# Patient Record
Sex: FEMALE | Race: WHITE | NOT HISPANIC OR LATINO | Employment: OTHER | URBAN - METROPOLITAN AREA
[De-identification: names, ages, dates, MRNs, and addresses within clinical notes are randomized per-mention and may not be internally consistent; named-entity substitution may affect disease eponyms.]

---

## 2017-03-22 DIAGNOSIS — F31.32 BIPOLAR 1 DISORDER, DEPRESSED, MODERATE (H): Primary | ICD-10-CM

## 2017-03-22 LAB — LITHIUM SERPL-SCNC: 0.3 MMOL/L (ref 0.6–1.2)

## 2017-03-22 PROCEDURE — 36415 COLL VENOUS BLD VENIPUNCTURE: CPT | Performed by: FAMILY MEDICINE

## 2017-03-22 PROCEDURE — 80178 ASSAY OF LITHIUM: CPT | Performed by: PSYCHIATRY & NEUROLOGY

## 2017-03-28 ENCOUNTER — TRANSFERRED RECORDS (OUTPATIENT)
Dept: HEALTH INFORMATION MANAGEMENT | Facility: CLINIC | Age: 82
End: 2017-03-28

## 2017-04-01 DIAGNOSIS — R25.1 TREMOR: ICD-10-CM

## 2017-04-01 NOTE — TELEPHONE ENCOUNTER
Medication Detail      Disp Refills Start End EDNA   propranolol (INDERAL) 20 MG tablet 60 tablet 11 10/6/2016  No   Sig: Take 1 tablet (20 mg) by mouth 2 times daily   Class: E-Prescribe   Route: Oral   Order: [id number]       Last Office Visit with [facility], [facility] or [facility] prescribing provider:  8/4/2016   Future Office Visit:        BP Readings from Last 3 Encounters:   08/04/16 118/78   05/14/15 126/66   11/21/14 122/80     Patient is requesting a 90 day supply

## 2017-04-05 RX ORDER — PROPRANOLOL HYDROCHLORIDE 20 MG/1
TABLET ORAL
Qty: 180 TABLET | Refills: 1 | Status: SHIPPED | OUTPATIENT
Start: 2017-04-05 | End: 2017-11-22

## 2017-05-15 ENCOUNTER — TELEPHONE (OUTPATIENT)
Dept: FAMILY MEDICINE | Facility: CLINIC | Age: 82
End: 2017-05-15

## 2017-05-15 DIAGNOSIS — F31.4 SEVERE DEPRESSED BIPOLAR I DISORDER WITHOUT PSYCHOTIC FEATURES (H): Primary | ICD-10-CM

## 2017-05-15 NOTE — TELEPHONE ENCOUNTER
Reason for Call: Request for an order or referral:    Order or referral being requested: Pt is calling req a referral for a psychiatrist. The one she is seeing now is retiring.     Date needed: as soon as possible    Has the patient been seen by the PCP for this problem? YES    Additional comments: NA    Phone number Patient can be reached at:  Home number on file [phone] (home)    Best Time:  anytime    Can we leave a detailed message on this number?  YES    Call taken on 5/15/2017 at 9:47 AM by Evita Carrillo

## 2017-08-29 ENCOUNTER — TELEPHONE (OUTPATIENT)
Dept: FAMILY MEDICINE | Facility: CLINIC | Age: 82
End: 2017-08-29

## 2017-08-29 DIAGNOSIS — F31.4 SEVERE DEPRESSED BIPOLAR I DISORDER WITHOUT PSYCHOTIC FEATURES (H): Primary | ICD-10-CM

## 2017-10-25 DIAGNOSIS — Z79.01 LONG-TERM (CURRENT) USE OF ANTICOAGULANTS: ICD-10-CM

## 2017-10-25 DIAGNOSIS — F31.32 MODERATE DEPRESSED BIPOLAR I DISORDER (H): Primary | ICD-10-CM

## 2017-10-25 LAB
ALBUMIN SERPL-MCNC: 3.2 G/DL (ref 3.4–5)
ALP SERPL-CCNC: 117 U/L (ref 40–150)
ALT SERPL W P-5'-P-CCNC: 17 U/L (ref 0–50)
ANION GAP SERPL CALCULATED.3IONS-SCNC: 13 MMOL/L (ref 3–14)
AST SERPL W P-5'-P-CCNC: 13 U/L (ref 0–45)
BILIRUB SERPL-MCNC: 0.3 MG/DL (ref 0.2–1.3)
BUN SERPL-MCNC: 28 MG/DL (ref 7–30)
CALCIUM SERPL-MCNC: 9.9 MG/DL (ref 8.5–10.1)
CHLORIDE SERPL-SCNC: 113 MMOL/L (ref 94–109)
CO2 SERPL-SCNC: 17 MMOL/L (ref 20–32)
CREAT SERPL-MCNC: 1.78 MG/DL (ref 0.52–1.04)
GFR SERPL CREATININE-BSD FRML MDRD: 27 ML/MIN/1.7M2
GLUCOSE SERPL-MCNC: 104 MG/DL (ref 70–99)
LITHIUM SERPL-SCNC: 1.29 MMOL/L (ref 0.6–1.2)
POTASSIUM SERPL-SCNC: 4.8 MMOL/L (ref 3.4–5.3)
PROT SERPL-MCNC: 7.2 G/DL (ref 6.8–8.8)
SODIUM SERPL-SCNC: 143 MMOL/L (ref 133–144)
T4 FREE SERPL-MCNC: 0.92 NG/DL (ref 0.76–1.46)
TSH SERPL DL<=0.005 MIU/L-ACNC: 3.04 MU/L (ref 0.4–4)

## 2017-10-25 PROCEDURE — 84479 ASSAY OF THYROID (T3 OR T4): CPT | Mod: 90

## 2017-10-25 PROCEDURE — 80178 ASSAY OF LITHIUM: CPT

## 2017-10-25 PROCEDURE — 84443 ASSAY THYROID STIM HORMONE: CPT

## 2017-10-25 PROCEDURE — 36415 COLL VENOUS BLD VENIPUNCTURE: CPT

## 2017-10-25 PROCEDURE — 99000 SPECIMEN HANDLING OFFICE-LAB: CPT

## 2017-10-25 PROCEDURE — 84439 ASSAY OF FREE THYROXINE: CPT

## 2017-10-25 PROCEDURE — 80053 COMPREHEN METABOLIC PANEL: CPT

## 2017-10-26 LAB — T3RU NFR SERPL: 32 % (ref 28–41)

## 2017-11-22 DIAGNOSIS — R25.1 TREMOR: ICD-10-CM

## 2017-11-22 NOTE — TELEPHONE ENCOUNTER
Medication Detail      Disp Refills Start End EDNA   propranolol (INDERAL) 20 MG tablet 180 tablet 1 4/5/2017  No   Sig: TAKE 1 TABLET BY MOUTH TWICE DAILY     LOV   8-4-16

## 2017-11-24 NOTE — TELEPHONE ENCOUNTER
BP Readings from Last 1 Encounters:   08/04/16 118/78     Last o v 8/16    Defer to pcp  Perlita Pepe RN

## 2017-11-27 RX ORDER — PROPRANOLOL HYDROCHLORIDE 20 MG/1
20 TABLET ORAL 2 TIMES DAILY
Qty: 60 TABLET | Refills: 0 | Status: SHIPPED | OUTPATIENT
Start: 2017-11-27 | End: 2017-12-26

## 2017-11-27 NOTE — TELEPHONE ENCOUNTER
Notified pt that medication was approved and sent to pharmacy and that an additional appointment must be scheduled prior to additional refills given. Pt stated she will call back to schedule.  Evita Golsdmith

## 2017-11-29 DIAGNOSIS — Z79.899 ENCOUNTER FOR LONG-TERM CURRENT USE OF MEDICATION: Primary | ICD-10-CM

## 2017-11-29 LAB — LITHIUM SERPL-SCNC: 0.42 MMOL/L (ref 0.6–1.2)

## 2017-11-29 PROCEDURE — 80178 ASSAY OF LITHIUM: CPT

## 2017-11-29 PROCEDURE — 36415 COLL VENOUS BLD VENIPUNCTURE: CPT | Performed by: FAMILY MEDICINE

## 2017-12-06 ENCOUNTER — TRANSFERRED RECORDS (OUTPATIENT)
Dept: HEALTH INFORMATION MANAGEMENT | Facility: CLINIC | Age: 82
End: 2017-12-06

## 2017-12-26 DIAGNOSIS — R25.1 TREMOR: ICD-10-CM

## 2017-12-26 NOTE — TELEPHONE ENCOUNTER
Medication Detail      Disp Refills Start End EDNA   propranolol (INDERAL) 20 MG tablet 60 tablet 0 11/27/2017  No   Sig: Take 1 tablet (20 mg) by mouth 2 times daily   Class: E-Prescribe   Route: Oral     LOV 8/4/2016

## 2017-12-28 RX ORDER — PROPRANOLOL HYDROCHLORIDE 20 MG/1
20 TABLET ORAL 2 TIMES DAILY
Qty: 15 TABLET | Refills: 0 | Status: SHIPPED | OUTPATIENT
Start: 2017-12-28 | End: 2018-01-09

## 2017-12-28 NOTE — TELEPHONE ENCOUNTER
Routing refill request to provider for review/approval because:  Associated diagnosis of tremor not an indicated use for this medication on [facility] Refill protocol  -patient also overdue for annual office visit  -Nicole refill given and patient did not follow up.  See 11/22/17 refill encounter.    Dr. Quintanilla-Please sign if agree.  15 day supply pended.    Team coordinators-Please contact patient to schedule annual office visit.    Thank you!  AMITA LangstonN, RN        Requested Prescriptions   Pending Prescriptions Disp Refills     propranolol (INDERAL) 20 MG tablet 60 tablet 0     Sig: Take 1 tablet (20 mg) by mouth 2 times daily    Beta-Blockers Protocol Failed    12/26/2017  3:57 PM       Failed - Blood pressure under 140/90    BP Readings from Last 3 Encounters:   08/04/16 118/78   05/14/15 126/66   11/21/14 122/80                Failed - Recent or future visit with authorizing provider's specialty    Patient had office visit in the last year or has a visit in the next 30 days with authorizing provider.  See chart review.              Passed - Patient is age 6 or older

## 2017-12-28 NOTE — TELEPHONE ENCOUNTER
"Notified pt that medication was approved and sent to pharmacy and that an additional appointment must be scheduled prior to additional refills given. Pt stated she will \"try\" to schedule an appt in [place]  [place]       "

## 2018-01-09 ENCOUNTER — OFFICE VISIT (OUTPATIENT)
Dept: FAMILY MEDICINE | Facility: CLINIC | Age: 83
End: 2018-01-09
Payer: MEDICARE

## 2018-01-09 VITALS
WEIGHT: 168 LBS | HEART RATE: 74 BPM | BODY MASS INDEX: 31.72 KG/M2 | TEMPERATURE: 98.6 F | RESPIRATION RATE: 12 BRPM | HEIGHT: 61 IN | DIASTOLIC BLOOD PRESSURE: 72 MMHG | SYSTOLIC BLOOD PRESSURE: 126 MMHG | OXYGEN SATURATION: 94 %

## 2018-01-09 DIAGNOSIS — R25.1 TREMOR: ICD-10-CM

## 2018-01-09 DIAGNOSIS — R53.83 FATIGUE, UNSPECIFIED TYPE: ICD-10-CM

## 2018-01-09 DIAGNOSIS — Z91.81 AT RISK FOR FALLING: ICD-10-CM

## 2018-01-09 DIAGNOSIS — N18.4 CKD (CHRONIC KIDNEY DISEASE) STAGE 4, GFR 15-29 ML/MIN (H): ICD-10-CM

## 2018-01-09 DIAGNOSIS — E53.8 VITAMIN B 12 DEFICIENCY: ICD-10-CM

## 2018-01-09 DIAGNOSIS — R60.0 PERIPHERAL EDEMA: ICD-10-CM

## 2018-01-09 DIAGNOSIS — E55.9 VITAMIN D DEFICIENCY: ICD-10-CM

## 2018-01-09 DIAGNOSIS — F31.4 SEVERE DEPRESSED BIPOLAR I DISORDER WITHOUT PSYCHOTIC FEATURES (H): ICD-10-CM

## 2018-01-09 DIAGNOSIS — Z00.00 MEDICARE ANNUAL WELLNESS VISIT, SUBSEQUENT: Primary | ICD-10-CM

## 2018-01-09 LAB
ERYTHROCYTE [DISTWIDTH] IN BLOOD BY AUTOMATED COUNT: 14.3 % (ref 10–15)
HCT VFR BLD AUTO: 40.6 % (ref 35–47)
HGB BLD-MCNC: 12.7 G/DL (ref 11.7–15.7)
MCH RBC QN AUTO: 31.6 PG (ref 26.5–33)
MCHC RBC AUTO-ENTMCNC: 31.3 G/DL (ref 31.5–36.5)
MCV RBC AUTO: 101 FL (ref 78–100)
PLATELET # BLD AUTO: 282 10E9/L (ref 150–450)
RBC # BLD AUTO: 4.02 10E12/L (ref 3.8–5.2)
VIT B12 SERPL-MCNC: 340 PG/ML (ref 193–986)
WBC # BLD AUTO: 8.2 10E9/L (ref 4–11)

## 2018-01-09 PROCEDURE — 85027 COMPLETE CBC AUTOMATED: CPT | Performed by: FAMILY MEDICINE

## 2018-01-09 PROCEDURE — 82607 VITAMIN B-12: CPT | Performed by: FAMILY MEDICINE

## 2018-01-09 PROCEDURE — G0439 PPPS, SUBSEQ VISIT: HCPCS | Performed by: FAMILY MEDICINE

## 2018-01-09 PROCEDURE — 36415 COLL VENOUS BLD VENIPUNCTURE: CPT | Performed by: FAMILY MEDICINE

## 2018-01-09 PROCEDURE — 82306 VITAMIN D 25 HYDROXY: CPT | Performed by: FAMILY MEDICINE

## 2018-01-09 RX ORDER — PROPRANOLOL HYDROCHLORIDE 20 MG/1
20 TABLET ORAL 2 TIMES DAILY
Qty: 180 TABLET | Refills: 3 | Status: SHIPPED | OUTPATIENT
Start: 2018-01-09 | End: 2019-01-22

## 2018-01-09 NOTE — MR AVS SNAPSHOT
After Visit Summary   1/9/2018    Lennie Mar    MRN: [id number]           Patient Information     Date Of Birth          11/28/1927        Visit Information        Provider Department      1/9/2018 2:00 PM Ty Quintanilla MD [facility]        Today's Diagnoses     Vitamin D deficiency    -  1    At risk for falling        Medicare annual wellness visit, subsequent        Tremor        Vitamin B 12 deficiency        Peripheral edema        Fatigue, unspecified type        CKD (chronic kidney disease) stage 4, GFR 15-29 ml/min (H)          Care Instructions      Preventive Health Recommendations    Female Ages 65 +    Yearly exam:     See your health care provider every year in order to  o Review health changes.   o Discuss preventive care.    o Review your medicines if your doctor has prescribed any.      You no longer need a yearly Pap test unless you've had an abnormal Pap test in the past 10 years. If you have vaginal symptoms, such as bleeding or discharge, be sure to talk with your provider about a Pap test.      Every 1 to 2 years, have a mammogram.  If you are over 69, talk with your health care provider about whether or not you want to continue having screening mammograms.      Every 10 years, have a colonoscopy. Or, have a yearly FIT test (stool test). These exams will check for colon cancer.       Have a cholesterol test every 5 years, or more often if your doctor advises it.       Have a diabetes test (fasting glucose) every three years. If you are at risk for diabetes, you should have this test more often.       At age 65, have a bone density scan (DEXA) to check for osteoporosis (brittle bone disease).    Shots:    Get a flu shot each year.    Get a tetanus shot every 10 years.    Talk to your doctor about your pneumonia vaccines. There are now two you should receive - Pneumovax (PPSV 23) and Prevnar (PCV 13).    Talk to your doctor about the shingles  "vaccine.    Talk to your doctor about the hepatitis B vaccine.    Nutrition:     Eat at least 5 servings of fruits and vegetables each day.      Eat whole-grain bread, whole-wheat pasta and brown rice instead of white grains and rice.      Talk to your provider about Calcium and Vitamin D.     Lifestyle    Exercise at least 150 minutes a week (30 minutes a day, 5 days a week). This will help you control your weight and prevent disease.      Limit alcohol to one drink per day.      No smoking.       Wear sunscreen to prevent skin cancer.       See your dentist twice a year for an exam and cleaning.      See your eye doctor every 1 to 2 years to screen for conditions such as glaucoma, macular degeneration and cataracts.          Follow-ups after your visit        Who to contact     If you have questions or need follow up information about today's clinic visit or your schedule please contact [facility] directly at [phone].  Normal or non-critical lab and imaging results will be communicated to you by MyChart, letter or phone within 4 business days after the clinic has received the results. If you do not hear from us within 7 days, please contact the clinic through [employer] or phone. If you have a critical or abnormal lab result, we will notify you by phone as soon as possible.  Submit refill requests through [employer] or call your pharmacy and they will forward the refill request to us. Please allow 3 business days for your refill to be completed.          Additional Information About Your Visit        [employer] Information     [employer] lets you send messages to your doctor, view your test results, renew your prescriptions, schedule appointments and more. To sign up, go to [email or web address] . Click on \"Log in\" on the left side of the screen, which will take you to the Welcome page. Then click on \"Sign up Now\" on the right side of the page.     You will be asked to enter the access code listed " "below, as well as some personal information. Please follow the directions to create your username and password.     Your access code is: [email or web address]  Expires: 2018  2:42 PM     Your access code will  in 90 days. If you need help or a new code, please call your [facility] clinic or [phone].        Care EveryWhere ID     This is your Care EveryWhere ID. This could be used by other organizations to access your [facility] medical records  [id number]        Your Vitals Were     Pulse Temperature Respirations Height Pulse Oximetry BMI (Body Mass Index)    74 98.6  F (37  C) (Oral) 12 5' 1\" (1.549 m) 94% 31.74 kg/m2       Blood Pressure from Last 3 Encounters:   18 126/72   16 118/78   05/14/15 126/66    Weight from Last 3 Encounters:   18 168 lb (76.2 kg)   16 178 lb 12.8 oz (81.1 kg)   05/14/15 187 lb (84.8 kg)              We Performed the Following     CBC with platelets     Vitamin B12     Vitamin D Deficiency          Where to get your medicines      These medications were sent to [id number] - [facility] [address] AT [facility]  [address], [facility] [address]     Phone:  [phone]     propranolol 20 MG tablet          Primary Care Provider Office Phone # Fax #    Ty Quintanilla [phone] [address]  [facility] [address]        Equal Access to Services     [facility]: Hadii aad cody hadasho Sojean marie, waaxda luqadaha, qaybta kaalmada mellisa pagan . So [facility] [phone].    ATENCIÓN: Si habla español, tiene a foster disposición servicios gratuitos de asistencia lingüística. Llame al [phone].    We comply with applicable federal civil rights laws and [place] laws. We do not discriminate on the basis of race, color, national origin, age, disability, sex, sexual orientation, or gender identity.            Thank you!     Thank you for choosing " [facility]  for your care. Our goal is always to provide you with excellent care. Hearing back from our patients is one way we can continue to improve our services. Please take a few minutes to complete the written survey that you may receive in the mail after your visit with us. Thank you!             Your Updated Medication List - Protect others around you: Learn how to safely use, store and throw away your medicines at www.disposemymeds.org.          This list is accurate as of: 1/9/18  2:42 PM.  Always use your most recent med list.                   Brand Name Dispense Instructions for use Diagnosis    acetaminophen 325 MG tablet    TYLENOL    100 tablet    Take 2 tablets by mouth every 4 hours as needed for pain.    S/P colon resection       LATUDA PO      Take by mouth daily        * lithium 150 MG capsule     30 capsule    Take 1 capsule by mouth daily (with breakfast).    Bipolar disorder, unspecified       * lithium 300 MG capsule     30 capsule    Take 1 capsule by mouth daily (with dinner).    Bipolar disorder, unspecified       order for DME     2 Package    Equipment being ordered: compression stocking knee high 20-30mmhg    Edema       propranolol 20 MG tablet    INDERAL    180 tablet    Take 1 tablet (20 mg) by mouth 2 times daily    Tremor       sertraline 50 MG tablet    ZOLOFT     Take 25 mg by mouth daily        * Notice:  This list has 2 medication(s) that are the same as other medications prescribed for you. Read the directions carefully, and ask your doctor or other care provider to review them with you.

## 2018-01-09 NOTE — PROGRESS NOTES
SUBJECTIVE:   Lennie Mar is a 89 year old female who presents for Preventive Visit.  She is followed regularly by psychiatry. They do labs including kidney function testing and thyroid testing. She had been seen by nephrology but hasn't recently due to them saying she needn't follow up there any longer as things were stable.   Are you in the first 12 months of your Medicare Part B coverage?  No    Healthy Habits:    Do you get at least three servings of calcium containing foods daily (dairy, green leafy vegetables, etc.)? yes    Amount of exercise or daily activities, outside of work: 0 day(s) per week    Problems taking medications regularly No    Medication side effects: No    Have you had an eye exam in the past two years? yes    Do you see a dentist twice per year? yes    Do you have sleep apnea, excessive snoring or daytime drowsiness?no      Ability to successfully perform activities of daily living: No, needs assistance with: transportation, housework and laundry    Home safety:  none identified -lives in a independent community     Hearing impairment: No    Fall risk:  Fallen 2 or more times in the past year?: No  Any fall with injury in the past year?: No        COGNITIVE SCREEN  1) Repeat 3 items (Banana, Sunrise, Chair)    2) Clock draw: NORMAL  3) 3 item recall: Recalls 3 objects  Results: 3 items recalled: COGNITIVE IMPAIRMENT LESS LIKELY    Mini-CogTM Copyright KILO Roldan. Licensed by the author for use in [facility]; reprinted with permission (shae@[facility]). All rights reserved.       she lives in an apartment. Spends time with family going to movies frequently and they have thes at her apratment. She report having more severe depression recently but is coming out of that on her new medication. She is unsure of what the med changes were exactly.     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 No                        Today's PHQ-2 Score:   PHQ-2 ( 1999 Pfizer) 1/9/2018 5/14/2015   Q1: Little interest or pleasure in doing things 0 0   Q2: Feeling down, depressed or hopeless 0 0   PHQ-2 Score 0 0         Do you feel safe in your environment - Yes    Do you have a Health Care Directive?: No: Advance care planning was reviewed with patient; patient declined at this time.      Current providers sharing in care for this patient include: Patient Care Team:  Ty Quintanilla MD as PCP - Elton Hussein MD as MD (Psychiatry)    The following health maintenance items are reviewed in Epic and correct as of today:  Health Maintenance   Topic Date Due     MEDICARE ANNUAL WELLNESS VISIT  01/09/2019     FALL RISK ASSESSMENT  01/09/2019     TETANUS IMMUNIZATION (SYSTEM ASSIGNED)  05/11/2019     ADVANCE DIRECTIVE PLANNING Q5 YRS  01/09/2023     INFLUENZA VACCINE (SYSTEM ASSIGNED)  Completed     PNEUMOCOCCAL  Completed     Labs reviewed in EPIC        ROS: she is chronically incontinent of urine. She reports occ edema about her ankles without associated shortness of breath. This resolves spontaneously. Unclear of trigger.   Constitutional, HEENT, cardiovascular, pulmonary, gi and gu systems are negative, except as otherwise noted.      OBJECTIVE:   /72 (BP Location: Left arm, Patient Position: Sitting, Cuff Size: Adult Regular)  Pulse 74  Temp 98.6  F (37  C) (Oral)  Resp 12  Ht 5' 1\" (1.549 m)  Wt 168 lb (76.2 kg)  SpO2 94%  BMI 31.74 kg/m2 Estimated body mass index is " "31.74 kg/(m^2) as calculated from the following:    Height as of this encounter: 5' 1\" (1.549 m).    Weight as of this encounter: 168 lb (76.2 kg).  EXAM:   GENERAL: healthy, alert and no distress  EYES: Eyes grossly normal to inspection, PERRL and conjunctivae and sclerae normal  HENT: ear canals and TM's normal, nose and mouth without ulcers or lesions  NECK: no adenopathy, no asymmetry, masses, or scars and thyroid normal to palpation  RESP: lungs clear to auscultation - no rales, rhonchi or wheezes  BREAST: normal without masses, tenderness or nipple discharge and no palpable axillary masses or adenopathy  CV: regular rate and rhythm, normal S1 S2, no S3 or S4, no murmur, click or rub, no peripheral edema and peripheral pulses strong  ABDOMEN: soft, nontender, no hepatosplenomegaly, no masses and bowel sounds normal  MS: no gross musculoskeletal defects noted, no edema today  SKIN: no suspicious lesions or rashes  PSYCH: mentation appears normal, affect normal/bright    ASSESSMENT / PLAN:       ICD-10-CM    1. Medicare annual wellness visit, subsequent Z00.00    2. CKD (chronic kidney disease) stage 4, GFR 15-29 ml/min (H) N18.4    3. Severe depressed bipolar I disorder without psychotic features (H) F31.4    4. Tremor R25.1 propranolol (INDERAL) 20 MG tablet   5. Vitamin D deficiency E55.9 Vitamin D Deficiency   6. Vitamin B 12 deficiency E53.8 Vitamin B12   7. Peripheral edema R60.9    8. Fatigue, unspecified type R53.83 Vitamin B12     CBC with platelets   9. At risk for falling Z91.81    we refilled her propranolol. She has been stable this past year medically. Will repeat the b12 and cbc as this has not been done for some time.     End of Life Planning:  Patient currently has an advanced directive: she has a POLST but no advance directive. Provided her with the short form. She wants her son, Mg, to be the primary decision maker for her.     COUNSELING:  Reviewed preventive health counseling, as reflected " "in patient instructions       Regular exercise    BP Screening:   Last 3 BP Readings:    BP Readings from Last 3 Encounters:   01/09/18 126/72   08/04/16 118/78   05/14/15 126/66       The following was recommended to the patient:  Re-screen BP within a year and recommended lifestyle modifications    Estimated body mass index is 31.74 kg/(m^2) as calculated from the following:    Height as of this encounter: 5' 1\" (1.549 m).    Weight as of this encounter: 168 lb (76.2 kg).  Weight management plan: Discussed healthy diet and exercise guidelines and patient will follow up in 12 months in clinic to re-evaluate.     reports that she quit smoking about 24 years ago. Her smoking use included Cigarettes. She has a 45.00 pack-year smoking history. She has never used smokeless tobacco.        Appropriate preventive services were discussed with this patient, including applicable screening as appropriate for cardiovascular disease, diabetes, osteopenia/osteoporosis, and glaucoma.  As appropriate for age/gender, discussed screening for colorectal cancer, prostate cancer, breast cancer, and cervical cancer. Checklist reviewing preventive services available has been given to the patient.    Reviewed patients plan of care and provided an AVS. The Intermediate Care Plan ( asthma action plan, low back pain action plan, and migraine action plan) for Lennie meets the Care Plan requirement. This Care Plan has been established and reviewed with the Patient.    Counseling Resources:  ATP IV Guidelines  Pooled Cohorts Equation Calculator  Breast Cancer Risk Calculator  FRAX Risk Assessment  ICSI Preventive Guidelines  Dietary Guidelines for Americans, 2010  USDA's MyPlate  ASA Prophylaxis  Lung CA Screening    Ty Quintanilla MD  [facility]  "

## 2018-01-09 NOTE — NURSING NOTE
"Chief Complaint   Patient presents with     Physical       Initial /72 (BP Location: Left arm, Patient Position: Sitting, Cuff Size: Adult Regular)  Pulse 74  Temp 98.6  F (37  C) (Oral)  Resp 12  Ht 5' 1\" (1.549 m)  Wt 168 lb (76.2 kg)  SpO2 94%  BMI 31.74 kg/m2 Estimated body mass index is 31.74 kg/(m^2) as calculated from the following:    Height as of this encounter: 5' 1\" (1.549 m).    Weight as of this encounter: 168 lb (76.2 kg).  Medication Reconciliation: complete       Diego Davila MA      "

## 2018-01-09 NOTE — LETTER
January 16, 2018      Lennie Pauline Zaid  1955 [address]   [facility] [address]        Dear ,    We are writing to inform you of your test results.    Your vitamin D level is low. We should replace it with vitamin d at 50,000 IU weekly for 2-3 months then recheck. A prescription has been sent to your pharmacy.      The b 12 is borderline low as well. Are you taking b 12 1000mg daily. If not, you should start. This is available over the counter.     We could recheck these 2 in 3 months.    I will have my nurse try to call you with the results to make sure you get the message.    Resulted Orders   Vitamin B12   Result Value Ref Range    Vitamin B12 340 193 - 986 pg/mL   Vitamin D Deficiency   Result Value Ref Range    Vitamin D Deficiency screening 12 (L) 20 - 75 ug/L      Comment:      Season, race, dietary intake, and treatment affect the concentration of   25-hydroxy-Vitamin D. Values may decrease during winter months and increase   during summer months. Values 20-29 ug/L may indicate Vitamin D insufficiency   and values <20 ug/L may indicate Vitamin D deficiency.  Vitamin D determination is routinely performed by an immunoassay specific for   25 hydroxyvitamin D3.  If an individual is on vitamin D2 (ergocalciferol)   supplementation, please specify 25 OH vitamin D2 and D3 level determination by   LCMSMS test VITD23.     CBC with platelets   Result Value Ref Range    WBC 8.2 4.0 - 11.0 10e9/L    RBC Count 4.02 3.8 - 5.2 10e12/L    Hemoglobin 12.7 11.7 - 15.7 g/dL    Hematocrit 40.6 35.0 - 47.0 %     (H) 78 - 100 fl    MCH 31.6 26.5 - 33.0 pg    MCHC 31.3 (L) 31.5 - 36.5 g/dL    RDW 14.3 10.0 - 15.0 %    Platelet Count 282 150 - 450 10e9/L       If you have any questions or concerns, please call the clinic at the number listed above.       Sincerely,        Ty Quintanilla MD/nr

## 2018-01-10 LAB — DEPRECATED CALCIDIOL+CALCIFEROL SERPL-MC: 12 UG/L (ref 20–75)

## 2018-01-15 ENCOUNTER — TELEPHONE (OUTPATIENT)
Dept: FAMILY MEDICINE | Facility: CLINIC | Age: 83
End: 2018-01-15

## 2018-01-15 DIAGNOSIS — E53.8 VITAMIN B12 DEFICIENCY (NON ANEMIC): ICD-10-CM

## 2018-01-15 DIAGNOSIS — E55.9 VITAMIN D DEFICIENCY: Primary | ICD-10-CM

## 2018-01-15 RX ORDER — ERGOCALCIFEROL 1.25 MG/1
50000 CAPSULE, LIQUID FILLED ORAL
Qty: 12 CAPSULE | Refills: 0 | Status: SHIPPED | OUTPATIENT
Start: 2018-01-15 | End: 2018-11-08

## 2018-01-15 RX ORDER — ERGOCALCIFEROL 1.25 MG/1
50000 CAPSULE, LIQUID FILLED ORAL
Qty: 12 CAPSULE | Refills: 0 | Status: SHIPPED | OUTPATIENT
Start: 2018-01-15 | End: 2018-01-15

## 2018-01-15 NOTE — LETTER
January 15, 2018      Lennie Mar  1955 [address]   [place] [address]        Dear Lennie,     Your vitamin D level is low. We should replace it with vitamin d at 50,000 IU weekly for 2-3 months then recheck. A prescription has been sent to your pharmacy.       The b 12 is borderline low as well. Are you taking b 12 1000mg daily. If not, you should start.      We could recheck these 2 in 3 months.     I will have my nurse try to call you with the results to make sure you get the message.     Ty Quintanilla                Sincerely,        Ty Quintanilla MD/Sienna Kessler RN

## 2018-01-15 NOTE — TELEPHONE ENCOUNTER
Triage nurse called patient and reviewed the lab result note below. Also mailed letter to patient.  Future orders placed for labs .  Patient states she understands the plan.   Sienna Kessler RN

## 2018-01-15 NOTE — TELEPHONE ENCOUNTER
Your vitamin D level is low. We should replace it with vitamin d at 50,000 IU weekly for 2-3 months then recheck. A prescription has been sent to your pharmacy.      The b 12 is borderline low as well. Are you taking b 12 1000mg daily. If not, you should start.     We could recheck these 2 in 3 months.    I will have my nurse try to call you with the results to make sure you get the message.    Ty Quintanilla

## 2018-02-27 ENCOUNTER — MEDICAL CORRESPONDENCE (OUTPATIENT)
Dept: HEALTH INFORMATION MANAGEMENT | Facility: CLINIC | Age: 83
End: 2018-02-27

## 2018-03-02 DIAGNOSIS — E53.8 VITAMIN B12 DEFICIENCY (NON ANEMIC): ICD-10-CM

## 2018-03-02 DIAGNOSIS — E53.8 VITAMIN B 12 DEFICIENCY: ICD-10-CM

## 2018-03-02 DIAGNOSIS — Z79.899 ENCOUNTER FOR LONG-TERM (CURRENT) USE OF MEDICATIONS: Primary | ICD-10-CM

## 2018-03-02 DIAGNOSIS — E55.9 VITAMIN D DEFICIENCY: ICD-10-CM

## 2018-03-02 LAB
ALBUMIN SERPL-MCNC: 3.3 G/DL (ref 3.4–5)
ALP SERPL-CCNC: 102 U/L (ref 40–150)
ALT SERPL W P-5'-P-CCNC: 15 U/L (ref 0–50)
ANION GAP SERPL CALCULATED.3IONS-SCNC: 8 MMOL/L (ref 3–14)
AST SERPL W P-5'-P-CCNC: 15 U/L (ref 0–45)
BILIRUB SERPL-MCNC: 0.3 MG/DL (ref 0.2–1.3)
BUN SERPL-MCNC: 28 MG/DL (ref 7–30)
CALCIUM SERPL-MCNC: 9.6 MG/DL (ref 8.5–10.1)
CHLORIDE SERPL-SCNC: 110 MMOL/L (ref 94–109)
CO2 SERPL-SCNC: 23 MMOL/L (ref 20–32)
CREAT SERPL-MCNC: 1.83 MG/DL (ref 0.52–1.04)
GFR SERPL CREATININE-BSD FRML MDRD: 26 ML/MIN/1.7M2
GLUCOSE SERPL-MCNC: 94 MG/DL (ref 70–99)
LITHIUM SERPL-SCNC: 0.6 MMOL/L (ref 0.6–1.2)
POTASSIUM SERPL-SCNC: 4.6 MMOL/L (ref 3.4–5.3)
PROT SERPL-MCNC: 6.8 G/DL (ref 6.8–8.8)
SODIUM SERPL-SCNC: 141 MMOL/L (ref 133–144)
TSH SERPL DL<=0.005 MIU/L-ACNC: 2.25 MU/L (ref 0.4–4)
VIT B12 SERPL-MCNC: 664 PG/ML (ref 193–986)

## 2018-03-02 PROCEDURE — 99000 SPECIMEN HANDLING OFFICE-LAB: CPT | Performed by: FAMILY MEDICINE

## 2018-03-02 PROCEDURE — 80053 COMPREHEN METABOLIC PANEL: CPT

## 2018-03-02 PROCEDURE — 83921 ORGANIC ACID SINGLE QUANT: CPT | Mod: 90 | Performed by: FAMILY MEDICINE

## 2018-03-02 PROCEDURE — 84443 ASSAY THYROID STIM HORMONE: CPT | Performed by: FAMILY MEDICINE

## 2018-03-02 PROCEDURE — 82306 VITAMIN D 25 HYDROXY: CPT | Performed by: FAMILY MEDICINE

## 2018-03-02 PROCEDURE — 82607 VITAMIN B-12: CPT | Performed by: FAMILY MEDICINE

## 2018-03-02 PROCEDURE — 80178 ASSAY OF LITHIUM: CPT

## 2018-03-02 PROCEDURE — 36415 COLL VENOUS BLD VENIPUNCTURE: CPT | Performed by: FAMILY MEDICINE

## 2018-03-04 LAB — METHYLMALONATE SERPL-SCNC: 0.22 UMOL/L (ref 0–0.4)

## 2018-03-05 LAB — DEPRECATED CALCIDIOL+CALCIFEROL SERPL-MC: 21 UG/L (ref 20–75)

## 2018-04-24 DIAGNOSIS — E55.9 VITAMIN D DEFICIENCY: ICD-10-CM

## 2018-04-24 RX ORDER — ERGOCALCIFEROL 1.25 MG/1
50000 CAPSULE, LIQUID FILLED ORAL
Qty: 12 CAPSULE | Refills: 0 | OUTPATIENT
Start: 2018-04-24

## 2018-04-24 NOTE — TELEPHONE ENCOUNTER
vitamin D (ERGOCALCIFEROL) 57743 UNIT capsule      Last Written Prescription Date:  1-15-18  Last Fill Quantity: 12 cap,   # refills: 0  Last Office Visit: 1-9-18  Future Office visit:    Next 5 appointments (look out 90 days)     May 01, 2018  2:00 PM CDT   Office Visit with Ty Quintanilla MD   [facility]    [address] [place] [address] [phone]                   Routing refill request to provider for review/approval because:  Drug not on the [facility], [facility] or [facility] refill protocol or controlled substance

## 2018-05-01 ENCOUNTER — OFFICE VISIT (OUTPATIENT)
Dept: FAMILY MEDICINE | Facility: CLINIC | Age: 83
End: 2018-05-01
Payer: MEDICARE

## 2018-05-01 VITALS
WEIGHT: 165 LBS | OXYGEN SATURATION: 97 % | SYSTOLIC BLOOD PRESSURE: 125 MMHG | RESPIRATION RATE: 18 BRPM | BODY MASS INDEX: 31.18 KG/M2 | HEART RATE: 67 BPM | DIASTOLIC BLOOD PRESSURE: 71 MMHG | TEMPERATURE: 97.7 F

## 2018-05-01 DIAGNOSIS — N63.20 LEFT BREAST LUMP: Primary | ICD-10-CM

## 2018-05-01 DIAGNOSIS — D48.62 NEOPLASM OF UNCERTAIN BEHAVIOR OF LEFT BREAST: ICD-10-CM

## 2018-05-01 PROCEDURE — 99214 OFFICE O/P EST MOD 30 MIN: CPT | Performed by: FAMILY MEDICINE

## 2018-05-01 NOTE — MR AVS SNAPSHOT
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 If you do, we will ask you to remove it before your exam.  Wear comfortable, two-piece clothing.  If you have any allergies, tell your care team.  Bring any previous mammograms from other facilities or have them mailed to the breast center.  Three-dimensional (3D) mammograms are available at [facility] locations in [facility], [facility], [facility], and [place]. [facility] locations include [place] and [facility] in [place]. Benefits of 3D mammograms include: - Improved rate of cancer detection - Decreases your chance of having to go back for more tests, which means fewer: - \"False-positive\" results (This means that there is an abnormal area but it isn't cancer.) - Invasive testing procedures, such as a biopsy or surgery - Can provide clearer images of the breast if you have dense breast tissue. 3D mammography is an optional exam that anyone can have with a 2D mammogram. It doesn't replace or take the place of a 2D mammogram. 2D mammograms remain an effective screening test for all women.  Not all insurance companies cover the cost of a 3D mammogram. Check with your insurance.            " "May 04, 2018  1:30 PM CDT   US BREAST LEFT LIMITED 1-3 QUAD with UCBCUS2   [facility] ([facility])    [address]  2nd Floor  [facility] [address] [phone]           Please bring a list of your medicines (including vitamins, minerals and over-the-counter drugs). Also, tell your doctor about any allergies you may have. Wear comfortable clothes and leave your valuables at home.  You do not need to do anything special to prepare for your exam.  Please call the Imaging Department at your exam site with any questions.              Future tests that were ordered for you today     Open Future Orders        Priority Expected Expires Ordered    US Breast Left Limited 1-3 Quadrants Routine  5/1/2019 5/1/2018    MA Diagnostic Bilateral w/Asher Routine  5/1/2019 5/1/2018            Who to contact     If you have questions or need follow up information about today's clinic visit or your schedule please contact [facility] directly at [phone].  Normal or non-critical lab and imaging results will be communicated to you by MyChart, letter or phone within 4 business days after the clinic has received the results. If you do not hear from us within 7 days, please contact the clinic through Socialitehart or phone. If you have a critical or abnormal lab result, we will notify you by phone as soon as possible.  Submit refill requests through SocialThreader or call your pharmacy and they will forward the refill request to us. Please allow 3 business days for your refill to be completed.          Additional Information About Your Visit        SocialThreader Information     SocialThreader lets you send messages to your doctor, view your test results, renew your prescriptions, schedule appointments and more. To sign up, go to [email or web address] . Click on \"Log in\" on the left side of the screen, which will take you to the Welcome page. Then click on \"Sign up Now\" on the right " side of the page.     You will be asked to enter the access code listed below, as well as some personal information. Please follow the directions to create your username and password.     Your access code is: [email or web address]  Expires: 2018  2:37 PM     Your access code will  in 90 days. If you need help or a new code, please call your [facility] clinic or [phone].        Care EveryWhere ID     This is your Care EveryWhere ID. This could be used by other organizations to access your [facility] medical records  [id number]        Your Vitals Were     Pulse Temperature Respirations Pulse Oximetry BMI (Body Mass Index)       67 97.7  F (36.5  C) (Oral) 18 97% 31.18 kg/m2        Blood Pressure from Last 3 Encounters:   18 125/71   18 126/72   16 118/78    Weight from Last 3 Encounters:   18 165 lb (74.8 kg)   18 168 lb (76.2 kg)   16 178 lb 12.8 oz (81.1 kg)              Today, you had the following     No orders found for display       Primary Care Provider Office Phone # Fax #    Ty Quintanilla [phone] [address] [address]        Equal Access to Services     [facility]: Hadii dottie ross hadasho Soomaali, waaxda luqadaha, qaybta kaalmada adeegyada, mellisa lim . So [facility] [phone].    ATENCIÓN: Si habla español, tiene a foster disposición servicios gratuitos de asistencia lingüística. Llame al [phone].    We comply with applicable federal civil rights laws and [place] laws. We do not discriminate on the basis of race, color, national origin, age, disability, sex, sexual orientation, or gender identity.            Thank you!     Thank you for choosing [facility]  for your care. Our goal is always to provide you with excellent care. Hearing back from our patients is one way we can continue to improve our services. Please take a few minutes to complete the written survey that you may  receive in the mail after your visit with us. Thank you!             Your Updated Medication List - Protect others around you: Learn how to safely use, store and throw away your medicines at www.disposemymeds.org.          This list is accurate as of 5/1/18  2:37 PM.  Always use your most recent med list.                   Brand Name Dispense Instructions for use Diagnosis    acetaminophen 325 MG tablet    TYLENOL    100 tablet    Take 2 tablets by mouth every 4 hours as needed for pain.    S/P colon resection       LATUDA PO      Take by mouth daily        * lithium 150 MG capsule     30 capsule    Take 1 capsule by mouth daily (with breakfast).    Bipolar disorder, unspecified       * lithium 300 MG capsule     30 capsule    Take 1 capsule by mouth daily (with dinner).    Bipolar disorder, unspecified       order for DME     2 Package    Equipment being ordered: compression stocking knee high 20-30mmhg    Edema       propranolol 20 MG tablet    INDERAL    180 tablet    Take 1 tablet (20 mg) by mouth 2 times daily    Tremor       sertraline 50 MG tablet    ZOLOFT     Take 25 mg by mouth daily        vitamin D 77218 UNIT capsule    ERGOCALCIFEROL    12 capsule    Take 1 capsule (50,000 Units) by mouth every 7 days    Vitamin D deficiency       * Notice:  This list has 2 medication(s) that are the same as other medications prescribed for you. Read the directions carefully, and ask your doctor or other care provider to review them with you.

## 2018-05-01 NOTE — PROGRESS NOTES
SUBJECTIVE:   Lennie Mar is a 89 year old female who presents to clinic today for the following health issues:      Breast Lump      Duration: unknow    Description (location/character/radiation): left breast     Intensity:  moderate    Accompanying signs and symptoms: none    History (similar episodes/previous evaluation): None    Precipitating or alleviating factors: None    Therapies tried and outcome: None     Not sure how long it was there. Noted when she rolled over in bed.     Hard. Tender.     No fever. Otherwise has been feeling well. No sweats nor weight change.     No cough nor lung symptoms    OBJECTIVE: /71  Pulse 67  Temp 97.7  F (36.5  C) (Oral)  Resp 18  Wt 165 lb (74.8 kg)  SpO2 97%  BMI 31.18 kg/m2 no apparent distress   left breast with 2cm firm mass just at edge of nipple about 4 oclock position.     No lad.       ICD-10-CM    1. Left breast lump N63.20 MA Diagnostic Digital Left   2. 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 Concerning for cancer. Discussed with her.

## 2018-05-04 ENCOUNTER — RADIANT APPOINTMENT (OUTPATIENT)
Dept: MAMMOGRAPHY | Facility: CLINIC | Age: 83
End: 2018-05-04
Payer: MEDICARE

## 2018-05-04 DIAGNOSIS — N63.20 LEFT BREAST LUMP: ICD-10-CM

## 2018-05-04 DIAGNOSIS — D48.62 NEOPLASM OF UNCERTAIN BEHAVIOR OF LEFT BREAST: ICD-10-CM

## 2018-05-09 ENCOUNTER — RADIANT APPOINTMENT (OUTPATIENT)
Dept: MAMMOGRAPHY | Facility: CLINIC | Age: 83
End: 2018-05-09
Attending: FAMILY MEDICINE
Payer: MEDICARE

## 2018-05-09 DIAGNOSIS — R92.8 ABNORMAL FINDING ON BREAST IMAGING: ICD-10-CM

## 2018-05-09 PROCEDURE — 88305 TISSUE EXAM BY PATHOLOGIST: CPT | Performed by: FAMILY MEDICINE

## 2018-05-09 PROCEDURE — 00000159 ZZHCL STATISTIC H-SEND OUTS PREP: Performed by: FAMILY MEDICINE

## 2018-05-09 PROCEDURE — 88342 IMHCHEM/IMCYTCHM 1ST ANTB: CPT | Performed by: FAMILY MEDICINE

## 2018-05-09 PROCEDURE — 88360 TUMOR IMMUNOHISTOCHEM/MANUAL: CPT | Performed by: FAMILY MEDICINE

## 2018-05-09 PROCEDURE — 88377 M/PHMTRC ALYS ISHQUANT/SEMIQ: CPT | Performed by: PATHOLOGY

## 2018-05-09 PROCEDURE — 00000158 ZZHCL STATISTIC H-FISH PROCESS B/S: Performed by: FAMILY MEDICINE

## 2018-05-09 RX ORDER — LIDOCAINE HYDROCHLORIDE AND EPINEPHRINE 10; 10 MG/ML; UG/ML
10 INJECTION, SOLUTION INFILTRATION; PERINEURAL ONCE
Status: COMPLETED | OUTPATIENT
Start: 2018-05-09 | End: 2018-05-09

## 2018-05-09 RX ORDER — LIDOCAINE HYDROCHLORIDE 10 MG/ML
10 INJECTION, SOLUTION EPIDURAL; INFILTRATION; INTRACAUDAL; PERINEURAL ONCE
Status: COMPLETED | OUTPATIENT
Start: 2018-05-09 | End: 2018-05-09

## 2018-05-09 RX ADMIN — LIDOCAINE HYDROCHLORIDE AND EPINEPHRINE 10 ML: 10; 10 INJECTION, SOLUTION INFILTRATION; PERINEURAL at 15:08

## 2018-05-09 RX ADMIN — LIDOCAINE HYDROCHLORIDE 10 ML: 10 INJECTION, SOLUTION EPIDURAL; INFILTRATION; INTRACAUDAL; PERINEURAL at 15:08

## 2018-05-11 LAB — COPATH REPORT: NORMAL

## 2018-05-14 ENCOUNTER — TELEPHONE (OUTPATIENT)
Dept: MAMMOGRAPHY | Facility: CLINIC | Age: 83
End: 2018-05-14

## 2018-05-14 LAB — COPATH REPORT: NORMAL

## 2018-05-14 NOTE — TELEPHONE ENCOUNTER
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  We discussed the Radiologist's recommendation of surgical and oncological consultation.  She is going to start by meeting with Dr. Perlita Malik on Monday, May 21st at 1:15pm at [facility] to discuss treatment options.  Lennie verbalized understanding and all questions and concerns were answered at this time.  Per Lennie's request writer reached out to her son Naren Mar regarding her biopsy results, left him a message, still awaiting a return phone call.

## 2018-05-19 NOTE — PROGRESS NOTES
Oncology Consultation:  Date on this visit: 5/21/2018    Lennie Mar  is referred by Dr.Donald Maged Quintanilla for an oncology consultation. She requires evaluation for new diagnosis of two separate left breast cancers.    Primary Physician: Ty Quintanilla     History Of Present Illness:  Ms. Mar is a 89 year old female who presents with two invasive cancers of the left breast. She self palpated a lump and underwent mammaogram on 5/4/18 that showed a  2 cm mass in the superficial lateral left breast, near the nipple, with fine pleomorphic calcifications extending posteriorly measuring up to 6.9 cm and a second 2.3 cm mass in the upper inner left breast at the 11:00 position. Ultrasonography showed an irregular mass at 11:00, 7 cm from the nipple, measuring 2.4 cm and a mass at 3:00, 2 cm from the nipple, measuring 3.5 cm.  Biopsies of both masses were performed on 5/9/18.  Pathology of the 3:00  mass near the nipple was a grade 3 invasive carcinoma.  Estrogen receptor staining was strongly positive and ND moderately positive.  HER2 was amplified with a HER2/CEN17 ratio of 6.4/2.3 for a total ratio of 2.8.  There was associated intermediate grade DCIS and skin involvement. The 11:00 mass was a grade 3 invasive carcinoma ER strongly positive, ND strongly positive, and HER2 nonamplified. No lymphadenopathy was seen on ultrasound.    The patient was feeling well but 'overwhelmed' when seen in clinic today. She was seen with her daughter-in-law, Marta. She notes no obvious growth of her breast masses since diagnosis.  In fact, she states she can only palpate one of the two masses.  She denies breast pain or discomfort. She is generally in good health.  [place] but cares for herself, doing her own cooking and cleaning. She mentions she doesn't eat very much.  She is currently on lithium, abilify, and sertraline for bipolar disorder.  She has had no recent changes in  these medications.  She has 8 children, one of whom  from melanoma.  She has two paternal aunts with breast cancer but no other known cancer in the family. She did not take estrogen after menopause and was only on birth control briefly in her 20s for menorrhagia.  The remainder of a complete 12 point ROS was negative other than the symptoms noted above in the HPI.      Past Medical/Surgical History:  Past Medical History:   Diagnosis Date     Asymptomatic varicose veins      Bipolar disorder, unspecified      Chronic back pain     Right sided pain     CKD (chronic kidney disease) stage 3, GFR 30-59 ml/min 2014     Memory loss      Muscle weakness (generalized) 2008     Past Surgical History:   Procedure Laterality Date     APPENDECTOMY OPEN       CATARACT IOL, RT/LT       COLONOSCOPY WITH CO2 INSUFFLATION  2012    Procedure:COLONOSCOPY WITH CO2 INSUFFLATION; Surgeon:GAYLE REYNA; Location:[facility] OR     CYSTOCELE REPAIR       CYSTOSCOPY, INSERT STENT URETHRA, COMBINED       CYSTOSCOPY, RETROGRADES, INSERT STENT URETER(S), COMBINED  2012    Procedure:COMBINED CYSTOSCOPY, RETROGRADES, INSERT STENT URETER(S); Surgeon:ANT ESPINOZA; Location:[facility] OR     DECOMPRESSION LUMBAR ONE LEVEL  2013    Procedure: DECOMPRESSION LUMBAR ONE LEVEL;  Posterior Decompression Right Lumbar 5- Sacral 1;  Surgeon: You Zavala MD;  Location: [facility] OR     HC TOOTH EXTRACTION W/FORCEP       HYSTERECTOMY, CATA       IRRIGATION AND DEBRIDEMENT ORAL, COMBINED      For treatment of gingivitis     LAPAROSCOPIC ASSISTED COLECTOMY  2012    Procedure:LAPAROSCOPIC ASSISTED COLECTOMY; Cysto, Bilateral Stent placement (both stents removed at end of case)- Yanet ACOSTA. Laparoscopic Extended Right Venu Colectomy with CO2 Colonoscopy and polyp removal-Judah; Surgeon:GAYLE REYNA; Location:[facility] OR     LIGATN/STRIP LONG OR SHORT SAPHEN  5     STRIP VEIN BILATERAL  1964      SURGICAL HISTORY OF -       ureter surgery for blockage.     Allergies:  Allergies as of 2018 - Alexey as Reviewed 2018   Allergen Reaction Noted     Cafergot Other (See Comments) and Nausea and Vomiting 1972     Ciprofloxacin  2012     Penicillins Rash 1949     Sulfa drugs Rash 1950     Lamictal [lamotrigine] Other (See Comments) 02/15/2014     Naproxen       Tetracycline       Current Medications:  Current Outpatient Prescriptions   Medication Sig Dispense Refill     acetaminophen (TYLENOL) 325 MG tablet Take 2 tablets by mouth every 4 hours as needed for pain. (Patient not taking: Reported on 2018) 100 tablet 0     lithium 150 MG capsule Take 1 capsule by mouth daily (with breakfast). 30 capsule 1     lithium 300 MG capsule Take 1 capsule by mouth daily (with dinner). 30 capsule 1     Lurasidone HCl (LATUDA PO) Take by mouth daily        ORDER FOR DME Equipment being ordered: compression stocking knee high 20-30mmhg 2 Package 0     propranolol (INDERAL) 20 MG tablet Take 1 tablet (20 mg) by mouth 2 times daily 180 tablet 3     sertraline (ZOLOFT) 50 MG tablet Take 25 mg by mouth daily        vitamin D (ERGOCALCIFEROL) 51833 UNIT capsule Take 1 capsule (50,000 Units) by mouth every 7 days (Patient not taking: Reported on 2018) 12 capsule 0      Family History:  Son  of melanoma.  Two paternal aunts with breast cancer at an unknown age.  She denies other family history of malignancy.    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 Yes     mother dies from a heart attack at age 47     Social History Narrative    Dairy/d 0-1 servings/d.     Caffeine 2 servings/d    Exercise 0 x week-walks regularly.    Sunscreen used - Yes    Seatbelts used - Yes    Working smoke/CO detectors in the home - Yes    Guns stored in the home - No    Self Breast Exams - Yes    Self Testicular Exam - No    Eye Exam up to date - Yes 2009    Dental Exam up to date - Yes  2010    Pap Smear up to date - No    Mammogram up to date - No    PSA up to date - No    Dexa Scan up to date - No    Flex Sig / Colonoscopy up to date - No    Immunizations up to date - Yes  5/2009 Td    Abuse: Current or Past(Physical, Sexual or Emotional)- No    Do you feel safe in your environment - Yes    Updated 3/2010                                 Lives alone. Does not drive.   Has 4 living sons, 3 of whom are physicians, and 3 daughters        Physical Exam:  /68  Pulse 70  Temp 97.1  F (36.2  C) (Oral)  Ht 1.54 m (5' 0.63\")  Wt 73.3 kg (161 lb 9.6 oz)  SpO2 92%  BMI 30.91 kg/m2  General:  Elderly appearing adult female in NAD.  Ambulates with a cane.  HEENT:  Normocephalic.  Sclera anicteric.  MMM.  No lesions of the oropharynx.  Lymph:  No palpable cervical, supraclavicular, or axillary LAD.  Breast: Increased fibroglandular tissue density at 6:00 right breast but no masses or nipple discharge. Left breast with a firm 3 x 4 cm mass involving the skin at 3:00, adjacent to the NAC. Discrete mass of 2.8 x 2.5 cm at 11:00, 7 cm from the nipple, in left breast.   Chest:  CTA bilaterally.  No wheezes or crackles.  CV:  RRR.  Nl S1 and S2.  No m/r/g.  Abd:  Soft/NT/ND.  BSs normoactive.    Ext:  No pitting edema of the bilateral lower extremities.  Pulses 2+ and symmetric.  Neuro/MSK:  Pupils equal and reactive, no facial droop, requires assistance to get up " to the exam table. Slow and wide based gait  Psych:  Mood and affect appear normal.    Laboratory/Imaging Studies:  5/4/18  Diagnostic bilateral mammograms:  2 cm mass superficial lateral left breast near the nipple with fine pleomorphic calcifications extending posteriorly measuring up to 6.9 cm.  Additional 2.3 cm mass in the upper inner left breast at the 11:00 position. Small oval mass inner right breast.    5/4/18 Bilateral breast ultrasounds:  Left breast 11:00, 7 cm from the nipple 2.4 cm mass.  Left breast 3:00, 2 cm from the nipple 3.5 cm mass with skin invasion.  Left axilla without suspicious nodes.  Right breast 3:00, 5 cm from the nipple cluster of microcysts.    5/9/18 Breast biopsies:  Left breast 3:00 mass, 2 cm from nipple is a grade 3 invasive mammary carcinoma with associated intermediate grade DCIS.  8 mitotic figures/10 hpf  ER strong in 95%  VA moderate in 15%  HER2 amplified (ratio = 6.4/2.3)      Left breast 11:00, 7 cm from the nipple is a grade 3 invasive carcinoma (mixed ductal and mucinous) with associated intermediate grade DCIS.  14 mitotic figures/hpf  ER strong in 100%  VA strong in 95%  HER2 nonamplified (ratio = 1.8/1.7)      ASSESSMENT/PLAN:  89 year old female with a stage Ib,T2N0M0, ER/VA positive, HER2 positive and a stage IIa, T2N0M0 ER/VA positive, HER2 amplified invasive mammary carcinomas of the left breast.    Today we discussed the diagnosis, staging, prognosis, and treatment options in detail with Ms. Mar and her daughter-in-law, Marta.  We reviewed the results of her imaging which demonstrates two biopsy proven breast cancers in the left breast.  One measured 2.4 cm, the other 3.5 cm.  There is no evidence of left axillary lymphadenopathy on either imaging or clinical exam.  She is asymptomatic of distant metastases.  When accounting for grade and receptor status, her 11:00 tumor is a stage IIa breast cancer; the 3:00 tumor a stage Ib.  We discussed both of these are  early stage breast cancers.    We then discussed treatment options including chemotherapy, HER2 targeted therapy, surgery, and endocrine therapy.  We reviewed the receptor status of both breast cancers.  Both breast cancers are estrogen receptor positive.  The tumor adjacent to the nipple is also HER2 positive.  We discussed that HER2 positive breast cancers are inherently more aggressive in that they are prone to increased rates of proliferation, metastasis, and recurrence.  Fortunately, HER2 targeted therapy can significantly reduce this risk of recurrence.  We discussed that HER2 targeted therapy works best in combination with chemotherapy.  We discussed the potential risk and benefits of chemotherapy.  She expressed that she is not interested in chemotherapy at this time.  Given her age and medical comorbidities, I think it is reasonable to try to avoid chemotherapy if possible.  I therefore recommended a trial of HER2 targeted therapy in combination with endocrine therapy.  I recommended treatment with Herceptin and letrozole.  We discussed Herceptin is administered intravenously once every three weeks.  It can be given via either a port-a-catheter or a peripheral vein.  Letrozole is taken as a pill daily.  We discussed potential side effects of Herceptin include hypersensitivity reaction, congestive heart failure, and diarrhea.  We discussed need to obtain an echocardiogram prior to starting Herceptin and once every three months while on the medication.  Potential side effects of letrozole include muscle and joint aches and pains, hot flashes, vaginal dryness, mood disturbances, and thinning of the bones with resultant fracture.  I recommended administering treatment in the neoadjuvant setting in order to monitor response to treatment.  I would like to give a minimum of 3-6 months of treatment before considering surgery.  If lack of response to Herceptin and letrozole, we could consider adding a second HER2  targeted agent (pertuzumab) or consider changing letrozole to low dose weekly Taxol.      After our extensive discussion, Ms. Mar, was uncertain as to whether she wanted to proceed with any treatment.  We therefore discussed no treatment as an option.  We discussed that on exam, she already has skin involvement with her tumor.  Without further treatment, it is likely the tumor would ulcerate through the skin.  Furthermore, spread to the lymph nodes, bones, liver, or lungs would be likely in the upcoming months.  If she chose to forgo treatment, I would recommend treatment with hospice.  Hospice is a service that focuses on improving quality of life at the end of life.  We would treat symptoms for the tumor to make the most of the time she has.  She expressed that she would like to discuss treatment vs no treatment further with her family.  She will contact us with the result of her decision.    Ms. Mar was seen and discussed with Dr. Field.  I have edited the above note to reflect our joint assessment and plan.  Ms. Mar and her daughter-in-law had multiple questions which were answered to their satisfaction.  A total of 50 minutes or our 60 minute face to face visit was spent in counseling.    Addendum:  Ms. Mar contacted the clinic stating she would like to proceed with treatment with Herceptin and letrozole.  A prescription for letrozole as sent to her local pharmacy.  Will schedule her for a baseline echocardiogram and first Herceptin infusion within 1-2 weeks.  Will schedule to see a provider prior to first Herceptin infusion.

## 2018-05-21 ENCOUNTER — ONCOLOGY VISIT (OUTPATIENT)
Dept: ONCOLOGY | Facility: CLINIC | Age: 83
End: 2018-05-21
Attending: INTERNAL MEDICINE
Payer: MEDICARE

## 2018-05-21 ENCOUNTER — CARE COORDINATION (OUTPATIENT)
Dept: ONCOLOGY | Facility: CLINIC | Age: 83
End: 2018-05-21

## 2018-05-21 VITALS
BODY MASS INDEX: 30.51 KG/M2 | SYSTOLIC BLOOD PRESSURE: 120 MMHG | WEIGHT: 161.6 LBS | HEART RATE: 70 BPM | OXYGEN SATURATION: 92 % | DIASTOLIC BLOOD PRESSURE: 68 MMHG | TEMPERATURE: 97.1 F | HEIGHT: 61 IN

## 2018-05-21 DIAGNOSIS — Z17.0 MALIGNANT NEOPLASM OF UPPER-INNER QUADRANT OF LEFT BREAST IN FEMALE, ESTROGEN RECEPTOR POSITIVE (H): Primary | ICD-10-CM

## 2018-05-21 DIAGNOSIS — C50.412 MALIGNANT NEOPLASM OF UPPER-OUTER QUADRANT OF LEFT BREAST IN FEMALE, ESTROGEN RECEPTOR POSITIVE (H): ICD-10-CM

## 2018-05-21 DIAGNOSIS — Z51.11 ENCOUNTER FOR ANTINEOPLASTIC CHEMOTHERAPY: ICD-10-CM

## 2018-05-21 DIAGNOSIS — Z17.0 MALIGNANT NEOPLASM OF UPPER-OUTER QUADRANT OF LEFT BREAST IN FEMALE, ESTROGEN RECEPTOR POSITIVE (H): ICD-10-CM

## 2018-05-21 DIAGNOSIS — C50.212 MALIGNANT NEOPLASM OF UPPER-INNER QUADRANT OF LEFT BREAST IN FEMALE, ESTROGEN RECEPTOR POSITIVE (H): Primary | ICD-10-CM

## 2018-05-21 PROCEDURE — 99205 OFFICE O/P NEW HI 60 MIN: CPT | Mod: ZP | Performed by: INTERNAL MEDICINE

## 2018-05-21 PROCEDURE — G0463 HOSPITAL OUTPT CLINIC VISIT: HCPCS | Mod: ZF

## 2018-05-21 RX ORDER — BUPROPION HYDROCHLORIDE 300 MG/1
TABLET ORAL
Refills: 2 | COMMUNITY
Start: 2018-03-18 | End: 2019-06-04

## 2018-05-21 RX ORDER — ARIPIPRAZOLE 5 MG/1
2 TABLET ORAL DAILY
Refills: 3 | COMMUNITY
Start: 2018-03-18 | End: 2022-05-03

## 2018-05-21 ASSESSMENT — PAIN SCALES - GENERAL: PAINLEVEL: NO PAIN (0)

## 2018-05-21 NOTE — MR AVS SNAPSHOT
After Visit Summary   5/21/2018    Lennie Mar    MRN: [id number]           Patient Information     Date Of Birth          11/28/1927        Visit Information        Provider Department      5/21/2018 1:15 PM Perlita Malik MD [facility]        Today's Diagnoses     Malignant neoplasm of upper-inner quadrant of left breast in female, estrogen receptor positive (H)    -  1    Malignant neoplasm of upper-outer quadrant of left breast in female, estrogen receptor positive (H)        Encounter for antineoplastic chemotherapy           Follow-ups after your visit        Your next 10 appointments already scheduled     Jun 05, 2018  2:00 PM CDT   Ech Limited with [id number]   [facility] ([facility])    [address]  3rd Floor  [facility] [id number] [phone]           1.  Please bring or wear a comfortable two-piece outfit. 2.  You may eat, drink and take your normal medicines. 3.  For any questions that cannot be answered, please contact the ordering physician            Jun 05, 2018  3:00 PM CDT   [facility] Draw with [facility] LAB DRAW   [facility] ([facility])    [address] [place] [address]  [address]  [facility] [address]   [phone]            Jun 05, 2018  3:30 PM CDT   (Arrive by 3:15 PM)   Return Visit with Dalila Blum PA-C   [facility] ([facility])    [address]  [address]  [facility] [address]   [phone]            Jun 06, 2018  7:00 AM CDT   Infusion 180 with  ONCOLOGY INFUSION,  12 ATC   [facility] [place] ([facility])    [address] [place] [address]  [address]  [facility] [address]   [phone]              Who to contact     If you have questions or need follow up information about today's clinic visit or your schedule please contact [facility]  "CLINIC directly at [phone].  Normal or non-critical lab and imaging results will be communicated to you by MyChart, letter or phone within 4 business days after the clinic has received the results. If you do not hear from us within 7 days, please contact the clinic through Epay Systemshart or phone. If you have a critical or abnormal lab result, we will notify you by phone as soon as possible.  Submit refill requests through Zeenoh or call your pharmacy and they will forward the refill request to us. Please allow 3 business days for your refill to be completed.          Additional Information About Your Visit        Epay SystemsharBroken Buy Information     Zeenoh lets you send messages to your doctor, view your test results, renew your prescriptions, schedule appointments and more. To sign up, go to [email or web address] . Click on \"Log in\" on the left side of the screen, which will take you to the Welcome page. Then click on \"Sign up Now\" on the right side of the page.     You will be asked to enter the access code listed below, as well as some personal information. Please follow the directions to create your username and password.     Your access code is: [email or web address]  Expires: 2018  2:37 PM     Your access code will  in 90 days. If you need help or a new code, please call your [place] clinic or [phone].        Care EveryWhere ID     This is your Care EveryWhere ID. This could be used by other organizations to access your [place] medical records  [id number]        Your Vitals Were     Pulse Temperature Height Pulse Oximetry BMI (Body Mass Index)       70 97.1  F (36.2  C) (Oral) 1.54 m (5' 0.63\") 92% 30.91 kg/m2        Blood Pressure from Last 3 Encounters:   18 120/68   18 125/71   18 126/72    Weight from Last 3 Encounters:   18 73.3 kg (161 lb 9.6 oz)   18 74.8 kg (165 lb)   18 76.2 kg (168 lb)              Today, you had the following     No orders found for display       "   Today's Medication Changes          These changes are accurate as of 5/21/18 11:59 PM.  If you have any questions, ask your nurse or doctor.               Start taking these medicines.        Dose/Directions    letrozole 2.5 MG tablet   Commonly known as:  FEMARA   Used for:  Malignant neoplasm of upper-inner quadrant of left breast in female, estrogen receptor positive (H), Malignant neoplasm of upper-outer quadrant of left breast in female, estrogen receptor positive (H)   Started by:  Perlita Malik MD        Dose:  2.5 mg   Take 1 tablet (2.5 mg) by mouth daily   Quantity:  30 tablet   Refills:  11            Where to get your medicines      These medications were sent to [id number] - [facility] [address] AT [place] [id number], [facility] [address]     Phone:  [phone]     letrozole 2.5 MG tablet                Primary Care Provider Office Phone # Fax #    Ty Quintanilla [phone] [address] [facility] [address]        Equal Access to Services     [facility] AH: Hadii dottie ku hadasho Soomaali, waaxda luqadaha, qaybta kaalmada adepazyasigrid, mellisa lim . So [facility] [phone].    ATENCIÓN: Si habla español, tiene a foster disposición servicios gratuitos de asistencia lingüística. [facility] [phone].    We comply with applicable federal civil rights laws and [place] laws. We do not discriminate on the basis of race, color, national origin, age, disability, sex, sexual orientation, or gender identity.            Thank you!     Thank you for choosing [facility]  for your care. Our goal is always to provide you with excellent care. Hearing back from our patients is one way we can continue to improve our services. Please take a few minutes to complete the written survey that you may receive in the mail after your visit with us. Thank you!              Your Updated Medication List - Protect others around you: Learn how to safely use, store and throw away your medicines at www.disposemymeds.org.          This list is accurate as of 5/21/18 11:59 PM.  Always use your most recent med list.                   Brand Name Dispense Instructions for use Diagnosis    acetaminophen 325 MG tablet    TYLENOL    100 tablet    Take 2 tablets by mouth every 4 hours as needed for pain.    S/P colon resection       ARIPiprazole 5 MG tablet    ABILIFY     TK 1 T PO QAM        buPROPion 300 MG 24 hr tablet    WELLBUTRIN XL     TK 1 T PO D        LATUDA PO      Take by mouth daily        letrozole 2.5 MG tablet    FEMARA    30 tablet    Take 1 tablet (2.5 mg) by mouth daily    Malignant neoplasm of upper-inner quadrant of left breast in female, estrogen receptor positive (H), Malignant neoplasm of upper-outer quadrant of left breast in female, estrogen receptor positive (H)       * lithium 150 MG capsule     30 capsule    Take 1 capsule by mouth daily (with breakfast).    Bipolar disorder, unspecified       * lithium 300 MG capsule     30 capsule    Take 1 capsule by mouth daily (with dinner).    Bipolar disorder, unspecified       order for DME     2 Package    Equipment being ordered: compression stocking knee high 20-30mmhg    Edema       propranolol 20 MG tablet    INDERAL    180 tablet    Take 1 tablet (20 mg) by mouth 2 times daily    Tremor       sertraline 50 MG tablet    ZOLOFT     Take 25 mg by mouth daily        vitamin D 25939 UNIT capsule    ERGOCALCIFEROL    12 capsule    Take 1 capsule (50,000 Units) by mouth every 7 days    Vitamin D deficiency       * Notice:  This list has 2 medication(s) that are the same as other medications prescribed for you. Read the directions carefully, and ask your doctor or other care provider to review them with you.

## 2018-05-21 NOTE — NURSING NOTE
"Oncology Rooming Note    May 21, 2018 1:15 PM   Lennie Mar is a 89 year old female who presents for:    Chief Complaint   Patient presents with     Oncology Clinic Visit     New - breast cancer      Initial Vitals: /68  Pulse 70  Temp 97.1  F (36.2  C) (Oral)  Ht 1.54 m (5' 0.63\")  Wt 73.3 kg (161 lb 9.6 oz)  SpO2 92%  BMI 30.91 kg/m2 Estimated body mass index is 30.91 kg/(m^2) as calculated from the following:    Height as of this encounter: 1.54 m (5' 0.63\").    Weight as of this encounter: 73.3 kg (161 lb 9.6 oz). Body surface area is 1.77 meters squared.  No Pain (0) Comment: Data Unavailable   No LMP recorded. Patient is postmenopausal.  Allergies reviewed: Yes  Medications reviewed: Yes    Medications: Medication refills not needed today.  Pharmacy name entered into [facility]:    [facility] - [place], [place] - [address] [facility] [place], [place] - [address] [facility] [place], [place] - [address] [place] [address] DRUG STORE [address] [place] [address] - [facility] [address] AT [facility] [address]    Clinical concerns:  New patient breast cancer   10 minutes for nursing intake (face to face time)     Renu Grey CMA            "

## 2018-05-21 NOTE — LETTER
5/21/2018     RE: Lennie Mar  [address]  [facility] [address]     Dear Colleague,    Thank you for referring your patient, Lennie Mar, to the [facility]. Please see a copy of my visit note below.    Oncology Consultation:  Date on this visit: 5/21/2018    Lennie Mar  is referred by Dr.Donald Maged Quintanilla for an oncology consultation. She requires evaluation for new diagnosis of two separate left breast cancers.    Primary Physician: Ty Quintanilla     History Of Present Illness:  Ms. Mar is a 89 year old female who presents with two invasive cancers of the left breast. She self palpated a lump and underwent mammaogram on 5/4/18 that showed a  2 cm mass in the superficial lateral left breast, near the nipple, with fine pleomorphic calcifications extending posteriorly measuring up to 6.9 cm and a second 2.3 cm mass in the upper inner left breast at the 11:00 position. Ultrasonography showed an irregular mass at 11:00, 7 cm from the nipple, measuring 2.4 cm and a mass at 3:00, 2 cm from the nipple, measuring 3.5 cm.  Biopsies of both masses were performed on 5/9/18.  Pathology of the 3:00  mass near the nipple was a grade 3 invasive carcinoma.  Estrogen receptor staining was strongly positive and CA moderately positive.  HER2 was amplified with a HER2/CEN17 ratio of 6.4/2.3 for a total ratio of 2.8.  There was associated intermediate grade DCIS and skin involvement. The 11:00 mass was a grade 3 invasive carcinoma ER strongly positive, CA strongly positive, and HER2 nonamplified. No lymphadenopathy was seen on ultrasound.    The patient was feeling well but 'overwhelmed' when seen in clinic today. She was seen with her daughter-in-law, Marta. She notes no obvious growth of her breast masses since diagnosis.  In fact, she states she can only palpate one of the two masses.  She denies breast pain or discomfort. She is generally in good health.  [place] and no longer drives but cares for herself, doing her own cooking and cleaning. She mentions she doesn't eat very much.  She is currently on lithium, abilify, and sertraline for bipolar disorder.  She has had no recent changes in these medications.  She has 8 children, one of whom  from melanoma.  She has two paternal aunts with breast cancer but no other known cancer in the family. She did not take estrogen after menopause and was only on birth control briefly in her 20s for menorrhagia.  The remainder of a complete 12 point ROS was negative other than the symptoms noted above in the HPI.      Past Medical/Surgical History:  Past Medical History:   Diagnosis Date     Asymptomatic varicose veins      Bipolar disorder, unspecified      Chronic back pain     Right sided pain     CKD (chronic kidney disease) stage 3, GFR 30-59 ml/min 2014     Memory loss      Muscle weakness (generalized) 2008     Past Surgical History:   Procedure Laterality Date     APPENDECTOMY OPEN       CATARACT IOL, RT/LT       COLONOSCOPY WITH CO2 INSUFFLATION  2012    Procedure:COLONOSCOPY WITH CO2 INSUFFLATION; Surgeon:GAYLE REYNA; Location:[facility]     CYSTOCELE REPAIR       CYSTOSCOPY, INSERT STENT URETHRA, COMBINED       CYSTOSCOPY, RETROGRADES, INSERT STENT URETER(S), COMBINED  2012    Procedure:COMBINED CYSTOSCOPY, RETROGRADES, INSERT STENT URETER(S); Surgeon:ANT ESPINOZA; Location:[facility]     DECOMPRESSION LUMBAR ONE LEVEL  2013    Procedure: DECOMPRESSION LUMBAR ONE LEVEL;  Posterior Decompression Right Lumbar 5- Sacral 1;  Surgeon: You Zavala MD;  Location: [facility] OR     HC TOOTH EXTRACTION W/FORCEP       HYSTERECTOMY, CATA       IRRIGATION AND DEBRIDEMENT ORAL, COMBINED      For treatment of gingivitis     LAPAROSCOPIC ASSISTED COLECTOMY  2012    Procedure:LAPAROSCOPIC ASSISTED COLECTOMY; Cysto, Bilateral Stent placement (both stents removed  at end of case)- Yanet ACOSTA. Laparoscopic Extended Right Venu Colectomy with CO2 Colonoscopy and polyp removal-Judah; Surgeon:GAYLE REYNA; Location:[facility] OR     LIGATN/STRIP LONG OR SHORT SAPHEN       STRIP VEIN BILATERAL  1964     SURGICAL HISTORY OF -       ureter surgery for blockage.     Allergies:  Allergies as of 2018 - Alexey as Reviewed 2018   Allergen Reaction Noted     Cafergot Other (See Comments) and Nausea and Vomiting 1972     Ciprofloxacin  2012     Penicillins Rash 1949     Sulfa drugs Rash 1950     Lamictal [lamotrigine] Other (See Comments) 02/15/2014     Naproxen       Tetracycline       Current Medications:  Current Outpatient Prescriptions   Medication Sig Dispense Refill     acetaminophen (TYLENOL) 325 MG tablet Take 2 tablets by mouth every 4 hours as needed for pain. (Patient not taking: Reported on 2018) 100 tablet 0     lithium 150 MG capsule Take 1 capsule by mouth daily (with breakfast). 30 capsule 1     lithium 300 MG capsule Take 1 capsule by mouth daily (with dinner). 30 capsule 1     Lurasidone HCl (LATUDA PO) Take by mouth daily        ORDER FOR DME Equipment being ordered: compression stocking knee high 20-30mmhg 2 Package 0     propranolol (INDERAL) 20 MG tablet Take 1 tablet (20 mg) by mouth 2 times daily 180 tablet 3     sertraline (ZOLOFT) 50 MG tablet Take 25 mg by mouth daily        vitamin D (ERGOCALCIFEROL) 43422 UNIT capsule Take 1 capsule (50,000 Units) by mouth every 7 days (Patient not taking: Reported on 2018) 12 capsule 0      Family History:  Son  of melanoma.  Two paternal aunts with breast cancer at an unknown age.  She denies other family history of malignancy.    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 Yes     mother dies from a heart attack at age 47     Social History Narrative    Dairy/d 0-1 servings/d.     Caffeine 2 servings/d    Exercise 0 x week-walks regularly.    Sunscreen used - Yes    Seatbelts used - Yes    Working smoke/CO detectors in the home - Yes    Guns stored in the home - No    Self Breast Exams - Yes    Self Testicular Exam - No    Eye Exam up to date - Yes 2009    Dental Exam up to date - Yes  2010    Pap Smear up to date - No    Mammogram up to date - No    PSA up to date - No    Dexa Scan up to date - No    Flex Sig / Colonoscopy up to date - No    Immunizations up to date - Yes  5/2009 Td    Abuse: Current or Past(Physical, Sexual or Emotional)- No    Do you feel safe in your environment - Yes    Updated 3/2010                                 Lives alone. Does not drive.   Has 4 living sons, 3 of whom are physicians, and 3 daughters        Physical Exam:  /68  Pulse 70  Temp 97.1  F (36.2  C) (Oral)  Ht 1.54 m (5' 0.63\")  Wt 73.3 kg (161 lb 9.6 oz)  SpO2 92%  BMI 30.91 kg/m2  General:  Elderly appearing adult female in NAD.  Ambulates with a cane.  HEENT:  Normocephalic.  Sclera anicteric.  MMM.  No lesions of the oropharynx.  Lymph:  No palpable cervical, supraclavicular, or axillary LAD.  Breast: Increased fibroglandular tissue density at 6:00 right breast but no masses or nipple discharge. Left breast with a firm 3 x 4 cm mass involving the skin at 3:00, adjacent to the NAC. Discrete mass of 2.8 x 2.5 cm at 11:00, 7 cm from the nipple, in left breast.   Chest:  CTA bilaterally.  No wheezes or crackles.  CV:  " RRR.  Nl S1 and S2.  No m/r/g.  Abd:  Soft/NT/ND.  BSs normoactive.    Ext:  No pitting edema of the bilateral lower extremities.  Pulses 2+ and symmetric.  Neuro/MSK:  Pupils equal and reactive, no facial droop, requires assistance to get up to the exam table. Slow and wide based gait  Psych:  Mood and affect appear normal.    Laboratory/Imaging Studies:  5/4/18  Diagnostic bilateral mammograms:  2 cm mass superficial lateral left breast near the nipple with fine pleomorphic calcifications extending posteriorly measuring up to 6.9 cm.  Additional 2.3 cm mass in the upper inner left breast at the 11:00 position. Small oval mass inner right breast.    5/4/18 Bilateral breast ultrasounds:  Left breast 11:00, 7 cm from the nipple 2.4 cm mass.  Left breast 3:00, 2 cm from the nipple 3.5 cm mass with skin invasion.  Left axilla without suspicious nodes.  Right breast 3:00, 5 cm from the nipple cluster of microcysts.    5/9/18 Breast biopsies:  Left breast 3:00 mass, 2 cm from nipple is a grade 3 invasive mammary carcinoma with associated intermediate grade DCIS.  8 mitotic figures/10 hpf  ER strong in 95%  ME moderate in 15%  HER2 amplified (ratio = 6.4/2.3)      Left breast 11:00, 7 cm from the nipple is a grade 3 invasive carcinoma (mixed ductal and mucinous) with associated intermediate grade DCIS.  14 mitotic figures/hpf  ER strong in 100%  ME strong in 95%  HER2 nonamplified (ratio = 1.8/1.7)      ASSESSMENT/PLAN:  89 year old female with a stage Ib,T2N0M0, ER/ME positive, HER2 positive and a stage IIa, T2N0M0 ER/ME positive, HER2 amplified invasive mammary carcinomas of the left breast.    Today we discussed the diagnosis, staging, prognosis, and treatment options in detail with Ms. Mar and her daughter-in-law, Marta.  We reviewed the results of her imaging which demonstrates two biopsy proven breast cancers in the left breast.  One measured 2.4 cm, the other 3.5 cm.  There is no evidence of left axillary  lymphadenopathy on either imaging or clinical exam.  She is asymptomatic of distant metastases.  When accounting for grade and receptor status, her 11:00 tumor is a stage IIa breast cancer; the 3:00 tumor a stage Ib.  We discussed both of these are early stage breast cancers.    We then discussed treatment options including chemotherapy, HER2 targeted therapy, surgery, and endocrine therapy.  We reviewed the receptor status of both breast cancers.  Both breast cancers are estrogen receptor positive.  The tumor adjacent to the nipple is also HER2 positive.  We discussed that HER2 positive breast cancers are inherently more aggressive in that they are prone to increased rates of proliferation, metastasis, and recurrence.  Fortunately, HER2 targeted therapy can significantly reduce this risk of recurrence.  We discussed that HER2 targeted therapy works best in combination with chemotherapy.  We discussed the potential risk and benefits of chemotherapy.  She expressed that she is not interested in chemotherapy at this time.  Given her age and medical comorbidities, I think it is reasonable to try to avoid chemotherapy if possible.  I therefore recommended a trial of HER2 targeted therapy in combination with endocrine therapy.  I recommended treatment with Herceptin and letrozole.  We discussed Herceptin is administered intravenously once every three weeks.  It can be given via either a port-a-catheter or a peripheral vein.  Letrozole is taken as a pill daily.  We discussed potential side effects of Herceptin include hypersensitivity reaction, congestive heart failure, and diarrhea.  We discussed need to obtain an echocardiogram prior to starting Herceptin and once every three months while on the medication.  Potential side effects of letrozole include muscle and joint aches and pains, hot flashes, vaginal dryness, mood disturbances, and thinning of the bones with resultant fracture.  I recommended administering  treatment in the neoadjuvant setting in order to monitor response to treatment.  I would like to give a minimum of 3-6 months of treatment before considering surgery.  If lack of response to Herceptin and letrozole, we could consider adding a second HER2 targeted agent (pertuzumab) or consider changing letrozole to low dose weekly Taxol.      After our extensive discussion, Ms. Mar, was uncertain as to whether she wanted to proceed with any treatment.  We therefore discussed no treatment as an option.  We discussed that on exam, she already has skin involvement with her tumor.  Without further treatment, it is likely the tumor would ulcerate through the skin.  Furthermore, spread to the lymph nodes, bones, liver, or lungs would be likely in the upcoming months.  If she chose to forgo treatment, I would recommend treatment with hospice.  Hospice is a service that focuses on improving quality of life at the end of life.  We would treat symptoms for the tumor to make the most of the time she has.  She expressed that she would like to discuss treatment vs no treatment further with her family.  She will contact us with the result of her decision.    Ms. Mar was seen and discussed with Dr. Field.  I have edited the above note to reflect our joint assessment and plan.  Ms. Mar and her daughter-in-law had multiple questions which were answered to their satisfaction.  A total of 50 minutes or our 60 minute face to face visit was spent in counseling.    Addendum:  Ms. Mar contacted the clinic stating she would like to proceed with treatment with Herceptin and letrozole.  A prescription for letrozole as sent to her local pharmacy.  Will schedule her for a baseline echocardiogram and first Herceptin infusion within 1-2 weeks.  Will schedule to see a provider prior to first Herceptin infusion.    Again, thank you for allowing me to participate in the care of your patient.      Sincerely,    Perlita Stringer  MD [place]

## 2018-05-21 NOTE — PROGRESS NOTES
Met patient and daughter in law in clinic room.  Discussed decision options and how to contact our office once they make a decision together.  Patient was given [facility] handouts on Herceptin and Letrozole.  Pt was given our business cards.    Leslee Colon RNCC BSN CBCN

## 2018-05-22 ENCOUNTER — CARE COORDINATION (OUTPATIENT)
Dept: ONCOLOGY | Facility: CLINIC | Age: 83
End: 2018-05-22

## 2018-05-22 DIAGNOSIS — D15.1 BENIGN NEOPLASM OF HEART: ICD-10-CM

## 2018-05-22 DIAGNOSIS — C50.912 MALIGNANT NEOPLASM OF LEFT BREAST (H): Primary | ICD-10-CM

## 2018-05-22 PROBLEM — C50.212 MALIGNANT NEOPLASM OF UPPER-INNER QUADRANT OF LEFT BREAST IN FEMALE, ESTROGEN RECEPTOR POSITIVE (H): Status: ACTIVE | Noted: 2018-05-22

## 2018-05-22 PROBLEM — Z17.0 MALIGNANT NEOPLASM OF UPPER-INNER QUADRANT OF LEFT BREAST IN FEMALE, ESTROGEN RECEPTOR POSITIVE (H): Status: ACTIVE | Noted: 2018-05-22

## 2018-05-22 PROBLEM — C50.412 MALIGNANT NEOPLASM OF UPPER-OUTER QUADRANT OF LEFT BREAST IN FEMALE, ESTROGEN RECEPTOR POSITIVE (H): Status: ACTIVE | Noted: 2018-05-22

## 2018-05-22 PROBLEM — Z17.0 MALIGNANT NEOPLASM OF UPPER-OUTER QUADRANT OF LEFT BREAST IN FEMALE, ESTROGEN RECEPTOR POSITIVE (H): Status: ACTIVE | Noted: 2018-05-22

## 2018-05-22 RX ORDER — DIPHENHYDRAMINE HYDROCHLORIDE 50 MG/ML
50 INJECTION INTRAMUSCULAR; INTRAVENOUS
Status: CANCELLED
Start: 2018-06-06

## 2018-05-22 RX ORDER — DIPHENHYDRAMINE HCL 25 MG
25 CAPSULE ORAL ONCE
Status: CANCELLED | OUTPATIENT
Start: 2018-06-06

## 2018-05-22 RX ORDER — EPINEPHRINE 1 MG/ML
0.3 INJECTION, SOLUTION INTRAMUSCULAR; SUBCUTANEOUS EVERY 5 MIN PRN
Status: CANCELLED | OUTPATIENT
Start: 2018-06-06

## 2018-05-22 RX ORDER — METHYLPREDNISOLONE SODIUM SUCCINATE 125 MG/2ML
125 INJECTION, POWDER, LYOPHILIZED, FOR SOLUTION INTRAMUSCULAR; INTRAVENOUS
Status: CANCELLED
Start: 2018-06-06

## 2018-05-22 RX ORDER — LETROZOLE 2.5 MG/1
2.5 TABLET, FILM COATED ORAL DAILY
Qty: 30 TABLET | Refills: 11 | Status: SHIPPED | OUTPATIENT
Start: 2018-05-22 | End: 2019-06-14

## 2018-05-22 RX ORDER — ALBUTEROL SULFATE 0.83 MG/ML
2.5 SOLUTION RESPIRATORY (INHALATION)
Status: CANCELLED | OUTPATIENT
Start: 2018-06-06

## 2018-05-22 RX ORDER — ACETAMINOPHEN 325 MG/1
650 TABLET ORAL ONCE
Status: CANCELLED | OUTPATIENT
Start: 2018-06-06

## 2018-05-22 RX ORDER — ALBUTEROL SULFATE 90 UG/1
1-2 AEROSOL, METERED RESPIRATORY (INHALATION)
Status: CANCELLED
Start: 2018-06-06

## 2018-05-22 RX ORDER — EPINEPHRINE 0.3 MG/.3ML
0.3 INJECTION SUBCUTANEOUS EVERY 5 MIN PRN
Status: CANCELLED | OUTPATIENT
Start: 2018-06-06

## 2018-05-22 RX ORDER — SODIUM CHLORIDE 9 MG/ML
1000 INJECTION, SOLUTION INTRAVENOUS CONTINUOUS PRN
Status: CANCELLED
Start: 2018-06-06

## 2018-05-22 RX ORDER — MEPERIDINE HYDROCHLORIDE 25 MG/ML
25 INJECTION INTRAMUSCULAR; INTRAVENOUS; SUBCUTANEOUS EVERY 30 MIN PRN
Status: CANCELLED | OUTPATIENT
Start: 2018-06-06

## 2018-05-22 NOTE — PROGRESS NOTES
Patient was called and given Dr. Malik's message.  Pt was also given information that schedulers will call her with appt.    Leslee MICHAEL BSN CBCN      Please let Lennie know that I sent a prescription for letrozole to the [facility] in [place].  She can start taking it anytime.  I have asked scheduling to schedule an echocardiogram within 1 week and Herceptin infusion within 1-2 weeks.  They can be done the same day if needed                    Pt called to say that she and her family were all in agreement to start treatment letrozole and trastuzumab. Pt was asking when she could have her Echo completed.  She needs to have rides to her appts.   Dr. Malik, updated.    Leslee MICHAEL BSN CBCN

## 2018-05-25 ENCOUNTER — RADIANT APPOINTMENT (OUTPATIENT)
Dept: CARDIOLOGY | Facility: CLINIC | Age: 83
End: 2018-05-25
Attending: INTERNAL MEDICINE
Payer: MEDICARE

## 2018-05-25 DIAGNOSIS — D15.1 BENIGN NEOPLASM OF HEART: ICD-10-CM

## 2018-05-25 DIAGNOSIS — C50.912 MALIGNANT NEOPLASM OF LEFT BREAST (H): ICD-10-CM

## 2018-06-05 ENCOUNTER — ONCOLOGY VISIT (OUTPATIENT)
Dept: ONCOLOGY | Facility: CLINIC | Age: 83
End: 2018-06-05
Attending: PHYSICIAN ASSISTANT
Payer: MEDICARE

## 2018-06-05 VITALS
DIASTOLIC BLOOD PRESSURE: 81 MMHG | HEIGHT: 60 IN | BODY MASS INDEX: 31.96 KG/M2 | HEART RATE: 70 BPM | RESPIRATION RATE: 18 BRPM | SYSTOLIC BLOOD PRESSURE: 134 MMHG | OXYGEN SATURATION: 95 % | TEMPERATURE: 98.5 F | WEIGHT: 162.8 LBS

## 2018-06-05 DIAGNOSIS — Z17.0 MALIGNANT NEOPLASM OF UPPER-OUTER QUADRANT OF LEFT BREAST IN FEMALE, ESTROGEN RECEPTOR POSITIVE (H): ICD-10-CM

## 2018-06-05 DIAGNOSIS — C50.212 MALIGNANT NEOPLASM OF UPPER-INNER QUADRANT OF LEFT BREAST IN FEMALE, ESTROGEN RECEPTOR POSITIVE (H): Primary | ICD-10-CM

## 2018-06-05 DIAGNOSIS — Z17.0 MALIGNANT NEOPLASM OF UPPER-INNER QUADRANT OF LEFT BREAST IN FEMALE, ESTROGEN RECEPTOR POSITIVE (H): Primary | ICD-10-CM

## 2018-06-05 DIAGNOSIS — C50.412 MALIGNANT NEOPLASM OF UPPER-OUTER QUADRANT OF LEFT BREAST IN FEMALE, ESTROGEN RECEPTOR POSITIVE (H): ICD-10-CM

## 2018-06-05 LAB
ALBUMIN SERPL-MCNC: 3.7 G/DL (ref 3.4–5)
ALP SERPL-CCNC: 105 U/L (ref 40–150)
ALT SERPL W P-5'-P-CCNC: 20 U/L (ref 0–50)
ANION GAP SERPL CALCULATED.3IONS-SCNC: 6 MMOL/L (ref 3–14)
AST SERPL W P-5'-P-CCNC: 15 U/L (ref 0–45)
BASOPHILS # BLD AUTO: 0.1 10E9/L (ref 0–0.2)
BASOPHILS NFR BLD AUTO: 0.5 %
BILIRUB SERPL-MCNC: 0.7 MG/DL (ref 0.2–1.3)
BUN SERPL-MCNC: 22 MG/DL (ref 7–30)
CALCIUM SERPL-MCNC: 10.1 MG/DL (ref 8.5–10.1)
CHLORIDE SERPL-SCNC: 110 MMOL/L (ref 94–109)
CO2 SERPL-SCNC: 26 MMOL/L (ref 20–32)
CREAT SERPL-MCNC: 1.53 MG/DL (ref 0.52–1.04)
DIFFERENTIAL METHOD BLD: NORMAL
EOSINOPHIL # BLD AUTO: 0.2 10E9/L (ref 0–0.7)
EOSINOPHIL NFR BLD AUTO: 1.7 %
ERYTHROCYTE [DISTWIDTH] IN BLOOD BY AUTOMATED COUNT: 14.1 % (ref 10–15)
GFR SERPL CREATININE-BSD FRML MDRD: 32 ML/MIN/1.7M2
GLUCOSE SERPL-MCNC: 92 MG/DL (ref 70–99)
HCT VFR BLD AUTO: 42.7 % (ref 35–47)
HGB BLD-MCNC: 13.7 G/DL (ref 11.7–15.7)
IMM GRANULOCYTES # BLD: 0 10E9/L (ref 0–0.4)
IMM GRANULOCYTES NFR BLD: 0.4 %
LYMPHOCYTES # BLD AUTO: 2.2 10E9/L (ref 0.8–5.3)
LYMPHOCYTES NFR BLD AUTO: 22.8 %
MCH RBC QN AUTO: 32 PG (ref 26.5–33)
MCHC RBC AUTO-ENTMCNC: 32.1 G/DL (ref 31.5–36.5)
MCV RBC AUTO: 100 FL (ref 78–100)
MONOCYTES # BLD AUTO: 0.8 10E9/L (ref 0–1.3)
MONOCYTES NFR BLD AUTO: 8.2 %
NEUTROPHILS # BLD AUTO: 6.4 10E9/L (ref 1.6–8.3)
NEUTROPHILS NFR BLD AUTO: 66.4 %
NRBC # BLD AUTO: 0 10*3/UL
NRBC BLD AUTO-RTO: 0 /100
PLATELET # BLD AUTO: 266 10E9/L (ref 150–450)
POTASSIUM SERPL-SCNC: 4.6 MMOL/L (ref 3.4–5.3)
PROT SERPL-MCNC: 7.6 G/DL (ref 6.8–8.8)
RBC # BLD AUTO: 4.28 10E12/L (ref 3.8–5.2)
SODIUM SERPL-SCNC: 141 MMOL/L (ref 133–144)
WBC # BLD AUTO: 9.6 10E9/L (ref 4–11)

## 2018-06-05 PROCEDURE — 99213 OFFICE O/P EST LOW 20 MIN: CPT | Mod: ZP | Performed by: PHYSICIAN ASSISTANT

## 2018-06-05 PROCEDURE — 36415 COLL VENOUS BLD VENIPUNCTURE: CPT

## 2018-06-05 PROCEDURE — 80053 COMPREHEN METABOLIC PANEL: CPT | Performed by: PHYSICIAN ASSISTANT

## 2018-06-05 PROCEDURE — 85025 COMPLETE CBC W/AUTO DIFF WBC: CPT | Performed by: PHYSICIAN ASSISTANT

## 2018-06-05 PROCEDURE — G0463 HOSPITAL OUTPT CLINIC VISIT: HCPCS

## 2018-06-05 RX ORDER — PROCHLORPERAZINE MALEATE 10 MG
5 TABLET ORAL EVERY 6 HOURS PRN
Qty: 30 TABLET | Refills: 3 | Status: SHIPPED | OUTPATIENT
Start: 2018-06-05 | End: 2018-11-08

## 2018-06-05 ASSESSMENT — PAIN SCALES - GENERAL: PAINLEVEL: NO PAIN (0)

## 2018-06-05 NOTE — MR AVS SNAPSHOT
After Visit Summary   6/5/2018    Lennie Mar    MRN: [id number]           Patient Information     Date Of Birth          11/28/1927        Visit Information        Provider Department      6/5/2018 3:30 PM Dalila Blum PA-C [facility]        Today's Diagnoses     Malignant neoplasm of upper-inner quadrant of left breast in female, estrogen receptor positive (H)    -  1    Malignant neoplasm of upper-outer quadrant of left breast in female, estrogen receptor positive (H)           Follow-ups after your visit        Your next 10 appointments already scheduled     Jun 06, 2018  7:00 AM CDT   Infusion 180 with UC ONCOLOGY INFUSION, UC 12 ATC   [facility] ([facility] [place])    [address]  [address]  [facility] [address] [phone]              Who to contact     If you have questions or need follow up information about today's clinic visit or your schedule please contact [facility] directly at [phone].  Normal or non-critical lab and imaging results will be communicated to you by MyChart, letter or phone within 4 business days after the clinic has received the results. If you do not hear from us within 7 days, please contact the clinic through [employer] or phone. If you have a critical or abnormal lab result, we will notify you by phone as soon as possible.  Submit refill requests through [employer] or call your pharmacy and they will forward the refill request to us. Please allow 3 business days for your refill to be completed.          Additional Information About Your Visit        Care EveryWhere ID     This is your Care EveryWhere ID. This could be used by other organizations to access your [facility] medical records  [id number]        Your Vitals Were     Pulse Temperature Respirations Height Pulse Oximetry BMI (Body Mass Index)    70 98.5  F (36.9  C) (Tympanic) 18 1.524 m (5') 95%  31.79 kg/m2       Blood Pressure from Last 3 Encounters:   06/05/18 134/81   05/21/18 120/68   05/01/18 125/71    Weight from Last 3 Encounters:   06/05/18 73.8 kg (162 lb 12.8 oz)   05/21/18 73.3 kg (161 lb 9.6 oz)   05/01/18 74.8 kg (165 lb)              We Performed the Following     CBC with platelets differential     Comprehensive metabolic panel          Today's Medication Changes          These changes are accurate as of 6/5/18  4:36 PM.  If you have any questions, ask your nurse or doctor.               Start taking these medicines.        Dose/Directions    prochlorperazine 10 MG tablet   Commonly known as:  COMPAZINE   Used for:  Malignant neoplasm of upper-inner quadrant of left breast in female, estrogen receptor positive (H), Malignant neoplasm of upper-outer quadrant of left breast in female, estrogen receptor positive (H)   Started by:  Dalila Blum PA-C        Dose:  5 mg   Take 0.5 tablets (5 mg) by mouth every 6 hours as needed (Nausea/Vomiting)   Quantity:  30 tablet   Refills:  3            Where to get your medicines      These medications were sent to [place], [place] - [address] [facility]  [address] [facility] [address] [address]    Hours:  TRANSPLANT PHONE NUMBER [phone] Phone:  [phone]     prochlorperazine 10 MG tablet                Primary Care Provider Office Phone # Fax #    Ty Maged Quintanilla [phone]       [address]  [facility] [address]        Equal Access to Services     ANKIT SALINAS AH: Hadii dottie tavarezo Sojean marie, waaxda luqadaha, qaybta kaalmada adeegyasigrid, waxay idikarie palacios. So [facility] [phone].    ATENCIÓN: Si habla español, tiene a foster disposición servicios gratuitos de asistencia lingüística. Llame al [phone].    We comply with applicable federal civil rights laws and [place] laws. We do not discriminate on the basis of race, color, national origin, age,  disability, sex, sexual orientation, or gender identity.            Thank you!     Thank you for choosing [facility]  for your care. Our goal is always to provide you with excellent care. Hearing back from our patients is one way we can continue to improve our services. Please take a few minutes to complete the written survey that you may receive in the mail after your visit with us. Thank you!             Your Updated Medication List - Protect others around you: Learn how to safely use, store and throw away your medicines at www.disposemymeds.org.          This list is accurate as of 6/5/18  4:36 PM.  Always use your most recent med list.                   Brand Name Dispense Instructions for use Diagnosis    acetaminophen 325 MG tablet    TYLENOL    100 tablet    Take 2 tablets by mouth every 4 hours as needed for pain.    S/P colon resection       ARIPiprazole 5 MG tablet    ABILIFY     TK 1 T PO QAM        buPROPion 300 MG 24 hr tablet    WELLBUTRIN XL     TK 1 T PO D        LATUDA PO      Take by mouth daily        letrozole 2.5 MG tablet    FEMARA    30 tablet    Take 1 tablet (2.5 mg) by mouth daily    Malignant neoplasm of upper-inner quadrant of left breast in female, estrogen receptor positive (H), Malignant neoplasm of upper-outer quadrant of left breast in female, estrogen receptor positive (H)       * lithium 150 MG capsule     30 capsule    Take 1 capsule by mouth daily (with breakfast).    Bipolar disorder, unspecified       * lithium 300 MG capsule     30 capsule    Take 1 capsule by mouth daily (with dinner).    Bipolar disorder, unspecified       order for DME     2 Package    Equipment being ordered: compression stocking knee high 20-30mmhg    Edema       prochlorperazine 10 MG tablet    COMPAZINE    30 tablet    Take 0.5 tablets (5 mg) by mouth every 6 hours as needed (Nausea/Vomiting)    Malignant neoplasm of upper-inner quadrant of left breast in female, estrogen receptor  positive (H), Malignant neoplasm of upper-outer quadrant of left breast in female, estrogen receptor positive (H)       propranolol 20 MG tablet    INDERAL    180 tablet    Take 1 tablet (20 mg) by mouth 2 times daily    Tremor       sertraline 50 MG tablet    ZOLOFT     Take 25 mg by mouth daily        vitamin D 59188 UNIT capsule    ERGOCALCIFEROL    12 capsule    Take 1 capsule (50,000 Units) by mouth every 7 days    Vitamin D deficiency       * Notice:  This list has 2 medication(s) that are the same as other medications prescribed for you. Read the directions carefully, and ask your doctor or other care provider to review them with you.

## 2018-06-05 NOTE — PROGRESS NOTES
Hematology-Oncology Visit  Jun 5, 2018    Reason for Visit: follow-up left breast cancer, 2 primaries     HPI: Lennie Mar is a 89 year old female with past medical history of bipolar disorder, CKD, essential tremor with recent diagnosis of left breast cancer, two separate primaries. She presented with a palpable mass and had a mammogram and US leading to biopsies on 5/9/18. Pathology showed grade 3 invasive carcinoma, ER/RI positive, HER2 amplified of mass at 3:00 position with associated DCIS and skin involvement. The mass at 11:00 position was also grade 3 invasive carcinoma, ER/RI positive, but HER2 nonamplified. No lymphadenopathy was noted.     She met with Dr. Malik on 5/21/18 and elected to start neoadjuvant treatment with Herceptin every 3 weeks along with letrozole. She presents in follow-up prior to starting this tomorrow. Echocardiogram was done 5/25 showed EF of 55-60%.     Interval History: Lennie is here alone today. She is feeling well. She started letrozole about a week ago. She denies any side effects from treatment. Denies any hot flashes, fatigue, joint stiffness. She has no breast pain. She generally has been in good health and lives independently. She does not drive herself. She denies any recent fevers/chills or infectious concerns. No issues with anorexia, n/v, constipation, or diarrhea. She has chronic urinary incontinence which is unchanged. She has a good understanding of the plan going forward. ROS otherwise negative.     Current Outpatient Prescriptions   Medication     acetaminophen (TYLENOL) 325 MG tablet     ARIPiprazole (ABILIFY) 5 MG tablet     buPROPion (WELLBUTRIN XL) 300 MG 24 hr tablet     letrozole (FEMARA) 2.5 MG tablet     lithium 150 MG capsule     lithium 300 MG capsule     Lurasidone HCl (LATUDA PO)     ORDER FOR DME     propranolol (INDERAL) 20 MG tablet     sertraline (ZOLOFT) 50 MG tablet     vitamin D (ERGOCALCIFEROL) 91683 UNIT capsule     No current  facility-administered medications for this visit.        PHYSICAL EXAM:  /81 (BP Location: Right arm)  Pulse 70  Temp 98.5  F (36.9  C) (Tympanic)  Resp 18  Ht 1.524 m (5')  Wt 73.8 kg (162 lb 12.8 oz)  SpO2 95%  BMI 31.79 kg/m2  General: Alert, oriented, pleasant, NAD  HEENT: Normocephalic, atraumatic, PERRLA, EOMI. Moist mucus membranes, no lesions or thrush  Neck: No cervical or supraclavicular LAD.  Axillary: No LAD  Breast: R breast without focal mass. L breast softer, deeper mass 11 o'clock position several centimeters from NAC measuring 3 x 2.5 cm. More superficial mass at 3 o'clock position with central erythema and induration on skin measuring 3 x 2.5 cm abutting lateral nipple   Lungs: CTA bilaterally, normal work of breathing  Cardiac: RRR, S1, S2, no murmurs  Abdomen: Soft, nontender, nondistended. Normoactive bowel sounds. No hepatosplenomegaly, masses  Neuro: CNII-XII grossly intact  Extremities: No pedal edema    Labs:    6/5/2018 15:52   Sodium 141   Potassium 4.6   Chloride 110 (H)   Carbon Dioxide 26   Urea Nitrogen 22   Creatinine 1.53 (H)   GFR Estimate 32 (L)   GFR Estimate If Black 39 (L)   Calcium 10.1   Anion Gap 6   Albumin 3.7   Protein Total 7.6   Bilirubin Total 0.7   Alkaline Phosphatase 105   ALT 20   AST 15   Glucose 92   WBC 9.6   Hemoglobin 13.7   Hematocrit 42.7   Platelet Count 266   RBC Count 4.28      MCH 32.0   MCHC 32.1   RDW 14.1   Diff Method Automated Method   % Neutrophils 66.4   % Lymphocytes 22.8   % Monocytes 8.2   % Eosinophils 1.7   % Basophils 0.5   % Immature Granulocytes 0.4   Nucleated RBCs 0   Absolute Neutrophil 6.4   Absolute Lymphocytes 2.2   Absolute Monocytes 0.8   Absolute Eosinophils 0.2   Absolute Basophils 0.1   Abs Immature Granulocytes 0.0   Absolute Nucleated RBC 0.0       Assessment & Plan:     1. Left breast cancer, two separate primaries, both ER/CT positive, 1 HER2 amplified: Recently diagnosed. Plan for neoadjuvant letrozole  and every 3 week Herceptin for around 3-6 months and then consider surgery at that time pending response. If there is no response to letrozole and Herceptin, would potentially add Perjeta or weekly Taxol. Her baseline lab work and echocardiogram are sufficient to start treatment with Herceptin tomorrow. She started on letrozole last week and is tolerating well. Follow-up in 3 weeks prior to next Herceptin. Then can follow-up every other infusion. Will plan for serial breast exams to assess response. Repeat imaging may be needed if clinical response is not clear.     Dalila Blum PA-C    [facility]  [address] [place] [id number]  [phone]

## 2018-06-05 NOTE — LETTER
6/5/2018      RE: Lennie Mar  [address]  [place] [address]       Hematology-Oncology Visit  Jun 5, 2018    Reason for Visit: follow-up left breast cancer, 2 primaries     HPI: Lennie Mar is a 89 year old female with past medical history of bipolar disorder, CKD, essential tremor with recent diagnosis of left breast cancer, two separate primaries. She presented with a palpable mass and had a mammogram and US leading to biopsies on 5/9/18. Pathology showed grade 3 invasive carcinoma, ER/AR positive, HER2 amplified of mass at 3:00 position with associated DCIS and skin involvement. The mass at 11:00 position was also grade 3 invasive carcinoma, ER/AR positive, but HER2 nonamplified. No lymphadenopathy was noted.     She met with Dr. Malik on 5/21/18 and elected to start neoadjuvant treatment with Herceptin every 3 weeks along with letrozole. She presents in follow-up prior to starting this tomorrow. Echocardiogram was done 5/25 showed EF of 55-60%.     Interval History: Lennie is here alone today. She is feeling well. She started letrozole about a week ago. She denies any side effects from treatment. Denies any hot flashes, fatigue, joint stiffness. She has no breast pain. She generally has been in good health and lives independently. She does not drive herself. She denies any recent fevers/chills or infectious concerns. No issues with anorexia, n/v, constipation, or diarrhea. She has chronic urinary incontinence which is unchanged. She has a good understanding of the plan going forward. ROS otherwise negative.     Current Outpatient Prescriptions   Medication     acetaminophen (TYLENOL) 325 MG tablet     ARIPiprazole (ABILIFY) 5 MG tablet     buPROPion (WELLBUTRIN XL) 300 MG 24 hr tablet     letrozole (FEMARA) 2.5 MG tablet     lithium 150 MG capsule     lithium 300 MG capsule     Lurasidone HCl (LATUDA PO)     ORDER FOR DME     propranolol (INDERAL) 20 MG tablet     sertraline  (ZOLOFT) 50 MG tablet     vitamin D (ERGOCALCIFEROL) 02891 UNIT capsule     No current facility-administered medications for this visit.        PHYSICAL EXAM:  /81 (BP Location: Right arm)  Pulse 70  Temp 98.5  F (36.9  C) (Tympanic)  Resp 18  Ht 1.524 m (5')  Wt 73.8 kg (162 lb 12.8 oz)  SpO2 95%  BMI 31.79 kg/m2  General: Alert, oriented, pleasant, NAD  HEENT: Normocephalic, atraumatic, PERRLA, EOMI. Moist mucus membranes, no lesions or thrush  Neck: No cervical or supraclavicular LAD.  Axillary: No LAD  Breast: R breast without focal mass. L breast softer, deeper mass 11 o'clock position several centimeters from NAC measuring 3 x 2.5 cm. More superficial mass at 3 o'clock position with central erythema and induration on skin measuring 3 x 2.5 cm abutting lateral nipple   Lungs: CTA bilaterally, normal work of breathing  Cardiac: RRR, S1, S2, no murmurs  Abdomen: Soft, nontender, nondistended. Normoactive bowel sounds. No hepatosplenomegaly, masses  Neuro: CNII-XII grossly intact  Extremities: No pedal edema    Labs:    6/5/2018 15:52   Sodium 141   Potassium 4.6   Chloride 110 (H)   Carbon Dioxide 26   Urea Nitrogen 22   Creatinine 1.53 (H)   GFR Estimate 32 (L)   GFR Estimate If Black 39 (L)   Calcium 10.1   Anion Gap 6   Albumin 3.7   Protein Total 7.6   Bilirubin Total 0.7   Alkaline Phosphatase 105   ALT 20   AST 15   Glucose 92   WBC 9.6   Hemoglobin 13.7   Hematocrit 42.7   Platelet Count 266   RBC Count 4.28      MCH 32.0   MCHC 32.1   RDW 14.1   Diff Method Automated Method   % Neutrophils 66.4   % Lymphocytes 22.8   % Monocytes 8.2   % Eosinophils 1.7   % Basophils 0.5   % Immature Granulocytes 0.4   Nucleated RBCs 0   Absolute Neutrophil 6.4   Absolute Lymphocytes 2.2   Absolute Monocytes 0.8   Absolute Eosinophils 0.2   Absolute Basophils 0.1   Abs Immature Granulocytes 0.0   Absolute Nucleated RBC 0.0       Assessment & Plan:     1. Left breast cancer, two separate primaries, both  ER/IA positive, 1 HER2 amplified: Recently diagnosed. Plan for neoadjuvant letrozole and every 3 week Herceptin for around 3-6 months and then consider surgery at that time pending response. If there is no response to letrozole and Herceptin, would potentially add Perjeta or weekly Taxol. Her baseline lab work and echocardiogram are sufficient to start treatment with Herceptin tomorrow. She started on letrozole last week and is tolerating well. Follow-up in 3 weeks prior to next Herceptin. Then can follow-up every other infusion. Will plan for serial breast exams to assess response. Repeat imaging may be needed if clinical response is not clear.     Dalila Blum PA-C    [facility]  [address] [place] [address] [phone]

## 2018-06-05 NOTE — NURSING NOTE
Chief Complaint   Patient presents with     Blood Draw     VPT blood draw and vitals     Blood draw from left arm

## 2018-06-05 NOTE — NURSING NOTE
Oncology Rooming Note    June 5, 2018 3:48 PM   Lennie Mar is a 89 year old female who presents for:    Chief Complaint   Patient presents with     Blood Draw     VPT blood draw and vitals     Oncology Clinic Visit     Return for Breast Ca , Labs      Initial Vitals: /81 (BP Location: Right arm)  Pulse 70  Temp 98.5  F (36.9  C) (Tympanic)  Resp 18  Ht 1.524 m (5')  Wt 73.8 kg (162 lb 12.8 oz)  SpO2 95%  BMI 31.79 kg/m2 Estimated body mass index is 31.79 kg/(m^2) as calculated from the following:    Height as of this encounter: 1.524 m (5').    Weight as of this encounter: 73.8 kg (162 lb 12.8 oz). Body surface area is 1.77 meters squared.  No Pain (0) Comment: Data Unavailable   No LMP recorded. Patient is postmenopausal.  Allergies reviewed: Yes  Medications reviewed: Yes    Medications: MEDICATION REFILLS NEEDED TODAY. Provider was notified.  Pharmacy name entered into [facility]:    [facility] - [place], [place] - [address] [facility] [place], [place] - [address] [facility] [place], [place] - [address] [facility] [id number] [place] [address] - [address] [facility] [address]    Clinical concerns: Results  Kulwant was notified.    6  minutes for nursing intake (face to face time)     Bridget Walden MA

## 2018-06-06 ENCOUNTER — INFUSION THERAPY VISIT (OUTPATIENT)
Dept: ONCOLOGY | Facility: CLINIC | Age: 83
End: 2018-06-06
Attending: INTERNAL MEDICINE
Payer: MEDICARE

## 2018-06-06 VITALS
RESPIRATION RATE: 16 BRPM | HEART RATE: 68 BPM | DIASTOLIC BLOOD PRESSURE: 59 MMHG | TEMPERATURE: 97.7 F | OXYGEN SATURATION: 95 % | SYSTOLIC BLOOD PRESSURE: 118 MMHG

## 2018-06-06 DIAGNOSIS — Z17.0 MALIGNANT NEOPLASM OF UPPER-OUTER QUADRANT OF LEFT BREAST IN FEMALE, ESTROGEN RECEPTOR POSITIVE (H): ICD-10-CM

## 2018-06-06 DIAGNOSIS — C50.412 MALIGNANT NEOPLASM OF UPPER-OUTER QUADRANT OF LEFT BREAST IN FEMALE, ESTROGEN RECEPTOR POSITIVE (H): ICD-10-CM

## 2018-06-06 DIAGNOSIS — Z17.0 MALIGNANT NEOPLASM OF UPPER-INNER QUADRANT OF LEFT BREAST IN FEMALE, ESTROGEN RECEPTOR POSITIVE (H): Primary | ICD-10-CM

## 2018-06-06 DIAGNOSIS — C50.212 MALIGNANT NEOPLASM OF UPPER-INNER QUADRANT OF LEFT BREAST IN FEMALE, ESTROGEN RECEPTOR POSITIVE (H): Primary | ICD-10-CM

## 2018-06-06 PROCEDURE — A9270 NON-COVERED ITEM OR SERVICE: HCPCS | Mod: ZF | Performed by: INTERNAL MEDICINE

## 2018-06-06 PROCEDURE — 96413 CHEMO IV INFUSION 1 HR: CPT

## 2018-06-06 PROCEDURE — 25000128 H RX IP 250 OP 636: Mod: ZF | Performed by: INTERNAL MEDICINE

## 2018-06-06 PROCEDURE — 25000132 ZZH RX MED GY IP 250 OP 250 PS 637: Mod: ZF | Performed by: INTERNAL MEDICINE

## 2018-06-06 RX ORDER — ACETAMINOPHEN 325 MG/1
650 TABLET ORAL ONCE
Status: COMPLETED | OUTPATIENT
Start: 2018-06-06 | End: 2018-06-06

## 2018-06-06 RX ORDER — DIPHENHYDRAMINE HCL 25 MG
25 CAPSULE ORAL ONCE
Status: COMPLETED | OUTPATIENT
Start: 2018-06-06 | End: 2018-06-06

## 2018-06-06 RX ADMIN — TRASTUZUMAB 600 MG: 150 INJECTION, POWDER, LYOPHILIZED, FOR SOLUTION INTRAVENOUS at 08:14

## 2018-06-06 RX ADMIN — SODIUM CHLORIDE 250 ML: 9 INJECTION, SOLUTION INTRAVENOUS at 07:41

## 2018-06-06 RX ADMIN — ACETAMINOPHEN 650 MG: 325 TABLET ORAL at 07:41

## 2018-06-06 RX ADMIN — DIPHENHYDRAMINE HYDROCHLORIDE 25 MG: 25 CAPSULE ORAL at 07:41

## 2018-06-06 ASSESSMENT — PAIN SCALES - GENERAL: PAINLEVEL: NO PAIN (0)

## 2018-06-06 NOTE — PROGRESS NOTES
Infusion Nursing Note:  Lennie Mar presents today for Day 1 Cycle 1 Herceptin.    Patient seen by provider today: No   present during visit today: Not Applicable.    Note: Patient new to infusion, received first chemotherapy today.  Pt oriented to infusion room, location of bathrooms, nutrition stations, and call light. New patient teaching done previously.  All medications reviewed prior to administration and all patient's questions answered.    Patient instructed to call triage with chills and/or temperature greater than or equal to 100.5, uncontrolled nausea/vomiting, diarrhea, constipation, dizziness, shortness of breath, chest pain, bleeding, unexplained bruising, or any new/concerning symptoms, questions/concerns.     Patient states she feels well overall and presents no new complaints since seeing EVELINE Stewart yesterday (6/5/18). Denies pain.    Intravenous Access:  Peripheral IV placed.    Treatment Conditions:  ECHO/MUGA completed 5/25  EF 55-60%.      Post Infusion Assessment:  Patient tolerated infusion without incident.  Blood return noted pre and post infusion.  Site patent and intact, free from redness, edema or discomfort.  No evidence of extravasations.  Access discontinued per protocol.    Discharge Plan:   Prescription refills given for Compazine.  Discharge instructions reviewed with: Patient.  Patient and/or family verbalized understanding of discharge instructions and all questions answered.  Copy of AVS reviewed with patient and/or family.  Patient will return 6/27/18 for next appointment.  Patient discharged in stable condition accompanied by: self.  Departure Mode: Ambulatory.  Face to Face time: 0 minutes.    Juan Diego Jimenez RN

## 2018-06-06 NOTE — MR AVS SNAPSHOT
After Visit Summary   6/6/2018    Lennie Mar    MRN: [id number]           Patient Information     Date Of Birth          11/28/1927        Visit Information        Provider Department      6/6/2018 7:00 AM  12 ATC;  ONCOLOGY INFUSION [facility]        Today's Diagnoses     Malignant neoplasm of upper-inner quadrant of left breast in female, estrogen receptor positive (H)    -  1    Malignant neoplasm of upper-outer quadrant of left breast in female, estrogen receptor positive (H)          Care Instructions    Contact Numbers    [facility] [place]: [phone]  [facility] Triage and after hours / weekends / holidays:  [phone]      Please call the triage or after hours line if you experience a temperature greater than or equal to 100.5, shaking chills, have uncontrolled nausea, vomiting and/or diarrhea, dizziness, shortness of breath, chest pain, bleeding, unexplained bruising, or if you have any other new/concerning symptoms, questions or concerns.      If you are having any concerning symptoms or wish to speak to a provider before your next infusion visit, please call your care coordinator or triage to notify them so we can adequately serve you.     If you need a refill on a narcotic prescription or other medication, please call before your infusion appointment.                   June 2018 Sunday Monday Tuesday Wednesday Thursday Friday Saturday                            1     2       3     4     5     [facility] LAB DRAW    3:00 PM   (15 min.)   [facility]   [facility] Lab Draw     [facility] RETURN    3:15 PM   (50 min.)   Dalila Blum PA-C   [facility] 6     [facility] ONC INFUSION 180    7:00 AM   (180 min.)    ONCOLOGY INFUSION   [facility] 7     8     9       10     11     12     13     14     15     16       17     18     19     20     21     22     23       24     25     26     27     [facility] DRAW     6:30 AM   (15 min.)   [facility] DRAW   [facility] Lab Draw     [facility] RETURN    6:45 AM   (50 min.)   Dalila Blum PA-C   [facility]     [facility] ONC INFUSION 60    9:30 AM   (60 min.)   UC ONCOLOGY INFUSION   [facility] 28 29 30 July 2018 Sunday Monday Tuesday Wednesday Thursday Friday Saturday   1     2     3     4     5     6     7       8     9     10     11     12     13     14       15     16     17     [facility] ONC INFUSION 60    9:30 AM   (60 min.)   [facility] ONCOLOGY INFUSION   [facility] 18     19     20     21       22     23     24     25     26     27     28       29     30     31                                      Lab Results:  No results found for this or any previous visit (from the past 12 hour(s)).            Follow-ups after your visit        Your next 10 appointments already scheduled     Jun 27, 2018  6:30 AM CDT   [facility] Draw with  [facility] LAB DRAW   [facility] Lab Draw ([facility])    [address]  [address]  [facility] [place] [facility] [address]   [phone]            Jun 27, 2018  7:00 AM CDT   (Arrive by 6:45 AM)   Return Visit with Dalila Blum PA-C   [facility] ([facility])    [address]  [address]  [facility] [place] [facility] [place] [facility] [address]   [phone]            Jun 27, 2018  9:30 AM CDT   Infusion 60 with UC ONCOLOGY INFUSION, UC 22 ATC   [facility] ([facility])    [address]  [address]  [facility] [place] [facility] [address]   [phone]            Jul 17, 2018  9:30 AM CDT   Infusion 60 with UC ONCOLOGY INFUSION, UC 22 ATC   [facility] ([facility])    [address]  [address]  [facility] [place] [facility] [address]   [phone]            Aug 08, 2018  8:00 AM CDT   [facility] Lab Draw with [facility] LAB DRAW   [facility]  Lab Draw ([facility])    [address]  [address]  [facility] [facility] [id number] [phone]            Aug 08, 2018  8:40 AM CDT   (Arrive by 8:25 AM)   Return Visit with Dalila Blum PA-C   [facility] ([facility])    [address]  [address]  [facility] [id number] [phone]            Aug 08, 2018  9:30 AM CDT   Infusion 60 with UC ONCOLOGY INFUSION, UC 21 ATC   [facility] ([facility])    [address]  [address]  [facility] [facility] [id number] [phone]              Who to contact     If you have questions or need follow up information about today's clinic visit or your schedule please contact [facility] directly at [phone].  Normal or non-critical lab and imaging results will be communicated to you by MyChart, letter or phone within 4 business days after the clinic has received the results. If you do not hear from us within 7 days, please contact the clinic through MyChart or phone. If you have a critical or abnormal lab result, we will notify you by phone as soon as possible.  Submit refill requests through [facility] or call your pharmacy and they will forward the refill request to us. Please allow 3 business days for your refill to be completed.          Additional Information About Your Visit        Care EveryWhere ID     This is your Care EveryWhere ID. This could be used by other organizations to access your [facility] medical records  [id number]        Your Vitals Were     Pulse Temperature Respirations Pulse Oximetry          68 97.7  F (36.5  C) (Oral) 16 95%         Blood Pressure from Last 3 Encounters:   06/06/18 118/59   06/05/18 134/81   05/21/18 120/68    Weight from Last 3 Encounters:   06/05/18 73.8 kg (162 lb 12.8 oz)   05/21/18 73.3 kg (161 lb 9.6 oz)   05/01/18 74.8 kg (165 lb)              Today, you had the following      No orders found for display       Primary Care Provider Office Phone # Fax #    Ty Quintanilla [phone] [address]  [facility] [address]        Equal Access to Services     ANKIT SALINAS : Hadalex dottie ross daysio Soanastasiiaali, waaxda luqadaha, qaybta kaalmada adeshar, mellisa posada laMarylyly palacios. So [facility] [phone].    ATENCIÓN: Si habla español, tiene a foster disposición servicios gratuitos de asistencia lingüística. Llame al [phone].    We comply with applicable federal civil rights laws and [place] laws. We do not discriminate on the basis of race, color, national origin, age, disability, sex, sexual orientation, or gender identity.            Thank you!     Thank you for choosing [facility]  for your care. Our goal is always to provide you with excellent care. Hearing back from our patients is one way we can continue to improve our services. Please take a few minutes to complete the written survey that you may receive in the mail after your visit with us. Thank you!             Your Updated Medication List - Protect others around you: Learn how to safely use, store and throw away your medicines at www.disposemymeds.org.          This list is accurate as of 6/6/18  9:18 AM.  Always use your most recent med list.                   Brand Name Dispense Instructions for use Diagnosis    acetaminophen 325 MG tablet    TYLENOL    100 tablet    Take 2 tablets by mouth every 4 hours as needed for pain.    S/P colon resection       ARIPiprazole 5 MG tablet    ABILIFY     TK 1 T PO QAM        buPROPion 300 MG 24 hr tablet    WELLBUTRIN XL     TK 1 T PO D        LATUDA PO      Take by mouth daily        letrozole 2.5 MG tablet    FEMARA    30 tablet    Take 1 tablet (2.5 mg) by mouth daily    Malignant neoplasm of upper-inner quadrant of left breast in female, estrogen receptor positive (H), Malignant neoplasm of upper-outer quadrant of left breast in female,  estrogen receptor positive (H)       * lithium 150 MG capsule     30 capsule    Take 1 capsule by mouth daily (with breakfast).    Bipolar disorder, unspecified       * lithium 300 MG capsule     30 capsule    Take 1 capsule by mouth daily (with dinner).    Bipolar disorder, unspecified       order for DME     2 Package    Equipment being ordered: compression stocking knee high 20-30mmhg    Edema       prochlorperazine 10 MG tablet    COMPAZINE    30 tablet    Take 0.5 tablets (5 mg) by mouth every 6 hours as needed (Nausea/Vomiting)    Malignant neoplasm of upper-inner quadrant of left breast in female, estrogen receptor positive (H), Malignant neoplasm of upper-outer quadrant of left breast in female, estrogen receptor positive (H)       propranolol 20 MG tablet    INDERAL    180 tablet    Take 1 tablet (20 mg) by mouth 2 times daily    Tremor       sertraline 50 MG tablet    ZOLOFT     Take 25 mg by mouth daily        vitamin D 35407 UNIT capsule    ERGOCALCIFEROL    12 capsule    Take 1 capsule (50,000 Units) by mouth every 7 days    Vitamin D deficiency       * Notice:  This list has 2 medication(s) that are the same as other medications prescribed for you. Read the directions carefully, and ask your doctor or other care provider to review them with you.

## 2018-06-06 NOTE — PATIENT INSTRUCTIONS
Contact Numbers    [facility] Main [place]: [phone]  [facility] Triage and after hours / weekends / holidays:  [phone]      Please call the triage or after hours line if you experience a temperature greater than or equal to 100.5, shaking chills, have uncontrolled nausea, vomiting and/or diarrhea, dizziness, shortness of breath, chest pain, bleeding, unexplained bruising, or if you have any other new/concerning symptoms, questions or concerns.      If you are having any concerning symptoms or wish to speak to a provider before your next infusion visit, please call your care coordinator or triage to notify them so we can adequately serve you.     If you need a refill on a narcotic prescription or other medication, please call before your infusion appointment.                   June 2018 Sunday Monday Tuesday Wednesday Thursday Friday Saturday                            1     2       3     4     5     [facility]    3:00 PM   (15 min.)    [facility]     [facility] RETURN    3:15 PM   (50 min.)   Dalila Blum PA-C   [facility] 6     [facility] ONC INFUSION 180    7:00 AM   (180 min.)    ONCOLOGY INFUSION   [facility] 7     8     9       10     11     12     13     14     15     16       17     18     19     20     21     22     23       24     25     26     27     [facility] DRAW    6:30 AM   (15 min.)    [facility] LAB DRAW   [facility] Lab Draw     [facility] RETURN    6:45 AM   (50 min.)   Dalila Blum PA-C   [facility]     [facility] ONC INFUSION 60    9:30 AM   (60 min.)   UC ONCOLOGY INFUSION   [facility] 28 29 30 July 2018 Sunday Monday Tuesday Wednesday Thursday Friday Saturday   1     2     3     4     5     6     7       8     9     10     11     12     13     14       15     16     17     [facility] ONC INFUSION 60    9:30 AM   (60 min.)    ONCOLOGY INFUSION     [facility] 18     19     20     21       22     23     24     25     26     27     28       29     30     31                                      Lab Results:  No results found for this or any previous visit (from the past 12 hour(s)).

## 2018-06-12 ENCOUNTER — CARE COORDINATION (OUTPATIENT)
Dept: ONCOLOGY | Facility: CLINIC | Age: 83
End: 2018-06-12

## 2018-06-12 NOTE — PROGRESS NOTES
Call placed to patient for follow up with Herceptin infusion.  Pt stated she is doing and feeling fine.  Pt is also taking letrozole without any complaints.          Leslee Colon RNCC BSN CBCN

## 2018-06-26 ENCOUNTER — TELEPHONE (OUTPATIENT)
Dept: ONCOLOGY | Facility: CLINIC | Age: 83
End: 2018-06-26

## 2018-06-27 ENCOUNTER — INFUSION THERAPY VISIT (OUTPATIENT)
Dept: ONCOLOGY | Facility: CLINIC | Age: 83
End: 2018-06-27
Attending: INTERNAL MEDICINE
Payer: MEDICARE

## 2018-06-27 VITALS
SYSTOLIC BLOOD PRESSURE: 143 MMHG | HEIGHT: 60 IN | OXYGEN SATURATION: 96 % | BODY MASS INDEX: 32.76 KG/M2 | TEMPERATURE: 97.9 F | HEART RATE: 66 BPM | WEIGHT: 166.9 LBS | DIASTOLIC BLOOD PRESSURE: 79 MMHG | RESPIRATION RATE: 16 BRPM

## 2018-06-27 DIAGNOSIS — Z17.0 MALIGNANT NEOPLASM OF UPPER-INNER QUADRANT OF LEFT BREAST IN FEMALE, ESTROGEN RECEPTOR POSITIVE (H): Primary | ICD-10-CM

## 2018-06-27 DIAGNOSIS — C50.412 MALIGNANT NEOPLASM OF UPPER-OUTER QUADRANT OF LEFT BREAST IN FEMALE, ESTROGEN RECEPTOR POSITIVE (H): ICD-10-CM

## 2018-06-27 DIAGNOSIS — C50.212 MALIGNANT NEOPLASM OF UPPER-INNER QUADRANT OF LEFT BREAST IN FEMALE, ESTROGEN RECEPTOR POSITIVE (H): Primary | ICD-10-CM

## 2018-06-27 DIAGNOSIS — Z17.0 MALIGNANT NEOPLASM OF UPPER-OUTER QUADRANT OF LEFT BREAST IN FEMALE, ESTROGEN RECEPTOR POSITIVE (H): ICD-10-CM

## 2018-06-27 PROCEDURE — A9270 NON-COVERED ITEM OR SERVICE: HCPCS | Mod: ZF | Performed by: PHYSICIAN ASSISTANT

## 2018-06-27 PROCEDURE — 25000132 ZZH RX MED GY IP 250 OP 250 PS 637: Mod: ZF | Performed by: PHYSICIAN ASSISTANT

## 2018-06-27 PROCEDURE — 96367 TX/PROPH/DG ADDL SEQ IV INF: CPT

## 2018-06-27 PROCEDURE — 25000128 H RX IP 250 OP 636: Mod: ZF | Performed by: PHYSICIAN ASSISTANT

## 2018-06-27 PROCEDURE — 99213 OFFICE O/P EST LOW 20 MIN: CPT | Mod: ZP | Performed by: PHYSICIAN ASSISTANT

## 2018-06-27 PROCEDURE — 40000556 ZZH STATISTIC PERIPHERAL IV START W US GUIDANCE: Mod: ZF

## 2018-06-27 PROCEDURE — G0463 HOSPITAL OUTPT CLINIC VISIT: HCPCS | Mod: ZF

## 2018-06-27 PROCEDURE — 96413 CHEMO IV INFUSION 1 HR: CPT

## 2018-06-27 PROCEDURE — 25000125 ZZHC RX 250: Mod: ZF | Performed by: PHYSICIAN ASSISTANT

## 2018-06-27 RX ORDER — DIPHENHYDRAMINE HCL 25 MG
50 CAPSULE ORAL
Status: CANCELLED
Start: 2018-06-27

## 2018-06-27 RX ORDER — ALBUTEROL SULFATE 90 UG/1
1-2 AEROSOL, METERED RESPIRATORY (INHALATION)
Status: CANCELLED
Start: 2018-06-27

## 2018-06-27 RX ORDER — ALBUTEROL SULFATE 0.83 MG/ML
2.5 SOLUTION RESPIRATORY (INHALATION)
Status: CANCELLED | OUTPATIENT
Start: 2018-06-27

## 2018-06-27 RX ORDER — SODIUM CHLORIDE 9 MG/ML
1000 INJECTION, SOLUTION INTRAVENOUS CONTINUOUS PRN
Status: CANCELLED
Start: 2018-06-27

## 2018-06-27 RX ORDER — EPINEPHRINE 0.3 MG/.3ML
0.3 INJECTION SUBCUTANEOUS EVERY 5 MIN PRN
Status: CANCELLED | OUTPATIENT
Start: 2018-06-27

## 2018-06-27 RX ORDER — EPINEPHRINE 1 MG/ML
0.3 INJECTION, SOLUTION INTRAMUSCULAR; SUBCUTANEOUS EVERY 5 MIN PRN
Status: CANCELLED | OUTPATIENT
Start: 2018-06-27

## 2018-06-27 RX ORDER — MEPERIDINE HYDROCHLORIDE 25 MG/ML
25 INJECTION INTRAMUSCULAR; INTRAVENOUS; SUBCUTANEOUS EVERY 30 MIN PRN
Status: CANCELLED | OUTPATIENT
Start: 2018-06-27

## 2018-06-27 RX ORDER — METHYLPREDNISOLONE SODIUM SUCCINATE 125 MG/2ML
125 INJECTION, POWDER, LYOPHILIZED, FOR SOLUTION INTRAMUSCULAR; INTRAVENOUS
Status: CANCELLED
Start: 2018-06-27

## 2018-06-27 RX ORDER — ACETAMINOPHEN 325 MG/1
650 TABLET ORAL
Status: DISCONTINUED | OUTPATIENT
Start: 2018-06-27 | End: 2018-06-27 | Stop reason: HOSPADM

## 2018-06-27 RX ORDER — ACETAMINOPHEN 325 MG/1
650 TABLET ORAL
Status: CANCELLED
Start: 2018-06-27

## 2018-06-27 RX ORDER — DIPHENHYDRAMINE HYDROCHLORIDE 50 MG/ML
50 INJECTION INTRAMUSCULAR; INTRAVENOUS
Status: CANCELLED
Start: 2018-06-27

## 2018-06-27 RX ORDER — DIPHENHYDRAMINE HCL 25 MG
50 CAPSULE ORAL
Status: DISCONTINUED | OUTPATIENT
Start: 2018-06-27 | End: 2018-06-27 | Stop reason: HOSPADM

## 2018-06-27 RX ADMIN — DIPHENHYDRAMINE HYDROCHLORIDE 50 MG: 25 CAPSULE ORAL at 09:26

## 2018-06-27 RX ADMIN — ACETAMINOPHEN 650 MG: 325 TABLET ORAL at 09:26

## 2018-06-27 RX ADMIN — TRASTUZUMAB 450 MG: 150 INJECTION, POWDER, LYOPHILIZED, FOR SOLUTION INTRAVENOUS at 09:58

## 2018-06-27 RX ADMIN — FAMOTIDINE 20 MG: 20 INJECTION, SOLUTION INTRAVENOUS at 09:26

## 2018-06-27 RX ADMIN — SODIUM CHLORIDE 250 ML: 9 INJECTION, SOLUTION INTRAVENOUS at 09:26

## 2018-06-27 ASSESSMENT — PAIN SCALES - GENERAL: PAINLEVEL: NO PAIN (0)

## 2018-06-27 NOTE — LETTER
"6/27/2018      RE: Lennie Mar  [address]  [facility] [address]       Hematology-Oncology Visit  Jun 27, 2018    Reason for Visit: follow-up left breast cancer, 2 primaries     HPI: Lennie Mar is a 89 year old female with past medical history of bipolar disorder, CKD, essential tremor with recent diagnosis of left breast cancer, two separate primaries. She presented with a palpable mass and had a mammogram and US leading to biopsies on 5/9/18. Pathology showed grade 3 invasive carcinoma, ER/NC positive, HER2 amplified of mass at 3:00 position with associated DCIS and skin involvement. The mass at 11:00 position was also grade 3 invasive carcinoma, ER/NC positive, but HER2 nonamplified. No lymphadenopathy was noted.     She met with Dr. Malik on 5/21/18 and elected to start neoadjuvant treatment with Herceptin every 3 weeks along with letrozole. Echocardiogram was done 5/25 showed EF of 55-60%.     Here today for Cycle 2 of her Herceptin.     Chemotherapy 6/6/2018   Day, Cycle Day 1, Cycle 1   TRASTuzumab (HERCEPTIN)  mg       Interval History: Lennie is here alone today. Feeling fine today. Denies any side effects from either the Letrozole or the Herceptin. She does have edema in her LE bilaterally (history of for several years) for which she has relief with elevation prn.   Denies any hot flashes, fatigue, joint stiffness. She has no breast pain. She does reports that the breast mass at 3:00 o'clock \"feels softer\" than before start of treatment. She does not feel there has been a noticeable change in size of either masses. She continues to live independently. She does not drive herself. he has chronic urinary incontinence which is stable.     Patient denies any of the following except if noted above: fevers, chills, difficulty with energy, vision or hearing changes, headaches, chest pain, dyspnea, abdominal pain, vomiting, or neuropathy.. ROS otherwise negative on a " 12-system review.            Current Outpatient Prescriptions   Medication     ARIPiprazole (ABILIFY) 5 MG tablet     buPROPion (WELLBUTRIN XL) 300 MG 24 hr tablet     letrozole (FEMARA) 2.5 MG tablet     lithium 150 MG capsule     lithium 300 MG capsule     ORDER FOR DME     propranolol (INDERAL) 20 MG tablet     sertraline (ZOLOFT) 50 MG tablet     vitamin D (ERGOCALCIFEROL) 03203 UNIT capsule     acetaminophen (TYLENOL) 325 MG tablet     Lurasidone HCl (LATUDA PO)     prochlorperazine (COMPAZINE) 10 MG tablet     No current facility-administered medications for this visit.        PHYSICAL EXAM:  /79  Pulse 66  Temp 97.9  F (36.6  C) (Oral)  Resp 16  Ht 1.524 m (5')  Wt 75.7 kg (166 lb 14.4 oz)  SpO2 96%  BMI 32.6 kg/m2  General: Alert, oriented, pleasant, NAD  HEENT: Normocephalic, atraumatic, PERRLA, EOMI. Moist mucus membranes, no lesions or thrush  Neck: No cervical or supraclavicular LAD.  Axillary: No LAD  Breast: R breast without focal mass. L breast softer, deeper mass 11 o'clock position several centimeters from NAC measuring 3 x 2.5 cm . More superficial mass at 3 o'clock position with no skin changes measuring 2x2 cm (smaller in size and not as firm as last exam) abutting lateral nipple   Lungs: CTA bilaterally, normal work of breathing  Cardiac: RRR, S1, S2, no murmurs  Abdomen: Soft, nontender, nondistended. Normoactive bowel sounds. No hepatosplenomegaly, masses  Neuro: CNII-XII grossly intact  Extremities: No pedal edema    Labs:   None today    Assessment & Plan:     1. Left breast cancer, two separate primaries, both ER/DC positive, 1 HER2 amplified: Recently diagnosed. Plan for neoadjuvant letrozole and every 3 week Herceptin for around 3-6 months and then consider surgery at that time pending response. If there is no response to letrozole and Herceptin, would potentially add Perjeta or weekly Taxol. Her baseline lab work and echocardiogram were sufficient to start treatment with  Herceptin on 6/6/2018. She started on letrozole the week prior to her first Herceptin.  She is tolerating well. Will continue with cycle 2 of Herceptin today.     Follow-up in 6 weeks prior to cycle 4 of Herceptin with labs. She will follow-up every other infusion. Will plan for serial breast exams to assess response. Repeat imaging may be needed if clinical response is not clear.     Patient seen in coordination with Dalila Blum PA-C. Please see attestation.     Jayashree ISIDRO    The patient was seen in conjunction with Jayashree Devi NP who served as a scribe for today's visit. I have reviewed and edited the above note, and agree with the above findings and plan.    Dalila Blum PA-C  [facility]  [address] [place] [address] [id number] [phone]

## 2018-06-27 NOTE — PROGRESS NOTES
Infusion Nursing Note:  Lennie Mar presents today for Cycle 2 Day 1 Herceptin.    Patient seen by provider today: Yes: Dalila Blum NP   present during visit today: Not Applicable.    Note: Patient requested same pre-meds as Cycle 1.     Intravenous Access:  Peripheral IV placed by Vascular Access    Treatment Conditions:  ECHO/MUGA completed 5/25/18  EF 55-60%.    Post Infusion Assessment:  Patient tolerated infusion without incident.  Blood return noted pre and post infusion.  Site patent and intact, free from redness, edema or discomfort.  No evidence of extravasations.  Access discontinued per protocol.    Discharge Plan:   Patient declined prescription refills.  Discharge instructions reviewed with: Patient.  Patient and/or family verbalized understanding of discharge instructions and all questions answered.  Copy of AVS reviewed with patient and/or family.  Patient will return 7/17/18 for next appointment.  Patient discharged in stable condition accompanied by: self.  Departure Mode: Ambulatory.    Lore Workman RN

## 2018-06-27 NOTE — NURSING NOTE
Oncology Rooming Note    June 27, 2018 7:01 AM   Lennie Mar is a 89 year old female who presents for:    Chief Complaint   Patient presents with     Oncology Clinic Visit     F/U Breast Ca     Initial Vitals: /79  Pulse 66  Temp 97.9  F (36.6  C) (Oral)  Resp 16  Ht 1.524 m (5')  Wt 75.7 kg (166 lb 14.4 oz)  SpO2 96%  BMI 32.6 kg/m2 Estimated body mass index is 32.6 kg/(m^2) as calculated from the following:    Height as of this encounter: 1.524 m (5').    Weight as of this encounter: 75.7 kg (166 lb 14.4 oz). Body surface area is 1.79 meters squared.  No Pain (0) Comment: Data Unavailable   No LMP recorded. Patient is postmenopausal.  Allergies reviewed: Yes  Medications reviewed: Yes    Medications: MEDICATION REFILLS NEEDED TODAY. Provider was notified.  Pharmacy name entered into Can[facility]:    [facility] - [place], [place] - [address] [facility] [place], [place] - [address] [facility] [place], [place] - [address] [facility] [id number] [place] [address] - [address] AT [facility] [address]    Clinical concerns: none , Dalila was not notified.    10 minutes for nursing intake (face to face time)     NICHO OWEN LPN

## 2018-06-27 NOTE — PATIENT INSTRUCTIONS
Contact Numbers    [facility] Main Line: [phone]  [facility] Triage and after hours / weekends / holidays:  [phone]      Please call the triage or after hours line if you experience a temperature greater than or equal to 100.5, shaking chills, have uncontrolled nausea, vomiting and/or diarrhea, dizziness, shortness of breath, chest pain, bleeding, unexplained bruising, or if you have any other new/concerning symptoms, questions or concerns.      If you are having any concerning symptoms or wish to speak to a provider before your next infusion visit, please call your care coordinator or triage to notify them so we can adequately serve you.     If you need a refill on a narcotic prescription or other medication, please call before your infusion appointment.                   June 2018 Sunday Monday Tuesday Wednesday Thursday Friday Saturday                            1     2       3     4     5     [facility] D[facility]    3:00 PM   (15 min.)    [facility] LAB DRAW   [facility] Lab Draw     [facility] RETURN    3:15 PM   (50 min.)   Dalila Blum PA-C [facility] 6     [facility] ONC INFUSION 180    7:00 AM   (180 min.)    ONCOLOGY INFUSION   [facility] 7     8     9       10     11     12     13     14     15     16       17     18     19     20     21     22     23       24     25     26     27     [facility] RETURN    6:45 AM   (50 min.)   Dalila Blum PA-C   [facility]     [facility] ONC INFUSION 60    9:30 AM   (60 min.)    ONCOLOGY INFUSION   [facility] 28     29 30 July 2018 Sunday Monday Tuesday Wednesday Thursday Friday Saturday   1     2     3     4     5     6     7       8     9     10     11     12     13     14       15     16     17     [facility] DRAW    8:45 AM   (15 min.)   [facility]   [facility] Lab Draw     [facility] ONC INFUSION 60    9:30 AM   (60 min.)    ONCOLOGY INFUSION     [facility] 18     19     20     21       22     23     24     25     26     27     28       29     30     31                                   No results found for this or any previous visit (from the past 24 hour(s)).

## 2018-06-27 NOTE — MR AVS SNAPSHOT
After Visit Summary   6/27/2018    Lennie Mar    MRN: [id number]           Patient Information     Date Of Birth          11/28/1927        Visit Information        Provider Department      6/27/2018 9:30 AM MARICRUZ 22 ATC;  ONCOLOGY INFUSION [facility]        Today's Diagnoses     Malignant neoplasm of upper-inner quadrant of left breast in female, estrogen receptor positive (H)    -  1    Malignant neoplasm of upper-outer quadrant of left breast in female, estrogen receptor positive (H)          Care Instructions    Contact Numbers    [facility] Main Line: [phone]  [facility] Triage and after hours / weekends / holidays:  [phone]      Please call the triage or after hours line if you experience a temperature greater than or equal to 100.5, shaking chills, have uncontrolled nausea, vomiting and/or diarrhea, dizziness, shortness of breath, chest pain, bleeding, unexplained bruising, or if you have any other new/concerning symptoms, questions or concerns.      If you are having any concerning symptoms or wish to speak to a provider before your next infusion visit, please call your care coordinator or triage to notify them so we can adequately serve you.     If you need a refill on a narcotic prescription or other medication, please call before your infusion appointment.                   June 2018 Sunday Monday Tuesday Wednesday Thursday Friday Saturday                            1     2       3     4     5     [facility]00 [facility]   (15 min.)   [facility]   [facility] Lab Draw     [facility] RETURN    3:15 PM   (50 min.)   Dalila Blum PA-C   [facility] 6     [facility] ONC INFUSION 180    7:00 AM   (180 min.)    ONCOLOGY INFUSION   [facility] 7     8     9       10     11     12     13     14     15     16       17     18     19     20     21     22     23       24     25     26     27     [facility] RETURN    6:45 AM    (50 min.)   Dalila Blum PA-C   [facility]     [facility] ONC INFUSION 60    9:30 AM   (60 min.)   [facility] ONCOLOGY INFUSION   [facility] 28 29 30 July 2018 Sunday Monday Tuesday Wednesday Thursday Friday Saturday   1     2     3     4     5     6     7       8     9     10     11     12     13     14       15     16     17     [facility] DRAW    8:45 AM   (15 min.)   [facility]   [facility] Lab Draw     [facility] ONC INFUSION 60    9:30 AM   (60 min.)   UC ONCOLOGY INFUSION   [facility] 18     19     20     21       22     23     24     25     26     27     28       29     30     31                                   No results found for this or any previous visit (from the past 24 hour(s)).              Follow-ups after your visit        Your next 10 appointments already scheduled     Jul 17, 2018  8:45 AM CDT   [facility] with [facility]   [facility]    [address]  [address]  [place] [address]   [phone]            Jul 17, 2018  9:30 AM CDT   Infusion 60 with UC ONCOLOGY INFUSION, UC 22 ATC   [facility] ([facility])    [address]  [address]  [place] [facility] [address]   [phone]            Aug 08, 2018  8:00 AM CDT   [facility] Draw with [facility]   [facility]    [address]  [address]  [place] [facility] [address]   [phone]            Aug 08, 2018  8:40 AM CDT   (Arrive by 8:25 AM)   Return Visit with Dalila Blum PA-C   [facility] ([facility])    [address]  [address]  [place] [address]   [phone]            Aug 08, 2018  9:30 AM CDT   Infusion 60 with UC ONCOLOGY INFUSION, UC 21 ATC   [facility] (  [facility])    [address]  [address]  [facility] [facility] [address]   [phone]            Aug 28, 2018 10:00 AM CDT   Infusion 60 with UC ONCOLOGY INFUSION, UC 29 ATC   [facility] ([facility])    [address]  [address]  [place] [facility] [place] [address]   [phone]            Sep 18, 2018  8:45 AM CDT   (Arrive by 8:30 AM)   Return Visit with Perlita Malik MD   [facility] ([facility])    [address] [place] [address]  [address]  [facility] [address]   [phone]              Future tests that were ordered for you today     Open Future Orders        Priority Expected Expires Ordered    *CBC with platelets differential Routine 7/17/2018 6/27/2019 6/27/2018    Comprehensive metabolic panel Routine 7/17/2018 6/27/2019 6/27/2018            Who to contact     If you have questions or need follow up information about today's clinic visit or your schedule please contact [facility] directly at [phone].  Normal or non-critical lab and imaging results will be communicated to you by MyChart, letter or phone within 4 business days after the clinic has received the results. If you do not hear from us within 7 days, please contact the clinic through MyChart or phone. If you have a critical or abnormal lab result, we will notify you by phone as soon as possible.  Submit refill requests through [facility] or call your pharmacy and they will forward the refill request to us. Please allow 3 business days for your refill to be completed.          Additional Information About Your Visit        Care EveryWhere ID     This is your Care EveryWhere ID. This could be used by other organizations to access your [facility] medical records  [id number]         Blood Pressure from Last 3 Encounters:   06/27/18 143/79   06/06/18 118/59   06/05/18 134/81    Weight from Last 3 Encounters:    06/27/18 75.7 kg (166 lb 14.4 oz)   06/05/18 73.8 kg (162 lb 12.8 oz)   05/21/18 73.3 kg (161 lb 9.6 oz)              Today, you had the following     No orders found for display       Primary Care Provider Office Phone # Fax #    Ty Quintanilla [phone]  [facility] [address]        Equal Access to Services     ANKIT SALINAS : Hadii aad ku hadasho Soomaali, waaxda luqadaha, qaybta kaalmada adeegyada, waxay idiin hayaan adeeg khreshari la'lyly . So [facility] [phone].    ATENCIÓN: Si habla español, tiene a foster disposición servicios gratuitos de asistencia lingüística. [facility] [phone].    We comply with applicable federal civil rights laws and [place] laws. We do not discriminate on the basis of race, color, national origin, age, disability, sex, sexual orientation, or gender identity.            Thank you!     Thank you for choosing [facility]  for your care. Our goal is always to provide you with excellent care. Hearing back from our patients is one way we can continue to improve our services. Please take a few minutes to complete the written survey that you may receive in the mail after your visit with us. Thank you!             Your Updated Medication List - Protect others around you: Learn how to safely use, store and throw away your medicines at www.disposemymeds.org.          This list is accurate as of 6/27/18 10:25 AM.  Always use your most recent med list.                   Brand Name Dispense Instructions for use Diagnosis    acetaminophen 325 MG tablet    TYLENOL    100 tablet    Take 2 tablets by mouth every 4 hours as needed for pain.    S/P colon resection       ARIPiprazole 5 MG tablet    ABILIFY     TK 1 T PO QAM        buPROPion 300 MG 24 hr tablet    WELLBUTRIN XL     TK 1 T PO D        LATUDA PO      Take by mouth daily        letrozole 2.5 MG tablet    FEMARA    30 tablet    Take 1 tablet (2.5 mg) by mouth daily    Malignant neoplasm  of upper-inner quadrant of left breast in female, estrogen receptor positive (H), Malignant neoplasm of upper-outer quadrant of left breast in female, estrogen receptor positive (H)       * lithium 150 MG capsule     30 capsule    Take 1 capsule by mouth daily (with breakfast).    Bipolar disorder, unspecified       * lithium 300 MG capsule     30 capsule    Take 1 capsule by mouth daily (with dinner).    Bipolar disorder, unspecified       order for DME     2 Package    Equipment being ordered: compression stocking knee high 20-30mmhg    Edema       prochlorperazine 10 MG tablet    COMPAZINE    30 tablet    Take 0.5 tablets (5 mg) by mouth every 6 hours as needed (Nausea/Vomiting)    Malignant neoplasm of upper-inner quadrant of left breast in female, estrogen receptor positive (H), Malignant neoplasm of upper-outer quadrant of left breast in female, estrogen receptor positive (H)       propranolol 20 MG tablet    INDERAL    180 tablet    Take 1 tablet (20 mg) by mouth 2 times daily    Tremor       sertraline 50 MG tablet    ZOLOFT     Take 25 mg by mouth daily        vitamin D 86348 UNIT capsule    ERGOCALCIFEROL    12 capsule    Take 1 capsule (50,000 Units) by mouth every 7 days    Vitamin D deficiency       * Notice:  This list has 2 medication(s) that are the same as other medications prescribed for you. Read the directions carefully, and ask your doctor or other care provider to review them with you.

## 2018-06-27 NOTE — MR AVS SNAPSHOT
After Visit Summary   6/27/2018    Lennie Mar    MRN: [id number]           Patient Information     Date Of Birth          11/28/1927        Visit Information        Provider Department      6/27/2018 7:00 AM Dalila Blum PA-C [facility]        Today's Diagnoses     Malignant neoplasm of upper-inner quadrant of left breast in female, estrogen receptor positive (H)    -  1    Malignant neoplasm of upper-outer quadrant of left breast in female, estrogen receptor positive (H)           Follow-ups after your visit        Your next 10 appointments already scheduled     Jul 17, 2018  8:45 AM CDT   [facility] Lab Draw with [facility] DRAW   [facility]    [address]  [address]  [facility] [address]   [phone]            Jul 17, 2018  9:30 AM CDT   Infusion 60 with UC ONCOLOGY INFUSION, UC 22 ATC   [facility] ([facility])    [address]  [address]  [place] [facility] [address]   [phone]            Aug 08, 2018  8:00 AM CDT   [facility] Lab Draw with [facility] LAB DRAW   [facility] Lab Draw ([facility])    [address]  [address]  [place] [facility] [address]   [phone]            Aug 08, 2018  8:40 AM CDT   (Arrive by 8:25 AM)   Return Visit with Dalila Blum PA-C   [facility] ([facility])    [address]  [address]  [place] [facility] [address]   [phone]            Aug 08, 2018  9:30 AM CDT   Infusion 60 with UC ONCOLOGY INFUSION, UC 21 ATC   [facility] ([facility])    [address]  [address]  [facility] [address]   [phone]            Aug 28, 2018 10:00 AM CDT   Infusion 60 with UC ONCOLOGY INFUSION, UC 29 ATC   [facility] ([facility])     [address]  [address]  [facility] [id number] [phone]            Sep 18, 2018  8:45 AM CDT   (Arrive by 8:30 AM)   Return Visit with Perlita Malik MD   [facility] ([facility])    [address]  [address]  [place] [facility] [address] [phone]              Future tests that were ordered for you today     Open Future Orders        Priority Expected Expires Ordered    *CBC with platelets differential Routine 7/17/2018 6/27/2019 6/27/2018    Comprehensive metabolic panel Routine 7/17/2018 6/27/2019 6/27/2018            Who to contact     If you have questions or need follow up information about today's clinic visit or your schedule please contact [facility] directly at [phone].  Normal or non-critical lab and imaging results will be communicated to you by MyChart, letter or phone within 4 business days after the clinic has received the results. If you do not hear from us within 7 days, please contact the clinic through MyChart or phone. If you have a critical or abnormal lab result, we will notify you by phone as soon as possible.  Submit refill requests through [employer] or call your pharmacy and they will forward the refill request to us. Please allow 3 business days for your refill to be completed.          Additional Information About Your Visit        Care EveryWhere ID     This is your Care EveryWhere ID. This could be used by other organizations to access your [facility] medical records  [id number]        Your Vitals Were     Pulse Temperature Respirations Height Pulse Oximetry BMI (Body Mass Index)    66 97.9  F (36.6  C) (Oral) 16 1.524 m (5') 96% 32.6 kg/m2       Blood Pressure from Last 3 Encounters:   06/27/18 143/79   06/06/18 118/59   06/05/18 134/81    Weight from Last 3 Encounters:   06/27/18 75.7 kg (166 lb 14.4 oz)   06/05/18 73.8 kg (162 lb 12.8 oz)   05/21/18 73.3 kg (161 lb 9.6 oz)                Primary Care Provider Office Phone # Fax #    Ty Quintanilla [phone]  [facility] [address]        Equal Access to Services     ANKIT SALINAS : Marielle dottie ross inga Teague, wayogeshda luqlyly, qaybta kadanna pagan, mellisa posada laMarylyly palacios. So [facility] [phone].    ATENCIÓN: Si habla español, tiene a foster disposición servicios gratuitos de asistencia lingüística. Llame al [phone].    We comply with applicable federal civil rights laws and [place] laws. We do not discriminate on the basis of race, color, national origin, age, disability, sex, sexual orientation, or gender identity.            Thank you!     Thank you for choosing [facility]  for your care. Our goal is always to provide you with excellent care. Hearing back from our patients is one way we can continue to improve our services. Please take a few minutes to complete the written survey that you may receive in the mail after your visit with us. Thank you!             Your Updated Medication List - Protect others around you: Learn how to safely use, store and throw away your medicines at www.disposemymeds.org.          This list is accurate as of 6/27/18 11:36 AM.  Always use your most recent med list.                   Brand Name Dispense Instructions for use Diagnosis    acetaminophen 325 MG tablet    TYLENOL    100 tablet    Take 2 tablets by mouth every 4 hours as needed for pain.    S/P colon resection       ARIPiprazole 5 MG tablet    ABILIFY     TK 1 T PO QAM        buPROPion 300 MG 24 hr tablet    WELLBUTRIN XL     TK 1 T PO D        LATUDA PO      Take by mouth daily        letrozole 2.5 MG tablet    FEMARA    30 tablet    Take 1 tablet (2.5 mg) by mouth daily    Malignant neoplasm of upper-inner quadrant of left breast in female, estrogen receptor positive (H), Malignant neoplasm of upper-outer quadrant of left breast in female, estrogen receptor positive (H)        * lithium 150 MG capsule     30 capsule    Take 1 capsule by mouth daily (with breakfast).    Bipolar disorder, unspecified       * lithium 300 MG capsule     30 capsule    Take 1 capsule by mouth daily (with dinner).    Bipolar disorder, unspecified       order for DME     2 Package    Equipment being ordered: compression stocking knee high 20-30mmhg    Edema       prochlorperazine 10 MG tablet    COMPAZINE    30 tablet    Take 0.5 tablets (5 mg) by mouth every 6 hours as needed (Nausea/Vomiting)    Malignant neoplasm of upper-inner quadrant of left breast in female, estrogen receptor positive (H), Malignant neoplasm of upper-outer quadrant of left breast in female, estrogen receptor positive (H)       propranolol 20 MG tablet    INDERAL    180 tablet    Take 1 tablet (20 mg) by mouth 2 times daily    Tremor       sertraline 50 MG tablet    ZOLOFT     Take 25 mg by mouth daily        vitamin D 69398 UNIT capsule    ERGOCALCIFEROL    12 capsule    Take 1 capsule (50,000 Units) by mouth every 7 days    Vitamin D deficiency       * Notice:  This list has 2 medication(s) that are the same as other medications prescribed for you. Read the directions carefully, and ask your doctor or other care provider to review them with you.

## 2018-06-27 NOTE — PROGRESS NOTES
"Hematology-Oncology Visit  Jun 27, 2018    Reason for Visit: follow-up left breast cancer, 2 primaries     HPI: Lennie Mar is a 89 year old female with past medical history of bipolar disorder, CKD, essential tremor with recent diagnosis of left breast cancer, two separate primaries. She presented with a palpable mass and had a mammogram and US leading to biopsies on 5/9/18. Pathology showed grade 3 invasive carcinoma, ER/KY positive, HER2 amplified of mass at 3:00 position with associated DCIS and skin involvement. The mass at 11:00 position was also grade 3 invasive carcinoma, ER/KY positive, but HER2 nonamplified. No lymphadenopathy was noted.     She met with Dr. Malik on 5/21/18 and elected to start neoadjuvant treatment with Herceptin every 3 weeks along with letrozole. Echocardiogram was done 5/25 showed EF of 55-60%.     Here today for Cycle 2 of her Herceptin.     Chemotherapy 6/6/2018   Day, Cycle Day 1, Cycle 1   TRASTuzumab (HERCEPTIN)  mg       Interval History: Lennie is here alone today. Feeling fine today. Denies any side effects from either the Letrozole or the Herceptin. She does have edema in her LE bilaterally (history of for several years) for which she has relief with elevation prn.   Denies any hot flashes, fatigue, joint stiffness. She has no breast pain. She does reports that the breast mass at 3:00 o'clock \"feels softer\" than before start of treatment. She does not feel there has been a noticeable change in size of either masses. She continues to live independently. She does not drive herself. he has chronic urinary incontinence which is stable.     Patient denies any of the following except if noted above: fevers, chills, difficulty with energy, vision or hearing changes, headaches, chest pain, dyspnea, abdominal pain, vomiting, or neuropathy.. ROS otherwise negative on a 12-system review.            Current Outpatient Prescriptions   Medication     ARIPiprazole " (ABILIFY) 5 MG tablet     buPROPion (WELLBUTRIN XL) 300 MG 24 hr tablet     letrozole (FEMARA) 2.5 MG tablet     lithium 150 MG capsule     lithium 300 MG capsule     ORDER FOR DME     propranolol (INDERAL) 20 MG tablet     sertraline (ZOLOFT) 50 MG tablet     vitamin D (ERGOCALCIFEROL) 57071 UNIT capsule     acetaminophen (TYLENOL) 325 MG tablet     Lurasidone HCl (LATUDA PO)     prochlorperazine (COMPAZINE) 10 MG tablet     No current facility-administered medications for this visit.        PHYSICAL EXAM:  /79  Pulse 66  Temp 97.9  F (36.6  C) (Oral)  Resp 16  Ht 1.524 m (5')  Wt 75.7 kg (166 lb 14.4 oz)  SpO2 96%  BMI 32.6 kg/m2  General: Alert, oriented, pleasant, NAD  HEENT: Normocephalic, atraumatic, PERRLA, EOMI. Moist mucus membranes, no lesions or thrush  Neck: No cervical or supraclavicular LAD.  Axillary: No LAD  Breast: R breast without focal mass. L breast softer, deeper mass 11 o'clock position several centimeters from NAC measuring 3 x 2.5 cm . More superficial mass at 3 o'clock position with no skin changes measuring 2x2 cm (smaller in size and not as firm as last exam) abutting lateral nipple   Lungs: CTA bilaterally, normal work of breathing  Cardiac: RRR, S1, S2, no murmurs  Abdomen: Soft, nontender, nondistended. Normoactive bowel sounds. No hepatosplenomegaly, masses  Neuro: CNII-XII grossly intact  Extremities: No pedal edema    Labs:   None today    Assessment & Plan:     1. Left breast cancer, two separate primaries, both ER/SC positive, 1 HER2 amplified: Recently diagnosed. Plan for neoadjuvant letrozole and every 3 week Herceptin for around 3-6 months and then consider surgery at that time pending response. If there is no response to letrozole and Herceptin, would potentially add Perjeta or weekly Taxol. Her baseline lab work and echocardiogram were sufficient to start treatment with Herceptin on 6/6/2018. She started on letrozole the week prior to her first Herceptin.  She is  tolerating well. Will continue with cycle 2 of Herceptin today.     Follow-up in 6 weeks prior to cycle 4 of Herceptin with labs. She will follow-up every other infusion. Will plan for serial breast exams to assess response. Repeat imaging may be needed if clinical response is not clear.     Patient seen in coordination with Dalila Blum PA-C. Please see attestation.     Jayashree ISIDRO    The patient was seen in conjunction with Jayashree Devi NP who served as a scribe for today's visit. I have reviewed and edited the above note, and agree with the above findings and plan.    Dalila Blum PA-C  [facility]  [address] [place] [id number] [phone]

## 2018-07-12 RX ORDER — EPINEPHRINE 1 MG/ML
0.3 INJECTION, SOLUTION, CONCENTRATE INTRAVENOUS EVERY 5 MIN PRN
Status: CANCELLED | OUTPATIENT
Start: 2018-07-18

## 2018-07-12 RX ORDER — DIPHENHYDRAMINE HCL 25 MG
25 CAPSULE ORAL
Status: CANCELLED
Start: 2018-07-18

## 2018-07-12 RX ORDER — DIPHENHYDRAMINE HYDROCHLORIDE 50 MG/ML
50 INJECTION INTRAMUSCULAR; INTRAVENOUS
Status: CANCELLED
Start: 2018-07-18

## 2018-07-12 RX ORDER — ALBUTEROL SULFATE 90 UG/1
1-2 AEROSOL, METERED RESPIRATORY (INHALATION)
Status: CANCELLED
Start: 2018-07-18

## 2018-07-12 RX ORDER — METHYLPREDNISOLONE SODIUM SUCCINATE 125 MG/2ML
125 INJECTION, POWDER, LYOPHILIZED, FOR SOLUTION INTRAMUSCULAR; INTRAVENOUS
Status: CANCELLED
Start: 2018-07-18

## 2018-07-12 RX ORDER — EPINEPHRINE 0.3 MG/.3ML
0.3 INJECTION SUBCUTANEOUS EVERY 5 MIN PRN
Status: CANCELLED | OUTPATIENT
Start: 2018-07-18

## 2018-07-12 RX ORDER — ALBUTEROL SULFATE 0.83 MG/ML
2.5 SOLUTION RESPIRATORY (INHALATION)
Status: CANCELLED | OUTPATIENT
Start: 2018-07-18

## 2018-07-12 RX ORDER — SODIUM CHLORIDE 9 MG/ML
1000 INJECTION, SOLUTION INTRAVENOUS CONTINUOUS PRN
Status: CANCELLED
Start: 2018-07-18

## 2018-07-12 RX ORDER — MEPERIDINE HYDROCHLORIDE 25 MG/ML
25 INJECTION INTRAMUSCULAR; INTRAVENOUS; SUBCUTANEOUS EVERY 30 MIN PRN
Status: CANCELLED | OUTPATIENT
Start: 2018-07-18

## 2018-07-12 RX ORDER — ACETAMINOPHEN 325 MG/1
650 TABLET ORAL
Status: CANCELLED
Start: 2018-07-18

## 2018-07-16 ENCOUNTER — CARE COORDINATION (OUTPATIENT)
Dept: ONCOLOGY | Facility: CLINIC | Age: 83
End: 2018-07-16

## 2018-07-16 NOTE — PROGRESS NOTES
"Call placed to patient for follow up.  Pt reports she is feeling fine, \"would not even know I had anything wrong.\"  Pt gets up early and goes for a walk everyday.   Pt has a clinic infusion appt tomorrow.    Leslee Colon RNCC BSN CBCN    "

## 2018-07-17 ENCOUNTER — APPOINTMENT (OUTPATIENT)
Dept: LAB | Facility: CLINIC | Age: 83
End: 2018-07-17
Attending: INTERNAL MEDICINE
Payer: MEDICARE

## 2018-07-17 ENCOUNTER — INFUSION THERAPY VISIT (OUTPATIENT)
Dept: ONCOLOGY | Facility: CLINIC | Age: 83
End: 2018-07-17
Attending: INTERNAL MEDICINE
Payer: MEDICARE

## 2018-07-17 VITALS
BODY MASS INDEX: 32.52 KG/M2 | HEART RATE: 71 BPM | OXYGEN SATURATION: 96 % | WEIGHT: 166.5 LBS | SYSTOLIC BLOOD PRESSURE: 145 MMHG | TEMPERATURE: 98.5 F | DIASTOLIC BLOOD PRESSURE: 82 MMHG

## 2018-07-17 DIAGNOSIS — Z17.0 MALIGNANT NEOPLASM OF UPPER-INNER QUADRANT OF LEFT BREAST IN FEMALE, ESTROGEN RECEPTOR POSITIVE (H): Primary | ICD-10-CM

## 2018-07-17 DIAGNOSIS — C50.412 MALIGNANT NEOPLASM OF UPPER-OUTER QUADRANT OF LEFT BREAST IN FEMALE, ESTROGEN RECEPTOR POSITIVE (H): ICD-10-CM

## 2018-07-17 DIAGNOSIS — Z17.0 MALIGNANT NEOPLASM OF UPPER-OUTER QUADRANT OF LEFT BREAST IN FEMALE, ESTROGEN RECEPTOR POSITIVE (H): ICD-10-CM

## 2018-07-17 DIAGNOSIS — C50.212 MALIGNANT NEOPLASM OF UPPER-INNER QUADRANT OF LEFT BREAST IN FEMALE, ESTROGEN RECEPTOR POSITIVE (H): Primary | ICD-10-CM

## 2018-07-17 LAB
ALBUMIN SERPL-MCNC: 3.5 G/DL (ref 3.4–5)
ALP SERPL-CCNC: 112 U/L (ref 40–150)
ALT SERPL W P-5'-P-CCNC: 26 U/L (ref 0–50)
ANION GAP SERPL CALCULATED.3IONS-SCNC: 7 MMOL/L (ref 3–14)
AST SERPL W P-5'-P-CCNC: 27 U/L (ref 0–45)
BASOPHILS # BLD AUTO: 0.1 10E9/L (ref 0–0.2)
BASOPHILS NFR BLD AUTO: 0.9 %
BILIRUB SERPL-MCNC: 0.5 MG/DL (ref 0.2–1.3)
BUN SERPL-MCNC: 23 MG/DL (ref 7–30)
CALCIUM SERPL-MCNC: 9.7 MG/DL (ref 8.5–10.1)
CHLORIDE SERPL-SCNC: 112 MMOL/L (ref 94–109)
CO2 SERPL-SCNC: 23 MMOL/L (ref 20–32)
CREAT SERPL-MCNC: 1.26 MG/DL (ref 0.52–1.04)
DIFFERENTIAL METHOD BLD: ABNORMAL
EOSINOPHIL # BLD AUTO: 0.2 10E9/L (ref 0–0.7)
EOSINOPHIL NFR BLD AUTO: 2.6 %
ERYTHROCYTE [DISTWIDTH] IN BLOOD BY AUTOMATED COUNT: 14.2 % (ref 10–15)
GFR SERPL CREATININE-BSD FRML MDRD: 40 ML/MIN/1.7M2
GLUCOSE SERPL-MCNC: 102 MG/DL (ref 70–99)
HCT VFR BLD AUTO: 41 % (ref 35–47)
HGB BLD-MCNC: 12.8 G/DL (ref 11.7–15.7)
IMM GRANULOCYTES # BLD: 0.1 10E9/L (ref 0–0.4)
IMM GRANULOCYTES NFR BLD: 0.7 %
LYMPHOCYTES # BLD AUTO: 2.5 10E9/L (ref 0.8–5.3)
LYMPHOCYTES NFR BLD AUTO: 26.4 %
MCH RBC QN AUTO: 31.2 PG (ref 26.5–33)
MCHC RBC AUTO-ENTMCNC: 31.2 G/DL (ref 31.5–36.5)
MCV RBC AUTO: 100 FL (ref 78–100)
MONOCYTES # BLD AUTO: 0.9 10E9/L (ref 0–1.3)
MONOCYTES NFR BLD AUTO: 9.1 %
NEUTROPHILS # BLD AUTO: 5.7 10E9/L (ref 1.6–8.3)
NEUTROPHILS NFR BLD AUTO: 60.3 %
NRBC # BLD AUTO: 0 10*3/UL
NRBC BLD AUTO-RTO: 0 /100
PLATELET # BLD AUTO: 287 10E9/L (ref 150–450)
POTASSIUM SERPL-SCNC: 5.3 MMOL/L (ref 3.4–5.3)
PROT SERPL-MCNC: 7.7 G/DL (ref 6.8–8.8)
RBC # BLD AUTO: 4.1 10E12/L (ref 3.8–5.2)
SODIUM SERPL-SCNC: 142 MMOL/L (ref 133–144)
WBC # BLD AUTO: 9.4 10E9/L (ref 4–11)

## 2018-07-17 PROCEDURE — 80053 COMPREHEN METABOLIC PANEL: CPT | Performed by: NURSE PRACTITIONER

## 2018-07-17 PROCEDURE — 96413 CHEMO IV INFUSION 1 HR: CPT

## 2018-07-17 PROCEDURE — 40000556 ZZH STATISTIC PERIPHERAL IV START W US GUIDANCE

## 2018-07-17 PROCEDURE — 85025 COMPLETE CBC W/AUTO DIFF WBC: CPT | Performed by: NURSE PRACTITIONER

## 2018-07-17 PROCEDURE — 25000128 H RX IP 250 OP 636: Mod: ZF | Performed by: INTERNAL MEDICINE

## 2018-07-17 RX ADMIN — TRASTUZUMAB 450 MG: 150 INJECTION, POWDER, LYOPHILIZED, FOR SOLUTION INTRAVENOUS at 10:11

## 2018-07-17 RX ADMIN — SODIUM CHLORIDE 250 ML: 9 INJECTION, SOLUTION INTRAVENOUS at 10:11

## 2018-07-17 NOTE — MR AVS SNAPSHOT
After Visit Summary   7/17/2018    Lennie Mar    MRN: [id number]           Patient Information     Date Of Birth          11/28/1927        Visit Information        Provider Department      7/17/2018 9:30 AM UC 22 ATC; [facility]        Today's Diagnoses     Malignant neoplasm of upper-inner quadrant of left breast in female, estrogen receptor positive (H)    -  1    Malignant neoplasm of upper-outer quadrant of left breast in female, estrogen receptor positive (H)           Follow-ups after your visit        Your next 10 appointments already scheduled     Aug 08, 2018  8:00 AM CDT   [facility] Draw with [facility]   [facility] Draw ([facility])    [address]  [address]  [facility] [address]   [phone]            Aug 08, 2018  8:40 AM CDT   (Arrive by 8:25 AM)   Return Visit with Dalila Blum PA-C   [facility] ([facility])    [address]  [address]  [facility] [address]   [phone]            Aug 08, 2018  9:30 AM CDT   Infusion 60 with UC ONCOLOGY INFUSION, UC 21 ATC   [facility] ([facility])    [address]  [address]  [facility] [address]   [phone]            Aug 28, 2018 10:00 AM CDT   Infusion 60 with UC ONCOLOGY INFUSION, UC 29 ATC   [facility] ([facility])    [address]  [address]  [facility] [address]   [phone]            Sep 18, 2018  8:45 AM CDT   (Arrive by 8:30 AM)   Return Visit with Perlita Malik MD   [facility] ([facility])    [address] [place] [address]  [address]  [facility] [address] [phone]              Who to contact     If you have questions or need follow up information about today's clinic visit or your  schedule please contact [facility] directly at [phone].  Normal or non-critical lab and imaging results will be communicated to you by MyChart, letter or phone within 4 business days after the clinic has received the results. If you do not hear from us within 7 days, please contact the clinic through MyChart or phone. If you have a critical or abnormal lab result, we will notify you by phone as soon as possible.  Submit refill requests through [facility] or call your pharmacy and they will forward the refill request to us. Please allow 3 business days for your refill to be completed.          Additional Information About Your Visit        Care EveryWhere ID     This is your Care EveryWhere ID. This could be used by other organizations to access your [facility] medical records  [id number]        Your Vitals Were     Pulse Temperature Pulse Oximetry BMI (Body Mass Index)          71 98.5  F (36.9  C) (Oral) 96% 32.52 kg/m2         Blood Pressure from Last 3 Encounters:   07/17/18 145/82   06/27/18 143/79   06/06/18 118/59    Weight from Last 3 Encounters:   07/17/18 75.5 kg (166 lb 8 oz)   06/27/18 75.7 kg (166 lb 14.4 oz)   06/05/18 73.8 kg (162 lb 12.8 oz)              We Performed the Following     *CBC with platelets differential     Comprehensive metabolic panel        Primary Care Provider Office Phone # Fax #    Ty Quintanilla [phone]       [address]  [facility] [address]        Equal Access to Services     ANKIT SALINAS : Hadii aad ku hadasho Soomaali, waaxda luqadaha, qaybta kaalmada adeegyada, mellisa lim . So [facility] [phone].    ATENCIÓN: Si habla español, tiene a foster disposición servicios gratuitos de asistencia lingüística. Llame al [phone].    We comply with applicable federal civil rights laws and [place] laws. We do not discriminate on the basis of race, color, national origin, age, disability, sex, sexual orientation, or  gender identity.            Thank you!     Thank you for choosing [facility]  for your care. Our goal is always to provide you with excellent care. Hearing back from our patients is one way we can continue to improve our services. Please take a few minutes to complete the written survey that you may receive in the mail after your visit with us. Thank you!             Your Updated Medication List - Protect others around you: Learn how to safely use, store and throw away your medicines at www.disposemymeds.org.          This list is accurate as of 7/17/18 11:22 AM.  Always use your most recent med list.                   Brand Name Dispense Instructions for use Diagnosis    acetaminophen 325 MG tablet    TYLENOL    100 tablet    Take 2 tablets by mouth every 4 hours as needed for pain.    S/P colon resection       ARIPiprazole 5 MG tablet    ABILIFY     TK 1 T PO QAM        buPROPion 300 MG 24 hr tablet    WELLBUTRIN XL     TK 1 T PO D        LATUDA PO      Take by mouth daily        letrozole 2.5 MG tablet    FEMARA    30 tablet    Take 1 tablet (2.5 mg) by mouth daily    Malignant neoplasm of upper-inner quadrant of left breast in female, estrogen receptor positive (H), Malignant neoplasm of upper-outer quadrant of left breast in female, estrogen receptor positive (H)       * lithium 150 MG capsule     30 capsule    Take 1 capsule by mouth daily (with breakfast).    Bipolar disorder, unspecified       * lithium 300 MG capsule     30 capsule    Take 1 capsule by mouth daily (with dinner).    Bipolar disorder, unspecified       order for DME     2 Package    Equipment being ordered: compression stocking knee high 20-30mmhg    Edema       prochlorperazine 10 MG tablet    COMPAZINE    30 tablet    Take 0.5 tablets (5 mg) by mouth every 6 hours as needed (Nausea/Vomiting)    Malignant neoplasm of upper-inner quadrant of left breast in female, estrogen receptor positive (H), Malignant neoplasm of  upper-outer quadrant of left breast in female, estrogen receptor positive (H)       propranolol 20 MG tablet    INDERAL    180 tablet    Take 1 tablet (20 mg) by mouth 2 times daily    Tremor       sertraline 50 MG tablet    ZOLOFT     Take 25 mg by mouth daily        vitamin D 99749 UNIT capsule    ERGOCALCIFEROL    12 capsule    Take 1 capsule (50,000 Units) by mouth every 7 days    Vitamin D deficiency       * Notice:  This list has 2 medication(s) that are the same as other medications prescribed for you. Read the directions carefully, and ask your doctor or other care provider to review them with you.

## 2018-07-17 NOTE — PROGRESS NOTES
Infusion Nursing Note:  Lennie Mar presents today for Cycle 3 Herceptin.    Patient seen by provider today: No    Note: Pt reports feeling well and states she has not noticed any side effects from treatment so far.    Intravenous Access:  Peripheral IV placed.    Treatment Conditions:  Lab Results   Component Value Date    HGB 12.8 07/17/2018     Lab Results   Component Value Date    WBC 9.4 07/17/2018      Lab Results   Component Value Date    ANEU 5.7 07/17/2018     Lab Results   Component Value Date     07/17/2018      Lab Results   Component Value Date     07/17/2018                   Lab Results   Component Value Date    POTASSIUM 5.3 07/17/2018           Lab Results   Component Value Date    MAG 2.0 04/22/2012            Lab Results   Component Value Date    CR 1.26 07/17/2018                   Lab Results   Component Value Date    CANELO 9.7 07/17/2018                Lab Results   Component Value Date    BILITOTAL 0.5 07/17/2018           Lab Results   Component Value Date    ALBUMIN 3.5 07/17/2018                    Lab Results   Component Value Date    ALT 26 07/17/2018           Lab Results   Component Value Date    AST 27 07/17/2018       Results reviewed, labs MET treatment parameters, ok to proceed with treatment.  ECHO/MUGA completed 5/25  EF 55-60%.    Post Infusion Assessment:  Patient tolerated infusion without incident.  Blood return noted pre and post infusion.  Site patent and intact, free from redness, edema or discomfort.  No evidence of extravasations. Access discontinued per protocol.    Discharge Plan:   Patient declined prescription refills.  Copy of AVS reviewed with patient and/or family.  Patient will return 8/8 for next appointment.  Patient discharged in stable condition accompanied by: self.  Departure Mode: Ambulatory.    Erma Bello RN

## 2018-07-17 NOTE — NURSING NOTE
Chief Complaint   Patient presents with     Blood Draw     IV placed by vascular access, vitals completed by MA, pt checked in for appt.      Fernanda Mitchell MA

## 2018-08-07 DIAGNOSIS — C50.212 MALIGNANT NEOPLASM OF UPPER-INNER QUADRANT OF LEFT BREAST IN FEMALE, ESTROGEN RECEPTOR POSITIVE (H): Primary | ICD-10-CM

## 2018-08-07 DIAGNOSIS — Z17.0 MALIGNANT NEOPLASM OF UPPER-INNER QUADRANT OF LEFT BREAST IN FEMALE, ESTROGEN RECEPTOR POSITIVE (H): Primary | ICD-10-CM

## 2018-08-08 ENCOUNTER — INFUSION THERAPY VISIT (OUTPATIENT)
Dept: ONCOLOGY | Facility: CLINIC | Age: 83
End: 2018-08-08
Attending: INTERNAL MEDICINE
Payer: MEDICARE

## 2018-08-08 ENCOUNTER — APPOINTMENT (OUTPATIENT)
Dept: LAB | Facility: CLINIC | Age: 83
End: 2018-08-08
Attending: INTERNAL MEDICINE
Payer: MEDICARE

## 2018-08-08 VITALS
WEIGHT: 168 LBS | HEIGHT: 60 IN | BODY MASS INDEX: 32.98 KG/M2 | HEART RATE: 65 BPM | OXYGEN SATURATION: 96 % | TEMPERATURE: 97.7 F | RESPIRATION RATE: 16 BRPM | SYSTOLIC BLOOD PRESSURE: 153 MMHG | DIASTOLIC BLOOD PRESSURE: 84 MMHG

## 2018-08-08 DIAGNOSIS — Z17.0 MALIGNANT NEOPLASM OF UPPER-INNER QUADRANT OF LEFT BREAST IN FEMALE, ESTROGEN RECEPTOR POSITIVE (H): Primary | ICD-10-CM

## 2018-08-08 DIAGNOSIS — Z91.89 AT RISK FOR CARDIOMYOPATHY: ICD-10-CM

## 2018-08-08 DIAGNOSIS — C50.212 MALIGNANT NEOPLASM OF UPPER-INNER QUADRANT OF LEFT BREAST IN FEMALE, ESTROGEN RECEPTOR POSITIVE (H): Primary | ICD-10-CM

## 2018-08-08 DIAGNOSIS — C50.412 MALIGNANT NEOPLASM OF UPPER-OUTER QUADRANT OF LEFT BREAST IN FEMALE, ESTROGEN RECEPTOR POSITIVE (H): ICD-10-CM

## 2018-08-08 DIAGNOSIS — Z17.0 MALIGNANT NEOPLASM OF UPPER-OUTER QUADRANT OF LEFT BREAST IN FEMALE, ESTROGEN RECEPTOR POSITIVE (H): ICD-10-CM

## 2018-08-08 DIAGNOSIS — Z51.11 ENCOUNTER FOR ANTINEOPLASTIC CHEMOTHERAPY: ICD-10-CM

## 2018-08-08 LAB
ALBUMIN SERPL-MCNC: 3.6 G/DL (ref 3.4–5)
ALP SERPL-CCNC: 124 U/L (ref 40–150)
ALT SERPL W P-5'-P-CCNC: 24 U/L (ref 0–50)
ANION GAP SERPL CALCULATED.3IONS-SCNC: 7 MMOL/L (ref 3–14)
AST SERPL W P-5'-P-CCNC: 22 U/L (ref 0–45)
BASOPHILS # BLD AUTO: 0.1 10E9/L (ref 0–0.2)
BASOPHILS NFR BLD AUTO: 0.8 %
BILIRUB SERPL-MCNC: 0.4 MG/DL (ref 0.2–1.3)
BUN SERPL-MCNC: 34 MG/DL (ref 7–30)
CALCIUM SERPL-MCNC: 9.7 MG/DL (ref 8.5–10.1)
CHLORIDE SERPL-SCNC: 110 MMOL/L (ref 94–109)
CO2 SERPL-SCNC: 23 MMOL/L (ref 20–32)
CREAT SERPL-MCNC: 1.41 MG/DL (ref 0.52–1.04)
DIFFERENTIAL METHOD BLD: NORMAL
EOSINOPHIL # BLD AUTO: 0.3 10E9/L (ref 0–0.7)
EOSINOPHIL NFR BLD AUTO: 2.8 %
ERYTHROCYTE [DISTWIDTH] IN BLOOD BY AUTOMATED COUNT: 13.8 % (ref 10–15)
GFR SERPL CREATININE-BSD FRML MDRD: 35 ML/MIN/1.7M2
GLUCOSE SERPL-MCNC: 92 MG/DL (ref 70–99)
HCT VFR BLD AUTO: 41.8 % (ref 35–47)
HGB BLD-MCNC: 13.2 G/DL (ref 11.7–15.7)
IMM GRANULOCYTES # BLD: 0 10E9/L (ref 0–0.4)
IMM GRANULOCYTES NFR BLD: 0.4 %
LYMPHOCYTES # BLD AUTO: 2.2 10E9/L (ref 0.8–5.3)
LYMPHOCYTES NFR BLD AUTO: 24.8 %
MCH RBC QN AUTO: 31.6 PG (ref 26.5–33)
MCHC RBC AUTO-ENTMCNC: 31.6 G/DL (ref 31.5–36.5)
MCV RBC AUTO: 100 FL (ref 78–100)
MONOCYTES # BLD AUTO: 0.8 10E9/L (ref 0–1.3)
MONOCYTES NFR BLD AUTO: 9.3 %
NEUTROPHILS # BLD AUTO: 5.5 10E9/L (ref 1.6–8.3)
NEUTROPHILS NFR BLD AUTO: 61.9 %
NRBC # BLD AUTO: 0 10*3/UL
NRBC BLD AUTO-RTO: 0 /100
PLATELET # BLD AUTO: 284 10E9/L (ref 150–450)
POTASSIUM SERPL-SCNC: 4.4 MMOL/L (ref 3.4–5.3)
PROT SERPL-MCNC: 7.7 G/DL (ref 6.8–8.8)
RBC # BLD AUTO: 4.18 10E12/L (ref 3.8–5.2)
SODIUM SERPL-SCNC: 140 MMOL/L (ref 133–144)
WBC # BLD AUTO: 8.9 10E9/L (ref 4–11)

## 2018-08-08 PROCEDURE — 85025 COMPLETE CBC W/AUTO DIFF WBC: CPT | Performed by: PHYSICIAN ASSISTANT

## 2018-08-08 PROCEDURE — G0463 HOSPITAL OUTPT CLINIC VISIT: HCPCS | Mod: ZF

## 2018-08-08 PROCEDURE — 80053 COMPREHEN METABOLIC PANEL: CPT | Performed by: PHYSICIAN ASSISTANT

## 2018-08-08 PROCEDURE — 25000128 H RX IP 250 OP 636: Mod: ZF | Performed by: PHYSICIAN ASSISTANT

## 2018-08-08 PROCEDURE — 40000556 ZZH STATISTIC PERIPHERAL IV START W US GUIDANCE

## 2018-08-08 PROCEDURE — 99214 OFFICE O/P EST MOD 30 MIN: CPT | Mod: ZP | Performed by: PHYSICIAN ASSISTANT

## 2018-08-08 PROCEDURE — 96413 CHEMO IV INFUSION 1 HR: CPT

## 2018-08-08 RX ORDER — ACETAMINOPHEN 325 MG/1
650 TABLET ORAL
Status: CANCELLED
Start: 2018-08-08

## 2018-08-08 RX ORDER — EPINEPHRINE 1 MG/ML
0.3 INJECTION, SOLUTION INTRAMUSCULAR; SUBCUTANEOUS EVERY 5 MIN PRN
Status: CANCELLED | OUTPATIENT
Start: 2018-08-08

## 2018-08-08 RX ORDER — METHYLPREDNISOLONE SODIUM SUCCINATE 125 MG/2ML
125 INJECTION, POWDER, LYOPHILIZED, FOR SOLUTION INTRAMUSCULAR; INTRAVENOUS
Status: CANCELLED
Start: 2018-08-08

## 2018-08-08 RX ORDER — SODIUM CHLORIDE 9 MG/ML
1000 INJECTION, SOLUTION INTRAVENOUS CONTINUOUS PRN
Status: CANCELLED
Start: 2018-08-08

## 2018-08-08 RX ORDER — ALBUTEROL SULFATE 0.83 MG/ML
2.5 SOLUTION RESPIRATORY (INHALATION)
Status: CANCELLED | OUTPATIENT
Start: 2018-08-08

## 2018-08-08 RX ORDER — DIPHENHYDRAMINE HCL 25 MG
25 CAPSULE ORAL
Status: CANCELLED
Start: 2018-08-08

## 2018-08-08 RX ORDER — PRAMIPEXOLE DIHYDROCHLORIDE 1 MG/1
1 TABLET ORAL EVERY MORNING
COMMUNITY
Start: 2018-07-30 | End: 2019-06-04

## 2018-08-08 RX ORDER — EPINEPHRINE 0.3 MG/.3ML
0.3 INJECTION SUBCUTANEOUS EVERY 5 MIN PRN
Status: CANCELLED | OUTPATIENT
Start: 2018-08-08

## 2018-08-08 RX ORDER — DIPHENHYDRAMINE HYDROCHLORIDE 50 MG/ML
50 INJECTION INTRAMUSCULAR; INTRAVENOUS
Status: CANCELLED
Start: 2018-08-08

## 2018-08-08 RX ORDER — ALBUTEROL SULFATE 90 UG/1
1-2 AEROSOL, METERED RESPIRATORY (INHALATION)
Status: CANCELLED
Start: 2018-08-08

## 2018-08-08 RX ORDER — MEPERIDINE HYDROCHLORIDE 25 MG/ML
25 INJECTION INTRAMUSCULAR; INTRAVENOUS; SUBCUTANEOUS EVERY 30 MIN PRN
Status: CANCELLED | OUTPATIENT
Start: 2018-08-08

## 2018-08-08 RX ADMIN — TRASTUZUMAB 450 MG: 150 INJECTION, POWDER, LYOPHILIZED, FOR SOLUTION INTRAVENOUS at 10:25

## 2018-08-08 RX ADMIN — SODIUM CHLORIDE 250 ML: 9 INJECTION, SOLUTION INTRAVENOUS at 10:21

## 2018-08-08 ASSESSMENT — PAIN SCALES - GENERAL: PAINLEVEL: NO PAIN (0)

## 2018-08-08 NOTE — LETTER
8/8/2018      RE: Lennie Mar  [address]  [facility] [address]       Hematology-Oncology Visit  Aug 8, 2018    Reason for Visit: follow-up left breast cancer, 2 primaries     HPI: Lennie Mar is a 89 year old female with past medical history of bipolar disorder, CKD, essential tremor with recent diagnosis of left breast cancer, two separate primaries. She presented with a palpable mass and had a mammogram and US leading to biopsies on 5/9/18. Pathology showed grade 3 invasive carcinoma, ER/RI positive, HER2 amplified of mass at 3:00 position with associated DCIS and skin involvement. The mass at 11:00 position was also grade 3 invasive carcinoma, ER/RI positive, but HER2 nonamplified. No lymphadenopathy was noted.     She met with Dr. Malik on 5/21/18 and elected to start neoadjuvant treatment with Herceptin every 3 weeks along with letrozole. Echocardiogram was done 5/25 showed EF of 55-60%.     Here today for Cycle 4 of her Herceptin.     Interval History: Lennie is here alone today. She continues to feel well at this time. She has no concerns. She denies any side effect from letrozole or Herceptin. No fatigue, joint stiffness, hot flashes, nausea, difficulty with bowels. No SOB, change in edema, or CP. Feels like one breast mass is smaller, the other is harder for her to feel. She has no breast pain. ROS otherwise negative.       Current Outpatient Prescriptions   Medication     acetaminophen (TYLENOL) 325 MG tablet     ARIPiprazole (ABILIFY) 5 MG tablet     buPROPion (WELLBUTRIN XL) 300 MG 24 hr tablet     letrozole (FEMARA) 2.5 MG tablet     lithium 150 MG capsule     lithium 300 MG capsule     pramipexole (MIRAPEX) 1 MG tablet     propranolol (INDERAL) 20 MG tablet     sertraline (ZOLOFT) 50 MG tablet     Lurasidone HCl (LATUDA PO)     ORDER FOR DME     prochlorperazine (COMPAZINE) 10 MG tablet     vitamin D (ERGOCALCIFEROL) 08769 UNIT capsule     No current  facility-administered medications for this visit.        PHYSICAL EXAM:  /84  Pulse 65  Temp 97.7  F (36.5  C) (Oral)  Resp 16  Ht 1.524 m (5')  Wt 76.2 kg (168 lb)  SpO2 96%  Breastfeeding? No  BMI 32.81 kg/m2  General: Alert, oriented, pleasant, NAD  HEENT: Normocephalic, atraumatic, PERRLA, EOMI. Moist mucus membranes, no lesions or thrush  Neck: No cervical or supraclavicular LAD.  Axillary: No LAD  Breast: R breast without focal mass. L breast softer, deeper mass 11 o'clock position several centimeters from NAC measuring 2.5 x 1.5 cm . More superficial mass at 3 o'clock position with no skin changes measuring 2x2 cm (smaller in size and not as firm as last exam) abutting lateral nipple   Lungs: CTA bilaterally, normal work of breathing  Cardiac: RRR, S1, S2, no murmurs  Abdomen: Soft, nontender, nondistended. Normoactive bowel sounds. No hepatosplenomegaly, masses  Neuro: CNII-XII grossly intact  Extremities: No pedal edema    Labs:    8/8/2018 08:12   Sodium 140   Potassium 4.4   Chloride 110 (H)   Carbon Dioxide 23   Urea Nitrogen 34 (H)   Creatinine 1.41 (H)   GFR Estimate 35 (L)   GFR Estimate If Black 42 (L)   Calcium 9.7   Anion Gap 7   Albumin 3.6   Protein Total 7.7   Bilirubin Total 0.4   Alkaline Phosphatase 124   ALT 24   AST 22   Glucose 92   WBC 8.9   Hemoglobin 13.2   Hematocrit 41.8   Platelet Count 284   RBC Count 4.18      MCH 31.6   MCHC 31.6   RDW 13.8   Diff Method Automated Method   % Neutrophils 61.9   % Lymphocytes 24.8   % Monocytes 9.3   % Eosinophils 2.8   % Basophils 0.8   % Immature Granulocytes 0.4   Nucleated RBCs 0   Absolute Neutrophil 5.5   Absolute Lymphocytes 2.2   Absolute Monocytes 0.8   Absolute Eosinophils 0.3   Absolute Basophils 0.1   Abs Immature Granulocytes 0.0   Absolute Nucleated RBC 0.0         Assessment & Plan:     1. Left breast cancer, two separate primaries, both ER/FL positive, 1 HER2 amplified: Recently diagnosed. Plan for neoadjuvant  letrozole and every 3 week Herceptin for around 3-6 months and then consider surgery at that time pending response. If there is no response to letrozole and Herceptin, would potentially add Perjeta or weekly Taxol. She is tolerating treatment extremely well and has had some clinical response. Will proceed with cycle 4 of Herceptin today and daily letrozole. Follow-up echo in 3 weeks prior to Herceptin and then see Dr. Malik in mid-September.     Dalila Blum PA-C  [facility] [address] [id number] [phone]

## 2018-08-08 NOTE — PROGRESS NOTES
Infusion Nursing Note:  Lennie Mar presents today for Cycle 4 Day 1 Herceptin.    Patient seen by provider today: Yes: Dalila Blum PA-C.   present during visit today: Not Applicable.    Note: Lennie reports to infusion after her visit with Dalila Blum PA-C. She is ready for treatment and offers no concerns today.     Intravenous Access:  Peripheral IV placed in lab.    Treatment Conditions:  Lab Results   Component Value Date    HGB 13.2 08/08/2018     Lab Results   Component Value Date    WBC 8.9 08/08/2018      Lab Results   Component Value Date    ANEU 5.5 08/08/2018     Lab Results   Component Value Date     08/08/2018      Lab Results   Component Value Date     08/08/2018                   Lab Results   Component Value Date    POTASSIUM 4.4 08/08/2018           Lab Results   Component Value Date    MAG 2.0 04/22/2012            Lab Results   Component Value Date    CR 1.41 08/08/2018                   Lab Results   Component Value Date    CANELO 9.7 08/08/2018                Lab Results   Component Value Date    BILITOTAL 0.4 08/08/2018           Lab Results   Component Value Date    ALBUMIN 3.6 08/08/2018                    Lab Results   Component Value Date    ALT 24 08/08/2018           Lab Results   Component Value Date    AST 22 08/08/2018       Post Infusion Assessment:  Patient tolerated infusion without incident.  Blood return noted pre and post infusion.  Site patent and intact, free from redness, edema or discomfort.  No evidence of extravasations.  Access discontinued per protocol.    Discharge Plan:   Patient declined prescription refills.  Discharge instructions reviewed with: Patient.  Patient and/or family verbalized understanding of discharge instructions and all questions answered.  Copy of AVS reviewed with patient and/or family.  Patient will return 8/28/2018 for next appointment.  Patient discharged in stable condition accompanied by:  self.  Departure Mode: Ambulatory.    John Schilling RN

## 2018-08-08 NOTE — MR AVS SNAPSHOT
After Visit Summary   8/8/2018    Lennie Mar    MRN: [id number]           Patient Information     Date Of Birth          11/28/1927        Visit Information        Provider Department      8/8/2018 9:30 AM UC 21 ATC;  ONCOLOGY INFUSION [facility]        Today's Diagnoses     Malignant neoplasm of upper-inner quadrant of left breast in female, estrogen receptor positive (H)    -  1    Malignant neoplasm of upper-outer quadrant of left breast in female, estrogen receptor positive (H)          Care Instructions    [facility] Main Line: [phone]    Call triage nurse with chills and/or temperature greater than or equal to 100.4, uncontrolled nausea/vomiting, diarrhea, constipation, dizziness, shortness of breath, chest pain, bleeding, unexplained bruising, or any new/concerning symptoms, questions/concerns.     If you are having any concerning symptoms or wish to speak to a provider before your next infusion visit, please call your care coordinator or triage to notify them so we can adequately serve you.     For triage nurse, after hours, weekends, and holidays: [phone]          August 2018 Sunday Monday Tuesday Wednesday Thursday Friday Saturday                  1     2     3     4       5     6     7     8     [facility] LAB DRAW    8:00 AM   (15 min.)   [facility] LAB DRAW   [facility] Lab Draw     P RETURN    8:25 AM   (50 min.)   Dalila Blum PA-C   [facility] 60    9:30 AM   (60 min.)    ONCOLOGY INFUSION   [facility] 9     10     11       12     13     14     15     16     17     18       19     20     21     22     23     24     25       26     27     28     ECH LIMITED    9:00 AM   (60 min.)   [facility]CR[facility]   10:00 AM   (60 min.)    ONCOLOGY INFUSION   [facility] 29     30     31                      September 2018 Sunday Monday Tuesday Wednesday Thursday Friday Saturday                                 1       2     3     4     5     6     7     8       9     10     11     12     13     14     15       16     17     18     [facility] RETURN    8:30 AM   (30 min.)   Perlita Malik MD   [facility]     UMP ONC INFUSION 60    9:30 AM   (60 min.)   UC ONCOLOGY INFUSION   [facility] 19     20     21     22       23     24     25     26     27     28     29       30                                                Lab Results:  Recent Results (from the past 12 hour(s))   CBC with platelets differential    Collection Time: 08/08/18  8:12 AM   Result Value Ref Range    WBC 8.9 4.0 - 11.0 10e9/L    RBC Count 4.18 3.8 - 5.2 10e12/L    Hemoglobin 13.2 11.7 - 15.7 g/dL    Hematocrit 41.8 35.0 - 47.0 %     78 - 100 fl    MCH 31.6 26.5 - 33.0 pg    MCHC 31.6 31.5 - 36.5 g/dL    RDW 13.8 10.0 - 15.0 %    Platelet Count 284 150 - 450 10e9/L    Diff Method Automated Method     % Neutrophils 61.9 %    % Lymphocytes 24.8 %    % Monocytes 9.3 %    % Eosinophils 2.8 %    % Basophils 0.8 %    % Immature Granulocytes 0.4 %    Nucleated RBCs 0 0 /100    Absolute Neutrophil 5.5 1.6 - 8.3 10e9/L    Absolute Lymphocytes 2.2 0.8 - 5.3 10e9/L    Absolute Monocytes 0.8 0.0 - 1.3 10e9/L    Absolute Eosinophils 0.3 0.0 - 0.7 10e9/L    Absolute Basophils 0.1 0.0 - 0.2 10e9/L    Abs Immature Granulocytes 0.0 0 - 0.4 10e9/L    Absolute Nucleated RBC 0.0    Comprehensive metabolic panel    Collection Time: 08/08/18  8:12 AM   Result Value Ref Range    Sodium 140 133 - 144 mmol/L    Potassium 4.4 3.4 - 5.3 mmol/L    Chloride 110 (H) 94 - 109 mmol/L    Carbon Dioxide 23 20 - 32 mmol/L    Anion Gap 7 3 - 14 mmol/L    Glucose 92 70 - 99 mg/dL    Urea Nitrogen 34 (H) 7 - 30 mg/dL    Creatinine 1.41 (H) 0.52 - 1.04 mg/dL    GFR Estimate 35 (L) >60 mL/min/1.7m2    GFR Estimate If Black 42 (L)  >60 mL/min/1.7m2    Calcium 9.7 8.5 - 10.1 mg/dL    Bilirubin Total 0.4 0.2 - 1.3 mg/dL    Albumin 3.6 3.4 - 5.0 g/dL    Protein Total 7.7 6.8 - 8.8 g/dL    Alkaline Phosphatase 124 40 - 150 U/L    ALT 24 0 - 50 U/L    AST 22 0 - 45 U/L                   Follow-ups after your visit        Your next 10 appointments already scheduled     Aug 28, 2018  9:00 AM CDT   Ech Limited with [id number]   [facility])    [address]  3rd Floor  [facility] [id number] [phone]           1.  Please bring or wear a comfortable two-piece outfit. 2.  You may eat, drink and take your normal medicines. 3.  For any questions that cannot be answered, please contact the ordering physician 4.  Please do not wear perfumes or scented lotions on the day of your exam.            Aug 28, 2018 10:00 AM CDT   Infusion 60 with UC ONCOLOGY INFUSION, UC 29 ATC   [facility] ([facility])    [address] [place] [address]  [address]  [facility] [id number] [phone]            Sep 18, 2018  8:45 AM CDT   (Arrive by 8:30 AM)   Return Visit with Perlita Malik MD   [facility] ([facility])    [address] [place] [address]  [address]  [facility] [id number] [phone]            Sep 18, 2018  9:30 AM CDT   Infusion 60 with UC ONCOLOGY INFUSION, UC 10 ATC   [facility] ([facility])    [address] [place] [address]  [address]  [facility] [id number] [phone]              Future tests that were ordered for you today     Open Future Orders        Priority Expected Expires Ordered    Echocardiogram Limited Routine 8/29/2018 8/8/2019 8/8/2018            Who to contact     If you have questions or need follow up information about today's clinic visit or your schedule please contact [facility] directly at [phone].  Normal or non-critical lab and  imaging results will be communicated to you by MyChart, letter or phone within 4 business days after the clinic has received the results. If you do not hear from us within 7 days, please contact the clinic through MyChart or phone. If you have a critical or abnormal lab result, we will notify you by phone as soon as possible.  Submit refill requests through [employer] or call your pharmacy and they will forward the refill request to us. Please allow 3 business days for your refill to be completed.          Additional Information About Your Visit        Care EveryWhere ID     This is your Care EveryWhere ID. This could be used by other organizations to access your [place] medical records  [id number]         Blood Pressure from Last 3 Encounters:   08/08/18 153/84   07/17/18 145/82   06/27/18 143/79    Weight from Last 3 Encounters:   08/08/18 76.2 kg (168 lb)   07/17/18 75.5 kg (166 lb 8 oz)   06/27/18 75.7 kg (166 lb 14.4 oz)              Today, you had the following     No orders found for display       Primary Care Provider Office Phone # Fax #    Ty Maged Quintanilla [phone]       [address]  [facility] [address]        Equal Access to Services     ANKIT SALINAS : Hadii dottie ku hadasho Soomaali, waaxda luqadaha, qaybta kaalmada adeegyada, mellisa palacios. So [facility] [phone].    ATENCIÓN: Si habla español, tiene a foster disposición servicios gratuitos de asistencia lingüística. Llame al [phone].    We comply with applicable federal civil rights laws and [place] laws. We do not discriminate on the basis of race, color, national origin, age, disability, sex, sexual orientation, or gender identity.            Thank you!     Thank you for choosing [facility]  for your care. Our goal is always to provide you with excellent care. Hearing back from our patients is one way we can continue to improve our services. Please take a few minutes to complete the  written survey that you may receive in the mail after your visit with us. Thank you!             Your Updated Medication List - Protect others around you: Learn how to safely use, store and throw away your medicines at www.disposemymeds.org.          This list is accurate as of 8/8/18 10:50 AM.  Always use your most recent med list.                   Brand Name Dispense Instructions for use Diagnosis    acetaminophen 325 MG tablet    TYLENOL    100 tablet    Take 2 tablets by mouth every 4 hours as needed for pain.    S/P colon resection       ARIPiprazole 5 MG tablet    ABILIFY     TK 1 T PO QAM        buPROPion 300 MG 24 hr tablet    WELLBUTRIN XL     TK 1 T PO D        LATUDA PO      Take by mouth daily        letrozole 2.5 MG tablet    FEMARA    30 tablet    Take 1 tablet (2.5 mg) by mouth daily    Malignant neoplasm of upper-inner quadrant of left breast in female, estrogen receptor positive (H), Malignant neoplasm of upper-outer quadrant of left breast in female, estrogen receptor positive (H)       * lithium 150 MG capsule     30 capsule    Take 1 capsule by mouth daily (with breakfast).    Bipolar disorder, unspecified       * lithium 300 MG capsule     30 capsule    Take 1 capsule by mouth daily (with dinner).    Bipolar disorder, unspecified       order for DME     2 Package    Equipment being ordered: compression stocking knee high 20-30mmhg    Edema       pramipexole 1 MG tablet    MIRAPEX          prochlorperazine 10 MG tablet    COMPAZINE    30 tablet    Take 0.5 tablets (5 mg) by mouth every 6 hours as needed (Nausea/Vomiting)    Malignant neoplasm of upper-inner quadrant of left breast in female, estrogen receptor positive (H), Malignant neoplasm of upper-outer quadrant of left breast in female, estrogen receptor positive (H)       propranolol 20 MG tablet    INDERAL    180 tablet    Take 1 tablet (20 mg) by mouth 2 times daily    Tremor       sertraline 50 MG tablet    ZOLOFT     Take 25 mg by mouth  daily        vitamin D 42294 UNIT capsule    ERGOCALCIFEROL    12 capsule    Take 1 capsule (50,000 Units) by mouth every 7 days    Vitamin D deficiency       * Notice:  This list has 2 medication(s) that are the same as other medications prescribed for you. Read the directions carefully, and ask your doctor or other care provider to review them with you.

## 2018-08-08 NOTE — PATIENT INSTRUCTIONS
[facility] Main Line: [phone]    Call triage nurse with chills and/or temperature greater than or equal to 100.4, uncontrolled nausea/vomiting, diarrhea, constipation, dizziness, shortness of breath, chest pain, bleeding, unexplained bruising, or any new/concerning symptoms, questions/concerns.     If you are having any concerning symptoms or wish to speak to a provider before your next infusion visit, please call your care coordinator or triage to notify them so we can adequately serve you.     For triage nurse, after hours, weekends, and holidays: [phone]          August 2018 Sunday Monday Tuesday Wednesday Thursday Friday Saturday                  1     2     3     4       5     6     7     8     [facility] LAB DRAW    8:00 AM   (15 min.)    [facility] LAB DRAW   [facility] Lab Draw     UMP RETURN    8:25 AM   (50 min.)   Dalila Blum PA-C   [facility] ONC INFUSION 60    9:30 AM   (60 min.)    ONCOLOGY INFUSION   [facility] 9     10     11       12     13     14     15     16     17     18       19     20     21     22     23     24     25       26     27     28     ECH LIMITED    9:00 AM   (60 min.)   UCECHCR4   [facility] ONC INFUSION 60   10:00 AM   (60 min.)    ONCOLOGY INFUSION   [facility] 29 30 31 September 2018 Sunday Monday Tuesday Wednesday Thursday Friday Saturday                                 1       2     3     4     5     6     7     8       9     10     11     12     13     14     15       16     17     18     [facility] RETURN    8:30 AM   (30 min.)   [facility]    9:30 AM   (60 min.)    ONCOLOGY INFUSION   [facility] 19     20     21     22       23     24     25     26     27     28     29       30                                                Lab  Results:  Recent Results (from the past 12 hour(s))   CBC with platelets differential    Collection Time: 08/08/18  8:12 AM   Result Value Ref Range    WBC 8.9 4.0 - 11.0 10e9/L    RBC Count 4.18 3.8 - 5.2 10e12/L    Hemoglobin 13.2 11.7 - 15.7 g/dL    Hematocrit 41.8 35.0 - 47.0 %     78 - 100 fl    MCH 31.6 26.5 - 33.0 pg    MCHC 31.6 31.5 - 36.5 g/dL    RDW 13.8 10.0 - 15.0 %    Platelet Count 284 150 - 450 10e9/L    Diff Method Automated Method     % Neutrophils 61.9 %    % Lymphocytes 24.8 %    % Monocytes 9.3 %    % Eosinophils 2.8 %    % Basophils 0.8 %    % Immature Granulocytes 0.4 %    Nucleated RBCs 0 0 /100    Absolute Neutrophil 5.5 1.6 - 8.3 10e9/L    Absolute Lymphocytes 2.2 0.8 - 5.3 10e9/L    Absolute Monocytes 0.8 0.0 - 1.3 10e9/L    Absolute Eosinophils 0.3 0.0 - 0.7 10e9/L    Absolute Basophils 0.1 0.0 - 0.2 10e9/L    Abs Immature Granulocytes 0.0 0 - 0.4 10e9/L    Absolute Nucleated RBC 0.0    Comprehensive metabolic panel    Collection Time: 08/08/18  8:12 AM   Result Value Ref Range    Sodium 140 133 - 144 mmol/L    Potassium 4.4 3.4 - 5.3 mmol/L    Chloride 110 (H) 94 - 109 mmol/L    Carbon Dioxide 23 20 - 32 mmol/L    Anion Gap 7 3 - 14 mmol/L    Glucose 92 70 - 99 mg/dL    Urea Nitrogen 34 (H) 7 - 30 mg/dL    Creatinine 1.41 (H) 0.52 - 1.04 mg/dL    GFR Estimate 35 (L) >60 mL/min/1.7m2    GFR Estimate If Black 42 (L) >60 mL/min/1.7m2    Calcium 9.7 8.5 - 10.1 mg/dL    Bilirubin Total 0.4 0.2 - 1.3 mg/dL    Albumin 3.6 3.4 - 5.0 g/dL    Protein Total 7.7 6.8 - 8.8 g/dL    Alkaline Phosphatase 124 40 - 150 U/L    ALT 24 0 - 50 U/L    AST 22 0 - 45 U/L

## 2018-08-08 NOTE — NURSING NOTE
Chief Complaint   Patient presents with     Blood Draw     PIV placed by RN, labs drawn, vitals completed, pt checked in for appt.      Wen Daniel, CMA

## 2018-08-08 NOTE — NURSING NOTE
Oncology Rooming Note    August 8, 2018 8:36 AM   Lennie Mar is a 89 year old female who presents for:    Chief Complaint   Patient presents with     Blood Draw     PIV placed by RN, labs drawn, vitals completed, pt checked in for appt.      Oncology Clinic Visit     Return: Breast CA     Initial Vitals: /84  Pulse 65  Temp 97.7  F (36.5  C) (Oral)  Resp 16  Ht 1.524 m (5')  Wt 76.2 kg (168 lb)  SpO2 96%  Breastfeeding? No  BMI 32.81 kg/m2 Estimated body mass index is 32.81 kg/(m^2) as calculated from the following:    Height as of this encounter: 1.524 m (5').    Weight as of this encounter: 76.2 kg (168 lb). Body surface area is 1.8 meters squared.  No Pain (0) Comment: Data Unavailable   No LMP recorded. Patient is postmenopausal.  Allergies reviewed: Yes  Medications reviewed: Yes    Medications: Medication refills not needed today.  Pharmacy name entered into Appvance:    [facility] - [place], [place] - [address] [facility] [place], [place] - [address] [facility] [place], [place] - [address] [facility] [address] [place] [address] - [address] [facility] [address]    Clinical concerns: no new concerns today Dalila Blum was notified.    10 minutes for nursing intake (face to face time)     Sanjuanita Anders CMA

## 2018-08-08 NOTE — MR AVS SNAPSHOT
After Visit Summary   8/8/2018    Lennie Mar    MRN: [id number]           Patient Information     Date Of Birth          11/28/1927        Visit Information        Provider Department      8/8/2018 8:40 AM Dalila Blum PA-C [facility]        Today's Diagnoses     Malignant neoplasm of upper-inner quadrant of left breast in female, estrogen receptor positive (H)    -  1    At risk for cardiomyopathy        Encounter for antineoplastic chemotherapy            Follow-ups after your visit        Your next 10 appointments already scheduled     Aug 28, 2018 10:00 AM CDT   Infusion 60 with UC ONCOLOGY INFUSION, UC 29 ATC   [facility] ([facility])    [address] [place] [address]  [address]  [facility] [address] [phone]            Sep 18, 2018  8:45 AM CDT   (Arrive by 8:30 AM)   Return Visit with Perlita Malik MD   [facility] ([facility])    [address] [place] [address]  [address]  [facility] [address] [phone]              Future tests that were ordered for you today     Open Future Orders        Priority Expected Expires Ordered    Echocardiogram Limited Routine 8/29/2018 8/8/2019 8/8/2018            Who to contact     If you have questions or need follow up information about today's clinic visit or your schedule please contact [facility] directly at [phone].  Normal or non-critical lab and imaging results will be communicated to you by MyChart, letter or phone within 4 business days after the clinic has received the results. If you do not hear from us within 7 days, please contact the clinic through MyChart or phone. If you have a critical or abnormal lab result, we will notify you by phone as soon as possible.  Submit refill requests through [employer] or call your pharmacy and they will forward the refill request to us. Please allow 3  business days for your refill to be completed.          Additional Information About Your Visit        Care EveryWhere ID     This is your Care EveryWhere ID. This could be used by other organizations to access your [place] medical records  [id number]        Your Vitals Were     Pulse Temperature Respirations Height Pulse Oximetry Breastfeeding?    65 97.7  F (36.5  C) (Oral) 16 1.524 m (5') 96% No    BMI (Body Mass Index)                   32.81 kg/m2            Blood Pressure from Last 3 Encounters:   08/08/18 153/84   07/17/18 145/82   06/27/18 143/79    Weight from Last 3 Encounters:   08/08/18 76.2 kg (168 lb)   07/17/18 75.5 kg (166 lb 8 oz)   06/27/18 75.7 kg (166 lb 14.4 oz)              We Performed the Following     CBC with platelets differential     Comprehensive metabolic panel        Primary Care Provider Office Phone # Fax #    Ty Quintanilla [phone] [address] [facility] [address]        Equal Access to Services     ANKIT SALINAS : Hadii aad ku hadasho Soomaali, waaxda luqadaha, qaybta kaalmada adeegyasigrid, mellisa lim . So [facility] [phone].    ATENCIÓN: Si habla español, tiene a foster disposición servicios gratuitos de asistencia lingüística. Ll[facility] [phone].    We comply with applicable federal civil rights laws and [place] laws. We do not discriminate on the basis of race, color, national origin, age, disability, sex, sexual orientation, or gender identity.            Thank you!     Thank you for choosing [facility]  for your care. Our goal is always to provide you with excellent care. Hearing back from our patients is one way we can continue to improve our services. Please take a few minutes to complete the written survey that you may receive in the mail after your visit with us. Thank you!             Your Updated Medication List - Protect others around you: Learn how to safely use, store and throw away your  medicines at www.disposemymeds.org.          This list is accurate as of 8/8/18  9:33 AM.  Always use your most recent med list.                   Brand Name Dispense Instructions for use Diagnosis    acetaminophen 325 MG tablet    TYLENOL    100 tablet    Take 2 tablets by mouth every 4 hours as needed for pain.    S/P colon resection       ARIPiprazole 5 MG tablet    ABILIFY     TK 1 T PO QAM        buPROPion 300 MG 24 hr tablet    WELLBUTRIN XL     TK 1 T PO D        LATUDA PO      Take by mouth daily        letrozole 2.5 MG tablet    FEMARA    30 tablet    Take 1 tablet (2.5 mg) by mouth daily    Malignant neoplasm of upper-inner quadrant of left breast in female, estrogen receptor positive (H), Malignant neoplasm of upper-outer quadrant of left breast in female, estrogen receptor positive (H)       * lithium 150 MG capsule     30 capsule    Take 1 capsule by mouth daily (with breakfast).    Bipolar disorder, unspecified       * lithium 300 MG capsule     30 capsule    Take 1 capsule by mouth daily (with dinner).    Bipolar disorder, unspecified       order for DME     2 Package    Equipment being ordered: compression stocking knee high 20-30mmhg    Edema       pramipexole 1 MG tablet    MIRAPEX          prochlorperazine 10 MG tablet    COMPAZINE    30 tablet    Take 0.5 tablets (5 mg) by mouth every 6 hours as needed (Nausea/Vomiting)    Malignant neoplasm of upper-inner quadrant of left breast in female, estrogen receptor positive (H), Malignant neoplasm of upper-outer quadrant of left breast in female, estrogen receptor positive (H)       propranolol 20 MG tablet    INDERAL    180 tablet    Take 1 tablet (20 mg) by mouth 2 times daily    Tremor       sertraline 50 MG tablet    ZOLOFT     Take 25 mg by mouth daily        vitamin D 83735 UNIT capsule    ERGOCALCIFEROL    12 capsule    Take 1 capsule (50,000 Units) by mouth every 7 days    Vitamin D deficiency       * Notice:  This list has 2 medication(s) that  are the same as other medications prescribed for you. Read the directions carefully, and ask your doctor or other care provider to review them with you.

## 2018-08-08 NOTE — PROGRESS NOTES
Hematology-Oncology Visit  Aug 8, 2018    Reason for Visit: follow-up left breast cancer, 2 primaries     HPI: Lennie Mar is a 89 year old female with past medical history of bipolar disorder, CKD, essential tremor with recent diagnosis of left breast cancer, two separate primaries. She presented with a palpable mass and had a mammogram and US leading to biopsies on 5/9/18. Pathology showed grade 3 invasive carcinoma, ER/FL positive, HER2 amplified of mass at 3:00 position with associated DCIS and skin involvement. The mass at 11:00 position was also grade 3 invasive carcinoma, ER/FL positive, but HER2 nonamplified. No lymphadenopathy was noted.     She met with Dr. Malik on 5/21/18 and elected to start neoadjuvant treatment with Herceptin every 3 weeks along with letrozole. Echocardiogram was done 5/25 showed EF of 55-60%.     Here today for Cycle 4 of her Herceptin.     Interval History: Lennie is here alone today. She continues to feel well at this time. She has no concerns. She denies any side effect from letrozole or Herceptin. No fatigue, joint stiffness, hot flashes, nausea, difficulty with bowels. No SOB, change in edema, or CP. Feels like one breast mass is smaller, the other is harder for her to feel. She has no breast pain. ROS otherwise negative.       Current Outpatient Prescriptions   Medication     acetaminophen (TYLENOL) 325 MG tablet     ARIPiprazole (ABILIFY) 5 MG tablet     buPROPion (WELLBUTRIN XL) 300 MG 24 hr tablet     letrozole (FEMARA) 2.5 MG tablet     lithium 150 MG capsule     lithium 300 MG capsule     pramipexole (MIRAPEX) 1 MG tablet     propranolol (INDERAL) 20 MG tablet     sertraline (ZOLOFT) 50 MG tablet     Lurasidone HCl (LATUDA PO)     ORDER FOR DME     prochlorperazine (COMPAZINE) 10 MG tablet     vitamin D (ERGOCALCIFEROL) 09251 UNIT capsule     No current facility-administered medications for this visit.        PHYSICAL EXAM:  /84  Pulse 65  Temp 97.7  F  (36.5  C) (Oral)  Resp 16  Ht 1.524 m (5')  Wt 76.2 kg (168 lb)  SpO2 96%  Breastfeeding? No  BMI 32.81 kg/m2  General: Alert, oriented, pleasant, NAD  HEENT: Normocephalic, atraumatic, PERRLA, EOMI. Moist mucus membranes, no lesions or thrush  Neck: No cervical or supraclavicular LAD.  Axillary: No LAD  Breast: R breast without focal mass. L breast softer, deeper mass 11 o'clock position several centimeters from NAC measuring 2.5 x 1.5 cm . More superficial mass at 3 o'clock position with no skin changes measuring 2x2 cm (smaller in size and not as firm as last exam) abutting lateral nipple   Lungs: CTA bilaterally, normal work of breathing  Cardiac: RRR, S1, S2, no murmurs  Abdomen: Soft, nontender, nondistended. Normoactive bowel sounds. No hepatosplenomegaly, masses  Neuro: CNII-XII grossly intact  Extremities: No pedal edema    Labs:    8/8/2018 08:12   Sodium 140   Potassium 4.4   Chloride 110 (H)   Carbon Dioxide 23   Urea Nitrogen 34 (H)   Creatinine 1.41 (H)   GFR Estimate 35 (L)   GFR Estimate If Black 42 (L)   Calcium 9.7   Anion Gap 7   Albumin 3.6   Protein Total 7.7   Bilirubin Total 0.4   Alkaline Phosphatase 124   ALT 24   AST 22   Glucose 92   WBC 8.9   Hemoglobin 13.2   Hematocrit 41.8   Platelet Count 284   RBC Count 4.18      MCH 31.6   MCHC 31.6   RDW 13.8   Diff Method Automated Method   % Neutrophils 61.9   % Lymphocytes 24.8   % Monocytes 9.3   % Eosinophils 2.8   % Basophils 0.8   % Immature Granulocytes 0.4   Nucleated RBCs 0   Absolute Neutrophil 5.5   Absolute Lymphocytes 2.2   Absolute Monocytes 0.8   Absolute Eosinophils 0.3   Absolute Basophils 0.1   Abs Immature Granulocytes 0.0   Absolute Nucleated RBC 0.0         Assessment & Plan:     1. Left breast cancer, two separate primaries, both ER/AK positive, 1 HER2 amplified: Recently diagnosed. Plan for neoadjuvant letrozole and every 3 week Herceptin for around 3-6 months and then consider surgery at that time pending  response. If there is no response to letrozole and Herceptin, would potentially add Perjeta or weekly Taxol. She is tolerating treatment extremely well and has had some clinical response. Will proceed with cycle 4 of Herceptin today and daily letrozole. Follow-up echo in 3 weeks prior to Herceptin and then see Dr. Malik in mid-September.     Dalila Blum PA-C  [facility] [address] [address] [phone]

## 2018-08-20 ENCOUNTER — OFFICE VISIT (OUTPATIENT)
Dept: PODIATRY | Facility: CLINIC | Age: 83
End: 2018-08-20
Payer: MEDICARE

## 2018-08-20 VITALS
BODY MASS INDEX: 31.72 KG/M2 | HEIGHT: 61 IN | SYSTOLIC BLOOD PRESSURE: 140 MMHG | DIASTOLIC BLOOD PRESSURE: 62 MMHG | WEIGHT: 168 LBS

## 2018-08-20 DIAGNOSIS — M20.41 HAMMER TOE OF RIGHT FOOT: Primary | ICD-10-CM

## 2018-08-20 PROCEDURE — 99203 OFFICE O/P NEW LOW 30 MIN: CPT | Performed by: PODIATRIST

## 2018-08-20 ASSESSMENT — PAIN SCALES - GENERAL: PAINLEVEL: NO PAIN (0)

## 2018-08-20 NOTE — PROGRESS NOTES
PATIENT HISTORY:  Lennie Mar is a 89 year old female who presents to clinic for R 2nd hammertoe.  1 week ago she irritated the toe in some boots while hiking.  0-4/10 pain.  No pain today.  Improving with rest.  No other injury.      Review of Systems:  Patient denies fever, chills, rash, wound, stiffness, numbness, weakness, heart burn, blood in stool, chest pain with activity, calf pain when walking, shortness of breath with activity, chronic cough, easy bleeding/bruising, fatigue, depression, anxiety.  Patient admits to limping, thirst, ankle swelling.     PAST MEDICAL HISTORY:   Past Medical History:   Diagnosis Date     Asymptomatic varicose veins      Bipolar disorder, unspecified      Chronic back pain 2005    Right sided pain     CKD (chronic kidney disease) stage 3, GFR 30-59 ml/min 2/18/2014     Memory loss      Muscle weakness (generalized) 6/23/2008     PAST SURGICAL HISTORY:   Past Surgical History:   Procedure Laterality Date     APPENDECTOMY OPEN  1947     CATARACT IOL, RT/LT       COLONOSCOPY WITH CO2 INSUFFLATION  2/29/2012    Procedure:COLONOSCOPY WITH CO2 INSUFFLATION; Surgeon:GAYLE REYNA; Location:[facility] OR     CYSTOCELE REPAIR  1972     CYSTOSCOPY, INSERT STENT URETHRA, COMBINED  1999     CYSTOSCOPY, RETROGRADES, INSERT STENT URETER(S), COMBINED  2/29/2012    Procedure:COMBINED CYSTOSCOPY, RETROGRADES, INSERT STENT URETER(S); Surgeon:ANT ESPINOZA; Location:[facility] OR     DECOMPRESSION LUMBAR ONE LEVEL  8/9/2013    Procedure: DECOMPRESSION LUMBAR ONE LEVEL;  Posterior Decompression Right Lumbar 5- Sacral 1;  Surgeon: You Zavala MD;  Location: [facility] OR     HC TOOTH EXTRACTION W/FORCEP       HYSTERECTOMY, CATA  1963     IRRIGATION AND DEBRIDEMENT ORAL, COMBINED  1982    For treatment of gingivitis     LAPAROSCOPIC ASSISTED COLECTOMY  2/29/2012    Procedure:LAPAROSCOPIC ASSISTED COLECTOMY; Cysto, Bilateral Stent placement (both stents removed at end of case)- Yanet ACOSTA.  Laparoscopic Extended Right Venu Colectomy with CO2 Colonoscopy and polyp removal-Judah; Surgeon:GAYLE REYNA; Location:[facility] OR     LIGATN/STRIP LONG OR SHORT SAPHEN  1955     STRIP VEIN BILATERAL  1964     SURGICAL HISTORY OF -   1999    ureter surgery for blockage.        MEDICATIONS:   Current Outpatient Prescriptions:      acetaminophen (TYLENOL) 325 MG tablet, Take 2 tablets by mouth every 4 hours as needed for pain., Disp: 100 tablet, Rfl: 0     ARIPiprazole (ABILIFY) 5 MG tablet, TK 1 T PO QAM, Disp: , Rfl: 3     buPROPion (WELLBUTRIN XL) 300 MG 24 hr tablet, TK 1 T PO D, Disp: , Rfl: 2     letrozole (FEMARA) 2.5 MG tablet, Take 1 tablet (2.5 mg) by mouth daily, Disp: 30 tablet, Rfl: 11     lithium 150 MG capsule, Take 1 capsule by mouth daily (with breakfast)., Disp: 30 capsule, Rfl: 1     lithium 300 MG capsule, Take 1 capsule by mouth daily (with dinner)., Disp: 30 capsule, Rfl: 1     Lurasidone HCl (LATUDA PO), Take by mouth daily , Disp: , Rfl:      ORDER FOR DME, Equipment being ordered: compression stocking knee high 20-30mmhg, Disp: 2 Package, Rfl: 0     pramipexole (MIRAPEX) 1 MG tablet, , Disp: , Rfl:      prochlorperazine (COMPAZINE) 10 MG tablet, Take 0.5 tablets (5 mg) by mouth every 6 hours as needed (Nausea/Vomiting), Disp: 30 tablet, Rfl: 3     propranolol (INDERAL) 20 MG tablet, Take 1 tablet (20 mg) by mouth 2 times daily, Disp: 180 tablet, Rfl: 3     sertraline (ZOLOFT) 50 MG tablet, Take 25 mg by mouth daily , Disp: , Rfl:      vitamin D (ERGOCALCIFEROL) 11453 UNIT capsule, Take 1 capsule (50,000 Units) by mouth every 7 days, Disp: 12 capsule, Rfl: 0     ALLERGIES:    Allergies   Allergen Reactions     Cafergot Other (See Comments) and Nausea and Vomiting     Severe HA, N/V & diarrhea     Ciprofloxacin      Nausea and vomiting per son      Penicillins Rash     Sulfa Drugs Rash     Lamictal [Lamotrigine] Other (See Comments)     Patient experienced night moss     Naproxen      Pt  unable to remember reaction.     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 Yes     mother dies from a heart attack at age 47     Social History Narrative    Dairy/d 0-1 servings/d.     Caffeine 2 servings/d    Exercise 0 x week-walks regularly.    Sunscreen used - Yes    Seatbelts used - Yes    Working smoke/CO detectors in the home - Yes    Guns stored in the home - No    Self Breast Exams - Yes    Self Testicular Exam - No    Eye Exam up to date - Yes     Dental Exam up to date - Yes      Pap Smear up to date - No    Mammogram up to date - No    PSA up to date - No    Dexa Scan up to date - No    Flex Sig / Colonoscopy up to date - No    Immunizations up to date - Yes  2009 Td    Abuse: Current or Past(Physical, Sexual or Emotional)- No    Do you feel safe in your environment - Yes    Updated 3/2010                                    FAMILY HISTORY:   Family History   Problem Relation Age of Onset     C.A.D. Mother       at age 47 Heart failure, Rhumatic Fever     Cerebrovascular Disease Father       at age 79     Diabetes Father      type 2     Circulatory Maternal Grandmother      vericose veins     C.A.D. Paternal Grandfather      GASTROINTESTINAL DISEASE Paternal Grandfather      ulcer     Cancer Son       age 51--Melanoma     Cancer Brother       age 50's--Melanoma  "    Arthritis Sister      Circulatory Sister      vericose veins     Circulatory Sister      vericose veins     Eye Disorder Sister      Cateracts     Respiratory Sister      asthma     Respiratory Brother      COPD        EXAM:Vitals: /62  Ht 5' 1\" (1.549 m)  Wt 168 lb (76.2 kg)  BMI 31.74 kg/m2  BMI= Body mass index is 31.74 kg/(m^2).    General appearance: Patient is alert and fully cooperative with history & exam.  No sign of distress is noted during the visit.     Psychiatric: Affect is pleasant & appropriate.  Patient appears motivated to improve health.     Respiratory: Breathing is regular & unlabored while sitting.     HEENT: Hearing is intact to spoken word.  Speech is clear.  No gross evidence of visual impairment that would impact ambulation.     Dermatologic: Skin is intact to R foot without significant lesions, rash or abrasion.  No paronychia or evidence of soft tissue infection is noted.     Vascular: DP & PT pulses are intact & regular on the R.  No significant edema or varicosities noted.  CFT and skin temperature are normal.     Neurologic: Lower extremity sensation is intact to light touch.  No evidence of weakness or contracture in the lower extremities.  No evidence of neuropathy.     Musculoskeletal: R 2nd hammertoe noted.  Partially reducible.  R bunion noted.  Patient is ambulatory without assistive device or brace.  No gross ankle deformity noted.  No foot or ankle joint effusion is noted.     ASSESSMENT: R 2nd hammertoe     PLAN:  Reviewed patient's chart in epic.  Discussed condition and treatment options including pros and cons.    Hammertoe treatment options were discussed.  This included both surgical and non-surgical treatment alternatives.  Non-surgical options include wide fitting shoes, deep toe box, crest pad or foam pads, foot orthotics and debridement of any callous as necessary.  Surgical treatments were explained.  Pt wants to avoid surgery.    Deeper shoes advised.  " Budin splint given for use prn.  F/u prn.     Mahendra Gold DPM, FACFAS    Weight management plan: Patient was referred to their PCP to discuss a diet and exercise plan.     Patient to follow up with Primary Care provider regarding elevated blood pressure.

## 2018-08-20 NOTE — PATIENT INSTRUCTIONS
Thank you for choosing [facility]!    Follow up as needed     DR. MEADOWS'S CLINIC LOCATIONS     MONDAY  [place] TUESDAY & FRIDAY AM  [place]   [address]   [address]   [place] [address] [place] [address]   [phone]  [phone]  [phone]       WEDNESDAY  [place] SCHEDULE SURGERY: [phone]   [address] [facility] APPOINTMENTS: [phone]   [place] [address] BILLING QUESTIONS: [phone]   [phone]       Hammertoe treatment options were discussed.  This included both surgical and non-surgical treatment alternatives.  Non-surgical options include wide fitting shoes, deep toe box, crest pad or foam pads, foot orthotics and debridement of any callous as necessary.  Surgical treatments were explained including soft tissue release, arthroplasty, joint fusion, metatarsal phalangeal joint release with extensor tendon release.      Potential complications from surgery were discussed to include recurrent deformity, floppy toe, incomplete correction, need for repeat surgery, infection, lateral drift and blood clot.  Surgery may involve external pin fixation.  It was explained that hammertoe surgery does not make the toes normal but rather the goal is to make the toe less crooked and hopefully less painful from shoe pressure.     Patient is aware that surgery is elective, can be avoided if desired.  The recovery process was discussed including impact to work, walking, shoes and daily activities.  I would anticipate up to 12 months for maximum recovery after surgery. Likely surgical procedures based on exam and xray findings include .        Patient to follow up with Primary Care provider regarding elevated blood pressure.      Body Mass Index (BMI)  Many things can cause foot and ankle problems. Foot structure, activity level, foot mechanics and injuries are common causes of pain.  One very important  issue that often goes unmentioned, is body weight. Extra weight can cause increased stress on muscles, ligaments, bones and tendons.  Sometimes just a few extra pounds is all it takes to put one over her/his threshold. Without reducing that stress, it can be difficult to alleviate pain. Some people are uncomfortable addressing this issue, but we feel it is important for you to think about it. As Foot &  Ankle specialists, our job is addressing the lower extremity problem and possible causes. Regarding extra body weight, we encourage patients to discuss diet and weight management plans with their primary care doctors. It is this team approach that gives you the best opportunity for pain relief and getting you back on your feet.

## 2018-08-20 NOTE — MR AVS SNAPSHOT
After Visit Summary   8/20/2018    Lennie Mar    MRN: [id number]           Patient Information     Date Of Birth          11/28/1927        Visit Information        Provider Department      8/20/2018 1:30 PM Mahendra Meadows DPM [facility]        Care Instructions    Thank you for choosing [facility]!    Follow up as needed     DR. MEADOWS'S CLINIC LOCATIONS     MONDAY  [place] TUESDAY & FRIDAY AM  [place]   [address] [facility]   [address]   [place] [address] JEAN MARIE Lemus [address]   [phone]  [phone]  [phone]       WEDNESDAY  [place] SCHEDULE SURGERY: [phone]   [address] [place] [address] APPOINTMENTS: [phone]   [place] [address] BILLING QUESTIONS: [phone]   [phone]       Hammertoe treatment options were discussed.  This included both surgical and non-surgical treatment alternatives.  Non-surgical options include wide fitting shoes, deep toe box, crest pad or foam pads, foot orthotics and debridement of any callous as necessary.  Surgical treatments were explained including soft tissue release, arthroplasty, joint fusion, metatarsal phalangeal joint release with extensor tendon release.      Potential complications from surgery were discussed to include recurrent deformity, floppy toe, incomplete correction, need for repeat surgery, infection, lateral drift and blood clot.  Surgery may involve external pin fixation.  It was explained that hammertoe surgery does not make the toes normal but rather the goal is to make the toe less crooked and hopefully less painful from shoe pressure.     Patient is aware that surgery is elective, can be avoided if desired.  The recovery process was discussed including impact to work, walking, shoes and daily activities.  I would anticipate up to 12 months for maximum recovery after surgery. Likely surgical  procedures based on exam and xray findings include .        Patient to follow up with Primary Care provider regarding elevated blood pressure.      Body Mass Index (BMI)  Many things can cause foot and ankle problems. Foot structure, activity level, foot mechanics and injuries are common causes of pain.  One very important issue that often goes unmentioned, is body weight. Extra weight can cause increased stress on muscles, ligaments, bones and tendons.  Sometimes just a few extra pounds is all it takes to put one over her/his threshold. Without reducing that stress, it can be difficult to alleviate pain. Some people are uncomfortable addressing this issue, but we feel it is important for you to think about it. As Foot &  Ankle specialists, our job is addressing the lower extremity problem and possible causes. Regarding extra body weight, we encourage patients to discuss diet and weight management plans with their primary care doctors. It is this team approach that gives you the best opportunity for pain relief and getting you back on your feet.              Follow-ups after your visit        Your next 10 appointments already scheduled     Aug 28, 2018  9:00 AM CDT   [employer] with [id number]   [facility])    [address] [place] [address]  3rd Floor  [facility] [address] [phone]           1.  Please bring or wear a comfortable two-piece outfit. 2.  You may eat, drink and take your normal medicines. 3.  For any questions that cannot be answered, please contact the ordering physician 4.  Please do not wear perfumes or scented lotions on the day of your exam.            Aug 28, 2018 10:00 AM CDT   Infusion 60 with MARICRUZ ONCOLOGY INFUSION, UC 29 ATC   [facility] ([facility])    [address] [place] [address]  [address]  [facility] [id number] [phone]            Sep 18, 2018  8:45 AM CDT   (Arrive by 8:30 AM)   Return Visit with Perlita  "Siomara Malik MD   [facility] ([facility])    [address]  [address]  [facility] [address] [phone]            Sep 18, 2018  9:30 AM CDT   Infusion 60 with UC ONCOLOGY INFUSION, UC 10 ATC   [facility] ([facility])    [address]  [address]  [facility] [address] [phone]              Who to contact     If you have questions or need follow up information about today's clinic visit or your schedule please contact [facility] directly at [phone].  Normal or non-critical lab and imaging results will be communicated to you by MyChart, letter or phone within 4 business days after the clinic has received the results. If you do not hear from us within 7 days, please contact the clinic through MyChart or phone. If you have a critical or abnormal lab result, we will notify you by phone as soon as possible.  Submit refill requests through Virtual 3-D Display for Smartphones or call your pharmacy and they will forward the refill request to us. Please allow 3 business days for your refill to be completed.          Additional Information About Your Visit        Care EveryWhere ID     This is your Care EveryWhere ID. This could be used by other organizations to access your [place] medical records  [id number]        Your Vitals Were     Height BMI (Body Mass Index)                5' 1\" (1.549 m) 31.74 kg/m2           Blood Pressure from Last 3 Encounters:   08/20/18 140/62   08/08/18 153/84   07/17/18 145/82    Weight from Last 3 Encounters:   08/20/18 168 lb (76.2 kg)   08/08/18 168 lb (76.2 kg)   07/17/18 166 lb 8 oz (75.5 kg)              Today, you had the following     No orders found for display       Primary Care Provider Office Phone # Fax #    Ty Quintanilla [phone]       [address]  [facility] [address]        Equal Access to Services     ANKIT SALINAS AH: Hadii aad ku " inga Te[place], wayogeshda luqadaha, qaybta kadanna pagan, mellisa popramana suzanne. So [facility] [phone].    ATENCIÓN: Si habla kristin, tiene a foster disposición servicios gratuitos de asistencia lingüística. Elio al [phone].    We comply with applicable federal civil rights laws and [place] laws. We do not discriminate on the basis of race, color, national origin, age, disability, sex, sexual orientation, or gender identity.            Thank you!     Thank you for choosing [facility]  for your care. Our goal is always to provide you with excellent care. Hearing back from our patients is one way we can continue to improve our services. Please take a few minutes to complete the written survey that you may receive in the mail after your visit with us. Thank you!             Your Updated Medication List - Protect others around you: Learn how to safely use, store and throw away your medicines at www.disposemymeds.org.          This list is accurate as of 8/20/18  1:45 PM.  Always use your most recent med list.                   Brand Name Dispense Instructions for use Diagnosis    acetaminophen 325 MG tablet    TYLENOL    100 tablet    Take 2 tablets by mouth every 4 hours as needed for pain.    S/P colon resection       ARIPiprazole 5 MG tablet    ABILIFY     TK 1 T PO QAM        buPROPion 300 MG 24 hr tablet    WELLBUTRIN XL     TK 1 T PO D        LATUDA PO      Take by mouth daily        letrozole 2.5 MG tablet    FEMARA    30 tablet    Take 1 tablet (2.5 mg) by mouth daily    Malignant neoplasm of upper-inner quadrant of left breast in female, estrogen receptor positive (H), Malignant neoplasm of upper-outer quadrant of left breast in female, estrogen receptor positive (H)       * lithium 150 MG capsule     30 capsule    Take 1 capsule by mouth daily (with breakfast).    Bipolar disorder, unspecified       * lithium 300 MG capsule     30 capsule    Take 1 capsule by  mouth daily (with dinner).    Bipolar disorder, unspecified       order for DME     2 Package    Equipment being ordered: compression stocking knee high 20-30mmhg    Edema       pramipexole 1 MG tablet    MIRAPEX          prochlorperazine 10 MG tablet    COMPAZINE    30 tablet    Take 0.5 tablets (5 mg) by mouth every 6 hours as needed (Nausea/Vomiting)    Malignant neoplasm of upper-inner quadrant of left breast in female, estrogen receptor positive (H), Malignant neoplasm of upper-outer quadrant of left breast in female, estrogen receptor positive (H)       propranolol 20 MG tablet    INDERAL    180 tablet    Take 1 tablet (20 mg) by mouth 2 times daily    Tremor       sertraline 50 MG tablet    ZOLOFT     Take 25 mg by mouth daily        vitamin D 12185 UNIT capsule    ERGOCALCIFEROL    12 capsule    Take 1 capsule (50,000 Units) by mouth every 7 days    Vitamin D deficiency       * Notice:  This list has 2 medication(s) that are the same as other medications prescribed for you. Read the directions carefully, and ask your doctor or other care provider to review them with you.

## 2018-08-20 NOTE — LETTER
8/20/2018         RE: Lennie Mar  [address]  [facility] [address]        Dear Colleague,    Thank you for referring your patient, Lennie Mar, to the [facility]. Please see a copy of my visit note below.    PATIENT HISTORY:  Lennie Mar is a 89 year old female who presents to clinic for R 2nd hammertoe.  1 week ago she irritated the toe in some boots while hiking.  0-4/10 pain.  No pain today.  Improving with rest.  No other injury.      Review of Systems:  Patient denies fever, chills, rash, wound, stiffness, numbness, weakness, heart burn, blood in stool, chest pain with activity, calf pain when walking, shortness of breath with activity, chronic cough, easy bleeding/bruising, fatigue, depression, anxiety.  Patient admits to limping, thirst, ankle swelling.     PAST MEDICAL HISTORY:   Past Medical History:   Diagnosis Date     Asymptomatic varicose veins      Bipolar disorder, unspecified      Chronic back pain 2005    Right sided pain     CKD (chronic kidney disease) stage 3, GFR 30-59 ml/min 2/18/2014     Memory loss      Muscle weakness (generalized) 6/23/2008     PAST SURGICAL HISTORY:   Past Surgical History:   Procedure Laterality Date     APPENDECTOMY OPEN  1947     CATARACT IOL, RT/LT       COLONOSCOPY WITH CO2 INSUFFLATION  2/29/2012    Procedure:COLONOSCOPY WITH CO2 INSUFFLATION; Surgeon:GAYLE REYNA; Location:[facility] OR     CYSTOCELE REPAIR  1972     CYSTOSCOPY, INSERT STENT URETHRA, COMBINED  1999     CYSTOSCOPY, RETROGRADES, INSERT STENT URETER(S), COMBINED  2/29/2012    Procedure:COMBINED CYSTOSCOPY, RETROGRADES, INSERT STENT URETER(S); Surgeon:ANT ESPINOZA; Location:[facility]     DECOMPRESSION LUMBAR ONE LEVEL  8/9/2013    Procedure: DECOMPRESSION LUMBAR ONE LEVEL;  Posterior Decompression Right Lumbar 5- Sacral 1;  Surgeon: You Zavala MD;  Location: [facility] OR     HC TOOTH EXTRACTION W/FORCEP       HYSTERECTOMY, CATA  1963      IRRIGATION AND DEBRIDEMENT ORAL, COMBINED  1982    For treatment of gingivitis     LAPAROSCOPIC ASSISTED COLECTOMY  2/29/2012    Procedure:LAPAROSCOPIC ASSISTED COLECTOMY; Cysto, Bilateral Stent placement (both stents removed at end of case)- Yanet ACOSTA. Laparoscopic Extended Right Venu Colectomy with CO2 Colonoscopy and polyp removal-Judah; Surgeon:GAYLE REYNA; Location:[facility] OR     LIGATN/STRIP LONG OR SHORT SAPHEN  1955     STRIP VEIN BILATERAL  1964     SURGICAL HISTORY OF -   1999    ureter surgery for blockage.        MEDICATIONS:   Current Outpatient Prescriptions:      acetaminophen (TYLENOL) 325 MG tablet, Take 2 tablets by mouth every 4 hours as needed for pain., Disp: 100 tablet, Rfl: 0     ARIPiprazole (ABILIFY) 5 MG tablet, TK 1 T PO QAM, Disp: , Rfl: 3     buPROPion (WELLBUTRIN XL) 300 MG 24 hr tablet, TK 1 T PO D, Disp: , Rfl: 2     letrozole (FEMARA) 2.5 MG tablet, Take 1 tablet (2.5 mg) by mouth daily, Disp: 30 tablet, Rfl: 11     lithium 150 MG capsule, Take 1 capsule by mouth daily (with breakfast)., Disp: 30 capsule, Rfl: 1     lithium 300 MG capsule, Take 1 capsule by mouth daily (with dinner)., Disp: 30 capsule, Rfl: 1     Lurasidone HCl (LATUDA PO), Take by mouth daily , Disp: , Rfl:      ORDER FOR DME, Equipment being ordered: compression stocking knee high 20-30mmhg, Disp: 2 Package, Rfl: 0     pramipexole (MIRAPEX) 1 MG tablet, , Disp: , Rfl:      prochlorperazine (COMPAZINE) 10 MG tablet, Take 0.5 tablets (5 mg) by mouth every 6 hours as needed (Nausea/Vomiting), Disp: 30 tablet, Rfl: 3     propranolol (INDERAL) 20 MG tablet, Take 1 tablet (20 mg) by mouth 2 times daily, Disp: 180 tablet, Rfl: 3     sertraline (ZOLOFT) 50 MG tablet, Take 25 mg by mouth daily , Disp: , Rfl:      vitamin D (ERGOCALCIFEROL) 79251 UNIT capsule, Take 1 capsule (50,000 Units) by mouth every 7 days, Disp: 12 capsule, Rfl: 0     ALLERGIES:    Allergies   Allergen Reactions     Cafergot Other (See  Comments) and Nausea and Vomiting     Severe HA, N/V & diarrhea     Ciprofloxacin      Nausea and vomiting per son      Penicillins Rash     Sulfa Drugs Rash     Lamictal [Lamotrigine] Other (See Comments)     Patient experienced night moss     Naproxen      Pt unable to remember reaction.     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 Yes     mother dies from a heart attack at age 47     Social History Narrative    Dairy/d 0-1 servings/d.     Caffeine 2 servings/d    Exercise 0 x week-walks regularly.    Sunscreen used - Yes    Seatbelts used - Yes    Working smoke/CO detectors in the home - Yes    Guns stored in the home - No    Self Breast Exams - Yes    Self Testicular Exam - No    Eye Exam up to date - Yes     Dental Exam up to date - Yes      Pap Smear up to date - No    Mammogram up to date - No    PSA up to date - No    Dexa Scan up to date - No    Flex Sig / Colonoscopy up to date - No    Immunizations up to date - Yes  2009 Td    Abuse: Current or Past(Physical, Sexual or Emotional)- No    Do you feel safe in your environment - Yes    Updated 3/2010                                    FAMILY HISTORY:   Family History   Problem Relation Age of Onset     C.A.D. Mother       at age 47 Heart failure, Rhumatic Fever     Cerebrovascular Disease Father       at age 79      "Diabetes Father      type 2     Circulatory Maternal Grandmother      vericose veins     C.A.D. Paternal Grandfather      GASTROINTESTINAL DISEASE Paternal Grandfather      ulcer     Cancer Son       age 51--Melanoma     Cancer Brother       age 50's--Melanoma     Arthritis Sister      Circulatory Sister      vericose veins     Circulatory Sister      vericose veins     Eye Disorder Sister      Cateracts     Respiratory Sister      asthma     Respiratory Brother      COPD        EXAM:Vitals: /62  Ht 5' 1\" (1.549 m)  Wt 168 lb (76.2 kg)  BMI 31.74 kg/m2  BMI= Body mass index is 31.74 kg/(m^2).    General appearance: Patient is alert and fully cooperative with history & exam.  No sign of distress is noted during the visit.     Psychiatric: Affect is pleasant & appropriate.  Patient appears motivated to improve health.     Respiratory: Breathing is regular & unlabored while sitting.     HEENT: Hearing is intact to spoken word.  Speech is clear.  No gross evidence of visual impairment that would impact ambulation.     Dermatologic: Skin is intact to R foot without significant lesions, rash or abrasion.  No paronychia or evidence of soft tissue infection is noted.     Vascular: DP & PT pulses are intact & regular on the R.  No significant edema or varicosities noted.  CFT and skin temperature are normal.     Neurologic: Lower extremity sensation is intact to light touch.  No evidence of weakness or contracture in the lower extremities.  No evidence of neuropathy.     Musculoskeletal: R 2nd hammertoe noted.  Partially reducible.  R bunion noted.  Patient is ambulatory without assistive device or brace.  No gross ankle deformity noted.  No foot or ankle joint effusion is noted.     ASSESSMENT: R 2nd hammertoe     PLAN:  Reviewed patient's chart in epic.  Discussed condition and treatment options including pros and cons.    Hammjonnye treatment options were discussed.  This included both surgical and " non-surgical treatment alternatives.  Non-surgical options include wide fitting shoes, deep toe box, crest pad or foam pads, foot orthotics and debridement of any callous as necessary.  Surgical treatments were explained.  Pt wants to avoid surgery.    Deeper shoes advised.  Budin splint given for use prn.  F/u prn.     Mahendra Gold DPM, FACFAS    Weight management plan: Patient was referred to their PCP to discuss a diet and exercise plan.     Patient to follow up with Primary Care provider regarding elevated blood pressure.        Again, thank you for allowing me to participate in the care of your patient.        Sincerely,        Mahendra Gold DPM

## 2018-08-23 RX ORDER — ALBUTEROL SULFATE 90 UG/1
1-2 AEROSOL, METERED RESPIRATORY (INHALATION)
Status: CANCELLED
Start: 2018-08-28

## 2018-08-23 RX ORDER — DIPHENHYDRAMINE HYDROCHLORIDE 50 MG/ML
50 INJECTION INTRAMUSCULAR; INTRAVENOUS
Status: CANCELLED
Start: 2018-08-28

## 2018-08-23 RX ORDER — EPINEPHRINE 1 MG/ML
0.3 INJECTION, SOLUTION INTRAMUSCULAR; SUBCUTANEOUS EVERY 5 MIN PRN
Status: CANCELLED | OUTPATIENT
Start: 2018-08-28

## 2018-08-23 RX ORDER — ALBUTEROL SULFATE 0.83 MG/ML
2.5 SOLUTION RESPIRATORY (INHALATION)
Status: CANCELLED | OUTPATIENT
Start: 2018-08-28

## 2018-08-23 RX ORDER — SODIUM CHLORIDE 9 MG/ML
1000 INJECTION, SOLUTION INTRAVENOUS CONTINUOUS PRN
Status: CANCELLED
Start: 2018-08-28

## 2018-08-23 RX ORDER — MEPERIDINE HYDROCHLORIDE 25 MG/ML
25 INJECTION INTRAMUSCULAR; INTRAVENOUS; SUBCUTANEOUS EVERY 30 MIN PRN
Status: CANCELLED | OUTPATIENT
Start: 2018-08-28

## 2018-08-23 RX ORDER — EPINEPHRINE 0.3 MG/.3ML
0.3 INJECTION SUBCUTANEOUS EVERY 5 MIN PRN
Status: CANCELLED | OUTPATIENT
Start: 2018-08-28

## 2018-08-23 RX ORDER — ACETAMINOPHEN 325 MG/1
650 TABLET ORAL
Status: CANCELLED
Start: 2018-08-28

## 2018-08-23 RX ORDER — METHYLPREDNISOLONE SODIUM SUCCINATE 125 MG/2ML
125 INJECTION, POWDER, LYOPHILIZED, FOR SOLUTION INTRAMUSCULAR; INTRAVENOUS
Status: CANCELLED
Start: 2018-08-28

## 2018-08-23 RX ORDER — DIPHENHYDRAMINE HCL 25 MG
25 CAPSULE ORAL
Status: CANCELLED
Start: 2018-08-28

## 2018-08-28 ENCOUNTER — INFUSION THERAPY VISIT (OUTPATIENT)
Dept: ONCOLOGY | Facility: CLINIC | Age: 83
End: 2018-08-28
Attending: INTERNAL MEDICINE
Payer: MEDICARE

## 2018-08-28 ENCOUNTER — RADIANT APPOINTMENT (OUTPATIENT)
Dept: CARDIOLOGY | Facility: CLINIC | Age: 83
End: 2018-08-28
Attending: PHYSICIAN ASSISTANT
Payer: MEDICARE

## 2018-08-28 VITALS
DIASTOLIC BLOOD PRESSURE: 71 MMHG | HEIGHT: 61 IN | BODY MASS INDEX: 31.87 KG/M2 | WEIGHT: 168.8 LBS | RESPIRATION RATE: 18 BRPM | TEMPERATURE: 98.3 F | SYSTOLIC BLOOD PRESSURE: 149 MMHG | HEART RATE: 65 BPM | OXYGEN SATURATION: 99 %

## 2018-08-28 DIAGNOSIS — Z51.11 ENCOUNTER FOR ANTINEOPLASTIC CHEMOTHERAPY: ICD-10-CM

## 2018-08-28 DIAGNOSIS — Z17.0 MALIGNANT NEOPLASM OF UPPER-INNER QUADRANT OF LEFT BREAST IN FEMALE, ESTROGEN RECEPTOR POSITIVE (H): Primary | ICD-10-CM

## 2018-08-28 DIAGNOSIS — C50.412 MALIGNANT NEOPLASM OF UPPER-OUTER QUADRANT OF LEFT BREAST IN FEMALE, ESTROGEN RECEPTOR POSITIVE (H): ICD-10-CM

## 2018-08-28 DIAGNOSIS — C50.212 MALIGNANT NEOPLASM OF UPPER-INNER QUADRANT OF LEFT BREAST IN FEMALE, ESTROGEN RECEPTOR POSITIVE (H): Primary | ICD-10-CM

## 2018-08-28 DIAGNOSIS — Z17.0 MALIGNANT NEOPLASM OF UPPER-OUTER QUADRANT OF LEFT BREAST IN FEMALE, ESTROGEN RECEPTOR POSITIVE (H): ICD-10-CM

## 2018-08-28 DIAGNOSIS — Z91.89 AT RISK FOR CARDIOMYOPATHY: ICD-10-CM

## 2018-08-28 PROCEDURE — 25000128 H RX IP 250 OP 636: Mod: ZF | Performed by: INTERNAL MEDICINE

## 2018-08-28 PROCEDURE — 40000141 ZZH STATISTIC PERIPHERAL IV START W/O US GUIDANCE: Mod: ZF

## 2018-08-28 PROCEDURE — 96413 CHEMO IV INFUSION 1 HR: CPT

## 2018-08-28 RX ADMIN — SODIUM CHLORIDE 250 ML: 9 INJECTION, SOLUTION INTRAVENOUS at 11:43

## 2018-08-28 RX ADMIN — Medication 4 ML: at 10:30

## 2018-08-28 RX ADMIN — TRASTUZUMAB 450 MG: 150 INJECTION, POWDER, LYOPHILIZED, FOR SOLUTION INTRAVENOUS at 11:43

## 2018-08-28 ASSESSMENT — PAIN SCALES - GENERAL: PAINLEVEL: NO PAIN (0)

## 2018-08-28 NOTE — PROGRESS NOTES
Infusion Nursing Note:  Lennie Mar presents today for Cycle 5 Herceptin.    Patient seen by provider today: No    Note: Patient had an echo prior to infusion today. Preliminary ejection fraction: 55-60 %.  Denies any new medical concerns.    Intravenous Access:  Peripheral IV placed by Vascular Access.    Treatment Conditions:  ECHO/MUGA completed today:  EF 55-60%.  Not Applicable; no labs ordered for today.    Post Infusion Assessment:  Patient tolerated infusion without incident.  Blood return noted pre and post infusion.  Site patent and intact, free from redness, edema or discomfort.  No evidence of extravasations. Access discontinued per protocol.    Discharge Plan:   Patient declined prescription refills.  Copy of AVS reviewed with patient and/or family.  Patient will return 9/18 for next appointment.  Patient discharged in stable condition accompanied by: self.  Departure Mode: Ambulatory.    Erma Bello RN

## 2018-08-28 NOTE — MR AVS SNAPSHOT
After Visit Summary   8/28/2018    Lennie Mar    MRN: [id number]           Patient Information     Date Of Birth          11/28/1927        Visit Information        Provider Department      8/28/2018 10:00 AM UC 29 ATC; UC ONCOLOGY INFUSION [facility]        Today's Diagnoses     Malignant neoplasm of upper-inner quadrant of left breast in female, estrogen receptor positive (H)    -  1    Malignant neoplasm of upper-outer quadrant of left breast in female, estrogen receptor positive (H)           Follow-ups after your visit        Your next 10 appointments already scheduled     Sep 18, 2018  8:45 AM CDT   (Arrive by 8:30 AM)   Return Visit with Perlita Malik MD   [facility] ([facility])    [address] [place] [address]  [address]  [facility] [address] [phone]            Sep 18, 2018  9:30 AM CDT   Infusion 60 with UC ONCOLOGY INFUSION, UC 10 ATC   [facility] ([facility])    [address] [place] [address]  [address]  [facility] [address] [phone]              Who to contact     If you have questions or need follow up information about today's clinic visit or your schedule please contact [facility] directly at [phone].  Normal or non-critical lab and imaging results will be communicated to you by MyChart, letter or phone within 4 business days after the clinic has received the results. If you do not hear from us within 7 days, please contact the clinic through MyChart or phone. If you have a critical or abnormal lab result, we will notify you by phone as soon as possible.  Submit refill requests through [employer] or call your pharmacy and they will forward the refill request to us. Please allow 3 business days for your refill to be completed.          Additional Information About Your Visit        Care EveryWhere ID     This is your Care  "EveryWhere ID. This could be used by other organizations to access your [place] medical records  [id number]        Your Vitals Were     Pulse Temperature Respirations Height Pulse Oximetry BMI (Body Mass Index)    65 98.3  F (36.8  C) (Oral) 18 1.549 m (5' 0.98\") 99% 31.91 kg/m2       Blood Pressure from Last 3 Encounters:   08/28/18 149/71   08/20/18 140/62   08/08/18 153/84    Weight from Last 3 Encounters:   08/28/18 76.6 kg (168 lb 12.8 oz)   08/20/18 76.2 kg (168 lb)   08/08/18 76.2 kg (168 lb)              Today, you had the following     No orders found for display       Primary Care Provider Office Phone # Fax #    Ty Quintanilla [phone] [address]  [facility] [address]        Equal Access to Services     ANKIT SALINAS : Hadii dottie tavarezo Sojean marie, waaxda luqadaha, qaybta kaalmada adepazyasigrid, mellisa lim . So [facility] [phone].    ATENCIÓN: Si andreala español, tiene a foster disposición servicios gratuitos de asistencia lingüística. Llame al [phone].    We comply with applicable federal civil rights laws and [place] laws. We do not discriminate on the basis of race, color, national origin, age, disability, sex, sexual orientation, or gender identity.            Thank you!     Thank you for choosing [facility]  for your care. Our goal is always to provide you with excellent care. Hearing back from our patients is one way we can continue to improve our services. Please take a few minutes to complete the written survey that you may receive in the mail after your visit with us. Thank you!             Your Updated Medication List - Protect others around you: Learn how to safely use, store and throw away your medicines at www.disposemymeds.org.          This list is accurate as of 8/28/18 12:38 PM.  Always use your most recent med list.                   Brand Name Dispense Instructions for use Diagnosis    acetaminophen 325 MG " tablet    TYLENOL    100 tablet    Take 2 tablets by mouth every 4 hours as needed for pain.    S/P colon resection       ARIPiprazole 5 MG tablet    ABILIFY     TK 1 T PO QAM        buPROPion 300 MG 24 hr tablet    WELLBUTRIN XL     TK 1 T PO D        LATUDA PO      Take by mouth daily        letrozole 2.5 MG tablet    FEMARA    30 tablet    Take 1 tablet (2.5 mg) by mouth daily    Malignant neoplasm of upper-inner quadrant of left breast in female, estrogen receptor positive (H), Malignant neoplasm of upper-outer quadrant of left breast in female, estrogen receptor positive (H)       * lithium 150 MG capsule     30 capsule    Take 1 capsule by mouth daily (with breakfast).    Bipolar disorder, unspecified       * lithium 300 MG capsule     30 capsule    Take 1 capsule by mouth daily (with dinner).    Bipolar disorder, unspecified       order for DME     2 Package    Equipment being ordered: compression stocking knee high 20-30mmhg    Edema       pramipexole 1 MG tablet    MIRAPEX          prochlorperazine 10 MG tablet    COMPAZINE    30 tablet    Take 0.5 tablets (5 mg) by mouth every 6 hours as needed (Nausea/Vomiting)    Malignant neoplasm of upper-inner quadrant of left breast in female, estrogen receptor positive (H), Malignant neoplasm of upper-outer quadrant of left breast in female, estrogen receptor positive (H)       propranolol 20 MG tablet    INDERAL    180 tablet    Take 1 tablet (20 mg) by mouth 2 times daily    Tremor       sertraline 50 MG tablet    ZOLOFT     Take 25 mg by mouth daily        vitamin D 82084 UNIT capsule    ERGOCALCIFEROL    12 capsule    Take 1 capsule (50,000 Units) by mouth every 7 days    Vitamin D deficiency       * Notice:  This list has 2 medication(s) that are the same as other medications prescribed for you. Read the directions carefully, and ask your doctor or other care provider to review them with you.

## 2018-08-29 ENCOUNTER — CARE COORDINATION (OUTPATIENT)
Dept: ONCOLOGY | Facility: CLINIC | Age: 83
End: 2018-08-29

## 2018-08-29 NOTE — PROGRESS NOTES
Call placed to patient and left a voicemail message with our clinic contact information.    Leslee Colon RNCC BSN CBCN          Will you please call Lennie when you have time and let her know her echo was stable?     Thanks,   Dalila

## 2018-09-17 RX ORDER — ALBUTEROL SULFATE 90 UG/1
1-2 AEROSOL, METERED RESPIRATORY (INHALATION)
Status: CANCELLED
Start: 2018-09-18

## 2018-09-17 RX ORDER — DIPHENHYDRAMINE HYDROCHLORIDE 50 MG/ML
50 INJECTION INTRAMUSCULAR; INTRAVENOUS
Status: CANCELLED
Start: 2018-09-18

## 2018-09-17 RX ORDER — DIPHENHYDRAMINE HCL 25 MG
25 CAPSULE ORAL
Status: CANCELLED
Start: 2018-09-18

## 2018-09-17 RX ORDER — SODIUM CHLORIDE 9 MG/ML
1000 INJECTION, SOLUTION INTRAVENOUS CONTINUOUS PRN
Status: CANCELLED
Start: 2018-09-18

## 2018-09-17 RX ORDER — EPINEPHRINE 1 MG/ML
0.3 INJECTION, SOLUTION INTRAMUSCULAR; SUBCUTANEOUS EVERY 5 MIN PRN
Status: CANCELLED | OUTPATIENT
Start: 2018-09-18

## 2018-09-17 RX ORDER — MEPERIDINE HYDROCHLORIDE 25 MG/ML
25 INJECTION INTRAMUSCULAR; INTRAVENOUS; SUBCUTANEOUS EVERY 30 MIN PRN
Status: CANCELLED | OUTPATIENT
Start: 2018-09-18

## 2018-09-17 RX ORDER — ALBUTEROL SULFATE 0.83 MG/ML
2.5 SOLUTION RESPIRATORY (INHALATION)
Status: CANCELLED | OUTPATIENT
Start: 2018-09-18

## 2018-09-17 RX ORDER — EPINEPHRINE 0.3 MG/.3ML
0.3 INJECTION SUBCUTANEOUS EVERY 5 MIN PRN
Status: CANCELLED | OUTPATIENT
Start: 2018-09-18

## 2018-09-17 RX ORDER — ACETAMINOPHEN 325 MG/1
650 TABLET ORAL
Status: CANCELLED
Start: 2018-09-18

## 2018-09-17 RX ORDER — METHYLPREDNISOLONE SODIUM SUCCINATE 125 MG/2ML
125 INJECTION, POWDER, LYOPHILIZED, FOR SOLUTION INTRAMUSCULAR; INTRAVENOUS
Status: CANCELLED
Start: 2018-09-18

## 2018-09-18 ENCOUNTER — INFUSION THERAPY VISIT (OUTPATIENT)
Dept: ONCOLOGY | Facility: CLINIC | Age: 83
End: 2018-09-18
Attending: INTERNAL MEDICINE
Payer: MEDICARE

## 2018-09-18 VITALS
DIASTOLIC BLOOD PRESSURE: 70 MMHG | OXYGEN SATURATION: 100 % | WEIGHT: 165.31 LBS | SYSTOLIC BLOOD PRESSURE: 136 MMHG | RESPIRATION RATE: 16 BRPM | HEIGHT: 61 IN | BODY MASS INDEX: 31.21 KG/M2 | HEART RATE: 68 BPM | TEMPERATURE: 98.4 F

## 2018-09-18 DIAGNOSIS — C50.212 MALIGNANT NEOPLASM OF UPPER-INNER QUADRANT OF LEFT BREAST IN FEMALE, ESTROGEN RECEPTOR POSITIVE (H): Primary | ICD-10-CM

## 2018-09-18 DIAGNOSIS — Z17.0 MALIGNANT NEOPLASM OF UPPER-INNER QUADRANT OF LEFT BREAST IN FEMALE, ESTROGEN RECEPTOR POSITIVE (H): Primary | ICD-10-CM

## 2018-09-18 DIAGNOSIS — Z17.0 MALIGNANT NEOPLASM OF UPPER-OUTER QUADRANT OF LEFT BREAST IN FEMALE, ESTROGEN RECEPTOR POSITIVE (H): ICD-10-CM

## 2018-09-18 DIAGNOSIS — Z17.0 MALIGNANT NEOPLASM OF AREOLA OF LEFT BREAST IN FEMALE, ESTROGEN RECEPTOR POSITIVE (H): ICD-10-CM

## 2018-09-18 DIAGNOSIS — C50.012 MALIGNANT NEOPLASM OF AREOLA OF LEFT BREAST IN FEMALE, ESTROGEN RECEPTOR POSITIVE (H): ICD-10-CM

## 2018-09-18 DIAGNOSIS — Z51.11 ENCOUNTER FOR ANTINEOPLASTIC CHEMOTHERAPY: ICD-10-CM

## 2018-09-18 DIAGNOSIS — C50.412 MALIGNANT NEOPLASM OF UPPER-OUTER QUADRANT OF LEFT BREAST IN FEMALE, ESTROGEN RECEPTOR POSITIVE (H): ICD-10-CM

## 2018-09-18 PROCEDURE — 40000556 ZZH STATISTIC PERIPHERAL IV START W US GUIDANCE: Mod: ZF

## 2018-09-18 PROCEDURE — G0463 HOSPITAL OUTPT CLINIC VISIT: HCPCS | Mod: ZF

## 2018-09-18 PROCEDURE — 99215 OFFICE O/P EST HI 40 MIN: CPT | Mod: ZP | Performed by: INTERNAL MEDICINE

## 2018-09-18 PROCEDURE — 25000128 H RX IP 250 OP 636: Mod: ZF | Performed by: INTERNAL MEDICINE

## 2018-09-18 PROCEDURE — 96413 CHEMO IV INFUSION 1 HR: CPT

## 2018-09-18 RX ORDER — ALBUTEROL SULFATE 0.83 MG/ML
2.5 SOLUTION RESPIRATORY (INHALATION)
Status: CANCELLED | OUTPATIENT
Start: 2018-10-09

## 2018-09-18 RX ORDER — EPINEPHRINE 1 MG/ML
0.3 INJECTION, SOLUTION INTRAMUSCULAR; SUBCUTANEOUS EVERY 5 MIN PRN
Status: CANCELLED | OUTPATIENT
Start: 2018-10-09

## 2018-09-18 RX ORDER — EPINEPHRINE 0.3 MG/.3ML
0.3 INJECTION SUBCUTANEOUS EVERY 5 MIN PRN
Status: CANCELLED | OUTPATIENT
Start: 2018-10-09

## 2018-09-18 RX ORDER — METHYLPREDNISOLONE SODIUM SUCCINATE 125 MG/2ML
125 INJECTION, POWDER, LYOPHILIZED, FOR SOLUTION INTRAMUSCULAR; INTRAVENOUS
Status: CANCELLED
Start: 2018-10-09

## 2018-09-18 RX ORDER — SODIUM CHLORIDE 9 MG/ML
1000 INJECTION, SOLUTION INTRAVENOUS CONTINUOUS PRN
Status: CANCELLED
Start: 2018-10-09

## 2018-09-18 RX ORDER — ACETAMINOPHEN 325 MG/1
650 TABLET ORAL
Status: CANCELLED
Start: 2018-10-09

## 2018-09-18 RX ORDER — DIPHENHYDRAMINE HYDROCHLORIDE 50 MG/ML
50 INJECTION INTRAMUSCULAR; INTRAVENOUS
Status: CANCELLED
Start: 2018-10-09

## 2018-09-18 RX ORDER — MEPERIDINE HYDROCHLORIDE 25 MG/ML
25 INJECTION INTRAMUSCULAR; INTRAVENOUS; SUBCUTANEOUS EVERY 30 MIN PRN
Status: CANCELLED | OUTPATIENT
Start: 2018-10-09

## 2018-09-18 RX ORDER — DIPHENHYDRAMINE HCL 25 MG
25 CAPSULE ORAL
Status: CANCELLED
Start: 2018-10-09

## 2018-09-18 RX ORDER — ALBUTEROL SULFATE 90 UG/1
1-2 AEROSOL, METERED RESPIRATORY (INHALATION)
Status: CANCELLED
Start: 2018-10-09

## 2018-09-18 RX ADMIN — TRASTUZUMAB 450 MG: 150 INJECTION, POWDER, LYOPHILIZED, FOR SOLUTION INTRAVENOUS at 10:27

## 2018-09-18 ASSESSMENT — PAIN SCALES - GENERAL: PAINLEVEL: NO PAIN (0)

## 2018-09-18 NOTE — PATIENT INSTRUCTIONS
Contact Numbers    [facility] Main Line: [phone]  [facility] Triage and After Hours Nurse Line:  [phone]    Please call the [facility] nurse triage or the after hours nurse line if you experience a temperature greater than or equal to 100.5, shaking chills, have uncontrolled nausea, vomiting and/or diarrhea, dizziness, lightheadedness, shortness of breath, chest pain, bleeding, unexplained bruising, or if you have any other new/concerning symptoms, questions or concerns.     If you are having any concerning symptoms or wish to speak to a provider before your next infusion visit, please call your care coordinator or triage to notify them so we can adequately serve you.     If you need a refill on a narcotic prescription or other medication, please call triage before your infusion appointment.             September 2018 Sunday Monday Tuesday Wednesday Thursday Friday Saturday                                 1       2     3     4     5     6     7     8       9     10     11     12     13     14     15       16     17     18     [facility] RETURN    8:30 AM   (30 min.)   Perlita Malik MD   [facility] ONC INFUSION 60    9:30 AM   (60 min.)    ONCOLOGY INFUSION   [facility] 19     20     21     22       23     24     25     26     27     28     29       30                                              October 2018 Sunday Monday Tuesday Wednesday Thursday Friday Saturday        1     2     3     4     5     6       7     8     9     MA DIAGNOSTIC DIGITAL LEFT    1:00 PM   (20 min.)   UCBCMA2   [facility]     US BREAST LT LMT 1-3 QUAD    1:30 PM   (30 min.)   UCBCUS1   [facility]     [facility]    2:00 PM   (15 min.)   [facility] LAB DRAW   [facility] Lab Draw     [facility] ONC INFUSION 120    2:30 PM   (120 min.)    ONCOLOGY INFUSION   [facility] 10     11     12     13       14     15     16      17     18     19     20       21     22     23     24     25     26     27       28     29     30     [facility] LAB DRAW   11:30 AM   (15 min.)   [facility] LAB DRAW   [facility] Lab Draw     [facility] RETURN   11:55 AM   (50 min.)   Dalila Blum PA-C   [facility] ONC INFUSION 120    1:30 PM   (120 min.)    ONCOLOGY INFUSION   [facility] 31

## 2018-09-18 NOTE — MR AVS SNAPSHOT
After Visit Summary   9/18/2018    Lennie Mar    MRN: [id number]           Patient Information     Date Of Birth          11/28/1927        Visit Information        Provider Department      9/18/2018 8:45 AM Perlita Malik MD [facility]        Today's Diagnoses     Malignant neoplasm of upper-inner quadrant of left breast in female, estrogen receptor positive (H)    -  1    Malignant neoplasm of areola of left breast in female, estrogen receptor positive (H)        Malignant neoplasm of upper-outer quadrant of left breast in female, estrogen receptor positive (H)        Encounter for antineoplastic chemotherapy           Follow-ups after your visit        Your next 10 appointments already scheduled     Sep 28, 2018 10:15 AM CDT   (Arrive by 10:00 AM)   New Patient Visit with Nahun Betancourt MD   [facility] ([facility])    [address] [place] [address]  [address]  [facility] [address]   [phone]            Oct 09, 2018  1:00 PM CDT   MA DIAGNOSTIC DIGITAL LEFT with UCBCMA2   [facility] ([facility])    [address] [place] [address], [address] Floor  [facility] [address]   [phone]           How do I prepare for my exam? (Food and drink instructions) No Food and Drink Restrictions.  How do I prepare for my exam? (Other instructions) Do not use any powder, lotion or deodorant under your arms or on your breast. If you do, we will ask you to remove it before your exam.  What should I wear: Wear comfortable, two-piece clothing.  How long does the exam take: Most scans will take 15 minutes.  What should I bring: Bring any previous mammograms from other facilities or have them mailed to the breast center.  Do I need a :  No  is needed.  What do I need to tell my doctor: If you have any allergies, tell your care team.  What should I do after the exam: No restrictions, You may resume  "normal activities.  What is this test: This test is an x-ray of the breast to look for breast disease. The breast is pressed between two plates to flatten and spread the tissue. An X-ray is taken of the breast from different angles.  Who should I call with questions: If you have any questions, please call the Imaging Department where you will have your exam. Directions, parking instructions, and other information is available on our website, [email or web address].  Other information about my exam Three-dimensional (3D) mammograms are available at [facility] locations in [facility], [place], and [place]. [facility] locations include [place] and the [facility] [place] in [place].  Benefits of 3D mammograms include: * Improved rate of cancer detection * Decreases your chance of having to go back for more tests, which means fewer: * \"False-positive\" results (This means that there is an abnormal area but it isn't cancer.) * Invasive testing procedures, such as a biopsy or surgery * Can provide clearer images of the breast if you have dense breast tissue.  *3D mammography is an optional exam that anyone can have with a 2D mammogram. It doesn't replace or take the place of a 2D mammogram. 2D mammograms remain an effective screening test for all women.  Not all insurance companies cover the cost of a 3D mammogram. Check with your insurance.            Oct 09, 2018  1:30 PM CDT   US BREAST LEFT LIMITED 1-3 QUAD with UCBCUS1   [facility] ([facility])    [address]  [facility] [address] [phone]           How do I prepare for my exam? (Food and drink instructions) No Food and Drink Restrictions.  How do I prepare for my exam? (Other instructions) You do not need to do anything special to prepare for your exam.  What should I wear: Wear comfortable clothes.  How long does the exam take: Most " ultrasounds take 30 to 60 minutes.  What should I bring: Bring a list of your medicines, including vitamins, minerals and over-the-counter drugs. It is safest to leave personal items at home.  Do I need a :  No  is needed.  What do I need to tell my doctor: Tell your doctor about any allergies you may have.  What should I do after the exam: No restrictions, You may resume normal activities.  What is this test: An ultrasound uses sound waves to make pictures of the body. Sound waves do not cause pain. The only discomfort may be the pressure of the wand against your skin or full bladder.  Who should I call with questions: If you have any questions, please call the Imaging Department where you will have your exam. Directions, parking instructions, and other information is available on our website, [email or web address].            Oct 09, 2018  2:00 PM CDT   [facility] Lab Draw with UC MASONIC LAB DRAW   [facility] ([facility])    [address]  [address]  [facility] [address]   [phone]            Oct 09, 2018  2:30 PM CDT   Infusion 120 with UC ONCOLOGY INFUSION, UC 28 ATC   [facility] ([facility])    [address]  [address]  [facility] [address]   [phone]            Oct 30, 2018 11:30 AM CDT   [facility] Lab Draw with UC MASONIC LAB DRAW   [facility] ([facility])    [address]  [address]  [facility] [address]   [phone]            Oct 30, 2018 12:10 PM CDT   (Arrive by 11:55 AM)   Return Visit with Dalila Blum PA-C   [facility] ([facility])    [address] [place] [address]  [address]  [facility] [address] [phone]            Oct 30, 2018  1:30 PM CDT   Infusion 120 with UC ONCOLOGY INFUSION, UC 22 ATC   [facility] ([facility])    [address]  [address] [place] [id number] [phone]              Who to contact     If you have questions or need follow up information about today's clinic visit or your schedule please contact [facility] directly at [phone].  Normal or non-critical lab and imaging results will be communicated to you by MyChart, letter or phone within 4 business days after the clinic has received the results. If you do not hear from us within 7 days, please contact the clinic through MyChart or phone. If you have a critical or abnormal lab result, we will notify you by phone as soon as possible.  Submit refill requests through [employer] or call your pharmacy and they will forward the refill request to us. Please allow 3 business days for your refill to be completed.          Additional Information About Your Visit        MyChart Information     [employer] lets you send messages to your doctor, view your test results, renew your prescriptions, schedule appointments and more. To sign up, go to [email or web address] . Click on \"Log in\" on the left side of the screen, which will take you to the Welcome page. Then click on \"Sign up Now\" on the right side of the page.     You will be asked to enter the access code listed below, as well as some personal information. Please follow the directions to create your username and password.     Your access code is: [email or web address]  Expires: 2018 10:18 AM     Your access code will  in 90 days. If you need help or a new code, please call your [place] clinic or [phone].        Care EveryWhere ID     This is your Care EveryWhere ID. This could be used by other organizations to access your [place] medical records  [id number]        Your Vitals Were     Pulse Temperature Respirations Height Pulse Oximetry Breastfeeding?    68 98.4  F (36.9  C) (Oral) 16 1.549 m (5' 1\") 100% No    BMI (Body Mass Index)                   31.24 kg/m2            " Blood Pressure from Last 3 Encounters:   09/18/18 136/70   08/28/18 149/71   08/20/18 140/62    Weight from Last 3 Encounters:   09/18/18 75 kg (165 lb 5 oz)   08/28/18 76.6 kg (168 lb 12.8 oz)   08/20/18 76.2 kg (168 lb)               Primary Care Provider Office Phone # Fax #    Ty Maged Quintanilla [phone]       [address] [facility] [address]        Equal Access to Services     [facility] : Hadii aad ku hadasho Soomaali, waaxda luqadaha, qaybta kaalmada adeegyada, waxmonroe lim . So [facility] [phone].    ATENCIÓN: Si habla español, tiene a foster disposición servicios gratuitos de asistencia lingüística. LlFostoria City [facility] [phone].    We comply with applicable federal civil rights laws and [place] laws. We do not discriminate on the basis of race, color, national origin, age, disability, sex, sexual orientation, or gender identity.            Thank you!     Thank you for choosing [facility]  for your care. Our goal is always to provide you with excellent care. Hearing back from our patients is one way we can continue to improve our services. Please take a few minutes to complete the written survey that you may receive in the mail after your visit with us. Thank you!             Your Updated Medication List - Protect others around you: Learn how to safely use, store and throw away your medicines at www.disposemymeds.org.          This list is accurate as of 9/18/18 11:59 PM.  Always use your most recent med list.                   Brand Name Dispense Instructions for use Diagnosis    acetaminophen 325 MG tablet    TYLENOL    100 tablet    Take 2 tablets by mouth every 4 hours as needed for pain.    S/P colon resection       ARIPiprazole 5 MG tablet    ABILIFY     TK 1 T PO QAM        buPROPion 300 MG 24 hr tablet    WELLBUTRIN XL     TK 1 T PO D        LATUDA PO      Take by mouth daily        letrozole 2.5 MG tablet    FEMARA    30 tablet    Take 1  tablet (2.5 mg) by mouth daily    Malignant neoplasm of upper-inner quadrant of left breast in female, estrogen receptor positive (H), Malignant neoplasm of upper-outer quadrant of left breast in female, estrogen receptor positive (H)       * lithium 150 MG capsule     30 capsule    Take 1 capsule by mouth daily (with breakfast).    Bipolar disorder, unspecified       * lithium 300 MG capsule     30 capsule    Take 1 capsule by mouth daily (with dinner).    Bipolar disorder, unspecified       order for DME     2 Package    Equipment being ordered: compression stocking knee high 20-30mmhg    Edema       pramipexole 1 MG tablet    MIRAPEX          prochlorperazine 10 MG tablet    COMPAZINE    30 tablet    Take 0.5 tablets (5 mg) by mouth every 6 hours as needed (Nausea/Vomiting)    Malignant neoplasm of upper-inner quadrant of left breast in female, estrogen receptor positive (H), Malignant neoplasm of upper-outer quadrant of left breast in female, estrogen receptor positive (H)       propranolol 20 MG tablet    INDERAL    180 tablet    Take 1 tablet (20 mg) by mouth 2 times daily    Tremor       sertraline 50 MG tablet    ZOLOFT     Take 25 mg by mouth daily        vitamin D 90963 UNIT capsule    ERGOCALCIFEROL    12 capsule    Take 1 capsule (50,000 Units) by mouth every 7 days    Vitamin D deficiency       * Notice:  This list has 2 medication(s) that are the same as other medications prescribed for you. Read the directions carefully, and ask your doctor or other care provider to review them with you.

## 2018-09-18 NOTE — LETTER
9/18/2018     RE: Lennie Mar  [address]  [facility] [address]     Dear Colleague,    Thank you for referring your patient, Lennie Mar, to the [facility]. Please see a copy of my visit note below.    Oncology Follow Up:  Date on this visit: 9/18/2018    Diagnosis:  1.  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  Stage IIa, T2N0M0, ER/NE positive, HER-2 non-amplified invasive ductal carcinoma of the left breast at 11:00    Primary Physician: Ty Quintanilla     History Of Present Illness:  Ms. Mar is a 89 year old female who presents with two invasive cancers of the left breast. She self palpated a lump and underwent mammaogram on 5/4/18 that showed a  2 cm mass in the superficial lateral left breast, near the nipple, with fine pleomorphic calcifications extending posteriorly measuring up to 6.9 cm and a second 2.3 cm mass in the upper inner left breast at the 11:00 position. Ultrasonography showed an irregular mass at 11:00, 7 cm from the nipple, measuring 2.4 cm and a mass at 3:00, 2 cm from the nipple, measuring 3.5 cm.  Biopsies of both masses were performed on 5/9/18.  Pathology of the 3:00  mass near the nipple was a grade 3 invasive carcinoma.  Estrogen receptor staining was strongly positive and NE moderately positive.  HER2 was amplified with a HER2/CEN17 ratio of 6.4/2.3 for a total ratio of 2.8.  There was associated intermediate grade DCIS and skin involvement. The 11:00 mass was a grade 3 invasive carcinoma ER strongly positive, NE strongly positive, and HER2 nonamplified. No lymphadenopathy was seen on ultrasound.    She began treatment with Herceptin and letrozole on 6/1/18.    Interval History:  Ms. Mar comes in to clinic today for routine breast cancer followup as well as to receive her next Herceptin infusion.  She is  currently on treatment with Herceptin and letrozole.  She is tolerating it remarkably well and in fact denies any side effects from either treatment.  She specifically denies increased joint stiffness, hot flashes, vaginal dryness, pain or mood disturbances.  She has not had any diarrhea.  She denies cough, shortness of breath or chest pain.  She has not noted any swelling of her lower extremities.  She has no new bone or joint aches or pains.  She has chronic low back pain which is unchanged from prior.  She has no headaches, visual changes or focal neurologic complaints.  She mentions that she no longer can feel the upper inner left breast mass.  She still feels the mass adjacent to the left nipple; however, denies any residual skin redness.  The remainder of a complete 12-point review of systems is completed and was negative with the exception of that mentioned above.      Past Medical/Surgical History:  Past Medical History:   Diagnosis Date     Asymptomatic varicose veins      Bipolar disorder, unspecified      Chronic back pain 2005    Right sided pain     CKD (chronic kidney disease) stage 3, GFR 30-59 ml/min 2/18/2014     Memory loss      Muscle weakness (generalized) 6/23/2008     Past Surgical History:   Procedure Laterality Date     APPENDECTOMY OPEN  1947     CATARACT IOL, RT/LT       COLONOSCOPY WITH CO2 INSUFFLATION  2/29/2012    Procedure:COLONOSCOPY WITH CO2 INSUFFLATION; Surgeon:GAYLE REYNA; Location:[facility] OR     CYSTOCELE REPAIR  1972     CYSTOSCOPY, INSERT STENT URETHRA, COMBINED  1999     CYSTOSCOPY, RETROGRADES, INSERT STENT URETER(S), COMBINED  2/29/2012    Procedure:COMBINED CYSTOSCOPY, RETROGRADES, INSERT STENT URETER(S); Surgeon:ANT ESPINOZA; Location:[facility]     DECOMPRESSION LUMBAR ONE LEVEL  8/9/2013    Procedure: DECOMPRESSION LUMBAR ONE LEVEL;  Posterior Decompression Right Lumbar 5- Sacral 1;  Surgeon: You Zavala MD;  Location: [facility] OR     HC TOOTH EXTRACTION  W/FORCEP       HYSTERECTOMY, CATA  1963     IRRIGATION AND DEBRIDEMENT ORAL, COMBINED  1982    For treatment of gingivitis     LAPAROSCOPIC ASSISTED COLECTOMY  2/29/2012    Procedure:LAPAROSCOPIC ASSISTED COLECTOMY; Cysto, Bilateral Stent placement (both stents removed at end of case)- Yanet ACOSTA. Laparoscopic Extended Right Venu Colectomy with CO2 Colonoscopy and polyp removal-Judah; Surgeon:GAYLE REYNA; Location:[facility]     LIGATN/STRIP LONG OR SHORT SAPHEN  1955     STRIP VEIN BILATERAL  1964     SURGICAL HISTORY OF -   1999    ureter surgery for blockage.     Allergies:  Allergies as of 09/18/2018 - Alexey as Reviewed 08/28/2018   Allergen Reaction Noted     Cafergot Other (See Comments) and Nausea and Vomiting 01/01/1972     Ciprofloxacin  06/19/2012     Penicillins Rash 01/01/1949     Sulfa drugs Rash 01/01/1950     Lamictal [lamotrigine] Other (See Comments) 02/15/2014     Naproxen       Tetracycline       Current Medications:  Current Outpatient Prescriptions   Medication Sig Dispense Refill     acetaminophen (TYLENOL) 325 MG tablet Take 2 tablets by mouth every 4 hours as needed for pain. 100 tablet 0     ARIPiprazole (ABILIFY) 5 MG tablet TK 1 T PO QAM  3     buPROPion (WELLBUTRIN XL) 300 MG 24 hr tablet TK 1 T PO D  2     letrozole (FEMARA) 2.5 MG tablet Take 1 tablet (2.5 mg) by mouth daily 30 tablet 11     lithium 150 MG capsule Take 1 capsule by mouth daily (with breakfast). 30 capsule 1     lithium 300 MG capsule Take 1 capsule by mouth daily (with dinner). 30 capsule 1     Lurasidone HCl (LATUDA PO) Take by mouth daily        ORDER FOR DME Equipment being ordered: compression stocking knee high 20-30mmhg 2 Package 0     pramipexole (MIRAPEX) 1 MG tablet        prochlorperazine (COMPAZINE) 10 MG tablet Take 0.5 tablets (5 mg) by mouth every 6 hours as needed (Nausea/Vomiting) (Patient not taking: Reported on 8/28/2018) 30 tablet 3     propranolol (INDERAL) 20 MG tablet Take 1 tablet (20 mg) by  "mouth 2 times daily 180 tablet 3     sertraline (ZOLOFT) 50 MG tablet Take 25 mg by mouth daily        vitamin D (ERGOCALCIFEROL) 41664 UNIT capsule Take 1 capsule (50,000 Units) by mouth every 7 days 12 capsule 0     Physical Exam:  /70  Pulse 68  Temp 98.4  F (36.9  C) (Oral)  Resp 16  Ht 1.549 m (5' 1\")  Wt 75 kg (165 lb 5 oz)  SpO2 100%  Breastfeeding? No  BMI 31.24 kg/m2  General:  Elderly appearing adult female in NAD.  Ambulates with a cane.  HEENT:  Normocephalic.  Sclera anicteric.  MMM.  No lesions of the oropharynx.  Lymph:  No palpable cervical, supraclavicular, or axillary LAD.  Breast: Left breast with a firm 2 x 2 cm mass at 3:00, adjacent to the NAC.  There is still tethering of the skin to the mass at this location, but no erythema or pear d'orange.  Mass at 11:00 is no longer palpable, but there remains a subcentimeter tender area of increased density 6 cm from the nipple at 11:00.   Chest:  CTA bilaterally.  No wheezes or crackles.  CV:  RRR.  Nl S1 and S2.  No m/r/g.  Abd:  Soft/NT/ND.  BSs normoactive.    Ext:  No pitting edema of the bilateral lower extremities.  Pulses 2+ and symmetric.  Neuro/MSK:  Pupils equal and reactive, no facial droop, requires assistance to get up to the exam table. Slow and wide based gait  Psych:  Mood and affect appear normal.  Skin:  No visible concerning skin rashes or lesions.    Laboratory/Imaging Studies:  8/28/18 Echocardiogram:  LVEF of 60-65%.  Mild to moderate ELIU.  Trace mitral and tricuspid insufficiency.      ASSESSMENT/PLAN:  89 year old female with a stage Ib,T2N0M0, ER/TX positive, HER2 positive and a stage IIa, T2N0M0 ER/TX positive, HER2 non-amplified invasive mammary carcinomas of the left breast.    1.  Left breast cancers:  On Herceptin and letrozole for >3 months.  She has had a remarkable decrease in size of the ER/TX positive breast cancer at 11:00 left breast.  The ER positive, HER2 positive cancer at 3:00 is perhaps half the " initial size on exam.  We discussed obtaining imaging with mammogram/ultrasound to obtain objective measurements of her breast tumors.  We discussed we could potentially enhance response of the HER2 positive tumor to treatment with the addition of pertuzumab.  We discussed potential for increased diarrhea with addition of pertuzumab. She, her son, and daughter-in-law agreed to add pertuzumab.  We will plan to obtain insurance approval at this time and give first pertuzumab infusion along with her Herceptin infusion in 3 weeks.    Finally we discussed surgery.  We discussed that without surgery, the tumors would eventually progress through the current treatment and other treatments would need to be considered.  She again expressed that she does not want chemotherapy treatments now or in the future.  She has skin involvement of her malignancy, therefore if surgery was pursued, a mastectomy would typically be recommended.  We discussed that given advanced age, the risks of surgery must be weighed with the potential benefits.  I would like to obtain Dr. Betancourt's opinion as to whether patient is a surgical candidate.  We will repeat imaging and then review her case with our surgeons at breast conference.  I have also placed a referral for her to meet with Dr. Betancourt in clinic.  She was in agreement with this plan.    2.  At risk for cardiomyopathy:  Echocardiogram 8/28/18 with a retained LVEF.   Plan to continue to monitor once every 3 months while on HER2 targeted therapy.  Next echocardiogram will be due late November/early December.    3.  Follow Up:  Visit with Dr. Betancourt within 1-2 months.  Labs, left breast mammogram and ultrasound, and Herceptin and pertuzumab infusion in 3 weeks.  Visit with Dalila Blum and Herceptin and pertuzumab infusion in 6 weeks.  Echocardiogram, Herceptin and pertuzumab infusion in 9 weeks.  Visit with me and herceptin and pertuzumab infusion in 12 weeks.    It was a pleasure to meet  with Ms. Mar and her daughter-in-law in clinic today.  Her son also called in and took part in the conversation as well.  A total of 30 minutes of our 40 minute visit was spent discussing risks and benefits of her treatment options.    Again, thank you for allowing me to participate in the care of your patient.      Sincerely,    Perlita Malik MD

## 2018-09-18 NOTE — NURSING NOTE
"Oncology Rooming Note    September 18, 2018 8:40 AM   Lennie Mar is a 89 year old female who presents for:    Chief Complaint   Patient presents with     Oncology Clinic Visit     Return: Breast CA     Initial Vitals: /70  Pulse 68  Temp 98.4  F (36.9  C) (Oral)  Resp 16  Ht 1.549 m (5' 1\")  Wt 75 kg (165 lb 5 oz)  SpO2 100%  Breastfeeding? No  BMI 31.24 kg/m2 Estimated body mass index is 31.24 kg/(m^2) as calculated from the following:    Height as of this encounter: 1.549 m (5' 1\").    Weight as of this encounter: 75 kg (165 lb 5 oz). Body surface area is 1.8 meters squared.  No Pain (0) Comment: Data Unavailable   No LMP recorded. Patient is postmenopausal.  Allergies reviewed: Yes  Medications reviewed: Yes    Medications: Medication refills not needed today.  Pharmacy name entered into [employer]:    [facility] - [place], [place] - [address] [facility] [place], [place] - [address] [facility] [place], [place] - [address] [facility] [id number] [place] [address] - [address] [facility] [address]    Clinical concerns: no new concerns today Dr. Malik was notified.    10 minutes for nursing intake (face to face time)     Sanjuanita Andres CMA            "

## 2018-09-18 NOTE — PROGRESS NOTES
Infusion Nursing Note:  Lennie Mar presents today for Cycle 6 Herceptin.    Patient seen by provider today: Yes: Dr. Malik    Note: Reviewed with patient that she will be starting Perjeta with next infusion.    Intravenous Access:  Peripheral IV placed by Vascular Access.    Treatment Conditions:  ECHO/MUGA completed 8/28/18  EF 60-65%.    Post Infusion Assessment:  Patient tolerated infusion without incident.  Blood return noted pre and post infusion.  Site patent and intact, free from redness, edema or discomfort.  No evidence of extravasations. Access discontinued per protocol.    Discharge Plan:   Patient declined prescription refills.  Copy of AVS reviewed with patient and/or family.  Patient will return 10/9 for next appointment.  Patient discharged in stable condition accompanied by: self.  Departure Mode: Ambulatory.    Erma Bello RN

## 2018-09-18 NOTE — MR AVS SNAPSHOT
After Visit Summary   9/18/2018    Lennie Mar    MRN: [id number]           Patient Information     Date Of Birth          11/28/1927        Visit Information        Provider Department      9/18/2018 9:30 AM MARICRUZ 10 ATC;  ONCOLOGY INFUSION [facility]        Today's Diagnoses     Malignant neoplasm of upper-inner quadrant of left breast in female, estrogen receptor positive (H)    -  1    Malignant neoplasm of upper-outer quadrant of left breast in female, estrogen receptor positive (H)          Care Instructions    Contact Numbers    [facility] [place] [phone]  [facility] Triage and After Hours Nurse Line:  [phone]    Please call the [facility] nurse triage or the after hours nurse line if you experience a temperature greater than or equal to 100.5, shaking chills, have uncontrolled nausea, vomiting and/or diarrhea, dizziness, lightheadedness, shortness of breath, chest pain, bleeding, unexplained bruising, or if you have any other new/concerning symptoms, questions or concerns.     If you are having any concerning symptoms or wish to speak to a provider before your next infusion visit, please call your care coordinator or triage to notify them so we can adequately serve you.     If you need a refill on a narcotic prescription or other medication, please call triage before your infusion appointment.             September 2018 Sunday Monday Tuesday Wednesday Thursday Friday Saturday                                 1       2     3     4     5     6     7     8       9     10     11     12     13     14     15       16     17     18     UMP RETURN    8:30 AM   (30 min.)   Perlita Malik MD   [facility] ONC INFUSION 60    9:30 AM   (60 min.)    ONCOLOGY INFUSION   [facility] 19     20     21     22       23     24     25     26     27     28     29       30                                              October 2018    Sunday Monday Tuesday Wednesday Thursday Friday Saturday        1     2     3     4     5     6       7     8     9     MA DIAGNOSTIC DIGITAL LEFT    1:00 PM   (20 min.)   UCBCMA2   [facility]     US BREAST LT LMT 1-3 QUAD    1:30 PM   (30 min.)   UCBCUS1   [facility]     UM [facility] LAB DRAW    2:00 PM   (15 min.)    [facility] LAB DRAW   [facility] Draw     UM ONC INFUSION 120    2:30 PM   (120 min.)   UC ONCOLOGY INFUSION   [facility] 10     11     12     13       14     15     16     17     18     19     20       21     22     23     24     25     26     27       28     29     30     [facility] LAB DRAW   11:30 AM   (15 min.)    [facility] LAB DRAW   [facility] Draw     UMP RETURN   11:55 AM   (50 min.)   Dalila Blum PA-C   [facility]     UM ONC INFUSION 120    1:30 PM   (120 min.)    ONCOLOGY INFUSION   [facility] 31                                   Follow-ups after your visit        Your next 10 appointments already scheduled     Oct 09, 2018  1:00 PM CDT   MA DIAGNOSTIC DIGITAL LEFT with UCBA2   [facility] ([facility] [place])    [address] [facility] [address] [phone]           Do not use any powder, lotion or deodorant under your arms or on your breast. If you do, we will ask you to remove it before your exam.  Wear comfortable, two-piece clothing.  If you have any allergies, tell your care team.  Bring any previous mammograms from other facilities or have them mailed to the breast center.  Three-dimensional (3D) mammograms are available at [place] locations in [facility], [place], [place], [facility], [place], [place], and [place]. [facility] locations include [place] [facility] in [place]. Benefits of 3D mammograms include: - Improved rate of cancer detection -  "Decreases your chance of having to go back for more tests, which means fewer: - \"False-positive\" results (This means that there is an abnormal area but it isn't cancer.) - Invasive testing procedures, such as a biopsy or surgery - Can provide clearer images of the breast if you have dense breast tissue. 3D mammography is an optional exam that anyone can have with a 2D mammogram. It doesn't replace or take the place of a 2D mammogram. 2D mammograms remain an effective screening test for all women.  Not all insurance companies cover the cost of a 3D mammogram. Check with your insurance.            Oct 09, 2018  1:30 PM CDT   US BREAST LEFT LIMITED 1-3 QUAD with UCBCUS1   [facility] ([facility])    [address]  [facility] [address] [phone]           How do I prepare for my exam? (Food and drink instructions) No Food and Drink Restrictions.  How do I prepare for my exam? (Other instructions) You do not need to do anything special to prepare for your exam.  What should I wear: Wear comfortable clothes.  How long does the exam take: Most ultrasounds take 30 to 60 minutes.  What should I bring: Bring a list of your medicines, including vitamins, minerals and over-the-counter drugs. It is safest to leave personal items at home.  Do I need a :  No  is needed.  What do I need to tell my doctor: Tell your doctor about any allergies you may have.  What should I do after the exam: No restrictions, You may resume normal activities.  What is this test: An ultrasound uses sound waves to make pictures of the body. Sound waves do not cause pain. The only discomfort may be the pressure of the wand against your skin or full bladder.  Who should I call with questions: If you have any questions, please call the Imaging Department where you will have your exam. Directions, parking instructions, and other information is available on our website, " [email or web address].            Oct 09, 2018  2:00 PM CDT   [facility] Lab Draw with  [facility] LAB DRAW   [facility] ([facility])    [address]  [address]  [facility] [address]   [phone]            Oct 09, 2018  2:30 PM CDT   Infusion 120 with UC ONCOLOGY INFUSION, UC 28 ATC   [facility] ([facility])    [address]  [address]  [facility] [address]   [phone]            Oct 30, 2018 11:30 AM CDT   [facility] Lab Draw with  MASONIC LAB DRAW   [facility] Lab Draw ([facility])    [address]  [address]  [place] [facility] [address]   [phone]            Oct 30, 2018 12:10 PM CDT   (Arrive by 11:55 AM)   Return Visit with Dalila Blum PA-C   [facility] ([facility])    [address]  [address]  [facility] [address]   [phone]            Oct 30, 2018  1:30 PM CDT   Infusion 120 with UC ONCOLOGY INFUSION, UC 22 ATC   [facility] ([facility])    [address]  [address]  [place] [facility] [address]   [phone]            Nov 20, 2018 10:30 AM CST   Ech Limited with UCECHCR1   [facility] Echo ([facility])    [address]  3rd Floor  [facility] [facility] [address]   [phone]           1.  Please bring or wear a comfortable two-piece outfit. 2.  You may eat, drink and take your normal medicines. 3.  For any questions that cannot be answered, please contact the ordering physician 4.  Please do not wear perfumes or scented lotions on the day of your exam.              Future tests that were ordered for you today     Open Future Orders        Priority Expected Expires Ordered    Echocardiogram Limited Routine  9/18/2019 9/18/2018    MA Diagnostic Digital Left Routine  9/18/2019 9/18/2018    US Breast Unilateral Left  "Routine  2019            Who to contact     If you have questions or need follow up information about today's clinic visit or your schedule please contact [facility] directly at [phone].  Normal or non-critical lab and imaging results will be communicated to you by MyChart, letter or phone within 4 business days after the clinic has received the results. If you do not hear from us within 7 days, please contact the clinic through Urbantech[employer] or phone. If you have a critical or abnormal lab result, we will notify you by phone as soon as possible.  Submit refill requests through Tr[employer]im or call your pharmacy and they will forward the refill request to us. Please allow 3 business days for your refill to be completed.          Additional Information About Your Visit        UrbantechharEnlighted Information     Tr[employer]im lets you send messages to your doctor, view your test results, renew your prescriptions, schedule appointments and more. To sign up, go to [email or web address] . Click on \"Log in\" on the left side of the screen, which will take you to the Welcome page. Then click on \"Sign up Now\" on the right side of the page.     You will be asked to enter the access code listed below, as well as some personal information. Please follow the directions to create your username and password.     Your access code is: [email or web address]  Expires: 2018 10:18 AM     Your access code will  in 90 days. If you need help or a new code, please call your [place] clinic or [phone].        Care EveryWhere ID     This is your Care EveryWhere ID. This could be used by other organizations to access your [place] medical records  [id number]         Blood Pressure from Last 3 Encounters:   18 136/70   18 149/71   18 140/62    Weight from Last 3 Encounters:   18 75 kg (165 lb 5 oz)   18 76.6 kg (168 lb 12.8 oz)   18 76.2 kg (168 lb)              Today, you had the " following     No orders found for display       Primary Care Provider Office Phone # Fax #    Ty Quintanilla [phone] [address]  [facility] [address]        Equal Access to Services     ANKIT SALINAS : Hadalex dottie ku daysio Soanastasiiaali, waaxda luqadaha, qaybta kaalmada adeegyada, mellisa posada laMarylyly palacios. So [facility] [phone].    ATENCIÓN: Si habla español, tiene a foster disposición servicios gratuitos de asistencia lingüística. Llame al [phone].    We comply with applicable federal civil rights laws and [place] laws. We do not discriminate on the basis of race, color, national origin, age, disability, sex, sexual orientation, or gender identity.            Thank you!     Thank you for choosing [facility]  for your care. Our goal is always to provide you with excellent care. Hearing back from our patients is one way we can continue to improve our services. Please take a few minutes to complete the written survey that you may receive in the mail after your visit with us. Thank you!             Your Updated Medication List - Protect others around you: Learn how to safely use, store and throw away your medicines at www.disposemymeds.org.          This list is accurate as of 9/18/18 10:59 AM.  Always use your most recent med list.                   Brand Name Dispense Instructions for use Diagnosis    acetaminophen 325 MG tablet    TYLENOL    100 tablet    Take 2 tablets by mouth every 4 hours as needed for pain.    S/P colon resection       ARIPiprazole 5 MG tablet    ABILIFY     TK 1 T PO QAM        buPROPion 300 MG 24 hr tablet    WELLBUTRIN XL     TK 1 T PO D        LATUDA PO      Take by mouth daily        letrozole 2.5 MG tablet    FEMARA    30 tablet    Take 1 tablet (2.5 mg) by mouth daily    Malignant neoplasm of upper-inner quadrant of left breast in female, estrogen receptor positive (H), Malignant neoplasm of upper-outer quadrant of left breast  in female, estrogen receptor positive (H)       * lithium 150 MG capsule     30 capsule    Take 1 capsule by mouth daily (with breakfast).    Bipolar disorder, unspecified       * lithium 300 MG capsule     30 capsule    Take 1 capsule by mouth daily (with dinner).    Bipolar disorder, unspecified       order for DME     2 Package    Equipment being ordered: compression stocking knee high 20-30mmhg    Edema       pramipexole 1 MG tablet    MIRAPEX          prochlorperazine 10 MG tablet    COMPAZINE    30 tablet    Take 0.5 tablets (5 mg) by mouth every 6 hours as needed (Nausea/Vomiting)    Malignant neoplasm of upper-inner quadrant of left breast in female, estrogen receptor positive (H), Malignant neoplasm of upper-outer quadrant of left breast in female, estrogen receptor positive (H)       propranolol 20 MG tablet    INDERAL    180 tablet    Take 1 tablet (20 mg) by mouth 2 times daily    Tremor       sertraline 50 MG tablet    ZOLOFT     Take 25 mg by mouth daily        vitamin D 31407 UNIT capsule    ERGOCALCIFEROL    12 capsule    Take 1 capsule (50,000 Units) by mouth every 7 days    Vitamin D deficiency       * Notice:  This list has 2 medication(s) that are the same as other medications prescribed for you. Read the directions carefully, and ask your doctor or other care provider to review them with you.

## 2018-09-28 ENCOUNTER — ONCOLOGY VISIT (OUTPATIENT)
Dept: ONCOLOGY | Facility: CLINIC | Age: 83
End: 2018-09-28
Attending: SURGERY
Payer: MEDICARE

## 2018-09-28 VITALS
DIASTOLIC BLOOD PRESSURE: 77 MMHG | TEMPERATURE: 97 F | RESPIRATION RATE: 16 BRPM | WEIGHT: 164 LBS | HEART RATE: 68 BPM | SYSTOLIC BLOOD PRESSURE: 132 MMHG | BODY MASS INDEX: 30.96 KG/M2 | HEIGHT: 61 IN | OXYGEN SATURATION: 97 %

## 2018-09-28 DIAGNOSIS — C50.212 MALIGNANT NEOPLASM OF UPPER-INNER QUADRANT OF LEFT BREAST IN FEMALE, ESTROGEN RECEPTOR POSITIVE (H): Primary | ICD-10-CM

## 2018-09-28 DIAGNOSIS — Z17.0 MALIGNANT NEOPLASM OF UPPER-INNER QUADRANT OF LEFT BREAST IN FEMALE, ESTROGEN RECEPTOR POSITIVE (H): Primary | ICD-10-CM

## 2018-09-28 PROCEDURE — G0463 HOSPITAL OUTPT CLINIC VISIT: HCPCS | Mod: ZF

## 2018-09-28 ASSESSMENT — PAIN SCALES - GENERAL: PAINLEVEL: NO PAIN (0)

## 2018-09-28 NOTE — NURSING NOTE
"Oncology Rooming Note    September 28, 2018 10:09 AM   Lennie Mar is a 89 year old female who presents for:    Chief Complaint   Patient presents with     Oncology Clinic Visit     NEw pt Breast Ca      Initial Vitals: /77 (BP Location: Right arm, Patient Position: Sitting, Cuff Size: Adult Regular)  Pulse 68  Temp 97  F (36.1  C) (Oral)  Resp 16  Ht 1.549 m (5' 0.98\")  Wt 74.4 kg (164 lb)  SpO2 97%  BMI 31 kg/m2 Estimated body mass index is 31 kg/(m^2) as calculated from the following:    Height as of this encounter: 1.549 m (5' 0.98\").    Weight as of this encounter: 74.4 kg (164 lb). Body surface area is 1.79 meters squared.  No Pain (0) Comment: Data Unavailable   No LMP recorded. Patient is postmenopausal.  Allergies reviewed: Yes  Medications reviewed: Yes    Medications: Medication refills not needed today.  Pharmacy name entered into readeo:    [facility] - [place], [place] - [address] [facility] - [place], [place] - [address] [facility] [place], [place] - [address]  [facility] [id number] [place] [address] - [address] [facility] [address]    Clinical concerns: none      8 minutes for nursing intake (face to face time)     Lela YASMINE Boyle              "

## 2018-09-28 NOTE — MR AVS SNAPSHOT
After Visit Summary   9/28/2018    Lennie Mar    MRN: [id number]           Patient Information     Date Of Birth          11/28/1927        Visit Information        Provider Department      9/28/2018 10:15 AM Nahun Betancourt MD [facility]        Today's Diagnoses     Malignant neoplasm of upper-inner quadrant of left breast in female, estrogen receptor positive (H)    -  1       Follow-ups after your visit        Your next 10 appointments already scheduled     Oct 09, 2018  1:00 PM CDT   MA DIAGNOSTIC DIGITAL LEFT with UCBCMA2   [facility] ([email or web address])    [address] [place] [address], 2nd Floor  [facility] [address] [phone]           How do I prepare for my exam? (Food and drink instructions) No Food and Drink Restrictions.  How do I prepare for my exam? (Other instructions) Do not use any powder, lotion or deodorant under your arms or on your breast. If you do, we will ask you to remove it before your exam.  What should I wear: Wear comfortable, two-piece clothing.  How long does the exam take: Most scans will take 15 minutes.  What should I bring: Bring any previous mammograms from other facilities or have them mailed to the breast center.  Do I need a :  No  is needed.  What do I need to tell my doctor: If you have any allergies, tell your care team.  What should I do after the exam: No restrictions, You may resume normal activities.  What is this test: This test is an x-ray of the breast to look for breast disease. The breast is pressed between two plates to flatten and spread the tissue. An X-ray is taken of the breast from different angles.  Who should I call with questions: If you have any questions, please call the Imaging Department where you will have your exam. Directions, parking instructions, and other information is available on our website, [email or web address].  Other information about my exam  "Three-dimensional (3D) mammograms are available at [facility] locations in [place], [place], [place], [place], [facility], [place], and [place]. [facility] locations include [place] and the [facility] [place] in [place].  Benefits of 3D mammograms include: * Improved rate of cancer detection * Decreases your chance of having to go back for more tests, which means fewer: * \"False-positive\" results (This means that there is an abnormal area but it isn't cancer.) * Invasive testing procedures, such as a biopsy or surgery * Can provide clearer images of the breast if you have dense breast tissue.  *3D mammography is an optional exam that anyone can have with a 2D mammogram. It doesn't replace or take the place of a 2D mammogram. 2D mammograms remain an effective screening test for all women.  Not all insurance companies cover the cost of a 3D mammogram. Check with your insurance.            Oct 09, 2018  1:30 PM CDT   US BREAST LEFT LIMITED 1-3 QUAD with UCBCUS1   [facility] ([facility])    [address] [place] [address], 2nd Floor  [facility] [address] [phone]           How do I prepare for my exam? (Food and drink instructions) No Food and Drink Restrictions.  How do I prepare for my exam? (Other instructions) You do not need to do anything special to prepare for your exam.  What should I wear: Wear comfortable clothes.  How long does the exam take: Most ultrasounds take 30 to 60 minutes.  What should I bring: Bring a list of your medicines, including vitamins, minerals and over-the-counter drugs. It is safest to leave personal items at home.  Do I need a :  No  is needed.  What do I need to tell my doctor: Tell your doctor about any allergies you may have.  What should I do after the exam: No restrictions, You may resume normal activities.  What is this test: An ultrasound uses sound waves to make pictures of the body. Sound waves do not " cause pain. The only discomfort may be the pressure of the wand against your skin or full bladder.  Who should I call with questions: If you have any questions, please call the Imaging Department where you will have your exam. Directions, parking instructions, and other information is available on our website, [email or web address].            Oct 09, 2018  2:00 PM CDT   Masonic Lab Draw with  MASONIC LAB DRAW   [facility])    [address]  [address]  [facility] [address]   [phone]            Oct 09, 2018  2:30 PM CDT   Infusion 120 with UC ONCOLOGY INFUSION, UC 28 ATC   [facility] ([facility])    [address]  [address]  [facility] [address]   [phone]            Oct 30, 2018 11:30 AM CDT   Masonic Lab Draw with  MASONIC LAB DRAW   [facility] Lab Draw ([facility])    [address]  [address]  [facility] [address]   [phone]            Oct 30, 2018 12:10 PM CDT   (Arrive by 11:55 AM)   Return Visit with Dalila Blum PA-C   [facility] ([facility])    [address]  [address]  [facility] [address]   [phone]            Oct 30, 2018  1:30 PM CDT   Infusion 120 with UC ONCOLOGY INFUSION, UC 22 ATC   [facility] ([facility])    [address]  [address]  [facility] [address]   [phone]            Nov 20, 2018 10:30 AM CST   Ech Limited with UCECHCR1   [facility] ([facility])    [address]  3rd Floor  [facility] [address] [phone]           1.  Please bring or wear a comfortable two-piece outfit. 2.  You may eat, drink and take your normal medicines. 3.  For any questions that cannot be answered, please contact the ordering physician 4.  Please do not wear perfumes or  "scented lotions on the day of your exam.              Who to contact     If you have questions or need follow up information about today's clinic visit or your schedule please contact [facility] directly at [phone].  Normal or non-critical lab and imaging results will be communicated to you by MyChart, letter or phone within 4 business days after the clinic has received the results. If you do not hear from us within 7 days, please contact the clinic through [facility] or phone. If you have a critical or abnormal lab result, we will notify you by phone as soon as possible.  Submit refill requests through [facility] or call your pharmacy and they will forward the refill request to us. Please allow 3 business days for your refill to be completed.          Additional Information About Your Visit        [facility] Information     [facility] lets you send messages to your doctor, view your test results, renew your prescriptions, schedule appointments and more. To sign up, go to [email or web address] . Click on \"Log in\" on the left side of the screen, which will take you to the Welcome page. Then click on \"Sign up Now\" on the right side of the page.     You will be asked to enter the access code listed below, as well as some personal information. Please follow the directions to create your username and password.     Your access code is: [email or web address]  Expires: 2018 10:18 AM     Your access code will  in 90 days. If you need help or a new code, please call your [place] clinic or [phone].        Care EveryWhere ID     This is your Care EveryWhere ID. This could be used by other organizations to access your [place] medical records  [id number]        Your Vitals Were     Pulse Temperature Respirations Height Pulse Oximetry BMI (Body Mass Index)    68 97  F (36.1  C) (Oral) 16 1.549 m (5' 0.98\") 97% 31 kg/m2       Blood Pressure from Last 3 Encounters:   18 132/77   18 136/70   18 " 149/71    Weight from Last 3 Encounters:   09/28/18 74.4 kg (164 lb)   09/18/18 75 kg (165 lb 5 oz)   08/28/18 76.6 kg (168 lb 12.8 oz)              Today, you had the following     No orders found for display       Primary Care Provider Office Phone # Fax #    Ty Quintanilla [phone]  [facility] [address]        Equal Access to Services     ANKIT SALINAS : Hadii aad ku hadasho Soomaali, waaxda luqadaha, qaybta kaalmada adeegyada, waxay idiin hayaan adeeg tammyreshari la'lyly . So [facility] [phone].    ATENCIÓN: Si habla español, tiene a foster disposición servicios gratuitos de asistencia lingüística. [facility] [phone].    We comply with applicable federal civil rights laws and [place] laws. We do not discriminate on the basis of race, color, national origin, age, disability, sex, sexual orientation, or gender identity.            Thank you!     Thank you for choosing [facility]  for your care. Our goal is always to provide you with excellent care. Hearing back from our patients is one way we can continue to improve our services. Please take a few minutes to complete the written survey that you may receive in the mail after your visit with us. Thank you!             Your Updated Medication List - Protect others around you: Learn how to safely use, store and throw away your medicines at www.disposemymeds.org.          This list is accurate as of 9/28/18 11:59 PM.  Always use your most recent med list.                   Brand Name Dispense Instructions for use Diagnosis    acetaminophen 325 MG tablet    TYLENOL    100 tablet    Take 2 tablets by mouth every 4 hours as needed for pain.    S/P colon resection       ARIPiprazole 5 MG tablet    ABILIFY     TK 1 T PO QAM        buPROPion 300 MG 24 hr tablet    WELLBUTRIN XL     TK 1 T PO D        LATUDA PO      Take by mouth daily        letrozole 2.5 MG tablet    FEMARA    30 tablet    Take 1 tablet (2.5 mg) by mouth daily     Malignant neoplasm of upper-inner quadrant of left breast in female, estrogen receptor positive (H), Malignant neoplasm of upper-outer quadrant of left breast in female, estrogen receptor positive (H)       * lithium 150 MG capsule     30 capsule    Take 1 capsule by mouth daily (with breakfast).    Bipolar disorder, unspecified (H)       * lithium 300 MG capsule     30 capsule    Take 1 capsule by mouth daily (with dinner).    Bipolar disorder, unspecified (H)       order for DME     2 Package    Equipment being ordered: compression stocking knee high 20-30mmhg    Edema       pramipexole 1 MG tablet    MIRAPEX          prochlorperazine 10 MG tablet    COMPAZINE    30 tablet    Take 0.5 tablets (5 mg) by mouth every 6 hours as needed (Nausea/Vomiting)    Malignant neoplasm of upper-inner quadrant of left breast in female, estrogen receptor positive (H), Malignant neoplasm of upper-outer quadrant of left breast in female, estrogen receptor positive (H)       propranolol 20 MG tablet    INDERAL    180 tablet    Take 1 tablet (20 mg) by mouth 2 times daily    Tremor       sertraline 50 MG tablet    ZOLOFT     Take 25 mg by mouth daily        vitamin D 12098 UNIT capsule    ERGOCALCIFEROL    12 capsule    Take 1 capsule (50,000 Units) by mouth every 7 days    Vitamin D deficiency       * Notice:  This list has 2 medication(s) that are the same as other medications prescribed for you. Read the directions carefully, and ask your doctor or other care provider to review them with you.

## 2018-09-28 NOTE — PROGRESS NOTES
Lennie Mar is a 89-year-old woman I was asked to see at the request of Dr. Perlita Malik for evaluation of a left breast cancer.  The patient palpated a mass in her left breast.  She underwent a mammogram and ultrasound, which I have reviewed.  This was in May.  It demonstrated 2 masses in her breast, 1 mass was 3.5 cm, the other was 2.4 cm.  There is also a large area of pleomorphic calcifications.  She underwent a needle biopsy of the 2 masses which demonstrated a grade 3 invasive ductal cancer, estrogen receptor positive, HER-2 positive.  She saw Dr. Malik and started Herceptin and letrozole.  Since starting that treatment, she can no longer palpate one of the breast masses.  She has done well with her preoperative treatment.      PAST MEDICAL HISTORY:  Significant for no major medical problems.  She does have bipolar disease.  She has had colon resection for a polyp and a hysterectomy.      PHYSICAL EXAMINATION:  She is a well-appearing woman in no apparent distress.  Head and neck examination reveals no cervical, supraclavicular or infraclavicular lymphadenopathy.  Bilateral breast examination reveals a palpable mass and thickening of the skin just lateral to the left nipple.  I can appreciate no other palpable masses.  She has no lymphadenopathy.  Right breast examination reveals no dominant masses, skin changes, nipple changes or axillary lymphadenopathy.      IMPRESSION:  I think this is 1 process going on in her left breast, she probably has a T3 N0 M0 breast cancer.      PLAN:  I think she is a candidate for surgical treatment.  I would recommend a mastectomy with a sentinel lymph node biopsy.  I do not think she is a good candidate for reconstruction.  She is reluctant to undergo the surgery.  I spoke to her son who is a physician.  He would like her to undergo surgery.  We are not going to schedule her for surgery at the present time, she is going to talk to the rest of her children and make a  decision about whether or not to proceed with surgery or to continue her current therapy.      TT: 40 minutes, CT: 30 minutes.      cc:   Perlita Malik MD   [address] [place] [address]      TT 40 minutes.  CT:  30 minutes.

## 2018-10-09 ENCOUNTER — RADIANT APPOINTMENT (OUTPATIENT)
Dept: MAMMOGRAPHY | Facility: CLINIC | Age: 83
End: 2018-10-09
Attending: INTERNAL MEDICINE
Payer: MEDICARE

## 2018-10-09 ENCOUNTER — APPOINTMENT (OUTPATIENT)
Dept: LAB | Facility: CLINIC | Age: 83
End: 2018-10-09
Attending: INTERNAL MEDICINE
Payer: MEDICARE

## 2018-10-09 ENCOUNTER — INFUSION THERAPY VISIT (OUTPATIENT)
Dept: ONCOLOGY | Facility: CLINIC | Age: 83
End: 2018-10-09
Attending: INTERNAL MEDICINE
Payer: MEDICARE

## 2018-10-09 VITALS
OXYGEN SATURATION: 96 % | WEIGHT: 161.8 LBS | BODY MASS INDEX: 30.59 KG/M2 | SYSTOLIC BLOOD PRESSURE: 145 MMHG | DIASTOLIC BLOOD PRESSURE: 71 MMHG | TEMPERATURE: 99.4 F | RESPIRATION RATE: 18 BRPM | HEART RATE: 76 BPM

## 2018-10-09 DIAGNOSIS — C50.412 MALIGNANT NEOPLASM OF UPPER-OUTER QUADRANT OF LEFT BREAST IN FEMALE, ESTROGEN RECEPTOR POSITIVE (H): ICD-10-CM

## 2018-10-09 DIAGNOSIS — Z17.0 MALIGNANT NEOPLASM OF UPPER-INNER QUADRANT OF LEFT BREAST IN FEMALE, ESTROGEN RECEPTOR POSITIVE (H): ICD-10-CM

## 2018-10-09 DIAGNOSIS — C50.012 MALIGNANT NEOPLASM OF AREOLA OF LEFT BREAST IN FEMALE, ESTROGEN RECEPTOR POSITIVE (H): ICD-10-CM

## 2018-10-09 DIAGNOSIS — Z17.0 MALIGNANT NEOPLASM OF AREOLA OF LEFT BREAST IN FEMALE, ESTROGEN RECEPTOR POSITIVE (H): ICD-10-CM

## 2018-10-09 DIAGNOSIS — Z17.0 MALIGNANT NEOPLASM OF UPPER-OUTER QUADRANT OF LEFT BREAST IN FEMALE, ESTROGEN RECEPTOR POSITIVE (H): ICD-10-CM

## 2018-10-09 DIAGNOSIS — C50.212 MALIGNANT NEOPLASM OF UPPER-INNER QUADRANT OF LEFT BREAST IN FEMALE, ESTROGEN RECEPTOR POSITIVE (H): ICD-10-CM

## 2018-10-09 DIAGNOSIS — Z17.0 MALIGNANT NEOPLASM OF UPPER-INNER QUADRANT OF LEFT BREAST IN FEMALE, ESTROGEN RECEPTOR POSITIVE (H): Primary | ICD-10-CM

## 2018-10-09 DIAGNOSIS — C50.212 MALIGNANT NEOPLASM OF UPPER-INNER QUADRANT OF LEFT BREAST IN FEMALE, ESTROGEN RECEPTOR POSITIVE (H): Primary | ICD-10-CM

## 2018-10-09 LAB
ALBUMIN SERPL-MCNC: 3.5 G/DL (ref 3.4–5)
ALP SERPL-CCNC: 111 U/L (ref 40–150)
ALT SERPL W P-5'-P-CCNC: 17 U/L (ref 0–50)
ANION GAP SERPL CALCULATED.3IONS-SCNC: 6 MMOL/L (ref 3–14)
AST SERPL W P-5'-P-CCNC: 14 U/L (ref 0–45)
BASOPHILS # BLD AUTO: 0.1 10E9/L (ref 0–0.2)
BASOPHILS NFR BLD AUTO: 0.8 %
BILIRUB SERPL-MCNC: 0.4 MG/DL (ref 0.2–1.3)
BUN SERPL-MCNC: 25 MG/DL (ref 7–30)
CALCIUM SERPL-MCNC: 9.4 MG/DL (ref 8.5–10.1)
CHLORIDE SERPL-SCNC: 108 MMOL/L (ref 94–109)
CO2 SERPL-SCNC: 24 MMOL/L (ref 20–32)
CREAT SERPL-MCNC: 1.49 MG/DL (ref 0.52–1.04)
DIFFERENTIAL METHOD BLD: NORMAL
EOSINOPHIL # BLD AUTO: 0.2 10E9/L (ref 0–0.7)
EOSINOPHIL NFR BLD AUTO: 1.8 %
ERYTHROCYTE [DISTWIDTH] IN BLOOD BY AUTOMATED COUNT: 13.3 % (ref 10–15)
GFR SERPL CREATININE-BSD FRML MDRD: 33 ML/MIN/1.7M2
GLUCOSE SERPL-MCNC: 87 MG/DL (ref 70–99)
HCT VFR BLD AUTO: 41.2 % (ref 35–47)
HGB BLD-MCNC: 13.1 G/DL (ref 11.7–15.7)
IMM GRANULOCYTES # BLD: 0.1 10E9/L (ref 0–0.4)
IMM GRANULOCYTES NFR BLD: 0.5 %
LYMPHOCYTES # BLD AUTO: 2.8 10E9/L (ref 0.8–5.3)
LYMPHOCYTES NFR BLD AUTO: 28 %
MCH RBC QN AUTO: 31.7 PG (ref 26.5–33)
MCHC RBC AUTO-ENTMCNC: 31.8 G/DL (ref 31.5–36.5)
MCV RBC AUTO: 100 FL (ref 78–100)
MONOCYTES # BLD AUTO: 0.8 10E9/L (ref 0–1.3)
MONOCYTES NFR BLD AUTO: 8.1 %
NEUTROPHILS # BLD AUTO: 6 10E9/L (ref 1.6–8.3)
NEUTROPHILS NFR BLD AUTO: 60.8 %
NRBC # BLD AUTO: 0 10*3/UL
NRBC BLD AUTO-RTO: 0 /100
PLATELET # BLD AUTO: 289 10E9/L (ref 150–450)
POTASSIUM SERPL-SCNC: 4.6 MMOL/L (ref 3.4–5.3)
PROT SERPL-MCNC: 7.2 G/DL (ref 6.8–8.8)
RBC # BLD AUTO: 4.13 10E12/L (ref 3.8–5.2)
SODIUM SERPL-SCNC: 138 MMOL/L (ref 133–144)
WBC # BLD AUTO: 9.9 10E9/L (ref 4–11)

## 2018-10-09 PROCEDURE — 85025 COMPLETE CBC W/AUTO DIFF WBC: CPT | Performed by: INTERNAL MEDICINE

## 2018-10-09 PROCEDURE — 25000128 H RX IP 250 OP 636: Mod: ZF | Performed by: INTERNAL MEDICINE

## 2018-10-09 PROCEDURE — 96413 CHEMO IV INFUSION 1 HR: CPT

## 2018-10-09 PROCEDURE — 80053 COMPREHEN METABOLIC PANEL: CPT | Performed by: INTERNAL MEDICINE

## 2018-10-09 RX ADMIN — SODIUM CHLORIDE 250 ML: 9 INJECTION, SOLUTION INTRAVENOUS at 15:33

## 2018-10-09 RX ADMIN — TRASTUZUMAB 450 MG: 150 INJECTION, POWDER, LYOPHILIZED, FOR SOLUTION INTRAVENOUS at 15:34

## 2018-10-09 ASSESSMENT — PAIN SCALES - GENERAL: PAINLEVEL: NO PAIN (0)

## 2018-10-09 NOTE — PATIENT INSTRUCTIONS
Contact Numbers    [facility] Main Line: [phone]  [facility] Triage and after hours / weekends / holidays:  [phone]      Please call the triage or after hours line if you experience a temperature greater than or equal to 100.5, shaking chills, have uncontrolled nausea, vomiting and/or diarrhea, dizziness, shortness of breath, chest pain, bleeding, unexplained bruising, or if you have any other new/concerning symptoms, questions or concerns.      If you are having any concerning symptoms or wish to speak to a provider before your next infusion visit, please call your care coordinator or triage to notify them so we can adequately serve you.     If you need a refill on a narcotic prescription or other medication, please call before your infusion appointment.             October 2018 Sunday Monday Tuesday Wednesday Thursday Friday Saturday        1     2     3     4     5     6       7     8     9     MA DIAGNOSTIC DIGITAL LEFT    1:00 PM   (20 min.)   [email or web address]   [facility]     US BREAST LT LMT 1-3 QUAD    1:30 PM   (30 min.)   UCBCUS1   [facility]     [facility] MASONIC LAB DRAW    2:00 PM   (15 min.)    MASONIC LAB DRAW   [facility] Lab Draw     P ONC INFUSION 120    2:30 PM   (120 min.)    ONCOLOGY INFUSION   [facility] 10     11     12     13       14     15     16     17     18     19     20       21     22     23     24     25     26     27       28     29     30     [facility] MASONIC LAB DRAW   11:30 AM   (15 min.)    MAS[facility]IC LAB DRAW   [facility]     [facility] RETURN   11:55 AM   (50 min.)   Dalila Blum PA-C   [facility]     [facility] ONC INFUSION 120    1:30 PM   (120 min.)    ONCOLOGY INFUSION   [facility] 31 November 2018 Sunday Monday Tuesday Wednesday Thursday Friday Saturday                       1     2     3       4     5     6     7     8     9     10        11     12     13     14     15     16     17       18     19     20     ECH LIMITED   10:30 AM   (60 min.)   [email or web address]   [facility] Echo     [facility] MASONIC LAB DRAW   11:30 AM   (15 min.)    MASONIC LAB DRAW   [facility] Lab Draw     [facility] ONC INFUSION 120   12:00 PM   (120 min.)    ONCOLOGY INFUSION   [facility] 21     22     23     24       25     26     27     28  Happy Birthday!     29     30                      Recent Results (from the past 24 hour(s))   Comprehensive metabolic panel    Collection Time: 10/09/18  2:37 PM   Result Value Ref Range    Sodium 138 133 - 144 mmol/L    Potassium 4.6 3.4 - 5.3 mmol/L    Chloride 108 94 - 109 mmol/L    Carbon Dioxide 24 20 - 32 mmol/L    Anion Gap 6 3 - 14 mmol/L    Glucose 87 70 - 99 mg/dL    Urea Nitrogen 25 7 - 30 mg/dL    Creatinine 1.49 (H) 0.52 - 1.04 mg/dL    GFR Estimate 33 (L) >60 mL/min/1.7m2    GFR Estimate If Black 40 (L) >60 mL/min/1.7m2    Calcium 9.4 8.5 - 10.1 mg/dL    Bilirubin Total 0.4 0.2 - 1.3 mg/dL    Albumin 3.5 3.4 - 5.0 g/dL    Protein Total 7.2 6.8 - 8.8 g/dL    Alkaline Phosphatase 111 40 - 150 U/L    ALT 17 0 - 50 U/L    AST 14 0 - 45 U/L   *CBC with platelets differential    Collection Time: 10/09/18  2:37 PM   Result Value Ref Range    WBC 9.9 4.0 - 11.0 10e9/L    RBC Count 4.13 3.8 - 5.2 10e12/L    Hemoglobin 13.1 11.7 - 15.7 g/dL    Hematocrit 41.2 35.0 - 47.0 %     78 - 100 fl    MCH 31.7 26.5 - 33.0 pg    MCHC 31.8 31.5 - 36.5 g/dL    RDW 13.3 10.0 - 15.0 %    Platelet Count 289 150 - 450 10e9/L    Diff Method Automated Method     % Neutrophils 60.8 %    % Lymphocytes 28.0 %    % Monocytes 8.1 %    % Eosinophils 1.8 %    % Basophils 0.8 %    % Immature Granulocytes 0.5 %    Nucleated RBCs 0 0 /100    Absolute Neutrophil 6.0 1.6 - 8.3 10e9/L    Absolute Lymphocytes 2.8 0.8 - 5.3 10e9/L    Absolute Monocytes 0.8 0.0 - 1.3 10e9/L    Absolute Eosinophils 0.2 0.0 - 0.7 10e9/L    Absolute Basophils 0.1 0.0 -  0.2 10e9/L    Abs Immature Granulocytes 0.1 0 - 0.4 10e9/L    Absolute Nucleated RBC 0.0

## 2018-10-09 NOTE — PROGRESS NOTES
Infusion Nursing Note:  Lennie Mar presents today for Cycle 7 Day 1 Herceptin (perjeta held due to insurance issues).    Patient seen by provider today: No    Treatment Conditions:  Lab Results   Component Value Date    HGB 13.1 10/09/2018     Lab Results   Component Value Date    WBC 9.9 10/09/2018      Lab Results   Component Value Date    ANEU 6.0 10/09/2018     Lab Results   Component Value Date     10/09/2018      Lab Results   Component Value Date     10/09/2018                   Lab Results   Component Value Date    POTASSIUM 4.6 10/09/2018           Lab Results   Component Value Date    MAG 2.0 04/22/2012            Lab Results   Component Value Date    CR 1.49 10/09/2018                   Lab Results   Component Value Date    CANELO 9.4 10/09/2018                Lab Results   Component Value Date    BILITOTAL 0.4 10/09/2018           Lab Results   Component Value Date    ALBUMIN 3.5 10/09/2018                    Lab Results   Component Value Date    ALT 17 10/09/2018           Lab Results   Component Value Date    AST 14 10/09/2018         Pain: No    Note:   10/9/18 1430: TORB Dr. Malik / Addie Raphael RN:  Hold perjeta today, due to perjeta not covered by insurance.   OK to give Herceptin.    Intravenous Access:  Peripheral IV placed.    Post Infusion Assessment:  Patient tolerated infusion without incident.  Blood return noted pre and post infusion.  Access discontinued per protocol.    Discharge Plan:   Patient declined prescription refills.  Discharge instructions reviewed with: Patient.  Patient and/or family verbalized understanding of discharge instructions and all questions answered.  Copy of AVS reviewed with patient and/or family.  Patient will return 10/30/18 for next appointment.  Patient discharged in stable condition accompanied by: self.  Departure Mode: Ambulatory.  Face to Face time: 0.    Addie Raphael, CASSIE

## 2018-10-09 NOTE — NURSING NOTE
Chief Complaint   Patient presents with     Blood Draw     Labs drawn via PIV placed by RN. Line flushed with saline. VS taken.      Ramu Moore RN

## 2018-10-09 NOTE — MR AVS SNAPSHOT
After Visit Summary   10/9/2018    Lennie Mar    MRN: [id number]           Patient Information     Date Of Birth          11/28/1927        Visit Information        Provider Department      10/9/2018 2:30 PM  28 ATC;  ONCOLOGY INFUSION [facility]        Today's Diagnoses     Malignant neoplasm of upper-inner quadrant of left breast in female, estrogen receptor positive (H)    -  1    Malignant neoplasm of upper-outer quadrant of left breast in female, estrogen receptor positive (H)          Care Instructions    Contact Numbers    [facility] Main Line: [phone]  [facility] Triage and after hours / weekends / holidays:  [phone]      Please call the triage or after hours line if you experience a temperature greater than or equal to 100.5, shaking chills, have uncontrolled nausea, vomiting and/or diarrhea, dizziness, shortness of breath, chest pain, bleeding, unexplained bruising, or if you have any other new/concerning symptoms, questions or concerns.      If you are having any concerning symptoms or wish to speak to a provider before your next infusion visit, please call your care coordinator or triage to notify them so we can adequately serve you.     If you need a refill on a narcotic prescription or other medication, please call before your infusion appointment.             October 2018 Sunday Monday Tuesday Wednesday Thursday Friday Saturday        1     2     3     4     5     6       7     8     9     MA DIAGNOSTIC DIGITAL LEFT    1:00 PM   (20 min.)   UCBCMA2   [facility]     US BREAST LT LMT 1-3 QUAD    1:30 PM   (30 min.)   UCBCUS1   [facility]     [facility] LAB DRAW    2:00 PM   (15 min.)   [facility] LAB DRAW   [facility] Lab Draw     [facility] ONC INFUSION 120    2:30 PM   (120 min.)    ONCOLOGY INFUSION   [facility] 10     11     12     13       14     15     16     17     18     19     20        21     22     23     24     25     26     27       28     29     30     [facility] LAB DRAW   11:30 AM   (15 min.)    [facility] LAB [facility]RAW   [facility]     [facility] RETURN   11:55 AM   (50 min.)   Dalila Blum PA-C   [facility]     [facility] ONC INFUSION 120    1:30 PM   (120 min.)    ONCOLOGY INFUSION   [facility] 31 November 2018 Sunday Monday Tuesday Wednesday Thursday Friday Saturday                       1     2     3       4     5     6     7     8     9     10       11     12     13     14     15     16     17       18     19     20     ECH LIMITED   10:30 AM   (60 min.)   [email or web address]   [facility]     [facility] LAB DRAW   11:30 AM   (15 min.)    [facility] LAB DRAW   [facility]     [facility] ONC INFUSION 120   12:00 PM   (120 min.)    ONCOLOGY INFUSION   [facility] 21     22     23     24       25     26     27     28  Happy Birthday!     29     30                      Recent Results (from the past 24 hour(s))   Comprehensive metabolic panel    Collection Time: 10/09/18  2:37 PM   Result Value Ref Range    Sodium 138 133 - 144 mmol/L    Potassium 4.6 3.4 - 5.3 mmol/L    Chloride 108 94 - 109 mmol/L    Carbon Dioxide 24 20 - 32 mmol/L    Anion Gap 6 3 - 14 mmol/L    Glucose 87 70 - 99 mg/dL    Urea Nitrogen 25 7 - 30 mg/dL    Creatinine 1.49 (H) 0.52 - 1.04 mg/dL    GFR Estimate 33 (L) >60 mL/min/1.7m2    GFR Estimate If Black 40 (L) >60 mL/min/1.7m2    Calcium 9.4 8.5 - 10.1 mg/dL    Bilirubin Total 0.4 0.2 - 1.3 mg/dL    Albumin 3.5 3.4 - 5.0 g/dL    Protein Total 7.2 6.8 - 8.8 g/dL    Alkaline Phosphatase 111 40 - 150 U/L    ALT 17 0 - 50 U/L    AST 14 0 - 45 U/L   *CBC with platelets differential    Collection Time: 10/09/18  2:37 PM   Result Value Ref Range    WBC 9.9 4.0 - 11.0 10e9/L    RBC Count 4.13 3.8 - 5.2 10e12/L    Hemoglobin 13.1 11.7 - 15.7 g/dL    Hematocrit 41.2 35.0 - 47.0  %     78 - 100 fl    MCH 31.7 26.5 - 33.0 pg    MCHC 31.8 31.5 - 36.5 g/dL    RDW 13.3 10.0 - 15.0 %    Platelet Count 289 150 - 450 10e9/L    Diff Method Automated Method     % Neutrophils 60.8 %    % Lymphocytes 28.0 %    % Monocytes 8.1 %    % Eosinophils 1.8 %    % Basophils 0.8 %    % Immature Granulocytes 0.5 %    Nucleated RBCs 0 0 /100    Absolute Neutrophil 6.0 1.6 - 8.3 10e9/L    Absolute Lymphocytes 2.8 0.8 - 5.3 10e9/L    Absolute Monocytes 0.8 0.0 - 1.3 10e9/L    Absolute Eosinophils 0.2 0.0 - 0.7 10e9/L    Absolute Basophils 0.1 0.0 - 0.2 10e9/L    Abs Immature Granulocytes 0.1 0 - 0.4 10e9/L    Absolute Nucleated RBC 0.0                Follow-ups after your visit        Your next 10 appointments already scheduled     Oct 30, 2018 11:30 AM CDT   [facility] Lab Draw with [facility] LAB DRAW   [facility] Draw ([facility])    [address] [place] [address]  [address]  [facility] [address]   [phone]            Oct 30, 2018 12:10 PM CDT   (Arrive by 11:55 AM)   Return Visit with Dalila Blum PA-C   [facility] ([facility])    [address] [place] [address]  [address]  [facility] [address]   [phone]            Oct 30, 2018  1:30 PM CDT   Infusion 120 with  ONCOLOGY INFUSION, UC 22 ATC   [facility] ([facility])    [address] [place] [address]  [address]  [facility] [address]   [phone]            Nov 20, 2018 10:30 AM CST   Ech Limited with UCECHCR1   [facility])    [address] [place] [address]  3rd Floor  [facility] [address] [phone]           1.  Please bring or wear a comfortable two-piece outfit. 2.  You may eat, drink and take your normal medicines. 3.  For any questions that cannot be answered, please contact the ordering physician 4.  Please do not wear perfumes or scented lotions on the day of your exam.            Nov  "20, 2018 11:30 AM CST   M[facility]ic Lab Draw with UC MASONIC LAB DRAW   [facility] Lab Draw ([facility])    [address]  [address]  [facility] [address]   [phone]            Nov 20, 2018 12:00 PM CST   Infusion 120 with UC ONCOLOGY INFUSION, UC 15 ATC   [facility] ([facility])    [address]  [address]  [facility] [address]   [phone]            Dec 11, 2018 10:45 AM CST   [facility] Lab Draw with UC MASONIC LAB DRAW   [facility] Lab Draw ([facility])    [address]  [address]  [facility] [address]   [phone]            Dec 11, 2018 11:15 AM CST   (Arrive by 11:00 AM)   Return Visit with Perlita Malik MD   [facility] ([facility])    [address] [place] [address]  [address]  [facility] [address] [phone]              Who to contact     If you have questions or need follow up information about today's clinic visit or your schedule please contact [facility] [place] directly at [phone].  Normal or non-critical lab and imaging results will be communicated to you by Ooploohart, letter or phone within 4 business days after the clinic has received the results. If you do not hear from us within 7 days, please contact the clinic through Ooploohart or phone. If you have a critical or abnormal lab result, we will notify you by phone as soon as possible.  Submit refill requests through APX or call your pharmacy and they will forward the refill request to us. Please allow 3 business days for your refill to be completed.          Additional Information About Your Visit        APX Information     APX lets you send messages to your doctor, view your test results, renew your prescriptions, schedule appointments and more. To sign up, go to [email or web address] . Click on \"Log in\" on the left side " "of the screen, which will take you to the Welcome page. Then click on \"Sign up Now\" on the right side of the page.     You will be asked to enter the access code listed below, as well as some personal information. Please follow the directions to create your username and password.     Your access code is: [email or web address]  Expires: 2018 10:18 AM     Your access code will  in 90 days. If you need help or a new code, please call your [facility] or [phone].        Care EveryWhere ID     This is your Care EveryWhere ID. This could be used by other organizations to access your [place] medical records  [id number]        Your Vitals Were     Pulse Temperature Respirations Pulse Oximetry BMI (Body Mass Index)       76 99.4  F (37.4  C) (Oral) 18 96% 30.59 kg/m2        Blood Pressure from Last 3 Encounters:   10/09/18 145/71   18 132/77   18 136/70    Weight from Last 3 Encounters:   10/09/18 73.4 kg (161 lb 12.8 oz)   18 74.4 kg (164 lb)   18 75 kg (165 lb 5 oz)              We Performed the Following     *CBC with platelets differential     Comprehensive metabolic panel        Primary Care Provider Office Phone # Fax #    Ty Quintanilla [phone] [address]  [facility] [address]        Equal Access to Services     [facility] AH: Hadii aad ku hadasho Soanastasiiaali, waaxda luqadaha, qaybta kaalmada deepakyada, mellisa palacios. So [facility] [phone].    ATENCIÓN: Si habla español, tiene a foster disposición servicios gratuitos de asistencia lingüística. Llame al [phone].    We comply with applicable federal civil rights laws and [place] laws. We do not discriminate on the basis of race, color, national origin, age, disability, sex, sexual orientation, or gender identity.            Thank you!     Thank you for choosing [facility]  for your care. Our goal is always to provide you with excellent care. Hearing back " from our patients is one way we can continue to improve our services. Please take a few minutes to complete the written survey that you may receive in the mail after your visit with us. Thank you!             Your Updated Medication List - Protect others around you: Learn how to safely use, store and throw away your medicines at www.disposemymeds.org.          This list is accurate as of 10/9/18  3:46 PM.  Always use your most recent med list.                   Brand Name Dispense Instructions for use Diagnosis    acetaminophen 325 MG tablet    TYLENOL    100 tablet    Take 2 tablets by mouth every 4 hours as needed for pain.    S/P colon resection       ARIPiprazole 5 MG tablet    ABILIFY     TK 1 T PO QAM        buPROPion 300 MG 24 hr tablet    WELLBUTRIN XL     TK 1 T PO D        LATUDA PO      Take by mouth daily        letrozole 2.5 MG tablet    FEMARA    30 tablet    Take 1 tablet (2.5 mg) by mouth daily    Malignant neoplasm of upper-inner quadrant of left breast in female, estrogen receptor positive (H), Malignant neoplasm of upper-outer quadrant of left breast in female, estrogen receptor positive (H)       * lithium 150 MG capsule     30 capsule    Take 1 capsule by mouth daily (with breakfast).    Bipolar disorder, unspecified (H)       * lithium 300 MG capsule     30 capsule    Take 1 capsule by mouth daily (with dinner).    Bipolar disorder, unspecified (H)       order for DME     2 Package    Equipment being ordered: compression stocking knee high 20-30mmhg    Edema       pramipexole 1 MG tablet    MIRAPEX          prochlorperazine 10 MG tablet    COMPAZINE    30 tablet    Take 0.5 tablets (5 mg) by mouth every 6 hours as needed (Nausea/Vomiting)    Malignant neoplasm of upper-inner quadrant of left breast in female, estrogen receptor positive (H), Malignant neoplasm of upper-outer quadrant of left breast in female, estrogen receptor positive (H)       propranolol 20 MG tablet    INDERAL    180 tablet     Take 1 tablet (20 mg) by mouth 2 times daily    Tremor       sertraline 50 MG tablet    ZOLOFT     Take 25 mg by mouth daily        vitamin D 73369 UNIT capsule    ERGOCALCIFEROL    12 capsule    Take 1 capsule (50,000 Units) by mouth every 7 days    Vitamin D deficiency       * Notice:  This list has 2 medication(s) that are the same as other medications prescribed for you. Read the directions carefully, and ask your doctor or other care provider to review them with you.

## 2018-10-11 DIAGNOSIS — C50.912 MALIGNANT NEOPLASM OF LEFT BREAST (H): Primary | ICD-10-CM

## 2018-10-11 RX ORDER — CLINDAMYCIN PHOSPHATE 900 MG/50ML
900 INJECTION, SOLUTION INTRAVENOUS SEE ADMIN INSTRUCTIONS
Status: CANCELLED | OUTPATIENT
Start: 2018-10-11

## 2018-10-11 RX ORDER — CLINDAMYCIN PHOSPHATE 900 MG/50ML
900 INJECTION, SOLUTION INTRAVENOUS
Status: CANCELLED | OUTPATIENT
Start: 2018-10-11

## 2018-10-12 ENCOUNTER — MEDICAL CORRESPONDENCE (OUTPATIENT)
Dept: HEALTH INFORMATION MANAGEMENT | Facility: CLINIC | Age: 83
End: 2018-10-12

## 2018-10-29 PROBLEM — C50.212 MALIGNANT NEOPLASM OF UPPER-INNER QUADRANT OF LEFT BREAST IN FEMALE, ESTROGEN RECEPTOR POSITIVE (H): Status: ACTIVE | Noted: 2018-05-09

## 2018-10-29 PROBLEM — Z17.0 MALIGNANT NEOPLASM OF UPPER-INNER QUADRANT OF LEFT BREAST IN FEMALE, ESTROGEN RECEPTOR POSITIVE (H): Status: ACTIVE | Noted: 2018-05-09

## 2018-10-29 RX ORDER — ACETAMINOPHEN 325 MG/1
650 TABLET ORAL
Status: CANCELLED
Start: 2018-10-30

## 2018-10-29 RX ORDER — METHYLPREDNISOLONE SODIUM SUCCINATE 125 MG/2ML
125 INJECTION, POWDER, LYOPHILIZED, FOR SOLUTION INTRAMUSCULAR; INTRAVENOUS
Status: CANCELLED
Start: 2018-10-30

## 2018-10-29 RX ORDER — ALBUTEROL SULFATE 0.83 MG/ML
2.5 SOLUTION RESPIRATORY (INHALATION)
Status: CANCELLED | OUTPATIENT
Start: 2018-10-30

## 2018-10-29 RX ORDER — DIPHENHYDRAMINE HYDROCHLORIDE 50 MG/ML
50 INJECTION INTRAMUSCULAR; INTRAVENOUS
Status: CANCELLED
Start: 2018-10-30

## 2018-10-29 RX ORDER — SODIUM CHLORIDE 9 MG/ML
1000 INJECTION, SOLUTION INTRAVENOUS CONTINUOUS PRN
Status: CANCELLED
Start: 2018-10-30

## 2018-10-29 RX ORDER — EPINEPHRINE 0.3 MG/.3ML
0.3 INJECTION SUBCUTANEOUS EVERY 5 MIN PRN
Status: CANCELLED | OUTPATIENT
Start: 2018-10-30

## 2018-10-29 RX ORDER — ALBUTEROL SULFATE 90 UG/1
1-2 AEROSOL, METERED RESPIRATORY (INHALATION)
Status: CANCELLED
Start: 2018-10-30

## 2018-10-29 RX ORDER — DIPHENHYDRAMINE HCL 25 MG
25 CAPSULE ORAL
Status: CANCELLED
Start: 2018-10-30

## 2018-10-29 RX ORDER — MEPERIDINE HYDROCHLORIDE 25 MG/ML
25 INJECTION INTRAMUSCULAR; INTRAVENOUS; SUBCUTANEOUS EVERY 30 MIN PRN
Status: CANCELLED | OUTPATIENT
Start: 2018-10-30

## 2018-10-29 RX ORDER — EPINEPHRINE 1 MG/ML
0.3 INJECTION, SOLUTION INTRAMUSCULAR; SUBCUTANEOUS EVERY 5 MIN PRN
Status: CANCELLED | OUTPATIENT
Start: 2018-10-30

## 2018-10-30 ENCOUNTER — ONCOLOGY VISIT (OUTPATIENT)
Dept: ONCOLOGY | Facility: CLINIC | Age: 83
End: 2018-10-30
Attending: INTERNAL MEDICINE
Payer: MEDICARE

## 2018-10-30 ENCOUNTER — APPOINTMENT (OUTPATIENT)
Dept: LAB | Facility: CLINIC | Age: 83
End: 2018-10-30
Attending: INTERNAL MEDICINE
Payer: MEDICARE

## 2018-10-30 VITALS
HEIGHT: 60 IN | SYSTOLIC BLOOD PRESSURE: 132 MMHG | BODY MASS INDEX: 32.94 KG/M2 | RESPIRATION RATE: 24 BRPM | OXYGEN SATURATION: 96 % | WEIGHT: 167.8 LBS | HEART RATE: 63 BPM | DIASTOLIC BLOOD PRESSURE: 76 MMHG | TEMPERATURE: 98.8 F

## 2018-10-30 DIAGNOSIS — Z17.0 MALIGNANT NEOPLASM OF UPPER-INNER QUADRANT OF LEFT BREAST IN FEMALE, ESTROGEN RECEPTOR POSITIVE (H): Primary | ICD-10-CM

## 2018-10-30 DIAGNOSIS — Z17.0 MALIGNANT NEOPLASM OF UPPER-OUTER QUADRANT OF LEFT BREAST IN FEMALE, ESTROGEN RECEPTOR POSITIVE (H): ICD-10-CM

## 2018-10-30 DIAGNOSIS — C50.212 MALIGNANT NEOPLASM OF UPPER-INNER QUADRANT OF LEFT BREAST IN FEMALE, ESTROGEN RECEPTOR POSITIVE (H): Primary | ICD-10-CM

## 2018-10-30 DIAGNOSIS — C50.412 MALIGNANT NEOPLASM OF UPPER-OUTER QUADRANT OF LEFT BREAST IN FEMALE, ESTROGEN RECEPTOR POSITIVE (H): ICD-10-CM

## 2018-10-30 DIAGNOSIS — Z17.0 MALIGNANT NEOPLASM OF UPPER-INNER QUADRANT OF LEFT BREAST IN FEMALE, ESTROGEN RECEPTOR POSITIVE (H): ICD-10-CM

## 2018-10-30 DIAGNOSIS — C50.212 MALIGNANT NEOPLASM OF UPPER-INNER QUADRANT OF LEFT BREAST IN FEMALE, ESTROGEN RECEPTOR POSITIVE (H): ICD-10-CM

## 2018-10-30 LAB
ALBUMIN SERPL-MCNC: 3.4 G/DL (ref 3.4–5)
ALP SERPL-CCNC: 116 U/L (ref 40–150)
ALT SERPL W P-5'-P-CCNC: 30 U/L (ref 0–50)
ANION GAP SERPL CALCULATED.3IONS-SCNC: 7 MMOL/L (ref 3–14)
AST SERPL W P-5'-P-CCNC: 21 U/L (ref 0–45)
BASOPHILS # BLD AUTO: 0.1 10E9/L (ref 0–0.2)
BASOPHILS NFR BLD AUTO: 0.7 %
BILIRUB SERPL-MCNC: 0.4 MG/DL (ref 0.2–1.3)
BUN SERPL-MCNC: 30 MG/DL (ref 7–30)
CALCIUM SERPL-MCNC: 9.5 MG/DL (ref 8.5–10.1)
CHLORIDE SERPL-SCNC: 110 MMOL/L (ref 94–109)
CO2 SERPL-SCNC: 24 MMOL/L (ref 20–32)
CREAT SERPL-MCNC: 1.44 MG/DL (ref 0.52–1.04)
DIFFERENTIAL METHOD BLD: NORMAL
EOSINOPHIL # BLD AUTO: 0.2 10E9/L (ref 0–0.7)
EOSINOPHIL NFR BLD AUTO: 2.1 %
ERYTHROCYTE [DISTWIDTH] IN BLOOD BY AUTOMATED COUNT: 14.1 % (ref 10–15)
GFR SERPL CREATININE-BSD FRML MDRD: 34 ML/MIN/1.7M2
GLUCOSE SERPL-MCNC: 82 MG/DL (ref 70–99)
HCT VFR BLD AUTO: 39.1 % (ref 35–47)
HGB BLD-MCNC: 12.6 G/DL (ref 11.7–15.7)
IMM GRANULOCYTES # BLD: 0.1 10E9/L (ref 0–0.4)
IMM GRANULOCYTES NFR BLD: 0.7 %
LYMPHOCYTES # BLD AUTO: 2.4 10E9/L (ref 0.8–5.3)
LYMPHOCYTES NFR BLD AUTO: 22.3 %
MCH RBC QN AUTO: 31.9 PG (ref 26.5–33)
MCHC RBC AUTO-ENTMCNC: 32.2 G/DL (ref 31.5–36.5)
MCV RBC AUTO: 99 FL (ref 78–100)
MONOCYTES # BLD AUTO: 1.1 10E9/L (ref 0–1.3)
MONOCYTES NFR BLD AUTO: 10.1 %
NEUTROPHILS # BLD AUTO: 7 10E9/L (ref 1.6–8.3)
NEUTROPHILS NFR BLD AUTO: 64.1 %
NRBC # BLD AUTO: 0 10*3/UL
NRBC BLD AUTO-RTO: 0 /100
PLATELET # BLD AUTO: 308 10E9/L (ref 150–450)
POTASSIUM SERPL-SCNC: 4.4 MMOL/L (ref 3.4–5.3)
PROT SERPL-MCNC: 7.4 G/DL (ref 6.8–8.8)
RBC # BLD AUTO: 3.95 10E12/L (ref 3.8–5.2)
SODIUM SERPL-SCNC: 141 MMOL/L (ref 133–144)
WBC # BLD AUTO: 10.9 10E9/L (ref 4–11)

## 2018-10-30 PROCEDURE — 96413 CHEMO IV INFUSION 1 HR: CPT

## 2018-10-30 PROCEDURE — G0463 HOSPITAL OUTPT CLINIC VISIT: HCPCS | Mod: ZF

## 2018-10-30 PROCEDURE — 25000128 H RX IP 250 OP 636: Mod: ZF | Performed by: INTERNAL MEDICINE

## 2018-10-30 PROCEDURE — 80053 COMPREHEN METABOLIC PANEL: CPT | Performed by: PHYSICIAN ASSISTANT

## 2018-10-30 PROCEDURE — 40000141 ZZH STATISTIC PERIPHERAL IV START W/O US GUIDANCE: Mod: ZF

## 2018-10-30 PROCEDURE — 85025 COMPLETE CBC W/AUTO DIFF WBC: CPT | Performed by: PHYSICIAN ASSISTANT

## 2018-10-30 PROCEDURE — 40000556 ZZH STATISTIC PERIPHERAL IV START W US GUIDANCE: Mod: ZF

## 2018-10-30 PROCEDURE — 99214 OFFICE O/P EST MOD 30 MIN: CPT | Mod: ZP | Performed by: PHYSICIAN ASSISTANT

## 2018-10-30 RX ADMIN — TRASTUZUMAB 450 MG: 150 INJECTION, POWDER, LYOPHILIZED, FOR SOLUTION INTRAVENOUS at 13:44

## 2018-10-30 ASSESSMENT — PAIN SCALES - GENERAL: PAINLEVEL: NO PAIN (0)

## 2018-10-30 NOTE — MR AVS SNAPSHOT
After Visit Summary   10/30/2018    Lennie Mar    MRN: [id number]           Patient Information     Date Of Birth          11/28/1927        Visit Information        Provider Department      10/30/2018 12:10 PM Dalila Blum PA-C [facility]        Today's Diagnoses     Malignant neoplasm of upper-inner quadrant of left breast in female, estrogen receptor positive (H)           Follow-ups after your visit        Your next 10 appointments already scheduled     Oct 30, 2018  1:30 PM CDT   Infusion 120 with UC ONCOLOGY INFUSION, UC 22 ATC   [facility] ([facility])    [address]  [address]  [facility] [address]   [phone]            Nov 19, 2018  7:00 AM CST   NM SENTINEL NODE INJECTION BILATERAL with [email or web address]   [facility] [place], Nuclear Medicine ([facility])    [address] [id number] [phone]           How do I prepare for my exam? (Food and drink instructions) For Adults:  No Food and Drink Restrictions.  For children: Young children may need medicine to help them relax (called sedation). We will tell you in advance if your child needs this medicine. If so, he or she cannot eat or drink before this test. You will need to arrive about 45 minutes early.  *If your child will not be sedated, he or she can eat and drink as normal.  How do I prepare for my exam? (Other instructions) You may take your normal medicines, unless your doctor tells you not to.  What should I wear: Please wear comfortable clothes.  How long does the exam take: Please allow 90 minutes for this exam.  What should I bring: Please bring a list of your medicines (including vitamins, minerals and over-the-counter drugs). Leave your valuables at home.  Do I need a :  No  is needed.  What do I need to tell my doctor: If you are feeding or may be pregnant,  tell us before the exam.  What should I do after the exam: No restrictions, You may resume normal activities. The radioactive fluid will leave your body when you urinate (use the toilet).  *If your child was sedated, we will bring him or her to the recovery room. It may take up to 90 minutes before your child is ready to go home. We will give you clear guidelines about how to care for your child at home. Be sure to ask any questions you may have.  What is this test:  For these tests, we will inject a small amount of radioactive fluid into your arm or hand (about the amount of radiation you would get in an X-ray). If you are having a GFR, we will test your blood to measure the radioactivity. This tells us how well your kidneys are filtering (cleaning) your blood. If you are having a renogram (kidney scan), we take pictures of the kidneys to see how quickly they can remove the radioactive fluid from your body. This tells us how well your kidneys are working. The fluid helps the kidneys show up on our video screen.  Other information about my exam (For children): We may place a catheter (tube) in the bladder. This tube will drain urine from the body. A parent or other adult may stay with the child in the exam room.  Who should I call with questions: Please call your Imaging Department at your exam site with any questions. Directions, parking instructions, and other information is available on our website, [email or web address].            Nov 19, 2018   Procedure with Nahun Betancourt MD   [facility] [place], Same Day Surgery (--)    [address] [address] [phone]            Nov 20, 2018 10:30 AM [facility]   Ech Limited with [address]CH[address] Echo ([facility])    [address]  3rd Floor  [facility] [address] [phone]           1.  Please bring or wear a comfortable two-piece outfit. 2.  You may eat, drink and take your normal medicines. 3.  For any questions that  cannot be answered, please contact the ordering physician 4.  Please do not wear perfumes or scented lotions on the day of your exam.            Nov 20, 2018 11:30 AM CST   Masonic Lab Draw with UC MASONIC LAB DRAW   [facility])    [address] [place] [address]  [address]  [facility] [address]   [phone]            Nov 20, 2018 12:00 PM CST   Infusion 120 with UC ONCOLOGY INFUSION, UC 15 ATC   [facility] ([facility])    [address] [place] [address]  [address]  [facility] [address] [phone]            Dec 06, 2018 10:30 AM CST   (Arrive by 10:15 AM)   Post-Op with Nahun Betancourt MD   [facility] ([facility])    [address] [place] [address]  [address]  [facility] [address] [phone]            Dec 11, 2018 10:45 AM CST   Masonic Lab Draw with UC MASONIC LAB DRAW   [facility] ([facility])    [address] [place] [address]  [address]  [facility] [address] [phone]              Who to contact     If you have questions or need follow up information about today's clinic visit or your schedule please contact [facility] directly at [phone].  Normal or non-critical lab and imaging results will be communicated to you by MyChart, letter or phone within 4 business days after the clinic has received the results. If you do not hear from us within 7 days, please contact the clinic through OUYAhart or phone. If you have a critical or abnormal lab result, we will notify you by phone as soon as possible.  Submit refill requests through [employer] or call your pharmacy and they will forward the refill request to us. Please allow 3 business days for your refill to be completed.          Additional Information About Your Visit        [employer] Information     [employer] lets you send messages to your doctor, view your test results, renew your  "prescriptions, schedule appointments and more. To sign up, go to [email or web address] . Click on \"Log in\" on the left side of the screen, which will take you to the Welcome page. Then click on \"Sign up Now\" on the right side of the page.     You will be asked to enter the access code listed below, as well as some personal information. Please follow the directions to create your username and password.     Your access code is: [email or web address]  Expires: 2018 10:18 AM     Your access code will  in 90 days. If you need help or a new code, please call your [place] clinic or [phone].        Care EveryWhere ID     This is your Care EveryWhere ID. This could be used by other organizations to access your [place] medical records  [id number]        Your Vitals Were     Pulse Temperature Respirations Height Pulse Oximetry BMI (Body Mass Index)    63 98.8  F (37.1  C) (Oral) 24 1.524 m (5') 96% 32.77 kg/m2       Blood Pressure from Last 3 Encounters:   10/30/18 132/76   10/09/18 145/71   18 132/77    Weight from Last 3 Encounters:   10/30/18 76.1 kg (167 lb 12.8 oz)   10/09/18 73.4 kg (161 lb 12.8 oz)   18 74.4 kg (164 lb)              We Performed the Following     CBC with platelets differential     Comprehensive metabolic panel        Primary Care Provider Office Phone # Fax #    Ty Maged Quintanilla [phone]       [address]  [facility] [address]        Equal Access to Services     [facility] AH: Hadii dottie Teague, maximiliano curry, mellisa mata. So [facility] [phone].    ATENCIÓN: Si habla español, tiene a foster disposición servicios gratuitos de asistencia lingüística. Elio al [phone].    We comply with applicable federal civil rights laws and [place] laws. We do not discriminate on the basis of race, color, national origin, age, disability, sex, sexual orientation, or gender identity.            Thank " you!     Thank you for choosing [facility]  for your care. Our goal is always to provide you with excellent care. Hearing back from our patients is one way we can continue to improve our services. Please take a few minutes to complete the written survey that you may receive in the mail after your visit with us. Thank you!             Your Updated Medication List - Protect others around you: Learn how to safely use, store and throw away your medicines at www.disposemymeds.org.          This list is accurate as of 10/30/18  1:04 PM.  Always use your most recent med list.                   Brand Name Dispense Instructions for use Diagnosis    acetaminophen 325 MG tablet    TYLENOL    100 tablet    Take 2 tablets by mouth every 4 hours as needed for pain.    S/P colon resection       ARIPiprazole 5 MG tablet    ABILIFY     TK 1 T PO QAM        buPROPion 300 MG 24 hr tablet    WELLBUTRIN XL     TK 1 T PO D        LATUDA PO      Take by mouth daily        letrozole 2.5 MG tablet    FEMARA    30 tablet    Take 1 tablet (2.5 mg) by mouth daily    Malignant neoplasm of upper-inner quadrant of left breast in female, estrogen receptor positive (H), Malignant neoplasm of upper-outer quadrant of left breast in female, estrogen receptor positive (H)       * lithium 150 MG capsule     30 capsule    Take 1 capsule by mouth daily (with breakfast).    Bipolar disorder, unspecified (H)       * lithium 300 MG capsule     30 capsule    Take 1 capsule by mouth daily (with dinner).    Bipolar disorder, unspecified (H)       order for DME     2 Package    Equipment being ordered: compression stocking knee high 20-30mmhg    Edema       pramipexole 1 MG tablet    MIRAPEX          prochlorperazine 10 MG tablet    COMPAZINE    30 tablet    Take 0.5 tablets (5 mg) by mouth every 6 hours as needed (Nausea/Vomiting)    Malignant neoplasm of upper-inner quadrant of left breast in female, estrogen receptor positive (H),  Malignant neoplasm of upper-outer quadrant of left breast in female, estrogen receptor positive (H)       propranolol 20 MG tablet    INDERAL    180 tablet    Take 1 tablet (20 mg) by mouth 2 times daily    Tremor       sertraline 50 MG tablet    ZOLOFT     Take 25 mg by mouth daily        vitamin D 52246 UNIT capsule    ERGOCALCIFEROL    12 capsule    Take 1 capsule (50,000 Units) by mouth every 7 days    Vitamin D deficiency       * Notice:  This list has 2 medication(s) that are the same as other medications prescribed for you. Read the directions carefully, and ask your doctor or other care provider to review them with you.

## 2018-10-30 NOTE — LETTER
10/30/2018      RE: Lennie Mar  [address]  [facility] [id number]       Hematology-Oncology Visit  Oct 30, 2018    Reason for Visit: follow-up left breast cancer, 2 primaries     HPI: Lennie Mar is a 89 year old female with past medical history of bipolar disorder, CKD, essential tremor with recent diagnosis of left breast cancer, two separate primaries. She presented with a palpable mass and had a mammogram and US leading to biopsies on 5/9/18. Pathology showed grade 3 invasive carcinoma, ER/CA positive, HER2 amplified of mass at 3:00 position with associated DCIS and skin involvement. The mass at 11:00 position was also grade 3 invasive carcinoma, ER/CA positive, but HER2 nonamplified. No lymphadenopathy was noted.     She met with Dr. Malik on 5/21/18 and elected to start neoadjuvant treatment with Herceptin every 3 weeks along with letrozole. Echocardiogram was done 5/25 showed EF of 55-60%.    Here today for Cycle 8 of her Herceptin. Plan for surgery with mastectomy on 11/19.     Interval History: Lennie is here alone today. She continues to feel well. She has been walking half a mile around her assisted living complex daily. With the weather getting colder, she is planning to walk inside. She has a good appetite and is eating well and cooking meals for herself. She denies any side effects from treatment: no fatigue, SOB, change in edema, CP, palpitations, joint aches, hot flashes. No nausea or bowel changes. She does not have any questions about surgery. Her grand daughter is coming to stay with her from [place] for the recovery time. ROS otherwise negative.      Current Outpatient Prescriptions   Medication     acetaminophen (TYLENOL) 325 MG tablet     ARIPiprazole (ABILIFY) 5 MG tablet     buPROPion (WELLBUTRIN XL) 300 MG 24 hr tablet     letrozole (FEMARA) 2.5 MG tablet     lithium 150 MG capsule     lithium 300 MG capsule     Lurasidone HCl (LATUDA PO)     pramipexole  (MIRAPEX) 1 MG tablet     propranolol (INDERAL) 20 MG tablet     sertraline (ZOLOFT) 50 MG tablet     vitamin D (ERGOCALCIFEROL) 71772 UNIT capsule     ORDER FOR DME     prochlorperazine (COMPAZINE) 10 MG tablet     No current facility-administered medications for this visit.      Facility-Administered Medications Ordered in Other Visits   Medication     TRASTuzumab (HERCEPTIN) 440 mg in sodium chloride 0.9 % 270.97 mL       PHYSICAL EXAM:  /76 (BP Location: Right arm, Patient Position: Sitting)  Pulse 63  Temp 98.8  F (37.1  C) (Oral)  Resp 24  Ht 1.524 m (5')  Wt 76.1 kg (167 lb 12.8 oz)  SpO2 96%  BMI 32.77 kg/m2  General: Alert, oriented, pleasant, NAD  HEENT: Normocephalic, atraumatic, PERRLA, EOMI. Moist mucus membranes, no lesions or thrush  Neck: No cervical or supraclavicular LAD.  Axillary: No LAD  Breast: L breast softer, deeper mass 11 o'clock position several centimeters from NAC is no longer palpable but patient can feel tenderness in the area. More superficial mass at 3 o'clock position with no skin changes measuring 1x1 cm (smaller in size and not as firm as last exam) abutting lateral nipple. There is a tethering effect still seen   Lungs: CTA bilaterally, normal work of breathing  Cardiac: RRR, S1, S2, no murmurs  Abdomen: Soft, nontender, nondistended. Normoactive bowel sounds. No hepatosplenomegaly, masses  Neuro: CNII-XII grossly intact  Extremities: No pedal edema    Labs:    10/30/2018 12:06   Sodium 141   Potassium 4.4   Chloride 110 (H)   Carbon Dioxide 24   Urea Nitrogen 30   Creatinine 1.44 (H)   GFR Estimate 34 (L)   GFR Estimate If Black 41 (L)   Calcium 9.5   Anion Gap 7   Albumin 3.4   Protein Total 7.4   Bilirubin Total 0.4   Alkaline Phosphatase 116   ALT 30   AST 21   Glucose 82   WBC 10.9   Hemoglobin 12.6   Hematocrit 39.1   Platelet Count 308   RBC Count 3.95   MCV 99   MCH 31.9   MCHC 32.2   RDW 14.1   Diff Method Automated Method   % Neutrophils 64.1   %  Lymphocytes 22.3   % Monocytes 10.1   % Eosinophils 2.1   % Basophils 0.7   % Immature Granulocytes 0.7   Nucleated RBCs 0   Absolute Neutrophil 7.0   Absolute Lymphocytes 2.4   Absolute Monocytes 1.1   Absolute Eosinophils 0.2   Absolute Basophils 0.1   Abs Immature Granulocytes 0.1   Absolute Nucleated RBC 0.0     Imaging: Mammo and US on 10/9   Findings:     Mammogram:  In the lateral left breast, anterior depth, there has been significant  decrease in size of the 3:00 mass located superficially.  Biopsy clip  remains in this region.  Fine pleomorphic calcifications extending  from anterior depth in a segmental distribution measuring up to 7 cm  cm are not significantly changed. Additional round mass with  spiculated margins upper inner quadrant 11:00 position, middle depth  appears decreased by both size and volume on mammography.  Additional  right breast oval small mass described on 5/4/2018 exam is no longer  visualized.  Questionable prominent left axillary lymph node.     Ultrasound:  Targeted ultrasound evaluation was performed by the technologist and  radiologist.     In the left breast 11:00 position, 7 cm from the nipple, there is an  irregular hypoechoic mass containing calcifications and  hypervascularity, measuring 2.2 x 1.3 x 2.1 cm, previously,  2.4 x 1.9  x 2.1 cm, correlating with mammography.     In the left breast 3:00 position, 2 cm from the nipple, there is an  irregular mass containing coarse calcifications with evidence of skin  invasion.  This area is overall decreased in extent, which correlates  with mammography and measures approximately 1.1 x 1.3 x 0.8 cm,  previously at least 3.5 x 1.9 x 2.0 cm.      Left axillary survey demonstrates no morphologically suspicious lymph  nodes.  Questionable lymph node on mammography correlates with  adjacent benign appearing lymph nodes.      Assessment & Plan:   1. Left breast cancer, two separate primaries, both ER/CT positive, 1 HER2 amplified: She  is currently on letrozole and Herceptin and has had a great response with reduction in size of both primary masses based on US and mammogram earlier this month. There was plan to add in Perjeta however insurance coverage has been an issue, however, on imaging she has had a greater response to HER2 amplified lesion. Will give infusion of Herceptin today and proceed with surgery on 11/19 and skip infusion on 11/20. Then echo, pathology review, and resume Herceptin (and potentially Perjeta) after seeing Dr. Malik. She continues to tolerate treatment extremely well. She should call with any interval concerns prior to surgery.     Dalila Blum PA-C  [facility]  [address] [place] [id number] [phone]

## 2018-10-30 NOTE — PROGRESS NOTES
Hematology-Oncology Visit  Oct 30, 2018    Reason for Visit: follow-up left breast cancer, 2 primaries     HPI: Lennie Mar is a 89 year old female with past medical history of bipolar disorder, CKD, essential tremor with recent diagnosis of left breast cancer, two separate primaries. She presented with a palpable mass and had a mammogram and US leading to biopsies on 5/9/18. Pathology showed grade 3 invasive carcinoma, ER/MT positive, HER2 amplified of mass at 3:00 position with associated DCIS and skin involvement. The mass at 11:00 position was also grade 3 invasive carcinoma, ER/MT positive, but HER2 nonamplified. No lymphadenopathy was noted.     She met with Dr. Malik on 5/21/18 and elected to start neoadjuvant treatment with Herceptin every 3 weeks along with letrozole. Echocardiogram was done 5/25 showed EF of 55-60%.    Here today for Cycle 8 of her Herceptin. Plan for surgery with mastectomy on 11/19.     Interval History: Lennie is here alone today. She continues to feel well. She has been walking half a mile around her assisted living complex daily. With the weather getting colder, she is planning to walk inside. She has a good appetite and is eating well and cooking meals for herself. She denies any side effects from treatment: no fatigue, SOB, change in edema, CP, palpitations, joint aches, hot flashes. No nausea or bowel changes. She does not have any questions about surgery. Her grand daughter is coming to stay with her from [place] for the recovery time. ROS otherwise negative.      Current Outpatient Prescriptions   Medication     acetaminophen (TYLENOL) 325 MG tablet     ARIPiprazole (ABILIFY) 5 MG tablet     buPROPion (WELLBUTRIN XL) 300 MG 24 hr tablet     letrozole (FEMARA) 2.5 MG tablet     lithium 150 MG capsule     lithium 300 MG capsule     Lurasidone HCl (LATUDA PO)     pramipexole (MIRAPEX) 1 MG tablet     propranolol (INDERAL) 20 MG tablet     sertraline (ZOLOFT) 50 MG  tablet     vitamin D (ERGOCALCIFEROL) 59480 UNIT capsule     ORDER FOR DME     prochlorperazine (COMPAZINE) 10 MG tablet     No current facility-administered medications for this visit.      Facility-Administered Medications Ordered in Other Visits   Medication     TRASTuzumab (HERCEPTIN) 440 mg in sodium chloride 0.9 % 270.97 mL       PHYSICAL EXAM:  /76 (BP Location: Right arm, Patient Position: Sitting)  Pulse 63  Temp 98.8  F (37.1  C) (Oral)  Resp 24  Ht 1.524 m (5')  Wt 76.1 kg (167 lb 12.8 oz)  SpO2 96%  BMI 32.77 kg/m2  General: Alert, oriented, pleasant, NAD  HEENT: Normocephalic, atraumatic, PERRLA, EOMI. Moist mucus membranes, no lesions or thrush  Neck: No cervical or supraclavicular LAD.  Axillary: No LAD  Breast: L breast softer, deeper mass 11 o'clock position several centimeters from NAC is no longer palpable but patient can feel tenderness in the area. More superficial mass at 3 o'clock position with no skin changes measuring 1x1 cm (smaller in size and not as firm as last exam) abutting lateral nipple. There is a tethering effect still seen   Lungs: CTA bilaterally, normal work of breathing  Cardiac: RRR, S1, S2, no murmurs  Abdomen: Soft, nontender, nondistended. Normoactive bowel sounds. No hepatosplenomegaly, masses  Neuro: CNII-XII grossly intact  Extremities: No pedal edema    Labs:    10/30/2018 12:06   Sodium 141   Potassium 4.4   Chloride 110 (H)   Carbon Dioxide 24   Urea Nitrogen 30   Creatinine 1.44 (H)   GFR Estimate 34 (L)   GFR Estimate If Black 41 (L)   Calcium 9.5   Anion Gap 7   Albumin 3.4   Protein Total 7.4   Bilirubin Total 0.4   Alkaline Phosphatase 116   ALT 30   AST 21   Glucose 82   WBC 10.9   Hemoglobin 12.6   Hematocrit 39.1   Platelet Count 308   RBC Count 3.95   MCV 99   MCH 31.9   MCHC 32.2   RDW 14.1   Diff Method Automated Method   % Neutrophils 64.1   % Lymphocytes 22.3   % Monocytes 10.1   % Eosinophils 2.1   % Basophils 0.7   % Immature Granulocytes  0.7   Nucleated RBCs 0   Absolute Neutrophil 7.0   Absolute Lymphocytes 2.4   Absolute Monocytes 1.1   Absolute Eosinophils 0.2   Absolute Basophils 0.1   Abs Immature Granulocytes 0.1   Absolute Nucleated RBC 0.0     Imaging: Mammo and US on 10/9   Findings:     Mammogram:  In the lateral left breast, anterior depth, there has been significant  decrease in size of the 3:00 mass located superficially.  Biopsy clip  remains in this region.  Fine pleomorphic calcifications extending  from anterior depth in a segmental distribution measuring up to 7 cm  cm are not significantly changed. Additional round mass with  spiculated margins upper inner quadrant 11:00 position, middle depth  appears decreased by both size and volume on mammography.  Additional  right breast oval small mass described on 5/4/2018 exam is no longer  visualized.  Questionable prominent left axillary lymph node.     Ultrasound:  Targeted ultrasound evaluation was performed by the technologist and  radiologist.     In the left breast 11:00 position, 7 cm from the nipple, there is an  irregular hypoechoic mass containing calcifications and  hypervascularity, measuring 2.2 x 1.3 x 2.1 cm, previously,  2.4 x 1.9  x 2.1 cm, correlating with mammography.     In the left breast 3:00 position, 2 cm from the nipple, there is an  irregular mass containing coarse calcifications with evidence of skin  invasion.  This area is overall decreased in extent, which correlates  with mammography and measures approximately 1.1 x 1.3 x 0.8 cm,  previously at least 3.5 x 1.9 x 2.0 cm.      Left axillary survey demonstrates no morphologically suspicious lymph  nodes.  Questionable lymph node on mammography correlates with  adjacent benign appearing lymph nodes.      Assessment & Plan:   1. Left breast cancer, two separate primaries, both ER/[place] positive, 1 HER2 amplified: She is currently on letrozole and Herceptin and has had a great response with reduction in size of both  primary masses based on US and mammogram earlier this month. There was plan to add in Perjeta however insurance coverage has been an issue, however, on imaging she has had a greater response to HER2 amplified lesion. Will give infusion of Herceptin today and proceed with surgery on 11/19 and skip infusion on 11/20. Then echo, pathology review, and resume Herceptin (and potentially Perjeta) after seeing Dr. Malik. She continues to tolerate treatment extremely well. She should call with any interval concerns prior to surgery.     Dalila Blum PA-C  [facility]  [address] [place] [id number] [phone]

## 2018-10-30 NOTE — PATIENT INSTRUCTIONS
Contact Numbers  [facility]: [phone]    After Hours:  [phone]  Triage: [phone]    Please call the [facility] Triage line if you experience a temperature greater than or equal to 100.5, shaking chills, have uncontrolled nausea, vomiting and/or diarrhea, dizziness, shortness of breath, chest pain, bleeding, unexplained bruising, or if you have any other new/concerning symptoms, questions or concerns.     If it is after hours, weekends, or holidays, please call the main hospital  at  [phone] and ask to speak to the Oncology doctor on call.     If you are having any concerning symptoms or wish to speak to a provider before your next infusion visit, please call your care coordinator or triage to notify them so we can adequately serve you.     If you need a refill on a narcotic prescription or other medication, please call triage before your infusion appointment.           October 2018 Sunday Monday Tuesday Wednesday Thursday Friday Saturday        1     2     3     4     5     6       7     8     9     MA DIAGNOSTIC DIGITAL LEFT    1:00 PM   (20 min.)   [id number]   [facility]     US BREAST LT LMT 1-3 QUAD    1:30 PM   (30 min.)   [id number]   [facility]     [facility] DRAW    2:00 PM   (15 min.)    [facility] DRAW   [facility] Lab Draw     [facility] ONC INFUSION 120    2:30 PM   (120 min.)    ONCOLOGY INFUSION   [facility] 10     11     12     13       14     15     16     17     18     19     20       21     22     23     24     25     26     27       28     29     30     [facility] DRAW   11:30 AM   (15 min.)    [facility] DRAW   [facility] Lab Draw     [facility] RETURN   11:55 AM   (50 min.)   Dalila Blum PA-C   [facility] ONC INFUSION 120    1:30 PM   (120 min.)    ONCOLOGY INFUSION   [facility] 31 November 2018    Sunday Monday Tuesday Wednesday Thursday Friday Saturday                       1     2     3       4     5     6     7     8     9     10       11     12     13     14     15     16     17       18     19     NM SENTINAL NODE INJ    7:00 AM   (30 min.)   [id number]   [facility], Nuclear Medicine     MASTECTOMY PARTIAL WITH SENTINEL NODE    7:30 AM   Nahun Betancourt MD   [facility] OR 20     21     22     23     24       25     26     27     28  Happy Birthday!     29     30                       Lab Results:  Recent Results (from the past 12 hour(s))   Comprehensive metabolic panel    Collection Time: 10/30/18 12:06 PM   Result Value Ref Range    Sodium 141 133 - 144 mmol/L    Potassium 4.4 3.4 - 5.3 mmol/L    Chloride 110 (H) 94 - 109 mmol/L    Carbon Dioxide 24 20 - 32 mmol/L    Anion Gap 7 3 - 14 mmol/L    Glucose 82 70 - 99 mg/dL    Urea Nitrogen 30 7 - 30 mg/dL    Creatinine 1.44 (H) 0.52 - 1.04 mg/dL    GFR Estimate 34 (L) >60 mL/min/1.7m2    GFR Estimate If Black 41 (L) >60 mL/min/1.7m2    Calcium 9.5 8.5 - 10.1 mg/dL    Bilirubin Total 0.4 0.2 - 1.3 mg/dL    Albumin 3.4 3.4 - 5.0 g/dL    Protein Total 7.4 6.8 - 8.8 g/dL    Alkaline Phosphatase 116 40 - 150 U/L    ALT 30 0 - 50 U/L    AST 21 0 - 45 U/L   CBC with platelets differential    Collection Time: 10/30/18 12:06 PM   Result Value Ref Range    WBC 10.9 4.0 - 11.0 10e9/L    RBC Count 3.95 3.8 - 5.2 10e12/L    Hemoglobin 12.6 11.7 - 15.7 g/dL    Hematocrit 39.1 35.0 - 47.0 %    MCV 99 78 - 100 fl    MCH 31.9 26.5 - 33.0 pg    MCHC 32.2 31.5 - 36.5 g/dL    RDW 14.1 10.0 - 15.0 %    Platelet Count 308 150 - 450 10e9/L    Diff Method Automated Method     % Neutrophils 64.1 %    % Lymphocytes 22.3 %    % Monocytes 10.1 %    % Eosinophils 2.1 %    % Basophils 0.7 %    % Immature Granulocytes 0.7 %    Nucleated RBCs 0 0 /100    Absolute Neutrophil 7.0 1.6 - 8.3 10e9/L    Absolute Lymphocytes 2.4 0.8 - 5.3 10e9/L    Absolute Monocytes 1.1 0.0 - 1.3 10e9/L     Absolute Eosinophils 0.2 0.0 - 0.7 10e9/L    Absolute Basophils 0.1 0.0 - 0.2 10e9/L    Abs Immature Granulocytes 0.1 0 - 0.4 10e9/L    Absolute Nucleated RBC 0.0

## 2018-10-30 NOTE — NURSING NOTE
Chief Complaint   Patient presents with     Blood Draw     PIV placed, labs collected by RN.

## 2018-10-30 NOTE — NURSING NOTE
Oncology Rooming Note    October 30, 2018 12:29 PM   Lennie Mar is a 89 year old female who presents for:    Chief Complaint   Patient presents with     Blood Draw     PIV placed, labs collected by RN.      Oncology Clinic Visit     Breast Cancer     Initial Vitals: /76 (BP Location: Right arm, Patient Position: Sitting)  Pulse 63  Temp 98.8  F (37.1  C) (Oral)  Resp 24  Ht 1.524 m (5')  Wt 76.1 kg (167 lb 12.8 oz)  SpO2 96%  BMI 32.77 kg/m2 Estimated body mass index is 32.77 kg/(m^2) as calculated from the following:    Height as of this encounter: 1.524 m (5').    Weight as of this encounter: 76.1 kg (167 lb 12.8 oz). Body surface area is 1.79 meters squared.  No Pain (0) Comment: Data Unavailable   No LMP recorded. Patient is postmenopausal.  Allergies reviewed: Yes  Medications reviewed: Yes    Medications: Medication refills not needed today.  Pharmacy name entered into [facility]:    [facility] - [place], [place] - [address] [facility] - [place], [place] - [address] [facility] [place], [place] - [address]  [id number] - [place], [place] - [facility] [facility] [address]    Clinical concerns: No New Concerns    5 minutes for nursing intake (face to face time)     KAI Alford

## 2018-10-30 NOTE — MR AVS SNAPSHOT
After Visit Summary   10/30/2018    Lennie Mar    MRN: [id number]           Patient Information     Date Of Birth          11/28/1927        Visit Information        Provider Department      10/30/2018 1:30 PM UC 22 ATC;  ONCOLOGY INFUSION [facility]        Today's Diagnoses     Malignant neoplasm of upper-inner quadrant of left breast in female, estrogen receptor positive (H)    -  1    Malignant neoplasm of upper-outer quadrant of left breast in female, estrogen receptor positive (H)          Care Instructions    Contact Numbers  [facility]: [phone]    After Hours:  [phone]  Triage: [phone]    Please call the [facility] Triage line if you experience a temperature greater than or equal to 100.5, shaking chills, have uncontrolled nausea, vomiting and/or diarrhea, dizziness, shortness of breath, chest pain, bleeding, unexplained bruising, or if you have any other new/concerning symptoms, questions or concerns.     If it is after hours, weekends, or holidays, please call the main hospital  at  [phone] and ask to speak to the Oncology doctor on call.     If you are having any concerning symptoms or wish to speak to a provider before your next infusion visit, please call your care coordinator or triage to notify them so we can adequately serve you.     If you need a refill on a narcotic prescription or other medication, please call triage before your infusion appointment.           October 2018 Sunday Monday Tuesday Wednesday Thursday Friday Saturday        1     2     3     4     5     6       7     8     9     MA DIAGNOSTIC DIGITAL LEFT    1:00 PM   (20 min.)   [id number]   [facility]     US BREAST LT LMT 1-3 QUAD    1:30 PM   (30 min.)   [email or web address]   [facility]     [facility] LAB DRAW    2:00 PM   (15 min.)    [facility]     [facility] ONC INFUSION 120    2:30 PM   (120  min.)   UC ONCOLOGY INFUSION   [facility] 10     11     12     13       14     15     16     17     18     19     20       21     22     23     24     25     26     27       28     29     30     [facility]   11:30 AM   (15 min.)    MASONIC LAB DRAW   [facility] Lab Draw     [facility] RETURN   11:55 AM   (50 min.)   Dalila Blum PA-C   [facility] ONC INFUSION 120    1:30 PM   (120 min.)    ONCOLOGY INFUSION   [facility] 31 November 2018 Sunday Monday Tuesday Wednesday Thursday Friday Saturday                       1     2     3       4     5     6     7     8     9     10       11     12     13     14     15     16     17       18     19     NM SENTINAL NODE INJ    7:00 AM   (30 min.)   [email or web address]   [facility], [place], Nuclear Medicine     MASTECTOMY PARTIAL WITH SENTINEL NODE    7:30 AM   Nahun Betancourt MD   UU OR 20     21     22     23     24       25     26     27     28  Happy Birthday!     29     30                       Lab Results:  Recent Results (from the past 12 hour(s))   Comprehensive metabolic panel    Collection Time: 10/30/18 12:06 PM   Result Value Ref Range    Sodium 141 133 - 144 mmol/L    Potassium 4.4 3.4 - 5.3 mmol/L    Chloride 110 (H) 94 - 109 mmol/L    Carbon Dioxide 24 20 - 32 mmol/L    Anion Gap 7 3 - 14 mmol/L    Glucose 82 70 - 99 mg/dL    Urea Nitrogen 30 7 - 30 mg/dL    Creatinine 1.44 (H) 0.52 - 1.04 mg/dL    GFR Estimate 34 (L) >60 mL/min/1.7m2    GFR Estimate If Black 41 (L) >60 mL/min/1.7m2    Calcium 9.5 8.5 - 10.1 mg/dL    Bilirubin Total 0.4 0.2 - 1.3 mg/dL    Albumin 3.4 3.4 - 5.0 g/dL    Protein Total 7.4 6.8 - 8.8 g/dL    Alkaline Phosphatase 116 40 - 150 U/L    ALT 30 0 - 50 U/L    AST 21 0 - 45 U/L   CBC with platelets differential    Collection Time: 10/30/18 12:06 PM   Result Value Ref Range    WBC 10.9 4.0 - 11.0 10e9/L    RBC Count 3.95 3.8 - 5.2 10e12/L     Hemoglobin 12.6 11.7 - 15.7 g/dL    Hematocrit 39.1 35.0 - 47.0 %    MCV 99 78 - 100 fl    MCH 31.9 26.5 - 33.0 pg    MCHC 32.2 31.5 - 36.5 g/dL    RDW 14.1 10.0 - 15.0 %    Platelet Count 308 150 - 450 10e9/L    Diff Method Automated Method     % Neutrophils 64.1 %    % Lymphocytes 22.3 %    % Monocytes 10.1 %    % Eosinophils 2.1 %    % Basophils 0.7 %    % Immature Granulocytes 0.7 %    Nucleated RBCs 0 0 /100    Absolute Neutrophil 7.0 1.6 - 8.3 10e9/L    Absolute Lymphocytes 2.4 0.8 - 5.3 10e9/L    Absolute Monocytes 1.1 0.0 - 1.3 10e9/L    Absolute Eosinophils 0.2 0.0 - 0.7 10e9/L    Absolute Basophils 0.1 0.0 - 0.2 10e9/L    Abs Immature Granulocytes 0.1 0 - 0.4 10e9/L    Absolute Nucleated RBC 0.0                Follow-ups after your visit        Your next 10 appointments already scheduled     Nov 19, 2018  7:00 AM CST   NM SENTINEL NODE INJECTION BILATERAL with [email or web address]   [facility], [place], Nuclear Medicine ([facility], [place])    [address] [address] [phone]           How do I prepare for my exam? (Food and drink instructions) For Adults:  No Food and Drink Restrictions.  For children: Young children may need medicine to help them relax (called sedation). We will tell you in advance if your child needs this medicine. If so, he or she cannot eat or drink before this test. You will need to arrive about 45 minutes early.  *If your child will not be sedated, he or she can eat and drink as normal.  How do I prepare for my exam? (Other instructions) You may take your normal medicines, unless your doctor tells you not to.  What should I wear: Please wear comfortable clothes.  How long does the exam take: Please allow 90 minutes for this exam.  What should I bring: Please bring a list of your medicines (including vitamins, minerals and over-the-counter drugs). Leave your valuables at home.  Do I need a :  No  is needed.  What do I  need to tell my doctor: If you are feeding or may be pregnant, tell us before the exam.  What should I do after the exam: No restrictions, You may resume normal activities. The radioactive fluid will leave your body when you urinate (use the toilet).  *If your child was sedated, we will bring him or her to the recovery room. It may take up to 90 minutes before your child is ready to go home. We will give you clear guidelines about how to care for your child at home. Be sure to ask any questions you may have.  What is this test:  For these tests, we will inject a small amount of radioactive fluid into your arm or hand (about the amount of radiation you would get in an X-ray). If you are having a GFR, we will test your blood to measure the radioactivity. This tells us how well your kidneys are filtering (cleaning) your blood. If you are having a renogram (kidney scan), we take pictures of the kidneys to see how quickly they can remove the radioactive fluid from your body. This tells us how well your kidneys are working. The fluid helps the kidneys show up on our video screen.  Other information about my exam (For children): We may place a catheter (tube) in the bladder. This tube will drain urine from the body. A parent or other adult may stay with the child in the exam room.  Who should I call with questions: Please call your Imaging Department at your exam site with any questions. Directions, parking instructions, and other information is available on our website, [email or web address].            Nov 19, 2018   Procedure with Nahun Betancourt MD   [facility], [place], Same Day Surgery (--)    [address] [address]   [phone]            Dec 06, 2018 10:30 AM CST   (Arrive by 10:15 AM)   Post-Op with Nahun Betancourt MD   [facility] ([facility])    [address]  [address]  [place] [address] [phone]            Dec 11, 2018 10:45 AM CST   [facility]   [facility] Lab Draw ([facility])    [address]  [address]  [place] [address] [phone]            Dec 11, 2018 11:15 AM CST   (Arrive by 11:00 AM)   Return Visit with Perlita Malik MD   [facility] ([facility])    [address]  [address]  [place] [id number] [phone]            Dec 11, 2018 11:30 AM CST   (Arrive by 11:15 AM)   [employer] with [id number]   [facility] ([facility])    [address]  3rd [address]  [place] [id number] [phone]           1.  Please bring or wear a comfortable two-piece outfit. 2.  You may eat, drink and take your normal medicines. 3.  For any questions that cannot be answered, please contact the ordering physician 4.  Please do not wear perfumes or scented lotions on the day of your exam.            Dec 11, 2018  1:00 PM CST   Infusion 120 with  ONCOLOGY INFUSION, UC 18 ATC   [facility] ([facility])    [address]  [address]  [place] [address] [phone]              Who to contact     If you have questions or need follow up information about today's clinic visit or your schedule please contact [facility] directly at [phone].  Normal or non-critical lab and imaging results will be communicated to you by MyChart, letter or phone within 4 business days after the clinic has received the results. If you do not hear from us within 7 days, please contact the clinic through MyChart or phone. If you have a critical or abnormal lab result, we will notify you by phone as soon as possible.  Submit refill requests through [employer] or call your pharmacy and they will forward the refill request to us. Please allow 3 business days for your refill to be completed.          Additional Information About Your  "Visit        [employer] Information     [employer] lets you send messages to your doctor, view your test results, renew your prescriptions, schedule appointments and more. To sign up, go to [email or web address] . Click on \"Log in\" on the left side of the screen, which will take you to the Welcome page. Then click on \"Sign up Now\" on the right side of the page.     You will be asked to enter the access code listed below, as well as some personal information. Please follow the directions to create your username and password.     Your access code is: [email or web address]  Expires: 2018 10:18 AM     Your access code will  in 90 days. If you need help or a new code, please call your [facility] clinic or [phone].        Care EveryWhere ID     This is your Care EveryWhere ID. This could be used by other organizations to access your [facility] medical records  [id number]         Blood Pressure from Last 3 Encounters:   10/30/18 132/76   10/09/18 145/71   18 132/77    Weight from Last 3 Encounters:   10/30/18 76.1 kg (167 lb 12.8 oz)   10/09/18 73.4 kg (161 lb 12.8 oz)   18 74.4 kg (164 lb)              Today, you had the following     No orders found for display       Primary Care Provider Office Phone # Fax #    Ty Maged Quintanilla [phone]       [address]  [facility] [address]        Equal Access to Services     [facility] Center[facility]CTOR[facility] AH: Hadii aad ku hadasho Soomaali, waaxda luqadaha, qaybta kaalmada adeegyada, mellisa palacios. So [facility] [phone].    ATENCIÓN: Si habla español, tiene a foster disposición servicios gratuitos de asistencia lingüística. Llame al [phone].    We comply with applicable federal civil rights laws and [place] laws. We do not discriminate on the basis of race, color, national origin, age, disability, sex, sexual orientation, or gender identity.            Thank you!     Thank you for choosing [facility]  for your care. Our " goal is always to provide you with excellent care. Hearing back from our patients is one way we can continue to improve our services. Please take a few minutes to complete the written survey that you may receive in the mail after your visit with us. Thank you!             Your Updated Medication List - Protect others around you: Learn how to safely use, store and throw away your medicines at www.disposemymeds.org.          This list is accurate as of 10/30/18  1:53 PM.  Always use your most recent med list.                   Brand Name Dispense Instructions for use Diagnosis    acetaminophen 325 MG tablet    TYLENOL    100 tablet    Take 2 tablets by mouth every 4 hours as needed for pain.    S/P colon resection       ARIPiprazole 5 MG tablet    ABILIFY     TK 1 T PO QAM        buPROPion 300 MG 24 hr tablet    WELLBUTRIN XL     TK 1 T PO D        LATUDA PO      Take by mouth daily        letrozole 2.5 MG tablet    FEMARA    30 tablet    Take 1 tablet (2.5 mg) by mouth daily    Malignant neoplasm of upper-inner quadrant of left breast in female, estrogen receptor positive (H), Malignant neoplasm of upper-outer quadrant of left breast in female, estrogen receptor positive (H)       * lithium 150 MG capsule     30 capsule    Take 1 capsule by mouth daily (with breakfast).    Bipolar disorder, unspecified (H)       * lithium 300 MG capsule     30 capsule    Take 1 capsule by mouth daily (with dinner).    Bipolar disorder, unspecified (H)       order for DME     2 Package    Equipment being ordered: compression stocking knee high 20-30mmhg    Edema       pramipexole 1 MG tablet    MIRAPEX          prochlorperazine 10 MG tablet    COMPAZINE    30 tablet    Take 0.5 tablets (5 mg) by mouth every 6 hours as needed (Nausea/Vomiting)    Malignant neoplasm of upper-inner quadrant of left breast in female, estrogen receptor positive (H), Malignant neoplasm of upper-outer quadrant of left breast in female, estrogen receptor  positive (H)       propranolol 20 MG tablet    INDERAL    180 tablet    Take 1 tablet (20 mg) by mouth 2 times daily    Tremor       sertraline 50 MG tablet    ZOLOFT     Take 25 mg by mouth daily        vitamin D 89837 UNIT capsule    ERGOCALCIFEROL    12 capsule    Take 1 capsule (50,000 Units) by mouth every 7 days    Vitamin D deficiency       * Notice:  This list has 2 medication(s) that are the same as other medications prescribed for you. Read the directions carefully, and ask your doctor or other care provider to review them with you.

## 2018-10-30 NOTE — PROGRESS NOTES
Infusion Nursing Note:  Lennie Mar presents today for C8D1 Herceptin.    Patient seen by provider today: Yes: Dalila Blum   present during visit today: Not Applicable.    Note: Patient feels well. No complaints made. Otherwise well. Patient will proceed with surgery on 11/19/18. See provider's notes (10/30/18) .    Intravenous Access:  Peripheral IV placed.    Treatment Conditions:  Lab Results   Component Value Date    HGB 12.6 10/30/2018     Lab Results   Component Value Date    WBC 10.9 10/30/2018      Lab Results   Component Value Date    ANEU 7.0 10/30/2018     Lab Results   Component Value Date     10/30/2018      Lab Results   Component Value Date     10/30/2018                   Lab Results   Component Value Date    POTASSIUM 4.4 10/30/2018           Lab Results   Component Value Date    MAG 2.0 04/22/2012            Lab Results   Component Value Date    CR 1.44 10/30/2018                   Lab Results   Component Value Date    CANELO 9.5 10/30/2018                Lab Results   Component Value Date    BILITOTAL 0.4 10/30/2018           Lab Results   Component Value Date    ALBUMIN 3.4 10/30/2018                    Lab Results   Component Value Date    ALT 30 10/30/2018           Lab Results   Component Value Date    AST 21 10/30/2018       Results reviewed, labs MET treatment parameters, ok to proceed with treatment.  ECHO/MUGA completed 8/28/18  EF 60-65%.      Post Infusion Assessment:  Patient tolerated infusion without incident.  Blood return noted pre and post infusion.  Site patent and intact, free from redness, edema or discomfort.  No evidence of extravasations.  Access discontinued per protocol.    Discharge Plan:   Patient declined prescription refills.  Discharge instructions reviewed with: Patient.  Patient and/or family verbalized understanding of discharge instructions and all questions answered.  Copy of AVS reviewed with patient and/or family.  Patient will  return 12/11/18 for next provider appointment.  Patient discharged in stable condition accompanied by: self.  Departure Mode: Ambulatory.    MARISA FOSS RN

## 2018-11-08 ENCOUNTER — ANESTHESIA EVENT (OUTPATIENT)
Dept: SURGERY | Facility: CLINIC | Age: 83
End: 2018-11-08
Payer: MEDICARE

## 2018-11-08 ENCOUNTER — OFFICE VISIT (OUTPATIENT)
Dept: SURGERY | Facility: CLINIC | Age: 83
End: 2018-11-08
Payer: MEDICARE

## 2018-11-08 VITALS
HEIGHT: 61 IN | OXYGEN SATURATION: 96 % | DIASTOLIC BLOOD PRESSURE: 83 MMHG | TEMPERATURE: 98.7 F | BODY MASS INDEX: 31.45 KG/M2 | HEART RATE: 95 BPM | RESPIRATION RATE: 18 BRPM | SYSTOLIC BLOOD PRESSURE: 145 MMHG | WEIGHT: 166.6 LBS

## 2018-11-08 DIAGNOSIS — C50.912 MALIGNANT NEOPLASM OF LEFT FEMALE BREAST, UNSPECIFIED ESTROGEN RECEPTOR STATUS, UNSPECIFIED SITE OF BREAST (H): ICD-10-CM

## 2018-11-08 DIAGNOSIS — Z01.818 PREOP EXAMINATION: Primary | ICD-10-CM

## 2018-11-08 ASSESSMENT — LIFESTYLE VARIABLES: TOBACCO_USE: 1

## 2018-11-08 NOTE — ANESTHESIA PREPROCEDURE EVALUATION
Anesthesia Evaluation     . Pt has had prior anesthetic. Type: MAC and General    No history of anesthetic complications          ROS/MED HX    ENT/Pulmonary:     (+)tobacco use, Past use 1.5 packs/day  , . .    Neurologic:     (+)other neuro chronic non-traumatic subdural hygroma, memory loss, tremors, poor balance    Cardiovascular:     (+) ----. : . . . :. . Previous cardiac testing Echodate:8/2018results:Interpretation Summary  Left ventricular function, chamber size, wall motion, and wall thickness are  normal.The EF is 60-65%. No regional wall motion abnormalities are seen.  Right ventricular function, chamber size, wall motion, and thickness are  normal.  Mild to moderate right atrial enlargement is present. Trace mitral  insufficiency is present. Trace tricuspid insufficiency is present.  The inferior vena cava was normal in size with preserved respiratory  variability. No pericardial effusion is present.     There has been no change.Stress Testdate:2014 results:Interpretation Summary  Normal resting LV function with EF of approximately 60-65%; normal response   to exercise with increase to approximately 70-75%.  No stress induced regional wall motion abnormalities.  No ECG evidence of ischemia.  No subjective evidence of ischemia.  No significant valvular disease noted on routine screening color flow Doppler   and pulsed Doppler examination.  Normal functional capacity. date: results: date: results:          METS/Exercise Tolerance:  3 - Able to walk 1-2 blocks without stopping   Hematologic:     (+) History of Transfusion no previous transfusion reaction -      Musculoskeletal:  - neg musculoskeletal ROS       GI/Hepatic:  - neg GI/hepatic ROS       Renal/Genitourinary:     (+) chronic renal disease, type: CRI, Other Renal/ Genitourinary, urinary incontinence      Endo:     (+) Other Endocrine Disorder hyperparathyroidism.      Psychiatric:     (+) psychiatric history bipolar, depression and anxiety  (alcohol dependence in remission x 41 years)      Infectious Disease:  - neg infectious disease ROS       Malignancy:   (+) Malignancy History of Breast  Breast CA Active status post Chemo.         Other:    (+) No chance of pregnancy no H/O Chronic Pain,                   Physical Exam  Normal systems: cardiovascular and pulmonary    Airway   Mallampati: III  TM distance: <3 FB  Neck ROM: limited    Dental   (+) upper dentures and lower dentures    Cardiovascular   Rhythm and rate: regular and normal      Pulmonary    breath sounds clear to auscultation    Other findings: Generalized tremors           PAC Discussion and Assessment    ASA Classification: 3  Case is suitable for: [place]  Anesthetic techniques and relevant risks discussed: GA  Invasive monitoring and risk discussed:   Types:   Possibility and Risk of blood transfusion discussed:   NPO instructions given:   Additional anesthetic preparation and risks discussed:   Needs early admission to pre-op area:   Other:     PAC Resident/NP Anesthesia Assessment:  Lennie Mar is a 89 year old female scheduled for a Left Mastectomy, Left [place] Lymph Node Biopsy on 11/19/2018 by Dr. Betancourt in treatment of breast cancer.  PAC referral for risk assessment and optimization for anesthesia with comorbid conditions of: history of smoking, memory loss, tremors, hyperparathyroidism, chronic subdural hygroma, CKD, alcohol dependence in remission, anxiety, bipolar disorder and depression.     Pre-operative considerations:  1.  Cardiac:  Functional status- METS 3-4.  She reports that she walks a half mile daily at slow pace for exercise.  She denies any cardiopulmonary complications with that exercise.  She has a stress test that was negative for ischemia in 2014 and an echocardiogram in August 2018 that showed an EF of 60-65% and no wall motion abnormalities.  She has no documented cardiac history.  Intermediate risk surgery with 0.4% risk of major adverse cardiac  event.   2.  Pulm:  Airway feasible. Neck ROM and mouth opening are limited.  KASANDRA risk: low.  She quit smoking in 1993.    3.  GI:  Risk of PONV score = 3.  If > 2, anti-emetic intervention recommended.  4. Neuro:  Has a known chronic non-traumatic subdural hygroma which has required no surgical intervention.  Last imaging was in 2016 (see Care Everywhere).  She has notable memory impairment.  She is on propranolol and mirapex for generalized tremors.  High risk for post-op delirium based on age and known history of memory loss - monitor closely.  She reports having poor balance, but chooses not to use a walker or a cane (she has one of each) as she feels they aren't needed.  She does have a known history of falls though.  Consider fall risk precautions.  5. Psych: On multiple psych medications, but she denies any recent changes.  Follows with [facility].  6. Musculoskeletal:  Neck ROM is limited.   7. Renal:  +CKD - recent creatinine was stable at 1.44.  Will recheck DOS.      VTE risk: 3%    Patient is optimized and is acceptable candidate for the proposed procedure.  No further diagnostic evaluation is needed.     Patient discussed with Dr. Ferguson.     **For further details of assessment, testing, and physical exam please see H and P completed on same date.          Elva Neville DNP, RN, APRN      Reviewed and Signed by PAC Mid-Level Provider/Resident  Mid-Level Provider/Resident: Elva Neville DNP, RN, APRN  Date: 11/8/2018  Time: 1018    Attending Anesthesiologist Anesthesia Assessment:  I have examined the patient and reviewed the medical record.  I have discussed the patient with the DWAINE and concur with her assessment  The patient is scheduled for mastectomy for breast cancer  The patient has a history of hygroma which was evaluated by neurosurgery and felt to not require surgery.  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  She continues to have  memory loss, tremors, and intermittent balance issues.  She has no known cardiac disease, a negative stress test 2014, and normal echocardiogram with EF 65%.  She walks daily a total of 1//2 mile.  She denies pulmonary disease.  She has stable CKD stage 3 (GFR 33)    PE:  Pleasant but somewhat confused female, easily reoriented.  MPC 2-3, limited extension of neck , dentures.  Lungs clear .  CV  RRR without murmur.    No further testing necessary per protocol.  Final plan per attending anesthesiologist the day of surgery.  Patient is high risk for post operative delirium. Recommend no benzodiazepines      Reviewed and Signed by PAC Anesthesiologist  Anesthesiologist: Souleymane Ferguson MD  Date: 11/8/2018  Time:   Pass/Fail:   Disposition:     PAC Pharmacist Assessment:        Pharmacist:   Date:   Time:      Anesthesia Plan      History & Physical Review  History and physical reviewed and following examination; no interval change.    ASA Status:  3 .        Plan for General with Intravenous induction. Maintenance will be Inhalation.    PONV prophylaxis:  Ondansetron (or other 5HT-3)       Postoperative Care  Postoperative pain management:  IV analgesics.      Consents  Anesthetic plan, risks, benefits and alternatives discussed with:  Patient..                          .

## 2018-11-08 NOTE — PATIENT INSTRUCTIONS
Preparing for Your Surgery      Name:  Lennie Mar   MRN:  [id number]   :  [id number]   Today's Date:  2018     Arriving for surgery:  Surgery date:  18  Arrival time:  5:30AM  Please come to:       [facility] [address]  [address] [place]  [address]    -   parking is available in front of the hospital from 5:15 am to 8:00 pm    -  Stop at the Information Desk in the lobby    -   Inform the information person that you are here for surgery. An escort to 3c will be provided. If you would not like an escort, please proceed to 3C on the 3rd floor. [phone]     What can I eat or drink?  -  You may have solid food or milk products until 8 hours prior to your surgery. 18, 11:30PM  -  You may have water, apple juice or 7up/Sprite until 2 hours prior to your surgery. 18, 5:30AM    Which medicines can I take?  -  Do NOT take these medications in the morning, the day of surgery:    Vitamin B12    -  Please take these medications the day of surgery:    Take all other morning medications except Vitamin B12.    How do I prepare myself?  -  Take two showers: one the night before surgery; and one the morning of surgery.         Use Scrubcare or Hibiclens to wash from neck down.  You may use your own     shampoo and conditioner. No other hair products.   -  Do NOT use lotion, powder, deodorant, or antiperspirant the day of your surgery.  -  Do NOT wear any makeup, fingernail polish or jewelry.  - Do not bring your own medications to the hospital, except for inhalers and eye   drops.  -  Bring your ID and insurance card.    Questions or Concerns:  -If you have questions or concerns regarding the day of surgery, please call [phone].     -For questions after surgery please call your surgeons office.           AFTER YOUR SURGERY  Breathing exercises   Breathing exercises help you recover faster. Take deep breaths and let the air out slowly. This  will:     Help you wake up after surgery.    Help prevent complications like pneumonia.  Preventing complications will help you go home sooner.   Nausea and vomiting   You may feel sick to your stomach after surgery; if so, let your nurse know.    Pain control:  After surgery, you may have pain. Our goal is to help you manage your pain. Pain medicine will help you feel comfortable enough to do activities that will help you heal.  These activities may include breathing exercises, walking and physical therapy.   To help your health care team treat your pain we will ask: 1) If you have pain  2) where it is located 3) describe your pain in your words  Methods of pain control include medications given by mouth, vein or by nerve block for some surgeries.  Sequential Compression Device (SCD) or Pneumo Boots:  You may need to wear SCD S on your legs or feet. These are wraps connected to a machine that pumps in air and releases it. The repeated pumping helps prevent blood clots from forming.

## 2018-11-08 NOTE — H&P
Pre-Operative H & P     CC:  Preoperative exam to assess for increased cardiopulmonary risk while undergoing surgery and anesthesia.    Date of Encounter: 11/8/2018  Primary Care Physician:  Ty Quintanilla  Associated diagnosis:  Left breast cancer    HPI  Lennie Mar is a 89 year old female who presents for pre-operative H & P in preparation for a Left Mastectomy, Left [place] Lymph Node Biopsy with Dr. Betancourt on 11/19/2018 at [facility].       Lennie Mar is a 89 year old female with history of smoking, memory loss, tremors, hyperparathyroidism, chronic subdural hygroma, CKD, alcohol dependence in remission, anxiety, bipolar disorder and depression that has left breast cancer.  She was diagnosed with the breast cancer in May 2018 after presenting with a palpable mass.  She started neoadjuvant treatment with Herceptin and letrozole for initial treatment.  She completed cycle 8 of her Herceptin on 10/30/2018 and feels she has tolerated that well.  Her next Herceptin will be skipped on 11/20/2018 due to the surgery on 11/19/2018, but the plan currently is to resume that afterwards.  She has consulted with Dr. Betancourt for surgical opinion and the above listed surgery has been recommended for further treatment.      History is obtained from the patient and her medical record.     Past Medical History  Past Medical History:   Diagnosis Date     Alcohol dependence in remission (H)      Anxiety      Asymptomatic varicose veins      Bipolar disorder, unspecified (H)      CKD (chronic kidney disease) stage 3, GFR 30-59 ml/min (H) 2/18/2014     History of smoking      Hyperparathyroidism (H)      Memory loss      Muscle weakness (generalized) 6/23/2008     Severe depression (H)      Subdural hygroma     chronic.  no surgeries     Tremor of unknown origin        Past Surgical History  Past Surgical History:   Procedure Laterality Date     APPENDECTOMY OPEN  1947      CATARACT IOL, RT/LT       COLONOSCOPY WITH CO2 INSUFFLATION  2/29/2012    Procedure:COLONOSCOPY WITH CO2 INSUFFLATION; Surgeon:GAYLE REYNA; Location:[facility] OR     CYSTOCELE REPAIR  1972     CYSTOSCOPY, INSERT STENT URETHRA, COMBINED  1999     CYSTOSCOPY, RETROGRADES, INSERT STENT URETER(S), COMBINED  2/29/2012    Procedure:COMBINED CYSTOSCOPY, RETROGRADES, INSERT STENT URETER(S); Surgeon:ANT ESPINOZA; Location:[facility] OR     DECOMPRESSION LUMBAR ONE LEVEL  8/9/2013    Procedure: DECOMPRESSION LUMBAR ONE LEVEL;  Posterior Decompression Right Lumbar 5- Sacral 1;  Surgeon: You Zavala MD;  Location: [facility] OR     HC TOOTH EXTRACTION W/FORCEP       HYSTERECTOMY, CATA  1963     IRRIGATION AND DEBRIDEMENT ORAL, COMBINED  1982    For treatment of gingivitis     LAPAROSCOPIC ASSISTED COLECTOMY  2/29/2012    Procedure:LAPAROSCOPIC ASSISTED COLECTOMY; Cysto, Bilateral Stent placement (both stents removed at end of case)- Yanet ACOSTA. Laparoscopic Extended Right Venu Colectomy with CO2 Colonoscopy and polyp removal-Judah; Surgeon:GAYLE REYNA; Location:[facility] OR     LIGATN/STRIP LONG OR SHORT SAPHEN  1955     STRIP VEIN BILATERAL  1964     SURGICAL HISTORY OF -   1999    ureter surgery for blockage.       Hx of Blood transfusions/reactions: yes, no reactions    Hx of abnormal bleeding or anti-platelet use: none    Menstrual history: No LMP recorded. Patient is postmenopausal.:     Steroid use in the last year: none    Personal or FH with difficulty with Anesthesia:  none    Prior to Admission Medications  Current Outpatient Prescriptions   Medication Sig Dispense Refill     ARIPiprazole (ABILIFY) 5 MG tablet 5 mg tablet in the morning  3     buPROPion (WELLBUTRIN XL) 300 MG 24 hr tablet 300 mg tablet in the mornnig  2     Cyanocobalamin (VITAMIN B 12 PO) Take by mouth every morning       letrozole (FEMARA) 2.5 MG tablet Take 1 tablet (2.5 mg) by mouth daily (Patient taking differently: Take 2.5 mg by mouth  every evening ) 30 tablet 11     lithium 150 MG capsule Take 1 capsule by mouth daily (with breakfast). 30 capsule 1     lithium 300 MG capsule Take 1 capsule by mouth daily (with dinner). 30 capsule 1     pramipexole (MIRAPEX) 1 MG tablet Take 1 mg by mouth every morning        propranolol (INDERAL) 20 MG tablet Take 1 tablet (20 mg) by mouth 2 times daily 180 tablet 3     sertraline (ZOLOFT) 50 MG tablet Take 25 mg by mouth every morning        ORDER FOR DME Equipment being ordered: compression stocking knee high 20-30mmhg (Patient not taking: Reported on 9/18/2018) 2 Package 0       Allergies  Allergies   Allergen Reactions     Cafergot Other (See Comments) and Nausea and Vomiting     Severe HA, N/V & diarrhea     Ciprofloxacin      Nausea and vomiting per son      Penicillins Rash     Sulfa Drugs Rash     Lamictal [Lamotrigine] Other (See Comments)     Patient experienced night moss     Naproxen      Pt unable to remember reaction.     Tetracycline      Pt unable to remember allergy       Social History  Social History     Social History     Marital status:      Spouse name: N/A     Number of children: N/A     Years of education: College-2y     Occupational History     [employer] Retired     Retired ini 1992     Social History Main Topics     Smoking status: Former Smoker     Packs/day: 1.50     Years: 30.00     Types: Cigarettes     Quit date: 7/26/1993     Smokeless tobacco: Never Used     Alcohol use No     Drug use: No     Sexual activity: Not Currently     Other Topics Concern     Parent/Sibling W/ Cabg, Mi Or Angioplasty Before 65f 55m? Yes     mother dies from a heart attack at age 47     Social History Narrative    Dairy/d 0-1 servings/d.     Caffeine 2 servings/d    Exercise 0 x week-walks regularly.    Sunscreen used - Yes    Seatbelts used - Yes    Working smoke/CO detectors in the home - Yes    Guns stored in the home - No    Self Breast Exams - Yes    Self Testicular Exam - No    Eye Exam up  to date - Yes     Dental Exam up to date - Yes      Pap Smear up to date - No    Mammogram up to date - No    PSA up to date - No    Dexa Scan up to date - No    Flex Sig / Colonoscopy up to date - No    Immunizations up to date - Yes  2009 Td    Abuse: Current or Past(Physical, Sexual or Emotional)- No    Do you feel safe in your environment - Yes    Updated 3/2010                                   Family History  Family History   Problem Relation Age of Onset     C.A.D. Mother       at age 47 Heart failure, Rhumatic Fever     Cerebrovascular Disease Father       at age 79     Diabetes Father      type 2     Circulatory Maternal Grandmother      vericose veins     C.A.D. Paternal Grandfather      GASTROINTESTINAL DISEASE Paternal Grandfather      ulcer     Cancer Son       age 51--Melanoma     Cancer Brother       age 50's--Melanoma     Arthritis Sister      Circulatory Sister      vericose veins     Circulatory Sister      vericose veins     Eye Disorder Sister      Cateracts     Respiratory Sister      asthma     Respiratory Brother      COPD           ROS/MED HX  The complete review of systems is negative other than noted in the HPI or here.   ENT/Pulmonary:     (+)tobacco use, Past use 1.5 packs/day  , . .    Neurologic:     (+)other neuro chronic non-traumatic subdural hygroma, memory loss, tremors, poor balance    Cardiovascular:     negative  METS/Exercise Tolerance:  3 - Able to walk 1-2 blocks without stopping   Hematologic:     (+) History of Transfusion no previous transfusion reaction -      Musculoskeletal:  - neg musculoskeletal ROS       GI/Hepatic:  - neg GI/hepatic ROS       Renal/Genitourinary:     (+) chronic renal disease, type: CRI, Other Renal/ Genitourinary, urinary incontinence      Endo:     (+) Other Endocrine Disorder hyperparathyroidism.      Psychiatric:     (+) psychiatric history bipolar, depression and anxiety (alcohol dependence in remission x 41 years)     "  Infectious Disease:  - neg infectious disease ROS       Malignancy:   (+) Malignancy History of Breast  Breast CA Active status post Chemo.         Other:    (+) No chance of pregnancy no H/O Chronic Pain,               Temp: 98.7  F (37.1  C) Temp src: Oral BP: 145/83 Pulse: 95   Resp: 18 SpO2: 96 %         166 lbs 9.6 oz  5' 1\"   Body mass index is 31.48 kg/(m^2).       Physical Exam  Constitutional: Awake, alert, cooperative, no apparent distress, and appears stated age.  Eyes: Pupils equal, round and reactive to light, extra ocular muscles intact, sclera clear, conjunctiva normal.  HENT: Normocephalic, oral pharynx with moist mucus membranes.  Upper and lower dentures.    Respiratory: Clear to auscultation bilaterally, no crackles or wheezing.  Cardiovascular: Regular rate and rhythm, normal S1 and S2, and no murmur noted.  Carotids +2, no bruits. No edema. Palpable pulses to radial  DP and PT arteries.   GI: Normal bowel sounds, soft, non-distended, slight right upper quadrant pinpoint tenderness, no CVA tenderness , no masses palpated, no hepatosplenomegaly.    Lymph/Hematologic: No cervical lymphadenopathy and no supraclavicular lymphadenopathy.  Genitourinary:  deferred  Skin: Warm and dry.  No rashes at anticipated surgical site.   Musculoskeletal: Limited ROM of neck. There is no redness, warmth, or swelling of the exposed joints. Gross motor strength is normal.    Neurologic: Awake, alert, oriented to name, place and time. Cranial nerves II-XII are grossly intact. Gait is slightly impaired.   Neuropsychiatric: Calm, cooperative. Normal affect.     Labs: (personally reviewed)   Component      Latest Ref Rng & Units 10/30/2018   WBC      4.0 - 11.0 10e9/L 10.9   RBC Count      3.8 - 5.2 10e12/L 3.95   Hemoglobin      11.7 - 15.7 g/dL 12.6   Hematocrit      35.0 - 47.0 % 39.1   MCV      78 - 100 fl 99   MCH      26.5 - 33.0 pg 31.9   MCHC      31.5 - 36.5 g/dL 32.2   RDW      10.0 - 15.0 % 14.1   Platelet " Count      150 - 450 10e9/L 308   Diff Method       Automated Method   % Neutrophils      % 64.1   % Lymphocytes      % 22.3   % Monocytes      % 10.1   % Eosinophils      % 2.1   % Basophils      % 0.7   % Immature Granulocytes      % 0.7   Nucleated RBCs      0 /100 0   Absolute Neutrophil      1.6 - 8.3 10e9/L 7.0   Absolute Lymphocytes      0.8 - 5.3 10e9/L 2.4   Absolute Monocytes      0.0 - 1.3 10e9/L 1.1   Absolute Eosinophils      0.0 - 0.7 10e9/L 0.2   Absolute Basophils      0.0 - 0.2 10e9/L 0.1   Abs Immature Granulocytes      0 - 0.4 10e9/L 0.1   Absolute Nucleated RBC       0.0   Sodium      133 - 144 mmol/L 141   Potassium      3.4 - 5.3 mmol/L 4.4   Chloride      94 - 109 mmol/L 110 (H)   Carbon Dioxide      20 - 32 mmol/L 24   Anion Gap      3 - 14 mmol/L 7   Glucose      70 - 99 mg/dL 82   Urea Nitrogen      7 - 30 mg/dL 30   Creatinine      0.52 - 1.04 mg/dL 1.44 (H)   GFR Estimate      >60 mL/min/1.7m2 34 (L)   GFR Estimate If Black      >60 mL/min/1.7m2 41 (L)   Calcium      8.5 - 10.1 mg/dL 9.5   Bilirubin Total      0.2 - 1.3 mg/dL 0.4   Albumin      3.4 - 5.0 g/dL 3.4   Protein Total      6.8 - 8.8 g/dL 7.4   Alkaline Phosphatase      40 - 150 U/L 116   ALT      0 - 50 U/L 30   AST      0 - 45 U/L 21           Echocardiogram  8/2018  Interpretation Summary  Left ventricular function, chamber size, wall motion, and wall thickness are  normal.The EF is 60-65%. No regional wall motion abnormalities are seen.  Right ventricular function, chamber size, wall motion, and thickness are  normal.  Mild to moderate right atrial enlargement is present. Trace mitral  insufficiency is present. Trace tricuspid insufficiency is present.  The inferior vena cava was normal in size with preserved respiratory  variability. No pericardial effusion is present.     There has been no change.    Stress test   2014  Interpretation Summary  Normal resting LV function with EF of approximately 60-65%; normal response   to  exercise with increase to approximately 70-75%.  No stress induced regional wall motion abnormalities.  No ECG evidence of ischemia.  No subjective evidence of ischemia.  No significant valvular disease noted on routine screening color flow Doppler   and pulsed Doppler examination.  Normal functional capacity.    Outside records reviewed from: Care Everywhere        ASSESSMENT and PLAN  Lennie Mar is a 89 year old female scheduled for a Left Mastectomy, Left [place] Lymph Node Biopsy on 11/19/2018 by Dr. Betancourt in treatment of breast cancer.  PAC referral for risk assessment and optimization for anesthesia with comorbid conditions of: history of smoking, memory loss, tremors, hyperparathyroidism, chronic subdural hygroma, CKD, alcohol dependence in remission, anxiety, bipolar disorder and depression.     Pre-operative considerations:  1.  Cardiac:  Functional status- METS 3-4.  She reports that she walks a half mile daily at slow pace for exercise.  She denies any cardiopulmonary complications with that exercise.  She has a stress test that was negative for ischemia in 2014 and an echocardiogram in August 2018 that showed an EF of 60-65% and no wall motion abnormalities.  She has no documented cardiac history.  Intermediate risk surgery with 0.4% risk of major adverse cardiac event.   2.  Pulm:  Airway feasible. Neck ROM and mouth opening are limited.  KASANDRA risk: low.  She quit smoking in 1993.    3.  GI:  Risk of PONV score = 3.  If > 2, anti-emetic intervention recommended.  4. Neuro:  Has a known chronic non-traumatic subdural hygroma which has required no surgical intervention.  Last imaging was in 2016 (see Care Everywhere).  She has notable memory impairment.  She is on propranolol and mirapex for generalized tremors.  High risk for post-op delirium based on age and known history of memory loss - monitor closely.  She reports having poor balance, but chooses not to use a walker or a cane (she has one of  each) as she feels they aren't needed.  She does have a known history of falls though.  Consider fall risk precautions.  5. Psych: On multiple psych medications, but she denies any recent changes.  Follows with [facility].  6. Musculoskeletal:  Neck ROM is limited.   7. Renal:  +CKD - recent creatinine was stable at 1.44.  Will recheck DOS.      VTE risk: 3%    Patient is optimized and is acceptable candidate for the proposed procedure.  No further diagnostic evaluation is needed.     Patient discussed with Dr. Ferguson.               Elva Neville DNP, RN, APRN  Preoperative Assessment Center  [facility]  Phone: [phone]  Fax: [phone]

## 2018-11-08 NOTE — MR AVS SNAPSHOT
After Visit Summary   2018    Lennie Mar    MRN: [id number]           Patient Information     Date Of Birth          1927        Visit Information        Provider Department      2018 8:00 AM Elva Neville APRN CNP [facility]        Care Instructions    Preparing for Your Surgery      Name:  Lennie Mar   MRN:  [id number]   :  1927   Today's Date:  2018     Arriving for surgery:  Surgery date:  18  Arrival time:  5:30AM  Please come to:       [facility] [address]  [address] [place]  [address]    -   parking is available in front of the hospital from 5:15 am to 8:00 pm    -  Stop at the Information Desk in the lobby    -   Inform the information person that you are here for surgery. An escort to 3c will be provided. If you would not like an escort, please proceed to 3C on the 3rd floor. [phone]     What can I eat or drink?  -  You may have solid food or milk products until 8 hours prior to your surgery. 18, 11:30PM  -  You may have water, apple juice or 7up/Sprite until 2 hours prior to your surgery. 18, 5:30AM    Which medicines can I take?  -  Do NOT take these medications in the morning, the day of surgery:    Vitamin B12    -  Please take these medications the day of surgery:    Take all other morning medications except Vitamin B12.    How do I prepare myself?  -  Take two showers: one the night before surgery; and one the morning of surgery.         Use Scrubcare or Hibiclens to wash from neck down.  You may use your own     shampoo and conditioner. No other hair products.   -  Do NOT use lotion, powder, deodorant, or antiperspirant the day of your surgery.  -  Do NOT wear any makeup, fingernail polish or jewelry.  - Do not bring your own medications to the hospital, except for inhalers and eye   drops.  -  Bring your ID and insurance  card.    Questions or Concerns:  -If you have questions or concerns regarding the day of surgery, please call [phone].     -For questions after surgery please call your surgeons office.           AFTER YOUR SURGERY  Breathing exercises   Breathing exercises help you recover faster. Take deep breaths and let the air out slowly. This will:     Help you wake up after surgery.    Help prevent complications like pneumonia.  Preventing complications will help you go home sooner.   Nausea and vomiting   You may feel sick to your stomach after surgery; if so, let your nurse know.    Pain control:  After surgery, you may have pain. Our goal is to help you manage your pain. Pain medicine will help you feel comfortable enough to do activities that will help you heal.  These activities may include breathing exercises, walking and physical therapy.   To help your health care team treat your pain we will ask: 1) If you have pain  2) where it is located 3) describe your pain in your words  Methods of pain control include medications given by mouth, vein or by nerve block for some surgeries.  Sequential Compression Device (SCD) or Pneumo Boots:  You may need to wear SCD S on your legs or feet. These are wraps connected to a machine that pumps in air and releases it. The repeated pumping helps prevent blood clots from forming.           Follow-ups after your visit        Your next 10 appointments already scheduled     Nov 19, 2018  7:00 AM CST   NM SENTINEL NODE INJECTION BILATERAL with [email or web address]   [facility], [place], Nuclear Medicine ([facility] [place])    [address] [facility] [address] [phone]           How do I prepare for my exam? (Food and drink instructions) For Adults:  No Food and Drink Restrictions.  For children: Young children may need medicine to help them relax (called sedation). We will tell you in advance if your child needs this medicine. If so, he or she  cannot eat or drink before this test. You will need to arrive about 45 minutes early.  *If your child will not be sedated, he or she can eat and drink as normal.  How do I prepare for my exam? (Other instructions) You may take your normal medicines, unless your doctor tells you not to.  What should I wear: Please wear comfortable clothes.  How long does the exam take: Please allow 90 minutes for this exam.  What should I bring: Please bring a list of your medicines (including vitamins, minerals and over-the-counter drugs). Leave your valuables at home.  Do I need a :  No  is needed.  What do I need to tell my doctor: If you are feeding or may be pregnant, tell us before the exam.  What should I do after the exam: No restrictions, You may resume normal activities. The radioactive fluid will leave your body when you urinate (use the toilet).  *If your child was sedated, we will bring him or her to the recovery room. It may take up to 90 minutes before your child is ready to go home. We will give you clear guidelines about how to care for your child at home. Be sure to ask any questions you may have.  What is this test:  For these tests, we will inject a small amount of radioactive fluid into your arm or hand (about the amount of radiation you would get in an X-ray). If you are having a GFR, we will test your blood to measure the radioactivity. This tells us how well your kidneys are filtering (cleaning) your blood. If you are having a renogram (kidney scan), we take pictures of the kidneys to see how quickly they can remove the radioactive fluid from your body. This tells us how well your kidneys are working. The fluid helps the kidneys show up on our video screen.  Other information about my exam (For children): We may place a catheter (tube) in the bladder. This tube will drain urine from the body. A parent or other adult may stay with the child in the exam room.  Who should I call with questions: Please  call your Imaging Department at your exam site with any questions. Directions, parking instructions, and other information is available on our website, [email or web address].            Nov 19, 2018   Procedure with Nahun Betancourt MD   [facility], [place], Same Day Surgery (--)    [address]  [place] [address]   [phone]            Dec 06, 2018 10:30 AM CST   (Arrive by 10:15 AM)   Post-Op with Nahun Betancourt MD   [facility] ([facility])    [address] [place] [address]  [address]  [facility] [address] [phone]            Dec 11, 2018 10:45 AM CST   [facility] Lab Draw with  [facility] LAB DRAW   [facility] Lab Draw ([facility])    [address] [place] [address]  [address]  [facility] [address] [phone]            Dec 11, 2018 11:15 AM CST   (Arrive by 11:00 AM)   Return Visit with Perlita Malik MD   [facility] ([facility])    [address] [place] [address]  [address]  [facility] [id number] [phone]            Dec 11, 2018 11:30 AM CST   (Arrive by 11:15 AM)   [employer] with [id number]   [facility])    [address] [place] [address]  3rd Floor  [facility] [address] [phone]           1.  Please bring or wear a comfortable two-piece outfit. 2.  You may eat, drink and take your normal medicines. 3.  For any questions that cannot be answered, please contact the ordering physician 4.  Please do not wear perfumes or scented lotions on the day of your exam.            Dec 11, 2018  1:00 PM CST   Infusion 120 with UC ONCOLOGY INFUSION, UC 18 ATC   [facility])    [address] [place] [address]  [address]  [facility] [id number] [phone]              Who to contact     Please call your clinic at [phone] to:    Ask questions about your health    Make or cancel appointments    Discuss your  "medicines    Learn about your test results    Speak to your doctor            Additional Information About Your Visit        MyChart Information     [employer] is an electronic gateway that provides easy, online access to your medical records. With [employer], you can request a clinic appointment, read your test results, renew a prescription or communicate with your care team.     To sign up for [employer] visit the website at [email or web address]   You will be asked to enter the access code listed below, as well as some personal information. Please follow the directions to create your username and password.     Your access code is: [email or web address]  Expires: 2018  9:18 AM     Your access code will  in 90 days. If you need help or a new code, please contact your [facility] or call [phone] for assistance.        Care EveryWhere ID     This is your Care EveryWhere ID. This could be used by other organizations to access your [place] medical records  [id number]        Your Vitals Were     Pulse Temperature Respirations Height Pulse Oximetry BMI (Body Mass Index)    95 98.7  F (37.1  C) (Oral) 18 1.549 m (5' 1\") 96% 31.48 kg/m2       Blood Pressure from Last 3 Encounters:   18 145/83   10/30/18 132/76   10/09/18 145/71    Weight from Last 3 Encounters:   18 75.6 kg (166 lb 9.6 oz)   10/30/18 76.1 kg (167 lb 12.8 oz)   10/09/18 73.4 kg (161 lb 12.8 oz)              Today, you had the following     No orders found for display         Today's Medication Changes          These changes are accurate as of 18  8:44 AM.  If you have any questions, ask your nurse or doctor.               These medicines have changed or have updated prescriptions.        Dose/Directions    letrozole 2.5 MG tablet   Commonly known as:  FEMARA   This may have changed:  when to take this   Used for:  Malignant neoplasm of upper-inner quadrant of left breast in female, estrogen receptor " positive (H), Malignant neoplasm of upper-outer quadrant of left breast in female, estrogen receptor positive (H)        Dose:  2.5 mg   Take 1 tablet (2.5 mg) by mouth daily   Quantity:  30 tablet   Refills:  11                Primary Care Provider Office Phone # Fax #    Ty Quintanilla [phone] [address]  [facility] [address]        Equal Access to Services     ANKIT SALINAS : Hadii aad ku hadasho Soomaali, waaxda luqadaha, qaybta kaalmada adeegyada, waxay idiin hayleifn adeeg khjasen ladeneenn ah. So [facility] [phone].    ATENCIÓN: Si moise foster, tiene a foster disposición servicios gratuitos de asistencia lingüística. Llame al [phone].    We comply with applicable federal civil rights laws and [place] laws. We do not discriminate on the basis of race, color, national origin, age, disability, sex, sexual orientation, or gender identity.            Thank you!     Thank you for choosing [facility]  for your care. Our goal is always to provide you with excellent care. Hearing back from our patients is one way we can continue to improve our services. Please take a few minutes to complete the written survey that you may receive in the mail after your visit with us. Thank you!             Your Updated Medication List - Protect others around you: Learn how to safely use, store and throw away your medicines at www.disposemymeds.org.          This list is accurate as of 11/8/18  8:44 AM.  Always use your most recent med list.                   Brand Name Dispense Instructions for use Diagnosis    ARIPiprazole 5 MG tablet    ABILIFY     5 mg tablet in the morning        buPROPion 300 MG 24 hr tablet    WELLBUTRIN XL     300 mg tablet in the mornnig        letrozole 2.5 MG tablet    FEMARA    30 tablet    Take 1 tablet (2.5 mg) by mouth daily    Malignant neoplasm of upper-inner quadrant of left breast in female, estrogen receptor positive (H), Malignant neoplasm of  upper-outer quadrant of left breast in female, estrogen receptor positive (H)       * lithium 150 MG capsule     30 capsule    Take 1 capsule by mouth daily (with breakfast).    Bipolar disorder, unspecified (H)       * lithium 300 MG capsule     30 capsule    Take 1 capsule by mouth daily (with dinner).    Bipolar disorder, unspecified (H)       order for DME     2 Package    Equipment being ordered: compression stocking knee high 20-30mmhg    Edema       pramipexole 1 MG tablet    MIRAPEX     Take 1 mg by mouth every morning        propranolol 20 MG tablet    INDERAL    180 tablet    Take 1 tablet (20 mg) by mouth 2 times daily    Tremor       sertraline 50 MG tablet    ZOLOFT     Take 25 mg by mouth every morning        VITAMIN B 12 PO      Take by mouth every morning        * Notice:  This list has 2 medication(s) that are the same as other medications prescribed for you. Read the directions carefully, and ask your doctor or other care provider to review them with you.

## 2018-11-19 ENCOUNTER — HOSPITAL ENCOUNTER (OUTPATIENT)
Facility: CLINIC | Age: 83
Discharge: HOME | End: 2018-11-20
Attending: SURGERY | Admitting: SURGERY
Payer: MEDICARE

## 2018-11-19 ENCOUNTER — SURGERY (OUTPATIENT)
Age: 83
End: 2018-11-19

## 2018-11-19 ENCOUNTER — ANESTHESIA (OUTPATIENT)
Dept: SURGERY | Facility: CLINIC | Age: 83
End: 2018-11-19
Payer: MEDICARE

## 2018-11-19 ENCOUNTER — HOSPITAL ENCOUNTER (OUTPATIENT)
Dept: NUCLEAR MEDICINE | Facility: CLINIC | Age: 83
Setting detail: NUCLEAR MEDICINE
End: 2018-11-19
Attending: SURGERY
Payer: MEDICARE

## 2018-11-19 DIAGNOSIS — G89.18 POSTOPERATIVE PAIN: Primary | ICD-10-CM

## 2018-11-19 DIAGNOSIS — C50.912 MALIGNANT NEOPLASM OF LEFT BREAST (H): ICD-10-CM

## 2018-11-19 PROBLEM — C50.919 BREAST CANCER (H): Status: ACTIVE | Noted: 2018-11-19

## 2018-11-19 LAB
CREAT SERPL-MCNC: 1.57 MG/DL (ref 0.52–1.04)
GFR SERPL CREATININE-BSD FRML MDRD: 31 ML/MIN/1.7M2
GLUCOSE BLDC GLUCOMTR-MCNC: 78 MG/DL (ref 70–99)

## 2018-11-19 PROCEDURE — 25000566 ZZH SEVOFLURANE, EA 15 MIN: Performed by: SURGERY

## 2018-11-19 PROCEDURE — 88342 IMHCHEM/IMCYTCHM 1ST ANTB: CPT | Performed by: SURGERY

## 2018-11-19 PROCEDURE — 00000159 ZZHCL STATISTIC H-SEND OUTS PREP: Performed by: SURGERY

## 2018-11-19 PROCEDURE — 36415 COLL VENOUS BLD VENIPUNCTURE: CPT | Performed by: NURSE PRACTITIONER

## 2018-11-19 PROCEDURE — 00000158 ZZHCL STATISTIC H-FISH PROCESS B/S: Performed by: SURGERY

## 2018-11-19 PROCEDURE — 37000009 ZZH ANESTHESIA TECHNICAL FEE, EACH ADDTL 15 MIN: Performed by: SURGERY

## 2018-11-19 PROCEDURE — 88360 TUMOR IMMUNOHISTOCHEM/MANUAL: CPT | Performed by: SURGERY

## 2018-11-19 PROCEDURE — 88341 IMHCHEM/IMCYTCHM EA ADD ANTB: CPT | Performed by: SURGERY

## 2018-11-19 PROCEDURE — 25000128 H RX IP 250 OP 636: Performed by: ANESTHESIOLOGY

## 2018-11-19 PROCEDURE — 25000128 H RX IP 250 OP 636: Performed by: NEUROLOGICAL SURGERY

## 2018-11-19 PROCEDURE — 71000016 ZZH RECOVERY PHASE 1 LEVEL 3 FIRST HR: Performed by: SURGERY

## 2018-11-19 PROCEDURE — 25000125 ZZHC RX 250: Performed by: NURSE ANESTHETIST, CERTIFIED REGISTERED

## 2018-11-19 PROCEDURE — 27210794 ZZH OR GENERAL SUPPLY STERILE: Performed by: SURGERY

## 2018-11-19 PROCEDURE — 82565 ASSAY OF CREATININE: CPT | Performed by: NURSE PRACTITIONER

## 2018-11-19 PROCEDURE — 25000128 H RX IP 250 OP 636: Performed by: NURSE ANESTHETIST, CERTIFIED REGISTERED

## 2018-11-19 PROCEDURE — 37000008 ZZH ANESTHESIA TECHNICAL FEE, 1ST 30 MIN: Performed by: SURGERY

## 2018-11-19 PROCEDURE — 71000017 ZZH RECOVERY PHASE 1 LEVEL 3 EA ADDTL HR: Performed by: SURGERY

## 2018-11-19 PROCEDURE — A9270 NON-COVERED ITEM OR SERVICE: HCPCS | Mod: GY | Performed by: NEUROLOGICAL SURGERY

## 2018-11-19 PROCEDURE — 38792 RA TRACER ID OF SENTINL NODE: CPT

## 2018-11-19 PROCEDURE — 25000125 ZZHC RX 250: Performed by: SURGERY

## 2018-11-19 PROCEDURE — 36000057 ZZH SURGERY LEVEL 3 1ST 30 MIN - UMMC: Performed by: SURGERY

## 2018-11-19 PROCEDURE — 40000170 ZZH STATISTIC PRE-PROCEDURE ASSESSMENT II: Performed by: SURGERY

## 2018-11-19 PROCEDURE — 82962 GLUCOSE BLOOD TEST: CPT

## 2018-11-19 PROCEDURE — 88307 TISSUE EXAM BY PATHOLOGIST: CPT | Performed by: SURGERY

## 2018-11-19 PROCEDURE — 25000132 ZZH RX MED GY IP 250 OP 250 PS 637: Mod: GY | Performed by: NEUROLOGICAL SURGERY

## 2018-11-19 PROCEDURE — 88377 M/PHMTRC ALYS ISHQUANT/SEMIQ: CPT | Performed by: PATHOLOGY

## 2018-11-19 PROCEDURE — 36000059 ZZH SURGERY LEVEL 3 EA 15 ADDTL MIN UMMC: Performed by: SURGERY

## 2018-11-19 RX ORDER — LETROZOLE 2.5 MG/1
2.5 TABLET, FILM COATED ORAL EVERY EVENING
Status: DISCONTINUED | OUTPATIENT
Start: 2018-11-19 | End: 2018-11-20 | Stop reason: HOSPADM

## 2018-11-19 RX ORDER — ONDANSETRON 2 MG/ML
4 INJECTION INTRAMUSCULAR; INTRAVENOUS EVERY 30 MIN PRN
Status: DISCONTINUED | OUTPATIENT
Start: 2018-11-19 | End: 2018-11-19 | Stop reason: HOSPADM

## 2018-11-19 RX ORDER — PRAMIPEXOLE DIHYDROCHLORIDE 1 MG/1
1 TABLET ORAL EVERY MORNING
Status: DISCONTINUED | OUTPATIENT
Start: 2018-11-20 | End: 2018-11-20 | Stop reason: HOSPADM

## 2018-11-19 RX ORDER — SERTRALINE HYDROCHLORIDE 25 MG/1
25 TABLET, FILM COATED ORAL EVERY MORNING
Status: DISCONTINUED | OUTPATIENT
Start: 2018-11-20 | End: 2018-11-20 | Stop reason: HOSPADM

## 2018-11-19 RX ORDER — ONDANSETRON 4 MG/1
4 TABLET, ORALLY DISINTEGRATING ORAL EVERY 6 HOURS PRN
Status: DISCONTINUED | OUTPATIENT
Start: 2018-11-19 | End: 2018-11-20 | Stop reason: HOSPADM

## 2018-11-19 RX ORDER — EPHEDRINE SULFATE 50 MG/ML
INJECTION, SOLUTION INTRAMUSCULAR; INTRAVENOUS; SUBCUTANEOUS PRN
Status: DISCONTINUED | OUTPATIENT
Start: 2018-11-19 | End: 2018-11-19

## 2018-11-19 RX ORDER — LIDOCAINE HYDROCHLORIDE 20 MG/ML
INJECTION, SOLUTION INFILTRATION; PERINEURAL PRN
Status: DISCONTINUED | OUTPATIENT
Start: 2018-11-19 | End: 2018-11-19

## 2018-11-19 RX ORDER — BUPROPION HYDROCHLORIDE 300 MG/1
300 TABLET ORAL DAILY
Status: DISCONTINUED | OUTPATIENT
Start: 2018-11-20 | End: 2018-11-20 | Stop reason: HOSPADM

## 2018-11-19 RX ORDER — LIDOCAINE 40 MG/G
CREAM TOPICAL
Status: DISCONTINUED | OUTPATIENT
Start: 2018-11-19 | End: 2018-11-20 | Stop reason: HOSPADM

## 2018-11-19 RX ORDER — FENTANYL CITRATE 50 UG/ML
INJECTION, SOLUTION INTRAMUSCULAR; INTRAVENOUS PRN
Status: DISCONTINUED | OUTPATIENT
Start: 2018-11-19 | End: 2018-11-19

## 2018-11-19 RX ORDER — ONDANSETRON 4 MG/1
4 TABLET, ORALLY DISINTEGRATING ORAL EVERY 30 MIN PRN
Status: DISCONTINUED | OUTPATIENT
Start: 2018-11-19 | End: 2018-11-19 | Stop reason: HOSPADM

## 2018-11-19 RX ORDER — NALOXONE HYDROCHLORIDE 0.4 MG/ML
.1-.4 INJECTION, SOLUTION INTRAMUSCULAR; INTRAVENOUS; SUBCUTANEOUS
Status: DISCONTINUED | OUTPATIENT
Start: 2018-11-19 | End: 2018-11-20 | Stop reason: HOSPADM

## 2018-11-19 RX ORDER — HYDROMORPHONE HYDROCHLORIDE 1 MG/ML
.3-.5 INJECTION, SOLUTION INTRAMUSCULAR; INTRAVENOUS; SUBCUTANEOUS EVERY 10 MIN PRN
Status: DISCONTINUED | OUTPATIENT
Start: 2018-11-19 | End: 2018-11-19 | Stop reason: HOSPADM

## 2018-11-19 RX ORDER — CLINDAMYCIN PHOSPHATE 900 MG/50ML
900 INJECTION, SOLUTION INTRAVENOUS
Status: COMPLETED | OUTPATIENT
Start: 2018-11-19 | End: 2018-11-19

## 2018-11-19 RX ORDER — ONDANSETRON 2 MG/ML
4 INJECTION INTRAMUSCULAR; INTRAVENOUS EVERY 6 HOURS PRN
Status: DISCONTINUED | OUTPATIENT
Start: 2018-11-19 | End: 2018-11-20 | Stop reason: HOSPADM

## 2018-11-19 RX ORDER — SODIUM CHLORIDE, SODIUM LACTATE, POTASSIUM CHLORIDE, CALCIUM CHLORIDE 600; 310; 30; 20 MG/100ML; MG/100ML; MG/100ML; MG/100ML
INJECTION, SOLUTION INTRAVENOUS CONTINUOUS
Status: DISCONTINUED | OUTPATIENT
Start: 2018-11-19 | End: 2018-11-20 | Stop reason: HOSPADM

## 2018-11-19 RX ORDER — SODIUM CHLORIDE, SODIUM LACTATE, POTASSIUM CHLORIDE, CALCIUM CHLORIDE 600; 310; 30; 20 MG/100ML; MG/100ML; MG/100ML; MG/100ML
INJECTION, SOLUTION INTRAVENOUS CONTINUOUS
Status: DISCONTINUED | OUTPATIENT
Start: 2018-11-19 | End: 2018-11-19 | Stop reason: HOSPADM

## 2018-11-19 RX ORDER — ONDANSETRON 2 MG/ML
INJECTION INTRAMUSCULAR; INTRAVENOUS PRN
Status: DISCONTINUED | OUTPATIENT
Start: 2018-11-19 | End: 2018-11-19

## 2018-11-19 RX ORDER — HYDROMORPHONE HCL/0.9% NACL/PF 0.2MG/0.2
0.2 SYRINGE (ML) INTRAVENOUS
Status: DISCONTINUED | OUTPATIENT
Start: 2018-11-19 | End: 2018-11-20 | Stop reason: HOSPADM

## 2018-11-19 RX ORDER — CLINDAMYCIN PHOSPHATE 900 MG/50ML
900 INJECTION, SOLUTION INTRAVENOUS SEE ADMIN INSTRUCTIONS
Status: DISCONTINUED | OUTPATIENT
Start: 2018-11-19 | End: 2018-11-19 | Stop reason: HOSPADM

## 2018-11-19 RX ORDER — ARIPIPRAZOLE 5 MG/1
5 TABLET ORAL DAILY
Status: DISCONTINUED | OUTPATIENT
Start: 2018-11-20 | End: 2018-11-20 | Stop reason: HOSPADM

## 2018-11-19 RX ORDER — PROPRANOLOL HYDROCHLORIDE 20 MG/1
20 TABLET ORAL 2 TIMES DAILY
Status: DISCONTINUED | OUTPATIENT
Start: 2018-11-19 | End: 2018-11-20 | Stop reason: HOSPADM

## 2018-11-19 RX ORDER — LITHIUM CARBONATE 300 MG/1
300 CAPSULE ORAL
Status: DISCONTINUED | OUTPATIENT
Start: 2018-11-19 | End: 2018-11-20 | Stop reason: HOSPADM

## 2018-11-19 RX ORDER — ACETAMINOPHEN 325 MG/1
975 TABLET ORAL EVERY 8 HOURS
Status: DISCONTINUED | OUTPATIENT
Start: 2018-11-19 | End: 2018-11-20 | Stop reason: HOSPADM

## 2018-11-19 RX ORDER — FENTANYL CITRATE 50 UG/ML
25-50 INJECTION, SOLUTION INTRAMUSCULAR; INTRAVENOUS EVERY 5 MIN PRN
Status: DISCONTINUED | OUTPATIENT
Start: 2018-11-19 | End: 2018-11-19 | Stop reason: HOSPADM

## 2018-11-19 RX ORDER — ISOSULFAN BLUE 50 MG/5ML
INJECTION, SOLUTION SUBCUTANEOUS PRN
Status: DISCONTINUED | OUTPATIENT
Start: 2018-11-19 | End: 2018-11-19 | Stop reason: HOSPADM

## 2018-11-19 RX ORDER — LITHIUM CARBONATE 150 MG/1
150 CAPSULE ORAL
Status: DISCONTINUED | OUTPATIENT
Start: 2018-11-20 | End: 2018-11-20 | Stop reason: HOSPADM

## 2018-11-19 RX ORDER — HYDROMORPHONE HYDROCHLORIDE 1 MG/ML
.3-.5 INJECTION, SOLUTION INTRAMUSCULAR; INTRAVENOUS; SUBCUTANEOUS
Status: DISCONTINUED | OUTPATIENT
Start: 2018-11-19 | End: 2018-11-19 | Stop reason: HOSPADM

## 2018-11-19 RX ORDER — PROPOFOL 10 MG/ML
INJECTION, EMULSION INTRAVENOUS PRN
Status: DISCONTINUED | OUTPATIENT
Start: 2018-11-19 | End: 2018-11-19

## 2018-11-19 RX ORDER — SODIUM CHLORIDE, SODIUM LACTATE, POTASSIUM CHLORIDE, CALCIUM CHLORIDE 600; 310; 30; 20 MG/100ML; MG/100ML; MG/100ML; MG/100ML
INJECTION, SOLUTION INTRAVENOUS CONTINUOUS PRN
Status: DISCONTINUED | OUTPATIENT
Start: 2018-11-19 | End: 2018-11-19

## 2018-11-19 RX ADMIN — PROPRANOLOL HYDROCHLORIDE 20 MG: 20 TABLET ORAL at 19:23

## 2018-11-19 RX ADMIN — Medication 5 MG: at 08:46

## 2018-11-19 RX ADMIN — ACETAMINOPHEN 975 MG: 325 TABLET, FILM COATED ORAL at 14:36

## 2018-11-19 RX ADMIN — Medication 5 MG: at 09:02

## 2018-11-19 RX ADMIN — PHENYLEPHRINE HYDROCHLORIDE 100 MCG: 10 INJECTION, SOLUTION INTRAMUSCULAR; INTRAVENOUS; SUBCUTANEOUS at 09:10

## 2018-11-19 RX ADMIN — ROCURONIUM BROMIDE 50 MG: 10 INJECTION INTRAVENOUS at 07:42

## 2018-11-19 RX ADMIN — PHENYLEPHRINE HYDROCHLORIDE 100 MCG: 10 INJECTION, SOLUTION INTRAMUSCULAR; INTRAVENOUS; SUBCUTANEOUS at 07:55

## 2018-11-19 RX ADMIN — Medication 0.5 MG: at 11:12

## 2018-11-19 RX ADMIN — ISOSULFAN BLUE 5 ML: 10 INJECTION, SOLUTION SUBCUTANEOUS at 07:50

## 2018-11-19 RX ADMIN — ONDANSETRON HYDROCHLORIDE 4 MG: 2 INJECTION, SOLUTION INTRAMUSCULAR; INTRAVENOUS at 10:19

## 2018-11-19 RX ADMIN — LIDOCAINE HYDROCHLORIDE 60 MG: 20 INJECTION, SOLUTION INFILTRATION; PERINEURAL at 07:42

## 2018-11-19 RX ADMIN — Medication 5 MG: at 09:10

## 2018-11-19 RX ADMIN — CLINDAMYCIN PHOSPHATE 900 MG: 18 INJECTION, SOLUTION INTRAVENOUS at 07:52

## 2018-11-19 RX ADMIN — PHENYLEPHRINE HYDROCHLORIDE 100 MCG: 10 INJECTION, SOLUTION INTRAMUSCULAR; INTRAVENOUS; SUBCUTANEOUS at 08:02

## 2018-11-19 RX ADMIN — FENTANYL CITRATE 50 MCG: 50 INJECTION, SOLUTION INTRAMUSCULAR; INTRAVENOUS at 07:42

## 2018-11-19 RX ADMIN — Medication 0.5 MG: at 11:00

## 2018-11-19 RX ADMIN — Medication 0.5 MG: at 10:46

## 2018-11-19 RX ADMIN — PROPOFOL 60 MG: 10 INJECTION, EMULSION INTRAVENOUS at 07:42

## 2018-11-19 RX ADMIN — SODIUM CHLORIDE, POTASSIUM CHLORIDE, SODIUM LACTATE AND CALCIUM CHLORIDE: 600; 310; 30; 20 INJECTION, SOLUTION INTRAVENOUS at 07:22

## 2018-11-19 RX ADMIN — Medication 0.3 MG: at 10:19

## 2018-11-19 RX ADMIN — SUGAMMADEX 200 MG: 100 INJECTION, SOLUTION INTRAVENOUS at 09:42

## 2018-11-19 RX ADMIN — FENTANYL CITRATE 50 MCG: 50 INJECTION, SOLUTION INTRAMUSCULAR; INTRAVENOUS at 08:12

## 2018-11-19 RX ADMIN — SODIUM CHLORIDE, POTASSIUM CHLORIDE, SODIUM LACTATE AND CALCIUM CHLORIDE: 600; 310; 30; 20 INJECTION, SOLUTION INTRAVENOUS at 14:39

## 2018-11-19 RX ADMIN — ONDANSETRON 4 MG: 2 INJECTION INTRAMUSCULAR; INTRAVENOUS at 09:14

## 2018-11-19 RX ADMIN — LETROZOLE 2.5 MG: 2.5 TABLET ORAL at 19:23

## 2018-11-19 RX ADMIN — Medication 0.2 MG: at 10:31

## 2018-11-19 RX ADMIN — Medication 5 MG: at 08:05

## 2018-11-19 RX ADMIN — LITHIUM CARBONATE 300 MG: 300 CAPSULE, GELATIN COATED ORAL at 19:23

## 2018-11-19 NOTE — ANESTHESIA CARE TRANSFER NOTE
Patient: Lennie Mar    Procedure(s):  Left Mastectomy, Left [place] Lymph Node Biopsy    Diagnosis: Malignant Neoplasm Of Left Breast   Diagnosis Additional Information: No value filed.    Anesthesia Type:   No value filed.     Note:  Airway :Face Mask  Patient transferred to:PACU  Handoff Report: Identifed the Patient, Identified the Reponsible Provider, Reviewed the pertinent medical history, Discussed the surgical course, Reviewed Intra-OP anesthesia mangement and issues during anesthesia, Set expectations for post-procedure period and Allowed opportunity for questions and acknowledgement of understanding      Vitals: (Last set prior to Anesthesia Care Transfer)    CRNA VITALS  11/19/2018 0930 - 11/19/2018 1000      11/19/2018             Resp Rate (observed): (!)  1                Electronically Signed By: DOTTIE Mohamud CRNA  November 19, 2018  10:00 AM

## 2018-11-19 NOTE — IP AVS SNAPSHOT
[address] Observation [address] [address]    Phone:  [phone]    Fax:  [phone]                                       After Visit Summary   11/19/2018    Lennie Mar    MRN: [id number]           After Visit Summary Signature Page     I have received my discharge instructions, and my questions have been answered. I have discussed any challenges I see with this plan with the nurse or doctor.    ..........................................................................................................................................  Patient/Patient Representative Signature      ..........................................................................................................................................  Patient Representative Print Name and Relationship to Patient    ..................................................               ................................................  Date                                   Time    ..........................................................................................................................................  Reviewed by Signature/Title    ...................................................              ..............................................  Date                                               Time          22EPIC Rev 08/18

## 2018-11-19 NOTE — OP NOTE
Procedure Date: 11/19/2018      PREOPERATIVE DIAGNOSIS:  Left breast cancer.      POSTOPERATIVE DIAGNOSIS:  Left breast cancer.      PROCEDURE:  Left mastectomy, lymphatic mapping and left axillary sentinel lymph node biopsy x2.      ATTENDING SURGEON:  Nahun Betancourt MD      RESIDENT SURGEON:  Nasir Quiros MD      ANESTHESIA:  General with endotracheal intubation.      INDICATIONS:  The patient is a 89-year-old woman who presented with a T3N0M0 left breast cancer.  She now presents for surgical treatment.      PROCEDURE IN DETAIL:  After informed consent, the patient was brought to the operating room, given a general anesthetic and endotracheally intubated.  I injected technetium sulfur colloid and isosulfan blue beneath her left areola.  She was prepped and draped in the usual fashion.  I made a wide elliptical incision to include the nipple areolar complex.  The Bovie cautery was used to incise subcutaneous tissues.  Skin flaps were raised sharply with the Bovie cautery.  A superior skin flap was raised to the clavicle.  A medial skin flap was raised to the lateral border of the sternum and an inferior skin flap was raised to inferior mammary fold.  Next, the breast and pectoralis fascia removed the pectoralis major muscle from superior to inferior and from medial to lateral.  As the specimen was dissected off the lateral border of the pectoralis major muscle it was oriented, divided and sent to pathology.  Strict hemostasis was obtained.  We identified a blue-stained radioactive lymph node.  [place] lymph node #1 was removed and had ex vivo counts of 408 counts per second.  We identified a second blue radioactive lymph node.  [place] lymph node #2 had ex vivo counts of 59 counts per second.  After removal of the second sentinel lymph node, there were no additional blue radioactive or palpable lymph nodes.  Strict hemostasis was obtained with the Bovie cautery.  A 15 round Sen-Tariq drain was placed in the  anterior axillary line and secured to the skin with a 3-0 nylon suture.  The dermis was closed with interrupted 3-0 Vicryl suture.  The skin was closed with a running 4-0 PDS subcuticular stitch.  Dermabond was placed and the patient was taken to recovery room in stable condition.         ALEXANDER SONG MD             D: 2018   T: 2018   MT: KELLY      Name:     MANI ANDERSON   MRN:      [id number]        Account:        [id number]   :      1927           Procedure Date: 2018      Document: [id number]

## 2018-11-19 NOTE — IP AVS SNAPSHOT
MRN:[id number]                      After Visit Summary   11/19/2018    Lennie Mar    MRN: [id number]           Thank you!     Thank you for choosing [facility] for your care. Our goal is always to provide you with excellent care. Hearing back from our patients is one way we can continue to improve our services. Please take a few minutes to complete the written survey that you may receive in the mail after you visit with us. Thank you!        Patient Information     Date Of Birth          11/28/1927        Designated Caregiver       Most Recent Value    Caregiver    Will someone help with your care after discharge? yes    Name of designated caregiver ave Garner- daughter in law    Phone number of caregiver [phone]      About your hospital stay     You were admitted on:  November 19, 2018 You last received care in the:  Unit 6D Observation [facility]    You were discharged on:  November 20, 2018       Who to Call     For medical emergencies, please call 911.  For non-urgent questions about your medical care, please call your primary care provider or clinic, [phone]  For questions related to your surgery, please call your surgery clinic        Attending Provider     Provider Specialty    Nahun Betancourt MD General Surgery       Primary Care Provider Office Phone # Fax #    Ty Quintanilla [phone]      After Care Instructions     Diet Instructions       Resume pre-procedure diet            Discharge Instructions       Follow up appointment as instructed by Surgeon and or RN (12/6 with Dr. Betancourt)            Discharge Instructions       From Dr Betancourt:    1. Patient to follow up with appointment in 2 weeks with Dr. Betancourt.   2. Do not drive while taking narcotic pain medication   3. May take plain Tylenol as needed for pain.   4. Caution with putting ice on the incision as it will be numb you will not realize it if you leave the ice for too long and can  "damage your skin.  5. If you develop any fever/chills, worsening pain, redness, swelling, or drainage from your wound please call the clinic (Monday through Friday 8:00am-5:00pm [phone] Chiquita MATTHEWS) or on-call surgical oncology resident (nights and weekends [phone] and ask \"I would like to page the Surgical Oncology Resident on call.\")   6. No lifting over 5-10 lbs and no strenuous physical activity for 6 weeks.   7. Monitor the drain output. Record the drain output per 24 hours.   Drain care:  Please keep drain site clean and dry.   Okay to shower with drains.  Please call the clinic (see phone numbers above) if:  Your drain falls out.  The stitch that is holding the drain in place at the skin is coming loose or missing.  The drainage fluid is very thick and/or has a bad odor.  The squeeze bulb does not stay collapsed.  The drainage suddenly stops or dramatically decreases when the drain has been having steady amounts of drainage.  Please keep a log of the drainage amounts every time you empty the drain.    Please \"strip\" the drain 3 times per day:  Wash your hands with soap and water and dry.  Gently squeeze the tubing if you see a clot to loosen it up.   and pinch the drain where it comes out of the skin with one hand, to steady it.  With the other hand,  and pinch the drain and slide your fingers down the tubing, towards the drainage bulb.  Then release your  on the end where the tube comes out of your body, followed by releasing your  at the end of the drainage bulb.    Repeat several times.  It may be easier to 'strip' the drain with an alcohol swab or with hand cleanser on the hand that is moving along the tube.  Wash your hands again.            No Alcohol       For 24 hours post procedure and while taking narcotics            No driving or operating machinery        Until able to react safely and quickly and not taking narcotics            Shower       No shower for 48 hours post " procedure. May shower Postoperative Day (POD)  2                  Your next 10 appointments already scheduled     Dec 06, 2018 10:30 AM CST   (Arrive by 10:15 AM)   Post-Op with Nahun Betancourt MD   [facility] ([facility])    [address] [place] [address]  [address]  [facility] [address]   [phone]            Dec 11, 2018 10:45 AM CST   [facility] Lab Draw with  [facility] LAB DRAW   [facility] Lab Draw ([facility])    [address] [place] [address]  [address]  [facility] [address]   [phone]            Dec 11, 2018 11:15 AM CST   (Arrive by 11:00 AM)   Return Visit with Perlita Malik MD   [facility] ([facility])    [address] [place] [address]  [address]  [facility] [address]   [phone]            Dec 11, 2018 11:30 AM CST   (Arrive by 11:15 AM)   [employer] with [email or web address]   [facility] ([facility])    [address] [place] [address]  3rd Floor  [facility] [address]   [phone]           1.  Please bring or wear a comfortable two-piece outfit. 2.  You may eat, drink and take your normal medicines. 3.  For any questions that cannot be answered, please contact the ordering physician 4.  Please do not wear perfumes or scented lotions on the day of your exam.            Dec 11, 2018  1:00 PM CST   Infusion 120 with  ONCOLOGY INFUSION, UC 18 ATC   [facility] ([facility])    [address] [place] [address]  [address]  [facility] [address]   [phone]              Pending Results     Date and Time Order Name Status Description    11/19/2018 0848 Surgical pathology exam In process             Statement of Approval     Ordered          11/20/18 0702  I have reviewed and agree with all the recommendations and orders detailed in this document.  EFFECTIVE NOW     Approved and electronically signed by:  Mini Lua PA-C            "  Admission Information     Date & Time Provider Department Dept. Phone    2018 Nahun Betancourt MD Unit 6D Observation [facility] [place] [phone]      Your Vitals Were     Blood Pressure Pulse Temperature Respirations Height Weight    135/58 (BP Location: Right arm) 67 98.1  F (36.7  C) (Oral) 16 1.549 m (5' 1\") 75.5 kg (166 lb 7.2 oz)    Pulse Oximetry BMI (Body Mass Index)                96% 31.45 kg/m2          [employer] Information     [facility] lets you send messages to your doctor, view your test results, renew your prescriptions, schedule appointments and more. To sign up, go to [email or web address] . Click on \"Log in\" on the left side of the screen, which will take you to the Welcome page. Then click on \"Sign up Now\" on the right side of the page.     You will be asked to enter the access code listed below, as well as some personal information. Please follow the directions to create your username and password.     Your access code is: [email or web address]  Expires: 2018  9:18 AM     Your access code will  in 90 days. If you need help or a new code, please call your [facility] clinic or [phone].        Care EveryWhere ID     This is your Care EveryWhere ID. This could be used by other organizations to access your [facility] medical records  [id number]        Equal Access to Services     [facility] BRAD AH: Hadii dottie tavarezo Sojean marie, waaxda luqadaha, qaybta kaalmada eshada, mellisa palacios. So [facility] [phone].    ATENCIÓN: Si habla español, tiene a foster disposición servicios gratuitos de asistencia lingüística. Llame al [phone].    We comply with applicable federal civil rights laws and [place] laws. We do not discriminate on the basis of race, color, national origin, age, disability, sex, sexual orientation, or gender identity.               Review of your medicines      START taking        Dose / Directions    acetaminophen 325 MG tablet   Commonly known as:  " TYLENOL   Used for:  Postoperative pain        Dose:  975 mg   Take 3 tablets (975 mg) by mouth every 8 hours as needed for mild pain   Quantity:  50 tablet   Refills:  0       oxyCODONE IR 5 MG tablet   Commonly known as:  ROXICODONE   Used for:  Postoperative pain        Dose:  2.5-5 mg   Take 0.5-1 tablets (2.5-5 mg) by mouth every 6 hours as needed   Quantity:  10 tablet   Refills:  0         CONTINUE these medicines which may have CHANGED, or have new prescriptions. If we are uncertain of the size of tablets/capsules you have at home, strength may be listed as something that might have changed.        Dose / Directions    letrozole 2.5 MG tablet   Commonly known as:  FEMARA   This may have changed:  when to take this   Used for:  Malignant neoplasm of upper-inner quadrant of left breast in female, estrogen receptor positive (H), Malignant neoplasm of upper-outer quadrant of left breast in female, estrogen receptor positive (H)        Dose:  2.5 mg   Take 1 tablet (2.5 mg) by mouth daily   Quantity:  30 tablet   Refills:  11         CONTINUE these medicines which have NOT CHANGED        Dose / Directions    ARIPiprazole 5 MG tablet   Commonly known as:  ABILIFY        5 mg tablet in the morning   Refills:  3       buPROPion 300 MG 24 hr tablet   Commonly known as:  WELLBUTRIN XL        300 mg tablet in the mornnig   Refills:  2       * lithium 150 MG capsule   Used for:  Bipolar disorder, unspecified (H)        Dose:  150 mg   Take 1 capsule by mouth daily (with breakfast).   Quantity:  30 capsule   Refills:  1       * lithium 300 MG capsule   Used for:  Bipolar disorder, unspecified (H)        Dose:  300 mg   Take 1 capsule by mouth daily (with dinner).   Quantity:  30 capsule   Refills:  1       order for DME   Used for:  Edema        Equipment being ordered: compression stocking knee high 20-30mmhg   Quantity:  2 Package   Refills:  0       pramipexole 1 MG tablet   Commonly known as:  MIRAPEX        Dose:  1  mg   Take 1 mg by mouth every morning   Refills:  0       propranolol 20 MG tablet   Commonly known as:  INDERAL   Used for:  Tremor        Dose:  20 mg   Take 1 tablet (20 mg) by mouth 2 times daily   Quantity:  180 tablet   Refills:  3       sertraline 50 MG tablet   Commonly known as:  ZOLOFT        Dose:  25 mg   Take 25 mg by mouth every morning   Refills:  0       VITAMIN B 12 PO        Take by mouth every morning   Refills:  0       * Notice:  This list has 2 medication(s) that are the same as other medications prescribed for you. Read the directions carefully, and ask your doctor or other care provider to review them with you.         Where to get your medicines      These medications were sent to [facility] [place], [place] - [address]  [address] [address]     Phone:  [phone]     acetaminophen 325 MG tablet         Some of these will need a paper prescription and others can be bought over the counter. Ask your nurse if you have questions.     Bring a paper prescription for each of these medications     oxyCODONE IR 5 MG tablet                Protect others around you: Learn how to safely use, store and throw away your medicines at www.disposemymeds.org.        Information about OPIOIDS     PRESCRIPTION OPIOIDS: WHAT YOU NEED TO KNOW   We gave you an opioid (narcotic) pain medicine. It is important to manage your pain, but opioids are not always the best choice. You should first try all the other options your care team gave you. Take this medicine for as short a time (and as few doses) as possible.    Some activities can increase your pain, such as bandage changes or therapy sessions. It may help to take your pain medicine 30 to 60 minutes before these activities. Reduce your stress by getting enough sleep, working on hobbies you enjoy and practicing relaxation or meditation. Talk to your care team about ways to manage your pain beyond prescription  opioids.    These medicines have risks:    DO NOT drive when on new or higher doses of pain medicine. These medicines can affect your alertness and reaction times, and you could be arrested for driving under the influence (DUI). If you need to use opioids long-term, talk to your care team about driving.    DO NOT operate heavy machinery    DO NOT do any other dangerous activities while taking these medicines.    DO NOT drink any alcohol while taking these medicines.     If the opioid prescribed includes acetaminophen, DO NOT take with any other medicines that contain acetaminophen. Read all labels carefully. Look for the word  acetaminophen  or  Tylenol.  Ask your pharmacist if you have questions or are unsure.    You can get addicted to pain medicines, especially if you have a history of addiction (chemical, alcohol or substance dependence). Talk to your care team about ways to reduce this risk.    All opioids tend to cause constipation. Drink plenty of water and eat foods that have a lot of fiber, such as fruits, vegetables, prune juice, apple juice and high-fiber cereal. Take a laxative (Miralax, milk of magnesia, Colace, Senna) if you don t move your bowels at least every other day. Other side effects include upset stomach, sleepiness, dizziness, throwing up, tolerance (needing more of the medicine to have the same effect), physical dependence and slowed breathing.    Store your pills in a secure place, locked if possible. We will not replace any lost or stolen medicine. If you don t finish your medicine, please throw away (dispose) as directed by your pharmacist. The [place] Pollution Control Agency has more information about safe disposal: https://www.pca.[facility].[place].us/living-green/managing-unwanted-medications             Medication List: This is a list of all your medications and when to take them. Check marks below indicate your daily home schedule. Keep this list as a reference.      Medications            Morning Afternoon Evening Bedtime As Needed    acetaminophen 325 MG tablet   Commonly known as:  TYLENOL   Take 3 tablets (975 mg) by mouth every 8 hours as needed for mild pain   Last time this was given:  975 mg on 11/19/2018  2:36 PM                                ARIPiprazole 5 MG tablet   Commonly known as:  ABILIFY   5 mg tablet in the morning                                buPROPion 300 MG 24 hr tablet   Commonly known as:  WELLBUTRIN XL   300 mg tablet in the mornnig                                letrozole 2.5 MG tablet   Commonly known as:  FEMARA   Take 1 tablet (2.5 mg) by mouth daily   Last time this was given:  2.5 mg on 11/19/2018  7:23 PM                                * lithium 150 MG capsule   Take 1 capsule by mouth daily (with breakfast).   Last time this was given:  300 mg on 11/19/2018  7:23 PM                                * lithium 300 MG capsule   Take 1 capsule by mouth daily (with dinner).   Last time this was given:  300 mg on 11/19/2018  7:23 PM                                order for DME   Equipment being ordered: compression stocking knee high 20-30mmhg                                oxyCODONE IR 5 MG tablet   Commonly known as:  ROXICODONE   Take 0.5-1 tablets (2.5-5 mg) by mouth every 6 hours as needed                                pramipexole 1 MG tablet   Commonly known as:  MIRAPEX   Take 1 mg by mouth every morning                                propranolol 20 MG tablet   Commonly known as:  INDERAL   Take 1 tablet (20 mg) by mouth 2 times daily   Last time this was given:  20 mg on 11/19/2018  7:23 PM                                sertraline 50 MG tablet   Commonly known as:  ZOLOFT   Take 25 mg by mouth every morning                                VITAMIN B 12 PO   Take by mouth every morning                                * Notice:  This list has 2 medication(s) that are the same as other medications prescribed for you. Read the directions carefully, and ask your doctor or  other care provider to review them with you.

## 2018-11-19 NOTE — BRIEF OP NOTE
[facility], [place]    Brief Operative Note    Pre-operative diagnosis: Malignant Neoplasm Of Left Breast   Post-operative diagnosis * No post-op diagnosis entered *  Procedure: Procedure(s):  Left Mastectomy, Left [place] Lymph Node Biopsy  Surgeon: Surgeon(s) and Role:     * Nahun Betancourt MD - Primary     * Nasir Quiros MD - Resident - Assisting  Anesthesia: General   Estimated blood loss: 20cc  Drains: Sen-Tariq  Specimens:   ID Type Source Tests Collected by Time Destination   A : Left Breast Stitch @ 12 clock  Tissue Breast, Left SURGICAL PATHOLOGY EXAM Nahun Betancourt MD 11/19/2018  8:47 AM    B : Left Axillary Sentinal Lymphnode # 1 Tissue Breast, Left SURGICAL PATHOLOGY EXAM Nahun Betancourt MD 11/19/2018  8:57 AM    C : Left Axillary Sentinal Lymphnode # 2 Tissue Breast, Left SURGICAL PATHOLOGY EXAM Nahun Betancourt MD 11/19/2018  8:57 AM      Findings:   None unexpected.  Complications: None.  Implants: None.

## 2018-11-19 NOTE — ANESTHESIA POSTPROCEDURE EVALUATION
Anesthesia POST Procedure Evaluation    Patient: Lennie Mar   MRN:     [id number] Gender:   female   Age:    89 year old :      1927        Preoperative Diagnosis: Malignant Neoplasm Of Left Breast    Procedure(s):  Left Mastectomy, Left [place] Lymph Node Biopsy   Postop Comments: No value filed.       Anesthesia Type:  Not documented    Reportable Event: NO     PAIN: Uncomplicated   Sign Out status: Comfortable, Well controlled pain     PONV: No PONV   Sign Out status:  No Nausea or Vomiting     Neuro/Psych: Uneventful perioperative course   Sign Out Status: Preoperative baseline; Age appropriate mentation     Airway/Resp.: Uneventful perioperative course   Sign Out Status: Non labored breathing, age appropriate RR; Resp. Status within EXPECTED Parameters     CV: Uneventful perioperative course   Sign Out status: Appropriate BP and perfusion indices; Appropriate HR/Rhythm     Disposition:   Sign Out in:  PACU  Disposition:  Phase II; Home  Recovery Course: Uneventful  Follow-Up: Not required           Last Anesthesia Record Vitals:  CRNA VITALS  2018 0930 - 2018 1030      2018             Resp Rate (observed): (!)  1          Last PACU/Preop Vitals:  Vitals:    18 1030 18 1045 18 1100   BP: 138/68 144/65 148/71   Pulse:      Resp: 16 14 14   Temp:      SpO2: 97% 98% 98%         Electronically Signed By: Westley Rowell MD, 2018, 11:18 AM

## 2018-11-19 NOTE — OR NURSING
Dr. Rowell (anesthesia) at bedside in PACU, states OK to give patient additional 1 mg IV dilaudid (states he will enter order). Dr. Rowell also states patient may transfer out of PACU, he will enter anesthesia sign out.

## 2018-11-20 VITALS
BODY MASS INDEX: 31.43 KG/M2 | RESPIRATION RATE: 16 BRPM | WEIGHT: 166.45 LBS | HEART RATE: 67 BPM | OXYGEN SATURATION: 96 % | DIASTOLIC BLOOD PRESSURE: 58 MMHG | SYSTOLIC BLOOD PRESSURE: 135 MMHG | TEMPERATURE: 98.1 F | HEIGHT: 61 IN

## 2018-11-20 PROCEDURE — 99024 POSTOP FOLLOW-UP VISIT: CPT | Performed by: PHYSICIAN ASSISTANT

## 2018-11-20 RX ORDER — OXYCODONE HYDROCHLORIDE 5 MG/1
2.5-5 TABLET ORAL EVERY 6 HOURS PRN
Qty: 10 TABLET | Refills: 0 | Status: SHIPPED | OUTPATIENT
Start: 2018-11-20 | End: 2019-02-18

## 2018-11-20 RX ORDER — ACETAMINOPHEN 325 MG/1
975 TABLET ORAL EVERY 8 HOURS PRN
Qty: 50 TABLET | Refills: 0 | Status: SHIPPED | OUTPATIENT
Start: 2018-11-20 | End: 2024-08-08

## 2018-11-20 NOTE — PROGRESS NOTES
"POST OP CHECK  11/19/2018    Lennie Mar is a 89 year old female with h/o malignant neoplasm of left breast now POD #0 s/p left mastectomy, left sentinel lymph node biopsy.    Pt reports pain is controlled. Tolerating sips and chips. Voiding. No new issues.    /52 (BP Location: Right arm)  Pulse 67  Temp 98.2  F (36.8  C) (Oral)  Resp 16  Ht 1.549 m (5' 1\")  Wt 75.5 kg (166 lb 7.2 oz)  SpO2 91%  BMI 31.45 kg/m2  General: Alert, interactive, & in NAD  Resp: Non-labored on 2L NC  Cardiac: Regular rate; extremities warm   Abdomen: Soft, non-tender, non-distended  Incision: dressings C/D/I. Compression wrap in place, JORGE draining serosanguinous fluid  Extremities: No LE edema or obvious joint abnormalities    EBL 20      A/P: No acute post-op issues. Continue plan of care per primary team. Please call with questions.     - Ok for regular diet    Yolanda Bowman MD  General Surgery PGY-1  [phone]      "

## 2018-11-20 NOTE — PROGRESS NOTES
"Pt is s/p Left Mastectomy with Lymph Node Biopsies. Pt has JORGE drain from left chest-see I and O flowsheets for output. Pt's chest wrapped with ace wrap, UTV incisions but no visible drainage present. Pt denies pain at this time. Left arm tender to the touch, pulses palpable. Pt voided spontaneously overnight, tolerating oral hydration, and walked with assist. Pt moved from 19-2 to 16-1. Capnography is good, sats 95% on RA.   /56 (BP Location: Right arm)  Pulse 67  Temp 98.5  F (36.9  C) (Oral)  Resp 16  Ht 1.549 m (5' 1\")  Wt 75.5 kg (166 lb 7.2 oz)  SpO2 92%  BMI 31.45 kg/m2    "

## 2018-11-20 NOTE — PROGRESS NOTES
Pt refused capnography the rest of the night. Pt values were all good on capno, 95% O2 on RA. Will continue to monitor on continuous pulse ox.

## 2018-11-20 NOTE — PROGRESS NOTES
Vitals stable.  Pain control adequate with scheduled tylenol.  Tolerating regular diet.  Good bowel sounds.  Dressing to left chest cdi.  Ace wrap intact.  L radial pulse 2+.  cmsin intact.  JORGE patent.  See flowsheet.  Pt family member demonstrated proper care of JORGE drain.  Incontinent of urine at baseline.  Two incontinent episodes this shift.  Pt wearing incontience brief.  Out of bed with assist of one.  Capnography on.  IPI 10.  Instructed on IS.

## 2018-11-20 NOTE — PROGRESS NOTES
Up walking in room. Ate well for breakfast. Urinating adequate amounts. No complaint of discomfort. Belongings packed by family. Adequate for discharge. Will be discharged to home with family.

## 2018-11-20 NOTE — PROGRESS NOTES
"Surgery Progress Note    No events overnight; pain well-controlled; tolerating regular diet without nausea (ate two bowls of tomato soup).    /61 (BP Location: Right arm)  Pulse 67  Temp 98.4  F (36.9  C) (Oral)  Resp 16  Ht 1.549 m (5' 1\")  Wt 75.5 kg (166 lb 7.2 oz)  SpO2 94%  BMI 31.45 kg/m2  NAD, pleasant  On room air  Incision c/d/i closed with dermabond.  Flaps healthy.  No underlying masses or swelling.  JORGE with serosanguinous output    A/p 89F s/p L mastectomy, slnb POD1    -ok for discharge home today  -drain teaching to daughter complete  -follow-up with Dr. Betancourt on 12/6 already scheduled.    Will discuss with staff.    Nasir Quiros MD  [email or web address]  [id number]  "

## 2018-11-20 NOTE — PLAN OF CARE
Problem: Patient Care Overview  Goal: Plan of Care/Patient Progress Review  Patient discharge via wheelchair with all belongings. She understands and agrees with all discharge instructions. PIV removed. Patient discharged.

## 2018-11-20 NOTE — DISCHARGE SUMMARY
[facility] Discharge Summary    Lennie Mar MRN# [id number]   Age: 89 year old YOB: 1927     Date of Admission:  11/19/2018  Date of Discharge::  11/20/2018  Admitting Physician:  Nahun Betancourt MD  Discharge Physician:  Nahun Betancourt MD     PCP:  Ty Quintanilla    Disposition: Patient discharged to home in stable condition.    Admission Diagnosis:  Left breast cancer  Chronic kidney disease  Hyperparathyroidism  Subdural hygroma  Depression  Anxiety  History of smoking  Memory loss    Discharge Diagnosis:  Left breast cancer  Chronic kidney disease  Hyperparathyroidism  Subdural hygroma  Depression  Anxiety  History of smoking  Memory loss    Discharge medications  Current Discharge Medication List      START taking these medications    Details   acetaminophen (TYLENOL) 325 MG tablet Take 3 tablets (975 mg) by mouth every 8 hours as needed for mild pain  Qty: 50 tablet, Refills: 0    Associated Diagnoses: Postoperative pain      oxyCODONE IR (ROXICODONE) 5 MG tablet Take 0.5-1 tablets (2.5-5 mg) by mouth every 6 hours as needed  Qty: 10 tablet, Refills: 0    Associated Diagnoses: Postoperative pain         CONTINUE these medications which have NOT CHANGED    Details   ARIPiprazole (ABILIFY) 5 MG tablet 5 mg tablet in the morning  Refills: 3      buPROPion (WELLBUTRIN XL) 300 MG 24 hr tablet 300 mg tablet in the mornnig  Refills: 2      Cyanocobalamin (VITAMIN B 12 PO) Take by mouth every morning      letrozole (FEMARA) 2.5 MG tablet Take 1 tablet (2.5 mg) by mouth daily  Qty: 30 tablet, Refills: 11    Associated Diagnoses: Malignant neoplasm of upper-inner quadrant of left breast in female, estrogen receptor positive (H); Malignant neoplasm of upper-outer quadrant of left breast in female, estrogen receptor positive (H)      !! lithium 150 MG capsule Take 1 capsule by mouth daily (with breakfast).  Qty: 30 capsule, Refills: 1    Associated Diagnoses: Bipolar  "disorder, unspecified (H)      !! lithium 300 MG capsule Take 1 capsule by mouth daily (with dinner).  Qty: 30 capsule, Refills: 1    Associated Diagnoses: Bipolar disorder, unspecified (H)      pramipexole (MIRAPEX) 1 MG tablet Take 1 mg by mouth every morning       propranolol (INDERAL) 20 MG tablet Take 1 tablet (20 mg) by mouth 2 times daily  Qty: 180 tablet, Refills: 3    Associated Diagnoses: Tremor      sertraline (ZOLOFT) 50 MG tablet Take 25 mg by mouth every morning       ORDER FOR DME Equipment being ordered: compression stocking knee high 20-30mmhg  Qty: 2 Package, Refills: 0    Associated Diagnoses: Edema       !! - Potential duplicate medications found. Please discuss with provider.        Follow up, Special Instructions:  After Care     Future Labs/Procedures    Diet Instructions     Comments:    Resume pre-procedure diet    Discharge Instructions     Comments:    Follow up appointment as instructed by Surgeon and or RN (12/6 with Dr. Betancourt)    Discharge Instructions     Comments:    From Dr Betancourt:    1. Patient to follow up with appointment in 2 weeks with Dr. Betancourt.   2. Do not drive while taking narcotic pain medication   3. May take plain Tylenol as needed for pain.   4. Caution with putting ice on the incision as it will be numb you will not realize it if you leave the ice for too long and can damage your skin.  5. If you develop any fever/chills, worsening pain, redness, swelling, or drainage from your wound please call the clinic (Monday through Friday 8:00am-5:00pm [phone] Chiquita MATTHEWS) or on-call surgical oncology resident (nights and weekends [phone] and ask \"I would like to page the Surgical Oncology Resident on call.\")   6. No lifting over 5-10 lbs and no strenuous physical activity for 6 weeks.   7. Monitor the drain output. Record the drain output per 24 hours.   Drain care:  Please keep drain site clean and dry.   Okay to shower with drains.  Please call the clinic (see phone " "numbers above) if:  Your drain falls out.  The stitch that is holding the drain in place at the skin is coming loose or missing.  The drainage fluid is very thick and/or has a bad odor.  The squeeze bulb does not stay collapsed.  The drainage suddenly stops or dramatically decreases when the drain has been having steady amounts of drainage.  Please keep a log of the drainage amounts every time you empty the drain.    Please \"strip\" the drain 3 times per day:  Wash your hands with soap and water and dry.  Gently squeeze the tubing if you see a clot to loosen it up.   and pinch the drain where it comes out of the skin with one hand, to steady it.  With the other hand,  and pinch the drain and slide your fingers down the tubing, towards the drainage bulb.  Then release your  on the end where the tube comes out of your body, followed by releasing your  at the end of the drainage bulb.    Repeat several times.  It may be easier to 'strip' the drain with an alcohol swab or with hand cleanser on the hand that is moving along the tube.  Wash your hands again.    No Alcohol     Comments:    For 24 hours post procedure and while taking narcotics    No driving or operating machinery      Comments:    Until able to react safely and quickly and not taking narcotics    Shower     Comments:    No shower for 48 hours post procedure. May shower Postoperative Day (POD)  2        Procedures:  11/19/18 Dr. Betancourt  Left mastectomy, lymphatic mapping and left axillary sentinel lymph node biopsy x2.     Consultations:  None    Brief HPI:  Lennie Mar is a 89-year-old woman who presented with a [id number] left breast cancer.  She now presents for surgical treatment.     Hospital Course:  The patient was admitted and underwent the above procedure. The patient tolerated the procedure well. The patient recovered well with no post-operative complications. Prior to discharge pain was controlled with oral pain medication and the " patient was able to ambulate and void without difficulty. The patient received appropriate education post operatively including drain teaching. On POD #1 the patient was discharged to home. She was discharged with the surgical JORGE drain. She will follow up with Dr. Betancourt in 2 weeks.     Surgical pathology  Pending     Mini Lua PA-C

## 2018-11-21 ENCOUNTER — TELEPHONE (OUTPATIENT)
Dept: ONCOLOGY | Facility: CLINIC | Age: 83
End: 2018-11-21

## 2018-11-21 ENCOUNTER — TELEPHONE (OUTPATIENT)
Dept: SURGERY | Facility: CLINIC | Age: 83
End: 2018-11-21

## 2018-11-21 NOTE — TELEPHONE ENCOUNTER
Patient's daughter called back saying she had follow up questions regarding a conversation she had with someone from Dr. Betancourt's team. Unsure of who called, believed this may be Mini. Paged Mini that family is requesting another call.     Mini returned the page and confirmed she did call the patient and family and will call them back.

## 2018-11-21 NOTE — TELEPHONE ENCOUNTER
POST-OP CALL  Nov 21, 2018    Lennie Mar is a 89 year old female s/p left mastectomy and sentinel lymph node biopsy.     Patient called with swelling above the ace wrap with some warmth. No erythema or bruising. She has mild erythema around SLNBx site. No fever. Pain controlled. Discussed monitoring the swelling. They will call if this worsens or she developed fever, increased pain or erythema. Ok to remove acewrap. Ok to shower. Ok to use ice for less than 5 minutes at a time.     Fevers/chills: Patient denies fever/chills.  Eating/drinking: Patient is able to eat and drink without any complaints.   Bowel habits: Patient reports having a normal bowel movement.  Urine output: Voiding without difficulty.   Drains (JORGE): site covered with gauze, denies drainage from around site. Drainage:  light pink,   Incisions: Patient denies any signs and symptoms of infection. No erythema, swelling or drainag  Pain: Patient reports pain is controlled.  Narcotics: The patient denies taking any pain medication.   Patient will call with any questions or concerns.    Mini Lua PA-C

## 2018-11-21 NOTE — TELEPHONE ENCOUNTER
Lennie's daughter called with a few concerns:     1. Compression dressing to L breast around torso - how long should this be left on?     2. Swollen and warm area several inches in length superior to L breast mastectomy site near L axilla.     3. Home care needed next week for post op cares    Lennie had L breast mastectomy and sentinel node biopsy with Dr. Betancourt on 11/19/18. JORGE drain is patent and draining well, drainage has changed from red to pink. Area of warmth and swelling described above. Currently afebrile. No bleeding or purulent drainage from L mastectomy site.     Paged on call resident Franko Chi.     Paged returned from OR nurse, Franko is scrubbing into surgery. Advised to outreach to Dr. Betancourt's team in clinic or page Dr. Betancourt in OR 8 at *[phone]

## 2018-11-26 ENCOUNTER — TELEPHONE (OUTPATIENT)
Dept: ONCOLOGY | Facility: CLINIC | Age: 83
End: 2018-11-26

## 2018-11-26 LAB — COPATH REPORT: NORMAL

## 2018-11-26 NOTE — TELEPHONE ENCOUNTER
Spoke with pt's daughter, Ca who said pt is doing well after left mastectomy/sentinel lymph node biopsy on 11/19. She reports april drain output @ < 30 cc's in 24 hours for several days. The plan is for pt to have post-op appt with EVELINE Hankins on 11/28 to remove april drain. Pt's daughter believes she can manage without home health assistance if her drain is out.

## 2018-11-26 NOTE — PROGRESS NOTES
"FOLLOW-UP  Nov 28, 2018    Lennie Mar is a 89 year old female who returns for a post operative visit for JORGE drain removal.     HPI:    She underwent a left mastectomy and left axillary sentinel lymph node biopsy on 11/19/18 with Dr. Betancourt for left breast cancer    Since the procedure, she has done well. She is taking tylenol for some axillary discomfort. She denies any swelling, erythema, bruising or fever. Her JORGE drain output has been less than 30 ml per day. They are stripping the drain.     /71  Pulse 68  Temp 97.6  F (36.4  C) (Tympanic)  Resp 16  Ht 1.549 m (5' 1\")  Wt 75.5 kg (166 lb 8 oz)  SpO2 99%  Breastfeeding? No  BMI 31.46 kg/m2   Physical Exam  Left chest incision is clean, dry and intact. There is no swelling, erythema, or bruising. The drain output is serosanguinous. The drain was stripped. There is about 15 ml in the drain and she reports that has been in there for several days.     ASSESSMENT:    Lennie Mar is a 89 year old female s/p left mastectomy and sentinel lymph node biopsy.     PLAN:  1. JORGE drain was removed.   2. Follow up with Dr. Betancourt as scheduled.     Mini Lua PA-C  "

## 2018-11-27 LAB — COPATH REPORT: NORMAL

## 2018-11-28 ENCOUNTER — OFFICE VISIT (OUTPATIENT)
Dept: SURGERY | Facility: CLINIC | Age: 83
End: 2018-11-28
Attending: PHYSICIAN ASSISTANT
Payer: MEDICARE

## 2018-11-28 VITALS
TEMPERATURE: 97.6 F | HEIGHT: 61 IN | SYSTOLIC BLOOD PRESSURE: 143 MMHG | DIASTOLIC BLOOD PRESSURE: 71 MMHG | WEIGHT: 166.5 LBS | BODY MASS INDEX: 31.43 KG/M2 | RESPIRATION RATE: 16 BRPM | OXYGEN SATURATION: 99 % | HEART RATE: 68 BPM

## 2018-11-28 DIAGNOSIS — C50.912 MALIGNANT NEOPLASM OF LEFT FEMALE BREAST, UNSPECIFIED ESTROGEN RECEPTOR STATUS, UNSPECIFIED SITE OF BREAST (H): Primary | ICD-10-CM

## 2018-11-28 PROCEDURE — 40000114 ZZH STATISTIC NO CHARGE CLINIC VISIT

## 2018-11-28 PROCEDURE — 99024 POSTOP FOLLOW-UP VISIT: CPT | Mod: ZP | Performed by: PHYSICIAN ASSISTANT

## 2018-11-28 PROCEDURE — G0463 HOSPITAL OUTPT CLINIC VISIT: HCPCS | Mod: ZF

## 2018-11-28 ASSESSMENT — PAIN SCALES - GENERAL: PAINLEVEL: MILD PAIN (2)

## 2018-11-28 NOTE — LETTER
"11/28/2018       RE: Lennie Mar  [address]  [facility] [address]     Dear Colleague,    Thank you for referring your patient, Lnenie Mar, to the [facility]. Please see a copy of my visit note below.    FOLLOW-UP  Nov 28, 2018    Lennie Mar is a 89 year old female who returns for a post operative visit for JORGE drain removal.     HPI:    She underwent a left mastectomy and left axillary sentinel lymph node biopsy on 11/19/18 with Dr. Betancourt for left breast cancer    Since the procedure, she has done well. She is taking tylenol for some axillary discomfort. She denies any swelling, erythema, bruising or fever. Her JORGE drain output has been less than 30 ml per day. They are stripping the drain.     /71  Pulse 68  Temp 97.6  F (36.4  C) (Tympanic)  Resp 16  Ht 1.549 m (5' 1\")  Wt 75.5 kg (166 lb 8 oz)  SpO2 99%  Breastfeeding? No  BMI 31.46 kg/m2   Physical Exam  Left chest incision is clean, dry and intact. There is no swelling, erythema, or bruising. The drain output is serosanguinous. The drain was stripped. There is about 15 ml in the drain and she reports that has been in there for several days.     ASSESSMENT:    Lennie Mar is a 89 year old female s/p left mastectomy and sentinel lymph node biopsy.     PLAN:  1. JORGE drain was removed.   2. Follow up with Dr. Betancourt as scheduled.     Miin Lua PA-C    "

## 2018-11-28 NOTE — NURSING NOTE
"Oncology Rooming Note    November 28, 2018 10:00 AM   Lennie Mar is a 89 year old female who presents for:    Chief Complaint   Patient presents with     Oncology Clinic Visit     Return: Post Op     Initial Vitals: /71  Pulse 68  Temp 97.6  F (36.4  C) (Tympanic)  Resp 16  Ht 1.549 m (5' 1\")  Wt 75.5 kg (166 lb 8 oz)  SpO2 99%  Breastfeeding? No  BMI 31.46 kg/m2 Estimated body mass index is 31.46 kg/(m^2) as calculated from the following:    Height as of this encounter: 1.549 m (5' 1\").    Weight as of this encounter: 75.5 kg (166 lb 8 oz). Body surface area is 1.8 meters squared.  Mild Pain (2) Comment: left side drain   No LMP recorded. Patient is postmenopausal.  Allergies reviewed: Yes  Medications reviewed: Yes    Medications: Medication refills not needed today.  Pharmacy name entered into [employer]:    [facility] - [place], [place] - [address] [facility] [place], [place] - [address] [facility] [place], [place] - [address] [address] [place] [address] - [facility] [address] AT [facility] [address]    Clinical concerns: no new concerns today Mini Lua was notified.    10 minutes for nursing intake (face to face time)     Sanjuanita Anders CMA              "

## 2018-11-28 NOTE — MR AVS SNAPSHOT
After Visit Summary   11/28/2018    Lennie Mar    MRN: [id number]           Patient Information     Date Of Birth          11/28/1927        Visit Information        Provider Department      11/28/2018 10:00 AM Mini Lua PA-C [facility]         Follow-ups after your visit        Your next 10 appointments already scheduled     Dec 06, 2018 10:30 AM CST   (Arrive by 10:15 AM)   Post-Op with Nahun Betancourt MD   [facility] ([facility])    [address] [place] [address]  [address]  [facility] [address]   [phone]            Dec 11, 2018 10:45 AM CST   [facility] Draw with  [facility] DRAW   [facility] Draw ([facility])    [address] [place] [address]  [address]  [facility] [address]   [phone]            Dec 11, 2018 11:15 AM CST   (Arrive by 11:00 AM)   Return Visit with Perlita Malik MD   [facility] ([facility])    [address] [place] [address]  [address]  [facility] [address]   [phone]            Dec 11, 2018 11:30 AM CST   (Arrive by 11:15 AM)   [employer] with [id number]   [facility])    [address] [place] [address]  3rd Floor  [facility] [address] [phone]           1.  Please bring or wear a comfortable two-piece outfit. 2.  You may eat, drink and take your normal medicines. 3.  For any questions that cannot be answered, please contact the ordering physician 4.  Please do not wear perfumes or scented lotions on the day of your exam.            Dec 11, 2018  1:00 PM CST   Infusion 120 with  ONCOLOGY INFUSION, UC 18 ATC   [facility])    [address] [place] [address]  [address] [place] [id number] [phone]              Who to contact     If you have questions or need follow up information about today's clinic visit or your  "schedule please contact [facility] directly at [phone].  Normal or non-critical lab and imaging results will be communicated to you by MyChart, letter or phone within 4 business days after the clinic has received the results. If you do not hear from us within 7 days, please contact the clinic through MyChart or phone. If you have a critical or abnormal lab result, we will notify you by phone as soon as possible.  Submit refill requests through "[employer]" or call your pharmacy and they will forward the refill request to us. Please allow 3 business days for your refill to be completed.          Additional Information About Your Visit        Care EveryWhere ID     This is your Care EveryWhere ID. This could be used by other organizations to access your [facility] medical records  [id number]        Your Vitals Were     Pulse Temperature Respirations Height Pulse Oximetry Breastfeeding?    68 97.6  F (36.4  C) (Tympanic) 16 1.549 m (5' 1\") 99% No    BMI (Body Mass Index)                   31.46 kg/m2            Blood Pressure from Last 3 Encounters:   11/28/18 143/71   11/20/18 135/58   11/08/18 145/83    Weight from Last 3 Encounters:   11/28/18 75.5 kg (166 lb 8 oz)   11/19/18 75.5 kg (166 lb 7.2 oz)   11/08/18 75.6 kg (166 lb 9.6 oz)              Today, you had the following     No orders found for display         Today's Medication Changes          These changes are accurate as of 11/28/18 10:30 AM.  If you have any questions, ask your nurse or doctor.               These medicines have changed or have updated prescriptions.        Dose/Directions    letrozole 2.5 MG tablet   Commonly known as:  FEMARA   This may have changed:  when to take this   Used for:  Malignant neoplasm of upper-inner quadrant of left breast in female, estrogen receptor positive (H), Malignant neoplasm of upper-outer quadrant of left breast in female, estrogen receptor positive (H)        Dose:  2.5 mg   Take 1 tablet " (2.5 mg) by mouth daily   Quantity:  30 tablet   Refills:  11                Primary Care Provider Office Phone # Fax #    Ty Quintanilla [phone] [address] [facility] [address]        Equal Access to Services     INDIOVALENCIA BRAD AH: Hadii dottie ross hadjennifero Soanastasiiaali, waaxda luqadaha, qaybta kaalmada adeegyada, mellisa reginaldoin hayaan anapaz posada carina palacios. So [facility] [phone].    ATENCIÓN: Si habla español, tiene a foster disposición servicios gratuitos de asistencia lingüística. Llame al [phone].    We comply with applicable federal civil rights laws and [place] laws. We do not discriminate on the basis of race, color, national origin, age, disability, sex, sexual orientation, or gender identity.            Thank you!     Thank you for choosing [facility]  for your care. Our goal is always to provide you with excellent care. Hearing back from our patients is one way we can continue to improve our services. Please take a few minutes to complete the written survey that you may receive in the mail after your visit with us. Thank you!             Your Updated Medication List - Protect others around you: Learn how to safely use, store and throw away your medicines at www.disposemymeds.org.          This list is accurate as of 11/28/18 10:30 AM.  Always use your most recent med list.                   Brand Name Dispense Instructions for use Diagnosis    acetaminophen 325 MG tablet    TYLENOL    50 tablet    Take 3 tablets (975 mg) by mouth every 8 hours as needed for mild pain    Postoperative pain       ARIPiprazole 5 MG tablet    ABILIFY     5 mg tablet in the morning        buPROPion 300 MG 24 hr tablet    WELLBUTRIN XL     300 mg tablet in the mornnig        letrozole 2.5 MG tablet    FEMARA    30 tablet    Take 1 tablet (2.5 mg) by mouth daily    Malignant neoplasm of upper-inner quadrant of left breast in female, estrogen receptor positive (H), Malignant neoplasm of  upper-outer quadrant of left breast in female, estrogen receptor positive (H)       * lithium 150 MG capsule     30 capsule    Take 1 capsule by mouth daily (with breakfast).    Bipolar disorder, unspecified (H)       * lithium 300 MG capsule     30 capsule    Take 1 capsule by mouth daily (with dinner).    Bipolar disorder, unspecified (H)       order for DME     2 Package    Equipment being ordered: compression stocking knee high 20-30mmhg    Edema       oxyCODONE 5 MG tablet    ROXICODONE    10 tablet    Take 0.5-1 tablets (2.5-5 mg) by mouth every 6 hours as needed    Postoperative pain       pramipexole 1 MG tablet    MIRAPEX     Take 1 mg by mouth every morning        propranolol 20 MG tablet    INDERAL    180 tablet    Take 1 tablet (20 mg) by mouth 2 times daily    Tremor       sertraline 50 MG tablet    ZOLOFT     Take 25 mg by mouth every morning        VITAMIN B 12 PO      Take by mouth every morning        * Notice:  This list has 2 medication(s) that are the same as other medications prescribed for you. Read the directions carefully, and ask your doctor or other care provider to review them with you.

## 2018-12-05 RX ORDER — MEPERIDINE HYDROCHLORIDE 25 MG/ML
25 INJECTION INTRAMUSCULAR; INTRAVENOUS; SUBCUTANEOUS EVERY 30 MIN PRN
Status: CANCELLED | OUTPATIENT
Start: 2018-12-11

## 2018-12-05 RX ORDER — DIPHENHYDRAMINE HYDROCHLORIDE 50 MG/ML
50 INJECTION INTRAMUSCULAR; INTRAVENOUS
Status: CANCELLED
Start: 2018-12-11

## 2018-12-05 RX ORDER — DIPHENHYDRAMINE HCL 25 MG
25 CAPSULE ORAL
Status: CANCELLED
Start: 2018-12-11

## 2018-12-05 RX ORDER — SODIUM CHLORIDE 9 MG/ML
1000 INJECTION, SOLUTION INTRAVENOUS CONTINUOUS PRN
Status: CANCELLED
Start: 2018-12-11

## 2018-12-05 RX ORDER — EPINEPHRINE 1 MG/ML
0.3 INJECTION, SOLUTION INTRAMUSCULAR; SUBCUTANEOUS EVERY 5 MIN PRN
Status: CANCELLED | OUTPATIENT
Start: 2018-12-11

## 2018-12-05 RX ORDER — ALBUTEROL SULFATE 90 UG/1
1-2 AEROSOL, METERED RESPIRATORY (INHALATION)
Status: CANCELLED
Start: 2018-12-11

## 2018-12-05 RX ORDER — ACETAMINOPHEN 325 MG/1
650 TABLET ORAL
Status: CANCELLED
Start: 2018-12-11

## 2018-12-05 RX ORDER — ALBUTEROL SULFATE 0.83 MG/ML
2.5 SOLUTION RESPIRATORY (INHALATION)
Status: CANCELLED | OUTPATIENT
Start: 2018-12-11

## 2018-12-05 RX ORDER — EPINEPHRINE 0.3 MG/.3ML
0.3 INJECTION SUBCUTANEOUS EVERY 5 MIN PRN
Status: CANCELLED | OUTPATIENT
Start: 2018-12-11

## 2018-12-05 RX ORDER — METHYLPREDNISOLONE SODIUM SUCCINATE 125 MG/2ML
125 INJECTION, POWDER, LYOPHILIZED, FOR SOLUTION INTRAMUSCULAR; INTRAVENOUS
Status: CANCELLED
Start: 2018-12-11

## 2018-12-06 ENCOUNTER — OFFICE VISIT (OUTPATIENT)
Dept: ONCOLOGY | Facility: CLINIC | Age: 83
End: 2018-12-06
Attending: SURGERY
Payer: MEDICARE

## 2018-12-06 VITALS
SYSTOLIC BLOOD PRESSURE: 133 MMHG | OXYGEN SATURATION: 96 % | WEIGHT: 158.06 LBS | BODY MASS INDEX: 29.84 KG/M2 | TEMPERATURE: 97.2 F | RESPIRATION RATE: 16 BRPM | HEART RATE: 86 BPM | DIASTOLIC BLOOD PRESSURE: 74 MMHG | HEIGHT: 61 IN

## 2018-12-06 DIAGNOSIS — Z17.0 MALIGNANT NEOPLASM OF UPPER-INNER QUADRANT OF LEFT BREAST IN FEMALE, ESTROGEN RECEPTOR POSITIVE (H): Primary | ICD-10-CM

## 2018-12-06 DIAGNOSIS — C50.212 MALIGNANT NEOPLASM OF UPPER-INNER QUADRANT OF LEFT BREAST IN FEMALE, ESTROGEN RECEPTOR POSITIVE (H): Primary | ICD-10-CM

## 2018-12-06 PROCEDURE — G0463 HOSPITAL OUTPT CLINIC VISIT: HCPCS | Mod: ZF

## 2018-12-06 RX ORDER — NEOMYCIN SULFATE, POLYMYXIN B SULFATE, AND DEXAMETHASONE 3.5; 10000; 1 MG/G; [USP'U]/G; MG/G
OINTMENT OPHTHALMIC
Refills: 1 | COMMUNITY
Start: 2018-02-10 | End: 2019-06-04

## 2018-12-06 ASSESSMENT — PAIN SCALES - GENERAL: PAINLEVEL: NO PAIN (0)

## 2018-12-06 NOTE — MR AVS SNAPSHOT
After Visit Summary   12/6/2018    Lennie Mar    MRN: [id number]           Patient Information     Date Of Birth          11/28/1927        Visit Information        Provider Department      12/6/2018 10:30 AM Nahun Betancourt MD [facility]        Today's Diagnoses     Malignant neoplasm of upper-inner quadrant of left breast in female, estrogen receptor positive (H)    -  1       Follow-ups after your visit        Your next 10 appointments already scheduled     Dec 11, 2018 10:45 AM CST   [facility] Draw with  [facility] DRAW   [facility] ([facility])    [address] [place] [address]  [address]  [facility] [id number] [phone]            Dec 11, 2018 11:15 AM CST   (Arrive by 11:00 AM)   Return Visit with Perlita Malik MD   [facility] ([facility])    [address] [place] [address]  [address]  [facility] [id number] [phone]            Dec 11, 2018 11:30 AM CST   [facility] [employer] with [id number]   [facility] ([facility])    [address] [place] [address]  3rd Floor  [facility] [id number] [phone]           1.  Please bring or wear a comfortable two-piece outfit. 2.  You may eat, drink and take your normal medicines. 3.  For any questions that cannot be answered, please contact the ordering physician            Dec 11, 2018  1:00 PM CST   Infusion 120 with  ONCOLOGY INFUSION, UC 18 ATC   [facility] ([facility])    [address] [place] [address]  [address]  [facility] [id number] [phone]              Who to contact     If you have questions or need follow up information about today's clinic visit or your schedule please contact [facility] directly at [phone].  Normal or non-critical lab and imaging results will be communicated to you by MyChart, letter or phone within 4 business days after  "the clinic has received the results. If you do not hear from us within 7 days, please contact the clinic through [email or web address] or phone. If you have a critical or abnormal lab result, we will notify you by phone as soon as possible.  Submit refill requests through [email or web address] or call your pharmacy and they will forward the refill request to us. Please allow 3 business days for your refill to be completed.          Additional Information About Your Visit        Care EveryWhere ID     This is your Care EveryWhere ID. This could be used by other organizations to access your [place] medical records  [id number]        Your Vitals Were     Pulse Temperature Respirations Height Pulse Oximetry Breastfeeding?    86 97.2  F (36.2  C) (Oral) 16 1.549 m (5' 1\") 96% No    BMI (Body Mass Index)                   29.87 kg/m2            Blood Pressure from Last 3 Encounters:   12/06/18 133/74   11/28/18 143/71   11/20/18 135/58    Weight from Last 3 Encounters:   12/06/18 71.7 kg (158 lb 1 oz)   11/28/18 75.5 kg (166 lb 8 oz)   11/19/18 75.5 kg (166 lb 7.2 oz)              Today, you had the following     No orders found for display         Today's Medication Changes          These changes are accurate as of 12/6/18 11:59 PM.  If you have any questions, ask your nurse or doctor.               These medicines have changed or have updated prescriptions.        Dose/Directions    letrozole 2.5 MG tablet   Commonly known as:  FEMARA   This may have changed:  when to take this   Used for:  Malignant neoplasm of upper-inner quadrant of left breast in female, estrogen receptor positive (H), Malignant neoplasm of upper-outer quadrant of left breast in female, estrogen receptor positive (H)        Dose:  2.5 mg   Take 1 tablet (2.5 mg) by mouth daily   Quantity:  30 tablet   Refills:  11                Primary Care Provider Office Phone # Fax #    Ty Quintanilla [phone] [address]  [facility] [address]        Equal " Access to Services     [facility]: Hadii aad ku hadselvin Teague, wayogeshda luqlyly, qaerickata kaguanakomellisa rey. So [facility] [phone].    ATENCIÓN: Si habla kristin, tiene a foster disposición servicios gratuitos de asistencia lingüística. Llame al [phone].    We comply with applicable federal civil rights laws and [place] laws. We do not discriminate on the basis of race, color, national origin, age, disability, sex, sexual orientation, or gender identity.            Thank you!     Thank you for choosing [facility]  for your care. Our goal is always to provide you with excellent care. Hearing back from our patients is one way we can continue to improve our services. Please take a few minutes to complete the written survey that you may receive in the mail after your visit with us. Thank you!             Your Updated Medication List - Protect others around you: Learn how to safely use, store and throw away your medicines at www.disposemymeds.org.          This list is accurate as of 12/6/18 11:59 PM.  Always use your most recent med list.                   Brand Name Dispense Instructions for use Diagnosis    acetaminophen 325 MG tablet    TYLENOL    50 tablet    Take 3 tablets (975 mg) by mouth every 8 hours as needed for mild pain    Postoperative pain       ARIPiprazole 5 MG tablet    ABILIFY     5 mg tablet in the morning        buPROPion 300 MG 24 hr tablet    WELLBUTRIN XL     300 mg tablet in the mornnig        letrozole 2.5 MG tablet    FEMARA    30 tablet    Take 1 tablet (2.5 mg) by mouth daily    Malignant neoplasm of upper-inner quadrant of left breast in female, estrogen receptor positive (H), Malignant neoplasm of upper-outer quadrant of left breast in female, estrogen receptor positive (H)       * lithium 150 MG capsule    ESKALITH    30 capsule    Take 1 capsule by mouth daily (with breakfast).    Bipolar disorder, unspecified (H)       *  lithium 300 MG capsule     30 capsule    Take 1 capsule by mouth daily (with dinner).    Bipolar disorder, unspecified (H)       neomycin-polymyxin-dexamethasone 3.5-44906-6.1 ophthalmic ointment    MAXITROL          order for DME     2 Package    Equipment being ordered: compression stocking knee high 20-30mmhg    Edema       oxyCODONE 5 MG tablet    ROXICODONE    10 tablet    Take 0.5-1 tablets (2.5-5 mg) by mouth every 6 hours as needed    Postoperative pain       pramipexole 1 MG tablet    MIRAPEX     Take 1 mg by mouth every morning        propranolol 20 MG tablet    INDERAL    180 tablet    Take 1 tablet (20 mg) by mouth 2 times daily    Tremor       sertraline 50 MG tablet    ZOLOFT     Take 25 mg by mouth every morning        VITAMIN B 12 PO      Take by mouth every morning        * Notice:  This list has 2 medication(s) that are the same as other medications prescribed for you. Read the directions carefully, and ask your doctor or other care provider to review them with you.

## 2018-12-06 NOTE — LETTER
12/6/2018       RE: Lennie Mar  [address]  [facility] [place] [address]     Dear Colleague,    Thank you for referring your patient, Lennie Mar, to the [facility] at [facility]. Please see a copy of my visit note below.    Lennie Mar is here for a postoperative visit after undergoing a left simple mastectomy and a sentinel lymph node biopsy.  She did well from that procedure and really did not have any postoperative problems.  She saw Mini Lua PA-C, who removed her Sen-Tariq drain.  The patient really denies any pain or swelling.  Her pathology report revealed 2 foci of residual disease, 1 focus was measured at 27 mm in greatest dimension;  the other focus was 25 mm in greatest dimension.  The margins were negative.  She had 2 positive sentinel lymph nodes.      PHYSICAL EXAMINATION:  Her incision is healing well with no evidence of infection.  She does have some swelling medially and superiorly.        PLAN:  I told her that I expect that this would improve with time.  She is scheduled to see Dr. Malik next week to discuss additional endocrine therapy or HER-2 directed therapy.  I will let Dr. Malik discuss with her the potential benefits of radiation therapy in someone who is 89 years old.     Nahun Betancourt MD      cc:    Perlita Malik MD   [address] [place] [address]

## 2018-12-06 NOTE — PROGRESS NOTES
Lennie Mar is here for a postoperative visit after undergoing a left simple mastectomy and a sentinel lymph node biopsy.  She did well from that procedure and really did not have any postoperative problems.  She saw Mini Lua PA-C, who removed her Sen-Tariq drain.  The patient really denies any pain or swelling.  Her pathology report revealed 2 foci of residual disease, 1 focus was measured at 27 mm in greatest dimension;  the other focus was 25 mm in greatest dimension.  The margins were negative.  She had 2 positive sentinel lymph nodes.      PHYSICAL EXAMINATION:  Her incision is healing well with no evidence of infection.  She does have some swelling medially and superiorly.        PLAN:  I told her that I expect that this would improve with time.  She is scheduled to see Dr. Malik next week to discuss additional endocrine therapy or HER-2 directed therapy.  I will let Dr. Malik discuss with her the potential benefits of radiation therapy in someone who is 89 years old.      cc:    Perlita Malik MD   [address] [place] [address]

## 2018-12-06 NOTE — NURSING NOTE
"Oncology Rooming Note    December 6, 2018 10:35 AM   Lennei Mar is a 89 year old female who presents for:    Chief Complaint   Patient presents with     Oncology Clinic Visit     Post OP     Initial Vitals: /74  Pulse 86  Temp 97.2  F (36.2  C) (Oral)  Resp 16  Ht 1.549 m (5' 1\")  Wt 71.7 kg (158 lb 1 oz)  SpO2 96%  Breastfeeding? No  BMI 29.87 kg/m2 Estimated body mass index is 29.87 kg/(m^2) as calculated from the following:    Height as of this encounter: 1.549 m (5' 1\").    Weight as of this encounter: 71.7 kg (158 lb 1 oz). Body surface area is 1.76 meters squared.  No Pain (0) Comment: Data Unavailable   No LMP recorded. Patient is postmenopausal.  Allergies reviewed: Yes  Medications reviewed: Yes    Medications: Medication refills not needed today.  Pharmacy name entered into Pint Please:    [facility] - [place], [place] - [address] [facility] [place], [place] - [address] [facility] [place], [place] - [address] [facility] [id number] [place] [address] - [address] [facility] [address]    Clinical concerns: no new concerns today Dr. Betancourt    10 minutes for nursing intake (face to face time)     Sanjuanita Anders CMA              "

## 2018-12-10 NOTE — PROGRESS NOTES
Oncology Follow Up:  Date on this visit: 12/11/2018    Diagnosis:  1.  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  Stage IIa, T2N0M0, ER/AK positive, HER-2 non-amplified invasive ductal carcinoma of the left breast at 11:00    Primary Physician: Ty Quintanilla     History Of Present Illness:  Ms. Mar is a 89 year old female who presents with two invasive cancers of the left breast. She self palpated a lump and underwent mammaogram on 5/4/18 that showed a  2 cm mass in the superficial lateral left breast, near the nipple, with fine pleomorphic calcifications extending posteriorly measuring up to 6.9 cm and a second 2.3 cm mass in the upper inner left breast at the 1:00 position. Ultrasonography showed an irregular mass at 1:00, 7 cm from the nipple, measuring 2.4 cm and a mass at 3:00, 2 cm from the nipple, measuring 3.5 cm.  Biopsies of both masses were performed on 5/9/18.  Pathology of the 3:00  mass near the nipple was a grade 3 invasive carcinoma.  Estrogen receptor staining was strongly positive and AK moderately positive.  HER2 was amplified with a HER2/CEN17 ratio of 6.4/2.3 for a total ratio of 2.8.  There was associated intermediate grade DCIS and skin involvement. The 1:00 mass was a grade 3 invasive carcinoma ER strongly positive, AK strongly positive, and HER2 nonamplified. No lymphadenopathy was seen on ultrasound.    She began treatment with Herceptin and letrozole on 6/1/18.  Left breast mastectomy and SLN biopsy on 11/19/18 showed two residual tumors.  The larger tumor at 3:00 was grade 2 and measured 2.7 cm, ER positive, AK negative, HER2-amplified.    The smaller tumor at 1:00 was grade 3 and measured 2.5 cm, ER/AK positive, HER2 non-amplified.  2/3 left axillary lymph nodes demonstrated metastases measuring 9 mm and 1.2 mm.  HER-2 FISH of the larger axillary lymph node  metastasis was amplified.    Interval History:  Ms. Mar presents today for post-mastectomy follow up for breast cancer.    She reports tolerating the operation well without any post-op complications. She continues to have some left chest wall tenderness and swelling.    She has been living independently in an apartment in a senior living community. She has someone clean her apartment once in a while, but otherwise completes her ADLs independently. She walks about 1/2 a mile per day in her building and takes 2-3 hr naps per day. She does have nocturia so wakes up multiple times during the night. Denies falls.    She has 2 sisters (99 and 98 yo) and 2 brothers, 8 children, 23 grand children, and 40 great grand children. Her nephew who lives in [place] and a daughter in law in [place] drive her to her doctor's appointments.    3 of her children are physicians (Cardiologist, trauma, psychiatrist).    The remainder of a complete 12 point review of systems was reviewed with the patient and was negative with the exception of that mentioned above.    Past Medical/Surgical History:  Past Medical History:   Diagnosis Date     Alcohol dependence in remission (H)      Anxiety      Asymptomatic varicose veins      Bipolar disorder, unspecified (H)      CKD (chronic kidney disease) stage 3, GFR 30-59 ml/min (H) 2/18/2014     History of smoking      Hyperparathyroidism (H)      Memory loss      Muscle weakness (generalized) 6/23/2008     Severe depression (H)      Subdural hygroma     chronic.  no surgeries     Tremor of unknown origin      Past Surgical History:   Procedure Laterality Date     APPENDECTOMY OPEN  1947     CATARACT IOL, RT/LT       COLONOSCOPY WITH CO2 INSUFFLATION  2/29/2012    Procedure:COLONOSCOPY WITH CO2 INSUFFLATION; Surgeon:GAYLE REYNA; Location:[facility] OR     CYSTOCELE REPAIR  1972     CYSTOSCOPY, INSERT STENT URETHRA, COMBINED  1999     CYSTOSCOPY, RETROGRADES, INSERT STENT URETER(S), COMBINED   2/29/2012    Procedure:COMBINED CYSTOSCOPY, RETROGRADES, INSERT STENT URETER(S); Surgeon:ANT ESPINOZA; Location:[facility] OR     DECOMPRESSION LUMBAR ONE LEVEL  8/9/2013    Procedure: DECOMPRESSION LUMBAR ONE LEVEL;  Posterior Decompression Right Lumbar 5- Sacral 1;  Surgeon: You Zavala MD;  Location: [facility] OR     HC TOOTH EXTRACTION W/FORCEP       HYSTERECTOMY, CATA  1963     IRRIGATION AND DEBRIDEMENT ORAL, COMBINED  1982    For treatment of gingivitis     LAPAROSCOPIC ASSISTED COLECTOMY  2/29/2012    Procedure:LAPAROSCOPIC ASSISTED COLECTOMY; Cysto, Bilateral Stent placement (both stents removed at end of case)- Yanet ACOSTA. Laparoscopic Extended Right Venu Colectomy with CO2 Colonoscopy and polyp removal-Judah; Surgeon:GAYLE REYNA; Location:[facility] OR     LIGATN/STRIP LONG OR SHORT SAPHEN  1955     MASTECTOMY PARTIAL WITH SENTINEL NODE Left 11/19/2018    Procedure: Left Mastectomy, Left [place] Lymph Node Biopsy;  Surgeon: Nahun Betancourt MD;  Location: [facility] OR     STRIP VEIN BILATERAL  1964     SURGICAL HISTORY OF -   1999    ureter surgery for blockage.     Allergies:  Allergies as of 12/11/2018 - Reviewed 12/11/2018   Allergen Reaction Noted     Cafergot Other (See Comments) and Nausea and Vomiting 01/01/1972     Ciprofloxacin  06/19/2012     Penicillins Rash 01/01/1949     Sulfa drugs Rash 01/01/1950     Lamictal [lamotrigine] Other (See Comments) 02/15/2014     Naproxen       Tetracycline       Current Medications:  Current Outpatient Medications   Medication Sig Dispense Refill     acetaminophen (TYLENOL) 325 MG tablet Take 3 tablets (975 mg) by mouth every 8 hours as needed for mild pain 50 tablet 0     ARIPiprazole (ABILIFY) 5 MG tablet 5 mg tablet in the morning  3     buPROPion (WELLBUTRIN XL) 300 MG 24 hr tablet 300 mg tablet in the mornnig  2     Cyanocobalamin (VITAMIN B 12 PO) Take by mouth every morning       letrozole (FEMARA) 2.5 MG tablet Take 1 tablet (2.5 mg) by mouth daily  (Patient taking differently: Take 2.5 mg by mouth every evening ) 30 tablet 11     lithium 150 MG capsule Take 1 capsule by mouth daily (with breakfast). 30 capsule 1     lithium 300 MG capsule Take 1 capsule by mouth daily (with dinner). 30 capsule 1     neomycin-polymyxin-dexamethasone (MAXITROL) 3.5-00349-5.1 ophthalmic ointment   1     ORDER FOR DME Equipment being ordered: compression stocking knee high 20-30mmhg 2 Package 0     oxyCODONE IR (ROXICODONE) 5 MG tablet Take 0.5-1 tablets (2.5-5 mg) by mouth every 6 hours as needed 10 tablet 0     pramipexole (MIRAPEX) 1 MG tablet Take 1 mg by mouth every morning        propranolol (INDERAL) 20 MG tablet Take 1 tablet (20 mg) by mouth 2 times daily 180 tablet 3     sertraline (ZOLOFT) 50 MG tablet Take 25 mg by mouth every morning        Family history:  Updated. Patient reported finding out that her 98 yo sister had unilateral mastectomy 15 yrs ago, and her 61 yo niece recently underwent b/l mastectomies    Physical Exam:  /64 (BP Location: Right arm, Patient Position: Sitting, Cuff Size: Adult Regular)   Pulse 77   Temp 97.6  F (36.4  C) (Oral)   Resp 18   Wt 72.3 kg (159 lb 4.8 oz)   SpO2 94%   BMI 30.10 kg/m    General:  Elderly appearing adult female in NAD.  Ambulating today without a cane.  HEENT:  Normocephalic.  Sclera anicteric.  MMM.  No lesions of the oropharynx.  Lymph:  No palpable cervical, supraclavicular, or axillary LAD.  Breast: Left breast mastectomy c/d/i, minimal ecchymoses in inframammary fold region with mild tenderness, prominent left axillary fold without palpable mass,   Chest:  CTA bilaterally.  No wheezes or crackles.  CV:  RRR.  Nl S1 and S2.  No m/r/g.  Abd:  Soft/NT/ND.  BSs normoactive.    Ext:  No pitting edema of the bilateral lower extremities.   Neuro/MSK:  Pupils equal and reactive, no facial droop, requires assistance to get up to the exam table. Slow and wide based gait but stable without assistance  Psych:  Mood  and affect appear normal.  Skin:  No visible concerning skin rashes or lesions.    Laboratory/Imaging Studies:  11/19/18 Left breast mastectomy and sentinel lymph node procedure:  Two foci of invasive carcinoma residual:    1.  2.7 cm, grade 2 at 3:00 invasive mammary carcinoma of NOS  - directly invades the dermis  - lymphovascular invasion present  - ER positive, NC negative  - HER2 amplified    2.  2.5 cm, grade 3 at 1:00, mixed invasive ductal and mucinous carcinoma  - ER positive, NC positive  - HER-2 non-amplified    -Overall tumor cellularity of both tumors is 15%  -Surgical margins were negative    3.  High-grade DCIS, LCIS, ALH    4.  Metastatic carcinoma in 2/3 axillary lymph nodes, measuring 9 mm and 1.2 mm.  Positive extranodal extension.  Larger of the two lymph node metastases is HER2 amplified.    ASSESSMENT/PLAN:  89 year old female with multifocal, T2N1M0, invasive mammary carcinomas of the left breast.      1.  Left breast cancers:  We reviewed the pathology of her recent left breast mastectomy and SLN bx which demonstrated residual tumors in the left breast measuring 2.7 cm and 2.5 cm, with overall tumor cellularity of 15%.  2/3 axillary nodes showed metastases.  She remains at high risk of recurrence.  According to the Symmans 2017 paper with RCB III disease, her risk of recurrence in the next 2 years is approximately 40-45% and is approximately 80% in 4 years, with the caveat that she did not receive a taxane with her neoadjuvant therapy.  Given positive axillary nodes and dermal involvement of tumor, I recommend proceeding with adjuvant radiation therapy.  In addition, I recommend 1 year of adjuvant treatment with ado-trastuzumab emtansine (T-DM1).      We reviewed that T-DM1 is an antibody drug conjugate, in this case, a chemotherapy drug with trastuzumab.  It is given as an IV infusion once every 3 weeks.  We reviewed potential side effects including low blood counts, increased risk of  infection, elevated LFTs, neuropathy, fatigue, nausea, vomiting, diarrhea, and potential for hypersensitivity reaction.  She is agreeable to treatment with T-DM1.  Will therefore proceed with obtaining insurance approval.  Of note, in the [facility] clinical trial, T-DM1 was delivered concurrently with radiation.  We will plan to do the same.  Will, however, also discuss this treatment plan with my radiation colleagues at breast conference this Friday.    If she were intolerant of T-DM1, could consider adjuvant trastuzumab, pertuzumab, letrozole.    Although she expressed her desire for risk reduction, she was not ready to commit to therapy. Discussed the above via phone call with her son Dmitry (psychiatrist) and provided references. Dmitry was supportive of his mother starting T-DM1, but Ms. Mar wanted to give it some thought primarily because she did not feel ready to commit to 1 year of therapy. Reviewed also that she likely would be eligible for transportation assistance via the [facility], she will also discuss this with her family.    2.  At risk for cardiomyopathy:  Echocardiogram to be performed today following our visit.   Plan to continue to monitor once every 3 months while on HER2 targeted therapy.  Next echocardiogram will be due in 03/2019.    3.  Follow Up:  Rad/onc within 1 week. RTC with labs in 2-3 weeks with infusion appointment.    35 of this 40 minute visit spent in counseling and coordinating care.    Reviewed the above recommendations with Ms. Mar and her son, Dmitry (via telephone). All questions were answered to their satisfaction.    Patient discussed with Dr. Malik.    Nena Cade MD ([place]), PhD   Hem/onc fellow    Patient was seen and discussed with Dr. Cade.  I have edited the above note to reflect our joint assessment and plan.  A total of 35 minutes of our 40 minute face to face visit was spent in counseling.    Perlita Malik MD    Addendum: Discussed her case at breast tumor board  12/14/18. Dr. Andrews will see patient and discuss hypofractionated xrt and is agreeable to concurrent kadcyla.     Radiology also commented on a 5mm right breast abnormality seen on her 10/9/18 mammogram, which is suspicious for cancer. Will need to discuss this with patient further and consider repeat imaging to assess for stability.    Genetic counselor recommended discussing referral with patient.

## 2018-12-11 ENCOUNTER — APPOINTMENT (OUTPATIENT)
Dept: LAB | Facility: CLINIC | Age: 83
End: 2018-12-11
Attending: INTERNAL MEDICINE
Payer: MEDICARE

## 2018-12-11 ENCOUNTER — ONCOLOGY VISIT (OUTPATIENT)
Dept: ONCOLOGY | Facility: CLINIC | Age: 83
End: 2018-12-11
Attending: INTERNAL MEDICINE
Payer: MEDICARE

## 2018-12-11 ENCOUNTER — PRE VISIT (OUTPATIENT)
Dept: RADIATION ONCOLOGY | Facility: CLINIC | Age: 83
End: 2018-12-11

## 2018-12-11 VITALS
HEART RATE: 77 BPM | TEMPERATURE: 97.6 F | OXYGEN SATURATION: 94 % | BODY MASS INDEX: 30.1 KG/M2 | DIASTOLIC BLOOD PRESSURE: 64 MMHG | RESPIRATION RATE: 18 BRPM | WEIGHT: 159.3 LBS | SYSTOLIC BLOOD PRESSURE: 127 MMHG

## 2018-12-11 DIAGNOSIS — C50.212 MALIGNANT NEOPLASM OF UPPER-INNER QUADRANT OF LEFT BREAST IN FEMALE, ESTROGEN RECEPTOR POSITIVE (H): Primary | ICD-10-CM

## 2018-12-11 DIAGNOSIS — Z17.0 MALIGNANT NEOPLASM OF UPPER-OUTER QUADRANT OF LEFT BREAST IN FEMALE, ESTROGEN RECEPTOR POSITIVE (H): ICD-10-CM

## 2018-12-11 DIAGNOSIS — C50.412 MALIGNANT NEOPLASM OF UPPER-OUTER QUADRANT OF LEFT BREAST IN FEMALE, ESTROGEN RECEPTOR POSITIVE (H): ICD-10-CM

## 2018-12-11 DIAGNOSIS — Z17.0 MALIGNANT NEOPLASM OF UPPER-INNER QUADRANT OF LEFT BREAST IN FEMALE, ESTROGEN RECEPTOR POSITIVE (H): Primary | ICD-10-CM

## 2018-12-11 LAB
ALBUMIN SERPL-MCNC: 3.2 G/DL (ref 3.4–5)
ALP SERPL-CCNC: 124 U/L (ref 40–150)
ALT SERPL W P-5'-P-CCNC: 17 U/L (ref 0–50)
ANION GAP SERPL CALCULATED.3IONS-SCNC: 7 MMOL/L (ref 3–14)
AST SERPL W P-5'-P-CCNC: 15 U/L (ref 0–45)
BASOPHILS # BLD AUTO: 0.1 10E9/L (ref 0–0.2)
BASOPHILS NFR BLD AUTO: 0.9 %
BILIRUB SERPL-MCNC: 0.3 MG/DL (ref 0.2–1.3)
BUN SERPL-MCNC: 32 MG/DL (ref 7–30)
CALCIUM SERPL-MCNC: 9.5 MG/DL (ref 8.5–10.1)
CHLORIDE SERPL-SCNC: 111 MMOL/L (ref 94–109)
CO2 SERPL-SCNC: 23 MMOL/L (ref 20–32)
CREAT SERPL-MCNC: 1.72 MG/DL (ref 0.52–1.04)
DIFFERENTIAL METHOD BLD: ABNORMAL
EOSINOPHIL # BLD AUTO: 0.2 10E9/L (ref 0–0.7)
EOSINOPHIL NFR BLD AUTO: 1.6 %
ERYTHROCYTE [DISTWIDTH] IN BLOOD BY AUTOMATED COUNT: 14.1 % (ref 10–15)
GFR SERPL CREATININE-BSD FRML MDRD: 28 ML/MIN/1.7M2
GLUCOSE SERPL-MCNC: 100 MG/DL (ref 70–99)
HCT VFR BLD AUTO: 43.1 % (ref 35–47)
HGB BLD-MCNC: 12.6 G/DL (ref 11.7–15.7)
IMM GRANULOCYTES # BLD: 0.1 10E9/L (ref 0–0.4)
IMM GRANULOCYTES NFR BLD: 0.6 %
LYMPHOCYTES # BLD AUTO: 2.7 10E9/L (ref 0.8–5.3)
LYMPHOCYTES NFR BLD AUTO: 19.4 %
MCH RBC QN AUTO: 31.1 PG (ref 26.5–33)
MCHC RBC AUTO-ENTMCNC: 29.2 G/DL (ref 31.5–36.5)
MCV RBC AUTO: 106 FL (ref 78–100)
MONOCYTES # BLD AUTO: 1.3 10E9/L (ref 0–1.3)
MONOCYTES NFR BLD AUTO: 9 %
NEUTROPHILS # BLD AUTO: 9.5 10E9/L (ref 1.6–8.3)
NEUTROPHILS NFR BLD AUTO: 68.5 %
NRBC # BLD AUTO: 0 10*3/UL
NRBC BLD AUTO-RTO: 0 /100
PLATELET # BLD AUTO: 262 10E9/L (ref 150–450)
POTASSIUM SERPL-SCNC: 5 MMOL/L (ref 3.4–5.3)
PROT SERPL-MCNC: 7.3 G/DL (ref 6.8–8.8)
RBC # BLD AUTO: 4.05 10E12/L (ref 3.8–5.2)
SODIUM SERPL-SCNC: 140 MMOL/L (ref 133–144)
WBC # BLD AUTO: 13.9 10E9/L (ref 4–11)

## 2018-12-11 PROCEDURE — 85025 COMPLETE CBC W/AUTO DIFF WBC: CPT | Performed by: INTERNAL MEDICINE

## 2018-12-11 PROCEDURE — G0463 HOSPITAL OUTPT CLINIC VISIT: HCPCS | Mod: ZF

## 2018-12-11 PROCEDURE — 80053 COMPREHEN METABOLIC PANEL: CPT | Performed by: INTERNAL MEDICINE

## 2018-12-11 PROCEDURE — 99215 OFFICE O/P EST HI 40 MIN: CPT | Mod: GC | Performed by: INTERNAL MEDICINE

## 2018-12-11 PROCEDURE — 36592 COLLECT BLOOD FROM PICC: CPT

## 2018-12-11 ASSESSMENT — PAIN SCALES - GENERAL: PAINLEVEL: NO PAIN (0)

## 2018-12-11 NOTE — TELEPHONE ENCOUNTER
Date of appointment:12/26/18    Diagnosis/reason for appointment:Cons-Breast Cancer-Left-  Referring provider/facility:Ref Dr. Malik  Who called:Clinic    Recent Studies  Imaging:  Pathology:  Labs:  Previous chemo: Yes  radiation (if known):No    Records in Epic/CE    Additional information:

## 2018-12-11 NOTE — NURSING NOTE
"Oncology Rooming Note    December 11, 2018 11:56 AM   Lennie Mar is a 89 year old female who presents for:    Chief Complaint   Patient presents with     Blood Draw     Labs drawn via PIV placed by vascular RN, line flushed and saline locked, VS taken     Oncology Clinic Visit     P RETURN- BREAST CA     Initial Vitals: /64 (BP Location: Right arm, Patient Position: Sitting, Cuff Size: Adult Regular)   Pulse 77   Temp 97.6  F (36.4  C) (Oral)   Resp 18   Wt 72.3 kg (159 lb 4.8 oz)   SpO2 94%   BMI 30.10 kg/m   Estimated body mass index is 30.1 kg/m  as calculated from the following:    Height as of 12/6/18: 1.549 m (5' 1\").    Weight as of this encounter: 72.3 kg (159 lb 4.8 oz). Body surface area is 1.76 meters squared.  No Pain (0) Comment: Data Unavailable   No LMP recorded. Patient is postmenopausal.  Allergies reviewed: Yes  Medications reviewed: Yes    Medications: Medication refills not needed today.  Pharmacy name entered into [employer]:    [facility] - [place], [place] - [address] [facility] - [place], [place] - [address] [facility] [place], [place] - [address] [id number] - [place], [place] - [address] AT [facility] [address]    Clinical concerns: No new concerns. Helena was notified.    10 minutes for nursing intake (face to face time)     Adrian Haines LPN            "

## 2018-12-11 NOTE — LETTER
12/11/2018       RE: Lennie Mar  [address]  [facility] [address]     Dear Colleague,    Thank you for referring your patient, Lennie Mar, to the [facility]. Please see a copy of my visit note below.    Oncology Follow Up:  Date on this visit: 12/11/2018    Diagnosis:  1.  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  Stage IIa, T2N0M0, ER/FL positive, HER-2 non-amplified invasive ductal carcinoma of the left breast at 11:00    Primary Physician: Ty Quintanilla     History Of Present Illness:  Ms. Mar is a 89 year old female who presents with two invasive cancers of the left breast. She self palpated a lump and underwent mammaogram on 5/4/18 that showed a  2 cm mass in the superficial lateral left breast, near the nipple, with fine pleomorphic calcifications extending posteriorly measuring up to 6.9 cm and a second 2.3 cm mass in the upper inner left breast at the 1:00 position. Ultrasonography showed an irregular mass at 1:00, 7 cm from the nipple, measuring 2.4 cm and a mass at 3:00, 2 cm from the nipple, measuring 3.5 cm.  Biopsies of both masses were performed on 5/9/18.  Pathology of the 3:00  mass near the nipple was a grade 3 invasive carcinoma.  Estrogen receptor staining was strongly positive and FL moderately positive.  HER2 was amplified with a HER2/CEN17 ratio of 6.4/2.3 for a total ratio of 2.8.  There was associated intermediate grade DCIS and skin involvement. The 1:00 mass was a grade 3 invasive carcinoma ER strongly positive, FL strongly positive, and HER2 nonamplified. No lymphadenopathy was seen on ultrasound.    She began treatment with Herceptin and letrozole on 6/1/18.  Left breast mastectomy and SLN biopsy on 11/19/18 showed two residual tumors.  The larger tumor at 3:00 was grade 2 and measured 2.7 cm, ER positive, FL  negative, HER2-amplified.    The smaller tumor at 1:00 was grade 3 and measured 2.5 cm, ER/ID positive, HER2 non-amplified.  2/3 left axillary lymph nodes demonstrated metastases measuring 9 mm and 1.2 mm.  HER-2 FISH of the larger axillary lymph node metastasis was amplified.    Interval History:  Ms. Mar presents today for post-mastectomy follow up for breast cancer.    She reports tolerating the operation well without any post-op complications. She continues to have some left chest wall tenderness and swelling.    She has been living independently in an apartment in a senior living community. She has someone clean her apartment once in a while, but otherwise completes her ADLs independently. She walks about 1/2 a mile per day in her building and takes 2-3 hr naps per day. She does have nocturia so wakes up multiple times during the night. Denies falls.    She has 2 sisters (99 and 96 yo) and 2 brothers, 8 children, 23 grand children, and 40 great grand children. Her nephew who lives in [place] and a daughter in law in [place] drive her to her doctor's appointments.    3 of her children are physicians (Cardiologist, trauma, psychiatrist).    The remainder of a complete 12 point review of systems was reviewed with the patient and was negative with the exception of that mentioned above.    Past Medical/Surgical History:  Past Medical History:   Diagnosis Date     Alcohol dependence in remission (H)      Anxiety      Asymptomatic varicose veins      Bipolar disorder, unspecified (H)      CKD (chronic kidney disease) stage 3, GFR 30-59 ml/min (H) 2/18/2014     History of smoking      Hyperparathyroidism (H)      Memory loss      Muscle weakness (generalized) 6/23/2008     Severe depression (H)      Subdural hygroma     chronic.  no surgeries     Tremor of unknown origin      Past Surgical History:   Procedure Laterality Date     APPENDECTOMY OPEN  1947     CATARACT IOL, RT/LT       COLONOSCOPY WITH CO2  INSUFFLATION  2/29/2012    Procedure:COLONOSCOPY WITH CO2 INSUFFLATION; Surgeon:GAYLE REYNA; Location:[facility] OR     CYSTOCELE REPAIR  1972     CYSTOSCOPY, INSERT STENT URETHRA, COMBINED  1999     CYSTOSCOPY, RETROGRADES, INSERT STENT URETER(S), COMBINED  2/29/2012    Procedure:COMBINED CYSTOSCOPY, RETROGRADES, INSERT STENT URETER(S); Surgeon:ANT ESPINOZA; Location:[facility] OR     DECOMPRESSION LUMBAR ONE LEVEL  8/9/2013    Procedure: DECOMPRESSION LUMBAR ONE LEVEL;  Posterior Decompression Right Lumbar 5- Sacral 1;  Surgeon: You Zavala MD;  Location: [facility] OR     HC TOOTH EXTRACTION W/FORCEP       HYSTERECTOMY, CATA  1963     IRRIGATION AND DEBRIDEMENT ORAL, COMBINED  1982    For treatment of gingivitis     LAPAROSCOPIC ASSISTED COLECTOMY  2/29/2012    Procedure:LAPAROSCOPIC ASSISTED COLECTOMY; Cysto, Bilateral Stent placement (both stents removed at end of case)- Yanet ACOSTA. Laparoscopic Extended Right Venu Colectomy with CO2 Colonoscopy and polyp removal-Judah; Surgeon:GAYLE REYNA; Location:[facility] OR     LIGATN/STRIP LONG OR SHORT SAPHEN  1955     MASTECTOMY PARTIAL WITH SENTINEL NODE Left 11/19/2018    Procedure: Left Mastectomy, Left Howe Lymph Node Biopsy;  Surgeon: Nahun Betancourt MD;  Location: [facility] OR     STRIP VEIN BILATERAL  1964     SURGICAL HISTORY OF -   1999    ureter surgery for blockage.     Allergies:  Allergies as of 12/11/2018 - Reviewed 12/11/2018   Allergen Reaction Noted     Cafergot Other (See Comments) and Nausea and Vomiting 01/01/1972     Ciprofloxacin  06/19/2012     Penicillins Rash 01/01/1949     Sulfa drugs Rash 01/01/1950     Lamictal [lamotrigine] Other (See Comments) 02/15/2014     Naproxen       Tetracycline       Current Medications:  Current Outpatient Medications   Medication Sig Dispense Refill     acetaminophen (TYLENOL) 325 MG tablet Take 3 tablets (975 mg) by mouth every 8 hours as needed for mild pain 50 tablet 0     ARIPiprazole (ABILIFY) 5 MG tablet  5 mg tablet in the morning  3     buPROPion (WELLBUTRIN XL) 300 MG 24 hr tablet 300 mg tablet in the mornnig  2     Cyanocobalamin (VITAMIN B 12 PO) Take by mouth every morning       letrozole (FEMARA) 2.5 MG tablet Take 1 tablet (2.5 mg) by mouth daily (Patient taking differently: Take 2.5 mg by mouth every evening ) 30 tablet 11     lithium 150 MG capsule Take 1 capsule by mouth daily (with breakfast). 30 capsule 1     lithium 300 MG capsule Take 1 capsule by mouth daily (with dinner). 30 capsule 1     neomycin-polymyxin-dexamethasone (MAXITROL) 3.5-69138-9.1 ophthalmic ointment   1     ORDER FOR DME Equipment being ordered: compression stocking knee high 20-30mmhg 2 Package 0     oxyCODONE IR (ROXICODONE) 5 MG tablet Take 0.5-1 tablets (2.5-5 mg) by mouth every 6 hours as needed 10 tablet 0     pramipexole (MIRAPEX) 1 MG tablet Take 1 mg by mouth every morning        propranolol (INDERAL) 20 MG tablet Take 1 tablet (20 mg) by mouth 2 times daily 180 tablet 3     sertraline (ZOLOFT) 50 MG tablet Take 25 mg by mouth every morning        Family history:  Updated. Patient reported finding out that her 98 yo sister had unilateral mastectomy 15 yrs ago, and her 61 yo niece recently underwent b/l mastectomies    Physical Exam:  /64 (BP Location: Right arm, Patient Position: Sitting, Cuff Size: Adult Regular)   Pulse 77   Temp 97.6  F (36.4  C) (Oral)   Resp 18   Wt 72.3 kg (159 lb 4.8 oz)   SpO2 94%   BMI 30.10 kg/m     General:  Elderly appearing adult female in NAD.  Ambulating today without a cane.  HEENT:  Normocephalic.  Sclera anicteric.  MMM.  No lesions of the oropharynx.  Lymph:  No palpable cervical, supraclavicular, or axillary LAD.  Breast: Left breast mastectomy c/d/i, minimal ecchymoses in inframammary fold region with mild tenderness, prominent left axillary fold without palpable mass,   Chest:  CTA bilaterally.  No wheezes or crackles.  CV:  RRR.  Nl S1 and S2.  No m/r/g.  Abd:  Soft/NT/ND.   BSs normoactive.    Ext:  No pitting edema of the bilateral lower extremities.   Neuro/MSK:  Pupils equal and reactive, no facial droop, requires assistance to get up to the exam table. Slow and wide based gait but stable without assistance  Psych:  Mood and affect appear normal.  Skin:  No visible concerning skin rashes or lesions.    Laboratory/Imaging Studies:  11/19/18 Left breast mastectomy and sentinel lymph node procedure:  Two foci of invasive carcinoma residual:    1.  2.7 cm, grade 2 at 3:00 invasive mammary carcinoma of NOS  - directly invades the dermis  - lymphovascular invasion present  - ER positive, AL negative  - HER2 amplified    2.  2.5 cm, grade 3 at 1:00, mixed invasive ductal and mucinous carcinoma  - ER positive, AL positive  - HER-2 non-amplified    -Overall tumor cellularity of both tumors is 15%  -Surgical margins were negative    3.  High-grade DCIS, LCIS, ALH    4.  Metastatic carcinoma in 2/3 axillary lymph nodes, measuring 9 mm and 1.2 mm.  Positive extranodal extension.  Larger of the two lymph node metastases is HER2 amplified.    ASSESSMENT/PLAN:  89 year old female with multifocal, T2N1M0, invasive mammary carcinomas of the left breast.      1.  Left breast cancers:  We reviewed the pathology of her recent left breast mastectomy and SLN bx which demonstrated residual tumors in the left breast measuring 2.7 cm and 2.5 cm, with overall tumor cellularity of 15%.  2/3 axillary nodes showed metastases.  She remains at high risk of recurrence.  According to the [facility] 2017 paper with RCB III disease, her risk of recurrence in the next 2 years is approximately 40-45% and is approximately 80% in 4 years, with the caveat that she did not receive a taxane with her neoadjuvant therapy.  Given positive axillary nodes and dermal involvement of tumor, I recommend proceeding with adjuvant radiation therapy.  In addition, I recommend 1 year of adjuvant treatment with ado-trastuzumab emtansine  (T-DM1).      We reviewed that T-DM1 is an antibody drug conjugate, in this case, a chemotherapy drug with trastuzumab.  It is given as an IV infusion once every 3 weeks.  We reviewed potential side effects including low blood counts, increased risk of infection, elevated LFTs, neuropathy, fatigue, nausea, vomiting, diarrhea, and potential for hypersensitivity reaction.  She is agreeable to treatment with T-DM1.  Will therefore proceed with obtaining insurance approval.  Of note, in the [facility] clinical trial, T-DM1 was delivered concurrently with radiation.  We will plan to do the same.  Will, however, also discuss this treatment plan with my radiation colleagues at breast conference this Friday.    If she were intolerant of T-DM1, could consider adjuvant trastuzumab, pertuzumab, letrozole.    Although she expressed her desire for risk reduction, she was not ready to commit to therapy. Discussed the above via phone call with her son Dmitry (psychiatrist) and provided references. Dmitry was supportive of his mother starting T-DM1, but Ms. Mar wanted to give it some thought primarily because she did not feel ready to commit to 1 year of therapy. Reviewed also that she likely would be eligible for transportation assistance via the [facility], she will also discuss this with her family.    2.  At risk for cardiomyopathy:  Echocardiogram to be performed today following our visit.   Plan to continue to monitor once every 3 months while on HER2 targeted therapy.  Next echocardiogram will be due in 03/2019.    3.  Follow Up:  Rad/onc within 1 week. RTC with labs in 2-3 weeks with infusion appointment.    35 of this 40 minute visit spent in counseling and coordinating care.    Reviewed the above recommendations with Ms. Mar and her son, Dmitry (via telephone). All questions were answered to their satisfaction.    Patient discussed with Dr. Malik.    Nena AliceaVaidenCarla Cade MD, PhD   Hem/onc fellow    Patient was seen and  discussed with Dr. Cade.  I have edited the above note to reflect our joint assessment and plan.  A total of 35 minutes of our 40 minute face to face visit was spent in counseling.    Perlita Malik MD    Addendum: Discussed her case at breast tumor board 12/14/18. Dr. Andrews will see patient and discuss hypofractionated xrt and is agreeable to concurrent kadcyla.     Radiology also commented on a 5mm right breast abnormality seen on her 10/9/18 mammogram, which is suspicious for cancer. Will need to discuss this with patient further and consider repeat imaging to assess for stability.    Genetic counselor recommended discussing referral with patient.        Again, thank you for allowing me to participate in the care of your patient.      Sincerely,    Perlita Malik MD

## 2018-12-11 NOTE — NURSING NOTE
Chief Complaint   Patient presents with     Blood Draw     Labs drawn via PIV placed by vascular RN, line flushed and saline locked, VS taken     Melita Leavitt RN

## 2018-12-14 ENCOUNTER — TELEPHONE (OUTPATIENT)
Dept: ONCOLOGY | Facility: CLINIC | Age: 83
End: 2018-12-14

## 2018-12-14 NOTE — TELEPHONE ENCOUNTER
Social Work Note: Telephone Call  Oncology Clinic    Data/Intervention:  Patient Name:  Lennie Mar  /Age:  1927 (89 year old)    Call From:  Pt to SW  Reason for Call:  transportation    Assessment:  Received call from pt re: options for transportation to appts, specifically for upcoming appt on .  SW reviewed transportation options available to pt, including both [facility] and [employer].  Provided national contact phone number for [employer] [facility] [employer] program (as clinic navigator currently out of office).  Pt accepting of information and states she plans to call as her family (who usually provides transport) is unavailable.  Provided pt with SW's contact phone number for any further questions about resources or supports, including transportation.      Plan:  Addressed patient's distress through today. No other concerns at this time.  SW available as necessary.         Sarah Guillen (Martens), SUZYSW, MSW   - [facility]  Phone: [phone]  Pager: [phone]  2018

## 2018-12-20 RX ORDER — EPINEPHRINE 0.3 MG/.3ML
0.3 INJECTION SUBCUTANEOUS EVERY 5 MIN PRN
Status: CANCELLED | OUTPATIENT
Start: 2019-01-03

## 2018-12-20 RX ORDER — DIPHENHYDRAMINE HYDROCHLORIDE 50 MG/ML
50 INJECTION INTRAMUSCULAR; INTRAVENOUS
Status: CANCELLED
Start: 2019-01-03

## 2018-12-20 RX ORDER — EPINEPHRINE 1 MG/ML
0.3 INJECTION, SOLUTION INTRAMUSCULAR; SUBCUTANEOUS EVERY 5 MIN PRN
Status: CANCELLED | OUTPATIENT
Start: 2019-01-03

## 2018-12-20 RX ORDER — ALBUTEROL SULFATE 90 UG/1
1-2 AEROSOL, METERED RESPIRATORY (INHALATION)
Status: CANCELLED
Start: 2019-01-03

## 2018-12-20 RX ORDER — SODIUM CHLORIDE 9 MG/ML
1000 INJECTION, SOLUTION INTRAVENOUS CONTINUOUS PRN
Status: CANCELLED
Start: 2019-01-03

## 2018-12-20 RX ORDER — LORAZEPAM 2 MG/ML
0.5 INJECTION INTRAMUSCULAR EVERY 4 HOURS PRN
Status: CANCELLED
Start: 2019-01-03

## 2018-12-20 RX ORDER — METHYLPREDNISOLONE SODIUM SUCCINATE 125 MG/2ML
125 INJECTION, POWDER, LYOPHILIZED, FOR SOLUTION INTRAMUSCULAR; INTRAVENOUS
Status: CANCELLED
Start: 2019-01-03

## 2018-12-20 RX ORDER — ALBUTEROL SULFATE 0.83 MG/ML
2.5 SOLUTION RESPIRATORY (INHALATION)
Status: CANCELLED | OUTPATIENT
Start: 2019-01-03

## 2018-12-20 RX ORDER — MEPERIDINE HYDROCHLORIDE 25 MG/ML
25 INJECTION INTRAMUSCULAR; INTRAVENOUS; SUBCUTANEOUS EVERY 30 MIN PRN
Status: CANCELLED | OUTPATIENT
Start: 2019-01-03

## 2018-12-26 NOTE — PROGRESS NOTES
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    Ms. Mar's oncologic history begins with a self palpated breast mass in mid 2018.  As such, she underwent a mammogram and targeted ultrasound on 2018 which showed two lesions, one in the UOQ (3:00) and one in the UIQ (11:00).  The first (lesion A), showed a 2 cm hyperdense mass with associated calcifications extending from the mass posteriorly in segmental and ductal distribution on mammography and correlated with 3.5 x 1.9 x 2.0 cm irregular mass with skin invasion at the 3 o'clock position 2 cm from the nipple on ultrasonography.  The second (lesion B) was a round 2.3 cm hyperdense mass in the upper inner quadrant correlating to a 2.4 x 1.9 x 2.1 cm irregular mass containing calcifications in hypervascularity at the 11 o'clock position, 7 cm from the nipple.  Directed left axillary ultrasound showed no suspicious lymphadenopathy.  The right breast demonstrated a cluster of microcysts at  "the 3 o'clock position, 5 cm from the nipple with corresponding mammographic changes which was assessed to be benign in appearance.      She then returned for a left breast biopsy of the 2 aforementioned lesions on 5/9/2018. Pathology from the biopsy of the specimen A (specimen #: [id number]) demonstrated solid-type, ER+/AL+/Her2+ grade 3, invasive mammary carcinoma with associated grade 2 DCIS and calcifications. Pathology from the biopsy of the specimen B (specimen #: [id number]) ER+/AL+/Her2- grade 3, invasive mammary carcinoma with associated grade 2 DCIS.  HER-2 testing was done independently on the samples by [facility] with HER2/CEN17 ratio of 2.8 (amplified) and sample A and 1.8 (nonamplified) and sample B.     She then began treatment with traztuzumab (Herceptin) and letrozole on 6/1/2018. She declined chemotherapy due to advanced age.      After this she proceeded to left breast mastectomy and sentinel lymph node biopsy on 11/19/2018.  Surgical pathology from this procedure (specimen #: [id number]) again showed partial response in both foci of disease.  Lesion A (3:00 focus) showed a 2.7 x 2.5 x 1.2 cm focus of ER+ (95%)/AL positive (15%)/HER-2+ (HER2/CEN17=2.8) grade 2 invasive ductal carcinoma, (+) LVSI, (+) dermal invasion, (-) dermal lymphatic invasion, Ki-67 5%.  Lesion B (1:00 focus) showed a 2.5 x 1.6 x 1.2 cm focus of ER+(98%)/AL+(70%)/HER2-(HER2/CEN17= 1.1), grade 3 invasive ductal carcinoma. The 1:00 focus correlated to the 11:00 lesion seen on imaging as determined by the presence of a \"padlock\" shaped biopsy clip.  There was associated DCIS and LCIS.  The margins were negative with closest invasive margin of 2.5 mm anteriorly and closest DCIS margins of > 10 mm in every direction.  The overall tumor cellularity was 15%.     In regards to her axillary evaluation, 2/3 sentinel lymph nodes were positive for metastatic ductal carcinoma, (+) BRENT.  One lymph node harbored a focus of 9 mm of metastatic " carcinoma with BRENT (1.5 mm). HER-2 testing was done on this lymph node (specimen #: [id number]) which was positive by [facility] in 2/3 blocks tested (maximum HER2/CEN17 ratio 3.1).  The second lymph node showed a 1.2 mm focus of metastatic carcinoma without BRENT and probable treatment related change.    Given her residual disease and pricila positivity she was referred for consideration of adjuvant radiotherapy.  On interview today she reports she is well healed from surgery.  She has no discharge from her surgical site.  She denies any post-surgical fatigue or pain.  Her full review of systems was negative with exception of reported bipolar disease for which she is under medical management.     All pertinent labs, imaging, and pathology findings have been reviewed with details above.     REVIEW OF SYSTEMS: A 10 point review of systems was obtained. Pertinent findings are noted in the HPI and nursing note from today, but are otherwise unremarkable.     CHEMOTHERAPY HISTORY: Yes letrozole and Herceptin -   Trastuzumab infusion:    Cycle 1: 6/6/2018    Cycle 2: 6/27/2018    Cycle 3: 7/17/2018    Cycle 4: 8/8/2018    Cycle 5: 8/28/2018    Cycle 6: 9/18/2018    Cycle 7: 10/9/2018    Cycle 8: 10/30/2018    RADIATION THERAPY HISTORY: None    IMPLANTABLE CARDIAC DEVICE: None    PREGNANCY RISK: Postmenopausal    PAST MEDICAL /SURGICAL HISTORY:  Past Medical History:   Diagnosis Date     Alcohol dependence in remission (H)      Anxiety      Asymptomatic varicose veins      Bipolar disorder, unspecified (H)      CKD (chronic kidney disease) stage 3, GFR 30-59 ml/min (H) 2/18/2014     History of smoking      Hyperparathyroidism (H)      Memory loss      Muscle weakness (generalized) 6/23/2008     Severe depression (H)      Subdural hygroma     chronic.  no surgeries     Tremor of unknown origin      Past Surgical History:   Procedure Laterality Date     APPENDECTOMY OPEN  1947     CATARACT IOL, RT/LT       COLONOSCOPY WITH CO2  INSUFFLATION  2/29/2012    Procedure:COLONOSCOPY WITH CO2 INSUFFLATION; Surgeon:GAYLE REYNA; Location:[facility] OR     CYSTOCELE REPAIR  1972     CYSTOSCOPY, INSERT STENT URETHRA, COMBINED  1999     CYSTOSCOPY, RETROGRADES, INSERT STENT URETER(S), COMBINED  2/29/2012    Procedure:COMBINED CYSTOSCOPY, RETROGRADES, INSERT STENT URETER(S); Surgeon:ANT ESPINOZA; Location:[facility]     DECOMPRESSION LUMBAR ONE LEVEL  8/9/2013    Procedure: DECOMPRESSION LUMBAR ONE LEVEL;  Posterior Decompression Right Lumbar 5- Sacral 1;  Surgeon: You Zavala MD;  Location: [facility] OR     HC TOOTH EXTRACTION W/FORCEP       HYSTERECTOMY, CATA  1963     IRRIGATION AND DEBRIDEMENT ORAL, COMBINED  1982    For treatment of gingivitis     LAPAROSCOPIC ASSISTED COLECTOMY  2/29/2012    Procedure:LAPAROSCOPIC ASSISTED COLECTOMY; Cysto, Bilateral Stent placement (both stents removed at end of case)- Yanet ACOSTA. Laparoscopic Extended Right Venu Colectomy with CO2 Colonoscopy and polyp removal-Judah; Surgeon:GAYLE REYNA; Location:[facility] OR     LIGATN/STRIP LONG OR SHORT SAPHEN  1955     MASTECTOMY PARTIAL WITH SENTINEL NODE Left 11/19/2018    Procedure: Left Mastectomy, Left [place] Lymph Node Biopsy;  Surgeon: Nahun Betancourt MD;  Location: [facility] OR     STRIP VEIN BILATERAL  1964     SURGICAL HISTORY OF -   1999    ureter surgery for blockage.       ALLERGIES:    Cafergot    Penicillin class antibiotics    Sulfa drugs    Naproxen    Tetracycline    Ciprofloxacin    Lamotrigine (Lamictal)    MEDICATIONS:    Acetaminophen    Aripiprazole line bupropion    Letrozole    Lithium    Neomycin-polymyxin- dexamethasone ophthalmic ointment    Pramipexole    Propranolol    Sertraline    FAMILY CANCER HISTORY:  Sister with breast cancer at age of 84 (now alive at 99)  Niece with breast cancer diagnosed at age 60    SOCIAL HISTORY:    Marital status:     Education: College (2 years)    Tobacco: Former smoker of 30 years (45 pack  years)    Alcohol use: None    PHYSICAL EXAM:  Vital Signs: /82   Pulse 51   Wt 73.4 kg (161 lb 14.4 oz)   BMI 30.59 kg/m    Gen: NAD  Eyes: EOMI, sclera anicteric  HENT      Head: NC/AT     Ears: No external auricular lesions     Nose/sinus: No rhinorrhea or epistaxis     Oral Cavity/Oropharynx: MMM  Pulm: Breathing comfortably on room air  CV: No visible cyanosis  Skin: Normal color and turgor of the visualized skin  Neurologic/MSK/psych: A&Ox3, Gross motor movements against gravity noted in the upper and lower extremities bilaterally.  She has some post-surgical restriction to raising her left arm above her head, however is able to with discomfort.    Chest Wall: Left chest wall with surgical incision in place and well healed.  She has a minor amount of redundant tissue post mastectomy in the medial portion of the left chest wall.  Right breast without any nodularity or changes to the NAC.    ECOG PERFORMANCE STATUS: 2    RADIOLOGY AND LABORATORIES: Relevant studies reviewed as above outlined in HPI.     IMPRESSION: In summary, Ms. Mar is a very pleasant 89 year old female multicentric cT2(m) N0 M0 G3 (stage III), grade 3, invasive ductal carcinoma of the upper outer and upper inner quadrants of the left breast status post neoadjuvant letrozole and trastuzumab followed by mastectomy with sentinel lymph node biopsy (11/19/2018) showing pPR (ypT2 N1(sn) M0 G3).       She is an appropriate candidate for PMRT for the purpose of decreasing the probability of local regional recurrence.  While she is of advanced age, given our projected estimates of her life span based on the longevity within her family as well as good performance status for her age, we expect that this would offer her a significant benefit in locoregional control and possibly survival as well.    RECOMMENDATION:  We recommended a course of postmastectomy radiotherapy with coverage of the chest wall and the axillary and supraclavicular  lymphatics. Specifically, we would recommend ~ 4000 cGy in 15 daily fractions (265 cGy/fx) to this field with a boost to the chest wall scar of 1000 cGy in 4 daily fractions.  We have chosen to recommend a hypofractionated course for this patient in the post-mastectomy setting given her transportation limitations, advanced age, and inability to attend a long course of treatment.      In regards to the side effects, we discussed both the expected acute and chronic toxicities of treatment. In regards to acute toxicities, we discussed the possibility of the development of fatigue, skin redness, blistering, itching, swelling of the chest wall, or lower blood counts. In regards to chronic toxicities, we discussed the possibility of hyperpigmentation or fibrosis of the skin within the treatment field, lymphedema of the arm, pain, rib fracture, damage to the shoulder joint, scarring or inflammation of the lung, or damage or inflammation to the pericardium or myocardium.  Additionally we discussed the small possibility of the development of secondary cancer.    Ms. Mar had many questions during our conversation today, which were answered to the best of our ability. By the end of our consultation today, Ms. Mar decided she was not ready to sign an informed consent and decided to return home for consideration.  She understands the merits of adjuvant radiotherapy and is amenable to radiotherapy as long as her ride situation can be coordinated.  We made plans to discuss her care with her son, Dmitry, in [place], [place], by her request.  His cell phone number is [phone] and his home phone number is [phone].    Additionally we gave her instructions for exercises to improve her left arm / shoulder mobility prior to treatment.     The patient was seen and discussed with staff, Dr. Andrews. Thank you for involving us in the care of this patient.  Please feel free to contact us with questions or concerns at any  time.      Josesito Doherty MD  Radiation Oncology Resident, PGY-4  [facility]  Phone: [phone]    I saw and examined the patient with the resident.  I have reviewed and edited the resident's note and agree with the plan of care.    I spoke to Dmitry, Mrs. Mar's son over the phone.  Dmitry is supportive of a course of adjuvant radiation therapy.  He will reach out to Mrs. Mar and will facilitate transportation.  Potentially, Mrs. Mar's nephew can provide some rides.  I will reach out to Mrs. Mar this Friday to touch base again.    Lupe Andrews MD    CC  Patient Care Team:  Ty Quintanilla MD as PCP - General  [facility] as MD (Psychiatry)  Leslee Colon, RN as Nurse Coordinator (Breast Oncology)  SHAYAN JULIO    Addendum:  Talked to Mrs. Mar on the phone on 1/7/19.  She has decided to move forward with radiation therapy.  She prefers simulation next week instead of this week. We will have it set up.    Lupe Andrews MD

## 2018-12-31 ENCOUNTER — OFFICE VISIT (OUTPATIENT)
Dept: RADIATION ONCOLOGY | Facility: CLINIC | Age: 83
End: 2018-12-31
Attending: RADIOLOGY
Payer: MEDICARE

## 2018-12-31 ENCOUNTER — HOSPITAL ENCOUNTER (OUTPATIENT)
Dept: CARDIOLOGY | Facility: CLINIC | Age: 83
Discharge: HOME | End: 2018-12-31
Attending: INTERNAL MEDICINE | Admitting: INTERNAL MEDICINE
Payer: MEDICARE

## 2018-12-31 VITALS
HEART RATE: 51 BPM | WEIGHT: 161.9 LBS | SYSTOLIC BLOOD PRESSURE: 138 MMHG | DIASTOLIC BLOOD PRESSURE: 82 MMHG | BODY MASS INDEX: 30.59 KG/M2

## 2018-12-31 DIAGNOSIS — Z51.11 ENCOUNTER FOR ANTINEOPLASTIC CHEMOTHERAPY: ICD-10-CM

## 2018-12-31 DIAGNOSIS — Z17.0 MALIGNANT NEOPLASM OF UPPER-OUTER QUADRANT OF LEFT BREAST IN FEMALE, ESTROGEN RECEPTOR POSITIVE (H): ICD-10-CM

## 2018-12-31 DIAGNOSIS — C50.212 MALIGNANT NEOPLASM OF UPPER-INNER QUADRANT OF LEFT BREAST IN FEMALE, ESTROGEN RECEPTOR POSITIVE (H): Primary | ICD-10-CM

## 2018-12-31 DIAGNOSIS — Z17.0 MALIGNANT NEOPLASM OF UPPER-INNER QUADRANT OF LEFT BREAST IN FEMALE, ESTROGEN RECEPTOR POSITIVE (H): Primary | ICD-10-CM

## 2018-12-31 DIAGNOSIS — C50.412 MALIGNANT NEOPLASM OF UPPER-OUTER QUADRANT OF LEFT BREAST IN FEMALE, ESTROGEN RECEPTOR POSITIVE (H): ICD-10-CM

## 2018-12-31 PROCEDURE — G0463 HOSPITAL OUTPT CLINIC VISIT: HCPCS | Performed by: RADIOLOGY

## 2018-12-31 PROCEDURE — 93306 TTE W/DOPPLER COMPLETE: CPT

## 2018-12-31 PROCEDURE — 93306 TTE W/DOPPLER COMPLETE: CPT | Mod: 26 | Performed by: INTERNAL MEDICINE

## 2018-12-31 ASSESSMENT — ENCOUNTER SYMPTOMS
CHILLS: 0
DYSURIA: 0
SHORTNESS OF BREATH: 0
NERVOUS/ANXIOUS: 1
HEARTBURN: 0
DEPRESSION: 1
FEVER: 0
COUGH: 0
DIZZINESS: 0
BLURRED VISION: 0
MYALGIAS: 0

## 2018-12-31 NOTE — PROGRESS NOTES
HPI    INITIAL PATIENT ASSESSMENT    Diagnosis: breast cancer    Prior radiation therapy: None    Prior chemotherapy: None    Prior hormonal therapy:No    Pain Eval:  Denies    Psychosocial  Living arrangements: by herself in an apartment, cousin is nearby   Fall Risk: independent   referral needs: Not needed    Advanced Directive: No  Implantable Cardiac Device? No    Onset of menarche:   LMP: No LMP recorded. Patient is postmenopausal.  Onset of menopause:   Abnormal vaginal bleeding/discharge: No  Are you pregnant? No  Reproductive note: 8 children    Nurse face-to-face time: Level 3:  10 min face to face time  Review of Systems   Constitutional: Negative for chills and fever.   HENT: Negative for hearing loss.    Eyes: Negative for blurred vision.   Respiratory: Negative for cough and shortness of breath.    Cardiovascular: Negative for chest pain.   Gastrointestinal: Negative for heartburn.   Genitourinary: Negative for dysuria and urgency.   Musculoskeletal: Negative for myalgias.   Neurological: Negative for dizziness.   Psychiatric/Behavioral: Positive for depression. The patient is nervous/anxious (is bipolar).

## 2018-12-31 NOTE — LETTER
2018       RE: Lennie Mar   [address]  [facility] [address]     Dear Colleague,    Thank you for referring your patient, Lennie Mar, to the RADIATION ONCOLOGY CLINIC. Please see a copy of my visit note below.    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    Ms. Mar's oncologic history begins with a self palpated breast mass in mid 2018.  As such, she underwent a mammogram and targeted ultrasound on 2018 which showed two lesions, one in the UOQ (3:00) and one in the UIQ (11:00).  The first (lesion A), showed a 2 cm hyperdense mass with associated calcifications extending from the mass posteriorly in segmental and ductal distribution on mammography and correlated with 3.5 x 1.9 x 2.0 cm irregular mass with skin invasion at the 3 o'clock position 2 cm from the nipple on ultrasonography.  The second (lesion B) was a round 2.3 cm hyperdense mass in the upper inner quadrant correlating to a 2.4 x 1.9  "x 2.1 cm irregular mass containing calcifications in hypervascularity at the 11 o'clock position, 7 cm from the nipple.  Directed left axillary ultrasound showed no suspicious lymphadenopathy.  The right breast demonstrated a cluster of microcysts at the 3 o'clock position, 5 cm from the nipple with corresponding mammographic changes which was assessed to be benign in appearance.      She then returned for a left breast biopsy of the 2 aforementioned lesions on 5/9/2018. Pathology from the biopsy of the specimen A (specimen #: [id number]) demonstrated solid-type, ER+/KS+/Her2+ grade 3, invasive mammary carcinoma with associated grade 2 DCIS and calcifications. Pathology from the biopsy of the specimen B (specimen #: [id number]) ER+/KS+/Her2- grade 3, invasive mammary carcinoma with associated grade 2 DCIS.  HER-2 testing was done independently on the samples by COURTNEY with HER2/CEN17 ratio of 2.8 (amplified) and sample A and 1.8 (nonamplified) and sample B.     She then began treatment with traztuzumab (Herceptin) and letrozole on 6/1/2018. She declined chemotherapy due to advanced age.      After this she proceeded to left breast mastectomy and sentinel lymph node biopsy on 11/19/2018.  Surgical pathology from this procedure (specimen #: [id number]) again showed partial response in both foci of disease.  Lesion A (3:00 focus) showed a 2.7 x 2.5 x 1.2 cm focus of ER+ (95%)/KS positive (15%)/HER-2+ (HER2/CEN17=2.8) grade 2 invasive ductal carcinoma, (+) LVSI, (+) dermal invasion, (-) dermal lymphatic invasion, Ki-67 5%.  Lesion B (1:00 focus) showed a 2.5 x 1.6 x 1.2 cm focus of ER+(98%)/KS+(70%)/HER2-(HER2/CEN17= 1.1), grade 3 invasive ductal carcinoma. The 1:00 focus correlated to the 11:00 lesion seen on imaging as determined by the presence of a \"padlock\" shaped biopsy clip.  There was associated DCIS and LCIS.  The margins were negative with closest invasive margin of 2.5 mm anteriorly and closest DCIS margins of > " 10 mm in every direction.  The overall tumor cellularity was 15%.     In regards to her axillary evaluation, 2/3 sentinel lymph nodes were positive for metastatic ductal carcinoma, (+) BRENT.  One lymph node harbored a focus of 9 mm of metastatic carcinoma with BRENT (1.5 mm). HER-2 testing was done on this lymph node (specimen #: [id number]) which was positive by COURTNEY in 2/3 blocks tested (maximum HER2/CEN17 ratio 3.1).  The second lymph node showed a 1.2 mm focus of metastatic carcinoma without BRENT and probable treatment related change.    Given her residual disease and pricila positivity she was referred for consideration of adjuvant radiotherapy.  On interview today she reports she is well healed from surgery.  She has no discharge from her surgical site.  She denies any post-surgical fatigue or pain.  Her full review of systems was negative with exception of reported bipolar disease for which she is under medical management.     All pertinent labs, imaging, and pathology findings have been reviewed with details above.     REVIEW OF SYSTEMS: A 10 point review of systems was obtained. Pertinent findings are noted in the HPI and nursing note from today, but are otherwise unremarkable.     CHEMOTHERAPY HISTORY: Yes letrozole and Herceptin -   Trastuzumab infusion:    Cycle 1: 6/6/2018    Cycle 2: 6/27/2018    Cycle 3: 7/17/2018    Cycle 4: 8/8/2018    Cycle 5: 8/28/2018    Cycle 6: 9/18/2018    Cycle 7: 10/9/2018    Cycle 8: 10/30/2018    RADIATION THERAPY HISTORY: None    IMPLANTABLE CARDIAC DEVICE: None    PREGNANCY RISK: Postmenopausal    PAST MEDICAL /SURGICAL HISTORY:  Past Medical History:   Diagnosis Date     Alcohol dependence in remission (H)      Anxiety      Asymptomatic varicose veins      Bipolar disorder, unspecified (H)      CKD (chronic kidney disease) stage 3, GFR 30-59 ml/min (H) 2/18/2014     History of smoking      Hyperparathyroidism (H)      Memory loss      Muscle weakness (generalized) 6/23/2008      Severe depression (H)      Subdural hygroma     chronic.  no surgeries     Tremor of unknown origin      Past Surgical History:   Procedure Laterality Date     APPENDECTOMY OPEN  1947     CATARACT IOL, RT/LT       COLONOSCOPY WITH CO2 INSUFFLATION  2/29/2012    Procedure:COLONOSCOPY WITH CO2 INSUFFLATION; Surgeon:GAYLE REYNA; Location:[facility] OR     CYSTOCELE REPAIR  1972     CYSTOSCOPY, INSERT STENT URETHRA, COMBINED  1999     CYSTOSCOPY, RETROGRADES, INSERT STENT URETER(S), COMBINED  2/29/2012    Procedure:COMBINED CYSTOSCOPY, RETROGRADES, INSERT STENT URETER(S); Surgeon:ANT ESPINOZA; Location:[facility] OR     DECOMPRESSION LUMBAR ONE LEVEL  8/9/2013    Procedure: DECOMPRESSION LUMBAR ONE LEVEL;  Posterior Decompression Right Lumbar 5- Sacral 1;  Surgeon: You Zavala MD;  Location: [facility] OR     HC TOOTH EXTRACTION W/FORCEP       HYSTERECTOMY, CATA  1963     IRRIGATION AND DEBRIDEMENT ORAL, COMBINED  1982    For treatment of gingivitis     LAPAROSCOPIC ASSISTED COLECTOMY  2/29/2012    Procedure:LAPAROSCOPIC ASSISTED COLECTOMY; Cysto, Bilateral Stent placement (both stents removed at end of case)- Yanet ACOSTA. Laparoscopic Extended Right Venu Colectomy with CO2 Colonoscopy and polyp removal-Judah; Surgeon:GAYLE REYNA; Location:[facility] OR     LIGATN/STRIP LONG OR SHORT SAPHEN  1955     MASTECTOMY PARTIAL WITH SENTINEL NODE Left 11/19/2018    Procedure: Left Mastectomy, Left Oxbow Lymph Node Biopsy;  Surgeon: Nahun Betancourt MD;  Location: [facility] OR     STRIP VEIN BILATERAL  1964     SURGICAL HISTORY OF -   1999    ureter surgery for blockage.       ALLERGIES:    Cafergot    Penicillin class antibiotics    Sulfa drugs    Naproxen    Tetracycline    Ciprofloxacin    Lamotrigine (Lamictal)    MEDICATIONS:    Acetaminophen    Aripiprazole line bupropion    Letrozole    Lithium    Neomycin-polymyxin- dexamethasone ophthalmic ointment    Pramipexole    Propranolol    Sertraline    FAMILY CANCER  HISTORY:  Sister with breast cancer at age of 84 (now alive at 99)  Niece with breast cancer diagnosed at age 60    SOCIAL HISTORY:    Marital status:     Education: College (2 years)    Tobacco: Former smoker of 30 years (45 pack years)    Alcohol use: None    PHYSICAL EXAM:  Vital Signs: /82   Pulse 51   Wt 73.4 kg (161 lb 14.4 oz)   BMI 30.59 kg/m     Gen: NAD  Eyes: EOMI, sclera anicteric  HENT      Head: NC/AT     Ears: No external auricular lesions     Nose/sinus: No rhinorrhea or epistaxis     Oral Cavity/Oropharynx: MMM  Pulm: Breathing comfortably on room air  CV: No visible cyanosis  Skin: Normal color and turgor of the visualized skin  Neurologic/MSK/psych: A&Ox3, Gross motor movements against gravity noted in the upper and lower extremities bilaterally.  She has some post-surgical restriction to raising her left arm above her head, however is able to with discomfort.    Chest Wall: Left chest wall with surgical incision in place and well healed.  She has a minor amount of redundant tissue post mastectomy in the medial portion of the left chest wall.  Right breast without any nodularity or changes to the NAC.    ECOG PERFORMANCE STATUS: 2    RADIOLOGY AND LABORATORIES: Relevant studies reviewed as above outlined in HPI.     IMPRESSION: In summary, Ms. Mar is a very pleasant 89 year old female multicentric cT2(m) N0 M0 G3 (stage III), grade 3, invasive ductal carcinoma of the upper outer and upper inner quadrants of the left breast status post neoadjuvant letrozole and trastuzumab followed by mastectomy with sentinel lymph node biopsy (11/19/2018) showing pPR (ypT2 N1(sn) M0 G3).       She is an appropriate candidate for PMRT for the purpose of decreasing the probability of local regional recurrence.  While she is of advanced age, given our projected estimates of her life span based on the longevity within her family as well as good performance status for her age, we expect that this  would offer her a significant benefit in locoregional control and possibly survival as well.    RECOMMENDATION:  We recommended a course of postmastectomy radiotherapy with coverage of the chest wall and the axillary and supraclavicular lymphatics. Specifically, we would recommend ~ 4000 cGy in 15 daily fractions (265 cGy/fx) to this field with a boost to the chest wall scar of 1000 cGy in 4 daily fractions.  We have chosen to recommend a hypofractionated course for this patient in the post-mastectomy setting given her transportation limitations, advanced age, and inability to attend a long course of treatment.      In regards to the side effects, we discussed both the expected acute and chronic toxicities of treatment. In regards to acute toxicities, we discussed the possibility of the development of fatigue, skin redness, blistering, itching, swelling of the chest wall, or lower blood counts. In regards to chronic toxicities, we discussed the possibility of hyperpigmentation or fibrosis of the skin within the treatment field, lymphedema of the arm, pain, rib fracture, damage to the shoulder joint, scarring or inflammation of the lung, or damage or inflammation to the pericardium or myocardium.  Additionally we discussed the small possibility of the development of secondary cancer.    Ms. Mar had many questions during our conversation today, which were answered to the best of our ability. By the end of our consultation today, Ms. Mar decided she was not ready to sign an informed consent and decided to return home for consideration.  She understands the merits of adjuvant radiotherapy and is amenable to radiotherapy as long as her ride situation can be coordinated.  We made plans to discuss her care with her son, Dmitry, in [place], [place], by her request.  His cell phone number is [phone] and his home phone number is [phone].    Additionally we gave her instructions for exercises to improve her left arm /  shoulder mobility prior to treatment.     The patient was seen and discussed with staff, Dr. Andrews. Thank you for involving us in the care of this patient.  Please feel free to contact us with questions or concerns at any time.      Josesito Doherty MD  Radiation Oncology Resident, PGY-4  [facility]  Phone: [phone]    I saw and examined the patient with the resident.  I have reviewed and edited the resident's note and agree with the plan of care.    I spoke to Dmitry, Mrs. Mar's son over the phone.  Dmitry is supportive of a course of adjuvant radiation therapy.  He will reach out to Mrs. Mar and will facilitate transportation.  Potentially, Mrs. Mar's nephew can provide some rides.  I will reach out to Mrs. Mar this Friday to touch base again.      Lupe Andrews MD    CC  Patient Care Team:  Ty Quintanilla MD as PCP - General  Elton Mejia MD as MD (Psychiatry)  Leslee Colon, RN as Nurse Coordinator (Breast Oncology)  SHAYAN JULIO          [facility] [place]    INITIAL PATIENT ASSESSMENT    Diagnosis: breast cancer    Prior radiation therapy: None    Prior chemotherapy: None    Prior hormonal therapy:No    Pain Eval:  Denies    Psychosocial  Living arrangements: by herself in an apartment, cousin is nearby   Fall Risk: independent   referral needs: Not needed    Advanced Directive: No  Implantable Cardiac Device? No    Onset of menarche:   LMP: No LMP recorded. Patient is postmenopausal.  Onset of menopause:   Abnormal vaginal bleeding/discharge: No  Are you pregnant? No  Reproductive note: 8 children    Nurse face-to-face time: Level 3:  10 min face to face time  Review of Systems   Constitutional: Negative for chills and fever.   HENT: Negative for hearing loss.    Eyes: Negative for blurred vision.   Respiratory: Negative for cough and shortness of breath.    Cardiovascular: Negative for chest pain.   Gastrointestinal: Negative for heartburn.    Genitourinary: Negative for dysuria and urgency.   Musculoskeletal: Negative for myalgias.   Neurological: Negative for dizziness.   Psychiatric/Behavioral: Positive for depression. The patient is nervous/anxious (is bipolar).

## 2019-01-02 RX ORDER — LORAZEPAM 0.5 MG/1
0.5 TABLET ORAL EVERY 4 HOURS PRN
Qty: 30 TABLET | Refills: 2 | Status: SHIPPED | OUTPATIENT
Start: 2019-01-02 | End: 2019-06-04

## 2019-01-02 RX ORDER — PROCHLORPERAZINE MALEATE 10 MG
5 TABLET ORAL EVERY 6 HOURS PRN
Qty: 30 TABLET | Refills: 2 | Status: SHIPPED | OUTPATIENT
Start: 2019-01-02 | End: 2019-06-04

## 2019-01-02 RX ORDER — ONDANSETRON 8 MG/1
8 TABLET, FILM COATED ORAL EVERY 8 HOURS PRN
Qty: 30 TABLET | Refills: 3 | Status: SHIPPED | OUTPATIENT
Start: 2019-01-02 | End: 2019-06-04

## 2019-01-03 ENCOUNTER — INFUSION THERAPY VISIT (OUTPATIENT)
Dept: ONCOLOGY | Facility: CLINIC | Age: 84
End: 2019-01-03
Attending: INTERNAL MEDICINE
Payer: MEDICARE

## 2019-01-03 ENCOUNTER — APPOINTMENT (OUTPATIENT)
Dept: LAB | Facility: CLINIC | Age: 84
End: 2019-01-03
Attending: INTERNAL MEDICINE
Payer: MEDICARE

## 2019-01-03 VITALS — SYSTOLIC BLOOD PRESSURE: 135 MMHG | DIASTOLIC BLOOD PRESSURE: 41 MMHG

## 2019-01-03 VITALS
WEIGHT: 161.4 LBS | RESPIRATION RATE: 16 BRPM | TEMPERATURE: 98.6 F | OXYGEN SATURATION: 98 % | HEART RATE: 70 BPM | DIASTOLIC BLOOD PRESSURE: 76 MMHG | BODY MASS INDEX: 30.5 KG/M2 | SYSTOLIC BLOOD PRESSURE: 153 MMHG

## 2019-01-03 DIAGNOSIS — Z17.0 MALIGNANT NEOPLASM OF UPPER-INNER QUADRANT OF LEFT BREAST IN FEMALE, ESTROGEN RECEPTOR POSITIVE (H): Primary | ICD-10-CM

## 2019-01-03 DIAGNOSIS — C50.212 MALIGNANT NEOPLASM OF UPPER-INNER QUADRANT OF LEFT BREAST IN FEMALE, ESTROGEN RECEPTOR POSITIVE (H): Primary | ICD-10-CM

## 2019-01-03 DIAGNOSIS — Z17.0 MALIGNANT NEOPLASM OF UPPER-OUTER QUADRANT OF LEFT BREAST IN FEMALE, ESTROGEN RECEPTOR POSITIVE (H): ICD-10-CM

## 2019-01-03 DIAGNOSIS — C50.412 MALIGNANT NEOPLASM OF UPPER-OUTER QUADRANT OF LEFT BREAST IN FEMALE, ESTROGEN RECEPTOR POSITIVE (H): ICD-10-CM

## 2019-01-03 LAB
ALBUMIN SERPL-MCNC: 3.1 G/DL (ref 3.4–5)
ALP SERPL-CCNC: 134 U/L (ref 40–150)
ALT SERPL W P-5'-P-CCNC: 23 U/L (ref 0–50)
ANION GAP SERPL CALCULATED.3IONS-SCNC: 7 MMOL/L (ref 3–14)
AST SERPL W P-5'-P-CCNC: 22 U/L (ref 0–45)
BASOPHILS # BLD AUTO: 0.1 10E9/L (ref 0–0.2)
BASOPHILS NFR BLD AUTO: 0.9 %
BILIRUB SERPL-MCNC: 0.5 MG/DL (ref 0.2–1.3)
BUN SERPL-MCNC: 25 MG/DL (ref 7–30)
CALCIUM SERPL-MCNC: 9.4 MG/DL (ref 8.5–10.1)
CHLORIDE SERPL-SCNC: 109 MMOL/L (ref 94–109)
CO2 SERPL-SCNC: 23 MMOL/L (ref 20–32)
CREAT SERPL-MCNC: 1.46 MG/DL (ref 0.52–1.04)
DIFFERENTIAL METHOD BLD: ABNORMAL
EOSINOPHIL # BLD AUTO: 0.3 10E9/L (ref 0–0.7)
EOSINOPHIL NFR BLD AUTO: 3.1 %
ERYTHROCYTE [DISTWIDTH] IN BLOOD BY AUTOMATED COUNT: 14.4 % (ref 10–15)
GFR SERPL CREATININE-BSD FRML MDRD: 31 ML/MIN/{1.73_M2}
GLUCOSE SERPL-MCNC: 90 MG/DL (ref 70–99)
HCT VFR BLD AUTO: 37.5 % (ref 35–47)
HGB BLD-MCNC: 11.6 G/DL (ref 11.7–15.7)
IMM GRANULOCYTES # BLD: 0.1 10E9/L (ref 0–0.4)
IMM GRANULOCYTES NFR BLD: 0.6 %
LYMPHOCYTES # BLD AUTO: 2.6 10E9/L (ref 0.8–5.3)
LYMPHOCYTES NFR BLD AUTO: 25 %
MCH RBC QN AUTO: 31.3 PG (ref 26.5–33)
MCHC RBC AUTO-ENTMCNC: 30.9 G/DL (ref 31.5–36.5)
MCV RBC AUTO: 101 FL (ref 78–100)
MONOCYTES # BLD AUTO: 1.1 10E9/L (ref 0–1.3)
MONOCYTES NFR BLD AUTO: 10.4 %
NEUTROPHILS # BLD AUTO: 6.1 10E9/L (ref 1.6–8.3)
NEUTROPHILS NFR BLD AUTO: 60 %
NRBC # BLD AUTO: 0 10*3/UL
NRBC BLD AUTO-RTO: 0 /100
PLATELET # BLD AUTO: 282 10E9/L (ref 150–450)
POTASSIUM SERPL-SCNC: 4.8 MMOL/L (ref 3.4–5.3)
PROT SERPL-MCNC: 7.3 G/DL (ref 6.8–8.8)
RBC # BLD AUTO: 3.71 10E12/L (ref 3.8–5.2)
SODIUM SERPL-SCNC: 139 MMOL/L (ref 133–144)
WBC # BLD AUTO: 10.2 10E9/L (ref 4–11)

## 2019-01-03 PROCEDURE — 96415 CHEMO IV INFUSION ADDL HR: CPT

## 2019-01-03 PROCEDURE — 99214 OFFICE O/P EST MOD 30 MIN: CPT | Mod: ZP | Performed by: PHYSICIAN ASSISTANT

## 2019-01-03 PROCEDURE — 25000128 H RX IP 250 OP 636: Mod: ZF | Performed by: INTERNAL MEDICINE

## 2019-01-03 PROCEDURE — G0463 HOSPITAL OUTPT CLINIC VISIT: HCPCS | Mod: ZF

## 2019-01-03 PROCEDURE — 96413 CHEMO IV INFUSION 1 HR: CPT

## 2019-01-03 PROCEDURE — 85025 COMPLETE CBC W/AUTO DIFF WBC: CPT | Performed by: INTERNAL MEDICINE

## 2019-01-03 PROCEDURE — 80053 COMPREHEN METABOLIC PANEL: CPT | Performed by: INTERNAL MEDICINE

## 2019-01-03 RX ADMIN — ADO-TRASTUZUMAB EMTANSINE 260 MG: 20 INJECTION, POWDER, LYOPHILIZED, FOR SOLUTION INTRAVENOUS at 15:46

## 2019-01-03 RX ADMIN — SODIUM CHLORIDE 250 ML: 9 INJECTION, SOLUTION INTRAVENOUS at 15:42

## 2019-01-03 ASSESSMENT — PAIN SCALES - GENERAL: PAINLEVEL: NO PAIN (0)

## 2019-01-03 NOTE — NURSING NOTE
Chief Complaint   Patient presents with     Blood Draw     Labs drawn via PIV by RN in lab. VS taken. Pt checked in for next appt     Labs drawn from PIV placed by RN. Line flushed with saline. Vitals taken. Pt checked in for appointment(s).    Magali COWAN RN PHN BSN  BMT/Oncology Lab

## 2019-01-03 NOTE — PATIENT INSTRUCTIONS
Contact Numbers    [facility] Main Line: [phone]  [facility] Triage and after hours / weekends / holidays:  [phone]      Please call the triage or after hours line if you experience a temperature greater than or equal to 100.5, shaking chills, have uncontrolled nausea, vomiting and/or diarrhea, dizziness, shortness of breath, chest pain, bleeding, unexplained bruising, or if you have any other new/concerning symptoms, questions or concerns.      If you are having any concerning symptoms or wish to speak to a provider before your next infusion visit, please call your care coordinator or triage to notify them so we can adequately serve you.     If you need a refill on a narcotic prescription or other medication, please call before your infusion appointment.

## 2019-01-03 NOTE — LETTER
1/3/2019      RE: Lennie Mar  [address]  [facility] [address]       Hematology-Oncology Visit  Gary 3, 2019    Reason for Visit: follow-up left breast cancer, 2 primaries     HPI: Lennie Mar is a 89 year old female with past medical history of bipolar disorder, CKD, essential tremor with recent diagnosis of left breast cancer, two separate primaries. She presented with a palpable mass and had a mammogram and US leading to biopsies on 5/9/18. Pathology showed grade 3 invasive carcinoma, ER/IL positive, HER2 amplified of mass at 3:00 position with associated DCIS and skin involvement. The mass at 11:00 position was also grade 3 invasive carcinoma, ER/IL positive, but HER2 nonamplified. No lymphadenopathy was noted.     She met with Dr. Malik on 5/21/18 and elected to start neoadjuvant treatment with Herceptin every 3 weeks along with letrozole. Echocardiogram was done 5/25 showed EF of 55-60%. She tolerated neoadjuvant treatment remarkably well. She had a left breast mastectomy and SLNB on 11/19/18 showing two residual tumors, the larger grade 2 tumor ER, IL, HER2 amplified measuring 2.7 cm and smaller grade 3 tumor ER positive, IL negative, HER2 negative measured 2.5 cm. 2/3 left axillary nodes demonstrated metastases measuring 9 mm and 1.2 mm. HER2 FISH of larger axillary lymph node metastasis was amplified. Dr. Malik recommended adjuvant therapy with TDM1 for 1 year and adjuvant radiation.     Interval History: Lennie is here alone today to start treatment with TDM1. She is feeling well. She has had no pain since surgery and has been recovering well. She has no questions or concerns today. No fevers/chills, cough, SOB, CP, or edema. No anorexia, n/v, or changes in bowels or bladder. No baseline neuropathy. ROS otherwise negative.      Current Outpatient Medications   Medication     acetaminophen (TYLENOL) 325 MG tablet     ARIPiprazole (ABILIFY) 5 MG tablet     buPROPion  (WELLBUTRIN XL) 300 MG 24 hr tablet     Cyanocobalamin (VITAMIN B 12 PO)     letrozole (FEMARA) 2.5 MG tablet     lithium 150 MG capsule     lithium 300 MG capsule     LORazepam (ATIVAN) 0.5 MG tablet     neomycin-polymyxin-dexamethasone (MAXITROL) 3.5-07993-0.1 ophthalmic ointment     ondansetron (ZOFRAN) 8 MG tablet     ORDER FOR DME     oxyCODONE IR (ROXICODONE) 5 MG tablet     pramipexole (MIRAPEX) 1 MG tablet     prochlorperazine (COMPAZINE) 10 MG tablet     propranolol (INDERAL) 20 MG tablet     sertraline (ZOLOFT) 50 MG tablet     No current facility-administered medications for this visit.        PHYSICAL EXAM:  /76 (BP Location: Right arm, Patient Position: Sitting, Cuff Size: Adult Regular)   Pulse 70   Temp 98.6  F (37  C) (Tympanic)   Resp 16   Wt 73.2 kg (161 lb 6.4 oz)   SpO2 98%   BMI 30.50 kg/m     General: Alert, oriented, pleasant, NAD  HEENT: Normocephalic, atraumatic, PERRLA, EOMI. Moist mucus membranes, no lesions or thrush  Neck: No cervical or supraclavicular LAD.  Axillary: No LAD  Breast: S/p L mastectomy. Incision is healing well with dermabond present in areas medially. No axillary adenopathy   Lungs: CTA bilaterally, normal work of breathing  Cardiac: RRR, S1, S2, no murmurs  Abdomen: Soft, nontender, nondistended. Normoactive bowel sounds. No hepatosplenomegaly, masses  Neuro: CNII-XII grossly intact  Extremities: No pedal edema    Labs:    1/3/2019 14:35   Sodium 139   Potassium 4.8   Chloride 109   Carbon Dioxide 23   Urea Nitrogen 25   Creatinine 1.46 (H)   GFR Estimate 31 (L)   GFR Estimate If Black 36 (L)   Calcium 9.4   Anion Gap 7   Albumin 3.1 (L)   Protein Total 7.3   Bilirubin Total 0.5   Alkaline Phosphatase 134   ALT 23   AST 22   Glucose 90   WBC 10.2   Hemoglobin 11.6 (L)   Hematocrit 37.5   Platelet Count 282   RBC Count 3.71 (L)    (H)   MCH 31.3   MCHC 30.9 (L)   RDW 14.4   Diff Method Automated Method   % Neutrophils 60.0   % Lymphocytes 25.0   %  Monocytes 10.4   % Eosinophils 3.1   % Basophils 0.9   % Immature Granulocytes 0.6   Nucleated RBCs 0   Absolute Neutrophil 6.1   Absolute Lymphocytes 2.6   Absolute Monocytes 1.1   Absolute Eosinophils 0.3   Absolute Basophils 0.1   Abs Immature Granulocytes 0.1   Absolute Nucleated RBC 0.0         Assessment & Plan:   1. Left breast cancer, two separate primaries, both ER/CT positive, 1 HER2 amplified: S/p neoadjuvant treatment with letrozole and Herceptin, L mastectomy and SLNB with RCD III disease, indicating higher risk of recurrence. Adjuvant radiation therapy and adjuvant treatment of Kadycla for 1 year were recommended. Her echocardiogram from Monday shows normal EF to start Kadcyla. She is feeling well and baseline labs are adequate to start today. Will proceed with cycle 1 and follow-up prior to cycle 2. She has met with Dr. Andrews and is still considering radiation therapy due to rides. It seems like this issue has been sorted out, Dr. Andrews is following up end of this week.     Dalila Blum PA-C  [facility]  [address] [place] [id number] [phone]

## 2019-01-03 NOTE — NURSING NOTE
"Oncology Rooming Note    January 3, 2019 2:25 PM   Lennie Mar is a 89 year old female who presents for:    Chief Complaint   Patient presents with     Blood Draw     Labs drawn via PIV by RN in lab. VS taken. Pt checked in for next appt     Oncology Clinic Visit     Breast Ca , Labs , Tx      Initial Vitals: /76 (BP Location: Right arm, Patient Position: Sitting, Cuff Size: Adult Regular)   Pulse 70   Temp 98.6  F (37  C) (Tympanic)   Resp 16   Wt 73.2 kg (161 lb 6.4 oz)   SpO2 98%   BMI 30.50 kg/m   Estimated body mass index is 30.5 kg/m  as calculated from the following:    Height as of 12/6/18: 1.549 m (5' 1\").    Weight as of this encounter: 73.2 kg (161 lb 6.4 oz). Body surface area is 1.77 meters squared.  No Pain (0) Comment: Data Unavailable   No LMP recorded. Patient is postmenopausal.  Allergies reviewed: Yes  Medications reviewed: Yes    Medications: MEDICATION REFILLS NEEDED TODAY. Provider was notified.  Pharmacy name entered into [employer]:    [facility] - [place], [place] - [address] [facility] [place], [place] - [address] [facility] [place], [place] - [address]  [id number] - [place], [place] - [address] [facility] [address]    Clinical concerns: Refills Estefanía Blum  was notified.   mg 5  minutes for nursing intake (face to face time)     Bridget Walden MA              "

## 2019-01-03 NOTE — PROGRESS NOTES
Infusion Nursing Note:  Lennie Mar presents today for Cycle 1 Day 1 Kadcyla.    Patient seen by provider today: Yes: EVELINE Stewart    Note: Written handout provided for Kadcyla. Medication reviewed with patient, including side effects, potential infusion reaction symptoms. Verbalized understanding. Patient educated to call triage for temp > 100.4, uncontrolled symptoms. Verbalized understanding. No other concerns at this time.     Intravenous Access:  Peripheral IV placed in lab.    Treatment Conditions:  Lab Results   Component Value Date    HGB 11.6 01/03/2019     Lab Results   Component Value Date    WBC 10.2 01/03/2019      Lab Results   Component Value Date    ANEU 6.1 01/03/2019     Lab Results   Component Value Date     01/03/2019      Lab Results   Component Value Date     01/03/2019                   Lab Results   Component Value Date    POTASSIUM 4.8 01/03/2019           Lab Results   Component Value Date    MAG 2.0 04/22/2012            Lab Results   Component Value Date    CR 1.46 01/03/2019                   Lab Results   Component Value Date    CANELO 9.4 01/03/2019                Lab Results   Component Value Date    BILITOTAL 0.5 01/03/2019           Lab Results   Component Value Date    ALBUMIN 3.1 01/03/2019                    Lab Results   Component Value Date    ALT 23 01/03/2019           Lab Results   Component Value Date    AST 22 01/03/2019     Results reviewed, labs MET treatment parameters, ok to proceed with treatment.  ECHO/MUGA completed 12/31/18  EF 55-60%.      Post Infusion Assessment:  Patient tolerated infusion without incident.  Patient observed for 90 minutes post Kadcyla per protocol.  Blood return noted pre and post infusion.  Access discontinued per protocol.    Discharge Plan:   Patient declined prescription refills. Patient states she has compazine at home. Reviewed anti-nausea medications with pharmacist. Declined Ativan and zofran.  Discharge  instructions reviewed with: Patient.  Patient  verbalized understanding of discharge instructions and all questions answered.  Copy of AVS reviewed with patient. Inbox message sent to EVELINE Stewart to place schedule requests. Patient educated that next infusion appointment will be around 1/24/19.  Patient discharged in stable condition accompanied by: Cousin to pick patient up.  Face to Face time: 2.    LUCIE OJEDA RN

## 2019-01-03 NOTE — PROGRESS NOTES
Hematology-Oncology Visit  Gary 3, 2019    Reason for Visit: follow-up left breast cancer, 2 primaries     HPI: Lennie Mar is a 89 year old female with past medical history of bipolar disorder, CKD, essential tremor with recent diagnosis of left breast cancer, two separate primaries. She presented with a palpable mass and had a mammogram and US leading to biopsies on 5/9/18. Pathology showed grade 3 invasive carcinoma, ER/VT positive, HER2 amplified of mass at 3:00 position with associated DCIS and skin involvement. The mass at 11:00 position was also grade 3 invasive carcinoma, ER/VT positive, but HER2 nonamplified. No lymphadenopathy was noted.     She met with Dr. Malik on 5/21/18 and elected to start neoadjuvant treatment with Herceptin every 3 weeks along with letrozole. Echocardiogram was done 5/25 showed EF of 55-60%. She tolerated neoadjuvant treatment remarkably well. She had a left breast mastectomy and SLNB on 11/19/18 showing two residual tumors, the larger grade 2 tumor ER, VT, HER2 amplified measuring 2.7 cm and smaller grade 3 tumor ER positive, VT negative, HER2 negative measured 2.5 cm. 2/3 left axillary nodes demonstrated metastases measuring 9 mm and 1.2 mm. HER2 FISH of larger axillary lymph node metastasis was amplified. Dr. Malik recommended adjuvant therapy with TDM1 for 1 year and adjuvant radiation.     Interval History: Lennie is here alone today to start treatment with TDM1. She is feeling well. She has had no pain since surgery and has been recovering well. She has no questions or concerns today. No fevers/chills, cough, SOB, CP, or edema. No anorexia, n/v, or changes in bowels or bladder. No baseline neuropathy. ROS otherwise negative.      Current Outpatient Medications   Medication     acetaminophen (TYLENOL) 325 MG tablet     ARIPiprazole (ABILIFY) 5 MG tablet     buPROPion (WELLBUTRIN XL) 300 MG 24 hr tablet     Cyanocobalamin (VITAMIN B 12 PO)     letrozole (FEMARA)  2.5 MG tablet     lithium 150 MG capsule     lithium 300 MG capsule     LORazepam (ATIVAN) 0.5 MG tablet     neomycin-polymyxin-dexamethasone (MAXITROL) 3.5-78132-2.1 ophthalmic ointment     ondansetron (ZOFRAN) 8 MG tablet     ORDER FOR DME     oxyCODONE IR (ROXICODONE) 5 MG tablet     pramipexole (MIRAPEX) 1 MG tablet     prochlorperazine (COMPAZINE) 10 MG tablet     propranolol (INDERAL) 20 MG tablet     sertraline (ZOLOFT) 50 MG tablet     No current facility-administered medications for this visit.        PHYSICAL EXAM:  /76 (BP Location: Right arm, Patient Position: Sitting, Cuff Size: Adult Regular)   Pulse 70   Temp 98.6  F (37  C) (Tympanic)   Resp 16   Wt 73.2 kg (161 lb 6.4 oz)   SpO2 98%   BMI 30.50 kg/m    General: Alert, oriented, pleasant, NAD  HEENT: Normocephalic, atraumatic, PERRLA, EOMI. Moist mucus membranes, no lesions or thrush  Neck: No cervical or supraclavicular LAD.  Axillary: No LAD  Breast: S/p L mastectomy. Incision is healing well with dermabond present in areas medially. No axillary adenopathy   Lungs: CTA bilaterally, normal work of breathing  Cardiac: RRR, S1, S2, no murmurs  Abdomen: Soft, nontender, nondistended. Normoactive bowel sounds. No hepatosplenomegaly, masses  Neuro: CNII-XII grossly intact  Extremities: No pedal edema    Labs:    1/3/2019 14:35   Sodium 139   Potassium 4.8   Chloride 109   Carbon Dioxide 23   Urea Nitrogen 25   Creatinine 1.46 (H)   GFR Estimate 31 (L)   GFR Estimate If Black 36 (L)   Calcium 9.4   Anion Gap 7   Albumin 3.1 (L)   Protein Total 7.3   Bilirubin Total 0.5   Alkaline Phosphatase 134   ALT 23   AST 22   Glucose 90   WBC 10.2   Hemoglobin 11.6 (L)   Hematocrit 37.5   Platelet Count 282   RBC Count 3.71 (L)    (H)   MCH 31.3   MCHC 30.9 (L)   RDW 14.4   Diff Method Automated Method   % Neutrophils 60.0   % Lymphocytes 25.0   % Monocytes 10.4   % Eosinophils 3.1   % Basophils 0.9   % Immature Granulocytes 0.6   Nucleated RBCs 0    Absolute Neutrophil 6.1   Absolute Lymphocytes 2.6   Absolute Monocytes 1.1   Absolute Eosinophils 0.3   Absolute Basophils 0.1   Abs Immature Granulocytes 0.1   Absolute Nucleated RBC 0.0         Assessment & Plan:   1. Left breast cancer, two separate primaries, both ER/AR positive, 1 HER2 amplified: S/p neoadjuvant treatment with letrozole and Herceptin, L mastectomy and SLNB with RCD III disease, indicating higher risk of recurrence. Adjuvant radiation therapy and adjuvant treatment of Kadycla for 1 year were recommended. Her echocardiogram from Monday shows normal EF to start Kadcyla. She is feeling well and baseline labs are adequate to start today. Will proceed with cycle 1 and follow-up prior to cycle 2. She has met with Dr. Andrews and is still considering radiation therapy due to rides. It seems like this issue has been sorted out, Dr. Andrews is following up end of this week.     Dalila Blum PA-C  [facility]  [address] [place] [address] [phone]

## 2019-01-16 ENCOUNTER — ALLIED HEALTH/NURSE VISIT (OUTPATIENT)
Dept: RADIATION ONCOLOGY | Facility: CLINIC | Age: 84
End: 2019-01-16
Attending: RADIOLOGY
Payer: MEDICARE

## 2019-01-16 DIAGNOSIS — C50.212 MALIGNANT NEOPLASM OF UPPER-INNER QUADRANT OF LEFT BREAST IN FEMALE, ESTROGEN RECEPTOR POSITIVE (H): Primary | ICD-10-CM

## 2019-01-16 DIAGNOSIS — Z17.0 MALIGNANT NEOPLASM OF UPPER-INNER QUADRANT OF LEFT BREAST IN FEMALE, ESTROGEN RECEPTOR POSITIVE (H): Primary | ICD-10-CM

## 2019-01-16 PROCEDURE — 77290 THER RAD SIMULAJ FIELD CPLX: CPT | Performed by: RADIOLOGY

## 2019-01-16 PROCEDURE — 77334 RADIATION TREATMENT AID(S): CPT | Performed by: RADIOLOGY

## 2019-01-16 NOTE — PROGRESS NOTES
Radiation Therapy Patient Education    Person involved with teaching: Patient    Patient educational needs for self management of treatment-related side effects assessment completed.  [facility] Patient Ed tab contains Patient Learning Assessment    Education Materials Given  Skin Care During Radiation Treatment    Educational Topics Discussed  Side effects expected, Skin care and When to call MD/RN    Response To Teaching  Verbalizes understanding, may need repetition of information.      Referrals sent: None    Chemotherapy?  No  Lennie voiced hesitation to start radiation therapy, said she was going to think about it.  Dr. Andrews aware and will call her son to discuss with him per Lennie's request.

## 2019-01-19 DIAGNOSIS — R25.1 TREMOR: ICD-10-CM

## 2019-01-19 NOTE — TELEPHONE ENCOUNTER
"Requested Prescriptions   Pending Prescriptions Disp Refills     propranolol (INDERAL) 20 MG tablet  Last Written Prescription Date:  1/9/2018  Last Fill Quantity: 180 tabs,  # refills: 3   Last office visit: 5/1/2018 with prescribing provider:  Dwight   Future Office Visit:     180 tablet 3     Sig: Take 1 tablet (20 mg) by mouth 2 times daily    Beta-Blockers Protocol Passed - 1/19/2019  2:19 PM       Passed - Blood pressure under 140/90 in past 12 months    BP Readings from Last 3 Encounters:   01/03/19 135/41   01/03/19 153/76   12/31/18 138/82                Passed - Patient is age 6 or older       Passed - Recent (12 mo) or future (30 days) visit within the authorizing provider's specialty    Patient had office visit in the last 12 months or has a visit in the next 30 days with authorizing provider or within the authorizing provider's specialty.  See \"Patient Info\" tab in inbasket, or \"Choose Columns\" in Meds & Orders section of the refill encounter.             Passed - Medication is active on med list          "

## 2019-01-22 RX ORDER — PROPRANOLOL HYDROCHLORIDE 20 MG/1
20 TABLET ORAL 2 TIMES DAILY
Qty: 180 TABLET | Refills: 1 | Status: SHIPPED | OUTPATIENT
Start: 2019-01-22 | End: 2019-06-04

## 2019-01-22 NOTE — TELEPHONE ENCOUNTER
Pending Prescriptions:                       Disp   Refills    propranolol (INDERAL) 20 MG tablet        180 ta*1            Sig: Take 1 tablet (20 mg) by mouth 2 times daily     - note placed on refill to schedule office visit in May 2019    Routing refill request to provider for review/approval because:    BP Readings from Last 6 Encounters:   01/03/19 135/41   01/03/19 153/76   12/31/18 138/82   12/11/18 127/64   12/06/18 133/74   11/28/18 143/71     BP elevated at oncology visit - patient does not have HTN on problem list and medication is associated with tremor not HTN     Lennie Horvath, Registered Nurse   [facility]

## 2019-01-24 ENCOUNTER — ONCOLOGY VISIT (OUTPATIENT)
Dept: ONCOLOGY | Facility: CLINIC | Age: 84
End: 2019-01-24
Attending: PHYSICIAN ASSISTANT
Payer: MEDICARE

## 2019-01-24 ENCOUNTER — APPOINTMENT (OUTPATIENT)
Dept: LAB | Facility: CLINIC | Age: 84
End: 2019-01-24
Attending: INTERNAL MEDICINE
Payer: MEDICARE

## 2019-01-24 ENCOUNTER — INFUSION THERAPY VISIT (OUTPATIENT)
Dept: ONCOLOGY | Facility: CLINIC | Age: 84
End: 2019-01-24
Attending: INTERNAL MEDICINE
Payer: MEDICARE

## 2019-01-24 VITALS
OXYGEN SATURATION: 95 % | TEMPERATURE: 98.7 F | WEIGHT: 155.4 LBS | RESPIRATION RATE: 16 BRPM | HEIGHT: 61 IN | HEART RATE: 90 BPM | DIASTOLIC BLOOD PRESSURE: 67 MMHG | BODY MASS INDEX: 29.34 KG/M2 | SYSTOLIC BLOOD PRESSURE: 129 MMHG

## 2019-01-24 DIAGNOSIS — Z17.0 MALIGNANT NEOPLASM OF UPPER-INNER QUADRANT OF LEFT BREAST IN FEMALE, ESTROGEN RECEPTOR POSITIVE (H): Primary | ICD-10-CM

## 2019-01-24 DIAGNOSIS — C50.212 MALIGNANT NEOPLASM OF UPPER-INNER QUADRANT OF LEFT BREAST IN FEMALE, ESTROGEN RECEPTOR POSITIVE (H): Primary | ICD-10-CM

## 2019-01-24 DIAGNOSIS — C50.212 MALIGNANT NEOPLASM OF UPPER-INNER QUADRANT OF LEFT BREAST IN FEMALE, ESTROGEN RECEPTOR POSITIVE (H): ICD-10-CM

## 2019-01-24 DIAGNOSIS — Z17.0 MALIGNANT NEOPLASM OF UPPER-INNER QUADRANT OF LEFT BREAST IN FEMALE, ESTROGEN RECEPTOR POSITIVE (H): ICD-10-CM

## 2019-01-24 LAB
ALBUMIN SERPL-MCNC: 2.9 G/DL (ref 3.4–5)
ALP SERPL-CCNC: 111 U/L (ref 40–150)
ALT SERPL W P-5'-P-CCNC: 20 U/L (ref 0–50)
ANION GAP SERPL CALCULATED.3IONS-SCNC: 7 MMOL/L (ref 3–14)
AST SERPL W P-5'-P-CCNC: 25 U/L (ref 0–45)
BASOPHILS # BLD AUTO: 0.1 10E9/L (ref 0–0.2)
BASOPHILS NFR BLD AUTO: 1.2 %
BILIRUB SERPL-MCNC: 0.3 MG/DL (ref 0.2–1.3)
BUN SERPL-MCNC: 24 MG/DL (ref 7–30)
CALCIUM SERPL-MCNC: 9.5 MG/DL (ref 8.5–10.1)
CHLORIDE SERPL-SCNC: 110 MMOL/L (ref 94–109)
CO2 SERPL-SCNC: 23 MMOL/L (ref 20–32)
CREAT SERPL-MCNC: 1.6 MG/DL (ref 0.52–1.04)
DIFFERENTIAL METHOD BLD: ABNORMAL
EOSINOPHIL # BLD AUTO: 0.2 10E9/L (ref 0–0.7)
EOSINOPHIL NFR BLD AUTO: 2.3 %
ERYTHROCYTE [DISTWIDTH] IN BLOOD BY AUTOMATED COUNT: 14 % (ref 10–15)
GFR SERPL CREATININE-BSD FRML MDRD: 28 ML/MIN/{1.73_M2}
GLUCOSE SERPL-MCNC: 97 MG/DL (ref 70–99)
HCT VFR BLD AUTO: 38.4 % (ref 35–47)
HGB BLD-MCNC: 11.9 G/DL (ref 11.7–15.7)
IMM GRANULOCYTES # BLD: 0.2 10E9/L (ref 0–0.4)
IMM GRANULOCYTES NFR BLD: 1.4 %
LYMPHOCYTES # BLD AUTO: 2.6 10E9/L (ref 0.8–5.3)
LYMPHOCYTES NFR BLD AUTO: 24.7 %
MCH RBC QN AUTO: 30.1 PG (ref 26.5–33)
MCHC RBC AUTO-ENTMCNC: 31 G/DL (ref 31.5–36.5)
MCV RBC AUTO: 97 FL (ref 78–100)
MONOCYTES # BLD AUTO: 0.9 10E9/L (ref 0–1.3)
MONOCYTES NFR BLD AUTO: 9 %
NEUTROPHILS # BLD AUTO: 6.4 10E9/L (ref 1.6–8.3)
NEUTROPHILS NFR BLD AUTO: 61.4 %
NRBC # BLD AUTO: 0 10*3/UL
NRBC BLD AUTO-RTO: 0 /100
PLATELET # BLD AUTO: 302 10E9/L (ref 150–450)
POTASSIUM SERPL-SCNC: 4.3 MMOL/L (ref 3.4–5.3)
PROT SERPL-MCNC: 7.4 G/DL (ref 6.8–8.8)
RBC # BLD AUTO: 3.96 10E12/L (ref 3.8–5.2)
SODIUM SERPL-SCNC: 140 MMOL/L (ref 133–144)
WBC # BLD AUTO: 10.5 10E9/L (ref 4–11)

## 2019-01-24 PROCEDURE — 96413 CHEMO IV INFUSION 1 HR: CPT

## 2019-01-24 PROCEDURE — G0463 HOSPITAL OUTPT CLINIC VISIT: HCPCS | Mod: ZF

## 2019-01-24 PROCEDURE — 80053 COMPREHEN METABOLIC PANEL: CPT | Performed by: PHYSICIAN ASSISTANT

## 2019-01-24 PROCEDURE — 85025 COMPLETE CBC W/AUTO DIFF WBC: CPT | Performed by: PHYSICIAN ASSISTANT

## 2019-01-24 PROCEDURE — 99214 OFFICE O/P EST MOD 30 MIN: CPT | Mod: ZP | Performed by: PHYSICIAN ASSISTANT

## 2019-01-24 PROCEDURE — 25000128 H RX IP 250 OP 636: Mod: ZF | Performed by: PHYSICIAN ASSISTANT

## 2019-01-24 RX ORDER — SODIUM CHLORIDE 9 MG/ML
1000 INJECTION, SOLUTION INTRAVENOUS CONTINUOUS PRN
Status: CANCELLED
Start: 2019-01-24

## 2019-01-24 RX ORDER — ALBUTEROL SULFATE 0.83 MG/ML
2.5 SOLUTION RESPIRATORY (INHALATION)
Status: CANCELLED | OUTPATIENT
Start: 2019-01-24

## 2019-01-24 RX ORDER — EPINEPHRINE 0.3 MG/.3ML
0.3 INJECTION SUBCUTANEOUS EVERY 5 MIN PRN
Status: CANCELLED | OUTPATIENT
Start: 2019-01-24

## 2019-01-24 RX ORDER — LORAZEPAM 2 MG/ML
0.5 INJECTION INTRAMUSCULAR EVERY 4 HOURS PRN
Status: CANCELLED
Start: 2019-01-24

## 2019-01-24 RX ORDER — METHYLPREDNISOLONE SODIUM SUCCINATE 125 MG/2ML
125 INJECTION, POWDER, LYOPHILIZED, FOR SOLUTION INTRAMUSCULAR; INTRAVENOUS
Status: CANCELLED
Start: 2019-01-24

## 2019-01-24 RX ORDER — MEPERIDINE HYDROCHLORIDE 25 MG/ML
25 INJECTION INTRAMUSCULAR; INTRAVENOUS; SUBCUTANEOUS EVERY 30 MIN PRN
Status: CANCELLED | OUTPATIENT
Start: 2019-01-24

## 2019-01-24 RX ORDER — ALBUTEROL SULFATE 90 UG/1
1-2 AEROSOL, METERED RESPIRATORY (INHALATION)
Status: CANCELLED
Start: 2019-01-24

## 2019-01-24 RX ORDER — DIPHENHYDRAMINE HYDROCHLORIDE 50 MG/ML
50 INJECTION INTRAMUSCULAR; INTRAVENOUS
Status: CANCELLED
Start: 2019-01-24

## 2019-01-24 RX ORDER — EPINEPHRINE 1 MG/ML
0.3 INJECTION, SOLUTION INTRAMUSCULAR; SUBCUTANEOUS EVERY 5 MIN PRN
Status: CANCELLED | OUTPATIENT
Start: 2019-01-24

## 2019-01-24 RX ADMIN — ADO-TRASTUZUMAB EMTANSINE 260 MG: 20 INJECTION, POWDER, LYOPHILIZED, FOR SOLUTION INTRAVENOUS at 12:34

## 2019-01-24 RX ADMIN — SODIUM CHLORIDE 250 ML: 9 INJECTION, SOLUTION INTRAVENOUS at 12:34

## 2019-01-24 ASSESSMENT — MIFFLIN-ST. JEOR: SCORE: 1057.01

## 2019-01-24 ASSESSMENT — PAIN SCALES - GENERAL: PAINLEVEL: NO PAIN (0)

## 2019-01-24 NOTE — PROGRESS NOTES
Infusion Nursing Note:  Lennie Mar presents today for Day 1 Cycle 2 Kadcyla.    Patient seen by provider today: Yes: EVELINE Stewart   present during visit today: Not Applicable.    Note: N/A.    Intravenous Access:  Peripheral IV placed.    Treatment Conditions:  Lab Results   Component Value Date    HGB 11.9 01/24/2019     Lab Results   Component Value Date    WBC 10.5 01/24/2019      Lab Results   Component Value Date    ANEU 6.4 01/24/2019     Lab Results   Component Value Date     01/24/2019      Lab Results   Component Value Date     01/24/2019                   Lab Results   Component Value Date    POTASSIUM 4.3 01/24/2019           Lab Results   Component Value Date    MAG 2.0 04/22/2012            Lab Results   Component Value Date    CR 1.60 01/24/2019                   Lab Results   Component Value Date    CANELO 9.5 01/24/2019                Lab Results   Component Value Date    BILITOTAL 0.3 01/24/2019           Lab Results   Component Value Date    ALBUMIN 2.9 01/24/2019                    Lab Results   Component Value Date    ALT 20 01/24/2019           Lab Results   Component Value Date    AST 25 01/24/2019       Results reviewed, labs MET treatment parameters, ok to proceed with treatment.  ECHO/MUGA completed 12/31/18  EF 55-60%.    Post Infusion Assessment:  Patient tolerated infusion without incident.  Patient observed for 30 minutes post Kadcyla infusion per protocol.   Blood return noted pre and post infusion.  Site patent and intact, free from redness, edema or discomfort.  No evidence of extravasations.  Access discontinued per protocol.    Discharge Plan:   Patient declined prescription refills.  Copy of AVS reviewed with patient and/or family.  Patient will return 02/12 for next appointment.  Patient discharged in stable condition accompanied by: friend.  Departure Mode: Ambulatory.    Lennie Briggs RN

## 2019-01-24 NOTE — NURSING NOTE
Chief Complaint   Patient presents with     Blood Draw     labs drawn with piv start by rn.  vs taken     Labs drawn with PIV start by rn.  Pt tolerated well.  VS taken and pt checked in for next appt.  Tamika Nguyen RN

## 2019-01-24 NOTE — NURSING NOTE
"Oncology Rooming Note    January 24, 2019 10:45 AM   Lennie Mar is a 89 year old female who presents for:    Chief Complaint   Patient presents with     Blood Draw     labs drawn with piv start by rn.  vs taken     Oncology Clinic Visit     Return visit related to Breast Cancer     Initial Vitals: /67 (BP Location: Right arm, Patient Position: Sitting, Cuff Size: Adult Regular)   Pulse 90   Temp 98.7  F (37.1  C) (Oral)   Resp 16   Ht 1.549 m (5' 0.98\")   Wt 70.5 kg (155 lb 6.4 oz)   SpO2 95%   BMI 29.38 kg/m   Estimated body mass index is 29.38 kg/m  as calculated from the following:    Height as of this encounter: 1.549 m (5' 0.98\").    Weight as of this encounter: 70.5 kg (155 lb 6.4 oz). Body surface area is 1.74 meters squared.  No Pain (0) Comment: Data Unavailable   No LMP recorded. Patient is postmenopausal.  Allergies reviewed: Yes  Medications reviewed: Yes    Medications: Medication refills not needed today.  Pharmacy name entered into [employer]:    [facility] - [place], [place] - [address] [facility] - [place], [place] - [address] [facility] [place], [place] - [address]  [id number] - [place], [place] - [address] [facility] [address]    Clinical concerns: No new concerns. Provider was notified.    10 minutes for nursing intake (face to face time)     Pauline Koroma LPN            "

## 2019-01-24 NOTE — LETTER
1/24/2019      RE: Lennie Mar  [address]  [facility] [id number]       Hematology-Oncology Visit  Jan 24, 2019    Reason for Visit: follow-up left breast cancer, 2 primaries     HPI: Lennie Mar is a 89 year old female with past medical history of bipolar disorder, CKD, essential tremor with recent diagnosis of left breast cancer, two separate primaries. She presented with a palpable mass and had a mammogram and US leading to biopsies on 5/9/18. Pathology showed grade 3 invasive carcinoma, ER/ME positive, HER2 amplified of mass at 3:00 position with associated DCIS and skin involvement. The mass at 11:00 position was also grade 3 invasive carcinoma, ER/ME positive, but HER2 nonamplified. No lymphadenopathy was noted.     She met with Dr. Malik on 5/21/18 and elected to start neoadjuvant treatment with Herceptin every 3 weeks along with letrozole. Echocardiogram was done 5/25 showed EF of 55-60%. She tolerated neoadjuvant treatment remarkably well. She had a left breast mastectomy and SLNB on 11/19/18 showing two residual tumors, the larger grade 2 tumor ER, ME, HER2 amplified measuring 2.7 cm and smaller grade 3 tumor ER positive, ME negative, HER2 negative measured 2.5 cm. 2/3 left axillary nodes demonstrated metastases measuring 9 mm and 1.2 mm. HER2 FISH of larger axillary lymph node metastasis was amplified. Dr. Malik recommended adjuvant therapy with TDM1 for 1 year and adjuvant radiation.     Interval History: Lennie is here alone today to for cycle 2 of TDM1. Patient is feeling well overall today. Tolerated her first treatment well. Denies any nausea or new onset arthralgias. States she has factors in her life that are causing her some emotional stress, however when asked she states repeatedly that she does not want to discuss it. She had no further questions or concerns today.      Current Outpatient Medications   Medication     acetaminophen (TYLENOL) 325 MG tablet      "ARIPiprazole (ABILIFY) 5 MG tablet     buPROPion (WELLBUTRIN XL) 300 MG 24 hr tablet     Cyanocobalamin (VITAMIN B 12 PO)     letrozole (FEMARA) 2.5 MG tablet     lithium 150 MG capsule     lithium 300 MG capsule     LORazepam (ATIVAN) 0.5 MG tablet     neomycin-polymyxin-dexamethasone (MAXITROL) 3.5-84439-6.1 ophthalmic ointment     ondansetron (ZOFRAN) 8 MG tablet     ORDER FOR DME     oxyCODONE IR (ROXICODONE) 5 MG tablet     pramipexole (MIRAPEX) 1 MG tablet     prochlorperazine (COMPAZINE) 10 MG tablet     propranolol (INDERAL) 20 MG tablet     sertraline (ZOLOFT) 50 MG tablet     No current facility-administered medications for this visit.      Facility-Administered Medications Ordered in Other Visits   Medication     ADO-trastuzumab (KADCYLA) 260 mg in sodium chloride 0.9 % 288 mL CHEMOTHERAPY     ROS: Patient denies any fevers, chills, or night sweats. No headaches, dizziness, or confusion. Denies chest pain, SOB, or abdominal pain. No diarrhea, constipation or vomiting. Denies any pain, rashes, or peripheral swelling.     PHYSICAL EXAM:  /67 (BP Location: Right arm, Patient Position: Sitting, Cuff Size: Adult Regular)   Pulse 90   Temp 98.7  F (37.1  C) (Oral)   Resp 16   Ht 1.549 m (5' 0.98\")   Wt 70.5 kg (155 lb 6.4 oz)   SpO2 95%   BMI 29.38 kg/m     General: Alert, oriented, pleasant, NAD  HEENT: Normocephalic, atraumatic, PERRLA, EOMI. Moist mucus membranes, no lesions or thrush  Neck: No cervical or supraclavicular LAD.  Axillary: No LAD  Lungs: CTA bilaterally, normal work of breathing  Cardiac: RRR, S1, S2, no murmurs  Abdomen: Soft, nontender, nondistended. Normoactive bowel sounds. No hepatosplenomegaly, masses  Neuro: CNII-XII grossly intact  Extremities: No pedal edema    Labs:    1/24/2019 13:07   1/24/2019 10:31   Sodium 140   Potassium 4.3   Chloride 110 (H)   Carbon Dioxide 23   Urea Nitrogen 24   Creatinine 1.60 (H)   GFR Estimate 28 (L)   GFR Estimate If Black 32 (L)   Calcium " 9.5   Anion Gap 7   Albumin 2.9 (L)   Protein Total 7.4   Bilirubin Total 0.3   Alkaline Phosphatase 111   ALT 20   AST 25   Glucose 97   WBC 10.5   Hemoglobin 11.9   Hematocrit 38.4   Platelet Count 302   RBC Count 3.96   MCV 97   MCH 30.1   MCHC 31.0 (L)   RDW 14.0   Diff Method Automated Method   % Neutrophils 61.4   % Lymphocytes 24.7   % Monocytes 9.0   % Eosinophils 2.3   % Basophils 1.2   % Immature Granulocytes 1.4   Nucleated RBCs 0   Absolute Neutrophil 6.4   Absolute Lymphocytes 2.6   Absolute Monocytes 0.9   Absolute Eosinophils 0.2   Absolute Basophils 0.1   Abs Immature Granulocytes 0.2   Absolute Nucleated RBC 0.0       Assessment & Plan:   1. Left breast cancer, two separate primaries, both ER/CT positive, 1 HER2 amplified: S/p neoadjuvant treatment with letrozole and Herceptin, L mastectomy and SLNB with RCD III disease, indicating higher risk of recurrence. Adjuvant radiation therapy and adjuvant treatment of Kadycla for 1 year.  Tolerated cycle 1 well without any adverse effects. Will proceed with cycle 2 today. Plan for radiation to start tomorrow. Follow-up in 3 weeks prior to cycle 3.     Luis Eduardo Steinberg PA-Tatiana    The patient was seen in conjunction with CLEM Cantor who served as a scribe for today's visit. I have reviewed and edited the above note, and agree with the above findings and plan.    Dalila Blum PA-C    [facility]  [address] [place] [address]  [phone]

## 2019-01-24 NOTE — PROGRESS NOTES
Hematology-Oncology Visit  Jan 24, 2019    Reason for Visit: follow-up left breast cancer, 2 primaries     HPI: Lennie Mar is a 89 year old female with past medical history of bipolar disorder, CKD, essential tremor with recent diagnosis of left breast cancer, two separate primaries. She presented with a palpable mass and had a mammogram and US leading to biopsies on 5/9/18. Pathology showed grade 3 invasive carcinoma, ER/HI positive, HER2 amplified of mass at 3:00 position with associated DCIS and skin involvement. The mass at 11:00 position was also grade 3 invasive carcinoma, ER/HI positive, but HER2 nonamplified. No lymphadenopathy was noted.     She met with Dr. Malik on 5/21/18 and elected to start neoadjuvant treatment with Herceptin every 3 weeks along with letrozole. Echocardiogram was done 5/25 showed EF of 55-60%. She tolerated neoadjuvant treatment remarkably well. She had a left breast mastectomy and SLNB on 11/19/18 showing two residual tumors, the larger grade 2 tumor ER, HI, HER2 amplified measuring 2.7 cm and smaller grade 3 tumor ER positive, HI negative, HER2 negative measured 2.5 cm. 2/3 left axillary nodes demonstrated metastases measuring 9 mm and 1.2 mm. HER2 FISH of larger axillary lymph node metastasis was amplified. Dr. Malik recommended adjuvant therapy with TDM1 for 1 year and adjuvant radiation.     Interval History: Lennie is here alone today to for cycle 2 of TDM1. Patient is feeling well overall today. Tolerated her first treatment well. Denies any nausea or new onset arthralgias. States she has factors in her life that are causing her some emotional stress, however when asked she states repeatedly that she does not want to discuss it. She had no further questions or concerns today.      Current Outpatient Medications   Medication     acetaminophen (TYLENOL) 325 MG tablet     ARIPiprazole (ABILIFY) 5 MG tablet     buPROPion (WELLBUTRIN XL) 300 MG 24 hr tablet      "Cyanocobalamin (VITAMIN B 12 PO)     letrozole (FEMARA) 2.5 MG tablet     lithium 150 MG capsule     lithium 300 MG capsule     LORazepam (ATIVAN) 0.5 MG tablet     neomycin-polymyxin-dexamethasone (MAXITROL) 3.5-47994-6.1 ophthalmic ointment     ondansetron (ZOFRAN) 8 MG tablet     ORDER FOR DME     oxyCODONE IR (ROXICODONE) 5 MG tablet     pramipexole (MIRAPEX) 1 MG tablet     prochlorperazine (COMPAZINE) 10 MG tablet     propranolol (INDERAL) 20 MG tablet     sertraline (ZOLOFT) 50 MG tablet     No current facility-administered medications for this visit.      Facility-Administered Medications Ordered in Other Visits   Medication     ADO-trastuzumab (KADCYLA) 260 mg in sodium chloride 0.9 % 288 mL CHEMOTHERAPY     ROS: Patient denies any fevers, chills, or night sweats. No headaches, dizziness, or confusion. Denies chest pain, SOB, or abdominal pain. No diarrhea, constipation or vomiting. Denies any pain, rashes, or peripheral swelling.     PHYSICAL EXAM:  /67 (BP Location: Right arm, Patient Position: Sitting, Cuff Size: Adult Regular)   Pulse 90   Temp 98.7  F (37.1  C) (Oral)   Resp 16   Ht 1.549 m (5' 0.98\")   Wt 70.5 kg (155 lb 6.4 oz)   SpO2 95%   BMI 29.38 kg/m    General: Alert, oriented, pleasant, NAD  HEENT: Normocephalic, atraumatic, PERRLA, EOMI. Moist mucus membranes, no lesions or thrush  Neck: No cervical or supraclavicular LAD.  Axillary: No LAD  Lungs: CTA bilaterally, normal work of breathing  Cardiac: RRR, S1, S2, no murmurs  Abdomen: Soft, nontender, nondistended. Normoactive bowel sounds. No hepatosplenomegaly, masses  Neuro: CNII-XII grossly intact  Extremities: No pedal edema    Labs:    1/24/2019 13:07   1/24/2019 10:31   Sodium 140   Potassium 4.3   Chloride 110 (H)   Carbon Dioxide 23   Urea Nitrogen 24   Creatinine 1.60 (H)   GFR Estimate 28 (L)   GFR Estimate If Black 32 (L)   Calcium 9.5   Anion Gap 7   Albumin 2.9 (L)   Protein Total 7.4   Bilirubin Total 0.3   Alkaline " Phosphatase 111   ALT 20   AST 25   Glucose 97   WBC 10.5   Hemoglobin 11.9   Hematocrit 38.4   Platelet Count 302   RBC Count 3.96   MCV 97   MCH 30.1   MCHC 31.0 (L)   RDW 14.0   Diff Method Automated Method   % Neutrophils 61.4   % Lymphocytes 24.7   % Monocytes 9.0   % Eosinophils 2.3   % Basophils 1.2   % Immature Granulocytes 1.4   Nucleated RBCs 0   Absolute Neutrophil 6.4   Absolute Lymphocytes 2.6   Absolute Monocytes 0.9   Absolute Eosinophils 0.2   Absolute Basophils 0.1   Abs Immature Granulocytes 0.2   Absolute Nucleated RBC 0.0       Assessment & Plan:   1. Left breast cancer, two separate primaries, both ER/OH positive, 1 HER2 amplified: S/p neoadjuvant treatment with letrozole and Herceptin, L mastectomy and SLNB with RCD III disease, indicating higher risk of recurrence. Adjuvant radiation therapy and adjuvant treatment of Kadycla for 1 year.  Tolerated cycle 1 well without any adverse effects. Will proceed with cycle 2 today. Plan for radiation to start tomorrow. Follow-up in 3 weeks prior to cycle 3.     Luis Eduardo Steinberg PA-Tatiana    The patient was seen in conjunction with CLEM Cantor who served as a scribe for today's visit. I have reviewed and edited the above note, and agree with the above findings and plan.    Dalila Blum PA-C    [facility] [address] [address]  [phone]

## 2019-01-24 NOTE — PATIENT INSTRUCTIONS
Contact Numbers  [facility] Main Line: [phone]  [facility] NurseTriage and After Hours:  [phone]    Call triage with chills and/or temperature greater than or equal to 100.5, uncontrolled nausea/vomiting, diarrhea, constipation, dizziness, shortness of breath, chest pain, bleeding, unexplained bruising, or any new/concerning symptoms, questions/concerns.     If after hours, weekends, or holidays, call the nurse triage number. Calls may be forwarded to the hospital , please ask for the adult oncology doctor on call.     If you are having any concerning symptoms or wish to speak to a provider before your next infusion visit, please call your care coordinator or triage to notify them so we can adequately serve you.     If you need a refill on a narcotic prescription, please call triage or your care coordinator before your infusion appointment.           January 2019 Sunday Monday Tuesday Wednesday Thursday Friday Saturday             1     2     3    [facility] LAB DRAW   2:00 PM   (15 min.)    [facility] DRAW   [facility] Lab Draw    P RETURN   2:15 PM   (50 min.)   Dalila Blum PA-C   [facility] ONC INFUSION 120   3:30 PM   (120 min.)   [facility] ONCOL[facility]Y [facility]FUSION   [facility] 4     5       6     7     8     9     10     11     12       13     14     15     16    [facility] TCT/SIM SUITE  10:00 AM   (60 min.)   Lupe Andrews MD   Radiation Oncology Clinic 17     18     19       20     21     22     23     24    P [facility] DRAW   8:45 AM   (15 min.)    [facility] DRAW   [facility] Lab Draw    P RETURN   9:05 AM   (50 min.)   Dal[facility] Blum [facility]C   [facility]  10:00 AM   (120 min.)   [facility]   [facility] 25    P EXTERNAL RADIATION TREATMT   2:30 PM   (30 min.)   [facility] RAD ONC VARIAN   Radiation Oncology Clinic 26       27     28    [facility] EXTERNAL RADIATION  TREATMT   2:00 PM   (15 min.)   [facility] RAD ONC VARIAN   Radiation Oncology Clinic 29    [facility] EXTERNAL RADIATION TREATMT   2:00 PM   (15 min.)   [facility] RAD ONC VARIAN   [facility] Oncology [facility] 30    [facility] EXTERNAL RADIATION TREATMT   2:00 PM   (15 min.)   [facility] RAD ONC VARIAN   [facility] 31    [facility] EXTERNAL RADIATION TREATMT   2:00 PM   (15 min.)   [facility] RAD ONC VARIAN   Radiation Oncology Clinic    [facility] ON TREATMENT VISIT   2:15 PM   (15 min.)   Lupe Andrews MD   Radiation Oncology Clinic                       February 2019 Sunday Monday Tuesday Wednesday Thursday Friday Saturday                            1    [facility] EXTERNAL RADIATION TREATMT   2:00 PM   (15 min.)   [facility] RAD ONC VARIAN   Rad[facility] Oncology Clinic 2       3     4    [facility] EXTERNAL RADIATION TREATMT   2:00 PM   (15 min.)   [facility] RAD ONC VARIAN   Radiation Oncology Clinic    [facility] ON TREATMENT VISIT   2:15 PM   (15 min.)   Lupe Andrews MD   Radiation Oncology Clinic 5    [facility] EXTERNAL RADIATION TREATMT   2:00 PM   (15 min.)   [facility] RAD ONC VARIAN   [facility] Oncology Clinic 6    [facility] EXTERNAL RADIATION TREATMT   2:00 PM   (15 min.)   [facility] RAD ONC VARIAN   [facility]cology [facility] 7    [facility] EXTERNAL RADIATION TREATMT   2:00 PM   (15 min.)   [facility] RAD ONC VARIAN   [facility] Oncology Clinic 8    [facility] EXTERNAL RADIATION TREATMT   2:00 PM   (15 min.)   [facility] RAD ONC VARIAN   Radiation Oncology Clinic 9       10     11    [facility] EXTERNAL RADIATION TREATMT   2:00 PM   (15 min.)   [facility] RAD ONC VARIAN   Radiation Oncology Clinic    [facility] ON TREATMENT VISIT   2:15 PM   (15 min.)   Lupe Andrews MD   Radiation Oncology Clinic 12    [facility] DRAW   7:45 AM   (15 min.)    [facility] DRAW   [facility] Lab Draw    [facility] RETURN   8:00 AM   (30 min.)   Perlita Malik MD   [facility]   9:00 AM   (120 min.)   [facility]   [facility]   2:00 PM    (15 min.)   [facility] RAD ONC VARIAN   [facility] Oncology Clinic 13    [facility] EXTERNAL RADIATION TREATMT   2:00 PM   (15 min.)   [facility] RAD ONC VARIAN   Radiation Oncology Clinic 14    [facility] EXTERNAL RADIATION TREATMT   2:00 PM   (15 min.)   [facility] RAD ONC VARIAN   Radiation Oncology Clinic 15    [facility] EXTERNAL RADIATION TREATMT   2:00 PM   (15 min.)   [facility] RAD ONC VARIAN   Radiation Oncology Clinic 16       17     18    [facility] EXTERNAL RADIATION TREATMT   2:00 PM   (15 min.)   [facility] RAD ONC VARIAN   Radiation Oncology Clinic    [facility] ON TREATMENT VISIT   2:15 PM   (15 min.)   Lupe Andrews MD   Radiation Oncology Clinic 19    [facility] EXTERNAL RADIATION TREATMT   2:00 PM   (15 min.)   [facility] RAD ONC [facility]   Radiation Oncology Clinic 20    [facility] EXTERNAL RADIATION TREATMT   2:00 PM   (15 min.)   [facility] RAD ONC VARIAN   Radiation Oncology Clinic 21    [facility] EXTERNAL RADIATION TREATMT   2:00 PM   (15 min.)   [facility] RAD ONC [facility]   Radiation Oncology Clinic 22     23       24     25     26     27     28                               Lab Results:  Recent Results (from the past 12 hour(s))   CBC with platelets differential    Collection Time: 01/24/19 10:31 AM   Result Value Ref Range    WBC 10.5 4.0 - 11.0 10e9/L    RBC Count 3.96 3.8 - 5.2 10e12/L    Hemoglobin 11.9 11.7 - 15.7 g/dL    Hematocrit 38.4 35.0 - 47.0 %    MCV 97 78 - 100 fl    MCH 30.1 26.5 - 33.0 pg    MCHC 31.0 (L) 31.5 - 36.5 g/dL    RDW 14.0 10.0 - 15.0 %    Platelet Count 302 150 - 450 10e9/L    Diff Method Automated Method     % Neutrophils 61.4 %    % Lymphocytes 24.7 %    % Monocytes 9.0 %    % Eosinophils 2.3 %    % Basophils 1.2 %    % Immature Granulocytes 1.4 %    Nucleated RBCs 0 0 /100    Absolute Neutrophil 6.4 1.6 - 8.3 10e9/L    Absolute Lymphocytes 2.6 0.8 - 5.3 10e9/L    Absolute Monocytes 0.9 0.0 - 1.3 10e9/L    Absolute Eosinophils 0.2 0.0 - 0.7 10e9/L    Absolute Basophils 0.1 0.0 - 0.2 10e9/L    Abs Immature Granulocytes 0.2 0 - 0.4 10e9/L    Absolute Nucleated RBC 0.0     Comprehensive metabolic panel    Collection Time: 01/24/19 10:31 AM   Result Value Ref Range    Sodium 140 133 - 144 mmol/L    Potassium 4.3 3.4 - 5.3 mmol/L    Chloride 110 (H) 94 - 109 mmol/L    Carbon Dioxide 23 20 - 32 mmol/L    Anion Gap 7 3 - 14 mmol/L    Glucose 97 70 - 99 mg/dL    Urea Nitrogen 24 7 - 30 mg/dL    Creatinine 1.60 (H) 0.52 - 1.04 mg/dL    GFR Estimate 28 (L) >60 mL/min/[1.73_m2]    GFR Estimate If Black 32 (L) >60 mL/min/[1.73_m2]    Calcium 9.5 8.5 - 10.1 mg/dL    Bilirubin Total 0.3 0.2 - 1.3 mg/dL    Albumin 2.9 (L) 3.4 - 5.0 g/dL    Protein Total 7.4 6.8 - 8.8 g/dL    Alkaline Phosphatase 111 40 - 150 U/L    ALT 20 0 - 50 U/L    AST 25 0 - 45 U/L

## 2019-01-25 ENCOUNTER — APPOINTMENT (OUTPATIENT)
Dept: RADIATION ONCOLOGY | Facility: CLINIC | Age: 84
End: 2019-01-25
Attending: RADIOLOGY
Payer: MEDICARE

## 2019-01-25 PROCEDURE — 77280 THER RAD SIMULAJ FIELD SMPL: CPT | Performed by: RADIOLOGY

## 2019-01-25 PROCEDURE — 77332 RADIATION TREATMENT AID(S): CPT | Performed by: RADIOLOGY

## 2019-01-28 ENCOUNTER — APPOINTMENT (OUTPATIENT)
Dept: RADIATION ONCOLOGY | Facility: CLINIC | Age: 84
End: 2019-01-28
Attending: RADIOLOGY
Payer: MEDICARE

## 2019-01-28 PROCEDURE — 77412 RADIATION TX DELIVERY LVL 3: CPT | Performed by: RADIOLOGY

## 2019-01-28 PROCEDURE — 77331 SPECIAL RADIATION DOSIMETRY: CPT | Performed by: RADIOLOGY

## 2019-01-29 ENCOUNTER — APPOINTMENT (OUTPATIENT)
Dept: RADIATION ONCOLOGY | Facility: CLINIC | Age: 84
End: 2019-01-29
Attending: RADIOLOGY
Payer: MEDICARE

## 2019-01-29 PROCEDURE — 77412 RADIATION TX DELIVERY LVL 3: CPT | Performed by: RADIOLOGY

## 2019-01-29 PROCEDURE — 77417 THER RADIOLOGY PORT IMAGE(S): CPT | Performed by: RADIOLOGY

## 2019-01-30 ENCOUNTER — APPOINTMENT (OUTPATIENT)
Dept: RADIATION ONCOLOGY | Facility: CLINIC | Age: 84
End: 2019-01-30
Attending: RADIOLOGY
Payer: MEDICARE

## 2019-01-30 PROCEDURE — 77412 RADIATION TX DELIVERY LVL 3: CPT | Performed by: RADIOLOGY

## 2019-01-31 ENCOUNTER — APPOINTMENT (OUTPATIENT)
Dept: RADIATION ONCOLOGY | Facility: CLINIC | Age: 84
End: 2019-01-31
Attending: RADIOLOGY
Payer: MEDICARE

## 2019-01-31 VITALS
BODY MASS INDEX: 29.74 KG/M2 | HEART RATE: 66 BPM | DIASTOLIC BLOOD PRESSURE: 55 MMHG | WEIGHT: 157.3 LBS | SYSTOLIC BLOOD PRESSURE: 142 MMHG | OXYGEN SATURATION: 100 %

## 2019-01-31 DIAGNOSIS — Z17.0 MALIGNANT NEOPLASM OF UPPER-INNER QUADRANT OF LEFT BREAST IN FEMALE, ESTROGEN RECEPTOR POSITIVE (H): Primary | ICD-10-CM

## 2019-01-31 DIAGNOSIS — C50.212 MALIGNANT NEOPLASM OF UPPER-INNER QUADRANT OF LEFT BREAST IN FEMALE, ESTROGEN RECEPTOR POSITIVE (H): Primary | ICD-10-CM

## 2019-01-31 PROCEDURE — 77412 RADIATION TX DELIVERY LVL 3: CPT | Performed by: RADIOLOGY

## 2019-01-31 NOTE — PROGRESS NOTES
[facility] PHYSICIANS  SPECIALIZING IN BREAKTHROUGHS  Radiation Oncology    On Treatment Visit Note    Lennie Mar      Date: 2019   MRN: [id number]   : 1927  Diagnosis: Breast cancer    Reason for Visit:  On Radiation Treatment Visit     Treatment Summary to Date   Site: L CW+ Axilla+ SC Current Dose: 1060/6040 cGy Fractions:       Chemotherapy  Chemo concurrent with radx?: No    Subjective:   Ms. Mar presents today for her weekly on treatment visit. She is tolerating her treatments so far with no issues. She denies any skin changes.     Nursing ROS:   Nutrition Alteration  Diet Type: Patient's Preference  Nutrition Note: appetite good    Skin  Skin Reaction: 0 - No changes  Skin Note: reviewed skin care     Gastrointestinal  GI Note: WNL     Psychosocial  Pyschosocial Note: sleeping well    Pain Assessment  0-10 Pain Scale: 0    Objective:   /55   Pulse 66   Wt 71.4 kg (157 lb 4.8 oz)   SpO2 100%   BMI 29.74 kg/m    Gen: Appears well, in no acute distress  Skin: No erythema    Labs:  CBC RESULTS:   Recent Labs   Lab Test 19  1031   WBC 10.5   RBC 3.96   HGB 11.9   HCT 38.4   MCV 97   MCH 30.1   MCHC 31.0*   RDW 14.0        ELECTROLYTES:  Recent Labs   Lab Test 19  1031      POTASSIUM 4.3   CHLORIDE 110*   CANELO 9.5   CO2 23   BUN 24   CR 1.60*   GLC 97     Assessment:  She is tolerating her radiation therapy well with no toxicity.  All questions and concerns addressed.    Toxicities: None    Plan:   1. Continue current therapy.  2. Use of skin moisturizers regularly          Mosaiq chart and setup information reviewed  Ports checked    Educational Topic Discussed  Education Instructions: reviewed      Jose Souza MD  PGY-2 Resident, Radiation Oncology  [facility]    I saw and examined the patient with the resident.  I have reviewed and edited the resident's note and agree with the plan of care.      Lupe  MD Darryl

## 2019-01-31 NOTE — LETTER
2019       RE: Lennie Mar   [address]  [place] [address]     Dear Colleague,    Thank you for referring your patient, Lennie Mar, to the RADIATION ONCOLOGY CLINIC. Please see a copy of my visit note below.    [facility] PHYSICIANS  SPECIALIZING IN BREAKTHROUGHS  Radiation Oncology    On Treatment Visit Note    Lennie Mar      Date: 2019   MRN: [id number]   : 1927  Diagnosis: Breast cancer    Reason for Visit:  On Radiation Treatment Visit     Treatment Summary to Date   Site: L CW+ Axilla+ SC Current Dose: 1060/6040 cGy Fractions:       Chemotherapy  Chemo concurrent with radx?: No    Subjective:   Ms. Mar presents today for her weekly on treatment visit. She is tolerating her treatments so far with no issues. She denies any skin changes.     Nursing ROS:   Nutrition Alteration  Diet Type: Patient's Preference  Nutrition Note: appetite good    Skin  Skin Reaction: 0 - No changes  Skin Note: reviewed skin care     Gastrointestinal  GI Note: WNL     Psychosocial  Pyschosocial Note: sleeping well    Pain Assessment  0-10 Pain Scale: 0    Objective:   /55   Pulse 66   Wt 71.4 kg (157 lb 4.8 oz)   SpO2 100%   BMI 29.74 kg/m     Gen: Appears well, in no acute distress  Skin: No erythema    Labs:  CBC RESULTS:   Recent Labs   Lab Test 19  1031   WBC 10.5   RBC 3.96   HGB 11.9   HCT 38.4   MCV 97   MCH 30.1   MCHC 31.0*   RDW 14.0        ELECTROLYTES:  Recent Labs   Lab Test 19  1031      POTASSIUM 4.3   CHLORIDE 110*   CANELO 9.5   CO2 23   BUN 24   CR 1.60*   GLC 97     Assessment:  She is tolerating her radiation therapy well with no toxicity.  All questions and concerns addressed.    Toxicities: None    Plan:   1. Continue current therapy.  2. Use of skin moisturizers regularly          Mosaiq chart and setup information reviewed  Ports checked    Educational Topic Discussed  Education Instructions:  reviewed      Jose Souza MD  PGY-2 Resident, Radiation Oncology  [facility]    I saw and examined the patient with the resident.  I have reviewed and edited the resident's note and agree with the plan of care.      Lupe Andrews MD

## 2019-02-01 ENCOUNTER — APPOINTMENT (OUTPATIENT)
Dept: RADIATION ONCOLOGY | Facility: CLINIC | Age: 84
End: 2019-02-01
Attending: RADIOLOGY
Payer: MEDICARE

## 2019-02-01 PROCEDURE — 77336 RADIATION PHYSICS CONSULT: CPT | Performed by: RADIOLOGY

## 2019-02-01 PROCEDURE — 77412 RADIATION TX DELIVERY LVL 3: CPT | Performed by: RADIOLOGY

## 2019-02-04 ENCOUNTER — APPOINTMENT (OUTPATIENT)
Dept: RADIATION ONCOLOGY | Facility: CLINIC | Age: 84
End: 2019-02-04
Attending: RADIOLOGY
Payer: MEDICARE

## 2019-02-04 PROCEDURE — 77412 RADIATION TX DELIVERY LVL 3: CPT | Performed by: RADIOLOGY

## 2019-02-05 ENCOUNTER — APPOINTMENT (OUTPATIENT)
Dept: RADIATION ONCOLOGY | Facility: CLINIC | Age: 84
End: 2019-02-05
Attending: RADIOLOGY
Payer: MEDICARE

## 2019-02-05 PROCEDURE — 77412 RADIATION TX DELIVERY LVL 3: CPT | Performed by: RADIOLOGY

## 2019-02-05 PROCEDURE — 77417 THER RADIOLOGY PORT IMAGE(S): CPT | Performed by: RADIOLOGY

## 2019-02-06 ENCOUNTER — APPOINTMENT (OUTPATIENT)
Dept: RADIATION ONCOLOGY | Facility: CLINIC | Age: 84
End: 2019-02-06
Attending: RADIOLOGY
Payer: MEDICARE

## 2019-02-06 VITALS
WEIGHT: 158 LBS | BODY MASS INDEX: 29.87 KG/M2 | HEART RATE: 68 BPM | SYSTOLIC BLOOD PRESSURE: 128 MMHG | DIASTOLIC BLOOD PRESSURE: 76 MMHG

## 2019-02-06 DIAGNOSIS — C50.412 MALIGNANT NEOPLASM OF UPPER-OUTER QUADRANT OF LEFT BREAST IN FEMALE, ESTROGEN RECEPTOR POSITIVE (H): Primary | ICD-10-CM

## 2019-02-06 DIAGNOSIS — Z17.0 MALIGNANT NEOPLASM OF UPPER-OUTER QUADRANT OF LEFT BREAST IN FEMALE, ESTROGEN RECEPTOR POSITIVE (H): Primary | ICD-10-CM

## 2019-02-06 PROCEDURE — 77412 RADIATION TX DELIVERY LVL 3: CPT | Performed by: RADIOLOGY

## 2019-02-06 NOTE — PROGRESS NOTES
[facility] PHYSICIANS  SPECIALIZING IN BREAKTHROUGHS  Radiation Oncology    On Treatment Visit Note      Lennie Mar      Date: 2019   MRN: [id number]   : 1927  Diagnosis: Breast cancer      Reason for Visit:  On Radiation Treatment Visit     Treatment Summary to Date   L breast + SCV Fossa Current Dose: 2120/6040 cGy Fractions:       Chemotherapy  Chemo concurrent with radx?: No    ED Visit/Hosiptal Admission: None    Treatment Breaks: None      Subjective:   Mrs. Mar continue to tolerate radiation therapy well with no complaints.  She has been using moisturizer.  She has no questions.      Nursing ROS:   Nutrition Alteration  Diet Type: Patient's Preference  Nutrition Note: appetite good  Skin  Skin Reaction: 0 - No changes  Skin Note: reviewed skin care        Gastrointestinal  GI Note: WNL     Psychosocial  Pyschosocial Note: sleeping well  Pain Assessment  0-10 Pain Scale: 0    Objective:   /76   Pulse 68   Wt 71.7 kg (158 lb)   BMI 29.87 kg/m    Gen: Appears well, in no acute distress  Skin: Mild diffuse erythema over treatment field    Labs:  CBC RESULTS:   Recent Labs   Lab Test 19  1031   WBC 10.5   RBC 3.96   HGB 11.9   HCT 38.4   MCV 97   MCH 30.1   MCHC 31.0*   RDW 14.0        ELECTROLYTES:  Recent Labs   Lab Test 19  1031      POTASSIUM 4.3   CHLORIDE 110*   CANELO 9.5   CO2 23   BUN 24   CR 1.60*   GLC 97       Assessment:    Tolerating radiation therapy well.  All questions and concerns addressed.    Toxicities:  Dermatitis: Grade 1: Faint erythema or dry desquamation    Plan:   1. Continue current therapy.        Mosaiq chart and setup information reviewed  Ports checked    Medication Review  Med list reviewed with patient?: Yes    Educational Topic Discussed  Education Instructions: reviewed        Lupe Andrews MD/PhD  [phone] clinic  Pager [phone]    Please do not send letter to referring physician.

## 2019-02-06 NOTE — LETTER
Date:February 8, 2019      Provider requested that no letter be sent. Do not send.       [facility] Information

## 2019-02-07 ENCOUNTER — APPOINTMENT (OUTPATIENT)
Dept: RADIATION ONCOLOGY | Facility: CLINIC | Age: 84
End: 2019-02-07
Attending: RADIOLOGY
Payer: MEDICARE

## 2019-02-07 PROCEDURE — 77412 RADIATION TX DELIVERY LVL 3: CPT | Performed by: RADIOLOGY

## 2019-02-08 ENCOUNTER — APPOINTMENT (OUTPATIENT)
Dept: RADIATION ONCOLOGY | Facility: CLINIC | Age: 84
End: 2019-02-08
Attending: RADIOLOGY
Payer: MEDICARE

## 2019-02-08 PROCEDURE — 77412 RADIATION TX DELIVERY LVL 3: CPT | Performed by: RADIOLOGY

## 2019-02-08 PROCEDURE — 77336 RADIATION PHYSICS CONSULT: CPT | Performed by: RADIOLOGY

## 2019-02-11 ENCOUNTER — APPOINTMENT (OUTPATIENT)
Dept: RADIATION ONCOLOGY | Facility: CLINIC | Age: 84
End: 2019-02-11
Attending: RADIOLOGY
Payer: MEDICARE

## 2019-02-11 ENCOUNTER — CARE COORDINATION (OUTPATIENT)
Dept: ONCOLOGY | Facility: CLINIC | Age: 84
End: 2019-02-11

## 2019-02-11 VITALS
DIASTOLIC BLOOD PRESSURE: 64 MMHG | BODY MASS INDEX: 29.6 KG/M2 | HEART RATE: 78 BPM | WEIGHT: 156.6 LBS | SYSTOLIC BLOOD PRESSURE: 138 MMHG

## 2019-02-11 DIAGNOSIS — Z17.0 MALIGNANT NEOPLASM OF UPPER-OUTER QUADRANT OF LEFT BREAST IN FEMALE, ESTROGEN RECEPTOR POSITIVE (H): Primary | ICD-10-CM

## 2019-02-11 DIAGNOSIS — E07.9 DISORDER OF THYROID: ICD-10-CM

## 2019-02-11 DIAGNOSIS — Z79.01 LONG TERM (CURRENT) USE OF ANTICOAGULANTS: Primary | ICD-10-CM

## 2019-02-11 DIAGNOSIS — C50.412 MALIGNANT NEOPLASM OF UPPER-OUTER QUADRANT OF LEFT BREAST IN FEMALE, ESTROGEN RECEPTOR POSITIVE (H): Primary | ICD-10-CM

## 2019-02-11 LAB
ALBUMIN SERPL-MCNC: 3.2 G/DL (ref 3.4–5)
ALP SERPL-CCNC: 113 U/L (ref 40–150)
ALT SERPL W P-5'-P-CCNC: 22 U/L (ref 0–50)
ANION GAP SERPL CALCULATED.3IONS-SCNC: 8 MMOL/L (ref 3–14)
AST SERPL W P-5'-P-CCNC: 26 U/L (ref 0–45)
BILIRUB SERPL-MCNC: 0.4 MG/DL (ref 0.2–1.3)
BUN SERPL-MCNC: 29 MG/DL (ref 7–30)
CALCIUM SERPL-MCNC: 9.9 MG/DL (ref 8.5–10.1)
CHLORIDE SERPL-SCNC: 110 MMOL/L (ref 94–109)
CO2 SERPL-SCNC: 22 MMOL/L (ref 20–32)
CREAT SERPL-MCNC: 1.67 MG/DL (ref 0.52–1.04)
GFR SERPL CREATININE-BSD FRML MDRD: 26 ML/MIN/{1.73_M2}
GLUCOSE SERPL-MCNC: 96 MG/DL (ref 70–99)
LITHIUM SERPL-SCNC: 0.76 MMOL/L (ref 0.6–1.2)
POTASSIUM SERPL-SCNC: 4.2 MMOL/L (ref 3.4–5.3)
PROT SERPL-MCNC: 7.4 G/DL (ref 6.8–8.8)
SODIUM SERPL-SCNC: 140 MMOL/L (ref 133–144)
T4 FREE SERPL-MCNC: 0.95 NG/DL (ref 0.76–1.46)
TSH SERPL DL<=0.005 MIU/L-ACNC: 2.49 MU/L (ref 0.4–4)

## 2019-02-11 PROCEDURE — 84439 ASSAY OF FREE THYROXINE: CPT | Performed by: PSYCHIATRY & NEUROLOGY

## 2019-02-11 PROCEDURE — 80053 COMPREHEN METABOLIC PANEL: CPT | Performed by: PSYCHIATRY & NEUROLOGY

## 2019-02-11 PROCEDURE — 77412 RADIATION TX DELIVERY LVL 3: CPT | Performed by: RADIOLOGY

## 2019-02-11 PROCEDURE — 84443 ASSAY THYROID STIM HORMONE: CPT | Performed by: PSYCHIATRY & NEUROLOGY

## 2019-02-11 PROCEDURE — 80178 ASSAY OF LITHIUM: CPT | Performed by: PSYCHIATRY & NEUROLOGY

## 2019-02-11 PROCEDURE — 36415 COLL VENOUS BLD VENIPUNCTURE: CPT | Performed by: PSYCHIATRY & NEUROLOGY

## 2019-02-11 NOTE — LETTER
2019       RE: Lennie Mar   [address]  [place] [address]     Dear Colleague,    Thank you for referring your patient, Lennie Mar, to the RADIATION ONCOLOGY CLINIC. Please see a copy of my visit note below.    [facility] PHYSICIANS  SPECIALIZING IN BREAKTHROUGHS  Radiation Oncology    On Treatment Visit Note    Lennie Mar      Date: 2019   MRN: [id number]   : 1927  Diagnosis: Breast cancer    Reason for Visit:  On Radiation Treatment Visit     Treatment Summary to Date   L Chest Wall & SCV  Current Dose: 2915/6040 cGy Fractions:       Chemotherapy  Chemo concurrent with radx?: No    Subjective:  Mrs. Mar continue to tolerate radiation therapy well with no complaints. She has been using moisturizer. She has no questions    Nursing ROS:   Nutrition Alteration  Diet Type: Patient's Preference  Nutrition Note: appetite good    Skin  Skin Reaction: 0 - No changes  Skin Note: reviewed skin care     Gastrointestinal  GI Note: WNL    Psychosocial  Pyschosocial Note: sleeping well    Objective:   /64   Pulse 78   Wt 71 kg (156 lb 9.6 oz)   BMI 29.60 kg/m     Gen: Appears well, in no acute distress  Skin: Mild erythema over treatment field    Labs:  CBC RESULTS:   Recent Labs   Lab Test 19  1031   WBC 10.5   RBC 3.96   HGB 11.9   HCT 38.4   MCV 97   MCH 30.1   MCHC 31.0*   RDW 14.0        ELECTROLYTES:  Recent Labs   Lab Test 19  1031      POTASSIUM 4.3   CHLORIDE 110*   CANELO 9.5   CO2 23   BUN 24   CR 1.60*   GLC 97       Assessment:    Tolerating radiation therapy well.  All questions and concerns addressed.    Toxicities:  Dermatitis: Grade 1: Faint erythema or dry desquamation    Plan:   1. Continue current therapy.    2. Continue skin care       Mosaiq chart and setup information reviewed  Ports checked    Medication Review  Med list reviewed with patient?: Yes    Educational Topic Discussed  Education  Instructions: reviewed        Jose Souza MD  PGY-2 Resident, Radiation Oncology  [facility]    I saw and examined the patient with the resident.  I have reviewed and edited the resident's note and agree with the plan of care.      Lupe Andrews MD    Again, thank you for allowing me to participate in the care of your patient.      Sincerely,    Lupe Andrews MD

## 2019-02-11 NOTE — PROGRESS NOTES
Call placed to patient about ride status tomorrow.  Pt stated she will have a ride here but just being dropped offf.  Pt has radiation oncology appt after clinic visit and infusion appts tomorrow. Pt will need to take the shuttle to radiation oncology appt.          Leslee Colon RNCC BSN CBCN

## 2019-02-11 NOTE — LETTER
Date:February 13, 2019      Provider requested that no letter be sent. Do not send.       [facility] Information

## 2019-02-11 NOTE — PROGRESS NOTES
[facility] PHYSICIANS  SPECIALIZING IN BREAKTHROUGHS  Radiation Oncology    On Treatment Visit Note    Lennie Mar      Date: 2019   MRN: [id number]   : 1927  Diagnosis: Breast cancer    Reason for Visit:  On Radiation Treatment Visit     Treatment Summary to Date   L Chest Wall & SCV  Current Dose: 2915/6040 cGy Fractions:       Chemotherapy  Chemo concurrent with radx?: No    Subjective:  Mrs. Mar continue to tolerate radiation therapy well with no complaints. She has been using moisturizer. She has no questions    Nursing ROS:   Nutrition Alteration  Diet Type: Patient's Preference  Nutrition Note: appetite good    Skin  Skin Reaction: 0 - No changes  Skin Note: reviewed skin care     Gastrointestinal  GI Note: WNL    Psychosocial  Pyschosocial Note: sleeping well    Objective:   /64   Pulse 78   Wt 71 kg (156 lb 9.6 oz)   BMI 29.60 kg/m    Gen: Appears well, in no acute distress  Skin: Mild erythema over treatment field    Labs:  CBC RESULTS:   Recent Labs   Lab Test 19  1031   WBC 10.5   RBC 3.96   HGB 11.9   HCT 38.4   MCV 97   MCH 30.1   MCHC 31.0*   RDW 14.0        ELECTROLYTES:  Recent Labs   Lab Test 19  1031      POTASSIUM 4.3   CHLORIDE 110*   CANELO 9.5   CO2 23   BUN 24   CR 1.60*   GLC 97       Assessment:    Tolerating radiation therapy well.  All questions and concerns addressed.    Toxicities:  Dermatitis: Grade 1: Faint erythema or dry desquamation    Plan:   1. Continue current therapy.    2. Continue skin care       Mosaiq chart and setup information reviewed  Ports checked    Medication Review  Med list reviewed with patient?: Yes    Educational Topic Discussed  Education Instructions: reviewed        Jose Souza MD  PGY-2 Resident, Radiation Oncology  [facility]    I saw and examined the patient with the resident.  I have reviewed and edited the resident's note and agree with the plan of  care.      Lupe Andrews MD

## 2019-02-13 ENCOUNTER — APPOINTMENT (OUTPATIENT)
Dept: RADIATION ONCOLOGY | Facility: CLINIC | Age: 84
End: 2019-02-13
Attending: RADIOLOGY
Payer: MEDICARE

## 2019-02-13 PROCEDURE — 77417 THER RADIOLOGY PORT IMAGE(S): CPT | Performed by: RADIOLOGY

## 2019-02-13 PROCEDURE — 77412 RADIATION TX DELIVERY LVL 3: CPT | Performed by: RADIOLOGY

## 2019-02-14 ENCOUNTER — APPOINTMENT (OUTPATIENT)
Dept: RADIATION ONCOLOGY | Facility: CLINIC | Age: 84
End: 2019-02-14
Attending: RADIOLOGY
Payer: MEDICARE

## 2019-02-14 PROCEDURE — 77412 RADIATION TX DELIVERY LVL 3: CPT | Performed by: RADIOLOGY

## 2019-02-15 ENCOUNTER — APPOINTMENT (OUTPATIENT)
Dept: RADIATION ONCOLOGY | Facility: CLINIC | Age: 84
End: 2019-02-15
Attending: RADIOLOGY
Payer: MEDICARE

## 2019-02-15 PROCEDURE — 77300 RADIATION THERAPY DOSE PLAN: CPT | Performed by: RADIOLOGY

## 2019-02-15 PROCEDURE — 77412 RADIATION TX DELIVERY LVL 3: CPT | Performed by: RADIOLOGY

## 2019-02-15 PROCEDURE — 77290 THER RAD SIMULAJ FIELD CPLX: CPT | Performed by: RADIOLOGY

## 2019-02-15 PROCEDURE — 77334 RADIATION TREATMENT AID(S): CPT | Performed by: RADIOLOGY

## 2019-02-18 ENCOUNTER — OFFICE VISIT (OUTPATIENT)
Dept: RADIATION ONCOLOGY | Facility: CLINIC | Age: 84
End: 2019-02-18
Attending: RADIOLOGY
Payer: MEDICARE

## 2019-02-18 VITALS
SYSTOLIC BLOOD PRESSURE: 129 MMHG | DIASTOLIC BLOOD PRESSURE: 60 MMHG | BODY MASS INDEX: 29.49 KG/M2 | OXYGEN SATURATION: 96 % | WEIGHT: 156 LBS | HEART RATE: 66 BPM

## 2019-02-18 DIAGNOSIS — Z17.0 MALIGNANT NEOPLASM OF UPPER-OUTER QUADRANT OF LEFT BREAST IN FEMALE, ESTROGEN RECEPTOR POSITIVE (H): Primary | ICD-10-CM

## 2019-02-18 DIAGNOSIS — C50.412 MALIGNANT NEOPLASM OF UPPER-OUTER QUADRANT OF LEFT BREAST IN FEMALE, ESTROGEN RECEPTOR POSITIVE (H): Primary | ICD-10-CM

## 2019-02-18 PROCEDURE — 77336 RADIATION PHYSICS CONSULT: CPT | Performed by: RADIOLOGY

## 2019-02-18 PROCEDURE — 77412 RADIATION TX DELIVERY LVL 3: CPT | Performed by: RADIOLOGY

## 2019-02-18 NOTE — PROGRESS NOTES
[facility] PHYSICIANS  SPECIALIZING IN BREAKTHROUGHS  Radiation Oncology    On Treatment Visit Note    Lennie Mar      Date: 2019   MRN: [id number]   : 1927  Diagnosis: Breast cancer    Reason for Visit:  On Radiation Treatment Visit     Treatment Summary to Date   L Chest wall and SCV fossa   Current Dose: 3975/4975 cGy Fractions: 15/19      Chemotherapy  Chemo concurrent with radx?: No    Subjective: Ms. Mar continues to tolerate radiation therapy well with no complaints. She has been using moisturizer. She has no questions.    Nursing ROS:   Nutrition Alteration  Diet Type: Patient's Preference  Nutrition Note: appetite good    Skin  Skin Reaction: 0 - No changes  Skin Note: reviewed skin care     Gastrointestinal  GI Note: WNL    Genitourinary   Note: Up alot at night r/t drinking lots of fluids    Psychosocial  Pyschosocial Note: Not sleeping as well r/t up to the BR. Discussed not drinking fluids after 7 pm.     Objective:   /60   Pulse 66   Wt 70.8 kg (156 lb)   SpO2 96%   BMI 29.49 kg/m    Gen: Appears well, in no acute distress  Skin: Mild erythema over treatment field. Multiple black moles around the mastectomy scar.     Labs:  CBC RESULTS:   Recent Labs   Lab Test 19  1031   WBC 10.5   RBC 3.96   HGB 11.9   HCT 38.4   MCV 97   MCH 30.1   MCHC 31.0*   RDW 14.0        ELECTROLYTES:  Recent Labs   Lab Test 19  0915      POTASSIUM 4.2   CHLORIDE 110*   CANELO 9.9   CO2 22   BUN 29   CR 1.67*   GLC 96       Assessment:  She is tolerating her radiation therapy well with no issues or concerns.  All questions and concerns addressed.    Toxicities:  Dermatitis: Grade 1: Faint erythema or dry desquamation    Plan:   1. Continue current therapy.    2. Continue skin care       Mosaiq chart and setup information reviewed  Ports checked    Medication Review  Med list reviewed with patient?: Yes    Educational Topic Discussed  Education  Instructions: reviewed        Jose Souza MD  PGY-2 Resident, Radiation Oncology  [facility]    I saw and examined the patient with the resident.  I have reviewed and edited the resident's note and agree with the plan of care.      Lupe Andrews MD

## 2019-02-18 NOTE — LETTER
2019       RE: Lennie Mar   [address]  [place] [address]     Dear Colleague,    Thank you for referring your patient, Lennie Mar, to the RADIATION ONCOLOGY CLINIC. Please see a copy of my visit note below.    [facility] PHYSICIANS  SPECIALIZING IN BREAKTHROUGHS  Radiation Oncology    On Treatment Visit Note    Lennie Mar      Date: 2019   MRN: [id number]   : 1927  Diagnosis: Breast cancer    Reason for Visit:  On Radiation Treatment Visit     Treatment Summary to Date   L Chest wall and SCV fossa   Current Dose: 3975/4975 cGy Fractions: 15/19      Chemotherapy  Chemo concurrent with radx?: No    Subjective: Ms. Mar continues to tolerate radiation therapy well with no complaints. She has been using moisturizer. She has no questions.    Nursing ROS:   Nutrition Alteration  Diet Type: Patient's Preference  Nutrition Note: appetite good    Skin  Skin Reaction: 0 - No changes  Skin Note: reviewed skin care     Gastrointestinal  GI Note: WNL    Genitourinary   Note: Up alot at night r/t drinking lots of fluids    Psychosocial  Pyschosocial Note: Not sleeping as well r/t up to the BR. Discussed not drinking fluids after 7 pm.     Objective:   /60   Pulse 66   Wt 70.8 kg (156 lb)   SpO2 96%   BMI 29.49 kg/m     Gen: Appears well, in no acute distress  Skin: Mild erythema over treatment field. Multiple black moles around the mastectomy scar.     Labs:  CBC RESULTS:   Recent Labs   Lab Test 19  1031   WBC 10.5   RBC 3.96   HGB 11.9   HCT 38.4   MCV 97   MCH 30.1   MCHC 31.0*   RDW 14.0        ELECTROLYTES:  Recent Labs   Lab Test 19  0915      POTASSIUM 4.2   CHLORIDE 110*   CANELO 9.9   CO2 22   BUN 29   CR 1.67*   GLC 96       Assessment:  She is tolerating her radiation therapy well with no issues or concerns.  All questions and concerns addressed.    Toxicities:  Dermatitis: Grade 1: Faint erythema or dry  desquamation    Plan:   1. Continue current therapy.    2. Continue skin care       Mosaiq chart and setup information reviewed  Ports checked    Medication Review  Med list reviewed with patient?: Yes    Educational Topic Discussed  Education Instructions: reviewed    Jose Souza MD  PGY-2 Resident, Radiation Oncology  [facility]    I saw and examined the patient with the resident.  I have reviewed and edited the resident's note and agree with the plan of care.      Lupe Andrews MD

## 2019-02-19 ENCOUNTER — APPOINTMENT (OUTPATIENT)
Dept: RADIATION ONCOLOGY | Facility: CLINIC | Age: 84
End: 2019-02-19
Attending: RADIOLOGY
Payer: MEDICARE

## 2019-02-19 PROCEDURE — 77412 RADIATION TX DELIVERY LVL 3: CPT | Performed by: RADIOLOGY

## 2019-02-20 ENCOUNTER — APPOINTMENT (OUTPATIENT)
Dept: RADIATION ONCOLOGY | Facility: CLINIC | Age: 84
End: 2019-02-20
Attending: RADIOLOGY
Payer: MEDICARE

## 2019-02-20 PROCEDURE — 77412 RADIATION TX DELIVERY LVL 3: CPT | Performed by: RADIOLOGY

## 2019-02-21 ENCOUNTER — APPOINTMENT (OUTPATIENT)
Dept: RADIATION ONCOLOGY | Facility: CLINIC | Age: 84
End: 2019-02-21
Attending: RADIOLOGY
Payer: MEDICARE

## 2019-02-21 PROCEDURE — 77412 RADIATION TX DELIVERY LVL 3: CPT | Performed by: RADIOLOGY

## 2019-02-22 ENCOUNTER — OFFICE VISIT (OUTPATIENT)
Dept: RADIATION ONCOLOGY | Facility: CLINIC | Age: 84
End: 2019-02-22
Attending: RADIOLOGY
Payer: MEDICARE

## 2019-02-22 VITALS — BODY MASS INDEX: 29.3 KG/M2 | WEIGHT: 155 LBS

## 2019-02-22 DIAGNOSIS — Z17.0 MALIGNANT NEOPLASM OF UPPER-OUTER QUADRANT OF LEFT BREAST IN FEMALE, ESTROGEN RECEPTOR POSITIVE (H): Primary | ICD-10-CM

## 2019-02-22 DIAGNOSIS — C50.412 MALIGNANT NEOPLASM OF UPPER-OUTER QUADRANT OF LEFT BREAST IN FEMALE, ESTROGEN RECEPTOR POSITIVE (H): Primary | ICD-10-CM

## 2019-02-22 PROCEDURE — 77412 RADIATION TX DELIVERY LVL 3: CPT | Performed by: RADIOLOGY

## 2019-02-22 PROCEDURE — 77336 RADIATION PHYSICS CONSULT: CPT | Performed by: RADIOLOGY

## 2019-02-22 NOTE — LETTER
2019       RE: Lennie Mar   [address]  [facility] [address]     Dear Colleague,    Thank you for referring your patient, Lennie Mar, to the RADIATION ONCOLOGY CLINIC. Please see a copy of my visit note below.    [facility] PHYSICIANS  SPECIALIZING IN BREAKTHROUGHS  Radiation Oncology    On Treatment Visit Note    Lennie Mar      Date: 2019   MRN: [id number]   : 1927  Diagnosis: Breast cancer    Reason for Visit:  On Radiation Treatment Visit     Treatment Summary to Date   L Chest Wall & SCV  Current Dose: 4975/4975 cGy Fractions:      Chemotherapy  Chemo concurrent with radx?: No    Subjective:  Ms. Mar completed her treatment today. She has tolerated her radiation therapy well with no complaints. She has been using moisturizer. She has no questions.     Nursing ROS:   Nutrition Alteration  Diet Type: Patient's Preference  Nutrition Note: appetite good    Skin  Skin Reaction: 0 - No changes  Skin Note: reviewed skin care     Gastrointestinal  GI Note: WNL    Genitourinary   Note: Up a lot at night r/t drinking lots of fluids    Psychosocial  Pyschosocial Note: Not sleeping as well r/t up to the BR. Discussed not drinking fluids after 7 pm.     Objective:   Wt 70.3 kg (155 lb)   BMI 29.30 kg/m     Gen: Appears well, in no acute distress  Skin: Mild swelling and erythema of treatment field    Labs:  CBC RESULTS:   Recent Labs   Lab Test 19  1031   WBC 10.5   RBC 3.96   HGB 11.9   HCT 38.4   MCV 97   MCH 30.1   MCHC 31.0*   RDW 14.0        ELECTROLYTES:  Recent Labs   Lab Test 19  0915      POTASSIUM 4.2   CHLORIDE 110*   CANELO 9.9   CO2 22   BUN 29   CR 1.67*   GLC 96       Assessment:  She tolerated her radiation therapy well. She has mild swelling and redness over the treatment area.  All questions and concerns addressed.    Toxicities: Dermatitis: Grade 1: Faint erythema or dry desquamation    Plan:   1. RTC  in one month to see Ms. Lay Porter NP  2. Continue skin care     Mosaiq chart and setup information reviewed  Ports checked    Medication Review  Med list reviewed with patient?: Yes    Educational Topic Discussed  Additional Instructions: Pt to F/U with Lay Porter NP in 1 month  Education Instructions: reviewed    Jose Souza MD  PGY-2 Resident, Radiation Oncology  [facility]    I saw and examined the patient with the resident.  I have reviewed and edited the resident's note and agree with the plan of care.      Lupe Andrews MD

## 2019-02-22 NOTE — PROGRESS NOTES
[facility] PHYSICIANS  SPECIALIZING IN BREAKTHROUGHS  Radiation Oncology    On Treatment Visit Note    Lennie Mar      Date: 2019   MRN: [id number]   : 1927  Diagnosis: Breast cancer    Reason for Visit:  On Radiation Treatment Visit     Treatment Summary to Date   L Chest Wall & SCV  Current Dose: 4975/4975 cGy Fractions:      Chemotherapy  Chemo concurrent with radx?: No    Subjective:  Ms. Mar completed her treatment today. She has tolerated her radiation therapy well with no complaints. She has been using moisturizer. She has no questions.     Nursing ROS:   Nutrition Alteration  Diet Type: Patient's Preference  Nutrition Note: appetite good    Skin  Skin Reaction: 0 - No changes  Skin Note: reviewed skin care     Gastrointestinal  GI Note: WNL    Genitourinary   Note: Up a lot at night r/t drinking lots of fluids    Psychosocial  Pyschosocial Note: Not sleeping as well r/t up to the BR. Discussed not drinking fluids after 7 pm.     Objective:   Wt 70.3 kg (155 lb)   BMI 29.30 kg/m    Gen: Appears well, in no acute distress  Skin: Mild swelling and erythema of treatment field    Labs:  CBC RESULTS:   Recent Labs   Lab Test 19  1031   WBC 10.5   RBC 3.96   HGB 11.9   HCT 38.4   MCV 97   MCH 30.1   MCHC 31.0*   RDW 14.0        ELECTROLYTES:  Recent Labs   Lab Test 19  0915      POTASSIUM 4.2   CHLORIDE 110*   CANELO 9.9   CO2 22   BUN 29   CR 1.67*   GLC 96       Assessment:  She tolerated her radiation therapy well. She has mild swelling and redness over the treatment area.  All questions and concerns addressed.    Toxicities: Dermatitis: Grade 1: Faint erythema or dry desquamation    Plan:   1. RTC in one month to see Ms. Lay Porter NP  2. Continue skin care       Mosaiq chart and setup information reviewed  Ports checked    Medication Review  Med list reviewed with patient?: Yes    Educational Topic Discussed  Additional Instructions: Pt  to F/U with Lay Porter NP in 1 month  Education Instructions: reviewed      Jose Souza MD  PGY-2 Resident, Radiation Oncology  [facility]    I saw and examined the patient with the resident.  I have reviewed and edited the resident's note and agree with the plan of care.      Lupe Andrews MD

## 2019-02-26 NOTE — PROCEDURES
Radiotherapy Treatment Summary          Date of Report: 2019     PATIENT: MANI ANDERSON  MEDICAL RECORD NO: [id number]  : 1927     DIAGNOSIS: C50.211 Malignant neoplasm of upper-inner quadrant of right female breast  INTENT OF RADIOTHERAPY: Cure  PATHOLOGY:     Invasive ductal carcinoma                              STAGE: cT2(m)N0;  ypT2(m) N1(sn) M0  Lesion A (UOQ @ 3:00): ER+ (95%)/WY positive (15%)/HER-2+, 2.7 x 2.5 x 1.2 cm focus, G2                                          (+) LVSI, (+) dermal invasion, (-) dermal lymphatic invasion  Lesion B (UIQ @ 11:00): ER+(98%)/WY+(70%)/HER2-, 2.5 x 1.6 x 1.2 cm , G3  Lymph Node: 9 mm, (+) REINALDO & 1.2 mm, (-) REINALDO, Her2+     CONCURRENT SYSTEMIC THERAPY:  None                   Details of the treatments summarized below are found in records kept in the Department of Radiation Oncology at [facility].     Treatment Summary:  Radiation Oncology - Course: 1 Protocol:   Treatment Site Current Dose Modality From To Elapsed Days Fx.  1 L CW + Axilla  3,975 cGy 06 X  1/28/2019  2019  21 15  1 L CW Boost  1,000 cGy e09  2019   3  4  2 L SCV  3,975 cGy 10x/18x  1/28/2019  2019  21 15          Dose per Fraction:  265 cGy (CW, SCV), 250 cGy (Boost)  Total Dose:   4,975 cGy           COMMENTS:  Ms. Anderson is a 89 year old female who initially palpated a lump in her left breast in mid .   Her work up demonstrated two lesions in the left breast; a 3.5 x 1.9 x 2.0 cm irregular mass with skin invasion   at the 3 o'clock position 2 cm from the nipple and a 2.4 x 1.9 x 2.1 cm irregular mass at the 11 o'clock position,   7 cm from the nipple. Ultrasound guided biopsy showed invasive mammary carcinoma, ER+/WY+/Her2+ at the   3:00 lesion, but ER+/WY+/Her2- at the 11:00 lesion. Both are grade 3. She declined chemotherapy and received   Herceptin and letrozole between 18- 10/30/18. She then underwent left breast mastectomy and sentinel    lymph node biopsy on 11/19/2018. The pathology showed treatment related changes in both lesions with   pathologic findings as listed above.  Margins were negative with closest invasive margin of 2.5 mm anteriorly   and closest DCIS margins of > 10 mm. Two of three sentinel nodes were positive for metastatic carcinoma.      She was hesitant towards adjuvant radiation therapy but ultimately agreed to proceed.  Radiotherapy was   delivered in two phases: 3,975 cGy in 15 fractions to the chest wall and the axillary and supraclavicular   lymphatics; this was followed by a boost 1000 cGy in 4 fractions to the mastectomy scar.  She tolerated her   treatment course very well. She developed mild swelling and erythema of her skin by the end of treatment. She   used skin moisturizers regularly during treatment.                    Acute Toxicity Profile by CTC v4.0:   Dermatitis: Grade 1: Faint erythema or dry desquamation     PAIN MANAGEMENT:    None                           FOLLOW UP PLAN:   1- Follow up in one month with Lay Porter NP                                            2- Follow up with Medical oncology, as scheduled                        Resident Physician: Jose Souza M.D.   Staff Physician: Lupe Andrews M.D.  Physicist: Irais Latif, PhD     CC: Ty Quintanilla MD                                    Radiation Oncology:  [facility] [address] [place] [address]

## 2019-03-01 ENCOUNTER — APPOINTMENT (OUTPATIENT)
Dept: LAB | Facility: CLINIC | Age: 84
End: 2019-03-01
Attending: INTERNAL MEDICINE
Payer: MEDICARE

## 2019-03-01 ENCOUNTER — INFUSION THERAPY VISIT (OUTPATIENT)
Dept: ONCOLOGY | Facility: CLINIC | Age: 84
End: 2019-03-01
Attending: INTERNAL MEDICINE
Payer: MEDICARE

## 2019-03-01 VITALS
OXYGEN SATURATION: 98 % | RESPIRATION RATE: 18 BRPM | SYSTOLIC BLOOD PRESSURE: 125 MMHG | TEMPERATURE: 98.5 F | DIASTOLIC BLOOD PRESSURE: 78 MMHG | HEART RATE: 95 BPM | BODY MASS INDEX: 28.81 KG/M2 | WEIGHT: 152.4 LBS

## 2019-03-01 DIAGNOSIS — Z17.0 MALIGNANT NEOPLASM OF UPPER-INNER QUADRANT OF LEFT BREAST IN FEMALE, ESTROGEN RECEPTOR POSITIVE (H): Primary | ICD-10-CM

## 2019-03-01 DIAGNOSIS — C50.212 MALIGNANT NEOPLASM OF UPPER-INNER QUADRANT OF LEFT BREAST IN FEMALE, ESTROGEN RECEPTOR POSITIVE (H): Primary | ICD-10-CM

## 2019-03-01 LAB
ALBUMIN SERPL-MCNC: 3.3 G/DL (ref 3.4–5)
ALP SERPL-CCNC: 122 U/L (ref 40–150)
ALT SERPL W P-5'-P-CCNC: 19 U/L (ref 0–50)
ANION GAP SERPL CALCULATED.3IONS-SCNC: 6 MMOL/L (ref 3–14)
AST SERPL W P-5'-P-CCNC: 19 U/L (ref 0–45)
BASOPHILS # BLD AUTO: 0.1 10E9/L (ref 0–0.2)
BASOPHILS NFR BLD AUTO: 0.7 %
BILIRUB SERPL-MCNC: 0.5 MG/DL (ref 0.2–1.3)
BUN SERPL-MCNC: 26 MG/DL (ref 7–30)
CALCIUM SERPL-MCNC: 10 MG/DL (ref 8.5–10.1)
CHLORIDE SERPL-SCNC: 110 MMOL/L (ref 94–109)
CO2 SERPL-SCNC: 24 MMOL/L (ref 20–32)
CREAT SERPL-MCNC: 1.9 MG/DL (ref 0.52–1.04)
DIFFERENTIAL METHOD BLD: ABNORMAL
EOSINOPHIL # BLD AUTO: 0.2 10E9/L (ref 0–0.7)
EOSINOPHIL NFR BLD AUTO: 2.3 %
ERYTHROCYTE [DISTWIDTH] IN BLOOD BY AUTOMATED COUNT: 16 % (ref 10–15)
GFR SERPL CREATININE-BSD FRML MDRD: 23 ML/MIN/{1.73_M2}
GLUCOSE SERPL-MCNC: 103 MG/DL (ref 70–99)
HCT VFR BLD AUTO: 39.6 % (ref 35–47)
HGB BLD-MCNC: 12.5 G/DL (ref 11.7–15.7)
IMM GRANULOCYTES # BLD: 0 10E9/L (ref 0–0.4)
IMM GRANULOCYTES NFR BLD: 0.4 %
LYMPHOCYTES # BLD AUTO: 1 10E9/L (ref 0.8–5.3)
LYMPHOCYTES NFR BLD AUTO: 12 %
MCH RBC QN AUTO: 31.3 PG (ref 26.5–33)
MCHC RBC AUTO-ENTMCNC: 31.6 G/DL (ref 31.5–36.5)
MCV RBC AUTO: 99 FL (ref 78–100)
MONOCYTES # BLD AUTO: 1 10E9/L (ref 0–1.3)
MONOCYTES NFR BLD AUTO: 11.4 %
NEUTROPHILS # BLD AUTO: 6.1 10E9/L (ref 1.6–8.3)
NEUTROPHILS NFR BLD AUTO: 73.2 %
NRBC # BLD AUTO: 0 10*3/UL
NRBC BLD AUTO-RTO: 0 /100
PLATELET # BLD AUTO: 210 10E9/L (ref 150–450)
POTASSIUM SERPL-SCNC: 4.5 MMOL/L (ref 3.4–5.3)
PROT SERPL-MCNC: 7.5 G/DL (ref 6.8–8.8)
RBC # BLD AUTO: 4 10E12/L (ref 3.8–5.2)
SODIUM SERPL-SCNC: 140 MMOL/L (ref 133–144)
WBC # BLD AUTO: 8.3 10E9/L (ref 4–11)

## 2019-03-01 PROCEDURE — 25000128 H RX IP 250 OP 636: Mod: ZF | Performed by: INTERNAL MEDICINE

## 2019-03-01 PROCEDURE — 80053 COMPREHEN METABOLIC PANEL: CPT | Performed by: INTERNAL MEDICINE

## 2019-03-01 PROCEDURE — 25800030 ZZH RX IP 258 OP 636: Mod: ZF | Performed by: INTERNAL MEDICINE

## 2019-03-01 PROCEDURE — 96413 CHEMO IV INFUSION 1 HR: CPT

## 2019-03-01 PROCEDURE — 85025 COMPLETE CBC W/AUTO DIFF WBC: CPT | Performed by: INTERNAL MEDICINE

## 2019-03-01 RX ADMIN — SODIUM CHLORIDE 250 ML: 9 INJECTION, SOLUTION INTRAVENOUS at 14:23

## 2019-03-01 RX ADMIN — ADO-TRASTUZUMAB EMTANSINE 260 MG: 20 INJECTION, POWDER, LYOPHILIZED, FOR SOLUTION INTRAVENOUS at 14:23

## 2019-03-01 ASSESSMENT — PAIN SCALES - GENERAL: PAINLEVEL: NO PAIN (0)

## 2019-03-01 NOTE — PROGRESS NOTES
Infusion Nursing Note:  Lennie Mar presents today for Cycle 3 Day 1 Kadcyla.    Patient seen by provider today: No   present during visit today: Not Applicable.    Note: Patient presents today very distressed and angry with treatment plan. Per pt, she was under the notion that she would be done with treatment after radiation. Pa, RNCC did notify writer prior to patient's infusion appt about patient's possible misunderstanding.  Told patient we would follow-up with her RNCC/provider regarding communication issue. IB sent to [facility] RN[facility]/Dr. Malik. Upon assessment, before chemo started, did notice trembling of bilateral hands. Patient stated this was new to her. Patient did appear/verbalized her distress. Provider notified. No complaints of pain.    Intravenous Access:  Peripheral IV placed.    Treatment Conditions:  Lab Results   Component Value Date    HGB 12.5 03/01/2019     Lab Results   Component Value Date    WBC 8.3 03/01/2019      Lab Results   Component Value Date    ANEU 6.1 03/01/2019     Lab Results   Component Value Date     03/01/2019      Results reviewed, labs MET treatment parameters, ok to proceed with treatment.  ECHO/MUGA completed 9/18/18  EF 55-60%.      Post Infusion Assessment:  Patient tolerated infusion without incident.  Blood return noted pre and post infusion.  No observation required per protocol.  Site patent and intact, free from redness, edema or discomfort.  No evidence of extravasations.  Access discontinued per protocol.    Discharge Plan:   Patient declined prescription refills.  Discharge instructions reviewed with: Patient.  Patient and/or family verbalized understanding of discharge instructions and all questions answered.  Copy of AVS reviewed with patient and/or family.  Patient will return 3/22 for next appointment.  Patient discharged in stable condition accompanied by: self.  called on patient's behalf.  Departure Mode: Ambulatory.    Isabell  CASSIE Deshpande

## 2019-03-01 NOTE — NURSING NOTE
Chief Complaint   Patient presents with     Blood Draw     Labs drawn via PIV placed by Minal Gamez RN in lab. Line flushed with slaine. VS taken.       Ramu Moore RN

## 2019-03-01 NOTE — PATIENT INSTRUCTIONS
Contact Numbers    [facility] [place]: [phone]  [facility] Triage and After Hours Nurse Line:  [phone]    Please call the [facility] nurse triage or the after hours nurse line if you experience a temperature greater than or equal to 100.5, shaking chills, have uncontrolled nausea, vomiting and/or diarrhea, dizziness, lightheadedness, shortness of breath, chest pain, bleeding, unexplained bruising, or if you have any other new/concerning symptoms, questions or concerns.     If you are having any concerning symptoms or wish to speak to a provider before your next infusion visit, please call your care coordinator or triage to notify them so we can adequately serve you.     If you need a refill on a narcotic prescription or other medication, please call triage before your infusion appointment.       March 2019 Sunday Monday Tuesday Wednesday Thursday Friday Saturday                            1    [facility] DRAW  12:00 PM   (15 min.)    [facility] DRAW   [facility] Lab Draw    P ONC INFUSION 120  12:30 PM   (120 min.)    ONCOLOGY INFUSION   [facility] 2       3     4     5     6     7     8     9       10     11     12     13     14     15     16       17     18     19     20     21     22    [facility] DRAW   9:00 AM   (15 min.)    [facility] DRAW   [facility] Lab Draw    [facility] RETURN   9:15 AM   (50 min.)   Sheba Wilks PA   [facility]    [facility] ONC INFUSION 120  10:30 AM   (120 min.)    ONCOLOGY INFUSION   [facility]    [facility] RETURN  11:30 AM   (60 min.)   Lay Lyon APRN CNP   Radiation Oncology Clinic 23       24     25     26     27     28     29     30       31                                               April 2019 Sunday Monday Tuesday Wednesday Thursday Friday Saturday        1     2     3     4     5     6       7     8     9     10     11     12     13       14     15     16     17     18     19      20       21     22     23     24     25     26     27       28     29     30                                         Lab Results:  Recent Results (from the past 12 hour(s))   CBC with platelets differential    Collection Time: 03/01/19 12:33 PM   Result Value Ref Range    WBC 8.3 4.0 - 11.0 10e9/L    RBC Count 4.00 3.8 - 5.2 10e12/L    Hemoglobin 12.5 11.7 - 15.7 g/dL    Hematocrit 39.6 35.0 - 47.0 %    MCV 99 78 - 100 fl    MCH 31.3 26.5 - 33.0 pg    MCHC 31.6 31.5 - 36.5 g/dL    RDW 16.0 (H) 10.0 - 15.0 %    Platelet Count 210 150 - 450 10e9/L    Diff Method Automated Method     % Neutrophils 73.2 %    % Lymphocytes 12.0 %    % Monocytes 11.4 %    % Eosinophils 2.3 %    % Basophils 0.7 %    % Immature Granulocytes 0.4 %    Nucleated RBCs 0 0 /100    Absolute Neutrophil 6.1 1.6 - 8.3 10e9/L    Absolute Lymphocytes 1.0 0.8 - 5.3 10e9/L    Absolute Monocytes 1.0 0.0 - 1.3 10e9/L    Absolute Eosinophils 0.2 0.0 - 0.7 10e9/L    Absolute Basophils 0.1 0.0 - 0.2 10e9/L    Abs Immature Granulocytes 0.0 0 - 0.4 10e9/L    Absolute Nucleated RBC 0.0    Comprehensive metabolic panel    Collection Time: 03/01/19 12:33 PM   Result Value Ref Range    Sodium 140 133 - 144 mmol/L    Potassium 4.5 3.4 - 5.3 mmol/L    Chloride 110 (H) 94 - 109 mmol/L    Carbon Dioxide 24 20 - 32 mmol/L    Anion Gap 6 3 - 14 mmol/L    Glucose 103 (H) 70 - 99 mg/dL    Urea Nitrogen 26 7 - 30 mg/dL    Creatinine 1.90 (H) 0.52 - 1.04 mg/dL    GFR Estimate 23 (L) >60 mL/min/[1.73_m2]    GFR Estimate If Black 26 (L) >60 mL/min/[1.73_m2]    Calcium 10.0 8.5 - 10.1 mg/dL    Bilirubin Total 0.5 0.2 - 1.3 mg/dL    Albumin 3.3 (L) 3.4 - 5.0 g/dL    Protein Total 7.5 6.8 - 8.8 g/dL    Alkaline Phosphatase 122 40 - 150 U/L    ALT 19 0 - 50 U/L    AST 19 0 - 45 U/L

## 2019-03-08 ENCOUNTER — CARE COORDINATION (OUTPATIENT)
Dept: ONCOLOGY | Facility: CLINIC | Age: 84
End: 2019-03-08

## 2019-03-22 ENCOUNTER — APPOINTMENT (OUTPATIENT)
Dept: LAB | Facility: CLINIC | Age: 84
End: 2019-03-22
Attending: INTERNAL MEDICINE
Payer: MEDICARE

## 2019-03-22 ENCOUNTER — ONCOLOGY VISIT (OUTPATIENT)
Dept: ONCOLOGY | Facility: CLINIC | Age: 84
End: 2019-03-22
Attending: PHYSICIAN ASSISTANT
Payer: MEDICARE

## 2019-03-22 ENCOUNTER — OFFICE VISIT (OUTPATIENT)
Dept: RADIATION ONCOLOGY | Facility: CLINIC | Age: 84
End: 2019-03-22
Attending: RADIOLOGY
Payer: MEDICARE

## 2019-03-22 ENCOUNTER — INFUSION THERAPY VISIT (OUTPATIENT)
Dept: ONCOLOGY | Facility: CLINIC | Age: 84
End: 2019-03-22
Attending: INTERNAL MEDICINE
Payer: MEDICARE

## 2019-03-22 VITALS
RESPIRATION RATE: 16 BRPM | HEART RATE: 68 BPM | WEIGHT: 148 LBS | DIASTOLIC BLOOD PRESSURE: 82 MMHG | OXYGEN SATURATION: 96 % | SYSTOLIC BLOOD PRESSURE: 132 MMHG | BODY MASS INDEX: 27.98 KG/M2

## 2019-03-22 VITALS
BODY MASS INDEX: 27.96 KG/M2 | TEMPERATURE: 97.9 F | OXYGEN SATURATION: 94 % | RESPIRATION RATE: 16 BRPM | HEART RATE: 77 BPM | SYSTOLIC BLOOD PRESSURE: 123 MMHG | WEIGHT: 147.9 LBS | DIASTOLIC BLOOD PRESSURE: 74 MMHG

## 2019-03-22 DIAGNOSIS — N18.30 CKD (CHRONIC KIDNEY DISEASE) STAGE 3, GFR 30-59 ML/MIN (H): ICD-10-CM

## 2019-03-22 DIAGNOSIS — C50.412 MALIGNANT NEOPLASM OF UPPER-OUTER QUADRANT OF LEFT BREAST IN FEMALE, ESTROGEN RECEPTOR POSITIVE (H): Primary | ICD-10-CM

## 2019-03-22 DIAGNOSIS — Z17.0 MALIGNANT NEOPLASM OF UPPER-OUTER QUADRANT OF LEFT BREAST IN FEMALE, ESTROGEN RECEPTOR POSITIVE (H): Primary | ICD-10-CM

## 2019-03-22 DIAGNOSIS — Z17.0 MALIGNANT NEOPLASM OF UPPER-INNER QUADRANT OF LEFT BREAST IN FEMALE, ESTROGEN RECEPTOR POSITIVE (H): Primary | ICD-10-CM

## 2019-03-22 DIAGNOSIS — C50.212 MALIGNANT NEOPLASM OF UPPER-INNER QUADRANT OF LEFT BREAST IN FEMALE, ESTROGEN RECEPTOR POSITIVE (H): Primary | ICD-10-CM

## 2019-03-22 DIAGNOSIS — D48.7 NEOPLASM OF UNCERTAIN BEHAVIOR OF OTHER SPECIFIED SITES: ICD-10-CM

## 2019-03-22 LAB
ALBUMIN SERPL-MCNC: 3.3 G/DL (ref 3.4–5)
ALP SERPL-CCNC: 128 U/L (ref 40–150)
ALT SERPL W P-5'-P-CCNC: 24 U/L (ref 0–50)
ANION GAP SERPL CALCULATED.3IONS-SCNC: 7 MMOL/L (ref 3–14)
AST SERPL W P-5'-P-CCNC: 27 U/L (ref 0–45)
BASOPHILS # BLD AUTO: 0.1 10E9/L (ref 0–0.2)
BASOPHILS NFR BLD AUTO: 0.7 %
BILIRUB SERPL-MCNC: 0.5 MG/DL (ref 0.2–1.3)
BUN SERPL-MCNC: 24 MG/DL (ref 7–30)
CALCIUM SERPL-MCNC: 10.3 MG/DL (ref 8.5–10.1)
CHLORIDE SERPL-SCNC: 108 MMOL/L (ref 94–109)
CO2 SERPL-SCNC: 21 MMOL/L (ref 20–32)
CREAT SERPL-MCNC: 1.93 MG/DL (ref 0.52–1.04)
DIFFERENTIAL METHOD BLD: ABNORMAL
EOSINOPHIL # BLD AUTO: 0.2 10E9/L (ref 0–0.7)
EOSINOPHIL NFR BLD AUTO: 2.3 %
ERYTHROCYTE [DISTWIDTH] IN BLOOD BY AUTOMATED COUNT: 15.9 % (ref 10–15)
GFR SERPL CREATININE-BSD FRML MDRD: 22 ML/MIN/{1.73_M2}
GLUCOSE SERPL-MCNC: 90 MG/DL (ref 70–99)
HCT VFR BLD AUTO: 42.9 % (ref 35–47)
HGB BLD-MCNC: 13.8 G/DL (ref 11.7–15.7)
IMM GRANULOCYTES # BLD: 0.1 10E9/L (ref 0–0.4)
IMM GRANULOCYTES NFR BLD: 0.9 %
LYMPHOCYTES # BLD AUTO: 1.2 10E9/L (ref 0.8–5.3)
LYMPHOCYTES NFR BLD AUTO: 13.8 %
MCH RBC QN AUTO: 30.6 PG (ref 26.5–33)
MCHC RBC AUTO-ENTMCNC: 32.2 G/DL (ref 31.5–36.5)
MCV RBC AUTO: 95 FL (ref 78–100)
MONOCYTES # BLD AUTO: 0.8 10E9/L (ref 0–1.3)
MONOCYTES NFR BLD AUTO: 9.1 %
NEUTROPHILS # BLD AUTO: 6.6 10E9/L (ref 1.6–8.3)
NEUTROPHILS NFR BLD AUTO: 73.2 %
NRBC # BLD AUTO: 0 10*3/UL
NRBC BLD AUTO-RTO: 0 /100
PLATELET # BLD AUTO: 168 10E9/L (ref 150–450)
POTASSIUM SERPL-SCNC: 4.7 MMOL/L (ref 3.4–5.3)
PROT SERPL-MCNC: 7.5 G/DL (ref 6.8–8.8)
RBC # BLD AUTO: 4.51 10E12/L (ref 3.8–5.2)
SODIUM SERPL-SCNC: 137 MMOL/L (ref 133–144)
WBC # BLD AUTO: 9 10E9/L (ref 4–11)

## 2019-03-22 PROCEDURE — 25800030 ZZH RX IP 258 OP 636: Mod: ZF | Performed by: PHYSICIAN ASSISTANT

## 2019-03-22 PROCEDURE — G0463 HOSPITAL OUTPT CLINIC VISIT: HCPCS | Performed by: NURSE PRACTITIONER

## 2019-03-22 PROCEDURE — 85025 COMPLETE CBC W/AUTO DIFF WBC: CPT | Performed by: PHYSICIAN ASSISTANT

## 2019-03-22 PROCEDURE — 40000556 ZZH STATISTIC PERIPHERAL IV START W US GUIDANCE: Mod: ZF

## 2019-03-22 PROCEDURE — 99214 OFFICE O/P EST MOD 30 MIN: CPT | Mod: ZP | Performed by: PHYSICIAN ASSISTANT

## 2019-03-22 PROCEDURE — G0463 HOSPITAL OUTPT CLINIC VISIT: HCPCS | Mod: ZF

## 2019-03-22 PROCEDURE — 36415 COLL VENOUS BLD VENIPUNCTURE: CPT

## 2019-03-22 PROCEDURE — 80053 COMPREHEN METABOLIC PANEL: CPT | Performed by: PHYSICIAN ASSISTANT

## 2019-03-22 PROCEDURE — 25000128 H RX IP 250 OP 636: Mod: ZF | Performed by: PHYSICIAN ASSISTANT

## 2019-03-22 PROCEDURE — 96413 CHEMO IV INFUSION 1 HR: CPT

## 2019-03-22 RX ORDER — ALBUTEROL SULFATE 90 UG/1
1-2 AEROSOL, METERED RESPIRATORY (INHALATION)
Status: CANCELLED
Start: 2019-03-22

## 2019-03-22 RX ORDER — MEPERIDINE HYDROCHLORIDE 25 MG/ML
25 INJECTION INTRAMUSCULAR; INTRAVENOUS; SUBCUTANEOUS EVERY 30 MIN PRN
Status: CANCELLED | OUTPATIENT
Start: 2019-03-22

## 2019-03-22 RX ORDER — DIPHENHYDRAMINE HYDROCHLORIDE 50 MG/ML
50 INJECTION INTRAMUSCULAR; INTRAVENOUS
Status: CANCELLED
Start: 2019-03-22

## 2019-03-22 RX ORDER — METHYLPREDNISOLONE SODIUM SUCCINATE 125 MG/2ML
125 INJECTION, POWDER, LYOPHILIZED, FOR SOLUTION INTRAMUSCULAR; INTRAVENOUS
Status: CANCELLED
Start: 2019-03-22

## 2019-03-22 RX ORDER — LORAZEPAM 2 MG/ML
0.5 INJECTION INTRAMUSCULAR EVERY 4 HOURS PRN
Status: CANCELLED
Start: 2019-03-22

## 2019-03-22 RX ORDER — EPINEPHRINE 1 MG/ML
0.3 INJECTION, SOLUTION INTRAMUSCULAR; SUBCUTANEOUS EVERY 5 MIN PRN
Status: CANCELLED | OUTPATIENT
Start: 2019-03-22

## 2019-03-22 RX ORDER — SODIUM CHLORIDE 9 MG/ML
1000 INJECTION, SOLUTION INTRAVENOUS CONTINUOUS PRN
Status: CANCELLED
Start: 2019-03-22

## 2019-03-22 RX ORDER — EPINEPHRINE 0.3 MG/.3ML
0.3 INJECTION SUBCUTANEOUS EVERY 5 MIN PRN
Status: CANCELLED | OUTPATIENT
Start: 2019-03-22

## 2019-03-22 RX ORDER — ALBUTEROL SULFATE 0.83 MG/ML
2.5 SOLUTION RESPIRATORY (INHALATION)
Status: CANCELLED | OUTPATIENT
Start: 2019-03-22

## 2019-03-22 RX ADMIN — ADO-TRASTUZUMAB EMTANSINE 260 MG: 20 INJECTION, POWDER, LYOPHILIZED, FOR SOLUTION INTRAVENOUS at 11:25

## 2019-03-22 RX ADMIN — SODIUM CHLORIDE 250 ML: 9 INJECTION, SOLUTION INTRAVENOUS at 11:26

## 2019-03-22 ASSESSMENT — PAIN SCALES - GENERAL: PAINLEVEL: NO PAIN (0)

## 2019-03-22 NOTE — LETTER
3/22/2019       RE: Lennie Mar   [address]  [facility] [address]     Dear Colleague,    Thank you for referring your patient, Lennie Mar, to the RADIATION ONCOLOGY CLINIC. Please see a copy of my visit note below.    Radiation Oncology Follow-up Visit  2019    Lennie Mar  MRN: [id number]   : 1927     DISEASE TREATED:   Invasive ductal carcinoma, ypT2 (m) N1(sn) M0, ER+/WY+/HER2+    RADIATION THERAPY DELIVERED:   4,975 cGy to left chest wall and axilla completed 2019    INTERVAL SINCE COMPLETION OF RADIATION THERAPY:   1 month     HPI:  Ms. Mar is a 89 year old female who initially palpated a lump in her left breast in mid 2018.   Her work up demonstrated two lesions in the left breast; a 3.5 x 1.9 x 2.0 cm irregular mass with skin invasion at the 3 o'clock position 2 cm from the nipple and a 2.4 x 1.9 x 2.1 cm irregular mass at the 11 o'clock position, 7 cm from the nipple. Ultrasound guided biopsy showed invasive mammary carcinoma, ER+/WY+/Her2+ at the 3:00 lesion, but ER+/WY+/Her2- at the 11:00 lesion. Both are grade 3. She declined chemotherapy and received  Herceptin and letrozole between 18- 10/30/18. She then underwent left breast mastectomy and sentinel lymph node biopsy on 2018. The pathology showed treatment related changes in both lesions with pathologic findings as listed above.  Margins were negative with closest invasive margin of 2.5 mm anteriorly and closest DCIS margins of > 10 mm. Two of three sentinel nodes were positive for metastatic carcinoma.         SUBJECTIVE:   Lennie Mar is a 89 year old female who is here today for routine 1 month follow up after completing radiation therapy.   She states that her skin is healing well.  She continues to moisturize.  She denies any pain.  She feels she still has fatigue from radiation.    ROS:  Complete review of systems is negative except for symptoms discussed in  subjective above.    Current Outpatient Medications   Medication     acetaminophen (TYLENOL) 325 MG tablet     ARIPiprazole (ABILIFY) 5 MG tablet     buPROPion (WELLBUTRIN XL) 300 MG 24 hr tablet     Cyanocobalamin (VITAMIN B 12 PO)     letrozole (FEMARA) 2.5 MG tablet     lithium 150 MG capsule     lithium 300 MG capsule     LORazepam (ATIVAN) 0.5 MG tablet     neomycin-polymyxin-dexamethasone (MAXITROL) 3.5-83972-9.1 ophthalmic ointment     ondansetron (ZOFRAN) 8 MG tablet     ORDER FOR DME     pramipexole (MIRAPEX) 1 MG tablet     prochlorperazine (COMPAZINE) 10 MG tablet     propranolol (INDERAL) 20 MG tablet     sertraline (ZOLOFT) 50 MG tablet     No current facility-administered medications for this visit.           Allergies   Allergen Reactions     Cafergot Other (See Comments) and Nausea and Vomiting     Severe HA, N/V & diarrhea     Ciprofloxacin      Nausea and vomiting per son      Penicillins Rash     Sulfa Drugs Rash     Lamictal [Lamotrigine] Other (See Comments)     Patient experienced night moss     Naproxen      Pt unable to remember reaction.     Tetracycline      Pt unable to remember allergy       Past Medical History:   Diagnosis Date     Alcohol dependence in remission (H)      Anxiety      Asymptomatic varicose veins      Bipolar disorder, unspecified (H)      CKD (chronic kidney disease) stage 3, GFR 30-59 ml/min (H) 2/18/2014     History of smoking      Hyperparathyroidism (H)      Memory loss      Muscle weakness (generalized) 6/23/2008     Severe depression (H)      Subdural hygroma     chronic.  no surgeries     Tremor of unknown origin          PHYSICAL EXAM:  /82   Pulse 68   Resp 16   Wt 67.1 kg (148 lb)   SpO2 96%   BMI 27.98 kg/m     Gen: Alert, in NAD  Pulm: No wheezing, stridor or respiratory distress  CV: Well-perfused, no cyanosis, no pedal edema  Skin: Mild dry desquamation around left mastectomy scar and axilla remains.  No erythema.  No signs of infection and is  healing well.  Psychiatric: Appropriate mood and affect      LABS AND IMAGING:  Reviewed.    IMPRESSION:   Ms. Mar is a 89 year old female with a left sided invasive ductal carcinoma s/p mastectomy and now 1 month out from radiation.  She is doing great and healing from acute side effects.    PLAN:   Patient is recovering nicely from acute side effects of radiation therapy.  She has some fatigue that remains.  Discussed that the fatigue should improve very soon and recommended increased activity and exercise to help.  Continue to moisturize.  She has an appointment in medical oncology today for Kadcyla infusion.  She will continue to follow with medical oncology and will f/u here as needed.      Lay Lyon, NP  Radiation Oncology  [facility]

## 2019-03-22 NOTE — NURSING NOTE
Chief Complaint   Patient presents with     Labs Only     venipuncture, vitals checked     Debora Cobb RN on 3/22/2019 at 9:22 AM

## 2019-03-22 NOTE — PROGRESS NOTES
Infusion Nursing Note:  Lennie Mar presents today for Cycle 4 Day 1 Kadcyla    Patient seen by provider today: Yes: Sheba JIANG PA-C   present during visit today: Not Applicable.    Note:     Intravenous Access:  Peripheral IV placed.    Treatment Conditions:  Lab Results   Component Value Date    HGB 13.8 03/22/2019     Lab Results   Component Value Date    WBC 9.0 03/22/2019      Lab Results   Component Value Date    ANEU 6.6 03/22/2019     Lab Results   Component Value Date     03/22/2019      Lab Results   Component Value Date     03/22/2019                   Lab Results   Component Value Date    POTASSIUM 4.7 03/22/2019                                  Lab Results   Component Value Date    CR 1.93- provider aware. baseline 03/22/2019                   Lab Results   Component Value Date    CANELO 10.3 03/22/2019                Lab Results   Component Value Date    BILITOTAL 0.5 03/22/2019           Lab Results   Component Value Date    ALBUMIN 3.3 03/22/2019                    Lab Results   Component Value Date    ALT 24 03/22/2019           Lab Results   Component Value Date    AST 27 03/22/2019       Results reviewed, labs MET treatment parameters, ok to proceed with treatment.  ECHO/MUGA completed 12/18  EF 55-60%.      Post Infusion Assessment:  Patient tolerated infusion without incident.  Patient observed for 30 minutes post per protocol.  Blood return noted pre and post infusion.  No evidence of extravasations.  Access discontinued per protocol.       Discharge Plan:   Copy of AVS reviewed with patient and/or family.  Patient will return 4/15 for next appointment for labs/provider/ECHO/infusion.  Patient discharged in stable condition accompanied by: self.  Departure Mode: Ambulatory.    Idania Perez RN

## 2019-03-22 NOTE — PROGRESS NOTES
Hematology-Oncology Visit  Mar 22, 2019    Reason for Visit: follow-up left breast cancer, 2 primaries     HPI: Lennie Mar is a 89 year old female with past medical history of bipolar disorder, CKD, essential tremor with recent diagnosis of left breast cancer, two separate primaries. She presented with a palpable mass and had a mammogram and US leading to biopsies on 5/9/18. Pathology showed grade 3 invasive carcinoma, ER/CO positive, HER2 amplified of mass at 3:00 position with associated DCIS and skin involvement. The mass at 11:00 position was also grade 3 invasive carcinoma, ER/CO positive, but HER2 nonamplified. No lymphadenopathy was noted.     She met with Dr. Malik on 5/21/18 and elected to start neoadjuvant treatment with Herceptin every 3 weeks along with letrozole. Echocardiogram was done 5/25 showed EF of 55-60%. She tolerated neoadjuvant treatment remarkably well. She had a left breast mastectomy and SLNB on 11/19/18 showing two residual tumors, the larger grade 2 tumor ER, CO, HER2 amplified measuring 2.7 cm and smaller grade 3 tumor ER positive, CO negative, HER2 negative measured 2.5 cm. 2/3 left axillary nodes demonstrated metastases measuring 9 mm and 1.2 mm. HER2 FISH of larger axillary lymph node metastasis was amplified. Dr. Malik recommended adjuvant therapy with TDM1 for 1 year and adjuvant radiation.     Interval History: Lennie is here alone today to for cycle 4  of TDM1. She states she has some fatigue, but not worse in the past few weeks. Denies SOB, dizziness, chest pain. No new leg swelling. Her weight is down. Appetite is about the same. She eats typically 2 meals/day, brunch and dinner. She does snack between meals with tortilla chips. She does not think she is eating less, but eating less sweets and carbohydrate. Denies heartburn, nausea, diarrhea, constipation. She drinks 32 oz of juice and water daily.     She is taking letrozole. She does not think she is having any  side effects. She denies any arthralgias. She has some chronic low back on right side. She admits to feeling some depression lately. She is bipolar. She saw her psychiatrist recently. No medication changes. She denies any suicidal ideation. She had no further questions or concerns today.      Current Outpatient Medications   Medication     acetaminophen (TYLENOL) 325 MG tablet     ARIPiprazole (ABILIFY) 5 MG tablet     buPROPion (WELLBUTRIN XL) 300 MG 24 hr tablet     Cyanocobalamin (VITAMIN B 12 PO)     letrozole (FEMARA) 2.5 MG tablet     lithium 150 MG capsule     lithium 300 MG capsule     LORazepam (ATIVAN) 0.5 MG tablet     neomycin-polymyxin-dexamethasone (MAXITROL) 3.5-12181-6.1 ophthalmic ointment     ondansetron (ZOFRAN) 8 MG tablet     ORDER FOR DME     pramipexole (MIRAPEX) 1 MG tablet     prochlorperazine (COMPAZINE) 10 MG tablet     propranolol (INDERAL) 20 MG tablet     sertraline (ZOLOFT) 50 MG tablet     No current facility-administered medications for this visit.        PHYSICAL EXAM:  /74   Pulse 77   Temp 97.9  F (36.6  C)   Resp 16   Wt 67.1 kg (147 lb 14.4 oz)   SpO2 94%   BMI 27.96 kg/m    General: Alert, oriented, pleasant, NAD  HEENT: Normocephalic, atraumatic, PERRL, EOMI. Moist mucus membranes, no lesions or thrush  Neck: No cervical or supraclavicular LAD.  Axillary: No LAD  Breast: Left chest wall with healing mastectomy incision. Some desquamation in axillary area. Skin is moisturized. No suspicious nodules or masses.   Lungs: CTA bilaterally, normal work of breathing  Cardiac: RRR, S1, S2, no murmurs  Abdomen: Soft, nontender, nondistended. Normoactive bowel sounds. No hepatosplenomegaly, masses  Neuro: CNII-XII grossly intact  Extremities: No pedal edema    Labs:    Results for MANI ANDERSON (MRN [id number]) as of 3/22/2019 09:57   3/22/2019 09:20   Sodium 137   Potassium 4.7   Chloride 108   Carbon Dioxide 21   Urea Nitrogen 24   Creatinine 1.93 (H)   GFR Estimate  22 (L)   GFR Estimate If Black 26 (L)   Calcium 10.3 (H)   Anion Gap 7   Albumin 3.3 (L)   Protein Total 7.5   Bilirubin Total 0.5   Alkaline Phosphatase 128   ALT 24   AST 27   Glucose 90   WBC 9.0   Hemoglobin 13.8   Hematocrit 42.9   Platelet Count 168   RBC Count 4.51   MCV 95   MCH 30.6   MCHC 32.2   RDW 15.9 (H)   Diff Method Automated Method   % Neutrophils 73.2   % Lymphocytes 13.8   % Monocytes 9.1   % Eosinophils 2.3   % Basophils 0.7   % Immature Granulocytes 0.9   Nucleated RBCs 0   Absolute Neutrophil 6.6   Absolute Lymphocytes 1.2   Absolute Monocytes 0.8   Absolute Eosinophils 0.2   Absolute Basophils 0.1   Abs Immature Granulocytes 0.1   Absolute Nucleated RBC 0.0       Assessment & Plan:   89 year old female with multifocal, T2N1M0, invasive mammary carcinomas of the left breast.  She is s/p treatment with 5 months neoadjuvant herceptin and letrozole and left breast mastectomy.     1.  Left breast cancers:  She is currently undergoing treatment with radiation and Kadcyla.  She presents to clinic today for evaluation prior to cycle #4 of adjuvant Kadcyla.  She is tolerating treatment well.  Overall plan is to complete 1 year of T-DM1.  Upon completion of T-DM1 will resume treatment with letrozole. Discussed plan for 1 year of Kadcyla based on [facility] clinical trial to reduce risk of recurrence. Lennie understands reasons for continuing treatment.     2.  At risk for cardiomyopathy:  Echocardiogram 12/31/18 showed a retained LVEF.  Plan to continue to monitor once every 3 months while on HER2 targeted therapy.  Next echocardiogram will be due prior to next visit.     3.  Follow Up: Labs, visit with Dr. Simons, and Kadcyla infusion in 3 weeks.  Labs and Kadcyla infusion in 6 weeks.  Labs, visit with Dalila Blum, and Kadcyla infusion in 9 weeks.     Addendum: She has history of CKD but with worsening Cr will refer to nephrology. Per pharmacy, Kadcyla should not be affecting Cr. UA at next  visit prior to nephrology appointment.    [facility] [address] [id number] [phone]

## 2019-03-22 NOTE — LETTER
3/22/2019      RE: Lennie Mar  [address]  [facility] [address]       Hematology-Oncology Visit  Mar 22, 2019    Reason for Visit: follow-up left breast cancer, 2 primaries     HPI: Lennie Mar is a 89 year old female with past medical history of bipolar disorder, CKD, essential tremor with recent diagnosis of left breast cancer, two separate primaries. She presented with a palpable mass and had a mammogram and US leading to biopsies on 5/9/18. Pathology showed grade 3 invasive carcinoma, ER/SC positive, HER2 amplified of mass at 3:00 position with associated DCIS and skin involvement. The mass at 11:00 position was also grade 3 invasive carcinoma, ER/SC positive, but HER2 nonamplified. No lymphadenopathy was noted.     She met with Dr. Malik on 5/21/18 and elected to start neoadjuvant treatment with Herceptin every 3 weeks along with letrozole. Echocardiogram was done 5/25 showed EF of 55-60%. She tolerated neoadjuvant treatment remarkably well. She had a left breast mastectomy and SLNB on 11/19/18 showing two residual tumors, the larger grade 2 tumor ER, SC, HER2 amplified measuring 2.7 cm and smaller grade 3 tumor ER positive, SC negative, HER2 negative measured 2.5 cm. 2/3 left axillary nodes demonstrated metastases measuring 9 mm and 1.2 mm. HER2 FISH of larger axillary lymph node metastasis was amplified. Dr. Malik recommended adjuvant therapy with TDM1 for 1 year and adjuvant radiation.     Interval History: Lennie is here alone today to for cycle 4  of TDM1. She states she has some fatigue, but not worse in the past few weeks. Denies SOB, dizziness, chest pain. No new leg swelling. Her weight is down. Appetite is about the same. She eats typically 2 meals/day, brunch and dinner. She does snack between meals with tortilla chips. She does not think she is eating less, but eating less sweets and carbohydrate. Denies heartburn, nausea, diarrhea, constipation. She drinks 32  oz of juice and water daily.     She is taking letrozole. She does not think she is having any side effects. She denies any arthralgias. She has some chronic low back on right side. She admits to feeling some depression lately. She is bipolar. She saw her psychiatrist recently. No medication changes. She denies any suicidal ideation. She had no further questions or concerns today.      Current Outpatient Medications   Medication     acetaminophen (TYLENOL) 325 MG tablet     ARIPiprazole (ABILIFY) 5 MG tablet     buPROPion (WELLBUTRIN XL) 300 MG 24 hr tablet     Cyanocobalamin (VITAMIN B 12 PO)     letrozole (FEMARA) 2.5 MG tablet     lithium 150 MG capsule     lithium 300 MG capsule     LORazepam (ATIVAN) 0.5 MG tablet     neomycin-polymyxin-dexamethasone (MAXITROL) 3.5-05631-7.1 ophthalmic ointment     ondansetron (ZOFRAN) 8 MG tablet     ORDER FOR DME     pramipexole (MIRAPEX) 1 MG tablet     prochlorperazine (COMPAZINE) 10 MG tablet     propranolol (INDERAL) 20 MG tablet     sertraline (ZOLOFT) 50 MG tablet     No current facility-administered medications for this visit.        PHYSICAL EXAM:  /74   Pulse 77   Temp 97.9  F (36.6  C)   Resp 16   Wt 67.1 kg (147 lb 14.4 oz)   SpO2 94%   BMI 27.96 kg/m     General: Alert, oriented, pleasant, NAD  HEENT: Normocephalic, atraumatic, PERRL, EOMI. Moist mucus membranes, no lesions or thrush  Neck: No cervical or supraclavicular LAD.  Axillary: No LAD  Breast: Left chest wall with healing mastectomy incision. Some desquamation in axillary area. Skin is moisturized. No suspicious nodules or masses.   Lungs: CTA bilaterally, normal work of breathing  Cardiac: RRR, S1, S2, no murmurs  Abdomen: Soft, nontender, nondistended. Normoactive bowel sounds. No hepatosplenomegaly, masses  Neuro: CNII-XII grossly intact  Extremities: No pedal edema    Labs:    Results for MANI ANDERSON (MRN [id number]) as of 3/22/2019 09:57   3/22/2019 09:20   Sodium 137   Potassium  4.7   Chloride 108   Carbon Dioxide 21   Urea Nitrogen 24   Creatinine 1.93 (H)   GFR Estimate 22 (L)   GFR Estimate If Black 26 (L)   Calcium 10.3 (H)   Anion Gap 7   Albumin 3.3 (L)   Protein Total 7.5   Bilirubin Total 0.5   Alkaline Phosphatase 128   ALT 24   AST 27   Glucose 90   WBC 9.0   Hemoglobin 13.8   Hematocrit 42.9   Platelet Count 168   RBC Count 4.51   MCV 95   MCH 30.6   MCHC 32.2   RDW 15.9 (H)   Diff Method Automated Method   % Neutrophils 73.2   % Lymphocytes 13.8   % Monocytes 9.1   % Eosinophils 2.3   % Basophils 0.7   % Immature Granulocytes 0.9   Nucleated RBCs 0   Absolute Neutrophil 6.6   Absolute Lymphocytes 1.2   Absolute Monocytes 0.8   Absolute Eosinophils 0.2   Absolute Basophils 0.1   Abs Immature Granulocytes 0.1   Absolute Nucleated RBC 0.0       Assessment & Plan:   89 year old female with multifocal, T2N1M0, invasive mammary carcinomas of the left breast.  She is s/p treatment with 5 months neoadjuvant herceptin and letrozole and left breast mastectomy.     1.  Left breast cancers:  She is currently undergoing treatment with radiation and Kadcyla.  She presents to clinic today for evaluation prior to cycle #4 of adjuvant Kadcyla.  She is tolerating treatment well.  Overall plan is to complete 1 year of T-DM1.  Upon completion of T-DM1 will resume treatment with letrozole.  Discussed plan for 1 year of Kadcyla based on [facility] clinical trial to reduce risk of recurrence. Lennie understands reasons for continuing treatment.     2.  At risk for cardiomyopathy:  Echocardiogram 12/31/18 showed a retained LVEF.  Plan to continue to monitor once every 3 months while on HER2 targeted therapy.  Next echocardiogram will be due prior to next visit.     3.  Follow Up: Labs, visit with Dr. Simons, and Kadcyla infusion in 3 weeks.  Labs and Kadcyla infusion in 6 weeks.  Labs, visit with Dalila Blum, and Kadcyla infusion in 9 weeks.     Addendum: She has history of CKD but with  worsening Cr will refer to nephrology. Per pharmacy, Kadcyla should not be affecting Cr. UA at next visit prior to nephrology appointment.    [facility] [address] [id number] [phone]

## 2019-03-22 NOTE — PROGRESS NOTES
Radiation Oncology Follow-up Visit  2019    Lennie Mar  MRN: [id number]   : 1927     DISEASE TREATED:   Invasive ductal carcinoma, ypT2 (m) N1(sn) M0, ER+/ID+/HER2+    RADIATION THERAPY DELIVERED:   4,975 cGy to left chest wall and axilla completed 2019    INTERVAL SINCE COMPLETION OF RADIATION THERAPY:   1 month     HPI:  Ms. Mar is a 89 year old female who initially palpated a lump in her left breast in mid 2018.   Her work up demonstrated two lesions in the left breast; a 3.5 x 1.9 x 2.0 cm irregular mass with skin invasion at the 3 o'clock position 2 cm from the nipple and a 2.4 x 1.9 x 2.1 cm irregular mass at the 11 o'clock position, 7 cm from the nipple. Ultrasound guided biopsy showed invasive mammary carcinoma, ER+/ID+/Her2+ at the 3:00 lesion, but ER+/ID+/Her2- at the 11:00 lesion. Both are grade 3. She declined chemotherapy and received  Herceptin and letrozole between 18- 10/30/18. She then underwent left breast mastectomy and sentinel lymph node biopsy on 2018. The pathology showed treatment related changes in both lesions with pathologic findings as listed above.  Margins were negative with closest invasive margin of 2.5 mm anteriorly and closest DCIS margins of > 10 mm. Two of three sentinel nodes were positive for metastatic carcinoma.         SUBJECTIVE:   Lennie Mar is a 89 year old female who is here today for routine 1 month follow up after completing radiation therapy.   She states that her skin is healing well.  She continues to moisturize.  She denies any pain.  She feels she still has fatigue from radiation.    ROS:  Complete review of systems is negative except for symptoms discussed in subjective above.    Current Outpatient Medications   Medication     acetaminophen (TYLENOL) 325 MG tablet     ARIPiprazole (ABILIFY) 5 MG tablet     buPROPion (WELLBUTRIN XL) 300 MG 24 hr tablet     Cyanocobalamin (VITAMIN B 12 PO)     letrozole (FEMARA)  2.5 MG tablet     lithium 150 MG capsule     lithium 300 MG capsule     LORazepam (ATIVAN) 0.5 MG tablet     neomycin-polymyxin-dexamethasone (MAXITROL) 3.5-72097-3.1 ophthalmic ointment     ondansetron (ZOFRAN) 8 MG tablet     ORDER FOR DME     pramipexole (MIRAPEX) 1 MG tablet     prochlorperazine (COMPAZINE) 10 MG tablet     propranolol (INDERAL) 20 MG tablet     sertraline (ZOLOFT) 50 MG tablet     No current facility-administered medications for this visit.           Allergies   Allergen Reactions     Cafergot Other (See Comments) and Nausea and Vomiting     Severe HA, N/V & diarrhea     Ciprofloxacin      Nausea and vomiting per son      Penicillins Rash     Sulfa Drugs Rash     Lamictal [Lamotrigine] Other (See Comments)     Patient experienced night moss     Naproxen      Pt unable to remember reaction.     Tetracycline      Pt unable to remember allergy       Past Medical History:   Diagnosis Date     Alcohol dependence in remission (H)      Anxiety      Asymptomatic varicose veins      Bipolar disorder, unspecified (H)      CKD (chronic kidney disease) stage 3, GFR 30-59 ml/min (H) 2/18/2014     History of smoking      Hyperparathyroidism (H)      Memory loss      Muscle weakness (generalized) 6/23/2008     Severe depression (H)      Subdural hygroma     chronic.  no surgeries     Tremor of unknown origin          PHYSICAL EXAM:  /82   Pulse 68   Resp 16   Wt 67.1 kg (148 lb)   SpO2 96%   BMI 27.98 kg/m    Gen: Alert, in NAD  Pulm: No wheezing, stridor or respiratory distress  CV: Well-perfused, no cyanosis, no pedal edema  Skin: Mild dry desquamation around left mastectomy scar and axilla remains.  No erythema.  No signs of infection and is healing well.  Psychiatric: Appropriate mood and affect      LABS AND IMAGING:  Reviewed.    IMPRESSION:   Ms. Mar is a 89 year old female with a left sided invasive ductal carcinoma s/p mastectomy and now 1 month out from radiation.  She is doing great  and healing from acute side effects.    PLAN:   Patient is recovering nicely from acute side effects of radiation therapy.  She has some fatigue that remains.  Discussed that the fatigue should improve very soon and recommended increased activity and exercise to help.  Continue to moisturize.  She has an appointment in medical oncology today for Kadcyla infusion.  She will continue to follow with medical oncology and will f/u here as needed.      Lay Lyon NP  Radiation Oncology  [facility]

## 2019-03-22 NOTE — PATIENT INSTRUCTIONS
Contact Numbers    [facility] Main Line: [phone]  [facility] Triage and after hours / weekends / holidays:  [phone]      Please call the triage or after hours line if you experience a temperature greater than or equal to 100.5, shaking chills, have uncontrolled nausea, vomiting and/or diarrhea, dizziness, shortness of breath, chest pain, bleeding, unexplained bruising, or if you have any other new/concerning symptoms, questions or concerns.      If you are having any concerning symptoms or wish to speak to a provider before your next infusion visit, please call your care coordinator or triage to notify them so we can adequately serve you.     If you need a refill on a narcotic prescription or other medication, please call before your infusion appointment.       March 2019 Sunday Monday Tuesday Wednesday Thursday Friday Saturday                            1    [facility] DRAW  12:00 PM   (15 min.)    [facility]   [facility]    [facility] ONC INFUSION 120  12:30 PM   (120 min.)    ONCOLOGY INFUSION   [facility] 2       3     4     5     6     7     8     9       10     11     12     13     14     15     16       17     18     19     20     21     22    [facility] RETURN   8:00 AM   (30 min.)   Lay Lyon APRN CNP   Radiation Oncology Clinic    [facility] DRAW   9:00 AM   (15 min.)    [facility] DRAW   [facility] Draw    [facility] RETURN   9:15 AM   (50 min.)   Sheba Wilks PA   [facility] ONC INFUSION 120  10:30 AM   (120 min.)    ONCOLOGY INFUSION   [facility] 23       24     25     26     27     28     29     30       31 April 2019 Sunday Monday Tuesday Wednesday Thursday Friday Saturday        1     2     3     4     5     6       7     8     9     10     11     12     13       14     15    [facility] DRAW   7:45 AM   (15 min.)    [facility]  DRAW   [facility] Lab Draw    ECHO COMPLETE   8:30 AM   (60 min.)   [id number]   [facility]    P RETURN   9:30 AM   (30 min.)   Perlita Malik MD   [facility]    [facility] ONC INFUSION 120  11:30 AM   (120 min.)    ONCOLOGY INFUSION   [facility] 16     17     18     19     20       21     22     23     24     25     26     27       28     29     30                                        Recent Results (from the past 24 hour(s))   CBC with platelets differential    Collection Time: 03/22/19  9:20 AM   Result Value Ref Range    WBC 9.0 4.0 - 11.0 10e9/L    RBC Count 4.51 3.8 - 5.2 10e12/L    Hemoglobin 13.8 11.7 - 15.7 g/dL    Hematocrit 42.9 35.0 - 47.0 %    MCV 95 78 - 100 fl    MCH 30.6 26.5 - 33.0 pg    MCHC 32.2 31.5 - 36.5 g/dL    RDW 15.9 (H) 10.0 - 15.0 %    Platelet Count 168 150 - 450 10e9/L    Diff Method Automated Method     % Neutrophils 73.2 %    % Lymphocytes 13.8 %    % Monocytes 9.1 %    % Eosinophils 2.3 %    % Basophils 0.7 %    % Immature Granulocytes 0.9 %    Nucleated RBCs 0 0 /100    Absolute Neutrophil 6.6 1.6 - 8.3 10e9/L    Absolute Lymphocytes 1.2 0.8 - 5.3 10e9/L    Absolute Monocytes 0.8 0.0 - 1.3 10e9/L    Absolute Eosinophils 0.2 0.0 - 0.7 10e9/L    Absolute Basophils 0.1 0.0 - 0.2 10e9/L    Abs Immature Granulocytes 0.1 0 - 0.4 10e9/L    Absolute Nucleated RBC 0.0    Comprehensive metabolic panel    Collection Time: 03/22/19  9:20 AM   Result Value Ref Range    Sodium 137 133 - 144 mmol/L    Potassium 4.7 3.4 - 5.3 mmol/L    Chloride 108 94 - 109 mmol/L    Carbon Dioxide 21 20 - 32 mmol/L    Anion Gap 7 3 - 14 mmol/L    Glucose 90 70 - 99 mg/dL    Urea Nitrogen 24 7 - 30 mg/dL    Creatinine 1.93 (H) 0.52 - 1.04 mg/dL    GFR Estimate 22 (L) >60 mL/min/[1.73_m2]    GFR Estimate If Black 26 (L) >60 mL/min/[1.73_m2]    Calcium 10.3 (H) 8.5 - 10.1 mg/dL    Bilirubin Total 0.5 0.2 - 1.3 mg/dL    Albumin 3.3 (L) 3.4 - 5.0  g/dL    Protein Total 7.5 6.8 - 8.8 g/dL    Alkaline Phosphatase 128 40 - 150 U/L    ALT 24 0 - 50 U/L    AST 27 0 - 45 U/L

## 2019-03-22 NOTE — NURSING NOTE
FOLLOW-UP VISIT    Patient Name: Lennie Mar      : 1927     Age: 89 year old        ______________________________________________________________________________     Chief Complaint   Patient presents with     Cancer     Breast Cancer: Left breast 4975 cGy completed 19     /82   Pulse 68   Resp 16   Wt 67.1 kg (148 lb)   SpO2 96%   BMI 27.98 kg/m     Breast Cancer: Left breast 4975 cGy completed 19      Pain  Denies    Labs  Other Labs:     Imaging  None          Other Appointments:     MD Name:  Appointment Date:    MD Name: Appointment Date:   MD Name: Appointment Date:   Other Appointment Notes:     Residual Radiation side effect:      Additional Instructions:     Nurse face-to-face time: Level 2:  5 min face to face time

## 2019-03-25 ENCOUNTER — CARE COORDINATION (OUTPATIENT)
Dept: ONCOLOGY | Facility: CLINIC | Age: 84
End: 2019-03-25

## 2019-03-25 NOTE — PROGRESS NOTES
Call placed to patient who did not want to move forward with UA first   and nephrology referral.  Pt stated she wants to think about it first.  She would like to discuss with Dr. Malik at her next clinic visit.  Pt was asked to contact our office with any urine volume changes.  Pt stated she is drinking 32 oz of fluid everyday and is feeling fine.  Sheba costa.    Leslee Colon RNCC BSN ELEANORN            Sheba Wilks, Leslee Antonio, RN             I placed nephrology referral and order for UA for next visit. I was unable to reach Lennie by phone to discuss the nephrology referral. Can you try to reach her at some point to let her know? I will be on PTA starting Monday, 3/25 for 2 weeks and will not be able to follow up. Thanks

## 2019-04-12 RX ORDER — DIPHENHYDRAMINE HYDROCHLORIDE 50 MG/ML
50 INJECTION INTRAMUSCULAR; INTRAVENOUS
Status: CANCELLED
Start: 2019-05-06

## 2019-04-12 RX ORDER — EPINEPHRINE 0.3 MG/.3ML
0.3 INJECTION SUBCUTANEOUS EVERY 5 MIN PRN
Status: CANCELLED | OUTPATIENT
Start: 2019-05-06

## 2019-04-12 RX ORDER — ALBUTEROL SULFATE 0.83 MG/ML
2.5 SOLUTION RESPIRATORY (INHALATION)
Status: CANCELLED | OUTPATIENT
Start: 2019-04-15

## 2019-04-12 RX ORDER — LORAZEPAM 2 MG/ML
0.5 INJECTION INTRAMUSCULAR EVERY 4 HOURS PRN
Status: CANCELLED
Start: 2019-04-15

## 2019-04-12 RX ORDER — MEPERIDINE HYDROCHLORIDE 25 MG/ML
25 INJECTION INTRAMUSCULAR; INTRAVENOUS; SUBCUTANEOUS EVERY 30 MIN PRN
Status: CANCELLED | OUTPATIENT
Start: 2019-04-15

## 2019-04-12 RX ORDER — EPINEPHRINE 1 MG/ML
0.3 INJECTION, SOLUTION INTRAMUSCULAR; SUBCUTANEOUS EVERY 5 MIN PRN
Status: CANCELLED | OUTPATIENT
Start: 2019-04-15

## 2019-04-12 RX ORDER — MEPERIDINE HYDROCHLORIDE 25 MG/ML
25 INJECTION INTRAMUSCULAR; INTRAVENOUS; SUBCUTANEOUS EVERY 30 MIN PRN
Status: CANCELLED | OUTPATIENT
Start: 2019-05-06

## 2019-04-12 RX ORDER — ALBUTEROL SULFATE 90 UG/1
1-2 AEROSOL, METERED RESPIRATORY (INHALATION)
Status: CANCELLED
Start: 2019-04-15

## 2019-04-12 RX ORDER — ALBUTEROL SULFATE 0.83 MG/ML
2.5 SOLUTION RESPIRATORY (INHALATION)
Status: CANCELLED | OUTPATIENT
Start: 2019-05-06

## 2019-04-12 RX ORDER — METHYLPREDNISOLONE SODIUM SUCCINATE 125 MG/2ML
125 INJECTION, POWDER, LYOPHILIZED, FOR SOLUTION INTRAMUSCULAR; INTRAVENOUS
Status: CANCELLED
Start: 2019-04-15

## 2019-04-12 RX ORDER — METHYLPREDNISOLONE SODIUM SUCCINATE 125 MG/2ML
125 INJECTION, POWDER, LYOPHILIZED, FOR SOLUTION INTRAMUSCULAR; INTRAVENOUS
Status: CANCELLED
Start: 2019-05-06

## 2019-04-12 RX ORDER — EPINEPHRINE 1 MG/ML
0.3 INJECTION, SOLUTION INTRAMUSCULAR; SUBCUTANEOUS EVERY 5 MIN PRN
Status: CANCELLED | OUTPATIENT
Start: 2019-05-06

## 2019-04-12 RX ORDER — DIPHENHYDRAMINE HYDROCHLORIDE 50 MG/ML
50 INJECTION INTRAMUSCULAR; INTRAVENOUS
Status: CANCELLED
Start: 2019-04-15

## 2019-04-12 RX ORDER — ALBUTEROL SULFATE 90 UG/1
1-2 AEROSOL, METERED RESPIRATORY (INHALATION)
Status: CANCELLED
Start: 2019-05-06

## 2019-04-12 RX ORDER — SODIUM CHLORIDE 9 MG/ML
1000 INJECTION, SOLUTION INTRAVENOUS CONTINUOUS PRN
Status: CANCELLED
Start: 2019-05-06

## 2019-04-12 RX ORDER — EPINEPHRINE 0.3 MG/.3ML
0.3 INJECTION SUBCUTANEOUS EVERY 5 MIN PRN
Status: CANCELLED | OUTPATIENT
Start: 2019-04-15

## 2019-04-12 RX ORDER — SODIUM CHLORIDE 9 MG/ML
1000 INJECTION, SOLUTION INTRAVENOUS CONTINUOUS PRN
Status: CANCELLED
Start: 2019-04-15

## 2019-04-12 NOTE — PROGRESS NOTES
ONCOLOGY FOLLOW UP:    Date on this visit: 4/15/2019    Diagnosis:  1.  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  Stage IIa, T2N0M0, ER/KS positive, HER-2 non-amplified invasive ductal carcinoma of the left breast at 11:00    Primary Physician: Ty Quintanilla     History Of Present Illness:  Ms. Mar is a 89 year old female who presents with two invasive cancers of the left breast. She self palpated a lump and underwent mammaogram on 5/4/18 that showed a  2 cm mass in the superficial lateral left breast, near the nipple, with fine pleomorphic calcifications extending posteriorly measuring up to 6.9 cm and a second 2.3 cm mass in the upper inner left breast at the 1:00 position. Ultrasonography showed an irregular mass at 1:00, 7 cm from the nipple, measuring 2.4 cm and a mass at 3:00, 2 cm from the nipple, measuring 3.5 cm.  Biopsies of both masses were performed on 5/9/18.  Pathology of the 3:00  mass near the nipple was a grade 3 invasive carcinoma.  Estrogen receptor staining was strongly positive and KS moderately positive.  HER2 was amplified with a HER2/CEN17 ratio of 6.4/2.3 for a total ratio of 2.8.  There was associated intermediate grade DCIS and skin involvement. The 1:00 mass was a grade 3 invasive carcinoma ER strongly positive, KS strongly positive, and HER2 nonamplified. No lymphadenopathy was seen on ultrasound.    She began treatment with Herceptin and letrozole on 6/1/18.  Left breast mastectomy and SLN biopsy on 11/19/18 showed two residual tumors.  The larger tumor at 3:00 was grade 2 and measured 2.7 cm, ER positive, KS negative, HER2-amplified.    The smaller tumor at 1:00 was grade 3 and measured 2.5 cm, ER/KS positive, HER2 non-amplified.  Overall tumor cellularity was 15%.  2/3 left axillary lymph nodes demonstrated metastases measuring 9 mm and 1.2 mm.  HER-2 FISH of  the larger axillary lymph node metastasis was amplified.    Her case was discussed at breast conference and recommendation was to administer adjuvant T-DM1 given HER2 positivity in the lymph node metastasis.  She began treatment with adjuvant T-DM1 on 1/3/19.  She completed radiation to the left chest and regional lymph nodes (left axillary and supraclavicular) on 19.    Interval History:  Ms. Mar comes into clinic today for routine breast cancer followup.  She continues on treatment with Kadcyla and presents for evaluation prior to cycle #5 of 17 total planned treatments.  She has generally been feeling well and denies side effects from the medication.  She specifically denies fevers, chills or infectious complaints.  She has had no neuropathy.  She has no cough, shortness of breath or chest pains.  She denies nausea, vomiting, diarrhea or constipation.  She denies low urine output, dysuria, fatigue, or metal taste in the mouth.  She states generally she has been in good health.  She mentions today she has 8 children.  One of her sons  of cancer and her daughter is currently battling lung cancer.  Her daughter resides in [place], [place] and unfortunately she and her daughter have been estranged for a number of years.  She is able to keep up-to-date on her daughter's health via her sons who live locally.  She would like to visit her at some time, but is waiting for her daughter to invite her to do so.  She does mention that she has a new psychologist, Dr. Horn.  He has adjusted her lithium dosing a few times since she has started seeing him.  The remainder of a complete 12-point review of systems was reviewed with the patient and was negative with the exception of that mentioned above.      Past Medical/Surgical History:  Past Medical History:   Diagnosis Date     Alcohol dependence in remission (H)      Anxiety      Asymptomatic varicose veins      Bipolar disorder, unspecified (H)      CKD  (chronic kidney disease) stage 3, GFR 30-59 ml/min (H) 2/18/2014     History of smoking      Hyperparathyroidism (H)      Memory loss      Muscle weakness (generalized) 6/23/2008     Severe depression (H)      Subdural hygroma     chronic.  no surgeries     Tremor of unknown origin      Past Surgical History:   Procedure Laterality Date     APPENDECTOMY OPEN  1947     CATARACT IOL, RT/LT       COLONOSCOPY WITH CO2 INSUFFLATION  2/29/2012    Procedure:COLONOSCOPY WITH CO2 INSUFFLATION; Surgeon:GAYLE REYNA; Location:[facility]     CYSTOCELE REPAIR  1972     CYSTOSCOPY, INSERT STENT URETHRA, COMBINED  1999     CYSTOSCOPY, RETROGRADES, INSERT STENT URETER(S), COMBINED  2/29/2012    Procedure:COMBINED CYSTOSCOPY, RETROGRADES, INSERT STENT URETER(S); Surgeon:ANT ESPINOZA; Location:[facility]     DECOMPRESSION LUMBAR ONE LEVEL  8/9/2013    Procedure: DECOMPRESSION LUMBAR ONE LEVEL;  Posterior Decompression Right Lumbar 5- Sacral 1;  Surgeon: You Zavala MD;  Location: [facility] OR     HC TOOTH EXTRACTION W/FORCEP       HYSTERECTOMY, CATA  1963     IRRIGATION AND DEBRIDEMENT ORAL, COMBINED  1982    For treatment of gingivitis     LAPAROSCOPIC ASSISTED COLECTOMY  2/29/2012    Procedure:LAPAROSCOPIC ASSISTED COLECTOMY; Cysto, Bilateral Stent placement (both stents removed at end of case)- Yanet ACOSTA. Laparoscopic Extended Right Venu Colectomy with CO2 Colonoscopy and polyp removal-Judah; Surgeon:GAYLE REYNA; Location:[facility] OR     LIGATN/STRIP LONG OR SHORT SAPHEN  1955     MASTECTOMY PARTIAL WITH SENTINEL NODE Left 11/19/2018    Procedure: Left Mastectomy, Left Brillion Lymph Node Biopsy;  Surgeon: Nahun Betancourt MD;  Location: [facility] OR     STRIP VEIN BILATERAL  1964     SURGICAL HISTORY OF -   1999    ureter surgery for blockage.     Allergies:  Allergies as of 04/15/2019 - Reviewed 03/22/2019   Allergen Reaction Noted     Cafergot Other (See Comments) and Nausea and Vomiting 01/01/1972     Ciprofloxacin   06/19/2012     Penicillins Rash 01/01/1949     Sulfa drugs Rash 01/01/1950     Lamictal [lamotrigine] Other (See Comments) 02/15/2014     Naproxen       Tetracycline       Current Medications:  Current Outpatient Medications   Medication Sig Dispense Refill     acetaminophen (TYLENOL) 325 MG tablet Take 3 tablets (975 mg) by mouth every 8 hours as needed for mild pain 50 tablet 0     ARIPiprazole (ABILIFY) 5 MG tablet 5 mg tablet in the morning  3     buPROPion (WELLBUTRIN XL) 300 MG 24 hr tablet 300 mg tablet in the mornnig  2     Cyanocobalamin (VITAMIN B 12 PO) Take by mouth every morning       letrozole (FEMARA) 2.5 MG tablet Take 1 tablet (2.5 mg) by mouth daily (Patient taking differently: Take 2.5 mg by mouth every evening ) 30 tablet 11     lithium 150 MG capsule Take 1 capsule by mouth daily (with breakfast). 30 capsule 1     lithium 300 MG capsule Take 1 capsule by mouth daily (with dinner). 30 capsule 1     LORazepam (ATIVAN) 0.5 MG tablet Take 1 tablet (0.5 mg) by mouth every 4 hours as needed (Anxiety, Nausea/Vomiting or Sleep) 30 tablet 2     neomycin-polymyxin-dexamethasone (MAXITROL) 3.5-16385-3.1 ophthalmic ointment   1     ondansetron (ZOFRAN) 8 MG tablet Take 1 tablet (8 mg) by mouth every 8 hours as needed for nausea 30 tablet 3     ORDER FOR DME Equipment being ordered: compression stocking knee high 20-30mmhg 2 Package 0     pramipexole (MIRAPEX) 1 MG tablet Take 1 mg by mouth every morning        prochlorperazine (COMPAZINE) 10 MG tablet Take 0.5 tablets (5 mg) by mouth every 6 hours as needed (Nausea/Vomiting) 30 tablet 2     propranolol (INDERAL) 20 MG tablet Take 1 tablet (20 mg) by mouth 2 times daily 180 tablet 1     sertraline (ZOLOFT) 50 MG tablet Take 25 mg by mouth every morning        Physical Exam:  /74 (BP Location: Right arm, Patient Position: Sitting, Cuff Size: Adult Regular)   Pulse 75   Temp 97.6  F (36.4  C) (Oral)   Resp 16   Wt 66.2 kg (146 lb)   SpO2 93%   BMI  27.60 kg/m    General:  Elderly appearing, well-nourished adult female in NAD. A&Ox3.  HEENT:  Normocephalic.  Sclera anicteric.  MMM.  No lesions of the oropharynx.  There is a palpable 3 x 4 cm mass right neck, thyroid nodule vs lymphadenopathy.  Lymph:  No palpable supraclavicular or axillary LAD.  See HEENT exam for palpable neck mass.  Chest:  CTA bilaterally.  No wheezes or crackles.  CV:  RRR.  Nl S1 and S2.  No m/r/g.  Breast:  Left breast mastectomy.  No palpable masses of the left chest wall.  Right breast is of decreased fibroglandular density and is without palpable masses.  Abd:  Soft/NT/ND.  BSs normoactive.  No hepatosplenomegaly.  Ext:  No pitting edema of the bilateral lower extremities.  Pulses 2+ and symmetric.  Musculo:  Strength 5/5 throughout.  Neuro:  Cranial nerves grossly intact.  Psych:  Mood and affect appear normal.      Laboratory/Imaging Studies:  4/15/19 Labs:  Creatinine is elevated at 1.8 with a GFR of 24, however, stable over the last month  Sodium and potassium are wnl.  Corrected calcium is at the upper limits of normal  LFTs are wnl.    Blood counts are wnl.    TSH and free T4 wnl in 02/2019    4/15/19 echocardiogram:  LVEF = 61%    ASSESSMENT/PLAN:  89 year old female with multifocal, T2N1M0, invasive mammary carcinomas of the left breast.  She is s/p treatment with 5 months neoadjuvant herceptin and letrozole, left breast mastectomy, SLN biopsy, and adjuvant radiation.  Continues on adjuvant Kadcyla.    1.  Left breast cancers:  She continues on treatment with Kadcyla.  She presents to clinic today for evaluation prior to cycle #5 of adjuvant Kadcyla.  She is tolerating treatment well.  We reviewed today that overall plan is to complete 1 year, or 17 total doses, of T-DM1.  Upon completion of T-DM1 will resume treatment with letrozole.     2.  Acute on chronic kidney failure:  Increasing creatinine and decreasing GFR especially prominent over the past year and a half.  UA  today.  Previously declined Nephrology referral.  We discussed the nature of kidney failure, possible causes, and that intervention now might prevent further kidney failure and ultimately dialysis.  She is agreeable to Nephrology referral at this time.    3.  UTI:  UA today consistent with UTI.  She has a number of allergies including penicillins, sulfa, ciprofloxacin, and tetracycline.  Cefpodoxime 100 mg PO daily (renally dosed) x 5 days prescribed.    4.  At risk for cardiomyopathy:  Echocardiogram today shows a LVEF of 61%  Plan to continue to monitor once every 3 months while on HER2 targeted therapy.  Next echocardiogram will be due in 07/2019.    5.  Palpable right neck mass:  Suspect this is a thyroid nodule.  Lymphadenopathy is also possible.  Review of her imaging does not show prior neck imaging.  Patient is agreeable to further evaluation with a right neck ultrasound.    6.  Bipolar disorder:  Has now established care with Dr. Horn at [facility].  Recommended re-discussing lithium dose with Dr. Horn in the setting of worsening renal function.    7.  Follow Up:  Visit with Nephrology within 4-6 weeks.  Labs, neck ultrasound, and Kacyla infusion in 3 weeks.  Labs, visit with Dalila Blum, and Kadcyla infusion in 6 weeks.  Labs and Kadcyla infusion in 9 weeks.  Labs, echocardiogram, visit with me, and Kadcyla infusion in 12 weeks.    It was a pleasure to meet with Ms. Mar in clinic today.  A total of 20 minutes of our 30 minute face to face visit was spent in counseling.

## 2019-04-15 ENCOUNTER — CARE COORDINATION (OUTPATIENT)
Dept: ONCOLOGY | Facility: CLINIC | Age: 84
End: 2019-04-15

## 2019-04-15 ENCOUNTER — INFUSION THERAPY VISIT (OUTPATIENT)
Dept: ONCOLOGY | Facility: CLINIC | Age: 84
End: 2019-04-15
Attending: INTERNAL MEDICINE
Payer: MEDICARE

## 2019-04-15 ENCOUNTER — ANCILLARY PROCEDURE (OUTPATIENT)
Dept: CARDIOLOGY | Facility: CLINIC | Age: 84
End: 2019-04-15
Attending: PHYSICIAN ASSISTANT
Payer: MEDICARE

## 2019-04-15 ENCOUNTER — APPOINTMENT (OUTPATIENT)
Dept: LAB | Facility: CLINIC | Age: 84
End: 2019-04-15
Attending: INTERNAL MEDICINE
Payer: MEDICARE

## 2019-04-15 VITALS
DIASTOLIC BLOOD PRESSURE: 74 MMHG | HEART RATE: 75 BPM | BODY MASS INDEX: 27.6 KG/M2 | TEMPERATURE: 97.6 F | SYSTOLIC BLOOD PRESSURE: 122 MMHG | RESPIRATION RATE: 16 BRPM | OXYGEN SATURATION: 93 % | WEIGHT: 146 LBS

## 2019-04-15 DIAGNOSIS — Z17.0 MALIGNANT NEOPLASM OF UPPER-INNER QUADRANT OF LEFT BREAST IN FEMALE, ESTROGEN RECEPTOR POSITIVE (H): Primary | ICD-10-CM

## 2019-04-15 DIAGNOSIS — E04.1 THYROID NODULE: ICD-10-CM

## 2019-04-15 DIAGNOSIS — N18.4 CHRONIC RENAL FAILURE, STAGE 4 (SEVERE) (H): ICD-10-CM

## 2019-04-15 DIAGNOSIS — C50.212 MALIGNANT NEOPLASM OF UPPER-INNER QUADRANT OF LEFT BREAST IN FEMALE, ESTROGEN RECEPTOR POSITIVE (H): ICD-10-CM

## 2019-04-15 DIAGNOSIS — C50.212 MALIGNANT NEOPLASM OF UPPER-INNER QUADRANT OF LEFT BREAST IN FEMALE, ESTROGEN RECEPTOR POSITIVE (H): Primary | ICD-10-CM

## 2019-04-15 DIAGNOSIS — Z51.11 ENCOUNTER FOR ANTINEOPLASTIC CHEMOTHERAPY: ICD-10-CM

## 2019-04-15 DIAGNOSIS — D48.7 NEOPLASM OF UNCERTAIN BEHAVIOR OF OTHER SPECIFIED SITES: ICD-10-CM

## 2019-04-15 DIAGNOSIS — C50.411 MALIGNANT NEOPLASM OF UPPER-OUTER QUADRANT OF RIGHT FEMALE BREAST, UNSPECIFIED ESTROGEN RECEPTOR STATUS (H): ICD-10-CM

## 2019-04-15 DIAGNOSIS — N18.30 CKD (CHRONIC KIDNEY DISEASE) STAGE 3, GFR 30-59 ML/MIN (H): ICD-10-CM

## 2019-04-15 DIAGNOSIS — N30.00 ACUTE CYSTITIS WITHOUT HEMATURIA: ICD-10-CM

## 2019-04-15 DIAGNOSIS — Z17.0 MALIGNANT NEOPLASM OF UPPER-INNER QUADRANT OF LEFT BREAST IN FEMALE, ESTROGEN RECEPTOR POSITIVE (H): ICD-10-CM

## 2019-04-15 DIAGNOSIS — C50.411 MALIGNANT NEOPLASM OF UPPER-OUTER QUADRANT OF RIGHT FEMALE BREAST, UNSPECIFIED ESTROGEN RECEPTOR STATUS (H): Primary | ICD-10-CM

## 2019-04-15 LAB
ALBUMIN SERPL-MCNC: 3.2 G/DL (ref 3.4–5)
ALBUMIN UR-MCNC: NEGATIVE MG/DL
ALBUMIN UR-MCNC: NEGATIVE MG/DL
ALP SERPL-CCNC: 141 U/L (ref 40–150)
ALT SERPL W P-5'-P-CCNC: 24 U/L (ref 0–50)
ANION GAP SERPL CALCULATED.3IONS-SCNC: 6 MMOL/L (ref 3–14)
APPEARANCE UR: ABNORMAL
APPEARANCE UR: ABNORMAL
AST SERPL W P-5'-P-CCNC: 32 U/L (ref 0–45)
BACTERIA #/AREA URNS HPF: ABNORMAL /HPF
BACTERIA #/AREA URNS HPF: ABNORMAL /HPF
BASOPHILS # BLD AUTO: 0.1 10E9/L (ref 0–0.2)
BASOPHILS NFR BLD AUTO: 1.2 %
BILIRUB SERPL-MCNC: 0.4 MG/DL (ref 0.2–1.3)
BILIRUB UR QL STRIP: NEGATIVE
BILIRUB UR QL STRIP: NEGATIVE
BUN SERPL-MCNC: 33 MG/DL (ref 7–30)
CALCIUM SERPL-MCNC: 10.2 MG/DL (ref 8.5–10.1)
CHLORIDE SERPL-SCNC: 110 MMOL/L (ref 94–109)
CO2 SERPL-SCNC: 22 MMOL/L (ref 20–32)
COLOR UR AUTO: YELLOW
COLOR UR AUTO: YELLOW
CREAT SERPL-MCNC: 1.8 MG/DL (ref 0.52–1.04)
DIFFERENTIAL METHOD BLD: ABNORMAL
EOSINOPHIL # BLD AUTO: 0.2 10E9/L (ref 0–0.7)
EOSINOPHIL NFR BLD AUTO: 2 %
ERYTHROCYTE [DISTWIDTH] IN BLOOD BY AUTOMATED COUNT: 15.4 % (ref 10–15)
GFR SERPL CREATININE-BSD FRML MDRD: 24 ML/MIN/{1.73_M2}
GLUCOSE SERPL-MCNC: 91 MG/DL (ref 70–99)
GLUCOSE UR STRIP-MCNC: NEGATIVE MG/DL
GLUCOSE UR STRIP-MCNC: NEGATIVE MG/DL
HCT VFR BLD AUTO: 44.4 % (ref 35–47)
HGB BLD-MCNC: 14 G/DL (ref 11.7–15.7)
HGB UR QL STRIP: ABNORMAL
HGB UR QL STRIP: ABNORMAL
IMM GRANULOCYTES # BLD: 0 10E9/L (ref 0–0.4)
IMM GRANULOCYTES NFR BLD: 0.5 %
KETONES UR STRIP-MCNC: NEGATIVE MG/DL
KETONES UR STRIP-MCNC: NEGATIVE MG/DL
LEUKOCYTE ESTERASE UR QL STRIP: ABNORMAL
LEUKOCYTE ESTERASE UR QL STRIP: ABNORMAL
LYMPHOCYTES # BLD AUTO: 1.2 10E9/L (ref 0.8–5.3)
LYMPHOCYTES NFR BLD AUTO: 15.9 %
MCH RBC QN AUTO: 30.6 PG (ref 26.5–33)
MCHC RBC AUTO-ENTMCNC: 31.5 G/DL (ref 31.5–36.5)
MCV RBC AUTO: 97 FL (ref 78–100)
MONOCYTES # BLD AUTO: 0.7 10E9/L (ref 0–1.3)
MONOCYTES NFR BLD AUTO: 9.1 %
MUCOUS THREADS #/AREA URNS LPF: PRESENT /LPF
MUCOUS THREADS #/AREA URNS LPF: PRESENT /LPF
NEUTROPHILS # BLD AUTO: 5.4 10E9/L (ref 1.6–8.3)
NEUTROPHILS NFR BLD AUTO: 71.3 %
NITRATE UR QL: NEGATIVE
NITRATE UR QL: NEGATIVE
NRBC # BLD AUTO: 0 10*3/UL
NRBC BLD AUTO-RTO: 0 /100
PH UR STRIP: 6 PH (ref 5–7)
PH UR STRIP: 6 PH (ref 5–7)
PLATELET # BLD AUTO: 218 10E9/L (ref 150–450)
POTASSIUM SERPL-SCNC: 4.7 MMOL/L (ref 3.4–5.3)
PROT SERPL-MCNC: 7.7 G/DL (ref 6.8–8.8)
RBC # BLD AUTO: 4.58 10E12/L (ref 3.8–5.2)
RBC #/AREA URNS AUTO: 1 /HPF (ref 0–2)
RBC #/AREA URNS AUTO: 5 /HPF (ref 0–2)
SODIUM SERPL-SCNC: 138 MMOL/L (ref 133–144)
SOURCE: ABNORMAL
SOURCE: ABNORMAL
SP GR UR STRIP: 1.01 (ref 1–1.03)
SP GR UR STRIP: 1.01 (ref 1–1.03)
SQUAMOUS #/AREA URNS AUTO: <1 /HPF (ref 0–1)
TRANS CELLS #/AREA URNS HPF: 1 /HPF
UROBILINOGEN UR STRIP-MCNC: 0 MG/DL (ref 0–2)
UROBILINOGEN UR STRIP-MCNC: 0 MG/DL (ref 0–2)
WBC # BLD AUTO: 7.6 10E9/L (ref 4–11)
WBC #/AREA URNS AUTO: >182 /HPF (ref 0–5)
WBC #/AREA URNS AUTO: >182 /HPF (ref 0–5)
WBC CLUMPS #/AREA URNS HPF: PRESENT /HPF
WBC CLUMPS #/AREA URNS HPF: PRESENT /HPF
YEAST #/AREA URNS HPF: ABNORMAL /HPF

## 2019-04-15 PROCEDURE — 87186 SC STD MICRODIL/AGAR DIL: CPT | Performed by: PHYSICIAN ASSISTANT

## 2019-04-15 PROCEDURE — G0463 HOSPITAL OUTPT CLINIC VISIT: HCPCS | Mod: ZF

## 2019-04-15 PROCEDURE — 87088 URINE BACTERIA CULTURE: CPT | Performed by: PHYSICIAN ASSISTANT

## 2019-04-15 PROCEDURE — 96413 CHEMO IV INFUSION 1 HR: CPT

## 2019-04-15 PROCEDURE — 99214 OFFICE O/P EST MOD 30 MIN: CPT | Mod: ZP | Performed by: INTERNAL MEDICINE

## 2019-04-15 PROCEDURE — 25800030 ZZH RX IP 258 OP 636: Mod: ZF | Performed by: INTERNAL MEDICINE

## 2019-04-15 PROCEDURE — 87086 URINE CULTURE/COLONY COUNT: CPT | Performed by: PHYSICIAN ASSISTANT

## 2019-04-15 PROCEDURE — 81001 URINALYSIS AUTO W/SCOPE: CPT | Performed by: INTERNAL MEDICINE

## 2019-04-15 PROCEDURE — 85025 COMPLETE CBC W/AUTO DIFF WBC: CPT | Performed by: INTERNAL MEDICINE

## 2019-04-15 PROCEDURE — 80053 COMPREHEN METABOLIC PANEL: CPT | Performed by: INTERNAL MEDICINE

## 2019-04-15 PROCEDURE — 25000128 H RX IP 250 OP 636: Mod: ZF | Performed by: INTERNAL MEDICINE

## 2019-04-15 RX ORDER — CEFPODOXIME PROXETIL 100 MG/1
100 TABLET, FILM COATED ORAL 2 TIMES DAILY
Qty: 10 TABLET | Refills: 0 | Status: SHIPPED | OUTPATIENT
Start: 2019-04-15 | End: 2019-06-04

## 2019-04-15 RX ADMIN — ADO-TRASTUZUMAB EMTANSINE 260 MG: 20 INJECTION, POWDER, LYOPHILIZED, FOR SOLUTION INTRAVENOUS at 11:52

## 2019-04-15 RX ADMIN — SODIUM CHLORIDE 250 ML: 9 INJECTION, SOLUTION INTRAVENOUS at 11:52

## 2019-04-15 ASSESSMENT — PAIN SCALES - GENERAL: PAINLEVEL: NO PAIN (0)

## 2019-04-15 NOTE — NURSING NOTE
Chief Complaint   Patient presents with     Blood Draw     Labs drawn via PIV by RN in lab. VS taken.      Labs drawn via peripheral IV. Vital signs taken. Checked into next appointment.   Gabbi Solo RN

## 2019-04-15 NOTE — NURSING NOTE
"Oncology Rooming Note    April 15, 2019 9:41 AM   Lennie Mar is a 89 year old female who presents for:    Chief Complaint   Patient presents with     Blood Draw     Labs drawn via PIV by RN in lab. VS taken.      Oncology Clinic Visit     [facility] RETURN- BREAST CA     Initial Vitals: /74 (BP Location: Right arm, Patient Position: Sitting, Cuff Size: Adult Regular)   Pulse 75   Temp 97.6  F (36.4  C) (Oral)   Resp 16   Wt 66.2 kg (146 lb)   SpO2 93%   BMI 27.60 kg/m   Estimated body mass index is 27.6 kg/m  as calculated from the following:    Height as of 1/24/19: 1.549 m (5' 0.98\").    Weight as of this encounter: 66.2 kg (146 lb). Body surface area is 1.69 meters squared.  No Pain (0) Comment: Data Unavailable   No LMP recorded. Patient is postmenopausal.  Allergies reviewed: Yes  Medications reviewed: Yes    Medications: Medication refills not needed today.  Pharmacy name entered into [email or web address]:    [facility] - [place], [place] - [address] [facility] [place], [place] - [address] [facility] [place], [place] - [address]  [facility] [id number] [place] [address] - [facility] AT [facility] [address]    Clinical concerns: No new concerns. [place] was notified.      Adrian Haines LPN            "

## 2019-04-15 NOTE — PROGRESS NOTES
Per Dr. Malik called patient with positive UTI results.  Pt will start on Vantin BID x 5 days.  Pt stated she has no urinary symptoms.     Leslee Colon RNCC BSN CBCN

## 2019-04-15 NOTE — PROGRESS NOTES
Met with patient in clinic visit.  Pt offered no complaints and did not need any resources today.  Pt was alone today for her visit.      Leslee Colon RNCC BSN CBCN

## 2019-04-15 NOTE — PROGRESS NOTES
Call placed to clinic pharmacy to have medication sent by .  Pharmacist recommends due to patient's CRT clearance of 17 to start Vantin (cefpodoxine) once a day x 5 days.   This was reviewed with Dr. Malik and ok to proceed.    Leslee Colon RNCC BSN CBCN

## 2019-04-15 NOTE — PROGRESS NOTES
Infusion Nursing Note:  Lennie Carpenter Zaid presents today for Cycle 5 Day 1 Kadcyla.    Patient seen by provider today: Yes: Dr. Malik   present during visit today: Not Applicable.    Note:     Intravenous Access:  Peripheral IV placed.    Treatment Conditions:  Lab Results   Component Value Date    HGB 14.0 04/15/2019     Lab Results   Component Value Date    WBC 7.6 04/15/2019      Lab Results   Component Value Date    ANEU 5.4 04/15/2019     Lab Results   Component Value Date     04/15/2019      Lab Results   Component Value Date     04/15/2019                   Lab Results   Component Value Date    POTASSIUM 4.7 04/15/2019           Lab Results   Component Value Date    MAG 2.0 04/22/2012            Lab Results   Component Value Date    CR 1.80 04/15/2019                   Lab Results   Component Value Date    CANELO 10.2 04/15/2019                Lab Results   Component Value Date    BILITOTAL 0.4 04/15/2019           Lab Results   Component Value Date    ALBUMIN 3.2 04/15/2019                    Lab Results   Component Value Date    ALT 24 04/15/2019           Lab Results   Component Value Date    AST 32 04/15/2019       Results reviewed, labs MET treatment parameters, ok to proceed with treatment.  ECHO done today. 60-65%    Post Infusion Assessment:  Patient tolerated infusion without incident.  Blood return noted pre and post infusion.  No evidence of extravasations.  Access discontinued per protocol.       Discharge Plan:   Discharge instructions reviewed with: Patient.  Copy of AVS reviewed with patient and/or family.  Patient will return 5/6/19 for next appointment for provider/labs/infusion.  Patient discharged in stable condition accompanied by: self and friend.  Departure Mode: Ambulatory.    Idania Perez RN

## 2019-04-15 NOTE — LETTER
4/15/2019       RE: Lennie Mar  [address]  [facility] [address]     Dear Colleague,    Thank you for referring your patient, Lennie Mar, to the [facility]. Please see a copy of my visit note below.    ONCOLOGY FOLLOW UP:    Date on this visit: 4/15/2019    Diagnosis:  1.  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  Stage IIa, T2N0M0, ER/VA positive, HER-2 non-amplified invasive ductal carcinoma of the left breast at 11:00    Primary Physician: Ty Quintanilla     History Of Present Illness:  Ms. Mar is a 89 year old female who presents with two invasive cancers of the left breast. She self palpated a lump and underwent mammaogram on 5/4/18 that showed a  2 cm mass in the superficial lateral left breast, near the nipple, with fine pleomorphic calcifications extending posteriorly measuring up to 6.9 cm and a second 2.3 cm mass in the upper inner left breast at the 1:00 position. Ultrasonography showed an irregular mass at 1:00, 7 cm from the nipple, measuring 2.4 cm and a mass at 3:00, 2 cm from the nipple, measuring 3.5 cm.  Biopsies of both masses were performed on 5/9/18.  Pathology of the 3:00  mass near the nipple was a grade 3 invasive carcinoma.  Estrogen receptor staining was strongly positive and VA moderately positive.  HER2 was amplified with a HER2/CEN17 ratio of 6.4/2.3 for a total ratio of 2.8.  There was associated intermediate grade DCIS and skin involvement. The 1:00 mass was a grade 3 invasive carcinoma ER strongly positive, VA strongly positive, and HER2 nonamplified. No lymphadenopathy was seen on ultrasound.    She began treatment with Herceptin and letrozole on 6/1/18.  Left breast mastectomy and SLN biopsy on 11/19/18 showed two residual tumors.  The larger tumor at 3:00 was grade 2 and measured 2.7 cm, ER positive, VA  negative, HER2-amplified.    The smaller tumor at 1:00 was grade 3 and measured 2.5 cm, ER/FL positive, HER2 non-amplified.  Overall tumor cellularity was 15%.  2/3 left axillary lymph nodes demonstrated metastases measuring 9 mm and 1.2 mm.  HER-2 FISH of the larger axillary lymph node metastasis was amplified.    Her case was discussed at breast conference and recommendation was to administer adjuvant T-DM1 given HER2 positivity in the lymph node metastasis.  She began treatment with adjuvant T-DM1 on 1/3/19.  She completed radiation to the left chest and regional lymph nodes (left axillary and supraclavicular) on 19.    Interval History:  Ms. Mar comes into clinic today for routine breast cancer followup.  She continues on treatment with Kadcyla and presents for evaluation prior to cycle #5 of 17 total planned treatments.  She has generally been feeling well and denies side effects from the medication.  She specifically denies fevers, chills or infectious complaints.  She has had no neuropathy.  She has no cough, shortness of breath or chest pains.  She denies nausea, vomiting, diarrhea or constipation.  She denies low urine output, dysuria, fatigue, or metal taste in the mouth.  She states generally she has been in good health.  She mentions today she has 8 children.  One of her sons  of cancer and her daughter is currently battling lung cancer.  Her daughter resides in [place], [place] and unfortunately she and her daughter have been estranged for a number of years.  She is able to keep up-to-date on her daughter's health via her sons who live locally.  She would like to visit her at some time, but is waiting for her daughter to invite her to do so.  She does mention that she has a new psychologist, Dr. Horn.  He has adjusted her lithium dosing a few times since she has started seeing him.  The remainder of a complete 12-point review of systems was reviewed with the patient and was negative  with the exception of that mentioned above.      Past Medical/Surgical History:  Past Medical History:   Diagnosis Date     Alcohol dependence in remission (H)      Anxiety      Asymptomatic varicose veins      Bipolar disorder, unspecified (H)      CKD (chronic kidney disease) stage 3, GFR 30-59 ml/min (H) 2/18/2014     History of smoking      Hyperparathyroidism (H)      Memory loss      Muscle weakness (generalized) 6/23/2008     Severe depression (H)      Subdural hygroma     chronic.  no surgeries     Tremor of unknown origin      Past Surgical History:   Procedure Laterality Date     APPENDECTOMY OPEN  1947     CATARACT IOL, RT/LT       COLONOSCOPY WITH CO2 INSUFFLATION  2/29/2012    Procedure:COLONOSCOPY WITH CO2 INSUFFLATION; Surgeon:GAYLE REYNA; Location:[facility] OR     CYSTOCELE REPAIR  1972     CYSTOSCOPY, INSERT STENT URETHRA, COMBINED  1999     CYSTOSCOPY, RETROGRADES, INSERT STENT URETER(S), COMBINED  2/29/2012    Procedure:COMBINED CYSTOSCOPY, RETROGRADES, INSERT STENT URETER(S); Surgeon:ANT ESPINOZA; Location:[facility]     DECOMPRESSION LUMBAR ONE LEVEL  8/9/2013    Procedure: DECOMPRESSION LUMBAR ONE LEVEL;  Posterior Decompression Right Lumbar 5- Sacral 1;  Surgeon: You Zavala MD;  Location: [facility] OR     HC TOOTH EXTRACTION W/FORCEP       HYSTERECTOMY, CATA  1963     IRRIGATION AND DEBRIDEMENT ORAL, COMBINED  1982    For treatment of gingivitis     LAPAROSCOPIC ASSISTED COLECTOMY  2/29/2012    Procedure:LAPAROSCOPIC ASSISTED COLECTOMY; Cysto, Bilateral Stent placement (both stents removed at end of case)- Yanet ACOSTA. Laparoscopic Extended Right Venu Colectomy with CO2 Colonoscopy and polyp removal-Judah; Surgeon:GAYLE REYNA; Location:[facility] OR     LIGATN/STRIP LONG OR SHORT SAPHEN  1955     MASTECTOMY PARTIAL WITH SENTINEL NODE Left 11/19/2018    Procedure: Left Mastectomy, Left Lytle Lymph Node Biopsy;  Surgeon: Nahun Betancourt MD;  Location: [facility] OR     STRIP VEIN BILATERAL   1964     SURGICAL HISTORY OF -   1999    ureter surgery for blockage.     Allergies:  Allergies as of 04/15/2019 - Reviewed 03/22/2019   Allergen Reaction Noted     Cafergot Other (See Comments) and Nausea and Vomiting 01/01/1972     Ciprofloxacin  06/19/2012     Penicillins Rash 01/01/1949     Sulfa drugs Rash 01/01/1950     Lamictal [lamotrigine] Other (See Comments) 02/15/2014     Naproxen       Tetracycline       Current Medications:  Current Outpatient Medications   Medication Sig Dispense Refill     acetaminophen (TYLENOL) 325 MG tablet Take 3 tablets (975 mg) by mouth every 8 hours as needed for mild pain 50 tablet 0     ARIPiprazole (ABILIFY) 5 MG tablet 5 mg tablet in the morning  3     buPROPion (WELLBUTRIN XL) 300 MG 24 hr tablet 300 mg tablet in the mornnig  2     Cyanocobalamin (VITAMIN B 12 PO) Take by mouth every morning       letrozole (FEMARA) 2.5 MG tablet Take 1 tablet (2.5 mg) by mouth daily (Patient taking differently: Take 2.5 mg by mouth every evening ) 30 tablet 11     lithium 150 MG capsule Take 1 capsule by mouth daily (with breakfast). 30 capsule 1     lithium 300 MG capsule Take 1 capsule by mouth daily (with dinner). 30 capsule 1     LORazepam (ATIVAN) 0.5 MG tablet Take 1 tablet (0.5 mg) by mouth every 4 hours as needed (Anxiety, Nausea/Vomiting or Sleep) 30 tablet 2     neomycin-polymyxin-dexamethasone (MAXITROL) 3.5-69813-7.1 ophthalmic ointment   1     ondansetron (ZOFRAN) 8 MG tablet Take 1 tablet (8 mg) by mouth every 8 hours as needed for nausea 30 tablet 3     ORDER FOR DME Equipment being ordered: compression stocking knee high 20-30mmhg 2 Package 0     pramipexole (MIRAPEX) 1 MG tablet Take 1 mg by mouth every morning        prochlorperazine (COMPAZINE) 10 MG tablet Take 0.5 tablets (5 mg) by mouth every 6 hours as needed (Nausea/Vomiting) 30 tablet 2     propranolol (INDERAL) 20 MG tablet Take 1 tablet (20 mg) by mouth 2 times daily 180 tablet 1     sertraline (ZOLOFT) 50 MG  tablet Take 25 mg by mouth every morning        Physical Exam:  /74 (BP Location: Right arm, Patient Position: Sitting, Cuff Size: Adult Regular)   Pulse 75   Temp 97.6  F (36.4  C) (Oral)   Resp 16   Wt 66.2 kg (146 lb)   SpO2 93%   BMI 27.60 kg/m     General:  Elderly appearing, well-nourished adult female in NAD. A&Ox3.  HEENT:  Normocephalic.  Sclera anicteric.  MMM.  No lesions of the oropharynx.  There is a palpable 3 x 4 cm mass right neck, thyroid nodule vs lymphadenopathy.  Lymph:  No palpable supraclavicular or axillary LAD.  See HEENT exam for palpable neck mass.  Chest:  CTA bilaterally.  No wheezes or crackles.  CV:  RRR.  Nl S1 and S2.  No m/r/g.  Breast:  Left breast mastectomy.  No palpable masses of the left chest wall.  Right breast is of decreased fibroglandular density and is without palpable masses.  Abd:  Soft/NT/ND.  BSs normoactive.  No hepatosplenomegaly.  Ext:  No pitting edema of the bilateral lower extremities.  Pulses 2+ and symmetric.  Musculo:  Strength 5/5 throughout.  Neuro:  Cranial nerves grossly intact.  Psych:  Mood and affect appear normal.      Laboratory/Imaging Studies:  4/15/19 Labs:  Creatinine is elevated at 1.8 with a GFR of 24, however, stable over the last month  Sodium and potassium are wnl.  Corrected calcium is at the upper limits of normal  LFTs are wnl.    Blood counts are wnl.    TSH and free T4 wnl in 02/2019    4/15/19 echocardiogram:  LVEF = 61%    ASSESSMENT/PLAN:  89 year old female with multifocal, T2N1M0, invasive mammary carcinomas of the left breast.  She is s/p treatment with 5 months neoadjuvant herceptin and letrozole, left breast mastectomy, SLN biopsy, and adjuvant radiation.  Continues on adjuvant Kadcyla.    1.  Left breast cancers:  She continues on treatment with Kadcyla.  She presents to clinic today for evaluation prior to cycle #5 of adjuvant Kadcyla.  She is tolerating treatment well.  We reviewed today that overall plan is to  complete 1 year, or 17 total doses, of T-DM1.  Upon completion of T-DM1 will resume treatment with letrozole.     2.  Acute on chronic kidney failure:  Increasing creatinine and decreasing GFR especially prominent over the past year and a half.  UA today.  Previously declined Nephrology referral.  We discussed the nature of kidney failure, possible causes, and that intervention now might prevent further kidney failure and ultimately dialysis.  She is agreeable to Nephrology referral at this time.    3.  UTI:  UA today consistent with UTI.  She has a number of allergies including penicillins, sulfa, ciprofloxacin, and tetracycline.  Cefpodoxime 100 mg PO daily (renally dosed) x 5 days prescribed.    4.  At risk for cardiomyopathy:  Echocardiogram today shows a LVEF of 61%  Plan to continue to monitor once every 3 months while on HER2 targeted therapy.  Next echocardiogram will be due in 07/2019.    5.  Palpable right neck mass:  Suspect this is a thyroid nodule.  Lymphadenopathy is also possible.  Review of her imaging does not show prior neck imaging.  Patient is agreeable to further evaluation with a right neck ultrasound.    6.  Bipolar disorder:  Has now established care with Dr. Horn [facility] in [place].  Recommended re-discussing lithium dose with Dr. Horn in the setting of worsening renal function.    7.  Follow Up:  Visit with Nephrology within 4-6 weeks.  Labs, neck ultrasound, and Kacyla infusion in 3 weeks.  Labs, visit with Dalila Blum, and Kadcyla infusion in 6 weeks.  Labs and Kadcyla infusion in 9 weeks.  Labs, echocardiogram, visit with me, and Kadcyla infusion in 12 weeks.    It was a pleasure to meet with Ms. Mar in clinic today.  A total of 20 minutes of our 30 minute face to face visit was spent in counseling.    Again, thank you for allowing me to participate in the care of your patient.      Sincerely,    Perlita Malik MD

## 2019-04-15 NOTE — PROGRESS NOTES
"SPIRITUAL HEALTH SERVICES  SPIRITUAL ASSESSMENT Progress Note  [facility]     REASON FOR ENCOUNTER: Introduction      Reviewed documentation. Short visit with Lennie to introduce  availability.     Lennie was grateful to know about the availability of  support and immediately shared how excitedly she was anticipating the \"holy day services\" this week, naming her intention to attend mass on Thursday, Friday and Saturday. We shared our mutual love for the [facility] service.    Lennie attends [employer] [facility] [employer] [facility] [employer] [facility] [employer] in [place].    Lennie shared that evetything is going \"really good\" with her treatment and did not elaborate further.    She did not identify any immediate spiritual / emotional support needs but understands that she can request  follow-up visits at future appointments through her nurse.    Robert Mccarty MDiv  Chaplain Resident  Pager [phone]  Cell [phone]    "

## 2019-04-17 LAB
BACTERIA SPEC CULT: ABNORMAL
Lab: ABNORMAL
SPECIMEN SOURCE: ABNORMAL

## 2019-04-20 ENCOUNTER — OFFICE VISIT (OUTPATIENT)
Dept: URGENT CARE | Facility: URGENT CARE | Age: 84
End: 2019-04-20
Payer: MEDICARE

## 2019-04-20 VITALS
TEMPERATURE: 97.8 F | DIASTOLIC BLOOD PRESSURE: 61 MMHG | HEART RATE: 73 BPM | SYSTOLIC BLOOD PRESSURE: 115 MMHG | OXYGEN SATURATION: 96 % | WEIGHT: 146 LBS | BODY MASS INDEX: 27.6 KG/M2

## 2019-04-20 DIAGNOSIS — F31.4 SEVERE DEPRESSED BIPOLAR I DISORDER WITHOUT PSYCHOTIC FEATURES (H): ICD-10-CM

## 2019-04-20 DIAGNOSIS — R26.89 BALANCE PROBLEMS: Primary | ICD-10-CM

## 2019-04-20 LAB
ALBUMIN UR-MCNC: NEGATIVE MG/DL
APPEARANCE UR: CLEAR
BILIRUB UR QL STRIP: NEGATIVE
COLOR UR AUTO: YELLOW
GLUCOSE UR STRIP-MCNC: NEGATIVE MG/DL
HGB UR QL STRIP: NEGATIVE
KETONES UR STRIP-MCNC: NEGATIVE MG/DL
LEUKOCYTE ESTERASE UR QL STRIP: ABNORMAL
NITRATE UR QL: NEGATIVE
PH UR STRIP: 6.5 PH (ref 5–7)
RBC #/AREA URNS AUTO: NORMAL /HPF
SOURCE: ABNORMAL
SP GR UR STRIP: 1.01 (ref 1–1.03)
UROBILINOGEN UR STRIP-ACNC: 0.2 EU/DL (ref 0.2–1)
WBC #/AREA URNS AUTO: NORMAL /HPF

## 2019-04-20 PROCEDURE — 82565 ASSAY OF CREATININE: CPT | Performed by: PHYSICIAN ASSISTANT

## 2019-04-20 PROCEDURE — 80178 ASSAY OF LITHIUM: CPT | Performed by: PHYSICIAN ASSISTANT

## 2019-04-20 PROCEDURE — 36415 COLL VENOUS BLD VENIPUNCTURE: CPT | Performed by: PHYSICIAN ASSISTANT

## 2019-04-20 PROCEDURE — 81001 URINALYSIS AUTO W/SCOPE: CPT | Performed by: PHYSICIAN ASSISTANT

## 2019-04-20 PROCEDURE — 99213 OFFICE O/P EST LOW 20 MIN: CPT | Performed by: PHYSICIAN ASSISTANT

## 2019-04-20 ASSESSMENT — ENCOUNTER SYMPTOMS
CONSTITUTIONAL NEGATIVE: 1
TREMORS: 1
GASTROINTESTINAL NEGATIVE: 1

## 2019-04-20 NOTE — PROGRESS NOTES
SUBJECTIVE:   Lennie Mar is a 89 year old female presenting with a chief complaint of   Chief Complaint   Patient presents with     Urgent Care     Pt in clinic c/o being off balance.     Balance/ Vestibular       She is an established patient of [facility]. History of bipolar disorder for years and breast cancer diagnosed in 2018 receiving radiation to left breast. Completed one month ago. Recent abnormal UA and started on Vantin 2x daily for 5 days on 4/15/19. Recent fall 2 days ago and EMS was called and felt she was okay. No trip to ED.  Son Dmitry, a physician, has noticed problems with her turning (she mentions worse to the left) and increased tremor. She takes lithium for bipolar disorder with some reduction in dose one year ago according to son Dmitry. History of CKD stable GFR in the 's. She does have follow up with Psychiatry in 2 days. Dmitry would like his mother to get a Lithium level and a creatinine today. Last lithium in mid February was 0.76 which Dmitry feels is high. She walked around the clinic today without any falls but with some mild eccentric lateral movements. Talking well to provider.         Review of Systems   Constitutional: Negative.    Gastrointestinal: Negative.    Neurological: Positive for tremors.       Past Medical History:   Diagnosis Date     Alcohol dependence in remission (H)      Anxiety      Asymptomatic varicose veins      Bipolar disorder, unspecified (H)      CKD (chronic kidney disease) stage 3, GFR 30-59 ml/min (H) 2014     History of smoking      Hyperparathyroidism (H)      Memory loss      Muscle weakness (generalized) 2008     Severe depression (H)      Subdural hygroma     chronic.  no surgeries     Tremor of unknown origin      Family History   Problem Relation Age of Onset     C.A.D. Mother          at age 47 Heart failure, Rhumatic Fever     Cerebrovascular Disease Father          at age 79     Diabetes Father         type 2     Breast Cancer  Sister 84        99 year old sister     Circulatory Maternal Grandmother         vericose veins     C.A.D. Paternal Grandfather      Gastrointestinal Disease Paternal Grandfather         ulcer     Cancer Son          age 51--Melanoma     Cancer Brother          age 50's--Melanoma     Arthritis Sister      Circulatory Sister         vericose veins     Circulatory Sister         vericose veins     Eye Disorder Sister         Cateracts     Respiratory Sister         asthma     Respiratory Brother         COPD     Breast Cancer Niece 60        daughter of 98 yo sister     Current Outpatient Medications   Medication Sig Dispense Refill     acetaminophen (TYLENOL) 325 MG tablet Take 3 tablets (975 mg) by mouth every 8 hours as needed for mild pain 50 tablet 0     ARIPiprazole (ABILIFY) 5 MG tablet 5 mg tablet in the morning  3     buPROPion (WELLBUTRIN XL) 300 MG 24 hr tablet 300 mg tablet in the mornnig  2     cefpodoxime (VANTIN) 100 MG tablet Take 1 tablet (100 mg) by mouth 2 times daily for 5 days 10 tablet 0     Cyanocobalamin (VITAMIN B 12 PO) Take by mouth every morning       letrozole (FEMARA) 2.5 MG tablet Take 1 tablet (2.5 mg) by mouth daily (Patient taking differently: Take 2.5 mg by mouth every evening ) 30 tablet 11     lithium 150 MG capsule Take 1 capsule by mouth daily (with breakfast). 30 capsule 1     lithium 300 MG capsule Take 1 capsule by mouth daily (with dinner). 30 capsule 1     LORazepam (ATIVAN) 0.5 MG tablet Take 1 tablet (0.5 mg) by mouth every 4 hours as needed (Anxiety, Nausea/Vomiting or Sleep) 30 tablet 2     neomycin-polymyxin-dexamethasone (MAXITROL) 3.5-46502-3.1 ophthalmic ointment   1     ondansetron (ZOFRAN) 8 MG tablet Take 1 tablet (8 mg) by mouth every 8 hours as needed for nausea 30 tablet 3     ORDER FOR DME Equipment being ordered: compression stocking knee high 20-30mmhg 2 Package 0     pramipexole (MIRAPEX) 1 MG tablet Take 1 mg by mouth every morning         prochlorperazine (COMPAZINE) 10 MG tablet Take 0.5 tablets (5 mg) by mouth every 6 hours as needed (Nausea/Vomiting) 30 tablet 2     propranolol (INDERAL) 20 MG tablet Take 1 tablet (20 mg) by mouth 2 times daily 180 tablet 1     sertraline (ZOLOFT) 50 MG tablet Take 25 mg by mouth every morning        Social History     Tobacco Use     Smoking status: Former Smoker     Packs/day: 1.50     Years: 30.00     Pack years: 45.00     Types: Cigarettes     Last attempt to quit: 1993     Years since quittin.7     Smokeless tobacco: Never Used   Substance Use Topics     Alcohol use: No       OBJECTIVE  /61   Pulse 73   Temp 97.8  F (36.6  C) (Oral)   Wt 66.2 kg (146 lb)   SpO2 96%   BMI 27.60 kg/m      Physical Exam   Constitutional: She appears well-developed and well-nourished.   Eyes: Pupils are equal, round, and reactive to light. EOM are normal.   Neck: Normal range of motion. Neck supple.   Cardiovascular: Normal rate, regular rhythm and normal heart sounds.   Pulmonary/Chest: Effort normal and breath sounds normal.   Abdominal: Soft. Bowel sounds are normal.   Neurological: She is alert.   Psychiatric: She has a normal mood and affect.     intention tremor hands left >> right     Labs: UA normal    Pending lithium and creatinine.     ASSESSMENT:       1. Balance problems    - *UA reflex to Microscopic and Culture ([facility] (except [place] and [place])  - Lithium level  - Creatinine  - Urine Microscopic    2. Severe depressed bipolar I disorder without psychotic features (H)    - Lithium level  - Creatinine    PLAN: PCP aware of labs.     Follow up in 2 days with Psychiatrist. Pending nephrology appointment in 2 weeks.

## 2019-04-22 LAB
CREAT SERPL-MCNC: 1.67 MG/DL (ref 0.52–1.04)
GFR SERPL CREATININE-BSD FRML MDRD: 26 ML/MIN/{1.73_M2}
LITHIUM SERPL-SCNC: 0.51 MMOL/L (ref 0.6–1.2)

## 2019-05-03 DIAGNOSIS — N18.30 CKD (CHRONIC KIDNEY DISEASE) STAGE 3, GFR 30-59 ML/MIN (H): Primary | ICD-10-CM

## 2019-05-03 DIAGNOSIS — D63.1 ANEMIA IN CHRONIC KIDNEY DISEASE (CODE): ICD-10-CM

## 2019-05-06 ENCOUNTER — OFFICE VISIT (OUTPATIENT)
Dept: NEPHROLOGY | Facility: CLINIC | Age: 84
End: 2019-05-06
Attending: INTERNAL MEDICINE
Payer: MEDICARE

## 2019-05-06 ENCOUNTER — ANCILLARY PROCEDURE (OUTPATIENT)
Dept: ULTRASOUND IMAGING | Facility: CLINIC | Age: 84
End: 2019-05-06
Attending: INTERNAL MEDICINE
Payer: MEDICARE

## 2019-05-06 ENCOUNTER — TELEPHONE (OUTPATIENT)
Dept: ONCOLOGY | Facility: CLINIC | Age: 84
End: 2019-05-06

## 2019-05-06 ENCOUNTER — INFUSION THERAPY VISIT (OUTPATIENT)
Dept: ONCOLOGY | Facility: CLINIC | Age: 84
End: 2019-05-06
Attending: INTERNAL MEDICINE
Payer: MEDICARE

## 2019-05-06 VITALS
DIASTOLIC BLOOD PRESSURE: 76 MMHG | SYSTOLIC BLOOD PRESSURE: 130 MMHG | HEART RATE: 94 BPM | BODY MASS INDEX: 28.41 KG/M2 | WEIGHT: 150.3 LBS | OXYGEN SATURATION: 90 %

## 2019-05-06 DIAGNOSIS — E04.1 THYROID NODULE: ICD-10-CM

## 2019-05-06 DIAGNOSIS — R22.1 NECK MASS: Primary | ICD-10-CM

## 2019-05-06 DIAGNOSIS — D63.1 ANEMIA IN CHRONIC KIDNEY DISEASE (CODE): ICD-10-CM

## 2019-05-06 DIAGNOSIS — Z17.0 MALIGNANT NEOPLASM OF UPPER-INNER QUADRANT OF LEFT BREAST IN FEMALE, ESTROGEN RECEPTOR POSITIVE (H): Primary | ICD-10-CM

## 2019-05-06 DIAGNOSIS — E04.1 THYROID NODULE: Primary | ICD-10-CM

## 2019-05-06 DIAGNOSIS — N18.30 CKD (CHRONIC KIDNEY DISEASE) STAGE 3, GFR 30-59 ML/MIN (H): ICD-10-CM

## 2019-05-06 DIAGNOSIS — C50.212 MALIGNANT NEOPLASM OF UPPER-INNER QUADRANT OF LEFT BREAST IN FEMALE, ESTROGEN RECEPTOR POSITIVE (H): Primary | ICD-10-CM

## 2019-05-06 DIAGNOSIS — E83.52 HYPERCALCEMIA: Primary | ICD-10-CM

## 2019-05-06 DIAGNOSIS — N18.4 CHRONIC RENAL FAILURE, STAGE 4 (SEVERE) (H): ICD-10-CM

## 2019-05-06 LAB
ALBUMIN SERPL-MCNC: 3 G/DL (ref 3.4–5)
ALBUMIN SERPL-MCNC: 3 G/DL (ref 3.4–5)
ALBUMIN UR-MCNC: NEGATIVE MG/DL
ALP SERPL-CCNC: 141 U/L (ref 40–150)
ALT SERPL W P-5'-P-CCNC: 27 U/L (ref 0–50)
ANION GAP SERPL CALCULATED.3IONS-SCNC: 6 MMOL/L (ref 3–14)
ANION GAP SERPL CALCULATED.3IONS-SCNC: 7 MMOL/L (ref 3–14)
APPEARANCE UR: ABNORMAL
AST SERPL W P-5'-P-CCNC: 34 U/L (ref 0–45)
BACTERIA #/AREA URNS HPF: ABNORMAL /HPF
BASOPHILS # BLD AUTO: 0.1 10E9/L (ref 0–0.2)
BASOPHILS NFR BLD AUTO: 1.1 %
BILIRUB SERPL-MCNC: 0.4 MG/DL (ref 0.2–1.3)
BILIRUB UR QL STRIP: NEGATIVE
BUN SERPL-MCNC: 26 MG/DL (ref 7–30)
BUN SERPL-MCNC: 26 MG/DL (ref 7–30)
CALCIUM SERPL-MCNC: 10.3 MG/DL (ref 8.5–10.1)
CALCIUM SERPL-MCNC: 10.5 MG/DL (ref 8.5–10.1)
CHLORIDE SERPL-SCNC: 108 MMOL/L (ref 94–109)
CHLORIDE SERPL-SCNC: 109 MMOL/L (ref 94–109)
CO2 SERPL-SCNC: 23 MMOL/L (ref 20–32)
CO2 SERPL-SCNC: 23 MMOL/L (ref 20–32)
COLOR UR AUTO: YELLOW
CREAT SERPL-MCNC: 2.08 MG/DL (ref 0.52–1.04)
CREAT SERPL-MCNC: 2.1 MG/DL (ref 0.52–1.04)
CREAT UR-MCNC: 71 MG/DL
DEPRECATED CALCIDIOL+CALCIFEROL SERPL-MC: 14 UG/L (ref 20–75)
DIFFERENTIAL METHOD BLD: ABNORMAL
EOSINOPHIL # BLD AUTO: 0.2 10E9/L (ref 0–0.7)
EOSINOPHIL NFR BLD AUTO: 3.4 %
ERYTHROCYTE [DISTWIDTH] IN BLOOD BY AUTOMATED COUNT: 15.1 % (ref 10–15)
FERRITIN SERPL-MCNC: 180 NG/ML (ref 8–252)
GFR SERPL CREATININE-BSD FRML MDRD: 20 ML/MIN/{1.73_M2}
GFR SERPL CREATININE-BSD FRML MDRD: 20 ML/MIN/{1.73_M2}
GLUCOSE SERPL-MCNC: 97 MG/DL (ref 70–99)
GLUCOSE SERPL-MCNC: 98 MG/DL (ref 70–99)
GLUCOSE UR STRIP-MCNC: NEGATIVE MG/DL
HCT VFR BLD AUTO: 37.7 % (ref 35–47)
HGB BLD-MCNC: 11.6 G/DL (ref 11.7–15.7)
HGB UR QL STRIP: NEGATIVE
IMM GRANULOCYTES # BLD: 0 10E9/L (ref 0–0.4)
IMM GRANULOCYTES NFR BLD: 0.6 %
IRON SATN MFR SERPL: 18 % (ref 15–46)
IRON SERPL-MCNC: 54 UG/DL (ref 35–180)
KETONES UR STRIP-MCNC: NEGATIVE MG/DL
LEUKOCYTE ESTERASE UR QL STRIP: ABNORMAL
LYMPHOCYTES # BLD AUTO: 1.1 10E9/L (ref 0.8–5.3)
LYMPHOCYTES NFR BLD AUTO: 16.4 %
MCH RBC QN AUTO: 30.4 PG (ref 26.5–33)
MCHC RBC AUTO-ENTMCNC: 30.8 G/DL (ref 31.5–36.5)
MCV RBC AUTO: 99 FL (ref 78–100)
MICROALBUMIN UR-MCNC: 22 MG/L
MICROALBUMIN/CREAT UR: 30.79 MG/G CR (ref 0–25)
MONOCYTES # BLD AUTO: 1 10E9/L (ref 0–1.3)
MONOCYTES NFR BLD AUTO: 15.6 %
NEUTROPHILS # BLD AUTO: 4.1 10E9/L (ref 1.6–8.3)
NEUTROPHILS NFR BLD AUTO: 62.9 %
NITRATE UR QL: NEGATIVE
NRBC # BLD AUTO: 0 10*3/UL
NRBC BLD AUTO-RTO: 0 /100
PH UR STRIP: 7 PH (ref 5–7)
PHOSPHATE SERPL-MCNC: 3.4 MG/DL (ref 2.5–4.5)
PLATELET # BLD AUTO: 182 10E9/L (ref 150–450)
POTASSIUM SERPL-SCNC: 4.1 MMOL/L (ref 3.4–5.3)
POTASSIUM SERPL-SCNC: 4.1 MMOL/L (ref 3.4–5.3)
PROT SERPL-MCNC: 7.3 G/DL (ref 6.8–8.8)
PROT UR-MCNC: 0.19 G/L
PROT/CREAT 24H UR: 0.27 G/G CR (ref 0–0.2)
PTH-INTACT SERPL-MCNC: 112 PG/ML (ref 18–80)
RBC # BLD AUTO: 3.82 10E12/L (ref 3.8–5.2)
RBC #/AREA URNS AUTO: 2 /HPF (ref 0–2)
SODIUM SERPL-SCNC: 138 MMOL/L (ref 133–144)
SODIUM SERPL-SCNC: 139 MMOL/L (ref 133–144)
SOURCE: ABNORMAL
SP GR UR STRIP: 1.01 (ref 1–1.03)
SQUAMOUS #/AREA URNS AUTO: 1 /HPF (ref 0–1)
TIBC SERPL-MCNC: 298 UG/DL (ref 240–430)
UROBILINOGEN UR STRIP-MCNC: 0 MG/DL (ref 0–2)
WBC # BLD AUTO: 6.5 10E9/L (ref 4–11)
WBC #/AREA URNS AUTO: >182 /HPF (ref 0–5)
WBC CLUMPS #/AREA URNS HPF: PRESENT /HPF

## 2019-05-06 PROCEDURE — 25000128 H RX IP 250 OP 636: Mod: ZF | Performed by: INTERNAL MEDICINE

## 2019-05-06 PROCEDURE — 83970 ASSAY OF PARATHORMONE: CPT | Performed by: INTERNAL MEDICINE

## 2019-05-06 PROCEDURE — 82728 ASSAY OF FERRITIN: CPT | Performed by: INTERNAL MEDICINE

## 2019-05-06 PROCEDURE — 81001 URINALYSIS AUTO W/SCOPE: CPT | Performed by: INTERNAL MEDICINE

## 2019-05-06 PROCEDURE — 84156 ASSAY OF PROTEIN URINE: CPT | Performed by: INTERNAL MEDICINE

## 2019-05-06 PROCEDURE — 85025 COMPLETE CBC W/AUTO DIFF WBC: CPT | Performed by: INTERNAL MEDICINE

## 2019-05-06 PROCEDURE — 36415 COLL VENOUS BLD VENIPUNCTURE: CPT | Performed by: INTERNAL MEDICINE

## 2019-05-06 PROCEDURE — 83550 IRON BINDING TEST: CPT | Performed by: INTERNAL MEDICINE

## 2019-05-06 PROCEDURE — 25800030 ZZH RX IP 258 OP 636: Mod: ZF | Performed by: INTERNAL MEDICINE

## 2019-05-06 PROCEDURE — 80053 COMPREHEN METABOLIC PANEL: CPT | Performed by: INTERNAL MEDICINE

## 2019-05-06 PROCEDURE — G0463 HOSPITAL OUTPT CLINIC VISIT: HCPCS | Mod: ZF

## 2019-05-06 PROCEDURE — 82306 VITAMIN D 25 HYDROXY: CPT | Performed by: INTERNAL MEDICINE

## 2019-05-06 PROCEDURE — 80069 RENAL FUNCTION PANEL: CPT | Performed by: INTERNAL MEDICINE

## 2019-05-06 PROCEDURE — 82043 UR ALBUMIN QUANTITATIVE: CPT | Performed by: INTERNAL MEDICINE

## 2019-05-06 PROCEDURE — 83540 ASSAY OF IRON: CPT | Performed by: INTERNAL MEDICINE

## 2019-05-06 PROCEDURE — 82610 CYSTATIN C: CPT | Performed by: INTERNAL MEDICINE

## 2019-05-06 PROCEDURE — 96413 CHEMO IV INFUSION 1 HR: CPT

## 2019-05-06 RX ADMIN — SODIUM CHLORIDE 250 ML: 9 INJECTION, SOLUTION INTRAVENOUS at 11:30

## 2019-05-06 RX ADMIN — ADO-TRASTUZUMAB EMTANSINE 260 MG: 20 INJECTION, POWDER, LYOPHILIZED, FOR SOLUTION INTRAVENOUS at 11:30

## 2019-05-06 ASSESSMENT — PAIN SCALES - GENERAL: PAINLEVEL: NO PAIN (0)

## 2019-05-06 NOTE — PROGRESS NOTES
Infusion Nursing Note:  Lennie Mar presents today for C6 Kadcyla.    Patient seen by provider today: Yes: Dr Simons    Treatment Conditions:  Lab Results   Component Value Date    HGB 11.6 05/06/2019     Lab Results   Component Value Date    WBC 6.5 05/06/2019      Lab Results   Component Value Date    ANEU 4.1 05/06/2019     Lab Results   Component Value Date     05/06/2019      Lab Results   Component Value Date     05/06/2019     05/06/2019                   Lab Results   Component Value Date    POTASSIUM 4.1 05/06/2019    POTASSIUM 4.1 05/06/2019           Lab Results   Component Value Date    MAG 2.0 04/22/2012            Lab Results   Component Value Date    CR 2.08 05/06/2019    CR 2.10 05/06/2019                   Lab Results   Component Value Date    CANELO 10.5 05/06/2019    CANELO 10.3 05/06/2019                Lab Results   Component Value Date    BILITOTAL 0.4 05/06/2019           Lab Results   Component Value Date    ALBUMIN 3.0 05/06/2019    ALBUMIN 3.0 05/06/2019                    Lab Results   Component Value Date    ALT 27 05/06/2019           Lab Results   Component Value Date    AST 34 05/06/2019         Intravenous Access:  Peripheral IV placed.  Access dc'd at time of discharge.      Note:   Pt saw her renal MD prior to infusion and he is addressing her abnormal lab results per note.  PT will have U/S of neck and kidneys, also will decrease lithium dose.  IB sent to Dr Malik with drop in HGB.  Results reviewed, copy given to patient.  Proceed with treatment.    Copy of AVS given to patient. + Blood return from PIV pre and post infusion.  Tolerated infusion without incident. No Prescriptions filled today.   D/C in care of self.  Pt will return 5/29 for C7 Kadcyla/provider visit.       Stephy Dyer RN

## 2019-05-06 NOTE — TELEPHONE ENCOUNTER
I spoke to patient, per Dr Malik US thyroid today shows a large nodule in the right thyroid area. Recommended a biopsy (FNA) to further investigate this nodule. Pt is agreeable to this plan; she asks that I call her son, Dmitry, to update him as well.    I spoke to Dmitry, we reviewed US thyroid & recommendations for FNA. Dmitry is agreeable to this plan. Schedulers tasked to arrange appt & contact pt/Dmitry with appt details.           From: Perlita Malik MD  Sent: 5/6/2019  12:57 PM  To: Brian Tinsley RN  Subject: call pt with results                             Pa,     Please let Lennie know that her thyroid ultrasound shows a 7.7 cm nodule (I.e large nodule) right thyroid.  I recommend that she have this biopsied.  I will place the order.  If she is agreeable, please send scheduling a message.    Perlita Ray

## 2019-05-06 NOTE — PROGRESS NOTES
May 6, 2019    I was asked to see this patient by Dr. Malik     CC: CKD stage 4    HPI: Lennie Mar is a 89 year old female who presents for evaluation of  CKD stage 4  Ms Mar had an elevated creatinine going back to 2012, however may have had CKD long before since her creatinine overestimates her eGFR. She reports of being on Lithium since 1968 and reportedly was on higher dose of Li. She had an episode of lithium toxicity in 2012 when she presented with neurological symptoms and had level of 1.3. She also has developed tremors over years. I will obtain records from her psychiatrist. She has had recurrent renal calculi in the right kidney and had colon cancer/rt hemicolectomy during which b/l ureteral stents were placed. She had a tortuous right ureter at the time. In November 2018, she was diagnosed with Ca breast and had a left mastectomy. She is now on letrozole. She carries a h/o hyperparathyroidism and has had hypercalcemia more recently. Elevated PTH since 2012.Other ROS includes urinary incontinence due to overactive bladder and orthostatic hypotension.     - History of Hematuria: no  - Swelling: trace  - Hx of UTIs: yes  - Hx of stones: yes, does not remember having stones, reviewed from urology notes   - Rashes/Joint pain: no  - Family hx of kidney disease: no  - NSAID use: no    # CKD stage 4: most likely 2/2 long term lithium use. UA with minimal proteinuria. Her CKD seem to be progressively worsening and her creatinine is now at 2.0 with eGFR of 20. Her cystatin C eGFR is 25 . Has a h/o ureteral procedures and stent placement. She also has mild hypercalcemia which could be contributing.  --Will arrange for an US of her kidneys   --Agree with decreasing the lithium dose by her psychiatrist   --Will consider treating her hypercalcemia with Cinacalcet if her creatine continues to get worse and she remains hypercalcemic since this may be the only reversible factor.     #Volume/BP: Acceptable.  BP goal of < 160/90. Trace edema at the end of the day. TTE done 4/19 with EF of 61%    # Hypercalcemia: likely has PTH related hypercalcemia 2/2 lithium use. She also has a mass in her neck which will need to be evaluated. PTH of 112 today.   --TSH and T4 wnl.   --Check US of her neck to initiate the w/u     --Will recheck BMP in 1 month along with iCa     F/u in clinic in 3 months      Jodi Simons MD          Allergies   Allergen Reactions     Cafergot Other (See Comments) and Nausea and Vomiting     Severe HA, N/V & diarrhea     Ciprofloxacin      Nausea and vomiting per son      Penicillins Rash     Sulfa Drugs Rash     Lamictal [Lamotrigine] Other (See Comments)     Patient experienced night moss     Naproxen      Pt unable to remember reaction.     Tetracycline      Pt unable to remember allergy         Current Outpatient Medications on File Prior to Visit:  acetaminophen (TYLENOL) 325 MG tablet Take 3 tablets (975 mg) by mouth every 8 hours as needed for mild pain   ARIPiprazole (ABILIFY) 5 MG tablet 5 mg tablet in the morning   buPROPion (WELLBUTRIN XL) 300 MG 24 hr tablet 300 mg tablet in the [facility]   Cyanocobalamin (VITAMIN B 12 PO) Take by mouth every morning   letrozole (FEMARA) 2.5 MG tablet Take 1 tablet (2.5 mg) by mouth daily (Patient taking differently: Take 2.5 mg by mouth every evening )   lithium 150 MG capsule Take 1 capsule by mouth daily (with breakfast).   lithium 300 MG capsule Take 1 capsule by mouth daily (with dinner).   LORazepam (ATIVAN) 0.5 MG tablet Take 1 tablet (0.5 mg) by mouth every 4 hours as needed (Anxiety, Nausea/Vomiting or Sleep)   neomycin-polymyxin-dexamethasone (MAXITROL) 3.5-69774-0.1 ophthalmic ointment    ondansetron (ZOFRAN) 8 MG tablet Take 1 tablet (8 mg) by mouth every 8 hours as needed for nausea   ORDER FOR DME Equipment being ordered: compression stocking knee high 20-30mmhg   pramipexole (MIRAPEX) 1 MG tablet Take 1 mg by mouth every morning     prochlorperazine (COMPAZINE) 10 MG tablet Take 0.5 tablets (5 mg) by mouth every 6 hours as needed (Nausea/Vomiting)   propranolol (INDERAL) 20 MG tablet Take 1 tablet (20 mg) by mouth 2 times daily   sertraline (ZOLOFT) 50 MG tablet Take 25 mg by mouth every morning    [] cefpodoxime (VANTIN) 100 MG tablet Take 1 tablet (100 mg) by mouth 2 times daily for 5 days     No current facility-administered medications on file prior to visit.     Past Medical History:   Diagnosis Date     Alcohol dependence in remission (H)      Anxiety      Asymptomatic varicose veins      Bipolar disorder, unspecified (H)      CKD (chronic kidney disease) stage 3, GFR 30-59 ml/min (H) 2014     History of smoking      Hyperparathyroidism (H)      Memory loss      Muscle weakness (generalized) 2008     Severe depression (H)      Subdural hygroma     chronic.  no surgeries     Tremor of unknown origin        Past Surgical History:   Procedure Laterality Date     APPENDECTOMY OPEN       CATARACT IOL, RT/LT       COLONOSCOPY WITH CO2 INSUFFLATION  2012    Procedure:COLONOSCOPY WITH CO2 INSUFFLATION; Surgeon:GAYLE REYNA; Location:[facility] OR     CYSTOCELE REPAIR       CYSTOSCOPY, INSERT STENT URETHRA, COMBINED       CYSTOSCOPY, RETROGRADES, INSERT STENT URETER(S), COMBINED  2012    Procedure:COMBINED CYSTOSCOPY, RETROGRADES, INSERT STENT URETER(S); Surgeon:ANT ESPINOZA; Location:[facility] OR     DECOMPRESSION LUMBAR ONE LEVEL  2013    Procedure: DECOMPRESSION LUMBAR ONE LEVEL;  Posterior Decompression Right Lumbar 5- Sacral 1;  Surgeon: You Zavala MD;  Location: [facility] OR     HC TOOTH EXTRACTION W/FORCEP       HYSTERECTOMY, CATA       IRRIGATION AND DEBRIDEMENT ORAL, COMBINED      For treatment of gingivitis     LAPAROSCOPIC ASSISTED COLECTOMY  2012    Procedure:LAPAROSCOPIC ASSISTED COLECTOMY; Cysto, Bilateral Stent placement (both stents removed at end of case)- Yanet ACOSTA.  Laparoscopic Extended Right Venu Colectomy with CO2 Colonoscopy and polyp removal-Judah; Surgeon:GAYLE REYNA; Location:[facility] OR     LIGATN/STRIP LONG OR SHORT SAPHEN  195     MASTECTOMY PARTIAL WITH SENTINEL NODE Left 2018    Procedure: Left Mastectomy, Left [place] Lymph Node Biopsy;  Surgeon: Nahun Betancourt MD;  Location: [facility] OR     STRIP VEIN BILATERAL       SURGICAL HISTORY OF -       ureter surgery for blockage.       Social History     Tobacco Use     Smoking status: Former Smoker     Packs/day: 1.50     Years: 30.00     Pack years: 45.00     Types: Cigarettes     Last attempt to quit: 1993     Years since quittin.7     Smokeless tobacco: Never Used   Substance Use Topics     Alcohol use: No     Drug use: No       Family History   Problem Relation Age of Onset     C.A.D. Mother          at age 47 Heart failure, Rhumatic Fever     Cerebrovascular Disease Father          at age 79     Diabetes Father         type 2     Breast Cancer Sister 84        99 year old sister     Circulatory Maternal Grandmother         vericose veins     C.A.D. Paternal Grandfather      Gastrointestinal Disease Paternal Grandfather         ulcer     Cancer Son          age 51--Melanoma     Cancer Brother          age 50's--Melanoma     Arthritis Sister      Circulatory Sister         vericose veins     Circulatory Sister         vericose veins     Eye Disorder Sister         Cateracts     Respiratory Sister         asthma     Respiratory Brother         COPD     Breast Cancer Niece 60        daughter of 98 yo sister       ROS: A 12 system review of systems was negative other than noted here or above.     Exam:  /76   Pulse 94   Wt 68.2 kg (150 lb 4.8 oz)   SpO2 90%   BMI 28.41 kg/m      GENERAL APPEARANCE: alert and no distress  EYES: PERRL, no scleral icterus  HENT: mouth without ulcers or lesions  NECK: supple, no adenopathy  RESP: lungs clear to auscultation   CV: regular  rhythm, normal rate, no rub  Extremities: no clubbing, cyanosis. Trace edema +  SKIN: no rash  NEURO: mentation intact and speech normal  PSYCH: affect normal/bright    Results:    Infusion Therapy Visit on 05/06/2019   Component Date Value Ref Range Status     WBC 05/06/2019 6.5  4.0 - 11.0 10e9/L Final     RBC Count 05/06/2019 3.82  3.8 - 5.2 10e12/L Final     Hemoglobin 05/06/2019 11.6* 11.7 - 15.7 g/dL Final     Hematocrit 05/06/2019 37.7  35.0 - 47.0 % Final     MCV 05/06/2019 99  78 - 100 fl Final     MCH 05/06/2019 30.4  26.5 - 33.0 pg Final     MCHC 05/06/2019 30.8* 31.5 - 36.5 g/dL Final     RDW 05/06/2019 15.1* 10.0 - 15.0 % Final     Platelet Count 05/06/2019 182  150 - 450 10e9/L Final     Diff Method 05/06/2019 Automated Method   Final     % Neutrophils 05/06/2019 62.9  % Final     % Lymphocytes 05/06/2019 16.4  % Final     % Monocytes 05/06/2019 15.6  % Final     % Eosinophils 05/06/2019 3.4  % Final     % Basophils 05/06/2019 1.1  % Final     % Immature Granulocytes 05/06/2019 0.6  % Final     Nucleated RBCs 05/06/2019 0  0 /100 Final     Absolute Neutrophil 05/06/2019 4.1  1.6 - 8.3 10e9/L Final     Absolute Lymphocytes 05/06/2019 1.1  0.8 - 5.3 10e9/L Final     Absolute Monocytes 05/06/2019 1.0  0.0 - 1.3 10e9/L Final     Absolute Eosinophils 05/06/2019 0.2  0.0 - 0.7 10e9/L Final     Absolute Basophils 05/06/2019 0.1  0.0 - 0.2 10e9/L Final     Abs Immature Granulocytes 05/06/2019 0.0  0 - 0.4 10e9/L Final     Absolute Nucleated RBC 05/06/2019 0.0   Final     Sodium 05/06/2019 139  133 - 144 mmol/L Final     Potassium 05/06/2019 4.1  3.4 - 5.3 mmol/L Final     Chloride 05/06/2019 109  94 - 109 mmol/L Final     Carbon Dioxide 05/06/2019 23  20 - 32 mmol/L Final     Anion Gap 05/06/2019 7  3 - 14 mmol/L Final     Glucose 05/06/2019 97  70 - 99 mg/dL Final     Urea Nitrogen 05/06/2019 26  7 - 30 mg/dL Final     Creatinine 05/06/2019 2.08* 0.52 - 1.04 mg/dL Final     GFR Estimate 05/06/2019 20* >60  mL/min/[1.73_m2] Final    Comment: Non  GFR Calc  Starting 12/18/2018, serum creatinine based estimated GFR (eGFR) will be   calculated using the Chronic Kidney Disease Epidemiology Collaboration   (CKD-EPI) equation.       GFR Estimate If Black 05/06/2019 23* >60 mL/min/[1.73_m2] Final    Comment:  GFR Calc  Starting 12/18/2018, serum creatinine based estimated GFR (eGFR) will be   calculated using the Chronic Kidney Disease Epidemiology Collaboration   (CKD-EPI) equation.       Calcium 05/06/2019 10.5* 8.5 - 10.1 mg/dL Final     Bilirubin Total 05/06/2019 0.4  0.2 - 1.3 mg/dL Final     Albumin 05/06/2019 3.0* 3.4 - 5.0 g/dL Final     Protein Total 05/06/2019 7.3  6.8 - 8.8 g/dL Final     Alkaline Phosphatase 05/06/2019 141  40 - 150 U/L Final     ALT 05/06/2019 27  0 - 50 U/L Final     AST 05/06/2019 34  0 - 45 U/L Final   Orders Only on 05/06/2019   Component Date Value Ref Range Status     Color Urine 05/06/2019 Yellow   Final     Appearance Urine 05/06/2019 Slightly Cloudy   Final     Glucose Urine 05/06/2019 Negative  NEG^Negative mg/dL Final     Bilirubin Urine 05/06/2019 Negative  NEG^Negative Final     Ketones Urine 05/06/2019 Negative  NEG^Negative mg/dL Final     Specific Gravity Urine 05/06/2019 1.009  1.003 - 1.035 Final     Blood Urine 05/06/2019 Negative  NEG^Negative Final     pH Urine 05/06/2019 7.0  5.0 - 7.0 pH Final     Protein Albumin Urine 05/06/2019 Negative  NEG^Negative mg/dL Final     Urobilinogen mg/dL 05/06/2019 0.0  0.0 - 2.0 mg/dL Final     Nitrite Urine 05/06/2019 Negative  NEG^Negative Final     Leukocyte Esterase Urine 05/06/2019 Large* NEG^Negative Final     Source 05/06/2019 Midstream Urine   Final     WBC Urine 05/06/2019 >182* 0 - 5 /HPF Final     RBC Urine 05/06/2019 2  0 - 2 /HPF Final     WBC Clumps 05/06/2019 Present* NEG^Negative /HPF Final     Bacteria Urine 05/06/2019 Many* NEG^Negative /HPF Final     Squamous Epithelial /HPF Urine  05/06/2019 1  0 - 1 /HPF Final     Parathyroid Hormone Intact 05/06/2019 112* 18 - 80 pg/mL Final     Iron 05/06/2019 54  35 - 180 ug/dL Final     Iron Binding Cap 05/06/2019 298  240 - 430 ug/dL Final     Iron Saturation Index 05/06/2019 18  15 - 46 % Final     Ferritin 05/06/2019 180  8 - 252 ng/mL Final     Creatinine Urine 05/06/2019 71  mg/dL Final     Albumin Urine mg/L 05/06/2019 22  mg/L Final     Albumin Urine mg/g Cr 05/06/2019 30.79* 0 - 25 mg/g Cr Final     Protein Random Urine 05/06/2019 0.19  g/L Final     Protein Total Urine g/gr Creatinine 05/06/2019 0.27* 0 - 0.2 g/g Cr Final     Sodium 05/06/2019 138  133 - 144 mmol/L Final     Potassium 05/06/2019 4.1  3.4 - 5.3 mmol/L Final     Chloride 05/06/2019 108  94 - 109 mmol/L Final     Carbon Dioxide 05/06/2019 23  20 - 32 mmol/L Final     Anion Gap 05/06/2019 6  3 - 14 mmol/L Final     Glucose 05/06/2019 98  70 - 99 mg/dL Final     Urea Nitrogen 05/06/2019 26  7 - 30 mg/dL Final     Creatinine 05/06/2019 2.10* 0.52 - 1.04 mg/dL Final     GFR Estimate 05/06/2019 20* >60 mL/min/[1.73_m2] Final    Comment: Non  GFR Calc  Starting 12/18/2018, serum creatinine based estimated GFR (eGFR) will be   calculated using the Chronic Kidney Disease Epidemiology Collaboration   (CKD-EPI) equation.       GFR Estimate If Black 05/06/2019 23* >60 mL/min/[1.73_m2] Final    Comment:  GFR Calc  Starting 12/18/2018, serum creatinine based estimated GFR (eGFR) will be   calculated using the Chronic Kidney Disease Epidemiology Collaboration   (CKD-EPI) equation.       Calcium 05/06/2019 10.3* 8.5 - 10.1 mg/dL Final     Phosphorus 05/06/2019 3.4  2.5 - 4.5 mg/dL Final     Albumin 05/06/2019 3.0* 3.4 - 5.0 g/dL Final

## 2019-05-06 NOTE — LETTER
5/6/2019      RE: Lennie Mar  [address]  [facility] [address]       May 6, 2019    I was asked to see this patient by Dr. Malik     CC: CKD stage 4    HPI: Lennie Mar is a 89 year old female who presents for evaluation of  CKD stage 4  Ms Mar had an elevated creatinine going back to 2012, however may have had CKD long before since her creatinine overestimates her eGFR. She reports of being on Lithium since 1968 and reportedly was on higher dose of Li. She had an episode of lithium toxicity in 2012 when she presented with neurological symptoms and had level of 1.3. She also has developed tremors over years. I will obtain records from her psychiatrist. She has had recurrent renal calculi in the right kidney and had colon cancer/rt hemicolectomy during which b/l ureteral stents were placed. She had a tortuous right ureter at the time. In November 2018, she was diagnosed with Ca breast and had a left mastectomy. She is now on letrozole. She carries a h/o hyperparathyroidism and has had hypercalcemia more recently. Elevated PTH since 2012.Other ROS includes urinary incontinence due to overactive bladder and orthostatic hypotension.     - History of Hematuria: no  - Swelling: trace  - Hx of UTIs: yes  - Hx of stones: yes, does not remember having stones, reviewed from urology notes   - Rashes/Joint pain: no  - Family hx of kidney disease: no  - NSAID use: no    # CKD stage 4: most likely 2/2 long term lithium use. UA with minimal proteinuria. Her CKD seem to be progressively worsening and her creatinine is now at 2.0 with eGFR of 20. Her cystatin C eGFR is 25 . Has a h/o ureteral procedures and stent placement. She also has mild hypercalcemia which could be contributing.  --Will arrange for an US of her kidneys   --Agree with decreasing the lithium dose by her psychiatrist   --Will consider treating her hypercalcemia with Cinacalcet if her creatine continues to get worse and she  remains hypercalcemic since this may be the only reversible factor.     #Volume/BP: Acceptable. BP goal of < 160/90. Trace edema at the end of the day. TTE done 4/19 with EF of 61%    # Hypercalcemia: likely has PTH related hypercalcemia 2/2 lithium use. She also has a mass in her neck which will need to be evaluated. PTH of 112 today.   --TSH and T4 wnl.   --Check US of her neck to initiate the w/u     --Will recheck BMP in 1 month along with iCa     F/u in clinic in 3 months      Jodi Simons MD          Allergies   Allergen Reactions     Cafergot Other (See Comments) and Nausea and Vomiting     Severe HA, N/V & diarrhea     Ciprofloxacin      Nausea and vomiting per son      Penicillins Rash     Sulfa Drugs Rash     Lamictal [Lamotrigine] Other (See Comments)     Patient experienced night moss     Naproxen      Pt unable to remember reaction.     Tetracycline      Pt unable to remember allergy         Current Outpatient Medications on File Prior to Visit:  acetaminophen (TYLENOL) 325 MG tablet Take 3 tablets (975 mg) by mouth every 8 hours as needed for mild pain   ARIPiprazole (ABILIFY) 5 MG tablet 5 mg tablet in the morning   buPROPion (WELLBUTRIN XL) 300 MG 24 hr tablet 300 mg tablet in the mornnig   Cyanocobalamin (VITAMIN B 12 PO) Take by mouth every morning   letrozole (FEMARA) 2.5 MG tablet Take 1 tablet (2.5 mg) by mouth daily (Patient taking differently: Take 2.5 mg by mouth every evening )   lithium 150 MG capsule Take 1 capsule by mouth daily (with breakfast).   lithium 300 MG capsule Take 1 capsule by mouth daily (with dinner).   LORazepam (ATIVAN) 0.5 MG tablet Take 1 tablet (0.5 mg) by mouth every 4 hours as needed (Anxiety, Nausea/Vomiting or Sleep)   neomycin-polymyxin-dexamethasone (MAXITROL) 3.5-32144-5.1 ophthalmic ointment    ondansetron (ZOFRAN) 8 MG tablet Take 1 tablet (8 mg) by mouth every 8 hours as needed for nausea   ORDER FOR DME Equipment being ordered: compression stocking knee  high 20-30mmhg   pramipexole (MIRAPEX) 1 MG tablet Take 1 mg by mouth every morning    prochlorperazine (COMPAZINE) 10 MG tablet Take 0.5 tablets (5 mg) by mouth every 6 hours as needed (Nausea/Vomiting)   propranolol (INDERAL) 20 MG tablet Take 1 tablet (20 mg) by mouth 2 times daily   sertraline (ZOLOFT) 50 MG tablet Take 25 mg by mouth every morning    [] cefpodoxime (VANTIN) 100 MG tablet Take 1 tablet (100 mg) by mouth 2 times daily for 5 days     No current facility-administered medications on file prior to visit.     Past Medical History:   Diagnosis Date     Alcohol dependence in remission (H)      Anxiety      Asymptomatic varicose veins      Bipolar disorder, unspecified (H)      CKD (chronic kidney disease) stage 3, GFR 30-59 ml/min (H) 2014     History of smoking      Hyperparathyroidism (H)      Memory loss      Muscle weakness (generalized) 2008     Severe depression (H)      Subdural hygroma     chronic.  no surgeries     Tremor of unknown origin        Past Surgical History:   Procedure Laterality Date     APPENDECTOMY OPEN       CATARACT IOL, RT/LT       COLONOSCOPY WITH CO2 INSUFFLATION  2012    Procedure:COLONOSCOPY WITH CO2 INSUFFLATION; Surgeon:GAYLE REYNA; Location:[facility] OR     CYSTOCELE REPAIR       CYSTOSCOPY, INSERT STENT URETHRA, COMBINED       CYSTOSCOPY, RETROGRADES, INSERT STENT URETER(S), COMBINED  2012    Procedure:COMBINED CYSTOSCOPY, RETROGRADES, INSERT STENT URETER(S); Surgeon:ANT ESPINOZA; Location:[facility] OR     DECOMPRESSION LUMBAR ONE LEVEL  2013    Procedure: DECOMPRESSION LUMBAR ONE LEVEL;  Posterior Decompression Right Lumbar 5- Sacral 1;  Surgeon: You Zavala MD;  Location: [facility] OR     HC TOOTH EXTRACTION W/FORCEP       HYSTERECTOMY, CATA       IRRIGATION AND DEBRIDEMENT ORAL, COMBINED      For treatment of gingivitis     LAPAROSCOPIC ASSISTED COLECTOMY  2012    Procedure:LAPAROSCOPIC ASSISTED COLECTOMY;  Cysto, Bilateral Stent placement (both stents removed at end of case)- Yanet ACOSTA. Laparoscopic Extended Right Venu Colectomy with CO2 Colonoscopy and polyp removal-Judah; Surgeon:GAYLE REYNA; Location:[facility] OR     LIGATN/STRIP LONG OR SHORT SAPHEN  195     MASTECTOMY PARTIAL WITH SENTINEL NODE Left 2018    Procedure: Left Mastectomy, Left Pisek Lymph Node Biopsy;  Surgeon: Nahun Betancourt MD;  Location: [facility] OR     STRIP VEIN BILATERAL       SURGICAL HISTORY OF -       ureter surgery for blockage.       Social History     Tobacco Use     Smoking status: Former Smoker     Packs/day: 1.50     Years: 30.00     Pack years: 45.00     Types: Cigarettes     Last attempt to quit: 1993     Years since quittin.7     Smokeless tobacco: Never Used   Substance Use Topics     Alcohol use: No     Drug use: No       Family History   Problem Relation Age of Onset     C.A.D. Mother          at age 47 Heart failure, Rhumatic Fever     Cerebrovascular Disease Father          at age 79     Diabetes Father         type 2     Breast Cancer Sister 84        99 year old sister     Circulatory Maternal Grandmother         vericose veins     C.A.D. Paternal Grandfather      Gastrointestinal Disease Paternal Grandfather         ulcer     Cancer Son          age 51--Melanoma     Cancer Brother          age 50's--Melanoma     Arthritis Sister      Circulatory Sister         vericose veins     Circulatory Sister         vericose veins     Eye Disorder Sister         Cateracts     Respiratory Sister         asthma     Respiratory Brother         COPD     Breast Cancer Niece 60        daughter of 98 yo sister       ROS: A 12 system review of systems was negative other than noted here or above.     Exam:  /76   Pulse 94   Wt 68.2 kg (150 lb 4.8 oz)   SpO2 90%   BMI 28.41 kg/m       GENERAL APPEARANCE: alert and no distress  EYES: PERRL, no scleral icterus  HENT: mouth without ulcers or  lesions  NECK: supple, no adenopathy  RESP: lungs clear to auscultation   CV: regular rhythm, normal rate, no rub  Extremities: no clubbing, cyanosis. Trace edema +  SKIN: no rash  NEURO: mentation intact and speech normal  PSYCH: affect normal/bright    Results:    Infusion Therapy Visit on 05/06/2019   Component Date Value Ref Range Status     WBC 05/06/2019 6.5  4.0 - 11.0 10e9/L Final     RBC Count 05/06/2019 3.82  3.8 - 5.2 10e12/L Final     Hemoglobin 05/06/2019 11.6* 11.7 - 15.7 g/dL Final     Hematocrit 05/06/2019 37.7  35.0 - 47.0 % Final     MCV 05/06/2019 99  78 - 100 fl Final     MCH 05/06/2019 30.4  26.5 - 33.0 pg Final     MCHC 05/06/2019 30.8* 31.5 - 36.5 g/dL Final     RDW 05/06/2019 15.1* 10.0 - 15.0 % Final     Platelet Count 05/06/2019 182  150 - 450 10e9/L Final     Diff Method 05/06/2019 Automated Method   Final     % Neutrophils 05/06/2019 62.9  % Final     % Lymphocytes 05/06/2019 16.4  % Final     % Monocytes 05/06/2019 15.6  % Final     % Eosinophils 05/06/2019 3.4  % Final     % Basophils 05/06/2019 1.1  % Final     % Immature Granulocytes 05/06/2019 0.6  % Final     Nucleated RBCs 05/06/2019 0  0 /100 Final     Absolute Neutrophil 05/06/2019 4.1  1.6 - 8.3 10e9/L Final     Absolute Lymphocytes 05/06/2019 1.1  0.8 - 5.3 10e9/L Final     Absolute Monocytes 05/06/2019 1.0  0.0 - 1.3 10e9/L Final     Absolute Eosinophils 05/06/2019 0.2  0.0 - 0.7 10e9/L Final     Absolute Basophils 05/06/2019 0.1  0.0 - 0.2 10e9/L Final     Abs Immature Granulocytes 05/06/2019 0.0  0 - 0.4 10e9/L Final     Absolute Nucleated RBC 05/06/2019 0.0   Final     Sodium 05/06/2019 139  133 - 144 mmol/L Final     Potassium 05/06/2019 4.1  3.4 - 5.3 mmol/L Final     Chloride 05/06/2019 109  94 - 109 mmol/L Final     Carbon Dioxide 05/06/2019 23  20 - 32 mmol/L Final     Anion Gap 05/06/2019 7  3 - 14 mmol/L Final     Glucose 05/06/2019 97  70 - 99 mg/dL Final     Urea Nitrogen 05/06/2019 26  7 - 30 mg/dL Final      Creatinine 05/06/2019 2.08* 0.52 - 1.04 mg/dL Final     GFR Estimate 05/06/2019 20* >60 mL/min/[1.73_m2] Final    Comment: Non  GFR Calc  Starting 12/18/2018, serum creatinine based estimated GFR (eGFR) will be   calculated using the Chronic Kidney Disease Epidemiology Collaboration   (CKD-EPI) equation.       GFR Estimate If Black 05/06/2019 23* >60 mL/min/[1.73_m2] Final    Comment:  GFR Calc  Starting 12/18/2018, serum creatinine based estimated GFR (eGFR) will be   calculated using the Chronic Kidney Disease Epidemiology Collaboration   (CKD-EPI) equation.       Calcium 05/06/2019 10.5* 8.5 - 10.1 mg/dL Final     Bilirubin Total 05/06/2019 0.4  0.2 - 1.3 mg/dL Final     Albumin 05/06/2019 3.0* 3.4 - 5.0 g/dL Final     Protein Total 05/06/2019 7.3  6.8 - 8.8 g/dL Final     Alkaline Phosphatase 05/06/2019 141  40 - 150 U/L Final     ALT 05/06/2019 27  0 - 50 U/L Final     AST 05/06/2019 34  0 - 45 U/L Final   Orders Only on 05/06/2019   Component Date Value Ref Range Status     Color Urine 05/06/2019 Yellow   Final     Appearance Urine 05/06/2019 Slightly Cloudy   Final     Glucose Urine 05/06/2019 Negative  NEG^Negative mg/dL Final     Bilirubin Urine 05/06/2019 Negative  NEG^Negative Final     Ketones Urine 05/06/2019 Negative  NEG^Negative mg/dL Final     Specific Gravity Urine 05/06/2019 1.009  1.003 - 1.035 Final     Blood Urine 05/06/2019 Negative  NEG^Negative Final     pH Urine 05/06/2019 7.0  5.0 - 7.0 pH Final     Protein Albumin Urine 05/06/2019 Negative  NEG^Negative mg/dL Final     Urobilinogen mg/dL 05/06/2019 0.0  0.0 - 2.0 mg/dL Final     Nitrite Urine 05/06/2019 Negative  NEG^Negative Final     Leukocyte Esterase Urine 05/06/2019 Large* NEG^Negative Final     Source 05/06/2019 Midstream Urine   Final     WBC Urine 05/06/2019 >182* 0 - 5 /HPF Final     RBC Urine 05/06/2019 2  0 - 2 /HPF Final     WBC Clumps 05/06/2019 Present* NEG^Negative /HPF Final     Bacteria  Urine 05/06/2019 Many* NEG^Negative /HPF Final     Squamous Epithelial /HPF Urine 05/06/2019 1  0 - 1 /HPF Final     Parathyroid Hormone Intact 05/06/2019 112* 18 - 80 pg/mL Final     Iron 05/06/2019 54  35 - 180 ug/dL Final     Iron Binding Cap 05/06/2019 298  240 - 430 ug/dL Final     Iron Saturation Index 05/06/2019 18  15 - 46 % Final     Ferritin 05/06/2019 180  8 - 252 ng/mL Final     Creatinine Urine 05/06/2019 71  mg/dL Final     Albumin Urine mg/L 05/06/2019 22  mg/L Final     Albumin Urine mg/g Cr 05/06/2019 30.79* 0 - 25 mg/g Cr Final     Protein Random Urine 05/06/2019 0.19  g/L Final     Protein Total Urine g/gr Creatinine 05/06/2019 0.27* 0 - 0.2 g/g Cr Final     Sodium 05/06/2019 138  133 - 144 mmol/L Final     Potassium 05/06/2019 4.1  3.4 - 5.3 mmol/L Final     Chloride 05/06/2019 108  94 - 109 mmol/L Final     Carbon Dioxide 05/06/2019 23  20 - 32 mmol/L Final     Anion Gap 05/06/2019 6  3 - 14 mmol/L Final     Glucose 05/06/2019 98  70 - 99 mg/dL Final     Urea Nitrogen 05/06/2019 26  7 - 30 mg/dL Final     Creatinine 05/06/2019 2.10* 0.52 - 1.04 mg/dL Final     GFR Estimate 05/06/2019 20* >60 mL/min/[1.73_m2] Final    Comment: Non  GFR Calc  Starting 12/18/2018, serum creatinine based estimated GFR (eGFR) will be   calculated using the Chronic Kidney Disease Epidemiology Collaboration   (CKD-EPI) equation.       GFR Estimate If Black 05/06/2019 23* >60 mL/min/[1.73_m2] Final    Comment:  GFR Calc  Starting 12/18/2018, serum creatinine based estimated GFR (eGFR) will be   calculated using the Chronic Kidney Disease Epidemiology Collaboration   (CKD-EPI) equation.       Calcium 05/06/2019 10.3* 8.5 - 10.1 mg/dL Final     Phosphorus 05/06/2019 3.4  2.5 - 4.5 mg/dL Final     Albumin 05/06/2019 3.0* 3.4 - 5.0 g/dL Final           Jodi Simons MD

## 2019-05-06 NOTE — LETTER
5/6/2019       RE: Lennie Mar  [address]  [facility] [address]     Dear Colleague,    Thank you for referring your patient, Lennie Mar, to the [facility] NEPHROLOGY at [facility]. Please see a copy of my visit note below.    May 6, 2019    I was asked to see this patient by Dr. Malik     CC: CKD stage 4    HPI: Lennie Mar is a 89 year old female who presents for evaluation of  CKD stage 4  Ms Mar had an elevated creatinine going back to 2012, however may have had CKD long before since her creatinine overestimates her eGFR. She reports of being on Lithium since 1968 and reportedly was on higher dose of Li. She had an episode of lithium toxicity in 2012 when she presented with neurological symptoms and had level of 1.3. She also has developed tremors over years. I will obtain records from her psychiatrist. She has had recurrent renal calculi in the right kidney and had colon cancer/rt hemicolectomy during which b/l ureteral stents were placed. She had a tortuous right ureter at the time. In November 2018, she was diagnosed with Ca breast and had a left mastectomy. She is now on letrozole. She carries a h/o hyperparathyroidism and has had hypercalcemia more recently. Elevated PTH since 2012.Other ROS includes urinary incontinence due to overactive bladder and orthostatic hypotension.     - History of Hematuria: no  - Swelling: trace  - Hx of UTIs: yes  - Hx of stones: yes, does not remember having stones, reviewed from urology notes   - Rashes/Joint pain: no  - Family hx of kidney disease: no  - NSAID use: no    # CKD stage 4: most likely 2/2 long term lithium use. UA with minimal proteinuria. Her CKD seem to be progressively worsening and her creatinine is now at 2.0 with eGFR of 20. Her cystatin C eGFR is 25 . Has a h/o ureteral procedures and stent placement. She also has mild hypercalcemia which could be contributing.  --Will  arrange for an US of her kidneys   --Agree with decreasing the lithium dose by her psychiatrist   --Will consider treating her hypercalcemia with Cinacalcet if her creatine continues to get worse and she remains hypercalcemic since this may be the only reversible factor.     #Volume/BP: Acceptable. BP goal of < 160/90. Trace edema at the end of the day. TTE done 4/19 with EF of 61%    # Hypercalcemia: likely has PTH related hypercalcemia 2/2 lithium use. She also has a mass in her neck which will need to be evaluated. PTH of 112 today.   --TSH and T4 wnl.   --Check US of her neck to initiate the w/u     --Will recheck BMP in 1 month along with iCa     F/u in clinic in 3 months      Jodi Simons MD          Allergies   Allergen Reactions     Cafergot Other (See Comments) and Nausea and Vomiting     Severe HA, N/V & diarrhea     Ciprofloxacin      Nausea and vomiting per son      Penicillins Rash     Sulfa Drugs Rash     Lamictal [Lamotrigine] Other (See Comments)     Patient experienced night moss     Naproxen      Pt unable to remember reaction.     Tetracycline      Pt unable to remember allergy         Current Outpatient Medications on File Prior to Visit:  acetaminophen (TYLENOL) 325 MG tablet Take 3 tablets (975 mg) by mouth every 8 hours as needed for mild pain   ARIPiprazole (ABILIFY) 5 MG tablet 5 mg tablet in the morning   buPROPion (WELLBUTRIN XL) 300 MG 24 hr tablet 300 mg tablet in the mornnig   Cyanocobalamin (VITAMIN B 12 PO) Take by mouth every morning   letrozole (FEMARA) 2.5 MG tablet Take 1 tablet (2.5 mg) by mouth daily (Patient taking differently: Take 2.5 mg by mouth every evening )   lithium 150 MG capsule Take 1 capsule by mouth daily (with breakfast).   lithium 300 MG capsule Take 1 capsule by mouth daily (with dinner).   LORazepam (ATIVAN) 0.5 MG tablet Take 1 tablet (0.5 mg) by mouth every 4 hours as needed (Anxiety, Nausea/Vomiting or Sleep)   neomycin-polymyxin-dexamethasone (MAXITROL)  3.5-41782-4.1 ophthalmic ointment    ondansetron (ZOFRAN) 8 MG tablet Take 1 tablet (8 mg) by mouth every 8 hours as needed for nausea   ORDER FOR DME Equipment being ordered: compression stocking knee high 20-30mmhg   pramipexole (MIRAPEX) 1 MG tablet Take 1 mg by mouth every morning    prochlorperazine (COMPAZINE) 10 MG tablet Take 0.5 tablets (5 mg) by mouth every 6 hours as needed (Nausea/Vomiting)   propranolol (INDERAL) 20 MG tablet Take 1 tablet (20 mg) by mouth 2 times daily   sertraline (ZOLOFT) 50 MG tablet Take 25 mg by mouth every morning    [] cefpodoxime (VANTIN) 100 MG tablet Take 1 tablet (100 mg) by mouth 2 times daily for 5 days     No current facility-administered medications on file prior to visit.     Past Medical History:   Diagnosis Date     Alcohol dependence in remission (H)      Anxiety      Asymptomatic varicose veins      Bipolar disorder, unspecified (H)      CKD (chronic kidney disease) stage 3, GFR 30-59 ml/min (H) 2014     History of smoking      Hyperparathyroidism (H)      Memory loss      Muscle weakness (generalized) 2008     Severe depression (H)      Subdural hygroma     chronic.  no surgeries     Tremor of unknown origin        Past Surgical History:   Procedure Laterality Date     APPENDECTOMY OPEN       CATARACT IOL, RT/LT       COLONOSCOPY WITH CO2 INSUFFLATION  2012    Procedure:COLONOSCOPY WITH CO2 INSUFFLATION; Surgeon:GAYLE REYNA; Location:[facility] OR     CYSTOCELE REPAIR       CYSTOSCOPY, INSERT STENT URETHRA, COMBINED       CYSTOSCOPY, RETROGRADES, INSERT STENT URETER(S), COMBINED  2012    Procedure:COMBINED CYSTOSCOPY, RETROGRADES, INSERT STENT URETER(S); Surgeon:ATN ESPINOZA; Location:[facility] OR     DECOMPRESSION LUMBAR ONE LEVEL  2013    Procedure: DECOMPRESSION LUMBAR ONE LEVEL;  Posterior Decompression Right Lumbar 5- Sacral 1;  Surgeon: You Zavala MD;  Location: [facility] OR     HC TOOTH EXTRACTION W/FORCEP        HYSTERECTOMY, CATA       IRRIGATION AND DEBRIDEMENT ORAL, COMBINED      For treatment of gingivitis     LAPAROSCOPIC ASSISTED COLECTOMY  2012    Procedure:LAPAROSCOPIC ASSISTED COLECTOMY; Cysto, Bilateral Stent placement (both stents removed at end of case)- Yanet ACOSTA. Laparoscopic Extended Right Venu Colectomy with CO2 Colonoscopy and polyp removal-Judah; Surgeon:GAYLE REYNA; Location:[facility] OR     LIGATN/STRIP LONG OR SHORT SAPHEN       MASTECTOMY PARTIAL WITH SENTINEL NODE Left 2018    Procedure: Left Mastectomy, Left Grant Lymph Node Biopsy;  Surgeon: Nahun Betancourt MD;  Location: [facility] OR     STRIP VEIN BILATERAL       SURGICAL HISTORY OF -       ureter surgery for blockage.       Social History     Tobacco Use     Smoking status: Former Smoker     Packs/day: 1.50     Years: 30.00     Pack years: 45.00     Types: Cigarettes     Last attempt to quit: 1993     Years since quittin.7     Smokeless tobacco: Never Used   Substance Use Topics     Alcohol use: No     Drug use: No       Family History   Problem Relation Age of Onset     C.A.D. Mother          at age 47 Heart failure, Rhumatic Fever     Cerebrovascular Disease Father          at age 79     Diabetes Father         type 2     Breast Cancer Sister 84        99 year old sister     Circulatory Maternal Grandmother         vericose veins     C.A.D. Paternal Grandfather      Gastrointestinal Disease Paternal Grandfather         ulcer     Cancer Son          age 51--Melanoma     Cancer Brother          age 50's--Melanoma     Arthritis Sister      Circulatory Sister         vericose veins     Circulatory Sister         vericose veins     Eye Disorder Sister         Cateracts     Respiratory Sister         asthma     Respiratory Brother         COPD     Breast Cancer Niece 60        daughter of 98 yo sister       ROS: A 12 system review of systems was negative other than noted here or above.      Exam:  /76   Pulse 94   Wt 68.2 kg (150 lb 4.8 oz)   SpO2 90%   BMI 28.41 kg/m       GENERAL APPEARANCE: alert and no distress  EYES: PERRL, no scleral icterus  HENT: mouth without ulcers or lesions  NECK: supple, no adenopathy  RESP: lungs clear to auscultation   CV: regular rhythm, normal rate, no rub  Extremities: no clubbing, cyanosis. Trace edema +  SKIN: no rash  NEURO: mentation intact and speech normal  PSYCH: affect normal/bright    Results:    Infusion Therapy Visit on 05/06/2019   Component Date Value Ref Range Status     WBC 05/06/2019 6.5  4.0 - 11.0 10e9/L Final     RBC Count 05/06/2019 3.82  3.8 - 5.2 10e12/L Final     Hemoglobin 05/06/2019 11.6* 11.7 - 15.7 g/dL Final     Hematocrit 05/06/2019 37.7  35.0 - 47.0 % Final     MCV 05/06/2019 99  78 - 100 fl Final     MCH 05/06/2019 30.4  26.5 - 33.0 pg Final     MCHC 05/06/2019 30.8* 31.5 - 36.5 g/dL Final     RDW 05/06/2019 15.1* 10.0 - 15.0 % Final     Platelet Count 05/06/2019 182  150 - 450 10e9/L Final     Diff Method 05/06/2019 Automated Method   Final     % Neutrophils 05/06/2019 62.9  % Final     % Lymphocytes 05/06/2019 16.4  % Final     % Monocytes 05/06/2019 15.6  % Final     % Eosinophils 05/06/2019 3.4  % Final     % Basophils 05/06/2019 1.1  % Final     % Immature Granulocytes 05/06/2019 0.6  % Final     Nucleated RBCs 05/06/2019 0  0 /100 Final     Absolute Neutrophil 05/06/2019 4.1  1.6 - 8.3 10e9/L Final     Absolute Lymphocytes 05/06/2019 1.1  0.8 - 5.3 10e9/L Final     Absolute Monocytes 05/06/2019 1.0  0.0 - 1.3 10e9/L Final     Absolute Eosinophils 05/06/2019 0.2  0.0 - 0.7 10e9/L Final     Absolute Basophils 05/06/2019 0.1  0.0 - 0.2 10e9/L Final     Abs Immature Granulocytes 05/06/2019 0.0  0 - 0.4 10e9/L Final     Absolute Nucleated RBC 05/06/2019 0.0   Final     Sodium 05/06/2019 139  133 - 144 mmol/L Final     Potassium 05/06/2019 4.1  3.4 - 5.3 mmol/L Final     Chloride 05/06/2019 109  94 - 109 mmol/L Final      Carbon Dioxide 05/06/2019 23  20 - 32 mmol/L Final     Anion Gap 05/06/2019 7  3 - 14 mmol/L Final     Glucose 05/06/2019 97  70 - 99 mg/dL Final     Urea Nitrogen 05/06/2019 26  7 - 30 mg/dL Final     Creatinine 05/06/2019 2.08* 0.52 - 1.04 mg/dL Final     GFR Estimate 05/06/2019 20* >60 mL/min/[1.73_m2] Final    Comment: Non  GFR Calc  Starting 12/18/2018, serum creatinine based estimated GFR (eGFR) will be   calculated using the Chronic Kidney Disease Epidemiology Collaboration   (CKD-EPI) equation.       GFR Estimate If Black 05/06/2019 23* >60 mL/min/[1.73_m2] Final    Comment:  GFR Calc  Starting 12/18/2018, serum creatinine based estimated GFR (eGFR) will be   calculated using the Chronic Kidney Disease Epidemiology Collaboration   (CKD-EPI) equation.       Calcium 05/06/2019 10.5* 8.5 - 10.1 mg/dL Final     Bilirubin Total 05/06/2019 0.4  0.2 - 1.3 mg/dL Final     Albumin 05/06/2019 3.0* 3.4 - 5.0 g/dL Final     Protein Total 05/06/2019 7.3  6.8 - 8.8 g/dL Final     Alkaline Phosphatase 05/06/2019 141  40 - 150 U/L Final     ALT 05/06/2019 27  0 - 50 U/L Final     AST 05/06/2019 34  0 - 45 U/L Final   Orders Only on 05/06/2019   Component Date Value Ref Range Status     Color Urine 05/06/2019 Yellow   Final     Appearance Urine 05/06/2019 Slightly Cloudy   Final     Glucose Urine 05/06/2019 Negative  NEG^Negative mg/dL Final     Bilirubin Urine 05/06/2019 Negative  NEG^Negative Final     Ketones Urine 05/06/2019 Negative  NEG^Negative mg/dL Final     Specific Gravity Urine 05/06/2019 1.009  1.003 - 1.035 Final     Blood Urine 05/06/2019 Negative  NEG^Negative Final     pH Urine 05/06/2019 7.0  5.0 - 7.0 pH Final     Protein Albumin Urine 05/06/2019 Negative  NEG^Negative mg/dL Final     Urobilinogen mg/dL 05/06/2019 0.0  0.0 - 2.0 mg/dL Final     Nitrite Urine 05/06/2019 Negative  NEG^Negative Final     Leukocyte Esterase Urine 05/06/2019 Large* NEG^Negative Final     Source  05/06/2019 Midstream Urine   Final     WBC Urine 05/06/2019 >182* 0 - 5 /HPF Final     RBC Urine 05/06/2019 2  0 - 2 /HPF Final     WBC Clumps 05/06/2019 Present* NEG^Negative /HPF Final     Bacteria Urine 05/06/2019 Many* NEG^Negative /HPF Final     Squamous Epithelial /HPF Urine 05/06/2019 1  0 - 1 /HPF Final     Parathyroid Hormone Intact 05/06/2019 112* 18 - 80 pg/mL Final     Iron 05/06/2019 54  35 - 180 ug/dL Final     Iron Binding Cap 05/06/2019 298  240 - 430 ug/dL Final     Iron Saturation Index 05/06/2019 18  15 - 46 % Final     Ferritin 05/06/2019 180  8 - 252 ng/mL Final     Creatinine Urine 05/06/2019 71  mg/dL Final     Albumin Urine mg/L 05/06/2019 22  mg/L Final     Albumin Urine mg/g Cr 05/06/2019 30.79* 0 - 25 mg/g Cr Final     Protein Random Urine 05/06/2019 0.19  g/L Final     Protein Total Urine g/gr Creatinine 05/06/2019 0.27* 0 - 0.2 g/g Cr Final     Sodium 05/06/2019 138  133 - 144 mmol/L Final     Potassium 05/06/2019 4.1  3.4 - 5.3 mmol/L Final     Chloride 05/06/2019 108  94 - 109 mmol/L Final     Carbon Dioxide 05/06/2019 23  20 - 32 mmol/L Final     Anion Gap 05/06/2019 6  3 - 14 mmol/L Final     Glucose 05/06/2019 98  70 - 99 mg/dL Final     Urea Nitrogen 05/06/2019 26  7 - 30 mg/dL Final     Creatinine 05/06/2019 2.10* 0.52 - 1.04 mg/dL Final     GFR Estimate 05/06/2019 20* >60 mL/min/[1.73_m2] Final    Comment: Non  GFR Calc  Starting 12/18/2018, serum creatinine based estimated GFR (eGFR) will be   calculated using the Chronic Kidney Disease Epidemiology Collaboration   (CKD-EPI) equation.       GFR Estimate If Black 05/06/2019 23* >60 mL/min/[1.73_m2] Final    Comment:  GFR Calc  Starting 12/18/2018, serum creatinine based estimated GFR (eGFR) will be   calculated using the Chronic Kidney Disease Epidemiology Collaboration   (CKD-EPI) equation.       Calcium 05/06/2019 10.3* 8.5 - 10.1 mg/dL Final     Phosphorus 05/06/2019 3.4  2.5 - 4.5 mg/dL Final      Albumin 05/06/2019 3.0* 3.4 - 5.0 g/dL Final           Again, thank you for allowing me to participate in the care of your patient.      Sincerely,    Jodi Simons MD

## 2019-05-07 LAB — LAB SCANNED RESULT: ABNORMAL

## 2019-05-09 ENCOUNTER — ANCILLARY PROCEDURE (OUTPATIENT)
Dept: ULTRASOUND IMAGING | Facility: CLINIC | Age: 84
End: 2019-05-09
Attending: INTERNAL MEDICINE
Payer: MEDICARE

## 2019-05-09 DIAGNOSIS — E04.1 THYROID NODULE: ICD-10-CM

## 2019-05-09 PROCEDURE — 88173 CYTOPATH EVAL FNA REPORT: CPT | Performed by: INTERNAL MEDICINE

## 2019-05-09 PROCEDURE — 88172 CYTP DX EVAL FNA 1ST EA SITE: CPT | Performed by: INTERNAL MEDICINE

## 2019-05-09 RX ORDER — LIDOCAINE HYDROCHLORIDE 10 MG/ML
5 INJECTION, SOLUTION INFILTRATION; PERINEURAL ONCE
Status: COMPLETED | OUTPATIENT
Start: 2019-05-09 | End: 2019-05-09

## 2019-05-09 RX ADMIN — LIDOCAINE HYDROCHLORIDE 2 ML: 10 INJECTION, SOLUTION INFILTRATION; PERINEURAL at 10:19

## 2019-05-10 ENCOUNTER — PATIENT OUTREACH (OUTPATIENT)
Dept: ONCOLOGY | Facility: CLINIC | Age: 84
End: 2019-05-10

## 2019-05-10 LAB — COPATH REPORT: NORMAL

## 2019-05-10 NOTE — PROGRESS NOTES
Call placed to patient but no answer.      Leslee Colon RNCC BSN CBCN        Please let Lennie know that thyroid biopsy was benign.  I sent her a [employer] message, but I am not sure she actually reads these.     Perlita

## 2019-05-13 ENCOUNTER — PATIENT OUTREACH (OUTPATIENT)
Dept: ONCOLOGY | Facility: CLINIC | Age: 84
End: 2019-05-13

## 2019-05-13 NOTE — PROGRESS NOTES
Pt returned call and received her benign thyroid biopsy results.  Pt stated she has been hiking and feeling great!  Dr. Malik, updated.    Leslee Colon RNCC BSN CBCN

## 2019-05-13 NOTE — PROGRESS NOTES
Per Dr. Malik call placed again to notify patient that her thyroid bx was negative.  Left our nurse triage telephone number for call back to receive results.    Leslee Colon RNCC BSN CBCN

## 2019-05-16 DIAGNOSIS — N18.4 CKD (CHRONIC KIDNEY DISEASE) STAGE 4, GFR 15-29 ML/MIN (H): Primary | ICD-10-CM

## 2019-05-28 DIAGNOSIS — N18.30 CKD (CHRONIC KIDNEY DISEASE) STAGE 3, GFR 30-59 ML/MIN (H): Primary | ICD-10-CM

## 2019-05-29 ENCOUNTER — ONCOLOGY VISIT (OUTPATIENT)
Dept: ONCOLOGY | Facility: CLINIC | Age: 84
End: 2019-05-29
Attending: INTERNAL MEDICINE
Payer: MEDICARE

## 2019-05-29 ENCOUNTER — ANCILLARY PROCEDURE (OUTPATIENT)
Dept: ULTRASOUND IMAGING | Facility: CLINIC | Age: 84
End: 2019-05-29
Attending: INTERNAL MEDICINE
Payer: MEDICARE

## 2019-05-29 ENCOUNTER — APPOINTMENT (OUTPATIENT)
Dept: LAB | Facility: CLINIC | Age: 84
End: 2019-05-29
Attending: INTERNAL MEDICINE
Payer: MEDICARE

## 2019-05-29 VITALS
TEMPERATURE: 97.9 F | OXYGEN SATURATION: 92 % | HEIGHT: 61 IN | BODY MASS INDEX: 28.45 KG/M2 | SYSTOLIC BLOOD PRESSURE: 144 MMHG | RESPIRATION RATE: 16 BRPM | DIASTOLIC BLOOD PRESSURE: 69 MMHG | WEIGHT: 150.7 LBS | HEART RATE: 83 BPM

## 2019-05-29 DIAGNOSIS — N18.30 CKD (CHRONIC KIDNEY DISEASE) STAGE 3, GFR 30-59 ML/MIN (H): ICD-10-CM

## 2019-05-29 DIAGNOSIS — N18.4 CKD (CHRONIC KIDNEY DISEASE) STAGE 4, GFR 15-29 ML/MIN (H): ICD-10-CM

## 2019-05-29 DIAGNOSIS — C50.212 MALIGNANT NEOPLASM OF UPPER-INNER QUADRANT OF LEFT BREAST IN FEMALE, ESTROGEN RECEPTOR POSITIVE (H): ICD-10-CM

## 2019-05-29 DIAGNOSIS — C50.212 MALIGNANT NEOPLASM OF UPPER-INNER QUADRANT OF LEFT BREAST IN FEMALE, ESTROGEN RECEPTOR POSITIVE (H): Primary | ICD-10-CM

## 2019-05-29 DIAGNOSIS — Z17.0 MALIGNANT NEOPLASM OF UPPER-INNER QUADRANT OF LEFT BREAST IN FEMALE, ESTROGEN RECEPTOR POSITIVE (H): Primary | ICD-10-CM

## 2019-05-29 DIAGNOSIS — Z17.0 MALIGNANT NEOPLASM OF UPPER-INNER QUADRANT OF LEFT BREAST IN FEMALE, ESTROGEN RECEPTOR POSITIVE (H): ICD-10-CM

## 2019-05-29 LAB
ALBUMIN SERPL-MCNC: 3.2 G/DL (ref 3.4–5)
ALP SERPL-CCNC: 187 U/L (ref 40–150)
ALT SERPL W P-5'-P-CCNC: 33 U/L (ref 0–50)
ANION GAP SERPL CALCULATED.3IONS-SCNC: 7 MMOL/L (ref 3–14)
AST SERPL W P-5'-P-CCNC: 38 U/L (ref 0–45)
BASOPHILS # BLD AUTO: 0.1 10E9/L (ref 0–0.2)
BASOPHILS NFR BLD AUTO: 0.9 %
BILIRUB SERPL-MCNC: 0.4 MG/DL (ref 0.2–1.3)
BUN SERPL-MCNC: 24 MG/DL (ref 7–30)
CA-I SERPL ISE-MCNC: 5.3 MG/DL (ref 4.4–5.2)
CALCIUM SERPL-MCNC: 10.5 MG/DL (ref 8.5–10.1)
CHLORIDE SERPL-SCNC: 110 MMOL/L (ref 94–109)
CO2 SERPL-SCNC: 25 MMOL/L (ref 20–32)
CREAT SERPL-MCNC: 1.15 MG/DL (ref 0.52–1.04)
DIFFERENTIAL METHOD BLD: ABNORMAL
EOSINOPHIL # BLD AUTO: 0.2 10E9/L (ref 0–0.7)
EOSINOPHIL NFR BLD AUTO: 3.4 %
ERYTHROCYTE [DISTWIDTH] IN BLOOD BY AUTOMATED COUNT: 15.1 % (ref 10–15)
GFR SERPL CREATININE-BSD FRML MDRD: 41 ML/MIN/{1.73_M2}
GLUCOSE SERPL-MCNC: 91 MG/DL (ref 70–99)
HCT VFR BLD AUTO: 36.7 % (ref 35–47)
HGB BLD-MCNC: 11.6 G/DL (ref 11.7–15.7)
IMM GRANULOCYTES # BLD: 0 10E9/L (ref 0–0.4)
IMM GRANULOCYTES NFR BLD: 0.6 %
LYMPHOCYTES # BLD AUTO: 1 10E9/L (ref 0.8–5.3)
LYMPHOCYTES NFR BLD AUTO: 18.9 %
MCH RBC QN AUTO: 30.5 PG (ref 26.5–33)
MCHC RBC AUTO-ENTMCNC: 31.6 G/DL (ref 31.5–36.5)
MCV RBC AUTO: 97 FL (ref 78–100)
MONOCYTES # BLD AUTO: 0.6 10E9/L (ref 0–1.3)
MONOCYTES NFR BLD AUTO: 11.2 %
NEUTROPHILS # BLD AUTO: 3.5 10E9/L (ref 1.6–8.3)
NEUTROPHILS NFR BLD AUTO: 65 %
NRBC # BLD AUTO: 0 10*3/UL
NRBC BLD AUTO-RTO: 0 /100
PLATELET # BLD AUTO: 157 10E9/L (ref 150–450)
POTASSIUM SERPL-SCNC: 4.5 MMOL/L (ref 3.4–5.3)
PROT SERPL-MCNC: 7.2 G/DL (ref 6.8–8.8)
RBC # BLD AUTO: 3.8 10E12/L (ref 3.8–5.2)
SODIUM SERPL-SCNC: 142 MMOL/L (ref 133–144)
WBC # BLD AUTO: 5.4 10E9/L (ref 4–11)

## 2019-05-29 PROCEDURE — 25800030 ZZH RX IP 258 OP 636: Mod: ZF | Performed by: PHYSICIAN ASSISTANT

## 2019-05-29 PROCEDURE — 82330 ASSAY OF CALCIUM: CPT | Performed by: INTERNAL MEDICINE

## 2019-05-29 PROCEDURE — 80053 COMPREHEN METABOLIC PANEL: CPT | Performed by: PHYSICIAN ASSISTANT

## 2019-05-29 PROCEDURE — 25000128 H RX IP 250 OP 636: Mod: ZF | Performed by: PHYSICIAN ASSISTANT

## 2019-05-29 PROCEDURE — 40000556 ZZH STATISTIC PERIPHERAL IV START W US GUIDANCE: Mod: ZF

## 2019-05-29 PROCEDURE — 85025 COMPLETE CBC W/AUTO DIFF WBC: CPT | Performed by: PHYSICIAN ASSISTANT

## 2019-05-29 PROCEDURE — G0463 HOSPITAL OUTPT CLINIC VISIT: HCPCS | Mod: ZF

## 2019-05-29 PROCEDURE — 99214 OFFICE O/P EST MOD 30 MIN: CPT | Mod: ZP | Performed by: PHYSICIAN ASSISTANT

## 2019-05-29 PROCEDURE — 96413 CHEMO IV INFUSION 1 HR: CPT

## 2019-05-29 RX ORDER — ALBUTEROL SULFATE 90 UG/1
1-2 AEROSOL, METERED RESPIRATORY (INHALATION)
Status: CANCELLED
Start: 2019-05-29

## 2019-05-29 RX ORDER — SODIUM CHLORIDE 9 MG/ML
1000 INJECTION, SOLUTION INTRAVENOUS CONTINUOUS PRN
Status: CANCELLED
Start: 2019-05-29

## 2019-05-29 RX ORDER — DIPHENHYDRAMINE HYDROCHLORIDE 50 MG/ML
50 INJECTION INTRAMUSCULAR; INTRAVENOUS
Status: CANCELLED
Start: 2019-05-29

## 2019-05-29 RX ORDER — METHYLPREDNISOLONE SODIUM SUCCINATE 125 MG/2ML
125 INJECTION, POWDER, LYOPHILIZED, FOR SOLUTION INTRAMUSCULAR; INTRAVENOUS
Status: CANCELLED
Start: 2019-05-29

## 2019-05-29 RX ORDER — MEPERIDINE HYDROCHLORIDE 25 MG/ML
25 INJECTION INTRAMUSCULAR; INTRAVENOUS; SUBCUTANEOUS EVERY 30 MIN PRN
Status: CANCELLED | OUTPATIENT
Start: 2019-05-29

## 2019-05-29 RX ORDER — ALBUTEROL SULFATE 0.83 MG/ML
2.5 SOLUTION RESPIRATORY (INHALATION)
Status: CANCELLED | OUTPATIENT
Start: 2019-05-29

## 2019-05-29 RX ORDER — EPINEPHRINE 0.3 MG/.3ML
0.3 INJECTION SUBCUTANEOUS EVERY 5 MIN PRN
Status: CANCELLED | OUTPATIENT
Start: 2019-05-29

## 2019-05-29 RX ORDER — EPINEPHRINE 1 MG/ML
0.3 INJECTION, SOLUTION INTRAMUSCULAR; SUBCUTANEOUS EVERY 5 MIN PRN
Status: CANCELLED | OUTPATIENT
Start: 2019-05-29

## 2019-05-29 RX ADMIN — SODIUM CHLORIDE 250 ML: 9 INJECTION, SOLUTION INTRAVENOUS at 13:38

## 2019-05-29 RX ADMIN — ADO-TRASTUZUMAB EMTANSINE 260 MG: 20 INJECTION, POWDER, LYOPHILIZED, FOR SOLUTION INTRAVENOUS at 13:38

## 2019-05-29 ASSESSMENT — PAIN SCALES - GENERAL: PAINLEVEL: NO PAIN (0)

## 2019-05-29 ASSESSMENT — MIFFLIN-ST. JEOR: SCORE: 1035.69

## 2019-05-29 NOTE — NURSING NOTE
"Oncology Rooming Note    May 29, 2019 12:44 PM   Lennie Mar is a 89 year old female who presents for:    Chief Complaint   Patient presents with     Blood Draw     labs drawn with IV start by rn.  vital signs taken.     Oncology Clinic Visit     Return visit related to Breast Cancer     Initial Vitals: /69 (BP Location: Right arm, Patient Position: Sitting, Cuff Size: Adult Regular)   Pulse 83   Temp 97.9  F (36.6  C) (Oral)   Resp 16   Ht 1.549 m (5' 0.98\")   Wt 68.4 kg (150 lb 11.2 oz)   SpO2 92%   BMI 28.49 kg/m   Estimated body mass index is 28.49 kg/m  as calculated from the following:    Height as of this encounter: 1.549 m (5' 0.98\").    Weight as of this encounter: 68.4 kg (150 lb 11.2 oz). Body surface area is 1.72 meters squared.  No Pain (0) Comment: Data Unavailable   No LMP recorded. Patient is postmenopausal.  Allergies reviewed: Yes  Medications reviewed: Yes    Medications: Medication refills not needed today.  Pharmacy name entered into [employer]:    [facility] - [place], [place] - [address] [facility] [place], [place] - [address] [facility] [place], [place] - [address]  [facility] [id number] [place] [address] - [address] AT [facility] [address]    Clinical concerns: No new concerns. Provider was notified.      Pauline Koroma LPN            "

## 2019-05-29 NOTE — PATIENT INSTRUCTIONS
Contact Numbers  [facility] Nurse Triage: [phone]  After Hours Nurse Line:  [phone]     Please call the [facility] Triage line if you experience a temperature greater than or equal to 100.5, shaking chills, have uncontrolled nausea, vomiting and/or diarrhea, dizziness, shortness of breath, chest pain, bleeding, unexplained bruising, or if you have any other new/concerning symptoms, questions or concerns.      If it is after hours, weekends, or holidays, please call either the after hours nurse line listed above.      If you are having any concerning symptoms or wish to speak to a provider before your next infusion visit, please call your care coordinator or triage to notify them so we can adequately serve you.      If you need a refill on a narcotic prescription or other medication, please call triage before your infusion appointment.       May 2019      [place] Monday Tuesday Wednesday Thursday Friday Saturday                  1     2     3     4       5     6    LAB WITH  CLINIC   7:00 AM   (15 min.)    LAB   [facility]    P NEW   7:30 AM   (60 min.)   Jodi Simons MD   [facility] Nephrology    US HEAD NECK SOFT TISSUE   9:45 AM   (45 min.)   [facility]   [facility] ONC INFUSION 60  11:00 AM   (60 min.)    ONCOLOGY INFUSION   [facility] 7     8     9    US BIOPSY THYROID FNA  10:00 AM   (60 min.)   [facility]   [facility] US 10     11       12     13     14     15     16     17     18       19     20     21     22     23     24     25       26     27     28     29    US RENAL COMPLETE  10:45 AM   (45 min.)   [facility]   [facility]  11:30 AM   (15 min.)   [facility]   [facility] Lab Draw    [facility] RETURN  11:55 AM   (50 min.)   Dalila Blum PA-C   [facility] ONC INFUSION 60   1:00 PM   (60 min.)    ONCOLOGY INFUSION   [facility]  30    [facility] RETURN  12:00 PM   (30 min.)   Jodi Simons MD   [facility] Nephrology 31 June 2019 Sunday Monday Tuesday Wednesday Thursday Friday Saturday                                 1       2     3     4     5     6     7     8       9     10     11     12     13     14     15       16     17    [facility] DRAW   9:00 AM   (15 min.)    [facility] DRAW   [facility] Lab Draw    [facility] ONC INFUSION 60   9:30 AM   (60 min.)    ONCOLOGY INFUSION   [facility] 18     19     20     21     22       23     24     25     26     27     28     29       30                                                   Lab Results:  Recent Results (from the past 12 hour(s))   CBC with platelets differential    Collection Time: 05/29/19 12:36 PM   Result Value Ref Range    WBC 5.4 4.0 - 11.0 10e9/L    RBC Count 3.80 3.8 - 5.2 10e12/L    Hemoglobin 11.6 (L) 11.7 - 15.7 g/dL    Hematocrit 36.7 35.0 - 47.0 %    MCV 97 78 - 100 fl    MCH 30.5 26.5 - 33.0 pg    MCHC 31.6 31.5 - 36.5 g/dL    RDW 15.1 (H) 10.0 - 15.0 %    Platelet Count 157 150 - 450 10e9/L    Diff Method Automated Method     % Neutrophils 65.0 %    % Lymphocytes 18.9 %    % Monocytes 11.2 %    % Eosinophils 3.4 %    % Basophils 0.9 %    % Immature Granulocytes 0.6 %    Nucleated RBCs 0 0 /100    Absolute Neutrophil 3.5 1.6 - 8.3 10e9/L    Absolute Lymphocytes 1.0 0.8 - 5.3 10e9/L    Absolute Monocytes 0.6 0.0 - 1.3 10e9/L    Absolute Eosinophils 0.2 0.0 - 0.7 10e9/L    Absolute Basophils 0.1 0.0 - 0.2 10e9/L    Abs Immature Granulocytes 0.0 0 - 0.4 10e9/L    Absolute Nucleated RBC 0.0    Comprehensive metabolic panel    Collection Time: 05/29/19 12:36 PM   Result Value Ref Range    Sodium 142 133 - 144 mmol/L    Potassium 4.5 3.4 - 5.3 mmol/L    Chloride 110 (H) 94 - 109 mmol/L    Carbon Dioxide 25 20 - 32 mmol/L    Anion Gap 7 3 - 14 mmol/L    Glucose 91 70 - 99 mg/dL    Urea Nitrogen 24 7 - 30 mg/dL    Creatinine  1.15 (H) 0.52 - 1.04 mg/dL    GFR Estimate 41 (L) >60 mL/min/[1.73_m2]    GFR Estimate If Black 48 (L) >60 mL/min/[1.73_m2]    Calcium 10.5 (H) 8.5 - 10.1 mg/dL    Bilirubin Total 0.4 0.2 - 1.3 mg/dL    Albumin 3.2 (L) 3.4 - 5.0 g/dL    Protein Total 7.2 6.8 - 8.8 g/dL    Alkaline Phosphatase 187 (H) 40 - 150 U/L    ALT 33 0 - 50 U/L    AST 38 0 - 45 U/L   Calcium ionized    Collection Time: 05/29/19 12:36 PM   Result Value Ref Range    Calcium Ionized 5.3 (H) 4.4 - 5.2 mg/dL

## 2019-05-29 NOTE — PROGRESS NOTES
Infusion Nursing Note:  Lennie Mar presents for C7D1 Kadcyla  Met with EVELINE Stewart before infusion.    Note: Patient presents to infusion unaccompanied today. She reports feeling ok with no new complaints.     Treatment Conditions:  Lab Results   Component Value Date    HGB 11.6 05/29/2019     Lab Results   Component Value Date    WBC 5.4 05/29/2019      Lab Results   Component Value Date    ANEU 3.5 05/29/2019     Lab Results   Component Value Date     05/29/2019      Lab Results   Component Value Date     05/29/2019                   Lab Results   Component Value Date    POTASSIUM 4.5 05/29/2019           Lab Results   Component Value Date    MAG 2.0 04/22/2012            Lab Results   Component Value Date    CR 1.15 05/29/2019                   Lab Results   Component Value Date    CANELO 10.5 05/29/2019                Lab Results   Component Value Date    BILITOTAL 0.4 05/29/2019           Lab Results   Component Value Date    ALBUMIN 3.2 05/29/2019                    Lab Results   Component Value Date    ALT 33 05/29/2019           Lab Results   Component Value Date    AST 38 05/29/2019       Results reviewed, labs MET treatment parameters, ok to proceed with treatment.    Intravenous Access:  Peripheral IV placed.    Post Infusion Assessment:  Patient tolerated infusion without incident.  Blood return noted pre and post infusion.  Site patent and intact, free from redness, edema or discomfort.  No evidence of extravasations.  Access discontinued per protocol.    Discharge Plan:   Patient declined prescription refills.  Patient and/or family verbalized understanding of discharge instructions and all questions answered.  Copy of AVS reviewed with patient and/or family.  Patient will return 6/17 for next appointment.  Patient discharged in stable condition accompanied by: self.    Oxana Zhong RN

## 2019-05-29 NOTE — NURSING NOTE
Chief Complaint   Patient presents with     Blood Draw     labs drawn with IV start by rn.  vital signs taken.     Labs drawn with IV start by RN in lab.  Vital signs taken.  Pt checked in to next appointment.  Bryanna Root RN

## 2019-05-29 NOTE — LETTER
2019      RE: Lennie Mar   [address]  [facility] [address]       Hematology-Oncology Visit  May 29, 2019    Reason for Visit: follow-up left breast cancer, 2 primaries     HPI: Lennie Mar is a 89 year old female with past medical history of bipolar disorder, CKD, essential tremor with recent diagnosis of left breast cancer, two separate primaries. She presented with a palpable mass and had a mammogram and US leading to biopsies on 18. Pathology showed grade 3 invasive carcinoma, ER/MT positive, HER2 amplified of mass at 3:00 position with associated DCIS and skin involvement. The mass at 11:00 position was also grade 3 invasive carcinoma, ER/MT positive, but HER2 nonamplified. No lymphadenopathy was noted.     She met with Dr. Malik on 18 and elected to start neoadjuvant treatment with Herceptin every 3 weeks along with letrozole. Echocardiogram was done  showed EF of 55-60%. She tolerated neoadjuvant treatment remarkably well. She had a left breast mastectomy and SLNB on 18 showing two residual tumors, the larger grade 2 tumor ER, MT, HER2 amplified measuring 2.7 cm and smaller grade 3 tumor ER positive, MT negative, HER2 negative measured 2.5 cm. 2/3 left axillary nodes demonstrated metastases measuring 9 mm and 1.2 mm. HER2 FISH of larger axillary lymph node metastasis was amplified. Dr. Malik recommended adjuvant therapy with TDM1 for 1 year and adjuvant radiation.     Interval History: Lennie is here alone today to for cycle 7 of TDM1. She is feeling well today. Since her lithium dose has been decreased, she has more energy and feels better overall. She is happy her thyroid biopsy returned benign. No change in FLORES, CP, worsening edema. She is eating and drinking well. No n/v/d/c.  She is drinking a lot of fluids. She is traveling to [place] on  for her sister's . No fevers/chills or infectious concerns. ROS otherwise negative.   "     Current Outpatient Medications   Medication     lithium 150 MG capsule     acetaminophen (TYLENOL) 325 MG tablet     ARIPiprazole (ABILIFY) 5 MG tablet     buPROPion (WELLBUTRIN XL) 300 MG 24 hr tablet     Cyanocobalamin (VITAMIN B 12 PO)     letrozole (FEMARA) 2.5 MG tablet     lithium 300 MG capsule     LORazepam (ATIVAN) 0.5 MG tablet     neomycin-polymyxin-dexamethasone (MAXITROL) 3.5-67338-7.1 ophthalmic ointment     ondansetron (ZOFRAN) 8 MG tablet     ORDER FOR DME     pramipexole (MIRAPEX) 1 MG tablet     prochlorperazine (COMPAZINE) 10 MG tablet     propranolol (INDERAL) 20 MG tablet     sertraline (ZOLOFT) 50 MG tablet     No current facility-administered medications for this visit.      Facility-Administered Medications Ordered in Other Visits   Medication     0.9% sodium chloride BOLUS     ADO-trastuzumab (KADCYLA) 260 mg in sodium chloride 0.9 % 288 mL CHEMOTHERAPY       PHYSICAL EXAM:  /69 (BP Location: Right arm, Patient Position: Sitting, Cuff Size: Adult Regular)   Pulse 83   Temp 97.9  F (36.6  C) (Oral)   Resp 16   Ht 1.549 m (5' 0.98\")   Wt 68.4 kg (150 lb 11.2 oz)   SpO2 92%   BMI 28.49 kg/m     General: Alert, oriented, pleasant, NAD  HEENT: Normocephalic, atraumatic, PERRL, EOMI. Moist mucus membranes, no lesions or thrush  Neck: No cervical or supraclavicular LAD. R neck mass central to lateral measuring about 3 cm.   Axillary: No LAD  Breast: Deferred today.   Lungs: CTA bilaterally, normal work of breathing  Cardiac: RRR, S1, S2, no murmurs  Abdomen: Soft, nontender, nondistended. Normoactive bowel sounds. No hepatosplenomegaly, masses  Neuro: CNII-XII grossly intact  Extremities: No pedal edema    Labs:    5/29/2019 12:36   Sodium 142   Potassium 4.5   Chloride 110 (H)   Carbon Dioxide 25   Urea Nitrogen 24   Creatinine 1.15 (H)   GFR Estimate 41 (L)   GFR Estimate If Black 48 (L)   Calcium 10.5 (H)   Anion Gap 7   Albumin 3.2 (L)   Protein Total 7.2   Bilirubin Total 0.4 "   Alkaline Phosphatase 187 (H)   ALT 33   AST 38   Calcium Ionized 5.3 (H)   Glucose 91   WBC 5.4   Hemoglobin 11.6 (L)   Hematocrit 36.7   Platelet Count 157   RBC Count 3.80   MCV 97   MCH 30.5   MCHC 31.6   RDW 15.1 (H)   Diff Method Automated Method   % Neutrophils 65.0   % Lymphocytes 18.9   % Monocytes 11.2   % Eosinophils 3.4   % Basophils 0.9   % Immature Granulocytes 0.6   Nucleated RBCs 0   Absolute Neutrophil 3.5   Absolute Lymphocytes 1.0   Absolute Monocytes 0.6   Absolute Eosinophils 0.2   Absolute Basophils 0.1   Abs Immature Granulocytes 0.0   Absolute Nucleated RBC 0.0         Assessment & Plan:   89 year old female with multifocal, T2N1M0, invasive mammary carcinomas of the left breast.  She is s/p treatment with 5 months neoadjuvant herceptin and letrozole and left breast mastectomy.     1.  Left breast cancers:  She is currently undergoing treatment with radiation and Kadcyla.  She presents to clinic today for evaluation prior to cycle #7 of adjuvant Kadcyla.  She is tolerating treatment well.  Overall plan is to complete 1 year of T-DM1.  Upon completion of T-DM1 will resume treatment with letrozole. Discussed plan for 1 year of Kadcyla based on [facility] clinical trial to reduce risk of recurrence. She is tolerating well and lab work is adequate to proceed.      2.  At risk for cardiomyopathy:  Echocardiogram    4/15/19 was unchanged with EF at 61%. Plan to continue to monitor once every 3 months while on HER2 targeted therapy.  Next echocardiogram will be due in July.      3. CKD: She saw nephrology who suspected lithium toxicity. Her lithium dose was decreased. Renal US shows medical renal disease and mild R hydro. Cr has improved to 1.15 today. Has follow-up with nephrology tomorrow.     4. R neck mass: Biopsy was done showing benign findings.     5. Hypercalcemia: Likely related to PTH elevation from lithium. Stable--seeing nephro tomorrow.     [facility] [address] [place] [address]  [phone]

## 2019-05-30 ENCOUNTER — OFFICE VISIT (OUTPATIENT)
Dept: NEPHROLOGY | Facility: CLINIC | Age: 84
End: 2019-05-30
Attending: INTERNAL MEDICINE
Payer: MEDICARE

## 2019-05-30 VITALS
DIASTOLIC BLOOD PRESSURE: 83 MMHG | SYSTOLIC BLOOD PRESSURE: 146 MMHG | HEIGHT: 61 IN | HEART RATE: 85 BPM | OXYGEN SATURATION: 93 % | WEIGHT: 148.8 LBS | BODY MASS INDEX: 28.09 KG/M2

## 2019-05-30 DIAGNOSIS — N18.30 CKD (CHRONIC KIDNEY DISEASE) STAGE 3, GFR 30-59 ML/MIN (H): ICD-10-CM

## 2019-05-30 DIAGNOSIS — E55.9 VITAMIN D DEFICIENCY: Primary | ICD-10-CM

## 2019-05-30 DIAGNOSIS — E21.3 HYPERPARATHYROIDISM (H): ICD-10-CM

## 2019-05-30 PROCEDURE — G0463 HOSPITAL OUTPT CLINIC VISIT: HCPCS | Mod: ZF

## 2019-05-30 ASSESSMENT — PAIN SCALES - GENERAL: PAINLEVEL: NO PAIN (0)

## 2019-05-30 ASSESSMENT — MIFFLIN-ST. JEOR: SCORE: 1027.01

## 2019-05-30 NOTE — LETTER
"5/30/2019      RE: Lennie Mar  [address]  [facility] [address]       May 6, 2019    I was asked to see this patient by Dr. Malik     CC: CKD stage 4    HPI: Lennie Mar is a 89 year old female who presents for evaluation of  CKD stage 4  Ms Mar had an elevated creatinine going back to 2012, however may have had CKD long before since her creatinine overestimates her eGFR. She reports of being on Lithium since 1968 and reportedly was on higher dose of Li. She had an episode of lithium toxicity in 2012 when she presented with neurological symptoms and had level of 1.3. She also has developed tremors over years. I will obtain records from her psychiatrist. She has had recurrent renal calculi in the right kidney and had colon cancer/rt hemicolectomy during which b/l ureteral stents were placed. She had a tortuous right ureter at the time. In November 2018, she was diagnosed with Ca breast and had a left mastectomy. She is now on letrozole. She carries a h/o hyperparathyroidism and has had hypercalcemia more recently. Elevated PTH since 2012.Other ROS includes urinary incontinence due to overactive bladder and orthostatic hypotension.     - History of Hematuria: no  - Swelling: trace  - Hx of UTIs: yes  - Hx of stones: yes, does not remember having stones, reviewed from urology notes   - Rashes/Joint pain: no  - Family hx of kidney disease: no  - NSAID use: no    Today (5/30/2019): The patient is doing well and her kidney function is improved as she reduces her Lithium dose. She says that she is doing well on this reduced dosage: \"feeling like her old self\". She is not taking Vitamin D at this time, but is drinking a couple glasses of milk per day. She is not taking calcium supplements and continues to take Vitamin C. Her chemo cycles are also going well at this time, as she is finishing up cycle 7/17. Her blood pressures run slightly lower than 140/90 at home but usually around " those levels. Her tremors are resolved. No lower extremity edema at this time. She denies dizziness or lightheadedness except when she gets rapidly or turns fast. No shortness of breath.       # CKD stage 4: most likely 2/2 long term lithium use. UA with minimal proteinuria. Her CKD seem to be progressively worsening and her creatinine is now at 2.0 with eGFR of 20. Her cystatin C eGFR is 25 . Has a h/o ureteral procedures and stent placement. She also has mild hypercalcemia which could be contributing. US kidney showed mild atrophy and medical renal disease w/ b/l simple cysts. Mild right hydronephrosis.  --Creatinine improved today likely with decreasing Lithium dose.   --Agree with decreasing the lithium dose as she tolerates further   --Will consider treating her hypercalcemia with Cinacalcet if her creatine continues to get worse and she remains hypercalcemic     #Volume/BP: Acceptable. BP goal of < 160/90. Trace edema at the end of the day. TTE done 4/19 with EF of 61%    # Hypercalcemia: likely has PTH related hypercalcemia 2/2 lithium use. She also has a mass in her neck which will need to be evaluated. PTH of 112 today.   --TSH and T4 wnl. US neck showed a thyroid nodule which was biopsied and found to be benign   --Continues to have mild hypercalcemia  --will supplement 1000 international unit(s) vit D for now  --will continue to monitor Ca for now.     F/u in clinic in 6 months.     Jodi Simons MD          Allergies   Allergen Reactions     Cafergot Other (See Comments) and Nausea and Vomiting     Severe HA, N/V & diarrhea     Ciprofloxacin      Nausea and vomiting per son      Penicillins Rash and Unknown     Sulfa Drugs Rash and Unknown     Ergot Alkaloids Unknown     Lamictal [Lamotrigine] Other (See Comments)     Patient experienced night moss     Naproxen      Pt unable to remember reaction.     Naproxen Unknown     Tetracycline Unknown     Pt unable to remember allergy         Current Outpatient  Medications on File Prior to Visit:  acetaminophen (TYLENOL) 325 MG tablet Take 3 tablets (975 mg) by mouth every 8 hours as needed for mild pain   ARIPiprazole (ABILIFY) 5 MG tablet 5 mg tablet in the morning   buPROPion (WELLBUTRIN XL) 300 MG 24 hr tablet 300 mg tablet in the mornnig   [] cefpodoxime (VANTIN) 100 MG tablet Take 1 tablet (100 mg) by mouth 2 times daily for 5 days   Cyanocobalamin (VITAMIN B 12 PO) Take by mouth every morning   letrozole (FEMARA) 2.5 MG tablet Take 1 tablet (2.5 mg) by mouth daily (Patient taking differently: Take 2.5 mg by mouth every evening )   lithium 150 MG capsule Take 1 capsule by mouth daily (with breakfast).   lithium 300 MG capsule Take 1 capsule by mouth daily (with dinner). (Patient not taking: Reported on 2019)   LORazepam (ATIVAN) 0.5 MG tablet Take 1 tablet (0.5 mg) by mouth every 4 hours as needed (Anxiety, Nausea/Vomiting or Sleep)   neomycin-polymyxin-dexamethasone (MAXITROL) 3.5-55526-2.1 ophthalmic ointment    ondansetron (ZOFRAN) 8 MG tablet Take 1 tablet (8 mg) by mouth every 8 hours as needed for nausea   ORDER FOR DME Equipment being ordered: compression stocking knee high 20-30mmhg   pramipexole (MIRAPEX) 1 MG tablet Take 1 mg by mouth every morning    prochlorperazine (COMPAZINE) 10 MG tablet Take 0.5 tablets (5 mg) by mouth every 6 hours as needed (Nausea/Vomiting)   propranolol (INDERAL) 20 MG tablet Take 1 tablet (20 mg) by mouth 2 times daily   sertraline (ZOLOFT) 50 MG tablet Take 25 mg by mouth every morning      Current Facility-Administered Medications on File Prior to Visit:  [COMPLETED] 0.9% sodium chloride BOLUS   [COMPLETED] ADO-trastuzumab (KADCYLA) 260 mg in sodium chloride 0.9 % 288 mL CHEMOTHERAPY       Past Medical History:   Diagnosis Date     Alcohol dependence in remission (H)      Anxiety      Asymptomatic varicose veins      Bipolar disorder, unspecified (H)      CKD (chronic kidney disease) stage 3, GFR 30-59 ml/min (H)  2014     History of smoking      Hyperparathyroidism (H)      Memory loss      Muscle weakness (generalized) 2008     Severe depression (H)      Subdural hygroma     chronic.  no surgeries     Tremor of unknown origin        Past Surgical History:   Procedure Laterality Date     APPENDECTOMY OPEN       CATARACT IOL, RT/LT       COLONOSCOPY WITH CO2 INSUFFLATION  2012    Procedure:COLONOSCOPY WITH CO2 INSUFFLATION; Surgeon:GAYLE REYNA; Location:[facility] OR     CYSTOCELE REPAIR       CYSTOSCOPY, INSERT STENT URETHRA, COMBINED       CYSTOSCOPY, RETROGRADES, INSERT STENT URETER(S), COMBINED  2012    Procedure:COMBINED CYSTOSCOPY, RETROGRADES, INSERT STENT URETER(S); Surgeon:ANT ESPINOZA; Location:[facility] OR     DECOMPRESSION LUMBAR ONE LEVEL  2013    Procedure: DECOMPRESSION LUMBAR ONE LEVEL;  Posterior Decompression Right Lumbar 5- Sacral 1;  Surgeon: You Zavala MD;  Location: [facility] OR     HC TOOTH EXTRACTION W/FORCEP       HYSTERECTOMY, CATA       IRRIGATION AND DEBRIDEMENT ORAL, COMBINED      For treatment of gingivitis     LAPAROSCOPIC ASSISTED COLECTOMY  2012    Procedure:LAPAROSCOPIC ASSISTED COLECTOMY; Cysto, Bilateral Stent placement (both stents removed at end of case)- Yanet ACOSTA. Laparoscopic Extended Right Venu Colectomy with CO2 Colonoscopy and polyp removal-Judah; Surgeon:GAYLE REYNA; Location:[facility] OR     LIGATN/STRIP LONG OR SHORT SAPHEN       MASTECTOMY PARTIAL WITH SENTINEL NODE Left 2018    Procedure: Left Mastectomy, Left [place] Lymph Node Biopsy;  Surgeon: Nahun Betancourt MD;  Location: [facility] OR     STRIP VEIN BILATERAL       SURGICAL HISTORY OF -       ureter surgery for blockage.       Social History     Tobacco Use     Smoking status: Former Smoker     Packs/day: 1.50     Years: 30.00     Pack years: 45.00     Types: Cigarettes     Last attempt to quit: 1993     Years since quittin.8     Smokeless  tobacco: Never Used   Substance Use Topics     Alcohol use: No     Drug use: No       Family History   Problem Relation Age of Onset     C.A.D. Mother          at age 47 Heart failure, Rhumatic Fever     Cerebrovascular Disease Father          at age 79     Diabetes Father         type 2     Breast Cancer Sister 84        99 year old sister     Circulatory Maternal Grandmother         vericose veins     C.A.D. Paternal Grandfather      Gastrointestinal Disease Paternal Grandfather         ulcer     Cancer Son          age 51--Melanoma     Cancer Brother          age 50's--Melanoma     Arthritis Sister      Circulatory Sister         vericose veins     Circulatory Sister         vericose veins     Eye Disorder Sister         Cateracts     Respiratory Sister         asthma     Respiratory Brother         COPD     Breast Cancer Niece 60        daughter of 98 yo sister       ROS: A 12 system review of systems was negative other than noted here or above.     Exam:  There were no vitals taken for this visit.    GENERAL APPEARANCE: alert and no distress  EYES: PERRL, no scleral icterus  HENT: mouth without ulcers or lesions  NECK: supple, no adenopathy  RESP: lungs clear to auscultation   CV: regular rhythm, normal rate, no rub  Extremities: no clubbing, cyanosis. No edema   SKIN: no rash  NEURO: mentation intact and speech normal  PSYCH: affect normal/bright    Results:    Infusion Therapy Visit on 2019   Component Date Value Ref Range Status     WBC 2019 6.5  4.0 - 11.0 10e9/L Final     RBC Count 2019 3.82  3.8 - 5.2 10e12/L Final     Hemoglobin 2019 11.6* 11.7 - 15.7 g/dL Final     Hematocrit 2019 37.7  35.0 - 47.0 % Final     MCV 2019 99  78 - 100 fl Final     MCH 2019 30.4  26.5 - 33.0 pg Final     MCHC 2019 30.8* 31.5 - 36.5 g/dL Final     RDW 2019 15.1* 10.0 - 15.0 % Final     Platelet Count 2019 182  150 - 450 10e9/L Final     Diff Method  05/06/2019 Automated Method   Final     % Neutrophils 05/06/2019 62.9  % Final     % Lymphocytes 05/06/2019 16.4  % Final     % Monocytes 05/06/2019 15.6  % Final     % Eosinophils 05/06/2019 3.4  % Final     % Basophils 05/06/2019 1.1  % Final     % Immature Granulocytes 05/06/2019 0.6  % Final     Nucleated RBCs 05/06/2019 0  0 /100 Final     Absolute Neutrophil 05/06/2019 4.1  1.6 - 8.3 10e9/L Final     Absolute Lymphocytes 05/06/2019 1.1  0.8 - 5.3 10e9/L Final     Absolute Monocytes 05/06/2019 1.0  0.0 - 1.3 10e9/L Final     Absolute Eosinophils 05/06/2019 0.2  0.0 - 0.7 10e9/L Final     Absolute Basophils 05/06/2019 0.1  0.0 - 0.2 10e9/L Final     Abs Immature Granulocytes 05/06/2019 0.0  0 - 0.4 10e9/L Final     Absolute Nucleated RBC 05/06/2019 0.0   Final     Sodium 05/06/2019 139  133 - 144 mmol/L Final     Potassium 05/06/2019 4.1  3.4 - 5.3 mmol/L Final     Chloride 05/06/2019 109  94 - 109 mmol/L Final     Carbon Dioxide 05/06/2019 23  20 - 32 mmol/L Final     Anion Gap 05/06/2019 7  3 - 14 mmol/L Final     Glucose 05/06/2019 97  70 - 99 mg/dL Final     Urea Nitrogen 05/06/2019 26  7 - 30 mg/dL Final     Creatinine 05/06/2019 2.08* 0.52 - 1.04 mg/dL Final     GFR Estimate 05/06/2019 20* >60 mL/min/[1.73_m2] Final    Comment: Non  GFR Calc  Starting 12/18/2018, serum creatinine based estimated GFR (eGFR) will be   calculated using the Chronic Kidney Disease Epidemiology Collaboration   (CKD-EPI) equation.       GFR Estimate If Black 05/06/2019 23* >60 mL/min/[1.73_m2] Final    Comment:  GFR Calc  Starting 12/18/2018, serum creatinine based estimated GFR (eGFR) will be   calculated using the Chronic Kidney Disease Epidemiology Collaboration   (CKD-EPI) equation.       Calcium 05/06/2019 10.5* 8.5 - 10.1 mg/dL Final     Bilirubin Total 05/06/2019 0.4  0.2 - 1.3 mg/dL Final     Albumin 05/06/2019 3.0* 3.4 - 5.0 g/dL Final     Protein Total 05/06/2019 7.3  6.8 - 8.8 g/dL Final      Alkaline Phosphatase 05/06/2019 141  40 - 150 U/L Final     ALT 05/06/2019 27  0 - 50 U/L Final     AST 05/06/2019 34  0 - 45 U/L Final   Orders Only on 05/06/2019   Component Date Value Ref Range Status     Color Urine 05/06/2019 Yellow   Final     Appearance Urine 05/06/2019 Slightly Cloudy   Final     Glucose Urine 05/06/2019 Negative  NEG^Negative mg/dL Final     Bilirubin Urine 05/06/2019 Negative  NEG^Negative Final     Ketones Urine 05/06/2019 Negative  NEG^Negative mg/dL Final     Specific Gravity Urine 05/06/2019 1.009  1.003 - 1.035 Final     Blood Urine 05/06/2019 Negative  NEG^Negative Final     pH Urine 05/06/2019 7.0  5.0 - 7.0 pH Final     Protein Albumin Urine 05/06/2019 Negative  NEG^Negative mg/dL Final     Urobilinogen mg/dL 05/06/2019 0.0  0.0 - 2.0 mg/dL Final     Nitrite Urine 05/06/2019 Negative  NEG^Negative Final     Leukocyte Esterase Urine 05/06/2019 Large* NEG^Negative Final     Source 05/06/2019 Midstream Urine   Final     WBC Urine 05/06/2019 >182* 0 - 5 /HPF Final     RBC Urine 05/06/2019 2  0 - 2 /HPF Final     WBC Clumps 05/06/2019 Present* NEG^Negative /HPF Final     Bacteria Urine 05/06/2019 Many* NEG^Negative /HPF Final     Squamous Epithelial /HPF Urine 05/06/2019 1  0 - 1 /HPF Final     Parathyroid Hormone Intact 05/06/2019 112* 18 - 80 pg/mL Final     Iron 05/06/2019 54  35 - 180 ug/dL Final     Iron Binding Cap 05/06/2019 298  240 - 430 ug/dL Final     Iron Saturation Index 05/06/2019 18  15 - 46 % Final     Ferritin 05/06/2019 180  8 - 252 ng/mL Final     Creatinine Urine 05/06/2019 71  mg/dL Final     Albumin Urine mg/L 05/06/2019 22  mg/L Final     Albumin Urine mg/g Cr 05/06/2019 30.79* 0 - 25 mg/g Cr Final     Protein Random Urine 05/06/2019 0.19  g/L Final     Protein Total Urine g/gr Creatinine 05/06/2019 0.27* 0 - 0.2 g/g Cr Final     Sodium 05/06/2019 138  133 - 144 mmol/L Final     Potassium 05/06/2019 4.1  3.4 - 5.3 mmol/L Final     Chloride 05/06/2019 108  94 -  109 mmol/L Final     Carbon Dioxide 05/06/2019 23  20 - 32 mmol/L Final     Anion Gap 05/06/2019 6  3 - 14 mmol/L Final     Glucose 05/06/2019 98  70 - 99 mg/dL Final     Urea Nitrogen 05/06/2019 26  7 - 30 mg/dL Final     Creatinine 05/06/2019 2.10* 0.52 - 1.04 mg/dL Final     GFR Estimate 05/06/2019 20* >60 mL/min/[1.73_m2] Final    Comment: Non  GFR Calc  Starting 12/18/2018, serum creatinine based estimated GFR (eGFR) will be   calculated using the Chronic Kidney Disease Epidemiology Collaboration   (CKD-EPI) equation.       GFR Estimate If Black 05/06/2019 23* >60 mL/min/[1.73_m2] Final    Comment:  GFR Calc  Starting 12/18/2018, serum creatinine based estimated GFR (eGFR) will be   calculated using the Chronic Kidney Disease Epidemiology Collaboration   (CKD-EPI) equation.       Calcium 05/06/2019 10.3* 8.5 - 10.1 mg/dL Final     Phosphorus 05/06/2019 3.4  2.5 - 4.5 mg/dL Final     Albumin 05/06/2019 3.0* 3.4 - 5.0 g/dL Final           Jodi Simons MD

## 2019-05-30 NOTE — PROGRESS NOTES
"May 6, 2019    I was asked to see this patient by Dr. Malik     CC: CKD stage 4    HPI: Lennie Mar is a 89 year old female who presents for evaluation of  CKD stage 4  Ms Mar had an elevated creatinine going back to 2012, however may have had CKD long before since her creatinine overestimates her eGFR. She reports of being on Lithium since 1968 and reportedly was on higher dose of Li. She had an episode of lithium toxicity in 2012 when she presented with neurological symptoms and had level of 1.3. She also has developed tremors over years. I will obtain records from her psychiatrist. She has had recurrent renal calculi in the right kidney and had colon cancer/rt hemicolectomy during which b/l ureteral stents were placed. She had a tortuous right ureter at the time. In November 2018, she was diagnosed with Ca breast and had a left mastectomy. She is now on letrozole. She carries a h/o hyperparathyroidism and has had hypercalcemia more recently. Elevated PTH since 2012.Other ROS includes urinary incontinence due to overactive bladder and orthostatic hypotension.     - History of Hematuria: no  - Swelling: trace  - Hx of UTIs: yes  - Hx of stones: yes, does not remember having stones, reviewed from urology notes   - Rashes/Joint pain: no  - Family hx of kidney disease: no  - NSAID use: no    Today (5/30/2019): The patient is doing well and her kidney function is improved as she reduces her Lithium dose. She says that she is doing well on this reduced dosage: \"feeling like her old self\". She is not taking Vitamin D at this time, but is drinking a couple glasses of milk per day. She is not taking calcium supplements and continues to take Vitamin C. Her chemo cycles are also going well at this time, as she is finishing up cycle 7/17. Her blood pressures run slightly lower than 140/90 at home but usually around those levels. Her tremors are resolved. No lower extremity edema at this time. She denies " dizziness or lightheadedness except when she gets rapidly or turns fast. No shortness of breath.       # CKD stage 4: most likely 2/2 long term lithium use. UA with minimal proteinuria. Her CKD seem to be progressively worsening and her creatinine is now at 2.0 with eGFR of 20. Her cystatin C eGFR is 25 . Has a h/o ureteral procedures and stent placement. She also has mild hypercalcemia which could be contributing. US kidney showed mild atrophy and medical renal disease w/ b/l simple cysts. Mild right hydronephrosis.  --Creatinine improved today likely with decreasing Lithium dose.   --Agree with decreasing the lithium dose as she tolerates further   --Will consider treating her hypercalcemia with Cinacalcet if her creatine continues to get worse and she remains hypercalcemic     #Volume/BP: Acceptable. BP goal of < 160/90. Trace edema at the end of the day. TTE done 4/19 with EF of 61%    # Hypercalcemia: likely has PTH related hypercalcemia 2/2 lithium use. She also has a mass in her neck which will need to be evaluated. PTH of 112 today.   --TSH and T4 wnl. US neck showed a thyroid nodule which was biopsied and found to be benign   --Continues to have mild hypercalcemia  --will supplement 1000 international unit(s) vit D for now  --will continue to monitor Ca for now.     F/u in clinic in 6 months.     Jodi Simons MD          Allergies   Allergen Reactions     Cafergot Other (See Comments) and Nausea and Vomiting     Severe HA, N/V & diarrhea     Ciprofloxacin      Nausea and vomiting per son      Penicillins Rash and Unknown     Sulfa Drugs Rash and Unknown     Ergot Alkaloids Unknown     Lamictal [Lamotrigine] Other (See Comments)     Patient experienced night moss     Naproxen      Pt unable to remember reaction.     Naproxen Unknown     Tetracycline Unknown     Pt unable to remember allergy         Current Outpatient Medications on File Prior to Visit:  acetaminophen (TYLENOL) 325 MG tablet Take 3 tablets  (975 mg) by mouth every 8 hours as needed for mild pain   ARIPiprazole (ABILIFY) 5 MG tablet 5 mg tablet in the morning   buPROPion (WELLBUTRIN XL) 300 MG 24 hr tablet 300 mg tablet in the mornnig   [] cefpodoxime (VANTIN) 100 MG tablet Take 1 tablet (100 mg) by mouth 2 times daily for 5 days   Cyanocobalamin (VITAMIN B 12 PO) Take by mouth every morning   letrozole (FEMARA) 2.5 MG tablet Take 1 tablet (2.5 mg) by mouth daily (Patient taking differently: Take 2.5 mg by mouth every evening )   lithium 150 MG capsule Take 1 capsule by mouth daily (with breakfast).   lithium 300 MG capsule Take 1 capsule by mouth daily (with dinner). (Patient not taking: Reported on 2019)   LORazepam (ATIVAN) 0.5 MG tablet Take 1 tablet (0.5 mg) by mouth every 4 hours as needed (Anxiety, Nausea/Vomiting or Sleep)   neomycin-polymyxin-dexamethasone (MAXITROL) 3.5-87469-2.1 ophthalmic ointment    ondansetron (ZOFRAN) 8 MG tablet Take 1 tablet (8 mg) by mouth every 8 hours as needed for nausea   ORDER FOR DME Equipment being ordered: compression stocking knee high 20-30mmhg   pramipexole (MIRAPEX) 1 MG tablet Take 1 mg by mouth every morning    prochlorperazine (COMPAZINE) 10 MG tablet Take 0.5 tablets (5 mg) by mouth every 6 hours as needed (Nausea/Vomiting)   propranolol (INDERAL) 20 MG tablet Take 1 tablet (20 mg) by mouth 2 times daily   sertraline (ZOLOFT) 50 MG tablet Take 25 mg by mouth every morning      Current Facility-Administered Medications on File Prior to Visit:  [COMPLETED] 0.9% sodium chloride BOLUS   [COMPLETED] ADO-trastuzumab (KADCYLA) 260 mg in sodium chloride 0.9 % 288 mL CHEMOTHERAPY       Past Medical History:   Diagnosis Date     Alcohol dependence in remission (H)      Anxiety      Asymptomatic varicose veins      Bipolar disorder, unspecified (H)      CKD (chronic kidney disease) stage 3, GFR 30-59 ml/min (H) 2014     History of smoking      Hyperparathyroidism (H)      Memory loss      Muscle  weakness (generalized) 2008     Severe depression (H)      Subdural hygroma     chronic.  no surgeries     Tremor of unknown origin        Past Surgical History:   Procedure Laterality Date     APPENDECTOMY OPEN       CATARACT IOL, RT/LT       COLONOSCOPY WITH CO2 INSUFFLATION  2012    Procedure:COLONOSCOPY WITH CO2 INSUFFLATION; Surgeon:GAYLE REYNA; Location:[facility] OR     CYSTOCELE REPAIR       CYSTOSCOPY, INSERT STENT URETHRA, COMBINED       CYSTOSCOPY, RETROGRADES, INSERT STENT URETER(S), COMBINED  2012    Procedure:COMBINED CYSTOSCOPY, RETROGRADES, INSERT STENT URETER(S); Surgeon:ANT ESPINOZA; Location:[facility]     DECOMPRESSION LUMBAR ONE LEVEL  2013    Procedure: DECOMPRESSION LUMBAR ONE LEVEL;  Posterior Decompression Right Lumbar 5- Sacral 1;  Surgeon: You Zavala MD;  Location: [facility] OR     HC TOOTH EXTRACTION W/FORCEP       HYSTERECTOMY, CATA       IRRIGATION AND DEBRIDEMENT ORAL, COMBINED      For treatment of gingivitis     LAPAROSCOPIC ASSISTED COLECTOMY  2012    Procedure:LAPAROSCOPIC ASSISTED COLECTOMY; Cysto, Bilateral Stent placement (both stents removed at end of case)- Yanet ACOSTA. Laparoscopic Extended Right Venu Colectomy with CO2 Colonoscopy and polyp removal-Judah; Surgeon:GAYLE REYNA; Location:[facility] OR     LIGATN/STRIP LONG OR SHORT SAPHEN       MASTECTOMY PARTIAL WITH SENTINEL NODE Left 2018    Procedure: Left Mastectomy, Left Coleman Lymph Node Biopsy;  Surgeon: Nahun Betancourt MD;  Location: [facility] OR     STRIP VEIN BILATERAL  1964     SURGICAL HISTORY OF -       ureter surgery for blockage.       Social History     Tobacco Use     Smoking status: Former Smoker     Packs/day: 1.50     Years: 30.00     Pack years: 45.00     Types: Cigarettes     Last attempt to quit: 1993     Years since quittin.8     Smokeless tobacco: Never Used   Substance Use Topics     Alcohol use: No     Drug use: No       Family  History   Problem Relation Age of Onset     C.A.D. Mother          at age 47 Heart failure, Rhumatic Fever     Cerebrovascular Disease Father          at age 79     Diabetes Father         type 2     Breast Cancer Sister 84        99 year old sister     Circulatory Maternal Grandmother         vericose veins     C.A.D. Paternal Grandfather      Gastrointestinal Disease Paternal Grandfather         ulcer     Cancer Son          age 51--Melanoma     Cancer Brother          age 50's--Melanoma     Arthritis Sister      Circulatory Sister         vericose veins     Circulatory Sister         vericose veins     Eye Disorder Sister         Cateracts     Respiratory Sister         asthma     Respiratory Brother         COPD     Breast Cancer Niece 60        daughter of 98 yo sister       ROS: A 12 system review of systems was negative other than noted here or above.     Exam:  There were no vitals taken for this visit.    GENERAL APPEARANCE: alert and no distress  EYES: PERRL, no scleral icterus  HENT: mouth without ulcers or lesions  NECK: supple, no adenopathy  RESP: lungs clear to auscultation   CV: regular rhythm, normal rate, no rub  Extremities: no clubbing, cyanosis. No edema   SKIN: no rash  NEURO: mentation intact and speech normal  PSYCH: affect normal/bright    Results:    Infusion Therapy Visit on 2019   Component Date Value Ref Range Status     WBC 2019 6.5  4.0 - 11.0 10e9/L Final     RBC Count 2019 3.82  3.8 - 5.2 10e12/L Final     Hemoglobin 2019 11.6* 11.7 - 15.7 g/dL Final     Hematocrit 2019 37.7  35.0 - 47.0 % Final     MCV 2019 99  78 - 100 fl Final     MCH 2019 30.4  26.5 - 33.0 pg Final     MCHC 2019 30.8* 31.5 - 36.5 g/dL Final     RDW 2019 15.1* 10.0 - 15.0 % Final     Platelet Count 2019 182  150 - 450 10e9/L Final     Diff Method 2019 Automated Method   Final     % Neutrophils 2019 62.9  % Final     %  Lymphocytes 05/06/2019 16.4  % Final     % Monocytes 05/06/2019 15.6  % Final     % Eosinophils 05/06/2019 3.4  % Final     % Basophils 05/06/2019 1.1  % Final     % Immature Granulocytes 05/06/2019 0.6  % Final     Nucleated RBCs 05/06/2019 0  0 /100 Final     Absolute Neutrophil 05/06/2019 4.1  1.6 - 8.3 10e9/L Final     Absolute Lymphocytes 05/06/2019 1.1  0.8 - 5.3 10e9/L Final     Absolute Monocytes 05/06/2019 1.0  0.0 - 1.3 10e9/L Final     Absolute Eosinophils 05/06/2019 0.2  0.0 - 0.7 10e9/L Final     Absolute Basophils 05/06/2019 0.1  0.0 - 0.2 10e9/L Final     Abs Immature Granulocytes 05/06/2019 0.0  0 - 0.4 10e9/L Final     Absolute Nucleated RBC 05/06/2019 0.0   Final     Sodium 05/06/2019 139  133 - 144 mmol/L Final     Potassium 05/06/2019 4.1  3.4 - 5.3 mmol/L Final     Chloride 05/06/2019 109  94 - 109 mmol/L Final     Carbon Dioxide 05/06/2019 23  20 - 32 mmol/L Final     Anion Gap 05/06/2019 7  3 - 14 mmol/L Final     Glucose 05/06/2019 97  70 - 99 mg/dL Final     Urea Nitrogen 05/06/2019 26  7 - 30 mg/dL Final     Creatinine 05/06/2019 2.08* 0.52 - 1.04 mg/dL Final     GFR Estimate 05/06/2019 20* >60 mL/min/[1.73_m2] Final    Comment: Non  GFR Calc  Starting 12/18/2018, serum creatinine based estimated GFR (eGFR) will be   calculated using the Chronic Kidney Disease Epidemiology Collaboration   (CKD-EPI) equation.       GFR Estimate If Black 05/06/2019 23* >60 mL/min/[1.73_m2] Final    Comment:  GFR Calc  Starting 12/18/2018, serum creatinine based estimated GFR (eGFR) will be   calculated using the Chronic Kidney Disease Epidemiology Collaboration   (CKD-EPI) equation.       Calcium 05/06/2019 10.5* 8.5 - 10.1 mg/dL Final     Bilirubin Total 05/06/2019 0.4  0.2 - 1.3 mg/dL Final     Albumin 05/06/2019 3.0* 3.4 - 5.0 g/dL Final     Protein Total 05/06/2019 7.3  6.8 - 8.8 g/dL Final     Alkaline Phosphatase 05/06/2019 141  40 - 150 U/L Final     ALT 05/06/2019 27  0  - 50 U/L Final     AST 05/06/2019 34  0 - 45 U/L Final   Orders Only on 05/06/2019   Component Date Value Ref Range Status     Color Urine 05/06/2019 Yellow   Final     Appearance Urine 05/06/2019 Slightly Cloudy   Final     Glucose Urine 05/06/2019 Negative  NEG^Negative mg/dL Final     Bilirubin Urine 05/06/2019 Negative  NEG^Negative Final     Ketones Urine 05/06/2019 Negative  NEG^Negative mg/dL Final     Specific Gravity Urine 05/06/2019 1.009  1.003 - 1.035 Final     Blood Urine 05/06/2019 Negative  NEG^Negative Final     pH Urine 05/06/2019 7.0  5.0 - 7.0 pH Final     Protein Albumin Urine 05/06/2019 Negative  NEG^Negative mg/dL Final     Urobilinogen mg/dL 05/06/2019 0.0  0.0 - 2.0 mg/dL Final     Nitrite Urine 05/06/2019 Negative  NEG^Negative Final     Leukocyte Esterase Urine 05/06/2019 Large* NEG^Negative Final     Source 05/06/2019 Midstream Urine   Final     WBC Urine 05/06/2019 >182* 0 - 5 /HPF Final     RBC Urine 05/06/2019 2  0 - 2 /HPF Final     WBC Clumps 05/06/2019 Present* NEG^Negative /HPF Final     Bacteria Urine 05/06/2019 Many* NEG^Negative /HPF Final     Squamous Epithelial /HPF Urine 05/06/2019 1  0 - 1 /HPF Final     Parathyroid Hormone Intact 05/06/2019 112* 18 - 80 pg/mL Final     Iron 05/06/2019 54  35 - 180 ug/dL Final     Iron Binding Cap 05/06/2019 298  240 - 430 ug/dL Final     Iron Saturation Index 05/06/2019 18  15 - 46 % Final     Ferritin 05/06/2019 180  8 - 252 ng/mL Final     Creatinine Urine 05/06/2019 71  mg/dL Final     Albumin Urine mg/L 05/06/2019 22  mg/L Final     Albumin Urine mg/g Cr 05/06/2019 30.79* 0 - 25 mg/g Cr Final     Protein Random Urine 05/06/2019 0.19  g/L Final     Protein Total Urine g/gr Creatinine 05/06/2019 0.27* 0 - 0.2 g/g Cr Final     Sodium 05/06/2019 138  133 - 144 mmol/L Final     Potassium 05/06/2019 4.1  3.4 - 5.3 mmol/L Final     Chloride 05/06/2019 108  94 - 109 mmol/L Final     Carbon Dioxide 05/06/2019 23  20 - 32 mmol/L Final     Anion  Gap 05/06/2019 6  3 - 14 mmol/L Final     Glucose 05/06/2019 98  70 - 99 mg/dL Final     Urea Nitrogen 05/06/2019 26  7 - 30 mg/dL Final     Creatinine 05/06/2019 2.10* 0.52 - 1.04 mg/dL Final     GFR Estimate 05/06/2019 20* >60 mL/min/[1.73_m2] Final    Comment: Non  GFR Calc  Starting 12/18/2018, serum creatinine based estimated GFR (eGFR) will be   calculated using the Chronic Kidney Disease Epidemiology Collaboration   (CKD-EPI) equation.       GFR Estimate If Black 05/06/2019 23* >60 mL/min/[1.73_m2] Final    Comment:  GFR Calc  Starting 12/18/2018, serum creatinine based estimated GFR (eGFR) will be   calculated using the Chronic Kidney Disease Epidemiology Collaboration   (CKD-EPI) equation.       Calcium 05/06/2019 10.3* 8.5 - 10.1 mg/dL Final     Phosphorus 05/06/2019 3.4  2.5 - 4.5 mg/dL Final     Albumin 05/06/2019 3.0* 3.4 - 5.0 g/dL Final

## 2019-05-30 NOTE — LETTER
"5/30/2019       RE: Lennie Mar  [address]  [facility] [address]     Dear Colleague,    Thank you for referring your patient, Lennie Mar, to the [facility] NEPHROLOGY at [facility]. Please see a copy of my visit note below.    May 6, 2019    I was asked to see this patient by Dr. Malik     CC: CKD stage 4    HPI: Lennie Mar is a 89 year old female who presents for evaluation of  CKD stage 4  Ms Mar had an elevated creatinine going back to 2012, however may have had CKD long before since her creatinine overestimates her eGFR. She reports of being on Lithium since 1968 and reportedly was on higher dose of Li. She had an episode of lithium toxicity in 2012 when she presented with neurological symptoms and had level of 1.3. She also has developed tremors over years. I will obtain records from her psychiatrist. She has had recurrent renal calculi in the right kidney and had colon cancer/rt hemicolectomy during which b/l ureteral stents were placed. She had a tortuous right ureter at the time. In November 2018, she was diagnosed with Ca breast and had a left mastectomy. She is now on letrozole. She carries a h/o hyperparathyroidism and has had hypercalcemia more recently. Elevated PTH since 2012.Other ROS includes urinary incontinence due to overactive bladder and orthostatic hypotension.     - History of Hematuria: no  - Swelling: trace  - Hx of UTIs: yes  - Hx of stones: yes, does not remember having stones, reviewed from urology notes   - Rashes/Joint pain: no  - Family hx of kidney disease: no  - NSAID use: no    Today (5/30/2019): The patient is doing well and her kidney function is improved as she reduces her Lithium dose. She says that she is doing well on this reduced dosage: \"feeling like her old self\". She is not taking Vitamin D at this time, but is drinking a couple glasses of milk per day. She is not taking calcium supplements " and continues to take Vitamin C. Her chemo cycles are also going well at this time, as she is finishing up cycle 7/17. Her blood pressures run slightly lower than 140/90 at home but usually around those levels. Her tremors are resolved. No lower extremity edema at this time. She denies dizziness or lightheadedness except when she gets rapidly or turns fast. No shortness of breath.       # CKD stage 4: most likely 2/2 long term lithium use. UA with minimal proteinuria. Her CKD seem to be progressively worsening and her creatinine is now at 2.0 with eGFR of 20. Her cystatin C eGFR is 25 . Has a h/o ureteral procedures and stent placement. She also has mild hypercalcemia which could be contributing. US kidney showed mild atrophy and medical renal disease w/ b/l simple cysts. Mild right hydronephrosis.  --Creatinine improved today likely with decreasing Lithium dose.   --Agree with decreasing the lithium dose as she tolerates further   --Will consider treating her hypercalcemia with Cinacalcet if her creatine continues to get worse and she remains hypercalcemic     #Volume/BP: Acceptable. BP goal of < 160/90. Trace edema at the end of the day. TTE done 4/19 with EF of 61%    # Hypercalcemia: likely has PTH related hypercalcemia 2/2 lithium use. She also has a mass in her neck which will need to be evaluated. PTH of 112 today.   --TSH and T4 wnl. US neck showed a thyroid nodule which was biopsied and found to be benign   --Continues to have mild hypercalcemia  --will supplement 1000 international unit(s) vit D for now  --will continue to monitor Ca for now.     F/u in clinic in 6 months.     Jodi Simons MD          Allergies   Allergen Reactions     Cafergot Other (See Comments) and Nausea and Vomiting     Severe HA, N/V & diarrhea     Ciprofloxacin      Nausea and vomiting per son      Penicillins Rash and Unknown     Sulfa Drugs Rash and Unknown     Ergot Alkaloids Unknown     Lamictal [Lamotrigine] Other (See  Comments)     Patient experienced night moss     Naproxen      Pt unable to remember reaction.     Naproxen Unknown     Tetracycline Unknown     Pt unable to remember allergy         Current Outpatient Medications on File Prior to Visit:  acetaminophen (TYLENOL) 325 MG tablet Take 3 tablets (975 mg) by mouth every 8 hours as needed for mild pain   ARIPiprazole (ABILIFY) 5 MG tablet 5 mg tablet in the morning   buPROPion (WELLBUTRIN XL) 300 MG 24 hr tablet 300 mg tablet in the mornnig   [] cefpodoxime (VANTIN) 100 MG tablet Take 1 tablet (100 mg) by mouth 2 times daily for 5 days   Cyanocobalamin (VITAMIN B 12 PO) Take by mouth every morning   letrozole (FEMARA) 2.5 MG tablet Take 1 tablet (2.5 mg) by mouth daily (Patient taking differently: Take 2.5 mg by mouth every evening )   lithium 150 MG capsule Take 1 capsule by mouth daily (with breakfast).   lithium 300 MG capsule Take 1 capsule by mouth daily (with dinner). (Patient not taking: Reported on 2019)   LORazepam (ATIVAN) 0.5 MG tablet Take 1 tablet (0.5 mg) by mouth every 4 hours as needed (Anxiety, Nausea/Vomiting or Sleep)   neomycin-polymyxin-dexamethasone (MAXITROL) 3.5-50297-3.1 ophthalmic ointment    ondansetron (ZOFRAN) 8 MG tablet Take 1 tablet (8 mg) by mouth every 8 hours as needed for nausea   ORDER FOR DME Equipment being ordered: compression stocking knee high 20-30mmhg   pramipexole (MIRAPEX) 1 MG tablet Take 1 mg by mouth every morning    prochlorperazine (COMPAZINE) 10 MG tablet Take 0.5 tablets (5 mg) by mouth every 6 hours as needed (Nausea/Vomiting)   propranolol (INDERAL) 20 MG tablet Take 1 tablet (20 mg) by mouth 2 times daily   sertraline (ZOLOFT) 50 MG tablet Take 25 mg by mouth every morning      Current Facility-Administered Medications on File Prior to Visit:  [COMPLETED] 0.9% sodium chloride BOLUS   [COMPLETED] ADO-trastuzumab (KADCYLA) 260 mg in sodium chloride 0.9 % 288 mL CHEMOTHERAPY       Past Medical History:    Diagnosis Date     Alcohol dependence in remission (H)      Anxiety      Asymptomatic varicose veins      Bipolar disorder, unspecified (H)      CKD (chronic kidney disease) stage 3, GFR 30-59 ml/min (H) 2/18/2014     History of smoking      Hyperparathyroidism (H)      Memory loss      Muscle weakness (generalized) 6/23/2008     Severe depression (H)      Subdural hygroma     chronic.  no surgeries     Tremor of unknown origin        Past Surgical History:   Procedure Laterality Date     APPENDECTOMY OPEN  1947     CATARACT IOL, RT/LT       COLONOSCOPY WITH CO2 INSUFFLATION  2/29/2012    Procedure:COLONOSCOPY WITH CO2 INSUFFLATION; Surgeon:GAYLE REYNA; Location:[facility] OR     CYSTOCELE REPAIR  1972     CYSTOSCOPY, INSERT STENT URETHRA, COMBINED  1999     CYSTOSCOPY, RETROGRADES, INSERT STENT URETER(S), COMBINED  2/29/2012    Procedure:COMBINED CYSTOSCOPY, RETROGRADES, INSERT STENT URETER(S); Surgeon:ANT ESPINOZA; Location:[facility] OR     DECOMPRESSION LUMBAR ONE LEVEL  8/9/2013    Procedure: DECOMPRESSION LUMBAR ONE LEVEL;  Posterior Decompression Right Lumbar 5- Sacral 1;  Surgeon: You Zavala MD;  Location: [facility] OR     HC TOOTH EXTRACTION W/FORCEP       HYSTERECTOMY, CATA  1963     IRRIGATION AND DEBRIDEMENT ORAL, COMBINED  1982    For treatment of gingivitis     LAPAROSCOPIC ASSISTED COLECTOMY  2/29/2012    Procedure:LAPAROSCOPIC ASSISTED COLECTOMY; Cysto, Bilateral Stent placement (both stents removed at end of case)- Yanet ACOSTA. Laparoscopic Extended Right Venu Colectomy with CO2 Colonoscopy and polyp removal-Judah; Surgeon:GAYLE REYNA; Location:[facility] OR     LIGATN/STRIP LONG OR SHORT SAPHEN  1955     MASTECTOMY PARTIAL WITH SENTINEL NODE Left 11/19/2018    Procedure: Left Mastectomy, Left [place] Lymph Node Biopsy;  Surgeon: Nahun Betancourt MD;  Location: [facility] OR     STRIP VEIN BILATERAL  1964     SURGICAL HISTORY OF -   1999    ureter surgery for blockage.       Social History      Tobacco Use     Smoking status: Former Smoker     Packs/day: 1.50     Years: 30.00     Pack years: 45.00     Types: Cigarettes     Last attempt to quit: 1993     Years since quittin.8     Smokeless tobacco: Never Used   Substance Use Topics     Alcohol use: No     Drug use: No       Family History   Problem Relation Age of Onset     C.A.D. Mother          at age 47 Heart failure, Rhumatic Fever     Cerebrovascular Disease Father          at age 79     Diabetes Father         type 2     Breast Cancer Sister 84        99 year old sister     Circulatory Maternal Grandmother         vericose veins     C.A.D. Paternal Grandfather      Gastrointestinal Disease Paternal Grandfather         ulcer     Cancer Son          age 51--Melanoma     Cancer Brother          age 50's--Melanoma     Arthritis Sister      Circulatory Sister         vericose veins     Circulatory Sister         vericose veins     Eye Disorder Sister         Cateracts     Respiratory Sister         asthma     Respiratory Brother         COPD     Breast Cancer Niece 60        daughter of 98 yo sister       ROS: A 12 system review of systems was negative other than noted here or above.     Exam:  There were no vitals taken for this visit.    GENERAL APPEARANCE: alert and no distress  EYES: PERRL, no scleral icterus  HENT: mouth without ulcers or lesions  NECK: supple, no adenopathy  RESP: lungs clear to auscultation   CV: regular rhythm, normal rate, no rub  Extremities: no clubbing, cyanosis. No edema   SKIN: no rash  NEURO: mentation intact and speech normal  PSYCH: affect normal/bright    Results:    Infusion Therapy Visit on 2019   Component Date Value Ref Range Status     WBC 2019 6.5  4.0 - 11.0 10e9/L Final     RBC Count 2019 3.82  3.8 - 5.2 10e12/L Final     Hemoglobin 2019 11.6* 11.7 - 15.7 g/dL Final     Hematocrit 2019 37.7  35.0 - 47.0 % Final     MCV 2019 99  78 - 100 fl Final      MCH 05/06/2019 30.4  26.5 - 33.0 pg Final     MCHC 05/06/2019 30.8* 31.5 - 36.5 g/dL Final     RDW 05/06/2019 15.1* 10.0 - 15.0 % Final     Platelet Count 05/06/2019 182  150 - 450 10e9/L Final     Diff Method 05/06/2019 Automated Method   Final     % Neutrophils 05/06/2019 62.9  % Final     % Lymphocytes 05/06/2019 16.4  % Final     % Monocytes 05/06/2019 15.6  % Final     % Eosinophils 05/06/2019 3.4  % Final     % Basophils 05/06/2019 1.1  % Final     % Immature Granulocytes 05/06/2019 0.6  % Final     Nucleated RBCs 05/06/2019 0  0 /100 Final     Absolute Neutrophil 05/06/2019 4.1  1.6 - 8.3 10e9/L Final     Absolute Lymphocytes 05/06/2019 1.1  0.8 - 5.3 10e9/L Final     Absolute Monocytes 05/06/2019 1.0  0.0 - 1.3 10e9/L Final     Absolute Eosinophils 05/06/2019 0.2  0.0 - 0.7 10e9/L Final     Absolute Basophils 05/06/2019 0.1  0.0 - 0.2 10e9/L Final     Abs Immature Granulocytes 05/06/2019 0.0  0 - 0.4 10e9/L Final     Absolute Nucleated RBC 05/06/2019 0.0   Final     Sodium 05/06/2019 139  133 - 144 mmol/L Final     Potassium 05/06/2019 4.1  3.4 - 5.3 mmol/L Final     Chloride 05/06/2019 109  94 - 109 mmol/L Final     Carbon Dioxide 05/06/2019 23  20 - 32 mmol/L Final     Anion Gap 05/06/2019 7  3 - 14 mmol/L Final     Glucose 05/06/2019 97  70 - 99 mg/dL Final     Urea Nitrogen 05/06/2019 26  7 - 30 mg/dL Final     Creatinine 05/06/2019 2.08* 0.52 - 1.04 mg/dL Final     GFR Estimate 05/06/2019 20* >60 mL/min/[1.73_m2] Final    Comment: Non  GFR Calc  Starting 12/18/2018, serum creatinine based estimated GFR (eGFR) will be   calculated using the Chronic Kidney Disease Epidemiology Collaboration   (CKD-EPI) equation.       GFR Estimate If Black 05/06/2019 23* >60 mL/min/[1.73_m2] Final    Comment:  GFR Calc  Starting 12/18/2018, serum creatinine based estimated GFR (eGFR) will be   calculated using the Chronic Kidney Disease Epidemiology Collaboration   (CKD-EPI) equation.        Calcium 05/06/2019 10.5* 8.5 - 10.1 mg/dL Final     Bilirubin Total 05/06/2019 0.4  0.2 - 1.3 mg/dL Final     Albumin 05/06/2019 3.0* 3.4 - 5.0 g/dL Final     Protein Total 05/06/2019 7.3  6.8 - 8.8 g/dL Final     Alkaline Phosphatase 05/06/2019 141  40 - 150 U/L Final     ALT 05/06/2019 27  0 - 50 U/L Final     AST 05/06/2019 34  0 - 45 U/L Final   Orders Only on 05/06/2019   Component Date Value Ref Range Status     Color Urine 05/06/2019 Yellow   Final     Appearance Urine 05/06/2019 Slightly Cloudy   Final     Glucose Urine 05/06/2019 Negative  NEG^Negative mg/dL Final     Bilirubin Urine 05/06/2019 Negative  NEG^Negative Final     Ketones Urine 05/06/2019 Negative  NEG^Negative mg/dL Final     Specific Gravity Urine 05/06/2019 1.009  1.003 - 1.035 Final     Blood Urine 05/06/2019 Negative  NEG^Negative Final     pH Urine 05/06/2019 7.0  5.0 - 7.0 pH Final     Protein Albumin Urine 05/06/2019 Negative  NEG^Negative mg/dL Final     Urobilinogen mg/dL 05/06/2019 0.0  0.0 - 2.0 mg/dL Final     Nitrite Urine 05/06/2019 Negative  NEG^Negative Final     Leukocyte Esterase Urine 05/06/2019 Large* NEG^Negative Final     Source 05/06/2019 Midstream Urine   Final     WBC Urine 05/06/2019 >182* 0 - 5 /HPF Final     RBC Urine 05/06/2019 2  0 - 2 /HPF Final     WBC Clumps 05/06/2019 Present* NEG^Negative /HPF Final     Bacteria Urine 05/06/2019 Many* NEG^Negative /HPF Final     Squamous Epithelial /HPF Urine 05/06/2019 1  0 - 1 /HPF Final     Parathyroid Hormone Intact 05/06/2019 112* 18 - 80 pg/mL Final     Iron 05/06/2019 54  35 - 180 ug/dL Final     Iron Binding Cap 05/06/2019 298  240 - 430 ug/dL Final     Iron Saturation Index 05/06/2019 18  15 - 46 % Final     Ferritin 05/06/2019 180  8 - 252 ng/mL Final     Creatinine Urine 05/06/2019 71  mg/dL Final     Albumin Urine mg/L 05/06/2019 22  mg/L Final     Albumin Urine mg/g Cr 05/06/2019 30.79* 0 - 25 mg/g Cr Final     Protein Random Urine 05/06/2019 0.19  g/L Final      Protein Total Urine g/gr Creatinine 05/06/2019 0.27* 0 - 0.2 g/g Cr Final     Sodium 05/06/2019 138  133 - 144 mmol/L Final     Potassium 05/06/2019 4.1  3.4 - 5.3 mmol/L Final     Chloride 05/06/2019 108  94 - 109 mmol/L Final     Carbon Dioxide 05/06/2019 23  20 - 32 mmol/L Final     Anion Gap 05/06/2019 6  3 - 14 mmol/L Final     Glucose 05/06/2019 98  70 - 99 mg/dL Final     Urea Nitrogen 05/06/2019 26  7 - 30 mg/dL Final     Creatinine 05/06/2019 2.10* 0.52 - 1.04 mg/dL Final     GFR Estimate 05/06/2019 20* >60 mL/min/[1.73_m2] Final    Comment: Non  GFR Calc  Starting 12/18/2018, serum creatinine based estimated GFR (eGFR) will be   calculated using the Chronic Kidney Disease Epidemiology Collaboration   (CKD-EPI) equation.       GFR Estimate If Black 05/06/2019 23* >60 mL/min/[1.73_m2] Final    Comment:  GFR Calc  Starting 12/18/2018, serum creatinine based estimated GFR (eGFR) will be   calculated using the Chronic Kidney Disease Epidemiology Collaboration   (CKD-EPI) equation.       Calcium 05/06/2019 10.3* 8.5 - 10.1 mg/dL Final     Phosphorus 05/06/2019 3.4  2.5 - 4.5 mg/dL Final     Albumin 05/06/2019 3.0* 3.4 - 5.0 g/dL Final           Again, thank you for allowing me to participate in the care of your patient.      Sincerely,    Jodi Simons MD

## 2019-06-03 ENCOUNTER — TELEPHONE (OUTPATIENT)
Dept: FAMILY MEDICINE | Facility: CLINIC | Age: 84
End: 2019-06-03

## 2019-06-03 NOTE — TELEPHONE ENCOUNTER
Reason for Call:  Other     Detailed comments: patient is calling with a message for Dr Dwight Quintanilla is to call Dr Naren Mar on his cell phone   # [phone] when asking patient what this is regarding she said it's regarding Lennie Zaid    Phone Number Patient can be reached at: Home number on file [phone] (home)    Best Time:     Can we leave a detailed message on this number? YES    Call taken on 6/3/2019 at 10:21 AM by Siomara Horvath

## 2019-06-03 NOTE — TELEPHONE ENCOUNTER
Routing to provider - Dwight - please review and advise as appropriate      Attempted clarification Writer called patient left non detailed message requesting return call to clinic and ask to speak with nurse

## 2019-06-04 ENCOUNTER — TELEPHONE (OUTPATIENT)
Dept: FAMILY MEDICINE | Facility: CLINIC | Age: 84
End: 2019-06-04

## 2019-06-04 DIAGNOSIS — F31.9 BIPOLAR DISORDER, UNSPECIFIED (H): ICD-10-CM

## 2019-06-04 DIAGNOSIS — F31.4 SEVERE DEPRESSED BIPOLAR I DISORDER WITHOUT PSYCHOTIC FEATURES (H): Primary | ICD-10-CM

## 2019-06-04 RX ORDER — PRAMIPEXOLE DIHYDROCHLORIDE 1 MG/1
1 TABLET ORAL AT BEDTIME
Status: ON HOLD | COMMUNITY
Start: 2019-06-04 | End: 2022-03-10

## 2019-06-04 RX ORDER — LITHIUM CARBONATE 150 MG/1
150 CAPSULE ORAL EVERY MORNING
Qty: 30 CAPSULE | Refills: 1 | COMMUNITY
Start: 2019-06-04

## 2019-06-04 NOTE — TELEPHONE ENCOUNTER
Reason for Call:  Other    Detailed comments: Patient is requesting PCP to call psychiatrist Dr. Naren Mar at [phone] regarding lithium carbonate. Please assist. Thanks!    Phone Number Patient can be reached at: Home number on file [phone] (home)    Best Time: Any    Can we leave a detailed message on this number? Not Applicable    Call taken on 6/4/2019 at 10:55 AM by Annmarie Donaldson

## 2019-06-05 DIAGNOSIS — C50.212 MALIGNANT NEOPLASM OF UPPER-INNER QUADRANT OF LEFT BREAST IN FEMALE, ESTROGEN RECEPTOR POSITIVE (H): ICD-10-CM

## 2019-06-05 DIAGNOSIS — Z17.0 MALIGNANT NEOPLASM OF UPPER-INNER QUADRANT OF LEFT BREAST IN FEMALE, ESTROGEN RECEPTOR POSITIVE (H): ICD-10-CM

## 2019-06-05 DIAGNOSIS — C50.412 MALIGNANT NEOPLASM OF UPPER-OUTER QUADRANT OF LEFT BREAST IN FEMALE, ESTROGEN RECEPTOR POSITIVE (H): ICD-10-CM

## 2019-06-05 DIAGNOSIS — Z17.0 MALIGNANT NEOPLASM OF UPPER-OUTER QUADRANT OF LEFT BREAST IN FEMALE, ESTROGEN RECEPTOR POSITIVE (H): ICD-10-CM

## 2019-06-05 RX ORDER — LETROZOLE 2.5 MG/1
TABLET, FILM COATED ORAL
Qty: 30 TABLET | Refills: 0 | OUTPATIENT
Start: 2019-06-05

## 2019-06-08 DIAGNOSIS — Z17.0 MALIGNANT NEOPLASM OF UPPER-OUTER QUADRANT OF LEFT BREAST IN FEMALE, ESTROGEN RECEPTOR POSITIVE (H): ICD-10-CM

## 2019-06-08 DIAGNOSIS — C50.212 MALIGNANT NEOPLASM OF UPPER-INNER QUADRANT OF LEFT BREAST IN FEMALE, ESTROGEN RECEPTOR POSITIVE (H): ICD-10-CM

## 2019-06-08 DIAGNOSIS — C50.412 MALIGNANT NEOPLASM OF UPPER-OUTER QUADRANT OF LEFT BREAST IN FEMALE, ESTROGEN RECEPTOR POSITIVE (H): ICD-10-CM

## 2019-06-08 DIAGNOSIS — Z17.0 MALIGNANT NEOPLASM OF UPPER-INNER QUADRANT OF LEFT BREAST IN FEMALE, ESTROGEN RECEPTOR POSITIVE (H): ICD-10-CM

## 2019-06-11 NOTE — TELEPHONE ENCOUNTER
Spoke with her son.     Could we call her and recommend she schedule an annual wellness appt with me? Even better if she could bring her medicaiton bottles with her.     Thanks    Ty Quintanilla

## 2019-06-11 NOTE — TELEPHONE ENCOUNTER
"Spoke with pt and she refused to schedule an appt.   She said she had \"surgery and is undergoing treatment\".   Pt wishes not to schedule this appt.   Routing as SHERI GANT     [facility]        "

## 2019-06-12 ENCOUNTER — TELEPHONE (OUTPATIENT)
Dept: NEPHROLOGY | Facility: CLINIC | Age: 84
End: 2019-06-12

## 2019-06-12 RX ORDER — LETROZOLE 2.5 MG/1
TABLET, FILM COATED ORAL
Qty: 90 TABLET | Refills: 0 | OUTPATIENT
Start: 2019-06-12

## 2019-06-14 DIAGNOSIS — Z17.0 MALIGNANT NEOPLASM OF UPPER-OUTER QUADRANT OF LEFT BREAST IN FEMALE, ESTROGEN RECEPTOR POSITIVE (H): ICD-10-CM

## 2019-06-14 DIAGNOSIS — Z17.0 MALIGNANT NEOPLASM OF UPPER-INNER QUADRANT OF LEFT BREAST IN FEMALE, ESTROGEN RECEPTOR POSITIVE (H): ICD-10-CM

## 2019-06-14 DIAGNOSIS — C50.212 MALIGNANT NEOPLASM OF UPPER-INNER QUADRANT OF LEFT BREAST IN FEMALE, ESTROGEN RECEPTOR POSITIVE (H): ICD-10-CM

## 2019-06-14 DIAGNOSIS — C50.412 MALIGNANT NEOPLASM OF UPPER-OUTER QUADRANT OF LEFT BREAST IN FEMALE, ESTROGEN RECEPTOR POSITIVE (H): ICD-10-CM

## 2019-06-14 RX ORDER — SODIUM CHLORIDE 9 MG/ML
1000 INJECTION, SOLUTION INTRAVENOUS CONTINUOUS PRN
Status: CANCELLED
Start: 2019-06-17

## 2019-06-14 RX ORDER — MEPERIDINE HYDROCHLORIDE 25 MG/ML
25 INJECTION INTRAMUSCULAR; INTRAVENOUS; SUBCUTANEOUS EVERY 30 MIN PRN
Status: CANCELLED | OUTPATIENT
Start: 2019-06-17

## 2019-06-14 RX ORDER — EPINEPHRINE 1 MG/ML
0.3 INJECTION, SOLUTION, CONCENTRATE INTRAVENOUS EVERY 5 MIN PRN
Status: CANCELLED | OUTPATIENT
Start: 2019-06-17

## 2019-06-14 RX ORDER — DIPHENHYDRAMINE HYDROCHLORIDE 50 MG/ML
50 INJECTION INTRAMUSCULAR; INTRAVENOUS
Status: CANCELLED
Start: 2019-06-17

## 2019-06-14 RX ORDER — LETROZOLE 2.5 MG/1
TABLET, FILM COATED ORAL
Qty: 90 TABLET | Refills: 3 | Status: SHIPPED | OUTPATIENT
Start: 2019-06-14 | End: 2019-12-23

## 2019-06-14 RX ORDER — EPINEPHRINE 0.3 MG/.3ML
0.3 INJECTION SUBCUTANEOUS EVERY 5 MIN PRN
Status: CANCELLED | OUTPATIENT
Start: 2019-06-17

## 2019-06-14 RX ORDER — ALBUTEROL SULFATE 90 UG/1
1-2 AEROSOL, METERED RESPIRATORY (INHALATION)
Status: CANCELLED
Start: 2019-06-17

## 2019-06-14 RX ORDER — ALBUTEROL SULFATE 0.83 MG/ML
2.5 SOLUTION RESPIRATORY (INHALATION)
Status: CANCELLED | OUTPATIENT
Start: 2019-06-17

## 2019-06-17 ENCOUNTER — INFUSION THERAPY VISIT (OUTPATIENT)
Dept: ONCOLOGY | Facility: CLINIC | Age: 84
End: 2019-06-17
Attending: INTERNAL MEDICINE
Payer: MEDICARE

## 2019-06-17 ENCOUNTER — APPOINTMENT (OUTPATIENT)
Dept: LAB | Facility: CLINIC | Age: 84
End: 2019-06-17
Attending: INTERNAL MEDICINE
Payer: MEDICARE

## 2019-06-17 VITALS
TEMPERATURE: 97.4 F | HEART RATE: 91 BPM | DIASTOLIC BLOOD PRESSURE: 74 MMHG | SYSTOLIC BLOOD PRESSURE: 115 MMHG | OXYGEN SATURATION: 95 % | WEIGHT: 138.3 LBS | RESPIRATION RATE: 18 BRPM | BODY MASS INDEX: 26.15 KG/M2

## 2019-06-17 DIAGNOSIS — Z17.0 MALIGNANT NEOPLASM OF UPPER-INNER QUADRANT OF LEFT BREAST IN FEMALE, ESTROGEN RECEPTOR POSITIVE (H): Primary | ICD-10-CM

## 2019-06-17 DIAGNOSIS — C50.212 MALIGNANT NEOPLASM OF UPPER-INNER QUADRANT OF LEFT BREAST IN FEMALE, ESTROGEN RECEPTOR POSITIVE (H): Primary | ICD-10-CM

## 2019-06-17 LAB
ALBUMIN SERPL-MCNC: 3.5 G/DL (ref 3.4–5)
ALP SERPL-CCNC: 170 U/L (ref 40–150)
ALT SERPL W P-5'-P-CCNC: 27 U/L (ref 0–50)
ANION GAP SERPL CALCULATED.3IONS-SCNC: 7 MMOL/L (ref 3–14)
AST SERPL W P-5'-P-CCNC: 45 U/L (ref 0–45)
AST SERPL W P-5'-P-CCNC: ABNORMAL U/L (ref 0–45)
BASOPHILS # BLD AUTO: 0.1 10E9/L (ref 0–0.2)
BASOPHILS NFR BLD AUTO: 0.5 %
BILIRUB SERPL-MCNC: 0.9 MG/DL (ref 0.2–1.3)
BUN SERPL-MCNC: 24 MG/DL (ref 7–30)
CALCIUM SERPL-MCNC: 10.7 MG/DL (ref 8.5–10.1)
CHLORIDE SERPL-SCNC: 108 MMOL/L (ref 94–109)
CO2 SERPL-SCNC: 22 MMOL/L (ref 20–32)
CREAT SERPL-MCNC: 1.32 MG/DL (ref 0.52–1.04)
DIFFERENTIAL METHOD BLD: ABNORMAL
EOSINOPHIL # BLD AUTO: 0.1 10E9/L (ref 0–0.7)
EOSINOPHIL NFR BLD AUTO: 0.9 %
ERYTHROCYTE [DISTWIDTH] IN BLOOD BY AUTOMATED COUNT: 15.1 % (ref 10–15)
GFR SERPL CREATININE-BSD FRML MDRD: 34 ML/MIN/{1.73_M2}
GLUCOSE SERPL-MCNC: 102 MG/DL (ref 70–99)
HCT VFR BLD AUTO: 43.7 % (ref 35–47)
HGB BLD-MCNC: 13.9 G/DL (ref 11.7–15.7)
IMM GRANULOCYTES # BLD: 0 10E9/L (ref 0–0.4)
IMM GRANULOCYTES NFR BLD: 0.4 %
LYMPHOCYTES # BLD AUTO: 0.9 10E9/L (ref 0.8–5.3)
LYMPHOCYTES NFR BLD AUTO: 10.1 %
MCH RBC QN AUTO: 30.5 PG (ref 26.5–33)
MCHC RBC AUTO-ENTMCNC: 31.8 G/DL (ref 31.5–36.5)
MCV RBC AUTO: 96 FL (ref 78–100)
MONOCYTES # BLD AUTO: 0.8 10E9/L (ref 0–1.3)
MONOCYTES NFR BLD AUTO: 9 %
NEUTROPHILS # BLD AUTO: 7.2 10E9/L (ref 1.6–8.3)
NEUTROPHILS NFR BLD AUTO: 79.1 %
NRBC # BLD AUTO: 0 10*3/UL
NRBC BLD AUTO-RTO: 0 /100
PLATELET # BLD AUTO: 152 10E9/L (ref 150–450)
POTASSIUM SERPL-SCNC: 4.9 MMOL/L (ref 3.4–5.3)
PROT SERPL-MCNC: 8 G/DL (ref 6.8–8.8)
RBC # BLD AUTO: 4.55 10E12/L (ref 3.8–5.2)
SODIUM SERPL-SCNC: 137 MMOL/L (ref 133–144)
WBC # BLD AUTO: 9.1 10E9/L (ref 4–11)

## 2019-06-17 PROCEDURE — 85025 COMPLETE CBC W/AUTO DIFF WBC: CPT | Performed by: INTERNAL MEDICINE

## 2019-06-17 PROCEDURE — 80053 COMPREHEN METABOLIC PANEL: CPT | Performed by: INTERNAL MEDICINE

## 2019-06-17 PROCEDURE — 25800030 ZZH RX IP 258 OP 636: Mod: ZF | Performed by: INTERNAL MEDICINE

## 2019-06-17 PROCEDURE — 96413 CHEMO IV INFUSION 1 HR: CPT

## 2019-06-17 PROCEDURE — 84450 TRANSFERASE (AST) (SGOT): CPT | Mod: 91 | Performed by: INTERNAL MEDICINE

## 2019-06-17 PROCEDURE — 25000128 H RX IP 250 OP 636: Mod: ZF | Performed by: INTERNAL MEDICINE

## 2019-06-17 RX ADMIN — SODIUM CHLORIDE 250 ML: 9 INJECTION, SOLUTION INTRAVENOUS at 12:18

## 2019-06-17 RX ADMIN — ADO-TRASTUZUMAB EMTANSINE 242 MG: 20 INJECTION, POWDER, LYOPHILIZED, FOR SOLUTION INTRAVENOUS at 12:19

## 2019-06-17 NOTE — PATIENT INSTRUCTIONS
Contact Numbers    [facility] Main Line: [phone]  [facility] Triage and after hours / weekends / holidays:  [phone]      Please call the triage or after hours line if you experience a temperature greater than or equal to 100.5, shaking chills, have uncontrolled nausea, vomiting and/or diarrhea, dizziness, shortness of breath, chest pain, bleeding, unexplained bruising, or if you have any other new/concerning symptoms, questions or concerns.      If you are having any concerning symptoms or wish to speak to a provider before your next infusion visit, please call your care coordinator or triage to notify them so we can adequately serve you.     If you need a refill on a narcotic prescription or other medication, please call before your infusion appointment.                 June 2019 Sunday Monday Tuesday Wednesday Thursday Friday Saturday                                 1       2     3     4     5     6     7     8       9     10     11     12     13     14     15       16     17    [facility] DRAW   9:00 AM   (15 min.)   [facility]   [facility] Lab Draw    [facility] ONC INFUSION 60   9:30 AM   (60 min.)    ONCOLOGY INFUSION   [facility] 18     19     20     21     22       23     24     25     26     27     28     29       30                                               July 2019 Sunday Monday Tuesday Wednesday Thursday Friday Saturday        1     2     3     4     5     6       7     8     9     10     11    [facility] DRAW  10:00 AM   (15 min.)   [facility] D[facility]   [facility] Lab Draw    ECHO LIMITED  10:30 AM   (60 min.)   [facility] Cardiac Services    [facility] RETURN  12:00 PM   (30 min.)   Perlita Malik MD   [facility]    [facility] ONC INFUSION 60   1:00 PM   (60 min.)    ONCOLOGY INFUSION   [facility] 12     13       14     15     16     17     18     19     20       21     22     23     24      25     26     27       28     29     30     31                                   Recent Results (from the past 24 hour(s))   CBC with platelets differential    Collection Time: 06/17/19  9:10 AM   Result Value Ref Range    WBC 9.1 4.0 - 11.0 10e9/L    RBC Count 4.55 3.8 - 5.2 10e12/L    Hemoglobin 13.9 11.7 - 15.7 g/dL    Hematocrit 43.7 35.0 - 47.0 %    MCV 96 78 - 100 fl    MCH 30.5 26.5 - 33.0 pg    MCHC 31.8 31.5 - 36.5 g/dL    RDW 15.1 (H) 10.0 - 15.0 %    Platelet Count 152 150 - 450 10e9/L    Diff Method Automated Method     % Neutrophils 79.1 %    % Lymphocytes 10.1 %    % Monocytes 9.0 %    % Eosinophils 0.9 %    % Basophils 0.5 %    % Immature Granulocytes 0.4 %    Nucleated RBCs 0 0 /100    Absolute Neutrophil 7.2 1.6 - 8.3 10e9/L    Absolute Lymphocytes 0.9 0.8 - 5.3 10e9/L    Absolute Monocytes 0.8 0.0 - 1.3 10e9/L    Absolute Eosinophils 0.1 0.0 - 0.7 10e9/L    Absolute Basophils 0.1 0.0 - 0.2 10e9/L    Abs Immature Granulocytes 0.0 0 - 0.4 10e9/L    Absolute Nucleated RBC 0.0    Comprehensive metabolic panel    Collection Time: 06/17/19  9:10 AM   Result Value Ref Range    Sodium 137 133 - 144 mmol/L    Potassium 4.9 3.4 - 5.3 mmol/L    Chloride 108 94 - 109 mmol/L    Carbon Dioxide 22 20 - 32 mmol/L    Anion Gap 7 3 - 14 mmol/L    Glucose 102 (H) 70 - 99 mg/dL    Urea Nitrogen 24 7 - 30 mg/dL    Creatinine 1.32 (H) 0.52 - 1.04 mg/dL    GFR Estimate 34 (L) >60 mL/min/[1.73_m2]    GFR Estimate If Black 40 (L) >60 mL/min/[1.73_m2]    Calcium 10.7 (H) 8.5 - 10.1 mg/dL    Bilirubin Total 0.9 0.2 - 1.3 mg/dL    Albumin 3.5 3.4 - 5.0 g/dL    Protein Total 8.0 6.8 - 8.8 g/dL    Alkaline Phosphatase 170 (H) 40 - 150 U/L    ALT 27 0 - 50 U/L    AST Canceled, Test credited 0 - 45 U/L   AST    Collection Time: 06/17/19 10:45 AM   Result Value Ref Range    AST 45 0 - 45 U/L

## 2019-06-17 NOTE — PROGRESS NOTES
Infusion Nursing Note:  Lennie Mar presents today for Cycle 8 Day 1 Kadcyla.    Patient seen by provider today: No   present during visit today: Not Applicable.    Note: Patient is feeling OK today, she denies any new complaints. Unfortunately her CMP hemolyzed today and lab was not able to result her AST. Dr. Malik notified.     TORB Dr. Malik/Gali Neville, RN/Lily Albarado RN- Please re-draw AST, wait for results prior to releasing chemotherapy.     Intravenous Access:  Peripheral IV placed by lab.    Treatment Conditions:  Lab Results   Component Value Date    HGB 13.9 06/17/2019     Lab Results   Component Value Date    WBC 9.1 06/17/2019      Lab Results   Component Value Date    ANEU 7.2 06/17/2019     Lab Results   Component Value Date     06/17/2019      Lab Results   Component Value Date     06/17/2019                   Lab Results   Component Value Date    POTASSIUM 4.9 06/17/2019                  Lab Results   Component Value Date    CR 1.32 06/17/2019                   Lab Results   Component Value Date    CANELO 10.7 06/17/2019                Lab Results   Component Value Date    BILITOTAL 0.9 06/17/2019           Lab Results   Component Value Date    ALBUMIN 3.5 06/17/2019                    Lab Results   Component Value Date    ALT 27 06/17/2019           Lab Results   Component Value Date    AST 45 06/17/2019       Results reviewed, labs MET treatment parameters, ok to proceed with treatment.      Post Infusion Assessment:  Patient tolerated infusion without incident.  Blood return noted pre and post infusion.  Site patent and intact, free from redness, edema or discomfort.  No evidence of extravasations.  Access discontinued per protocol.       Discharge Plan:   Patient declined prescription refills.  Discharge instructions reviewed with: Patient.  Patient and/or family verbalized understanding of discharge instructions and all questions answered.  AVS to  patient via Penemarie K MurphyT.  Patient will return 7/11/2019 for next MD visit and infusion appointment.   Patient discharged in stable condition accompanied by: self.  Departure Mode: Ambulatory.    Lily Albarado RN

## 2019-07-10 RX ORDER — MEPERIDINE HYDROCHLORIDE 25 MG/ML
25 INJECTION INTRAMUSCULAR; INTRAVENOUS; SUBCUTANEOUS EVERY 30 MIN PRN
Status: CANCELLED | OUTPATIENT
Start: 2019-07-11

## 2019-07-10 RX ORDER — SODIUM CHLORIDE 9 MG/ML
1000 INJECTION, SOLUTION INTRAVENOUS CONTINUOUS PRN
Status: CANCELLED
Start: 2019-07-11

## 2019-07-10 RX ORDER — DIPHENHYDRAMINE HYDROCHLORIDE 50 MG/ML
50 INJECTION INTRAMUSCULAR; INTRAVENOUS
Status: CANCELLED
Start: 2019-07-11

## 2019-07-10 RX ORDER — EPINEPHRINE 1 MG/ML
0.3 INJECTION, SOLUTION INTRAMUSCULAR; SUBCUTANEOUS EVERY 5 MIN PRN
Status: CANCELLED | OUTPATIENT
Start: 2019-07-11

## 2019-07-10 RX ORDER — METHYLPREDNISOLONE SODIUM SUCCINATE 125 MG/2ML
125 INJECTION, POWDER, LYOPHILIZED, FOR SOLUTION INTRAMUSCULAR; INTRAVENOUS
Status: CANCELLED
Start: 2019-07-11

## 2019-07-10 RX ORDER — EPINEPHRINE 0.3 MG/.3ML
0.3 INJECTION SUBCUTANEOUS EVERY 5 MIN PRN
Status: CANCELLED | OUTPATIENT
Start: 2019-07-11

## 2019-07-10 RX ORDER — ALBUTEROL SULFATE 90 UG/1
1-2 AEROSOL, METERED RESPIRATORY (INHALATION)
Status: CANCELLED
Start: 2019-07-11

## 2019-07-10 RX ORDER — ALBUTEROL SULFATE 0.83 MG/ML
2.5 SOLUTION RESPIRATORY (INHALATION)
Status: CANCELLED | OUTPATIENT
Start: 2019-07-11

## 2019-07-10 NOTE — PROGRESS NOTES
ONCOLOGY FOLLOW UP:  Date on this visit: 7/11/2019    Diagnosis:  1.  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  Stage IIa, T2N0M0, ER/GA positive, HER-2 non-amplified invasive ductal carcinoma of the left breast at 11:00    Primary Physician: Ty Quintanilla     History Of Present Illness:  Ms. Mar is a 89 year old female with two invasive cancers of the left breast. She self palpated a lump and underwent mammaogram on 5/4/18 that showed a  2 cm mass in the superficial lateral left breast, near the nipple, with fine pleomorphic calcifications extending posteriorly measuring up to 6.9 cm and a second 2.3 cm mass in the upper inner left breast at the 1:00 position. Ultrasonography showed an irregular mass at 1:00, 7 cm from the nipple, measuring 2.4 cm and a mass at 3:00, 2 cm from the nipple, measuring 3.5 cm.  Biopsies of both masses were performed on 5/9/18.  Pathology of the 3:00  mass near the nipple was a grade 3 invasive carcinoma.  Estrogen receptor staining was strongly positive and GA moderately positive.  HER2 was amplified with a HER2/CEN17 ratio of 6.4/2.3 for a total ratio of 2.8.  There was associated intermediate grade DCIS and skin involvement. The 1:00 mass was a grade 3 invasive carcinoma ER strongly positive, GA strongly positive, and HER2 nonamplified. No lymphadenopathy was seen on ultrasound.    She began treatment with Herceptin and letrozole on 6/1/18.  Left breast mastectomy and SLN biopsy on 11/19/18 showed two residual tumors.  The larger tumor at 3:00 was grade 2 and measured 2.7 cm, ER positive, GA negative, HER2-amplified.    The smaller tumor at 1:00 was grade 3 and measured 2.5 cm, ER/GA positive, HER2 non-amplified.  Overall tumor cellularity was 15%.  2/3 left axillary lymph nodes demonstrated metastases measuring 9 mm and 1.2 mm.  HER-2 FISH of the larger  axillary lymph node metastasis was amplified.    Her case was discussed at breast conference and recommendation was to administer adjuvant T-DM1 given HER2 positivity in the lymph node metastasis.  She began treatment with adjuvant T-DM1 on 1/3/19.  She completed radiation to the left chest and regional lymph nodes (left axillary and supraclavicular) on 2/18/19.    Interval History:   Ms. Mar comes into clinic today for routine breast cancer followup and evaluation prior to her next cycle of Kadcyla.  She overall is tolerating treatment well.  She states the last couple of months have been very difficult for her due to worsening of depression.  She states she cycles through periods of depression.  Per the patient, this episode seems longer and more intense than previous episodes, but per her daughter-in-law, this is par for the course.  It is notable that due to acute kidney failure her lithium dose was recently decreased.  Notably, she has had improvement in kidney function since.  Her daughter-in-law states that the dose reduction in lithium was also due to history of falls.  She has had no further falls since the change in dosing.  She denies concerning lumps or masses of either the right breast or the left chest wall.  She has had no cough, shortness of breath or chest pain.  No abdominal complaints.  She denies numbness and tingling of her fingers or toes.  She has had no headaches, visual changes or focal neurologic complaints.  The remainder of a complete 12-point review of systems was reviewed with the patient and was negative with the exception of that mentioned above.     Past Medical/Surgical History:  Past Medical History:   Diagnosis Date     Alcohol dependence in remission (H)      Anxiety      Asymptomatic varicose veins      Bipolar disorder, unspecified (H)      CKD (chronic kidney disease) stage 3, GFR 30-59 ml/min (H) 2/18/2014     History of smoking      Hyperparathyroidism (H)      Memory  loss      Muscle weakness (generalized) 6/23/2008     Severe depression (H)      Subdural hygroma     chronic.  no surgeries     Tremor of unknown origin      Past Surgical History:   Procedure Laterality Date     APPENDECTOMY OPEN  1947     CATARACT IOL, RT/LT       COLONOSCOPY WITH CO2 INSUFFLATION  2/29/2012    Procedure:COLONOSCOPY WITH CO2 INSUFFLATION; Surgeon:GAYLE REYNA; Location:[facility]     CYSTOCELE REPAIR  1972     CYSTOSCOPY, INSERT STENT URETHRA, COMBINED  1999     CYSTOSCOPY, RETROGRADES, INSERT STENT URETER(S), COMBINED  2/29/2012    Procedure:COMBINED CYSTOSCOPY, RETROGRADES, INSERT STENT URETER(S); Surgeon:ANT ESPINOZA; Location:[facility]     DECOMPRESSION LUMBAR ONE LEVEL  8/9/2013    Procedure: DECOMPRESSION LUMBAR ONE LEVEL;  Posterior Decompression Right Lumbar 5- Sacral 1;  Surgeon: You Zavala MD;  Location: [facility] OR     HC TOOTH EXTRACTION W/FORCEP       HYSTERECTOMY, CATA  1963     IRRIGATION AND DEBRIDEMENT ORAL, COMBINED  1982    For treatment of gingivitis     LAPAROSCOPIC ASSISTED COLECTOMY  2/29/2012    Procedure:LAPAROSCOPIC ASSISTED COLECTOMY; Cysto, Bilateral Stent placement (both stents removed at end of case)- aYnet ACOSTA. Laparoscopic Extended Right Venu Colectomy with CO2 Colonoscopy and polyp removal-Judah; Surgeon:GAYLE REYNA; Location:[facility] OR     LIGATN/STRIP LONG OR SHORT SAPHEN  1955     MASTECTOMY PARTIAL WITH SENTINEL NODE Left 11/19/2018    Procedure: Left Mastectomy, Left [place] Lymph Node Biopsy;  Surgeon: Nahun Betancourt MD;  Location: [facility] OR     STRIP VEIN BILATERAL  1964     SURGICAL HISTORY OF -   1999    ureter surgery for blockage.     Allergies:  Allergies as of 07/11/2019 - Reviewed 06/17/2019   Allergen Reaction Noted     Cafergot Other (See Comments) and Nausea and Vomiting 01/01/1972     Ciprofloxacin  06/19/2012     Penicillins Rash and Unknown 01/01/1949     Sulfa drugs Rash and Unknown 01/01/1950     Ergot alkaloids Unknown  03/08/2012     Lamictal [lamotrigine] Other (See Comments) 02/15/2014     Naproxen       Naproxen Unknown 03/08/2012     Tetracycline Unknown 03/08/2012     Current Medications:  Current Outpatient Medications   Medication Sig Dispense Refill     acetaminophen (TYLENOL) 325 MG tablet Take 3 tablets (975 mg) by mouth every 8 hours as needed for mild pain 50 tablet 0     ARIPiprazole (ABILIFY) 5 MG tablet 5 mg tablet in the morning  3     Cyanocobalamin (VITAMIN B 12 PO) Take by mouth every morning       letrozole (FEMARA) 2.5 MG tablet TAKE 1 TABLET(2.5 MG) BY MOUTH DAILY (Patient not taking: Reported on 6/17/2019) 90 tablet 3     lithium (ESKALITH) 150 MG capsule Take 1 capsule (150 mg) by mouth 2 times daily (with meals) 30 capsule 1     ORDER FOR DME Equipment being ordered: compression stocking knee high 20-30mmhg (Patient not taking: Reported on 6/17/2019) 2 Package 0     pramipexole (MIRAPEX) 1 MG tablet Take 1 tablet (1 mg) by mouth At Bedtime       vitamin D3 (CHOLECALCIFEROL) 1000 units (25 mcg) tablet Take 1 tablet (1,000 Units) by mouth daily 30 tablet 1     Physical Exam:  /67 (BP Location: Right arm, Patient Position: Sitting, Cuff Size: Adult Regular)   Pulse 92   Temp 97.7  F (36.5  C) (Oral)   Resp 16   Wt 64.8 kg (142 lb 14.4 oz)   SpO2 95%   BMI 27.02 kg/m    General:  Elderly appearing adult female in NAD. A&Ox3.  HEENT:  Normocephalic.  Sclera anicteric.  MMM.  No lesions of the oropharynx.  Persistently palpable mass right neck, unchanged from prior.  Lymph:  No palpable cervical, supraclavicular or axillary LAD.   Chest:  CTA bilaterally.  No wheezes or crackles.  CV:  RRR.  Nl S1 and S2.  No m/r/g.  Breast:  Left breast mastectomy.  No palpable masses of the left chest wall.  Right breast is without palpable masses.  Right nipple is everted.  Abd:  Soft/NT/ND.  BSs normoactive.    Ext:  No pitting edema of the bilateral lower extremities.  Pulses 2+ and symmetric.  Neuro:  Cranial  nerves grossly intact.  Shuffling gait, requires some assistance to climb onto the exam table.  Psych:  Mood and affect are flat.  Skin:  Numerous seborrheic keratoses.      Laboratory/Imaging Studies:  7/11/19 Labs:  Creatinine 1.32 (improved from 2.0 at last visit)  Calcium is elevated at 10.7, this is chronic and relatively unchanged from prior.  ALT is wnl  Alk phos stably elevated at 170  Blood counts are wnl.    7/11/19 echocardiogram:  LVEF is normal at 55-60%    ASSESSMENT/PLAN:  89 year old female with multifocal, T2N1M0, invasive mammary carcinomas of the left breast.  She is s/p treatment with 5 months neoadjuvant herceptin and letrozole, left breast mastectomy, SLN biopsy, and adjuvant radiation.  Continues on adjuvant Kadcyla.    1.  Left breast cancers:  She continues on treatment with Kadcyla.  She presents to clinic today for evaluation prior to cycle #9 of 17 total planned cycles of adjuvant Kadcyla.  Overall plan is to complete 1 year of T-DM1.  We discussed that upon completion of T-DM1 will resume treatment with letrozole.     2.  Stage 4 chronic kidney disease:  Saw Dr. Simons on 5/30/19.  Likely secondary to chronic lithium.  Plan to decrease lithium as able.  Has had significant improvement in creatinine (decrease from 2 mg/dL to 1.3 mg/dL) with adjustment of lithium dosing.  Plan for return visit with Nephrology in 11/2019.    3.  At risk for cardiomyopathy:  Echocardiogram today shows a LVEF of 55-60%  Plan to continue to monitor once every 3 months while on HER2 targeted therapy.  Next echocardiogram will be due in 10/2019.    4.  Palpable right neck mass:  7.7 cm right thyroid nodule, biopsy in 05/2019 was benign.    5.  Bipolar disorder:  Worsening of depression since last visit.  Denies suicidality.  Recommend she contact her psychiatrist, Dr. Horn at [facility] in [place], with her current symptoms.      6.  Follow Up:  Labs and Kacyla infusion in 3 weeks.  Labs, visit with Dalila  Kulwant, and Kadcyla infusion in 6 weeks.  Labs and Kadcyla infusion in 9 weeks.  Labs, echocardiogram, visit with me, and Kadcyla infusion in 12 weeks.    It was a pleasure to meet with Ms. Mar and her daughter-in-law today.  They had multiple questions regarding her treatment plan which I answered to their satisfaction.  A total of 20 minutes of our 30 minute face to face visit was spent in counseling.

## 2019-07-11 ENCOUNTER — INFUSION THERAPY VISIT (OUTPATIENT)
Dept: ONCOLOGY | Facility: CLINIC | Age: 84
End: 2019-07-11
Attending: INTERNAL MEDICINE
Payer: MEDICARE

## 2019-07-11 ENCOUNTER — ANCILLARY PROCEDURE (OUTPATIENT)
Dept: CARDIOLOGY | Facility: CLINIC | Age: 84
End: 2019-07-11
Attending: INTERNAL MEDICINE
Payer: MEDICARE

## 2019-07-11 ENCOUNTER — APPOINTMENT (OUTPATIENT)
Dept: LAB | Facility: CLINIC | Age: 84
End: 2019-07-11
Attending: INTERNAL MEDICINE
Payer: MEDICARE

## 2019-07-11 VITALS
SYSTOLIC BLOOD PRESSURE: 126 MMHG | BODY MASS INDEX: 27.02 KG/M2 | DIASTOLIC BLOOD PRESSURE: 67 MMHG | WEIGHT: 142.9 LBS | TEMPERATURE: 97.7 F | OXYGEN SATURATION: 95 % | HEART RATE: 92 BPM | RESPIRATION RATE: 16 BRPM

## 2019-07-11 DIAGNOSIS — Z51.11 ENCOUNTER FOR ANTINEOPLASTIC CHEMOTHERAPY: ICD-10-CM

## 2019-07-11 DIAGNOSIS — C50.212 MALIGNANT NEOPLASM OF UPPER-INNER QUADRANT OF LEFT BREAST IN FEMALE, ESTROGEN RECEPTOR POSITIVE (H): Primary | ICD-10-CM

## 2019-07-11 DIAGNOSIS — Z17.0 MALIGNANT NEOPLASM OF UPPER-OUTER QUADRANT OF LEFT BREAST IN FEMALE, ESTROGEN RECEPTOR POSITIVE (H): ICD-10-CM

## 2019-07-11 DIAGNOSIS — C50.412 MALIGNANT NEOPLASM OF UPPER-OUTER QUADRANT OF LEFT BREAST IN FEMALE, ESTROGEN RECEPTOR POSITIVE (H): ICD-10-CM

## 2019-07-11 DIAGNOSIS — Z17.0 MALIGNANT NEOPLASM OF UPPER-INNER QUADRANT OF LEFT BREAST IN FEMALE, ESTROGEN RECEPTOR POSITIVE (H): Primary | ICD-10-CM

## 2019-07-11 LAB
ALBUMIN SERPL-MCNC: 3.3 G/DL (ref 3.4–5)
ALP SERPL-CCNC: 241 U/L (ref 40–150)
ALT SERPL W P-5'-P-CCNC: 42 U/L (ref 0–50)
ANION GAP SERPL CALCULATED.3IONS-SCNC: 6 MMOL/L (ref 3–14)
AST SERPL W P-5'-P-CCNC: 46 U/L (ref 0–45)
BASOPHILS # BLD AUTO: 0.1 10E9/L (ref 0–0.2)
BASOPHILS NFR BLD AUTO: 0.8 %
BILIRUB SERPL-MCNC: 0.6 MG/DL (ref 0.2–1.3)
BUN SERPL-MCNC: 28 MG/DL (ref 7–30)
CALCIUM SERPL-MCNC: 10.2 MG/DL (ref 8.5–10.1)
CHLORIDE SERPL-SCNC: 113 MMOL/L (ref 94–109)
CO2 SERPL-SCNC: 24 MMOL/L (ref 20–32)
CREAT SERPL-MCNC: 1.32 MG/DL (ref 0.52–1.04)
DIFFERENTIAL METHOD BLD: ABNORMAL
EOSINOPHIL # BLD AUTO: 0.1 10E9/L (ref 0–0.7)
EOSINOPHIL NFR BLD AUTO: 1.7 %
ERYTHROCYTE [DISTWIDTH] IN BLOOD BY AUTOMATED COUNT: 16.9 % (ref 10–15)
GFR SERPL CREATININE-BSD FRML MDRD: 35 ML/MIN/{1.73_M2}
GLUCOSE SERPL-MCNC: 106 MG/DL (ref 70–99)
HCT VFR BLD AUTO: 41.7 % (ref 35–47)
HGB BLD-MCNC: 13.2 G/DL (ref 11.7–15.7)
IMM GRANULOCYTES # BLD: 0 10E9/L (ref 0–0.4)
IMM GRANULOCYTES NFR BLD: 0.3 %
LYMPHOCYTES # BLD AUTO: 0.9 10E9/L (ref 0.8–5.3)
LYMPHOCYTES NFR BLD AUTO: 14 %
MCH RBC QN AUTO: 31.1 PG (ref 26.5–33)
MCHC RBC AUTO-ENTMCNC: 31.7 G/DL (ref 31.5–36.5)
MCV RBC AUTO: 98 FL (ref 78–100)
MONOCYTES # BLD AUTO: 0.7 10E9/L (ref 0–1.3)
MONOCYTES NFR BLD AUTO: 10.9 %
NEUTROPHILS # BLD AUTO: 4.8 10E9/L (ref 1.6–8.3)
NEUTROPHILS NFR BLD AUTO: 72.3 %
NRBC # BLD AUTO: 0 10*3/UL
NRBC BLD AUTO-RTO: 0 /100
PLATELET # BLD AUTO: 143 10E9/L (ref 150–450)
POTASSIUM SERPL-SCNC: 4.1 MMOL/L (ref 3.4–5.3)
PROT SERPL-MCNC: 7.7 G/DL (ref 6.8–8.8)
RBC # BLD AUTO: 4.25 10E12/L (ref 3.8–5.2)
SODIUM SERPL-SCNC: 143 MMOL/L (ref 133–144)
WBC # BLD AUTO: 6.6 10E9/L (ref 4–11)

## 2019-07-11 PROCEDURE — 99214 OFFICE O/P EST MOD 30 MIN: CPT | Mod: ZP | Performed by: INTERNAL MEDICINE

## 2019-07-11 PROCEDURE — 25000128 H RX IP 250 OP 636: Mod: ZF | Performed by: INTERNAL MEDICINE

## 2019-07-11 PROCEDURE — 85025 COMPLETE CBC W/AUTO DIFF WBC: CPT | Performed by: INTERNAL MEDICINE

## 2019-07-11 PROCEDURE — G0463 HOSPITAL OUTPT CLINIC VISIT: HCPCS | Mod: ZF

## 2019-07-11 PROCEDURE — 40000556 ZZH STATISTIC PERIPHERAL IV START W US GUIDANCE: Mod: ZF

## 2019-07-11 PROCEDURE — 96413 CHEMO IV INFUSION 1 HR: CPT

## 2019-07-11 PROCEDURE — 25800030 ZZH RX IP 258 OP 636: Mod: ZF | Performed by: INTERNAL MEDICINE

## 2019-07-11 PROCEDURE — 80053 COMPREHEN METABOLIC PANEL: CPT | Performed by: INTERNAL MEDICINE

## 2019-07-11 RX ADMIN — ADO-TRASTUZUMAB EMTANSINE 242 MG: 20 INJECTION, POWDER, LYOPHILIZED, FOR SOLUTION INTRAVENOUS at 13:53

## 2019-07-11 RX ADMIN — SODIUM CHLORIDE 250 ML: 9 INJECTION, SOLUTION INTRAVENOUS at 13:53

## 2019-07-11 ASSESSMENT — PAIN SCALES - GENERAL: PAINLEVEL: NO PAIN (0)

## 2019-07-11 NOTE — NURSING NOTE
"Oncology Rooming Note    July 11, 2019 12:33 PM   Lennie Mar is a 89 year old female who presents for:    Chief Complaint   Patient presents with     Blood Draw     labs drawn with IV start by rn.  vital signs taken.     Oncology Clinic Visit     [facility] RETURN- BREAST CA     Initial Vitals: /67 (BP Location: Right arm, Patient Position: Sitting, Cuff Size: Adult Regular)   Pulse 92   Temp 97.7  F (36.5  C) (Oral)   Resp 16   Wt 64.8 kg (142 lb 14.4 oz)   SpO2 95%   BMI 27.02 kg/m   Estimated body mass index is 27.02 kg/m  as calculated from the following:    Height as of 5/30/19: 1.549 m (5' 0.98\").    Weight as of this encounter: 64.8 kg (142 lb 14.4 oz). Body surface area is 1.67 meters squared.  No Pain (0) Comment: Data Unavailable   No LMP recorded. Patient is postmenopausal.  Allergies reviewed: Yes  Medications reviewed: Yes    Medications: Medication refills not needed today.  Pharmacy name entered into Barcoding:    [facility] - [place], [place] - [address] [facility] - [place], [place] - [address] [facility] [place], [place] - [address] [facility] [id number] [place] [address] - [address] [facility] [address]    Clinical concerns: No new concerns. [place] was notified.      Adrian Haines, DUNIA            "

## 2019-07-11 NOTE — PATIENT INSTRUCTIONS
Contact Numbers    [facility] Main Line: [phone]  [facility] Triage and after hours / weekends / holidays:  [phone]      Please call the triage or after hours line if you experience a temperature greater than or equal to 100.5, shaking chills, have uncontrolled nausea, vomiting and/or diarrhea, dizziness, shortness of breath, chest pain, bleeding, unexplained bruising, or if you have any other new/concerning symptoms, questions or concerns.      If you are having any concerning symptoms or wish to speak to a provider before your next infusion visit, please call your care coordinator or triage to notify them so we can adequately serve you.     If you need a refill on a narcotic prescription or other medication, please call before your infusion appointment.         July 2019 Sunday Monday Tuesday Wednesday Thursday Friday Saturday        1     2     3     4     5     6       7     8     9     10     11    [facility] D[facility]  10:00 AM   (15 min.)   [facility]   [facility] Draw    ECHO LIMITED  10:30 AM   (60 min.)   [id number]   [facility]    [facility] RETURN  12:00 PM   (30 min.)   Perlita Malik MD   [facility]    [facility] ONC INFUSION 60   1:00 PM   (60 min.)    ONCOLOGY INFUSION   [facility] 12     13       14     15     16     17     18     19     20       21     22     23     24     25     26     27       28     29     30     31                                August 2019 Sunday Monday Tuesday Wednesday Thursday Friday Saturday                       1    [facility]   1:45 PM   (15 min.)    [facility]    [facility] ONC INFUSION 60   2:30 PM   (60 min.)    ONCOLOGY INFUSION   [facility] 2     3       4     5     6     7     8     9     10       11     12     13     14     15     16     17       18     19     20     21     22    [facility] DRAW  10:15 AM   (15 min.)     [facility] D[facility]   [facility] Draw    [facility] RETURN  10:45 AM   (50 min.)   Dalila Blum PA-C [facility] 23     24       25     26     27     28     29     30     31                    Recent Results (from the past 24 hour(s))   CBC with platelets differential    Collection Time: 07/11/19 10:32 AM   Result Value Ref Range    WBC 6.6 4.0 - 11.0 10e9/L    RBC Count 4.25 3.8 - 5.2 10e12/L    Hemoglobin 13.2 11.7 - 15.7 g/dL    Hematocrit 41.7 35.0 - 47.0 %    MCV 98 78 - 100 fl    MCH 31.1 26.5 - 33.0 pg    MCHC 31.7 31.5 - 36.5 g/dL    RDW 16.9 (H) 10.0 - 15.0 %    Platelet Count 143 (L) 150 - 450 10e9/L    Diff Method Automated Method     % Neutrophils 72.3 %    % Lymphocytes 14.0 %    % Monocytes 10.9 %    % Eosinophils 1.7 %    % Basophils 0.8 %    % Immature Granulocytes 0.3 %    Nucleated RBCs 0 0 /100    Absolute Neutrophil 4.8 1.6 - 8.3 10e9/L    Absolute Lymphocytes 0.9 0.8 - 5.3 10e9/L    Absolute Monocytes 0.7 0.0 - 1.3 10e9/L    Absolute Eosinophils 0.1 0.0 - 0.7 10e9/L    Absolute Basophils 0.1 0.0 - 0.2 10e9/L    Abs Immature Granulocytes 0.0 0 - 0.4 10e9/L    Absolute Nucleated RBC 0.0    Comprehensive metabolic panel    Collection Time: 07/11/19 10:32 AM   Result Value Ref Range    Sodium 143 133 - 144 mmol/L    Potassium 4.1 3.4 - 5.3 mmol/L    Chloride 113 (H) 94 - 109 mmol/L    Carbon Dioxide 24 20 - 32 mmol/L    Anion Gap 6 3 - 14 mmol/L    Glucose 106 (H) 70 - 99 mg/dL    Urea Nitrogen 28 7 - 30 mg/dL    Creatinine 1.32 (H) 0.52 - 1.04 mg/dL    GFR Estimate 35 (L) >60 mL/min/[1.73_m2]    GFR Estimate If Black 41 (L) >60 mL/min/[1.73_m2]    Calcium 10.2 (H) 8.5 - 10.1 mg/dL    Bilirubin Total 0.6 0.2 - 1.3 mg/dL    Albumin 3.3 (L) 3.4 - 5.0 g/dL    Protein Total 7.7 6.8 - 8.8 g/dL    Alkaline Phosphatase 241 (H) 40 - 150 U/L    ALT 42 0 - 50 U/L    AST 46 (H) 0 - 45 U/L

## 2019-07-11 NOTE — LETTER
7/11/2019       RE: Lennie Mar  [address]  [facility] [address]     Dear Colleague,    Thank you for referring your patient, Lennie Mar, to the [facility]. Please see a copy of my visit note below.    ONCOLOGY FOLLOW UP:  Date on this visit: 7/11/2019    Diagnosis:  1.  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  Stage IIa, T2N0M0, ER/UT positive, HER-2 non-amplified invasive ductal carcinoma of the left breast at 11:00    Primary Physician: Ty Quintanilla     History Of Present Illness:  Ms. Mar is a 89 year old female with two invasive cancers of the left breast. She self palpated a lump and underwent mammaogram on 5/4/18 that showed a  2 cm mass in the superficial lateral left breast, near the nipple, with fine pleomorphic calcifications extending posteriorly measuring up to 6.9 cm and a second 2.3 cm mass in the upper inner left breast at the 1:00 position. Ultrasonography showed an irregular mass at 1:00, 7 cm from the nipple, measuring 2.4 cm and a mass at 3:00, 2 cm from the nipple, measuring 3.5 cm.  Biopsies of both masses were performed on 5/9/18.  Pathology of the 3:00  mass near the nipple was a grade 3 invasive carcinoma.  Estrogen receptor staining was strongly positive and UT moderately positive.  HER2 was amplified with a HER2/CEN17 ratio of 6.4/2.3 for a total ratio of 2.8.  There was associated intermediate grade DCIS and skin involvement. The 1:00 mass was a grade 3 invasive carcinoma ER strongly positive, UT strongly positive, and HER2 nonamplified. No lymphadenopathy was seen on ultrasound.    She began treatment with Herceptin and letrozole on 6/1/18.  Left breast mastectomy and SLN biopsy on 11/19/18 showed two residual tumors.  The larger tumor at 3:00 was grade 2 and measured 2.7 cm, ER positive, UT negative,  HER2-amplified.    The smaller tumor at 1:00 was grade 3 and measured 2.5 cm, ER/CT positive, HER2 non-amplified.  Overall tumor cellularity was 15%.  2/3 left axillary lymph nodes demonstrated metastases measuring 9 mm and 1.2 mm.  HER-2 FISH of the larger axillary lymph node metastasis was amplified.    Her case was discussed at breast conference and recommendation was to administer adjuvant T-DM1 given HER2 positivity in the lymph node metastasis.  She began treatment with adjuvant T-DM1 on 1/3/19.  She completed radiation to the left chest and regional lymph nodes (left axillary and supraclavicular) on 2/18/19.    Interval History:   Ms. Mar comes into clinic today for routine breast cancer followup and evaluation prior to her next cycle of Kadcyla.  She overall is tolerating treatment well.  She states the last couple of months have been very difficult for her due to worsening of depression.  She states she cycles through periods of depression.  Per the patient, this episode seems longer and more intense than previous episodes, but per her daughter-in-law, this is par for the course.  It is notable that due to acute kidney failure her lithium dose was recently decreased.  Notably, she has had improvement in kidney function since.  Her daughter-in-law states that the dose reduction in lithium was also due to history of falls.  She has had no further falls since the change in dosing.  She denies concerning lumps or masses of either the right breast or the left chest wall.  She has had no cough, shortness of breath or chest pain.  No abdominal complaints.  She denies numbness and tingling of her fingers or toes.  She has had no headaches, visual changes or focal neurologic complaints.  The remainder of a complete 12-point review of systems was reviewed with the patient and was negative with the exception of that mentioned above.     Past Medical/Surgical History:  Past Medical History:   Diagnosis Date      Alcohol dependence in remission (H)      Anxiety      Asymptomatic varicose veins      Bipolar disorder, unspecified (H)      CKD (chronic kidney disease) stage 3, GFR 30-59 ml/min (H) 2/18/2014     History of smoking      Hyperparathyroidism (H)      Memory loss      Muscle weakness (generalized) 6/23/2008     Severe depression (H)      Subdural hygroma     chronic.  no surgeries     Tremor of unknown origin      Past Surgical History:   Procedure Laterality Date     APPENDECTOMY OPEN  1947     CATARACT IOL, RT/LT       COLONOSCOPY WITH CO2 INSUFFLATION  2/29/2012    Procedure:COLONOSCOPY WITH CO2 INSUFFLATION; Surgeon:GAYLE REYNA; Location:[facility] OR     CYSTOCELE REPAIR  1972     CYSTOSCOPY, INSERT STENT URETHRA, COMBINED  1999     CYSTOSCOPY, RETROGRADES, INSERT STENT URETER(S), COMBINED  2/29/2012    Procedure:COMBINED CYSTOSCOPY, RETROGRADES, INSERT STENT URETER(S); Surgeon:ANT ESPINOZA; Location:[facility] OR     DECOMPRESSION LUMBAR ONE LEVEL  8/9/2013    Procedure: DECOMPRESSION LUMBAR ONE LEVEL;  Posterior Decompression Right Lumbar 5- Sacral 1;  Surgeon: You Zavala MD;  Location: [facility] OR     HC TOOTH EXTRACTION W/FORCEP       HYSTERECTOMY, CATA  1963     IRRIGATION AND DEBRIDEMENT ORAL, COMBINED  1982    For treatment of gingivitis     LAPAROSCOPIC ASSISTED COLECTOMY  2/29/2012    Procedure:LAPAROSCOPIC ASSISTED COLECTOMY; Cysto, Bilateral Stent placement (both stents removed at end of case)- Yanet ACOSTA. Laparoscopic Extended Right Venu Colectomy with CO2 Colonoscopy and polyp removal-Judah; Surgeon:GAYLE REYNA; Location:[facility] OR     LIGATN/STRIP LONG OR SHORT SAPHEN  1955     MASTECTOMY PARTIAL WITH SENTINEL NODE Left 11/19/2018    Procedure: Left Mastectomy, Left [place] Lymph Node Biopsy;  Surgeon: Nahun Betancourt MD;  Location: [facility] OR     STRIP VEIN BILATERAL  1964     SURGICAL HISTORY OF -   1999    ureter surgery for blockage.     Allergies:  Allergies as of 07/11/2019 - Reviewed  06/17/2019   Allergen Reaction Noted     Cafergot Other (See Comments) and Nausea and Vomiting 01/01/1972     Ciprofloxacin  06/19/2012     Penicillins Rash and Unknown 01/01/1949     Sulfa drugs Rash and Unknown 01/01/1950     Ergot alkaloids Unknown 03/08/2012     Lamictal [lamotrigine] Other (See Comments) 02/15/2014     Naproxen       Naproxen Unknown 03/08/2012     Tetracycline Unknown 03/08/2012     Current Medications:  Current Outpatient Medications   Medication Sig Dispense Refill     acetaminophen (TYLENOL) 325 MG tablet Take 3 tablets (975 mg) by mouth every 8 hours as needed for mild pain 50 tablet 0     ARIPiprazole (ABILIFY) 5 MG tablet 5 mg tablet in the morning  3     Cyanocobalamin (VITAMIN B 12 PO) Take by mouth every morning       letrozole (FEMARA) 2.5 MG tablet TAKE 1 TABLET(2.5 MG) BY MOUTH DAILY (Patient not taking: Reported on 6/17/2019) 90 tablet 3     lithium (ESKALITH) 150 MG capsule Take 1 capsule (150 mg) by mouth 2 times daily (with meals) 30 capsule 1     ORDER FOR DME Equipment being ordered: compression stocking knee high 20-30mmhg (Patient not taking: Reported on 6/17/2019) 2 Package 0     pramipexole (MIRAPEX) 1 MG tablet Take 1 tablet (1 mg) by mouth At Bedtime       vitamin D3 (CHOLECALCIFEROL) 1000 units (25 mcg) tablet Take 1 tablet (1,000 Units) by mouth daily 30 tablet 1     Physical Exam:  /67 (BP Location: Right arm, Patient Position: Sitting, Cuff Size: Adult Regular)   Pulse 92   Temp 97.7  F (36.5  C) (Oral)   Resp 16   Wt 64.8 kg (142 lb 14.4 oz)   SpO2 95%   BMI 27.02 kg/m     General:  Elderly appearing adult female in NAD. A&Ox3.  HEENT:  Normocephalic.  Sclera anicteric.  MMM.  No lesions of the oropharynx.  Persistently palpable mass right neck, unchanged from prior.  Lymph:  No palpable cervical, supraclavicular or axillary LAD.   Chest:  CTA bilaterally.  No wheezes or crackles.  CV:  RRR.  Nl S1 and S2.  No m/r/g.  Breast:  Left breast mastectomy.   No palpable masses of the left chest wall.  Right breast is without palpable masses.  Right nipple is everted.  Abd:  Soft/NT/ND.  BSs normoactive.    Ext:  No pitting edema of the bilateral lower extremities.  Pulses 2+ and symmetric.  Neuro:  Cranial nerves grossly intact.  Shuffling gait, requires some assistance to climb onto the exam table.  Psych:  Mood and affect are flat.  Skin:  Numerous seborrheic keratoses.      Laboratory/Imaging Studies:  7/11/19 Labs:  Creatinine 1.32 (improved from 2.0 at last visit)  Calcium is elevated at 10.7, this is chronic and relatively unchanged from prior.  ALT is wnl  Alk phos stably elevated at 170  Blood counts are wnl.    7/11/19 echocardiogram:  LVEF is normal at 55-60%    ASSESSMENT/PLAN:  89 year old female with multifocal, T2N1M0, invasive mammary carcinomas of the left breast.  She is s/p treatment with 5 months neoadjuvant herceptin and letrozole, left breast mastectomy, SLN biopsy, and adjuvant radiation.  Continues on adjuvant Kadcyla.    1.  Left breast cancers:  She continues on treatment with Kadcyla.  She presents to clinic today for evaluation prior to cycle #9 of 17 total planned cycles of adjuvant Kadcyla.  Overall plan is to complete 1 year of T-DM1.  We discussed that upon completion of T-DM1 will resume treatment with letrozole.     2.  Stage 4 chronic kidney disease:  Saw Dr. Simons on 5/30/19.  Likely secondary to chronic lithium.  Plan to decrease lithium as able.  Has had significant improvement in creatinine (decrease from 2 mg/dL to 1.3 mg/dL) with adjustment of lithium dosing.  Plan for return visit with Nephrology in 11/2019.    3.  At risk for cardiomyopathy:  Echocardiogram today shows a LVEF of 55-60%  Plan to continue to monitor once every 3 months while on HER2 targeted therapy.  Next echocardiogram will be due in 10/2019.    4.  Palpable right neck mass:  7.7 cm right thyroid nodule, biopsy in 05/2019 was benign.    5.  Bipolar disorder:   Worsening of depression since last visit.  Denies suicidality.  Recommend she contact her psychiatrist, Dr. Horn at [facility] in [place], with her current symptoms.      6.  Follow Up:  Labs and Kacyla infusion in 3 weeks.  Labs, visit with Dalila Blum, and Kadcyla infusion in 6 weeks.  Labs and Kadcyla infusion in 9 weeks.  Labs, echocardiogram, visit with me, and Kadcyla infusion in 12 weeks.    It was a pleasure to meet with Ms. Mar and her daughter-in-law today.  They had multiple questions regarding her treatment plan which I answered to their satisfaction.  A total of 20 minutes of our 30 minute face to face visit was spent in counseling.    Again, thank you for allowing me to participate in the care of your patient.      Sincerely,    Perlita Malik MD

## 2019-07-11 NOTE — PROGRESS NOTES
Infusion Nursing Note:  Lennie Mar presents today for Cycle 9 Day 1 Kadcyla.    Patient seen by provider today: Yes: Dr. Malik    Treatment Conditions:  Lab Results   Component Value Date    HGB 13.2 07/11/2019     Lab Results   Component Value Date    WBC 6.6 07/11/2019      Lab Results   Component Value Date    ANEU 4.8 07/11/2019     Lab Results   Component Value Date     07/11/2019      Lab Results   Component Value Date     07/11/2019                   Lab Results   Component Value Date    POTASSIUM 4.1 07/11/2019           Lab Results   Component Value Date    MAG 2.0 04/22/2012            Lab Results   Component Value Date    CR 1.32 07/11/2019                   Lab Results   Component Value Date    CANELO 10.2 07/11/2019                Lab Results   Component Value Date    BILITOTAL 0.6 07/11/2019           Lab Results   Component Value Date    ALBUMIN 3.3 07/11/2019                    Lab Results   Component Value Date    ALT 42 07/11/2019           Lab Results   Component Value Date    AST 46 07/11/2019       Results reviewed, labs MET treatment parameters, ok to proceed with treatment.  ECHO/MUGA completed 7/11/19  EF 55-60%.      Note: Pt with no concerns during infusion visit today.    Intravenous Access:  Peripheral IV placed.    Post Infusion Assessment:  Patient tolerated infusion without incident.  Blood return noted pre and post infusion.  Access discontinued per protocol.    Discharge Plan:   Patient declined prescription refills.  Discharge instructions reviewed with: Patient.  Patient and/or family verbalized understanding of discharge instructions and all questions answered.  Copy of AVS reviewed with patient and/or family.  Patient will return 8/1/19 for next appointment.  Patient discharged in stable condition accompanied by: self.  Departure Mode: Ambulatory.  Face to Face time: 0.    Addie Raphael RN

## 2019-07-31 DIAGNOSIS — Z17.0 MALIGNANT NEOPLASM OF UPPER-INNER QUADRANT OF LEFT BREAST IN FEMALE, ESTROGEN RECEPTOR POSITIVE (H): ICD-10-CM

## 2019-07-31 DIAGNOSIS — C50.212 MALIGNANT NEOPLASM OF UPPER-INNER QUADRANT OF LEFT BREAST IN FEMALE, ESTROGEN RECEPTOR POSITIVE (H): ICD-10-CM

## 2019-07-31 RX ORDER — EPINEPHRINE 0.3 MG/.3ML
0.3 INJECTION SUBCUTANEOUS EVERY 5 MIN PRN
Status: CANCELLED | OUTPATIENT
Start: 2019-08-01

## 2019-07-31 RX ORDER — ALBUTEROL SULFATE 0.83 MG/ML
2.5 SOLUTION RESPIRATORY (INHALATION)
Status: CANCELLED | OUTPATIENT
Start: 2019-08-01

## 2019-07-31 RX ORDER — MEPERIDINE HYDROCHLORIDE 25 MG/ML
25 INJECTION INTRAMUSCULAR; INTRAVENOUS; SUBCUTANEOUS EVERY 30 MIN PRN
Status: CANCELLED | OUTPATIENT
Start: 2019-08-01

## 2019-07-31 RX ORDER — SODIUM CHLORIDE 9 MG/ML
1000 INJECTION, SOLUTION INTRAVENOUS CONTINUOUS PRN
Status: CANCELLED
Start: 2019-08-01

## 2019-07-31 RX ORDER — ALBUTEROL SULFATE 90 UG/1
1-2 AEROSOL, METERED RESPIRATORY (INHALATION)
Status: CANCELLED
Start: 2019-08-01

## 2019-07-31 RX ORDER — METHYLPREDNISOLONE SODIUM SUCCINATE 125 MG/2ML
125 INJECTION, POWDER, LYOPHILIZED, FOR SOLUTION INTRAMUSCULAR; INTRAVENOUS
Status: CANCELLED
Start: 2019-08-01

## 2019-07-31 RX ORDER — DIPHENHYDRAMINE HYDROCHLORIDE 50 MG/ML
50 INJECTION INTRAMUSCULAR; INTRAVENOUS
Status: CANCELLED
Start: 2019-08-01

## 2019-07-31 RX ORDER — EPINEPHRINE 1 MG/ML
0.3 INJECTION, SOLUTION INTRAMUSCULAR; SUBCUTANEOUS EVERY 5 MIN PRN
Status: CANCELLED | OUTPATIENT
Start: 2019-08-01

## 2019-08-01 ENCOUNTER — INFUSION THERAPY VISIT (OUTPATIENT)
Dept: ONCOLOGY | Facility: CLINIC | Age: 84
End: 2019-08-01
Attending: INTERNAL MEDICINE
Payer: MEDICARE

## 2019-08-01 VITALS
RESPIRATION RATE: 16 BRPM | OXYGEN SATURATION: 95 % | SYSTOLIC BLOOD PRESSURE: 134 MMHG | DIASTOLIC BLOOD PRESSURE: 68 MMHG | HEART RATE: 82 BPM | TEMPERATURE: 97.9 F | WEIGHT: 144.2 LBS | BODY MASS INDEX: 27.26 KG/M2

## 2019-08-01 DIAGNOSIS — Z17.0 MALIGNANT NEOPLASM OF UPPER-INNER QUADRANT OF LEFT BREAST IN FEMALE, ESTROGEN RECEPTOR POSITIVE (H): Primary | ICD-10-CM

## 2019-08-01 DIAGNOSIS — C50.212 MALIGNANT NEOPLASM OF UPPER-INNER QUADRANT OF LEFT BREAST IN FEMALE, ESTROGEN RECEPTOR POSITIVE (H): Primary | ICD-10-CM

## 2019-08-01 LAB
ALBUMIN SERPL-MCNC: 3.3 G/DL (ref 3.4–5)
ALP SERPL-CCNC: 236 U/L (ref 40–150)
ALT SERPL W P-5'-P-CCNC: 47 U/L (ref 0–50)
ANION GAP SERPL CALCULATED.3IONS-SCNC: 4 MMOL/L (ref 3–14)
AST SERPL W P-5'-P-CCNC: 57 U/L (ref 0–45)
BASOPHILS # BLD AUTO: 0.1 10E9/L (ref 0–0.2)
BASOPHILS NFR BLD AUTO: 1.1 %
BILIRUB SERPL-MCNC: 0.7 MG/DL (ref 0.2–1.3)
BUN SERPL-MCNC: 34 MG/DL (ref 7–30)
CALCIUM SERPL-MCNC: 10.3 MG/DL (ref 8.5–10.1)
CHLORIDE SERPL-SCNC: 111 MMOL/L (ref 94–109)
CO2 SERPL-SCNC: 24 MMOL/L (ref 20–32)
CREAT SERPL-MCNC: 1.07 MG/DL (ref 0.52–1.04)
DIFFERENTIAL METHOD BLD: ABNORMAL
EOSINOPHIL # BLD AUTO: 0.1 10E9/L (ref 0–0.7)
EOSINOPHIL NFR BLD AUTO: 1.9 %
ERYTHROCYTE [DISTWIDTH] IN BLOOD BY AUTOMATED COUNT: 16.4 % (ref 10–15)
GFR SERPL CREATININE-BSD FRML MDRD: 45 ML/MIN/{1.73_M2}
GLUCOSE SERPL-MCNC: 78 MG/DL (ref 70–99)
HCT VFR BLD AUTO: 42.6 % (ref 35–47)
HGB BLD-MCNC: 13.4 G/DL (ref 11.7–15.7)
IMM GRANULOCYTES # BLD: 0 10E9/L (ref 0–0.4)
IMM GRANULOCYTES NFR BLD: 0.5 %
LYMPHOCYTES # BLD AUTO: 1 10E9/L (ref 0.8–5.3)
LYMPHOCYTES NFR BLD AUTO: 16.2 %
MCH RBC QN AUTO: 31.3 PG (ref 26.5–33)
MCHC RBC AUTO-ENTMCNC: 31.5 G/DL (ref 31.5–36.5)
MCV RBC AUTO: 100 FL (ref 78–100)
MONOCYTES # BLD AUTO: 0.8 10E9/L (ref 0–1.3)
MONOCYTES NFR BLD AUTO: 12.5 %
NEUTROPHILS # BLD AUTO: 4.2 10E9/L (ref 1.6–8.3)
NEUTROPHILS NFR BLD AUTO: 67.8 %
NRBC # BLD AUTO: 0 10*3/UL
NRBC BLD AUTO-RTO: 0 /100
PLATELET # BLD AUTO: 122 10E9/L (ref 150–450)
POTASSIUM SERPL-SCNC: 4.6 MMOL/L (ref 3.4–5.3)
PROT SERPL-MCNC: 7.8 G/DL (ref 6.8–8.8)
RBC # BLD AUTO: 4.28 10E12/L (ref 3.8–5.2)
SODIUM SERPL-SCNC: 139 MMOL/L (ref 133–144)
WBC # BLD AUTO: 6.2 10E9/L (ref 4–11)

## 2019-08-01 PROCEDURE — 80053 COMPREHEN METABOLIC PANEL: CPT | Performed by: INTERNAL MEDICINE

## 2019-08-01 PROCEDURE — 25000128 H RX IP 250 OP 636: Mod: ZF | Performed by: INTERNAL MEDICINE

## 2019-08-01 PROCEDURE — 25800030 ZZH RX IP 258 OP 636: Mod: ZF | Performed by: INTERNAL MEDICINE

## 2019-08-01 PROCEDURE — 85025 COMPLETE CBC W/AUTO DIFF WBC: CPT | Performed by: INTERNAL MEDICINE

## 2019-08-01 PROCEDURE — 96413 CHEMO IV INFUSION 1 HR: CPT

## 2019-08-01 RX ADMIN — SODIUM CHLORIDE 250 ML: 9 INJECTION, SOLUTION INTRAVENOUS at 15:15

## 2019-08-01 RX ADMIN — ADO-TRASTUZUMAB EMTANSINE 242 MG: 20 INJECTION, POWDER, LYOPHILIZED, FOR SOLUTION INTRAVENOUS at 15:15

## 2019-08-01 ASSESSMENT — PAIN SCALES - GENERAL: PAINLEVEL: NO PAIN (0)

## 2019-08-01 NOTE — NURSING NOTE
Chief Complaint   Patient presents with     Labs Only     venipuncture, vitals checked     PIV placed for infusion    Debora Cobb RN on 8/1/2019 at 2:19 PM

## 2019-08-01 NOTE — PROGRESS NOTES
Infusion Nursing Note:  Lennie Mar presents for C10 Kadcyla    Note: pt arrives today feeling well physically, but does endorse still having issues with depression. States they've been adjusting her lithium for the past couple months, and her depression hasn't gotten any better or worse, pt does have a psychologist she follows up with. Pt denies feeling suicidal and has no thoughts of harming herself.    Pain: denies    Treatment Conditions:  Lab Results   Component Value Date    HGB 13.4 08/01/2019     Lab Results   Component Value Date    WBC 6.2 08/01/2019      Lab Results   Component Value Date    ANEU 4.2 08/01/2019     Lab Results   Component Value Date     08/01/2019      Lab Results   Component Value Date     08/01/2019                   Lab Results   Component Value Date    POTASSIUM 4.6 08/01/2019           Lab Results   Component Value Date    MAG 2.0 04/22/2012            Lab Results   Component Value Date    CR 1.07 08/01/2019                   Lab Results   Component Value Date    CANELO 10.3 08/01/2019                Lab Results   Component Value Date    BILITOTAL 0.7 08/01/2019           Lab Results   Component Value Date    ALBUMIN 3.3 08/01/2019                    Lab Results   Component Value Date    ALT 47 08/01/2019           Lab Results   Component Value Date    AST 57 08/01/2019       Results reviewed, labs MET treatment parameters, ok to proceed with treatment.  ECHO/MUGA completed 7/11/19  EF 55-60%.    Intravenous Access:  Peripheral IV placed.    Post Infusion Assessment:  Patient tolerated infusion without incident.  Blood return noted pre and post infusion.  No evidence of extravasations.  Access discontinued per protocol.    Discharge Plan:   Patient declined prescription refills.  Discharge instructions reviewed with: Patient.  Patient and/or family verbalized understanding of discharge instructions and all questions answered.  AVS to patient via [facility].  Patient  will return 8/22 for next appointment.   Patient discharged in stable condition accompanied by: lawson Comer RN

## 2019-08-09 DIAGNOSIS — E55.9 VITAMIN D DEFICIENCY: ICD-10-CM

## 2019-08-22 ENCOUNTER — ONCOLOGY VISIT (OUTPATIENT)
Dept: ONCOLOGY | Facility: CLINIC | Age: 84
End: 2019-08-22
Attending: PHYSICIAN ASSISTANT
Payer: MEDICARE

## 2019-08-22 ENCOUNTER — INFUSION THERAPY VISIT (OUTPATIENT)
Dept: ONCOLOGY | Facility: CLINIC | Age: 84
End: 2019-08-22
Attending: INTERNAL MEDICINE
Payer: MEDICARE

## 2019-08-22 VITALS
SYSTOLIC BLOOD PRESSURE: 142 MMHG | OXYGEN SATURATION: 92 % | HEART RATE: 82 BPM | WEIGHT: 148.7 LBS | HEIGHT: 61 IN | DIASTOLIC BLOOD PRESSURE: 81 MMHG | BODY MASS INDEX: 28.07 KG/M2 | RESPIRATION RATE: 18 BRPM | TEMPERATURE: 97.4 F

## 2019-08-22 DIAGNOSIS — C50.212 MALIGNANT NEOPLASM OF UPPER-INNER QUADRANT OF LEFT BREAST IN FEMALE, ESTROGEN RECEPTOR POSITIVE (H): Primary | ICD-10-CM

## 2019-08-22 DIAGNOSIS — Z17.0 MALIGNANT NEOPLASM OF UPPER-INNER QUADRANT OF LEFT BREAST IN FEMALE, ESTROGEN RECEPTOR POSITIVE (H): ICD-10-CM

## 2019-08-22 DIAGNOSIS — Z17.0 MALIGNANT NEOPLASM OF UPPER-INNER QUADRANT OF LEFT BREAST IN FEMALE, ESTROGEN RECEPTOR POSITIVE (H): Primary | ICD-10-CM

## 2019-08-22 DIAGNOSIS — C50.212 MALIGNANT NEOPLASM OF UPPER-INNER QUADRANT OF LEFT BREAST IN FEMALE, ESTROGEN RECEPTOR POSITIVE (H): ICD-10-CM

## 2019-08-22 LAB
ALBUMIN SERPL-MCNC: 3.2 G/DL (ref 3.4–5)
ALP SERPL-CCNC: 254 U/L (ref 40–150)
ALT SERPL W P-5'-P-CCNC: 57 U/L (ref 0–50)
ANION GAP SERPL CALCULATED.3IONS-SCNC: 6 MMOL/L (ref 3–14)
AST SERPL W P-5'-P-CCNC: 64 U/L (ref 0–45)
BASOPHILS # BLD AUTO: 0.1 10E9/L (ref 0–0.2)
BASOPHILS NFR BLD AUTO: 0.9 %
BILIRUB SERPL-MCNC: 0.6 MG/DL (ref 0.2–1.3)
BUN SERPL-MCNC: 19 MG/DL (ref 7–30)
CALCIUM SERPL-MCNC: 9.8 MG/DL (ref 8.5–10.1)
CHLORIDE SERPL-SCNC: 114 MMOL/L (ref 94–109)
CO2 SERPL-SCNC: 24 MMOL/L (ref 20–32)
CREAT SERPL-MCNC: 1.06 MG/DL (ref 0.52–1.04)
DIFFERENTIAL METHOD BLD: ABNORMAL
EOSINOPHIL # BLD AUTO: 0.2 10E9/L (ref 0–0.7)
EOSINOPHIL NFR BLD AUTO: 3.1 %
ERYTHROCYTE [DISTWIDTH] IN BLOOD BY AUTOMATED COUNT: 16.1 % (ref 10–15)
GFR SERPL CREATININE-BSD FRML MDRD: 46 ML/MIN/{1.73_M2}
GLUCOSE SERPL-MCNC: 90 MG/DL (ref 70–99)
HCT VFR BLD AUTO: 38.8 % (ref 35–47)
HGB BLD-MCNC: 12.3 G/DL (ref 11.7–15.7)
IMM GRANULOCYTES # BLD: 0 10E9/L (ref 0–0.4)
IMM GRANULOCYTES NFR BLD: 0.4 %
LYMPHOCYTES # BLD AUTO: 0.9 10E9/L (ref 0.8–5.3)
LYMPHOCYTES NFR BLD AUTO: 17.4 %
MCH RBC QN AUTO: 31.2 PG (ref 26.5–33)
MCHC RBC AUTO-ENTMCNC: 31.7 G/DL (ref 31.5–36.5)
MCV RBC AUTO: 99 FL (ref 78–100)
MONOCYTES # BLD AUTO: 0.6 10E9/L (ref 0–1.3)
MONOCYTES NFR BLD AUTO: 11.6 %
NEUTROPHILS # BLD AUTO: 3.6 10E9/L (ref 1.6–8.3)
NEUTROPHILS NFR BLD AUTO: 66.6 %
NRBC # BLD AUTO: 0 10*3/UL
NRBC BLD AUTO-RTO: 0 /100
PLATELET # BLD AUTO: 134 10E9/L (ref 150–450)
POTASSIUM SERPL-SCNC: 3.9 MMOL/L (ref 3.4–5.3)
PROT SERPL-MCNC: 7.2 G/DL (ref 6.8–8.8)
RBC # BLD AUTO: 3.94 10E12/L (ref 3.8–5.2)
SODIUM SERPL-SCNC: 144 MMOL/L (ref 133–144)
WBC # BLD AUTO: 5.4 10E9/L (ref 4–11)

## 2019-08-22 PROCEDURE — 80053 COMPREHEN METABOLIC PANEL: CPT | Performed by: PHYSICIAN ASSISTANT

## 2019-08-22 PROCEDURE — 85025 COMPLETE CBC W/AUTO DIFF WBC: CPT | Performed by: PHYSICIAN ASSISTANT

## 2019-08-22 PROCEDURE — 40000556 ZZH STATISTIC PERIPHERAL IV START W US GUIDANCE: Mod: ZF

## 2019-08-22 PROCEDURE — 99214 OFFICE O/P EST MOD 30 MIN: CPT | Mod: ZP | Performed by: PHYSICIAN ASSISTANT

## 2019-08-22 PROCEDURE — 96413 CHEMO IV INFUSION 1 HR: CPT

## 2019-08-22 PROCEDURE — G0463 HOSPITAL OUTPT CLINIC VISIT: HCPCS | Mod: ZF

## 2019-08-22 PROCEDURE — 25000128 H RX IP 250 OP 636: Mod: ZF | Performed by: PHYSICIAN ASSISTANT

## 2019-08-22 PROCEDURE — 25800030 ZZH RX IP 258 OP 636: Mod: ZF | Performed by: PHYSICIAN ASSISTANT

## 2019-08-22 RX ORDER — EPINEPHRINE 0.3 MG/.3ML
0.3 INJECTION SUBCUTANEOUS EVERY 5 MIN PRN
Status: CANCELLED | OUTPATIENT
Start: 2019-09-12

## 2019-08-22 RX ORDER — EPINEPHRINE 0.3 MG/.3ML
0.3 INJECTION SUBCUTANEOUS EVERY 5 MIN PRN
Status: CANCELLED | OUTPATIENT
Start: 2019-08-22

## 2019-08-22 RX ORDER — SODIUM CHLORIDE 9 MG/ML
1000 INJECTION, SOLUTION INTRAVENOUS CONTINUOUS PRN
Status: CANCELLED
Start: 2019-09-12

## 2019-08-22 RX ORDER — ALBUTEROL SULFATE 90 UG/1
1-2 AEROSOL, METERED RESPIRATORY (INHALATION)
Status: CANCELLED
Start: 2019-09-12

## 2019-08-22 RX ORDER — ALBUTEROL SULFATE 0.83 MG/ML
2.5 SOLUTION RESPIRATORY (INHALATION)
Status: CANCELLED | OUTPATIENT
Start: 2019-09-12

## 2019-08-22 RX ORDER — SODIUM CHLORIDE 9 MG/ML
1000 INJECTION, SOLUTION INTRAVENOUS CONTINUOUS PRN
Status: CANCELLED
Start: 2019-08-22

## 2019-08-22 RX ORDER — EPINEPHRINE 1 MG/ML
0.3 INJECTION, SOLUTION INTRAMUSCULAR; SUBCUTANEOUS EVERY 5 MIN PRN
Status: CANCELLED | OUTPATIENT
Start: 2019-09-12

## 2019-08-22 RX ORDER — DIPHENHYDRAMINE HYDROCHLORIDE 50 MG/ML
50 INJECTION INTRAMUSCULAR; INTRAVENOUS
Status: CANCELLED
Start: 2019-08-22

## 2019-08-22 RX ORDER — MEPERIDINE HYDROCHLORIDE 25 MG/ML
25 INJECTION INTRAMUSCULAR; INTRAVENOUS; SUBCUTANEOUS EVERY 30 MIN PRN
Status: CANCELLED | OUTPATIENT
Start: 2019-08-22

## 2019-08-22 RX ORDER — MEPERIDINE HYDROCHLORIDE 25 MG/ML
25 INJECTION INTRAMUSCULAR; INTRAVENOUS; SUBCUTANEOUS EVERY 30 MIN PRN
Status: CANCELLED | OUTPATIENT
Start: 2019-09-12

## 2019-08-22 RX ORDER — METHYLPREDNISOLONE SODIUM SUCCINATE 125 MG/2ML
125 INJECTION, POWDER, LYOPHILIZED, FOR SOLUTION INTRAMUSCULAR; INTRAVENOUS
Status: CANCELLED
Start: 2019-09-12

## 2019-08-22 RX ORDER — DIPHENHYDRAMINE HYDROCHLORIDE 50 MG/ML
50 INJECTION INTRAMUSCULAR; INTRAVENOUS
Status: CANCELLED
Start: 2019-09-12

## 2019-08-22 RX ORDER — METHYLPREDNISOLONE SODIUM SUCCINATE 125 MG/2ML
125 INJECTION, POWDER, LYOPHILIZED, FOR SOLUTION INTRAMUSCULAR; INTRAVENOUS
Status: CANCELLED
Start: 2019-08-22

## 2019-08-22 RX ORDER — EPINEPHRINE 1 MG/ML
0.3 INJECTION, SOLUTION INTRAMUSCULAR; SUBCUTANEOUS EVERY 5 MIN PRN
Status: CANCELLED | OUTPATIENT
Start: 2019-08-22

## 2019-08-22 RX ORDER — ALBUTEROL SULFATE 90 UG/1
1-2 AEROSOL, METERED RESPIRATORY (INHALATION)
Status: CANCELLED
Start: 2019-08-22

## 2019-08-22 RX ORDER — ALBUTEROL SULFATE 0.83 MG/ML
2.5 SOLUTION RESPIRATORY (INHALATION)
Status: CANCELLED | OUTPATIENT
Start: 2019-08-22

## 2019-08-22 RX ADMIN — ADO-TRASTUZUMAB EMTANSINE 260 MG: 20 INJECTION, POWDER, LYOPHILIZED, FOR SOLUTION INTRAVENOUS at 12:54

## 2019-08-22 ASSESSMENT — PAIN SCALES - GENERAL: PAINLEVEL: NO PAIN (0)

## 2019-08-22 ASSESSMENT — MIFFLIN-ST. JEOR: SCORE: 1026.56

## 2019-08-22 NOTE — PROGRESS NOTES
Infusion Nursing Note:  Lennie Mar presents today for C11 Kadcyla.    Patient seen by provider today: Yes: EVELINE Stewart    Treatment Conditions:  Lab Results   Component Value Date    HGB 12.3 08/22/2019     Lab Results   Component Value Date    WBC 5.4 08/22/2019      Lab Results   Component Value Date    ANEU 3.6 08/22/2019     Lab Results   Component Value Date     08/22/2019      Lab Results   Component Value Date     08/22/2019                   Lab Results   Component Value Date    POTASSIUM 3.9 08/22/2019           Lab Results   Component Value Date    MAG 2.0 04/22/2012            Lab Results   Component Value Date    CR 1.06 08/22/2019                   Lab Results   Component Value Date    CANELO 9.8 08/22/2019                Lab Results   Component Value Date    BILITOTAL 0.6 08/22/2019           Lab Results   Component Value Date    ALBUMIN 3.2 08/22/2019                    Lab Results   Component Value Date    ALT 57 08/22/2019           Lab Results   Component Value Date    AST 64 08/22/2019       Results reviewed, labs MET treatment parameters, ok to proceed with treatment.  ECHO/MUGA completed 7/11/19  EF 55-60%.    Intravenous Access:  Peripheral IV placed by Vascular Access.  Access dc'd at time of discharge.      Note:   Results reviewed, copy given to patient.  Proceed with treatment.    Copy of AVS given to patient. + Blood return from PIV pre and post infusion.  Tolerated infusion without incident. No Prescriptions filled today.   D/C in care of self.  Pt will return 9/12 for next appointment.       Stephy Dyer, RN, RN

## 2019-08-22 NOTE — PATIENT INSTRUCTIONS
Contact Numbers  [facility]: [phone]    After Hours:  [phone]  Triage: [phone]    Please call the [facility] Triage line if you experience a temperature greater than or equal to 100.5, shaking chills, have uncontrolled nausea, vomiting and/or diarrhea, dizziness, shortness of breath, chest pain, bleeding, unexplained bruising, or if you have any other new/concerning symptoms, questions or concerns.     If it is after hours, weekends, or holidays, please call the main hospital  at  [phone] and ask to speak to the Oncology doctor on call.     If you are having any concerning symptoms or wish to speak to a provider before your next infusion visit, please call your care coordinator or triage to notify them so we can adequately serve you.     If you need a refill on a narcotic prescription or other medication, please call triage before your infusion appointment.

## 2019-08-22 NOTE — LETTER
8/22/2019      RE: Lennie Mar  1955 [address]  [facility] [address]       Hematology-Oncology Visit  Aug 22, 2019    Reason for Visit: follow-up left breast cancer, 2 primaries     HPI: Lennie Mar is a 89 year old female with past medical history of bipolar disorder, CKD, essential tremor with recent diagnosis of left breast cancer, two separate primaries. She presented with a palpable mass and had a mammogram and US leading to biopsies on 5/9/18. Pathology showed grade 3 invasive carcinoma, ER/KY positive, HER2 amplified of mass at 3:00 position with associated DCIS and skin involvement. The mass at 11:00 position was also grade 3 invasive carcinoma, ER/KY positive, but HER2 nonamplified. No lymphadenopathy was noted.     She met with Dr. Malik on 5/21/18 and elected to start neoadjuvant treatment with Herceptin every 3 weeks along with letrozole. Echocardiogram was done 5/25 showed EF of 55-60%. She tolerated neoadjuvant treatment remarkably well. She had a left breast mastectomy and SLNB on 11/19/18 showing two residual tumors, the larger grade 2 tumor ER, KY, HER2 amplified measuring 2.7 cm and smaller grade 3 tumor ER positive, KY negative, HER2 negative measured 2.5 cm. 2/3 left axillary nodes demonstrated metastases measuring 9 mm and 1.2 mm. HER2 FISH of larger axillary lymph node metastasis was amplified. Dr. Malik recommended adjuvant therapy with TDM1 for 1 year and adjuvant radiation.     Interval History: Lennie is here alone today to for cycle 11 of TDM1. She is feeling much better. She had more depression these past few months which has finally improved, and she is very happy about this. Her lithium dose was increased again, and she is now on Mirapex as well. She continues to tolerate TDM1 well. She denies any nausea, constipation, diarrhea, neuropathy, anorexia/ No fevers/chills or infectious concerns. She has been walking a mile and a half every day, wearing her  "name tag and life alert. She has great stamina and energy with this. Denies any cough, SOB, CP, or edema. ROS otherwise negative.     Current Outpatient Medications   Medication     acetaminophen (TYLENOL) 325 MG tablet     ARIPiprazole (ABILIFY) 5 MG tablet     Ascorbic Acid (VITAMIN C PO)     lithium (ESKALITH) 150 MG capsule     pramipexole (MIRAPEX) 1 MG tablet     vitamin D3 (CHOLECALCIFEROL) 1000 units (25 mcg) tablet     Cyanocobalamin (VITAMIN B 12 PO)     letrozole (FEMARA) 2.5 MG tablet     ORDER FOR DME     No current facility-administered medications for this visit.        PHYSICAL EXAM:  BP (!) 142/81 (BP Location: Right arm, Patient Position: Sitting, Cuff Size: Adult Regular)   Pulse 82   Temp 97.4  F (36.3  C) (Oral)   Resp 18   Ht 1.549 m (5' 0.98\")   Wt 67.4 kg (148 lb 11.2 oz)   SpO2 92%   BMI 28.12 kg/m     General: Alert, oriented, pleasant, NAD  HEENT: Normocephalic, atraumatic, PERRL, EOMI. Moist mucus membranes, no lesions or thrush  Breast: Deferred today.   Lungs: CTA bilaterally, normal work of breathing  Cardiac: RRR, S1, S2, no murmurs  Abdomen: Soft, nontender, nondistended. Normoactive bowel sounds. No hepatosplenomegaly, masses  Neuro: CNII-XII grossly intact  Extremities: No pedal edema    Labs:    8/22/2019 10:52   Sodium 144   Potassium 3.9   Chloride 114 (H)   Carbon Dioxide 24   Urea Nitrogen 19   Creatinine 1.06 (H)   GFR Estimate 46 (L)   GFR Estimate If Black 53 (L)   Calcium 9.8   Anion Gap 6   Albumin 3.2 (L)   Protein Total 7.2   Bilirubin Total 0.6   Alkaline Phosphatase 254 (H)   ALT 57 (H)   AST 64 (H)   Glucose 90   WBC 5.4   Hemoglobin 12.3   Hematocrit 38.8   Platelet Count 134 (L)   RBC Count 3.94   MCV 99   MCH 31.2   MCHC 31.7   RDW 16.1 (H)   Diff Method Automated Method   % Neutrophils 66.6   % Lymphocytes 17.4   % Monocytes 11.6   % Eosinophils 3.1   % Basophils 0.9   % Immature Granulocytes 0.4   Nucleated RBCs 0   Absolute Neutrophil 3.6   Absolute " Lymphocytes 0.9   Absolute Monocytes 0.6   Absolute Eosinophils 0.2   Absolute Basophils 0.1   Abs Immature Granulocytes 0.0   Absolute Nucleated RBC 0.0       Assessment & Plan:   89 year old female with multifocal, T2N1M0, invasive mammary carcinomas of the left breast.  She is s/p treatment with 5 months neoadjuvant herceptin and letrozole and left breast mastectomy. Completed adjuvant radiation on 2/22/19.      1.  Left breast cancers:  She is currently undergoing treatment with adjuvant Kadcyla which she continues to tolerate well. Her LFTs have been trending up the past few cycles, mostly alk phos elevation. Discussed with infusion pharmacy and since her ALT/AST are only mild elevated, okay to continue on same dose but will need to monitor. Overall plan is to complete 1 year of T-DM1.  Upon completion of T-DM1 will resume treatment with letrozole. Discussed plan for 1 year of Kadcyla based on [facility] clinical trial to reduce risk of recurrence. She is tolerating well and lab work is adequate to proceed.      2.  At risk for cardiomyopathy:  Echocardiogram  7/11/19  was unchanged with normal EF. Plan to continue to monitor once every 3 months while on HER2 targeted therapy.  Next echocardiogram will be due in October, scheduled for end of September.      3. CKD: She saw nephrology who suspected lithium toxicity. Her lithium dose was initially decreased and now increased again with major depression episode. Cr has remained stable and is 1.06 today. Continue follow-up with nephrology.     4. R neck mass: Biopsy was done showing benign findings.     5. Hypercalcemia: Likely related to PTH elevation from lithium. Stable--seeing nephro tomorrow.     Dalila Blum PA-C  [facility]  [address] [place] [id number]  [phone]

## 2019-08-22 NOTE — NURSING NOTE
"Oncology Rooming Note    August 22, 2019 11:09 AM   Lennie Mar is a 89 year old female who presents for:    Chief Complaint   Patient presents with     Oncology Clinic Visit     RETURN VISIT; BREAST CA FOLLOW UP      Blood Draw     labs drawn via piv start by vascular access rn. vs taken      Initial Vitals: BP (!) 142/81 (BP Location: Right arm, Patient Position: Sitting, Cuff Size: Adult Regular)   Pulse 82   Temp 97.4  F (36.3  C) (Oral)   Resp 18   Ht 1.549 m (5' 0.98\")   Wt 67.4 kg (148 lb 11.2 oz)   SpO2 92%   BMI 28.12 kg/m   Estimated body mass index is 28.12 kg/m  as calculated from the following:    Height as of this encounter: 1.549 m (5' 0.98\").    Weight as of this encounter: 67.4 kg (148 lb 11.2 oz). Body surface area is 1.7 meters squared.  No Pain (0) Comment: Data Unavailable   No LMP recorded. Patient is postmenopausal.  Allergies reviewed: Yes  Medications reviewed: Yes    Medications: Medication refills not needed today.  Pharmacy name entered into Kensho:    [facility] - [place], [place] - [address] [facility] [place], [place] - [address] [facility] [place], [place] - [address] [id number] [place], [place] - [address] [facility] [address]    Clinical concerns: Patient has a scab on her left arm that she would like you to look at. Patient states that it has been there for a while.  Dalila Blum was notified.      Thalia Farr              "

## 2019-08-22 NOTE — PROGRESS NOTES
Hematology-Oncology Visit  Aug 22, 2019    Reason for Visit: follow-up left breast cancer, 2 primaries     HPI: Lennie Mar is a 89 year old female with past medical history of bipolar disorder, CKD, essential tremor with recent diagnosis of left breast cancer, two separate primaries. She presented with a palpable mass and had a mammogram and US leading to biopsies on 5/9/18. Pathology showed grade 3 invasive carcinoma, ER/MO positive, HER2 amplified of mass at 3:00 position with associated DCIS and skin involvement. The mass at 11:00 position was also grade 3 invasive carcinoma, ER/MO positive, but HER2 nonamplified. No lymphadenopathy was noted.     She met with Dr. Malik on 5/21/18 and elected to start neoadjuvant treatment with Herceptin every 3 weeks along with letrozole. Echocardiogram was done 5/25 showed EF of 55-60%. She tolerated neoadjuvant treatment remarkably well. She had a left breast mastectomy and SLNB on 11/19/18 showing two residual tumors, the larger grade 2 tumor ER, MO, HER2 amplified measuring 2.7 cm and smaller grade 3 tumor ER positive, MO negative, HER2 negative measured 2.5 cm. 2/3 left axillary nodes demonstrated metastases measuring 9 mm and 1.2 mm. HER2 FISH of larger axillary lymph node metastasis was amplified. Dr. Malik recommended adjuvant therapy with TDM1 for 1 year and adjuvant radiation.     Interval History: Lennie is here alone today to for cycle 11 of TDM1. She is feeling much better. She had more depression these past few months which has finally improved, and she is very happy about this. Her lithium dose was increased again, and she is now on Mirapex as well. She continues to tolerate TDM1 well. She denies any nausea, constipation, diarrhea, neuropathy, anorexia/ No fevers/chills or infectious concerns. She has been walking a mile and a half every day, wearing her name tag and life alert. She has great stamina and energy with this. Denies any cough, SOB, CP,  "or edema. ROS otherwise negative.     Current Outpatient Medications   Medication     acetaminophen (TYLENOL) 325 MG tablet     ARIPiprazole (ABILIFY) 5 MG tablet     Ascorbic Acid (VITAMIN C PO)     lithium (ESKALITH) 150 MG capsule     pramipexole (MIRAPEX) 1 MG tablet     vitamin D3 (CHOLECALCIFEROL) 1000 units (25 mcg) tablet     Cyanocobalamin (VITAMIN B 12 PO)     letrozole (FEMARA) 2.5 MG tablet     ORDER FOR DME     No current facility-administered medications for this visit.        PHYSICAL EXAM:  BP (!) 142/81 (BP Location: Right arm, Patient Position: Sitting, Cuff Size: Adult Regular)   Pulse 82   Temp 97.4  F (36.3  C) (Oral)   Resp 18   Ht 1.549 m (5' 0.98\")   Wt 67.4 kg (148 lb 11.2 oz)   SpO2 92%   BMI 28.12 kg/m    General: Alert, oriented, pleasant, NAD  HEENT: Normocephalic, atraumatic, PERRL, EOMI. Moist mucus membranes, no lesions or thrush  Breast: Deferred today.   Lungs: CTA bilaterally, normal work of breathing  Cardiac: RRR, S1, S2, no murmurs  Abdomen: Soft, nontender, nondistended. Normoactive bowel sounds. No hepatosplenomegaly, masses  Neuro: CNII-XII grossly intact  Extremities: No pedal edema    Labs:    8/22/2019 10:52   Sodium 144   Potassium 3.9   Chloride 114 (H)   Carbon Dioxide 24   Urea Nitrogen 19   Creatinine 1.06 (H)   GFR Estimate 46 (L)   GFR Estimate If Black 53 (L)   Calcium 9.8   Anion Gap 6   Albumin 3.2 (L)   Protein Total 7.2   Bilirubin Total 0.6   Alkaline Phosphatase 254 (H)   ALT 57 (H)   AST 64 (H)   Glucose 90   WBC 5.4   Hemoglobin 12.3   Hematocrit 38.8   Platelet Count 134 (L)   RBC Count 3.94   MCV 99   MCH 31.2   MCHC 31.7   RDW 16.1 (H)   Diff Method Automated Method   % Neutrophils 66.6   % Lymphocytes 17.4   % Monocytes 11.6   % Eosinophils 3.1   % Basophils 0.9   % Immature Granulocytes 0.4   Nucleated RBCs 0   Absolute Neutrophil 3.6   Absolute Lymphocytes 0.9   Absolute Monocytes 0.6   Absolute Eosinophils 0.2   Absolute Basophils 0.1   Abs " Immature Granulocytes 0.0   Absolute Nucleated RBC 0.0       Assessment & Plan:   89 year old female with multifocal, T2N1M0, invasive mammary carcinomas of the left breast.  She is s/p treatment with 5 months neoadjuvant herceptin and letrozole and left breast mastectomy. Completed adjuvant radiation on 2/22/19.      1.  Left breast cancers:  She is currently undergoing treatment with adjuvant Kadcyla which she continues to tolerate well. Her LFTs have been trending up the past few cycles, mostly alk phos elevation. Discussed with infusion pharmacy and since her ALT/AST are only mild elevated, okay to continue on same dose but will need to monitor. Overall plan is to complete 1 year of T-DM1.  Upon completion of T-DM1 will resume treatment with letrozole. Discussed plan for 1 year of Kadcyla based on [facility] clinical trial to reduce risk of recurrence. She is tolerating well and lab work is adequate to proceed.      2.  At risk for cardiomyopathy:  Echocardiogram 7/11/19  was unchanged with normal EF. Plan to continue to monitor once every 3 months while on HER2 targeted therapy.  Next echocardiogram will be due in October, scheduled for end of September.      3. CKD: She saw nephrology who suspected lithium toxicity. Her lithium dose was initially decreased and now increased again with major depression episode. Cr has remained stable and is 1.06 today. Continue follow-up with nephrology.     4. R neck mass: Biopsy was done showing benign findings.     5. Hypercalcemia: Likely related to PTH elevation from lithium. Stable--seeing nephro tomorrow.     Dalila Blum PA-C  [facility]  [address] [place] [id number]  [phone]

## 2019-08-22 NOTE — NURSING NOTE
Chief Complaint   Patient presents with     Oncology Clinic Visit     RETURN VISIT; BREAST CA FOLLOW UP      Blood Draw     labs drawn via piv start by vascular access rn. vs taken      Labs drawn via PIV start by vascular access rn in lab. Flushed with saline. Vitals taken. Pt checked in for next appointment.   Gay Mcmahan RN

## 2019-09-12 ENCOUNTER — INFUSION THERAPY VISIT (OUTPATIENT)
Dept: ONCOLOGY | Facility: CLINIC | Age: 84
End: 2019-09-12
Attending: INTERNAL MEDICINE
Payer: MEDICARE

## 2019-09-12 ENCOUNTER — APPOINTMENT (OUTPATIENT)
Dept: LAB | Facility: CLINIC | Age: 84
End: 2019-09-12
Attending: INTERNAL MEDICINE
Payer: MEDICARE

## 2019-09-12 VITALS
SYSTOLIC BLOOD PRESSURE: 120 MMHG | OXYGEN SATURATION: 95 % | WEIGHT: 144.3 LBS | TEMPERATURE: 97.6 F | RESPIRATION RATE: 16 BRPM | HEART RATE: 75 BPM | BODY MASS INDEX: 27.28 KG/M2 | DIASTOLIC BLOOD PRESSURE: 64 MMHG

## 2019-09-12 DIAGNOSIS — C50.212 MALIGNANT NEOPLASM OF UPPER-INNER QUADRANT OF LEFT BREAST IN FEMALE, ESTROGEN RECEPTOR POSITIVE (H): Primary | ICD-10-CM

## 2019-09-12 DIAGNOSIS — Z17.0 MALIGNANT NEOPLASM OF UPPER-INNER QUADRANT OF LEFT BREAST IN FEMALE, ESTROGEN RECEPTOR POSITIVE (H): Primary | ICD-10-CM

## 2019-09-12 LAB
ALBUMIN SERPL-MCNC: 3.3 G/DL (ref 3.4–5)
ALP SERPL-CCNC: 233 U/L (ref 40–150)
ALT SERPL W P-5'-P-CCNC: 35 U/L (ref 0–50)
ANION GAP SERPL CALCULATED.3IONS-SCNC: 8 MMOL/L (ref 3–14)
AST SERPL W P-5'-P-CCNC: 48 U/L (ref 0–45)
BASOPHILS # BLD AUTO: 0.1 10E9/L (ref 0–0.2)
BASOPHILS NFR BLD AUTO: 1 %
BILIRUB SERPL-MCNC: 0.8 MG/DL (ref 0.2–1.3)
BUN SERPL-MCNC: 24 MG/DL (ref 7–30)
CALCIUM SERPL-MCNC: 9.9 MG/DL (ref 8.5–10.1)
CHLORIDE SERPL-SCNC: 110 MMOL/L (ref 94–109)
CO2 SERPL-SCNC: 21 MMOL/L (ref 20–32)
CREAT SERPL-MCNC: 1.21 MG/DL (ref 0.52–1.04)
DIFFERENTIAL METHOD BLD: ABNORMAL
EOSINOPHIL # BLD AUTO: 0.2 10E9/L (ref 0–0.7)
EOSINOPHIL NFR BLD AUTO: 3.6 %
ERYTHROCYTE [DISTWIDTH] IN BLOOD BY AUTOMATED COUNT: 15.6 % (ref 10–15)
GFR SERPL CREATININE-BSD FRML MDRD: 39 ML/MIN/{1.73_M2}
GLUCOSE SERPL-MCNC: 90 MG/DL (ref 70–99)
HCT VFR BLD AUTO: 40.2 % (ref 35–47)
HGB BLD-MCNC: 12.8 G/DL (ref 11.7–15.7)
IMM GRANULOCYTES # BLD: 0 10E9/L (ref 0–0.4)
IMM GRANULOCYTES NFR BLD: 0.3 %
LYMPHOCYTES # BLD AUTO: 0.9 10E9/L (ref 0.8–5.3)
LYMPHOCYTES NFR BLD AUTO: 14.7 %
MCH RBC QN AUTO: 31.6 PG (ref 26.5–33)
MCHC RBC AUTO-ENTMCNC: 31.8 G/DL (ref 31.5–36.5)
MCV RBC AUTO: 99 FL (ref 78–100)
MONOCYTES # BLD AUTO: 0.8 10E9/L (ref 0–1.3)
MONOCYTES NFR BLD AUTO: 13.9 %
NEUTROPHILS # BLD AUTO: 3.9 10E9/L (ref 1.6–8.3)
NEUTROPHILS NFR BLD AUTO: 66.5 %
NRBC # BLD AUTO: 0 10*3/UL
NRBC BLD AUTO-RTO: 0 /100
PLATELET # BLD AUTO: 131 10E9/L (ref 150–450)
POTASSIUM SERPL-SCNC: 4.2 MMOL/L (ref 3.4–5.3)
PROT SERPL-MCNC: 7.8 G/DL (ref 6.8–8.8)
RBC # BLD AUTO: 4.05 10E12/L (ref 3.8–5.2)
SODIUM SERPL-SCNC: 139 MMOL/L (ref 133–144)
WBC # BLD AUTO: 5.9 10E9/L (ref 4–11)

## 2019-09-12 PROCEDURE — 25000128 H RX IP 250 OP 636: Mod: ZF | Performed by: PHYSICIAN ASSISTANT

## 2019-09-12 PROCEDURE — 25800030 ZZH RX IP 258 OP 636: Mod: ZF | Performed by: PHYSICIAN ASSISTANT

## 2019-09-12 PROCEDURE — 85025 COMPLETE CBC W/AUTO DIFF WBC: CPT | Performed by: INTERNAL MEDICINE

## 2019-09-12 PROCEDURE — 96413 CHEMO IV INFUSION 1 HR: CPT

## 2019-09-12 PROCEDURE — 40000141 ZZH STATISTIC PERIPHERAL IV START W/O US GUIDANCE: Mod: ZF

## 2019-09-12 PROCEDURE — 80053 COMPREHEN METABOLIC PANEL: CPT | Performed by: PHYSICIAN ASSISTANT

## 2019-09-12 RX ADMIN — ADO-TRASTUZUMAB EMTANSINE 242 MG: 20 INJECTION, POWDER, LYOPHILIZED, FOR SOLUTION INTRAVENOUS at 12:25

## 2019-09-12 ASSESSMENT — PAIN SCALES - GENERAL: PAINLEVEL: NO PAIN (0)

## 2019-09-12 NOTE — PATIENT INSTRUCTIONS
Contact Numbers    [facility] Main Line/TRIAGE: [phone]    Call with chills and/or temperature greater than or equal to 100.5, uncontrolled nausea/vomiting, diarrhea, constipation, dizziness, shortness of breath, chest pain, bleeding, unexplained bruising, or any new/concerning symptoms, questions/concerns.     If you are having any concerning symptoms or wish to speak to a provider before your next infusion visit, please call your care coordinator or triage to notify them so we can adequately serve you.       After Hours: [phone]    If after hours, weekends, or holidays, call main hospital  and ask for Oncology doctor on call.       September 2019 Sunday Monday Tuesday Wednesday Thursday Friday Saturday   1     2     3     4     5     6     7       8     9     10     11     12    [facility] LAB DRAW   9:15 AM   (15 min.)    [facility] DRAW   [facility]    [facility] ONC INFUSION 60  10:00 AM   (60 min.)    ONCOLOGY INFUSION   [facility] 13     14       15     16     17    LONG   1:20 PM   (20 min.)   Ty Quintanilla MD   [facility] 18     19     20     21       22     23     24     25     26     27     28       29     30    [facility] LAB DRAW  10:00 AM   (15 min.)    [facility] DRAW   [facility]    ECHO LIMITED  10:30 AM   (60 min.)   UCECHCR1   [facility]    [facility] RETURN  12:00 PM   (30 min.)   Perlita Malik MD   [facility]    [facility] ONC INFUSION 60   1:00 PM   (60 min.)    ONCOLOGY INFUSION   [facility]                                      October 2019 Sunday Monday Tuesday Wednesday Thursday Friday Saturday             1     2     3     4     5       6     7     8     9     10     11     12       13     14     15     16     17     18     19       20     21     22     23     24     25     26       27     28     29     30     31                                Lab Results:  Recent Results (from the past 12 hour(s))   Comprehensive metabolic panel    Collection Time: 09/12/19 10:16 AM   Result Value Ref Range    Sodium 139 133 - 144 mmol/L    Potassium 4.2 3.4 - 5.3 mmol/L    Chloride 110 (H) 94 - 109 mmol/L    Carbon Dioxide 21 20 - 32 mmol/L    Anion Gap 8 3 - 14 mmol/L    Glucose 90 70 - 99 mg/dL    Urea Nitrogen 24 7 - 30 mg/dL    Creatinine 1.21 (H) 0.52 - 1.04 mg/dL    GFR Estimate 39 (L) >60 mL/min/[1.73_m2]    GFR Estimate If Black 45 (L) >60 mL/min/[1.73_m2]    Calcium 9.9 8.5 - 10.1 mg/dL    Bilirubin Total 0.8 0.2 - 1.3 mg/dL    Albumin 3.3 (L) 3.4 - 5.0 g/dL    Protein Total 7.8 6.8 - 8.8 g/dL    Alkaline Phosphatase 233 (H) 40 - 150 U/L    ALT 35 0 - 50 U/L    AST 48 (H) 0 - 45 U/L   CBC with platelets differential    Collection Time: 09/12/19 10:50 AM   Result Value Ref Range    WBC 5.9 4.0 - 11.0 10e9/L    RBC Count 4.05 3.8 - 5.2 10e12/L    Hemoglobin 12.8 11.7 - 15.7 g/dL    Hematocrit 40.2 35.0 - 47.0 %    MCV 99 78 - 100 fl    MCH 31.6 26.5 - 33.0 pg    MCHC 31.8 31.5 - 36.5 g/dL    RDW 15.6 (H) 10.0 - 15.0 %    Platelet Count 131 (L) 150 - 450 10e9/L    Diff Method Automated Method     % Neutrophils 66.5 %    % Lymphocytes 14.7 %    % Monocytes 13.9 %    % Eosinophils 3.6 %    % Basophils 1.0 %    % Immature Granulocytes 0.3 %    Nucleated RBCs 0 0 /100    Absolute Neutrophil 3.9 1.6 - 8.3 10e9/L    Absolute Lymphocytes 0.9 0.8 - 5.3 10e9/L    Absolute Monocytes 0.8 0.0 - 1.3 10e9/L    Absolute Eosinophils 0.2 0.0 - 0.7 10e9/L    Absolute Basophils 0.1 0.0 - 0.2 10e9/L    Abs Immature Granulocytes 0.0 0 - 0.4 10e9/L    Absolute Nucleated RBC 0.0

## 2019-09-12 NOTE — NURSING NOTE
Chief Complaint   Patient presents with     Blood Draw     Labs drawn via PIV placed by vascular access RN. VS taken. Patient checked in for next appt.     Labs drawn from PIV placed by vascular access RN. Line flushed with saline. Vitals taken. Pt checked in for appointment.    Juanis Acosta RN

## 2019-09-12 NOTE — PROGRESS NOTES
Infusion Nursing Note:  Lennie Mar presents today for Cycle 12, day 1 Kadcyla.    Patient seen by provider today: No    Note: Patient presents to clinic today feeling well with no questions.  Pt did not request or require any intervention for pain today.    Intravenous Access:  Peripheral IV placed.    Treatment Conditions:  Lab Results   Component Value Date    HGB 12.8 09/12/2019     Lab Results   Component Value Date    WBC 5.9 09/12/2019      Lab Results   Component Value Date    ANEU 3.9 09/12/2019     Lab Results   Component Value Date     09/12/2019      Lab Results   Component Value Date     09/12/2019                   Lab Results   Component Value Date    POTASSIUM 4.2 09/12/2019           Lab Results   Component Value Date    MAG 2.0 04/22/2012            Lab Results   Component Value Date    CR 1.21 09/12/2019                   Lab Results   Component Value Date    CANELO 9.9 09/12/2019                Lab Results   Component Value Date    BILITOTAL 0.8 09/12/2019           Lab Results   Component Value Date    ALBUMIN 3.3 09/12/2019                    Lab Results   Component Value Date    ALT 35 09/12/2019           Lab Results   Component Value Date    AST 48 09/12/2019     Results reviewed, labs MET treatment parameters, ok to proceed with treatment.  ECHO/MUGA completed 7/11/2019 EF 55-60%  Creatinine=1.2.  TORB 9/12/19 1123 Dr. Malik/TO Gutierrez RN: Ok to proceed with Kadcyla today    Post Infusion Assessment:  Patient tolerated infusion without incident.  Blood return noted pre and post infusion.  Site patent and intact, free from redness, edema or discomfort.  No evidence of extravasations.  Access discontinued per protocol.    Discharge Plan:   Patient declined prescription refills.  Discharge instructions reviewed with: Patient.  Patient and/or family verbalized understanding of discharge instructions and all questions answered.  Copy of AVS reviewed with patient and/or  family.  Patient will return 9/30/2019 for next appointment.  Patient discharged in stable condition accompanied by: self.  Departure Mode: Ambulatory.    Sherry Gutierrez, RN, RN

## 2019-09-17 ENCOUNTER — OFFICE VISIT (OUTPATIENT)
Dept: FAMILY MEDICINE | Facility: CLINIC | Age: 84
End: 2019-09-17
Payer: MEDICARE

## 2019-09-17 VITALS
BODY MASS INDEX: 26.43 KG/M2 | RESPIRATION RATE: 12 BRPM | HEIGHT: 61 IN | HEART RATE: 60 BPM | SYSTOLIC BLOOD PRESSURE: 106 MMHG | TEMPERATURE: 98 F | WEIGHT: 140 LBS | DIASTOLIC BLOOD PRESSURE: 58 MMHG

## 2019-09-17 DIAGNOSIS — E55.9 VITAMIN D DEFICIENCY: ICD-10-CM

## 2019-09-17 DIAGNOSIS — F31.9 BIPOLAR AFFECTIVE DISORDER, REMISSION STATUS UNSPECIFIED (H): ICD-10-CM

## 2019-09-17 DIAGNOSIS — R41.3 MEMORY DIFFICULTIES: ICD-10-CM

## 2019-09-17 DIAGNOSIS — F10.21 ALCOHOL DEPENDENCE IN REMISSION (H): ICD-10-CM

## 2019-09-17 DIAGNOSIS — Z00.00 ENCOUNTER FOR MEDICARE ANNUAL WELLNESS EXAM: Primary | ICD-10-CM

## 2019-09-17 DIAGNOSIS — L82.1 SEBORRHEIC KERATOSIS: ICD-10-CM

## 2019-09-17 DIAGNOSIS — N18.4 CKD (CHRONIC KIDNEY DISEASE) STAGE 4, GFR 15-29 ML/MIN (H): ICD-10-CM

## 2019-09-17 DIAGNOSIS — Z23 NEED FOR PROPHYLACTIC VACCINATION AND INOCULATION AGAINST INFLUENZA: ICD-10-CM

## 2019-09-17 LAB
DEPRECATED CALCIDIOL+CALCIFEROL SERPL-MC: NORMAL UG/L (ref 20–75)
LITHIUM SERPL-SCNC: 0.5 MMOL/L (ref 0.6–1.2)
TSH SERPL DL<=0.005 MIU/L-ACNC: NORMAL MU/L (ref 0.4–4)
VIT B12 SERPL-MCNC: NORMAL PG/ML (ref 193–986)

## 2019-09-17 PROCEDURE — 90662 IIV NO PRSV INCREASED AG IM: CPT | Performed by: FAMILY MEDICINE

## 2019-09-17 PROCEDURE — G0008 ADMIN INFLUENZA VIRUS VAC: HCPCS | Performed by: FAMILY MEDICINE

## 2019-09-17 PROCEDURE — 36415 COLL VENOUS BLD VENIPUNCTURE: CPT | Performed by: FAMILY MEDICINE

## 2019-09-17 PROCEDURE — 80178 ASSAY OF LITHIUM: CPT | Performed by: FAMILY MEDICINE

## 2019-09-17 PROCEDURE — 99213 OFFICE O/P EST LOW 20 MIN: CPT | Mod: 25 | Performed by: FAMILY MEDICINE

## 2019-09-17 PROCEDURE — G0439 PPPS, SUBSEQ VISIT: HCPCS | Performed by: FAMILY MEDICINE

## 2019-09-17 ASSESSMENT — MIFFLIN-ST. JEOR: SCORE: 987.42

## 2019-09-17 NOTE — PATIENT INSTRUCTIONS
Patient Education   Personalized Prevention Plan  You are due for the preventive services outlined below.  Your care team is available to assist you in scheduling these services.  If you have already completed any of these items, please share that information with your care team to update in your medical record.  Health Maintenance Due   Topic Date Due     Zoster (Shingles) Vaccine (2 of 3) 05/26/2010     PHQ-2  01/01/2019     Annual Wellness Visit  01/09/2019     Diptheria Tetanus Pertussis (DTAP/TDAP/TD) Vaccine (2 - Td) 05/11/2019     Flu Vaccine (1) 09/01/2019        Patient Education

## 2019-09-17 NOTE — PROGRESS NOTES
"Subjective     Lennie Mar is a 89 year old female who presents to clinic today for the following health issues:    HPI   Lump on left side of back near armpit. Noticed it 3-5 weeks ago. Was a scab  -Not itchy, tender, or painful   -Scaly, ~1cm in length   -Denies fevers, night sweats   -Never had this     Other concerns:   -Tremor in bilateral upper extremities. Unable to write yesterday.   -Check Lithium levels:   -she also has decreased mood over the past week.     Additional history from her psychiatrist son. He has noted she is probably a rapid cycler with her BPD. He wonders about a trial of synthroid which has been used by [employer] psychiatrists for rapid cycling BPD. He has not noted cogntive decrease in her except when her BPD is worse.       SUBJECTIVE:   Lennie Mar is a 89 year old female who presents for Preventive Visit.    Are you in the first 12 months of your Medicare Part B coverage?  No    Physical Health:    In general, how would you rate your overall physical health? good    Outside of work, how many days during the week do you exercise? 6-7 days/week    Outside of work, approximately how many minutes a day do you exercise?15-30 minutes    If you drink alcohol do you typically have >3 drinks per day or >7 drinks per week? No    Do you usually eat at least 4 servings of fruit and vegetables a day, include whole grains & fiber and avoid regularly eating high fat or \"junk\" foods? No    Do you have any problems taking medications regularly?  No    Do you have any side effects from medications? tremor    Needs assistance for the following daily activities: no assistance needed    Which of the following safety concerns are present in your home?  none identified     Hearing impairment: No    In the past 6 months, have you been bothered by leaking of urine? yes    Mental Health:    In general, how would you rate your overall mental or emotional health? poor  PHQ-2 Score:      Do you feel " safe in your environment? Yes    Do you have a Health Care Directive? No: Advance care planning was reviewed with patient; patient declined at this time.    Additional concerns to address?  No    Fall risk:       Cognitive Screenin) Repeat 3 items (Leader, Season, Table)    2) Clock draw: ABNORMAL pretty close, hard time because of tremor  3) 3 item recall: Recalls 2 objects   Results: NORMAL clock, 1-2 items recalled: COGNITIVE IMPAIRMENT LESS LIKELY    MOCA testing score of 24. Some trouble with writing due to tremor.     Mini-CogTM Copyright KILO Roldan. Licensed by the author for use in [facility]; reprinted with permission (shae@[facility]). All rights reserved.      Do you have sleep apnea, excessive snoring or daytime drowsiness?: no        Reviewed and updated as needed this visit by clinical staff  Allergies  Meds         Reviewed and updated as needed this visit by Provider        Social History     Tobacco Use     Smoking status: Former Smoker     Packs/day: 1.50     Years: 30.00     Pack years: 45.00     Types: Cigarettes     Last attempt to quit: 1993     Years since quittin.1     Smokeless tobacco: Never Used   Substance Use Topics     Alcohol use: No                           Current providers sharing in care for this patient include:   Patient Care Team:  Ty Quintanilla MD as PCP - Leslee Guerrier, RN as Nurse Coordinator (Breast Oncology)  Misael Horn DO as MD (Psychiatry)  Audie Marrufo, CASSIE as Specialty Care Coordinator (Nephrology)  Ty Quintanilla MD as Assigned PCP    The following health maintenance items are reviewed in Epic and correct as of today:  Health Maintenance   Topic Date Due     ZOSTER IMMUNIZATION (2 of 3) 2010     PHQ-2  2019     MEDICARE ANNUAL WELLNESS VISIT  2019     DTAP/TDAP/TD IMMUNIZATION (2 - Td) 2019     INFLUENZA VACCINE (1) 2019     FALL RISK ASSESSMENT  2019     ADVANCE CARE PLANNING   "01/09/2023     PNEUMOCOCCAL IMMUNIZATION 65+ HIGH/HIGHEST RISK  Completed     IPV IMMUNIZATION  Aged Out     MENINGITIS IMMUNIZATION  Aged Out     Lab work is in process      ROS:  Reports worse mood as of the last week. More depressed working with her psychiatrist.   Some slow worsening of her memory and continence of urine. No cv, other neuro, gi, gu, msk symptoms     OBJECTIVE:   /58   Pulse 60   Temp 98  F (36.7  C) (Oral)   Resp 12   Ht 1.549 m (5' 1\")   Wt 63.5 kg (140 lb)   BMI 26.45 kg/m   Estimated body mass index is 26.45 kg/m  as calculated from the following:    Height as of this encounter: 1.549 m (5' 1\").    Weight as of this encounter: 63.5 kg (140 lb).  EXAM:   GENERAL: healthy, alert and no distress  HENT: ear canals and TM's normal, nose and mouth without ulcers or lesions  NECK: no adenopathy, no asymmetry, masses, or scars and thyroid normal to palpation  RESP: lungs clear to auscultation - no rales, rhonchi or wheezes  CV: regular rate and rhythm, normal S1 S2, no S3 or S4, no murmur, click or rub, no peripheral edema and peripheral pulses strong  ABDOMEN: soft, nontender, no hepatosplenomegaly, no masses and bowel sounds normal  MS: no gross musculoskeletal defects noted, no edema  SKIN: the lesion in quiestion is a 1x2cm stuck on appearing skin colored-yellow-brown papule on the post left trunk.  NEURO: Normal strength and tone and tremor bilaterally  PSYCH: mentation appears normal and affect flat    Diagnostic Test Results:  Labs reviewed in Epic    ASSESSMENT / PLAN:   1. Encounter for Medicare annual wellness exam  Discussed HCM. Immunizations discussed and updated where needed.     2. Need for prophylactic vaccination and inoculation against influenza     - INFLUENZA (HIGH DOSE) 3 VALENT VACCINE [79547]  - ADMIN INFLUENZA (For MEDICARE Patients ONLY) []    3. Vitamin D deficiency  Will recheck  - Vitamin D Deficiency    4. Bipolar affective disorder, remission status " "unspecified (H)   connected with psych, I will make sure they are aware of her recent worsening symptoms. Her son will discuss with her son regarding the thyroid recommendation.  Isee no contraindication to this trial.   - Lithium level  - TSH with free T4 reflex    5. CKD (chronic kidney disease) stage 4, GFR 15-29 ml/min (H)  Stable   - TSH with free T4 reflex    6. Memory difficulties  Consider neuro eval. If these are noted to be persistent. But in the past they have fluctuated with her mood. MOCA testing today if 24 but her effort was poor consistent with mood.   - Vitamin B12  - TSH with free T4 reflex    7. Seborrheic keratosis  Reassured benign.       End of Life Planning:  Patient currently has an advanced directive: Yes.  Practitioner is supportive of decision.    COUNSELING:  Reviewed preventive health counseling, as reflected in patient instructions       Bladder control       Fall risk prevention       Immunizations    Vaccinated for: Influenza             Osteoporosis Prevention/Bone Health    Estimated body mass index is 26.45 kg/m  as calculated from the following:    Height as of this encounter: 1.549 m (5' 1\").    Weight as of this encounter: 63.5 kg (140 lb).         reports that she quit smoking about 26 years ago. Her smoking use included cigarettes. She has a 45.00 pack-year smoking history. She has never used smokeless tobacco.      Appropriate preventive services were discussed with this patient, including applicable screening as appropriate for cardiovascular disease, diabetes, osteopenia/osteoporosis, and glaucoma.  As appropriate for age/gender, discussed screening for colorectal cancer, prostate cancer, breast cancer, and cervical cancer. Checklist reviewing preventive services available has been given to the patient.    Reviewed patients plan of care and provided an AVS. The Complex Care Plan (for patients with higher acuity and needing more deliberate coordination of services) for " Lennie meets the Care Plan requirement. This Care Plan has been established and reviewed with the Patient and son.    Counseling Resources:  ATP IV Guidelines  Pooled Cohorts Equation Calculator  Breast Cancer Risk Calculator  FRAX Risk Assessment  ICSI Preventive Guidelines  Dietary Guidelines for Americans, 2010  USDA's MyPlate  ASA Prophylaxis  Lung CA Screening    Ty Quintanilla MD  [facility]

## 2019-09-18 ENCOUNTER — TELEPHONE (OUTPATIENT)
Dept: FAMILY MEDICINE | Facility: CLINIC | Age: 84
End: 2019-09-18

## 2019-09-18 NOTE — TELEPHONE ENCOUNTER
"Reason for Call:  Other call back    Detailed comments: Naren, son \"Consent to Communicate\" on file calling about mother's lab results. Wants Dr. Quintanilla to return his call. Explained Dr. Quintanilla will be back in the office tomorrow.    Phone Number Patient can be reached at: [phone] cell    Best Time: Any Time    Can we leave a detailed message on this number? YES    Call taken on 9/18/2019 at 2:57 PM by Marta Esquivel    "

## 2019-09-20 PROBLEM — F10.21 ALCOHOL DEPENDENCE IN REMISSION (H): Status: ACTIVE | Noted: 2019-09-20

## 2019-09-28 ENCOUNTER — HEALTH MAINTENANCE LETTER (OUTPATIENT)
Age: 84
End: 2019-09-28

## 2019-09-28 RX ORDER — DIPHENHYDRAMINE HYDROCHLORIDE 50 MG/ML
50 INJECTION INTRAMUSCULAR; INTRAVENOUS
Status: CANCELLED
Start: 2019-09-30

## 2019-09-28 RX ORDER — EPINEPHRINE 0.3 MG/.3ML
0.3 INJECTION SUBCUTANEOUS EVERY 5 MIN PRN
Status: CANCELLED | OUTPATIENT
Start: 2019-10-21

## 2019-09-28 RX ORDER — MEPERIDINE HYDROCHLORIDE 25 MG/ML
25 INJECTION INTRAMUSCULAR; INTRAVENOUS; SUBCUTANEOUS EVERY 30 MIN PRN
Status: CANCELLED | OUTPATIENT
Start: 2019-09-30

## 2019-09-28 RX ORDER — METHYLPREDNISOLONE SODIUM SUCCINATE 125 MG/2ML
125 INJECTION, POWDER, LYOPHILIZED, FOR SOLUTION INTRAMUSCULAR; INTRAVENOUS
Status: CANCELLED
Start: 2019-10-21

## 2019-09-28 RX ORDER — SODIUM CHLORIDE 9 MG/ML
1000 INJECTION, SOLUTION INTRAVENOUS CONTINUOUS PRN
Status: CANCELLED
Start: 2019-09-30

## 2019-09-28 RX ORDER — EPINEPHRINE 1 MG/ML
0.3 INJECTION, SOLUTION INTRAMUSCULAR; SUBCUTANEOUS EVERY 5 MIN PRN
Status: CANCELLED | OUTPATIENT
Start: 2019-10-21

## 2019-09-28 RX ORDER — SODIUM CHLORIDE 9 MG/ML
1000 INJECTION, SOLUTION INTRAVENOUS CONTINUOUS PRN
Status: CANCELLED
Start: 2019-10-21

## 2019-09-28 RX ORDER — ALBUTEROL SULFATE 90 UG/1
1-2 AEROSOL, METERED RESPIRATORY (INHALATION)
Status: CANCELLED
Start: 2019-10-21

## 2019-09-28 RX ORDER — ALBUTEROL SULFATE 0.83 MG/ML
2.5 SOLUTION RESPIRATORY (INHALATION)
Status: CANCELLED | OUTPATIENT
Start: 2019-10-21

## 2019-09-28 RX ORDER — EPINEPHRINE 1 MG/ML
0.3 INJECTION, SOLUTION INTRAMUSCULAR; SUBCUTANEOUS EVERY 5 MIN PRN
Status: CANCELLED | OUTPATIENT
Start: 2019-09-30

## 2019-09-28 RX ORDER — EPINEPHRINE 0.3 MG/.3ML
0.3 INJECTION SUBCUTANEOUS EVERY 5 MIN PRN
Status: CANCELLED | OUTPATIENT
Start: 2019-09-30

## 2019-09-28 RX ORDER — MEPERIDINE HYDROCHLORIDE 25 MG/ML
25 INJECTION INTRAMUSCULAR; INTRAVENOUS; SUBCUTANEOUS EVERY 30 MIN PRN
Status: CANCELLED | OUTPATIENT
Start: 2019-10-21

## 2019-09-28 RX ORDER — ALBUTEROL SULFATE 0.83 MG/ML
2.5 SOLUTION RESPIRATORY (INHALATION)
Status: CANCELLED | OUTPATIENT
Start: 2019-09-30

## 2019-09-28 RX ORDER — ALBUTEROL SULFATE 90 UG/1
1-2 AEROSOL, METERED RESPIRATORY (INHALATION)
Status: CANCELLED
Start: 2019-09-30

## 2019-09-28 RX ORDER — METHYLPREDNISOLONE SODIUM SUCCINATE 125 MG/2ML
125 INJECTION, POWDER, LYOPHILIZED, FOR SOLUTION INTRAMUSCULAR; INTRAVENOUS
Status: CANCELLED
Start: 2019-09-30

## 2019-09-28 RX ORDER — DIPHENHYDRAMINE HYDROCHLORIDE 50 MG/ML
50 INJECTION INTRAMUSCULAR; INTRAVENOUS
Status: CANCELLED
Start: 2019-10-21

## 2019-09-28 NOTE — PROGRESS NOTES
ONCOLOGY FOLLOW UP:  Date on this visit: 9/30/2019    Diagnosis:  1.  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  Stage IIa, T2N0M0, ER/IN positive, HER-2 non-amplified invasive ductal carcinoma of the left breast at 11:00    Primary Physician: Ty Quintanilla     History Of Present Illness:  Ms. Mar is a 89 year old female with two cancers of the left breast. She self palpated a lump and underwent mammogram and ultrasound in 5/2018 that showed a  3.5 cm mass in the superficial lateral left breast, at 3:00, 2 cm from the nipple and a second 2.4 cm mass in the upper outer left breast at 1:00, 7 cm from the nipple.  Biopsy of the 3:00  mass near the nipple was an ER/IN positive, HER2 amplified (HER2/CEN17 ratio of 6.4/2.3) grade 3 invasive carcinoma.  The 1:00 mass was an ER/IN positive, HER2 nonamplified, grade 3 invasive carcinoma.  No lymphadenopathy was seen on ultrasound.    She began treatment with Herceptin and letrozole on 6/1/18.  Left breast mastectomy and SLN biopsy on 11/19/18 showed two residual tumors.  The larger tumor at 3:00 was grade 2 and measured 2.7 cm, ER positive, IN negative, HER2-amplified.    The smaller tumor at 1:00 was grade 3 and measured 2.5 cm, ER/IN positive, HER2 non-amplified.  Overall tumor cellularity was 15%.  2/3 left axillary lymph nodes demonstrated metastases measuring 9 mm and 1.2 mm.  HER-2 FISH of the larger axillary lymph node metastasis was amplified.    Her case was discussed at breast conference and recommendation was to administer adjuvant T-DM1 given HER2 positivity in the lymph node metastasis.  She began treatment with adjuvant T-DM1 on 1/3/19.  She completed radiation to the left chest and regional lymph nodes (left axillary and supraclavicular) on 2/18/19.    Interval History:  Ms. Mar presents to clinic for evaluation prior to cycle #13 of  adjuvant Kadcyla chemotherapy.  Ms. Mar states that overall she is tolerating Kadcyla well.  She denies any specific side effects.  She specifically denies numbness or tingling in her fingers or her toes.  She states that she has had an intermittent nosebleed from the right side.  She has a scab in this area.  When the scab comes off, it bleeds.  She has had no difficulty getting the nosebleeds to stop.  She has had no easy bruising or bleeding from her gums.  She denies current cough, shortness of breath or chest pain.  She has no current abdominal complaints including nausea, vomiting, abdominal pain, diarrhea or constipation.  Her lithium dose was decreased a number of months ago due to worsening renal function.  Her renal function improved; however, her mood became worse.  She reports ongoing depression.  She also states today that she would like to stop Kadcyla infusions.  She has not discussed this with her family.  Upon further discussion, she is agreeable to today's infusion and another in 3 weeks; however, would like me to talk to her son about the risks and benefits of continuing beyond that.  The remainder of a complete 12-point review of systems was reviewed with the patient and was negative with the exception of that mentioned above.     Past Medical/Surgical History:  Past Medical History:   Diagnosis Date     Alcohol dependence in remission (H)      Anxiety      Asymptomatic varicose veins      Bipolar disorder, unspecified (H)      CKD (chronic kidney disease) stage 3, GFR 30-59 ml/min (H) 2/18/2014     History of smoking      Hyperparathyroidism (H)      Memory loss      Muscle weakness (generalized) 6/23/2008     Severe depression (H)      Subdural hygroma     chronic.  no surgeries     Tremor of unknown origin      Past Surgical History:   Procedure Laterality Date     APPENDECTOMY OPEN  1947     CATARACT IOL, RT/LT       COLONOSCOPY WITH CO2 INSUFFLATION  2/29/2012    Procedure:COLONOSCOPY  WITH CO2 INSUFFLATION; Surgeon:GAYLE REYNA; Location:[facility] OR     CYSTOCELE REPAIR  1972     CYSTOSCOPY, INSERT STENT URETHRA, COMBINED  1999     CYSTOSCOPY, RETROGRADES, INSERT STENT URETER(S), COMBINED  2/29/2012    Procedure:COMBINED CYSTOSCOPY, RETROGRADES, INSERT STENT URETER(S); Surgeon:ANT ESPINOZA; Location:[facility] OR     DECOMPRESSION LUMBAR ONE LEVEL  8/9/2013    Procedure: DECOMPRESSION LUMBAR ONE LEVEL;  Posterior Decompression Right Lumbar 5- Sacral 1;  Surgeon: You Zavala MD;  Location: [facility] OR     HC TOOTH EXTRACTION W/FORCEP       HYSTERECTOMY, CATA  1963     IRRIGATION AND DEBRIDEMENT ORAL, COMBINED  1982    For treatment of gingivitis     LAPAROSCOPIC ASSISTED COLECTOMY  2/29/2012    Procedure:LAPAROSCOPIC ASSISTED COLECTOMY; Cysto, Bilateral Stent placement (both stents removed at end of case)- Yanet ACOSTA. Laparoscopic Extended Right Venu Colectomy with CO2 Colonoscopy and polyp removal-Judah; Surgeon:GAYLE REYNA; Location:[facility] OR     LIGATN/STRIP LONG OR SHORT SAPHEN  1955     MASTECTOMY PARTIAL WITH SENTINEL NODE Left 11/19/2018    Procedure: Left Mastectomy, Left [place] Lymph Node Biopsy;  Surgeon: Nahun Betancourt MD;  Location: [facility] OR     STRIP VEIN BILATERAL  1964     SURGICAL HISTORY OF -   1999    ureter surgery for blockage.     Allergies:  Allergies as of 09/30/2019 - Reviewed 09/17/2019   Allergen Reaction Noted     Cafergot Other (See Comments) and Nausea and Vomiting 01/01/1972     Ciprofloxacin  06/19/2012     Penicillins Rash and Unknown 01/01/1949     Sulfa drugs Rash and Unknown 01/01/1950     Ergot alkaloids Unknown 03/08/2012     Lamictal [lamotrigine] Other (See Comments) 02/15/2014     Naproxen       Naproxen Unknown 03/08/2012     Tetracycline Unknown 03/08/2012     Current Medications:  Current Outpatient Medications   Medication Sig Dispense Refill     acetaminophen (TYLENOL) 325 MG tablet Take 3 tablets (975 mg) by mouth every 8 hours as needed  for mild pain 50 tablet 0     ARIPiprazole (ABILIFY) 5 MG tablet 5 mg tablet in the morning  3     Ascorbic Acid (VITAMIN C PO) Take 1 tablet by mouth daily       Cyanocobalamin (VITAMIN B 12 PO) Take by mouth every morning       letrozole (FEMARA) 2.5 MG tablet TAKE 1 TABLET(2.5 MG) BY MOUTH DAILY (Patient not taking: Reported on 6/17/2019) 90 tablet 3     lithium (ESKALITH) 150 MG capsule Take 1 capsule (150 mg) by mouth 2 times daily (with meals) 30 capsule 1     ORDER FOR DME Equipment being ordered: compression stocking knee high 20-30mmhg (Patient not taking: Reported on 8/1/2019) 2 Package 0     pramipexole (MIRAPEX) 1 MG tablet Take 1 tablet (1 mg) by mouth At Bedtime       vitamin D3 (CHOLECALCIFEROL) 1000 units (25 mcg) tablet Take 1 tablet (1,000 Units) by mouth daily 30 tablet 1     Physical Exam:  /64 (BP Location: Right arm, Patient Position: Sitting, Cuff Size: Adult Regular)   Pulse 80   Temp 97.7  F (36.5  C) (Oral)   Resp 16   Wt 66.4 kg (146 lb 6.4 oz)   SpO2 95%   BMI 27.66 kg/m    General:  Elderly appearing adult female in NAD. A&Ox3.  HEENT:  Normocephalic.  Sclera anicteric.  MMM.  No lesions of the oropharynx.  Persistently palpable mass right neck, unchanged from prior.  Lymph:  No palpable cervical or supraclavicular.  There is a palpable mobile, approximately 1 cm lymph node palpable in the left anterior mid-low axilla.  Chest:  CTA bilaterally.  No wheezes or crackles.  CV:  RRR.  Nl S1 and S2.  No m/r/g.  Breast:  Left breast mastectomy.  There is increased fibrous density within the excess tissue at the medial aspect of left mastectomy incision.  No discretely palpable masses of the left chest wall.  Right breast is without palpable masses.  Right nipple is everted.  Abd:  Soft/NT/ND.  BSs normoactive.    Ext:  No pitting edema of the bilateral lower extremities.  Pulses 2+ and symmetric.  Neuro:  Cranial nerves grossly intact.  Shuffling gait, requires some assistance to  climb onto the exam table.  Psych:  Mood and affect are flat.  Skin:  Numerous seborrheic keratoses.      Laboratory/Imaging Studies:  9/30/19 Labs:  Electrolytes are wnl.  Creatinine is slightly elevated at 1.07 mg/dL.  LFTs are elevated as follows:  Alk Phos 233  ALT 69  AST 95    WBC and hemoglobin are wnl.  Platelets are slightly low at 117.    9/30/19 Echocardiogram:  LVEF=60-65%    ASSESSMENT/PLAN:  89 year old female with a h/o T2N0M0, ER/MA positive, HER2 non-amplified and T2N1M0, ER/MA positive, HER2 amplified invasive mammary carcinomas of the left breast.  She is s/p treatment with 5 months neoadjuvant herceptin and letrozole, left breast mastectomy, SLN biopsy, and adjuvant radiation.  Continues on adjuvant Kadcyla.    1.  Left breast cancers:  She presents to clinic today for evaluation prior to cycle #13 of 17 total planned cycles of adjuvant Kadcyla.  She is tolerating treatment well.  We reviewed overall plan is to complete 1 year of T-DM1, with last infusion on 12/23/19.  She states today that she would like to stop T-DM1.   States coming to the clinic is really inconvenient.  She has not discussed this with her family, only had the thought today.  We reviewed the risks and benefits of Kadcyla in this setting.  Discussed as of yet there has not been a study comparing 9 months vs 1 year of Kadcyla therefore I cannot predict how stopping early might affect efficacy.  She is agreeable to today's infusion and again in 3 weeks, but in the meantime, would like me to discuss with her son about stopping thereafter.  We discussed that upon completion of T-DM1, will resume treatment with letrozole.     On exam today, she has a palpable left axillary node that was not previously palpable.  We will obtain an ultrasound for further evaluation.    2.  Stage 4 chronic kidney disease:  Likely secondary to chronic lithium.  Creatinine has been stable in the 1.0 - 1.3 mg/dL range since decreasing dose of lithium.   Has return visit with Nephrology in 11/2019.    3.  At risk for cardiomyopathy:  Echocardiogram today shows a LVEF of 60-65%.  Plan to continue to monitor once every 3 months while on HER2 targeted therapy.  Next echocardiogram will be due in 1/2020.    4.  Palpable right neck mass:  7.7 cm right thyroid nodule, biopsy in 05/2019 was benign.  Recommend follow up ultrasound in 05/2020.    5.  Bipolar disorder:  Follows with Dr. Horn at [facility] in [place].  Current medications including lithium and Abilify.  She reports worsening of depression since last visit.  Has an appointment with Dr. Horn next week and will discuss with him at that time.    6.  Follow Up:  US left axilla today.  Labs and Kacyla infusion in 3 weeks.  Labs and visit with Dalila Blum in 6 weeks.      It was a pleasure to see Ms. Mar in clinic today.  A total of 20 minutes of our 25 minute face to face visit was spent in counseling.

## 2019-09-30 ENCOUNTER — APPOINTMENT (OUTPATIENT)
Dept: LAB | Facility: CLINIC | Age: 84
End: 2019-09-30
Attending: INTERNAL MEDICINE
Payer: MEDICARE

## 2019-09-30 ENCOUNTER — INFUSION THERAPY VISIT (OUTPATIENT)
Dept: ONCOLOGY | Facility: CLINIC | Age: 84
End: 2019-09-30
Attending: INTERNAL MEDICINE
Payer: MEDICARE

## 2019-09-30 ENCOUNTER — ANCILLARY PROCEDURE (OUTPATIENT)
Dept: MAMMOGRAPHY | Facility: CLINIC | Age: 84
End: 2019-09-30
Attending: INTERNAL MEDICINE
Payer: MEDICARE

## 2019-09-30 ENCOUNTER — ANCILLARY PROCEDURE (OUTPATIENT)
Dept: CARDIOLOGY | Facility: CLINIC | Age: 84
End: 2019-09-30
Attending: INTERNAL MEDICINE
Payer: MEDICARE

## 2019-09-30 VITALS
SYSTOLIC BLOOD PRESSURE: 128 MMHG | OXYGEN SATURATION: 95 % | HEART RATE: 80 BPM | WEIGHT: 146.4 LBS | RESPIRATION RATE: 16 BRPM | TEMPERATURE: 97.7 F | BODY MASS INDEX: 27.66 KG/M2 | DIASTOLIC BLOOD PRESSURE: 64 MMHG

## 2019-09-30 DIAGNOSIS — R59.0 LYMPHADENOPATHY, AXILLARY: ICD-10-CM

## 2019-09-30 DIAGNOSIS — C50.212 MALIGNANT NEOPLASM OF UPPER-INNER QUADRANT OF LEFT BREAST IN FEMALE, ESTROGEN RECEPTOR POSITIVE (H): ICD-10-CM

## 2019-09-30 DIAGNOSIS — R41.3 MEMORY DIFFICULTIES: Primary | ICD-10-CM

## 2019-09-30 DIAGNOSIS — E55.9 VITAMIN D DEFICIENCY: ICD-10-CM

## 2019-09-30 DIAGNOSIS — Z51.11 ENCOUNTER FOR ANTINEOPLASTIC CHEMOTHERAPY: ICD-10-CM

## 2019-09-30 DIAGNOSIS — C50.212 MALIGNANT NEOPLASM OF UPPER-INNER QUADRANT OF LEFT BREAST IN FEMALE, ESTROGEN RECEPTOR POSITIVE (H): Primary | ICD-10-CM

## 2019-09-30 DIAGNOSIS — Z17.0 MALIGNANT NEOPLASM OF UPPER-INNER QUADRANT OF LEFT BREAST IN FEMALE, ESTROGEN RECEPTOR POSITIVE (H): Primary | ICD-10-CM

## 2019-09-30 DIAGNOSIS — Z17.0 MALIGNANT NEOPLASM OF UPPER-INNER QUADRANT OF LEFT BREAST IN FEMALE, ESTROGEN RECEPTOR POSITIVE (H): ICD-10-CM

## 2019-09-30 LAB
ALBUMIN SERPL-MCNC: 3 G/DL (ref 3.4–5)
ALP SERPL-CCNC: 233 U/L (ref 40–150)
ALT SERPL W P-5'-P-CCNC: 69 U/L (ref 0–50)
ANION GAP SERPL CALCULATED.3IONS-SCNC: 5 MMOL/L (ref 3–14)
AST SERPL W P-5'-P-CCNC: 95 U/L (ref 0–45)
BASOPHILS # BLD AUTO: 0.1 10E9/L (ref 0–0.2)
BASOPHILS NFR BLD AUTO: 0.9 %
BILIRUB SERPL-MCNC: 0.7 MG/DL (ref 0.2–1.3)
BUN SERPL-MCNC: 17 MG/DL (ref 7–30)
CALCIUM SERPL-MCNC: 9.9 MG/DL (ref 8.5–10.1)
CHLORIDE SERPL-SCNC: 113 MMOL/L (ref 94–109)
CO2 SERPL-SCNC: 24 MMOL/L (ref 20–32)
CREAT SERPL-MCNC: 1.07 MG/DL (ref 0.52–1.04)
DEPRECATED CALCIDIOL+CALCIFEROL SERPL-MC: 21 UG/L (ref 20–75)
DIFFERENTIAL METHOD BLD: ABNORMAL
EOSINOPHIL # BLD AUTO: 0.1 10E9/L (ref 0–0.7)
EOSINOPHIL NFR BLD AUTO: 2.5 %
ERYTHROCYTE [DISTWIDTH] IN BLOOD BY AUTOMATED COUNT: 15.3 % (ref 10–15)
GFR SERPL CREATININE-BSD FRML MDRD: 45 ML/MIN/{1.73_M2}
GLUCOSE SERPL-MCNC: 92 MG/DL (ref 70–99)
HCT VFR BLD AUTO: 38.7 % (ref 35–47)
HGB BLD-MCNC: 12 G/DL (ref 11.7–15.7)
IMM GRANULOCYTES # BLD: 0 10E9/L (ref 0–0.4)
IMM GRANULOCYTES NFR BLD: 0.5 %
LYMPHOCYTES # BLD AUTO: 0.6 10E9/L (ref 0.8–5.3)
LYMPHOCYTES NFR BLD AUTO: 9.8 %
MCH RBC QN AUTO: 30.9 PG (ref 26.5–33)
MCHC RBC AUTO-ENTMCNC: 31 G/DL (ref 31.5–36.5)
MCV RBC AUTO: 100 FL (ref 78–100)
MONOCYTES # BLD AUTO: 0.7 10E9/L (ref 0–1.3)
MONOCYTES NFR BLD AUTO: 12.9 %
NEUTROPHILS # BLD AUTO: 4.1 10E9/L (ref 1.6–8.3)
NEUTROPHILS NFR BLD AUTO: 73.4 %
NRBC # BLD AUTO: 0 10*3/UL
NRBC BLD AUTO-RTO: 0 /100
PLATELET # BLD AUTO: 117 10E9/L (ref 150–450)
POTASSIUM SERPL-SCNC: 4.1 MMOL/L (ref 3.4–5.3)
PROT SERPL-MCNC: 7.6 G/DL (ref 6.8–8.8)
RBC # BLD AUTO: 3.88 10E12/L (ref 3.8–5.2)
SODIUM SERPL-SCNC: 141 MMOL/L (ref 133–144)
VIT B12 SERPL-MCNC: 594 PG/ML (ref 193–986)
WBC # BLD AUTO: 5.6 10E9/L (ref 4–11)

## 2019-09-30 PROCEDURE — 82306 VITAMIN D 25 HYDROXY: CPT | Performed by: FAMILY MEDICINE

## 2019-09-30 PROCEDURE — 80053 COMPREHEN METABOLIC PANEL: CPT | Performed by: FAMILY MEDICINE

## 2019-09-30 PROCEDURE — 96413 CHEMO IV INFUSION 1 HR: CPT

## 2019-09-30 PROCEDURE — 99214 OFFICE O/P EST MOD 30 MIN: CPT | Mod: ZP | Performed by: INTERNAL MEDICINE

## 2019-09-30 PROCEDURE — 85025 COMPLETE CBC W/AUTO DIFF WBC: CPT | Performed by: FAMILY MEDICINE

## 2019-09-30 PROCEDURE — 25000128 H RX IP 250 OP 636: Mod: ZF | Performed by: INTERNAL MEDICINE

## 2019-09-30 PROCEDURE — 82607 VITAMIN B-12: CPT | Performed by: FAMILY MEDICINE

## 2019-09-30 PROCEDURE — 25800030 ZZH RX IP 258 OP 636: Mod: ZF | Performed by: INTERNAL MEDICINE

## 2019-09-30 PROCEDURE — G0463 HOSPITAL OUTPT CLINIC VISIT: HCPCS | Mod: ZF

## 2019-09-30 RX ADMIN — ADO-TRASTUZUMAB EMTANSINE 242 MG: 20 INJECTION, POWDER, LYOPHILIZED, FOR SOLUTION INTRAVENOUS at 14:23

## 2019-09-30 ASSESSMENT — PAIN SCALES - GENERAL: PAINLEVEL: NO PAIN (0)

## 2019-09-30 NOTE — NURSING NOTE
"Oncology Rooming Note    September 30, 2019 12:30 PM   Lennie Mar is a 89 year old female who presents for:    Chief Complaint   Patient presents with     Blood Draw     labs drawn via piv start by vascular access rn. vs taken     Oncology Clinic Visit     [facility] RETURN- BREAST CA     Initial Vitals: /64 (BP Location: Right arm, Patient Position: Sitting, Cuff Size: Adult Regular)   Pulse 80   Temp 97.7  F (36.5  C) (Oral)   Resp 16   Wt 66.4 kg (146 lb 6.4 oz)   SpO2 95%   BMI 27.66 kg/m   Estimated body mass index is 27.66 kg/m  as calculated from the following:    Height as of 9/17/19: 1.549 m (5' 1\").    Weight as of this encounter: 66.4 kg (146 lb 6.4 oz). Body surface area is 1.69 meters squared.  No Pain (0) Comment: Data Unavailable   No LMP recorded. Patient is postmenopausal.  Allergies reviewed: Yes  Medications reviewed: Yes    Medications: Medication refills not needed today.  Pharmacy name entered into [employer]:    [facility] - [place], [place] - [address] [facility] [place], [place] - [address] [facility] [place], [place] - [address] [id number] - [place], [place] - [address] AT [facility] [address]    Clinical concerns: No new concerns. [place] was notified.      Adrian Haines, DUNIA            "

## 2019-09-30 NOTE — PATIENT INSTRUCTIONS
Contact Numbers  [facility]: [phone]    After Hours:  [phone]  Triage: [phone]    Please call the [facility] Triage line if you experience a temperature greater than or equal to 100.5, shaking chills, have uncontrolled nausea, vomiting and/or diarrhea, dizziness, shortness of breath, chest pain, bleeding, unexplained bruising, or if you have any other new/concerning symptoms, questions or concerns.     If it is after hours, weekends, or holidays, please call the main hospital  at  [phone] and ask to speak to the Oncology doctor on call.     If you are having any concerning symptoms or wish to speak to a provider before your next infusion visit, please call your care coordinator or triage to notify them so we can adequately serve you.     If you need a refill on a narcotic prescription or other medication, please call triage before your infusion appointment.

## 2019-09-30 NOTE — PROGRESS NOTES
Infusion Nursing Note:  Lennie Mar presents today for C13 Keytruda.    Patient seen by provider today: Yes: Dr Malik    Treatment Conditions:  Lab Results   Component Value Date    HGB 12.0 09/30/2019     Lab Results   Component Value Date    WBC 5.6 09/30/2019      Lab Results   Component Value Date    ANEU 4.1 09/30/2019     Lab Results   Component Value Date     09/30/2019      Lab Results   Component Value Date     09/30/2019                   Lab Results   Component Value Date    POTASSIUM 4.1 09/30/2019           Lab Results   Component Value Date    MAG 2.0 04/22/2012            Lab Results   Component Value Date    CR 1.07 09/30/2019                   Lab Results   Component Value Date    CANELO 9.9 09/30/2019                Lab Results   Component Value Date    BILITOTAL 0.7 09/30/2019           Lab Results   Component Value Date    ALBUMIN 3.0 09/30/2019                    Lab Results   Component Value Date    ALT 69 09/30/2019           Lab Results   Component Value Date    AST 95 09/30/2019       Results reviewed, labs MET treatment parameters, ok to proceed with treatment.  ECHO/MUGA completed 9-30  EF 60-65%.    Intravenous Access:  Peripheral IV placed in lab.  Access dc'd at time of discharge.      Note:   Results reviewed, copy given to patient.  Proceed with treatment.    Copy of AVS given to patient. + Blood return from PIV pre and post infusion.  Tolerated infusion without incident. No Prescriptions filled today.   D/C in care of self to [facility].  Pt will return 10/21 for next appointment.       Stephy Dyer, RN, RN

## 2019-09-30 NOTE — NURSING NOTE
"Oncology Rooming Note    September 30, 2019 12:24 PM   Lennie Mar is a 89 year old female who presents for:    Chief Complaint   Patient presents with     Blood Draw     labs drawn via piv start by vascular access rn. vs taken     Oncology Clinic Visit     [facility] RETURN- BREAST CA     Initial Vitals: /64 (BP Location: Right arm, Patient Position: Sitting, Cuff Size: Adult Regular)   Pulse 80   Temp 97.7  F (36.5  C) (Oral)   Resp 16   Wt 66.4 kg (146 lb 6.4 oz)   SpO2 95%   BMI 27.66 kg/m   Estimated body mass index is 27.66 kg/m  as calculated from the following:    Height as of 9/17/19: 1.549 m (5' 1\").    Weight as of this encounter: 66.4 kg (146 lb 6.4 oz). Body surface area is 1.69 meters squared.  No Pain (0) Comment: Data Unavailable   No LMP recorded. Patient is postmenopausal.  Allergies reviewed: Yes  Medications reviewed: Yes    Medications: Medication refills not needed today.  Pharmacy name entered into [employer]:    [facility] - [place], [place] - [address] [facility] - [place], [place] - [address] [facility] [place], [place] [address]  [id number] - [place], [place] - [address] AT [facility] [address]    Clinical concerns: No new concerns. [place] was notified.      Adrian Haines, DUNIA            "

## 2019-09-30 NOTE — NURSING NOTE
Chief Complaint   Patient presents with     Blood Draw     labs drawn via piv start by vascular access rn. vs taken     Labs drawn via PIV start by vascular access rn in lab. Flushed with saline. Vitals taken. Pt sent to the 3rd floor to check in for ECHO.   Gay Mcmahan RN

## 2019-09-30 NOTE — LETTER
9/30/2019       RE: Lennie Mar  [address]  [facility] [address]     Dear Colleague,    Thank you for referring your patient, Lennie Mar, to the [facility]. Please see a copy of my visit note below.    ONCOLOGY FOLLOW UP:  Date on this visit: 9/30/2019    Diagnosis:  1.  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  Stage IIa, T2N0M0, ER/ID positive, HER-2 non-amplified invasive ductal carcinoma of the left breast at 11:00    Primary Physician: Ty Quintanilla     History Of Present Illness:  Ms. Mar is a 89 year old female with two cancers of the left breast. She self palpated a lump and underwent mammogram and ultrasound in 5/2018 that showed a  3.5 cm mass in the superficial lateral left breast, at 3:00, 2 cm from the nipple and a second 2.4 cm mass in the upper outer left breast at 1:00, 7 cm from the nipple.  Biopsy of the 3:00  mass near the nipple was an ER/ID positive, HER2 amplified (HER2/CEN17 ratio of 6.4/2.3) grade 3 invasive carcinoma.  The 1:00 mass was an ER/ID positive, HER2 nonamplified, grade 3 invasive carcinoma.  No lymphadenopathy was seen on ultrasound.    She began treatment with Herceptin and letrozole on 6/1/18.  Left breast mastectomy and SLN biopsy on 11/19/18 showed two residual tumors.  The larger tumor at 3:00 was grade 2 and measured 2.7 cm, ER positive, ID negative, HER2-amplified.    The smaller tumor at 1:00 was grade 3 and measured 2.5 cm, ER/ID positive, HER2 non-amplified.  Overall tumor cellularity was 15%.  2/3 left axillary lymph nodes demonstrated metastases measuring 9 mm and 1.2 mm.  HER-2 FISH of the larger axillary lymph node metastasis was amplified.    Her case was discussed at breast conference and recommendation was to administer adjuvant T-DM1 given HER2 positivity in the lymph node metastasis.   She began treatment with adjuvant T-DM1 on 1/3/19.  She completed radiation to the left chest and regional lymph nodes (left axillary and supraclavicular) on 2/18/19.    Interval History:  Ms. Mar presents to clinic for evaluation prior to cycle #13 of adjuvant Kadcyla chemotherapy.  Ms. Mar states that overall she is tolerating Kadcyla well.  She denies any specific side effects.  She specifically denies numbness or tingling in her fingers or her toes.  She states that she has had an intermittent nosebleed from the right side.  She has a scab in this area.  When the scab comes off, it bleeds.  She has had no difficulty getting the nosebleeds to stop.  She has had no easy bruising or bleeding from her gums.  She denies current cough, shortness of breath or chest pain.  She has no current abdominal complaints including nausea, vomiting, abdominal pain, diarrhea or constipation.  Her lithium dose was decreased a number of months ago due to worsening renal function.  Her renal function improved; however, her mood became worse.  She reports ongoing depression.  She also states today that she would like to stop Kadcyla infusions.  She has not discussed this with her family.  Upon further discussion, she is agreeable to today's infusion and another in 3 weeks; however, would like me to talk to her son about the risks and benefits of continuing beyond that.  The remainder of a complete 12-point review of systems was reviewed with the patient and was negative with the exception of that mentioned above.     Past Medical/Surgical History:  Past Medical History:   Diagnosis Date     Alcohol dependence in remission (H)      Anxiety      Asymptomatic varicose veins      Bipolar disorder, unspecified (H)      CKD (chronic kidney disease) stage 3, GFR 30-59 ml/min (H) 2/18/2014     History of smoking      Hyperparathyroidism (H)      Memory loss      Muscle weakness (generalized) 6/23/2008     Severe depression (H)       Subdural hygroma     chronic.  no surgeries     Tremor of unknown origin      Past Surgical History:   Procedure Laterality Date     APPENDECTOMY OPEN  1947     CATARACT IOL, RT/LT       COLONOSCOPY WITH CO2 INSUFFLATION  2/29/2012    Procedure:COLONOSCOPY WITH CO2 INSUFFLATION; Surgeon:GAYLE REYNA; Location:[facility] OR     CYSTOCELE REPAIR  1972     CYSTOSCOPY, INSERT STENT URETHRA, COMBINED  1999     CYSTOSCOPY, RETROGRADES, INSERT STENT URETER(S), COMBINED  2/29/2012    Procedure:COMBINED CYSTOSCOPY, RETROGRADES, INSERT STENT URETER(S); Surgeon:ANT ESPINOZA; Location:[facility] OR     DECOMPRESSION LUMBAR ONE LEVEL  8/9/2013    Procedure: DECOMPRESSION LUMBAR ONE LEVEL;  Posterior Decompression Right Lumbar 5- Sacral 1;  Surgeon: You Zavala MD;  Location: [facility] OR     HC TOOTH EXTRACTION W/FORCEP       HYSTERECTOMY, CATA  1963     IRRIGATION AND DEBRIDEMENT ORAL, COMBINED  1982    For treatment of gingivitis     LAPAROSCOPIC ASSISTED COLECTOMY  2/29/2012    Procedure:LAPAROSCOPIC ASSISTED COLECTOMY; Cysto, Bilateral Stent placement (both stents removed at end of case)- Yanet ACOSTA. Laparoscopic Extended Right Venu Colectomy with CO2 Colonoscopy and polyp removal-Judah; Surgeon:GAYLE REYNA; Location:[facility] OR     LIGATN/STRIP LONG OR SHORT SAPHEN  1955     MASTECTOMY PARTIAL WITH SENTINEL NODE Left 11/19/2018    Procedure: Left Mastectomy, Left Galt Lymph Node Biopsy;  Surgeon: Nahun Betancourt MD;  Location: [facility] OR     STRIP VEIN BILATERAL  1964     SURGICAL HISTORY OF -   1999    ureter surgery for blockage.     Allergies:  Allergies as of 09/30/2019 - Reviewed 09/17/2019   Allergen Reaction Noted     Cafergot Other (See Comments) and Nausea and Vomiting 01/01/1972     Ciprofloxacin  06/19/2012     Penicillins Rash and Unknown 01/01/1949     Sulfa drugs Rash and Unknown 01/01/1950     Ergot alkaloids Unknown 03/08/2012     Lamictal [lamotrigine] Other (See Comments) 02/15/2014     Naproxen        Naproxen Unknown 03/08/2012     Tetracycline Unknown 03/08/2012     Current Medications:  Current Outpatient Medications   Medication Sig Dispense Refill     acetaminophen (TYLENOL) 325 MG tablet Take 3 tablets (975 mg) by mouth every 8 hours as needed for mild pain 50 tablet 0     ARIPiprazole (ABILIFY) 5 MG tablet 5 mg tablet in the morning  3     Ascorbic Acid (VITAMIN C PO) Take 1 tablet by mouth daily       Cyanocobalamin (VITAMIN B 12 PO) Take by mouth every morning       letrozole (FEMARA) 2.5 MG tablet TAKE 1 TABLET(2.5 MG) BY MOUTH DAILY (Patient not taking: Reported on 6/17/2019) 90 tablet 3     lithium (ESKALITH) 150 MG capsule Take 1 capsule (150 mg) by mouth 2 times daily (with meals) 30 capsule 1     ORDER FOR DME Equipment being ordered: compression stocking knee high 20-30mmhg (Patient not taking: Reported on 8/1/2019) 2 Package 0     pramipexole (MIRAPEX) 1 MG tablet Take 1 tablet (1 mg) by mouth At Bedtime       vitamin D3 (CHOLECALCIFEROL) 1000 units (25 mcg) tablet Take 1 tablet (1,000 Units) by mouth daily 30 tablet 1     Physical Exam:  /64 (BP Location: Right arm, Patient Position: Sitting, Cuff Size: Adult Regular)   Pulse 80   Temp 97.7  F (36.5  C) (Oral)   Resp 16   Wt 66.4 kg (146 lb 6.4 oz)   SpO2 95%   BMI 27.66 kg/m     General:  Elderly appearing adult female in NAD. A&Ox3.  HEENT:  Normocephalic.  Sclera anicteric.  MMM.  No lesions of the oropharynx.  Persistently palpable mass right neck, unchanged from prior.  Lymph:  No palpable cervical or supraclavicular.  There is a palpable mobile, approximately 1 cm lymph node palpable in the left anterior mid-low axilla.  Chest:  CTA bilaterally.  No wheezes or crackles.  CV:  RRR.  Nl S1 and S2.  No m/r/g.  Breast:  Left breast mastectomy.  There is increased fibrous density within the excess tissue at the medial aspect of left mastectomy incision.  No discretely palpable masses of the left chest wall.  Right breast is  without palpable masses.  Right nipple is everted.  Abd:  Soft/NT/ND.  BSs normoactive.    Ext:  No pitting edema of the bilateral lower extremities.  Pulses 2+ and symmetric.  Neuro:  Cranial nerves grossly intact.  Shuffling gait, requires some assistance to climb onto the exam table.  Psych:  Mood and affect are flat.  Skin:  Numerous seborrheic keratoses.      Laboratory/Imaging Studies:  9/30/19 Labs:  Electrolytes are wnl.  Creatinine is slightly elevated at 1.07 mg/dL.  LFTs are elevated as follows:  Alk Phos 233  ALT 69  AST 95    WBC and hemoglobin are wnl.  Platelets are slightly low at 117.    9/30/19 Echocardiogram:  LVEF=60-65%    ASSESSMENT/PLAN:  89 year old female with a h/o T2N0M0, ER/RI positive, HER2 non-amplified and T2N1M0, ER/RI positive, HER2 amplified invasive mammary carcinomas of the left breast.  She is s/p treatment with 5 months neoadjuvant herceptin and letrozole, left breast mastectomy, SLN biopsy, and adjuvant radiation.  Continues on adjuvant Kadcyla.    1.  Left breast cancers:  She presents to clinic today for evaluation prior to cycle #13 of 17 total planned cycles of adjuvant Kadcyla.  She is tolerating treatment well.  We reviewed overall plan is to complete 1 year of T-DM1, with last infusion on 12/23/19.  She states today that she would like to stop T-DM1.   States coming to the clinic is really inconvenient.  She has not discussed this with her family, only had the thought today.  We reviewed the risks and benefits of Kadcyla in this setting.  Discussed as of yet there has not been a study comparing 9 months vs 1 year of Kadcyla therefore I cannot predict how stopping early might affect efficacy.  She is agreeable to today's infusion and again in 3 weeks, but in the meantime, would like me to discuss with her son about stopping thereafter.  We discussed that upon completion of T-DM1, will resume treatment with letrozole.     On exam today, she has a palpable left axillary  node that was not previously palpable.  We will obtain an ultrasound for further evaluation.    2.  Stage 4 chronic kidney disease:  Likely secondary to chronic lithium.  Creatinine has been stable in the 1.0 - 1.3 mg/dL range since decreasing dose of lithium.  Has return visit with Nephrology in 11/2019.    3.  At risk for cardiomyopathy:  Echocardiogram today shows a LVEF of 60-65%.  Plan to continue to monitor once every 3 months while on HER2 targeted therapy.  Next echocardiogram will be due in 1/2020.    4.  Palpable right neck mass:  7.7 cm right thyroid nodule, biopsy in 05/2019 was benign.  Recommend follow up ultrasound in 05/2020.    5.  Bipolar disorder:  Follows with Dr. Horn at [facility] in [place].  Current medications including lithium and Abilify.  She reports worsening of depression since last visit.  Has an appointment with Dr. Horn next week and will discuss with him at that time.    6.  Follow Up:  US left axilla today.  Labs and Kacyla infusion in 3 weeks.  Labs and visit with Dalila Blum in 6 weeks.      It was a pleasure to see Ms. Mar in clinic today.  A total of 20 minutes of our 25 minute face to face visit was spent in counseling.    Again, thank you for allowing me to participate in the care of your patient.      Sincerely,    Perlita Malik MD

## 2019-10-02 ENCOUNTER — PATIENT OUTREACH (OUTPATIENT)
Dept: ONCOLOGY | Facility: CLINIC | Age: 84
End: 2019-10-02

## 2019-10-02 NOTE — PROGRESS NOTES
"Returned call to patient and discussed she would like to continue Kadcyla as currently scheduled and has the appointment details for her next infusion. Message sent to Dr Malik to clarify if wants an DWAINE visit. Patient felt she did not need the appointment with an DWAINE as she said \"I just saw Dr Malik. Answered all patient's questions and verbalized understanding. Cori Leonardo RN, BSN.   "

## 2019-10-21 ENCOUNTER — INFUSION THERAPY VISIT (OUTPATIENT)
Dept: ONCOLOGY | Facility: CLINIC | Age: 84
End: 2019-10-21
Attending: INTERNAL MEDICINE
Payer: MEDICARE

## 2019-10-21 ENCOUNTER — APPOINTMENT (OUTPATIENT)
Dept: LAB | Facility: CLINIC | Age: 84
End: 2019-10-21
Attending: INTERNAL MEDICINE
Payer: MEDICARE

## 2019-10-21 VITALS
WEIGHT: 147.1 LBS | SYSTOLIC BLOOD PRESSURE: 137 MMHG | DIASTOLIC BLOOD PRESSURE: 79 MMHG | OXYGEN SATURATION: 97 % | RESPIRATION RATE: 18 BRPM | HEART RATE: 80 BPM | TEMPERATURE: 97.6 F | BODY MASS INDEX: 27.79 KG/M2

## 2019-10-21 DIAGNOSIS — Z17.0 MALIGNANT NEOPLASM OF UPPER-INNER QUADRANT OF LEFT BREAST IN FEMALE, ESTROGEN RECEPTOR POSITIVE (H): Primary | ICD-10-CM

## 2019-10-21 DIAGNOSIS — C50.212 MALIGNANT NEOPLASM OF UPPER-INNER QUADRANT OF LEFT BREAST IN FEMALE, ESTROGEN RECEPTOR POSITIVE (H): Primary | ICD-10-CM

## 2019-10-21 LAB
ALBUMIN SERPL-MCNC: 3.4 G/DL (ref 3.4–5)
ALP SERPL-CCNC: 231 U/L (ref 40–150)
ALT SERPL W P-5'-P-CCNC: 64 U/L (ref 0–50)
ANION GAP SERPL CALCULATED.3IONS-SCNC: 6 MMOL/L (ref 3–14)
AST SERPL W P-5'-P-CCNC: 80 U/L (ref 0–45)
BASOPHILS # BLD AUTO: 0.1 10E9/L (ref 0–0.2)
BASOPHILS NFR BLD AUTO: 0.8 %
BILIRUB SERPL-MCNC: 1.1 MG/DL (ref 0.2–1.3)
BUN SERPL-MCNC: 21 MG/DL (ref 7–30)
CALCIUM SERPL-MCNC: 10.2 MG/DL (ref 8.5–10.1)
CHLORIDE SERPL-SCNC: 113 MMOL/L (ref 94–109)
CO2 SERPL-SCNC: 23 MMOL/L (ref 20–32)
CREAT SERPL-MCNC: 1.16 MG/DL (ref 0.52–1.04)
DIFFERENTIAL METHOD BLD: ABNORMAL
EOSINOPHIL # BLD AUTO: 0.2 10E9/L (ref 0–0.7)
EOSINOPHIL NFR BLD AUTO: 2.5 %
ERYTHROCYTE [DISTWIDTH] IN BLOOD BY AUTOMATED COUNT: 15.5 % (ref 10–15)
GFR SERPL CREATININE-BSD FRML MDRD: 41 ML/MIN/{1.73_M2}
GLUCOSE SERPL-MCNC: 86 MG/DL (ref 70–99)
HCT VFR BLD AUTO: 39.4 % (ref 35–47)
HGB BLD-MCNC: 12.5 G/DL (ref 11.7–15.7)
IMM GRANULOCYTES # BLD: 0 10E9/L (ref 0–0.4)
IMM GRANULOCYTES NFR BLD: 0.5 %
LYMPHOCYTES # BLD AUTO: 0.8 10E9/L (ref 0.8–5.3)
LYMPHOCYTES NFR BLD AUTO: 13.3 %
MCH RBC QN AUTO: 31.3 PG (ref 26.5–33)
MCHC RBC AUTO-ENTMCNC: 31.7 G/DL (ref 31.5–36.5)
MCV RBC AUTO: 99 FL (ref 78–100)
MONOCYTES # BLD AUTO: 0.9 10E9/L (ref 0–1.3)
MONOCYTES NFR BLD AUTO: 13.9 %
NEUTROPHILS # BLD AUTO: 4.2 10E9/L (ref 1.6–8.3)
NEUTROPHILS NFR BLD AUTO: 69 %
NRBC # BLD AUTO: 0 10*3/UL
NRBC BLD AUTO-RTO: 0 /100
PLATELET # BLD AUTO: 125 10E9/L (ref 150–450)
POTASSIUM SERPL-SCNC: 4.2 MMOL/L (ref 3.4–5.3)
PROT SERPL-MCNC: 7.8 G/DL (ref 6.8–8.8)
RBC # BLD AUTO: 3.99 10E12/L (ref 3.8–5.2)
SODIUM SERPL-SCNC: 142 MMOL/L (ref 133–144)
WBC # BLD AUTO: 6.1 10E9/L (ref 4–11)

## 2019-10-21 PROCEDURE — 85025 COMPLETE CBC W/AUTO DIFF WBC: CPT | Performed by: INTERNAL MEDICINE

## 2019-10-21 PROCEDURE — 25800030 ZZH RX IP 258 OP 636: Mod: ZF | Performed by: INTERNAL MEDICINE

## 2019-10-21 PROCEDURE — 25000128 H RX IP 250 OP 636: Mod: ZF | Performed by: INTERNAL MEDICINE

## 2019-10-21 PROCEDURE — 96413 CHEMO IV INFUSION 1 HR: CPT

## 2019-10-21 PROCEDURE — 80053 COMPREHEN METABOLIC PANEL: CPT | Performed by: INTERNAL MEDICINE

## 2019-10-21 RX ADMIN — ADO-TRASTUZUMAB EMTANSINE 242 MG: 20 INJECTION, POWDER, LYOPHILIZED, FOR SOLUTION INTRAVENOUS at 14:45

## 2019-10-21 RX ADMIN — SODIUM CHLORIDE 250 ML: 9 INJECTION, SOLUTION INTRAVENOUS at 14:45

## 2019-10-21 NOTE — PATIENT INSTRUCTIONS
October 2019 Sunday Monday Tuesday Wednesday Thursday Friday Saturday             1     2     3     4     5       6     7     8     9     10     11     12       13     14     15     16     17     18     19       20     21    [facility]  12:30 PM   (15 min.)   [facility]   [facility]    [facility] ONC INFUSION 60   1:00 PM   (60 min.)   [facility]   [facility] 22     23     24     25     26       27     28     29     30     31 November 2019 Sunday Monday Tuesday Wednesday Thursday Friday Saturday                            1     2       3     4     5     6     7     8     9       10     11     12    [facility]  12:30 PM   (15 min.)   [facility]   [facility] Draw    [facility] RETURN  12:45 PM   (50 min.)   Dalila Blum PA-C   [facility]    [facility] ONC INFUSION 60   2:30 PM   (60 min.)   [facility]   [facility] 13     14     15     16       17     18     19     20     21     22     23       24     25     26     27     28  Happy Birthday!     29 30                     Lab Results:  Recent Results (from the past 12 hour(s))   CBC with platelets differential    Collection Time: 10/21/19  1:29 PM   Result Value Ref Range    WBC 6.1 4.0 - 11.0 10e9/L    RBC Count 3.99 3.8 - 5.2 10e12/L    Hemoglobin 12.5 11.7 - 15.7 g/dL    Hematocrit 39.4 35.0 - 47.0 %    MCV 99 78 - 100 fl    MCH 31.3 26.5 - 33.0 pg    MCHC 31.7 31.5 - 36.5 g/dL    RDW 15.5 (H) 10.0 - 15.0 %    Platelet Count 125 (L) 150 - 450 10e9/L    Diff Method Automated Method     % Neutrophils 69.0 %    % Lymphocytes 13.3 %    % Monocytes 13.9 %    % Eosinophils 2.5 %    % Basophils 0.8 %    % Immature Granulocytes 0.5 %    Nucleated RBCs 0 0 /100    Absolute Neutrophil 4.2 1.6 - 8.3 10e9/L    Absolute Lymphocytes 0.8 0.8 - 5.3 10e9/L    Absolute Monocytes 0.9 0.0 - 1.3 10e9/L    Absolute  Eosinophils 0.2 0.0 - 0.7 10e9/L    Absolute Basophils 0.1 0.0 - 0.2 10e9/L    Abs Immature Granulocytes 0.0 0 - 0.4 10e9/L    Absolute Nucleated RBC 0.0    Comprehensive metabolic panel    Collection Time: 10/21/19  1:29 PM   Result Value Ref Range    Sodium 142 133 - 144 mmol/L    Potassium 4.2 3.4 - 5.3 mmol/L    Chloride 113 (H) 94 - 109 mmol/L    Carbon Dioxide 23 20 - 32 mmol/L    Anion Gap 6 3 - 14 mmol/L    Glucose 86 70 - 99 mg/dL    Urea Nitrogen 21 7 - 30 mg/dL    Creatinine 1.16 (H) 0.52 - 1.04 mg/dL    GFR Estimate 41 (L) >60 mL/min/[1.73_m2]    GFR Estimate If Black 47 (L) >60 mL/min/[1.73_m2]    Calcium 10.2 (H) 8.5 - 10.1 mg/dL    Bilirubin Total 1.1 0.2 - 1.3 mg/dL    Albumin 3.4 3.4 - 5.0 g/dL    Protein Total 7.8 6.8 - 8.8 g/dL    Alkaline Phosphatase 231 (H) 40 - 150 U/L    ALT 64 (H) 0 - 50 U/L    AST 80 (H) 0 - 45 U/L

## 2019-10-21 NOTE — PROGRESS NOTES
Infusion Nursing Note:  Lennie Mar presents today for C14 D1 Kadcyla.    Patient seen by provider today: No   present during visit today: Not Applicable.    Note: N/A.    Intravenous Access:  Peripheral IV placed.    Treatment Conditions:  Lab Results   Component Value Date    HGB 12.5 10/21/2019     Lab Results   Component Value Date    WBC 6.1 10/21/2019      Lab Results   Component Value Date    ANEU 4.2 10/21/2019     Lab Results   Component Value Date     10/21/2019      Lab Results   Component Value Date     10/21/2019                   Lab Results   Component Value Date    POTASSIUM 4.2 10/21/2019           Lab Results   Component Value Date    MAG 2.0 04/22/2012            Lab Results   Component Value Date    CR 1.16 10/21/2019                   Lab Results   Component Value Date    CANELO 10.2 10/21/2019                Lab Results   Component Value Date    BILITOTAL 1.1 10/21/2019           Lab Results   Component Value Date    ALBUMIN 3.4 10/21/2019                    Lab Results   Component Value Date    ALT 64 10/21/2019           Lab Results   Component Value Date    AST 80 10/21/2019       Results reviewed, labs MET treatment parameters, ok to proceed with treatment.      Post Infusion Assessment:  Patient tolerated infusion without incident.  Blood return noted pre and post infusion.  Site patent and intact, free from redness, edema or discomfort.  No evidence of extravasations.  Access discontinued per protocol.       Discharge Plan:   Patient declined prescription refills.  Discharge instructions reviewed with: Patient.  Patient and/or family verbalized understanding of discharge instructions and all questions answered.  Copy of AVS reviewed with patient and/or family.  Patient will return 11/12/2019 for next appointment.  Patient discharged in stable condition accompanied by: self.  Departure Mode: Ambulatory.    Ermelinda Anderson RN

## 2019-10-21 NOTE — NURSING NOTE
Chief Complaint   Patient presents with     Blood Draw     labs drawn via piv start by rn with ultrasound     Labs drawn via PIV start by rn in lab. Flushed with saline. Pt checked in for next appointment.   Gay Mcmahan RN

## 2019-10-29 DIAGNOSIS — F31.32 MODERATE DEPRESSED BIPOLAR I DISORDER (H): Primary | ICD-10-CM

## 2019-10-29 LAB
T4 FREE SERPL-MCNC: 0.79 NG/DL (ref 0.76–1.46)
TSH SERPL DL<=0.005 MIU/L-ACNC: 3.43 MU/L (ref 0.4–4)

## 2019-10-29 PROCEDURE — 36415 COLL VENOUS BLD VENIPUNCTURE: CPT

## 2019-10-29 PROCEDURE — 84439 ASSAY OF FREE THYROXINE: CPT

## 2019-10-29 PROCEDURE — 84443 ASSAY THYROID STIM HORMONE: CPT

## 2019-11-12 ENCOUNTER — ONCOLOGY VISIT (OUTPATIENT)
Dept: ONCOLOGY | Facility: CLINIC | Age: 84
End: 2019-11-12
Attending: INTERNAL MEDICINE
Payer: MEDICARE

## 2019-11-12 ENCOUNTER — INFUSION THERAPY VISIT (OUTPATIENT)
Dept: ONCOLOGY | Facility: CLINIC | Age: 84
End: 2019-11-12
Attending: PHYSICIAN ASSISTANT
Payer: MEDICARE

## 2019-11-12 ENCOUNTER — APPOINTMENT (OUTPATIENT)
Dept: LAB | Facility: CLINIC | Age: 84
End: 2019-11-12
Attending: INTERNAL MEDICINE
Payer: MEDICARE

## 2019-11-12 VITALS
TEMPERATURE: 97 F | BODY MASS INDEX: 26.66 KG/M2 | RESPIRATION RATE: 16 BRPM | DIASTOLIC BLOOD PRESSURE: 66 MMHG | WEIGHT: 141.1 LBS | SYSTOLIC BLOOD PRESSURE: 132 MMHG | HEART RATE: 92 BPM | OXYGEN SATURATION: 95 %

## 2019-11-12 DIAGNOSIS — C50.212 MALIGNANT NEOPLASM OF UPPER-INNER QUADRANT OF LEFT BREAST IN FEMALE, ESTROGEN RECEPTOR POSITIVE (H): ICD-10-CM

## 2019-11-12 DIAGNOSIS — C50.212 MALIGNANT NEOPLASM OF UPPER-INNER QUADRANT OF LEFT BREAST IN FEMALE, ESTROGEN RECEPTOR POSITIVE (H): Primary | ICD-10-CM

## 2019-11-12 DIAGNOSIS — Z17.0 MALIGNANT NEOPLASM OF UPPER-INNER QUADRANT OF LEFT BREAST IN FEMALE, ESTROGEN RECEPTOR POSITIVE (H): ICD-10-CM

## 2019-11-12 DIAGNOSIS — Z17.0 MALIGNANT NEOPLASM OF UPPER-INNER QUADRANT OF LEFT BREAST IN FEMALE, ESTROGEN RECEPTOR POSITIVE (H): Primary | ICD-10-CM

## 2019-11-12 PROBLEM — F06.30 MOOD DISORDER DUE TO A GENERAL MEDICAL CONDITION: Status: ACTIVE | Noted: 2019-11-12

## 2019-11-12 PROBLEM — F31.62: Status: ACTIVE | Noted: 2019-11-12

## 2019-11-12 PROBLEM — G96.08 NONTRAUMATIC SUBDURAL HYGROMA: Status: ACTIVE | Noted: 2019-11-12

## 2019-11-12 PROBLEM — T56.891A LITHIUM TOXICITY, ACCIDENTAL OR UNINTENTIONAL, INITIAL ENCOUNTER: Status: ACTIVE | Noted: 2019-11-12

## 2019-11-12 LAB
ALBUMIN SERPL-MCNC: 3 G/DL (ref 3.4–5)
ALP SERPL-CCNC: 244 U/L (ref 40–150)
ALT SERPL W P-5'-P-CCNC: 48 U/L (ref 0–50)
ANION GAP SERPL CALCULATED.3IONS-SCNC: 8 MMOL/L (ref 3–14)
AST SERPL W P-5'-P-CCNC: ABNORMAL U/L (ref 0–45)
BASOPHILS # BLD AUTO: 0.1 10E9/L (ref 0–0.2)
BASOPHILS NFR BLD AUTO: 0.9 %
BILIRUB SERPL-MCNC: 1.1 MG/DL (ref 0.2–1.3)
BUN SERPL-MCNC: 25 MG/DL (ref 7–30)
CALCIUM SERPL-MCNC: 9.6 MG/DL (ref 8.5–10.1)
CHLORIDE SERPL-SCNC: 111 MMOL/L (ref 94–109)
CO2 SERPL-SCNC: 20 MMOL/L (ref 20–32)
CREAT SERPL-MCNC: 1.06 MG/DL (ref 0.52–1.04)
DIFFERENTIAL METHOD BLD: ABNORMAL
EOSINOPHIL # BLD AUTO: 0.1 10E9/L (ref 0–0.7)
EOSINOPHIL NFR BLD AUTO: 2 %
ERYTHROCYTE [DISTWIDTH] IN BLOOD BY AUTOMATED COUNT: 15.7 % (ref 10–15)
GFR SERPL CREATININE-BSD FRML MDRD: 46 ML/MIN/{1.73_M2}
GLUCOSE SERPL-MCNC: 141 MG/DL (ref 70–99)
HCT VFR BLD AUTO: 41.6 % (ref 35–47)
HGB BLD-MCNC: 13.2 G/DL (ref 11.7–15.7)
IMM GRANULOCYTES # BLD: 0 10E9/L (ref 0–0.4)
IMM GRANULOCYTES NFR BLD: 0.6 %
LYMPHOCYTES # BLD AUTO: 0.8 10E9/L (ref 0.8–5.3)
LYMPHOCYTES NFR BLD AUTO: 15.2 %
MCH RBC QN AUTO: 31.1 PG (ref 26.5–33)
MCHC RBC AUTO-ENTMCNC: 31.7 G/DL (ref 31.5–36.5)
MCV RBC AUTO: 98 FL (ref 78–100)
MONOCYTES # BLD AUTO: 0.7 10E9/L (ref 0–1.3)
MONOCYTES NFR BLD AUTO: 12.8 %
NEUTROPHILS # BLD AUTO: 3.7 10E9/L (ref 1.6–8.3)
NEUTROPHILS NFR BLD AUTO: 68.5 %
NRBC # BLD AUTO: 0 10*3/UL
NRBC BLD AUTO-RTO: 0 /100
PLATELET # BLD AUTO: 141 10E9/L (ref 150–450)
POTASSIUM SERPL-SCNC: 5.1 MMOL/L (ref 3.4–5.3)
PROT SERPL-MCNC: 8 G/DL (ref 6.8–8.8)
RBC # BLD AUTO: 4.24 10E12/L (ref 3.8–5.2)
SODIUM SERPL-SCNC: 140 MMOL/L (ref 133–144)
WBC # BLD AUTO: 5.4 10E9/L (ref 4–11)

## 2019-11-12 PROCEDURE — 99214 OFFICE O/P EST MOD 30 MIN: CPT | Mod: ZP | Performed by: PHYSICIAN ASSISTANT

## 2019-11-12 PROCEDURE — 82247 BILIRUBIN TOTAL: CPT | Performed by: PHYSICIAN ASSISTANT

## 2019-11-12 PROCEDURE — 84460 ALANINE AMINO (ALT) (SGPT): CPT | Performed by: PHYSICIAN ASSISTANT

## 2019-11-12 PROCEDURE — 25800030 ZZH RX IP 258 OP 636: Mod: ZF | Performed by: PHYSICIAN ASSISTANT

## 2019-11-12 PROCEDURE — 25000128 H RX IP 250 OP 636: Mod: ZF | Performed by: PHYSICIAN ASSISTANT

## 2019-11-12 PROCEDURE — 96413 CHEMO IV INFUSION 1 HR: CPT

## 2019-11-12 PROCEDURE — 85025 COMPLETE CBC W/AUTO DIFF WBC: CPT | Performed by: PHYSICIAN ASSISTANT

## 2019-11-12 PROCEDURE — 84075 ASSAY ALKALINE PHOSPHATASE: CPT | Performed by: PHYSICIAN ASSISTANT

## 2019-11-12 PROCEDURE — G0463 HOSPITAL OUTPT CLINIC VISIT: HCPCS | Mod: ZF

## 2019-11-12 PROCEDURE — 84155 ASSAY OF PROTEIN SERUM: CPT | Performed by: PHYSICIAN ASSISTANT

## 2019-11-12 PROCEDURE — 82040 ASSAY OF SERUM ALBUMIN: CPT | Performed by: PHYSICIAN ASSISTANT

## 2019-11-12 PROCEDURE — 80048 BASIC METABOLIC PNL TOTAL CA: CPT | Performed by: PHYSICIAN ASSISTANT

## 2019-11-12 RX ORDER — EPINEPHRINE 0.3 MG/.3ML
0.3 INJECTION SUBCUTANEOUS EVERY 5 MIN PRN
Status: CANCELLED | OUTPATIENT
Start: 2019-11-12

## 2019-11-12 RX ORDER — ALBUTEROL SULFATE 90 UG/1
1-2 AEROSOL, METERED RESPIRATORY (INHALATION)
Status: CANCELLED
Start: 2019-11-12

## 2019-11-12 RX ORDER — DIPHENHYDRAMINE HYDROCHLORIDE 50 MG/ML
50 INJECTION INTRAMUSCULAR; INTRAVENOUS
Status: CANCELLED
Start: 2019-11-12

## 2019-11-12 RX ORDER — ALBUTEROL SULFATE 0.83 MG/ML
2.5 SOLUTION RESPIRATORY (INHALATION)
Status: CANCELLED | OUTPATIENT
Start: 2019-11-12

## 2019-11-12 RX ORDER — MEPERIDINE HYDROCHLORIDE 25 MG/ML
25 INJECTION INTRAMUSCULAR; INTRAVENOUS; SUBCUTANEOUS EVERY 30 MIN PRN
Status: CANCELLED | OUTPATIENT
Start: 2019-11-12

## 2019-11-12 RX ORDER — METHYLPREDNISOLONE SODIUM SUCCINATE 125 MG/2ML
125 INJECTION, POWDER, LYOPHILIZED, FOR SOLUTION INTRAMUSCULAR; INTRAVENOUS
Status: CANCELLED
Start: 2019-11-12

## 2019-11-12 RX ORDER — EPINEPHRINE 1 MG/ML
0.3 INJECTION, SOLUTION INTRAMUSCULAR; SUBCUTANEOUS EVERY 5 MIN PRN
Status: CANCELLED | OUTPATIENT
Start: 2019-11-12

## 2019-11-12 RX ORDER — SODIUM CHLORIDE 9 MG/ML
1000 INJECTION, SOLUTION INTRAVENOUS CONTINUOUS PRN
Status: CANCELLED
Start: 2019-11-12

## 2019-11-12 RX ADMIN — ADO-TRASTUZUMAB EMTANSINE 242 MG: 20 INJECTION, POWDER, LYOPHILIZED, FOR SOLUTION INTRAVENOUS at 14:24

## 2019-11-12 RX ADMIN — SODIUM CHLORIDE 250 ML: 9 INJECTION, SOLUTION INTRAVENOUS at 14:12

## 2019-11-12 ASSESSMENT — PAIN SCALES - GENERAL: PAINLEVEL: NO PAIN (0)

## 2019-11-12 NOTE — PROGRESS NOTES
Hematology-Oncology Visit  Nov 12, 2019    Reason for Visit: follow-up left breast cancer, 2 primaries     HPI: Lennie Mar is a 89 year old female with past medical history of bipolar disorder, CKD, essential tremor with recent diagnosis of left breast cancer, two separate primaries. She presented with a palpable mass and had a mammogram and US leading to biopsies on 5/9/18. Pathology showed grade 3 invasive carcinoma, ER/AZ positive, HER2 amplified of mass at 3:00 position with associated DCIS and skin involvement. The mass at 11:00 position was also grade 3 invasive carcinoma, ER/AZ positive, but HER2 nonamplified. No lymphadenopathy was noted.     She met with Dr. Malik on 5/21/18 and elected to start neoadjuvant treatment with Herceptin every 3 weeks along with letrozole. Echocardiogram was done 5/25 showed EF of 55-60%. She tolerated neoadjuvant treatment remarkably well. She had a left breast mastectomy and SLNB on 11/19/18 showing two residual tumors, the larger grade 2 tumor ER, AZ, HER2 amplified measuring 2.7 cm and smaller grade 3 tumor ER positive, AZ negative, HER2 negative measured 2.5 cm. 2/3 left axillary nodes demonstrated metastases measuring 9 mm and 1.2 mm. HER2 FISH of larger axillary lymph node metastasis was amplified. Dr. Malik recommended adjuvant therapy with TDM1 for 1 year and adjuvant radiation.     Interval History: Lennie is here alone today to for cycle 15 of TDM1. She is feeling okay beyond her mood has been down again. Psychiatry is going to start her on thyroid medication per her report. She remains on lithium. She denies any new external stressors or triggers for this. Apart from this, she has been okay. Tolerating TDM1 well and is planning to finish full year course. No nausea, difficulties with bowel movements, neuropathy, fevers/chills or infectious concerns, SOB, CP, or edema. She has been more fatigued with her depression and is sleeping a lot. Also only  eating one meal per day. Her weight is down 6 pounds from 3 weeks ago. She is hydrating well. ROS otherwise negative.     Current Outpatient Medications   Medication     acetaminophen (TYLENOL) 325 MG tablet     ARIPiprazole (ABILIFY) 5 MG tablet     Ascorbic Acid (VITAMIN C PO)     Cyanocobalamin (VITAMIN B 12 PO)     letrozole (FEMARA) 2.5 MG tablet     lithium (ESKALITH) 150 MG capsule     ORDER FOR DME     pramipexole (MIRAPEX) 1 MG tablet     vitamin D3 (CHOLECALCIFEROL) 1000 units (25 mcg) tablet     No current facility-administered medications for this visit.        PHYSICAL EXAM:  /66 (BP Location: Right arm, Patient Position: Sitting, Cuff Size: Adult Regular)   Pulse 92   Temp 97  F (36.1  C) (Oral)   Resp 16   Wt 64 kg (141 lb 1.6 oz)   SpO2 95%   BMI 26.66 kg/m    General: Alert, oriented, pleasant, NAD  HEENT: Normocephalic, atraumatic, PERRL, EOMI. Moist mucus membranes, no lesions or thrush  Breast: Deferred today.   Lungs: CTA bilaterally, normal work of breathing  Cardiac: RRR, S1, S2, no murmurs  Abdomen: Soft, nontender, nondistended. Normoactive bowel sounds. No hepatosplenomegaly, masses  Neuro: CNII-XII grossly intact  Extremities: No pedal edema    Labs:    11/12/2019 12:53   Sodium 140   Potassium 5.1   Chloride 111 (H)   Carbon Dioxide 20   Urea Nitrogen 25   Creatinine 1.06 (H)   GFR Estimate 46 (L)   GFR Estimate If Black 53 (L)   Calcium 9.6   Anion Gap 8   Albumin 3.0 (L)   Protein Total 8.0   Bilirubin Total 1.1   Alkaline Phosphatase 244 (H)   ALT 48   AST Canceled, Test credited   Glucose 141 (H)   WBC 5.4   Hemoglobin 13.2   Hematocrit 41.6   Platelet Count 141 (L)   RBC Count 4.24   MCV 98   MCH 31.1   MCHC 31.7   RDW 15.7 (H)   Diff Method Automated Method   % Neutrophils 68.5   % Lymphocytes 15.2   % Monocytes 12.8   % Eosinophils 2.0   % Basophils 0.9   % Immature Granulocytes 0.6   Nucleated RBCs 0   Absolute Neutrophil 3.7   Absolute Lymphocytes 0.8   Absolute  Monocytes 0.7   Absolute Eosinophils 0.1   Absolute Basophils 0.1   Abs Immature Granulocytes 0.0   Absolute Nucleated RBC 0.0         Assessment & Plan:   89 year old female with multifocal, T2N1M0, invasive mammary carcinomas of the left breast.  She is s/p treatment with 5 months neoadjuvant herceptin and letrozole and left breast mastectomy. Completed adjuvant radiation on 2/22/19.      1.  Left breast cancers:  She is currently undergoing treatment with adjuvant Kadcyla which she continues to tolerate well. Her LFTs had trended up initially with treatment but have since stabilized. Will continue to monitor. Overall plan is to complete 1 year of T-DM1.  Upon completion of T-DM1 will resume treatment with letrozole. Discussed plan for 1 year of Kadcyla based on [facility] clinical trial to reduce risk of recurrence. She is tolerating well and lab work is adequate to proceed.      2.  At risk for cardiomyopathy:  Echocardiogram 9/30/19 was unchanged with normal EF. Plan to continue to monitor once every 3 months while on HER2 targeted therapy.  Next echocardiogram will be due end of December.      3. CKD: She saw nephrology who suspected lithium toxicity. Her lithium dose was initially decreased and now increased again with major depression episode. Cr has remained stable and is 1.06 today. Continue follow-up with nephrology.     4. R neck mass: Biopsy was done showing benign findings.     5. Hypercalcemia: Likely related to PTH elevation from lithium. Improved.     6. Biopolar depression: On lithium. Seeing psychiatry next week. Discussed coping strategies in the meantime.     Dalila Blum PA-C  [facility]  [address] [place] [id number]  [phone]

## 2019-11-12 NOTE — LETTER
11/12/2019      RE: Lennie Mar  1955 [address]  [facility] [id number]       Hematology-Oncology Visit  Nov 12, 2019    Reason for Visit: follow-up left breast cancer, 2 primaries     HPI: Lennie Mar is a 89 year old female with past medical history of bipolar disorder, CKD, essential tremor with recent diagnosis of left breast cancer, two separate primaries. She presented with a palpable mass and had a mammogram and US leading to biopsies on 5/9/18. Pathology showed grade 3 invasive carcinoma, ER/MO positive, HER2 amplified of mass at 3:00 position with associated DCIS and skin involvement. The mass at 11:00 position was also grade 3 invasive carcinoma, ER/MO positive, but HER2 nonamplified. No lymphadenopathy was noted.     She met with Dr. Malik on 5/21/18 and elected to start neoadjuvant treatment with Herceptin every 3 weeks along with letrozole. Echocardiogram was done 5/25 showed EF of 55-60%. She tolerated neoadjuvant treatment remarkably well. She had a left breast mastectomy and SLNB on 11/19/18 showing two residual tumors, the larger grade 2 tumor ER, MO, HER2 amplified measuring 2.7 cm and smaller grade 3 tumor ER positive, MO negative, HER2 negative measured 2.5 cm. 2/3 left axillary nodes demonstrated metastases measuring 9 mm and 1.2 mm. HER2 FISH of larger axillary lymph node metastasis was amplified. Dr. Malik recommended adjuvant therapy with TDM1 for 1 year and adjuvant radiation.     Interval History: Lennie is here alone today to for cycle 15 of TDM1. She is feeling okay beyond her mood has been down again. Psychiatry is going to start her on thyroid medication per her report. She remains on lithium. She denies any new external stressors or triggers for this. Apart from this, she has been okay. Tolerating TDM1 well and is planning to finish full year course. No nausea, difficulties with bowel movements, neuropathy, fevers/chills or infectious concerns, SOB, CP,  or edema. She has been more fatigued with her depression and is sleeping a lot. Also only eating one meal per day. Her weight is down 6 pounds from 3 weeks ago. She is hydrating well. ROS otherwise negative.     Current Outpatient Medications   Medication     acetaminophen (TYLENOL) 325 MG tablet     ARIPiprazole (ABILIFY) 5 MG tablet     Ascorbic Acid (VITAMIN C PO)     Cyanocobalamin (VITAMIN B 12 PO)     letrozole (FEMARA) 2.5 MG tablet     lithium (ESKALITH) 150 MG capsule     ORDER FOR DME     pramipexole (MIRAPEX) 1 MG tablet     vitamin D3 (CHOLECALCIFEROL) 1000 units (25 mcg) tablet     No current facility-administered medications for this visit.        PHYSICAL EXAM:  /66 (BP Location: Right arm, Patient Position: Sitting, Cuff Size: Adult Regular)   Pulse 92   Temp 97  F (36.1  C) (Oral)   Resp 16   Wt 64 kg (141 lb 1.6 oz)   SpO2 95%   BMI 26.66 kg/m     General: Alert, oriented, pleasant, NAD  HEENT: Normocephalic, atraumatic, PERRL, EOMI. Moist mucus membranes, no lesions or thrush  Breast: Deferred today.   Lungs: CTA bilaterally, normal work of breathing  Cardiac: RRR, S1, S2, no murmurs  Abdomen: Soft, nontender, nondistended. Normoactive bowel sounds. No hepatosplenomegaly, masses  Neuro: CNII-XII grossly intact  Extremities: No pedal edema    Labs:    11/12/2019 12:53   Sodium 140   Potassium 5.1   Chloride 111 (H)   Carbon Dioxide 20   Urea Nitrogen 25   Creatinine 1.06 (H)   GFR Estimate 46 (L)   GFR Estimate If Black 53 (L)   Calcium 9.6   Anion Gap 8   Albumin 3.0 (L)   Protein Total 8.0   Bilirubin Total 1.1   Alkaline Phosphatase 244 (H)   ALT 48   AST Canceled, Test credited   Glucose 141 (H)   WBC 5.4   Hemoglobin 13.2   Hematocrit 41.6   Platelet Count 141 (L)   RBC Count 4.24   MCV 98   MCH 31.1   MCHC 31.7   RDW 15.7 (H)   Diff Method Automated Method   % Neutrophils 68.5   % Lymphocytes 15.2   % Monocytes 12.8   % Eosinophils 2.0   % Basophils 0.9   % Immature Granulocytes  0.6   Nucleated RBCs 0   Absolute Neutrophil 3.7   Absolute Lymphocytes 0.8   Absolute Monocytes 0.7   Absolute Eosinophils 0.1   Absolute Basophils 0.1   Abs Immature Granulocytes 0.0   Absolute Nucleated RBC 0.0         Assessment & Plan:   89 year old female with multifocal, T2N1M0, invasive mammary carcinomas of the left breast.  She is s/p treatment with 5 months neoadjuvant herceptin and letrozole and left breast mastectomy. Completed adjuvant radiation on 2/22/19.      1.  Left breast cancers:  She is currently undergoing treatment with adjuvant Kadcyla which she continues to tolerate well. Her LFTs had trended up initially with treatment but have since stabilized. Will continue to monitor. Overall plan is to complete 1 year of T-DM1.  Upon completion of T-DM1 will resume treatment with letrozole. Discussed plan for 1 year of Kadcyla based on [facility] clinical trial to reduce risk of recurrence. She is tolerating well and lab work is adequate to proceed.      2.  At risk for cardiomyopathy:  Echocardiogram  9/30/19 was unchanged with normal EF. Plan to continue to monitor once every 3 months while on HER2 targeted therapy.  Next echocardiogram will be due end of December.      3. CKD: She saw nephrology who suspected lithium toxicity. Her lithium dose was initially decreased and now increased again with major depression episode. Cr has remained stable and is 1.06 today. Continue follow-up with nephrology.     4. R neck mass: Biopsy was done showing benign findings.     5. Hypercalcemia: Likely related to PTH elevation from lithium. Improved.     6. Biopolar depression: On lithium. Seeing psychiatry next week. Discussed coping strategies in the meantime.     Dalila Blum PA-C  [facility]  [address] [place] [id number]  [phone]

## 2019-11-12 NOTE — PROGRESS NOTES
Infusion Nursing Note:  Lennie Mar presents today for C15D1 Kadcyla.    Patient seen by provider today: Yes: Dalila Blum PA-C   present during visit today: Not Applicable.    Note: Patient reported to clinic today with not new complaints or concerns after seeing provider.    Intravenous Access:  Peripheral IV placed.    Treatment Conditions:  Lab Results   Component Value Date    HGB 13.2 11/12/2019     Lab Results   Component Value Date    WBC 5.4 11/12/2019      Lab Results   Component Value Date    ANEU 3.7 11/12/2019     Lab Results   Component Value Date     11/12/2019      Lab Results   Component Value Date     11/12/2019                   Lab Results   Component Value Date    POTASSIUM 5.1 11/12/2019           Lab Results   Component Value Date    MAG 2.0 04/22/2012            Lab Results   Component Value Date    CR 1.06 11/12/2019                   Lab Results   Component Value Date    CANELO 9.6 11/12/2019                Lab Results   Component Value Date    BILITOTAL 1.1 11/12/2019           Lab Results   Component Value Date    ALBUMIN 3.0 11/12/2019                    Lab Results   Component Value Date    ALT 48 11/12/2019           Lab Results   Component Value Date    AST Canceled, Test credited 11/12/2019       Results reviewed, labs MET treatment parameters, ok to proceed with treatment.    TORB: 1343 11/11/19 Dalila Blum PA-C/Colin Novak RN  -ok to proceed without AST results.      Post Infusion Assessment:  Patient tolerated infusion without incident.  Blood return noted pre and post infusion.  Site patent and intact, free from redness, edema or discomfort.  No evidence of extravasations.  Access discontinued per protocol.       Discharge Plan:   Patient declined prescription refills.  Discharge instructions reviewed with: Patient.  Patient and/or family verbalized understanding of discharge instructions and all questions answered.  Copy of AVS reviewed  with patient and/or family.  Patient will return 12/2/19 for next appointment.  Patient discharged in stable condition accompanied by: self.  Departure Mode: Ambulatory.  Face to Face time: 0 minutes.    Colin Novak RN

## 2019-11-12 NOTE — PATIENT INSTRUCTIONS
[facility] Main Line: [phone]    Call triage nurse with chills and/or temperature greater than or equal to 100.4, uncontrolled nausea/vomiting, diarrhea, constipation, dizziness, shortness of breath, chest pain, bleeding, unexplained bruising, or any new/concerning symptoms, questions/concerns.   If you are having any concerning symptoms or wish to speak to a provider before your next infusion visit, please call your care coordinator or triage to notify them so we can adequately serve you.   Nurse Triage line:  [phone]    If after hours, weekends, or holidays, call main hospital  and ask for Oncology doctor on call @ [phone]      November 2019 Sunday Monday Tuesday Wednesday Thursday Friday Saturday                            1     2       3     4     5     6     7     8     9       10     11     12    [facility] DRAW  12:30 PM   (15 min.)    [facility]   [facility]    [facility] RETURN  12:45 PM   (50 min.)   Dalila Blum PA-C   [facility] ONC INFUSION 60   2:30 PM   (60 min.)    ONCOLOGY INFUSION   [facility] 13     14     15     16       17     18     19     20     21     22     23       24     25     26     27     28  Happy Birthday!     29 30 December 2019 Sunday Monday Tuesday Wednesday Thursday Friday Saturday   1     2    [facility] DRAW  12:30 PM   (15 min.)   [facility]   [facility] Draw    [facility] ONC INFUSION 60   1:00 PM   (60 min.)   [facility] ONCOLOGY INFUSION   [facility] 3     4     5     6     7       8     9     10     11     12     13     14       15     16     17     18     19     20     21       22     23    [facility] DRAW   9:45 AM   (15 min.)   [facility] D[facility]   [facility]    [facility] RETURN  10:00 AM   (30 min.)   Perlita Malik MD   [facility] ONC  INFUSION 60  11:00 AM   (60 min.)    ONCOLOGY INFUSION   [facility] 24     25     26     27     28       29     30     31                                         Lab Results:  Recent Results (from the past 12 hour(s))   CBC with platelets differential    Collection Time: 11/12/19 12:53 PM   Result Value Ref Range    WBC 5.4 4.0 - 11.0 10e9/L    RBC Count 4.24 3.8 - 5.2 10e12/L    Hemoglobin 13.2 11.7 - 15.7 g/dL    Hematocrit 41.6 35.0 - 47.0 %    MCV 98 78 - 100 fl    MCH 31.1 26.5 - 33.0 pg    MCHC 31.7 31.5 - 36.5 g/dL    RDW 15.7 (H) 10.0 - 15.0 %    Platelet Count 141 (L) 150 - 450 10e9/L    Diff Method Automated Method     % Neutrophils 68.5 %    % Lymphocytes 15.2 %    % Monocytes 12.8 %    % Eosinophils 2.0 %    % Basophils 0.9 %    % Immature Granulocytes 0.6 %    Nucleated RBCs 0 0 /100    Absolute Neutrophil 3.7 1.6 - 8.3 10e9/L    Absolute Lymphocytes 0.8 0.8 - 5.3 10e9/L    Absolute Monocytes 0.7 0.0 - 1.3 10e9/L    Absolute Eosinophils 0.1 0.0 - 0.7 10e9/L    Absolute Basophils 0.1 0.0 - 0.2 10e9/L    Abs Immature Granulocytes 0.0 0 - 0.4 10e9/L    Absolute Nucleated RBC 0.0    Comprehensive metabolic panel    Collection Time: 11/12/19 12:53 PM   Result Value Ref Range    Sodium 140 133 - 144 mmol/L    Potassium 5.1 3.4 - 5.3 mmol/L    Chloride 111 (H) 94 - 109 mmol/L    Carbon Dioxide 20 20 - 32 mmol/L    Anion Gap 8 3 - 14 mmol/L    Glucose 141 (H) 70 - 99 mg/dL    Urea Nitrogen 25 7 - 30 mg/dL    Creatinine 1.06 (H) 0.52 - 1.04 mg/dL    GFR Estimate 46 (L) >60 mL/min/[1.73_m2]    GFR Estimate If Black 53 (L) >60 mL/min/[1.73_m2]    Calcium 9.6 8.5 - 10.1 mg/dL    Bilirubin Total 1.1 0.2 - 1.3 mg/dL    Albumin 3.0 (L) 3.4 - 5.0 g/dL    Protein Total 8.0 6.8 - 8.8 g/dL    Alkaline Phosphatase 244 (H) 40 - 150 U/L    ALT 48 0 - 50 U/L    AST Canceled, Test credited 0 - 45 U/L

## 2019-11-12 NOTE — NURSING NOTE
"Oncology Rooming Note    November 12, 2019 1:08 PM   Lennie Mar is a 89 year old female who presents for:    Chief Complaint   Patient presents with     Blood Draw     labs drawn with piv start by rn.  vs taken     Oncology Clinic Visit     Return; Breast Ca     Initial Vitals: /66 (BP Location: Right arm, Patient Position: Sitting, Cuff Size: Adult Regular)   Pulse 92   Temp 97  F (36.1  C) (Oral)   Resp 16   Wt 64 kg (141 lb 1.6 oz)   LMP  (LMP Unknown)   SpO2 95%   BMI 26.66 kg/m   Estimated body mass index is 26.66 kg/m  as calculated from the following:    Height as of 9/17/19: 1.549 m (5' 1\").    Weight as of this encounter: 64 kg (141 lb 1.6 oz). Body surface area is 1.66 meters squared.  No Pain (0) Comment: Data Unavailable   No LMP recorded (lmp unknown). Patient is postmenopausal.  Allergies reviewed: Yes  Medications reviewed: Yes    Medications: Medication refills not needed today.  Pharmacy name entered into [employer]:    [facility] - [place], [place] - [address] [facility] [place], [place] - [address] [facility] [place], [place] [address]  [id number] - [place], [place] - [address] AT [facility] [address]    Clinical concerns: Lab results        Siomara Falcon CMA              "

## 2019-11-30 DIAGNOSIS — Z17.0 MALIGNANT NEOPLASM OF UPPER-INNER QUADRANT OF LEFT BREAST IN FEMALE, ESTROGEN RECEPTOR POSITIVE (H): ICD-10-CM

## 2019-11-30 DIAGNOSIS — C50.212 MALIGNANT NEOPLASM OF UPPER-INNER QUADRANT OF LEFT BREAST IN FEMALE, ESTROGEN RECEPTOR POSITIVE (H): ICD-10-CM

## 2019-11-30 RX ORDER — EPINEPHRINE 0.3 MG/.3ML
0.3 INJECTION SUBCUTANEOUS EVERY 5 MIN PRN
Status: CANCELLED | OUTPATIENT
Start: 2019-12-23

## 2019-11-30 RX ORDER — MEPERIDINE HYDROCHLORIDE 25 MG/ML
25 INJECTION INTRAMUSCULAR; INTRAVENOUS; SUBCUTANEOUS EVERY 30 MIN PRN
Status: CANCELLED | OUTPATIENT
Start: 2019-12-02

## 2019-11-30 RX ORDER — ALBUTEROL SULFATE 0.83 MG/ML
2.5 SOLUTION RESPIRATORY (INHALATION)
Status: CANCELLED | OUTPATIENT
Start: 2019-12-02

## 2019-11-30 RX ORDER — EPINEPHRINE 0.3 MG/.3ML
0.3 INJECTION SUBCUTANEOUS EVERY 5 MIN PRN
Status: CANCELLED | OUTPATIENT
Start: 2019-12-02

## 2019-11-30 RX ORDER — SODIUM CHLORIDE 9 MG/ML
1000 INJECTION, SOLUTION INTRAVENOUS CONTINUOUS PRN
Status: CANCELLED
Start: 2019-12-23

## 2019-11-30 RX ORDER — METHYLPREDNISOLONE SODIUM SUCCINATE 125 MG/2ML
125 INJECTION, POWDER, LYOPHILIZED, FOR SOLUTION INTRAMUSCULAR; INTRAVENOUS
Status: CANCELLED
Start: 2019-12-23

## 2019-11-30 RX ORDER — EPINEPHRINE 1 MG/ML
0.3 INJECTION, SOLUTION INTRAMUSCULAR; SUBCUTANEOUS EVERY 5 MIN PRN
Status: CANCELLED | OUTPATIENT
Start: 2019-12-02

## 2019-11-30 RX ORDER — MEPERIDINE HYDROCHLORIDE 25 MG/ML
25 INJECTION INTRAMUSCULAR; INTRAVENOUS; SUBCUTANEOUS EVERY 30 MIN PRN
Status: CANCELLED | OUTPATIENT
Start: 2019-12-23

## 2019-11-30 RX ORDER — ALBUTEROL SULFATE 90 UG/1
1-2 AEROSOL, METERED RESPIRATORY (INHALATION)
Status: CANCELLED
Start: 2019-12-23

## 2019-11-30 RX ORDER — SODIUM CHLORIDE 9 MG/ML
1000 INJECTION, SOLUTION INTRAVENOUS CONTINUOUS PRN
Status: CANCELLED
Start: 2019-12-02

## 2019-11-30 RX ORDER — DIPHENHYDRAMINE HYDROCHLORIDE 50 MG/ML
50 INJECTION INTRAMUSCULAR; INTRAVENOUS
Status: CANCELLED
Start: 2019-12-02

## 2019-11-30 RX ORDER — METHYLPREDNISOLONE SODIUM SUCCINATE 125 MG/2ML
125 INJECTION, POWDER, LYOPHILIZED, FOR SOLUTION INTRAMUSCULAR; INTRAVENOUS
Status: CANCELLED
Start: 2019-12-02

## 2019-11-30 RX ORDER — ALBUTEROL SULFATE 90 UG/1
1-2 AEROSOL, METERED RESPIRATORY (INHALATION)
Status: CANCELLED
Start: 2019-12-02

## 2019-11-30 RX ORDER — ALBUTEROL SULFATE 0.83 MG/ML
2.5 SOLUTION RESPIRATORY (INHALATION)
Status: CANCELLED | OUTPATIENT
Start: 2019-12-23

## 2019-11-30 RX ORDER — DIPHENHYDRAMINE HYDROCHLORIDE 50 MG/ML
50 INJECTION INTRAMUSCULAR; INTRAVENOUS
Status: CANCELLED
Start: 2019-12-23

## 2019-11-30 RX ORDER — EPINEPHRINE 1 MG/ML
0.3 INJECTION, SOLUTION INTRAMUSCULAR; SUBCUTANEOUS EVERY 5 MIN PRN
Status: CANCELLED | OUTPATIENT
Start: 2019-12-23

## 2019-12-02 ENCOUNTER — INFUSION THERAPY VISIT (OUTPATIENT)
Dept: ONCOLOGY | Facility: CLINIC | Age: 84
End: 2019-12-02
Attending: INTERNAL MEDICINE
Payer: MEDICARE

## 2019-12-02 ENCOUNTER — APPOINTMENT (OUTPATIENT)
Dept: LAB | Facility: CLINIC | Age: 84
End: 2019-12-02
Attending: INTERNAL MEDICINE
Payer: MEDICARE

## 2019-12-02 VITALS
HEART RATE: 87 BPM | DIASTOLIC BLOOD PRESSURE: 75 MMHG | WEIGHT: 141.2 LBS | SYSTOLIC BLOOD PRESSURE: 144 MMHG | OXYGEN SATURATION: 96 % | RESPIRATION RATE: 16 BRPM | BODY MASS INDEX: 26.68 KG/M2 | TEMPERATURE: 97.5 F

## 2019-12-02 DIAGNOSIS — C50.212 MALIGNANT NEOPLASM OF UPPER-INNER QUADRANT OF LEFT BREAST IN FEMALE, ESTROGEN RECEPTOR POSITIVE (H): Primary | ICD-10-CM

## 2019-12-02 DIAGNOSIS — Z17.0 MALIGNANT NEOPLASM OF UPPER-INNER QUADRANT OF LEFT BREAST IN FEMALE, ESTROGEN RECEPTOR POSITIVE (H): Primary | ICD-10-CM

## 2019-12-02 LAB
ALBUMIN SERPL-MCNC: 3.1 G/DL (ref 3.4–5)
ALP SERPL-CCNC: 223 U/L (ref 40–150)
ALT SERPL W P-5'-P-CCNC: 61 U/L (ref 0–50)
ANION GAP SERPL CALCULATED.3IONS-SCNC: 6 MMOL/L (ref 3–14)
AST SERPL W P-5'-P-CCNC: 74 U/L (ref 0–45)
BASOPHILS # BLD AUTO: 0.1 10E9/L (ref 0–0.2)
BASOPHILS NFR BLD AUTO: 0.9 %
BILIRUB SERPL-MCNC: 1 MG/DL (ref 0.2–1.3)
BUN SERPL-MCNC: 23 MG/DL (ref 7–30)
CALCIUM SERPL-MCNC: 10 MG/DL (ref 8.5–10.1)
CHLORIDE SERPL-SCNC: 113 MMOL/L (ref 94–109)
CO2 SERPL-SCNC: 20 MMOL/L (ref 20–32)
CREAT SERPL-MCNC: 1.13 MG/DL (ref 0.52–1.04)
DIFFERENTIAL METHOD BLD: ABNORMAL
EOSINOPHIL # BLD AUTO: 0.2 10E9/L (ref 0–0.7)
EOSINOPHIL NFR BLD AUTO: 2.6 %
ERYTHROCYTE [DISTWIDTH] IN BLOOD BY AUTOMATED COUNT: 15.8 % (ref 10–15)
GFR SERPL CREATININE-BSD FRML MDRD: 42 ML/MIN/{1.73_M2}
GLUCOSE SERPL-MCNC: 93 MG/DL (ref 70–99)
HCT VFR BLD AUTO: 40.5 % (ref 35–47)
HGB BLD-MCNC: 13 G/DL (ref 11.7–15.7)
IMM GRANULOCYTES # BLD: 0 10E9/L (ref 0–0.4)
IMM GRANULOCYTES NFR BLD: 0.3 %
LYMPHOCYTES # BLD AUTO: 1 10E9/L (ref 0.8–5.3)
LYMPHOCYTES NFR BLD AUTO: 16 %
MCH RBC QN AUTO: 31 PG (ref 26.5–33)
MCHC RBC AUTO-ENTMCNC: 32.1 G/DL (ref 31.5–36.5)
MCV RBC AUTO: 96 FL (ref 78–100)
MONOCYTES # BLD AUTO: 0.9 10E9/L (ref 0–1.3)
MONOCYTES NFR BLD AUTO: 13.4 %
NEUTROPHILS # BLD AUTO: 4.3 10E9/L (ref 1.6–8.3)
NEUTROPHILS NFR BLD AUTO: 66.8 %
NRBC # BLD AUTO: 0 10*3/UL
NRBC BLD AUTO-RTO: 0 /100
PLATELET # BLD AUTO: 134 10E9/L (ref 150–450)
POTASSIUM SERPL-SCNC: 3.8 MMOL/L (ref 3.4–5.3)
PROT SERPL-MCNC: 7.5 G/DL (ref 6.8–8.8)
RBC # BLD AUTO: 4.2 10E12/L (ref 3.8–5.2)
SODIUM SERPL-SCNC: 140 MMOL/L (ref 133–144)
WBC # BLD AUTO: 6.5 10E9/L (ref 4–11)

## 2019-12-02 PROCEDURE — 96413 CHEMO IV INFUSION 1 HR: CPT

## 2019-12-02 PROCEDURE — 85025 COMPLETE CBC W/AUTO DIFF WBC: CPT | Performed by: INTERNAL MEDICINE

## 2019-12-02 PROCEDURE — 25000128 H RX IP 250 OP 636: Mod: JW,ZF | Performed by: INTERNAL MEDICINE

## 2019-12-02 PROCEDURE — 80053 COMPREHEN METABOLIC PANEL: CPT | Performed by: INTERNAL MEDICINE

## 2019-12-02 PROCEDURE — 25800030 ZZH RX IP 258 OP 636: Mod: ZF | Performed by: INTERNAL MEDICINE

## 2019-12-02 RX ADMIN — SODIUM CHLORIDE 250 ML: 9 INJECTION, SOLUTION INTRAVENOUS at 13:59

## 2019-12-02 RX ADMIN — ADO-TRASTUZUMAB EMTANSINE 242 MG: 20 INJECTION, POWDER, LYOPHILIZED, FOR SOLUTION INTRAVENOUS at 14:39

## 2019-12-02 ASSESSMENT — PAIN SCALES - GENERAL: PAINLEVEL: NO PAIN (0)

## 2019-12-02 NOTE — PATIENT INSTRUCTIONS
[facility] Main Line: [phone]    Call triage nurse with chills and/or temperature greater than or equal to 100.4, uncontrolled nausea/vomiting, diarrhea, constipation, dizziness, shortness of breath, chest pain, bleeding, unexplained bruising, or any new/concerning symptoms, questions/concerns.   If you are having any concerning symptoms or wish to speak to a provider before your next infusion visit, please call your care coordinator or triage to notify them so we can adequately serve you.   Triage Nurse Line: [phone]    If after hours, weekends, or holidays [phone]

## 2019-12-02 NOTE — PROGRESS NOTES
Infusion Nursing Note:  Lennie Mar presents today for Cycle 16 Day 1 Kadcyla.    Patient seen by provider today: No   present during visit today: Not Applicable.    Note: Offers no new complaints.  See doc flow sheet for assessment.    Intravenous Access:  Peripheral IV placed in lab by vascular using U/S    Treatment Conditions:  Lab Results   Component Value Date    HGB 13.0 12/02/2019     Lab Results   Component Value Date    WBC 6.5 12/02/2019      Lab Results   Component Value Date    ANEU 4.3 12/02/2019     Lab Results   Component Value Date     12/02/2019      Lab Results   Component Value Date     12/02/2019                   Lab Results   Component Value Date    POTASSIUM 3.8 12/02/2019           Lab Results   Component Value Date    MAG 2.0 04/22/2012            Lab Results   Component Value Date    CR 1.13 12/02/2019                   Lab Results   Component Value Date    CANELO 10.0 12/02/2019                Lab Results   Component Value Date    BILITOTAL 1.0 12/02/2019           Lab Results   Component Value Date    ALBUMIN 3.1 12/02/2019                    Lab Results   Component Value Date    ALT 61 12/02/2019           Lab Results   Component Value Date    AST 74 12/02/2019       Results reviewed, labs MET treatment parameters, ok to proceed with treatment.      Post Infusion Assessment:  Patient tolerated infusion without incident.  Blood return noted pre and post infusion.  Site patent and intact, free from redness, edema or discomfort.  No evidence of extravasations.  Access discontinued per protocol.       Discharge Plan:   Patient declined prescription refills.  Copy of AVS reviewed with patient and/or family.  Patient will return 12/23/19 labs/Dr. Malik/chemo for next appointment.  Patient discharged in stable condition accompanied by: self.  Departure Mode: Ambulatory.  Face to Face time: 0.    Gifty Flannery

## 2019-12-18 ENCOUNTER — MEDICAL CORRESPONDENCE (OUTPATIENT)
Dept: HEALTH INFORMATION MANAGEMENT | Facility: CLINIC | Age: 84
End: 2019-12-18

## 2019-12-22 NOTE — PROGRESS NOTES
ONCOLOGY FOLLOW UP:  Date on this visit: 12/23/2019    Diagnosis:  1.  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  Stage IIa, T2N0M0, ER/WV positive, HER-2 non-amplified invasive ductal carcinoma of the left breast at 11:00    Primary Physician: Ty Quintanilla     History Of Present Illness:  Ms. Mar is a 89 year old female with two cancers of the left breast. She self palpated a lump and underwent mammogram and ultrasound in 5/2018 that showed a  3.5 cm mass in the superficial lateral left breast, at 3:00, 2 cm from the nipple and a second 2.4 cm mass in the upper outer left breast at 1:00, 7 cm from the nipple.  Biopsy of the 3:00  mass near the nipple was an ER/WV positive, HER2 amplified (HER2/CEN17 ratio of 6.4/2.3) grade 3 invasive carcinoma.  The 1:00 mass was an ER/WV positive, HER2 nonamplified, grade 3 invasive carcinoma.  No lymphadenopathy was seen on ultrasound.    She began treatment with Herceptin and letrozole on 6/1/18.  Left breast mastectomy and SLN biopsy on 11/19/18 showed two residual tumors.  The larger tumor at 3:00 was grade 2 and measured 2.7 cm, ER positive, WV negative, HER2-amplified.    The smaller tumor at 1:00 was grade 3 and measured 2.5 cm, ER/WV positive, HER2 non-amplified.  Overall tumor cellularity was 15%.  2/3 left axillary lymph nodes demonstrated metastases measuring 9 mm and 1.2 mm.  HER-2 FISH of the larger axillary lymph node metastasis was amplified.    She completed radiation to the left chest and regional lymph nodes (left axillary and supraclavicular) on 2/18/19.  Her case was discussed at breast conference and recommendation was to administer adjuvant T-DM1 given HER2 positivity in the lymph node metastasis.  She received 1 year of adjuvant T-DM1 from 1/3/19 - 12/23/19.      Interval History:   Ms. Mar comes into clinic today for evaluation  prior to cycle #17 of T-DM1 therapy.  Overall, she continues to tolerate therapy well.  Most bothersome to her at this time remains severe depression.  She has had this now for a number of months.  She states it is deeper and more prolonged than usual.  She is continuing to follow up with Dr. Horn.  Last week, he increased her levothyroxine hoping if she is more euthyroid, this will limit her depression.  She is sleeping more due to the depression.  She is not eating well and continues to lose a significant amount of weight.  She denies concerning lumps or masses of either the left chest wall or the right breast.  She has had no cough, shortness of breath or chest pains.  She denies abdominal complaints.  She has noted some morning nosebleeds.  She does not have any difficulty getting these to stop.  She reports some swollen veins in the vaginal area.  She denies that they are painful.  Because they are not bothersome, she has elected to not have them treated.  She has had no swelling of her lower extremities.  She denies headaches or focal neurologic complaints.  The remainder of a complete 12 point review of systems was reviewed with the patient and was negative with the exception of that mentioned above.     Past Medical/Surgical History:  Past Medical History:   Diagnosis Date     Alcohol dependence in remission (H)      Anxiety      Asymptomatic varicose veins      Bipolar disorder, unspecified (H)      CKD (chronic kidney disease) stage 3, GFR 30-59 ml/min (H) 2/18/2014     History of smoking      Hyperparathyroidism (H)      Memory loss      Muscle weakness (generalized) 6/23/2008     Severe depression (H)      Subdural hygroma     chronic.  no surgeries     Tremor of unknown origin      Past Surgical History:   Procedure Laterality Date     APPENDECTOMY OPEN  1947     CATARACT IOL, RT/LT       COLONOSCOPY WITH CO2 INSUFFLATION  2/29/2012    Procedure:COLONOSCOPY WITH CO2 INSUFFLATION; Surgeon:AXEL  GAYLE; [facility]     CYSTOCELE REPAIR  1972     CYSTOSCOPY, INSERT STENT URETHRA, COMBINED  1999     CYSTOSCOPY, RETROGRADES, INSERT STENT URETER(S), COMBINED  2/29/2012    Procedure:COMBINED CYSTOSCOPY, RETROGRADES, INSERT STENT URETER(S); Surgeon:ANT ESPINOZA; [facility]     DECOMPRESSION LUMBAR ONE LEVEL  8/9/2013    Procedure: DECOMPRESSION LUMBAR ONE LEVEL;  Posterior Decompression Right Lumbar 5- Sacral 1;  Surgeon: You Zavala MD;  [facility]     HC TOOTH EXTRACTION W/FORCEP       HYSTERECTOMY, CATA  1963     IRRIGATION AND DEBRIDEMENT ORAL, COMBINED  1982    For treatment of gingivitis     LAPAROSCOPIC ASSISTED COLECTOMY  2/29/2012    Procedure:LAPAROSCOPIC ASSISTED COLECTOMY; Cysto, Bilateral Stent placement (both stents removed at end of case)- Yanet ACOSTA. Laparoscopic Extended Right Venu Colectomy with CO2 Colonoscopy and polyp removal-Judah; Surgeon:GAYLE REYNA; [facility]     LIGATN/STRIP LONG OR SHORT SAPHEN  1955     MASTECTOMY PARTIAL WITH SENTINEL NODE Left 11/19/2018    Procedure: Left Mastectomy, Left [place] Lymph Node Biopsy;  Surgeon: Nahun Betancourt MD;  Location: [facility]     STRIP VEIN BILATERAL  1964     SURGICAL HISTORY OF -   1999    ureter surgery for blockage.     Allergies:  Allergies as of 12/23/2019 - Reviewed 12/02/2019   Allergen Reaction Noted     Cafergot Other (See Comments) and Nausea and Vomiting 01/01/1972     Ciprofloxacin  06/19/2012     Penicillins Rash and Unknown 01/01/1949     Sulfa drugs Rash and Unknown 01/01/1950     Ergot alkaloids Unknown 03/08/2012     Lamictal [lamotrigine] Other (See Comments) 02/15/2014     Naproxen       Naproxen Unknown 03/08/2012     Tetracycline Unknown 03/08/2012     Current Medications:  Current Outpatient Medications   Medication Sig Dispense Refill     acetaminophen (TYLENOL) 325 MG tablet Take 3 tablets (975 mg) by mouth every 8 hours as needed for mild pain 50 tablet 0     ARIPiprazole  "(ABILIFY) 5 MG tablet 5 mg tablet in the morning  3     Ascorbic Acid (VITAMIN C PO) Take 1 tablet by mouth daily       Cyanocobalamin (VITAMIN B 12 PO) Take by mouth every morning       letrozole (FEMARA) 2.5 MG tablet TAKE 1 TABLET(2.5 MG) BY MOUTH DAILY (Patient not taking: Reported on 6/17/2019) 90 tablet 3     lithium (ESKALITH) 150 MG capsule Take 1 capsule (150 mg) by mouth 2 times daily (with meals) 30 capsule 1     ORDER FOR DME Equipment being ordered: compression stocking knee high 20-30mmhg (Patient not taking: Reported on 8/1/2019) 2 Package 0     pramipexole (MIRAPEX) 1 MG tablet Take 1 tablet (1 mg) by mouth At Bedtime       vitamin D3 (CHOLECALCIFEROL) 1000 units (25 mcg) tablet Take 1 tablet (1,000 Units) by mouth daily 30 tablet 1     Physical Exam:  BP (!) 144/66 (BP Location: Right arm, Patient Position: Sitting, Cuff Size: Adult Regular)   Pulse 78   Temp 97.9  F (36.6  C) (Tympanic)   Resp 16   Ht 1.549 m (5' 1\")   Wt 65.4 kg (144 lb 3.2 oz)   LMP  (LMP Unknown)   SpO2 94%   BMI 27.25 kg/m    LMP  (LMP Unknown)   General:  Elderly appearing adult female in NAD. A&Ox3.  HEENT:  Normocephalic.  Sclera anicteric.  MMM.  No lesions of the oropharynx.  Persistently large palpable mass right neck, unchanged from prior.  Lymph:  No palpable cervical, supraclavicular, or axillary LAD.    Chest:  CTA bilaterally.  No wheezes or crackles.  CV:  RRR.  Nl S1 and S2.  No m/r/g.  Breast:  Left breast mastectomy.  There is mild erythema in the fold of the left mastectomy incision.  At the central incision is a 3 mm macule with crusting, no palpable mass.  Abd:  Soft/NT/ND.  BSs normoactive.    Ext:  No pitting edema of the bilateral lower extremities.  Pulses 2+ and symmetric.  Neuro:  Cranial nerves grossly intact.  Shuffling gait, requires some assistance to climb onto the exam table.  Psych:  Mood and affect are flat.  Skin:  Numerous seborrheic keratoses.      Laboratory/Imaging Studies:  12/23/19 " Labs:  Creatinine improved at 1.02 mg/dL  Albumin is low at 3.0.  ALT and AST are slightly elevated at 63 and 72 respectively.    WBC and hemoglobin are wnl.  Platelets are slightly low at 143.    ASSESSMENT/PLAN:  89 year old female with a h/o T2N0M0, ER/[place] positive, HER2 non-amplified and T2N1M0, ER/WY positive, HER2 amplified invasive mammary carcinomas of the left breast.  She is s/p treatment with 5 months neoadjuvant herceptin and letrozole, left breast mastectomy, SLN biopsy, and adjuvant radiation.  Continues on adjuvant Kadcyla.    1.  Left breast cancers:  Ms. Mar is 1 year, 1 month out from excision of left breast cancers.  She presents to clinic today for evaluation prior to cycle #17 of 17 total planned cycles of adjuvant Kadcyla.  This is her last planned infusion.  Her son, who is a physician was with her today and had questions about mechanism of action and side effects of Kadcyla which we reviewed in detail today.    Now that she is completing Kadcyla, I would like her to restart letrozole.  We discussed that letrozole can reduce the risk of recurrence of ER positive breast cancer by up to half.  We again reviewed the potential side effects of letrozole.  She is agreeable to restarting the medication.       She inquired about sites of possible recurrence which we discussed in detail.  She is happy to be done with IV infusions and is uncertain as to how long she will stay on letrozole, but agrees that she doesn't want to see her breast cancer back in the upcoming years.    2.  Stage IV CKD:  Likely secondary to chronic lithium.  Creatinine has been stable in the 1.0 - 1.3 mg/dL range since decreasing dose of lithium.  Ongoing follow up with Nephrology.    3.  At risk for cardiomyopathy:  Echocardiogram 9/30/19 with a LVEF of 60-65%.  Has now completed Kadcyla, no need for further cardiac monitoring.    4.  Thyroid mass:  7.7 cm right thyroid nodule, biopsy in 05/2019 was benign.  Recommend follow  up ultrasound in 05/2020.  I asked that she contact us if any symptoms of dysphagia or wheezing.    5.  Bipolar disorder:  Depression again worse since last visit.  I do not believe her cancer therapy is likely exacerbating it, but rather that it is due to her baseline bipolar disorder.  Follows with Dr. Horn at [facility] in [place].  Current medications including lithium and Abilify.  Levothyroxine dose was increased last week.    6.  Epistaxis:  Likely secondary to Kadcyla.  Advised to apply a thin layer of vaseline to the inside of the nose twice daily.  Can also try a saline nasal spray.  Anticipate will improve in the upcoming weeks, now that she has completed planned course of Kadcyla.    7.  Follow Up:  DEXA bone density scan and visit with Dalila Blum in 3 months.  Return to clinic in 6 months for visit with me.      Total time in face to face time with the patient was 35 minutes, >50% of which was spent in counseling.

## 2019-12-23 ENCOUNTER — INFUSION THERAPY VISIT (OUTPATIENT)
Dept: ONCOLOGY | Facility: CLINIC | Age: 84
End: 2019-12-23
Attending: INTERNAL MEDICINE
Payer: MEDICARE

## 2019-12-23 ENCOUNTER — APPOINTMENT (OUTPATIENT)
Dept: LAB | Facility: CLINIC | Age: 84
End: 2019-12-23
Attending: INTERNAL MEDICINE
Payer: MEDICARE

## 2019-12-23 VITALS
RESPIRATION RATE: 16 BRPM | HEART RATE: 78 BPM | SYSTOLIC BLOOD PRESSURE: 144 MMHG | WEIGHT: 144.2 LBS | DIASTOLIC BLOOD PRESSURE: 66 MMHG | TEMPERATURE: 97.9 F | HEIGHT: 61 IN | BODY MASS INDEX: 27.23 KG/M2 | OXYGEN SATURATION: 94 %

## 2019-12-23 DIAGNOSIS — C50.412 MALIGNANT NEOPLASM OF UPPER-OUTER QUADRANT OF LEFT BREAST IN FEMALE, ESTROGEN RECEPTOR POSITIVE (H): Primary | ICD-10-CM

## 2019-12-23 DIAGNOSIS — M94.9 DISORDER OF BONE AND CARTILAGE: ICD-10-CM

## 2019-12-23 DIAGNOSIS — C50.212 MALIGNANT NEOPLASM OF UPPER-INNER QUADRANT OF LEFT BREAST IN FEMALE, ESTROGEN RECEPTOR POSITIVE (H): Primary | ICD-10-CM

## 2019-12-23 DIAGNOSIS — Z17.0 MALIGNANT NEOPLASM OF UPPER-INNER QUADRANT OF LEFT BREAST IN FEMALE, ESTROGEN RECEPTOR POSITIVE (H): ICD-10-CM

## 2019-12-23 DIAGNOSIS — M89.9 DISORDER OF BONE AND CARTILAGE: ICD-10-CM

## 2019-12-23 DIAGNOSIS — Z17.0 MALIGNANT NEOPLASM OF UPPER-INNER QUADRANT OF LEFT BREAST IN FEMALE, ESTROGEN RECEPTOR POSITIVE (H): Primary | ICD-10-CM

## 2019-12-23 DIAGNOSIS — C50.212 MALIGNANT NEOPLASM OF UPPER-INNER QUADRANT OF LEFT BREAST IN FEMALE, ESTROGEN RECEPTOR POSITIVE (H): ICD-10-CM

## 2019-12-23 DIAGNOSIS — Z17.0 MALIGNANT NEOPLASM OF UPPER-OUTER QUADRANT OF LEFT BREAST IN FEMALE, ESTROGEN RECEPTOR POSITIVE (H): Primary | ICD-10-CM

## 2019-12-23 LAB
ALBUMIN SERPL-MCNC: 3 G/DL (ref 3.4–5)
ALP SERPL-CCNC: 248 U/L (ref 40–150)
ALT SERPL W P-5'-P-CCNC: 63 U/L (ref 0–50)
ANION GAP SERPL CALCULATED.3IONS-SCNC: 4 MMOL/L (ref 3–14)
AST SERPL W P-5'-P-CCNC: 72 U/L (ref 0–45)
BASOPHILS # BLD AUTO: 0.1 10E9/L (ref 0–0.2)
BASOPHILS NFR BLD AUTO: 1 %
BILIRUB SERPL-MCNC: 0.9 MG/DL (ref 0.2–1.3)
BUN SERPL-MCNC: 26 MG/DL (ref 7–30)
CALCIUM SERPL-MCNC: 9.9 MG/DL (ref 8.5–10.1)
CHLORIDE SERPL-SCNC: 114 MMOL/L (ref 94–109)
CO2 SERPL-SCNC: 24 MMOL/L (ref 20–32)
CREAT SERPL-MCNC: 1.02 MG/DL (ref 0.52–1.04)
DIFFERENTIAL METHOD BLD: ABNORMAL
EOSINOPHIL # BLD AUTO: 0.2 10E9/L (ref 0–0.7)
EOSINOPHIL NFR BLD AUTO: 2.7 %
ERYTHROCYTE [DISTWIDTH] IN BLOOD BY AUTOMATED COUNT: 15.5 % (ref 10–15)
GFR SERPL CREATININE-BSD FRML MDRD: 48 ML/MIN/{1.73_M2}
GLUCOSE SERPL-MCNC: 94 MG/DL (ref 70–99)
HCT VFR BLD AUTO: 38.6 % (ref 35–47)
HGB BLD-MCNC: 12.3 G/DL (ref 11.7–15.7)
IMM GRANULOCYTES # BLD: 0 10E9/L (ref 0–0.4)
IMM GRANULOCYTES NFR BLD: 0.5 %
LYMPHOCYTES # BLD AUTO: 1 10E9/L (ref 0.8–5.3)
LYMPHOCYTES NFR BLD AUTO: 16.6 %
MCH RBC QN AUTO: 30.9 PG (ref 26.5–33)
MCHC RBC AUTO-ENTMCNC: 31.9 G/DL (ref 31.5–36.5)
MCV RBC AUTO: 97 FL (ref 78–100)
MONOCYTES # BLD AUTO: 0.9 10E9/L (ref 0–1.3)
MONOCYTES NFR BLD AUTO: 15.8 %
NEUTROPHILS # BLD AUTO: 3.7 10E9/L (ref 1.6–8.3)
NEUTROPHILS NFR BLD AUTO: 63.4 %
NRBC # BLD AUTO: 0 10*3/UL
NRBC BLD AUTO-RTO: 0 /100
PLATELET # BLD AUTO: 143 10E9/L (ref 150–450)
POTASSIUM SERPL-SCNC: 4.1 MMOL/L (ref 3.4–5.3)
PROT SERPL-MCNC: 7.2 G/DL (ref 6.8–8.8)
RBC # BLD AUTO: 3.98 10E12/L (ref 3.8–5.2)
SODIUM SERPL-SCNC: 141 MMOL/L (ref 133–144)
WBC # BLD AUTO: 5.9 10E9/L (ref 4–11)

## 2019-12-23 PROCEDURE — G0463 HOSPITAL OUTPT CLINIC VISIT: HCPCS | Mod: ZF

## 2019-12-23 PROCEDURE — 85025 COMPLETE CBC W/AUTO DIFF WBC: CPT | Performed by: INTERNAL MEDICINE

## 2019-12-23 PROCEDURE — 99214 OFFICE O/P EST MOD 30 MIN: CPT | Mod: ZP | Performed by: INTERNAL MEDICINE

## 2019-12-23 PROCEDURE — 25000128 H RX IP 250 OP 636: Mod: ZF | Performed by: INTERNAL MEDICINE

## 2019-12-23 PROCEDURE — 96413 CHEMO IV INFUSION 1 HR: CPT

## 2019-12-23 PROCEDURE — 80053 COMPREHEN METABOLIC PANEL: CPT | Performed by: INTERNAL MEDICINE

## 2019-12-23 PROCEDURE — 25800030 ZZH RX IP 258 OP 636: Mod: ZF | Performed by: INTERNAL MEDICINE

## 2019-12-23 RX ORDER — LETROZOLE 2.5 MG/1
2.5 TABLET, FILM COATED ORAL DAILY
Qty: 90 TABLET | Refills: 3 | Status: SHIPPED | OUTPATIENT
Start: 2019-12-23 | End: 2020-12-21

## 2019-12-23 RX ORDER — LEVOTHYROXINE SODIUM 75 UG/1
100 TABLET ORAL DAILY
COMMUNITY
Start: 2019-12-17 | End: 2022-05-03

## 2019-12-23 RX ADMIN — ADO-TRASTUZUMAB EMTANSINE 242 MG: 20 INJECTION, POWDER, LYOPHILIZED, FOR SOLUTION INTRAVENOUS at 11:49

## 2019-12-23 ASSESSMENT — PAIN SCALES - GENERAL: PAINLEVEL: NO PAIN (0)

## 2019-12-23 ASSESSMENT — MIFFLIN-ST. JEOR: SCORE: 1001.47

## 2019-12-23 NOTE — LETTER
RE: Lennie Mar  1955 [address]  [place] [address]     Dear Colleague,    Thank you for referring your patient, Lennie Mar, to the [facility]. Please see a copy of my visit note below.    ONCOLOGY FOLLOW UP:  Date on this visit: 12/23/2019    Diagnosis:  1.  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  Stage IIa, T2N0M0, ER/NE positive, HER-2 non-amplified invasive ductal carcinoma of the left breast at 11:00    Primary Physician: Ty Quintanilla     History Of Present Illness:  Ms. Mar is a 89 year old female with two cancers of the left breast. She self palpated a lump and underwent mammogram and ultrasound in 5/2018 that showed a  3.5 cm mass in the superficial lateral left breast, at 3:00, 2 cm from the nipple and a second 2.4 cm mass in the upper outer left breast at 1:00, 7 cm from the nipple.  Biopsy of the 3:00  mass near the nipple was an ER/NE positive, HER2 amplified (HER2/CEN17 ratio of 6.4/2.3) grade 3 invasive carcinoma.  The 1:00 mass was an ER/NE positive, HER2 nonamplified, grade 3 invasive carcinoma.  No lymphadenopathy was seen on ultrasound.    She began treatment with Herceptin and letrozole on 6/1/18.  Left breast mastectomy and SLN biopsy on 11/19/18 showed two residual tumors.  The larger tumor at 3:00 was grade 2 and measured 2.7 cm, ER positive, NE negative, HER2-amplified.    The smaller tumor at 1:00 was grade 3 and measured 2.5 cm, ER/NE positive, HER2 non-amplified.  Overall tumor cellularity was 15%.  2/3 left axillary lymph nodes demonstrated metastases measuring 9 mm and 1.2 mm.  HER-2 FISH of the larger axillary lymph node metastasis was amplified.    She completed radiation to the left chest and regional lymph nodes (left axillary and supraclavicular) on 2/18/19.  Her case was discussed at breast conference  and recommendation was to administer adjuvant T-DM1 given HER2 positivity in the lymph node metastasis.  She received 1 year of adjuvant T-DM1 from 1/3/19 - 12/23/19.      Interval History:   Ms. Mar comes into clinic today for evaluation prior to cycle #17 of T-DM1 therapy.  Overall, she continues to tolerate therapy well.  Most bothersome to her at this time remains severe depression.  She has had this now for a number of months.  She states it is deeper and more prolonged than usual.  She is continuing to follow up with Dr. Horn.  Last week, he increased her levothyroxine hoping if she is more euthyroid, this will limit her depression.  She is sleeping more due to the depression.  She is not eating well and continues to lose a significant amount of weight.  She denies concerning lumps or masses of either the left chest wall or the right breast.  She has had no cough, shortness of breath or chest pains.  She denies abdominal complaints.  She has noted some morning nosebleeds.  She does not have any difficulty getting these to stop.  She reports some swollen veins in the vaginal area.  She denies that they are painful.  Because they are not bothersome, she has elected to not have them treated.  She has had no swelling of her lower extremities.  She denies headaches or focal neurologic complaints.  The remainder of a complete 12 point review of systems was reviewed with the patient and was negative with the exception of that mentioned above.     Past Medical/Surgical History:  Past Medical History:   Diagnosis Date     Alcohol dependence in remission (H)      Anxiety      Asymptomatic varicose veins      Bipolar disorder, unspecified (H)      CKD (chronic kidney disease) stage 3, GFR 30-59 ml/min (H) 2/18/2014     History of smoking      Hyperparathyroidism (H)      Memory loss      Muscle weakness (generalized) 6/23/2008     Severe depression (H)      Subdural hygroma     chronic.  no surgeries     Tremor of  unknown origin      Past Surgical History:   Procedure Laterality Date     APPENDECTOMY OPEN  1947     CATARACT IOL, RT/LT       COLONOSCOPY WITH CO2 INSUFFLATION  2/29/2012    Procedure:COLONOSCOPY WITH CO2 INSUFFLATION; Surgeon:GAYLE REYNA; [facility]     CYSTOCELE REPAIR  1972     CYSTOSCOPY, INSERT STENT URETHRA, COMBINED  1999     CYSTOSCOPY, RETROGRADES, INSERT STENT URETER(S), COMBINED  2/29/2012    Procedure:COMBINED CYSTOSCOPY, RETROGRADES, INSERT STENT URETER(S); Surgeon:ANT ESPINOZA; [facility]     DECOMPRESSION LUMBAR ONE LEVEL  8/9/2013    Procedure: DECOMPRESSION LUMBAR ONE LEVEL;  Posterior Decompression Right Lumbar 5- Sacral 1;  Surgeon: You Zavala MD;  [facility]     HC TOOTH EXTRACTION W/FORCEP       HYSTERECTOMY, CATA  1963     IRRIGATION AND DEBRIDEMENT ORAL, COMBINED  1982    For treatment of gingivitis     LAPAROSCOPIC ASSISTED COLECTOMY  2/29/2012    Procedure:LAPAROSCOPIC ASSISTED COLECTOMY; Cysto, Bilateral Stent placement (both stents removed at end of case)- Yanet ACOSTA. Laparoscopic Extended Right Venu Colectomy with CO2 Colonoscopy and polyp removal-Judah; Surgeon:GAYLE REYNA; Location:[facility]     LIGATN/STRIP LONG OR SHORT SAPHEN  1955     MASTECTOMY PARTIAL WITH SENTINEL NODE Left 11/19/2018    Procedure: Left Mastectomy, Left [place] Lymph Node Biopsy;  Surgeon: Nahun Betancourt MD;  Location: [facility]     STRIP VEIN BILATERAL  1964     SURGICAL HISTORY OF -   1999    ureter surgery for blockage.     Allergies:  Allergies as of 12/23/2019 - Reviewed 12/02/2019   Allergen Reaction Noted     Cafergot Other (See Comments) and Nausea and Vomiting 01/01/1972     Ciprofloxacin  06/19/2012     Penicillins Rash and Unknown 01/01/1949     Sulfa drugs Rash and Unknown 01/01/1950     Ergot alkaloids Unknown 03/08/2012     Lamictal [lamotrigine] Other (See Comments) 02/15/2014     Naproxen       Naproxen Unknown 03/08/2012     Tetracycline Unknown  "03/08/2012     Current Medications:  Current Outpatient Medications   Medication Sig Dispense Refill     acetaminophen (TYLENOL) 325 MG tablet Take 3 tablets (975 mg) by mouth every 8 hours as needed for mild pain 50 tablet 0     ARIPiprazole (ABILIFY) 5 MG tablet 5 mg tablet in the morning  3     Ascorbic Acid (VITAMIN C PO) Take 1 tablet by mouth daily       Cyanocobalamin (VITAMIN B 12 PO) Take by mouth every morning       letrozole (FEMARA) 2.5 MG tablet TAKE 1 TABLET(2.5 MG) BY MOUTH DAILY (Patient not taking: Reported on 6/17/2019) 90 tablet 3     lithium (ESKALITH) 150 MG capsule Take 1 capsule (150 mg) by mouth 2 times daily (with meals) 30 capsule 1     ORDER FOR DME Equipment being ordered: compression stocking knee high 20-30mmhg (Patient not taking: Reported on 8/1/2019) 2 Package 0     pramipexole (MIRAPEX) 1 MG tablet Take 1 tablet (1 mg) by mouth At Bedtime       vitamin D3 (CHOLECALCIFEROL) 1000 units (25 mcg) tablet Take 1 tablet (1,000 Units) by mouth daily 30 tablet 1     Physical Exam:  BP (!) 144/66 (BP Location: Right arm, Patient Position: Sitting, Cuff Size: Adult Regular)   Pulse 78   Temp 97.9  F (36.6  C) (Tympanic)   Resp 16   Ht 1.549 m (5' 1\")   Wt 65.4 kg (144 lb 3.2 oz)   LMP  (LMP Unknown)   SpO2 94%   BMI 27.25 kg/m     LMP  (LMP Unknown)   General:  Elderly appearing adult female in NAD. A&Ox3.  HEENT:  Normocephalic.  Sclera anicteric.  MMM.  No lesions of the oropharynx.  Persistently large palpable mass right neck, unchanged from prior.  Lymph:  No palpable cervical, supraclavicular, or axillary LAD.    Chest:  CTA bilaterally.  No wheezes or crackles.  CV:  RRR.  Nl S1 and S2.  No m/r/g.  Breast:  Left breast mastectomy.  There is mild erythema in the fold of the left mastectomy incision.  At the central incision is a 3 mm macule with crusting, no palpable mass.  Abd:  Soft/NT/ND.  BSs normoactive.    Ext:  No pitting edema of the bilateral lower extremities.  Pulses 2+ " and symmetric.  Neuro:  Cranial nerves grossly intact.  Shuffling gait, requires some assistance to climb onto the exam table.  Psych:  Mood and affect are flat.  Skin:  Numerous seborrheic keratoses.      Laboratory/Imaging Studies:  12/23/19 Labs:  Creatinine improved at 1.02 mg/dL  Albumin is low at 3.0.  ALT and AST are slightly elevated at 63 and 72 respectively.    WBC and hemoglobin are wnl.  Platelets are slightly low at 143.    ASSESSMENT/PLAN:  89 year old female with a h/o T2N0M0, ER/ME positive, HER2 non-amplified and T2N1M0, ER/ME positive, HER2 amplified invasive mammary carcinomas of the left breast.  She is s/p treatment with 5 months neoadjuvant herceptin and letrozole, left breast mastectomy, SLN biopsy, and adjuvant radiation.  Continues on adjuvant Kadcyla.    1.  Left breast cancers:  Ms. Mar is 1 year, 1 month out from excision of left breast cancers.  She presents to clinic today for evaluation prior to cycle #17 of 17 total planned cycles of adjuvant Kadcyla.  This is her last planned infusion.  Her son, who is a physician was with her today and had questions about mechanism of action and side effects of Kadcyla which we reviewed in detail today.    Now that she is completing Kadcyla, I would like her to restart letrozole.  We discussed that letrozole can reduce the risk of recurrence of ER positive breast cancer by up to half.  We again reviewed the potential side effects of letrozole.  She is agreeable to restarting the medication.       She inquired about sites of possible recurrence which we discussed in detail.  She is happy to be done with IV infusions and is uncertain as to how long she will stay on letrozole, but agrees that she doesn't want to see her breast cancer back in the upcoming years.    2.  Stage IV CKD:  Likely secondary to chronic lithium.  Creatinine has been stable in the 1.0 - 1.3 mg/dL range since decreasing dose of lithium.  Ongoing follow up with Nephrology.    3.   At risk for cardiomyopathy:  Echocardiogram 9/30/19 with a LVEF of 60-65%.  Has now completed Kadcyla, no need for further cardiac monitoring.    4.  Thyroid mass:  7.7 cm right thyroid nodule, biopsy in 05/2019 was benign.  Recommend follow up ultrasound in 05/2020.  I asked that she contact us if any symptoms of dysphagia or wheezing.    5.  Bipolar disorder:  Depression again worse since last visit.  I do not believe her cancer therapy is likely exacerbating it, but rather that it is due to her baseline bipolar disorder.  Follows with Dr. Horn at [facility] in [place].  Current medications including lithium and Abilify.  Levothyroxine dose was increased last week.    6.  Epistaxis:  Likely secondary to Kadcyla.  Advised to apply a thin layer of vaseline to the inside of the nose twice daily.  Can also try a saline nasal spray.  Anticipate will improve in the upcoming weeks, now that she has completed planned course of Kadcyla.    7.  Follow Up:  DEXA bone density scan and visit with Dalila Blum in 3 months.  Return to clinic in 6 months for visit with me.      Total time in face to face time with the patient was 35 minutes, >50% of which was spent in counseling.    Again, thank you for allowing me to participate in the care of your patient.      Sincerely,    Perlita Malik MD

## 2019-12-23 NOTE — NURSING NOTE
"Oncology Rooming Note    December 23, 2019 10:25 AM   Lennie Mar is a 89 year old female who presents for:    Chief Complaint   Patient presents with     Blood Draw     labs drawn with piv start by rn.  vs taken     Oncology Clinic Visit     [facility] RETURN; BREAST CANCER     Initial Vitals: BP (!) 144/66 (BP Location: Right arm, Patient Position: Sitting, Cuff Size: Adult Regular)   Pulse 78   Temp 97.9  F (36.6  C) (Tympanic)   Resp 16   Ht 1.549 m (5' 1\")   Wt 65.4 kg (144 lb 3.2 oz)   LMP  (LMP Unknown)   SpO2 94%   BMI 27.25 kg/m   Estimated body mass index is 27.25 kg/m  as calculated from the following:    Height as of this encounter: 1.549 m (5' 1\").    Weight as of this encounter: 65.4 kg (144 lb 3.2 oz). Body surface area is 1.68 meters squared.  No Pain (0) Comment: Data Unavailable   No LMP recorded (lmp unknown). Patient is postmenopausal.  Allergies reviewed: Yes  Medications reviewed: Yes    Medications: Medication refills not needed today.  Pharmacy name entered into [employer]:    [facility] - [place], [place] - [address] [facility] [place], [place] - [address] [facility] [place] [facility] [place], [place] [facility] [address] [id number] [place], [place] - [address]    Clinical concerns: No new concerns.  Dr. Malik was notified.      Cyn Bryan, [facility]              "

## 2019-12-23 NOTE — PROGRESS NOTES
Infusion Nursing Note:  Lennie Mar presents today for Cycle 17 Day 1 Kadcyla.    Patient seen by provider today: Yes: Dr. Malik   present during visit today: Not Applicable.    Intravenous Access:  Peripheral IV placed.    Treatment Conditions:  Lab Results   Component Value Date    HGB 12.3 12/23/2019     Lab Results   Component Value Date    WBC 5.9 12/23/2019      Lab Results   Component Value Date    ANEU 3.7 12/23/2019     Lab Results   Component Value Date     12/23/2019      Lab Results   Component Value Date     12/23/2019                   Lab Results   Component Value Date    POTASSIUM 4.1 12/23/2019           Lab Results   Component Value Date    MAG 2.0 04/22/2012            Lab Results   Component Value Date    CR 1.02 12/23/2019                   Lab Results   Component Value Date    CANELO 9.9 12/23/2019                Lab Results   Component Value Date    BILITOTAL 0.9 12/23/2019           Lab Results   Component Value Date    ALBUMIN 3.0 12/23/2019                    Lab Results   Component Value Date    ALT 63 12/23/2019           Lab Results   Component Value Date    AST 72 12/23/2019   Echo showed EF of 60-65% on 9/30/19   Results reviewed, labs MET treatment parameters, ok to proceed with treatment.    Post Infusion Assessment:  Patient tolerated infusion without incident.  Blood return noted pre and post infusion.  Site patent and intact, free from redness, edema or discomfort.  No evidence of extravasations.  Access discontinued per protocol.     Discharge Plan:   Patient declined prescription refills.  Copy of AVS reviewed with patient and/or family.  Patient will return 3/23/19 for next appointment (She is done with chemo).  Patient discharged in stable condition accompanied by: self and son.  Departure Mode: Ambulatory.    Lore Workman RN

## 2020-01-07 DIAGNOSIS — F31.32 MODERATE DEPRESSED BIPOLAR I DISORDER (H): ICD-10-CM

## 2020-01-07 DIAGNOSIS — Z79.899 ENCOUNTER FOR LONG-TERM (CURRENT) USE OF OTHER MEDICATIONS: Primary | ICD-10-CM

## 2020-01-08 DIAGNOSIS — Z79.899 ENCOUNTER FOR LONG-TERM (CURRENT) USE OF OTHER MEDICATIONS: ICD-10-CM

## 2020-01-08 DIAGNOSIS — F31.32 MODERATE DEPRESSED BIPOLAR I DISORDER (H): ICD-10-CM

## 2020-01-08 LAB
ALBUMIN SERPL-MCNC: 3.2 G/DL (ref 3.4–5)
ALP SERPL-CCNC: 238 U/L (ref 40–150)
ALT SERPL W P-5'-P-CCNC: 58 U/L (ref 0–50)
ANION GAP SERPL CALCULATED.3IONS-SCNC: 7 MMOL/L (ref 3–14)
AST SERPL W P-5'-P-CCNC: 80 U/L (ref 0–45)
BILIRUB SERPL-MCNC: 1 MG/DL (ref 0.2–1.3)
BUN SERPL-MCNC: 20 MG/DL (ref 7–30)
CALCIUM SERPL-MCNC: 10.4 MG/DL (ref 8.5–10.1)
CHLORIDE SERPL-SCNC: 113 MMOL/L (ref 94–109)
CO2 SERPL-SCNC: 22 MMOL/L (ref 20–32)
CREAT SERPL-MCNC: 1.15 MG/DL (ref 0.52–1.04)
GFR SERPL CREATININE-BSD FRML MDRD: 41 ML/MIN/{1.73_M2}
GLUCOSE SERPL-MCNC: 106 MG/DL (ref 70–99)
LITHIUM SERPL-SCNC: 0.33 MMOL/L (ref 0.6–1.2)
POTASSIUM SERPL-SCNC: 3.5 MMOL/L (ref 3.4–5.3)
PROT SERPL-MCNC: 7.7 G/DL (ref 6.8–8.8)
SODIUM SERPL-SCNC: 142 MMOL/L (ref 133–144)
T4 FREE SERPL-MCNC: 1.12 NG/DL (ref 0.76–1.46)
TSH SERPL DL<=0.005 MIU/L-ACNC: 0.3 MU/L (ref 0.4–4)

## 2020-01-08 PROCEDURE — 80053 COMPREHEN METABOLIC PANEL: CPT | Performed by: PSYCHIATRY & NEUROLOGY

## 2020-01-08 PROCEDURE — 36415 COLL VENOUS BLD VENIPUNCTURE: CPT | Performed by: PSYCHIATRY & NEUROLOGY

## 2020-01-08 PROCEDURE — 84439 ASSAY OF FREE THYROXINE: CPT | Performed by: PSYCHIATRY & NEUROLOGY

## 2020-01-08 PROCEDURE — 80178 ASSAY OF LITHIUM: CPT | Performed by: PSYCHIATRY & NEUROLOGY

## 2020-01-08 PROCEDURE — 84443 ASSAY THYROID STIM HORMONE: CPT | Performed by: PHYSICIAN ASSISTANT

## 2020-01-10 PROBLEM — Z17.0 MALIGNANT NEOPLASM OF UPPER-OUTER QUADRANT OF LEFT BREAST IN FEMALE, ESTROGEN RECEPTOR POSITIVE (H): Status: ACTIVE | Noted: 2018-05-09

## 2020-01-10 PROBLEM — C50.412 MALIGNANT NEOPLASM OF UPPER-OUTER QUADRANT OF LEFT BREAST IN FEMALE, ESTROGEN RECEPTOR POSITIVE (H): Status: ACTIVE | Noted: 2018-05-09

## 2020-01-16 ENCOUNTER — NURSE TRIAGE (OUTPATIENT)
Dept: NURSING | Facility: CLINIC | Age: 85
End: 2020-01-16

## 2020-01-16 ENCOUNTER — RECORDS - HEALTHEAST (OUTPATIENT)
Dept: ADMINISTRATIVE | Facility: OTHER | Age: 85
End: 2020-01-16

## 2020-01-16 NOTE — TELEPHONE ENCOUNTER
"C/o pain, redness and swelling of R calf starting the day before yesterday. States leg is \"2 times the size of the other leg\" and \"bright red\". Advised see provider w/i 4 hrs - advised ED due to likely need for Doppler study.     Reason for Disposition    SEVERE leg swelling (e.g., swelling extends above knee, entire leg is swollen, weeping fluid)    Additional Information    Negative: Severe difficulty breathing (e.g., struggling for each breath, speaks in single words)    Negative: Looks like a broken bone or dislocated joint (e.g., crooked or deformed)    Negative: Sounds like a life-threatening emergency to the triager    Negative: Chest pain    Negative: Followed a leg injury    Negative: [1] Small area of swelling AND [2] followed an insect bite to the area    Negative: Swelling of one ankle joint    Negative: Swelling of knee is main symptom    Negative: Pregnant    Negative: Postpartum (< 1 month since delivery)    Negative: Difficulty breathing at rest    Negative: Entire foot is cool or blue in comparison to other side    Negative: [1] Can't walk or can barely walk AND [2] new onset    Negative: [1] Difficulty breathing with exertion (e.g., walking) AND [2] new onset or worsening    Negative: [1] Red area or streak AND [2] fever    Negative: [1] Swelling is painful to touch AND [2] fever    Negative: [1] Cast on leg or ankle AND [2] now increased pain    Negative: Patient sounds very sick or weak to the triager    Protocols used: LEG SWELLING AND EDEMA-A-AH      "

## 2020-01-20 ENCOUNTER — TELEPHONE (OUTPATIENT)
Dept: FAMILY MEDICINE | Facility: CLINIC | Age: 85
End: 2020-01-20

## 2020-01-20 NOTE — TELEPHONE ENCOUNTER
The patient called to schedule cellulitis follow up with PCP. The patients PCP is booked until 2/25.  The patient is wondering if she should see another provider sooner.  The writer did offer to schedule with another provider but the patient wanted Dr. Quintanilla's opinion.

## 2020-01-20 NOTE — TELEPHONE ENCOUNTER
LM to call back & schedule a hosp. Follow-up with Dr. Quintanilla this week. OK to take same day hold.    Candy Goldsmith

## 2020-01-24 ENCOUNTER — OFFICE VISIT (OUTPATIENT)
Dept: FAMILY MEDICINE | Facility: CLINIC | Age: 85
End: 2020-01-24
Payer: MEDICARE

## 2020-01-24 ENCOUNTER — RECORDS - HEALTHEAST (OUTPATIENT)
Dept: ADMINISTRATIVE | Facility: OTHER | Age: 85
End: 2020-01-24

## 2020-01-24 ENCOUNTER — HOME CARE/HOSPICE - HEALTHEAST (OUTPATIENT)
Dept: HOME HEALTH SERVICES | Facility: HOME HEALTH | Age: 85
End: 2020-01-24

## 2020-01-24 VITALS
SYSTOLIC BLOOD PRESSURE: 142 MMHG | HEIGHT: 61 IN | WEIGHT: 144 LBS | DIASTOLIC BLOOD PRESSURE: 60 MMHG | BODY MASS INDEX: 27.19 KG/M2 | TEMPERATURE: 97.4 F | OXYGEN SATURATION: 100 % | HEART RATE: 86 BPM

## 2020-01-24 DIAGNOSIS — I87.2 VENOUS (PERIPHERAL) INSUFFICIENCY: ICD-10-CM

## 2020-01-24 DIAGNOSIS — F31.9 BIPOLAR AFFECTIVE DISORDER, REMISSION STATUS UNSPECIFIED (H): ICD-10-CM

## 2020-01-24 DIAGNOSIS — F10.21 ALCOHOL DEPENDENCE IN REMISSION (H): ICD-10-CM

## 2020-01-24 DIAGNOSIS — E21.3 HYPERPARATHYROIDISM (H): ICD-10-CM

## 2020-01-24 DIAGNOSIS — C77.3 SECONDARY AND UNSPECIFIED MALIGNANT NEOPLASM OF AXILLA AND UPPER LIMB LYMPH NODES (H): ICD-10-CM

## 2020-01-24 DIAGNOSIS — L03.90 CELLULITIS, UNSPECIFIED CELLULITIS SITE: Primary | ICD-10-CM

## 2020-01-24 PROCEDURE — 99214 OFFICE O/P EST MOD 30 MIN: CPT | Performed by: FAMILY MEDICINE

## 2020-01-24 ASSESSMENT — MIFFLIN-ST. JEOR: SCORE: 1000.56

## 2020-01-24 NOTE — PATIENT INSTRUCTIONS
Finish the antibiotics for your leg.     Use a moisturizer such as cerave or vanicream on your leg daily after showering.     Use your compression stockings if you can. Elevate your leg    Expect a call from our home care team. .

## 2020-01-24 NOTE — PROGRESS NOTES
"Subjective     Lennie Pauline Mar is a 89 year old female who presents to clinic today for the following health issues:    HPI   ED/UC Followup:    Facility:  [facility]  Date of visit: 1/16/2020  Reason for visit: Leg pain/problem  Current Status: right leg is still swollen.      1: Cellulitis-seen in the ED for leg redness and pain. This is improved with cephalexin. She notes minimal discomfort and that the redness is much improved but still present. No fever. ER report reviewed.     2: Bipolar with recent depression-she reports the bad depression symptoms recently resolved and she has begun to feel better.     3:  Leg swelling. She has noted intermittent swelling of her leg and occ redness mostly on the right lower extremity. Sometimes this is itchy. She has not used compression stocking regularly but does have them. She reports using a moisturizer regularly.    med hx, family hx, and soc hx reviewed and updated discussed with her son with her verbal consent. She has a niece that lives in the same building and a cousin both who help her out. She has contemplated moving to a place with more support but not interested currently. She is receiving no home services. She identifies no particular needs but her son wnders about her safety at home in particular with her frequent depression spells.     Reviewed and updated as needed this visit by Provider         Review of Systems   No const symptoms.       Objective    BP (!) 142/60 (BP Location: Right arm, Patient Position: Sitting, Cuff Size: Adult Regular)   Pulse 86   Temp 97.4  F (36.3  C) (Oral)   Ht 1.549 m (5' 1\")   Wt 65.3 kg (144 lb)   LMP  (LMP Unknown)   SpO2 100%   BMI 27.21 kg/m    Body mass index is 27.21 kg/m .  Physical Exam   no apparent distress   She communicates without difficulty but at times is unsure/enclear of the details of medications.   L clear  Cv: rrr  Lower ext with marked area of erythema. This is pink not bright " red and not warm nor tender. There is scaling of the skin,xerosis and signs of stasis dermatitis on bilateral lower ext worse on the right. Minimal edema at present.     US in the ER was normal.         Assessment & Plan     1. Cellulitis, unspecified cellulitis site  Seems to be resovling. She reports another 2-3 days of antibiotics. Recommend she complete these.   - HOME CARE NURSING REFERRAL    2. Bipolar affective disorder, remission status unspecified (H)  Stable currently.   - HOME CARE NURSING REFERRAL    3. 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     4. Alcohol dependence in remission (H)  Long term remission.     5. Hyperparathyroidism (H)  Followed by endocrinology. Due for follow up this spring.     6. Venous (peripheral) insufficiency  Likely an underlying contributor to the cellulitis. Discussed treatment with compression and moisturizers. Currently not really itchy, minimally inflamed. If becomes more symptomatic adding in corticosteroid.     Return in about 3 months (around 4/24/2020) for follow up appointment.    Ty Quintanilla MD  [facility]

## 2020-01-26 ENCOUNTER — HOME CARE/HOSPICE - HEALTHEAST (OUTPATIENT)
Dept: HOME HEALTH SERVICES | Facility: HOME HEALTH | Age: 85
End: 2020-01-26

## 2020-01-26 PROBLEM — C77.3 SECONDARY AND UNSPECIFIED MALIGNANT NEOPLASM OF AXILLA AND UPPER LIMB LYMPH NODES (H): Status: ACTIVE | Noted: 2020-01-26

## 2020-01-27 ENCOUNTER — HOME CARE/HOSPICE - HEALTHEAST (OUTPATIENT)
Dept: HOME HEALTH SERVICES | Facility: HOME HEALTH | Age: 85
End: 2020-01-27

## 2020-02-20 ENCOUNTER — TELEPHONE (OUTPATIENT)
Dept: FAMILY MEDICINE | Facility: CLINIC | Age: 85
End: 2020-02-20

## 2020-02-20 ENCOUNTER — HOME CARE/HOSPICE - HEALTHEAST (OUTPATIENT)
Dept: HOME HEALTH SERVICES | Facility: HOME HEALTH | Age: 85
End: 2020-02-20

## 2020-02-20 ENCOUNTER — RECORDS - HEALTHEAST (OUTPATIENT)
Dept: ADMINISTRATIVE | Facility: OTHER | Age: 85
End: 2020-02-20

## 2020-02-20 DIAGNOSIS — F31.9 BIPOLAR AFFECTIVE DISORDER, REMISSION STATUS UNSPECIFIED (H): Primary | ICD-10-CM

## 2020-02-20 DIAGNOSIS — L03.90 CELLULITIS, UNSPECIFIED CELLULITIS SITE: ICD-10-CM

## 2020-02-20 NOTE — TELEPHONE ENCOUNTER
"Routing to provider - Dwight - please review and advise as appropriate    Patient requesting to move from home to care center of some kind but is refusing resources to assist her with obtaining request    Patient states she wants to be committed to the [facility] hospital because she has depression \"I don't feel like I can cope anymore with everything, with living\" - she currently lives independently in an apartment    Denies suicidal ideation, harm to self/others, or plan to harm self/others  - patient contracts to safety    Discussed being admitted inpatient is done through the emergency department - she doesn't want to go to the emergency department at this time - reviewed if her symptoms worsen or she begins to have suicidal ideation or plan - please reach out or seek emergency care    States OK to call son - who is a psychiatrist  - he wants her to go through appropriate avenues to figure out her care.  He states when her mood is up she loves her neighbors/apartment and when she is down she doesn't.  He states previously had refused home care nursing referral but would encourage nursing/social work assessment if possible - doesn't believe she needs nursing home care - assisted living may be appropriate or inpatient as needed    [facility] - [phone] - home care reports nursing 1/27 - patient refused all services - patient agitated - didn't allow nurse to complete an assessment - she didn't want any home care services     Return call to patient to discuss options - she continues to refuse home care, social work, calling 911, inpatient - she wants us to call her son and have him call her - she states \"call me back later\" - she is now saying she doesn't know what she wants to do  "

## 2020-02-20 NOTE — TELEPHONE ENCOUNTER
Patient is calling and requesting to speak with Kiet, who she spoke with earlier today.  Only person she wants to speak with.    Please call today, OK to LM on VM

## 2020-02-20 NOTE — TELEPHONE ENCOUNTER
Patient called again. She won't say why she is calling she just keeps asking for Dr. Quintanilla's office/ orestes. RNs are unavailable at the moment. Please assist, thank you!

## 2020-02-20 NOTE — TELEPHONE ENCOUNTER
Reason for Call:  Other      Detailed comments: Patient is requesting for a care giver to to help her move out of her place. Please advise, thank you!    Phone Number Patient can be reached at: Home number on file [phone] (home)    Best Time: anytime    Can we leave a detailed message on this number? YES    Call taken on 2/20/2020 at 8:30 AM by Candy Barba

## 2020-02-20 NOTE — TELEPHONE ENCOUNTER
RESENT IN HOME CARE ORDER. [facility] TO HEL[facility] SCHEDULE HOPING A FAMILY MEMBER CAN BE THERE TO HELP MAKE SURE MANI LETS THEM IN AND TO DO THE ASSESSMENT.     Thanks.    Ty Quintanilla MD

## 2020-02-21 NOTE — TELEPHONE ENCOUNTER
Routing to provider - Dwight - please review and advise as appropriate    Spoke with patient she is agreeable and requesting to go inpatient at this time - discussed [facility] - provided address - she will reach out to family for transportation - she says she is feeling about the same today - no new or worsening symptoms    Writer called patient's son - Naren Mar - he is requesting Dr. Quintanilla please give the emergency department a call to discuss her case and situation to assist her in the inpatient process    [facility] [phone]

## 2020-02-21 NOTE — TELEPHONE ENCOUNTER
Patient is calling but she keeps on saying she has been waiting for Kiet to call her back and wants to speak to him

## 2020-02-26 ENCOUNTER — HOME CARE/HOSPICE - HEALTHEAST (OUTPATIENT)
Dept: HOME HEALTH SERVICES | Facility: HOME HEALTH | Age: 85
End: 2020-02-26

## 2020-02-26 ENCOUNTER — COMMUNICATION - HEALTHEAST (OUTPATIENT)
Dept: HOME HEALTH SERVICES | Facility: HOME HEALTH | Age: 85
End: 2020-02-26

## 2020-02-27 ENCOUNTER — HOME CARE/HOSPICE - HEALTHEAST (OUTPATIENT)
Dept: HOME HEALTH SERVICES | Facility: HOME HEALTH | Age: 85
End: 2020-02-27

## 2020-02-27 ENCOUNTER — TELEPHONE (OUTPATIENT)
Dept: FAMILY MEDICINE | Facility: CLINIC | Age: 85
End: 2020-02-27

## 2020-02-27 NOTE — TELEPHONE ENCOUNTER
Jodi called from [facility] home care stating she went to her home yesterday and requesting:    Nursing 1x wk for 3 wks  Home health aid 1x wk for 3 wks  PT, OT & social work consult     Jodi also reports patient doesn't have any Pramipexole in home but is on her med list?

## 2020-02-27 NOTE — TELEPHONE ENCOUNTER
On 6/4/19 Dr Quintanilla had dc'd the pramipexole and then reordered as historical. This may have been intended as to be sent to a pharmacy but wasn't. Dr Quintanilla has not ordered it for her in the past but it was noted as historical since 7/30/18    OK for below orders given for homecare on RN's voicemail. I have asked homecare RN to ask pt if she has been taking the pramipexole all along, if she can recall.    Samantha Gold, RN, BSN

## 2020-03-03 ENCOUNTER — HOME CARE/HOSPICE - HEALTHEAST (OUTPATIENT)
Dept: HOME HEALTH SERVICES | Facility: HOME HEALTH | Age: 85
End: 2020-03-03

## 2020-03-04 ENCOUNTER — HOME CARE/HOSPICE - HEALTHEAST (OUTPATIENT)
Dept: HOME HEALTH SERVICES | Facility: HOME HEALTH | Age: 85
End: 2020-03-04

## 2020-03-09 ENCOUNTER — HOME CARE/HOSPICE - HEALTHEAST (OUTPATIENT)
Dept: HOME HEALTH SERVICES | Facility: HOME HEALTH | Age: 85
End: 2020-03-09

## 2020-03-09 ENCOUNTER — TELEPHONE (OUTPATIENT)
Dept: FAMILY MEDICINE | Facility: CLINIC | Age: 85
End: 2020-03-09

## 2020-03-09 NOTE — TELEPHONE ENCOUNTER
Reason for Call: Request for an order or referral:    Order or referral being requested: Extend home care evaluation to this week as the patient declined a visit last week.    Date needed: as soon as possible    Has the patient been seen by the PCP for this problem? YES    Additional comments:     Phone number Patient can be reached at:  Other phone number:  [phone]    Best Time:      Can we leave a detailed message on this number?  YES    Call taken on 3/9/2020 at 10:53 AM by Cristal Mancilla

## 2020-03-10 ENCOUNTER — HOME CARE/HOSPICE - HEALTHEAST (OUTPATIENT)
Dept: HOME HEALTH SERVICES | Facility: HOME HEALTH | Age: 85
End: 2020-03-10

## 2020-03-10 ENCOUNTER — TELEPHONE (OUTPATIENT)
Dept: FAMILY MEDICINE | Facility: CLINIC | Age: 85
End: 2020-03-10

## 2020-03-10 ENCOUNTER — COMMUNICATION - HEALTHEAST (OUTPATIENT)
Dept: HOME HEALTH SERVICES | Facility: HOME HEALTH | Age: 85
End: 2020-03-10

## 2020-03-10 NOTE — TELEPHONE ENCOUNTER
Reason for Call: Request for an order or referral:    Order or referral being requested: Occupational Therapy    Additional comments: [facility] is requesting orders for OT - 3 visits within 30 days for ADL, anxiety management, adaptive equipment, and cognition as needed.    Phone number Patient can be reached at: [phone]    Best Time:  Any    Can we leave a detailed message on this number?  YES    Call taken on 3/10/2020 at 4:52 PM by Annmarie Donaldson

## 2020-03-11 ENCOUNTER — HOME CARE/HOSPICE - HEALTHEAST (OUTPATIENT)
Dept: HOME HEALTH SERVICES | Facility: HOME HEALTH | Age: 85
End: 2020-03-11

## 2020-03-11 ENCOUNTER — TELEPHONE (OUTPATIENT)
Dept: FAMILY MEDICINE | Facility: CLINIC | Age: 85
End: 2020-03-11

## 2020-03-11 NOTE — TELEPHONE ENCOUNTER
Reason for Call: Request for an order or referral:    Order or referral being requested: Skilled nursing weekly through 04.25.2020.    Date needed: as soon as possible    Has the patient been seen by the PCP for this problem? Not Applicable    Additional comments: RN stated pt has been missing her evening Lithium and RN is getting her back on track , Pt has agreed to keep on with HHA and OT , But has not yet agreed to bathing , They are making slow progress with her , she is more calmer when Home care team is there. Please Advise      Phone number Patient can be reached at:  Other phone number:  [phone]    Best Time:  ASAP    Can we leave a detailed message on this number?  YES    Call taken on 3/11/2020 at 11:12 AM by PELON Cunningham  [place]   Patient Rep

## 2020-03-11 NOTE — TELEPHONE ENCOUNTER
I called Vicki and left message approving orders for OT - 3 visits within 30 days for ADL, anxiety management, adaptive equipment, and cognition as needed.      Jayshree Vera RN

## 2020-03-12 ENCOUNTER — HOME CARE/HOSPICE - HEALTHEAST (OUTPATIENT)
Dept: HOME HEALTH SERVICES | Facility: HOME HEALTH | Age: 85
End: 2020-03-12

## 2020-03-17 ENCOUNTER — TELEPHONE (OUTPATIENT)
Dept: FAMILY MEDICINE | Facility: CLINIC | Age: 85
End: 2020-03-17

## 2020-03-17 ENCOUNTER — HOME CARE/HOSPICE - HEALTHEAST (OUTPATIENT)
Dept: HOME HEALTH SERVICES | Facility: HOME HEALTH | Age: 85
End: 2020-03-17

## 2020-03-17 NOTE — TELEPHONE ENCOUNTER
Actually, a 89-year-old with recurrent cellulitis, this really is considered an urgent issue and needs to be evaluated with a face-to-face appointment the provider.  I recommend that she be seen in urgent care today.

## 2020-03-17 NOTE — TELEPHONE ENCOUNTER
Dr. Quintanilla/ Provider-Normally writer would recommend Urgent Care evaluation.  Given patient's age and chronic medical conditions, may patient complete a phone visit?  Per chart review, patient does not read [employer] messages.    Thank you!  AMITA LangstonN, RN

## 2020-03-17 NOTE — TELEPHONE ENCOUNTER
Pt notified  Rationale discussed  She will try and find a ride  Pt in agreement with plan and verbalized understanding.  Thanks!  Perlita Pepe RN  Triage Nurse

## 2020-03-18 ENCOUNTER — HOME CARE/HOSPICE - HEALTHEAST (OUTPATIENT)
Dept: HOME HEALTH SERVICES | Facility: HOME HEALTH | Age: 85
End: 2020-03-18

## 2020-03-19 ENCOUNTER — HOME CARE/HOSPICE - HEALTHEAST (OUTPATIENT)
Dept: HOME HEALTH SERVICES | Facility: HOME HEALTH | Age: 85
End: 2020-03-19

## 2020-03-26 ENCOUNTER — HOME CARE/HOSPICE - HEALTHEAST (OUTPATIENT)
Dept: HOME HEALTH SERVICES | Facility: HOME HEALTH | Age: 85
End: 2020-03-26

## 2020-03-30 ENCOUNTER — HOME CARE/HOSPICE - HEALTHEAST (OUTPATIENT)
Dept: HOME HEALTH SERVICES | Facility: HOME HEALTH | Age: 85
End: 2020-03-30

## 2020-04-02 ENCOUNTER — HOME CARE/HOSPICE - HEALTHEAST (OUTPATIENT)
Dept: HOME HEALTH SERVICES | Facility: HOME HEALTH | Age: 85
End: 2020-04-02

## 2020-04-08 ENCOUNTER — HOME CARE/HOSPICE - HEALTHEAST (OUTPATIENT)
Dept: HOME HEALTH SERVICES | Facility: HOME HEALTH | Age: 85
End: 2020-04-08

## 2020-04-09 ENCOUNTER — HOME CARE/HOSPICE - HEALTHEAST (OUTPATIENT)
Dept: HOME HEALTH SERVICES | Facility: HOME HEALTH | Age: 85
End: 2020-04-09

## 2020-04-14 ENCOUNTER — TELEPHONE (OUTPATIENT)
Dept: FAMILY MEDICINE | Facility: CLINIC | Age: 85
End: 2020-04-14

## 2020-04-14 ENCOUNTER — HOME CARE/HOSPICE - HEALTHEAST (OUTPATIENT)
Dept: HOME HEALTH SERVICES | Facility: HOME HEALTH | Age: 85
End: 2020-04-14

## 2020-04-14 NOTE — TELEPHONE ENCOUNTER
Received 2nd request form for Face to Face Documentation       Please provide brief clinical summary of reason for visit and need for home care    Please identify which of the following home health disciplines the patient will need AND describe the skilled services that you would like the home health agency to perform    Homebound status (describe the functional limitations that support this patient is confined to his/her home. Medicaid recipients are not required to be homebound.)  Name of physician who will be responsible for the ongoing home health plan of Care or PCP?    Routing to covering provider to advise. I placed forms in Dr. Quintanilla's forms folder.     Lidia Roberts MA

## 2020-04-15 NOTE — TELEPHONE ENCOUNTER
Writer spoke with Jodi, Home care nurse who called back to relay that the appt. with Dr. Quintanilla on 1/24/20 fulfilled the F2F and no forms are needed. PLEASE DISREGARD BELOW REQUEST FOR FORMS.     HC nurse will inform family that patient will need to establish care with anew physician going forward.

## 2020-04-15 NOTE — TELEPHONE ENCOUNTER
Writer left a voice message with Jodi [facility] home care to please assist patient with virtual visit to establish care with a new provider and get forms filled out at that time.     Of note:  [facility] nurses complete the documentation that was requested below and then send in to clinic for MD signature--unclear why this was not done by [facility] care nurse-do they do it differently? Has this changed?.    Thanks! Cristal Whaley RN

## 2020-04-15 NOTE — TELEPHONE ENCOUNTER
Ma pool/ reception  Can someone please locate the forms that rashmi references below-     And give to one of the triage nurses at ?    Thanks!     Perlita Pepe RN

## 2020-04-15 NOTE — TELEPHONE ENCOUNTER
Triage can you please follow up on the below request?  Is a face to face encounter required for renewal of home care services?  Dr. Quintanilla is no longer practicing at , this pt needs to establish with a new provider.  I recommend the patient have a virtual visit with a new provider to est care and address the forms.  This visit should either be scheduled with the face to face provider so forms can be reviewed during visit or forms need to be sent electronically to the virtual provider pt is scheduled with.    Maybe have one of the face to face providers in clinic today eyeball the forms to see if this plan sounds appropriate?  Thanks.    Anette Clark, CNP

## 2020-04-16 ENCOUNTER — HOME CARE/HOSPICE - HEALTHEAST (OUTPATIENT)
Dept: HOME HEALTH SERVICES | Facility: HOME HEALTH | Age: 85
End: 2020-04-16

## 2020-04-22 ENCOUNTER — HOME CARE/HOSPICE - HEALTHEAST (OUTPATIENT)
Dept: HOME HEALTH SERVICES | Facility: HOME HEALTH | Age: 85
End: 2020-04-22

## 2020-07-01 ENCOUNTER — VIRTUAL VISIT (OUTPATIENT)
Dept: ONCOLOGY | Facility: CLINIC | Age: 85
End: 2020-07-01
Attending: INTERNAL MEDICINE
Payer: MEDICARE

## 2020-07-01 DIAGNOSIS — E04.1 THYROID NODULE: Primary | ICD-10-CM

## 2020-07-01 DIAGNOSIS — Z91.89 AT RISK FOR BONE DENSITY LOSS: ICD-10-CM

## 2020-07-01 DIAGNOSIS — Z79.811 LONG TERM (CURRENT) USE OF AROMATASE INHIBITORS: ICD-10-CM

## 2020-07-01 PROCEDURE — 40000114 ZZH STATISTIC NO CHARGE CLINIC VISIT

## 2020-07-01 PROCEDURE — 99441 ZZC PHYSICIAN TELEPHONE EVALUATION 5-10 MIN: CPT | Performed by: PHYSICIAN ASSISTANT

## 2020-07-01 NOTE — LETTER
"    7/1/2020         RE: Lennie Mar  [address]  [place] [address]        Dear Colleague,    Thank you for referring your patient, Lennie Mar, to the [facility]. Please see a copy of my visit note below.    Lennie Mar is a 89 year old female who is being evaluated via a billable telephone visit.      The patient has been notified of following:     \"This telephone visit will be conducted via a call between you and your physician/provider. We have found that certain health care needs can be provided without the need for a physical exam.  This service lets us provide the care you need with a short phone conversation.  If a prescription is necessary we can send it directly to your pharmacy.  If lab work is needed we can place an order for that and you can then stop by our lab to have the test done at a later time.    Telephone visits are billed at different rates depending on your insurance coverage. During this emergency period, for some insurers they may be billed the same as an in-person visit.  Please reach out to your insurance provider with any questions.    If during the course of the call the physician/provider feels a telephone visit is not appropriate, you will not be charged for this service.\"    Patient has given verbal consent for Telephone visit?  Yes    What phone number would you like to be contacted at? [phone]    How would you like to obtain your AVS? Cherelle     I have reviewed and updated the patient's allergies and medication list. Patient was asked if they had any patient reported vital signs to present, if yes, please see documented vitals.  Patient was also asked for their current weight and height, if presented, documented in vitals.      Concerns: Patient has no new concerns.    Refills: None       Bob Rodríguez, EMT    Additional Provider Notes:        Reason for Visit: follow-up left breast cancer, 2 primaries      HPI: " Lennie Mar is a 89 year old female with past medical history of bipolar disorder, CKD, essential tremor with recent diagnosis of left breast cancer, two separate primaries. She presented with a palpable mass and had a mammogram and US leading to biopsies on 5/9/18. Pathology showed grade 3 invasive carcinoma, ER/WA positive, HER2 amplified of mass at 3:00 position with associated DCIS and skin involvement. The mass at 11:00 position was also grade 3 invasive carcinoma, ER/WA positive, but HER2 nonamplified. No lymphadenopathy was noted.      She met with Dr. Malik on 5/21/18 and elected to start neoadjuvant treatment with Herceptin every 3 weeks along with letrozole. Echocardiogram was done 5/25 showed EF of 55-60%. She tolerated neoadjuvant treatment remarkably well. She had a left breast mastectomy and SLNB on 11/19/18 showing two residual tumors, the larger grade 2 tumor ER, WA, HER2 amplified measuring 2.7 cm and smaller grade 3 tumor ER positive, WA negative, HER2 negative measured 2.5 cm. 2/3 left axillary nodes demonstrated metastases measuring 9 mm and 1.2 mm. HER2 FISH of larger axillary lymph node metastasis was amplified. Dr. Malik recommended adjuvant therapy with TDM1 for 1 year and adjuvant radiation. She completed one year on 12/23/19.     Interval History: Lennie is feeling okay. She missed follow-up 3 months ago. She has felt more depressed lately. She continues to follow closely with psych and had one of her meds adjusted (?abilify?). She is no longer walking daily like she used to. She is keeping herself occupied with doing chores and watching TV. She is taking letrozole and continues to tolerate this well. Not hot flashes, joint stiffness, or aches. She is not having any breast concerns, no lumps/bumps. No new or different pain. No SOB. No CP. She had cellulitis earlier in the year and still has some swelling in that leg but has not had any erythema or pain. No recent  fevers/chills. She is drinking water and eating well. She is sleeping okay. No concerns or questions for me today.     Current Outpatient Medications   Medication     acetaminophen (TYLENOL) 325 MG tablet     ARIPiprazole (ABILIFY) 5 MG tablet     Ascorbic Acid (VITAMIN C PO)     Cyanocobalamin (VITAMIN B 12 PO)     letrozole (FEMARA) 2.5 MG tablet     levothyroxine (SYNTHROID/LEVOTHROID) 75 MCG tablet     lithium (ESKALITH) 150 MG capsule     ORDER FOR DME     pramipexole (MIRAPEX) 1 MG tablet     vitamin D3 (CHOLECALCIFEROL) 1000 units (25 mcg) tablet     No current facility-administered medications for this visit.         Allergies   Allergen Reactions     Cafergot Other (See Comments) and Nausea and Vomiting     Severe HA, N/V & diarrhea     Ciprofloxacin      Nausea and vomiting per son      Penicillins Rash and Unknown     Sulfa Drugs Rash and Unknown     Ergot Alkaloids Unknown     Lamictal [Lamotrigine] Other (See Comments)     Patient experienced night moss     Naproxen      Pt unable to remember reaction.     Naproxen Unknown     Tetracycline Unknown     Pt unable to remember allergy     Objective:   No recent vitals or labs     Voice is clear and strong. No audible stridor, wheezing, or respiratory distress. The remainder of PE was deferred due to PHE.     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  She is s/p treatment with 5 months neoadjuvant herceptin and letrozole and left breast mastectomy. Completed adjuvant radiation on 2/22/19 and adjuvant Kadcyla 12/2019.      1.  Left breast cancers:  Now over 1.5 years out from breast surgery. She continues on endocrine therapy with letrozole and continues to tolerate well. Plan for a 5 year course. She has no concerning symptoms today for recurrence. Will request follow-up in 3 months with Dr. Malik.      2.  At risk for cardiomyopathy:  Echocardiogram 9/30/19 was unchanged with  normal EF. She has completed HER2 directed therapy. Would not expect delayed cardiotoxicity. She is asymptomatic now.      3. CKD: She saw nephrology who suspected lithium toxicity. Continue follow-up with nephrology.      4. R thyroid nodule: Biopsy was done showing benign findings. Overdue for follow-up US.      5. Bone health: Was supposed to have DEXA 3 months ago. Will order this. Avoiding calcium supplement due to history of hypercalcemia with lithium. She continues on vitamin D. Encouraged her to start walking again.      6. Biopolar depression: On lithium and abilify. Closely follows with psych.       Phone call duration: 8 minutes    Dalila Blum PA-C        Again, thank you for allowing me to participate in the care of your patient.        Sincerely,        Dalila Blum PA-C

## 2020-07-01 NOTE — PROGRESS NOTES
"Lennie Mar is a 89 year old female who is being evaluated via a billable telephone visit.      The patient has been notified of following:     \"This telephone visit will be conducted via a call between you and your physician/provider. We have found that certain health care needs can be provided without the need for a physical exam.  This service lets us provide the care you need with a short phone conversation.  If a prescription is necessary we can send it directly to your pharmacy.  If lab work is needed we can place an order for that and you can then stop by our lab to have the test done at a later time.    Telephone visits are billed at different rates depending on your insurance coverage. During this emergency period, for some insurers they may be billed the same as an in-person visit.  Please reach out to your insurance provider with any questions.    If during the course of the call the physician/provider feels a telephone visit is not appropriate, you will not be charged for this service.\"    Patient has given verbal consent for Telephone visit?  Yes    What phone number would you like to be contacted at? [phone]    How would you like to obtain your AVS? MyChart     I have reviewed and updated the patient's allergies and medication list. Patient was asked if they had any patient reported vital signs to present, if yes, please see documented vitals.  Patient was also asked for their current weight and height, if presented, documented in vitals.      Concerns: Patient has no new concerns.    Refills: None       Bob Rodríguez, EMT    Additional Provider Notes:        Reason for Visit: follow-up left breast cancer, 2 primaries      HPI: Lennie Mar is a 89 year old female with past medical history of bipolar disorder, CKD, essential tremor with recent diagnosis of left breast cancer, two separate primaries. She presented with a palpable mass and had a mammogram and US leading to biopsies " on 5/9/18. Pathology showed grade 3 invasive carcinoma, ER/DE positive, HER2 amplified of mass at 3:00 position with associated DCIS and skin involvement. The mass at 11:00 position was also grade 3 invasive carcinoma, ER/DE positive, but HER2 nonamplified. No lymphadenopathy was noted.      She met with Dr. Malik on 5/21/18 and elected to start neoadjuvant treatment with Herceptin every 3 weeks along with letrozole. Echocardiogram was done 5/25 showed EF of 55-60%. She tolerated neoadjuvant treatment remarkably well. She had a left breast mastectomy and SLNB on 11/19/18 showing two residual tumors, the larger grade 2 tumor ER, DE, HER2 amplified measuring 2.7 cm and smaller grade 3 tumor ER positive, DE negative, HER2 negative measured 2.5 cm. 2/3 left axillary nodes demonstrated metastases measuring 9 mm and 1.2 mm. HER2 FISH of larger axillary lymph node metastasis was amplified. Dr. Malik recommended adjuvant therapy with TDM1 for 1 year and adjuvant radiation. She completed one year on 12/23/19.     Interval History: Lennie is feeling okay. She missed follow-up 3 months ago. She has felt more depressed lately. She continues to follow closely with psych and had one of her meds adjusted (?abilify?). She is no longer walking daily like she used to. She is keeping herself occupied with doing chores and watching TV. She is taking letrozole and continues to tolerate this well. Not hot flashes, joint stiffness, or aches. She is not having any breast concerns, no lumps/bumps. No new or different pain. No SOB. No CP. She had cellulitis earlier in the year and still has some swelling in that leg but has not had any erythema or pain. No recent fevers/chills. She is drinking water and eating well. She is sleeping okay. No concerns or questions for me today.     Current Outpatient Medications   Medication     acetaminophen (TYLENOL) 325 MG tablet     ARIPiprazole (ABILIFY) 5 MG tablet     Ascorbic Acid (VITAMIN C PO)      Cyanocobalamin (VITAMIN B 12 PO)     letrozole (FEMARA) 2.5 MG tablet     levothyroxine (SYNTHROID/LEVOTHROID) 75 MCG tablet     lithium (ESKALITH) 150 MG capsule     ORDER FOR DME     pramipexole (MIRAPEX) 1 MG tablet     vitamin D3 (CHOLECALCIFEROL) 1000 units (25 mcg) tablet     No current facility-administered medications for this visit.         Allergies   Allergen Reactions     Cafergot Other (See Comments) and Nausea and Vomiting     Severe HA, N/V & diarrhea     Ciprofloxacin      Nausea and vomiting per son      Penicillins Rash and Unknown     Sulfa Drugs Rash and Unknown     Ergot Alkaloids Unknown     Lamictal [Lamotrigine] Other (See Comments)     Patient experienced night omss     Naproxen      Pt unable to remember reaction.     Naproxen Unknown     Tetracycline Unknown     Pt unable to remember allergy     Objective:   No recent vitals or labs     Voice is clear and strong. No audible stridor, wheezing, or respiratory distress. The remainder of PE was deferred due to PHE.     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  She is s/p treatment with 5 months neoadjuvant herceptin and letrozole and left breast mastectomy. Completed adjuvant radiation on 2/22/19 and adjuvant Kadcyla 12/2019.      1.  Left breast cancers:  Now over 1.5 years out from breast surgery. She continues on endocrine therapy with letrozole and continues to tolerate well. Plan for a 5 year course. She has no concerning symptoms today for recurrence. Will request follow-up in 3 months with Dr. Malik.      2.  At risk for cardiomyopathy:  Echocardiogram 9/30/19 was unchanged with normal EF. She has completed HER2 directed therapy. Would not expect delayed cardiotoxicity. She is asymptomatic now.      3. CKD: She saw nephrology who suspected lithium toxicity. Continue follow-up with nephrology.      4. R thyroid nodule: Biopsy was done showing benign  findings. Overdue for follow-up US.      5. Bone health: Was supposed to have DEXA 3 months ago. Will order this. Avoiding calcium supplement due to history of hypercalcemia with lithium. She continues on vitamin D. Encouraged her to start walking again.      6. Biopolar depression: On lithium and abilify. Closely follows with psych.       Phone call duration: 8 minutes    Dalila Blum PA-C

## 2020-10-06 ENCOUNTER — TELEPHONE (OUTPATIENT)
Dept: ONCOLOGY | Facility: CLINIC | Age: 85
End: 2020-10-06

## 2020-10-06 NOTE — TELEPHONE ENCOUNTER
Ash for pt regarding appt on 10/12/20 with Dr. Malik that I converted to a phone visit due to COVID19 concerns.

## 2020-10-09 NOTE — PROGRESS NOTES
"Lennie Mar is a 89 year old female who is being evaluated via a billable telephone visit.      The patient has been notified of following:     \"This telephone visit will be conducted via a call between you and your physician/provider. We have found that certain health care needs can be provided without the need for a physical exam.  This service lets us provide the care you need with a short phone conversation.  If a prescription is necessary we can send it directly to your pharmacy.  If lab work is needed we can place an order for that and you can then stop by our lab to have the test done at a later time.    Telephone visits are billed at different rates depending on your insurance coverage. During this emergency period, for some insurers they may be billed the same as an in-person visit.  Please reach out to your insurance provider with any questions.    If during the course of the call the physician/provider feels a telephone visit is not appropriate, you will not be charged for this service.\"    Patient has given verbal consent for Telephone visit?  Yes    What phone number would you like to be contacted at? [phone]    How would you like to obtain your AVS? MyChart     I have reviewed and updated the patient's allergies and pt confirmed any changes to their medication list.  Patient was asked to provide any patient recorded vital signs, height and/or weight.  Please see \"Patient Reported Vital Signs\" tab for that information.  Patient instructed to be in the virtual waiting room 10-15 minutes prior to appointment time.      Concerns: Patient has no new concerns.      Refills: None        MONY Galicia          Phone call duration: 15 minutes    Perlita Malik MD          ONCOLOGY FOLLOW UP:  Date on this visit: 10/12/2020    Diagnosis:  1.  Stage Ib, T2N0M0, ER/W[place] positive, HER-2 amplified invasive ductal carcinoma of the left breast at 3:00, adjacent to the nipple with initial " skin involvement  2.  Stage IIa, T2N0M0, ER/TN positive, HER-2 non-amplified invasive ductal carcinoma of the left breast at 11:00    Primary Physician: Ty Quintanilla     History Of Present Illness:  Ms. Mar is a 89 year old female with two cancers of the left breast. She self palpated a lump and underwent mammogram and ultrasound in 5/2018 that showed a 3.5 cm mass in the superficial lateral left breast, at 3:00, 2 cm from the nipple and a second 2.4 cm mass in the upper outer left breast at 1:00, 7 cm from the nipple.  Biopsy of the 3:00  mass near the nipple was an ER/TN positive, HER2 amplified (HER2/CEN17 ratio of 6.4/2.3) grade 3 invasive carcinoma.  The 1:00 mass was an ER/TN positive, HER2 nonamplified, grade 3 invasive carcinoma.  No lymphadenopathy was seen on ultrasound.    She began treatment with Herceptin and letrozole on 6/1/18.  Left breast mastectomy and SLN biopsy on 11/19/18 showed two residual tumors.  The larger tumor at 3:00 was grade 2 and measured 2.7 cm, ER positive, TN negative, HER2-amplified.    The smaller tumor at 1:00 was grade 3 and measured 2.5 cm, ER/TN positive, HER2 non-amplified.  Overall tumor cellularity was 15%.  2/3 left axillary lymph nodes demonstrated metastases measuring 9 mm and 1.2 mm.  HER-2 FISH of the larger axillary lymph node metastasis was amplified.    She completed radiation to the left chest and regional lymph nodes (left axillary and supraclavicular) on 2/18/19.  Her case was discussed at breast conference and recommendation was to administer adjuvant T-DM1 given HER2 positivity in the lymph node metastasis.  She received 1 year of adjuvant T-DM1 from 1/3/19 - 12/23/19.  She has been on letrozole since 12/2019.    Interval History:  Ms. Mar was contacted via telephone visit for routine breast cancer followup.  She confirms that she continues on treatment with letrozole.  Overall, she is tolerating this medication very well.  She denies bone or joint  aches or pains.  She has not noted any hot flashes.  Most bothersome to her at this time remains ongoing depression.  She states that she has had this for weeks.  She has discussed with her psychiatrist and had her medications adjusted approximately 3 weeks ago.  She denies suicidal ideation or thoughts of self-harm.  She has not noted any improvement in depression since the adjustment in medication.  She does report anxiety as well.        She has had no fevers, chills, cough, shortness of breath or chest discomfort.  She denies abdominal pain, nausea, vomiting, diarrhea or constipation.  She has had no headaches, balance issues, speech issues or other focal neurologic complaints.  She states her right ankle is swollen.  This is chronic and unchanged from prior.  The remainder of a complete 12-point review of systems was reviewed with the patient and was negative with the exception of that mentioned above.     Past Medical/Surgical History:  Past Medical History:   Diagnosis Date     Alcohol dependence in remission (H)      Anxiety      Asymptomatic varicose veins      Bipolar disorder, unspecified (H)      CKD (chronic kidney disease) stage 3, GFR 30-59 ml/min (H) 2/18/2014     History of smoking      Hyperparathyroidism (H)      Memory loss      Muscle weakness (generalized) 6/23/2008     Severe depression (H)      Subdural hygroma     chronic.  no surgeries     Tremor of unknown origin      Past Surgical History:   Procedure Laterality Date     APPENDECTOMY OPEN  1947     CATARACT IOL, RT/LT       COLONOSCOPY WITH CO2 INSUFFLATION  2/29/2012    Procedure:COLONOSCOPY WITH CO2 INSUFFLATION; Surgeon:GAYLE REYNA; Location:[facility] OR     CYSTOCELE REPAIR  1972     CYSTOSCOPY, INSERT STENT URETHRA, COMBINED  1999     CYSTOSCOPY, RETROGRADES, INSERT STENT URETER(S), COMBINED  2/29/2012    Procedure:COMBINED CYSTOSCOPY, RETROGRADES, INSERT STENT URETER(S); Surgeon:ANT ESPINOZA; Location:[facility] OR     DECOMPRESSION  LUMBAR ONE LEVEL  8/9/2013    Procedure: DECOMPRESSION LUMBAR ONE LEVEL;  Posterior Decompression Right Lumbar 5- Sacral 1;  Surgeon: You Zavala MD;  Location: [facility] OR     HC TOOTH EXTRACTION W/FORCEP       HYSTERECTOMY, CATA  1963     IRRIGATION AND DEBRIDEMENT ORAL, COMBINED  1982    For treatment of gingivitis     LAPAROSCOPIC ASSISTED COLECTOMY  2/29/2012    Procedure:LAPAROSCOPIC ASSISTED COLECTOMY; Cysto, Bilateral Stent placement (both stents removed at end of case)- Yanet ACOSTA. Laparoscopic Extended Right Venu Colectomy with CO2 Colonoscopy and polyp removal-Judah; Surgeon:GAYLE REYNA; Location:[facility] OR     LIGATN/STRIP LONG OR SHORT SAPHEN  1955     MASTECTOMY PARTIAL WITH SENTINEL NODE Left 11/19/2018    Procedure: Left Mastectomy, Left [place] Lymph Node Biopsy;  Surgeon: Nahun Betancourt MD;  Location: [facility] OR     STRIP VEIN BILATERAL  1964     SURGICAL HISTORY OF -   1999    ureter surgery for blockage.     Allergies:  Allergies as of 10/12/2020 - Reviewed 07/01/2020   Allergen Reaction Noted     Cafergot Other (See Comments) and Nausea and Vomiting 01/01/1972     Ciprofloxacin  06/19/2012     Penicillins Rash and Unknown 01/01/1949     Sulfa drugs Rash and Unknown 01/01/1950     Ergot alkaloids Unknown 03/08/2012     Lamictal [lamotrigine] Other (See Comments) 02/15/2014     Naproxen       Naproxen Unknown 03/08/2012     Tetracycline Unknown 03/08/2012     Current Medications:  Current Outpatient Medications   Medication Sig Dispense Refill     acetaminophen (TYLENOL) 325 MG tablet Take 3 tablets (975 mg) by mouth every 8 hours as needed for mild pain 50 tablet 0     ARIPiprazole (ABILIFY) 5 MG tablet 5 mg tablet in the morning  3     Ascorbic Acid (VITAMIN C PO) Take 1 tablet by mouth daily       Cyanocobalamin (VITAMIN B 12 PO) Take by mouth every morning       letrozole (FEMARA) 2.5 MG tablet Take 1 tablet (2.5 mg) by mouth daily 90 tablet 3     levothyroxine (SYNTHROID/LEVOTHROID)  75 MCG tablet Take 75 mcg by mouth daily       lithium (ESKALITH) 150 MG capsule Take 1 capsule (150 mg) by mouth 2 times daily (with meals) 30 capsule 1     ORDER FOR DME Equipment being ordered: compression stocking knee high 20-30mmhg 2 Package 0     pramipexole (MIRAPEX) 1 MG tablet Take 1 tablet (1 mg) by mouth At Bedtime       vitamin D3 (CHOLECALCIFEROL) 1000 units (25 mcg) tablet Take 1 tablet (1,000 Units) by mouth daily 30 tablet 1     Physical Exam:  The rest of a comprehensive physical examination is deferred due to PHE (public health emergency) video visit restrictions.    Laboratory/Imaging Studies:  1/8/2020 TSH low at 0.3  1/8/2020 Free T4 wnl at 1.12    ASSESSMENT/PLAN:  89 year old female with a h/o T2N0M0, ER/MD positive, HER2 non-amplified and T2N1M0, ER/MD positive, HER2 amplified invasive mammary carcinomas of the left breast.  She is s/p treatment with 5 months neoadjuvant herceptin and letrozole, left breast mastectomy, SLN biopsy, radiation, and 1 year Kadcyla.    1.  Left breast cancers:  Ms. Mar is 1 year, 11 months out from excision of left breast cancers.  She continues on treatment with letrozole.  She is tolerating the medication well.      She is asymptomatic of disease recurrence on history taken today.  She specifically denies breast concerns.  Will schedule next visit in 4 months.  Due to age and medical comorbidities, we are not performing breast imaging.  I would like her next visit to be in person so that I may perform a breast examination.    2.  Stage IV CKD:  Secondary to chronic lithium.  Creatinine has been stable in the 1.0 - 1.3 mg/dL range.  Ongoing follow up with Nephrology.    3.  Thyroid mass/hypothyroidism:  Currently on treatment with levothyroxine.  7.7 cm right thyroid nodule, biopsy in 05/2019 was benign.  TSH and free T4 in 01/2020 c/w clinical hyperthyroidism.  Given increase in depression, will repeat TSH and free T4 at time of return visit.  She is overdue  for repeat thyroid ultrasound.  We will schedule this as well.    4.  Bipolar disorder:  Follows with Dr. Horn at [facility] in [place].  Current medications including lithium and Abilify.     5.  Bone health:  At risk for bone loss on letrozole.  Due for repeat DEXA scan.  Will schedule for at the time of return visit.    6.  Follow Up:    Labs, DEXA bone density scan, thyroid ultrasound, and in person visit with me in 4 months.    I spent a total of 15 minutes on the telephone with this patient today.

## 2020-10-12 ENCOUNTER — VIRTUAL VISIT (OUTPATIENT)
Dept: ONCOLOGY | Facility: CLINIC | Age: 85
End: 2020-10-12
Attending: INTERNAL MEDICINE
Payer: MEDICARE

## 2020-10-12 DIAGNOSIS — C50.212 MALIGNANT NEOPLASM OF UPPER-INNER QUADRANT OF LEFT BREAST IN FEMALE, ESTROGEN RECEPTOR POSITIVE (H): ICD-10-CM

## 2020-10-12 DIAGNOSIS — Z17.0 MALIGNANT NEOPLASM OF UPPER-INNER QUADRANT OF LEFT BREAST IN FEMALE, ESTROGEN RECEPTOR POSITIVE (H): ICD-10-CM

## 2020-10-12 DIAGNOSIS — M81.0 AGE-RELATED OSTEOPOROSIS WITHOUT CURRENT PATHOLOGICAL FRACTURE: ICD-10-CM

## 2020-10-12 DIAGNOSIS — C50.412 MALIGNANT NEOPLASM OF UPPER-OUTER QUADRANT OF LEFT BREAST IN FEMALE, ESTROGEN RECEPTOR POSITIVE (H): Primary | ICD-10-CM

## 2020-10-12 DIAGNOSIS — E04.1 THYROID NODULE: ICD-10-CM

## 2020-10-12 DIAGNOSIS — Z17.0 MALIGNANT NEOPLASM OF UPPER-OUTER QUADRANT OF LEFT BREAST IN FEMALE, ESTROGEN RECEPTOR POSITIVE (H): Primary | ICD-10-CM

## 2020-10-12 PROCEDURE — 999N001193 HC VIDEO/TELEPHONE VISIT; NO CHARGE

## 2020-10-12 PROCEDURE — 99442 PR PHYSICIAN TELEPHONE EVALUATION 11-20 MIN: CPT | Mod: 95 | Performed by: INTERNAL MEDICINE

## 2020-10-12 NOTE — LETTER
"    10/12/2020         RE: Lennie Mar  [address]  [facility] [address]        Dear Colleague,    Thank you for referring your patient, Lennie Mar, to the [facility]. Please see a copy of my visit note below.    Lennie Mar is a 89 year old female who is being evaluated via a billable telephone visit.      The patient has been notified of following:     \"This telephone visit will be conducted via a call between you and your physician/provider. We have found that certain health care needs can be provided without the need for a physical exam.  This service lets us provide the care you need with a short phone conversation.  If a prescription is necessary we can send it directly to your pharmacy.  If lab work is needed we can place an order for that and you can then stop by our lab to have the test done at a later time.    Telephone visits are billed at different rates depending on your insurance coverage. During this emergency period, for some insurers they may be billed the same as an in-person visit.  Please reach out to your insurance provider with any questions.    If during the course of the call the physician/provider feels a telephone visit is not appropriate, you will not be charged for this service.\"    Patient has given verbal consent for Telephone visit?  Yes    What phone number would you like to be contacted at? [phone]    How would you like to obtain your AVS? Cherelle     I have reviewed and updated the patient's allergies and pt confirmed any changes to their medication list.  Patient was asked to provide any patient recorded vital signs, height and/or weight.  Please see \"Patient Reported Vital Signs\" tab for that information.  Patient instructed to be in the virtual waiting room 10-15 minutes prior to appointment time.      Concerns: Patient has no new concerns.      Refills: None        MONY Galicia          Phone call " duration: 15 minutes    Perlita Malik MD          ONCOLOGY FOLLOW UP:  Date on this visit: 10/12/2020    Diagnosis:  1.  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  Stage IIa, T2N0M0, ER/DC positive, HER-2 non-amplified invasive ductal carcinoma of the left breast at 11:00    Primary Physician: Ty Quintanilla     History Of Present Illness:  Ms. Mar is a 89 year old female with two cancers of the left breast. She self palpated a lump and underwent mammogram and ultrasound in 5/2018 that showed a 3.5 cm mass in the superficial lateral left breast, at 3:00, 2 cm from the nipple and a second 2.4 cm mass in the upper outer left breast at 1:00, 7 cm from the nipple.  Biopsy of the 3:00  mass near the nipple was an ER/DC positive, HER2 amplified (HER2/CEN17 ratio of 6.4/2.3) grade 3 invasive carcinoma.  The 1:00 mass was an ER/DC positive, HER2 nonamplified, grade 3 invasive carcinoma.  No lymphadenopathy was seen on ultrasound.    She began treatment with Herceptin and letrozole on 6/1/18.  Left breast mastectomy and SLN biopsy on 11/19/18 showed two residual tumors.  The larger tumor at 3:00 was grade 2 and measured 2.7 cm, ER positive, DC negative, HER2-amplified.    The smaller tumor at 1:00 was grade 3 and measured 2.5 cm, ER/DC positive, HER2 non-amplified.  Overall tumor cellularity was 15%.  2/3 left axillary lymph nodes demonstrated metastases measuring 9 mm and 1.2 mm.  HER-2 FISH of the larger axillary lymph node metastasis was amplified.    She completed radiation to the left chest and regional lymph nodes (left axillary and supraclavicular) on 2/18/19.  Her case was discussed at breast conference and recommendation was to administer adjuvant T-DM1 given HER2 positivity in the lymph node metastasis.  She received 1 year of adjuvant T-DM1 from 1/3/19 - 12/23/19.  She has been  on letrozole since 12/2019.    Interval History:  Ms. Mar was contacted via telephone visit for routine breast cancer followup.  She confirms that she continues on treatment with letrozole.  Overall, she is tolerating this medication very well.  She denies bone or joint aches or pains.  She has not noted any hot flashes.  Most bothersome to her at this time remains ongoing depression.  She states that she has had this for weeks.  She has discussed with her psychiatrist and had her medications adjusted approximately 3 weeks ago.  She denies suicidal ideation or thoughts of self-harm.  She has not noted any improvement in depression since the adjustment in medication.  She does report anxiety as well.        She has had no fevers, chills, cough, shortness of breath or chest discomfort.  She denies abdominal pain, nausea, vomiting, diarrhea or constipation.  She has had no headaches, balance issues, speech issues or other focal neurologic complaints.  She states her right ankle is swollen.  This is chronic and unchanged from prior.  The remainder of a complete 12-point review of systems was reviewed with the patient and was negative with the exception of that mentioned above.     Past Medical/Surgical History:  Past Medical History:   Diagnosis Date     Alcohol dependence in remission (H)      Anxiety      Asymptomatic varicose veins      Bipolar disorder, unspecified (H)      CKD (chronic kidney disease) stage 3, GFR 30-59 ml/min (H) 2/18/2014     History of smoking      Hyperparathyroidism (H)      Memory loss      Muscle weakness (generalized) 6/23/2008     Severe depression (H)      Subdural hygroma     chronic.  no surgeries     Tremor of unknown origin      Past Surgical History:   Procedure Laterality Date     APPENDECTOMY OPEN  1947     CATARACT IOL, RT/LT       COLONOSCOPY WITH CO2 INSUFFLATION  2/29/2012    Procedure:COLONOSCOPY WITH CO2 INSUFFLATION; Surgeon:GAYLE REYNA; Location:[facility] OR     CYSTOCELE  REPAIR  1972     CYSTOSCOPY, INSERT STENT URETHRA, COMBINED  1999     CYSTOSCOPY, RETROGRADES, INSERT STENT URETER(S), COMBINED  2/29/2012    Procedure:COMBINED CYSTOSCOPY, RETROGRADES, INSERT STENT URETER(S); Surgeon:ANT ESPINOZA; Location:[facility] OR     DECOMPRESSION LUMBAR ONE LEVEL  8/9/2013    Procedure: DECOMPRESSION LUMBAR ONE LEVEL;  Posterior Decompression Right Lumbar 5- Sacral 1;  Surgeon: You Zavala MD;  Location: [facility] OR     HC TOOTH EXTRACTION W/FORCEP       HYSTERECTOMY, CATA  1963     IRRIGATION AND DEBRIDEMENT ORAL, COMBINED  1982    For treatment of gingivitis     LAPAROSCOPIC ASSISTED COLECTOMY  2/29/2012    Procedure:LAPAROSCOPIC ASSISTED COLECTOMY; Cysto, Bilateral Stent placement (both stents removed at end of case)- Yanet ACOSTA. Laparoscopic Extended Right Venu Colectomy with CO2 Colonoscopy and polyp removal-Judah; Surgeon:GAYLE REYNA; Location:[facility] OR     LIGATN/STRIP LONG OR SHORT SAPHEN  1955     MASTECTOMY PARTIAL WITH SENTINEL NODE Left 11/19/2018    Procedure: Left Mastectomy, Left [place] Lymph Node Biopsy;  Surgeon: Nahun Betancourt MD;  Location: [facility] OR     STRIP VEIN BILATERAL  1964     SURGICAL HISTORY OF -   1999    ureter surgery for blockage.     Allergies:  Allergies as of 10/12/2020 - Reviewed 07/01/2020   Allergen Reaction Noted     Cafergot Other (See Comments) and Nausea and Vomiting 01/01/1972     Ciprofloxacin  06/19/2012     Penicillins Rash and Unknown 01/01/1949     Sulfa drugs Rash and Unknown 01/01/1950     Ergot alkaloids Unknown 03/08/2012     Lamictal [lamotrigine] Other (See Comments) 02/15/2014     Naproxen       Naproxen Unknown 03/08/2012     Tetracycline Unknown 03/08/2012     Current Medications:  Current Outpatient Medications   Medication Sig Dispense Refill     acetaminophen (TYLENOL) 325 MG tablet Take 3 tablets (975 mg) by mouth every 8 hours as needed for mild pain 50 tablet 0     ARIPiprazole (ABILIFY) 5 MG tablet 5 mg tablet in  the morning  3     Ascorbic Acid (VITAMIN C PO) Take 1 tablet by mouth daily       Cyanocobalamin (VITAMIN B 12 PO) Take by mouth every morning       letrozole (FEMARA) 2.5 MG tablet Take 1 tablet (2.5 mg) by mouth daily 90 tablet 3     levothyroxine (SYNTHROID/LEVOTHROID) 75 MCG tablet Take 75 mcg by mouth daily       lithium (ESKALITH) 150 MG capsule Take 1 capsule (150 mg) by mouth 2 times daily (with meals) 30 capsule 1     ORDER FOR DME Equipment being ordered: compression stocking knee high 20-30mmhg 2 Package 0     pramipexole (MIRAPEX) 1 MG tablet Take 1 tablet (1 mg) by mouth At Bedtime       vitamin D3 (CHOLECALCIFEROL) 1000 units (25 mcg) tablet Take 1 tablet (1,000 Units) by mouth daily 30 tablet 1     Physical Exam:  The rest of a comprehensive physical examination is deferred due to PHE (public health emergency) video visit restrictions.    Laboratory/Imaging Studies:  1/8/2020 TSH low at 0.3  1/8/2020 Free T4 wnl at 1.12    ASSESSMENT/PLAN:  89 year old female with a h/o T2N0M0, ER/IL positive, HER2 non-amplified and T2N1M0, ER/IL positive, HER2 amplified invasive mammary carcinomas of the left breast.  She is s/p treatment with 5 months neoadjuvant herceptin and letrozole, left breast mastectomy, SLN biopsy, radiation, and 1 year Kadcyla.    1.  Left breast cancers:  Ms. Mar is 1 year, 11 months out from excision of left breast cancers.  She continues on treatment with letrozole.  She is tolerating the medication well.      She is asymptomatic of disease recurrence on history taken today.  She specifically denies breast concerns.  Will schedule next visit in 4 months.  Due to age and medical comorbidities, we are not performing breast imaging.  I would like her next visit to be in person so that I may perform a breast examination.    2.  Stage IV CKD:  Secondary to chronic lithium.  Creatinine has been stable in the 1.0 - 1.3 mg/dL range.  Ongoing follow up with Nephrology.    3.  Thyroid  mass/hypothyroidism:  Currently on treatment with levothyroxine.  7.7 cm right thyroid nodule, biopsy in 05/2019 was benign.  TSH and free T4 in 01/2020 c/w clinical hyperthyroidism.  Given increase in depression, will repeat TSH and free T4 at time of return visit.  She is overdue for repeat thyroid ultrasound.  We will schedule this as well.    4.  Bipolar disorder:  Follows with Dr. Horn at [facility] in [place].  Current medications including lithium and Abilify.     5.  Bone health:  At risk for bone loss on letrozole.  Due for repeat DEXA scan.  Will schedule for at the time of return visit.    6.  Follow Up:    Labs, DEXA bone density scan, thyroid ultrasound, and in person visit with me in 4 months.    I spent a total of 15 minutes on the telephone with this patient today.          Again, thank you for allowing me to participate in the care of your patient.        Sincerely,        Perlita Malik MD

## 2020-12-21 DIAGNOSIS — C50.212 MALIGNANT NEOPLASM OF UPPER-INNER QUADRANT OF LEFT BREAST IN FEMALE, ESTROGEN RECEPTOR POSITIVE (H): ICD-10-CM

## 2020-12-21 DIAGNOSIS — Z17.0 MALIGNANT NEOPLASM OF UPPER-OUTER QUADRANT OF LEFT BREAST IN FEMALE, ESTROGEN RECEPTOR POSITIVE (H): ICD-10-CM

## 2020-12-21 DIAGNOSIS — C50.412 MALIGNANT NEOPLASM OF UPPER-OUTER QUADRANT OF LEFT BREAST IN FEMALE, ESTROGEN RECEPTOR POSITIVE (H): ICD-10-CM

## 2020-12-21 DIAGNOSIS — Z17.0 MALIGNANT NEOPLASM OF UPPER-INNER QUADRANT OF LEFT BREAST IN FEMALE, ESTROGEN RECEPTOR POSITIVE (H): ICD-10-CM

## 2020-12-21 RX ORDER — LETROZOLE 2.5 MG/1
TABLET, FILM COATED ORAL
Qty: 90 TABLET | Refills: 3 | Status: SHIPPED | OUTPATIENT
Start: 2020-12-21 | End: 2022-01-03

## 2020-12-26 ENCOUNTER — NURSE TRIAGE (OUTPATIENT)
Dept: NURSING | Facility: CLINIC | Age: 85
End: 2020-12-26

## 2020-12-26 NOTE — TELEPHONE ENCOUNTER
Patient and son calling regarding status of E visit and antibiotic.  Gave per epic/MD:  clindamycin (CLEOCIN) 300 MG capsule 21 capsule 0 12/26/2020 1/2/2021 --   Sig - Route: Take 1 capsule (300 mg) by mouth 3 times daily for 7 days - Oral     Sarah Leonard RN [facility]

## 2020-12-26 NOTE — TELEPHONE ENCOUNTER
Lennie has a very swollen right ankle and redness around the top of foot and around the back.  Redness started yesterday and swelling has been going on for weeks.  Denies fever cough and shortness of breath.  Redness is painful to touch.    COVID 19 Nurse Triage Plan/Patient Instructions    Please be aware that novel coronavirus (COVID-19) may be circulating in the community. If you develop symptoms such as fever, cough, or SOB or if you have concerns about the presence of another infection including coronavirus (COVID-19), please contact your health care provider or visit [email or web address].     Disposition/Instructions    In-Person Visit with provider recommended. Reference Visit Selection Guide.    Thank you for taking steps to prevent the spread of this virus.  o Limit your contact with others.  o Wear a simple mask to cover your cough.  o Wash your hands well and often.    Resources    [facility]: About COVID-19: [email or web address]    CDC: What to Do If You're Sick: www.cdc.gov/coronavirus/2019-ncov/about/steps-when-sick.html    CDC: Ending Home Isolation: www.cdc.gov/coronavirus/2019-ncov/hcp/disposition-in-home-patients.html     CDC: Caring for Someone: www.cdc.gov/coronavirus/2019-ncov/if-you-are-sick/care-for-someone.html     [facility]: Interim Guidance for Hospital Discharge to Home: www.health.[facility].[place].us/diseases/coronavirus/hcp/hospdischarge.pdf    [facility] clinical trials (COVID-19 research studies): clinicalaffairs.[facility]/umn-clinical-trials     Below are the COVID-19 hotlines at the [place] Department of Health ([facility]). Interpreters are available.   o For health questions: Call 352-042-4149 or 1-300.716.9812 (7 a.m. to 7 p.m.)  o For questions about schools and childcare: Call 669-875-4176 or 1-277.207.2861 (7 a.m. to 7 p.m.)                       Additional Information    Negative: Difficulty breathing    Negative: Entire foot is cool or blue in comparison to other  side    Negative: Fever    Negative: Patient sounds very sick or weak to the triager    Negative: [1] SEVERE pain (e.g., excruciating, unable to walk) AND [2] not improved after 2 hours of pain medicine    Negative: [1] Can't move swollen joint at all AND [2] no fever    [1] Redness AND [2] painful when touched AND [3] no fever    Protocols used: ANKLE SWELLING-A-AH

## 2020-12-30 DIAGNOSIS — E55.9 VITAMIN D DEFICIENCY: Primary | ICD-10-CM

## 2020-12-30 RX ORDER — SWAB
2 SWAB, NON-MEDICATED MISCELLANEOUS DAILY
Qty: 60 CAPSULE | Refills: 3 | Status: SHIPPED | OUTPATIENT
Start: 2020-12-30 | End: 2024-08-08

## 2020-12-30 RX ORDER — MULTIVIT-MIN/IRON/FOLIC ACID/K 18-600-40
1 CAPSULE ORAL DAILY
Qty: 90 TABLET | Refills: 1 | Status: CANCELLED | OUTPATIENT
Start: 2020-12-30

## 2020-12-30 NOTE — TELEPHONE ENCOUNTER
Dr Malik-Pt has not seen family practice in a year and her provider has left the practice. Can you advise and refill Vitamin D if appropriate?

## 2021-01-07 ENCOUNTER — OFFICE VISIT (OUTPATIENT)
Dept: FAMILY MEDICINE | Facility: CLINIC | Age: 86
End: 2021-01-07
Payer: MEDICARE

## 2021-01-07 VITALS
TEMPERATURE: 97.3 F | DIASTOLIC BLOOD PRESSURE: 76 MMHG | HEART RATE: 76 BPM | WEIGHT: 146 LBS | SYSTOLIC BLOOD PRESSURE: 136 MMHG | BODY MASS INDEX: 27.56 KG/M2 | RESPIRATION RATE: 13 BRPM | HEIGHT: 61 IN | OXYGEN SATURATION: 97 %

## 2021-01-07 DIAGNOSIS — L30.9 ECZEMA, UNSPECIFIED TYPE: Primary | ICD-10-CM

## 2021-01-07 PROCEDURE — 99213 OFFICE O/P EST LOW 20 MIN: CPT | Performed by: FAMILY MEDICINE

## 2021-01-07 RX ORDER — TRIAMCINOLONE ACETONIDE 1 MG/G
CREAM TOPICAL 3 TIMES DAILY
Qty: 80 G | Refills: 1 | Status: ON HOLD | OUTPATIENT
Start: 2021-01-07 | End: 2022-03-10

## 2021-01-07 ASSESSMENT — MIFFLIN-ST. JEOR: SCORE: 1004.63

## 2021-01-07 NOTE — PROGRESS NOTES
"  Assessment & Plan     Eczema, unspecified type    - triamcinolone (KENALOG) 0.1 % external cream; Apply topically 3 times daily For 1-2 weeks to right leg until resolved    Treated as evisit UC as leg cellulitis 12- with clindamycin.  Patient had some improvement of redness but states itch still present and mild pinkness of RIGHT LE.    No sign of erythema or open lesions.  Treat with Kenalog tid x 1-2 weeks then continue with Aquaphor as already using.  Follow-up if no change or worsening symptoms.    Lives alone in own apartment.  Recent depression.  Walks daily.     Christi Terry MD  [facility]    Alfred Hogue is a 89 year old who presents to clinic today for the following health issues   HPI     Edema       Duration: a couple of weeks     Description (location/character/radiation):  Both legs    Intensity:  mild, moderate    Accompanying signs and symptoms:  Swelling     History (similar episodes/previous evaluation):  Hx of edema    Precipitating or alleviating factors: None    Therapies tried and outcome: None              Review of Systems   Constitutional, HEENT, cardiovascular, pulmonary, GI, , musculoskeletal, neuro, skin, endocrine and psych systems are negative, except as otherwise noted.      Objective    /76 (BP Location: Left arm, Patient Position: Chair, Cuff Size: Adult Large)   Pulse 76   Temp 97.3  F (36.3  C) (Tympanic)   Resp 13   Ht 1.549 m (5' 1\")   Wt 66.2 kg (146 lb)   LMP  (LMP Unknown)   SpO2 97%   BMI 27.59 kg/m    Body mass index is 27.59 kg/m .  Physical Exam   GENERAL: healthy, alert and no distress  EYES: Eyes grossly normal to inspection, PERRL and conjunctivae and sclerae normal  SKIN: dry, flaking skin on RLE anterior tibia, LLE is normal, no open wounds, mild swelling and pinkness from itching, no erythema  NEURO: Normal strength and tone, mentation intact and speech normal  PSYCH: mentation appears normal, " affect normal/bright

## 2021-01-10 ENCOUNTER — HEALTH MAINTENANCE LETTER (OUTPATIENT)
Age: 86
End: 2021-01-10

## 2021-01-18 ENCOUNTER — TELEPHONE (OUTPATIENT)
Dept: FAMILY MEDICINE | Facility: CLINIC | Age: 86
End: 2021-01-18

## 2021-01-18 NOTE — TELEPHONE ENCOUNTER
Patient states she was seen last week by Dr Terry.  Reports the cream she used on her leg has cleared the skin but she continues to have swelling.  Has been elevating her legs as able.  Would like to know what else is recommended.  Please advise.  Perla Anrdew RN  [facility]

## 2021-01-19 ENCOUNTER — TELEPHONE (OUTPATIENT)
Dept: FAMILY MEDICINE | Facility: CLINIC | Age: 86
End: 2021-01-19

## 2021-01-19 NOTE — TELEPHONE ENCOUNTER
Continue to reassure-- if it does not get any worse than it is currently- then continue with elevation and compression stockings prn.  If the edema worsens or there are new skin changes or breathing issues- she should be seen again in clinic.  This is a [facility] patient-- I only have seen her once in clinic

## 2021-01-19 NOTE — TELEPHONE ENCOUNTER
Writer called patient and reviewed message per Dr. Terry.    Patient verbalized understanding and in agreement with plan.  HARI Langston, RN  [facility]

## 2021-02-12 ENCOUNTER — ANCILLARY PROCEDURE (OUTPATIENT)
Dept: BONE DENSITY | Facility: CLINIC | Age: 86
End: 2021-02-12
Attending: INTERNAL MEDICINE
Payer: MEDICARE

## 2021-02-12 ENCOUNTER — ANCILLARY PROCEDURE (OUTPATIENT)
Dept: ULTRASOUND IMAGING | Facility: CLINIC | Age: 86
End: 2021-02-12
Attending: INTERNAL MEDICINE
Payer: MEDICARE

## 2021-02-12 DIAGNOSIS — E04.1 THYROID NODULE: ICD-10-CM

## 2021-02-12 DIAGNOSIS — Z92.21 STATUS POST CHEMOTHERAPY: Primary | ICD-10-CM

## 2021-02-12 DIAGNOSIS — Z92.21 STATUS POST CHEMOTHERAPY: ICD-10-CM

## 2021-02-12 DIAGNOSIS — M81.0 AGE-RELATED OSTEOPOROSIS WITHOUT CURRENT PATHOLOGICAL FRACTURE: ICD-10-CM

## 2021-02-12 LAB
ALBUMIN SERPL-MCNC: 3.2 G/DL (ref 3.4–5)
ALP SERPL-CCNC: 224 U/L (ref 40–150)
ALT SERPL W P-5'-P-CCNC: 26 U/L (ref 0–50)
ANION GAP SERPL CALCULATED.3IONS-SCNC: 7 MMOL/L (ref 3–14)
AST SERPL W P-5'-P-CCNC: 22 U/L (ref 0–45)
BASOPHILS # BLD AUTO: 0.1 10E9/L (ref 0–0.2)
BASOPHILS NFR BLD AUTO: 0.9 %
BILIRUB SERPL-MCNC: 0.5 MG/DL (ref 0.2–1.3)
BUN SERPL-MCNC: 21 MG/DL (ref 7–30)
CALCIUM SERPL-MCNC: 10.2 MG/DL (ref 8.5–10.1)
CHLORIDE SERPL-SCNC: 115 MMOL/L (ref 94–109)
CO2 SERPL-SCNC: 23 MMOL/L (ref 20–32)
CREAT SERPL-MCNC: 1.07 MG/DL (ref 0.52–1.04)
DIFFERENTIAL METHOD BLD: NORMAL
EOSINOPHIL # BLD AUTO: 0.1 10E9/L (ref 0–0.7)
EOSINOPHIL NFR BLD AUTO: 1.7 %
ERYTHROCYTE [DISTWIDTH] IN BLOOD BY AUTOMATED COUNT: 13.4 % (ref 10–15)
GFR SERPL CREATININE-BSD FRML MDRD: 45 ML/MIN/{1.73_M2}
GLUCOSE SERPL-MCNC: 119 MG/DL (ref 70–99)
HCT VFR BLD AUTO: 41.1 % (ref 35–47)
HGB BLD-MCNC: 13.3 G/DL (ref 11.7–15.7)
IMM GRANULOCYTES # BLD: 0 10E9/L (ref 0–0.4)
IMM GRANULOCYTES NFR BLD: 0.5 %
LYMPHOCYTES # BLD AUTO: 1.4 10E9/L (ref 0.8–5.3)
LYMPHOCYTES NFR BLD AUTO: 18.5 %
MCH RBC QN AUTO: 31.7 PG (ref 26.5–33)
MCHC RBC AUTO-ENTMCNC: 32.4 G/DL (ref 31.5–36.5)
MCV RBC AUTO: 98 FL (ref 78–100)
MONOCYTES # BLD AUTO: 0.8 10E9/L (ref 0–1.3)
MONOCYTES NFR BLD AUTO: 9.9 %
NEUTROPHILS # BLD AUTO: 5.3 10E9/L (ref 1.6–8.3)
NEUTROPHILS NFR BLD AUTO: 68.5 %
NRBC # BLD AUTO: 0 10*3/UL
NRBC BLD AUTO-RTO: 0 /100
PLATELET # BLD AUTO: 210 10E9/L (ref 150–450)
POTASSIUM SERPL-SCNC: 4.4 MMOL/L (ref 3.4–5.3)
PROT SERPL-MCNC: 7.2 G/DL (ref 6.8–8.8)
RBC # BLD AUTO: 4.19 10E12/L (ref 3.8–5.2)
SODIUM SERPL-SCNC: 145 MMOL/L (ref 133–144)
WBC # BLD AUTO: 7.8 10E9/L (ref 4–11)

## 2021-02-12 PROCEDURE — 76536 US EXAM OF HEAD AND NECK: CPT | Performed by: RADIOLOGY

## 2021-02-12 PROCEDURE — 84439 ASSAY OF FREE THYROXINE: CPT | Performed by: INTERNAL MEDICINE

## 2021-02-12 PROCEDURE — 36415 COLL VENOUS BLD VENIPUNCTURE: CPT

## 2021-02-12 PROCEDURE — 85025 COMPLETE CBC W/AUTO DIFF WBC: CPT | Performed by: INTERNAL MEDICINE

## 2021-02-12 PROCEDURE — 84443 ASSAY THYROID STIM HORMONE: CPT | Performed by: INTERNAL MEDICINE

## 2021-02-12 PROCEDURE — 80053 COMPREHEN METABOLIC PANEL: CPT | Performed by: INTERNAL MEDICINE

## 2021-02-12 PROCEDURE — 83970 ASSAY OF PARATHORMONE: CPT | Performed by: INTERNAL MEDICINE

## 2021-02-12 PROCEDURE — 77080 DXA BONE DENSITY AXIAL: CPT

## 2021-02-12 NOTE — Clinical Note
2/12/2021         RE: Lennie Mar  [address]  [facility] [address]        Dear Colleague,    Thank you for referring your patient, Lennie Mar, to the [facility]. Please see a copy of my visit note below.    JIC green and purple tubes drawn. No orders. Dr. Malik paged regarding lab orders.    -Nica LOPEZ CMA      Again, thank you for allowing me to participate in the care of your patient.        Sincerely,        [facility] Lab Draw

## 2021-02-12 NOTE — LETTER
2/12/2021       RE: Lennie Mar  [address]  [facility] [address]     Dear Colleague,    Thank you for referring your patient, Lennie Mar, to the [facility] at [facility]. Please see a copy of my visit note below.    JIC green and purple tubes drawn. No orders. Dr. Malik paged regarding lab orders.    -Nica LOPEZ CMA      Again, thank you for allowing me to participate in the care of your patient.      Sincerely,    [facility] Lab Draw

## 2021-02-14 NOTE — PROGRESS NOTES
"Lennie is a 89 year old who is being evaluated via a billable telephone visit.      What phone number would you like to be contacted at? [phone]  How would you like to obtain your AVS? Mail a copy     Vitals - Patient Reported  Weight (Patient Reported): 65.8 kg (145 lb)  Height (Patient Reported): 154.9 cm (5' 1\")  BMI (Based on Pt Reported Ht/Wt): 27.4  Pain Score: No Pain (0)  Zuly WAGONERESTEPHANIE    Phone call duration: 15 minutes, an additional 15 minutes was spent reviewing labs, calling patient's son, placing orders, and in documentation on the day of the visit.    ONCOLOGY FOLLOW UP:  Date on this visit: 2/15/2021    Diagnosis:  1.  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  Stage IIa, T2N0M0, ER/CO positive, HER-2 non-amplified invasive ductal carcinoma of the left breast at 11:00    Primary Physician: Ty Quintanilla     History Of Present Illness:  Ms. Mar is a 89 year old female with two cancers of the left breast. She self palpated a lump and underwent mammogram and ultrasound in 5/2018 that showed a 3.5 cm mass in the superficial lateral left breast, at 3:00, 2 cm from the nipple and a second 2.4 cm mass in the upper outer left breast at 1:00, 7 cm from the nipple.  Biopsy of the 3:00  mass near the nipple was an ER/CO positive, HER2 amplified (HER2/CEN17 ratio of 6.4/2.3) grade 3 invasive carcinoma.  The 1:00 mass was an ER/CO positive, HER2 nonamplified, grade 3 invasive carcinoma.  No lymphadenopathy was seen on ultrasound.    She began treatment with Herceptin and letrozole on 6/1/18.  Left breast mastectomy and SLN biopsy on 11/19/18 showed two residual tumors.  The larger tumor at 3:00 was grade 2 and measured 2.7 cm, ER positive, CO negative, HER2-amplified.    The smaller tumor at 1:00 was grade 3 and measured 2.5 cm, ER/CO positive, HER2 non-amplified.  Overall tumor " cellularity was 15%.  2/3 left axillary lymph nodes demonstrated metastases measuring 9 mm and 1.2 mm.  HER-2 FISH of the larger axillary lymph node metastasis was amplified.    She completed radiation to the left chest and regional lymph nodes (left axillary and supraclavicular) on 2/18/19.  Her case was discussed at breast conference and recommendation was to administer adjuvant T-DM1 given HER2 positivity in the lymph node metastasis.  She received 1 year of adjuvant T-DM1 from 1/3/19 - 12/23/19.  She has been on letrozole since 12/2019.    Interval History:  Ms. Mar was contacted today for routine breast cancer follow up.  She reports increased depression since last visit.  She has ongoing follow up with Dr. Horn.  She states he has not changed her meds including lithium dosing in some time.  She reports increased thirst and increased urination.  No pain with urination.  No fevers or chills.  She denies concerning breast lumps, pain, or swelling.  She states she has not had COVID and has no current cough, shortness of breath, or chest pain.  She denies abdominal complaints.  She states she can tell that her thyroid mass is larger.  She denies dysphagia or wheezing.  The remainder of a complete 12 point review of systems was reviewed with the patient and was negative with the exception of that mentioned above.    Past Medical/Surgical History:  Past Medical History:   Diagnosis Date     Alcohol dependence in remission (H)      Anxiety      Asymptomatic varicose veins      Bipolar disorder, unspecified (H)      CKD (chronic kidney disease) stage 3, GFR 30-59 ml/min 2/18/2014     History of smoking      Hyperparathyroidism (H)      Memory loss      Muscle weakness (generalized) 6/23/2008     Severe depression (H)      Subdural hygroma     chronic.  no surgeries     Tremor of unknown origin      Past Surgical History:   Procedure Laterality Date     APPENDECTOMY OPEN  1947     CATARACT IOL, RT/LT        COLONOSCOPY WITH CO2 INSUFFLATION  2/29/2012    Procedure:COLONOSCOPY WITH CO2 INSUFFLATION; Surgeon:GAYLE REYNA; [facility]     CYSTOCELE REPAIR  1972     CYSTOSCOPY, INSERT STENT URETHRA, COMBINED  1999     CYSTOSCOPY, RETROGRADES, INSERT STENT URETER(S), COMBINED  2/29/2012    Procedure:COMBINED CYSTOSCOPY, RETROGRADES, INSERT STENT URETER(S); Surgeon:ANT ESPINOZA; [facility]     DECOMPRESSION LUMBAR ONE LEVEL  8/9/2013    Procedure: DECOMPRESSION LUMBAR ONE LEVEL;  Posterior Decompression Right Lumbar 5- Sacral 1;  Surgeon: You Zavala MD;  [facility]     HC TOOTH EXTRACTION W/FORCEP       HYSTERECTOMY, CATA  1963     IRRIGATION AND DEBRIDEMENT ORAL, COMBINED  1982    For treatment of gingivitis     LAPAROSCOPIC ASSISTED COLECTOMY  2/29/2012    Procedure:LAPAROSCOPIC ASSISTED COLECTOMY; Cysto, Bilateral Stent placement (both stents removed at end of case)- Yanet ACOSTA. Laparoscopic Extended Right Venu Colectomy with CO2 Colonoscopy and polyp removal-Judah; Surgeon:GAYLE REYNA; [facility]     LIGATN/STRIP LONG OR SHORT SAPHEN  1955     MASTECTOMY PARTIAL WITH SENTINEL NODE Left 11/19/2018    Procedure: Left Mastectomy, Left [place] Lymph Node Biopsy;  Surgeon: Nahun Betancourt MD;  Location: [facility] OR     STRIP VEIN BILATERAL  1964     SURGICAL HISTORY OF -   1999    ureter surgery for blockage.     Allergies:  Allergies as of 02/15/2021 - Reviewed 01/07/2021   Allergen Reaction Noted     Cafergot Other (See Comments) and Nausea and Vomiting 01/01/1972     Ciprofloxacin  06/19/2012     Penicillins Rash and Unknown 01/01/1949     Sulfa drugs Rash and Unknown 01/01/1950     Ergot alkaloids Unknown 03/08/2012     Lamictal [lamotrigine] Other (See Comments) 02/15/2014     Naproxen       Naproxen Unknown 03/08/2012     Tetracycline Unknown 03/08/2012     Current Medications:  Current Outpatient Medications   Medication Sig Dispense Refill     acetaminophen (TYLENOL) 325  MG tablet Take 3 tablets (975 mg) by mouth every 8 hours as needed for mild pain 50 tablet 0     ARIPiprazole (ABILIFY) 5 MG tablet 5 mg tablet in the morning  3     Ascorbic Acid (VITAMIN C PO) Take 1 tablet by mouth daily       Cyanocobalamin (VITAMIN B 12 PO) Take by mouth every morning       letrozole (FEMARA) 2.5 MG tablet TAKE 1 TABLET BY MOUTH EVERY DAY 90 tablet 3     levothyroxine (SYNTHROID/LEVOTHROID) 75 MCG tablet Take 75 mcg by mouth daily       lithium (ESKALITH) 150 MG capsule Take 1 capsule (150 mg) by mouth 2 times daily (with meals) 30 capsule 1     ORDER FOR DME Equipment being ordered: compression stocking knee high 20-30mmhg 2 Package 0     pramipexole (MIRAPEX) 1 MG tablet Take 1 tablet (1 mg) by mouth At Bedtime       triamcinolone (KENALOG) 0.1 % external cream Apply topically 3 times daily For 1-2 weeks to right leg until resolved 80 g 1     vitamin D3 (CHOLECALCIFEROL) 10 MCG (400 UNIT) capsule Take 2 capsules (800 Units) by mouth daily 60 capsule 3     vitamin D3 (CHOLECALCIFEROL) 1000 units (25 mcg) tablet Take 1 tablet (1,000 Units) by mouth daily 30 tablet 1     Physical Exam:  The rest of a comprehensive physical examination is deferred due to PHE (public health emergency) video visit restrictions.    Laboratory/Imaging Studies:  2/12/2021 Labs:  Sodium is elevated at 145  Potassium is wnl.  Chloride is elevated at 115  Creatinine is stable at 1.07  Estimated GFR is low, but stable at 45  Calcium is elevated at 10.2    Alkaline phosphatase is stable high at 224  LFTs are wnl.    Blood counts are wnl.    2/12/2021 US thyroid:  Further enlargement of the right thyroid nodule.  Currently measuring 8.4 cm, previously 7.7 cm.    2/12/2021 DEXA bone density scan:  Lowest T-score = -2.4    ASSESSMENT/PLAN:  89 year old female with a h/o T2N0M0, ER/OK positive, HER2 non-amplified and T2N1M0, ER/OK positive, HER2 amplified invasive mammary carcinomas of the left breast.  She is s/p treatment  with 5 months neoadjuvant herceptin and letrozole, left breast mastectomy, SLN biopsy, radiation, and 1 year Kadcyla.    1.  Left breast cancers:  Ms. Mar is 2 years, 3 months out from excision of left breast cancers.  She continues on treatment with letrozole.     She is asymptomatic of disease recurrence on history taken today. Due to age and medical comorbidities, we are not performing breast imaging.  I will see her back in clinic in 4 months.    2.  Stage IV CKD:  Secondary to chronic lithium.  Creatinine has been stable in the 1.0 - 1.3 mg/dL range.  Ongoing follow up with Nephrology.    3.  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  Will check a urine osmolality.  Will also ask lab to add on a PTH for hypercalcemia.  Advised patient to increase fluid intake and follow up with psychiatrist.      4.  Thyroid mass/hypothyroidism:  Currently on treatment with levothyroxine.  7.7 cm right thyroid nodule, biopsy in 05/2019 was benign. Repeat thyroid ultrasound performed last week was reviewed and shows the thyroid nodule is larger.  Will ask that TSH and free T4 be added onto her labs and repeat a biopsy of the right thyroid nodule.    5.  Bipolar disorder:  Follows with Dr. Horn at [facility] in [place].  Current medications including lithium and Abilify.     6.  Osteopenia:  At risk for bone loss on letrozole.  DEXA with a lowest T-score of -2.4 c/w osteopenia/borderline osteoporosis.    7.  Follow Up:  Labs (UA, urine osm, PTH, TSH, and free T4) within 1 week. Visit with endocrinology within 2 weeks.   Thyroid biopsy within 2-4 weeks.  Return to clinic in approximately 4 months for visit with me.        Called patient's son, Dr. Dmitry Mar and updated him with the above plan.

## 2021-02-15 ENCOUNTER — VIRTUAL VISIT (OUTPATIENT)
Dept: ONCOLOGY | Facility: CLINIC | Age: 86
End: 2021-02-15
Attending: INTERNAL MEDICINE
Payer: MEDICARE

## 2021-02-15 DIAGNOSIS — E87.0 HYPERNATREMIA: ICD-10-CM

## 2021-02-15 DIAGNOSIS — Z17.0 MALIGNANT NEOPLASM OF UPPER-INNER QUADRANT OF LEFT BREAST IN FEMALE, ESTROGEN RECEPTOR POSITIVE (H): Primary | ICD-10-CM

## 2021-02-15 DIAGNOSIS — Z17.0 MALIGNANT NEOPLASM OF UPPER-OUTER QUADRANT OF LEFT BREAST IN FEMALE, ESTROGEN RECEPTOR POSITIVE (H): ICD-10-CM

## 2021-02-15 DIAGNOSIS — E04.1 THYROID NODULE: ICD-10-CM

## 2021-02-15 DIAGNOSIS — E83.52 HYPERCALCEMIA: ICD-10-CM

## 2021-02-15 DIAGNOSIS — E03.2 HYPOTHYROIDISM DUE TO MEDICATION: ICD-10-CM

## 2021-02-15 DIAGNOSIS — C50.212 MALIGNANT NEOPLASM OF UPPER-INNER QUADRANT OF LEFT BREAST IN FEMALE, ESTROGEN RECEPTOR POSITIVE (H): Primary | ICD-10-CM

## 2021-02-15 DIAGNOSIS — C50.412 MALIGNANT NEOPLASM OF UPPER-OUTER QUADRANT OF LEFT BREAST IN FEMALE, ESTROGEN RECEPTOR POSITIVE (H): ICD-10-CM

## 2021-02-15 DIAGNOSIS — E23.2 DIABETES INSIPIDUS (H): ICD-10-CM

## 2021-02-15 PROCEDURE — 99442 PR PHYSICIAN TELEPHONE EVALUATION 11-20 MIN: CPT | Mod: 95 | Performed by: INTERNAL MEDICINE

## 2021-02-15 NOTE — LETTER
"  2/15/2021         RE: Lennie Mar  [address]  [place] [address]      Dear Colleague,    Thank you for referring your patient, Lennie Mar, to the [facility]. Please see a copy of my visit note below.    Lennie is a 89 year old who is being evaluated via a billable telephone visit.      What phone number would you like to be contacted at? [phone]  How would you like to obtain your AVS? Mail a copy     Vitals - Patient Reported  Weight (Patient Reported): 65.8 kg (145 lb)  Height (Patient Reported): 154.9 cm (5' 1\")  BMI (Based on Pt Reported Ht/Wt): 27.4  Pain Score: No Pain (0)  Zuly QUINN    Phone call duration: 15 minutes, an additional 15 minutes was spent reviewing labs, calling patient's son, placing orders, and in documentation on the day of the visit.    ONCOLOGY FOLLOW UP:  Date on this visit: 2/15/2021    Diagnosis:  1.  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  Stage IIa, T2N0M0, ER/OH positive, HER-2 non-amplified invasive ductal carcinoma of the left breast at 11:00    Primary Physician: Ty Quintanilla     History Of Present Illness:  Ms. Mar is a 89 year old female with two cancers of the left breast. She self palpated a lump and underwent mammogram and ultrasound in 5/2018 that showed a 3.5 cm mass in the superficial lateral left breast, at 3:00, 2 cm from the nipple and a second 2.4 cm mass in the upper outer left breast at 1:00, 7 cm from the nipple.  Biopsy of the 3:00  mass near the nipple was an ER/OH positive, HER2 amplified (HER2/CEN17 ratio of 6.4/2.3) grade 3 invasive carcinoma.  The 1:00 mass was an ER/OH positive, HER2 nonamplified, grade 3 invasive carcinoma.  No lymphadenopathy was seen on ultrasound.    She began treatment with Herceptin and letrozole on 6/1/18.  Left breast mastectomy " and SLN biopsy on 11/19/18 showed two residual tumors.  The larger tumor at 3:00 was grade 2 and measured 2.7 cm, ER positive, NV negative, HER2-amplified.    The smaller tumor at 1:00 was grade 3 and measured 2.5 cm, ER/NV positive, HER2 non-amplified.  Overall tumor cellularity was 15%.  2/3 left axillary lymph nodes demonstrated metastases measuring 9 mm and 1.2 mm.  HER-2 FISH of the larger axillary lymph node metastasis was amplified.    She completed radiation to the left chest and regional lymph nodes (left axillary and supraclavicular) on 2/18/19.  Her case was discussed at breast conference and recommendation was to administer adjuvant T-DM1 given HER2 positivity in the lymph node metastasis.  She received 1 year of adjuvant T-DM1 from 1/3/19 - 12/23/19.  She has been on letrozole since 12/2019.    Interval History:  Ms. Mar was contacted today for routine breast cancer follow up.  She reports increased depression since last visit.  She has ongoing follow up with Dr. Horn.  She states he has not changed her meds including lithium dosing in some time.  She reports increased thirst and increased urination.  No pain with urination.  No fevers or chills.  She denies concerning breast lumps, pain, or swelling.  She states she has not had COVID and has no current cough, shortness of breath, or chest pain.  She denies abdominal complaints.  She states she can tell that her thyroid mass is larger.  She denies dysphagia or wheezing.  The remainder of a complete 12 point review of systems was reviewed with the patient and was negative with the exception of that mentioned above.    Past Medical/Surgical History:  Past Medical History:   Diagnosis Date     Alcohol dependence in remission (H)      Anxiety      Asymptomatic varicose veins      Bipolar disorder, unspecified (H)      CKD (chronic kidney disease) stage 3, GFR 30-59 ml/min 2/18/2014     History of smoking      Hyperparathyroidism (H)      Memory loss       Muscle weakness (generalized) 6/23/2008     Severe depression (H)      Subdural hygroma     chronic.  no surgeries     Tremor of unknown origin      Past Surgical History:   Procedure Laterality Date     APPENDECTOMY OPEN  1947     CATARACT IOL, RT/LT       COLONOSCOPY WITH CO2 INSUFFLATION  2/29/2012    Procedure:COLONOSCOPY WITH CO2 INSUFFLATION; Surgeon:GAYLE REYNA; [facility]     CYSTOCELE REPAIR  1972     CYSTOSCOPY, INSERT STENT URETHRA, COMBINED  1999     CYSTOSCOPY, RETROGRADES, INSERT STENT URETER(S), COMBINED  2/29/2012    Procedure:COMBINED CYSTOSCOPY, RETROGRADES, INSERT STENT URETER(S); Surgeon:ANT ESPINOZA; Location[facility]     DECOMPRESSION LUMBAR ONE LEVEL  8/9/2013    Procedure: DECOMPRESSION LUMBAR ONE LEVEL;  Posterior Decompression Right Lumbar 5- Sacral 1;  Surgeon: You Zavala MD;  [facility]     HC TOOTH EXTRACTION W/FORCEP       HYSTERECTOMY, CATA  1963     IRRIGATION AND DEBRIDEMENT ORAL, COMBINED  1982    For treatment of gingivitis     LAPAROSCOPIC ASSISTED COLECTOMY  2/29/2012    Procedure:LAPAROSCOPIC ASSISTED COLECTOMY; Cysto, Bilateral Stent placement (both stents removed at end of case)- Yanet ACOSTA. Laparoscopic Extended Right Venu Colectomy with CO2 Colonoscopy and polyp removal-Judah; Surgeon:GAYLE REYNA; [facility]     LIGATN/STRIP LONG OR SHORT SAPHEN  1955     MASTECTOMY PARTIAL WITH SENTINEL NODE Left 11/19/2018    Procedure: Left Mastectomy, Left [place] Lymph Node Biopsy;  Surgeon: Nahun Betancourt MD;  Location: [facility] OR     STRIP VEIN BILATERAL  1964     SURGICAL HISTORY OF -   1999    ureter surgery for blockage.     Allergies:  Allergies as of 02/15/2021 - Reviewed 01/07/2021   Allergen Reaction Noted     Cafergot Other (See Comments) and Nausea and Vomiting 01/01/1972     Ciprofloxacin  06/19/2012     Penicillins Rash and Unknown 01/01/1949     Sulfa drugs Rash and Unknown 01/01/1950     Ergot alkaloids Unknown 03/08/2012      Lamictal [lamotrigine] Other (See Comments) 02/15/2014     Naproxen       Naproxen Unknown 03/08/2012     Tetracycline Unknown 03/08/2012     Current Medications:  Current Outpatient Medications   Medication Sig Dispense Refill     acetaminophen (TYLENOL) 325 MG tablet Take 3 tablets (975 mg) by mouth every 8 hours as needed for mild pain 50 tablet 0     ARIPiprazole (ABILIFY) 5 MG tablet 5 mg tablet in the morning  3     Ascorbic Acid (VITAMIN C PO) Take 1 tablet by mouth daily       Cyanocobalamin (VITAMIN B 12 PO) Take by mouth every morning       letrozole (FEMARA) 2.5 MG tablet TAKE 1 TABLET BY MOUTH EVERY DAY 90 tablet 3     levothyroxine (SYNTHROID/LEVOTHROID) 75 MCG tablet Take 75 mcg by mouth daily       lithium (ESKALITH) 150 MG capsule Take 1 capsule (150 mg) by mouth 2 times daily (with meals) 30 capsule 1     ORDER FOR DME Equipment being ordered: compression stocking knee high 20-30mmhg 2 Package 0     pramipexole (MIRAPEX) 1 MG tablet Take 1 tablet (1 mg) by mouth At Bedtime       triamcinolone (KENALOG) 0.1 % external cream Apply topically 3 times daily For 1-2 weeks to right leg until resolved 80 g 1     vitamin D3 (CHOLECALCIFEROL) 10 MCG (400 UNIT) capsule Take 2 capsules (800 Units) by mouth daily 60 capsule 3     vitamin D3 (CHOLECALCIFEROL) 1000 units (25 mcg) tablet Take 1 tablet (1,000 Units) by mouth daily 30 tablet 1     Physical Exam:  The rest of a comprehensive physical examination is deferred due to PHE (public health emergency) video visit restrictions.    Laboratory/Imaging Studies:  2/12/2021 Labs:  Sodium is elevated at 145  Potassium is wnl.  Chloride is elevated at 115  Creatinine is stable at 1.07  Estimated GFR is low, but stable at 45  Calcium is elevated at 10.2    Alkaline phosphatase is stable high at 224  LFTs are wnl.    Blood counts are wnl.    2/12/2021 US thyroid:  Further enlargement of the right thyroid nodule.  Currently measuring 8.4 cm, previously 7.7  cm.    2/12/2021 DEXA bone density scan:  Lowest T-score = -2.4    ASSESSMENT/PLAN:  89 year old female with a h/o T2N0M0, ER/NC positive, HER2 non-amplified and T2N1M0, ER/NC positive, HER2 amplified invasive mammary carcinomas of the left breast.  She is s/p treatment with 5 months neoadjuvant herceptin and letrozole, left breast mastectomy, SLN biopsy, radiation, and 1 year Kadcyla.    1.  Left breast cancers:  Ms. Mar is 2 years, 3 months out from excision of left breast cancers.  She continues on treatment with letrozole.     She is asymptomatic of disease recurrence on history taken today. Due to age and medical comorbidities, we are not performing breast imaging.  I will see her back in clinic in 4 months.    2.  Stage IV CKD:  Secondary to chronic lithium.  Creatinine has been stable in the 1.0 - 1.3 mg/dL range.  Ongoing follow up with Nephrology.    3.  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  Will check a urine osmolality.  Will also ask lab to add on a PTH for hypercalcemia.  Advised patient to increase fluid intake and follow up with psychiatrist.      4.  Thyroid mass/hypothyroidism:  Currently on treatment with levothyroxine.  7.7 cm right thyroid nodule, biopsy in 05/2019 was benign. Repeat thyroid ultrasound performed last week was reviewed and shows the thyroid nodule is larger.  Will ask that TSH and free T4 be added onto her labs and repeat a biopsy of the right thyroid nodule.    5.  Bipolar disorder:  Follows with Dr. Horn at [facility] in [place].  Current medications including lithium and Abilify.     6.  Osteopenia:  At risk for bone loss on letrozole.  DEXA with a lowest T-score of -2.4 c/w osteopenia/borderline osteoporosis.    7.  Follow Up:  Labs (UA, urine osm, PTH, TSH, and free T4) within 1 week. Visit with endocrinology within 2 weeks.   Thyroid biopsy within 2-4 weeks.  Return to clinic in approximately 4 months for visit with me.         Called patient's son, Dr. Dmitry Mar and updated him with the above plan.    Sincerely,    Perlita Malik MD

## 2021-02-16 ENCOUNTER — PATIENT OUTREACH (OUTPATIENT)
Dept: ONCOLOGY | Facility: CLINIC | Age: 86
End: 2021-02-16

## 2021-02-16 DIAGNOSIS — E03.2 HYPOTHYROIDISM DUE TO MEDICATION: ICD-10-CM

## 2021-02-16 DIAGNOSIS — E83.52 HYPERCALCEMIA: ICD-10-CM

## 2021-02-16 LAB
PTH-INTACT SERPL-MCNC: 118 PG/ML (ref 18–80)
T4 FREE SERPL-MCNC: 1.38 NG/DL (ref 0.76–1.46)
TSH SERPL DL<=0.005 MIU/L-ACNC: <0.01 MU/L (ref 0.4–4)

## 2021-02-16 NOTE — PROGRESS NOTES
RN CARE COORDINATION NOTE        Outgoing: Spoke to Court at 1st floor lab requested TSH, PTH to be run on blood from 2/12. Orders released.    Uyen Burt MSN, RN, OCN  RN Care Coordinator  [facility]  [phone]

## 2021-03-18 ENCOUNTER — NURSE TRIAGE (OUTPATIENT)
Dept: FAMILY MEDICINE | Facility: CLINIC | Age: 86
End: 2021-03-18

## 2021-03-18 ENCOUNTER — VIRTUAL VISIT (OUTPATIENT)
Dept: FAMILY MEDICINE | Facility: CLINIC | Age: 86
End: 2021-03-18
Payer: MEDICARE

## 2021-03-18 DIAGNOSIS — R35.0 URINARY FREQUENCY: Primary | ICD-10-CM

## 2021-03-18 DIAGNOSIS — R32 URINARY INCONTINENCE, UNSPECIFIED TYPE: ICD-10-CM

## 2021-03-18 PROCEDURE — 99441 PR PHYSICIAN TELEPHONE EVALUATION 5-10 MIN: CPT | Mod: 95 | Performed by: FAMILY MEDICINE

## 2021-03-18 NOTE — PROGRESS NOTES
"Lennie is a 89 year old who is being evaluated via a billable telephone visit.      What phone number would you like to be contacted at? [phone] (H)   How would you like to obtain your AVS? MyChart    Assessment & Plan     Urinary frequency      Urinary incontinence, unspecified type  2 days of increased urinary frequency and increased incontinence of urine and denies any other symptoms. Has had incontinence for years, saw Urology at one point and was told \"nothing else can be done\". Wears pads. Noticing a little more leakage than usual. Typical pattern is that she urinates about every 2-3 hours and she says it is more often now, though she is unable to quantify. Typically leaks only occasionally when she stands from seated position, but now is leaking each time.   I recommended scheduling lab visit for UA. She will call for a ride and schedule. If symptoms are worsening or new symptoms arise, she will let us know right away. If fever or pain develop discussed she should be seen in clinic or UC right away.   - *UA reflex to Microscopic and Culture ([place] and [facility] (except [place] and [place])            BMI:   Estimated body mass index is 27.59 kg/m  as calculated from the following:    Height as of 1/7/21: 1.549 m (5' 1\").    Weight as of 1/7/21: 66.2 kg (146 lb).             Return in about 1 day (around 3/19/2021) for lab for UA today or tomorrow .    Lory Pierre MD  [facility]    Alfred Hogue is a 89 year old who presents for the following health issues     HPI     She did not want to schedule lab today, no ride today. She will call for a ride.     Genitourinary - Female  Onset/Duration: a couple of days ago   Description:   Painful urination (Dysuria): no           Frequency: YES  Blood in urine (Hematuria): no  Delay in urine (Hesitency): no  Intensity: mild  Progression of Symptoms:  same  Accompanying Signs & Symptoms:  Fever/chills: no  Flank " pain: no  Nausea and vomiting: no  Vaginal symptoms: none  Abdominal/Pelvic Pain: no  History:   History of frequent UTI s: no  History of kidney stones: no  Sexually Active: no  Possibility of pregnancy: No  Precipitating or alleviating factors: None  Therapies tried and outcome: none         Review of Systems         Objective           Vitals:  No vitals were obtained today due to virtual visit.    Physical Exam   healthy, alert and no distress  PSYCH: Alert and oriented times 3; coherent speech, normal   rate and volume, able to articulate logical thoughts, able   to abstract reason, no tangential thoughts, no hallucinations   or delusions  Her affect is normal  RESP: No cough, no audible wheezing, able to talk in full sentences  Remainder of exam unable to be completed due to telephone visits                Phone call duration: 6 minutes

## 2021-03-18 NOTE — TELEPHONE ENCOUNTER
"  Additional Information    Negative: Shock suspected (e.g., cold/pale/clammy skin, too weak to stand, low BP, rapid pulse)    Negative: Sounds like a life-threatening emergency to the triager    Negative: [1] Unable to urinate (or only a few drops) > 4 hours AND     [2] bladder feels very full (e.g., palpable bladder or strong urge to urinate)    Negative: Followed a genital area injury    Negative: Followed a genital area injury (penis, scrotum)    Negative: Vaginal discharge    Negative: Pus (white, yellow) or bloody discharge from end of penis    Negative: [1] Taking antibiotic for urinary tract infection (UTI) AND [2] female    Negative: [1] Taking antibiotic for urinary tract infection (UTI) AND [2] male    Negative: [1] Discomfort (pain, burning or stinging) when passing urine AND [2] pregnant    Negative: [1] Discomfort (pain, burning or stinging) when passing urine AND [2] postpartum (from 0 to 6 weeks after delivery)    Negative: [1] Discomfort (pain, burning or stinging) when passing urine AND [2] female    Negative: [1] Discomfort (pain, burning or stinging) when passing urine AND [2] male    Negative: Pain or itching in the vulvar area    Negative: Pain in scrotum is main symptom    Negative: Blood in the urine is main symptom    Negative: Symptoms arising from use of a urinary catheter (Kelley or Coude)    Negative: [1] Decreased urination and [2] drinking very little AND [2] dehydration suspected (e.g., dark urine, no urine > 12 hours, very dry mouth, very lightheaded)    Negative: Patient sounds very sick or weak to the triager    Negative: Fever > 100.5 F (38.1 C)    Bad or foul-smelling urine    Answer Assessment - Initial Assessment Questions  1. SYMPTOM: \"What's the main symptom you're concerned about?\" (e.g., frequency, incontinence)      frequency  2. ONSET: \"When did the frequency  start?\"      1 week ago  3. PAIN: \"Is there any pain?\" If so, ask: \"How bad is it?\" (Scale: 1-10; mild, moderate, " "severe)      no  4. CAUSE: \"What do you think is causing the symptoms?\"      UTI  5. OTHER SYMPTOMS: \"Do you have any other symptoms?\" (e.g., fever, flank pain, blood in urine, pain with urination)      no  6. PREGNANCY: \"Is there any chance you are pregnant?\" \"When was your last menstrual period?\"      no    Protocols used: URINARY SYMPTOMS-A-AH    "

## 2021-03-29 DIAGNOSIS — Z11.59 ENCOUNTER FOR SCREENING FOR OTHER VIRAL DISEASES: ICD-10-CM

## 2021-04-05 ENCOUNTER — NURSE TRIAGE (OUTPATIENT)
Dept: FAMILY MEDICINE | Facility: CLINIC | Age: 86
End: 2021-04-05

## 2021-04-05 NOTE — TELEPHONE ENCOUNTER
"S-(situation): she has had intermittent swelling of her R ankle for years. She has noticed her right ankle has been swelling the past couple of weeks, too.     B-(background): denies pain, no SOB. \" I feel fine. I hike and like to walk . NO pain.    A-(assessment): mild intermittent swelling of both ankles , etiology unknown.     R-(recommendations):  Should be seen within 2 weeks. Scheduled to see Dr. Terry on Tuesday, 4/20/21 at 4: 20 PM...............CASSIE Hines    Additional Information    Negative: Chest pain    Negative: Followed an ankle injury    Negative: Ankle pain is main symptom    Negative: Sounds like a life-threatening emergency to the triager    Negative: Chest pain    Negative: Small area of swelling and followed an insect bite to the area    Negative: Followed a knee injury    Negative: Ankle or foot injury    Negative: Pregnant with leg swelling or edema    Negative: Difficulty breathing at rest    Negative: Entire foot is cool or blue in comparison to other side    Negative: SEVERE swelling (e.g., swelling extends above knee, entire leg is swollen, weeping fluid)    Negative: Thigh or calf pain and only 1 side and present > 1 hour    Negative: Thigh, calf, or ankle swelling in only one leg    Negative: Thigh, calf, or ankle swelling in both legs, but one side is definitely more swollen    Negative: Cast on leg or ankle and has increasing pain    Negative: Can't walk or can barely stand (new onset)    Negative: Fever and red area (or area very tender to touch)    Negative: Patient sounds very sick or weak to the triager    Negative: Swelling of face, arm or hands (Exception: slight puffiness of fingers during hot weather)    Negative: Pregnant > 20 weeks and sudden weight gain (i.e., > 2 lbs, 1 kg in one week)    Negative: MODERATE swelling of both ankles (e.g., swelling extends up to the knees) AND new onset or worsening    Negative: Difficulty breathing with exertion AND worsening or new " "onset    Negative: Looks like a boil, infected sore, deep ulcer, or other infected rash (spreading redness, pus)    Negative: Patient wants to be seen    Mild swelling of both ankles and chronic (unchanged)    Answer Assessment - Initial Assessment Questions  1. LOCATION: \"Which joint is swollen?\"     Both ankles are swollen.    2. ONSET: \"When did the swelling start?\"      R ankle for years now, intermittent, but now the left ankle for the past couple of weeks. She can wear her shoes and she has been hiking.   3. SIZE: \"How large is the swelling?\"      She can wear her shoes. She gets indentation from her socks.   4. PAIN: \"Is there any pain?\" If so, ask: \"How bad is it?\" (Scale 1-10; or mild, moderate, severe)      NO pian.   5. CAUSE: \"What do you think caused the swollen joint?\"      Etiology unknown.  6. OTHER SYMPTOMS: \"Do you have any other symptoms?\" (e.g., fever, chest pain, difficulty breathing, calf pain)      NO symptoms of any kind, \" I feel fine. \"   7. PREGNANCY: \"Is there any chance you are pregnant?\" \"When was your last menstrual period?\"       Na    Answer Assessment - Initial Assessment Questions  1. ONSET: \"When did the swelling start?\" (e.g., minutes, hours, days)      r FOOT SWELLING INTERMITTNET FOR YEAR. LEFT ANKLE PAST 2 WEEKS.  2. LOCATION: \"What part of the leg is swollen?\"  \"Are both legs swollen or just one leg?\"      JUST THE ANKLES ARE SWOLLEN  3. SEVERITY: \"How bad is the swelling?\" (e.g., localized; mild, moderate, severe)   - Localized - small area of swelling localized to one leg   - MILD pedal edema - swelling limited to foot and ankle, pitting edema < 1/4 inch (6 mm) deep, rest and elevation eliminate most or all swelling   - MODERATE edema - swelling of lower leg to knee, pitting edema > 1/4 inch (6 mm) deep, rest and elevation only partially reduce swelling   - SEVERE edema - swelling extends above knee, facial or hand swelling present       MILD SWELLING. DOES NOT GO DOWN WITH " "ELEVATING LEGS.   4. REDNESS: \"Does the swelling look red or infected?\"      IS STARTING TO LOOK PINK, BUT NOT INFECTED. DOES NOT LOOK PINK TODAY.  5. PAIN: \"Is the swelling painful to touch?\" If so, ask: \"How painful is it?\"   (Scale 1-10; mild, moderate or severe)  THERE IS NO PAIN  6. FEVER: \"Do you have a fever?\" If so, ask: \"What is it, how was it measured, and when did it start?\"       SHE DOES NOT HAVE A FEVER.   7. CAUSE: \"What do you think is causing the leg swelling?\"       ETIOLOGY UNKNOWN  8. MEDICAL HISTORY: \"Do you have a history of heart failure, kidney disease, liver failure, or cancer?\"      SHE HAD A BLOCKED URETER IN 1999.   9. RECURRENT SYMPTOM: \"Have you had leg swelling before?\" If so, ask: \"When was the last time?\" \"What happened that time?\"      THIS HAPPENED WHEN SHE WAS PREGNANT WITH HER ONE SON.   10. OTHER SYMPTOMS: \"Do you have any other symptoms?\" (e.g., chest pain, difficulty breathing)        NO CHEST PAIN. \" I FEEL JUST FINE. I AM NOT A DIABETIC.\"  11. PREGNANCY: \"Is there any chance you are pregnant?\" \"When was your last menstrual period?\"        na    Protocols used: ANKLE SWELLING-A-AH, LEG SWELLING AND EDEMA-A-OH      "

## 2021-04-13 DIAGNOSIS — Z11.59 ENCOUNTER FOR SCREENING FOR OTHER VIRAL DISEASES: ICD-10-CM

## 2021-04-13 LAB
LABORATORY COMMENT REPORT: NORMAL
SARS-COV-2 RNA RESP QL NAA+PROBE: NEGATIVE
SARS-COV-2 RNA RESP QL NAA+PROBE: NORMAL
SPECIMEN SOURCE: NORMAL
SPECIMEN SOURCE: NORMAL

## 2021-04-13 PROCEDURE — U0003 INFECTIOUS AGENT DETECTION BY NUCLEIC ACID (DNA OR RNA); SEVERE ACUTE RESPIRATORY SYNDROME CORONAVIRUS 2 (SARS-COV-2) (CORONAVIRUS DISEASE [COVID-19]), AMPLIFIED PROBE TECHNIQUE, MAKING USE OF HIGH THROUGHPUT TECHNOLOGIES AS DESCRIBED BY CMS-2020-01-R: HCPCS | Performed by: INTERNAL MEDICINE

## 2021-04-13 PROCEDURE — U0005 INFEC AGEN DETEC AMPLI PROBE: HCPCS | Performed by: INTERNAL MEDICINE

## 2021-04-16 ENCOUNTER — ANCILLARY PROCEDURE (OUTPATIENT)
Dept: ULTRASOUND IMAGING | Facility: CLINIC | Age: 86
End: 2021-04-16
Attending: INTERNAL MEDICINE
Payer: MEDICARE

## 2021-04-16 DIAGNOSIS — E04.1 THYROID NODULE: ICD-10-CM

## 2021-04-16 PROCEDURE — 88173 CYTOPATH EVAL FNA REPORT: CPT | Mod: 26 | Performed by: PATHOLOGY

## 2021-04-16 PROCEDURE — 10005 FNA BX W/US GDN 1ST LES: CPT | Mod: GC | Performed by: RADIOLOGY

## 2021-04-16 PROCEDURE — 88172 CYTP DX EVAL FNA 1ST EA SITE: CPT | Mod: 26 | Performed by: PATHOLOGY

## 2021-04-16 RX ORDER — LIDOCAINE HYDROCHLORIDE 10 MG/ML
5 INJECTION, SOLUTION INFILTRATION; PERINEURAL ONCE
Status: COMPLETED | OUTPATIENT
Start: 2021-04-16 | End: 2021-04-16

## 2021-04-16 RX ADMIN — LIDOCAINE HYDROCHLORIDE 3 ML: 10 INJECTION, SOLUTION INFILTRATION; PERINEURAL at 10:20

## 2021-04-19 LAB — COPATH REPORT: NORMAL

## 2021-04-20 ENCOUNTER — OFFICE VISIT (OUTPATIENT)
Dept: FAMILY MEDICINE | Facility: CLINIC | Age: 86
End: 2021-04-20
Payer: MEDICARE

## 2021-04-20 VITALS
TEMPERATURE: 97.5 F | BODY MASS INDEX: 27.59 KG/M2 | DIASTOLIC BLOOD PRESSURE: 68 MMHG | RESPIRATION RATE: 20 BRPM | OXYGEN SATURATION: 96 % | HEART RATE: 77 BPM | SYSTOLIC BLOOD PRESSURE: 134 MMHG | WEIGHT: 146 LBS

## 2021-04-20 DIAGNOSIS — M79.89 LOCALIZED SWELLING OF LOWER EXTREMITY: Primary | ICD-10-CM

## 2021-04-20 PROCEDURE — 99213 OFFICE O/P EST LOW 20 MIN: CPT | Performed by: FAMILY MEDICINE

## 2021-04-22 ENCOUNTER — TELEPHONE (OUTPATIENT)
Dept: ONCOLOGY | Facility: CLINIC | Age: 86
End: 2021-04-22

## 2021-04-22 DIAGNOSIS — R89.9 ABNORMAL THYROID BIOPSY: Primary | ICD-10-CM

## 2021-04-22 NOTE — TELEPHONE ENCOUNTER
Called patient with result of FNA biopsy of the thyroid which showed atypia of undetermined significance.  She is agreeable to repeat biopsy in 6 weeks.  Order placed and message sent to scheduling.    Perlita Malik MD

## 2021-04-23 NOTE — PROGRESS NOTES
Assessment & Plan     Localized swelling of lower extremity    Presents for recheck today of BLE and recurrent cellulitis.  She is now walking more and feeling much better.  Swelling is almost nonexistent and no evidence of skin breakdown or infection.  She is excited about this improvement with more ambulation and wanted to come in today to show this improvement!  Doing well!    20 minutes spent on the date of the encounter doing chart review, patient visit and documentation     Christi Terry MD  [facility]    Alfred Hogue is a 89 year old who presents for the following health issues     HPI           Review of Systems   Constitutional, HEENT, cardiovascular, pulmonary, GI, , musculoskeletal, neuro, skin, endocrine and psych systems are negative, except as otherwise noted.      Objective    /68   Pulse 77   Temp 97.5  F (36.4  C) (Tympanic)   Resp 20   Wt 66.2 kg (146 lb)   LMP  (LMP Unknown)   SpO2 96%   BMI 27.59 kg/m    Body mass index is 27.59 kg/m .  Physical Exam   GENERAL: healthy, alert and no distress  EYES: Eyes grossly normal to inspection, PERRL and conjunctivae and sclerae normal  NECK: no adenopathy, no asymmetry, masses, or scars and thyroid normal to palpation  RESP: lungs clear to auscultation - no rales, rhonchi or wheezes  CV: regular rate and rhythm, normal S1 S2, no S3 or S4, no murmur, click or rub, no peripheral edema and peripheral pulses strong  ABDOMEN: soft, nontender, no hepatosplenomegaly, no masses and bowel sounds normal  MS: no gross musculoskeletal defects noted, no edema  SKIN: no suspicious lesions or rashes  PSYCH: mentation appears normal, affect normal/bright

## 2021-05-15 DIAGNOSIS — Z11.59 ENCOUNTER FOR SCREENING FOR OTHER VIRAL DISEASES: ICD-10-CM

## 2021-05-26 VITALS
DIASTOLIC BLOOD PRESSURE: 80 MMHG | OXYGEN SATURATION: 100 % | SYSTOLIC BLOOD PRESSURE: 131 MMHG | HEART RATE: 98 BPM | TEMPERATURE: 98.1 F

## 2021-05-26 VITALS
RESPIRATION RATE: 18 BRPM | DIASTOLIC BLOOD PRESSURE: 72 MMHG | HEART RATE: 66 BPM | SYSTOLIC BLOOD PRESSURE: 132 MMHG | TEMPERATURE: 98.1 F

## 2021-05-26 VITALS
HEART RATE: 66 BPM | TEMPERATURE: 98.8 F | SYSTOLIC BLOOD PRESSURE: 112 MMHG | DIASTOLIC BLOOD PRESSURE: 62 MMHG | RESPIRATION RATE: 18 BRPM

## 2021-05-26 VITALS
TEMPERATURE: 98.9 F | HEART RATE: 74 BPM | DIASTOLIC BLOOD PRESSURE: 76 MMHG | SYSTOLIC BLOOD PRESSURE: 142 MMHG | RESPIRATION RATE: 18 BRPM | OXYGEN SATURATION: 96 %

## 2021-05-27 VITALS
TEMPERATURE: 98.4 F | HEART RATE: 70 BPM | DIASTOLIC BLOOD PRESSURE: 66 MMHG | SYSTOLIC BLOOD PRESSURE: 118 MMHG | RESPIRATION RATE: 16 BRPM

## 2021-05-27 VITALS
TEMPERATURE: 98.5 F | SYSTOLIC BLOOD PRESSURE: 114 MMHG | DIASTOLIC BLOOD PRESSURE: 60 MMHG | OXYGEN SATURATION: 96 % | HEART RATE: 62 BPM

## 2021-05-27 VITALS
RESPIRATION RATE: 16 BRPM | DIASTOLIC BLOOD PRESSURE: 72 MMHG | HEART RATE: 76 BPM | TEMPERATURE: 98.2 F | SYSTOLIC BLOOD PRESSURE: 132 MMHG

## 2021-05-27 VITALS
TEMPERATURE: 98.2 F | DIASTOLIC BLOOD PRESSURE: 82 MMHG | RESPIRATION RATE: 16 BRPM | SYSTOLIC BLOOD PRESSURE: 130 MMHG | HEART RATE: 64 BPM

## 2021-05-27 VITALS
RESPIRATION RATE: 18 BRPM | TEMPERATURE: 97.4 F | DIASTOLIC BLOOD PRESSURE: 76 MMHG | SYSTOLIC BLOOD PRESSURE: 140 MMHG | OXYGEN SATURATION: 94 % | HEART RATE: 80 BPM

## 2021-05-27 VITALS
TEMPERATURE: 98.5 F | SYSTOLIC BLOOD PRESSURE: 132 MMHG | DIASTOLIC BLOOD PRESSURE: 80 MMHG | HEART RATE: 72 BPM | RESPIRATION RATE: 18 BRPM | OXYGEN SATURATION: 95 %

## 2021-05-27 VITALS
TEMPERATURE: 98 F | SYSTOLIC BLOOD PRESSURE: 134 MMHG | HEART RATE: 76 BPM | RESPIRATION RATE: 16 BRPM | DIASTOLIC BLOOD PRESSURE: 78 MMHG

## 2021-05-28 DIAGNOSIS — Z11.59 ENCOUNTER FOR SCREENING FOR OTHER VIRAL DISEASES: ICD-10-CM

## 2021-05-28 PROCEDURE — U0005 INFEC AGEN DETEC AMPLI PROBE: HCPCS | Performed by: INTERNAL MEDICINE

## 2021-05-28 PROCEDURE — U0003 INFECTIOUS AGENT DETECTION BY NUCLEIC ACID (DNA OR RNA); SEVERE ACUTE RESPIRATORY SYNDROME CORONAVIRUS 2 (SARS-COV-2) (CORONAVIRUS DISEASE [COVID-19]), AMPLIFIED PROBE TECHNIQUE, MAKING USE OF HIGH THROUGHPUT TECHNOLOGIES AS DESCRIBED BY CMS-2020-01-R: HCPCS | Performed by: INTERNAL MEDICINE

## 2021-06-01 ENCOUNTER — HOSPITAL ENCOUNTER (OUTPATIENT)
Dept: ULTRASOUND IMAGING | Facility: CLINIC | Age: 86
End: 2021-06-01
Attending: INTERNAL MEDICINE | Admitting: INTERNAL MEDICINE
Payer: MEDICARE

## 2021-06-01 ENCOUNTER — HOSPITAL ENCOUNTER (OUTPATIENT)
Facility: CLINIC | Age: 86
Discharge: HOME | End: 2021-06-01
Attending: INTERNAL MEDICINE | Admitting: RADIOLOGY
Payer: MEDICARE

## 2021-06-01 VITALS
TEMPERATURE: 97.9 F | DIASTOLIC BLOOD PRESSURE: 52 MMHG | HEART RATE: 65 BPM | SYSTOLIC BLOOD PRESSURE: 143 MMHG | OXYGEN SATURATION: 99 % | RESPIRATION RATE: 16 BRPM

## 2021-06-01 DIAGNOSIS — R89.9 ABNORMAL THYROID BIOPSY: ICD-10-CM

## 2021-06-01 PROCEDURE — 999N000154 HC STATISTIC RADIOLOGY XRAY, US, CT, MAR, NM

## 2021-06-01 PROCEDURE — 88173 CYTOPATH EVAL FNA REPORT: CPT | Mod: TC | Performed by: INTERNAL MEDICINE

## 2021-06-01 PROCEDURE — 272N000431 US BIOPSY THYROID FINE NEEDLE ASPIRATION

## 2021-06-01 PROCEDURE — 88173 CYTOPATH EVAL FNA REPORT: CPT | Mod: 26 | Performed by: PATHOLOGY

## 2021-06-01 PROCEDURE — 250N000009 HC RX 250: Performed by: INTERNAL MEDICINE

## 2021-06-01 RX ORDER — LIDOCAINE HYDROCHLORIDE 10 MG/ML
10 INJECTION, SOLUTION EPIDURAL; INFILTRATION; INTRACAUDAL; PERINEURAL ONCE
Status: COMPLETED | OUTPATIENT
Start: 2021-06-01 | End: 2021-06-01

## 2021-06-01 RX ADMIN — LIDOCAINE HYDROCHLORIDE 5 ML: 10 INJECTION, SOLUTION EPIDURAL; INFILTRATION; INTRACAUDAL; PERINEURAL at 10:18

## 2021-06-01 NOTE — DISCHARGE INSTRUCTIONS
Thyroid   Biopsy Discharge Instructions     After you go home:      You may resume your normal diet    Care of Puncture Site:      You may have mild bruising, soreness & swelling at the puncture site. This will go away in a few days    For swelling & bruising, you may use an ice pack on the site.     Activity:      You may go back to your normal activity    Avoid strenuous activity for 24 hours    Medicines:      You may resume all medications    For minor pain, you may take Acetaminophen (Tylenol) or Ibuprofen (Advil)            Call the provider who ordered this test if:      Increased redness or swelling at the site    Fluid or blood oozing from the site    Severe pain at the site    Chills or a fever greater than 101 F (38 C).    Any questions or concerns    Call  911 or go to the Emergency Room if:      Trouble breathing    Your neck swells    Bleeding that you cannot control    Severe difficulty swallowing    If you have questions call:      [facility] Radiology Dept @ [phone]    The provider who performed your procedure was _________________.

## 2021-06-01 NOTE — PROCEDURES
[facility]    Procedure: IR Procedure Note    Date/Time: 6/1/2021 10:00 AM  Performed by: Madison Lentz MD  Authorized by: Madison Lentz MD     UNIVERSAL PROTOCOL   Site Marked: NA  Prior Images Obtained and Reviewed:  Yes  Required items: Required blood products, implants, devices and special equipment available    Patient identity confirmed:  Verbally with patient, arm band, provided demographic data and hospital-assigned identification number  Patient was reevaluated immediately before administering moderate or deep sedation or anesthesia  Confirmation Checklist:  Patient's identity using two indicators, relevant allergies, procedure was appropriate and matched the consent or emergent situation and correct equipment/implants were available  Time out: Immediately prior to the procedure a time out was called    Universal Protocol: the Joint Commission Universal Protocol was followed    Preparation: Patient was prepped and draped in usual sterile fashion           ANESTHESIA    Anesthesia: Local infiltration  Local Anesthetic:  Lidocaine 1% without epinephrine      SEDATION    Patient Sedated: No    See dictated procedure note for full details.  Findings: Right lobe thyroid nodule    Specimens: none    Complications: None    Condition: Stable    PROCEDURE   Patient Tolerance:  Patient tolerated the procedure well with no immediate complications    Length of time physician/provider present for 1:1 monitoring during sedation: 0

## 2021-06-01 NOTE — PROGRESS NOTES
Care Suites Post Procedure Summary (without sedation)     Immediately prior to starting the procedure a Time Out was conducted with procedural staff and re-confirmed the patient s name, procedure, and site/side.           Procedure: FNA Thyroid    Procedure Interventions:    Fluid (cc) removed: No.    Tube/Drain placed: No.    Patient tolerance: Patient tolerated the procedure well with no immediate complications.         (See Doc Flow-sheets and MAR for additional information)    Care Suites Discharge Nursing Note    Patient Information  Name: Lennie Mar  Age: 89 year old    Discharge Education:  Discharge instructions reviewed: Yes  Additional education/resources provided: NA  Patient/patient representative verbalizes understanding: Yes  Patient discharging on new medications: No  Medication education completed: N/A    Discharge Plans:   Discharge location: home  Discharge ride contacted: N/A waiting in waiting room  Approximate discharge time: 1040    Discharge Criteria:  Discharge criteria met and vital signs stable: Yes    Patient Belongs:  Patient belongings returned to patient: Yes    Albertina Almazan RN

## 2021-06-01 NOTE — PROGRESS NOTES
PATIENT/VISITOR WELLNESS SCREENING    Step 1 Patient Screening    1. In the last month, have you been in contact with someone who was confirmed or suspected to have Coronavirus/COVID-19? No    2. Do you have the following symptoms?  Fever/Chills? No   Cough? No   Shortness of breath? No   New loss of taste or smell? No  Sore throat? No  Muscle or body aches? No  Headaches? No  Fatigue? No  Vomiting or diarrhea? No      Step 3 Refer to logic grid below for actions    NO SYMPTOM(S)    ACTIONS:  1. Standard rooming process  2. Provider to assess per normal protocol  3. Implement precautions as needed and per guidelines     POSITIVE SYMPTOM(S)  If positive for ANY of the following symptoms: fever, cough, shortness of breath, rash    ACTION:  1. Continue to have the patient wear a mask   2. Room patient as soon as possible  3. Don appropriate PPE when entering room  4. Provider evaluation  Care Suites Admission Nursing Note    Patient Information  Name: Lennie Mar  Age: 89 year old  Reason for admission: Thyroid biopsy  Care Suites arrival time:       Patient Admission/Assessment   Pre-procedure assessment complete: Yes  If abnormal assessment/labs, provider notified: N/A  NPO: N/A  Medications held per instructions/orders: N/A  Consent: deferred  If applicable, pregnancy test status: deferred  Patient oriented to room: N/A  Education/questions answered: Yes  Plan/other: Pt has had this procedure in the past and has no questions. Son is driving and waiting in the waiting room.    Discharge Planning  Discharge name/phone number: NA waiting in waiting room  Overnight post sedation caregiver: Cheryl   Discharge location: home    Albertina Almazan RN

## 2021-06-02 LAB — COPATH REPORT: NORMAL

## 2021-06-06 NOTE — TELEPHONE ENCOUNTER
After start of care assessment requesting orders as follows:    Skilled nursing 1x week x 3 weeks with focus on skin, nutrition. 3 PRN SNV for change in condition  HHA 1 x week x 3 weeks for assist with bathing.  PT eval and treat for balance, shuffle gait  OT eval and treat for coping strategies, equipment assist ADLs particularly bathing, vision.   MSW consult for long term planning , community resources.     FYI - we will be taking things very slow with patient due to high level of anxiety and mental health concerns.      Medication discrepancies:   No vitamins B,C or D in the home, on medlist but not being taken.    Pramipexole not in the home, not being taken - dc'd?     Response to this message takes the place of verbal order.  Thank you.

## 2021-06-07 NOTE — PROGRESS NOTES
Plan all along has been for ESPERANZA to work with pt's son, Dmitry, regarding short and long term care options to not overwhelm pt with home care visits.  ESPERANZA spoke with Dmitry. Pt is a 89 yo single female who lives alone in her own apt. Her cousin with whom she is close resides in the same apt complex. Dmitry states pt has family who assist her at least weekly for MSU and run errands as needed. Pt has many friends in her complex. ESPERANZA will eventually me  et with pt, Dmitry is not familiar with pt's finances. SW will make every attempt to assess her financial picture and access county waivers if indicated. Son would like to keep pt in her current location for as long as possible. ESPERANZA will make home assessment when pt is ready, ESPERANZA has spoken with pt via tc and her request has been to wait for a home assessment.

## 2021-06-07 NOTE — PROGRESS NOTES
SW spoke with pt last week and updated her son today.  SW completed a financial screen, pt is eligible for the [facility]demar.  ESPERANZA educated pt/caregiver regarding the options available through the valdemar: ongoing RN Case Management, HHA, Healthalert, homemaking, Durable Medical Equipment, MOWs, Adult Day Care. The intake number for [facility] [place], [place] is [phone].  At this point in time, pt is content with her cousin's daughter   continuing to assist her and does not want a referral to [facility] AC waiver. Pt is not on MA therefore, not eligible for [facility] worker.  Son understands. Both pt and son have SW name and number for future reference. Both are aware DC from [facility] is anticipated next week.

## 2021-06-07 NOTE — PROGRESS NOTES
SW spoke with pt again to prepare for DC from [facility] anticipated in a couple of weeks. Pt does not have MA therefore, unable to secure an [facility] worker to assist her on a regular basis. SW completed a financial screen, pt is eligible for the [facility]demar.  ESPERANZA educated pt/caregiver regarding the options available through the valdemar: ongoing RN Case Management, HHA, Healthalert, homemaking, Durable Medical Equipment, MOWs, A  dult Day Care. At this time pt states she is not interested in AC waiver as her cousin's daugher, Shama, is able to assist her as much as she needs. Pt continues to be in good mental health today. Good spirits. Looking forward to learning how to play chess after Holy Week. SW will update her son.

## 2021-06-07 NOTE — PROGRESS NOTES
Your patient was discharged from [facility] on 4/22/2020 because no further skilled need.  Thank you for allowing us to provide care to this patient!  A Discharge summary is available upon requestâ  [phone]

## 2021-06-13 NOTE — PROGRESS NOTES
Lennie is a 89 year old who is being evaluated via a billable telephone visit.      What phone number would you like to be contacted at? [phone]  How would you like to obtain your AVS? KAI Rogel    Phone call duration: 15 minutes, an additional 10 minutes was spent on the day of the visit reviewing medical records, reviewing and interpreting tests, placing orders, and in documentation.    ONCOLOGY FOLLOW UP:  Date on this visit: 6/14/2021    Diagnosis:  1.  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  Stage IIa, T2N0M0, ER/GA positive, HER-2 non-amplified invasive ductal carcinoma of the left breast at 11:00    Primary Physician: Ty Quintanilla     History Of Present Illness:  Ms. Mar is a 89 year old female with two cancers of the left breast. She self palpated a lump and underwent mammogram and ultrasound in 5/2018 that showed a 3.5 cm mass in the superficial lateral left breast, at 3:00, 2 cm from the nipple and a second 2.4 cm mass in the upper outer left breast at 1:00, 7 cm from the nipple.  Biopsy of the 3:00  mass near the nipple was an ER/GA positive, HER2 amplified (HER2/CEN17 ratio of 6.4/2.3) grade 3 invasive carcinoma.  The 1:00 mass was an ER/GA positive, HER2 nonamplified, grade 3 invasive carcinoma.  No lymphadenopathy was seen on ultrasound.    She began treatment with Herceptin and letrozole on 6/1/18.  Left breast mastectomy and SLN biopsy on 11/19/18 showed two residual tumors.  The larger tumor at 3:00 was grade 2 and measured 2.7 cm, ER positive, GA negative, HER2-amplified.    The smaller tumor at 1:00 was grade 3 and measured 2.5 cm, ER/GA positive, HER2 non-amplified.  Overall tumor cellularity was 15%.  2/3 left axillary lymph nodes demonstrated metastases measuring 9 mm and 1.2 mm.  HER-2 FISH of the larger axillary lymph node metastasis was  "amplified.    She completed radiation to the left chest and regional lymph nodes (left axillary and supraclavicular) on 2/18/19.  Her case was discussed at breast conference and recommendation was to administer adjuvant T-DM1 given HER2 positivity in the lymph node metastasis.  She received 1 year of adjuvant T-DM1 from 1/3/19 - 12/23/19.  She has been on letrozole since 12/2019.    Interval History:  Ms. Mar was contacted via telephone today for routine breast cancer follow up.  Most bothersome to her at this time is ongoing depression.  She has a chronic history of bipolar disorder.  She follows with Dr. Horn for this.  She states he is aware of current depression and has made some medication adjustments.  Last week, she had thoughts of self harm.  She contacted one of her sons, who is a physician.  He asked if she wanted to go to the hospital, she said \"no\".  She denies suicidal ideation at this time.  She states she feels isolated as she does not have many friends and her family is very busy.  She is not leaving the home much.  She denies lumps, masses, swelling, or pain of the left chest wall or the right breast.  She has no current abdominal pain, bloating, or other abdominal complaints.  She denies bone or joint aches or pains.  She has no cough, shortness of breath or chest pain.  She has no headaches, visual changes or focal neurologic complaints.  The remainder of a complete 12 point review of systems was reviewed with the patient and was negative with the exception of that mentioned above.    Past Medical/Surgical History:  Past Medical History:   Diagnosis Date     Alcohol dependence in remission (H)      Anxiety      Asymptomatic varicose veins      Bipolar disorder, unspecified (H)      CKD (chronic kidney disease) stage 3, GFR 30-59 ml/min 2/18/2014     History of smoking      Hyperparathyroidism (H)      Memory loss      Muscle weakness (generalized) 6/23/2008     Severe depression (H)      " Subdural hygroma     chronic.  no surgeries     Tremor of unknown origin      Past Surgical History:   Procedure Laterality Date     APPENDECTOMY OPEN  1947     CATARACT IOL, RT/LT       COLONOSCOPY WITH CO2 INSUFFLATION  2/29/2012    Procedure:COLONOSCOPY WITH CO2 INSUFFLATION; Surgeon:GAYLE REYNA; [facility]     CYSTOCELE REPAIR  1972     CYSTOSCOPY, INSERT STENT URETHRA, COMBINED  1999     CYSTOSCOPY, RETROGRADES, INSERT STENT URETER(S), COMBINED  2/29/2012    Procedure:COMBINED CYSTOSCOPY, RETROGRADES, INSERT STENT URETER(S); Surgeon:ANT ESPINOZA; [facility]     DECOMPRESSION LUMBAR ONE LEVEL  8/9/2013    Procedure: DECOMPRESSION LUMBAR ONE LEVEL;  Posterior Decompression Right Lumbar 5- Sacral 1;  Surgeon: You Zavala MD;  Location: [facility]     HC TOOTH EXTRACTION W/FORCEP       HYSTERECTOMY, CATA  1963     IRRIGATION AND DEBRIDEMENT ORAL, COMBINED  1982    For treatment of gingivitis     LAPAROSCOPIC ASSISTED COLECTOMY  2/29/2012    Procedure:LAPAROSCOPIC ASSISTED COLECTOMY; Cysto, Bilateral Stent placement (both stents removed at end of case)- Yanet ACOSTA. Laparoscopic Extended Right Venu Colectomy with CO2 Colonoscopy and polyp removal-Judah; Surgeon:GAYLE REYNA; [facility]     LIGATN/STRIP LONG OR SHORT SAPHEN  1955     MASTECTOMY PARTIAL WITH SENTINEL NODE Left 11/19/2018    Procedure: Left Mastectomy, Left [place] Lymph Node Biopsy;  Surgeon: Nahun Betancourt MD;  Location: [facility]     STRIP VEIN BILATERAL  1964     SURGICAL HISTORY OF -   1999    ureter surgery for blockage.     Allergies:  Allergies as of 06/14/2021 - Reviewed 06/01/2021   Allergen Reaction Noted     Cafergot Other (See Comments) and Nausea and Vomiting 01/01/1972     Ciprofloxacin  06/19/2012     Penicillins Rash and Unknown 01/01/1949     Sulfa drugs Rash and Unknown 01/01/1950     Ergot alkaloids Unknown 03/08/2012     Lamictal [lamotrigine] Other (See Comments) 02/15/2014     Naproxen        Naproxen Unknown 03/08/2012     Tetracycline Unknown 03/08/2012     Current Medications:  Current Outpatient Medications   Medication Sig Dispense Refill     acetaminophen (TYLENOL) 325 MG tablet Take 3 tablets (975 mg) by mouth every 8 hours as needed for mild pain 50 tablet 0     ARIPiprazole (ABILIFY) 5 MG tablet 5 mg tablet in the morning  3     Ascorbic Acid (VITAMIN C PO) Take 1 tablet by mouth daily       Cyanocobalamin (VITAMIN B 12 PO) Take by mouth every morning       letrozole (FEMARA) 2.5 MG tablet TAKE 1 TABLET BY MOUTH EVERY DAY 90 tablet 3     levothyroxine (SYNTHROID/LEVOTHROID) 75 MCG tablet Take 75 mcg by mouth daily       lithium (ESKALITH) 150 MG capsule Take 1 capsule (150 mg) by mouth 2 times daily (with meals) 30 capsule 1     ORDER FOR DME Equipment being ordered: compression stocking knee high 20-30mmhg 2 Package 0     pramipexole (MIRAPEX) 1 MG tablet Take 1 tablet (1 mg) by mouth At Bedtime       triamcinolone (KENALOG) 0.1 % external cream Apply topically 3 times daily For 1-2 weeks to right leg until resolved 80 g 1     vitamin D3 (CHOLECALCIFEROL) 10 MCG (400 UNIT) capsule Take 2 capsules (800 Units) by mouth daily 60 capsule 3     vitamin D3 (CHOLECALCIFEROL) 1000 units (25 mcg) tablet Take 1 tablet (1,000 Units) by mouth daily 30 tablet 1     Physical Exam:  A comprehensive physical examination is deferred due to PHE (public health emergency) telephone visit restrictions.    Laboratory/Imaging Studies:  6/1/2021 Thyroid biopsy:  Benign.  Consistent with a benign nodule.    ASSESSMENT/PLAN:  89 year old female with a h/o T2N0M0, ER/DC positive, HER2 non-amplified and T2N1M0, ER/DC positive, HER2 amplified invasive mammary carcinomas of the left breast.  She is s/p treatment with 5 months neoadjuvant herceptin and letrozole, left breast mastectomy, SLN biopsy, radiation, and 1 year Kadcyla.    1.  Left breast cancers:  Ms. Mar is 2 years, 7 months out from excision of left breast  cancers.  She continues on treatment with letrozole.     She is asymptomatic of disease recurrence on history taken today. Due to age and medical comorbidities, we are not performing breast imaging.  I will see her back in clinic in 4 months.  I would like this to be an in person visit to facilitate chest wall and lymph node exam.  She is agreeable to in person visit.    2.  Stage IV CKD:  Secondary to chronic lithium.  Creatinine has been stable in the 1.0 - 1.3 mg/dL range.  Ongoing follow up with Nephrology.    3.  Thyroid mass/hypothyroidism:  Currently on treatment with levothyroxine.  Repeat biopsy of enlarging thyroid nodule on 6/1/2021 confirms benign status.    4.  Bipolar disorder/depression:  Follows with Dr. Horn at [facility] in [place].  Current medications including lithium and Abilify.  She declines referral to a therapist.  She will let her sons know she feels isolated and that she would like to get out of the house more.  We discussed in case of thoughts of self harm, she should report to the ED.    5.  Osteopenia:  At risk for bone loss on letrozole.  DEXA in 02/2021 with a lowest T-score of -2.4 c/w osteopenia/borderline osteoporosis.  Plan to repeat a DEXA in 02/2023.    6.  Follow Up:  Return to clinic in approximately 4 months for in person visit with me.

## 2021-06-14 ENCOUNTER — VIRTUAL VISIT (OUTPATIENT)
Dept: ONCOLOGY | Facility: CLINIC | Age: 86
End: 2021-06-14
Attending: INTERNAL MEDICINE
Payer: MEDICARE

## 2021-06-14 DIAGNOSIS — C50.212 MALIGNANT NEOPLASM OF UPPER-INNER QUADRANT OF LEFT BREAST IN FEMALE, ESTROGEN RECEPTOR POSITIVE (H): Primary | ICD-10-CM

## 2021-06-14 DIAGNOSIS — C50.412 MALIGNANT NEOPLASM OF UPPER-OUTER QUADRANT OF LEFT BREAST IN FEMALE, ESTROGEN RECEPTOR POSITIVE (H): ICD-10-CM

## 2021-06-14 DIAGNOSIS — Z17.0 MALIGNANT NEOPLASM OF UPPER-OUTER QUADRANT OF LEFT BREAST IN FEMALE, ESTROGEN RECEPTOR POSITIVE (H): ICD-10-CM

## 2021-06-14 DIAGNOSIS — Z17.0 MALIGNANT NEOPLASM OF UPPER-INNER QUADRANT OF LEFT BREAST IN FEMALE, ESTROGEN RECEPTOR POSITIVE (H): Primary | ICD-10-CM

## 2021-06-14 PROCEDURE — 999N001193 HC VIDEO/TELEPHONE VISIT; NO CHARGE

## 2021-06-14 PROCEDURE — 99442 PR PHYSICIAN TELEPHONE EVALUATION 11-20 MIN: CPT | Mod: 95 | Performed by: INTERNAL MEDICINE

## 2021-06-14 NOTE — LETTER
6/14/2021         RE: Lennie Mar  [address]  [place] [address]        Dear Colleague,    Thank you for referring your patient, Lennie Mar, to the [facility]. Please see a copy of my visit note below.    Lennie is a 89 year old who is being evaluated via a billable telephone visit.      What phone number would you like to be contacted at? [phone]  How would you like to obtain your AVS? KAI Rogel    Phone call duration: 15 minutes, an additional 10 minutes was spent on the day of the visit reviewing medical records, reviewing and interpreting tests, placing orders, and in documentation.    ONCOLOGY FOLLOW UP:  Date on this visit: 6/14/2021    Diagnosis:  1.  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  Stage IIa, T2N0M0, ER/NH positive, HER-2 non-amplified invasive ductal carcinoma of the left breast at 11:00    Primary Physician: Ty Quintanilla     History Of Present Illness:  Ms. Mar is a 89 year old female with two cancers of the left breast. She self palpated a lump and underwent mammogram and ultrasound in 5/2018 that showed a 3.5 cm mass in the superficial lateral left breast, at 3:00, 2 cm from the nipple and a second 2.4 cm mass in the upper outer left breast at 1:00, 7 cm from the nipple.  Biopsy of the 3:00  mass near the nipple was an ER/NH positive, HER2 amplified (HER2/CEN17 ratio of 6.4/2.3) grade 3 invasive carcinoma.  The 1:00 mass was an ER/NH positive, HER2 nonamplified, grade 3 invasive carcinoma.  No lymphadenopathy was seen on ultrasound.    She began treatment with Herceptin and letrozole on 6/1/18.  Left breast mastectomy and SLN biopsy on 11/19/18 showed two residual tumors.  The larger tumor at 3:00 was grade 2 and measured 2.7 cm, ER positive, NH negative, HER2-amplified.     "The smaller tumor at 1:00 was grade 3 and measured 2.5 cm, ER/WI positive, HER2 non-amplified.  Overall tumor cellularity was 15%.  2/3 left axillary lymph nodes demonstrated metastases measuring 9 mm and 1.2 mm.  HER-2 FISH of the larger axillary lymph node metastasis was amplified.    She completed radiation to the left chest and regional lymph nodes (left axillary and supraclavicular) on 2/18/19.  Her case was discussed at breast conference and recommendation was to administer adjuvant T-DM1 given HER2 positivity in the lymph node metastasis.  She received 1 year of adjuvant T-DM1 from 1/3/19 - 12/23/19.  She has been on letrozole since 12/2019.    Interval History:  Ms. Mar was contacted via telephone today for routine breast cancer follow up.  Most bothersome to her at this time is ongoing depression.  She has a chronic history of bipolar disorder.  She follows with Dr. Horn for this.  She states he is aware of current depression and has made some medication adjustments.  Last week, she had thoughts of self harm.  She contacted one of her sons, who is a physician.  He asked if she wanted to go to the hospital, she said \"no\".  She denies suicidal ideation at this time.  She states she feels isolated as she does not have many friends and her family is very busy.  She is not leaving the home much.  She denies lumps, masses, swelling, or pain of the left chest wall or the right breast.  She has no current abdominal pain, bloating, or other abdominal complaints.  She denies bone or joint aches or pains.  She has no cough, shortness of breath or chest pain.  She has no headaches, visual changes or focal neurologic complaints.  The remainder of a complete 12 point review of systems was reviewed with the patient and was negative with the exception of that mentioned above.    Past Medical/Surgical History:  Past Medical History:   Diagnosis Date     Alcohol dependence in remission (H)      Anxiety      Asymptomatic " varicose veins      Bipolar disorder, unspecified (H)      CKD (chronic kidney disease) stage 3, GFR 30-59 ml/min 2/18/2014     History of smoking      Hyperparathyroidism (H)      Memory loss      Muscle weakness (generalized) 6/23/2008     Severe depression (H)      Subdural hygroma     chronic.  no surgeries     Tremor of unknown origin      Past Surgical History:   Procedure Laterality Date     APPENDECTOMY OPEN  1947     CATARACT IOL, RT/LT       COLONOSCOPY WITH CO2 INSUFFLATION  2/29/2012    Procedure:COLONOSCOPY WITH CO2 INSUFFLATION; Surgeon:GAYLE REYNA; [facility]     CYSTOCELE REPAIR  1972     CYSTOSCOPY, INSERT STENT URETHRA, COMBINED  1999     CYSTOSCOPY, RETROGRADES, INSERT STENT URETER(S), COMBINED  2/29/2012    Procedure:COMBINED CYSTOSCOPY, RETROGRADES, INSERT STENT URETER(S); Surgeon:ANT ESPINOZA; Location[facility]     DECOMPRESSION LUMBAR ONE LEVEL  8/9/2013    Procedure: DECOMPRESSION LUMBAR ONE LEVEL;  Posterior Decompression Right Lumbar 5- Sacral 1;  Surgeon: You Zavala MD;  Location: [facility]     HC TOOTH EXTRACTION W/FORCEP       HYSTERECTOMY, CATA  1963     IRRIGATION AND DEBRIDEMENT ORAL, COMBINED  1982    For treatment of gingivitis     LAPAROSCOPIC ASSISTED COLECTOMY  2/29/2012    Procedure:LAPAROSCOPIC ASSISTED COLECTOMY; Cysto, Bilateral Stent placement (both stents removed at end of case)- Yanet ACOSTA. Laparoscopic Extended Right Venu Colectomy with CO2 Colonoscopy and polyp removal-Judah; Surgeon:GAYLE REYNA; Location[facility]     LIGATN/STRIP LONG OR SHORT SAPHEN  1955     MASTECTOMY PARTIAL WITH SENTINEL NODE Left 11/19/2018    Procedure: Left Mastectomy, Left [place] Lymph Node Biopsy;  Surgeon: Nahun Betancourt MD;  Location: [facility]     STRIP VEIN BILATERAL  1964     SURGICAL HISTORY OF -   1999    ureter surgery for blockage.     Allergies:  Allergies as of 06/14/2021 - Reviewed 06/01/2021   Allergen Reaction Noted     Cafergot Other (See Comments)  and Nausea and Vomiting 01/01/1972     Ciprofloxacin  06/19/2012     Penicillins Rash and Unknown 01/01/1949     Sulfa drugs Rash and Unknown 01/01/1950     Ergot alkaloids Unknown 03/08/2012     Lamictal [lamotrigine] Other (See Comments) 02/15/2014     Naproxen       Naproxen Unknown 03/08/2012     Tetracycline Unknown 03/08/2012     Current Medications:  Current Outpatient Medications   Medication Sig Dispense Refill     acetaminophen (TYLENOL) 325 MG tablet Take 3 tablets (975 mg) by mouth every 8 hours as needed for mild pain 50 tablet 0     ARIPiprazole (ABILIFY) 5 MG tablet 5 mg tablet in the morning  3     Ascorbic Acid (VITAMIN C PO) Take 1 tablet by mouth daily       Cyanocobalamin (VITAMIN B 12 PO) Take by mouth every morning       letrozole (FEMARA) 2.5 MG tablet TAKE 1 TABLET BY MOUTH EVERY DAY 90 tablet 3     levothyroxine (SYNTHROID/LEVOTHROID) 75 MCG tablet Take 75 mcg by mouth daily       lithium (ESKALITH) 150 MG capsule Take 1 capsule (150 mg) by mouth 2 times daily (with meals) 30 capsule 1     ORDER FOR DME Equipment being ordered: compression stocking knee high 20-30mmhg 2 Package 0     pramipexole (MIRAPEX) 1 MG tablet Take 1 tablet (1 mg) by mouth At Bedtime       triamcinolone (KENALOG) 0.1 % external cream Apply topically 3 times daily For 1-2 weeks to right leg until resolved 80 g 1     vitamin D3 (CHOLECALCIFEROL) 10 MCG (400 UNIT) capsule Take 2 capsules (800 Units) by mouth daily 60 capsule 3     vitamin D3 (CHOLECALCIFEROL) 1000 units (25 mcg) tablet Take 1 tablet (1,000 Units) by mouth daily 30 tablet 1     Physical Exam:  A comprehensive physical examination is deferred due to PHE (public health emergency) telephone visit restrictions.    Laboratory/Imaging Studies:  6/1/2021 Thyroid biopsy:  Benign.  Consistent with a benign nodule.    ASSESSMENT/PLAN:  89 year old female with a h/o T2N0M0, ER/WA positive, HER2 non-amplified and T2N1M0, ER/WA positive, HER2 amplified invasive  mammary carcinomas of the left breast.  She is s/p treatment with 5 months neoadjuvant herceptin and letrozole, left breast mastectomy, SLN biopsy, radiation, and 1 year Kadcyla.    1.  Left breast cancers:  Ms. Mar is 2 years, 7 months out from excision of left breast cancers.  She continues on treatment with letrozole.     She is asymptomatic of disease recurrence on history taken today. Due to age and medical comorbidities, we are not performing breast imaging.  I will see her back in clinic in 4 months.  I would like this to be an in person visit to facilitate chest wall and lymph node exam.  She is agreeable to in person visit.    2.  Stage IV CKD:  Secondary to chronic lithium.  Creatinine has been stable in the 1.0 - 1.3 mg/dL range.  Ongoing follow up with Nephrology.    3.  Thyroid mass/hypothyroidism:  Currently on treatment with levothyroxine.  Repeat biopsy of enlarging thyroid nodule on 6/1/2021 confirms benign status.    4.  Bipolar disorder/depression:  Follows with Dr. Horn at [facility] in [place].  Current medications including lithium and Abilify.  She declines referral to a therapist.  She will let her sons know she feels isolated and that she would like to get out of the house more.  We discussed in case of thoughts of self harm, she should report to the ED.    5.  Osteopenia:  At risk for bone loss on letrozole.  DEXA in 02/2021 with a lowest T-score of -2.4 c/w osteopenia/borderline osteoporosis.  Plan to repeat a DEXA in 02/2023.    6.  Follow Up:  Return to clinic in approximately 4 months for in person visit with me.          Again, thank you for allowing me to participate in the care of your patient.        Sincerely,        Perlita Malik MD

## 2021-06-30 ENCOUNTER — TRANSFERRED RECORDS (OUTPATIENT)
Dept: HEALTH INFORMATION MANAGEMENT | Facility: CLINIC | Age: 86
End: 2021-06-30

## 2021-09-24 NOTE — TELEPHONE ENCOUNTER
Reason for Call:  Medication or medication refill:    Do you use a [facility]?  Name of the pharmacy and phone number for the current request:  [id number] - [place] [address] AT [facility] [address]     Name of the medication requested: Antibiotics for cellulitis     Other request: Patient is requesting for a refill & states that cellulitis is acting up again and is now in the other foot. Please advise, thank you!    Can we leave a detailed message on this number? YES    Phone number patient can be reached at: Home number on file [phone] (home)    Best Time: anytime    Call taken on 3/17/2020 at 1:18 PM by Candy Barba       Yes no

## 2021-10-18 ENCOUNTER — ONCOLOGY VISIT (OUTPATIENT)
Dept: ONCOLOGY | Facility: CLINIC | Age: 86
End: 2021-10-18
Attending: INTERNAL MEDICINE
Payer: MEDICARE

## 2021-10-18 VITALS
OXYGEN SATURATION: 97 % | RESPIRATION RATE: 20 BRPM | HEIGHT: 61 IN | SYSTOLIC BLOOD PRESSURE: 145 MMHG | WEIGHT: 146 LBS | DIASTOLIC BLOOD PRESSURE: 70 MMHG | HEART RATE: 72 BPM | TEMPERATURE: 98.1 F | BODY MASS INDEX: 27.56 KG/M2

## 2021-10-18 DIAGNOSIS — Z17.0 MALIGNANT NEOPLASM OF UPPER-INNER QUADRANT OF LEFT BREAST IN FEMALE, ESTROGEN RECEPTOR POSITIVE (H): Primary | ICD-10-CM

## 2021-10-18 DIAGNOSIS — C50.212 MALIGNANT NEOPLASM OF UPPER-INNER QUADRANT OF LEFT BREAST IN FEMALE, ESTROGEN RECEPTOR POSITIVE (H): Primary | ICD-10-CM

## 2021-10-18 PROCEDURE — 99213 OFFICE O/P EST LOW 20 MIN: CPT | Performed by: INTERNAL MEDICINE

## 2021-10-18 PROCEDURE — G0463 HOSPITAL OUTPT CLINIC VISIT: HCPCS

## 2021-10-18 ASSESSMENT — MIFFLIN-ST. JEOR: SCORE: 1004.63

## 2021-10-18 ASSESSMENT — PAIN SCALES - GENERAL: PAINLEVEL: NO PAIN (0)

## 2021-10-18 NOTE — LETTER
10/18/2021         RE: Lennie Mar  [address]  [facility] [address]        Dear Colleague,    Thank you for referring your patient, Lennie Mar, to the [facility]. Please see a copy of my visit note below.    ONCOLOGY FOLLOW UP:  Date on this visit: 10/18/2021    Diagnosis:  1.  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  Stage IIa, T2N0M0, ER/HI positive, HER-2 non-amplified invasive ductal carcinoma of the left breast at 11:00    Primary Physician: Ty Quintanilla     History Of Present Illness:  Ms. Mar is a 89 year old female with two cancers of the left breast. She self palpated a lump and underwent mammogram and ultrasound in 5/2018 that showed a 3.5 cm mass in the superficial lateral left breast, at 3:00, 2 cm from the nipple and a second 2.4 cm mass in the upper outer left breast at 1:00, 7 cm from the nipple.  Biopsy of the 3:00  mass near the nipple was an ER/HI positive, HER2 amplified (HER2/CEN17 ratio of 6.4/2.3) grade 3 invasive carcinoma.  The 1:00 mass was an ER/HI positive, HER2 nonamplified, grade 3 invasive carcinoma.  No lymphadenopathy was seen on ultrasound.    She began treatment with Herceptin and letrozole on 6/1/18.  Left breast mastectomy and SLN biopsy on 11/19/18 showed two residual tumors.  The larger tumor at 3:00 was grade 2 and measured 2.7 cm, ER positive, HI negative, HER2-amplified.    The smaller tumor at 1:00 was grade 3 and measured 2.5 cm, ER/HI positive, HER2 non-amplified.  Overall tumor cellularity was 15%.  2/3 left axillary lymph nodes demonstrated metastases measuring 9 mm and 1.2 mm.  HER-2 FISH of the larger axillary lymph node metastasis was amplified.    She completed radiation to the left chest and regional lymph nodes (left axillary and supraclavicular) on 2/18/19.  Her case was  discussed at breast conference and recommendation was to administer adjuvant T-DM1 given HER2 positivity in the lymph node metastasis.  She received 1 year of adjuvant T-DM1 from 1/3/19 - 12/23/19.  She has been on letrozole since 12/2019.    Interval History:  Ms. Mar comes into clinic today for routine breast cancer follow-up.  She had to left breast cancers approximately 3 years ago.  She states in general her health has been good over the last 6 months.  She continues to struggle with depression however feels like it is adequately being treated.  She denies any exacerbation of symptoms on today's visit.  She has no new bone or joint aches or pains.  She specifically denies lumps, masses, swelling, or pain of either the left chest wall or the right breast.  Likewise she has not noted any masses or swellings underneath either arm.  She has no abdominal pain, bloating, or other complaints.  She denies cough, shortness of breath, or chest pain.  She has no infectious complaints.  She denies headaches or visual changes.  The remainder of a complete 12 point review of systems is reviewed with patient was negative with exception that mentioned above.    Past Medical/Surgical History:  Past Medical History:   Diagnosis Date     Alcohol dependence in remission (H)      Anxiety      Asymptomatic varicose veins      Bipolar disorder, unspecified (H)      CKD (chronic kidney disease) stage 3, GFR 30-59 ml/min 2/18/2014     History of smoking      Hyperparathyroidism (H)      Memory loss      Muscle weakness (generalized) 6/23/2008     Severe depression (H)      Subdural hygroma     chronic.  no surgeries     Tremor of unknown origin      Past Surgical History:   Procedure Laterality Date     APPENDECTOMY OPEN  1947     CATARACT IOL, RT/LT       COLONOSCOPY WITH CO2 INSUFFLATION  2/29/2012    Procedure:COLONOSCOPY WITH CO2 INSUFFLATION; Surgeon:GAYLE REYNA; [facility]     CYSTOCELE REPAIR  1972     CYSTOSCOPY,  INSERT STENT URETHRA, COMBINED  1999     CYSTOSCOPY, RETROGRADES, INSERT STENT URETER(S), COMBINED  2/29/2012    Procedure:COMBINED CYSTOSCOPY, RETROGRADES, INSERT STENT URETER(S); Surgeon:ANT ESPINOZA; [facility]     DECOMPRESSION LUMBAR ONE LEVEL  8/9/2013    Procedure: DECOMPRESSION LUMBAR ONE LEVEL;  Posterior Decompression Right Lumbar 5- Sacral 1;  Surgeon: You Zavala MD;  Location[facility]     HC TOOTH EXTRACTION W/FORCEP       HYSTERECTOMY, CATA  1963     IRRIGATION AND DEBRIDEMENT ORAL, COMBINED  1982    For treatment of gingivitis     LAPAROSCOPIC ASSISTED COLECTOMY  2/29/2012    Procedure:LAPAROSCOPIC ASSISTED COLECTOMY; Cysto, Bilateral Stent placement (both stents removed at end of case)- Yanet ACOSTA. Laparoscopic Extended Right Venu Colectomy with CO2 Colonoscopy and polyp removal-Judah; Surgeon:GAYLE REYNA; Location[facility]     LIGATN/STRIP LONG OR SHORT SAPHEN  1955     MASTECTOMY PARTIAL WITH SENTINEL NODE Left 11/19/2018    Procedure: Left Mastectomy, Left [place] Lymph Node Biopsy;  Surgeon: Nahun Betancourt MD;  Location: [facility] OR     STRIP VEIN BILATERAL  1964     SURGICAL HISTORY OF -   1999    ureter surgery for blockage.     Allergies:  Allergies as of 10/18/2021 - Reviewed 06/14/2021   Allergen Reaction Noted     Cafergot Other (See Comments) and Nausea and Vomiting 01/01/1972     Ciprofloxacin  06/19/2012     Penicillins Rash and Unknown 01/01/1949     Sulfa drugs Rash and Unknown 01/01/1950     Ergot alkaloids Unknown 03/08/2012     Lamictal [lamotrigine] Other (See Comments) 02/15/2014     Naproxen       Naproxen Unknown 03/08/2012     Tetracycline Unknown 03/08/2012     Current Medications:  Current Outpatient Medications   Medication Sig Dispense Refill     acetaminophen (TYLENOL) 325 MG tablet Take 3 tablets (975 mg) by mouth every 8 hours as needed for mild pain 50 tablet 0     ARIPiprazole (ABILIFY) 5 MG tablet 5 mg tablet in the morning  3     Ascorbic  "Acid (VITAMIN C PO) Take 1 tablet by mouth daily       Cyanocobalamin (VITAMIN B 12 PO) Take by mouth every morning       letrozole (FEMARA) 2.5 MG tablet TAKE 1 TABLET BY MOUTH EVERY DAY 90 tablet 3     levothyroxine (SYNTHROID/LEVOTHROID) 75 MCG tablet Take 75 mcg by mouth daily       lithium (ESKALITH) 150 MG capsule Take 1 capsule (150 mg) by mouth 2 times daily (with meals) 30 capsule 1     ORDER FOR DME Equipment being ordered: compression stocking knee high 20-30mmhg 2 Package 0     pramipexole (MIRAPEX) 1 MG tablet Take 1 tablet (1 mg) by mouth At Bedtime       triamcinolone (KENALOG) 0.1 % external cream Apply topically 3 times daily For 1-2 weeks to right leg until resolved 80 g 1     vitamin D3 (CHOLECALCIFEROL) 10 MCG (400 UNIT) capsule Take 2 capsules (800 Units) by mouth daily 60 capsule 3     vitamin D3 (CHOLECALCIFEROL) 1000 units (25 mcg) tablet Take 1 tablet (1,000 Units) by mouth daily 30 tablet 1     Physical Exam:  BP (!) 145/70   Pulse 72   Temp 98.1  F (36.7  C) (Oral)   Resp 20   Ht 1.549 m (5' 1\")   Wt 66.2 kg (146 lb)   LMP  (LMP Unknown)   SpO2 97%   BMI 27.59 kg/m    General:  Elderly appearing adult female in NAD.  Alert and oriented.  HEENT:  Normocephalic.  Sclera anicteric.  MMM.  No lesions of the oropharynx.  Lymph:  No palpable cervical, supraclavicular, or axillary LAD.  Chest:  CTA bilaterally.  No wheezes or crackles.  Breast:  Left mastectomy.  There are no discretely palpable mass of either the left chest wall or within the right breast.  Left nipple is everted.  Abd:  Soft/ND.  Musculo:  Full ROM of the bilateral upper extremities.  Neuro:  Cranial nerves grossly intact.  Psych:  Mood and affect are flat.      Laboratory/Imaging Studies:  No interim imaging or laboratory studies pertinent to today's visit.    ASSESSMENT/PLAN:  89 year old female with a h/o T2N0M0, ER/KS positive, HER2 non-amplified and T2N1M0, ER/KS positive, HER2 amplified invasive mammary carcinomas " of the left breast.  She is s/p treatment with 5 months neoadjuvant herceptin and letrozole, left breast mastectomy, SLN biopsy, radiation, and 1 year Kadcyla.    1.  Left breast cancers:  Ms. Mar is 2 years, 11 months out from excision of left breast cancers.  She continues on treatment with letrozole. She is tolerating the medication well.    She is asymptomatic of disease recurrence on history taken today and exam is without concerning findings.  Due to age and medical comorbidities, we are not performing routine screening mammogram of the right breast.  Will schedule next visit in 6 months.    2.  Stage IV CKD:  Secondary to chronic lithium.  Ongoing follow up with Nephrology.    3.  Bipolar disorder/depression:  Follows with Dr. Horn at [facility] in [place].  Current medications including lithium and Abilify.     4.  Osteopenia:  At risk for bone loss on letrozole.  DEXA in 02/2021 with a lowest T-score of -2.4 c/w osteopenia/borderline osteoporosis.  Plan to repeat a DEXA in 02/2023.    5.  Follow Up:  Return to clinic in approximately 6 months for in person visit with me.        25 minutes spent on the date of the encounter doing chart review, patient visit and documentation.       Again, thank you for allowing me to participate in the care of your patient.        Sincerely,        Perlita Malik MD

## 2021-10-18 NOTE — PROGRESS NOTES
ONCOLOGY FOLLOW UP:  Date on this visit: 10/18/2021    Diagnosis:  1.  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  Stage IIa, T2N0M0, ER/CT positive, HER-2 non-amplified invasive ductal carcinoma of the left breast at 11:00    Primary Physician: Ty Quintanilla     History Of Present Illness:  Ms. Mar is a 89 year old female with two cancers of the left breast. She self palpated a lump and underwent mammogram and ultrasound in 5/2018 that showed a 3.5 cm mass in the superficial lateral left breast, at 3:00, 2 cm from the nipple and a second 2.4 cm mass in the upper outer left breast at 1:00, 7 cm from the nipple.  Biopsy of the 3:00  mass near the nipple was an ER/CT positive, HER2 amplified (HER2/CEN17 ratio of 6.4/2.3) grade 3 invasive carcinoma.  The 1:00 mass was an ER/CT positive, HER2 nonamplified, grade 3 invasive carcinoma.  No lymphadenopathy was seen on ultrasound.    She began treatment with Herceptin and letrozole on 6/1/18.  Left breast mastectomy and SLN biopsy on 11/19/18 showed two residual tumors.  The larger tumor at 3:00 was grade 2 and measured 2.7 cm, ER positive, CT negative, HER2-amplified.    The smaller tumor at 1:00 was grade 3 and measured 2.5 cm, ER/CT positive, HER2 non-amplified.  Overall tumor cellularity was 15%.  2/3 left axillary lymph nodes demonstrated metastases measuring 9 mm and 1.2 mm.  HER-2 FISH of the larger axillary lymph node metastasis was amplified.    She completed radiation to the left chest and regional lymph nodes (left axillary and supraclavicular) on 2/18/19.  Her case was discussed at breast conference and recommendation was to administer adjuvant T-DM1 given HER2 positivity in the lymph node metastasis.  She received 1 year of adjuvant T-DM1 from 1/3/19 - 12/23/19.  She has been on letrozole since 12/2019.    Interval History:  Ms. Mar  comes into clinic today for routine breast cancer follow-up.  She had to left breast cancers approximately 3 years ago.  She states in general her health has been good over the last 6 months.  She continues to struggle with depression however feels like it is adequately being treated.  She denies any exacerbation of symptoms on today's visit.  She has no new bone or joint aches or pains.  She specifically denies lumps, masses, swelling, or pain of either the left chest wall or the right breast.  Likewise she has not noted any masses or swellings underneath either arm.  She has no abdominal pain, bloating, or other complaints.  She denies cough, shortness of breath, or chest pain.  She has no infectious complaints.  She denies headaches or visual changes.  The remainder of a complete 12 point review of systems is reviewed with patient was negative with exception that mentioned above.    Past Medical/Surgical History:  Past Medical History:   Diagnosis Date     Alcohol dependence in remission (H)      Anxiety      Asymptomatic varicose veins      Bipolar disorder, unspecified (H)      CKD (chronic kidney disease) stage 3, GFR 30-59 ml/min 2/18/2014     History of smoking      Hyperparathyroidism (H)      Memory loss      Muscle weakness (generalized) 6/23/2008     Severe depression (H)      Subdural hygroma     chronic.  no surgeries     Tremor of unknown origin      Past Surgical History:   Procedure Laterality Date     APPENDECTOMY OPEN  1947     CATARACT IOL, RT/LT       COLONOSCOPY WITH CO2 INSUFFLATION  2/29/2012    Procedure:COLONOSCOPY WITH CO2 INSUFFLATION; Surgeon:GAYLE REYNA; Location[facility]     CYSTOCELE REPAIR  1972     CYSTOSCOPY, INSERT STENT URETHRA, COMBINED  1999     CYSTOSCOPY, RETROGRADES, INSERT STENT URETER(S), COMBINED  2/29/2012    Procedure:COMBINED CYSTOSCOPY, RETROGRADES, INSERT STENT URETER(S); Surgeon:ANT ESPINOZA; [facility]     DECOMPRESSION LUMBAR ONE LEVEL  8/9/2013     Procedure: DECOMPRESSION LUMBAR ONE LEVEL;  Posterior Decompression Right Lumbar 5- Sacral 1;  Surgeon: You Zavala MD;  Location: [facility]     HC TOOTH EXTRACTION W/FORCEP       HYSTERECTOMY, CATA  1963     IRRIGATION AND DEBRIDEMENT ORAL, COMBINED  1982    For treatment of gingivitis     LAPAROSCOPIC ASSISTED COLECTOMY  2/29/2012    Procedure:LAPAROSCOPIC ASSISTED COLECTOMY; Cysto, Bilateral Stent placement (both stents removed at end of case)- Yanet ACOSTA. Laparoscopic Extended Right Venu Colectomy with CO2 Colonoscopy and polyp removal-Judah; Surgeon:GAYLE REYNA; [facility]     LIGATN/STRIP LONG OR SHORT SAPHEN  1955     MASTECTOMY PARTIAL WITH SENTINEL NODE Left 11/19/2018    Procedure: Left Mastectomy, Left [place] Lymph Node Biopsy;  Surgeon: Nahun Betancourt MD;  Location: [facility] OR     STRIP VEIN BILATERAL  1964     SURGICAL HISTORY OF -   1999    ureter surgery for blockage.     Allergies:  Allergies as of 10/18/2021 - Reviewed 06/14/2021   Allergen Reaction Noted     Cafergot Other (See Comments) and Nausea and Vomiting 01/01/1972     Ciprofloxacin  06/19/2012     Penicillins Rash and Unknown 01/01/1949     Sulfa drugs Rash and Unknown 01/01/1950     Ergot alkaloids Unknown 03/08/2012     Lamictal [lamotrigine] Other (See Comments) 02/15/2014     Naproxen       Naproxen Unknown 03/08/2012     Tetracycline Unknown 03/08/2012     Current Medications:  Current Outpatient Medications   Medication Sig Dispense Refill     acetaminophen (TYLENOL) 325 MG tablet Take 3 tablets (975 mg) by mouth every 8 hours as needed for mild pain 50 tablet 0     ARIPiprazole (ABILIFY) 5 MG tablet 5 mg tablet in the morning  3     Ascorbic Acid (VITAMIN C PO) Take 1 tablet by mouth daily       Cyanocobalamin (VITAMIN B 12 PO) Take by mouth every morning       letrozole (FEMARA) 2.5 MG tablet TAKE 1 TABLET BY MOUTH EVERY DAY 90 tablet 3     levothyroxine (SYNTHROID/LEVOTHROID) 75 MCG tablet Take 75 mcg by mouth  "daily       lithium (ESKALITH) 150 MG capsule Take 1 capsule (150 mg) by mouth 2 times daily (with meals) 30 capsule 1     ORDER FOR DME Equipment being ordered: compression stocking knee high 20-30mmhg 2 Package 0     pramipexole (MIRAPEX) 1 MG tablet Take 1 tablet (1 mg) by mouth At Bedtime       triamcinolone (KENALOG) 0.1 % external cream Apply topically 3 times daily For 1-2 weeks to right leg until resolved 80 g 1     vitamin D3 (CHOLECALCIFEROL) 10 MCG (400 UNIT) capsule Take 2 capsules (800 Units) by mouth daily 60 capsule 3     vitamin D3 (CHOLECALCIFEROL) 1000 units (25 mcg) tablet Take 1 tablet (1,000 Units) by mouth daily 30 tablet 1     Physical Exam:  BP (!) 145/70   Pulse 72   Temp 98.1  F (36.7  C) (Oral)   Resp 20   Ht 1.549 m (5' 1\")   Wt 66.2 kg (146 lb)   LMP  (LMP Unknown)   SpO2 97%   BMI 27.59 kg/m    General:  Elderly appearing adult female in NAD.  Alert and oriented.  HEENT:  Normocephalic.  Sclera anicteric.  MMM.  No lesions of the oropharynx.  Lymph:  No palpable cervical, supraclavicular, or axillary LAD.  Chest:  CTA bilaterally.  No wheezes or crackles.  Breast:  Left mastectomy.  There are no discretely palpable mass of either the left chest wall or within the right breast.  Left nipple is everted.  Abd:  Soft/ND.  Musculo:  Full ROM of the bilateral upper extremities.  Neuro:  Cranial nerves grossly intact.  Psych:  Mood and affect are flat.      Laboratory/Imaging Studies:  No interim imaging or laboratory studies pertinent to today's visit.    ASSESSMENT/PLAN:  89 year old female with a h/o T2N0M0, ER/AL positive, HER2 non-amplified and T2N1M0, ER/AL positive, HER2 amplified invasive mammary carcinomas of the left breast.  She is s/p treatment with 5 months neoadjuvant herceptin and letrozole, left breast mastectomy, SLN biopsy, radiation, and 1 year Kadcyla.    1.  Left breast cancers:  Ms. Mar is 2 years, 11 months out from excision of left breast cancers.  She " continues on treatment with letrozole. She is tolerating the medication well.    She is asymptomatic of disease recurrence on history taken today and exam is without concerning findings.  Due to age and medical comorbidities, we are not performing routine screening mammogram of the right breast.  Will schedule next visit in 6 months.    2.  Stage IV CKD:  Secondary to chronic lithium.  Ongoing follow up with Nephrology.    3.  Bipolar disorder/depression:  Follows with Dr. Horn at [facility] in [place].  Current medications including lithium and Abilify.     4.  Osteopenia:  At risk for bone loss on letrozole.  DEXA in 02/2021 with a lowest T-score of -2.4 c/w osteopenia/borderline osteoporosis.  Plan to repeat a DEXA in 02/2023.    5.  Follow Up:  Return to clinic in approximately 6 months for in person visit with me.        25 minutes spent on the date of the encounter doing chart review, patient visit and documentation.

## 2021-10-23 ENCOUNTER — HEALTH MAINTENANCE LETTER (OUTPATIENT)
Age: 86
End: 2021-10-23

## 2021-12-31 DIAGNOSIS — Z17.0 MALIGNANT NEOPLASM OF UPPER-OUTER QUADRANT OF LEFT BREAST IN FEMALE, ESTROGEN RECEPTOR POSITIVE (H): ICD-10-CM

## 2021-12-31 DIAGNOSIS — C50.412 MALIGNANT NEOPLASM OF UPPER-OUTER QUADRANT OF LEFT BREAST IN FEMALE, ESTROGEN RECEPTOR POSITIVE (H): ICD-10-CM

## 2021-12-31 DIAGNOSIS — C50.212 MALIGNANT NEOPLASM OF UPPER-INNER QUADRANT OF LEFT BREAST IN FEMALE, ESTROGEN RECEPTOR POSITIVE (H): ICD-10-CM

## 2021-12-31 DIAGNOSIS — Z17.0 MALIGNANT NEOPLASM OF UPPER-INNER QUADRANT OF LEFT BREAST IN FEMALE, ESTROGEN RECEPTOR POSITIVE (H): ICD-10-CM

## 2022-01-03 RX ORDER — LETROZOLE 2.5 MG/1
TABLET, FILM COATED ORAL
Qty: 90 TABLET | Refills: 3 | Status: ON HOLD | OUTPATIENT
Start: 2022-01-03 | End: 2024-09-16

## 2022-01-03 NOTE — TELEPHONE ENCOUNTER
[facility] Triage    Refill    Last prescribing provider: Helena     Last clinic visit date: 10/18/21    Any missed appointments or no-shows since last clinic visit?:     Recommendations for requested medication (if none, N/A): NO    Next clinic visit date: 4/22    Any other pertinent information (if none, N/A): Copied from Dr. Malik's note on 10/18/21    1. Left breast cancers: Ms. Mar is 2 years, 11 months out from excision of left breast cancers. She continues on treatment with letrozole. She is tolerating the medication well.     Routing to Dr. Malik

## 2022-02-04 ENCOUNTER — LAB (OUTPATIENT)
Dept: LAB | Facility: CLINIC | Age: 87
End: 2022-02-04
Payer: MEDICARE

## 2022-02-04 DIAGNOSIS — F31.32 MODERATE DEPRESSED BIPOLAR I DISORDER (H): Primary | ICD-10-CM

## 2022-02-04 LAB
ALBUMIN SERPL-MCNC: 3.1 G/DL (ref 3.4–5)
ALP SERPL-CCNC: 143 U/L (ref 40–150)
ALT SERPL W P-5'-P-CCNC: 18 U/L (ref 0–50)
ANION GAP SERPL CALCULATED.3IONS-SCNC: 4 MMOL/L (ref 3–14)
AST SERPL W P-5'-P-CCNC: 15 U/L (ref 0–45)
BILIRUB SERPL-MCNC: 0.5 MG/DL (ref 0.2–1.3)
BUN SERPL-MCNC: 28 MG/DL (ref 7–30)
CALCIUM SERPL-MCNC: 9.9 MG/DL (ref 8.5–10.1)
CHLORIDE BLD-SCNC: 113 MMOL/L (ref 94–109)
CO2 SERPL-SCNC: 24 MMOL/L (ref 20–32)
CREAT SERPL-MCNC: 1.41 MG/DL (ref 0.52–1.04)
GFR SERPL CREATININE-BSD FRML MDRD: 34 ML/MIN/1.73M2
GLUCOSE BLD-MCNC: 97 MG/DL (ref 70–99)
LITHIUM SERPL-SCNC: 0.8 MMOL/L
POTASSIUM BLD-SCNC: 4.7 MMOL/L (ref 3.4–5.3)
PROT SERPL-MCNC: 7.4 G/DL (ref 6.8–8.8)
SODIUM SERPL-SCNC: 141 MMOL/L (ref 133–144)
TSH SERPL DL<=0.005 MIU/L-ACNC: <0.01 MU/L (ref 0.4–4)
VALPROATE SERPL-MCNC: 38 MG/L

## 2022-02-04 PROCEDURE — 80178 ASSAY OF LITHIUM: CPT

## 2022-02-04 PROCEDURE — 80164 ASSAY DIPROPYLACETIC ACD TOT: CPT

## 2022-02-04 PROCEDURE — 80053 COMPREHEN METABOLIC PANEL: CPT

## 2022-02-04 PROCEDURE — 84443 ASSAY THYROID STIM HORMONE: CPT

## 2022-02-04 PROCEDURE — 36415 COLL VENOUS BLD VENIPUNCTURE: CPT

## 2022-02-11 ENCOUNTER — PATIENT OUTREACH (OUTPATIENT)
Dept: ONCOLOGY | Facility: CLINIC | Age: 87
End: 2022-02-11
Payer: MEDICARE

## 2022-02-11 NOTE — PROGRESS NOTES
Called DUNIA Eng ([phone]) at [facility] to discuss her request to have Dr. Malik review patients lab results.     Left a detailed voicemail message that it is not appropriate for Dr. Malik  to review [facility] labs, they need to be reviewed by the ordering provider. The lab abnormalities are not related to the patients history of cancer.     Left call back number for additional questions.

## 2022-02-11 NOTE — PROGRESS NOTES
RN CARE COORDINATION NOTE      Incoming:    Voicemail from Albertina at [facility] stating Dr. Payan ordered monitoring labs on patient and there are abnormal results. She has called the PCP who no longer works in the clinic. They are requesting that Dr. Malik review the labs and contact patient with her recommendations.     Outgoing:  Returned call to Albertina, [phone] to discuss her message. Discussed that the patient has Stage IV CKD Secondary to chronic lithium and is followed by   Nephrology. Provided the Nephrologist name and recommended she contact them with the lab results. Also recommended that she contact the PCP office, there typically is a provider that assumes care or monitors things after one of the providers leave the clinic. She declined calling anyone else and requests Dr. Malik review the labs.   Advised that I would send a message to Dr. Malik and that she is out of the office today.  Recommended that she call patient's son or family member to advise the patient needs to establish care with a new PCP, Lianna reports they do not have an emergency contact or family contact on file.   Discussed that I am not accepting responsible for these labs results as they were not ordered or requested by Dr. Malik and should be addressed with nephrology. Albertina verbalized understanding .     Uyen B[place] MSN, RN, OCN  RN Care Coordinator  [facility]  [phone]

## 2022-02-12 ENCOUNTER — HEALTH MAINTENANCE LETTER (OUTPATIENT)
Age: 87
End: 2022-02-12

## 2022-03-10 ENCOUNTER — LAB (OUTPATIENT)
Dept: LAB | Facility: CLINIC | Age: 87
End: 2022-03-10
Payer: MEDICARE

## 2022-03-10 ENCOUNTER — APPOINTMENT (OUTPATIENT)
Dept: GENERAL RADIOLOGY | Facility: CLINIC | Age: 87
DRG: 178 | End: 2022-03-10
Attending: EMERGENCY MEDICINE
Payer: MEDICARE

## 2022-03-10 ENCOUNTER — HOSPITAL ENCOUNTER (INPATIENT)
Facility: CLINIC | Age: 87
LOS: 9 days | Discharge: NURSING, REHAB OR LONG-TERM CARE | DRG: 178 | End: 2022-03-22
Attending: EMERGENCY MEDICINE | Admitting: HOSPITALIST
Payer: MEDICARE

## 2022-03-10 DIAGNOSIS — R53.1 WEAKNESS: ICD-10-CM

## 2022-03-10 DIAGNOSIS — F32.A DEPRESSION, UNSPECIFIED DEPRESSION TYPE: ICD-10-CM

## 2022-03-10 DIAGNOSIS — Z78.9 UNABLE TO CARE FOR SELF: ICD-10-CM

## 2022-03-10 DIAGNOSIS — E87.0 HYPERNATREMIA: ICD-10-CM

## 2022-03-10 DIAGNOSIS — F31.62 BIPOLAR AFFECTIVE DISORDER, MIXED, MODERATE DEGREE (H): ICD-10-CM

## 2022-03-10 DIAGNOSIS — Z86.59 HX OF BIPOLAR DISORDER: ICD-10-CM

## 2022-03-10 DIAGNOSIS — C77.3 SECONDARY AND UNSPECIFIED MALIGNANT NEOPLASM OF AXILLA AND UPPER LIMB LYMPH NODES (H): Primary | ICD-10-CM

## 2022-03-10 DIAGNOSIS — U07.1 INFECTION DUE TO 2019 NOVEL CORONAVIRUS: ICD-10-CM

## 2022-03-10 DIAGNOSIS — K59.03 DRUG-INDUCED CONSTIPATION: ICD-10-CM

## 2022-03-10 DIAGNOSIS — I48.0 PAROXYSMAL ATRIAL FIBRILLATION (H): ICD-10-CM

## 2022-03-10 LAB
ALBUMIN SERPL-MCNC: 2.7 G/DL (ref 3.4–5)
ALBUMIN UR-MCNC: 10 MG/DL
ALP SERPL-CCNC: 98 U/L (ref 40–150)
ALT SERPL W P-5'-P-CCNC: 29 U/L (ref 0–50)
ANION GAP SERPL CALCULATED.3IONS-SCNC: 4 MMOL/L (ref 3–14)
APPEARANCE UR: ABNORMAL
AST SERPL W P-5'-P-CCNC: 35 U/L (ref 0–45)
ATRIAL RATE - MUSE: 87 BPM
BACTERIA #/AREA URNS HPF: ABNORMAL /HPF
BASOPHILS # BLD AUTO: 0 10E3/UL (ref 0–0.2)
BASOPHILS NFR BLD AUTO: 1 %
BILIRUB SERPL-MCNC: 0.3 MG/DL (ref 0.2–1.3)
BILIRUB UR QL STRIP: NEGATIVE
BUN SERPL-MCNC: 36 MG/DL (ref 7–30)
CALCIUM SERPL-MCNC: 8.9 MG/DL (ref 8.5–10.1)
CHLORIDE BLD-SCNC: 111 MMOL/L (ref 94–109)
CK SERPL-CCNC: 60 U/L (ref 30–225)
CO2 SERPL-SCNC: 25 MMOL/L (ref 20–32)
COLOR UR AUTO: ABNORMAL
CREAT SERPL-MCNC: 1.74 MG/DL (ref 0.52–1.04)
DIASTOLIC BLOOD PRESSURE - MUSE: NORMAL MMHG
EOSINOPHIL # BLD AUTO: 0 10E3/UL (ref 0–0.7)
EOSINOPHIL NFR BLD AUTO: 0 %
ERYTHROCYTE [DISTWIDTH] IN BLOOD BY AUTOMATED COUNT: 14.3 % (ref 10–15)
GFR SERPL CREATININE-BSD FRML MDRD: 27 ML/MIN/1.73M2
GLUCOSE BLD-MCNC: 88 MG/DL (ref 70–99)
GLUCOSE UR STRIP-MCNC: NEGATIVE MG/DL
HCT VFR BLD AUTO: 40.6 % (ref 35–47)
HGB BLD-MCNC: 12.6 G/DL (ref 11.7–15.7)
HGB UR QL STRIP: ABNORMAL
IMM GRANULOCYTES # BLD: 0 10E3/UL
IMM GRANULOCYTES NFR BLD: 1 %
INTERPRETATION ECG - MUSE: NORMAL
KETONES UR STRIP-MCNC: NEGATIVE MG/DL
LEUKOCYTE ESTERASE UR QL STRIP: ABNORMAL
LITHIUM SERPL-SCNC: 0.3 MMOL/L
LYMPHOCYTES # BLD AUTO: 0.5 10E3/UL (ref 0.8–5.3)
LYMPHOCYTES NFR BLD AUTO: 13 %
MCH RBC QN AUTO: 31.7 PG (ref 26.5–33)
MCHC RBC AUTO-ENTMCNC: 31 G/DL (ref 31.5–36.5)
MCV RBC AUTO: 102 FL (ref 78–100)
MONOCYTES # BLD AUTO: 0.4 10E3/UL (ref 0–1.3)
MONOCYTES NFR BLD AUTO: 10 %
MUCOUS THREADS #/AREA URNS LPF: PRESENT /LPF
NEUTROPHILS # BLD AUTO: 3.1 10E3/UL (ref 1.6–8.3)
NEUTROPHILS NFR BLD AUTO: 75 %
NITRATE UR QL: POSITIVE
NRBC # BLD AUTO: 0 10E3/UL
NRBC BLD AUTO-RTO: 0 /100
P AXIS - MUSE: 70 DEGREES
PH UR STRIP: 5.5 [PH] (ref 5–7)
PLATELET # BLD AUTO: 146 10E3/UL (ref 150–450)
POTASSIUM BLD-SCNC: 4.9 MMOL/L (ref 3.4–5.3)
PR INTERVAL - MUSE: 166 MS
PROT SERPL-MCNC: 6.7 G/DL (ref 6.8–8.8)
QRS DURATION - MUSE: 112 MS
QT - MUSE: 340 MS
QTC - MUSE: 409 MS
R AXIS - MUSE: -62 DEGREES
RBC # BLD AUTO: 3.97 10E6/UL (ref 3.8–5.2)
RBC URINE: 8 /HPF
SARS-COV-2 RNA RESP QL NAA+PROBE: POSITIVE
SODIUM SERPL-SCNC: 140 MMOL/L (ref 133–144)
SP GR UR STRIP: 1.01 (ref 1–1.03)
SQUAMOUS EPITHELIAL: 1 /HPF
SYSTOLIC BLOOD PRESSURE - MUSE: NORMAL MMHG
T AXIS - MUSE: 47 DEGREES
T4 FREE SERPL-MCNC: 1.2 NG/DL (ref 0.76–1.46)
TROPONIN I SERPL HS-MCNC: 17 NG/L
TSH SERPL DL<=0.005 MIU/L-ACNC: 0.01 MU/L (ref 0.4–4)
UROBILINOGEN UR STRIP-MCNC: NORMAL MG/DL
VALPROATE SERPL-MCNC: 15 MG/L
VENTRICULAR RATE- MUSE: 87 BPM
WBC # BLD AUTO: 4.1 10E3/UL (ref 4–11)
WBC CLUMPS #/AREA URNS HPF: PRESENT /HPF
WBC URINE: 178 /HPF

## 2022-03-10 PROCEDURE — 96374 THER/PROPH/DIAG INJ IV PUSH: CPT

## 2022-03-10 PROCEDURE — 82550 ASSAY OF CK (CPK): CPT | Performed by: EMERGENCY MEDICINE

## 2022-03-10 PROCEDURE — 258N000003 HC RX IP 258 OP 636: Performed by: HOSPITALIST

## 2022-03-10 PROCEDURE — 80053 COMPREHEN METABOLIC PANEL: CPT | Performed by: EMERGENCY MEDICINE

## 2022-03-10 PROCEDURE — G0378 HOSPITAL OBSERVATION PER HR: HCPCS

## 2022-03-10 PROCEDURE — 96361 HYDRATE IV INFUSION ADD-ON: CPT

## 2022-03-10 PROCEDURE — 96372 THER/PROPH/DIAG INJ SC/IM: CPT | Performed by: HOSPITALIST

## 2022-03-10 PROCEDURE — 93005 ELECTROCARDIOGRAM TRACING: CPT

## 2022-03-10 PROCEDURE — 250N000011 HC RX IP 250 OP 636: Performed by: PHYSICIAN ASSISTANT

## 2022-03-10 PROCEDURE — 84439 ASSAY OF FREE THYROXINE: CPT | Performed by: EMERGENCY MEDICINE

## 2022-03-10 PROCEDURE — 250N000011 HC RX IP 250 OP 636: Performed by: HOSPITALIST

## 2022-03-10 PROCEDURE — 80178 ASSAY OF LITHIUM: CPT | Performed by: EMERGENCY MEDICINE

## 2022-03-10 PROCEDURE — 99285 EMERGENCY DEPT VISIT HI MDM: CPT | Mod: 25

## 2022-03-10 PROCEDURE — 258N000003 HC RX IP 258 OP 636: Performed by: EMERGENCY MEDICINE

## 2022-03-10 PROCEDURE — 96360 HYDRATION IV INFUSION INIT: CPT

## 2022-03-10 PROCEDURE — 71045 X-RAY EXAM CHEST 1 VIEW: CPT

## 2022-03-10 PROCEDURE — 87086 URINE CULTURE/COLONY COUNT: CPT | Performed by: EMERGENCY MEDICINE

## 2022-03-10 PROCEDURE — 84443 ASSAY THYROID STIM HORMONE: CPT | Performed by: EMERGENCY MEDICINE

## 2022-03-10 PROCEDURE — 36415 COLL VENOUS BLD VENIPUNCTURE: CPT | Performed by: EMERGENCY MEDICINE

## 2022-03-10 PROCEDURE — 82040 ASSAY OF SERUM ALBUMIN: CPT | Performed by: EMERGENCY MEDICINE

## 2022-03-10 PROCEDURE — 84484 ASSAY OF TROPONIN QUANT: CPT | Performed by: EMERGENCY MEDICINE

## 2022-03-10 PROCEDURE — 87635 SARS-COV-2 COVID-19 AMP PRB: CPT | Performed by: EMERGENCY MEDICINE

## 2022-03-10 PROCEDURE — 80164 ASSAY DIPROPYLACETIC ACD TOT: CPT | Performed by: EMERGENCY MEDICINE

## 2022-03-10 PROCEDURE — 85025 COMPLETE CBC W/AUTO DIFF WBC: CPT | Performed by: EMERGENCY MEDICINE

## 2022-03-10 PROCEDURE — 99220 PR INITIAL OBSERVATION CARE,LEVEL III: CPT | Performed by: HOSPITALIST

## 2022-03-10 PROCEDURE — 81001 URINALYSIS AUTO W/SCOPE: CPT | Performed by: EMERGENCY MEDICINE

## 2022-03-10 PROCEDURE — C9803 HOPD COVID-19 SPEC COLLECT: HCPCS

## 2022-03-10 RX ORDER — DIVALPROEX SODIUM 500 MG/1
500 TABLET, EXTENDED RELEASE ORAL AT BEDTIME
Status: ON HOLD | COMMUNITY
End: 2024-09-16

## 2022-03-10 RX ORDER — PRAMIPEXOLE DIHYDROCHLORIDE 1 MG/1
1 TABLET ORAL AT BEDTIME
Status: DISCONTINUED | OUTPATIENT
Start: 2022-03-10 | End: 2022-03-10

## 2022-03-10 RX ORDER — ARIPIPRAZOLE 5 MG/1
5 TABLET ORAL DAILY
Status: DISCONTINUED | OUTPATIENT
Start: 2022-03-11 | End: 2022-03-22 | Stop reason: HOSPADM

## 2022-03-10 RX ORDER — LITHIUM CARBONATE 150 MG/1
150 CAPSULE ORAL EVERY MORNING
Status: DISCONTINUED | OUTPATIENT
Start: 2022-03-11 | End: 2022-03-22 | Stop reason: HOSPADM

## 2022-03-10 RX ORDER — ONDANSETRON 4 MG/1
4 TABLET, ORALLY DISINTEGRATING ORAL EVERY 6 HOURS PRN
Status: DISCONTINUED | OUTPATIENT
Start: 2022-03-10 | End: 2022-03-22 | Stop reason: HOSPADM

## 2022-03-10 RX ORDER — ONDANSETRON 2 MG/ML
4 INJECTION INTRAMUSCULAR; INTRAVENOUS EVERY 6 HOURS PRN
Status: DISCONTINUED | OUTPATIENT
Start: 2022-03-10 | End: 2022-03-22 | Stop reason: HOSPADM

## 2022-03-10 RX ORDER — SODIUM CHLORIDE, SODIUM LACTATE, POTASSIUM CHLORIDE, CALCIUM CHLORIDE 600; 310; 30; 20 MG/100ML; MG/100ML; MG/100ML; MG/100ML
INJECTION, SOLUTION INTRAVENOUS CONTINUOUS
Status: DISCONTINUED | OUTPATIENT
Start: 2022-03-10 | End: 2022-03-12

## 2022-03-10 RX ORDER — ACETAMINOPHEN 325 MG/1
975 TABLET ORAL EVERY 8 HOURS PRN
Status: DISCONTINUED | OUTPATIENT
Start: 2022-03-10 | End: 2022-03-13

## 2022-03-10 RX ORDER — HEPARIN SODIUM 5000 [USP'U]/.5ML
5000 INJECTION, SOLUTION INTRAVENOUS; SUBCUTANEOUS EVERY 12 HOURS
Status: DISCONTINUED | OUTPATIENT
Start: 2022-03-10 | End: 2022-03-14

## 2022-03-10 RX ORDER — LEVOTHYROXINE SODIUM 100 UG/1
100 TABLET ORAL DAILY
Status: DISCONTINUED | OUTPATIENT
Start: 2022-03-11 | End: 2022-03-22 | Stop reason: HOSPADM

## 2022-03-10 RX ORDER — LETROZOLE 2.5 MG/1
2.5 TABLET, FILM COATED ORAL DAILY
Status: DISCONTINUED | OUTPATIENT
Start: 2022-03-11 | End: 2022-03-22 | Stop reason: HOSPADM

## 2022-03-10 RX ORDER — CEFTRIAXONE 2 G/1
2 INJECTION, POWDER, FOR SOLUTION INTRAMUSCULAR; INTRAVENOUS EVERY 24 HOURS
Status: DISCONTINUED | OUTPATIENT
Start: 2022-03-10 | End: 2022-03-14

## 2022-03-10 RX ADMIN — SODIUM CHLORIDE, POTASSIUM CHLORIDE, SODIUM LACTATE AND CALCIUM CHLORIDE: 600; 310; 30; 20 INJECTION, SOLUTION INTRAVENOUS at 17:39

## 2022-03-10 RX ADMIN — SODIUM CHLORIDE 1000 ML: 9 INJECTION, SOLUTION INTRAVENOUS at 12:16

## 2022-03-10 RX ADMIN — HEPARIN SODIUM 5000 UNITS: 5000 INJECTION, SOLUTION INTRAVENOUS; SUBCUTANEOUS at 19:38

## 2022-03-10 RX ADMIN — CEFTRIAXONE 2 G: 2 INJECTION, POWDER, FOR SOLUTION INTRAMUSCULAR; INTRAVENOUS at 19:39

## 2022-03-10 ASSESSMENT — ENCOUNTER SYMPTOMS
ABDOMINAL PAIN: 0
DIZZINESS: 0
SHORTNESS OF BREATH: 0
CONFUSION: 0
FLANK PAIN: 0
WEAKNESS: 1
BACK PAIN: 0
FATIGUE: 1
FEVER: 0
WOUND: 0
AGITATION: 0
HEADACHES: 0

## 2022-03-10 NOTE — PROGRESS NOTES
ROOM # 228    Living Situation (if not independent, order SW consult):Ind in apartment   Facility name:NA  : Son Dmitry and Daughter in law Marta Lozano reports she will transport pt at discharge. She lives 1 hour away.    Activity level at baseline: Ind (no walker or cane per pt)  Activity level on admit: Assist of 1 with walker       Patient registered to observation; given Patient Bill of Rights; given the opportunity to ask questions about observation status and their plan of care.  Patient has been oriented to the observation room, bathroom and call light is in place.    Discussed discharge goals and expectations with patient/family.

## 2022-03-10 NOTE — PHARMACY-ADMISSION MEDICATION HISTORY
Admission medication history interview status for this patient is complete. See [facility] admission navigator for allergy information, prior to admission medications and immunization status.     Medication history interview done, indicate source(s): Dmitry (son) @ [phone]  Medication history resources (including written lists, pill bottles, clinic record): OUTSIDE meds    Changes made to PTA medication list:  Added: depakote ER-24hr  Changed: abilify, synthroid, Lithium  Reported as Not Taking: none  Removed: vitamin C, vitamin D (duplicate), vitamin b12, mirapex, triamcinolone cream    Actions taken by pharmacist (provider contacted, etc):None     Additional medication history information:None    Medication reconciliation/reorder completed by provider prior to medication history?  n   (Y/N)         Prior to Admission medications    Medication Sig Last Dose Taking? Auth Provider   acetaminophen (TYLENOL) 325 MG tablet Take 3 tablets (975 mg) by mouth every 8 hours as needed for mild pain  Yes Mini Lua, PAEhsanC   ARIPiprazole (ABILIFY) 5 MG tablet Take 2 mg by mouth daily   Yes Reported, Patient   divalproex sodium extended-release (DEPAKOTE ER) 500 MG 24 hr tablet Take 500 mg by mouth At Bedtime  Yes Unknown, Entered By History   letrozole (FEMARA) 2.5 MG tablet TAKE 1 TABLET BY MOUTH EVERY DAY  Yes Perlita Malik MD   levothyroxine (SYNTHROID/LEVOTHROID) 75 MCG tablet Take 100 mcg by mouth daily   Yes Reported, Patient   lithium (ESKALITH) 150 MG capsule Take 150 mg by mouth every morning   Yes Ty Quintanilla MD   vitamin D3 (CHOLECALCIFEROL) 10 MCG (400 UNIT) capsule Take 2 capsules (800 Units) by mouth daily  Yes Perlita Malki MD   ORDER FOR DME Equipment being ordered: compression stocking knee high 20-30mmhg   Ty Quintanilla MD

## 2022-03-10 NOTE — ED NOTES
[facility]  ED Nurse Handoff Report    Lennie Mar is a 89 year old female   ED Chief complaint: Generalized Weakness  . ED Diagnosis:   Final diagnoses:   Weakness   Unable to care for self   Depression, unspecified depression type   Hx of bipolar disorder   Infection due to 2019 novel coronavirus     Allergies:   Allergies   Allergen Reactions     Cafergot Other (See Comments) and Nausea and Vomiting     Severe HA, N/V & diarrhea     Ciprofloxacin      Nausea and vomiting per son      Penicillins Rash and Unknown     Sulfa Drugs Rash and Unknown     Ergot Alkaloids Unknown     Lamictal [Lamotrigine] Other (See Comments)     Patient experienced night moss     Naproxen      Pt unable to remember reaction.     Naproxen Unknown     Tetracycline Unknown     Pt unable to remember allergy       Code Status: Full Code  Activity level - Baseline/Home:  Independent. Activity Level - Current:   Assist X 1. Lift room needed: No. Bariatric: No   Needed: No   Isolation: Yes. Infection: Not Applicable  COVID r/o and special precautions.     Vital Signs:   Vitals:    03/10/22 1119   BP: (!) 120/102   Pulse: 109   Resp: 18   Temp: 98.5  F (36.9  C)   SpO2: 97%       Cardiac Rhythm:  ,      Pain level:    Patient confused: Yes. Patient Falls Risk: Yes.   Elimination Status: Bladder was scanned at 13 PM; bladder scan volume was: 99 mL   Patient Report - Initial Complaint: Pt presents to ED with daughter in law. Family is concerned that pt is weakness, confused, and probably dehydrated. Pt lives alone in an apartment and has not been able to take care of herself. Pt is having a hard time walking. Pt has history of bipolar disorder and takes lithium. Pt is short of breath and has a cough as well.  Focused Assessment: Lennie Mar is a 89 year old female who presents with weakness and depression.  Patient does have a history of bipolar but has been taking her medications compliantly.  Her family  helps her sort her medications but she is responsible for taking her own medications.  She currently lives independently in a apartment but is responsible for the remainder of her activities of daily living.  It sounds that she wears depends but is having significant difficulty and bathing and changing.  She is only utilizing frozen meals and challenging to interpret whether she is taking enough nutrition on a daily basis.  She feels unsafe at home at this time.  She does not feel suicidal or homicidal.  She does have a history of bipolar and family is concerned that she may be experiencing significant lows with a bout of depression at this time.  She often feels lonely.  She denies any chest pain, shortness of breath, abdominal pain, nausea, vomiting, or diarrhea.  Unsure on urinary complaints.  Denies fever and constitutional symptoms beyond global fatigue and weakness.  No prior history of stroke.  She presents with her daughter-in-law today, Marta.  Tests Performed:   Labs Ordered and Resulted from Time of ED Arrival to Time of ED Departure   COMPREHENSIVE METABOLIC PANEL - Abnormal       Result Value    Sodium 140      Potassium 4.9      Chloride 111 (*)     Carbon Dioxide (CO2) 25      Anion Gap 4      Urea Nitrogen 36 (*)     Creatinine 1.74 (*)     Calcium 8.9      Glucose 88      Alkaline Phosphatase 98      AST 35      ALT 29      Protein Total 6.7 (*)     Albumin 2.7 (*)     Bilirubin Total 0.3      GFR Estimate 27 (*)    TSH WITH FREE T4 REFLEX - Abnormal    TSH 0.01 (*)    COVID-19 VIRUS (CORONAVIRUS) BY PCR - Abnormal    SARS CoV2 PCR Positive (*)    CBC WITH PLATELETS AND DIFFERENTIAL - Abnormal    WBC Count 4.1      RBC Count 3.97      Hemoglobin 12.6      Hematocrit 40.6       (*)     MCH 31.7      MCHC 31.0 (*)     RDW 14.3      Platelet Count 146 (*)     % Neutrophils 75      % Lymphocytes 13      % Monocytes 10      % Eosinophils 0      % Basophils 1      % Immature Granulocytes 1       NRBCs per 100 WBC 0      Absolute Neutrophils 3.1      Absolute Lymphocytes 0.5 (*)     Absolute Monocytes 0.4      Absolute Eosinophils 0.0      Absolute Basophils 0.0      Absolute Immature Granulocytes 0.0      Absolute NRBCs 0.0     LITHIUM LEVEL - Normal    Lithium 0.3     TROPONIN I - Normal    Troponin I High Sensitivity 17     CK TOTAL - Normal    CK 60     VALPROIC ACID - Normal    Valproic acid 15     T4 FREE - Normal    Free T4 1.20     ROUTINE UA WITH MICROSCOPIC REFLEX TO CULTURE   . Abnormal Results:   XR Chest Port 1 View   Final Result   IMPRESSION: No airspace consolidation, pneumothorax, or pleural   effusion.      ARABELLA TEJEDA MD            SYSTEM ID:  [id number]         Treatments provided: Labs, meds, flids  Family Comments: Family at the bedside  OBS brochure/video discussed/provided to patient:  Yes  ED Medications:   Medications   0.9% sodium chloride BOLUS (1,000 mLs Intravenous New Bag 3/10/22 1216)     Drips infusing:  Yes  For the majority of the shift, the patient's behavior Yellow. Interventions performed were Care explained.    Sepsis treatment initiated: No     Patient tested for COVID 19 prior to admission: YES    ED Nurse Name/Phone Number: Nica Kirk RN,   1:49 PM  Zohra Clayton RN on 3/10/2022 at 4:10 PM

## 2022-03-10 NOTE — ED TRIAGE NOTES
Pt presents to ED with daughter in law. Family is concerned that pt is weakness, confused, and probably dehydrated. Pt lives alone in an apartment and has not been able to take care of herself. Pt is having a hard time walking. Pt has history of bipolar disorder and takes lithium. Pt is short of breath and has a cough as well.

## 2022-03-10 NOTE — H&P
[facility]    History and Physical - Hospitalist Service       Date of Admission:  3/10/2022    Assessment & Plan      Lennie Mar is a 89 year old female admitted on 3/10/2022. She has a history of breast cancer, alcohol dependence in remission, anxiety, asymptomatic varicose veins, bipolar disorder, chronic kidney disease stage 3, history of smoking, hyperparathyroidism, memory loss, muscle weakness, severe depression, subdural hygroma, and resting tremor, is admitted for generalized weakness and found to have COVID-19.     Generalized weakness    - likely due to Covid 19 infection +/- UTI    - UA appears infected, ceftriaxone started    - PT eval    - may need TCU    COVID - 19     - she had two vaccinations, but was not boosted     - CXR showed no acute disease    - patient not requiring oxygen    - monitor    - will not order or follow inflammatory markers unless has a clinical change     Acute on chronic kidney disease     - stage 3 chronic kidney disease felt to be from lithium use    - baseline Cr 1.1-1.3    - on admission is 1.74    - likely pre-renal due to decreased intake    UTI    - UA appears infected    - grew Klebsiella in 2014 and Klebsiella/Enterococcus in 2012    - started on ceftriaxone based on most recent previous culture    - pending final cultures    Bipolar disorder, unspecified    - follows with Dr. 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 Malik)    - on letrozole since 2019, cont    Confirmed COVID-19 infection       Symptom Onset Unknown    Date of 1st Positive Test 03/10/2022   Vaccination Status Had 2 vaccines, but not the booster        - COVID-19 special precautions, continuous pulse-ox  - Oxygen: continue current support with room air; titrate to keep SpO2 between 90-96%  - Labs: CBC and BMP  - Imaging: no additional imaging needed at this time  - Breathing treatments: no inhalers needed; avoid nebulizers in favor of MDIs   - IV fluids: Lactated ringers   - Antibiotics: not indicated   - COVID-Focused Medications: No COVID-specific therapies are appropriate at this time.  - DVT Prophylaxis: - At high risk of thrombotic complications due to COVID-19 (DDimer = N/A )         - Heparin     DNR/DNI     Daughter in room. Questions answered     Diet: Regular Diet Adult    DVT Prophylaxis: Heparin   Kelley Catheter: Not present  Fluids: IV and PO   Central Lines: None  Cardiac Monitoring: None  Code Status:  DNR    Clinically Significant Risk Factors Present on Admission                     Disposition Plan   Expected Discharge: Anticipated discharge location:  Awaiting care coordination huddle  Delays:       The patient's care was discussed with the Attending Physician, Dr. Devendra Cody MD .      MD Yolanda Ruth PA-S2  Hospitalist Service  [facility]  Securely message with the G[employer] 1 Web Console (learn more here)  Text page via [employer] Paging/Directory   ______________________________________________________________________    Chief Complaint   Generalized weakness    History is obtained from the patient and daughter - in - law     History of Present Illness   Lennie Mar is a 89  year old female who has a history of alcohol dependence in remission, anxiety, asymptomatic varicose veins, bipolar disorder, chronic kidney disease stage 3, breast cancer, history of smoking, hyperparathyroidism, memory loss, muscle weakness (generalized), severe depression, subdural hygroma, tremor of unknown origin and is admitted for generalized weakness and COVID - 19.     Her daughter - in - law shared that she has noticed her mother - in - law is more weak than normal. Lennie shares she is short of breath and it has been difficult for her to breath. It was said that she has a weak cough that has been been present for some time. She has not been eating or drinking well. Lennie was recently started on Depakote.     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 Her daughter - in - law would be interested in talking with social work. Lennie denies headache, fever, vision changes, hearing difficulty, chest pain, abdominal pain, and bowel or bladder concerns.     Review of Systems    The 5 point Review of Systems is negative other than noted in the HPI or here.     Past Medical History    I have reviewed this patient's medical history and updated it with pertinent information if needed.   Past Medical History:   Diagnosis Date     Alcohol dependence in remission (H)      Anxiety      Asymptomatic varicose veins      Bipolar disorder, unspecified (H)      CKD (chronic kidney disease) stage 3, GFR 30-59 ml/min (H) 2/18/2014     History of smoking      Hyperparathyroidism (H)      Memory loss      Muscle weakness (generalized) 6/23/2008     Severe depression (H)      Subdural hygroma     chronic.  no surgeries     Tremor of unknown origin       Past Surgical History   I have reviewed this patient's surgical history and updated it with pertinent information if needed.  Past Surgical History:   Procedure Laterality Date     APPENDECTOMY OPEN  1947     CATARACT IOL, RT/LT        COLONOSCOPY WITH CO2 INSUFFLATION  2012    Procedure:COLONOSCOPY WITH CO2 INSUFFLATION; Surgeon:GAYLE REYNA; Location:[facility] OR     CYSTOCELE REPAIR       CYSTOSCOPY, INSERT STENT URETHRA, COMBINED       CYSTOSCOPY, RETROGRADES, INSERT STENT URETER(S), COMBINED  2012    Procedure:COMBINED CYSTOSCOPY, RETROGRADES, INSERT STENT URETER(S); Surgeon:ANT ESPINOZA; Location:[facility] OR     DECOMPRESSION LUMBAR ONE LEVEL  2013    Procedure: DECOMPRESSION LUMBAR ONE LEVEL;  Posterior Decompression Right Lumbar 5- Sacral 1;  Surgeon: You Zavala MD;  Location: [facility] OR     HC TOOTH EXTRACTION W/FORCEP       HYSTERECTOMY, CATA       IRRIGATION AND DEBRIDEMENT ORAL, COMBINED      For treatment of gingivitis     LAPAROSCOPIC ASSISTED COLECTOMY  2012    Procedure:LAPAROSCOPIC ASSISTED COLECTOMY; Cysto, Bilateral Stent placement (both stents removed at end of case)- Yanet ACOSTA. Laparoscopic Extended Right Venu Colectomy with CO2 Colonoscopy and polyp removal-Judah; Surgeon:GAYLE REYNA; Location:[facility] OR     LIGATN/STRIP LONG OR SHORT SAPHEN       MASTECTOMY PARTIAL WITH SENTINEL NODE Left 2018    Procedure: Left Mastectomy, Left [place] Lymph Node Biopsy;  Surgeon: Nahun Betancourt MD;  Location: [facility] OR     STRIP VEIN BILATERAL       SURGICAL HISTORY OF -       ureter surgery for blockage.        Social History   I have reviewed this patient's social history and updated it with pertinent information if needed. Lennie Mar  reports that she quit smoking about 28 years ago. Her smoking use included cigarettes. She has a 45.00 pack-year smoking history. She has never used smokeless tobacco. She reports that she does not drink alcohol and does not use drugs.    Family History   I have reviewed this patient's family history and updated it with pertinent information if needed.  Family History   Problem Relation Age of Onset     C.A.D. Mother           at age 47 Heart failure, Rhumatic Fever     Cerebrovascular Disease Father          at age 79     Diabetes Father         type 2     Breast Cancer Sister 84        99 year old sister     Circulatory Maternal Grandmother         vericose veins     C.A.D. Paternal Grandfather      Gastrointestinal Disease Paternal Grandfather         ulcer     Cancer Son          age 51--Melanoma     Cancer Brother          age 50's--Melanoma     Arthritis Sister      Circulatory Sister         vericose veins     Circulatory Sister         vericose veins     Eye Disorder Sister         Cateracts     Respiratory Sister         asthma     Respiratory Brother         COPD     Breast Cancer Niece 60        daughter of 98 yo sister       Prior to Admission Medications   Prior to Admission Medications   Prescriptions Last Dose Informant Patient Reported? Taking?   ARIPiprazole (ABILIFY) 5 MG tablet   Yes No   Si mg tablet in the morning   Ascorbic Acid (VITAMIN C PO)   Yes No   Sig: Take 1 tablet by mouth daily   Cyanocobalamin (VITAMIN B 12 PO)   Yes No   Sig: Take by mouth every morning   ORDER FOR DME   No No   Sig: Equipment being ordered: compression stocking knee high 20-30mmhg   acetaminophen (TYLENOL) 325 MG tablet   No No   Sig: Take 3 tablets (975 mg) by mouth every 8 hours as needed for mild pain   letrozole (FEMARA) 2.5 MG tablet   No No   Sig: TAKE 1 TABLET BY MOUTH EVERY DAY   levothyroxine (SYNTHROID/LEVOTHROID) 75 MCG tablet   Yes No   Sig: Take 75 mcg by mouth daily   lithium (ESKALITH) 150 MG capsule   Yes No   Sig: Take 1 capsule (150 mg) by mouth 2 times daily (with meals)   pramipexole (MIRAPEX) 1 MG tablet   Yes No   Sig: Take 1 tablet (1 mg) by mouth At Bedtime   triamcinolone (KENALOG) 0.1 % external cream   No No   Sig: Apply topically 3 times daily For 1-2 weeks to right leg until resolved   vitamin D3 (CHOLECALCIFEROL) 10 MCG (400 UNIT) capsule   No No   Sig: Take 2 capsules (800 Units) by mouth  daily   vitamin D3 (CHOLECALCIFEROL) 1000 units (25 mcg) tablet   No No   Sig: Take 1 tablet (1,000 Units) by mouth daily      Facility-Administered Medications: None     Allergies   Allergies   Allergen Reactions     Cafergot Other (See Comments) and Nausea and Vomiting     Severe HA, N/V & diarrhea     Ciprofloxacin      Nausea and vomiting per son      Penicillins Rash and Unknown     Sulfa Drugs Rash and Unknown     Ergot Alkaloids Unknown     Lamictal [Lamotrigine] Other (See Comments)     Patient experienced night moss     Naproxen      Pt unable to remember reaction.     Naproxen Unknown     Tetracycline Unknown     Pt unable to remember allergy       Physical Exam   Vital Signs: Temp: 98.5  F (36.9  C)   BP: (!) 120/102 Pulse: 109   Resp: 18 SpO2: 97 % O2 Device: None (Room air)    Weight: 0 lbs 0 oz    General: Resting in bed, in no acute distress. Resting tremor.   Head: Normocephalic, atraumatic   Eyes: Sclera white   Lymph node: No lymphadenopathy of posterior auricular, anterior auricular, posterior cervical, anterior cervical, supraclavicular, and axillary nodes.   Thyroid: No hard nodules palpated  Heart: Regular rate and rhythm   Lung: Rhonchi auscultated bilaterally   Abdominal: Bowel sounds present.  Musculoskeletal: Grossly intact movement of upper and lower extremities   Neurological: Grossly intact strength and motor function   Psych: Appears to be down. Flat affect.     Data   Data reviewed today: I reviewed all medications, new labs and imaging results over the last 24 hours. I personally reviewed the EKG tracing showing Sinue rhythm. Possible Left atrial enlargment. Left anterior fascicular block. Left ventricular hypertrophy. Abnormal ECG. .    Recent Labs   Lab 03/10/22  1158   WBC 4.1   HGB 12.6   *   *      POTASSIUM 4.9   CHLORIDE 111*   CO2 25   BUN 36*   CR 1.74*   ANIONGAP 4   CANELO 8.9   GLC 88   ALBUMIN 2.7*   PROTTOTAL 6.7*   BILITOTAL 0.3   ALKPHOS 98   ALT 29    AST 35     Most Recent 3 CBC's:Recent Labs   Lab Test 03/10/22  1158 02/12/21  0957 12/23/19  1014   WBC 4.1 7.8 5.9   HGB 12.6 13.3 12.3   * 98 97   * 210 143*     Most Recent 3 BMP's:Recent Labs   Lab Test 03/10/22  1158 02/04/22  0937 02/12/21  0957    141 145*   POTASSIUM 4.9 4.7 4.4   CHLORIDE 111* 113* 115*   CO2 25 24 23   BUN 36* 28 21   CR 1.74* 1.41* 1.07*   ANIONGAP 4 4 7   CANELO 8.9 9.9 10.2*   GLC 88 97 119*     Most Recent 2 LFT's:Recent Labs   Lab Test 03/10/22  1158 02/04/22  0937   AST 35 15   ALT 29 18   ALKPHOS 98 143   BILITOTAL 0.3 0.5     Most Recent 3 INR's:No lab results found.  Anticoagulation Dose History     Recent Dosing and Labs Latest Ref Rng & Units 2/28/2012    INR 0.86 - 1.14 1.03          Most Recent 3 Creatinines:Recent Labs   Lab Test 03/10/22  1158 02/04/22  0937 02/12/21  0957   CR 1.74* 1.41* 1.07*     Most Recent 3 Hemoglobins:Recent Labs   Lab Test 03/10/22  1158 02/12/21  0957 12/23/19  1014   HGB 12.6 13.3 12.3     Most Recent 3 Troponin's:No lab results found.  Most Recent 3 BNP's:Recent Labs   Lab Test 02/15/14  1105 02/11/14  1431   NTBNPI 161  --    NTBNP  --  222     Most Recent D-dimer:Recent Labs   Lab Test 02/14/14  1444   DD 1.0*     Most Recent Cholesterol Panel:No lab results found.  Most Recent 6 Bacteria Isolates From Any Culture (See EPIC Reports for Culture Details):Recent Labs   Lab Test 04/15/19  0821   CULT >100,000 colonies/mL  Klebsiella pneumoniae  *     Most Recent TSH and T4:Recent Labs   Lab Test 03/10/22  1158   TSH 0.01*   T4 1.20     Most Recent Hemoglobin A1c:No lab results found.  Most Recent 6 glucoses:Recent Labs   Lab Test 03/10/22  1158 02/04/22  0937 02/12/21  0957 01/08/20  1033 12/23/19  1014 12/02/19  1254   GLC 88 97 119* 106* 94 93     Most Recent Urinalysis:Recent Labs   Lab Test 05/06/19  0715 04/20/19  1317   COLOR Yellow Yellow   APPEARANCE Slightly Cloudy Clear   URINEGLC Negative Negative   URINEBILI Negative  Negative   URINEKETONE Negative Negative   SG 1.009 1.015   UBLD Negative Negative   URINEPH 7.0 6.5   PROTEIN Negative Negative   UROBILINOGEN  --  0.2   NITRITE Negative Negative   LEUKEST Large* Small*   RBCU 2 O - 2   WBCU >182* 0 - 5     4.1    \    12.6    /    146 (L)   N 75    L N/A    140    111 (H)    36 (H) /   ------------------------------------ 88   ALT 29   AST 35   AP 98   ALB 2.7 (L)   Ca 8.9  4.9    25    1.74 (H) \    % RETIC N/A    LDH N/A  Troponin N/A    BNP N/A    CK 60  INR N/A   PTT N/A    D-dimer N/A    Fibrinogen N/A    Antithrombin N/A  Ferritin N/A  CRP N/A    IL-6 N/A  Recent Results (from the past 24 hour(s))   XR Chest Port 1 View    Narrative    CHEST ONE VIEW PORTABLE March 10, 2022 1:31 PM     HISTORY: COVID, weakness.    COMPARISON: 8/8/2014.      Impression    IMPRESSION: No airspace consolidation, pneumothorax, or pleural  effusion.    ARABELLA TEJEDA MD         SYSTEM ID:  [id number]

## 2022-03-10 NOTE — ED PROVIDER NOTES
History     Chief Complaint:  Generalized Weakness     HPI   Lennie Mar is a 89 year old female who presents with weakness and depression.  Patient does have a history of bipolar but has been taking her medications compliantly.  Her family helps her sort her medications but she is responsible for taking her own medications.  She currently lives independently in a apartment but is responsible for the remainder of her activities of daily living.  It sounds that she wears depends but is having significant difficulty and bathing and changing.  She is only utilizing frozen meals and challenging to interpret whether she is taking enough nutrition on a daily basis.  She feels unsafe at home at this time.  She does not feel suicidal or homicidal.  She does have a history of bipolar and family is concerned that she may be experiencing significant lows with a bout of depression at this time.  She often feels lonely.  She denies any chest pain, shortness of breath, abdominal pain, nausea, vomiting, or diarrhea.  Unsure on urinary complaints.  Denies fever and constitutional symptoms beyond global fatigue and weakness.  No prior history of stroke.  She presents with her daughter-in-law today, Marta.    ROS:  Review of Systems   Constitutional: Positive for fatigue. Negative for fever.   Respiratory: Negative for shortness of breath.    Cardiovascular: Negative for chest pain.   Gastrointestinal: Negative for abdominal pain.   Genitourinary: Negative for flank pain.   Musculoskeletal: Negative for back pain.   Skin: Negative for wound.   Neurological: Positive for weakness. Negative for dizziness and headaches.   Psychiatric/Behavioral: Negative for agitation, behavioral problems, confusion, self-injury and suicidal ideas.        Depressed   All other systems reviewed and are negative.      Allergies:  Cafergot  Ciprofloxacin  Penicillins  Sulfa Drugs  Ergot Alkaloids  Lamictal  [Lamotrigine]  Naproxen  Naproxen  Tetracycline     Medications:    acetaminophen (TYLENOL) 325 MG tablet  ARIPiprazole (ABILIFY) 5 MG tablet  Ascorbic Acid (VITAMIN C PO)  Cyanocobalamin (VITAMIN B 12 PO)  letrozole (FEMARA) 2.5 MG tablet  levothyroxine (SYNTHROID/LEVOTHROID) 75 MCG tablet  lithium (ESKALITH) 150 MG capsule  ORDER FOR DME  pramipexole (MIRAPEX) 1 MG tablet  triamcinolone (KENALOG) 0.1 % external cream  vitamin D3 (CHOLECALCIFEROL) 10 MCG (400 UNIT) capsule  vitamin D3 (CHOLECALCIFEROL) 1000 units (25 mcg) tablet        Past Medical History:    Past Medical History:   Diagnosis Date     Alcohol dependence in remission (H)      Anxiety      Asymptomatic varicose veins      Bipolar disorder, unspecified (H)      CKD (chronic kidney disease) stage 3, GFR 30-59 ml/min (H) 2/18/2014     History of smoking      Hyperparathyroidism (H)      Memory loss      Muscle weakness (generalized) 6/23/2008     Severe depression (H)      Subdural hygroma      Tremor of unknown origin      Patient Active Problem List   Diagnosis     INCONTINENCE URINE URGE     PAIN LOW BACK     WEAKNESS MUSCLE     Vitamin D deficiency     Memory difficulty     Falls frequently     Vitamin B 12 deficiency     Iron deficiency anemia     Lower urinary tract infectious disease     Delirium of mixed origin     Hyperparathyroidism (H)     Severe depressed bipolar I disorder without psychotic features (H)     Advanced directives, counseling/discussion     Pain     Hx of decompressive lumbar laminectomy     Sciatic pain     UTI (urinary tract infection)     CKD (chronic kidney disease) stage 3, GFR 30-59 ml/min (H)     Malignant neoplasm of upper-inner quadrant of left breast in female, estrogen receptor positive (H)     Malignant neoplasm of upper-outer quadrant of left breast in female, estrogen receptor positive (H)     Breast cancer (H)     Alcohol dependence in remission (H)     ARF (acute renal failure) (H)     Bipolar affective  disorder, mixed, moderate degree (H)     Colonic mass     Constipation     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     Gastritis     HTN (hypertension)     Leucocytosis     Lithium toxicity, accidental or unintentional, initial encounter     Metabolic acidosis     Mood disorder due to a general medical condition     Nontraumatic subdural hygroma     S/P laminectomy     S/P right hemicolectomy     Ureteral stent retained     Weakness generalized     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)     Weakness     Hx of bipolar disorder     Depression, unspecified depression type     Unable to care for self     Infection due to 2019 novel coronavirus        Past Surgical History:    Past Surgical History:   Procedure Laterality Date     APPENDECTOMY OPEN  1947     CATARACT IOL, RT/LT       COLONOSCOPY WITH CO2 INSUFFLATION  2/29/2012    Procedure:COLONOSCOPY WITH CO2 INSUFFLATION; Surgeon:GAYLE REYNA; Location:[facility] OR     CYSTOCELE REPAIR  1972     CYSTOSCOPY, INSERT STENT URETHRA, COMBINED  1999     CYSTOSCOPY, RETROGRADES, INSERT STENT URETER(S), COMBINED  2/29/2012    Procedure:COMBINED CYSTOSCOPY, RETROGRADES, INSERT STENT URETER(S); Surgeon:ANT ESPINOZA; Location:[facility]     DECOMPRESSION LUMBAR ONE LEVEL  8/9/2013    Procedure: DECOMPRESSION LUMBAR ONE LEVEL;  Posterior Decompression Right Lumbar 5- Sacral 1;  Surgeon: You Zavala MD;  Location: [facility]     HC TOOTH EXTRACTION W/FORCEP       HYSTERECTOMY, CATA  1963     IRRIGATION AND DEBRIDEMENT ORAL, COMBINED  1982    For treatment of gingivitis     LAPAROSCOPIC ASSISTED COLECTOMY  2/29/2012    Procedure:LAPAROSCOPIC ASSISTED COLECTOMY; Cysto, Bilateral Stent placement (both stents removed at end of case)- Yanet ACOSTA. Laparoscopic Extended Right Venu Colectomy with CO2 Colonoscopy and polyp removal-Judah; Surgeon:GAYLE REYNA; Location:[facility] OR     LIGATN/STRIP LONG OR SHORT  MARCIEEN  1955     MASTECTOMY PARTIAL WITH SENTINEL NODE Left 11/19/2018    Procedure: Left Mastectomy, Left [place] Lymph Node Biopsy;  Surgeon: Nahun Betancourt MD;  Location: [facility] OR     STRIP VEIN BILATERAL  1964     SURGICAL HISTORY OF -   1999    ureter surgery for blockage.        Family History:    family history includes Arthritis in her sister; Breast Cancer (age of onset: 60) in her niece; Breast Cancer (age of onset: 84) in her sister; C.A.D. in her mother and paternal grandfather; Cancer in her brother and son; Cerebrovascular Disease in her father; Circulatory in her maternal grandmother, sister, and sister; Diabetes in her father; Eye Disorder in her sister; Gastrointestinal Disease in her paternal grandfather; Respiratory in her brother and sister.    Social History:   reports that she quit smoking about 28 years ago. Her smoking use included cigarettes. She has a 45.00 pack-year smoking history. She has never used smokeless tobacco. She reports that she does not drink alcohol and does not use drugs.  PCP: Christi Terry     Physical Exam     Patient Vitals for the past 24 hrs:   BP Temp Pulse Resp SpO2   03/10/22 1119 (!) 120/102 98.5  F (36.9  C) 109 18 97 %      Physical Exam  General: Alert, appears frail and elderly. Cooperative.     In mild distress  HEENT:  Head:  Atraumatic  Ears:  External ears are normal  Mouth/Throat:  Oropharynx is without erythema or exudate and mucous membranes are dry.   Eyes:   Conjunctivae normal and EOM are normal. No scleral icterus.  CV:  Normal rate, regular rhythm, normal heart sounds and radial pulses are 2+ and symmetric.  No murmur.  Resp:  Breath sounds are clear bilaterally    Non-labored, no retractions or accessory muscle use  GI:  Abdomen is soft, no distension, no tenderness. No rebound or guarding.  No CVA tenderness bilaterally  MS:  Normal range of motion. No edema.    Normal strength in all 4 extremities.     Back atraumatic.    No  midline cervical, thoracic, or lumbar tenderness  Skin:  Warm and dry.  No rash or lesions noted.  Neuro: Alert. Normal strength.  Sensation intact in all 4 extremities. GCS: 15    Cranial nerves 2-12 intact.  Psych:  Depressed mood and flat affect.    Emergency Department Course   ECG  ECG taken at 1132, ECG read at 1132  Normal sinus rhythm. Possible left atrial enlargement. Left anterior fascicular block. Left ventricular hypertrophy. Abnormal ECG.    No significant change as compared to prior, dated 4/22/12.  Rate 87 bpm. AR interval 166 ms. QRS duration 112 ms. QT/QTc 340/409 ms. P-R-T axes 70 -62 47.     Laboratory:  Labs Ordered and Resulted from Time of ED Arrival to Time of ED Departure   COMPREHENSIVE METABOLIC PANEL - Abnormal       Result Value    Sodium 140      Potassium 4.9      Chloride 111 (*)     Carbon Dioxide (CO2) 25      Anion Gap 4      Urea Nitrogen 36 (*)     Creatinine 1.74 (*)     Calcium 8.9      Glucose 88      Alkaline Phosphatase 98      AST 35      ALT 29      Protein Total 6.7 (*)     Albumin 2.7 (*)     Bilirubin Total 0.3      GFR Estimate 27 (*)    TSH WITH FREE T4 REFLEX - Abnormal    TSH 0.01 (*)    COVID-19 VIRUS (CORONAVIRUS) BY PCR - Abnormal    SARS CoV2 PCR Positive (*)    CBC WITH PLATELETS AND DIFFERENTIAL - Abnormal    WBC Count 4.1      RBC Count 3.97      Hemoglobin 12.6      Hematocrit 40.6       (*)     MCH 31.7      MCHC 31.0 (*)     RDW 14.3      Platelet Count 146 (*)     % Neutrophils 75      % Lymphocytes 13      % Monocytes 10      % Eosinophils 0      % Basophils 1      % Immature Granulocytes 1      NRBCs per 100 WBC 0      Absolute Neutrophils 3.1      Absolute Lymphocytes 0.5 (*)     Absolute Monocytes 0.4      Absolute Eosinophils 0.0      Absolute Basophils 0.0      Absolute Immature Granulocytes 0.0      Absolute NRBCs 0.0     LITHIUM LEVEL - Normal    Lithium 0.3     TROPONIN I - Normal    Troponin I High Sensitivity 17     CK TOTAL - Normal     CK 60     VALPROIC ACID - Normal    Valproic acid 15     T4 FREE - Normal    Free T4 1.20     ROUTINE UA WITH MICROSCOPIC REFLEX TO CULTURE        Procedures     Emergency Department Course:     Reviewed:  I reviewed nursing notes, vitals and past medical history    Assessments:  1145 I obtained history and examined the patient as noted above.   1305 I rechecked the patient and explained findings.     Consults:   None    Interventions:  Medications   0.9% sodium chloride BOLUS (1,000 mLs Intravenous New Bag 3/10/22 1216)      Disposition:  The patient was admitted to the hospital under the care of Dr. Cody.     Impression & Plan    CMS Diagnoses: None    Covid-19  Lennie Mar was evaluated during a global COVID-19 pandemic, which necessitated consideration that the patient might be at risk for infection with the SARS-CoV-2 virus that causes COVID-19.   Applicable protocols for evaluation were followed during the patient's care.   COVID-19 was considered as part of the patient's evaluation. The plan for testing is:  a test was obtained during this visit.    Medical Decision Making:  Patient is a 89-year-old female who presents with weakness.  Patient does have a history of bipolar and is potentially undergoing a depressive episode.  It does appear that she is subtherapeutic on her lithium and Depakote and so question whether her medication compliance has been appropriate in her independent living situation.  She does have a mild ASHLEY with a creatinine elevation of 1.74.  She was given IV fluids here.  Covid was incidentally positive as well but thankfully patient is not having a cough or any evidence of hypoxia today.  Unclear whether that weakness is secondary to possible new Covid infection versus dehydration versus worsening bipolar and poor medication compliance.  Patient will be admitted for observation for further management of weakness.  I spoke with Dr. Cody who agreed observation.    Diagnosis:     ICD-10-CM    1. Weakness  R53.1    2. Unable to care for self  Z78.9    3. Depression, unspecified depression type  F32.A    4. Hx of bipolar disorder  Z86.59    5. Infection due to 2019 novel coronavirus  U07.1         Discharge Medications:  New Prescriptions    No medications on file        3/10/2022   Paul Harkins MD White, Scott, MD  03/10/22 9670

## 2022-03-11 ENCOUNTER — APPOINTMENT (OUTPATIENT)
Dept: PHYSICAL THERAPY | Facility: CLINIC | Age: 87
DRG: 178 | End: 2022-03-11
Attending: HOSPITALIST
Payer: MEDICARE

## 2022-03-11 LAB
ANION GAP SERPL CALCULATED.3IONS-SCNC: 5 MMOL/L (ref 3–14)
BACTERIA UR CULT: ABNORMAL
BASOPHILS # BLD AUTO: 0 10E3/UL (ref 0–0.2)
BASOPHILS NFR BLD AUTO: 0 %
BUN SERPL-MCNC: 25 MG/DL (ref 7–30)
CALCIUM SERPL-MCNC: 8.6 MG/DL (ref 8.5–10.1)
CHLORIDE BLD-SCNC: 114 MMOL/L (ref 94–109)
CO2 SERPL-SCNC: 23 MMOL/L (ref 20–32)
CREAT SERPL-MCNC: 1.42 MG/DL (ref 0.52–1.04)
EOSINOPHIL # BLD AUTO: 0 10E3/UL (ref 0–0.7)
EOSINOPHIL NFR BLD AUTO: 0 %
ERYTHROCYTE [DISTWIDTH] IN BLOOD BY AUTOMATED COUNT: 14.6 % (ref 10–15)
GFR SERPL CREATININE-BSD FRML MDRD: 34 ML/MIN/1.73M2
GLUCOSE BLD-MCNC: 81 MG/DL (ref 70–99)
HCT VFR BLD AUTO: 38 % (ref 35–47)
HGB BLD-MCNC: 11.9 G/DL (ref 11.7–15.7)
IMM GRANULOCYTES # BLD: 0 10E3/UL
IMM GRANULOCYTES NFR BLD: 0 %
LYMPHOCYTES # BLD AUTO: 1.2 10E3/UL (ref 0.8–5.3)
LYMPHOCYTES NFR BLD AUTO: 36 %
MCH RBC QN AUTO: 32 PG (ref 26.5–33)
MCHC RBC AUTO-ENTMCNC: 31.3 G/DL (ref 31.5–36.5)
MCV RBC AUTO: 102 FL (ref 78–100)
MONOCYTES # BLD AUTO: 0.4 10E3/UL (ref 0–1.3)
MONOCYTES NFR BLD AUTO: 13 %
NEUTROPHILS # BLD AUTO: 1.7 10E3/UL (ref 1.6–8.3)
NEUTROPHILS NFR BLD AUTO: 51 %
NRBC # BLD AUTO: 0 10E3/UL
NRBC BLD AUTO-RTO: 0 /100
PLATELET # BLD AUTO: 129 10E3/UL (ref 150–450)
POTASSIUM BLD-SCNC: 5 MMOL/L (ref 3.4–5.3)
RBC # BLD AUTO: 3.72 10E6/UL (ref 3.8–5.2)
SODIUM SERPL-SCNC: 142 MMOL/L (ref 133–144)
WBC # BLD AUTO: 3.4 10E3/UL (ref 4–11)

## 2022-03-11 PROCEDURE — G0378 HOSPITAL OBSERVATION PER HR: HCPCS

## 2022-03-11 PROCEDURE — 96372 THER/PROPH/DIAG INJ SC/IM: CPT | Performed by: HOSPITALIST

## 2022-03-11 PROCEDURE — 99226 PR SUBSEQUENT OBSERVATION CARE,LEVEL III: CPT | Performed by: HOSPITALIST

## 2022-03-11 PROCEDURE — 250N000011 HC RX IP 250 OP 636: Performed by: HOSPITALIST

## 2022-03-11 PROCEDURE — 36415 COLL VENOUS BLD VENIPUNCTURE: CPT | Performed by: HOSPITALIST

## 2022-03-11 PROCEDURE — 97161 PT EVAL LOW COMPLEX 20 MIN: CPT | Mod: GP | Performed by: PHYSICAL THERAPIST

## 2022-03-11 PROCEDURE — 96376 TX/PRO/DX INJ SAME DRUG ADON: CPT

## 2022-03-11 PROCEDURE — 85025 COMPLETE CBC W/AUTO DIFF WBC: CPT | Performed by: HOSPITALIST

## 2022-03-11 PROCEDURE — 97530 THERAPEUTIC ACTIVITIES: CPT | Mod: GP | Performed by: PHYSICAL THERAPIST

## 2022-03-11 PROCEDURE — 250N000013 HC RX MED GY IP 250 OP 250 PS 637: Performed by: HOSPITALIST

## 2022-03-11 PROCEDURE — 96361 HYDRATE IV INFUSION ADD-ON: CPT

## 2022-03-11 PROCEDURE — 250N000011 HC RX IP 250 OP 636: Performed by: PHYSICIAN ASSISTANT

## 2022-03-11 PROCEDURE — 80048 BASIC METABOLIC PNL TOTAL CA: CPT | Performed by: HOSPITALIST

## 2022-03-11 PROCEDURE — 258N000003 HC RX IP 258 OP 636: Performed by: HOSPITALIST

## 2022-03-11 RX ORDER — DIVALPROEX SODIUM 500 MG/1
500 TABLET, EXTENDED RELEASE ORAL AT BEDTIME
Status: DISCONTINUED | OUTPATIENT
Start: 2022-03-11 | End: 2022-03-22 | Stop reason: HOSPADM

## 2022-03-11 RX ADMIN — SODIUM CHLORIDE, POTASSIUM CHLORIDE, SODIUM LACTATE AND CALCIUM CHLORIDE: 600; 310; 30; 20 INJECTION, SOLUTION INTRAVENOUS at 14:52

## 2022-03-11 RX ADMIN — LETROZOLE 2.5 MG: 2.5 TABLET, FILM COATED ORAL at 09:07

## 2022-03-11 RX ADMIN — HEPARIN SODIUM 5000 UNITS: 5000 INJECTION, SOLUTION INTRAVENOUS; SUBCUTANEOUS at 20:48

## 2022-03-11 RX ADMIN — DIVALPROEX SODIUM 500 MG: 500 TABLET, FILM COATED, EXTENDED RELEASE ORAL at 21:06

## 2022-03-11 RX ADMIN — SODIUM CHLORIDE, POTASSIUM CHLORIDE, SODIUM LACTATE AND CALCIUM CHLORIDE: 600; 310; 30; 20 INJECTION, SOLUTION INTRAVENOUS at 05:08

## 2022-03-11 RX ADMIN — CEFTRIAXONE 2 G: 2 INJECTION, POWDER, FOR SOLUTION INTRAMUSCULAR; INTRAVENOUS at 20:51

## 2022-03-11 RX ADMIN — LEVOTHYROXINE SODIUM 100 MCG: 0.1 TABLET ORAL at 09:07

## 2022-03-11 RX ADMIN — LITHIUM CARBONATE 150 MG: 150 CAPSULE, GELATIN COATED ORAL at 09:05

## 2022-03-11 RX ADMIN — ARIPIPRAZOLE 5 MG: 5 TABLET ORAL at 09:07

## 2022-03-11 RX ADMIN — HEPARIN SODIUM 5000 UNITS: 5000 INJECTION, SOLUTION INTRAVENOUS; SUBCUTANEOUS at 09:09

## 2022-03-11 NOTE — PLAN OF CARE
"PRIMARY DIAGNOSIS: GENERALIZED WEAKNESS    OUTPATIENT/OBSERVATION GOALS TO BE MET BEFORE DISCHARGE  1. Orthostatic performed: No    2. Tolerating PO medications: Unknown/N/A at this time     3. Return to near baseline physical activity: Yes    4. Cleared for discharge by consultants (if involved): No    Discharge Planner Nurse   Safe discharge environment identified: No  Barriers to discharge: Yes       Entered by: Zohra Clayton 03/10/2022 6:56 PM     Please review provider order for any additional goals. Nurse to notify provider when observation goals have been met and patient is ready for discharge.    Patient alert to self, time, and situation. Patient appears angry and anxious, complains about \"disliking this place\" and wanting to go home. Up with 1 assist with walker and gait belt. Tremors present. Hypertensive, reports pain with taking blood pressures. LR infusing in left forearm. Social work and PT consulted.   "

## 2022-03-11 NOTE — PLAN OF CARE
PRIMARY DIAGNOSIS: GENERALIZED WEAKNESS    OUTPATIENT/OBSERVATION GOALS TO BE MET BEFORE DISCHARGE  1. Orthostatic performed: No    2. Tolerating PO medications: Yes    3. Return to near baseline physical activity: No    4. Cleared for discharge by consultants (if involved): No    Discharge Planner Nurse   Safe discharge environment identified: No  Barriers to discharge: Yes       Entered by: Mable Salguero 03/11/2022 10:51 AM     Please review provider order for any additional goals.   Nurse to notify provider when observation goals have been met and patient is ready for discharge.

## 2022-03-11 NOTE — PROGRESS NOTES
PRIMARY DIAGNOSIS: GENERALIZED WEAKNESS    OUTPATIENT/OBSERVATION GOALS TO BE MET BEFORE DISCHARGE  1. Orthostatic performed: No    2. Tolerating PO medications: Yes    3. Return to near baseline physical activity: No    4. Cleared for discharge by consultants (if involved): No    Discharge Planner Nurse   Safe discharge environment identified: No  Barriers to discharge: Yes       Entered by: Lore Ortiz 03/11/2022 4:48 AM     Patient is Aox3 with some minor confusion, VS WNL, COVID positive, Assist of 1 with walker, ambulated to the bathroom, DNR/DNI, IV  mL/hr in the LT arm, UA positive started IV Rocephin, will continue to monitor and provide services.        Please review provider order for any additional goals.   Nurse to notify provider when observation goals have been met and patient is ready for discharge.

## 2022-03-11 NOTE — PLAN OF CARE
PRIMARY DIAGNOSIS: GENERALIZED WEAKNESS    OUTPATIENT/OBSERVATION GOALS TO BE MET BEFORE DISCHARGE  1. Orthostatic performed: N/A    2. Tolerating PO medications: Yes    3. Return to near baseline physical activity: No    4. Cleared for discharge by consultants (if involved): No    Discharge Planner Nurse   Safe discharge environment identified: No  Barriers to discharge: Yes       Entered by: Sheila Vizcarra 03/10/2022 7:53 PM    VSS, pt is forgetful at times, denies pain, IVF @ 100, intermittent IV abx, PT/SW consults in, will continue to monitor       Please review provider order for any additional goals.   Nurse to notify provider when observation goals have been met and patient is ready for discharge

## 2022-03-11 NOTE — PLAN OF CARE
PRIMARY DIAGNOSIS: GENERALIZED WEAKNESS    OUTPATIENT/OBSERVATION GOALS TO BE MET BEFORE DISCHARGE  1. Orthostatic performed: No    2. Tolerating PO medications: Yes    3. Return to near baseline physical activity: No    4. Cleared for discharge by consultants (if involved): No    Discharge Planner Nurse   Safe discharge environment identified: No  Barriers to discharge: Yes       Entered by: Mable Salguero 03/11/2022 2:35 PM     Please review provider order for any additional goals.   Nurse to notify provider when observation goals have been met and patient is ready for discharge.    Pt oriented to self, place, and time. Denies pain. Assist of 1 with gait belt and walker. Pt incontinent of bladder. IVF running. On heparin. Treating with IV rocephin.

## 2022-03-11 NOTE — PROGRESS NOTES
03/11/22 1400   Quick Adds   Quick Adds Certification   Type of Visit Initial PT Evaluation   Living Environment   People in Home alone   Current Living Arrangements apartment   Home Accessibility no concerns   Self-Care   Usual Activity Tolerance moderate   Current Activity Tolerance fair   Regular Exercise No   Equipment Currently Used at Home none   Fall history within last six months no   Activity/Exercise/Self-Care Comment Owns a walker and a cane   General Information   Onset of Illness/Injury or Date of Surgery 03/10/22   Referring Physician Devendra Cody MD   Patient/Family Therapy Goals Statement (PT) Discharge to home   Pertinent History of Current Problem (include personal factors and/or comorbidities that impact the POC) Lennie Mar is a 89 year old female admitted on 3/10/2022. She has a history of breast cancer, alcohol dependence in remission, anxiety, asymptomatic varicose veins, bipolar disorder, chronic kidney disease stage 3, history of smoking, hyperparathyroidism, memory loss, muscle weakness, severe depression, subdural hygroma, and a resting tremor and she is admitted for generalized weakness and found to have COVID-19 and UTI.   Existing Precautions/Restrictions fall   Cognition   Affect/Mental Status (Cognition) flat/blunted affect   Orientation Status (Cognition) oriented x 3   Cognitive Status Comments becomes agitated easily, throwing pillow at this writer when asked to reposition in bed. poor problem solving as pt unable to figure out how to navigate around obstacles with walker   Pain Assessment   Patient Currently in Pain No   Integumentary/Edema   Integumentary/Edema no deficits were identifed   Posture    Posture Forward head position;Protracted shoulders   Range of Motion (ROM)   Range of Motion ROM is WFL   Strength (Manual Muscle Testing)   Strength (Manual Muscle Testing) Deficits observed during functional mobility   Strength Comments Requires UE support for safe  dynamic mobility   Bed Mobility   Comment, (Bed Mobility) sit<>supine with SBA   Transfers   Comment, (Transfers) sit<>stand with SBA   Gait/Stairs (Locomotion)   Distance in Feet (Required for LE Total Joints) 5'   Comment, (Gait/Stairs) CGA with FWW   Balance   Balance Comments Benefits from B UE support for safe dynamic mobility   Sensory Examination   Sensory Perception patient reports no sensory changes   Coordination   Coordination no deficits were identified   Muscle Tone   Muscle Tone no deficits were identified   Clinical Impression   Criteria for Skilled Therapeutic Intervention Yes, treatment indicated   PT Diagnosis (PT) Impaired functional mobility   Influenced by the following impairments generalized weakness, deconditioning, cognition   Functional limitations due to impairments Difficulty with navigating obstacles, impaired functional endurance   Clinical Presentation (PT Evaluation Complexity) Stable/Uncomplicated   Clinical Presentation Rationale progressing medically, clear POC   Clinical Decision Making (Complexity) low complexity   Planned Therapy Interventions (PT) balance training;bed mobility training;gait training;home exercise program;patient/family education;strengthening;stretching;transfer training   Risk & Benefits of therapy have been explained evaluation/treatment results reviewed;care plan/treatment goals reviewed;risks/benefits reviewed;current/potential barriers reviewed;participants voiced agreement with care plan;participants included;patient   PT Discharge Planning   PT Discharge Recommendation (DC Rec) home with assist   PT Rationale for DC Rec Pt has demonstrated the ability to complete mobility skills required for navigating her apartment. Would recommend assist for more taxing tasks such as bathing, however requested OT consult to assist with discharge recommendations due to patient's cognition and impaired safety awareness/problem solving skills.    PT Brief overview of  current status Ax1 WW   Therapy Certification   Start of care date 03/11/22   Certification date from 03/11/22   Certification date to 03/14/22   Medical Diagnosis Generalized weakness   Total Evaluation Time   Total Evaluation Time (Minutes) 10   Physical Therapy Goals   PT Frequency Daily   PT Predicated Duration/Target Date for Goal Attainment 03/14/22   PT Goals Bed Mobility;Transfers;Gait   PT: Bed Mobility Modified independent;Supine to/from sit   PT: Transfers Modified independent;Sit to/from stand   PT: Gait Modified independent;Rolling walker;100 feet

## 2022-03-11 NOTE — PROGRESS NOTES
[facility]    Progress Note - Hospitalist Service       Date of Admission:  3/10/2022    Assessment & Plan            Lennie Mar is a 89 year old female admitted on 3/10/2022. She has a history of breast cancer, alcohol dependence in remission, anxiety, asymptomatic varicose veins, bipolar disorder, chronic kidney disease stage 3, history of smoking, hyperparathyroidism, memory loss, muscle weakness, severe depression, subdural hygroma, and a resting tremor and she is admitted for generalized weakness and found to have COVID-19 and UTI.    Generalized weakness  - likely due to COVID-19 infection and UTI  - UA appears infected, ceftriaxone (started 3/11), final culture pending (see below)  - PT evaluation: dispo will be complicated (see below)    COVID-19   - had two vaccinations, but not boosted   - CXR showed no acute disease  - not requiring oxygen   - monitor   - will continue to not order or follow inflammatory markers unless there is a clinical change   - unable to discharge to TCU because of COVID-19 infection (unless 10 days out)    Acute on chronic kidney disease  - stage 3 chronic kidney disease possibly from lithium use   - baseline Cr 1.1-1.3  - Creatinine on admission was 1.74  - today Creatinine was 1.42   - eating and drinking well, IVF to end today at 4pm    UTI  - UA on 3/10 appeared infected  - grew Klebsiella in 2014 and Klebsiella/Enterocuccus in 2012   - on ceftriaxone (start date 3/10) based on most recent culture   - final cultures pending     Bipolar disorder, unspecified  - 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 Malik)  - on letrozole since 2019, continue     Confirmed COVID-19 infection       Symptom Onset Unknown    Date of 1st Positive Test 03/10/2022   Vaccination Status Had 2 vaccines, but was not boosted        - COVID-19 special precautions, continuous pulse-ox  - Oxygen: continue current support with room air; titrate to keep SpO2 between 90-96%  - Labs: BMP  - Imaging: no additional imaging at this time   - Breathing treatments: no inhalers needed; avoid nebulizers in favor of MDIs   - IV fluids: LR   - Antibiotics: Ceftriaxone for UTI, but not for COVID-19 infection   - COVID-Focused Medications: No COVID-specific therapies are appropriate at this time.  - DVT Prophylaxis:         - At high risk of thrombotic complications due to COVID-19 (DDimer = N/A )         - Heparin      Dispo    -I spoke with the patient's son for a long time today.  The patient lives independently in an apartment.  She has a cousin and her daughter who lives down the su to help her on a daily basis.  They, however are moving away.  Apparently the son does not have a plan yet as to how he is going to get his mother to help she needs.  If PT recommends a TCU, she will not be able to go for least 10 days given her Covid positive status.  I indicated to the son that even if she does well with PT and she is able to discharge home, there is still an issue.  She is 89 and has been needing more help and there is no plan to get her more help at this time.  He states he will wait for the PT consult and then think about what the plan might be.     Diet: Regular Diet  "Adult    DVT Prophylaxis: Heparin   Kelley Catheter: Not present  Fluids: IV and PO   Central Lines: None  Cardiac Monitoring: None  Code Status: No CPR- Do NOT Intubate      Disposition Plan   Expected Discharge: 03/13/2022     Anticipated discharge location:  Awaiting care coordination huddle  Delays:         The patient's care was discussed with the Attending Physician, Dr. Dr. Devendra Cody MD .    Devendra Cody MD      [facility]  Securely message with the Vocera Web Console (learn more here)  Text page via [facility] Paging/Directory     Clinically Significant Risk Factors Present on Admission               # Thrombocytopenia: Plts = 129 10e3/uL (Ref range: 150 - 450 10e3/uL) on admission, will monitor for bleeding   # Overweight: Estimated body mass index is 26.83 kg/m  as calculated from the following:    Height as of this encounter: 1.575 m (5' 2\").    Weight as of this encounter: 66.5 kg (146 lb 11.2 oz).      ______________________________________________________________________    Interval History     Lenniesa ESTEPHANIE Mar, a 89 - year - old female with a past medical history of breast cancer, alcohol dependence in remission, anxiety, asymptomatic varicose veins, bipolar disorder, chronic kidney disease stage 3, history of smoking, hyperparathyroidism, memory loss, muscle weakness, severe depression, subdural hygroma, and resting tremor was admitted on 3/10/2022 for generalized weakness and was found to have COVID-19. No acute events overnight. Denies pain, nausea, bowel, or bladder concerns. Denies back pain. Is drinking and tolerating diet. (Eating a lot today. Feeling much better)    Data reviewed today: I reviewed all medications, new labs and imaging results over the last 24 hours. I personally reviewed no images or EKG's today.    Physical Exam   Vital Signs: Temp: 98.1  F (36.7  C) Temp src: Oral BP: (!) 134/114 Pulse: 93   Resp: 14 SpO2: 95 % O2 " Device: None (Room air)    Weight: 146 lbs 11.2 oz     General: Resting in bed, watching TV, in no acute distress  Head: Atraumatic, normocephalic   Heart: Regular rate and rhythm   Lung: Clear to ausculation   Abdomen: Bowel sounds present. No pain with palpation. Abdomen, soft, non - tender   Musculoskeletal: Grossly intact movement of upper and lower extremities  Neurological: Grossly intact strength and motor function   Psych: Flat affect    Data   Recent Labs   Lab 03/11/22  0705 03/10/22  1158   WBC 3.4* 4.1   HGB 11.9 12.6   * 102*   * 146*    140   POTASSIUM 5.0 4.9   CHLORIDE 114* 111*   CO2 23 25   BUN 25 36*   CR 1.42* 1.74*   ANIONGAP 5 4   CANELO 8.6 8.9   GLC 81 88   ALBUMIN  --  2.7*   PROTTOTAL  --  6.7*   BILITOTAL  --  0.3   ALKPHOS  --  98   ALT  --  29   AST  --  35     Recent Results (from the past 24 hour(s))   XR Chest Port 1 View    Narrative    CHEST ONE VIEW PORTABLE March 10, 2022 1:31 PM     HISTORY: COVID, weakness.    COMPARISON: 8/8/2014.      Impression    IMPRESSION: No airspace consolidation, pneumothorax, or pleural  effusion.    ARABELLA TEJEDA MD         SYSTEM ID:  [id number]     Medications     lactated ringers 100 mL/hr at 03/11/22 0508       ARIPiprazole  5 mg Oral Daily     cefTRIAXone  2 g Intravenous Q24H     divalproex sodium extended-release  500 mg Oral At Bedtime     heparin ANTICOAGULANT  5,000 Units Subcutaneous Q12H     letrozole  2.5 mg Oral Daily     levothyroxine  100 mcg Oral Daily     lithium  150 mg Oral QAM

## 2022-03-11 NOTE — PROGRESS NOTES
PRIMARY DIAGNOSIS: GENERALIZED WEAKNESS    OUTPATIENT/OBSERVATION GOALS TO BE MET BEFORE DISCHARGE  1. Orthostatic performed: No    2. Tolerating PO medications: Yes    3. Return to near baseline physical activity: No    4. Cleared for discharge by consultants (if involved): No    Discharge Planner Nurse   Safe discharge environment identified: No  Barriers to discharge: Yes       Entered by: Lore Ortiz 03/11/2022 1:39 AM     Patient is Aox3 with some minor confusion, VS WNL, COVID positive, Assist of 1 with walker, DNR/DNI, IV  mL/hr in the LT arm, UA positive started IV Rocephin, will continue to monitor and provide services.        Please review provider order for any additional goals.   Nurse to notify provider when observation goals have been met and patient is ready for discharge.

## 2022-03-11 NOTE — CONSULTS
Care Management Initial Consult    General Information  Assessment completed with: Patient, Children, Pt via phone due to special precautions and son  Naren Mar via phone per pt request  Type of CM/SW Visit: Initial Assessment    Primary Care Provider verified and updated as needed: Yes   Readmission within the last 30 days: no previous admission in last 30 days      Reason for Consult: care coordination/care conference, discharge planning    Communication Assessment  Patient's communication style: spoken language (English or Bilingual)    Hearing Difficulty or Deaf: no   Wear Glasses or Blind: yes    Cognitive  Cognitive/Neuro/Behavioral: .WDL except  Level of Consciousness: alert  Arousal Level: opens eyes spontaneously  Orientation: disoriented to, situation  Mood/Behavior: agitated  Best Language: 0 - No aphasia  Speech: clear    Living Environment:   People in home: alone     Current living Arrangements: apartment   Able to return to prior arrangements: no     Family/Social Support:  Care provided by: self  Provides care for: no one  Marital Status:   Children          Description of Support System: Supportive    Support Assessment: Adequate family and caregiver support    Current Resources:   Patient receiving home care services: No  Community Resources: None  Equipment currently used at home: none  Supplies currently used at home: None    Lifestyle & Psychosocial Needs:  Social Determinants of Health     Tobacco Use: Medium Risk     Smoking Tobacco Use: Former Smoker     Smokeless Tobacco Use: Never Used   Alcohol Use: Not on file   Financial Resource Strain: Not on file   Food Insecurity: Not on file   Transportation Needs: Not on file   Physical Activity: Not on file   Stress: Not on file   Social Connections: Not on file   Intimate Partner Violence: Not on file   Depression: Not at risk     PHQ-2 Score: 0   Housing Stability: Not on file       Functional Status:  Prior to admission patient  needed assistance:   Dependent ADLs:: Independent  Dependent IADLs:: Cleaning, Shopping, Meal Preparation, Medication Management, Transportation       Additional Information:  CM consulted for discharge planning, spoke with pt via phone due to special precautions. Pt reports that she lives independently in an apartment, she does not use assistive devices. Her son sets up her medications and she self administers. She has help with cleaning every 3 weeks. Her cousin brings her groceries, she mostly eats frozen/pre-prepared meals. Her nephew provides her with transportation. She reports that her cousin is moving in May and her family support is otherwise limited. She reports that she does not feel she can return to her apartment independently at this time. Discussed that PT will evaluate mobility and made recommendations for safe discharge plan.     Reviewed options of home with home care vs TCU (this would likely be 10 days from positive covid test) vs higher level of care. Pt sounds very overwhelmed by this discussion stating she doesn't know where she will discharge to but she does not think she can discharge home. She declined resources or to further discuss options or consider longer term planning at this time. She gave CM permission to speak with her son  Naren Zaid [phone]. Called son who reports that he lives in [facility] and family's ability to provide support at discharge is limited. He is aware that pt and pt's cousin do not feel pt can continue to live independently. Reviewed discharge possibilities pending PT evaluation and pt's clinical improvement. Encouraged son to have further conversations with pt and family about moving pt to higher level of care so she will have a more supported living environment going forward. He expresses understanding and requested  e-mail him with resources. E-mailed links for Senior Linkage Line and Senior Housing Guide to pt's son at: [email or web address].      Pt is scheduled to work with PT this afternoon, CM/SW will follow-up with pt/son to further discuss discharge planning once recommendations are made. Discussed with Dr. Cody who also spoke with pt's son regarding discharge planning and need for additional support.     Gavi Ferrer RN BSN   Inpatient Care Coordination  [facility]   Phone [phone]

## 2022-03-11 NOTE — PROGRESS NOTES
[facility]      OUTPATIENT PHYSICAL THERAPY EVALUATION  PLAN OF TREATMENT FOR OUTPATIENT REHABILITATION  (COMPLETE FOR INITIAL CLAIMS ONLY)  Patient's Last Name, First Name, M.I.  YOB: 1927  Lennie Mar                        Provider's Name  [facility] Medical Record No.  [id number]                               Onset Date:  03/10/22   Start of Care Date:  03/11/22      Type:     _X_PT   ___OT   ___SLP Medical Diagnosis:  Generalized weakness                        PT Diagnosis:  Impaired functional mobility   Visits from SOC:  1   _________________________________________________________________________________  Plan of Treatment/Functional Goals    Planned Interventions: balance training, bed mobility training, gait training, home exercise program, patient/family education, strengthening, stretching, transfer training     Goals: See Physical Therapy Goals on Care Plan in [employer] electronic health record.    Therapy Frequency: Daily  Predicted Duration of Therapy Intervention: 03/14/22  _________________________________________________________________________________    I CERTIFY THE NEED FOR THESE SERVICES FURNISHED UNDER        THIS PLAN OF TREATMENT AND WHILE UNDER MY CARE     (Physician co-signature of this document indicates review and certification of the therapy plan).                Certification date from: 03/11/22, Certification date to: 03/14/22    Referring Physician: Devendra Cody MD            Initial Assessment        See Physical Therapy evaluation dated 03/11/22 in Epic electronic health record.

## 2022-03-12 ENCOUNTER — APPOINTMENT (OUTPATIENT)
Dept: OCCUPATIONAL THERAPY | Facility: CLINIC | Age: 87
DRG: 178 | End: 2022-03-12
Attending: HOSPITALIST
Payer: MEDICARE

## 2022-03-12 LAB
ANION GAP SERPL CALCULATED.3IONS-SCNC: 2 MMOL/L (ref 3–14)
BUN SERPL-MCNC: 20 MG/DL (ref 7–30)
CALCIUM SERPL-MCNC: 8.7 MG/DL (ref 8.5–10.1)
CHLORIDE BLD-SCNC: 115 MMOL/L (ref 94–109)
CO2 SERPL-SCNC: 25 MMOL/L (ref 20–32)
CREAT SERPL-MCNC: 1.23 MG/DL (ref 0.52–1.04)
GFR SERPL CREATININE-BSD FRML MDRD: 41 ML/MIN/1.73M2
GLUCOSE BLD-MCNC: 81 MG/DL (ref 70–99)
POTASSIUM BLD-SCNC: 5.2 MMOL/L (ref 3.4–5.3)
SODIUM SERPL-SCNC: 142 MMOL/L (ref 133–144)

## 2022-03-12 PROCEDURE — 250N000011 HC RX IP 250 OP 636: Performed by: PHYSICIAN ASSISTANT

## 2022-03-12 PROCEDURE — 97165 OT EVAL LOW COMPLEX 30 MIN: CPT | Mod: GO

## 2022-03-12 PROCEDURE — 250N000013 HC RX MED GY IP 250 OP 250 PS 637: Performed by: HOSPITALIST

## 2022-03-12 PROCEDURE — G0378 HOSPITAL OBSERVATION PER HR: HCPCS

## 2022-03-12 PROCEDURE — 96376 TX/PRO/DX INJ SAME DRUG ADON: CPT

## 2022-03-12 PROCEDURE — 96361 HYDRATE IV INFUSION ADD-ON: CPT

## 2022-03-12 PROCEDURE — 250N000011 HC RX IP 250 OP 636: Performed by: HOSPITALIST

## 2022-03-12 PROCEDURE — 97166 OT EVAL MOD COMPLEX 45 MIN: CPT | Mod: GO

## 2022-03-12 PROCEDURE — 36415 COLL VENOUS BLD VENIPUNCTURE: CPT | Performed by: HOSPITALIST

## 2022-03-12 PROCEDURE — 97535 SELF CARE MNGMENT TRAINING: CPT | Mod: GO

## 2022-03-12 PROCEDURE — 250N000013 HC RX MED GY IP 250 OP 250 PS 637

## 2022-03-12 PROCEDURE — 96372 THER/PROPH/DIAG INJ SC/IM: CPT | Performed by: HOSPITALIST

## 2022-03-12 PROCEDURE — 99207 PR CDG-CODE CATEGORY CHANGED: CPT | Performed by: PHYSICIAN ASSISTANT

## 2022-03-12 PROCEDURE — 82310 ASSAY OF CALCIUM: CPT | Performed by: HOSPITALIST

## 2022-03-12 PROCEDURE — 99225 PR SUBSEQUENT OBSERVATION CARE,LEVEL II: CPT | Performed by: PHYSICIAN ASSISTANT

## 2022-03-12 RX ADMIN — CEFTRIAXONE 2 G: 2 INJECTION, POWDER, FOR SOLUTION INTRAMUSCULAR; INTRAVENOUS at 20:37

## 2022-03-12 RX ADMIN — LEVOTHYROXINE SODIUM 100 MCG: 0.1 TABLET ORAL at 08:21

## 2022-03-12 RX ADMIN — Medication 1 MG: at 00:36

## 2022-03-12 RX ADMIN — ARIPIPRAZOLE 5 MG: 5 TABLET ORAL at 08:21

## 2022-03-12 RX ADMIN — ACETAMINOPHEN 975 MG: 325 TABLET, FILM COATED ORAL at 17:08

## 2022-03-12 RX ADMIN — ACETAMINOPHEN 975 MG: 325 TABLET, FILM COATED ORAL at 00:36

## 2022-03-12 RX ADMIN — DIVALPROEX SODIUM 500 MG: 500 TABLET, FILM COATED, EXTENDED RELEASE ORAL at 22:21

## 2022-03-12 RX ADMIN — LETROZOLE 2.5 MG: 2.5 TABLET, FILM COATED ORAL at 08:21

## 2022-03-12 RX ADMIN — HEPARIN SODIUM 5000 UNITS: 5000 INJECTION, SOLUTION INTRAVENOUS; SUBCUTANEOUS at 08:21

## 2022-03-12 RX ADMIN — LITHIUM CARBONATE 150 MG: 150 CAPSULE, GELATIN COATED ORAL at 08:21

## 2022-03-12 RX ADMIN — HEPARIN SODIUM 5000 UNITS: 5000 INJECTION, SOLUTION INTRAVENOUS; SUBCUTANEOUS at 20:37

## 2022-03-12 NOTE — PLAN OF CARE
PRIMARY DIAGNOSIS: GENERALIZED WEAKNESS     OUTPATIENT/OBSERVATION GOALS TO BE MET BEFORE DISCHARGE  1. Orthostatic performed: No    2. Tolerating PO medications: Yes    3. Return to near baseline physical activity: No    4. Cleared for discharge by consultants (if involved): No    Vitals are Temp: 98.5  F (36.9  C) Temp src: Oral BP: (!) 159/73 Pulse: 99   Resp: 19 SpO2: 94 %.  Patient seems agitated and disoriented.pt is a 1  person assist with Gait Belt and Walker.Pt is on Covid precaution. Pt is on a regular diet. Pt complaining of back pain after repositioning. Oral pain meds given for pain control..  Patient has Lactated Ringer's running at 100 mL per hour.     Discharge Planner Nurse   Safe discharge environment identified: No  Barriers to discharge: Yes- unable to take care of self       Entered by: Allegra Castro 03/12/2022      Please review provider order for any additional goals.   Nurse to notify provider when observation goals have been met and patient is ready for discharge.

## 2022-03-12 NOTE — PLAN OF CARE
"PRIMARY DIAGNOSIS: GENERALIZED WEAKNESS/Covid+    OUTPATIENT/OBSERVATION GOALS TO BE MET BEFORE DISCHARGE  1. Orthostatic performed: N/A    2. Tolerating PO medications: Yes    3. Return to near baseline physical activity: No    4. Cleared for discharge by consultants (if involved): No, OT to see    Discharge Planner Nurse   Safe discharge environment identified: No, PT recommends TCU (Covid+)  Barriers to discharge: Yes       Entered by: Jodi Christensen 03/12/2022 8:59 AM   BP (!) 141/67 (BP Location: Right arm)   Pulse 75   Temp 98.1  F (36.7  C) (Oral)   Resp 18   Ht 1.575 m (5' 2\")   Wt 66.5 kg (146 lb 11.2 oz)   LMP  (LMP Unknown)   SpO2 96%   BMI 26.83 kg/m      Please review provider order for any additional goals.   Nurse to notify provider when observation goals have been met and patient is ready for discharge.  "

## 2022-03-12 NOTE — PROGRESS NOTES
[facility]      OUTPATIENT OCCUPATIONAL THERAPY  EVALUATION  PLAN OF TREATMENT FOR OUTPATIENT REHABILITATION  (COMPLETE FOR INITIAL CLAIMS ONLY)  Patient's Last Name, First Name, M.I.  YOB: 1927  Lennie Mar                          Provider's Name  [facility] Medical Record No.  [id number]                               Onset Date:  03/10/22   Start of Care Date:  03/12/22     Type:     ___PT   _X_OT   ___SLP Medical Diagnosis:  weakness                        OT Diagnosis:  decreased function and safety in ADL   Visits from SOC:  1   _________________________________________________________________________________  Plan of Treatment/Functional Goals    Planned Interventions: ADL retraining, IADL retraining, strengthening, home program guidelines, progressive activity/exercise, cognition   Goals: See Occupational Therapy Goals on Care Plan in [employer] electronic health record.    Therapy Frequency: 5 times/wk  Predicted Duration of Therapy Intervention: 03/18/22  _________________________________________________________________________________    I CERTIFY THE NEED FOR THESE SERVICES FURNISHED UNDER        THIS PLAN OF TREATMENT AND WHILE UNDER MY CARE     (Physician co-signature of this document indicates review and certification of the therapy plan).                Certification date from: 03/12/22, Certification date to: 03/12/22    Referring Physician: Devendra Cody MD            Initial Assessment        See Occupational Therapy evaluation dated 03/12/22 in Epic electronic health record.

## 2022-03-12 NOTE — UTILIZATION REVIEW
Concurrent stay review; Secondary Review Determination      Under the authority of the Utilization Management Committee, the utilization review process indicated a secondary review on the above patient. The review outcome is based on review of the medical records, discussions with staff, and applying clinical experience noted on the date of the review.      (x) Observation Status Appropriate - Concurrent stay review      RATIONALE FOR DETERMINATION:    89 year old female admitted on 3/10/2022. She has a history of breast cancer, alcohol dependence in remission, anxiety, asymptomatic varicose veins, bipolar disorder, chronic kidney disease stage 3, history of smoking, hyperparathyroidism, memory loss, muscle weakness, severe depression, subdural hygroma, and a resting tremor and she is admitted for generalized weakness and found to have a COVID-19 infection and urinary tract infection.    Vitals signs notable for modest hypertension.  No fever.  No hypoxia.    Labs notable for UA with pyuria and urine cx positive for ecoli.  WBC count normal on admit.    CXR shows no infiltrate.    The patient is receiving Rocephin for treatment of UTI.  She is not receiving any COVID treatment due to lack of hypoxia and normal CXR.      She remains hospitalized primarily due to placement/disposition issues.    Continued observation status is most appropriate.       Patient is clinically improving and there is no clear indication to change patient's status to inpatient. The severity of illness, intensity of service provided, expected LOS and risk for adverse outcome make the care appropriate for observation.      The information on this document is developed by the utilization review team in order for the business office to ensure compliance. This only denotes the appropriateness of proper admission status and does not reflect the quality of care rendered.   The definitions of Inpatient Status and Observation Status used in making  the determination above are those provided in the CMS Coverage Manual, Chapter 1 and Chapter 6, section 70.4.      Sincerely,      Paul Martinez MD  Utilization Review   Physician Advisor   [facility].

## 2022-03-12 NOTE — PLAN OF CARE
"PRIMARY DIAGNOSIS: PE  OUTPATIENT/OBSERVATION GOALS TO BE MET BEFORE DISCHARGE:  1. Anticoagulant treatment initiated: Yes; Lovenox    2. Diagnostic testing complete (if applicable): Yes, ECHO results pending    3. Adequate home care or support arranged for LMWH administration: N/A    4. Return to near baseline physical activity: Yes    5. Pain Status: Improved but still requiring IV narcotics.    Discharge Planner Nurse   Safe discharge environment identified: Yes  Barriers to discharge: No       Entered by: Jodi Christensen 03/12/2022 8:55 AM   BP (!) 141/67 (BP Location: Right arm)   Pulse 75   Temp 98.1  F (36.7  C) (Oral)   Resp 18   Ht 1.575 m (5' 2\")   Wt 66.5 kg (146 lb 11.2 oz)   LMP  (LMP Unknown)   SpO2 96%   BMI 26.83 kg/m      Please review provider order for any additional goals.   Nurse to notify provider when observation goals have been met and patient is ready for discharge.  "

## 2022-03-12 NOTE — PLAN OF CARE
PRIMARY DIAGNOSIS: GENERALIZED WEAKNESS     OUTPATIENT/OBSERVATION GOALS TO BE MET BEFORE DISCHARGE  1. Orthostatic performed: No    2. Tolerating PO medications: Yes    3. Return to near baseline physical activity: No    4. Cleared for discharge by consultants (if involved): No    Vitals are Temp: 98.5  F (36.9  C) Temp src: Oral BP: (!) 159/73 Pulse: 99   Resp: 19 SpO2: 94 %.  Patient seems agitated and disoriented.pt is a 1  person assist with Gait Belt and Walker.Pt is on Covid precaution. Pt is on a regular diet. Pt is denying pain.  Patient has Lactated Ringer's running at 100 mL per hour.     Discharge Planner Nurse   Safe discharge environment identified: No  Barriers to discharge: Yes- unable to take care of self       Entered by: Allegra Castro 03/12/2022      Please review provider order for any additional goals.   Nurse to notify provider when observation goals have been met and patient is ready for discharge.

## 2022-03-12 NOTE — PLAN OF CARE
PRIMARY DIAGNOSIS: GENERALIZED WEAKNESS    OUTPATIENT/OBSERVATION GOALS TO BE MET BEFORE DISCHARGE  1. Orthostatic performed: No    2. Tolerating PO medications: Yes    3. Return to near baseline physical activity: Yes    4. Cleared for discharge by consultants (if involved): No    Discharge Planner Nurse   Safe discharge environment identified: No  Barriers to discharge: Yes       Entered by: Nini Guardado 03/11/2022 7:03 PM     Please review provider order for any additional goals.   Nurse to notify provider when observation goals have been met and patient is ready for discharge.Goal Outcome Evaluation:    Plan of Care Reviewed With: patient

## 2022-03-12 NOTE — PLAN OF CARE
"  PRIMARY DIAGNOSIS: GENERALIZED WEAKNESS/Covid+     OUTPATIENT/OBSERVATION GOALS TO BE MET BEFORE DISCHARGE  1. Orthostatic performed: N/A     2. Tolerating PO medications: Yes     3. Return to near baseline physical activity: No     4. Cleared for discharge by consultants (if involved): No     Discharge Planner Nurse   Safe discharge environment identified: No, PT recommends TCU (Covid+)  Barriers to discharge: Yes       Entered by: Jodi Christensen 03/12/2022 11:45 AM     BP (!) 141/67 (BP Location: Right arm)   Pulse 75   Temp 98.1  F (36.7  C) (Oral)   Resp 18   Ht 1.575 m (5' 2\")   Wt 66.5 kg (146 lb 11.2 oz)   LMP  (LMP Unknown)   SpO2 96%   BMI 26.83 kg/m      Please review provider order for any additional goals.   Nurse to notify provider when observation goals have been met and patient is ready for discharge.  "

## 2022-03-12 NOTE — PROGRESS NOTES
[facility]  Hospitalist Progress Note        Mitali Parekh PA-C   03/12/2022          Assessment and Plan:      Lennie Mar is a 89 year old female admitted on 3/10/2022. She has a history of breast cancer, alcohol dependence in remission, anxiety, asymptomatic varicose veins, bipolar disorder, chronic kidney disease stage 3, history of smoking, hyperparathyroidism, memory loss, muscle weakness, severe depression, subdural hygroma, and a resting tremor and she is admitted for generalized weakness and found to have COVID-19 infection and urinary tract infection. She remains admitted due to weakness, cognitive decline, and difficult placement.      1. Generalized Weakness  - Likely due to COVID-19 infection and urinary tract infection  - UA positive  - Urine culture 3/10/22 with Escherichia Coli resistant to Ampicillin, Ciprofloxacin, and Levofloxacin  - Treating with IV Ceftriaxone (3/11/22 - Present)  - PT evaluation 3/11/22: Recommended home with assist for more taxing tasks such as bathing.  - OT evaluation 3/12/2022: Recommended TCU or long term care facility as Lennie is below baseline function in self care and ADL.   - Disposition will be complicated as family lives in her apartment complex but are moving away. Patient has COVID so she can't go to TCU even if it is covered by insurance for 10 days.  See below for further details.      2.  Confirmed COVID-19   - Had two vaccinations, but not boosted   - CXR showed no acute disease  - Not requiring oxygen   - Continue to monitor   - Not ordering or following inflammatory markers unless there is a clinical change   -Unable to discharge to TCU because of COVID-19 infection (unless 10 days out)    Symptom Onset Unknown    Date of 1st Positive Test 03/10/2022   Vaccination Status Had 2 vaccines, but was not boosted          - COVID-19 special precautions, continuous pulse-ox  - Oxygen: Continue current support with room air; titrate to  keep SpO2 between 90-96%  - Labs: None  - Imaging: No additional imaging at this time   - Breathing treatments: No inhalers needed; avoid nebulizers in favor of MDIs   - IV fluids: None   - Antibiotics: Ceftriaxone for urinary tract infection but not for COVID-19 infection   - COVID-Focused Medications: No COVID-specific therapies are appropriate at this time.  - DVT Prophylaxis:         - At high risk of thrombotic complications due to COVID-19          - On Subcutaneous Heparin       3.  Acute on Chronic Kidney Disease Stage 3  - Possibly from lithium use   - At baseline creatinine 1.1-1.3  - Creatinine on admission was 1.74  - Currently creatinine 1.23   - Eating and drinking well so no longer on IV fluids      4.  Urinary Tract Infection   - Grew Klebsiella in 2014 and Klebsiella/Enterococcus in 2012   - UA positive on 3/10/22  - Urine culture 3/10/22 with Escherichia Coli resistant to Ampicillin, Ciprofloxacin, and Levofloxacin  - Treating with IV Ceftriaxone (3/11/22 - Present). Could transition to Bactrim but given continued weakness, decreased cognitive status, and need to remain admitted will continue IV Ceftriaxone for now.      5.  Bipolar Disorder, Unspecified  - Follows with Dr. Horn at [facility] in [place]   - In a depressive episode but does not have thoughts of dying or harming herself  - On Depakote, Lithium, and Aripiprazole prior to admission, continued.  - Upon admission on 3/10/22 Lithium level 0.3 and Valproic Acid level 15     6.   Hypothyroidism  - Had biopsy on 6/1/2021 of a thyroid nodule that was benign   - On 3/10/22, TSH 0.01, T4 1.20  - Continued prior to admission Levothyroxine   - Re-check levels in 6 weeks    7.   Resting Tremor  Chronic  Etiology unknown  Stated tremor is at baseline    8.    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 Malik)  - On Letrozole since 2019, continued     9.    Vitamin D Deficiency  - On Vitamin D3 prior to admission, held.     10.   Disposition  - The patient lives independently in an apartment.  She has a cousin and her daughter who lives down the su to help her on a daily basis.  They, however are moving away.    - PT currently recommends home with assist for more taxing tasks such as bathing.  - OT recommends TCU or long term care facility as Lennie is below baseline function in self care and ADL.   - Discussed with ashley Sneed on 3/12/2022 and he agrees that patient is not able to live alone and take care of herself.  He is open to long term placement even though this would be private pay.   - Discussed case with social work and patient will likely need long term care which will be private pay.  If TCU is covered by insurance Lennie will not be able to go for least 10 days post Covid diagnosis.    - Plan to remain admitted and see if cognition improves with treatment of COVID-19 and treatment of urinary tract infection.   - Social Work will continue to follow, will address concerns with family, and will look into placement  - Please call and update ashley Sneed daily ([phone])      DVT Prophylaxis: Heparin SQ  Code Status: DNR / DNI  Discharge Disposition: Unknown. Pending improvement in cognition and safe place for discharge.   Estimated Discharge Date / # of Days until Discharge: 1-2                 Interval History:        Lennie stated that she did not sleep well last night. She was sitting up in her bed eating breakfast.  Tremor was noted and Lennie stated the tremor is at baseline.  She denied pain. She is tolerating oral intake. Afebrile and vital signs stable.             Physical Exam:      Blood pressure  "(!) 141/67, pulse 75, temperature 98.1  F (36.7  C), temperature source Oral, resp. rate 18, height 1.575 m (5' 2\"), weight 66.5 kg (146 lb 11.2 oz), SpO2 96 %, not currently breastfeeding.  Vitals:    03/10/22 1727   Weight: 66.5 kg (146 lb 11.2 oz)     Vital Signs with Ranges  Temp:  [97.6  F (36.4  C)-99.9  F (37.7  C)] 98.1  F (36.7  C)  Pulse:  [75-99] 75  Resp:  [18-20] 18  BP: (137-168)/(58-86) 141/67  SpO2:  [93 %-97 %] 96 %  I/O's Last 24 hours  I/O last 3 completed shifts:  In: 440 [P.O.:440]  Out: -   Constitutional: Awake, alert, cooperative, no apparent distress   Respiratory: Clear to auscultation bilaterally, no crackles or wheezing   Cardiovascular: Regular rate and rhythm, normal S1 and S2, and no murmur noted   Abdomen: Normal bowel sounds, soft, non-distended, non-tender   Skin: No rashes, no cyanosis, dry to touch   Neuro: Alert and oriented x3, no weakness, numbness   Extremities: No edema, normal range of motion. Tremor in bilateral upper extremities.   Other(s):           All other systems: Negative              Medications:          ARIPiprazole  5 mg Oral Daily     cefTRIAXone  2 g Intravenous Q24H     divalproex sodium extended-release  500 mg Oral At Bedtime     heparin ANTICOAGULANT  5,000 Units Subcutaneous Q12H     letrozole  2.5 mg Oral Daily     levothyroxine  100 mcg Oral Daily     lithium  150 mg Oral QAM     PRN Meds: acetaminophen, melatonin, ondansetron **OR** ondansetron         Data:      All new lab and imaging data was reviewed.    All laboratory and imaging data have been reviewed by myself    "

## 2022-03-12 NOTE — PROGRESS NOTES
"   03/12/22 1018   Quick Adds   Type of Visit Initial Occupational Therapy Evaluation   Living Environment   People in Home alone   Current Living Arrangements apartment   Home Accessibility no concerns   Living Environment Comments walk in shower    Self-Care   Usual Activity Tolerance moderate   Current Activity Tolerance fair   Regular Exercise No   Equipment Currently Used at Home none   Fall history within last six months no   Activity/Exercise/Self-Care Comment reports indp. has a cane and walker   Instrumental Activities of Daily Living (IADL)   IADL Comments unclear at this time. pt agitated with questions    General Information   Onset of Illness/Injury or Date of Surgery 03/10/22   Referring Physician Devendra Cody MD   Patient/Family Therapy Goal Statement (OT) \"I don't know I can't go home!\"   Additional Occupational Profile Info/Pertinent History of Current Problem Lennie Mar is a 89 year old female admitted on 3/10/2022. She has a history of breast cancer, alcohol dependence in remission, anxiety, asymptomatic varicose veins, bipolar disorder, chronic kidney disease stage 3, history of smoking, hyperparathyroidism, memory loss, muscle weakness, severe depression, subdural hygroma, and a resting tremor and she is admitted for generalized weakness and found to have COVID-19 and UTI.   Existing Precautions/Restrictions fall   General Observations and Info on RA   Cognitive Status Examination   Orientation Status orientation to person, place and time   Behavioral Issues combative/physical outbursts   Affect/Mental Status (Cognitive) agitated   Follows Commands follows one-step commands;over 90% accuracy   Safety Deficit impulsivity   Memory Deficit short-term memory;moderate deficit   Attention Deficit distractible in noisy environment;perseverates on recent task;requires cues/redirection to task   Executive Function Deficit severe deficit;problem-solving/reasoning;impulse control   Cognitive " Status Comments does not follows 2 step commands. unable to problem solve wrapping on toothbrush   Visual Perception   Impact of Vision Impairment on Function (Vision) glasses in room    Sensory   Sensory Quick Adds No deficits were identified   Pain Assessment   Patient Currently in Pain Yes, see Vital Sign flowsheet   Posture   Posture forward head position;protracted shoulders   Range of Motion Comprehensive   Comment, General Range of Motion WFL for ADL   Strength Comprehensive (MMT)   Comment, General Manual Muscle Testing (MMT) Assessment 3+/5 bilterally UE   Coordination   Upper Extremity Coordination No deficits were identified   Activities of Daily Living   BADL Assessment/Intervention lower body dressing   Lower Body Dressing Assessment/Training   [place] Level (Lower Body Dressing) dependent (less than 25% patient effort)   Comment, (Lower Body Dressing) donning socks    Clinical Impression   Criteria for Skilled Therapeutic Interventions Met (OT) Yes, treatment indicated   OT Diagnosis decreased function and safety in ADL   OT Problem List-Impairments impacting ADL problems related to;activity tolerance impaired;cognition;strength   Assessment of Occupational Performance 3-5 Performance Deficits   Identified Performance Deficits bathing, dressing, toileting, home mgmt, safety    Planned Therapy Interventions (OT) ADL retraining;IADL retraining;strengthening;home program guidelines;progressive activity/exercise;cognition   Clinical Decision Making Complexity (OT) moderate complexity   Risk & Benefits of therapy have been explained evaluation/treatment results reviewed;care plan/treatment goals reviewed;risks/benefits reviewed;current/potential barriers reviewed;participants voiced agreement with care plan;participants included;patient   Clinical Impression Comments decreased function in aDL and self care warrants skilled IP OT tx    OT Discharge Planning   OT Discharge Recommendation (DC Rec)  Transitional Care Facility;Long term care facility   OT Rationale for DC Rec pt below baseline function in self care and ADL. largley limited by cognition, strength and activity intolerance. not safe to discharge to home; unable to care for self indp. needs TCU at discharge to increase function and indp. potentially good candidate for LTC or MC pending continued cog assessment and MD assessments. if home needs 24/7 assistance for ADL and IADL   Therapy Certification   Start of Care Date 03/12/22   Certification date from 03/12/22   Certification date to 03/12/22   Medical Diagnosis weakness   Total Evaluation Time (Minutes)   Total Evaluation Time (Minutes) 15   OT Goals   Therapy Frequency (OT) 5 times/wk   OT Predicated Duration/Target Date for Goal Attainment 03/18/22   OT Goals Cognition;Toilet Transfer/Toileting;Lower Body Dressing   OT: Lower Body Dressing Minimal assist   OT: Toilet Transfer/Toileting toilet transfer;cleaning and garment management;using adaptive equipment;Supervision/stand-by assist   OT: Cognitive Patient/caregiver will verbalize understanding of cognitive assessment results/recommendations as needed for safe discharge planning

## 2022-03-12 NOTE — PLAN OF CARE
PRIMARY DIAGNOSIS: GENERALIZED WEAKNESS     OUTPATIENT/OBSERVATION GOALS TO BE MET BEFORE DISCHARGE  1. Orthostatic performed: No    2. Tolerating PO medications: Yes    3. Return to near baseline physical activity: No    4. Cleared for discharge by consultants (if involved): No    Vitals are Temp: 99.1  F (37.3  C) Temp src: Oral BP: (!) 142/58 Pulse: 82   Resp: 20 SpO2: 93 %.  Patient seem to be sound asleep.pt is a 1  person assist with Gait Belt and Walker.Pt is on Covid precaution. Pt is on a regular diet. Pt not showing non verbal signs of pain.Pt is saline locked.    Discharge Planner Nurse   Safe discharge environment identified: No  Barriers to discharge: Yes- unable to take care of self       Entered by: Allegra Castro 03/12/2022      Please review provider order for any additional goals.   Nurse to notify provider when observation goals have been met and patient is ready for discharge.

## 2022-03-13 ENCOUNTER — APPOINTMENT (OUTPATIENT)
Dept: ULTRASOUND IMAGING | Facility: CLINIC | Age: 87
DRG: 178 | End: 2022-03-13
Attending: PHYSICIAN ASSISTANT
Payer: MEDICARE

## 2022-03-13 ENCOUNTER — APPOINTMENT (OUTPATIENT)
Dept: CARDIOLOGY | Facility: CLINIC | Age: 87
DRG: 178 | End: 2022-03-13
Attending: PHYSICIAN ASSISTANT
Payer: MEDICARE

## 2022-03-13 ENCOUNTER — APPOINTMENT (OUTPATIENT)
Dept: GENERAL RADIOLOGY | Facility: CLINIC | Age: 87
DRG: 178 | End: 2022-03-13
Attending: PHYSICIAN ASSISTANT
Payer: MEDICARE

## 2022-03-13 LAB
ALBUMIN SERPL-MCNC: 2.3 G/DL (ref 3.4–5)
ALP SERPL-CCNC: 94 U/L (ref 40–150)
ALT SERPL W P-5'-P-CCNC: 21 U/L (ref 0–50)
ANION GAP SERPL CALCULATED.3IONS-SCNC: 4 MMOL/L (ref 3–14)
AST SERPL W P-5'-P-CCNC: 34 U/L (ref 0–45)
BASOPHILS # BLD AUTO: 0 10E3/UL (ref 0–0.2)
BASOPHILS NFR BLD AUTO: 1 %
BILIRUB SERPL-MCNC: 0.3 MG/DL (ref 0.2–1.3)
BUN SERPL-MCNC: 15 MG/DL (ref 7–30)
CALCIUM SERPL-MCNC: 8.4 MG/DL (ref 8.5–10.1)
CHLORIDE BLD-SCNC: 113 MMOL/L (ref 94–109)
CO2 SERPL-SCNC: 23 MMOL/L (ref 20–32)
CREAT SERPL-MCNC: 1.26 MG/DL (ref 0.52–1.04)
CRP SERPL-MCNC: 87.1 MG/L (ref 0–8)
EOSINOPHIL # BLD AUTO: 0 10E3/UL (ref 0–0.7)
EOSINOPHIL NFR BLD AUTO: 0 %
ERYTHROCYTE [DISTWIDTH] IN BLOOD BY AUTOMATED COUNT: 14.5 % (ref 10–15)
FIBRINOGEN PPP-MCNC: 491 MG/DL (ref 170–490)
GFR SERPL CREATININE-BSD FRML MDRD: 39 ML/MIN/1.73M2
GLUCOSE BLD-MCNC: 115 MG/DL (ref 70–99)
HCT VFR BLD AUTO: 37.1 % (ref 35–47)
HGB BLD-MCNC: 11.7 G/DL (ref 11.7–15.7)
HOLD SPECIMEN: NORMAL
IMM GRANULOCYTES # BLD: 0 10E3/UL
IMM GRANULOCYTES NFR BLD: 1 %
LACTATE SERPL-SCNC: 1.4 MMOL/L (ref 0.7–2)
LDH SERPL L TO P-CCNC: 226 U/L (ref 81–234)
LVEF ECHO: NORMAL
LYMPHOCYTES # BLD AUTO: 0.5 10E3/UL (ref 0.8–5.3)
LYMPHOCYTES NFR BLD AUTO: 17 %
MAGNESIUM SERPL-MCNC: 1.6 MG/DL (ref 1.6–2.3)
MCH RBC QN AUTO: 32 PG (ref 26.5–33)
MCHC RBC AUTO-ENTMCNC: 31.5 G/DL (ref 31.5–36.5)
MCV RBC AUTO: 101 FL (ref 78–100)
MONOCYTES # BLD AUTO: 0.4 10E3/UL (ref 0–1.3)
MONOCYTES NFR BLD AUTO: 14 %
NEUTROPHILS # BLD AUTO: 2 10E3/UL (ref 1.6–8.3)
NEUTROPHILS NFR BLD AUTO: 67 %
NRBC # BLD AUTO: 0 10E3/UL
NRBC BLD AUTO-RTO: 0 /100
PLATELET # BLD AUTO: 126 10E3/UL (ref 150–450)
POTASSIUM BLD-SCNC: 4.3 MMOL/L (ref 3.4–5.3)
PROCALCITONIN SERPL-MCNC: 0.16 NG/ML
PROT SERPL-MCNC: 6.4 G/DL (ref 6.8–8.8)
RBC # BLD AUTO: 3.66 10E6/UL (ref 3.8–5.2)
SODIUM SERPL-SCNC: 140 MMOL/L (ref 133–144)
TROPONIN I SERPL HS-MCNC: 30 NG/L
TROPONIN I SERPL HS-MCNC: 30 NG/L
WBC # BLD AUTO: 3 10E3/UL (ref 4–11)

## 2022-03-13 PROCEDURE — 85025 COMPLETE CBC W/AUTO DIFF WBC: CPT | Performed by: PHYSICIAN ASSISTANT

## 2022-03-13 PROCEDURE — 80053 COMPREHEN METABOLIC PANEL: CPT | Performed by: PHYSICIAN ASSISTANT

## 2022-03-13 PROCEDURE — 86140 C-REACTIVE PROTEIN: CPT | Performed by: PHYSICIAN ASSISTANT

## 2022-03-13 PROCEDURE — 93321 DOPPLER ECHO F-UP/LMTD STD: CPT | Mod: 26 | Performed by: INTERNAL MEDICINE

## 2022-03-13 PROCEDURE — 36415 COLL VENOUS BLD VENIPUNCTURE: CPT | Performed by: PHYSICIAN ASSISTANT

## 2022-03-13 PROCEDURE — 258N000003 HC RX IP 258 OP 636: Performed by: PHYSICIAN ASSISTANT

## 2022-03-13 PROCEDURE — 83615 LACTATE (LD) (LDH) ENZYME: CPT | Performed by: PHYSICIAN ASSISTANT

## 2022-03-13 PROCEDURE — 250N000013 HC RX MED GY IP 250 OP 250 PS 637: Performed by: HOSPITALIST

## 2022-03-13 PROCEDURE — 93308 TTE F-UP OR LMTD: CPT | Mod: 26 | Performed by: INTERNAL MEDICINE

## 2022-03-13 PROCEDURE — 250N000011 HC RX IP 250 OP 636: Performed by: PHYSICIAN ASSISTANT

## 2022-03-13 PROCEDURE — 99233 SBSQ HOSP IP/OBS HIGH 50: CPT | Performed by: PHYSICIAN ASSISTANT

## 2022-03-13 PROCEDURE — 96372 THER/PROPH/DIAG INJ SC/IM: CPT | Performed by: HOSPITALIST

## 2022-03-13 PROCEDURE — 87040 BLOOD CULTURE FOR BACTERIA: CPT | Performed by: PHYSICIAN ASSISTANT

## 2022-03-13 PROCEDURE — 84484 ASSAY OF TROPONIN QUANT: CPT | Performed by: PHYSICIAN ASSISTANT

## 2022-03-13 PROCEDURE — 250N000013 HC RX MED GY IP 250 OP 250 PS 637: Performed by: PHYSICIAN ASSISTANT

## 2022-03-13 PROCEDURE — 93325 DOPPLER ECHO COLOR FLOW MAPG: CPT | Mod: 26 | Performed by: INTERNAL MEDICINE

## 2022-03-13 PROCEDURE — 71045 X-RAY EXAM CHEST 1 VIEW: CPT

## 2022-03-13 PROCEDURE — 250N000011 HC RX IP 250 OP 636: Performed by: HOSPITALIST

## 2022-03-13 PROCEDURE — G0378 HOSPITAL OBSERVATION PER HR: HCPCS

## 2022-03-13 PROCEDURE — 120N000004 HC R&B MS OVERFLOW

## 2022-03-13 PROCEDURE — 94640 AIRWAY INHALATION TREATMENT: CPT

## 2022-03-13 PROCEDURE — 250N000013 HC RX MED GY IP 250 OP 250 PS 637

## 2022-03-13 PROCEDURE — 36415 COLL VENOUS BLD VENIPUNCTURE: CPT | Performed by: HOSPITALIST

## 2022-03-13 PROCEDURE — 85384 FIBRINOGEN ACTIVITY: CPT | Performed by: PHYSICIAN ASSISTANT

## 2022-03-13 PROCEDURE — 84145 PROCALCITONIN (PCT): CPT | Performed by: PHYSICIAN ASSISTANT

## 2022-03-13 PROCEDURE — 93325 DOPPLER ECHO COLOR FLOW MAPG: CPT

## 2022-03-13 PROCEDURE — 93970 EXTREMITY STUDY: CPT

## 2022-03-13 PROCEDURE — 83735 ASSAY OF MAGNESIUM: CPT | Performed by: PHYSICIAN ASSISTANT

## 2022-03-13 PROCEDURE — 250N000009 HC RX 250: Performed by: PHYSICIAN ASSISTANT

## 2022-03-13 PROCEDURE — 83605 ASSAY OF LACTIC ACID: CPT | Performed by: HOSPITALIST

## 2022-03-13 PROCEDURE — 82040 ASSAY OF SERUM ALBUMIN: CPT | Performed by: PHYSICIAN ASSISTANT

## 2022-03-13 RX ORDER — NITROGLYCERIN 0.4 MG/1
0.4 TABLET SUBLINGUAL EVERY 5 MIN PRN
Status: DISCONTINUED | OUTPATIENT
Start: 2022-03-13 | End: 2022-03-22 | Stop reason: HOSPADM

## 2022-03-13 RX ORDER — GUAIFENESIN 600 MG/1
1200 TABLET, EXTENDED RELEASE ORAL 2 TIMES DAILY
Status: DISCONTINUED | OUTPATIENT
Start: 2022-03-13 | End: 2022-03-22 | Stop reason: HOSPADM

## 2022-03-13 RX ORDER — MAGNESIUM HYDROXIDE/ALUMINUM HYDROXICE/SIMETHICONE 120; 1200; 1200 MG/30ML; MG/30ML; MG/30ML
30 SUSPENSION ORAL EVERY 4 HOURS PRN
Status: DISCONTINUED | OUTPATIENT
Start: 2022-03-13 | End: 2022-03-22 | Stop reason: HOSPADM

## 2022-03-13 RX ORDER — LIDOCAINE 40 MG/G
CREAM TOPICAL
Status: DISCONTINUED | OUTPATIENT
Start: 2022-03-13 | End: 2022-03-22 | Stop reason: HOSPADM

## 2022-03-13 RX ORDER — NALOXONE HYDROCHLORIDE 0.4 MG/ML
0.4 INJECTION, SOLUTION INTRAMUSCULAR; INTRAVENOUS; SUBCUTANEOUS
Status: DISCONTINUED | OUTPATIENT
Start: 2022-03-13 | End: 2022-03-22 | Stop reason: HOSPADM

## 2022-03-13 RX ORDER — NALOXONE HYDROCHLORIDE 0.4 MG/ML
0.2 INJECTION, SOLUTION INTRAMUSCULAR; INTRAVENOUS; SUBCUTANEOUS
Status: DISCONTINUED | OUTPATIENT
Start: 2022-03-13 | End: 2022-03-22 | Stop reason: HOSPADM

## 2022-03-13 RX ORDER — NITROGLYCERIN 0.4 MG/1
TABLET SUBLINGUAL
Status: COMPLETED
Start: 2022-03-13 | End: 2022-03-13

## 2022-03-13 RX ORDER — SODIUM CHLORIDE, SODIUM LACTATE, POTASSIUM CHLORIDE, CALCIUM CHLORIDE 600; 310; 30; 20 MG/100ML; MG/100ML; MG/100ML; MG/100ML
INJECTION, SOLUTION INTRAVENOUS CONTINUOUS
Status: DISCONTINUED | OUTPATIENT
Start: 2022-03-13 | End: 2022-03-14

## 2022-03-13 RX ORDER — ACETAMINOPHEN 325 MG/1
650 TABLET ORAL EVERY 6 HOURS PRN
Status: DISCONTINUED | OUTPATIENT
Start: 2022-03-13 | End: 2022-03-22 | Stop reason: HOSPADM

## 2022-03-13 RX ORDER — FAMOTIDINE 20 MG/1
20 TABLET, FILM COATED ORAL DAILY
Status: DISCONTINUED | OUTPATIENT
Start: 2022-03-13 | End: 2022-03-18

## 2022-03-13 RX ORDER — INHALER, ASSIST DEVICES
1 SPACER (EA) MISCELLANEOUS ONCE
Status: DISCONTINUED | OUTPATIENT
Start: 2022-03-13 | End: 2022-03-13

## 2022-03-13 RX ORDER — AMOXICILLIN 250 MG
2 CAPSULE ORAL 2 TIMES DAILY PRN
Status: DISCONTINUED | OUTPATIENT
Start: 2022-03-13 | End: 2022-03-22 | Stop reason: HOSPADM

## 2022-03-13 RX ORDER — MORPHINE SULFATE 2 MG/ML
INJECTION, SOLUTION INTRAMUSCULAR; INTRAVENOUS
Status: DISCONTINUED
Start: 2022-03-13 | End: 2022-03-13 | Stop reason: WASHOUT

## 2022-03-13 RX ORDER — BISACODYL 10 MG
10 SUPPOSITORY, RECTAL RECTAL DAILY PRN
Status: DISCONTINUED | OUTPATIENT
Start: 2022-03-13 | End: 2022-03-22 | Stop reason: HOSPADM

## 2022-03-13 RX ORDER — AMOXICILLIN 250 MG
1 CAPSULE ORAL 2 TIMES DAILY PRN
Status: DISCONTINUED | OUTPATIENT
Start: 2022-03-13 | End: 2022-03-22 | Stop reason: HOSPADM

## 2022-03-13 RX ORDER — MAGNESIUM SULFATE HEPTAHYDRATE 40 MG/ML
2 INJECTION, SOLUTION INTRAVENOUS ONCE
Status: COMPLETED | OUTPATIENT
Start: 2022-03-13 | End: 2022-03-13

## 2022-03-13 RX ORDER — MORPHINE SULFATE 2 MG/ML
2 INJECTION, SOLUTION INTRAMUSCULAR; INTRAVENOUS ONCE
Status: DISCONTINUED | OUTPATIENT
Start: 2022-03-13 | End: 2022-03-13

## 2022-03-13 RX ADMIN — CEFTRIAXONE 2 G: 2 INJECTION, POWDER, FOR SOLUTION INTRAMUSCULAR; INTRAVENOUS at 20:38

## 2022-03-13 RX ADMIN — NITROGLYCERIN 0.4 MG: 0.4 TABLET SUBLINGUAL at 09:30

## 2022-03-13 RX ADMIN — SODIUM CHLORIDE, POTASSIUM CHLORIDE, SODIUM LACTATE AND CALCIUM CHLORIDE: 600; 310; 30; 20 INJECTION, SOLUTION INTRAVENOUS at 13:34

## 2022-03-13 RX ADMIN — FAMOTIDINE 20 MG: 20 TABLET ORAL at 10:30

## 2022-03-13 RX ADMIN — SODIUM CHLORIDE, POTASSIUM CHLORIDE, SODIUM LACTATE AND CALCIUM CHLORIDE: 600; 310; 30; 20 INJECTION, SOLUTION INTRAVENOUS at 23:48

## 2022-03-13 RX ADMIN — ARIPIPRAZOLE 5 MG: 5 TABLET ORAL at 07:38

## 2022-03-13 RX ADMIN — LETROZOLE 2.5 MG: 2.5 TABLET, FILM COATED ORAL at 07:38

## 2022-03-13 RX ADMIN — HEPARIN SODIUM 5000 UNITS: 5000 INJECTION, SOLUTION INTRAVENOUS; SUBCUTANEOUS at 20:39

## 2022-03-13 RX ADMIN — GUAIFENESIN 600 MG: 600 TABLET, EXTENDED RELEASE ORAL at 20:29

## 2022-03-13 RX ADMIN — LITHIUM CARBONATE 150 MG: 150 CAPSULE, GELATIN COATED ORAL at 07:38

## 2022-03-13 RX ADMIN — IPRATROPIUM BROMIDE AND ALBUTEROL 1 PUFF: 20; 100 SPRAY, METERED RESPIRATORY (INHALATION) at 19:24

## 2022-03-13 RX ADMIN — LEVOTHYROXINE SODIUM 100 MCG: 0.1 TABLET ORAL at 07:38

## 2022-03-13 RX ADMIN — MAGNESIUM SULFATE HEPTAHYDRATE 2 G: 2 INJECTION, SOLUTION INTRAVENOUS at 10:30

## 2022-03-13 RX ADMIN — ONDANSETRON 4 MG: 2 INJECTION INTRAMUSCULAR; INTRAVENOUS at 08:52

## 2022-03-13 RX ADMIN — HEPARIN SODIUM 5000 UNITS: 5000 INJECTION, SOLUTION INTRAVENOUS; SUBCUTANEOUS at 07:38

## 2022-03-13 ASSESSMENT — ACTIVITIES OF DAILY LIVING (ADL)
ADLS_ACUITY_SCORE: 24

## 2022-03-13 NOTE — PROGRESS NOTES
"PRIMARY DIAGNOSIS:GENERALIZED WEAKNESS AND COVID 19  OUTPATIENT/OBSERVATION GOALS TO BE MET BEFORE DISCHARGE:  1. ADLs back to baseline: No    2. Activity and level of assistance: Stand by assist    3. Pain status: Denies pain    4. Return to near baseline physical activity: Yes     Discharge Planner Nurse   Safe discharge environment identified: No  Barriers to discharge: Yes,        Entered by: Alice Núñez 03/12/2022     BP (!) 155/65 (BP Location: Right arm)   Pulse 88   Temp 99.2  F (37.3  C) (Oral)   Resp 20   Ht 1.575 m (5' 2\")   Wt 66.5 kg (146 lb 11.2 oz)   LMP  (LMP Unknown)   SpO2 91%   BMI 26.83 kg/m     Patient is more cooperative with cares, tolerated some oral nutrition, voided twice, had one small bowel movement, stand by assist with walker to bathroom, denies pain, resting comfortably in bed at this time with call light within reach.  Please review provider order for any additional goals.   Nurse to notify provider when observation goals have been met and patient is ready for discharge.  "

## 2022-03-13 NOTE — PLAN OF CARE
"PRIMARY DIAGNOSIS: GENERALIZED WEAKNESS    OUTPATIENT/OBSERVATION GOALS TO BE MET BEFORE DISCHARGE  1. Orthostatic performed: No    2. Tolerating PO medications: Yes    3. Return to near baseline physical activity: No    4. Cleared for discharge by consultants (if involved): No    Discharge Planner Nurse   Safe discharge environment identified: No  Barriers to discharge: Yes       Entered by: Max Montano 03/13/2022 6:10 AM    BP (!) 178/81 (BP Location: Right arm)   Pulse 78   Temp 98  F (36.7  C) (Oral)   Resp 16   Ht 1.575 m (5' 2\")   Wt 66.5 kg (146 lb 11.2 oz)   LMP  (LMP Unknown)   SpO2 94%   BMI 26.83 kg/m    Pt is alert to self with some confusion. Pt denies pain or discomfort. Pt has tremors on her right upper hand. Pt triggered sepsis and V/S x2 taken and documented. Pt lactic drawn was 1.4. pt is on isolation for COVID 19 and VRE. Pt sleeping in bed. Bed alarm in place and call light is within reach. Will continue to monitor and assess pt.      Please review provider order for any additional goals.   Nurse to notify provider when observation goals have been met and patient is ready for discharge.    "

## 2022-03-13 NOTE — PROGRESS NOTES
"PRIMARY DIAGNOSIS: GENERALIZED WEAKNESS AND COVID 19    OUTPATIENT/OBSERVATION GOALS TO BE MET BEFORE DISCHARGE  1. Orthostatic performed: No    2. Tolerating PO medications: Yes    3. Return to near baseline physical activity: No    4. Cleared for discharge by consultants (if involved): No    Discharge Planner Nurse   Safe discharge environment identified: No PT recommends TCU DUE TO Covid  Barriers to discharge: Yes       Entered by: Alice Núñez 03/12/2022     BP (!) 148/69 (BP Location: Right arm)   Pulse 81   Temp (!) 101.1  F (38.4  C) (Axillary)   Resp 20   Ht 1.575 m (5' 2\")   Wt 66.5 kg (146 lb 11.2 oz)   LMP  (LMP Unknown)   SpO2 95%   BMI 26.83 kg/m    Please review provider order for any additional goals.   Nurse to notify provider when observation goals have been met and patient is ready for discharge.  Patient refused full set of vitals, Temp elevated, Tylenol 975mg administered, refused ice packs, took a couple sips of ice water, resting in bed with HOB elevated at this time.  "

## 2022-03-13 NOTE — PLAN OF CARE
Care from [id number]     Temp: 100  F (37.8  C)  Temp src: Axillary  BP: 122/70  Pulse: 84  Resp: 20  SpO2: 97 %  O2 Device: Nasal cannula      Admitting Diagnosis: UTI/Weakness/Covid    Pertinent History: bipolar disorder, alcoholism in remission, breast cancer, Stage III CKD, hyperparathyroidism, depression, and resting tremor     Isolation Precautions: COVID and VRE  Neuro: Disorientated to and Situation  Activity: are SBA with Gait Belt and Walker  Telemetry Monitoring: SR 80s  Pain: denying pain.  Labs / Tests: CRP 87.1, Prolac 0.16  Mg 1.6 replaced and recheck in AM, trops negative x 2, UC +, BC pending, Cr 1.26 (baseline)  GI:Emesis x 1 during breakfast   : incontinent of urine  Medications:NTG x 1 at 0900 for reported CP, declines Tylenol for low grade fever, IV Mag replacement given,   Misc:0900 patient has emesis with breakfast, pt reports overall not felling well, T 101.1, O2 86% RA, denies CP to RN, reports CP to provider. CP r/o started. EKG changes, NTG given. Labs, fluids, imaging, ordered and now complete.   LDA's: Peripheral  Fluids: NaCl with K running at 100ml/hr  Diet: Regular  Living Situation:   Consults: PT, OT, and Social Work or Care Coordination  Discharge Disposition:  possibly TCU but Covid +

## 2022-03-13 NOTE — PROGRESS NOTES
Care Management Follow Up    Length of Stay (days): 0    Expected Discharge Date: 03/14/2022     Concerns to be Addressed: care coordination/care conferences, discharge planning     Patient plan of care discussed at interdisciplinary rounds: Yes    Anticipated Discharge Disposition: Transitional Care vs Assisted Living      Anticipated Discharge Services:  tbd  Anticipated Discharge DME:  tbd    Patient/family educated on Medicare website which has current facility and service quality ratings:  na  Education Provided on the Discharge Plan:  yes  Patient/Family in Agreement with the Plan: unable to assess    Referrals Placed by CM/SW: [facility] ([facility] )  Private pay costs discussed: private room/amenity fees    Additional Information:  SW spoke with patients son  Dmitry Mar. Discussed that need for his mom to have more services like an Assisted Living. He said they have just started the process. Advised him to look at the [facility] web site and he can search by vacancies. He thought she would have enough money private pay for a year. Also advised him to look for places that take the EW waiver. ESPERANZA explained the family would need to find the AL as it is a contact between them and the facilty and the hospital does not get involved with private pay matters.     We discussed going to the TCU. She would need to be considered COVID recovered which wont be until 3/21 and she will probably be medically cleared by then.     ESPERANZA gave him the number to the floor.      JOSE Lozano [facility]  [phone]  [address]   [facility]. [id number]  [facility]           JOSE Winters

## 2022-03-13 NOTE — PROGRESS NOTES
[facility]  Medicine Progress Note - Hospitalist Service       Date of Admission:  3/10/2022    Assessment & Plan     Lennie Mar is a 89 year old female with suspected cognitive impairment, bipolar disorder, alcoholism in remission, left-sided breast cancer on adjuvant Letrozole, Stage III CKD, VRE UTI 2012, hyperparathyroidism, depression, subdural hygroma, and resting tremor who presented to [facility] ED on 3/10/2022 with confusion and weakness and was found to have a COVID19 infection and UTI.  She was registered to Observation for further monitoring and treatment.      #Acute clinical decompensation with intermittent fevers to 101, chest pain, nausea with emesis, coarse cough, and mild hypoxia  New intermittent fever over last 24 hours with TMax 101.1.  Acute clinical change this AM as documented in prior progress note but is now stable.  Etiology for acute episode unclear but initially suspicious for cardiac event; evolving COVID19 symptoms also considered.  Chest pain has resolved.  No additional episodes of nausea/emesis.  While patient denies dyspnea, coarse cough appears new from yesterday and unfortunately it is unclear if it was present before emesis event.  While sats mainly 95-96% on 2L NC at rest, patient is intermittently hypoxic to 88% which may be related to coughing fits; aspiration event / aspiration pneumonitis also considered.  CXR relatively unchanged on my read.  Procal mildly elevated to 0.16.  EKG demonstrated new T-wave inversions in leads III and aVF without reciprocal ST changes.  Rate and rhythm stable.  Troponin higher than it was on admission but still within normal limits.   Echocardiogram without new rWMA.  Inflammatory markers mixed: CRP elevated as is fibrinogen however LDH normal.    --Repeat Trop at 12PM  --Continue to trend fever curve  --Did not add on DDimer as it will be elevated in setting of infection.  Given patient's renal function, hesistant to  proceed to CTA Chest PE Rule Out.     -Obtain BLE Duplex to eval for DVT   -If worsening hypoxia, could consider V/Q  --Will discuss changing antibiotics with Dr Duron (query if should add azithromycin or change to cefepime to cover for possible aspiration event)  --Electing to hold off on empiric steroids or remdesivir     --Contacted son to discuss clinical change and if he has a preference regarding start of steroids or remdesivir but unfortunately, he did not answer and am unable to leave a VM as his VM box is not working.     #Generalized Weakness, multifactorial:  UTI + COVID19 infection  Patient is vaccinated x2 but not boosted.  COVID19 positive but patient (initially) largely asymptomatic.  With regarding to UTI, patient has h/o VRE UTI 2012 sensitive only to linezolid.  Most recent UTIs have been EColi and Klebsiella with mixed resistance patterns.  Antibiotic choices limited by allergies to PCN, Sulfa, tetracycline, and fluoroquinolones.  UCx ultimately grew >100K Ecoli resistant only to ampicillin and fluoroquinolones; patient on ceftriaxone and is tolerating it well.    --Continue ceftriaxone, day 4   --Remains globally weak and may be starting to demonstrate s/sx of COVID19.    --Maximize inhalers and other symptomatic cares  --Evaluated by therapies and while PT recommends home with assist, OT recommending TCU or LTC.  Unfortunately, no TCU options available given COVID19 status.       #ASHLEY on chronic stage III CKD, resolved  Baseline Cr 1.1-1.3.  Cr 1.74 on admission likely related to UTI and volume depletion.  Cr normalized with fluid challenge.   --Cr today 1.26  --Minimize nephrotoxins    #Bipolar Disorder // Depression  #Underlying cognitive impairment  Follows with Dr. Horn at [facility] in [place].  H/o depression but denies thoughts of harming self or others.  On admission, lithium and valproic acid levels slightly subtherapeutic at 0.3 and 15, respectively.    --Continue PTA depakote,  lithium, and abilify    #Hypothyroidism  #History of hyperparathyroidism  Thyroid nodule biopsy 6/1/2021 benign.  TFTs 3/10/2022 showed undetectable TSH with normal free T4.  Calcium level normal.   --Continue PTA levothyroxine  --Recheck TFTs in 6 weeks     #History of Breast Cancer   History of left-sided breast cancer (2 separate tumors) diagnosed in 2018 after patient found a lump during self-exam.  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  Received neoadjuvant Herceptin + letrozole followed by left-sided mastectomy 11/2018 which revealed axillary LN involvement.  Based on Breast Conference recommendations, patient went on to receive adjuvant radiation to the left chest, axilla, and supraclavicular nodes in addition to a year of Kadcyla.  She remains on adjuvant hormone suppression with letrozole.  SHARON.  --Continue PTA letrozole  --Oncology follow-up planned for 4/18/2022 (Dr. Malik)     Diet: Regular Diet Adult    DVT Prophylaxis:  Heparin 5000u SC BID  Code Status: No CPR- Do NOT Intubate      DISPO:  Patient unsafe to return to independent living.  OT recommending LTC or TCU however there are no good TCU options given patient's COVID19 diagnosis.  Son Naren aware that patient will likely need LTC and that it will be private pay; he was reportedly agreeable to plan on 3/12.      Called ashley Sneed at [phone] with an update on patient's clinical change and to ask about starting steroids or Remdesivir (if clinical change is related to COVID) however was unable to get ahold of him and unable to leave a .      VEELINE Coronado  Hospitalist Service  [facility]     Text Page (7AM - 5PM, M-F)  ______________________________________________________________________    Interval History   Acute clinical change documented above.  New intermittent fevers.  N/V.  Chest pain, resolved.  Intermittently hypoxic.  New coarse cough which patient  believes started before the vomiting episode.  Patient reports having BM yesterday and today and denies diarrhea.  She also denies shortness of breath, new joint pain or muscle aches, new headache, or any other changes.      Data reviewed today: I reviewed all medications, new labs and imaging results over the last 24 hours. I personally reviewed the EKG tracing showing new T-wave inversions in leads III and avF and the chest x-ray image(s) showing no significant change (on my read) but patient is noted to have a fairly significant shift of trachea to the left that is stable from admission CXR but worse from CXRs from 2014.    Physical Exam   Vital Signs: Temp: (!) 100.9  F (38.3  C) Temp src: Axillary BP: (!) 146/62 Pulse: 87   Resp: 16 SpO2: 92 % O2 Device: None (Room air)    Weight: 146 lbs 11.2 oz    GENERAL:  Pleasant, cooperative.  Appears subdued and mildly lethargic.  NAD.  Appears to feel somewhat unwell.    HEENT: Normocephalic, atraumatic.  Extra occular mm intact.  Sclera clear. PERRL.  Mucous membranes dry.   PULMONOLOGY: Coarse with upper-airway sounds.  CARDIAC: Regular rate and rhythm.  No appreciated murmur.    ABDOMEN: Soft, nontender    MUSCULOSKELETAL:  Moving x 4 spontaneously with CMS intact x4.  Bulk and tone as expected for age.   NEURO: Alert and oriented to self and place.  CN II-XII grossly intact and symmetric.  Resting tremor.  No focal deficits or new lateralizing weakness.  SKIN: warm, pink, dry       Data   Recent Labs   Lab 03/13/22  1213 03/13/22  0858 03/12/22  0652 03/11/22  0705 03/10/22  1158   WBC 3.0*  --   --  3.4* 4.1   HGB 11.7  --   --  11.9 12.6   *  --   --  102* 102*   *  --   --  129* 146*   NA  --  140 142 142 140   POTASSIUM  --  4.3 5.2 5.0 4.9   CHLORIDE  --  113* 115* 114* 111*   CO2  --  23 25 23 25   BUN  --  15 20 25 36*   CR  --  1.26* 1.23* 1.42* 1.74*   ANIONGAP  --  4 2* 5 4   CANELO  --  8.4* 8.7 8.6 8.9   GLC  --  115* 81 81 88   ALBUMIN  --   2.3*  --   --  2.7*   PROTTOTAL  --  6.4*  --   --  6.7*   BILITOTAL  --  0.3  --   --  0.3   ALKPHOS  --  94  --   --  98   ALT  --  21  --   --  29   AST  --  34  --   --  35     Recent Results (from the past 24 hour(s))   XR Chest Port 1 View    Narrative    EXAM: XR CHEST PORTABLE 1 VIEW  LOCATION: [facility]  DATE/TIME: 2022, 9:50 AM    INDICATION: Chest pain, new hypoxia.  COMPARISON: Chest x-ray on 03/10/2022.      Impression    IMPRESSION: Single AP view of the chest was obtained. Cardiomediastinal silhouette is within normal limits. Mild left basilar/retrocardiac pulmonary opacities, indeterminate, could represent atelectasis versus infection. No significant pleural effusion   or pneumothorax.     Echocardiogram Limited   Result Value    LVEF  75-80%    Narrative    [id number]  [id number]  [id number]  [id number]^LUCIE^FOREST^VINNY     [facility]  Echocardiography Laboratory  [address] [place] [address]     Name: MANI ANDERSON  MRN: [id number]  : 1927  Study Date: 2022 11:12 AM  Age: 89 yrs  Gender: Female  Patient Location: [facility]  Reason For Study: Chest Pain  Ordering Physician: FOREST FAULKNER  Referring Physician: Christi Terry  Performed By: Batsheva Ch RDCS     BSA: 1.7 m2  Height: 62 in  Weight: 146 lb  HR: 80  BP: 124/64 mmHg  ______________________________________________________________________________  Procedure  Limited Portable Echo Adult.  ______________________________________________________________________________  Interpretation Summary     There is moderate concentric left ventricular hypertrophy.  Hyperdynamic left ventricular function  The visual ejection fraction is estimated at 75-80%.  No regional wall motion abnormalities noted.  The right ventricular systolic function is normal.  Trace tricuspid and mitral valve regurgitation.  No hemodynamically significant valvular aortic stenosis.  The  inferior vena cava is normal.     Compared to prior study, there is no significant change.  ______________________________________________________________________________  Left Ventricle  The left ventricle is normal in size. There is moderate concentric left  ventricular hypertrophy. Hyperdynamic left ventricular function. The visual  ejection fraction is estimated at 75-80%. Grade I or early diastolic  dysfunction. No regional wall motion abnormalities noted.     Right Ventricle  The right ventricle is grossly normal size. The right ventricular systolic  function is normal.     Atria  Normal left atrial size. Right atrial size is normal. There is no color  Doppler evidence of an atrial shunt.     Mitral Valve  The mitral valve leaflets are mildly thickened. There is trace mitral  regurgitation.     Tricuspid Valve  The tricuspid valve is not well visualized, but is grossly normal. There is  trace tricuspid regurgitation. Right ventricular systolic pressure could not  be approximated due to inadequate tricuspid regurgitation.     Aortic Valve  The aortic valve is not well visualized. No aortic regurgitation is present.  No hemodynamically significant valvular aortic stenosis.     Pulmonic Valve  The pulmonic valve is not well seen, but is grossly normal. There is trace  pulmonic valvular regurgitation.     Vessels  The aortic root is normal size. The ascending aorta is Borderline dilated. The  inferior vena cava is normal.     Pericardium  There is no pericardial effusion.     Rhythm  Sinus rhythm was noted.  ______________________________________________________________________________  MMode/2D Measurements & Calculations  IVSd: 1.4 cm     LVIDd: 3.9 cm  LVIDs: 2.3 cm  LVPWd: 1.2 cm  FS: 42.6 %  LV mass(C)d: 189.9 grams  LV mass(C)dI: 113.6 grams/m2  asc Aorta Diam: 3.8 cm  RWT: 0.63     ______________________________________________________________________________  Report approved by: Dr Mothilal Magalie  03/13/2022 12:25 PM           Medications     lactated ringers       - MEDICATION INSTRUCTIONS -         ARIPiprazole  5 mg Oral Daily     cefTRIAXone  2 g Intravenous Q24H     divalproex sodium extended-release  500 mg Oral At Bedtime     famotidine  20 mg Oral Daily     heparin ANTICOAGULANT  5,000 Units Subcutaneous Q12H     letrozole  2.5 mg Oral Daily     levothyroxine  100 mcg Oral Daily     lithium  150 mg Oral QAM     sodium chloride (PF)  3 mL Intracatheter Q8H

## 2022-03-13 NOTE — PROGRESS NOTES
INTERIM NOTE    Notified by RN of acute clinical change.  Patient with suspected underlying cog impairment, bipolar disorder, history of alcoholism, stage III CKD, hypothyroidism, breast cancer on Femara, and VRE UTI who was admitted 3/10/2022 for acute infectious encephalopathy related to EColi UTI and COVID19 infection.  Ongoing cyclic fevers to 101 but respiratory status has been stable.  On ceftriaxone for EColi UTI (sensitive to cephalosporins).  Patient seen by nursing staff this morning and reported she was feeling good.  She was said to be in good spirits.  Ordered breakfast.  About 1 hour later, patient called nursing station reporting she was feeling poorly.  She had an emesis.  Evaluated by RN who noted patient appears much different (worse) than this morning.        No recent labs.  Ordered CMP, CBC with diff, CRP, LDH, blood cultures.    Arrived to assess patient.  Phlebotomy already in room obtaining labs.  Patient is a difficult, at time unreliable historian but at present reports chest pain, placing her hand over the upper portion of her sternum.  Denies shortness of breath, back pain, or abdominal pain.  States she has had this pain in the past but cannot remember when.  Denies cough or trouble breathing.  Vitals stable with SBP 150s, HR 90s, temp 99.  RN noted O2 sats 86% after patietn's emesis and placed her on 2L NC on which she is satting in the mid 90s.  Clinically, patient appears unwell and mildly toxic but in NAD.  Her eyes are glassy, anincteric.  Mucous membranes a bit tacky.  Respirations unlabored.  No accessory mm use.  Lung clear peripherally but coarse upper airway with expiration.  Heart regular, no murmur.  Abdomen soft, NT.      Added troponin to lab. STAT EKG obtained and shows new T-wave inversions in leads III and avF without reciprocal ST elevation.  Patient now stating chest pain might be from vomiting.      Concern for cardiac event +/- aspiration event.     --Given SL  Nitro  --Tele  --Continuous pulse ox  --Await labs  --CXR    Collette PAC

## 2022-03-13 NOTE — PLAN OF CARE
"  PRIMARY DIAGNOSIS: GENERALIZED WEAKNESS    OUTPATIENT/OBSERVATION GOALS TO BE MET BEFORE DISCHARGE  1. Orthostatic performed: No    2. Tolerating PO medications: Yes    3. Return to near baseline physical activity: No    4. Cleared for discharge by consultants (if involved): No    Discharge Planner Nurse   Safe discharge environment identified: No  Barriers to discharge: Yes          Entered by: Max Montano 03/13/2022 12:51 AM    /70 (BP Location: Right arm)   Pulse 83   Temp 99.6  F (37.6  C) (Oral)   Resp 20   Ht 1.575 m (5' 2\")   Wt 66.5 kg (146 lb 11.2 oz)   LMP  (LMP Unknown)   SpO2 93%   BMI 26.83 kg/m         Pt is alert to self with some confusion. Pt denies pain or discomfort. Pt triggered sepsis and V/S x2 taken and documented. Pt lactic drawn was 1.4. pt is on isolation for COVID 19 and VRE. Pt sleeping in bed. Bed alarm in place and call light is within reach. Will continue to monitor and assess pt.       Please review provider order for any additional goals.   Nurse to notify provider when observation goals have been met and patient is ready for discharge.  "

## 2022-03-14 ENCOUNTER — APPOINTMENT (OUTPATIENT)
Dept: PHYSICAL THERAPY | Facility: CLINIC | Age: 87
DRG: 178 | End: 2022-03-14
Payer: MEDICARE

## 2022-03-14 LAB
ANION GAP SERPL CALCULATED.3IONS-SCNC: 1 MMOL/L (ref 3–14)
ATRIAL RATE - MUSE: 234 BPM
BASE EXCESS BLDV CALC-SCNC: 1.3 MMOL/L (ref -7.7–1.9)
BASOPHILS # BLD AUTO: 0 10E3/UL (ref 0–0.2)
BASOPHILS NFR BLD AUTO: 1 %
BUN SERPL-MCNC: 14 MG/DL (ref 7–30)
CALCIUM SERPL-MCNC: 8.9 MG/DL (ref 8.5–10.1)
CHLORIDE BLD-SCNC: 114 MMOL/L (ref 94–109)
CO2 SERPL-SCNC: 27 MMOL/L (ref 20–32)
CREAT SERPL-MCNC: 1.26 MG/DL (ref 0.52–1.04)
CRP SERPL-MCNC: 81 MG/L (ref 0–8)
DIASTOLIC BLOOD PRESSURE - MUSE: NORMAL MMHG
EOSINOPHIL # BLD AUTO: 0 10E3/UL (ref 0–0.7)
EOSINOPHIL NFR BLD AUTO: 1 %
ERYTHROCYTE [DISTWIDTH] IN BLOOD BY AUTOMATED COUNT: 14.5 % (ref 10–15)
FIBRINOGEN PPP-MCNC: 470 MG/DL (ref 170–490)
GFR SERPL CREATININE-BSD FRML MDRD: 39 ML/MIN/1.73M2
GLUCOSE BLD-MCNC: 99 MG/DL (ref 70–99)
HCO3 BLDV-SCNC: 27 MMOL/L (ref 21–28)
HCT VFR BLD AUTO: 41.4 % (ref 35–47)
HGB BLD-MCNC: 12.9 G/DL (ref 11.7–15.7)
IMM GRANULOCYTES # BLD: 0 10E3/UL
IMM GRANULOCYTES NFR BLD: 1 %
INTERPRETATION ECG - MUSE: NORMAL
LACTATE SERPL-SCNC: 1 MMOL/L (ref 0.7–2)
LDH SERPL L TO P-CCNC: 269 U/L (ref 81–234)
LITHIUM SERPL-SCNC: 0.3 MMOL/L
LYMPHOCYTES # BLD AUTO: 0.9 10E3/UL (ref 0.8–5.3)
LYMPHOCYTES NFR BLD AUTO: 21 %
MAGNESIUM SERPL-MCNC: 2 MG/DL (ref 1.6–2.3)
MCH RBC QN AUTO: 31.9 PG (ref 26.5–33)
MCHC RBC AUTO-ENTMCNC: 31.2 G/DL (ref 31.5–36.5)
MCV RBC AUTO: 103 FL (ref 78–100)
MONOCYTES # BLD AUTO: 0.6 10E3/UL (ref 0–1.3)
MONOCYTES NFR BLD AUTO: 15 %
NEUTROPHILS # BLD AUTO: 2.7 10E3/UL (ref 1.6–8.3)
NEUTROPHILS NFR BLD AUTO: 61 %
NRBC # BLD AUTO: 0 10E3/UL
NRBC BLD AUTO-RTO: 0 /100
O2/TOTAL GAS SETTING VFR VENT: 2 %
P AXIS - MUSE: NORMAL DEGREES
PCO2 BLDV: 48 MM HG (ref 40–50)
PH BLDV: 7.36 [PH] (ref 7.32–7.43)
PLATELET # BLD AUTO: 132 10E3/UL (ref 150–450)
PO2 BLDV: 28 MM HG (ref 25–47)
POTASSIUM BLD-SCNC: 4.9 MMOL/L (ref 3.4–5.3)
PR INTERVAL - MUSE: NORMAL MS
QRS DURATION - MUSE: 110 MS
QT - MUSE: 356 MS
QTC - MUSE: 445 MS
R AXIS - MUSE: -62 DEGREES
RBC # BLD AUTO: 4.04 10E6/UL (ref 3.8–5.2)
SODIUM SERPL-SCNC: 142 MMOL/L (ref 133–144)
SYSTOLIC BLOOD PRESSURE - MUSE: NORMAL MMHG
T AXIS - MUSE: -50 DEGREES
VALPROATE SERPL-MCNC: 40 MG/L
VENTRICULAR RATE- MUSE: 94 BPM
WBC # BLD AUTO: 4.2 10E3/UL (ref 4–11)

## 2022-03-14 PROCEDURE — 99221 1ST HOSP IP/OBS SF/LOW 40: CPT | Performed by: CLINICAL NURSE SPECIALIST

## 2022-03-14 PROCEDURE — 80164 ASSAY DIPROPYLACETIC ACD TOT: CPT | Performed by: PHYSICIAN ASSISTANT

## 2022-03-14 PROCEDURE — 258N000002 HC RX IP 258 OP 250: Performed by: PHYSICIAN ASSISTANT

## 2022-03-14 PROCEDURE — 80178 ASSAY OF LITHIUM: CPT | Performed by: PHYSICIAN ASSISTANT

## 2022-03-14 PROCEDURE — 258N000003 HC RX IP 258 OP 636: Performed by: PHYSICIAN ASSISTANT

## 2022-03-14 PROCEDURE — 85025 COMPLETE CBC W/AUTO DIFF WBC: CPT | Performed by: PHYSICIAN ASSISTANT

## 2022-03-14 PROCEDURE — 82803 BLOOD GASES ANY COMBINATION: CPT | Performed by: PHYSICIAN ASSISTANT

## 2022-03-14 PROCEDURE — 86140 C-REACTIVE PROTEIN: CPT | Performed by: PHYSICIAN ASSISTANT

## 2022-03-14 PROCEDURE — 250N000013 HC RX MED GY IP 250 OP 250 PS 637: Performed by: PHYSICIAN ASSISTANT

## 2022-03-14 PROCEDURE — 83615 LACTATE (LD) (LDH) ENZYME: CPT | Performed by: PHYSICIAN ASSISTANT

## 2022-03-14 PROCEDURE — 99233 SBSQ HOSP IP/OBS HIGH 50: CPT | Performed by: PHYSICIAN ASSISTANT

## 2022-03-14 PROCEDURE — 36415 COLL VENOUS BLD VENIPUNCTURE: CPT | Performed by: STUDENT IN AN ORGANIZED HEALTH CARE EDUCATION/TRAINING PROGRAM

## 2022-03-14 PROCEDURE — 120N000004 HC R&B MS OVERFLOW

## 2022-03-14 PROCEDURE — 82310 ASSAY OF CALCIUM: CPT | Performed by: PHYSICIAN ASSISTANT

## 2022-03-14 PROCEDURE — 36415 COLL VENOUS BLD VENIPUNCTURE: CPT | Performed by: PHYSICIAN ASSISTANT

## 2022-03-14 PROCEDURE — 250N000013 HC RX MED GY IP 250 OP 250 PS 637: Performed by: HOSPITALIST

## 2022-03-14 PROCEDURE — 83735 ASSAY OF MAGNESIUM: CPT | Performed by: PHYSICIAN ASSISTANT

## 2022-03-14 PROCEDURE — 94640 AIRWAY INHALATION TREATMENT: CPT | Mod: 76

## 2022-03-14 PROCEDURE — 94640 AIRWAY INHALATION TREATMENT: CPT

## 2022-03-14 PROCEDURE — 85384 FIBRINOGEN ACTIVITY: CPT | Performed by: PHYSICIAN ASSISTANT

## 2022-03-14 PROCEDURE — 250N000011 HC RX IP 250 OP 636: Performed by: PHYSICIAN ASSISTANT

## 2022-03-14 PROCEDURE — 250N000013 HC RX MED GY IP 250 OP 250 PS 637: Performed by: INTERNAL MEDICINE

## 2022-03-14 PROCEDURE — 83605 ASSAY OF LACTIC ACID: CPT | Performed by: STUDENT IN AN ORGANIZED HEALTH CARE EDUCATION/TRAINING PROGRAM

## 2022-03-14 PROCEDURE — 97530 THERAPEUTIC ACTIVITIES: CPT | Mod: GP | Performed by: PHYSICAL THERAPIST

## 2022-03-14 RX ORDER — METOPROLOL TARTRATE 25 MG/1
25 TABLET, FILM COATED ORAL 2 TIMES DAILY
Status: DISCONTINUED | OUTPATIENT
Start: 2022-03-14 | End: 2022-03-22 | Stop reason: HOSPADM

## 2022-03-14 RX ORDER — METOPROLOL TARTRATE 25 MG/1
25 TABLET, FILM COATED ORAL ONCE
Status: COMPLETED | OUTPATIENT
Start: 2022-03-14 | End: 2022-03-14

## 2022-03-14 RX ORDER — SODIUM CHLORIDE 450 MG/100ML
INJECTION, SOLUTION INTRAVENOUS CONTINUOUS
Status: DISCONTINUED | OUTPATIENT
Start: 2022-03-14 | End: 2022-03-15

## 2022-03-14 RX ORDER — MAGNESIUM SULFATE HEPTAHYDRATE 40 MG/ML
2 INJECTION, SOLUTION INTRAVENOUS ONCE
Status: COMPLETED | OUTPATIENT
Start: 2022-03-14 | End: 2022-03-15

## 2022-03-14 RX ORDER — PROCHLORPERAZINE 25 MG
12.5 SUPPOSITORY, RECTAL RECTAL EVERY 12 HOURS PRN
Status: DISCONTINUED | OUTPATIENT
Start: 2022-03-14 | End: 2022-03-22 | Stop reason: HOSPADM

## 2022-03-14 RX ORDER — CEFTRIAXONE 2 G/1
2 INJECTION, POWDER, FOR SOLUTION INTRAMUSCULAR; INTRAVENOUS EVERY 24 HOURS
Status: COMPLETED | OUTPATIENT
Start: 2022-03-14 | End: 2022-03-15

## 2022-03-14 RX ORDER — PROCHLORPERAZINE MALEATE 5 MG
5 TABLET ORAL EVERY 6 HOURS PRN
Status: DISCONTINUED | OUTPATIENT
Start: 2022-03-14 | End: 2022-03-22 | Stop reason: HOSPADM

## 2022-03-14 RX ADMIN — IPRATROPIUM BROMIDE AND ALBUTEROL 1 PUFF: 20; 100 SPRAY, METERED RESPIRATORY (INHALATION) at 20:23

## 2022-03-14 RX ADMIN — APIXABAN 2.5 MG: 2.5 TABLET, FILM COATED ORAL at 21:03

## 2022-03-14 RX ADMIN — ACETAMINOPHEN 650 MG: 325 TABLET, FILM COATED ORAL at 07:07

## 2022-03-14 RX ADMIN — LEVOTHYROXINE SODIUM 100 MCG: 0.1 TABLET ORAL at 09:03

## 2022-03-14 RX ADMIN — FAMOTIDINE 20 MG: 20 TABLET ORAL at 09:04

## 2022-03-14 RX ADMIN — IPRATROPIUM BROMIDE AND ALBUTEROL 1 PUFF: 20; 100 SPRAY, METERED RESPIRATORY (INHALATION) at 08:30

## 2022-03-14 RX ADMIN — GUAIFENESIN 1200 MG: 600 TABLET, EXTENDED RELEASE ORAL at 09:00

## 2022-03-14 RX ADMIN — METOPROLOL TARTRATE 25 MG: 25 TABLET, FILM COATED ORAL at 21:03

## 2022-03-14 RX ADMIN — APIXABAN 2.5 MG: 2.5 TABLET, FILM COATED ORAL at 09:04

## 2022-03-14 RX ADMIN — METOPROLOL TARTRATE 25 MG: 25 TABLET, FILM COATED ORAL at 09:35

## 2022-03-14 RX ADMIN — ARIPIPRAZOLE 5 MG: 5 TABLET ORAL at 09:03

## 2022-03-14 RX ADMIN — IPRATROPIUM BROMIDE AND ALBUTEROL 1 PUFF: 20; 100 SPRAY, METERED RESPIRATORY (INHALATION) at 17:44

## 2022-03-14 RX ADMIN — LETROZOLE 2.5 MG: 2.5 TABLET, FILM COATED ORAL at 09:03

## 2022-03-14 RX ADMIN — SODIUM CHLORIDE, POTASSIUM CHLORIDE, SODIUM LACTATE AND CALCIUM CHLORIDE: 600; 310; 30; 20 INJECTION, SOLUTION INTRAVENOUS at 05:57

## 2022-03-14 RX ADMIN — MAGNESIUM SULFATE HEPTAHYDRATE 2 G: 2 INJECTION, SOLUTION INTRAVENOUS at 10:55

## 2022-03-14 RX ADMIN — IPRATROPIUM BROMIDE AND ALBUTEROL 1 PUFF: 20; 100 SPRAY, METERED RESPIRATORY (INHALATION) at 13:31

## 2022-03-14 RX ADMIN — CEFTRIAXONE 2 G: 2 INJECTION, POWDER, FOR SOLUTION INTRAMUSCULAR; INTRAVENOUS at 21:03

## 2022-03-14 RX ADMIN — LITHIUM CARBONATE 150 MG: 150 CAPSULE, GELATIN COATED ORAL at 09:01

## 2022-03-14 RX ADMIN — METOPROLOL TARTRATE 12.5 MG: 25 TABLET, FILM COATED ORAL at 02:22

## 2022-03-14 RX ADMIN — SODIUM CHLORIDE: 4.5 INJECTION, SOLUTION INTRAVENOUS at 17:36

## 2022-03-14 ASSESSMENT — ACTIVITIES OF DAILY LIVING (ADL)
ADLS_ACUITY_SCORE: 26
ADLS_ACUITY_SCORE: 30
ADLS_ACUITY_SCORE: 24
ADLS_ACUITY_SCORE: 26
ADLS_ACUITY_SCORE: 24
ADLS_ACUITY_SCORE: 30
ADLS_ACUITY_SCORE: 26
ADLS_ACUITY_SCORE: 30
WEAR_GLASSES_OR_BLIND: YES
ADLS_ACUITY_SCORE: 26
ADLS_ACUITY_SCORE: 30
ADLS_ACUITY_SCORE: 26
ADLS_ACUITY_SCORE: 30
ADLS_ACUITY_SCORE: 30
ADLS_ACUITY_SCORE: 24
ADLS_ACUITY_SCORE: 26
ADLS_ACUITY_SCORE: 30
WEAR_GLASSES_OR_BLIND: YES
ADLS_ACUITY_SCORE: 26

## 2022-03-14 NOTE — PROGRESS NOTES
Puls ox continued to alarm. Pt found to be comfortable and denied SOB/Chest pain. LS coarse and diminished. No edema noted. Pt able to deep breathe and cough w/ encouragement. Puls ox was reading high 80s/low 90s on 2-5lpm NC w/ poor perfusion. Writer found that pt's hand tremors created such artifact that pulsox readings were inhibited. Pulsox applied to pt left 3rd toe and is stating 92-97% on 1lpm NC. Primary RN notified.    Update: stats dropped to 89% on 1lpm NC increased to 2lpm NC stating well.

## 2022-03-14 NOTE — PLAN OF CARE
Received critical call from tele that pt is having Afib 118. Pt vitals was checked and documented. Pt Temp was 98.4 after applying ice packs. Hospitalist paged and waiting for response. Will continue to monitor and assess pt.

## 2022-03-14 NOTE — CONSULTS
[facility]  Palliative Care Consultation   Text Page    Assessment & Plan   Lennie Mar is a 89 year old female who was admitted on 3/10/2022.   Consulted by EVELINE Alicia to assist this 89 yo with worsening cog impairment and bipolar now with symptomatic COVID. Worsening clinically. Goals of care discussion, family coordination.     Recommendations:  1.  Goals of Care- No CPR- Do NOT Intubate - Restorative efforts, but family request NO PREDNISONE due to previous behavioral issues.    Hospitalization goals discussed her son Dr. Naren Mar.     Decisional Capacity- Unreliable. Patient does not have an advance directive. Per  informed consent policy next of kin should be involved in all consent and decision making. Her children are next-of-kin.    POLST - Complete indicating patient's preference for SELECTIVE TREATMENT.    2.  Dyspnea - Due to COVID-19 infection. Currently on oxygen via nasal cannula at 2 liters. Son has clarified that the family request NO PREDNISONE due to the patient's history of behavioral problems.      3.  Generalized weakness - Appreciate input of Occupational Therapist Chiquita Chung recommendation for TCU or LTC facility as patient below baseline functioning.     4.  Unintentional weight loss - Current weight 141 pounds and she was documented at 187 pounds 6/10/2016 and 168 pounds 8/28/2018. Dietitian input would be appreciated.     5.  Pain - Patient denies pain. Recommend use of non-opiate adjuvants such as Tylenol for complaint of pain.   Patient's opioid use in past 24 hours: None = 0 mg Daily Morphine Equivalent  [place] Board of Pharmacy Data Base Reviewed:    YES; As expected, no concern for misuse/abuse of controlled medications based on this report.    6. Spiritual Care  Oriented to Spiritual Health as part of Palliative Care team. Spiritual Health Services declined at this time.  Spiritual Background: Bahai    7. Care Planning  Appreciate  input of  JOSE Lozano regarding dismissal planning.   Medications for discharge - to be determined.     Medical Decision Making and Goals of Care:  Discussed on March 14, 2022 with LINUS Pereyra:     Patient resting peacefully at time of assessment and given issues last evening with cardiac rate control, I did not awaken her. I phoned her son Dr Naren Mar at [phone] who is noted to be a spokes person for their family. He acknowledged that he had not been given an update regarding his mother today. I offered my concern given his mother has COVID-19 and she had issues with PAT with rates into the 150's which she was given a increased dose of 25 mg Metoprolol at 9 PM. I asked Dr. Mar if there are any symptoms his mother has complained which I may be of assistance. Dr. Mar offered his appreciation of palliative care, and acknowledged that he is at work and unable to offer much time for discussion. I asked Dr. Mar to confirm his mother's wishes regarding her code status to complete a POLST. Dr. Mar confirmed his mother's request for No CPR and Do NOT intubate. Dr. Mar requested that we contact his wife, cristal in regard to dismissal planning, which I have asked our unit  ONEL Stanford know.      Thank you for involving us in the patient's care.     Maria Esther Aguilar MS, RN, CNS, APRN, ACHPN, FAACVPR  Pain and Palliative Care  Pager [phone]  Office [phone]     Time Spent on this Encounter   Total unit/floor time 9:35 AM until 10:10 AM, time consisted of the following, examination of the patient, reviewing the record and completing documentation. >50% of time spent in counseling and coordination of care, Bedside Nurse Colin Paniagua RN and Hospitalist EVELINE Walker.  Time spend counseling with family consisted of the following topics, goals of care and symptom management.    Understanding of disease process:   This has been discussed with  the patient's son Dr. Naren Mar.    History of Present Illness   History is obtained from the electronic health record and patient's son    Lennie Mar is a 89 year old female who was admitted 3/10/2022 for acute infectious encephalopathy related to E. Coli UTI and COVID-19 infection. Her medical history is significant for bipolar disorder, history of alcoholism, stage III CKD, hypothyroidism, breast cancer (Femara) and VRE UTI.     Past Medical History   I have reviewed this patient's medical history and updated it with pertinent information if needed.   Past Medical History:   Diagnosis Date     Alcohol dependence in remission (H)      Anxiety      Asymptomatic varicose veins      Bipolar disorder, unspecified (H)      CKD (chronic kidney disease) stage 3, GFR 30-59 ml/min (H) 2/18/2014     History of smoking      Hyperparathyroidism (H)      Memory loss      Muscle weakness (generalized) 6/23/2008     Severe depression (H)      Subdural hygroma     chronic.  no surgeries     Tremor of unknown origin        Past Surgical History   I have reviewed this patient's surgical history and updated it with pertinent information if needed.  Past Surgical History:   Procedure Laterality Date     APPENDECTOMY OPEN  1947     CATARACT IOL, RT/LT       COLONOSCOPY WITH CO2 INSUFFLATION  2/29/2012    Procedure:COLONOSCOPY WITH CO2 INSUFFLATION; Surgeon:GAYLE REYNA; Location:[facility] OR     CYSTOCELE REPAIR  1972     CYSTOSCOPY, INSERT STENT URETHRA, COMBINED  1999     CYSTOSCOPY, RETROGRADES, INSERT STENT URETER(S), COMBINED  2/29/2012    Procedure:COMBINED CYSTOSCOPY, RETROGRADES, INSERT STENT URETER(S); Surgeon:ANT ESPINOZA; Location:[facility] OR     DECOMPRESSION LUMBAR ONE LEVEL  8/9/2013    Procedure: DECOMPRESSION LUMBAR ONE LEVEL;  Posterior Decompression Right Lumbar 5- Sacral 1;  Surgeon: You Zavala MD;  Location: [facility] OR     HC TOOTH EXTRACTION W/FORCEP       HYSTERECTOMY, CATA  1963     IRRIGATION  AND DEBRIDEMENT ORAL, COMBINED  1982    For treatment of gingivitis     LAPAROSCOPIC ASSISTED COLECTOMY  2/29/2012    Procedure:LAPAROSCOPIC ASSISTED COLECTOMY; Cysto, Bilateral Stent placement (both stents removed at end of case)- Yanet ACOSTA. Laparoscopic Extended Right Venu Colectomy with CO2 Colonoscopy and polyp removal-Judah; Surgeon:GAYLE REYNA; Location:[facility] OR     LIGATN/STRIP LONG OR SHORT SAPHEN  1955     MASTECTOMY PARTIAL WITH SENTINEL NODE Left 11/19/2018    Procedure: Left Mastectomy, Left [place] Lymph Node Biopsy;  Surgeon: Nahun Betancourt MD;  Location: [facility] OR     STRIP VEIN BILATERAL  1964     SURGICAL HISTORY OF -   1999    ureter surgery for blockage.       Prior to Admission Medications   Prior to Admission Medications   Prescriptions Last Dose Informant Patient Reported? Taking?   ARIPiprazole (ABILIFY) 5 MG tablet   Yes Yes   Sig: Take 2 mg by mouth daily    ORDER FOR DME   No No   Sig: Equipment being ordered: compression stocking knee high 20-30mmhg   acetaminophen (TYLENOL) 325 MG tablet   No Yes   Sig: Take 3 tablets (975 mg) by mouth every 8 hours as needed for mild pain   divalproex sodium extended-release (DEPAKOTE ER) 500 MG 24 hr tablet   Yes Yes   Sig: Take 500 mg by mouth At Bedtime   letrozole (FEMARA) 2.5 MG tablet   No Yes   Sig: TAKE 1 TABLET BY MOUTH EVERY DAY   levothyroxine (SYNTHROID/LEVOTHROID) 75 MCG tablet   Yes Yes   Sig: Take 100 mcg by mouth daily    lithium (ESKALITH) 150 MG capsule   Yes Yes   Sig: Take 150 mg by mouth every morning    vitamin D3 (CHOLECALCIFEROL) 10 MCG (400 UNIT) capsule   No Yes   Sig: Take 2 capsules (800 Units) by mouth daily      Facility-Administered Medications: None     Allergies   Allergies   Allergen Reactions     Cafergot Other (See Comments) and Nausea and Vomiting     Severe HA, N/V & diarrhea     Ciprofloxacin      Nausea and vomiting per son      Penicillins Rash and Unknown     Sulfa Drugs Rash and Unknown     Ergot  Alkaloids Unknown     Lamictal [Lamotrigine] Other (See Comments)     Patient experienced night moss     Naproxen      Pt unable to remember reaction.     Naproxen Unknown     Tetracycline Unknown     Pt unable to remember allergy       Social History   I have updated and reviewed the following Social History Narrative:   Social History     Social History Narrative    Dairy/d 0-1 servings/d.     Caffeine 2 servings/d    Exercise 0 x week-walks regularly.    Sunscreen used - Yes    Seatbelts used - Yes    Working smoke/CO detectors in the home - Yes    Guns stored in the home - No    Self Breast Exams - Yes    Self Testicular Exam - No    Eye Exam up to date - Yes     Dental Exam up to date - Yes      Pap Smear up to date - No    Mammogram up to date - No    PSA up to date - No    Dexa Scan up to date - No    Flex Sig / Colonoscopy up to date - No    Immunizations up to date - Yes  2009 Td    Abuse: Current or Past(Physical, Sexual or Emotional)- No    Do you feel safe in your environment - Yes    Updated 3/2010                                    Living situation: Independent in apartment       Support system: Adult children are supportive  History of substance use/abuse:  reports that she quit smoking about 28 years ago. Her smoking use included cigarettes. She has a 45.00 pack-year smoking history. She has never used smokeless tobacco.  reports no history of alcohol use.    Family History   I have reviewed this patient's family history and updated it with pertinent information if needed.   Family History   Problem Relation Age of Onset     C.A.D. Mother          at age 47 Heart failure, Rhumatic Fever     Cerebrovascular Disease Father          at age 79     Diabetes Father         type 2     Breast Cancer Sister 84        99 year old sister     Circulatory Maternal Grandmother         vericose veins     C.A.D. Paternal Grandfather      Gastrointestinal Disease Paternal Grandfather         ulcer      Cancer Son          age 51--Melanoma     Cancer Brother          age 50's--Melanoma     Arthritis Sister      Circulatory Sister         vericose veins     Circulatory Sister         vericose veins     Eye Disorder Sister         Cateracts     Respiratory Sister         asthma     Respiratory Brother         COPD     Breast Cancer Niece 60        daughter of 98 yo sister       Review of Systems   Review of systems not obtained due to patient factors - confusion and mental status    Physical Exam   Temp:  [98.4  F (36.9  C)-100.9  F (38.3  C)] 98.8  F (37.1  C)  Pulse:  [] 92  Resp:  [20-32] 28  BP: (114-156)/(58-79) 114/58  SpO2:  [84 %-97 %] 95 %  141 lbs 8 oz  Exam:  GEN:  Sleeping and appears comfortable, no apparent distress with oxygen via nasal cannula at 2 liters.  HEENT:  Normocephalic/atraumatic, no nasal discharge, mouth dry.  CV:  Irregular, S1, S2; no murmurs.  +3 DP/PT pulses bilaterally; no edema BLE.  RESP:  Anterior breath sounds with scattered crackles.  Symmetric chest rise on inhalation noted.  Normal respiratory effort.  ABD:  Rounded, soft, non-tender/non-distended.  +BS     M/S:   No wincing, grimace or awakening with palpation of extremities.    SKIN:  Warm and dry to touch, no exanthems noted in the visualized areas.    Psych:  Sleeping.    Data   Results for orders placed or performed during the hospital encounter of 03/10/22   XR Chest Port 1 View     Status: None    Narrative    CHEST ONE VIEW PORTABLE March 10, 2022 1:31 PM     HISTORY: COVID, weakness.    COMPARISON: 2014.      Impression    IMPRESSION: No airspace consolidation, pneumothorax, or pleural  effusion.    ARABELLA TEJEDA MD         SYSTEM ID:  [id number]   XR Chest Port 1 View     Status: None    Narrative    EXAM: XR CHEST PORTABLE 1 VIEW  LOCATION: [facility]  DATE/TIME: 2022, 9:50 AM    INDICATION: Chest pain, new hypoxia.  COMPARISON: Chest x-ray on 03/10/2022.       Impression    IMPRESSION: Single AP view of the chest was obtained. Cardiomediastinal silhouette is within normal limits. Mild left basilar/retrocardiac pulmonary opacities, indeterminate, could represent atelectasis versus infection. No significant pleural effusion   or pneumothorax.     US Lower Extremity Venous Bilateral Port     Status: None    Narrative    EXAM: US LOWER EXTREMITY VENOUS DUPLEX BILATERAL PORT  LOCATION: [facility]  DATE/TIME: 3/13/2022 1:16 PM    INDICATION: fever, dyspnea, hypoxia, COVID.  Eval for DVT  COMPARISON: None.  TECHNIQUE: Venous Duplex ultrasound of bilateral lower extremities with and without compression, augmentation and duplex. Color flow and spectral Doppler with waveform analysis performed.    FINDINGS: Exam includes the common femoral, femoral, popliteal veins as well as segmentally visualized deep calf veins and greater saphenous vein.     RIGHT: No deep vein thrombosis. No superficial thrombophlebitis. No popliteal cyst.    LEFT: No deep vein thrombosis. No superficial thrombophlebitis. No popliteal cyst.      Impression    IMPRESSION:  1.  No deep venous thrombosis in the bilateral lower extremities.   Lithium level     Status: Normal   Result Value Ref Range    Lithium 0.3   mmol/L   Comprehensive metabolic panel     Status: Abnormal   Result Value Ref Range    Sodium 140 133 - 144 mmol/L    Potassium 4.9 3.4 - 5.3 mmol/L    Chloride 111 (H) 94 - 109 mmol/L    Carbon Dioxide (CO2) 25 20 - 32 mmol/L    Anion Gap 4 3 - 14 mmol/L    Urea Nitrogen 36 (H) 7 - 30 mg/dL    Creatinine 1.74 (H) 0.52 - 1.04 mg/dL    Calcium 8.9 8.5 - 10.1 mg/dL    Glucose 88 70 - 99 mg/dL    Alkaline Phosphatase 98 40 - 150 U/L    AST 35 0 - 45 U/L    ALT 29 0 - 50 U/L    Protein Total 6.7 (L) 6.8 - 8.8 g/dL    Albumin 2.7 (L) 3.4 - 5.0 g/dL    Bilirubin Total 0.3 0.2 - 1.3 mg/dL    GFR Estimate 27 (L) >60 mL/min/1.73m2   Troponin I     Status: Normal   Result Value Ref Range     Troponin I High Sensitivity 17 <54 ng/L   CK total     Status: Normal   Result Value Ref Range    CK 60 30 - 225 U/L   TSH with free T4 reflex     Status: Abnormal   Result Value Ref Range    TSH 0.01 (L) 0.40 - 4.00 mU/L   UA with Microscopic reflex to Culture     Status: Abnormal    Specimen: Urine, Clean Catch   Result Value Ref Range    Color Urine Light Yellow Colorless, Straw, Light Yellow, Yellow    Appearance Urine Slightly Cloudy (A) Clear    Glucose Urine Negative Negative mg/dL    Bilirubin Urine Negative Negative    Ketones Urine Negative Negative mg/dL    Specific Gravity Urine 1.009 1.003 - 1.035    Blood Urine Small (A) Negative    pH Urine 5.5 5.0 - 7.0    Protein Albumin Urine 10  (A) Negative mg/dL    Urobilinogen Urine Normal Normal, 2.0 mg/dL    Nitrite Urine Positive (A) Negative    Leukocyte Esterase Urine Large (A) Negative    Bacteria Urine Few (A) None Seen /HPF    WBC Clumps Urine Present (A) None Seen /HPF    Mucus Urine Present (A) None Seen /LPF    RBC Urine 8 (H) <=2 /HPF    WBC Urine 178 (H) <=5 /HPF    Squamous Epithelials Urine 1 <=1 /HPF    Narrative    Urine Culture ordered based on laboratory criteria   Valproic acid (Depakote level)     Status: Normal   Result Value Ref Range    Valproic acid 15   mg/L   Asymptomatic COVID-19 Virus (Coronavirus) by PCR Nasopharyngeal     Status: Abnormal    Specimen: Nasopharyngeal; Swab   Result Value Ref Range    SARS CoV2 PCR Positive (A) Negative    Narrative    Testing was performed using the singh  SARS-CoV-2 & Influenza A/B Assay on the singh  [employer]  System.  This test should be ordered for the detection of SARS-COV-2 in individuals who meet SARS-CoV-2 clinical and/or epidemiological criteria. Test performance is unknown in asymptomatic patients.  This test is for in vitro diagnostic use under the FDA EUA for laboratories certified under CLIA to perform moderate and/or high complexity testing. This test has not been FDA cleared or  approved.  A negative test does not rule out the presence of PCR inhibitors in the specimen or target RNA in concentration below the limit of detection for the assay. The possibility of a false negative should be considered if the patient's recent exposure or clinical presentation suggests COVID-19.  [facility] are certified under the Clinical Laboratory Improvement Amendments of 1988 (CLIA-88) as qualified to perform moderate and/or high complexity laboratory testing.   CBC with platelets and differential     Status: Abnormal   Result Value Ref Range    WBC Count 4.1 4.0 - 11.0 10e3/uL    RBC Count 3.97 3.80 - 5.20 10e6/uL    Hemoglobin 12.6 11.7 - 15.7 g/dL    Hematocrit 40.6 35.0 - 47.0 %     (H) 78 - 100 fL    MCH 31.7 26.5 - 33.0 pg    MCHC 31.0 (L) 31.5 - 36.5 g/dL    RDW 14.3 10.0 - 15.0 %    Platelet Count 146 (L) 150 - 450 10e3/uL    % Neutrophils 75 %    % Lymphocytes 13 %    % Monocytes 10 %    % Eosinophils 0 %    % Basophils 1 %    % Immature Granulocytes 1 %    NRBCs per 100 WBC 0 <1 /100    Absolute Neutrophils 3.1 1.6 - 8.3 10e3/uL    Absolute Lymphocytes 0.5 (L) 0.8 - 5.3 10e3/uL    Absolute Monocytes 0.4 0.0 - 1.3 10e3/uL    Absolute Eosinophils 0.0 0.0 - 0.7 10e3/uL    Absolute Basophils 0.0 0.0 - 0.2 10e3/uL    Absolute Immature Granulocytes 0.0 <=0.4 10e3/uL    Absolute NRBCs 0.0 10e3/uL   T4 free     Status: Normal   Result Value Ref Range    Free T4 1.20 0.76 - 1.46 ng/dL   Basic metabolic panel     Status: Abnormal   Result Value Ref Range    Sodium 142 133 - 144 mmol/L    Potassium 5.0 3.4 - 5.3 mmol/L    Chloride 114 (H) 94 - 109 mmol/L    Carbon Dioxide (CO2) 23 20 - 32 mmol/L    Anion Gap 5 3 - 14 mmol/L    Urea Nitrogen 25 7 - 30 mg/dL    Creatinine 1.42 (H) 0.52 - 1.04 mg/dL    Calcium 8.6 8.5 - 10.1 mg/dL    Glucose 81 70 - 99 mg/dL    GFR Estimate 34 (L) >60 mL/min/1.73m2   CBC with platelets and differential     Status: Abnormal   Result Value Ref Range     WBC Count 3.4 (L) 4.0 - 11.0 10e3/uL    RBC Count 3.72 (L) 3.80 - 5.20 10e6/uL    Hemoglobin 11.9 11.7 - 15.7 g/dL    Hematocrit 38.0 35.0 - 47.0 %     (H) 78 - 100 fL    MCH 32.0 26.5 - 33.0 pg    MCHC 31.3 (L) 31.5 - 36.5 g/dL    RDW 14.6 10.0 - 15.0 %    Platelet Count 129 (L) 150 - 450 10e3/uL    % Neutrophils 51 %    % Lymphocytes 36 %    % Monocytes 13 %    % Eosinophils 0 %    % Basophils 0 %    % Immature Granulocytes 0 %    NRBCs per 100 WBC 0 <1 /100    Absolute Neutrophils 1.7 1.6 - 8.3 10e3/uL    Absolute Lymphocytes 1.2 0.8 - 5.3 10e3/uL    Absolute Monocytes 0.4 0.0 - 1.3 10e3/uL    Absolute Eosinophils 0.0 0.0 - 0.7 10e3/uL    Absolute Basophils 0.0 0.0 - 0.2 10e3/uL    Absolute Immature Granulocytes 0.0 <=0.4 10e3/uL    Absolute NRBCs 0.0 10e3/uL   Basic metabolic panel     Status: Abnormal   Result Value Ref Range    Sodium 142 133 - 144 mmol/L    Potassium 5.2 3.4 - 5.3 mmol/L    Chloride 115 (H) 94 - 109 mmol/L    Carbon Dioxide (CO2) 25 20 - 32 mmol/L    Anion Gap 2 (L) 3 - 14 mmol/L    Urea Nitrogen 20 7 - 30 mg/dL    Creatinine 1.23 (H) 0.52 - 1.04 mg/dL    Calcium 8.7 8.5 - 10.1 mg/dL    Glucose 81 70 - 99 mg/dL    GFR Estimate 41 (L) >60 mL/min/1.73m2   Lactic Acid STAT     Status: Normal   Result Value Ref Range    Lactic Acid 1.4 0.7 - 2.0 mmol/L   Comprehensive metabolic panel     Status: Abnormal   Result Value Ref Range    Sodium 140 133 - 144 mmol/L    Potassium 4.3 3.4 - 5.3 mmol/L    Chloride 113 (H) 94 - 109 mmol/L    Carbon Dioxide (CO2) 23 20 - 32 mmol/L    Anion Gap 4 3 - 14 mmol/L    Urea Nitrogen 15 7 - 30 mg/dL    Creatinine 1.26 (H) 0.52 - 1.04 mg/dL    Calcium 8.4 (L) 8.5 - 10.1 mg/dL    Glucose 115 (H) 70 - 99 mg/dL    Alkaline Phosphatase 94 40 - 150 U/L    AST 34 0 - 45 U/L    ALT 21 0 - 50 U/L    Protein Total 6.4 (L) 6.8 - 8.8 g/dL    Albumin 2.3 (L) 3.4 - 5.0 g/dL    Bilirubin Total 0.3 0.2 - 1.3 mg/dL    GFR Estimate 39 (L) >60 mL/min/1.73m2   CRP inflammation      Status: Abnormal   Result Value Ref Range    CRP Inflammation 87.1 (H) 0.0 - 8.0 mg/L   Lactate Dehydrogenase     Status: Normal   Result Value Ref Range    Lactate Dehydrogenase 226 81 - 234 U/L   Fibrinogen activity     Status: Abnormal   Result Value Ref Range    Fibrinogen Activity 491 (H) 170 - 490 mg/dL   Procalcitonin     Status: Abnormal   Result Value Ref Range    Procalcitonin 0.16 (H) <0.05 ng/mL   Troponin I     Status: Normal   Result Value Ref Range    Troponin I High Sensitivity 30 <54 ng/L   Extra Tube     Status: None    Narrative    The following orders were created for panel order Extra Tube.  Procedure                               Abnormality         Status                     ---------                               -----------         ------                     Extra Green Top (Lithium...[[id number]]                      Final result               Extra Purple Top Tube[[id number]]                            Final result                 Please view results for these tests on the individual orders.   Extra Green Top (Lithium Heparin) Tube     Status: None   Result Value Ref Range    Hold Specimen JIC    Extra Purple Top Tube     Status: None   Result Value Ref Range    Hold Specimen JIC    Magnesium     Status: Normal   Result Value Ref Range    Magnesium 1.6 1.6 - 2.3 mg/dL   Troponin I     Status: Normal   Result Value Ref Range    Troponin I High Sensitivity 30 <54 ng/L   CBC with platelets and differential     Status: Abnormal   Result Value Ref Range    WBC Count 3.0 (L) 4.0 - 11.0 10e3/uL    RBC Count 3.66 (L) 3.80 - 5.20 10e6/uL    Hemoglobin 11.7 11.7 - 15.7 g/dL    Hematocrit 37.1 35.0 - 47.0 %     (H) 78 - 100 fL    MCH 32.0 26.5 - 33.0 pg    MCHC 31.5 31.5 - 36.5 g/dL    RDW 14.5 10.0 - 15.0 %    Platelet Count 126 (L) 150 - 450 10e3/uL    % Neutrophils 67 %    % Lymphocytes 17 %    % Monocytes 14 %    % Eosinophils 0 %    % Basophils 1 %    % Immature Granulocytes 1 %    NRBCs  per 100 WBC 0 <1 /100    Absolute Neutrophils 2.0 1.6 - 8.3 10e3/uL    Absolute Lymphocytes 0.5 (L) 0.8 - 5.3 10e3/uL    Absolute Monocytes 0.4 0.0 - 1.3 10e3/uL    Absolute Eosinophils 0.0 0.0 - 0.7 10e3/uL    Absolute Basophils 0.0 0.0 - 0.2 10e3/uL    Absolute Immature Granulocytes 0.0 <=0.4 10e3/uL    Absolute NRBCs 0.0 10e3/uL   Extra Tube     Status: None    Narrative    The following orders were created for panel order Extra Tube.  Procedure                               Abnormality         Status                     ---------                               -----------         ------                     Extra Red Top Tube[[id number]]                               Final result                 Please view results for these tests on the individual orders.   Extra Red Top Tube     Status: None   Result Value Ref Range    Hold Specimen [facility]    Lactic Acid STAT     Status: Normal   Result Value Ref Range    Lactic Acid 1.0 0.7 - 2.0 mmol/L   Magnesium     Status: Normal   Result Value Ref Range    Magnesium 2.0 1.6 - 2.3 mg/dL   Basic metabolic panel     Status: Abnormal   Result Value Ref Range    Sodium 142 133 - 144 mmol/L    Potassium 4.9 3.4 - 5.3 mmol/L    Chloride 114 (H) 94 - 109 mmol/L    Carbon Dioxide (CO2) 27 20 - 32 mmol/L    Anion Gap 1 (L) 3 - 14 mmol/L    Urea Nitrogen 14 7 - 30 mg/dL    Creatinine 1.26 (H) 0.52 - 1.04 mg/dL    Calcium 8.9 8.5 - 10.1 mg/dL    Glucose 99 70 - 99 mg/dL    GFR Estimate 39 (L) >60 mL/min/1.73m2   CRP inflammation     Status: Abnormal   Result Value Ref Range    CRP Inflammation 81.0 (H) 0.0 - 8.0 mg/L   Lactate Dehydrogenase     Status: Abnormal   Result Value Ref Range    Lactate Dehydrogenase 269 (H) 81 - 234 U/L   Fibrinogen activity     Status: Normal   Result Value Ref Range    Fibrinogen Activity 470 170 - 490 mg/dL   Blood gas venous     Status: None   Result Value Ref Range    pH Venous 7.36 7.32 - 7.43    pCO2 Venous 48 40 - 50 mm Hg    pO2 Venous 28 25 - 47 mm Hg     Bicarbonate Venous 27 21 - 28 mmol/L    Base Excess/Deficit (+/-) 1.3 -7.7 - 1.9 mmol/L    FIO2 2    Lithium level     Status: Normal   Result Value Ref Range    Lithium 0.3   mmol/L   Valproic acid     Status: Normal   Result Value Ref Range    Valproic acid 40   mg/L   CBC with platelets and differential     Status: Abnormal   Result Value Ref Range    WBC Count 4.2 4.0 - 11.0 10e3/uL    RBC Count 4.04 3.80 - 5.20 10e6/uL    Hemoglobin 12.9 11.7 - 15.7 g/dL    Hematocrit 41.4 35.0 - 47.0 %     (H) 78 - 100 fL    MCH 31.9 26.5 - 33.0 pg    MCHC 31.2 (L) 31.5 - 36.5 g/dL    RDW 14.5 10.0 - 15.0 %    Platelet Count 132 (L) 150 - 450 10e3/uL    % Neutrophils 61 %    % Lymphocytes 21 %    % Monocytes 15 %    % Eosinophils 1 %    % Basophils 1 %    % Immature Granulocytes 1 %    NRBCs per 100 WBC 0 <1 /100    Absolute Neutrophils 2.7 1.6 - 8.3 10e3/uL    Absolute Lymphocytes 0.9 0.8 - 5.3 10e3/uL    Absolute Monocytes 0.6 0.0 - 1.3 10e3/uL    Absolute Eosinophils 0.0 0.0 - 0.7 10e3/uL    Absolute Basophils 0.0 0.0 - 0.2 10e3/uL    Absolute Immature Granulocytes 0.0 <=0.4 10e3/uL    Absolute NRBCs 0.0 10e3/uL   EKG 12-lead, tracing only     Status: None   Result Value Ref Range    Systolic Blood Pressure  mmHg    Diastolic Blood Pressure  mmHg    Ventricular Rate 87 BPM    Atrial Rate 87 BPM    HI Interval 166 ms    QRS Duration 112 ms     ms    QTc 409 ms    P Axis 70 degrees    R AXIS -62 degrees    T Axis 47 degrees    Interpretation ECG       Sinus rhythm  Possible Left atrial enlargement  Left anterior fascicular block  Left ventricular hypertrophy  Abnormal ECG  When compared with ECG of 10-BRAEDEN-2016 08:24,  No significant change was found  Confirmed by - EMERGENCY ROOM, PHYSICIAN ([id number]),  RUBIN ADEN ([id number]) on 3/10/2022 1:03:21 PM     EKG 12-lead, tracing only     Status: None (Preliminary result)   Result Value Ref Range    Systolic Blood Pressure  mmHg    Diastolic Blood Pressure  mmHg     Ventricular Rate 86 BPM    Atrial Rate 86 BPM    MD Interval 172 ms    QRS Duration 108 ms     ms    QTc 418 ms    P Axis 52 degrees    R AXIS -60 degrees    T Axis -36 degrees    Interpretation ECG       Sinus rhythm  Possible Left atrial enlargement  Left anterior fascicular block  Left ventricular hypertrophy  Nonspecific ST and T wave abnormality  Abnormal ECG     Echocardiogram Limited     Status: None   Result Value Ref Range    LVEF  75-80%     [facility]    [id number]  [id number]  [id number]  [id number]^[email or web address]^[email or web address]^VINNY     [facility]  Echocardiography Laboratory  [address] [place] [address]     Name: MANI ANDERSON  MRN: [id number]  : 1927  Study Date: 2022 11:12 AM  Age: 89 yrs  Gender: Female  Patient Location: [facility]  Reason For Study: Chest Pain  Ordering Physician: FOREST FAULKNER  Referring Physician: Christi Terry  Performed By: Batsheva Ch RDCS     BSA: 1.7 m2  Height: 62 in  Weight: 146 lb  HR: 80  BP: 124/64 mmHg  ______________________________________________________________________________  Procedure  Limited Portable Echo Adult.  ______________________________________________________________________________  Interpretation Summary     There is moderate concentric left ventricular hypertrophy.  Hyperdynamic left ventricular function  The visual ejection fraction is estimated at 75-80%.  No regional wall motion abnormalities noted.  The right ventricular systolic function is normal.  Trace tricuspid and mitral valve regurgitation.  No hemodynamically significant valvular aortic stenosis.  The inferior vena cava is normal.     Compared to prior study, there is no significant change.  ______________________________________________________________________________  Left Ventricle  The left ventricle is normal in size. There is moderate concentric left  ventricular hypertrophy. Hyperdynamic left ventricular function. The  visual  ejection fraction is estimated at 75-80%. Grade I or early diastolic  dysfunction. No regional wall motion abnormalities noted.     Right Ventricle  The right ventricle is grossly normal size. The right ventricular systolic  function is normal.     Atria  Normal left atrial size. Right atrial size is normal. There is no color  Doppler evidence of an atrial shunt.     Mitral Valve  The mitral valve leaflets are mildly thickened. There is trace mitral  regurgitation.     Tricuspid Valve  The tricuspid valve is not well visualized, but is grossly normal. There is  trace tricuspid regurgitation. Right ventricular systolic pressure could not  be approximated due to inadequate tricuspid regurgitation.     Aortic Valve  The aortic valve is not well visualized. No aortic regurgitation is present.  No hemodynamically significant valvular aortic stenosis.     Pulmonic Valve  The pulmonic valve is not well seen, but is grossly normal. There is trace  pulmonic valvular regurgitation.     Vessels  The aortic root is normal size. The ascending aorta is Borderline dilated. The  inferior vena cava is normal.     Pericardium  There is no pericardial effusion.     Rhythm  Sinus rhythm was noted.  ______________________________________________________________________________  MMode/2D Measurements & Calculations  IVSd: 1.4 cm     LVIDd: 3.9 cm  LVIDs: 2.3 cm  LVPWd: 1.2 cm  FS: 42.6 %  LV mass(C)d: 189.9 grams  LV mass(C)dI: 113.6 grams/m2  asc Aorta Diam: 3.8 cm  RWT: 0.63     ______________________________________________________________________________  Report approved by: Dr Sierra Mejias 03/13/2022 12:25 PM         Urine Culture     Status: Abnormal    Specimen: Urine, Clean Catch   Result Value Ref Range    Culture >100,000 CFU/mL Escherichia coli (A)        Susceptibility    Escherichia coli - STEPH     Ampicillin >=32.0 Resistant ug/mL     Ampicillin/ Sulbactam <=2.0 Susceptible ug/mL     Piperacillin/Tazobactam <=4.0  Susceptible ug/mL     Cefazolin* <=4.0 Susceptible ug/mL      * Cefazolin STEPH breakpoints are for the treatment of uncomplicated urinary tract infections. For the treatment of systemic infections, please contact the laboratory for additional testing.     Cefoxitin <=4.0 Susceptible ug/mL     Ceftazidime <=1.0 Susceptible ug/mL     Ceftriaxone <=1.0 Susceptible ug/mL     Cefepime <=1.0 Susceptible ug/mL     Gentamicin <=1.0 Susceptible ug/mL     Tobramycin <=1.0 Susceptible ug/mL     Ciprofloxacin >=4.0 Resistant ug/mL     Levofloxacin 4.0 Resistant ug/mL     Nitrofurantoin <=16.0 Susceptible ug/mL     Trimethoprim/Sulfamethoxazole <=1/19 Susceptible ug/mL   Blood Culture Peripheral Blood     Status: Normal (Preliminary result)    Specimen: Peripheral Blood   Result Value Ref Range    Culture No growth after 12 hours    Blood Culture Peripheral Blood     Status: Normal (Preliminary result)    Specimen: Peripheral Blood   Result Value Ref Range    Culture No growth after 12 hours    CBC with platelets differential     Status: Abnormal    Narrative    The following orders were created for panel order CBC with platelets differential.  Procedure                               Abnormality         Status                     ---------                               -----------         ------                     CBC with platelets and d...[[id number]]  Abnormal            Final result                 Please view results for these tests on the individual orders.   CBC with platelets differential     Status: Abnormal    Narrative    The following orders were created for panel order CBC with platelets differential.  Procedure                               Abnormality         Status                     ---------                               -----------         ------                     CBC with platelets and d...[[id number]]  Abnormal            Final result                 Please view results for these tests on the individual  orders.   CBC with Platelets & Differential     Status: Abnormal    Narrative    The following orders were created for panel order CBC with Platelets & Differential.  Procedure                               Abnormality         Status                     ---------                               -----------         ------                     CBC with platelets and d...[[id number]]  Abnormal            Final result                 Please view results for these tests on the individual orders.   CBC with Platelets & Differential     Status: Abnormal    Narrative    The following orders were created for panel order CBC with Platelets & Differential.  Procedure                               Abnormality         Status                     ---------                               -----------         ------                     CBC with platelets and d...[[id number]]  Abnormal            Final result                 Please view results for these tests on the individual orders.

## 2022-03-14 NOTE — PROVIDER NOTIFICATION
12:15 AM  Provider: X  Message: Temp 100.9, unable to tolerate oral tylenol or secretions, please change route.  Response:

## 2022-03-14 NOTE — PROGRESS NOTES
"/73 (BP Location: Right arm)   Pulse 84   Temp 100  F (37.8  C) (Axillary)   Resp 20   Ht 1.575 m (5' 2\")   Wt 68.2 kg (150 lb 6.4 oz)   LMP  (LMP Unknown)   SpO2 97%   BMI 27.51 kg/m      Admitting Diagnosis: UTI/Weakness/Covid  Pertinent History: bipolar disorder, alcoholism in remission, breast cancer, Stage III CKD, hyperparathyroidism, depression, and resting tremor   Isolation Precautions: VRE and COVID-19  Neuro: Alert to self, disoriented to situation  Activity: SBA with walker  Telemetry Monitoring: Sinus rhythm in the 80s per tele tech   Pain: Denies pain  Labs / Tests: Troponin negative  GI: Poor appetite  : Incontinent of urine  Medications: Refused bedtime Depakote, Only took 600mg of Mucinex and spat out the second pill saying she didn't want it, Offered apple sauce with meds and pt declined  Misc:   LDA's: Left arm infusing  Fluids: Lactated Ringers 100 ml per hour continuous infusing  Diet: Regular diet  Living Situation: Patient unable to return to former living situaation  Consults: PT, OT and Social Work or Care Coordination   scharge Disposition: Possibly TCU but Covid +     Plan of Care: Continue to provide supportive cares.    "

## 2022-03-14 NOTE — PROGRESS NOTES
[facility]  Medicine Progress Note - Hospitalist Service       Date of Admission:  3/10/2022    Assessment & Plan     Lennie Mar is a 89 year old female with suspected cognitive impairment, bipolar disorder, alcoholism in remission, left-sided breast cancer on adjuvant Letrozole, Stage III CKD, VRE UTI 2012, hyperparathyroidism, depression, subdural hygroma, and resting tremor who presented to [facility] ED on 3/10/2022 with confusion and weakness and was found to have a COVID19 infection and UTI.  She was registered to Observation for further monitoring and treatment.     Acutely decompensated 3/12-3/13 with fevers to 101, nausea with emesis, chest pain, worsening coarse cough, new hypoxia to mid-80s requiring supplemental O2, and worsening malaise and lethargy.  Broad work-up, including ACS rule out, largely negative.  Working diagnosis is now-symptomatic COVID19 infection.  Changed to Inpatient status.       #AFib with RVR, new  Had chest pain earlier yesterday with EKG showing NSR with new T-wave inversions in leads III, aVF, and V6.  ACS largely ruled out by trending of cardiac enzymes and Echocardiogram.  Developed new onset AFib with RVR overnight 3/13 with rates 120s-150s.  BP stable.  Started on metoprolol 12.5 mg but this AM, HR remains in 110s.  Echocardiogram 3/13 negative for decreased EF, new rWMA, or significant valvular dysfunction.  TFTs from 3/10 abnormal with undetectable TSH but normal free T4.  Electrolytes including magnesium remain within normal limits.     --Review of tele strips suggest this may actually be AFlutter.  Obtaining 12-lead EKG to assess further.   --Likely related to symptomatic COVID infection  --Increase metoprolol to 25 BID with hold parameters  --Continue tele  --Start therapeutic anticoagulation.  Unable to do Lovenox 2/2 CKD and would like to avoid heparin gtt.  Added Eliquis 2.5 BID.     #Symptomatic breakthrough COVID19  #Acute hypoxemic respiratory  failure  Patient is vaccinated x2 but not boosted.  Was initially asymptomatic when tested positive on admission (3/10) but on 3/12, began to develop intermittent fevers.  On 3/13 AM, developed fairly sudden nausea/vomiting, hypoxia into mid-80s, chest pain, lethargy, and malaise.  Coarse cough appears to be progression of a previous harsh cough.  In terms of hypoxia, it is unclear if it was present before emesis or indicates an aspiration event / aspiration pneumonitis.  CXR 3/13 relatively unchanged.  Procal mildly elevated to 0.16.  EKG demonstrated new T-wave inversions in leads III and aVF without reciprocal ST changes however trops cycled negative x2 and Echocardiogram negative for reduced LVEF or new rWMA.  CRP and sed rate elevated.  While PE unlikely, did not add on DDimer as it is likely to be elevated in setting of infection and patient not a candidate for CT Chest PE Rule Out due to CKD.  BLE Duplex U/S negative for DVT.    --Continue symptomatic cares including combivent inhaler, Mucinex, APAP, and emotional encouragement  --Trend inflammatory markers  --Per discussion with son Naren on 3/13, family would not want remdesivir (doctors in the family have mixed feelings about this medication) and son does not want steroids started due to risk of javan.  --Pt remains mildly hypoxic but stable with O2 sats mid-80s requiring 2L NC.    --Fine crackles on exam may indicate evolving volume overload however BP soft and patient's PO intake poor so will continue with IV fluids at 50 mL/hr overnight.    --If O2 needs increase, give 40 mg IV Lasix and low threshold to pursue CT Chest (WO contrast) or V/Q scan  --Note, patient is DNR/DNI.  Family has not made decisions about BiPAP but was informed that it may cause significant claustrophobia and distress for patient.   --Therapeutic Eliquis started for AFib and acute COVID    #Generalized Weakness, multifactorial:  UTI + COVID19 infection  Weakness and confusion  initially felt related to UTI alone however newly symptomatic COVID19 infection also contributing.  With regarding to UTI, patient has h/o VRE UTI 2012 sensitive only to linezolid.  Most recent UTIs have been EColi and Klebsiella with mixed resistance patterns.  Antibiotic choices limited by allergies to PCN, Sulfa, tetracycline, and fluoroquinolones.  UCx ultimately grew >100K Ecoli resistant only to ampicillin and fluoroquinolones; patient on ceftriaxone and is tolerating it well.  With regard to COVID19 infection (discussed above), intermittently confused over last few days related to malaise, lethargy, and fever.    --Today will be day 5 of IV ceftriaxone.  Current symptoms appear related to COVID19/viral process so will stop antibiotics after 5 doses and will monitor blood cultures and follow clinically.  --Remains globally weak and intermittently confused.  Concerned she needs family support making decisions and clarifying goals of care.  Palliative Care consult (discussed below)  --Maximize symptomatic cares  --Evaluated by PT on 3/11 (recommended home with assist) and by OT on 3/12 (recommended TCU or LTC).  Family aware of recommendations and lack of TCU options due to COVID19 status.  In light of clinical change, will need new PT/OT evaluations/recommendations once patient's acute COVID symptoms resolve.    #Complex family dynamics  #Infectious encephalopathy   #Suspected underlying cognitive impairment with diagnosis complicated by h/o bipolar disorder with episodic depression  Patient remains globally weak and intermittently confused.  At this point, patient is not a reliable historian and appears unable to make decisions for herself.  Unfortunately, patient does not have a designated POA or medical decision maker.  Son Naren has been established as the point-person however he mentions there are 6 doctors in the family with varying medical opinions.  Despite a long conversation between writer Naren  yesterday (and a phone call from Palliative Care today), he remains somewhat suspect of reports of confusion and dismissive of concerns regarding patient's ability to make her own decisions and he continues to defer all medical questions and decisions back to his mother.  Also, he does not appear to grasp the severity of / implications for patient's clinical change, ongoing symptoms, and possible outcomes with were explained as including worsening respiratory failure, worsening physical deconditioning, and possible cardiac events due to age and respiratory failure.  He remains focused on small details such as her lamictal and Depakote levels.  Suspect some of this is due to inability to see/assess the patient in person and also to denial and lack of situational control.    --Palliative Care consulted to assist with Goals of Care discussion, assist patient and family with clarifying who is/are the designated medical decision maker(s), and to help arrange a Family Conference so all children/family are on the same page regarding current clinical status, treatment options, and possible outcomes.    --Unfortunately, when Palliative Care contacted son Naren this morning, he stated he was busy with patients (may have been in clinic) and did not have the time to answer any questions.  He referred Palliative Care to speak to his wife.  --Writer contacted daughter-in-law Marta (Naren's wife) and spent nearly 45 minutes discussing clinical change, findings from yesterday's work-up, possible treatment options, and concerns that patient may not recover from this illness.  Marta is on board with trying to coordinate a Family conference.  Palliative Care to reach out to her tomorrow.    --At the very least, would like family's assistance clarifying patient's wishes regarding the following:     If worsening hypoxia, would patient want NIPPV such as HFNC or BiPAP (it is unlikely she would tolerate either)    If up-trending  inflammatory markers and worsening hypoxia, would patient want trial of dexamethasone or immunotherapies such as baricitinib    If patient suffers a cardiac event such as MI or VT/VF, would patient want any invasive interventions    If patient no longer wants to or is able to take in PO, would patient want tube feeds or parenteral nutrition    If patient declines to the point she is unable to ambulate or care for self, would she want to consider Hospice      #ASHLEY on chronic stage III CKD, resolved  Baseline Cr 1.1-1.3.  Cr 1.74 on admission likely related to UTI and volume depletion.  Cr normalized with fluid challenge.   --Minimize nephrotoxins    #Bipolar Disorder // Depression  #Underlying cognitive impairment  Follows with Dr. Horn at [facility] in [place].  H/o depression but denies thoughts of harming self or others.  On admission, lithium and valproic acid levels slightly subtherapeutic at 0.3 and 15, respectively.  This is likely due to several missed doses.  Recheck 3/14 shows improvement.  --Continue PTA depakote, lithium, and abilify    #Hypothyroidism  #History of hyperparathyroidism  Thyroid nodule biopsy 6/1/2021 benign.  TFTs 3/10/2022 showed undetectable TSH with normal free T4.  Calcium level normal.   --Continue PTA levothyroxine  --Recheck TFTs in 6 weeks     #History of Breast Cancer   History of left-sided breast cancer (2 separate tumors) diagnosed in 2018 after patient found a lump during self-exam.  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  Received neoadjuvant Herceptin + letrozole followed by left-sided mastectomy 11/2018, the pathology of which revealed axillary LN involvement.  Based on Breast Conference recommendations, patient went on to receive adjuvant radiation to the left chest, axilla, and supraclavicular nodes in addition to a year of Kadcyla.  She remains on adjuvant hormone suppression with letrozole.  SHARON.  --Continue PTA  letrozole  --Oncology follow-up planned for 4/18/2022 (Dr. Malik)     Diet: Regular Diet Adult    DVT Prophylaxis:  Eliquis BID  Code Status: No CPR- Do NOT Intubate      DISPO:  Likely in-house for several more days at least.  Once fevers improve, HR and BP stabilize, and patient's appetite improves (OR if patient and family elect to proceed with Hospice), will need PT/OT re-eval for TCU vs LTC placement.      Spoke with Marta, daughter in law, x45 minutes regarding clinical change, work-up, findings, treatment options, and possible outcomes/prognosis.       EVELINE Coronado  Hospitalist Service  [facility]     Text Page (7AM - 5PM, M-F)  ______________________________________________________________________    Interval History   Remains hypoxic although O2 needs stable.  A bit more interactive today but fairly restless and mildly agitated.  Reports ongoing chest tightness but denies shortness of breath.  Cough seems better than yesterday or at least less frequent.  New AFib with RVR overnight prompting medication adjustments.  Inflammatory marker elevated but stable.      Data reviewed today: I reviewed all medications, new labs and imaging results over the last 24 hours. I personally reviewed the EKG tracing showing AFib with CVR, ongoing T-wave inversions in leads III, avF, and V6.    Physical Exam   Vital Signs: Temp: 98.8  F (37.1  C) Temp src: Oral BP: 114/58 Pulse: 92   Resp: 28 SpO2: 95 % O2 Device: Nasal cannula Oxygen Delivery: 2 LPM  Weight: 141 lbs 8 oz    GENERAL: Sitting in bed.  Mildly impulsive, restless, and agitated.  Cooperative for the most part.  Appears unwell as compared to her baseline but nontoxic and NAD.  No accessory mm use or air hunger.  No cyanosis.    HEENT:  NCAT.  Extra occular mm grossly intact.  Sclera anicteric. PERRL.  Mucous membranes still slightly tacky.   PULMONOLOGY:  Upper airway sounds resolved.  Few scattered crackles right lung base.  No  wheezing.    CARDIAC:  Irreg irreg  ABDOMEN: Soft, nontender    MUSCULOSKELETAL:  Moving x 4 spontaneously with CMS intact x4.     NEURO: Alert and oriented to self and place.  CN II-XII grossly intact and symmetric.  Resting tremor.  No gross focal deficits or new lateralizing weakness.  SKIN: warm, pink, dry       Data   Recent Labs   Lab 22  0517 22  1213 22  0858 22  0652 22  0705 03/10/22  1158   WBC 4.2 3.0*  --   --  3.4* 4.1   HGB 12.9 11.7  --   --  11.9 12.6   * 101*  --   --  102* 102*   * 126*  --   --  129* 146*     --  140 142 142 140   POTASSIUM 4.9  --  4.3 5.2 5.0 4.9   CHLORIDE 114*  --  113* 115* 114* 111*   CO2 27  --  23 25 23 25   BUN 14  --  15 20 25 36*   CR 1.26*  --  1.26* 1.23* 1.42* 1.74*   ANIONGAP 1*  --  4 2* 5 4   CANELO 8.9  --  8.4* 8.7 8.6 8.9   GLC 99  --  115* 81 81 88   ALBUMIN  --   --  2.3*  --   --  2.7*   PROTTOTAL  --   --  6.4*  --   --  6.7*   BILITOTAL  --   --  0.3  --   --  0.3   ALKPHOS  --   --  94  --   --  98   ALT  --   --  21  --   --  29   AST  --   --  34  --   --  35     Recent Results (from the past 24 hour(s))   XR Chest Port 1 View    Narrative    EXAM: XR CHEST PORTABLE 1 VIEW  LOCATION: [facility]  DATE/TIME: 2022, 9:50 AM    INDICATION: Chest pain, new hypoxia.  COMPARISON: Chest x-ray on 03/10/2022.      Impression    IMPRESSION: Single AP view of the chest was obtained. Cardiomediastinal silhouette is within normal limits. Mild left basilar/retrocardiac pulmonary opacities, indeterminate, could represent atelectasis versus infection. No significant pleural effusion   or pneumothorax.     Echocardiogram Limited   Result Value    LVEF  75-80%    Narrative    [id number]  [id number]  [id number]  [id number]^LUCIE^FOREST^[facility]  Echocardiography Laboratory  [address] [place] [address]     Name: MANI ANDERSON  MRN: [id number]  :  11/28/1927  Study Date: 03/13/2022 11:12 AM  Age: 89 yrs  Gender: Female  Patient Location: [facility]  Reason For Study: Chest Pain  Ordering Physician: FOREST FAULKNER  Referring Physician: Christi Terry  Performed By: Batsheva Ch MICHELLE     BSA: 1.7 m2  Height: 62 in  Weight: 146 lb  HR: 80  BP: 124/64 mmHg  ______________________________________________________________________________  Procedure  Limited Portable Echo Adult.  ______________________________________________________________________________  Interpretation Summary     There is moderate concentric left ventricular hypertrophy.  Hyperdynamic left ventricular function  The visual ejection fraction is estimated at 75-80%.  No regional wall motion abnormalities noted.  The right ventricular systolic function is normal.  Trace tricuspid and mitral valve regurgitation.  No hemodynamically significant valvular aortic stenosis.  The inferior vena cava is normal.     Compared to prior study, there is no significant change.  ______________________________________________________________________________  Left Ventricle  The left ventricle is normal in size. There is moderate concentric left  ventricular hypertrophy. Hyperdynamic left ventricular function. The visual  ejection fraction is estimated at 75-80%. Grade I or early diastolic  dysfunction. No regional wall motion abnormalities noted.     Right Ventricle  The right ventricle is grossly normal size. The right ventricular systolic  function is normal.     Atria  Normal left atrial size. Right atrial size is normal. There is no color  Doppler evidence of an atrial shunt.     Mitral Valve  The mitral valve leaflets are mildly thickened. There is trace mitral  regurgitation.     Tricuspid Valve  The tricuspid valve is not well visualized, but is grossly normal. There is  trace tricuspid regurgitation. Right ventricular systolic pressure could not  be approximated due to inadequate tricuspid  regurgitation.     Aortic Valve  The aortic valve is not well visualized. No aortic regurgitation is present.  No hemodynamically significant valvular aortic stenosis.     Pulmonic Valve  The pulmonic valve is not well seen, but is grossly normal. There is trace  pulmonic valvular regurgitation.     Vessels  The aortic root is normal size. The ascending aorta is Borderline dilated. The  inferior vena cava is normal.     Pericardium  There is no pericardial effusion.     Rhythm  Sinus rhythm was noted.  ______________________________________________________________________________  MMode/2D Measurements & Calculations  IVSd: 1.4 cm     LVIDd: 3.9 cm  LVIDs: 2.3 cm  LVPWd: 1.2 cm  FS: 42.6 %  LV mass(C)d: 189.9 grams  LV mass(C)dI: 113.6 grams/m2  asc Aorta Diam: 3.8 cm  RWT: 0.63     ______________________________________________________________________________  Report approved by: Dr Sierra Mejias 03/13/2022 12:25 PM         US Lower Extremity Venous Bilateral Port    Narrative    EXAM: US LOWER EXTREMITY VENOUS DUPLEX BILATERAL PORT  LOCATION: [facility]  DATE/TIME: 3/13/2022 1:16 PM    INDICATION: fever, dyspnea, hypoxia, COVID.  Eval for DVT  COMPARISON: None.  TECHNIQUE: Venous Duplex ultrasound of bilateral lower extremities with and without compression, augmentation and duplex. Color flow and spectral Doppler with waveform analysis performed.    FINDINGS: Exam includes the common femoral, femoral, popliteal veins as well as segmentally visualized deep calf veins and greater saphenous vein.     RIGHT: No deep vein thrombosis. No superficial thrombophlebitis. No popliteal cyst.    LEFT: No deep vein thrombosis. No superficial thrombophlebitis. No popliteal cyst.      Impression    IMPRESSION:  1.  No deep venous thrombosis in the bilateral lower extremities.     Medications     lactated ringers 100 mL/hr at 03/14/22 0557     - MEDICATION INSTRUCTIONS -         apixaban ANTICOAGULANT  2.5  mg Oral BID     ARIPiprazole  5 mg Oral Daily     cefTRIAXone  2 g Intravenous Q24H     divalproex sodium extended-release  500 mg Oral At Bedtime     famotidine  20 mg Oral Daily     guaiFENesin  1,200 mg Oral BID     ipratropium-albuterol  1 puff Inhalation 4x daily     letrozole  2.5 mg Oral Daily     levothyroxine  100 mcg Oral Daily     lithium  150 mg Oral QAM     metoprolol tartrate  25 mg Oral BID     sodium chloride (PF)  3 mL Intracatheter Q8H

## 2022-03-14 NOTE — PLAN OF CARE
"  INPATIENT NOTE: [id number]     PRIMARY PROBLEM: Weakness, Infection due to 2019 novel coronavirus, Depression,        Vital signs:  Temp: 98.4  F (36.9  C) Temp src: Oral BP: 120/79 Pulse: 110   Resp: 25 SpO2: 92 % O2 Device: Nasal cannula Oxygen Delivery: 2 LPM Height: 157.5 cm (5' 2\") Weight: 68.2 kg (150 lb 6.4 oz)  Estimated body mass index is 27.51 kg/m  as calculated from the following:    Height as of this encounter: 1.575 m (5' 2\").    Weight as of this encounter: 68.2 kg (150 lb 6.4 oz).        Orientation: Disorientated to, Time, Place and Situation  Neuro: Intact   Pain status: denying pain.   Resp: Lung sounds has crackle, coarse. Denies any SOB.   Cardiac: WNL, Denies any Chest Pain   GI: Bowels are active x 4 Quads.   : pt is incontinent. Voiding with no concern    Skin: WNL   LDA: Peripheral IV   Infusions: Patient has Lactated Ringer's running at 100 mL per hour.   Pertinent Labs: Lactic 1.0  Diet: Regular  Activity: are 1 Assist with Gait Belt and Walker  Isolation:   Patient is on Contact precuations for VRE.   and   Patient is on Airborne precuations for COVID 19.    Consults: : PT, OT and Social Work or Care Coordination   Discharge Plan: Possible TCU after recovery from COVID 19  Major Shift Event: pt is on tele monitoring which was Afib. MD notified and he ordered metoprolol. Medication administered. Pt is on oxygen at 2L NC stating 92-97%. Pt denies shortness of breath. Pt had Temp and ice pack applied. Pt temp now 98.4 degrees.      Will continue to monitor and provide cares.     Max Montano RN  "

## 2022-03-14 NOTE — PLAN OF CARE
"  INPATIENT NOTE: [phone]     PRIMARY PROBLEM: Weakness, Infection due to 2019 novel coronavirus, Depression,        Vital signs:  Temp: 97.5  F (36.4  C) Temp src: Oral BP: 98/57 Pulse: 90   Resp: 22 SpO2: 95 % O2 Device: Nasal cannula Oxygen Delivery: 2 LPM Height: 157.5 cm (5' 2\") Weight: 64.2 kg (141 lb 8 oz)  Estimated body mass index is 25.88 kg/m  as calculated from the following:    Height as of this encounter: 1.575 m (5' 2\").    Weight as of this encounter: 64.2 kg (141 lb 8 oz).        Orientation: Alert and Oriented x 3  Neuro: Intact   Pain status: Complains of pain in left side of mouth. Refuses to allow writer to look in mouth. Provider notified    Resp: Lung sounds has crackle, coarse. Patient denies SOB.  Cardiac: WNL, Denies any Chest Pain   GI: Bowels are active x 4 Quads. Has had diarrhea at least once during shift.   : pt is incontinent. Voiding with no concern    Skin: WNL   LDA: Peripheral IV on left arm.   Infusions: Patient has sodium chloride 0.45% infusion running at 50mL/hr.   Pertinent Labs: last labs were drawn at 0517.   Diet: Regular  Activity: Currently assist of 1 with a gait belt and walker  Isolation:   Patient is on Contact precuations for VRE.   and   Patient is on Airborne precuations for COVID 19.    Consults: : PT, OT and Social Work or Care Coordination   Discharge Plan: Possible TCU after recovery from COVID 19           "

## 2022-03-14 NOTE — PROGRESS NOTES
Notified by [employer] pt's HR mostly in 120s-130s with high of 150s. Pt febrile overnight. Received Metoprolol 12.5 mg PO at 0222. Reported to Jodi Neville PA-C. Jodi will increase morning metoprolol dose. Will continue to monitor B/P and HR.

## 2022-03-15 ENCOUNTER — DOCUMENTATION ONLY (OUTPATIENT)
Dept: OTHER | Facility: CLINIC | Age: 87
End: 2022-03-15
Payer: MEDICARE

## 2022-03-15 ENCOUNTER — APPOINTMENT (OUTPATIENT)
Dept: OCCUPATIONAL THERAPY | Facility: CLINIC | Age: 87
DRG: 178 | End: 2022-03-15
Payer: MEDICARE

## 2022-03-15 ENCOUNTER — APPOINTMENT (OUTPATIENT)
Dept: PHYSICAL THERAPY | Facility: CLINIC | Age: 87
DRG: 178 | End: 2022-03-15
Payer: MEDICARE

## 2022-03-15 LAB
ANION GAP SERPL CALCULATED.3IONS-SCNC: 3 MMOL/L (ref 3–14)
BASE EXCESS BLDV CALC-SCNC: 1.4 MMOL/L (ref -7.7–1.9)
BASOPHILS # BLD AUTO: 0 10E3/UL (ref 0–0.2)
BASOPHILS NFR BLD AUTO: 0 %
BUN SERPL-MCNC: 19 MG/DL (ref 7–30)
CALCIUM SERPL-MCNC: 8.8 MG/DL (ref 8.5–10.1)
CHLORIDE BLD-SCNC: 113 MMOL/L (ref 94–109)
CO2 SERPL-SCNC: 24 MMOL/L (ref 20–32)
CREAT SERPL-MCNC: 1.27 MG/DL (ref 0.52–1.04)
CRP SERPL-MCNC: 94.2 MG/L (ref 0–8)
EOSINOPHIL # BLD AUTO: 0 10E3/UL (ref 0–0.7)
EOSINOPHIL NFR BLD AUTO: 0 %
ERYTHROCYTE [DISTWIDTH] IN BLOOD BY AUTOMATED COUNT: 14.6 % (ref 10–15)
FIBRINOGEN PPP-MCNC: 456 MG/DL (ref 170–490)
GFR SERPL CREATININE-BSD FRML MDRD: 39 ML/MIN/1.73M2
GLUCOSE BLD-MCNC: 82 MG/DL (ref 70–99)
HCO3 BLDV-SCNC: 28 MMOL/L (ref 21–28)
HCT VFR BLD AUTO: 39.6 % (ref 35–47)
HGB BLD-MCNC: 12.1 G/DL (ref 11.7–15.7)
IMM GRANULOCYTES # BLD: 0.1 10E3/UL
IMM GRANULOCYTES NFR BLD: 1 %
LACTATE SERPL-SCNC: 1.4 MMOL/L (ref 0.7–2)
LDH SERPL L TO P-CCNC: 249 U/L (ref 81–234)
LYMPHOCYTES # BLD AUTO: 1.3 10E3/UL (ref 0.8–5.3)
LYMPHOCYTES NFR BLD AUTO: 27 %
MAGNESIUM SERPL-MCNC: 1.9 MG/DL (ref 1.6–2.3)
MCH RBC QN AUTO: 31.4 PG (ref 26.5–33)
MCHC RBC AUTO-ENTMCNC: 30.6 G/DL (ref 31.5–36.5)
MCV RBC AUTO: 103 FL (ref 78–100)
MONOCYTES # BLD AUTO: 0.5 10E3/UL (ref 0–1.3)
MONOCYTES NFR BLD AUTO: 10 %
NEUTROPHILS # BLD AUTO: 2.9 10E3/UL (ref 1.6–8.3)
NEUTROPHILS NFR BLD AUTO: 62 %
NRBC # BLD AUTO: 0 10E3/UL
NRBC BLD AUTO-RTO: 0 /100
NT-PROBNP SERPL-MCNC: 5146 PG/ML (ref 0–1800)
O2/TOTAL GAS SETTING VFR VENT: 0 %
PCO2 BLDV: 50 MM HG (ref 40–50)
PH BLDV: 7.36 [PH] (ref 7.32–7.43)
PLATELET # BLD AUTO: 111 10E3/UL (ref 150–450)
PO2 BLDV: 28 MM HG (ref 25–47)
POTASSIUM BLD-SCNC: 4.7 MMOL/L (ref 3.4–5.3)
RBC # BLD AUTO: 3.85 10E6/UL (ref 3.8–5.2)
SODIUM SERPL-SCNC: 140 MMOL/L (ref 133–144)
WBC # BLD AUTO: 4.8 10E3/UL (ref 4–11)

## 2022-03-15 PROCEDURE — 250N000013 HC RX MED GY IP 250 OP 250 PS 637: Performed by: PHYSICIAN ASSISTANT

## 2022-03-15 PROCEDURE — 97535 SELF CARE MNGMENT TRAINING: CPT | Mod: GO

## 2022-03-15 PROCEDURE — 80048 BASIC METABOLIC PNL TOTAL CA: CPT | Performed by: PHYSICIAN ASSISTANT

## 2022-03-15 PROCEDURE — 85025 COMPLETE CBC W/AUTO DIFF WBC: CPT | Performed by: PHYSICIAN ASSISTANT

## 2022-03-15 PROCEDURE — 36415 COLL VENOUS BLD VENIPUNCTURE: CPT | Performed by: PHYSICIAN ASSISTANT

## 2022-03-15 PROCEDURE — 83735 ASSAY OF MAGNESIUM: CPT | Performed by: PHYSICIAN ASSISTANT

## 2022-03-15 PROCEDURE — 97110 THERAPEUTIC EXERCISES: CPT | Mod: GP | Performed by: PHYSICAL THERAPIST

## 2022-03-15 PROCEDURE — 94640 AIRWAY INHALATION TREATMENT: CPT

## 2022-03-15 PROCEDURE — 250N000011 HC RX IP 250 OP 636: Performed by: PHYSICIAN ASSISTANT

## 2022-03-15 PROCEDURE — 99233 SBSQ HOSP IP/OBS HIGH 50: CPT | Performed by: PHYSICIAN ASSISTANT

## 2022-03-15 PROCEDURE — 120N000004 HC R&B MS OVERFLOW

## 2022-03-15 PROCEDURE — 86140 C-REACTIVE PROTEIN: CPT | Performed by: PHYSICIAN ASSISTANT

## 2022-03-15 PROCEDURE — 82803 BLOOD GASES ANY COMBINATION: CPT | Performed by: PHYSICIAN ASSISTANT

## 2022-03-15 PROCEDURE — 83880 ASSAY OF NATRIURETIC PEPTIDE: CPT | Performed by: PHYSICIAN ASSISTANT

## 2022-03-15 PROCEDURE — 85384 FIBRINOGEN ACTIVITY: CPT | Performed by: PHYSICIAN ASSISTANT

## 2022-03-15 PROCEDURE — 250N000013 HC RX MED GY IP 250 OP 250 PS 637: Performed by: HOSPITALIST

## 2022-03-15 PROCEDURE — 83605 ASSAY OF LACTIC ACID: CPT | Performed by: STUDENT IN AN ORGANIZED HEALTH CARE EDUCATION/TRAINING PROGRAM

## 2022-03-15 PROCEDURE — 36415 COLL VENOUS BLD VENIPUNCTURE: CPT | Performed by: STUDENT IN AN ORGANIZED HEALTH CARE EDUCATION/TRAINING PROGRAM

## 2022-03-15 PROCEDURE — 999N000157 HC STATISTIC RCP TIME EA 10 MIN

## 2022-03-15 PROCEDURE — 83615 LACTATE (LD) (LDH) ENZYME: CPT | Performed by: PHYSICIAN ASSISTANT

## 2022-03-15 RX ORDER — MAGNESIUM SULFATE HEPTAHYDRATE 40 MG/ML
2 INJECTION, SOLUTION INTRAVENOUS ONCE
Status: COMPLETED | OUTPATIENT
Start: 2022-03-15 | End: 2022-03-15

## 2022-03-15 RX ORDER — LORAZEPAM 0.5 MG/1
0.25 TABLET ORAL EVERY 6 HOURS PRN
Status: DISCONTINUED | OUTPATIENT
Start: 2022-03-15 | End: 2022-03-15

## 2022-03-15 RX ORDER — FUROSEMIDE 40 MG
40 TABLET ORAL ONCE
Status: COMPLETED | OUTPATIENT
Start: 2022-03-15 | End: 2022-03-15

## 2022-03-15 RX ORDER — LORAZEPAM 2 MG/ML
0.5 INJECTION INTRAMUSCULAR EVERY 6 HOURS PRN
Status: DISCONTINUED | OUTPATIENT
Start: 2022-03-15 | End: 2022-03-21

## 2022-03-15 RX ADMIN — METOPROLOL TARTRATE 25 MG: 25 TABLET, FILM COATED ORAL at 19:49

## 2022-03-15 RX ADMIN — IPRATROPIUM BROMIDE AND ALBUTEROL 1 PUFF: 20; 100 SPRAY, METERED RESPIRATORY (INHALATION) at 19:50

## 2022-03-15 RX ADMIN — GUAIFENESIN 600 MG: 600 TABLET, EXTENDED RELEASE ORAL at 19:48

## 2022-03-15 RX ADMIN — FAMOTIDINE 20 MG: 20 TABLET ORAL at 08:54

## 2022-03-15 RX ADMIN — LETROZOLE 2.5 MG: 2.5 TABLET, FILM COATED ORAL at 08:54

## 2022-03-15 RX ADMIN — IPRATROPIUM BROMIDE AND ALBUTEROL 1 PUFF: 20; 100 SPRAY, METERED RESPIRATORY (INHALATION) at 08:21

## 2022-03-15 RX ADMIN — METOPROLOL TARTRATE 25 MG: 25 TABLET, FILM COATED ORAL at 08:55

## 2022-03-15 RX ADMIN — ACETAMINOPHEN 650 MG: 325 TABLET, FILM COATED ORAL at 00:25

## 2022-03-15 RX ADMIN — DIVALPROEX SODIUM 500 MG: 500 TABLET, FILM COATED, EXTENDED RELEASE ORAL at 22:10

## 2022-03-15 RX ADMIN — GUAIFENESIN 1200 MG: 600 TABLET, EXTENDED RELEASE ORAL at 08:55

## 2022-03-15 RX ADMIN — LORAZEPAM 0.25 MG: 0.5 TABLET ORAL at 13:46

## 2022-03-15 RX ADMIN — FUROSEMIDE 40 MG: 40 TABLET ORAL at 13:46

## 2022-03-15 RX ADMIN — LITHIUM CARBONATE 150 MG: 150 CAPSULE, GELATIN COATED ORAL at 08:53

## 2022-03-15 RX ADMIN — APIXABAN 2.5 MG: 2.5 TABLET, FILM COATED ORAL at 19:49

## 2022-03-15 RX ADMIN — LEVOTHYROXINE SODIUM 100 MCG: 0.1 TABLET ORAL at 08:55

## 2022-03-15 RX ADMIN — MAGNESIUM SULFATE HEPTAHYDRATE 2 G: 2 INJECTION, SOLUTION INTRAVENOUS at 16:19

## 2022-03-15 RX ADMIN — ACETAMINOPHEN 650 MG: 325 TABLET, FILM COATED ORAL at 16:36

## 2022-03-15 RX ADMIN — ARIPIPRAZOLE 5 MG: 5 TABLET ORAL at 08:55

## 2022-03-15 RX ADMIN — APIXABAN 2.5 MG: 2.5 TABLET, FILM COATED ORAL at 08:56

## 2022-03-15 ASSESSMENT — ACTIVITIES OF DAILY LIVING (ADL)
ADLS_ACUITY_SCORE: 28
ADLS_ACUITY_SCORE: 28
ADLS_ACUITY_SCORE: 30
ADLS_ACUITY_SCORE: 28
ADLS_ACUITY_SCORE: 30
ADLS_ACUITY_SCORE: 28
ADLS_ACUITY_SCORE: 28
ADLS_ACUITY_SCORE: 30
ADLS_ACUITY_SCORE: 28
ADLS_ACUITY_SCORE: 30
ADLS_ACUITY_SCORE: 28
ADLS_ACUITY_SCORE: 30
ADLS_ACUITY_SCORE: 28
ADLS_ACUITY_SCORE: 28
ADLS_ACUITY_SCORE: 30
ADLS_ACUITY_SCORE: 30
ADLS_ACUITY_SCORE: 28

## 2022-03-15 NOTE — PROVIDER NOTIFICATION
MD Notification    Notified Person: MD    Notified Person Name: Jodi Neville PA-C    Notification Date/Time: 3/15/2022 4227    Notification Interaction: Face to face    Purpose of Notification: Patient with excoriation to perineum. Incontinent of bowel, having diarrhea.    Orders Received: BERTHA to order enteric precautions, stool sample, and WOC RN consult.    Comments:

## 2022-03-15 NOTE — PROVIDER NOTIFICATION
EVELINE Walker-C notified of labored breathing and increased O2 needs. PA-C to see pt and order PO ativan.

## 2022-03-15 NOTE — PLAN OF CARE
"Pt is disoriented to time and situation. Pt was on 2-3L O2 overnight to maintain O2 stats in the 90's.  Temperature has been elevated this shift (100.1-101.9) and came down to 98.3 later on. Pt refused Tylenol multiple times and then later took it once. Ice packs have been utilized to try to decrease her temperature. Pt refused most medications at bedtime and wanted to be \"left alone.\" Pt has some tremors (baseline). Ax1 although has not gotten up this shift. Incontinent of bladder and bowel.     Pt triggered sepsis and the lactic came back at 1.4. Will continue to monitor.      Tele: Sinus 60's with inverted T's         /64 (BP Location: Right arm, Cuff Size: Adult Regular)   Pulse 68   Temp 98.3  F (36.8  C) (Oral)   Resp 24   Ht 1.575 m (5' 2\")   Wt 64.2 kg (141 lb 8 oz)   LMP  (LMP Unknown)   SpO2 96%   BMI 25.88 kg/m          "

## 2022-03-15 NOTE — PLAN OF CARE
"Pt disoriented to situation, assist x1,Pt on Tele and SR in the 70s.Sats sitting in the 90s on 3L of O2 via nasal canula. CRP and BNP high. Lung sounds: Coarse and crackles. Congested cough and labored breathing. Lasix ordered to manage fluids in lungs and PRN ativan given for  breathing control. Incontinent, BM twice this shift and excoriated skin on bottom, barrier cream applied. New Peripheral IV to left upper arm. No fluids running at this time. Maintained on regular diet but refused lunch. Enteric precaution initiated and waiting stool sample.    Vital signs:  Temp: 97.8  F (36.6  C) Temp src: Oral BP: 115/55 Pulse: 71   Resp: (!) 32 SpO2: 96 % O2 Device: Nasal cannula Oxygen Delivery: 3 LPM Height: 157.5 cm (5' 2\") Weight: 64.2 kg (141 lb 8 oz)  Estimated body mass index is 25.88 kg/m  as calculated from the following:    Height as of this encounter: 1.575 m (5' 2\").    Weight as of this encounter: 64.2 kg (141 lb 8 oz).                            "

## 2022-03-15 NOTE — PLAN OF CARE
"Vitals: Blood pressure 93/67, pulse 71, temperature (!) 101.4  F (38.6  C), temperature source Oral, resp. rate 28, height 1.575 m (5' 2\"), weight 64.2 kg (141 lb 8 oz), SpO2 92 %, not currently breastfeeding.  Labs: patient new labs, see chart.      Cardiac: WDL  Resp: Patient is experiencing shortness of breath, intermittent productive cough,    Neuro: Patient is alert and oriented to only self at this time.   GI: Patient currently is having diarrhea  : Incontinent of urine.   Skin: patient has bruising on bilateral lower arms   Activity: Patient is bed bound right now.   Plan: Patient has pending discussions of palliative care. Waiting on discussion with family.   Diet: Regular Adult diet                      "

## 2022-03-15 NOTE — PROGRESS NOTES
[facility]  Medicine Progress Note - Hospitalist Service       Date of Admission:  3/10/2022    Assessment & Plan     Lennie Mar is a 89 year old female with suspected cognitive impairment, bipolar disorder, alcoholism in remission, left-sided breast cancer on adjuvant Letrozole, Stage III CKD, VRE UTI 2012, hyperparathyroidism, depression, subdural hygroma, and resting tremor who presented to [facility] ED on 3/10/2022 with confusion and weakness and was found to have a COVID19 infection and UTI.  She was registered to Observation for further monitoring and treatment.     Acutely decompensated 3/12-3/13 with fevers to 101, nausea with emesis, chest pain, worsening coarse cough, new hypoxia to mid-80s requiring supplemental O2, and worsening malaise and lethargy.  Broad work-up, including ACS rule out, largely negative.  Working diagnosis is now-symptomatic COVID19 infection.  Changed to Inpatient status.       #Symptomatic breakthrough COVID19  #Acute hypoxemic respiratory failure  Patient is vaccinated x2 but not boosted.  Symptoms began 3/12 PM with fevers and evolved to include nausea with emesis, lethargy, malaise, confusion, hypoxia, and progression of a previous harsh cough.  Per discussion with son Naren on 3/13, family would not want remdesivir (doctors in the family have mixed feelings about this medication) and son does not want steroids started due to risk of javan.  --Continue symptomatic cares including combivent inhaler, Mucinex, APAP, and emotional encouragement  --Inflammatory markers remain mixed with CRP elevated today  --Hypoxia ongoing but relatively stable on 2-3L NC at rest.  Recurrent upper-airway coarseness and sounds a bit wet on exam today so giving a dose of Lasix (40 mg PO x1).   If O2 needs increase, consider CT Chest (WO contrast) or V/Q scan  --Intermittently confused  --Therapeutic Eliquis started 3/14 for AFib and acute COVID  --Spending nearly all day in bed  resulting in brisk physical deconditioning.  Pt should be up to chair for all meals in effort to minimize deconditioning    #Diarrhea, new  Frequent loose stools new since yesterday.  RN noting patient has significant excoriation perianal excoriations.  Suspect related to COVID but given recent Abx use, need to rule out CDiff.  --CDiff and Enteric panel ordered  --WOC consult  --Zinc oxide paste added for pericares  --If stool studies negative, add PRN imodium    #AFib with RVR, new on 3/13, now in NSR  Had chest pain on 3/13 AM with EKG showing new T-wave inversions in leads III, aVF, and V6.  ACS excluded by negative sequential cardiac enzymes.  Developed new onset AFib with RVR overnight 3/13 with rates 120s-150s.  Rate poorly controlled on metoprolol 12.5 (remained in the 110s) and so was increased to 25 BID.  Converted to NSR 3/14 PM.  --Likely related to acute illness  --Continue metoprolol 25 BID  --Continue tele  --Added Eliquis BID for CVA PPx and COVID.  (Unable to do Lovenox 2/2 CKD and would like to avoid heparin gtt)    #Generalized Weakness, multifactorial:  UTI + COVID19 infection  Weakness and confusion initially felt related to UTI alone however newly symptomatic COVID19 infection also contributing.  With regarding to UTI, patient has h/o VRE UTI 2012 sensitive only to linezolid.  Most recent UTIs have been EColi and Klebsiella with mixed resistance patterns.  Antibiotic choices limited by allergies to PCN, Sulfa, tetracycline, and fluoroquinolones.  UCx ultimately grew >100K Ecoli resistant only to ampicillin and fluoroquinolones; patient on ceftriaxone and is tolerating it well.  With regard to COVID19 infection (discussed above), intermittently confused over last few days related to malaise, lethargy, and fever.    --Completed 5 days IV ceftriaxone on 3/14   --Remains globally weak and intermittently confused.  Agitated overnight which may indicate component of Sundowning  --Maximize symptomatic  cares  --Will need repeat PT/OT evaluations/recommendations once acute COVID symptoms resolve      #Complex family dynamics  #Infectious encephalopathy   #Suspected underlying cognitive impairment with diagnosis complicated by h/o bipolar disorder with episodic depression  Patient remains globally weak and intermittently confused.  At this point, patient is not a reliable historian and appears unable to make decisions for herself.  Unfortunately, patient does not have a designated POA or medical decision maker.  Son Naren has been established as the point-person however he mentions there are 6 doctors in the family with varying medical opinions.  Family lacking insight into or acceptance of implications for patient's clinical change particularly that she is unable to make her own decisions right now and that patient unlikely to return to baseline functional or respiratory status.    --Palliative Care consulted to assist with Goals of Care discussion, assist patient and family with clarifying who is/are the designated medical decision maker(s), and to help arrange a Family Conference so all children/family are on the same page regarding current clinical status, treatment options, and possible outcomes.    --At the very least, would like family's assistance clarifying patient's wishes regarding the following:     Would patient want to be blood thinners to prevent strokes related to paroxysmal atrial fibrillation    If worsening hypoxia, would patient want NIPPV such as HFNC or BiPAP (it is unlikely she would tolerate either)    If up-trending inflammatory markers and worsening hypoxia, would patient want trial of dexamethasone or immunotherapies such as baricitinib    If patient suffers a cardiac event such as MI or VT/VF, would patient want any invasive interventions    If patient no longer wants to or is able to take in PO, would patient want tube feeds or parenteral nutrition    If patient declines to the point  she is unable to ambulate or care for self, would she want to consider Hospice      #ASHLEY on chronic stage III CKD, resolved  Baseline Cr 1.1-1.3.  Cr 1.74 on admission likely related to UTI and volume depletion.  Cr normalized with fluid challenge.   --Minimize nephrotoxins    #Bipolar Disorder // Depression  #Underlying cognitive impairment  Follows with Dr. Horn at [facility] in [place].  H/o depression but denies thoughts of harming self or others.  On admission, lithium and valproic acid levels slightly subtherapeutic at 0.3 and 15, respectively.  This is likely due to several missed doses.  Recheck 3/14 shows improvement.  --Continue PTA depakote, lithium, and abilify    #Hypothyroidism  #History of hyperparathyroidism  Thyroid nodule biopsy 6/1/2021 benign.  TFTs 3/10/2022 showed undetectable TSH with normal free T4.  Calcium level normal.   --Continue PTA levothyroxine  --Recheck TFTs in 6 weeks     #History of Breast Cancer   History of left-sided breast cancer (2 separate tumors) diagnosed in 2018 after patient found a lump during self-exam.  One tumor was a triple positive IDC and the other was ER/SC+ HER-2/josiane negative IDC.  Received neoadjuvant Herceptin + letrozole followed by left-sided mastectomy 11/2018, the pathology of which revealed axillary LN involvement.  Based on Breast Conference recommendations, patient went on to receive adjuvant radiation to the left chest, axilla, and supraclavicular nodes in addition to a year of Kadcyla.  She remains on adjuvant hormone suppression with letrozole.  SHARON.  --Continue PTA letrozole  --Oncology follow-up planned for 4/18/2022 (Dr. Malik)     Diet: Regular Diet Adult    DVT Prophylaxis:  Eliquis BID  Code Status: No CPR- Do NOT Intubate      DISPO:  Likely in-house for several more days at least.  Once fevers improve, HR and BP stabilize, and patient's appetite improves (OR if patient and family elect to proceed with Hospice), will need PT/OT re-eval  for TCU vs LTC placement.      Updated daughter-in-law x30 min regarding patient's clinic status and plan       EVELINE Coronado  Hospitalist Service  [facility]     Text Page (7AM - 5PM, M-F)  ______________________________________________________________________    Interval History   Doing OK - agitated overnight but calmer and redirectable this AM.  Converted to NSR around 10PM.  Quite fatigued.  Denies shortness of breath but lungs a bit more wet on exam and O2 turned up to 3L from 2L with sats now 96% on RA.  Denies pain or concerns.      Data reviewed today: I reviewed all medications, new labs and imaging results over the last 24 hours. I personally reviewed no images or EKG's today.    Physical Exam   Vital Signs: Temp: 98.3  F (36.8  C) Temp src: Oral BP: 132/58 Pulse: 68   Resp: 24 SpO2: 95 % O2 Device: Nasal cannula Oxygen Delivery: 2 LPM  Weight: 141 lbs 8 oz    GENERAL: Sitting in bed but is slumped in bed.  Appears sleepy, fatigued, and subdued.  Answering questions appropriately.  No air hunger, accessory mm use, cyanosis, or evidence of distress or pain.  Clinically appears euvolemic.  HEENT:  NCAT.  Extra occular mm grossly intact.  Sclera anicteric.  Mucous membranes moist.  PER.  PULMONOLOGY:  Upper airway sounds present on auscultation with some increased crackles at left lung base.  No wheezing.     CARDIAC:  Irreg irreg  ABDOMEN: Soft, nontender    MUSCULOSKELETAL:  Moving x 4 spontaneously with CMS intact x4.  No BLE edema.  NEURO: Alert and oriented to self and place.  CN II-XII grossly intact and symmetric.  Resting tremor, particularly with left arm.  No gross focal deficits or new lateralizing weakness.  SKIN: warm, pink, dry       Data   Recent Labs   Lab 03/15/22  0844 03/15/22  0628 03/14/22  0517 03/13/22  1213 03/13/22  0858 03/11/22  0705 03/10/22  1158   WBC  --  4.8 4.2 3.0*  --    < > 4.1   HGB  --  12.1 12.9 11.7  --    < > 12.6   MCV  --  103* 103*  101*  --    < > 102*   PLT  --  111* 132* 126*  --    < > 146*     --  142  --  140   < > 140   POTASSIUM 4.7  --  4.9  --  4.3   < > 4.9   CHLORIDE 113*  --  114*  --  113*   < > 111*   CO2 24  --  27  --  23   < > 25   BUN 19  --  14  --  15   < > 36*   CR 1.27*  --  1.26*  --  1.26*   < > 1.74*   ANIONGAP 3  --  1*  --  4   < > 4   CANELO 8.8  --  8.9  --  8.4*   < > 8.9   GLC 82  --  99  --  115*   < > 88   ALBUMIN  --   --   --   --  2.3*  --  2.7*   PROTTOTAL  --   --   --   --  6.4*  --  6.7*   BILITOTAL  --   --   --   --  0.3  --  0.3   ALKPHOS  --   --   --   --  94  --  98   ALT  --   --   --   --  21  --  29   AST  --   --   --   --  34  --  35    < > = values in this interval not displayed.     No results found for this or any previous visit (from the past 24 hour(s)).  Medications     - MEDICATION INSTRUCTIONS -         apixaban ANTICOAGULANT  2.5 mg Oral BID     ARIPiprazole  5 mg Oral Daily     divalproex sodium extended-release  500 mg Oral At Bedtime     famotidine  20 mg Oral Daily     guaiFENesin  1,200 mg Oral BID     ipratropium-albuterol  1 puff Inhalation 4x daily     letrozole  2.5 mg Oral Daily     levothyroxine  100 mcg Oral Daily     lithium  150 mg Oral QAM     metoprolol tartrate  25 mg Oral BID     sodium chloride (PF)  3 mL Intracatheter Q8H

## 2022-03-16 LAB
ANION GAP SERPL CALCULATED.3IONS-SCNC: 7 MMOL/L (ref 3–14)
BUN SERPL-MCNC: 18 MG/DL (ref 7–30)
CALCIUM SERPL-MCNC: 9 MG/DL (ref 8.5–10.1)
CHLORIDE BLD-SCNC: 110 MMOL/L (ref 94–109)
CO2 SERPL-SCNC: 23 MMOL/L (ref 20–32)
CREAT SERPL-MCNC: 1.29 MG/DL (ref 0.52–1.04)
CRP SERPL-MCNC: 116 MG/L (ref 0–8)
ERYTHROCYTE [SEDIMENTATION RATE] IN BLOOD BY WESTERGREN METHOD: 70 MM/HR (ref 0–30)
GFR SERPL CREATININE-BSD FRML MDRD: 38 ML/MIN/1.73M2
GLUCOSE BLD-MCNC: 83 MG/DL (ref 70–99)
LACTATE SERPL-SCNC: 0.9 MMOL/L (ref 0.7–2)
MAGNESIUM SERPL-MCNC: 2.1 MG/DL (ref 1.6–2.3)
POTASSIUM BLD-SCNC: 4.6 MMOL/L (ref 3.4–5.3)
SODIUM SERPL-SCNC: 140 MMOL/L (ref 133–144)

## 2022-03-16 PROCEDURE — 250N000013 HC RX MED GY IP 250 OP 250 PS 637: Performed by: PHYSICIAN ASSISTANT

## 2022-03-16 PROCEDURE — 87040 BLOOD CULTURE FOR BACTERIA: CPT | Performed by: HOSPITALIST

## 2022-03-16 PROCEDURE — 250N000013 HC RX MED GY IP 250 OP 250 PS 637: Performed by: HOSPITALIST

## 2022-03-16 PROCEDURE — 36415 COLL VENOUS BLD VENIPUNCTURE: CPT | Performed by: PHYSICIAN ASSISTANT

## 2022-03-16 PROCEDURE — 82310 ASSAY OF CALCIUM: CPT | Performed by: PHYSICIAN ASSISTANT

## 2022-03-16 PROCEDURE — 85652 RBC SED RATE AUTOMATED: CPT | Performed by: HOSPITALIST

## 2022-03-16 PROCEDURE — 120N000004 HC R&B MS OVERFLOW

## 2022-03-16 PROCEDURE — 94640 AIRWAY INHALATION TREATMENT: CPT | Mod: 76

## 2022-03-16 PROCEDURE — G0463 HOSPITAL OUTPT CLINIC VISIT: HCPCS

## 2022-03-16 PROCEDURE — 86140 C-REACTIVE PROTEIN: CPT | Performed by: PHYSICIAN ASSISTANT

## 2022-03-16 PROCEDURE — 36415 COLL VENOUS BLD VENIPUNCTURE: CPT | Performed by: HOSPITALIST

## 2022-03-16 PROCEDURE — 250N000011 HC RX IP 250 OP 636: Performed by: PHYSICIAN ASSISTANT

## 2022-03-16 PROCEDURE — 83735 ASSAY OF MAGNESIUM: CPT | Performed by: PHYSICIAN ASSISTANT

## 2022-03-16 PROCEDURE — 83605 ASSAY OF LACTIC ACID: CPT | Performed by: PHYSICIAN ASSISTANT

## 2022-03-16 PROCEDURE — 999N000157 HC STATISTIC RCP TIME EA 10 MIN

## 2022-03-16 PROCEDURE — 250N000011 HC RX IP 250 OP 636: Performed by: HOSPITALIST

## 2022-03-16 PROCEDURE — 99233 SBSQ HOSP IP/OBS HIGH 50: CPT | Performed by: HOSPITALIST

## 2022-03-16 PROCEDURE — 94640 AIRWAY INHALATION TREATMENT: CPT

## 2022-03-16 RX ORDER — FUROSEMIDE 10 MG/ML
60 INJECTION INTRAMUSCULAR; INTRAVENOUS ONCE
Status: COMPLETED | OUTPATIENT
Start: 2022-03-16 | End: 2022-03-16

## 2022-03-16 RX ADMIN — FUROSEMIDE 60 MG: 10 INJECTION, SOLUTION INTRAMUSCULAR; INTRAVENOUS at 10:13

## 2022-03-16 RX ADMIN — LEVOTHYROXINE SODIUM 100 MCG: 0.1 TABLET ORAL at 09:01

## 2022-03-16 RX ADMIN — FAMOTIDINE 20 MG: 20 TABLET ORAL at 09:00

## 2022-03-16 RX ADMIN — METOPROLOL TARTRATE 25 MG: 25 TABLET, FILM COATED ORAL at 22:05

## 2022-03-16 RX ADMIN — IPRATROPIUM BROMIDE AND ALBUTEROL 1 PUFF: 20; 100 SPRAY, METERED RESPIRATORY (INHALATION) at 16:02

## 2022-03-16 RX ADMIN — LORAZEPAM 0.5 MG: 2 INJECTION INTRAMUSCULAR; INTRAVENOUS at 01:37

## 2022-03-16 RX ADMIN — GUAIFENESIN 1200 MG: 600 TABLET, EXTENDED RELEASE ORAL at 09:00

## 2022-03-16 RX ADMIN — LITHIUM CARBONATE 150 MG: 150 CAPSULE, GELATIN COATED ORAL at 09:01

## 2022-03-16 RX ADMIN — APIXABAN 2.5 MG: 2.5 TABLET, FILM COATED ORAL at 22:01

## 2022-03-16 RX ADMIN — IPRATROPIUM BROMIDE AND ALBUTEROL 1 PUFF: 20; 100 SPRAY, METERED RESPIRATORY (INHALATION) at 11:56

## 2022-03-16 RX ADMIN — IPRATROPIUM BROMIDE AND ALBUTEROL 1 PUFF: 20; 100 SPRAY, METERED RESPIRATORY (INHALATION) at 20:20

## 2022-03-16 RX ADMIN — LETROZOLE 2.5 MG: 2.5 TABLET, FILM COATED ORAL at 09:00

## 2022-03-16 RX ADMIN — APIXABAN 2.5 MG: 2.5 TABLET, FILM COATED ORAL at 09:00

## 2022-03-16 RX ADMIN — IPRATROPIUM BROMIDE AND ALBUTEROL 1 PUFF: 20; 100 SPRAY, METERED RESPIRATORY (INHALATION) at 08:39

## 2022-03-16 RX ADMIN — GUAIFENESIN 1200 MG: 600 TABLET, EXTENDED RELEASE ORAL at 21:59

## 2022-03-16 RX ADMIN — ARIPIPRAZOLE 5 MG: 5 TABLET ORAL at 09:00

## 2022-03-16 RX ADMIN — DIVALPROEX SODIUM 500 MG: 500 TABLET, FILM COATED, EXTENDED RELEASE ORAL at 21:55

## 2022-03-16 ASSESSMENT — ACTIVITIES OF DAILY LIVING (ADL)
ADLS_ACUITY_SCORE: 26
ADLS_ACUITY_SCORE: 28
ADLS_ACUITY_SCORE: 26
ADLS_ACUITY_SCORE: 26
ADLS_ACUITY_SCORE: 28
ADLS_ACUITY_SCORE: 26
ADLS_ACUITY_SCORE: 26

## 2022-03-16 NOTE — CONSULTS
[facility] Nurse Inpatient Assessment     Today's Assessment: Perianal and perineal area  Patient History (according to provider note(s): Lennie Mar is a 89 year old female admitted on 3/10/2022. She has a history of breast cancer, alcohol dependence in remission, anxiety, asymptomatic varicose veins, bipolar disorder, chronic kidney disease stage 3, history of smoking, hyperparathyroidism, memory loss, muscle weakness, severe depression, subdural hygroma, and a resting tremor and she is admitted for generalized weakness and found to have COVID-19 and UTI. Episode of rapid a fib. Developed acute hypoxic respiratory failure.  Current support surface: Standard  Atmos Air mattress    Wound Location: Perianal and perineal area    LAST PHOTO: NA  WOUND DUE TO: Incontinence Associated Dermatitis (IAD)  WOUND HISTORY/PLAN OF CARE:   With with incontinence.    WOUND BASE: 100 % agranular     Palpation of the wound bed: normal      Drainage: none     Description of drainage: none     Measurements (length x width x depth, in cm) Moist erythema with satellite lesions to buttocks, perianal area, perineal area and groin.  Scattered pinpoint to 0.3x0.3cm agranular areas within area of erythema.       Tunneling N/A     Undermining N/A  PERIWOUND SKIN: erythema- blanchable      Color: normal and consistent with surrounding tissue      Temperature: normal   ODOR: none  PAIN: moderate, sharp-with incontinence care  WOUND CARE RATIONALE: Provide protection   TREATMENT GOAL: healing  STATUS: initial assessment  SUPPLIES ORDERED: Bridget JACKSON    Containment of urine/stool: Incontinence Protocol  BMI: Body mass index is 27.11 kg/m .   Active Diet Order: Orders Placed This Encounter      Mechanical/Dental Soft Diet     Output: I/O last 3 completed shifts:  In: 100 [P.O.:100]  Out: -    Labs: Recent Labs   Lab Test 03/16/22  0954 03/15/22  0844 03/15/22  0628 03/13/22  1213 03/13/22  0858   ALBUMIN  --   --   --    --  2.3*   HGB  --   --  12.1   < >  --    WBC  --   --  4.8   < >  --    .0*   < >  --    < > 87.1*    < > = values in this interval not displayed.      Pressure Injury Risk Assessment: Raul Risk Assessment  Sensory Perception: 3-->slightly limited    Moisture: 1-->constantly moist   Activity: 3-->walks occasionally     Mobility: 3-->slightly limited   Nutrition: 3-->adequate   Friction and Shear: 2-->potential problem  Raul Score: 15      TREATMENT PLAN:  Perianal/perineal/groin wound(s): BID and prn with incontinence  1. Cleanse with Bridget spray and soft dry wipe  2. Apply a thin layer of Bridget antifungal cream     RECOMMEND PRIMARY TEAM ORDER: None, at this time  Education provided to: plan of care, Moisture management and Hygiene  Discussed plan of care with: Patient and Nurse  WOC Nurse follow-up plan:weekly  Notify WOC if wound(s) deteriorate.  Nursing to notify the Provider(s) and re-consult the WOC Nurse if new skin concern.    Ray Hannah RN CWOCN  Dept. Pager: [phone]  Dept. Office Number: [phone]

## 2022-03-16 NOTE — PLAN OF CARE
Pt disoriented to place, situation. Tele d/c'd today, O@ sats 96 on 2-3 liters, resp and breathing much improved after iv lasix today. Congested cough at times with clear phlegm.  Pt able to do IS to 500 this am.  Pt refusing food taking in minium fluids, pt incontinent to small amount of  loose stool coccyx rash treated with anti fungal cream.

## 2022-03-16 NOTE — PROGRESS NOTES
[facility]    Progress Note - Hospitalist Service       Date of Admission:  3/10/2022    Assessment & Plan              Lennie Mar is a 89 year old female admitted on 3/10/2022. She has a history of breast cancer, alcohol dependence in remission, anxiety, asymptomatic varicose veins, bipolar disorder, chronic kidney disease stage 3, history of smoking, hyperparathyroidism, memory loss, muscle weakness, severe depression, subdural hygroma, and a resting tremor and she is admitted for generalized weakness and found to have COVID-19 and UTI. Episode of rapid a fib. Developed acute hypoxic respiratory failure.    Acute hypoxemic respiratory failure due to Covid 19 Pneumonia    - currently on 2L o2 via NC    - family has refused remdesivir and steroids  had two vaccinations, but not boosted     - CXR on admission showed no acute disease    - repeat CXR on 3/13 showed:  Mild left basilar/retrocardiac pulmonary opacities    - unable to discharge to TCU because of COVID-19 infection (unless 10 days out)    - dosed 40mg PO lasix on 3/15 (no in/out measured)    - given 60mg IV x1 today (urine in pad/sheets, now asked nursing to measure in/out)    - trying to keep lungs dry in setting of covid and likely fluid overload due to IVF earlier in admission    Acute fluid overload/exacerbation of mild diastolic dysfunction    - diuresing as above    - ECHO done on 3/13    Rapid a fib    - on 3/13    - started on metoprolol BID    - now has been in sinus for days    - started on eliqius 2.5mg BID (for a fib and DVT prophylaxis)    Generalized weakness    - likely due to COVID-19 infection and UTI    - PT evaluation: dispo will be complicated (see below)    UTI  - UA on 3/10 appeared infected  - grew Klebsiella in 2014 and Klebsiella/Enterocuccus in 2012   - was on ceftriaxone (start date 3/10), completed course  - final cultures grew E Coli    Bipolar disorder, unspecified    - followed with Dr. 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 Malik)    - on letrozole since 2019, continue     Called son Dmitry (message stated VM not working). Called his wife, Marta and left a message on voice mail.    Diet: Regular Diet Adult    DVT Prophylaxis: Heparin   Kelley Catheter: Not present  Fluids: IV and PO   Central Lines: None  Cardiac Monitoring: None  Code Status: No CPR- Do NOT Intubate      Disposition Plan   Expected Discharge: 03/18/2022     Anticipated discharge location: inpatient rehabilitation facility    Delays:     Placement - TCU       The patient's care was discussed with the nurse, family  MD Devendra Ruth MD  Hospitalist Service  [facility]  Securely message with the Vocera Web Console (learn more here)  Text page via [employer] Paging/Directory     Clinically Significant Risk Factors Present on Admission              # Overweight: Estimated body mass index is 27.11 kg/m  as calculated from the following:    Height as of this encounter: 1.575 m (5' 2\").    Weight as of this encounter: 67.2 kg (148 lb 3.2 oz).      ______________________________________________________________________    Interval History     Patient sleeping in bed. Awakens to voice. " Denies pain. Stats she is eating/drinking son.     Data reviewed today: I reviewed all medications, new labs and imaging results over the last 24 hours. I personally reviewed no images or EKG's today.    Physical Exam   Vital Signs: Temp: 100.2  F (37.9  C) Temp src: Oral BP: 131/63 Pulse: 84   Resp: 28 SpO2: 92 % O2 Device: Nasal cannula Oxygen Delivery: 3 LPM  Weight: 148 lbs 3.2 oz     General: Resting in bed, no acute distress  Head: Atraumatic, normocephalic   Heart: Regular rate and rhythm   Lung: crackles in bases  Abdomen: Bowel sounds present. No pain with palpation. Abdomen, soft, non - tender   Musculoskeletal: Grossly intact movement of upper and lower extremities  Neurological: Grossly intact strength and motor function   Psych: Flat affect    Data   Recent Labs   Lab 03/16/22  0954 03/15/22  0844 03/15/22  0628 03/14/22  0517 03/13/22  1213 03/13/22  0858 03/11/22  0705 03/10/22  1158   WBC  --   --  4.8 4.2 3.0*  --    < > 4.1   HGB  --   --  12.1 12.9 11.7  --    < > 12.6   MCV  --   --  103* 103* 101*  --    < > 102*   PLT  --   --  111* 132* 126*  --    < > 146*    140  --  142  --  140   < > 140   POTASSIUM 4.6 4.7  --  4.9  --  4.3   < > 4.9   CHLORIDE 110* 113*  --  114*  --  113*   < > 111*   CO2 23 24  --  27  --  23   < > 25   BUN 18 19  --  14  --  15   < > 36*   CR 1.29* 1.27*  --  1.26*  --  1.26*   < > 1.74*   ANIONGAP 7 3  --  1*  --  4   < > 4   CANELO 9.0 8.8  --  8.9  --  8.4*   < > 8.9   GLC 83 82  --  99  --  115*   < > 88   ALBUMIN  --   --   --   --   --  2.3*  --  2.7*   PROTTOTAL  --   --   --   --   --  6.4*  --  6.7*   BILITOTAL  --   --   --   --   --  0.3  --  0.3   ALKPHOS  --   --   --   --   --  94  --  98   ALT  --   --   --   --   --  21  --  29   AST  --   --   --   --   --  34  --  35    < > = values in this interval not displayed.     No results found for this or any previous visit (from the past 24 hour(s)).  Medications     - MEDICATION INSTRUCTIONS -          apixaban ANTICOAGULANT  2.5 mg Oral BID     ARIPiprazole  5 mg Oral Daily     divalproex sodium extended-release  500 mg Oral At Bedtime     famotidine  20 mg Oral Daily     guaiFENesin  1,200 mg Oral BID     ipratropium-albuterol  1 puff Inhalation 4x daily     letrozole  2.5 mg Oral Daily     levothyroxine  100 mcg Oral Daily     lithium  150 mg Oral QAM     metoprolol tartrate  25 mg Oral BID     sodium chloride (PF)  3 mL Intracatheter Q8H

## 2022-03-16 NOTE — PLAN OF CARE
"Care from [id number]    Inpatient Progress Note:  For complete assessment see flow sheet documentation.    /49 (BP Location: Right arm)   Pulse 52   Temp 97.5  F (36.4  C) (Oral)   Resp 28   Ht 1.575 m (5' 2\")   Wt 64.2 kg (141 lb 8 oz)   LMP  (LMP Unknown)   SpO2 95%   BMI 25.88 kg/m         Orientation: Pt A&Ox2, garbled speech. Denied pain, N/V.    Neuro: Intact  Pain status: Denies   Activity:  A-2 . Has not gotten OOB.   Resp: Infreq. Productive cough present. LS coarse crackles and diminished. On 3L of oxygen.  Cardiac: Remote tele SR in the 70's per tele tech.   GI: Incot. No BM awaiting stool sample.  :  Incontinent.   Skin: Blanchable redness to bottom. Perineum red and excoriated. Barrier cream applied.   Pertinent Labs: Pt triggered sepsis, lactic 0.9. Afebrile  Diet: Regular  Isolation: Special COVID   Consults: Palliative, WOC, PT/OT.   Discharge Plan: Possible TCU placement once found.     "

## 2022-03-17 ENCOUNTER — APPOINTMENT (OUTPATIENT)
Dept: PHYSICAL THERAPY | Facility: CLINIC | Age: 87
DRG: 178 | End: 2022-03-17
Payer: MEDICARE

## 2022-03-17 LAB
ATRIAL RATE - MUSE: 86 BPM
BASOPHILS # BLD AUTO: 0 10E3/UL (ref 0–0.2)
BASOPHILS NFR BLD AUTO: 0 %
D DIMER PPP FEU-MCNC: 1.29 UG/ML FEU (ref 0–0.5)
DIASTOLIC BLOOD PRESSURE - MUSE: NORMAL MMHG
EOSINOPHIL # BLD AUTO: 0.1 10E3/UL (ref 0–0.7)
EOSINOPHIL NFR BLD AUTO: 2 %
ERYTHROCYTE [DISTWIDTH] IN BLOOD BY AUTOMATED COUNT: 14.5 % (ref 10–15)
HCT VFR BLD AUTO: 37.9 % (ref 35–47)
HGB BLD-MCNC: 11.9 G/DL (ref 11.7–15.7)
IMM GRANULOCYTES # BLD: 0.1 10E3/UL
IMM GRANULOCYTES NFR BLD: 2 %
INTERPRETATION ECG - MUSE: NORMAL
LYMPHOCYTES # BLD AUTO: 1 10E3/UL (ref 0.8–5.3)
LYMPHOCYTES NFR BLD AUTO: 21 %
MCH RBC QN AUTO: 31.9 PG (ref 26.5–33)
MCHC RBC AUTO-ENTMCNC: 31.4 G/DL (ref 31.5–36.5)
MCV RBC AUTO: 102 FL (ref 78–100)
MONOCYTES # BLD AUTO: 0.5 10E3/UL (ref 0–1.3)
MONOCYTES NFR BLD AUTO: 10 %
NEUTROPHILS # BLD AUTO: 3.1 10E3/UL (ref 1.6–8.3)
NEUTROPHILS NFR BLD AUTO: 65 %
NRBC # BLD AUTO: 0 10E3/UL
NRBC BLD AUTO-RTO: 0 /100
P AXIS - MUSE: 52 DEGREES
PLATELET # BLD AUTO: 122 10E3/UL (ref 150–450)
PR INTERVAL - MUSE: 172 MS
QRS DURATION - MUSE: 108 MS
QT - MUSE: 350 MS
QTC - MUSE: 418 MS
R AXIS - MUSE: -60 DEGREES
RBC # BLD AUTO: 3.73 10E6/UL (ref 3.8–5.2)
SYSTOLIC BLOOD PRESSURE - MUSE: NORMAL MMHG
T AXIS - MUSE: -36 DEGREES
VENTRICULAR RATE- MUSE: 86 BPM
WBC # BLD AUTO: 4.8 10E3/UL (ref 4–11)

## 2022-03-17 PROCEDURE — 99233 SBSQ HOSP IP/OBS HIGH 50: CPT | Performed by: HOSPITALIST

## 2022-03-17 PROCEDURE — 250N000013 HC RX MED GY IP 250 OP 250 PS 637: Performed by: HOSPITALIST

## 2022-03-17 PROCEDURE — 250N000011 HC RX IP 250 OP 636: Performed by: HOSPITALIST

## 2022-03-17 PROCEDURE — 85025 COMPLETE CBC W/AUTO DIFF WBC: CPT | Performed by: HOSPITALIST

## 2022-03-17 PROCEDURE — 120N000004 HC R&B MS OVERFLOW

## 2022-03-17 PROCEDURE — 36415 COLL VENOUS BLD VENIPUNCTURE: CPT | Performed by: HOSPITALIST

## 2022-03-17 PROCEDURE — 250N000013 HC RX MED GY IP 250 OP 250 PS 637: Performed by: PHYSICIAN ASSISTANT

## 2022-03-17 PROCEDURE — 97530 THERAPEUTIC ACTIVITIES: CPT | Mod: GP | Performed by: PHYSICAL THERAPIST

## 2022-03-17 PROCEDURE — 85379 FIBRIN DEGRADATION QUANT: CPT | Performed by: HOSPITALIST

## 2022-03-17 RX ORDER — FUROSEMIDE 10 MG/ML
40 INJECTION INTRAMUSCULAR; INTRAVENOUS ONCE
Status: COMPLETED | OUTPATIENT
Start: 2022-03-17 | End: 2022-03-17

## 2022-03-17 RX ORDER — DEXAMETHASONE SODIUM PHOSPHATE 10 MG/ML
6 INJECTION, SOLUTION INTRAMUSCULAR; INTRAVENOUS DAILY
Status: DISCONTINUED | OUTPATIENT
Start: 2022-03-17 | End: 2022-03-17

## 2022-03-17 RX ORDER — DEXAMETHASONE SODIUM PHOSPHATE 10 MG/ML
6 INJECTION, SOLUTION INTRAMUSCULAR; INTRAVENOUS DAILY
Status: DISCONTINUED | OUTPATIENT
Start: 2022-03-17 | End: 2022-03-22 | Stop reason: HOSPADM

## 2022-03-17 RX ADMIN — LEVOTHYROXINE SODIUM 100 MCG: 0.1 TABLET ORAL at 08:33

## 2022-03-17 RX ADMIN — IPRATROPIUM BROMIDE AND ALBUTEROL 1 PUFF: 20; 100 SPRAY, METERED RESPIRATORY (INHALATION) at 13:50

## 2022-03-17 RX ADMIN — FUROSEMIDE 40 MG: 10 INJECTION, SOLUTION INTRAMUSCULAR; INTRAVENOUS at 13:47

## 2022-03-17 RX ADMIN — APIXABAN 2.5 MG: 2.5 TABLET, FILM COATED ORAL at 08:34

## 2022-03-17 RX ADMIN — APIXABAN 2.5 MG: 2.5 TABLET, FILM COATED ORAL at 20:05

## 2022-03-17 RX ADMIN — ARIPIPRAZOLE 5 MG: 5 TABLET ORAL at 08:32

## 2022-03-17 RX ADMIN — IPRATROPIUM BROMIDE AND ALBUTEROL 1 PUFF: 20; 100 SPRAY, METERED RESPIRATORY (INHALATION) at 08:40

## 2022-03-17 RX ADMIN — GUAIFENESIN 1200 MG: 600 TABLET, EXTENDED RELEASE ORAL at 08:32

## 2022-03-17 RX ADMIN — IPRATROPIUM BROMIDE AND ALBUTEROL 1 PUFF: 20; 100 SPRAY, METERED RESPIRATORY (INHALATION) at 17:32

## 2022-03-17 RX ADMIN — GUAIFENESIN 1200 MG: 600 TABLET, EXTENDED RELEASE ORAL at 20:05

## 2022-03-17 RX ADMIN — FAMOTIDINE 20 MG: 20 TABLET ORAL at 08:33

## 2022-03-17 RX ADMIN — LETROZOLE 2.5 MG: 2.5 TABLET, FILM COATED ORAL at 08:34

## 2022-03-17 RX ADMIN — DEXAMETHASONE SODIUM PHOSPHATE 6 MG: 10 INJECTION, SOLUTION INTRAMUSCULAR; INTRAVENOUS at 13:29

## 2022-03-17 RX ADMIN — METOPROLOL TARTRATE 25 MG: 25 TABLET, FILM COATED ORAL at 20:04

## 2022-03-17 RX ADMIN — IPRATROPIUM BROMIDE AND ALBUTEROL 1 PUFF: 20; 100 SPRAY, METERED RESPIRATORY (INHALATION) at 21:19

## 2022-03-17 RX ADMIN — LITHIUM CARBONATE 150 MG: 150 CAPSULE, GELATIN COATED ORAL at 08:32

## 2022-03-17 ASSESSMENT — ACTIVITIES OF DAILY LIVING (ADL)
ADLS_ACUITY_SCORE: 28
ADLS_ACUITY_SCORE: 26
ADLS_ACUITY_SCORE: 32
ADLS_ACUITY_SCORE: 26
ADLS_ACUITY_SCORE: 32
ADLS_ACUITY_SCORE: 26
ADLS_ACUITY_SCORE: 28
ADLS_ACUITY_SCORE: 28
ADLS_ACUITY_SCORE: 26

## 2022-03-17 NOTE — PROGRESS NOTES
Pt alert and oriented to self only. Disoriented to time, place and situation. VSS. PIV SL. Was on 3 L O2 NC - desating. O2 Sat 85%. 86% on 5 LPM NC. Now on Oxymask Sat 95%. Incontinent of B&B. Rash on bhavik area  Continue to monitor

## 2022-03-17 NOTE — PROGRESS NOTES
[facility]    Progress Note - Hospitalist Service       Date of Admission:  3/10/2022    Assessment & Plan          Lennie Mar is a 89 year old female admitted on 3/10/2022. She has a history of breast cancer, alcohol dependence in remission, anxiety, asymptomatic varicose veins, bipolar disorder, chronic kidney disease stage 3, history of smoking, hyperparathyroidism, memory loss, muscle weakness, severe depression, subdural hygroma, and a resting tremor and she is admitted for generalized weakness and found to have COVID-19 and UTI. Episode of rapid a fib. Developed acute hypoxic respiratory failure.    Acute hypoxemic respiratory failure due to Covid 19 Pneumonia    - currently on 6L oximizer  - family has refused remdesivir and steroids  had two vaccinations, but not boosted     - CXR on admission showed no acute disease    - repeat CXR on 3/13 showed:  Mild left basilar/retrocardiac pulmonary opacities    - unable to discharge to TCU because of COVID-19 infection (unless 10 days out)    - dosed 40mg PO lasix on 3/15 (no in/out measured)    - given 60mg IV x1 on 3/16: had good response (at least 700cc out)    - give 40mg IV x 1 today and re-assess in am    - trying to keep lungs dry in setting of covid and likely fluid overload due to IVF earlier in admission    - discussed Covid treatments with son again. He is agreeable to using dexamethasone    Acute fluid overload/exacerbation of mild diastolic dysfunction    - diuresing as above    - ECHO done on 3/13    Rapid a fib    - on 3/13    - started on metoprolol BID    - now has been in sinus for days    - started on eliqius 2.5mg BID (for a fib and DVT prophylaxis)    Generalized weakness    - likely due to COVID-19 infection and UTI    - PT evaluation: dispo will be complicated (see below)    UTI  - UA on 3/10 appeared infected  - grew Klebsiella in 2014 and Klebsiella/Enterocuccus in 2012   - was on ceftriaxone (start date  "3/10), completed course  - final cultures grew E Coli    Bipolar disorder, unspecified    - followed with Dr. 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 Malik)    - on letrozole since 2019, continue     Malnutrition    - has had poor PO intake     - denies pain/nausea    - \"just not hungry\"    - added Boost, encouraged PO    Called son Dmitry and updated him. Updated Marta (duter in law) via IPad as well    Diet: Mechanical/Dental Soft Diet  Snacks/Supplements Pediatric: Boost Plus; Between Meals    DVT Prophylaxis: Heparin   Kelley Catheter: Not present  Fluids: IV and PO   Central Lines: None  Cardiac Monitoring: None  Code Status: No CPR- Do NOT Intubate      Disposition Plan   Expected Discharge: 03/20/2022     Anticipated discharge location: inpatient rehabilitation facility    Delays:     Placement - TCU       The patient's care was discussed with the nurse, family  MD Devendra Ruth MD  Hospitalist Service  [facility]  Securely message with the Vocera Web Console (learn more here)  Text page via [employer] Paging/Directory     Clinically Significant Risk Factors Present on Admission                 "     ______________________________________________________________________    Interval History     Patient in bed. More interactive today. States she doesn't feel well. Has some chest heaviness and sob. No abdo pain, n/v/d. Drinking. States she is just not hungry.    Data reviewed today: I reviewed all medications, new labs and imaging results over the last 24 hours. I personally reviewed no images or EKG's today.    Physical Exam   Vital Signs: Temp: 97.6  F (36.4  C) Temp src: Oral BP: 128/61 Pulse: 72   Resp: 22 SpO2: 97 % O2 Device: Oxymizer cannula Oxygen Delivery: 6 LPM  Weight: 142 lbs 3.2 oz     General: Resting in bed, no acute distress  Head: Atraumatic, normocephalic   Heart: Regular rate and rhythm   Lung: crackles in bases  Abdomen: Bowel sounds present. No pain with palpation. Abdomen, soft, non - tender   Musculoskeletal: Grossly intact movement of upper and lower extremities  Neurological: Grossly intact strength and motor function   Psych: Flat affect    Data   Recent Labs   Lab 03/17/22  0709 03/16/22  0954 03/15/22  0844 03/15/22  0628 03/14/22  0517 03/13/22  1213 03/13/22  0858   WBC 4.8  --   --  4.8 4.2   < >  --    HGB 11.9  --   --  12.1 12.9   < >  --    *  --   --  103* 103*   < >  --    *  --   --  111* 132*   < >  --    NA  --  140 140  --  142  --  140   POTASSIUM  --  4.6 4.7  --  4.9  --  4.3   CHLORIDE  --  110* 113*  --  114*  --  113*   CO2  --  23 24  --  27  --  23   BUN  --  18 19  --  14  --  15   CR  --  1.29* 1.27*  --  1.26*  --  1.26*   ANIONGAP  --  7 3  --  1*  --  4   CANELO  --  9.0 8.8  --  8.9  --  8.4*   GLC  --  83 82  --  99  --  115*   ALBUMIN  --   --   --   --   --   --  2.3*   PROTTOTAL  --   --   --   --   --   --  6.4*   BILITOTAL  --   --   --   --   --   --  0.3   ALKPHOS  --   --   --   --   --   --  94   ALT  --   --   --   --   --   --  21   AST  --   --   --   --   --   --  34    < > = values in this interval not displayed.     No results  found for this or any previous visit (from the past 24 hour(s)).  Medications     - MEDICATION INSTRUCTIONS -         apixaban ANTICOAGULANT  2.5 mg Oral BID     ARIPiprazole  5 mg Oral Daily     dexamethasone  6 mg Intravenous Daily     divalproex sodium extended-release  500 mg Oral At Bedtime     famotidine  20 mg Oral Daily     furosemide  40 mg Intravenous Once     guaiFENesin  1,200 mg Oral BID     ipratropium-albuterol  1 puff Inhalation 4x daily     letrozole  2.5 mg Oral Daily     levothyroxine  100 mcg Oral Daily     lithium  150 mg Oral QAM     metoprolol tartrate  25 mg Oral BID     sodium chloride (PF)  3 mL Intracatheter Q8H

## 2022-03-18 LAB
ANION GAP SERPL CALCULATED.3IONS-SCNC: 4 MMOL/L (ref 3–14)
BACTERIA BLD CULT: NO GROWTH
BACTERIA BLD CULT: NO GROWTH
BASOPHILS # BLD AUTO: 0 10E3/UL (ref 0–0.2)
BASOPHILS NFR BLD AUTO: 0 %
BUN SERPL-MCNC: 37 MG/DL (ref 7–30)
CALCIUM SERPL-MCNC: 9.1 MG/DL (ref 8.5–10.1)
CHLORIDE BLD-SCNC: 107 MMOL/L (ref 94–109)
CO2 SERPL-SCNC: 29 MMOL/L (ref 20–32)
CREAT SERPL-MCNC: 1.61 MG/DL (ref 0.52–1.04)
CRP SERPL-MCNC: 114 MG/L (ref 0–8)
D DIMER PPP FEU-MCNC: 2.2 UG/ML FEU (ref 0–0.5)
EOSINOPHIL # BLD AUTO: 0 10E3/UL (ref 0–0.7)
EOSINOPHIL NFR BLD AUTO: 0 %
ERYTHROCYTE [DISTWIDTH] IN BLOOD BY AUTOMATED COUNT: 13.9 % (ref 10–15)
ERYTHROCYTE [SEDIMENTATION RATE] IN BLOOD BY WESTERGREN METHOD: 97 MM/HR (ref 0–30)
GFR SERPL CREATININE-BSD FRML MDRD: 29 ML/MIN/1.73M2
GLUCOSE BLD-MCNC: 133 MG/DL (ref 70–99)
HCT VFR BLD AUTO: 39.1 % (ref 35–47)
HGB BLD-MCNC: 12.3 G/DL (ref 11.7–15.7)
IMM GRANULOCYTES # BLD: 0.2 10E3/UL
IMM GRANULOCYTES NFR BLD: 4 %
LYMPHOCYTES # BLD AUTO: 0.7 10E3/UL (ref 0.8–5.3)
LYMPHOCYTES NFR BLD AUTO: 15 %
MAGNESIUM SERPL-MCNC: 2.3 MG/DL (ref 1.6–2.3)
MCH RBC QN AUTO: 31.5 PG (ref 26.5–33)
MCHC RBC AUTO-ENTMCNC: 31.5 G/DL (ref 31.5–36.5)
MCV RBC AUTO: 100 FL (ref 78–100)
MONOCYTES # BLD AUTO: 0.5 10E3/UL (ref 0–1.3)
MONOCYTES NFR BLD AUTO: 9 %
NEUTROPHILS # BLD AUTO: 3.6 10E3/UL (ref 1.6–8.3)
NEUTROPHILS NFR BLD AUTO: 72 %
NRBC # BLD AUTO: 0 10E3/UL
NRBC BLD AUTO-RTO: 0 /100
PLATELET # BLD AUTO: 158 10E3/UL (ref 150–450)
POTASSIUM BLD-SCNC: 4.2 MMOL/L (ref 3.4–5.3)
RBC # BLD AUTO: 3.9 10E6/UL (ref 3.8–5.2)
SODIUM SERPL-SCNC: 140 MMOL/L (ref 133–144)
WBC # BLD AUTO: 5 10E3/UL (ref 4–11)

## 2022-03-18 PROCEDURE — 83735 ASSAY OF MAGNESIUM: CPT | Performed by: STUDENT IN AN ORGANIZED HEALTH CARE EDUCATION/TRAINING PROGRAM

## 2022-03-18 PROCEDURE — 85652 RBC SED RATE AUTOMATED: CPT | Performed by: HOSPITALIST

## 2022-03-18 PROCEDURE — 94640 AIRWAY INHALATION TREATMENT: CPT

## 2022-03-18 PROCEDURE — 250N000011 HC RX IP 250 OP 636: Performed by: HOSPITALIST

## 2022-03-18 PROCEDURE — 999N000157 HC STATISTIC RCP TIME EA 10 MIN

## 2022-03-18 PROCEDURE — 82310 ASSAY OF CALCIUM: CPT | Performed by: HOSPITALIST

## 2022-03-18 PROCEDURE — 85025 COMPLETE CBC W/AUTO DIFF WBC: CPT | Performed by: HOSPITALIST

## 2022-03-18 PROCEDURE — 94640 AIRWAY INHALATION TREATMENT: CPT | Mod: 76

## 2022-03-18 PROCEDURE — 120N000004 HC R&B MS OVERFLOW

## 2022-03-18 PROCEDURE — 86140 C-REACTIVE PROTEIN: CPT | Performed by: HOSPITALIST

## 2022-03-18 PROCEDURE — 250N000013 HC RX MED GY IP 250 OP 250 PS 637: Performed by: PHYSICIAN ASSISTANT

## 2022-03-18 PROCEDURE — 250N000013 HC RX MED GY IP 250 OP 250 PS 637: Performed by: HOSPITALIST

## 2022-03-18 PROCEDURE — 36415 COLL VENOUS BLD VENIPUNCTURE: CPT | Performed by: HOSPITALIST

## 2022-03-18 PROCEDURE — 85379 FIBRIN DEGRADATION QUANT: CPT | Performed by: HOSPITALIST

## 2022-03-18 PROCEDURE — 99233 SBSQ HOSP IP/OBS HIGH 50: CPT | Performed by: HOSPITALIST

## 2022-03-18 RX ORDER — FAMOTIDINE 20 MG/1
20 TABLET, FILM COATED ORAL
Status: DISCONTINUED | OUTPATIENT
Start: 2022-03-20 | End: 2022-03-22 | Stop reason: HOSPADM

## 2022-03-18 RX ADMIN — APIXABAN 2.5 MG: 2.5 TABLET, FILM COATED ORAL at 20:25

## 2022-03-18 RX ADMIN — DIVALPROEX SODIUM 500 MG: 500 TABLET, FILM COATED, EXTENDED RELEASE ORAL at 21:24

## 2022-03-18 RX ADMIN — IPRATROPIUM BROMIDE AND ALBUTEROL 1 PUFF: 20; 100 SPRAY, METERED RESPIRATORY (INHALATION) at 15:56

## 2022-03-18 RX ADMIN — LETROZOLE 2.5 MG: 2.5 TABLET, FILM COATED ORAL at 09:42

## 2022-03-18 RX ADMIN — Medication: at 11:36

## 2022-03-18 RX ADMIN — GUAIFENESIN 1200 MG: 600 TABLET, EXTENDED RELEASE ORAL at 20:25

## 2022-03-18 RX ADMIN — IPRATROPIUM BROMIDE AND ALBUTEROL 1 PUFF: 20; 100 SPRAY, METERED RESPIRATORY (INHALATION) at 12:20

## 2022-03-18 RX ADMIN — GUAIFENESIN 1200 MG: 600 TABLET, EXTENDED RELEASE ORAL at 09:41

## 2022-03-18 RX ADMIN — ARIPIPRAZOLE 5 MG: 5 TABLET ORAL at 09:41

## 2022-03-18 RX ADMIN — METOPROLOL TARTRATE 25 MG: 25 TABLET, FILM COATED ORAL at 20:25

## 2022-03-18 RX ADMIN — LITHIUM CARBONATE 150 MG: 150 CAPSULE, GELATIN COATED ORAL at 09:41

## 2022-03-18 RX ADMIN — IPRATROPIUM BROMIDE AND ALBUTEROL 1 PUFF: 20; 100 SPRAY, METERED RESPIRATORY (INHALATION) at 21:26

## 2022-03-18 RX ADMIN — IPRATROPIUM BROMIDE AND ALBUTEROL 1 PUFF: 20; 100 SPRAY, METERED RESPIRATORY (INHALATION) at 08:57

## 2022-03-18 RX ADMIN — METOPROLOL TARTRATE 25 MG: 25 TABLET, FILM COATED ORAL at 09:41

## 2022-03-18 RX ADMIN — APIXABAN 2.5 MG: 2.5 TABLET, FILM COATED ORAL at 09:41

## 2022-03-18 RX ADMIN — FAMOTIDINE 20 MG: 20 TABLET ORAL at 09:41

## 2022-03-18 RX ADMIN — DEXAMETHASONE SODIUM PHOSPHATE 6 MG: 10 INJECTION, SOLUTION INTRAMUSCULAR; INTRAVENOUS at 09:42

## 2022-03-18 ASSESSMENT — ACTIVITIES OF DAILY LIVING (ADL)
ADLS_ACUITY_SCORE: 34
TOILETING_ASSISTANCE: TOILETING DIFFICULTY, DEPENDENT
ADLS_ACUITY_SCORE: 34
DOING_ERRANDS_INDEPENDENTLY_DIFFICULTY: NO
ADLS_ACUITY_SCORE: 31
CONCENTRATING,_REMEMBERING_OR_MAKING_DECISIONS_DIFFICULTY: YES
ADLS_ACUITY_SCORE: 34
TOILETING: 2-->COMPLETELY DEPENDENT (NOT DEVELOPMENTALLY APPROPRIATE)
ADLS_ACUITY_SCORE: 34
ADLS_ACUITY_SCORE: 32
ADLS_ACUITY_SCORE: 34
ADLS_ACUITY_SCORE: 32
ADLS_ACUITY_SCORE: 32
TOILETING_ISSUES: YES
EATING/SWALLOWING: SWALLOWING SOLID FOOD
DRESSING/BATHING: BATHING DIFFICULTY, ASSISTANCE 1 PERSON
ADLS_ACUITY_SCORE: 32
FALL_HISTORY_WITHIN_LAST_SIX_MONTHS: NO
ADLS_ACUITY_SCORE: 32
WALKING_OR_CLIMBING_STAIRS: STAIR CLIMBING DIFFICULTY, DEPENDENT
ADLS_ACUITY_SCORE: 34
HEARING_DIFFICULTY_OR_DEAF: NO
ADLS_ACUITY_SCORE: 32
ADLS_ACUITY_SCORE: 34
CHANGE_IN_FUNCTIONAL_STATUS_SINCE_ONSET_OF_CURRENT_ILLNESS/INJURY: NO
ADLS_ACUITY_SCORE: 32
TOILETING: 2-->COMPLETELY DEPENDENT
WEAR_GLASSES_OR_BLIND: YES
WALKING_OR_CLIMBING_STAIRS_DIFFICULTY: YES
DIFFICULTY_EATING/SWALLOWING: YES
ADLS_ACUITY_SCORE: 32
DRESSING/BATHING_DIFFICULTY: YES
ADLS_ACUITY_SCORE: 32

## 2022-03-18 NOTE — PLAN OF CARE
Admit Date: 03/10/2022  Admitting Diagnosis: Generalized weakness, COVID, UTI  Pertinent History: Breast cancer, ETOH dependence in remission, anxiety, depression, bipolar disorder, CKD stage 3.     Isolation Precautions:   Patient is on Contact precuations for VRE. Patient is on Enteric precautions. Patient is on Airborne precuations for COVID 19.    Neuro: Disorientated to, Place and Situation  Activity: are 2 assist not OOB this shift  Respiratory: Lung sounds clear diminished, infrequent productive coughs. Denies SOB, tachypneic. On 5LPM via nasal cannula oxymizer.   Telemetry Monitoring: No  Pain: complaining of pain in left side of mouth. Refusing intervention  Labs / Tests: See chart  GI/: Incontinent for bowel and bladder. Loose stools this shift. Purewick in use.  Skin: incontinent dermatitis to buttocks and perineum. Seen by WOC  Medications: Pt declined Depakote this evening   Misc:  LDA's: Peripheral  Fluids: is Saline locked.  Diet: Regular mechanical soft  Living Situation:   Consults: PT, OT, Social Work or Care Coordination, Palliative or Hospice and WOC  Discharge Disposition: Transitional Care Unit     Plan of Care:    Continue symptom management and supportive care.

## 2022-03-18 NOTE — PROGRESS NOTES
[facility]    Progress Note - Hospitalist Service       Date of Admission:  3/10/2022    Assessment & Plan          Lennie Mar is a 89 year old female admitted on 3/10/2022. She has a history of breast cancer, alcohol dependence in remission, anxiety, asymptomatic varicose veins, bipolar disorder, chronic kidney disease stage 3, history of smoking, hyperparathyroidism, memory loss, muscle weakness, severe depression, subdural hygroma, and a resting tremor and she is admitted for generalized weakness and found to have COVID-19 and UTI. Episode of rapid a fib. Developed acute hypoxic respiratory failure. No on 5 oxymizer.    Acute hypoxemic respiratory failure due to Covid 19 Pneumonia    - currently on 5L oximizer    - had two vaccinations, but not boosted     - CXR on admission showed no acute disease    - repeat CXR on 3/13 showed:  Mild left basilar/retrocardiac pulmonary opacities    - unable to discharge to TCU because of COVID-19 infection (unless 10 days out)    - dosed 40mg PO lasix on 3/15 (no in/out measured)    - given 60mg IV x1 on 3/16: had good response (at least 700cc out)    - given 40mg IV x 1 on 3/17    - exam improved, Cr rising, no lasix today    - trying to keep lungs dry in setting of covid and likely fluid overload due to IVF earlier in admission    - discussed Covid treatments with son again on 3/17: agreed to using dexamethasone, still declining remdesivir    - Day #2/10 dexamethasone    Acute fluid overload/exacerbation of mild diastolic dysfunction    - diuresing as above, no further diuretics     - ECHO done on 3/13    Rapid a fib    - on 3/13    - started on metoprolol BID    - now has been in sinus for days    - started on eliqius 2.5mg BID (for a fib and DVT prophylaxis)    Generalized weakness    - likely due to COVID-19 infection and UTI    - PT evaluation: dispo will be complicated (see below)    UTI  - UA on 3/10 appeared infected  - grew Klebsiella  "in 2014 and Klebsiella/Enterocuccus in 2012   - was on ceftriaxone (start date 3/10), completed course  - final cultures grew E Coli    Bipolar disorder, unspecified    - followed with Dr. 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 Malik)    - on letrozole since 2019, continue     Malnutrition    - has had poor PO intake     - denies pain/nausea    - \"just not hungry\"    - added Boost, encouraged PO    I have kept her son, Dmitry Mar updated (I have been texting as he is a Psychiatrist and cannot always answer his phone)    Diet: Mechanical/Dental Soft Diet  Snacks/Supplements Pediatric: Boost Plus; Between Meals    DVT Prophylaxis: Heparin   Kelley Catheter: Not present  Fluids: IV and PO   Central Lines: None  Cardiac Monitoring: None  Code Status: No CPR- Do NOT Intubate      Disposition Plan   Expected Discharge: 03/21/2022     Anticipated discharge location: inpatient rehabilitation facility    Delays:     Placement - TCU       The patient's care was discussed with the nurse, family  MD Devendra Ruth MD  Hospitalist Service  [facility]  Securely message with the Vocera Web Console (learn more here)  Text page via [employer] Paging/Directory "     Clinically Significant Risk Factors Present on Admission                     ______________________________________________________________________    Interval History     Patient in bed. States she feels better today. Eating more. No new complaints    Data reviewed today: I reviewed all medications, new labs and imaging results over the last 24 hours. I personally reviewed no images or EKG's today.    Physical Exam   Vital Signs: Temp: (!) 96.3  F (35.7  C) Temp src: Axillary BP: 114/62 Pulse: 71   Resp: 20 SpO2: 94 % O2 Device: Oxymizer cannula Oxygen Delivery: 4 LPM  Weight: 141 lbs 9.6 oz     General: Resting in bed, no acute distress  Head: Atraumatic, normocephalic   Heart: Regular rate and rhythm   Lung: clear, no wheezing or crackles  Abdomen: Bowel sounds present. No pain with palpation. Abdomen, soft, non - tender   Musculoskeletal: Grossly intact movement of upper and lower extremities  Neurological: Grossly intact strength and motor function   Psych: Flat affect    Data   Recent Labs   Lab 03/18/22  0918 03/17/22  0709 03/16/22  0954 03/15/22  0844 03/15/22  0628 03/13/22  1213 03/13/22  0858   WBC 5.0 4.8  --   --  4.8   < >  --    HGB 12.3 11.9  --   --  12.1   < >  --     102*  --   --  103*   < >  --     122*  --   --  111*   < >  --      --  140 140  --    < > 140   POTASSIUM 4.2  --  4.6 4.7  --    < > 4.3   CHLORIDE 107  --  110* 113*  --    < > 113*   CO2 29  --  23 24  --    < > 23   BUN 37*  --  18 19  --    < > 15   CR 1.61*  --  1.29* 1.27*  --    < > 1.26*   ANIONGAP 4  --  7 3  --    < > 4   CANELO 9.1  --  9.0 8.8  --    < > 8.4*   *  --  83 82  --    < > 115*   ALBUMIN  --   --   --   --   --   --  2.3*   PROTTOTAL  --   --   --   --   --   --  6.4*   BILITOTAL  --   --   --   --   --   --  0.3   ALKPHOS  --   --   --   --   --   --  94   ALT  --   --   --   --   --   --  21   AST  --   --   --   --   --   --  34    < > = values in this interval not displayed.      No results found for this or any previous visit (from the past 24 hour(s)).  Medications     - MEDICATION INSTRUCTIONS -         apixaban ANTICOAGULANT  2.5 mg Oral BID     ARIPiprazole  5 mg Oral Daily     dexamethasone  6 mg Intravenous Daily     divalproex sodium extended-release  500 mg Oral At Bedtime     famotidine  20 mg Oral Daily     guaiFENesin  1,200 mg Oral BID     ipratropium-albuterol  1 puff Inhalation 4x daily     letrozole  2.5 mg Oral Daily     levothyroxine  100 mcg Oral Daily     lithium  150 mg Oral QAM     metoprolol tartrate  25 mg Oral BID     sodium chloride (PF)  3 mL Intracatheter Q8H

## 2022-03-18 NOTE — CONSULTS
"BRIEF NUTRITION ASSESSMENT      REASON FOR ASSESSMENT:  Lennie Mar is a 89 year old female seen by Registered Dietitian for Admission Nutrition Risk Screen for positive    NUTRITION HISTORY:  Unable to obtain at this time d/t patient availability  No food allergies listed in chart  Malnutrition noted in MD note today \"has had poor intake, denies pain/nausea, \"just not hungry\"    CURRENT DIET AND INTAKE:  Diet:  Orders Placed This Encounter      Mechanical/Dental Soft Diet  Pediatric Boost Plus ordered by MD  Meal intakes/appetite improving per MD note  - missing information in flowsheets, per RN note today \"Poor intake on soft diet\"    ANTHROPOMETRICS:  Height: 5' 2\"  Weight:  141 lbs 9.6 oz  Body mass index is 25.9 kg/m .   Weight Status: Overweight BMI 25-29.9  Weight History:   3.4% weight loss in 8 days, meets criteria for severe acute weight loss  Wt Readings from Last 10 Encounters:   03/18/22 64.2 kg (141 lb 9.6 oz)   10/18/21 66.2 kg (146 lb)   04/20/21 66.2 kg (146 lb)   01/07/21 66.2 kg (146 lb)   01/24/20 65.3 kg (144 lb)   12/23/19 65.4 kg (144 lb 3.2 oz)   12/02/19 64 kg (141 lb 3.2 oz)   11/12/19 64 kg (141 lb 1.6 oz)   10/21/19 66.7 kg (147 lb 1.6 oz)   09/30/19 66.4 kg (146 lb 6.4 oz)     LABS:  Labs noted  Labs:  Electrolytes  Potassium (mmol/L)   Date Value   03/18/2022 4.2   03/16/2022 4.6   03/15/2022 4.7   02/12/2021 4.4   01/08/2020 3.5   12/23/2019 4.1     Phosphorus (mg/dL)   Date Value   05/06/2019 3.4   08/08/2014 3.5   04/22/2012 3.5   03/17/2012 4.4   03/02/2012 2.9    Blood Glucose  Glucose (mg/dL)   Date Value   03/18/2022 133 (H)   03/16/2022 83   03/15/2022 82   03/14/2022 99   03/13/2022 115 (H)   02/12/2021 119 (H)   01/08/2020 106 (H)   12/23/2019 94   12/02/2019 93   11/12/2019 141 (H)    Inflammatory Markers  CRP Inflammation (mg/L)   Date Value   03/18/2022 114.0 (H)   03/16/2022 116.0 (H)   03/15/2022 94.2 (H)   08/11/2013 89.4 (H)     WBC (10e9/L)   Date Value "   02/12/2021 7.8   12/23/2019 5.9   12/02/2019 6.5     WBC Count (10e3/uL)   Date Value   03/18/2022 5.0   03/17/2022 4.8   03/15/2022 4.8     Albumin (g/dL)   Date Value   03/13/2022 2.3 (L)   03/10/2022 2.7 (L)   02/04/2022 3.1 (L)   02/12/2021 3.2 (L)   01/08/2020 3.2 (L)   12/23/2019 3.0 (L)      Magnesium (mg/dL)   Date Value   03/18/2022 2.3   03/16/2022 2.1   03/15/2022 1.9   04/22/2012 2.0   03/18/2012 2.2   03/17/2012 2.6 (H)     Sodium (mmol/L)   Date Value   03/18/2022 140   03/16/2022 140   03/15/2022 140   02/12/2021 145 (H)   01/08/2020 142   12/23/2019 141    Renal  Urea Nitrogen (mg/dL)   Date Value   03/18/2022 37 (H)   03/16/2022 18   03/15/2022 19   02/12/2021 21   01/08/2020 20   12/23/2019 26     Creatinine (mg/dL)   Date Value   03/18/2022 1.61 (H)   03/16/2022 1.29 (H)   03/15/2022 1.27 (H)   02/12/2021 1.07 (H)   01/08/2020 1.15 (H)   12/23/2019 1.02     Additional  Triglycerides (mg/dL)   Date Value   03/31/2010 166 (H)     Ketones Urine (mg/dL)   Date Value   03/10/2022 Negative   05/06/2019 Negative        MALNUTRITION:  Visual Nutrition Focused Physical Assessment (NFPA) not completed due to restrictions on face-to-face patient care during COVID-19 Pandemic.   Unable to determine at this time.    % Weight Loss:  > 2% in 1 week (severe malnutrition)  % Intake: unable to determine, suspect poor intakes since admission  Subcutaneous Fat Loss:  Unable to assess  Muscle Loss:  Unable to assess  Fluid Retention:  Unable to assess    NUTRITION INTERVENTION:  Nutrition Diagnosis:  Predicted inadequate oral intakes d/t poor appetite    Implementation:  Nutrition Education:  Unable to provide at this time d/t patient unavailable  Changed oral nutrition supplements to Ensure Enlive BID    FOLLOW UP/MONITORING:   Will continue to follow per guidelines    Tess Beatty RDN, LD  Clinical Dietitian

## 2022-03-18 NOTE — PLAN OF CARE
"Patient is alert and oriented x2, confused to time and situation. Agitated and uncooperative. Respiratory sounds diminished. Productive cough. Continuous pulse ox on left toe. Oxymizer cannula on 4L. Cardiac sounds within normal limits. Poor intake on soft diet. Encourage intake of ensure. Incontinent, purewick in place. Rash on bhavik area and coccyx, barrier cream desitin applied. Assist x2, unwilling to get out of bed. Pain in bottom left side of mouth. Offered pain medication.Took dentures out, unable to fully assess but no obvious abnormal sores or open wounds found.     /61 (BP Location: Right arm)   Pulse 64   Temp 97.8  F (36.6  C) (Oral)   Resp 22   Ht 1.575 m (5' 2\")   Wt 64.2 kg (141 lb 9.6 oz)   LMP  (LMP Unknown)   SpO2 95%   BMI 25.90 kg/m                        "

## 2022-03-18 NOTE — PLAN OF CARE
"\PRIMARY DIAGNOSIS: GENERALIZED WEAKNESS, COVID, UTI    OUTPATIENT/OBSERVATION GOALS TO BE MET BEFORE DISCHARGE  1. Orthostatic performed: No    2. Tolerating PO medications: Yes    3. Return to near baseline physical activity: No    4. Cleared for discharge by consultants (if involved): No    Discharge Planner Nurse   Safe discharge environment identified: Yes  Barriers to discharge: Yes       Entered by: Malachi Locke 03/18/2022 11:23 AM     Please review provider order for any additional goals.   Nurse to notify provider when observation goals have been met and patient is ready for discharge.        Patient is alert and oriented x2, confused to time and situation. Agitated and uncooperative. Respiratory sounds diminished. Productive cough. Continuous pulse ox on left toe. Oxymizer cannula on 4L. Cardiac sounds within normal limits. Poor intake on soft diet. Encourage intake of ensure. Incontinent, purewick in place. Rash on bhavik area and coccyx, barrier cream desitin applied. Assist x2, unwilling to get out of bed. Pain in bottom left side of mouth. Offered pain medication.Took dentures out, unable to fully assess but no obvious abnormal sores or open wounds found.    /62 (BP Location: Right arm)   Pulse 71   Temp (!) 96.3  F (35.7  C) (Axillary)   Resp 20   Ht 1.575 m (5' 2\")   Wt 64.2 kg (141 lb 9.6 oz)   LMP  (LMP Unknown)   SpO2 94%   BMI 25.90 kg/m                  "

## 2022-03-18 NOTE — PLAN OF CARE
"Cares from 1900-0730    /64 (BP Location: Left arm)   Pulse 68   Temp 97.5  F (36.4  C) (Oral)   Resp 18   Ht 1.575 m (5' 2\")   Wt 64.5 kg (142 lb 3.2 oz)   LMP  (LMP Unknown)   SpO2 95%   BMI 26.01 kg/m       Neuro: Alert, disoriented to time and situation  Cardiac: WDL  Lungs: Diminished. Infrequent productive coughs. Tachypneic. Denies SOB. On 5 L NC oxymizer. Continuous pulse ox in place.  GI: Loose stool x1 this shift.  : Incontinent. Purewick in use.  Skin: Excoriation to buttocks/perineum. Barrier cream applied with bhavik cares. Seen by WOC.  Pain: Left side mouth pain. Pt declining intervention.  IV: SL on right wrist.  Meds: See EMAR  Labs/tests: See result in chart  Diet: Mechanical soft  Activity: x2, not OOB this shift  Iso: Special Precautions, Enteric, and Contact  Plan: Continue symptoms management and supportive cares. TCU once medically ready.       "

## 2022-03-19 LAB
ANION GAP SERPL CALCULATED.3IONS-SCNC: 5 MMOL/L (ref 3–14)
BASOPHILS # BLD MANUAL: 0 10E3/UL (ref 0–0.2)
BASOPHILS NFR BLD MANUAL: 0 %
BUN SERPL-MCNC: 47 MG/DL (ref 7–30)
CALCIUM SERPL-MCNC: 9.6 MG/DL (ref 8.5–10.1)
CHLORIDE BLD-SCNC: 109 MMOL/L (ref 94–109)
CO2 SERPL-SCNC: 29 MMOL/L (ref 20–32)
CREAT SERPL-MCNC: 1.42 MG/DL (ref 0.52–1.04)
CRP SERPL-MCNC: 57.2 MG/L (ref 0–8)
D DIMER PPP FEU-MCNC: 1.83 UG/ML FEU (ref 0–0.5)
EOSINOPHIL # BLD MANUAL: 0.1 10E3/UL (ref 0–0.7)
EOSINOPHIL NFR BLD MANUAL: 1 %
ERYTHROCYTE [DISTWIDTH] IN BLOOD BY AUTOMATED COUNT: 13.6 % (ref 10–15)
ERYTHROCYTE [SEDIMENTATION RATE] IN BLOOD BY WESTERGREN METHOD: 83 MM/HR (ref 0–30)
GFR SERPL CREATININE-BSD FRML MDRD: 34 ML/MIN/1.73M2
GLUCOSE BLD-MCNC: 114 MG/DL (ref 70–99)
HCT VFR BLD AUTO: 42.8 % (ref 35–47)
HGB BLD-MCNC: 13.2 G/DL (ref 11.7–15.7)
LYMPHOCYTES # BLD MANUAL: 0.8 10E3/UL (ref 0.8–5.3)
LYMPHOCYTES NFR BLD MANUAL: 11 %
MAGNESIUM SERPL-MCNC: 2.6 MG/DL (ref 1.6–2.3)
MCH RBC QN AUTO: 31.2 PG (ref 26.5–33)
MCHC RBC AUTO-ENTMCNC: 30.8 G/DL (ref 31.5–36.5)
MCV RBC AUTO: 101 FL (ref 78–100)
METAMYELOCYTES # BLD MANUAL: 0.1 10E3/UL
METAMYELOCYTES NFR BLD MANUAL: 1 %
MONOCYTES # BLD MANUAL: 0.2 10E3/UL (ref 0–1.3)
MONOCYTES NFR BLD MANUAL: 3 %
MYELOCYTES # BLD MANUAL: 0.3 10E3/UL
MYELOCYTES NFR BLD MANUAL: 4 %
NEUTROPHILS # BLD MANUAL: 5.9 10E3/UL (ref 1.6–8.3)
NEUTROPHILS NFR BLD MANUAL: 80 %
PLAT MORPH BLD: ABNORMAL
PLATELET # BLD AUTO: 198 10E3/UL (ref 150–450)
POTASSIUM BLD-SCNC: 5 MMOL/L (ref 3.4–5.3)
RBC # BLD AUTO: 4.23 10E6/UL (ref 3.8–5.2)
RBC MORPH BLD: ABNORMAL
SODIUM SERPL-SCNC: 143 MMOL/L (ref 133–144)
WBC # BLD AUTO: 7.4 10E3/UL (ref 4–11)

## 2022-03-19 PROCEDURE — 86140 C-REACTIVE PROTEIN: CPT | Performed by: HOSPITALIST

## 2022-03-19 PROCEDURE — 85652 RBC SED RATE AUTOMATED: CPT | Performed by: HOSPITALIST

## 2022-03-19 PROCEDURE — 83735 ASSAY OF MAGNESIUM: CPT | Performed by: HOSPITALIST

## 2022-03-19 PROCEDURE — 85379 FIBRIN DEGRADATION QUANT: CPT | Performed by: HOSPITALIST

## 2022-03-19 PROCEDURE — 36415 COLL VENOUS BLD VENIPUNCTURE: CPT | Performed by: HOSPITALIST

## 2022-03-19 PROCEDURE — 99232 SBSQ HOSP IP/OBS MODERATE 35: CPT | Performed by: INTERNAL MEDICINE

## 2022-03-19 PROCEDURE — 85014 HEMATOCRIT: CPT | Performed by: HOSPITALIST

## 2022-03-19 PROCEDURE — 82310 ASSAY OF CALCIUM: CPT | Performed by: HOSPITALIST

## 2022-03-19 PROCEDURE — 94640 AIRWAY INHALATION TREATMENT: CPT

## 2022-03-19 PROCEDURE — 250N000013 HC RX MED GY IP 250 OP 250 PS 637: Performed by: HOSPITALIST

## 2022-03-19 PROCEDURE — 250N000013 HC RX MED GY IP 250 OP 250 PS 637: Performed by: PHYSICIAN ASSISTANT

## 2022-03-19 PROCEDURE — 250N000011 HC RX IP 250 OP 636: Performed by: HOSPITALIST

## 2022-03-19 PROCEDURE — 999N000157 HC STATISTIC RCP TIME EA 10 MIN

## 2022-03-19 PROCEDURE — 94640 AIRWAY INHALATION TREATMENT: CPT | Mod: 76

## 2022-03-19 PROCEDURE — 120N000004 HC R&B MS OVERFLOW

## 2022-03-19 RX ADMIN — IPRATROPIUM BROMIDE AND ALBUTEROL 1 PUFF: 20; 100 SPRAY, METERED RESPIRATORY (INHALATION) at 13:19

## 2022-03-19 RX ADMIN — IPRATROPIUM BROMIDE AND ALBUTEROL 1 PUFF: 20; 100 SPRAY, METERED RESPIRATORY (INHALATION) at 09:06

## 2022-03-19 RX ADMIN — LETROZOLE 2.5 MG: 2.5 TABLET, FILM COATED ORAL at 08:59

## 2022-03-19 RX ADMIN — GUAIFENESIN 1200 MG: 600 TABLET, EXTENDED RELEASE ORAL at 08:58

## 2022-03-19 RX ADMIN — GUAIFENESIN 1200 MG: 600 TABLET, EXTENDED RELEASE ORAL at 19:44

## 2022-03-19 RX ADMIN — APIXABAN 2.5 MG: 2.5 TABLET, FILM COATED ORAL at 19:44

## 2022-03-19 RX ADMIN — IPRATROPIUM BROMIDE AND ALBUTEROL 1 PUFF: 20; 100 SPRAY, METERED RESPIRATORY (INHALATION) at 20:04

## 2022-03-19 RX ADMIN — DIVALPROEX SODIUM 500 MG: 500 TABLET, FILM COATED, EXTENDED RELEASE ORAL at 22:45

## 2022-03-19 RX ADMIN — DEXAMETHASONE SODIUM PHOSPHATE 6 MG: 10 INJECTION, SOLUTION INTRAMUSCULAR; INTRAVENOUS at 09:03

## 2022-03-19 RX ADMIN — LEVOTHYROXINE SODIUM 100 MCG: 0.1 TABLET ORAL at 08:59

## 2022-03-19 RX ADMIN — IPRATROPIUM BROMIDE AND ALBUTEROL 1 PUFF: 20; 100 SPRAY, METERED RESPIRATORY (INHALATION) at 15:58

## 2022-03-19 RX ADMIN — LITHIUM CARBONATE 150 MG: 150 CAPSULE, GELATIN COATED ORAL at 08:59

## 2022-03-19 RX ADMIN — ARIPIPRAZOLE 5 MG: 5 TABLET ORAL at 08:59

## 2022-03-19 RX ADMIN — METOPROLOL TARTRATE 25 MG: 25 TABLET, FILM COATED ORAL at 08:59

## 2022-03-19 RX ADMIN — APIXABAN 2.5 MG: 2.5 TABLET, FILM COATED ORAL at 08:59

## 2022-03-19 RX ADMIN — METOPROLOL TARTRATE 25 MG: 25 TABLET, FILM COATED ORAL at 19:44

## 2022-03-19 ASSESSMENT — ACTIVITIES OF DAILY LIVING (ADL)
ADLS_ACUITY_SCORE: 36
ADLS_ACUITY_SCORE: 34
ADLS_ACUITY_SCORE: 36
ADLS_ACUITY_SCORE: 36
ADLS_ACUITY_SCORE: 34
ADLS_ACUITY_SCORE: 36
ADLS_ACUITY_SCORE: 36
ADLS_ACUITY_SCORE: 34
ADLS_ACUITY_SCORE: 36
ADLS_ACUITY_SCORE: 36
ADLS_ACUITY_SCORE: 34
ADLS_ACUITY_SCORE: 36
ADLS_ACUITY_SCORE: 34
ADLS_ACUITY_SCORE: 36
ADLS_ACUITY_SCORE: 34
ADLS_ACUITY_SCORE: 34
ADLS_ACUITY_SCORE: 36
ADLS_ACUITY_SCORE: 34
ADLS_ACUITY_SCORE: 36
ADLS_ACUITY_SCORE: 34

## 2022-03-19 NOTE — PLAN OF CARE
Goal Outcome Evaluation:     Neuro: Alert, disoriented to time and situation  Cardiac: WDL  Lungs: Diminished. Infrequent productive coughs. Tachypneic. Denies SOB. On 2.5 L NC oxymizer. Continuous pulse ox in place.  GI: No BM this shift.  : Incontinent. Purewick in use.  Skin: Excoriation to buttocks/perineum. Barrier cream applied with bhavik cares.   Pain: Denies  IV: SL on right wrist.  Meds: See EMAR  Labs/tests: See result in chart  Diet: Mechanical soft  Activity: x2, not OOB this shift  Iso: Special Precautions, Enteric, and Contact  Plan: Continue symptoms management and supportive cares. TCU once medically ready.

## 2022-03-19 NOTE — PLAN OF CARE
Neuro: Alert, disoriented to time and situation  Cardiac: WDL  Lungs: Diminished. Infrequent productive coughs. Tachypneic. Denies SOB. On 2.5 L NC oxymizer. Continuous pulse ox in place.  GI: No BM this shift.  : Incontinent. Purewick in use.  Skin: Excoriation to buttocks/perineum. Barrier cream applied with bhavik cares.   Pain: Denies  IV: SL on right wrist.  Meds: See EMAR  Labs/tests: See result in chart  Diet: Mechanical soft  Activity: x2, not OOB this shift  Iso: Special Precautions, Enteric, and Contact  Plan: Continue symptoms management and supportive cares. TCU once medically ready.

## 2022-03-19 NOTE — PLAN OF CARE
"INPATIENT NOTE: [id number]     PRIMARY PROBLEM:Weakness, Infection due to 2019 novel coronavirus,Depression         Vital signs:  Temp: 97.9  F (36.6  C) Temp src: Oral BP: 119/66 Pulse: 68   Resp: 24 SpO2: 93 % O2 Device: Oxymizer cannula Oxygen Delivery: 3 LPM Height: 157.5 cm (5' 2\") Weight: 64.2 kg (141 lb 9.6 oz)  Estimated body mass index is 25.9 kg/m  as calculated from the following:    Height as of this encounter: 1.575 m (5' 2\").    Weight as of this encounter: 64.2 kg (141 lb 9.6 oz).        Orientation: Disorientated to, Time, Place and Situation  Neuro: Intact   Pain status: denying pain.   Resp: Lung sounds are diminished.  02 3L Oximyzer. Denies any SOB.   Cardiac: WNL, Denies any Chest Pain   GI: Bowels are active x 4 Quads. Last BM 3/19/22  : Voiding with no concern    Skin: redness on groin and scarum  LDA: Peripheral IV   Infusions: Patient is Saline locked.   Diet: mechanical soft soft  Activity: are 2 assist with Gait Belt and Walker  Isolation:   Patient is on Contact precuations for VRE and COVID 19.   and   Patient is on Airborne precuations for COVID 19.    Consults: PT/OT/Woundnurse/ palliative     Will continue to monitor and provide cares.     Max Montano RN    "

## 2022-03-19 NOTE — PLAN OF CARE
Pt. Did have a partial bed bath this evening, refused shower cap. Pt. Encouraged throughout the day to get oob to chair for meals and declined upon multiple attempts. Continuing to encourage as Pt. Tolerates.

## 2022-03-19 NOTE — PROGRESS NOTES
[facility]    Progress Note - Hospitalist Service  Date of Admission:  3/10/2022    Assessment & Plan          Lennie Mar is a 89 year old female admitted on 3/10/2022. She has a history of breast cancer, alcohol dependence in remission, anxiety, asymptomatic varicose veins, bipolar disorder, chronic kidney disease stage 3, history of smoking, hyperparathyroidism, memory loss, muscle weakness, severe depression, subdural hygroma, and a resting tremor and she is admitted for generalized weakness and found to have COVID-19 and UTI. Episode of rapid a fib. Developed acute hypoxic respiratory failure. No on 5 oxymizer.    Acute hypoxemic respiratory failure due to Covid 19 Pneumonia    - currently on 3L oximizer    - had two vaccinations, but not boosted     - CXR on admission showed no acute disease    - repeat CXR on 3/13 showed:  Mild left basilar/retrocardiac pulmonary opacities    - unable to discharge to TCU because of COVID-19 infection (unless 10 days out)    - dosed 40mg PO lasix on 3/15 (no in/out measured)    - given 60mg IV x1 on 3/16: had good response (at least 700cc out)    - given 40mg IV x 1 on 3/17    -Patient is on the dry side today, no need for additional Lasix today.    -Creatinine was improving    - trying to keep lungs dry in setting of covid and likely fluid overload due to IVF earlier in admission    -Continue dexamethasone as ordered started on 3/17, will treat for total of 10 days.    - Day #3/10 dexamethasone    Acute fluid overload/exacerbation of mild diastolic dysfunction    -She has been diuresed, now on the dry side.     - ECHO done on 3/13 showed EF of 75 to 80%, early diastolic dysfunction    Rapid a fib    - on 3/13    -She was started on metoprolol 25 mg BID    -She has been in sinus.    -Continue Eliquis 2.5mg BID (for a fib and DVT prophylaxis)    Generalized weakness    - likely due to COVID-19 infection and UTI    - PT evaluation: dispo will be  "complicated (see below)    UTI  - UA on 3/10 appeared infected  -Urine culture grew Klebsiella in 2014 and Klebsiella/Enterocuccus in 2012   -She was treated with Ceftriaxone (start date 3/10), completed course  - Final cultures grew E Coli  -Completed duration of therapy.    Bipolar disorder, unspecified    -Followed with Dr. 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 Malik)    - on letrozole since 2019, continue     Malnutrition    - has had poor PO intake     - Denies pain/nausea    - \"just not hungry\"    - added Boost, encouraged PO    Diet: Mechanical/Dental Soft Diet  Snacks/Supplements Pediatric: Boost Plus; Between Meals    DVT Prophylaxis: Heparin   Kelley Catheter: Not present  Fluids: IV and PO   Central Lines: None  Cardiac Monitoring: None  Code Status: No CPR- Do NOT Intubate      Disposition Plan   Expected Discharge: 03/21/2022     Anticipated discharge location: inpatient rehabilitation facility    Delays:     Placement - TCU       The patient's care was discussed with the nurse, family  MD Lucio Ruth MD  Hospitalist Service  [facility]  Securely message with the Vocera Web Console (learn more here)  Text page via [employer] " Paging/Directory     Clinically Significant Risk Factors Present on Admission                     ______________________________________________________________________    Interval History     Patient seen and examined, assumed care today, feels better, currently on 3 L of oxygen Oxymizer saturating 97%, denied any pain, no shortness of breath or increased work of breathing, tolerating oral intake and improved oral intake. No new complaints    Data reviewed today: I reviewed all medications, new labs and imaging results over the last 24 hours. I personally reviewed no images or EKG's today.    Physical Exam   Vital Signs: Temp: 97.6  F (36.4  C) Temp src: Oral BP: 131/64 Pulse: 72   Resp: 20 SpO2: 96 % O2 Device: Oxymizer cannula Oxygen Delivery: 3 LPM  Weight: 141 lbs 9.6 oz     General: Resting in bed, no acute distress  Head: Atraumatic, normocephalic   Heart: Regular rate and rhythm   Lung: clear, no wheezing or crackles  Abdomen: Bowel sounds present. No pain with palpation. Abdomen, soft, non - tender   Musculoskeletal: Grossly intact movement of upper and lower extremities  Neurological: Grossly intact strength and motor function   Psych: Flat affect    Data   Recent Labs   Lab 03/19/22  0659 03/18/22  0918 03/17/22  0709 03/16/22  0954 03/13/22  1213 03/13/22  0858   WBC 7.4 5.0 4.8  --    < >  --    HGB 13.2 12.3 11.9  --    < >  --    * 100 102*  --    < >  --     158 122*  --    < >  --     140  --  140   < > 140   POTASSIUM 5.0 4.2  --  4.6   < > 4.3   CHLORIDE 109 107  --  110*   < > 113*   CO2 29 29  --  23   < > 23   BUN 47* 37*  --  18   < > 15   CR 1.42* 1.61*  --  1.29*   < > 1.26*   ANIONGAP 5 4  --  7   < > 4   CANELO 9.6 9.1  --  9.0   < > 8.4*   * 133*  --  83   < > 115*   ALBUMIN  --   --   --   --   --  2.3*   PROTTOTAL  --   --   --   --   --  6.4*   BILITOTAL  --   --   --   --   --  0.3   ALKPHOS  --   --   --   --   --  94   ALT  --   --   --   --   --  21   AST   --   --   --   --   --  34    < > = values in this interval not displayed.     No results found for this or any previous visit (from the past 24 hour(s)).  Medications     - MEDICATION INSTRUCTIONS -         apixaban ANTICOAGULANT  2.5 mg Oral BID     ARIPiprazole  5 mg Oral Daily     dexamethasone  6 mg Intravenous Daily     divalproex sodium extended-release  500 mg Oral At Bedtime     [START ON 3/20/2022] famotidine  20 mg Oral Q48H     guaiFENesin  1,200 mg Oral BID     ipratropium-albuterol  1 puff Inhalation 4x daily     letrozole  2.5 mg Oral Daily     levothyroxine  100 mcg Oral Daily     lithium  150 mg Oral QAM     metoprolol tartrate  25 mg Oral BID     sodium chloride (PF)  3 mL Intracatheter Q8H

## 2022-03-20 LAB
ANION GAP SERPL CALCULATED.3IONS-SCNC: 3 MMOL/L (ref 3–14)
BUN SERPL-MCNC: 52 MG/DL (ref 7–30)
CALCIUM SERPL-MCNC: 9.6 MG/DL (ref 8.5–10.1)
CHLORIDE BLD-SCNC: 115 MMOL/L (ref 94–109)
CO2 SERPL-SCNC: 24 MMOL/L (ref 20–32)
CREAT SERPL-MCNC: 1.19 MG/DL (ref 0.52–1.04)
GFR SERPL CREATININE-BSD FRML MDRD: 42 ML/MIN/1.73M2
GLUCOSE BLD-MCNC: 93 MG/DL (ref 70–99)
MAGNESIUM SERPL-MCNC: 2.4 MG/DL (ref 1.6–2.3)
POTASSIUM BLD-SCNC: 4.9 MMOL/L (ref 3.4–5.3)
SODIUM SERPL-SCNC: 142 MMOL/L (ref 133–144)

## 2022-03-20 PROCEDURE — 250N000013 HC RX MED GY IP 250 OP 250 PS 637: Performed by: PHYSICIAN ASSISTANT

## 2022-03-20 PROCEDURE — 36415 COLL VENOUS BLD VENIPUNCTURE: CPT | Performed by: INTERNAL MEDICINE

## 2022-03-20 PROCEDURE — 250N000013 HC RX MED GY IP 250 OP 250 PS 637: Performed by: STUDENT IN AN ORGANIZED HEALTH CARE EDUCATION/TRAINING PROGRAM

## 2022-03-20 PROCEDURE — 80048 BASIC METABOLIC PNL TOTAL CA: CPT | Performed by: INTERNAL MEDICINE

## 2022-03-20 PROCEDURE — 999N000105 HC STATISTIC NO DOCUMENTATION TO SUPPORT CHARGE

## 2022-03-20 PROCEDURE — 83735 ASSAY OF MAGNESIUM: CPT | Performed by: INTERNAL MEDICINE

## 2022-03-20 PROCEDURE — 250N000011 HC RX IP 250 OP 636: Performed by: HOSPITALIST

## 2022-03-20 PROCEDURE — 250N000013 HC RX MED GY IP 250 OP 250 PS 637: Performed by: HOSPITALIST

## 2022-03-20 PROCEDURE — 99232 SBSQ HOSP IP/OBS MODERATE 35: CPT | Performed by: INTERNAL MEDICINE

## 2022-03-20 PROCEDURE — 120N000004 HC R&B MS OVERFLOW

## 2022-03-20 RX ADMIN — APIXABAN 2.5 MG: 2.5 TABLET, FILM COATED ORAL at 08:43

## 2022-03-20 RX ADMIN — METOPROLOL TARTRATE 25 MG: 25 TABLET, FILM COATED ORAL at 21:28

## 2022-03-20 RX ADMIN — DEXAMETHASONE SODIUM PHOSPHATE 6 MG: 10 INJECTION, SOLUTION INTRAMUSCULAR; INTRAVENOUS at 08:42

## 2022-03-20 RX ADMIN — GUAIFENESIN 1200 MG: 600 TABLET, EXTENDED RELEASE ORAL at 08:43

## 2022-03-20 RX ADMIN — IPRATROPIUM BROMIDE AND ALBUTEROL 1 PUFF: 20; 100 SPRAY, METERED RESPIRATORY (INHALATION) at 08:44

## 2022-03-20 RX ADMIN — LITHIUM CARBONATE 150 MG: 150 CAPSULE, GELATIN COATED ORAL at 08:43

## 2022-03-20 RX ADMIN — DIVALPROEX SODIUM 500 MG: 500 TABLET, FILM COATED, EXTENDED RELEASE ORAL at 21:28

## 2022-03-20 RX ADMIN — IPRATROPIUM BROMIDE AND ALBUTEROL 1 PUFF: 20; 100 SPRAY, METERED RESPIRATORY (INHALATION) at 21:31

## 2022-03-20 RX ADMIN — ARIPIPRAZOLE 5 MG: 5 TABLET ORAL at 08:43

## 2022-03-20 RX ADMIN — LETROZOLE 2.5 MG: 2.5 TABLET, FILM COATED ORAL at 08:43

## 2022-03-20 RX ADMIN — FAMOTIDINE 20 MG: 20 TABLET ORAL at 12:07

## 2022-03-20 RX ADMIN — METOPROLOL TARTRATE 25 MG: 25 TABLET, FILM COATED ORAL at 08:43

## 2022-03-20 RX ADMIN — LEVOTHYROXINE SODIUM 100 MCG: 0.1 TABLET ORAL at 08:43

## 2022-03-20 RX ADMIN — GUAIFENESIN 1200 MG: 600 TABLET, EXTENDED RELEASE ORAL at 21:28

## 2022-03-20 RX ADMIN — IPRATROPIUM BROMIDE AND ALBUTEROL 1 PUFF: 20; 100 SPRAY, METERED RESPIRATORY (INHALATION) at 16:57

## 2022-03-20 RX ADMIN — APIXABAN 2.5 MG: 2.5 TABLET, FILM COATED ORAL at 21:28

## 2022-03-20 ASSESSMENT — ACTIVITIES OF DAILY LIVING (ADL)
ADLS_ACUITY_SCORE: 38
ADLS_ACUITY_SCORE: 36
ADLS_ACUITY_SCORE: 36
ADLS_ACUITY_SCORE: 38
ADLS_ACUITY_SCORE: 38
ADLS_ACUITY_SCORE: 36
ADLS_ACUITY_SCORE: 38
ADLS_ACUITY_SCORE: 36
ADLS_ACUITY_SCORE: 36
ADLS_ACUITY_SCORE: 38
ADLS_ACUITY_SCORE: 36
ADLS_ACUITY_SCORE: 38
ADLS_ACUITY_SCORE: 36
ADLS_ACUITY_SCORE: 36
ADLS_ACUITY_SCORE: 38

## 2022-03-20 NOTE — PROGRESS NOTES
[facility]  Progress Note - Hospitalist Service  Date of Admission:  3/10/2022    Assessment & Plan          Lennie Mar is a 89 year old female admitted on 3/10/2022. She has a history of breast cancer, alcohol dependence in remission, anxiety, asymptomatic varicose veins, bipolar disorder, chronic kidney disease stage 3, history of smoking, hyperparathyroidism, memory loss, muscle weakness, severe depression, subdural hygroma, and a resting tremor and she is admitted for generalized weakness and found to have COVID-19 and UTI. Episode of rapid a fib.  Patient developed acute hypoxic respiratory failure started on oxygen.    Acute hypoxemic respiratory failure due to Covid 19 Pneumonia    -She has been on Oxymizer 5 L now weaned down and able to take her off oxygen 3/20.    -She is saturating 94 to 96% on room air    -She had had two vaccinations, but not boosted     -CXR on admission showed no acute disease    - Repeat CXR on 3/13 showed:  Mild left basilar/retrocardiac pulmonary opacities    - Unable to discharge to TCU because of COVID-19 infection (unless 10 days out)    -She was given a dose of 40mg PO lasix on 3/15 (no in/out measured).      - given 60mg IV x1 on 3/16: had good response (at least 700cc out)    -She also had Lasix 40mg IV x 1 on 3/17    -Patient is on the dry side, no need for further IV Lasix today.    -Creatinine is improving, BUN is still on the high side partly due to steroids.    -She should be on the dry side to help her respiratory status due to Covid pneumonia.    -Continue dexamethasone as ordered started on 3/17, will treat for total of 10 days.    - Day #4/10 dexamethasone    Acute fluid overload/exacerbation of mild diastolic dysfunction    -She has been diuresed, now on the dry side.     - ECHO done on 3/13 showed EF of 75 to 80%, early diastolic dysfunction    Rapid a fib    - on 3/13    -She was started on metoprolol 25 mg BID    -She has been in  sinus.    -Continue Eliquis 2.5mg BID (for a fib and DVT prophylaxis)    Generalized weakness    - likely due to COVID-19 infection and UTI    - PT evaluation: dispo will be complicated (see below)    UTI  - UA on 3/10 appeared infected  -Urine culture grew Klebsiella in 2014 and Klebsiella/Enterocuccus in 2012   -She was treated with Ceftriaxone (start date 3/10), completed course  - Final cultures grew E Coli  -Completed duration of therapy.    Bipolar disorder, unspecified    -Followed with Dr. 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 Malik)    - on letrozole since 2019, continue     Malnutrition    -She has had poor oral intake now improved.     - Denies pain/nausea    -She does not feel hungry but able to eat over half of her meal portion.    -Encourage oral intake.    Diet: Mechanical/Dental Soft Diet  Snacks/Supplements Pediatric: Boost Plus; Between Meals    DVT Prophylaxis: Heparin   Kelley Catheter: Not present  Fluids: IV and PO   Central Lines: None  Cardiac Monitoring: None  Code Status: No CPR- Do NOT Intubate      I called her Son, Dmitry [phone], and discussed with him at length the plan of care and updated him.  He stated it is okay for his emergency contact for his  mother and will be changed in the system to his name and phone number.    Disposition Plan   She will likely be discharged to transitional care unit when placement is available as she is 10 days now after she was diagnosed with COVID-19.  I called family, daughter but unable to leave a voice message.     Lucio Rowley MD  Hospitalist Service  [facility]    Clinically Significant Risk Factors Present on Admission         Interval History   Patient seen and examined, feels better, off oxygen, saturating 94 to 96%, able to use incentive spirometry, denied any pain, tolerating oral intake, no shortness of breath or increased work of breathing. No new complaints    Data reviewed today: I reviewed all medications, new labs and imaging results over the last 24 hours. I personally reviewed no images or EKG's today.    Physical Exam   Vital Signs: Temp: 98.7  F (37.1  C) Temp src: Oral BP: 119/54 Pulse: 63   Resp: 22 SpO2: 91 % O2 Device: None (Room air) Oxygen Delivery: 2.5 LPM  Weight: 144 lbs 12.8 oz     General: Resting in bed, not in any form of distress.  Head: Atraumatic, normocephalic   Heart: S1 and S2 well heard, no gallop or murmur.   Lung: Good air entry bilaterally, no wheezing or crackles  Abdomen: Bowel sounds present. No pain with palpation. Abdomen, soft, non - tender   Musculoskeletal: Grossly intact movement of upper and lower extremities  Neurological: Grossly intact strength and motor function   Psych: Flat affect    Data   Recent Labs   Lab 03/20/22  0708 03/19/22  0659 03/18/22  0918 03/17/22  0709   WBC  --  7.4 5.0 4.8   HGB  --  13.2 12.3 11.9   MCV  --  101* 100 102*   PLT  --  198 158 122*    143 140  --    POTASSIUM 4.9 5.0 4.2  --    CHLORIDE 115* 109 107  --    CO2 24 29 29  --    BUN 52* 47* 37*  --    CR 1.19* 1.42* 1.61*  --    ANIONGAP 3 5 4  --    CANELO 9.6 9.6 9.1  --    GLC 93 114* 133*  --      No results found for this or any previous visit (from the  past 24 hour(s)).  Medications     - MEDICATION INSTRUCTIONS -         apixaban ANTICOAGULANT  2.5 mg Oral BID     ARIPiprazole  5 mg Oral Daily     dexamethasone  6 mg Intravenous Daily     divalproex sodium extended-release  500 mg Oral At Bedtime     famotidine  20 mg Oral Q48H     guaiFENesin  1,200 mg Oral BID     ipratropium-albuterol  1 puff Inhalation 4x daily     letrozole  2.5 mg Oral Daily     levothyroxine  100 mcg Oral Daily     lithium  150 mg Oral QAM     metoprolol tartrate  25 mg Oral BID     sodium chloride (PF)  3 mL Intracatheter Q8H

## 2022-03-20 NOTE — PROGRESS NOTES
PRIMARY DIAGNOSIS: GENERALIZED WEAKNESS, UTI, COVID POSITIVE    OUTPATIENT/OBSERVATION GOALS TO BE MET BEFORE DISCHARGE  1. Orthostatic performed: No    2. Tolerating PO medications: Yes    3. Return to near baseline physical activity: No    4. Cleared for discharge by consultants (if involved): No    Discharge Planner Nurse   Safe discharge environment identified: No  Barriers to discharge: Yes       Entered by: Lore Ortiz 03/20/2022 5:05 AM     Patient is Aox2, VS WNL, regular diet, incontinent of bowel and bladder, patient did not get out of bed on this shift, having bouts of incontinent diarrhea throughout the shift, buttocks, groin and coccyx areas are red with open sores and peeling, barrier cream and powder applied, pure wick removed due to bowel incontinence, resting well, will continue to monitor and provide cares.     Please review provider order for any additional goals.   Nurse to notify provider when observation goals have been met and patient is ready for discharge.

## 2022-03-20 NOTE — PROGRESS NOTES
Care Management Follow Up    Length of Stay (days): 7    Expected Discharge Date: TBD possibly 3/21/22     Concerns to be Addressed: care coordination/care conferences, discharge planning     Patient plan of care discussed at interdisciplinary rounds: Yes    Anticipated Discharge Disposition: Transitional Care     Anticipated Discharge Services:  Rehab  Anticipated Discharge DME:  TBD    Patient/family educated on Medicare website which has current facility and service quality ratings:  Yes  Education Provided on the Discharge Plan:  Yes  Patient/Family in Agreement with the Plan: Yes    [facility]  Private pay costs discussed: Not applicable    Additional Information:  Chart review. Patient has history of bipolar, prescribed lithium, disoriented to time, place, situation.     Writer spoke with son, Naren Mar [phone]. He reports that his wife toured [facility] in [facility] and that is where they would like Lennie to go.  They also put down a deposit for an assisted living apartment at [facility] for after Lennie's TCU stay.     Of note, Dmitry reported that the  told his wife, that even if they say they do not have a TCU bed that is likely untrue and they will make room for her. I explained to Dmitry that I will send a referral today and [facility] will follow up with them in the morning; and that it is possible that since Lennie's plans to go to their assisted living that they will make space for her for TCU.       Sherry Edmondson [facility]   Inpatient Care Coordination  [facility]  [phone]

## 2022-03-20 NOTE — PLAN OF CARE
Admitting Diagnosis: Weakness, UTI, Depression  + COVID (3/10)  Pertinent History: bipolar, depression, anxiety, CKD, ETOH dependence in remission, breast CA  Living Situation: apt, alone  Pain plan: Denies  Mobility:x2, not oob this shift  Baseline activity: Ind with walker  Alarms/Safety: Bed Alarm  LDA's: Peripheral  Pertinent test results:  Consults: PT, OT, SW/CC, Palliative- goals of care, WOC- incontinent dermatitis  Abnormals/Pending: UC + e coli, , blood cx pending x2, repeat cxr- mild L basilar opacities.  Other Cares/Comments: pt. Has rube color in her bhavik area, barrier cream was applied, pure-wick in place, pt. Denied pain, ate dinner.

## 2022-03-20 NOTE — PLAN OF CARE
"1500 - 1900  /59 (BP Location: Right arm)   Pulse 73   Temp 98  F (36.7  C) (Oral)   Resp 20   Ht 1.575 m (5' 2\")   Wt 65.7 kg (144 lb 12.8 oz)   LMP  (LMP Unknown)   SpO2 93%   BMI 26.48 kg/m    VSS on RA. Disoriented x4. Ambulating Ax1-2 w/ walker and gait belt. Tolerating mechanical soft diet. PIV SL. Denies pain. LS course crackles, reporting FLORES, denies SOB at rest. Pt seems to be quiet confused and at times speech can be garbled and incoherent. Neuros intact, PERRLA. Pt up to chair for meals. External catheter discontinued per dry beefy redness in the perineum and sacrum, bhavik cares and barrier cream provided. Plan to hold off on lasix, continue metoprolol, eliquis, and day 4 of decadron, while consulting PT, and encouraging oral intake. Pt is resting peacefully. Will continue to provide supportive cares.    "

## 2022-03-21 ENCOUNTER — APPOINTMENT (OUTPATIENT)
Dept: PHYSICAL THERAPY | Facility: CLINIC | Age: 87
DRG: 178 | End: 2022-03-21
Payer: MEDICARE

## 2022-03-21 PROBLEM — U07.1 INFECTION DUE TO 2019 NOVEL CORONAVIRUS: Status: RESOLVED | Noted: 2022-03-10 | Resolved: 2022-03-21

## 2022-03-21 LAB
ALBUMIN UR-MCNC: NEGATIVE MG/DL
ANION GAP SERPL CALCULATED.3IONS-SCNC: 2 MMOL/L (ref 3–14)
APPEARANCE UR: CLEAR
BACTERIA BLD CULT: NO GROWTH
BACTERIA BLD CULT: NO GROWTH
BILIRUB UR QL STRIP: NEGATIVE
BUN SERPL-MCNC: 52 MG/DL (ref 7–30)
CALCIUM SERPL-MCNC: 9.3 MG/DL (ref 8.5–10.1)
CHLORIDE BLD-SCNC: 118 MMOL/L (ref 94–109)
CO2 SERPL-SCNC: 26 MMOL/L (ref 20–32)
COLOR UR AUTO: NORMAL
CREAT SERPL-MCNC: 1.27 MG/DL (ref 0.52–1.04)
GFR SERPL CREATININE-BSD FRML MDRD: 39 ML/MIN/1.73M2
GLUCOSE BLD-MCNC: 87 MG/DL (ref 70–99)
GLUCOSE UR STRIP-MCNC: NEGATIVE MG/DL
HGB UR QL STRIP: NEGATIVE
KETONES UR STRIP-MCNC: NEGATIVE MG/DL
LEUKOCYTE ESTERASE UR QL STRIP: NEGATIVE
MAGNESIUM SERPL-MCNC: 2.5 MG/DL (ref 1.6–2.3)
NITRATE UR QL: NEGATIVE
PH UR STRIP: 7 [PH] (ref 5–7)
POTASSIUM BLD-SCNC: 4.2 MMOL/L (ref 3.4–5.3)
SODIUM SERPL-SCNC: 146 MMOL/L (ref 133–144)
SP GR UR STRIP: 1.01 (ref 1–1.03)
UROBILINOGEN UR STRIP-MCNC: NORMAL MG/DL

## 2022-03-21 PROCEDURE — 94640 AIRWAY INHALATION TREATMENT: CPT | Mod: 76

## 2022-03-21 PROCEDURE — 81003 URINALYSIS AUTO W/O SCOPE: CPT | Performed by: INTERNAL MEDICINE

## 2022-03-21 PROCEDURE — 250N000013 HC RX MED GY IP 250 OP 250 PS 637: Performed by: PHYSICIAN ASSISTANT

## 2022-03-21 PROCEDURE — 250N000013 HC RX MED GY IP 250 OP 250 PS 637

## 2022-03-21 PROCEDURE — 97530 THERAPEUTIC ACTIVITIES: CPT | Mod: GP | Performed by: PHYSICAL THERAPIST

## 2022-03-21 PROCEDURE — 250N000013 HC RX MED GY IP 250 OP 250 PS 637: Performed by: INTERNAL MEDICINE

## 2022-03-21 PROCEDURE — 250N000011 HC RX IP 250 OP 636: Performed by: HOSPITALIST

## 2022-03-21 PROCEDURE — 99232 SBSQ HOSP IP/OBS MODERATE 35: CPT | Performed by: INTERNAL MEDICINE

## 2022-03-21 PROCEDURE — 80048 BASIC METABOLIC PNL TOTAL CA: CPT | Performed by: INTERNAL MEDICINE

## 2022-03-21 PROCEDURE — 94640 AIRWAY INHALATION TREATMENT: CPT

## 2022-03-21 PROCEDURE — 250N000011 HC RX IP 250 OP 636: Performed by: PHYSICIAN ASSISTANT

## 2022-03-21 PROCEDURE — 120N000004 HC R&B MS OVERFLOW

## 2022-03-21 PROCEDURE — 83735 ASSAY OF MAGNESIUM: CPT | Performed by: INTERNAL MEDICINE

## 2022-03-21 PROCEDURE — 250N000013 HC RX MED GY IP 250 OP 250 PS 637: Performed by: HOSPITALIST

## 2022-03-21 PROCEDURE — 999N000157 HC STATISTIC RCP TIME EA 10 MIN

## 2022-03-21 PROCEDURE — 36415 COLL VENOUS BLD VENIPUNCTURE: CPT | Performed by: INTERNAL MEDICINE

## 2022-03-21 RX ORDER — LORAZEPAM 2 MG/ML
.25-.5 INJECTION INTRAMUSCULAR EVERY 6 HOURS PRN
Status: DISCONTINUED | OUTPATIENT
Start: 2022-03-21 | End: 2022-03-22 | Stop reason: HOSPADM

## 2022-03-21 RX ORDER — QUETIAPINE FUMARATE 25 MG/1
25 TABLET, FILM COATED ORAL ONCE
Status: COMPLETED | OUTPATIENT
Start: 2022-03-21 | End: 2022-03-21

## 2022-03-21 RX ADMIN — ARIPIPRAZOLE 5 MG: 5 TABLET ORAL at 08:59

## 2022-03-21 RX ADMIN — APIXABAN 2.5 MG: 2.5 TABLET, FILM COATED ORAL at 20:04

## 2022-03-21 RX ADMIN — LORAZEPAM 0.5 MG: 2 INJECTION INTRAMUSCULAR; INTRAVENOUS at 06:00

## 2022-03-21 RX ADMIN — LORAZEPAM 0.5 MG: 2 INJECTION INTRAMUSCULAR; INTRAVENOUS at 00:06

## 2022-03-21 RX ADMIN — IPRATROPIUM BROMIDE AND ALBUTEROL 1 PUFF: 20; 100 SPRAY, METERED RESPIRATORY (INHALATION) at 12:11

## 2022-03-21 RX ADMIN — IPRATROPIUM BROMIDE AND ALBUTEROL 1 PUFF: 20; 100 SPRAY, METERED RESPIRATORY (INHALATION) at 15:05

## 2022-03-21 RX ADMIN — METOPROLOL TARTRATE 25 MG: 25 TABLET, FILM COATED ORAL at 20:04

## 2022-03-21 RX ADMIN — LEVOTHYROXINE SODIUM 100 MCG: 0.1 TABLET ORAL at 08:59

## 2022-03-21 RX ADMIN — DEXAMETHASONE SODIUM PHOSPHATE 6 MG: 10 INJECTION, SOLUTION INTRAMUSCULAR; INTRAVENOUS at 08:56

## 2022-03-21 RX ADMIN — IPRATROPIUM BROMIDE AND ALBUTEROL 1 PUFF: 20; 100 SPRAY, METERED RESPIRATORY (INHALATION) at 19:30

## 2022-03-21 RX ADMIN — LETROZOLE 2.5 MG: 2.5 TABLET, FILM COATED ORAL at 08:59

## 2022-03-21 RX ADMIN — METOPROLOL TARTRATE 25 MG: 25 TABLET, FILM COATED ORAL at 08:59

## 2022-03-21 RX ADMIN — DIVALPROEX SODIUM 500 MG: 500 TABLET, FILM COATED, EXTENDED RELEASE ORAL at 21:19

## 2022-03-21 RX ADMIN — IPRATROPIUM BROMIDE AND ALBUTEROL 1 PUFF: 20; 100 SPRAY, METERED RESPIRATORY (INHALATION) at 08:58

## 2022-03-21 RX ADMIN — LITHIUM CARBONATE 150 MG: 150 CAPSULE, GELATIN COATED ORAL at 08:59

## 2022-03-21 RX ADMIN — GUAIFENESIN 1200 MG: 600 TABLET, EXTENDED RELEASE ORAL at 08:59

## 2022-03-21 RX ADMIN — QUETIAPINE FUMARATE 25 MG: 25 TABLET ORAL at 03:22

## 2022-03-21 RX ADMIN — Medication 1 MG: at 01:21

## 2022-03-21 RX ADMIN — APIXABAN 2.5 MG: 2.5 TABLET, FILM COATED ORAL at 08:59

## 2022-03-21 RX ADMIN — GUAIFENESIN 1200 MG: 600 TABLET, EXTENDED RELEASE ORAL at 20:04

## 2022-03-21 ASSESSMENT — ACTIVITIES OF DAILY LIVING (ADL)
ADLS_ACUITY_SCORE: 36
ADLS_ACUITY_SCORE: 37
ADLS_ACUITY_SCORE: 36
ADLS_ACUITY_SCORE: 36
ADLS_ACUITY_SCORE: 38
ADLS_ACUITY_SCORE: 37
ADLS_ACUITY_SCORE: 36
ADLS_ACUITY_SCORE: 39
ADLS_ACUITY_SCORE: 37
ADLS_ACUITY_SCORE: 36
ADLS_ACUITY_SCORE: 37
ADLS_ACUITY_SCORE: 37
ADLS_ACUITY_SCORE: 38
ADLS_ACUITY_SCORE: 37
ADLS_ACUITY_SCORE: 38
ADLS_ACUITY_SCORE: 37
ADLS_ACUITY_SCORE: 36
ADLS_ACUITY_SCORE: 37
ADLS_ACUITY_SCORE: 37
ADLS_ACUITY_SCORE: 36
ADLS_ACUITY_SCORE: 37

## 2022-03-21 NOTE — PROGRESS NOTES
Care from [id number]:    Pt is A/O to self. VSS, on RA. Speech incoherent and rambling. Mag protocol, 2.5 recheck in the AM. UA sent, negative. Turned and repositioned in bed, checked and changed for incontinence. Neha area and buttox is red and excoriated. Remains on contact and enteric precautions, COVID recovered. Discharge to TCU tomorrow with w/c transport at 1300.

## 2022-03-21 NOTE — PROGRESS NOTES
Care Management Follow Up    Length of Stay (days): 8    Expected Discharge Date: 03/22/2022     Concerns to be Addressed: care coordination/care conferences, discharge planning     Patient plan of care discussed at interdisciplinary rounds: Yes    Anticipated Discharge Disposition: Transitional Care     Anticipated Discharge Services:  PT/OT    Patient/family educated on Medicare website which has current facility and service quality ratings:  Yes  Education Provided on the Discharge Plan:  Yes  Patient/Family in Agreement with the Plan: Yes    Referrals Placed by CM/SW: [facility] ([facility] ), Skilled Nursing Facility  Private pay costs discussed: Not applicable    Additional Information:  ESPERANZA received VM from patients daughter-in-law Marta ([phone]) stating that they are interested in [facility] TCU. Referral was sent by ESPERANZA 3/20. ESPERANZA spoke with admissions at [facility] (Jaiden [phone]). They will review referral and return call to  to see if they can admit patient to TCU in 1-2 days. Patient is COVID recovered as of 3/21. Returned call to daughter-in-law Marta to provide update. ESPERANZA will continue to follow.      1400: ESPERANZA received call from Jaiden at [facility] ([phone]). They have clinically accepted patient and have a bed available tomorrow. Updated daughter-in-law Marta. Discussed transport. She is unsure if patient is strong enough to go via WC. ESPERANZA will check with PT and RN--daughter-in-law agreeable to WC vs stretcher transportation based on patient's needs. ESPERANZA will continue to follow.    [id number]     1415: ESPERANZA spoke with RN. Reviewed out of pocket cost for [employer] transport, $81.80 for base rate and $5.26 per mile to the destination. Spoke with daughter-in-law Marta, they expressed understanding and are agreeable to this. [facility] WC transport arranged for 1300 tomorrow, 3/22.        ONEL Stanford

## 2022-03-21 NOTE — PROGRESS NOTES
Cross Cover    Patient is confused and agitated.  Was given Ativan earlier but this did not help.  Will give Seroquel now.    Celine Huff MD

## 2022-03-21 NOTE — PLAN OF CARE
"  INPATIENT NOTE: [id number]     PRIMARY PROBLEM:Weakness, Infection due to 2019 novel coronavirus,Depression          Vital signs:  Temp: 97.5  F (36.4  C) Temp src: Axillary BP: 126/60 Pulse: 62   Resp: 24 SpO2: 93 % O2 Device: Nasal cannula Oxygen Delivery: 2.5 LPM Height: 157.5 cm (5' 2\") Weight: 65.7 kg (144 lb 12.8 oz)  Estimated body mass index is 26.48 kg/m  as calculated from the following:    Height as of this encounter: 1.575 m (5' 2\").    Weight as of this encounter: 65.7 kg (144 lb 12.8 oz).        Orientation: Disorientated to, Time, Place and Situation  Neuro: Intact   Pain status: denying pain.   Resp: Lung sounds are Clear. Denies any SOB.   Cardiac: WNL, Denies any Chest Pain   GI: Bowels are active x 4 Quads. Last BM 3/20/22  : Voiding with no concern    Skin: redness on groin and scarum  LDA: Peripheral IV   Infusions: Patient is Saline locked.   Pertinent Labs: Mag 2.4  Diet: mechanical soft soft  Activity: are 2 assist with Gait Belt and Walker  Isolation:  Patient is on Contact precuations for VRE and COVID 19.   and   Patient is on Airborne precuations for COVID 19  Consults: PT/OT/Woundnurse/ palliative  Major Shift Event: pt was agitated and restless during the shift. Pt was given Ativan but was not helping. MD devries and MD ordered Seroquel x1 was was administered. Pt was room air.   Will continue to monitor and provide cares.     Max Montano RN  "

## 2022-03-21 NOTE — PROGRESS NOTES
[facility]  Progress Note - Hospitalist Service  Date of Admission:  3/10/2022    Assessment & Plan          Lennie Mar is a 89 year old female admitted on 3/10/2022. She has a history of breast cancer, alcohol dependence in remission, anxiety, asymptomatic varicose veins, bipolar disorder, chronic kidney disease stage 3, history of smoking, hyperparathyroidism, memory loss, muscle weakness, severe depression, subdural hygroma, and a resting tremor and she is admitted for generalized weakness and found to have COVID-19 and UTI. Episode of rapid a fib.  Patient developed acute hypoxic respiratory failure started on oxygen.    Acute hypoxemic respiratory failure due to Covid 19 Pneumonia.    -She has been on Oxymizer 5 L now weaned down and able to take her off oxygen 3/20.    -She is saturating 94 to 96% on room air    -She had had two vaccinations, but not boosted     -CXR on admission showed no acute disease    - Repeat CXR on 3/13 showed:  Mild left basilar/retrocardiac pulmonary opacities    - Unable to discharge to TCU because of COVID-19 infection (unless 10 days out)    -She was given a dose of 40mg PO lasix on 3/15 (no in/out measured).      - given 60mg IV x1 on 3/16: had good response (at least 700cc out)    -She also had Lasix 40mg IV x 1 on 3/17    -Patient is on the dry side, no need for further IV Lasix today.    -Creatinine is improving, BUN is still on the high side partly due to steroids.    -She should be on the dry side to help her respiratory status due to Covid pneumonia.    -Continue dexamethasone as ordered started on 3/17, will treat for total of 10 days.    - Day #5/10 dexamethasone    Acute fluid overload/exacerbation of mild diastolic dysfunction    -She has been diuresed, now on the dry side.     - ECHO done on 3/13 showed EF of 75 to 80%, early diastolic dysfunction    Rapid a fib    -On 3/13    -She was started on metoprolol 25 mg BID    -She has been in  sinus.    -Continue Eliquis 2.5mg BID (for a fib and DVT prophylaxis)    Generalized weakness    - likely due to COVID-19 infection and UTI    - PT evaluation: dispo will be complicated (see below)    UTI  - UA on 3/10 appeared infected, urine culture grew E. coli  -She had previous urine culture grew Klebsiella, and enterococcus.   -She was treated with Ceftriaxone (start date 3/10), completed course  - Final cultures grew E Coli  -Completed duration of therapy.  -Due to overnight delirium, after discussing with her son, he requested urinalysis to be checked prior to her discharge.  UA reordered with reflex to culture.    Bipolar disorder, unspecified    -Followed with Dr. 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 Malik)    - on letrozole since 2019, continue     Malnutrition    -She has had poor oral intake now improved.     - Denies pain/nausea    -She does not feel hungry but able to eat over half of her meal portion.    -Encourage oral intake.    Delirium likely medication related, rule out infection:   Patient was delirious last night, agitated, she required some anxiolytic medication stressed.  This morning she was slow to respond to question slightly anxious.   -Continue to  reorient the patient  -Try to use nonpharmacologic measures.  -We will check UA again, her son requested it.   -Use Ativan 0.25 to 0.5 mg every 6 hours as needed only for severe agitation or aggression.  -Use Seroquel 12.5 mg x 1 for anxiety only at night as needed    Diet: Mechanical/Dental Soft Diet  Snacks/Supplements Pediatric: Boost Plus; Between Meals    DVT Prophylaxis: Heparin   Kelley Catheter: Not present  Fluids: IV and PO   Central Lines: None  Cardiac Monitoring: None  Code Status: No CPR- Do NOT Intubate        I called her Son, Dmitry [phone], and discussed with him at length the plan of care and updated him the overnight event of delirium.      Disposition Plan   She will likely be discharged to transitional care unit when placement is available as she is 10 days now after she was diagnosed with COVID-19.  I called family, daughter but unable to leave a voice message.     Lucio Rowley MD  Hospitalist Service  [facility]    Clinically Significant Risk Factors Present on Admission         Interval History   Patient seen and examined, feels better, off oxygen, saturating 94 to 96%, able to use incentive spirometry, denied any pain, tolerating oral intake, no shortness of breath or increased work of breathing. No new complaints    Data reviewed today: I reviewed all medications, new labs and imaging results over the last 24 hours. I personally reviewed no images or EKG's today.    Physical Exam   Vital Signs: Temp: 97.6  F (36.4  C) Temp src: Axillary BP: 100/57 Pulse: 62   Resp: 20 SpO2: 95 % O2 Device: Nasal cannula Oxygen Delivery: 1.5 LPM  Weight: 144 lbs 12.8 oz     General: Resting in bed, not in any form of distress.  Head: Atraumatic, normocephalic   Heart: S1 and S2 well heard, no gallop or murmur.   Lung: Good air entry bilaterally, no wheezing or crackles  Abdomen: Bowel sounds present. No pain with palpation. Abdomen, soft, non - tender   Musculoskeletal:  Grossly intact movement of upper and lower extremities  Neurological: Grossly intact strength and motor function   Psych: Flat affect    Data   Recent Labs   Lab 03/21/22  0713 03/20/22  0708 03/19/22  0659 03/18/22  0918 03/17/22  0709   WBC  --   --  7.4 5.0 4.8   HGB  --   --  13.2 12.3 11.9   MCV  --   --  101* 100 102*   PLT  --   --  198 158 122*   * 142 143 140  --    POTASSIUM 4.2 4.9 5.0 4.2  --    CHLORIDE 118* 115* 109 107  --    CO2 26 24 29 29  --    BUN 52* 52* 47* 37*  --    CR 1.27* 1.19* 1.42* 1.61*  --    ANIONGAP 2* 3 5 4  --    CANELO 9.3 9.6 9.6 9.1  --    GLC 87 93 114* 133*  --      No results found for this or any previous visit (from the past 24 hour(s)).  Medications     - MEDICATION INSTRUCTIONS -         apixaban ANTICOAGULANT  2.5 mg Oral BID     ARIPiprazole  5 mg Oral Daily     dexamethasone  6 mg Intravenous Daily     divalproex sodium extended-release  500 mg Oral At Bedtime     famotidine  20 mg Oral Q48H     guaiFENesin  1,200 mg Oral BID     ipratropium-albuterol  1 puff Inhalation 4x daily     letrozole  2.5 mg Oral Daily     levothyroxine  100 mcg Oral Daily     lithium  150 mg Oral QAM     metoprolol tartrate  25 mg Oral BID     sodium chloride (PF)  3 mL Intracatheter Q8H

## 2022-03-22 VITALS
HEART RATE: 69 BPM | OXYGEN SATURATION: 94 % | WEIGHT: 144.8 LBS | SYSTOLIC BLOOD PRESSURE: 123 MMHG | RESPIRATION RATE: 20 BRPM | BODY MASS INDEX: 26.65 KG/M2 | TEMPERATURE: 97.3 F | HEIGHT: 62 IN | DIASTOLIC BLOOD PRESSURE: 83 MMHG

## 2022-03-22 LAB
ANION GAP SERPL CALCULATED.3IONS-SCNC: 3 MMOL/L (ref 3–14)
BUN SERPL-MCNC: 44 MG/DL (ref 7–30)
CALCIUM SERPL-MCNC: 9.4 MG/DL (ref 8.5–10.1)
CHLORIDE BLD-SCNC: 113 MMOL/L (ref 94–109)
CO2 SERPL-SCNC: 27 MMOL/L (ref 20–32)
CREAT SERPL-MCNC: 1.44 MG/DL (ref 0.52–1.04)
GFR SERPL CREATININE-BSD FRML MDRD: 34 ML/MIN/1.73M2
GLUCOSE BLD-MCNC: 80 MG/DL (ref 70–99)
MAGNESIUM SERPL-MCNC: 2.3 MG/DL (ref 1.6–2.3)
POTASSIUM BLD-SCNC: 4.9 MMOL/L (ref 3.4–5.3)
SODIUM SERPL-SCNC: 143 MMOL/L (ref 133–144)

## 2022-03-22 PROCEDURE — 250N000011 HC RX IP 250 OP 636: Performed by: HOSPITALIST

## 2022-03-22 PROCEDURE — 250N000013 HC RX MED GY IP 250 OP 250 PS 637: Performed by: STUDENT IN AN ORGANIZED HEALTH CARE EDUCATION/TRAINING PROGRAM

## 2022-03-22 PROCEDURE — 94640 AIRWAY INHALATION TREATMENT: CPT

## 2022-03-22 PROCEDURE — 999N000157 HC STATISTIC RCP TIME EA 10 MIN

## 2022-03-22 PROCEDURE — 80048 BASIC METABOLIC PNL TOTAL CA: CPT | Performed by: INTERNAL MEDICINE

## 2022-03-22 PROCEDURE — 250N000013 HC RX MED GY IP 250 OP 250 PS 637: Performed by: PHYSICIAN ASSISTANT

## 2022-03-22 PROCEDURE — 83735 ASSAY OF MAGNESIUM: CPT | Performed by: INTERNAL MEDICINE

## 2022-03-22 PROCEDURE — 36415 COLL VENOUS BLD VENIPUNCTURE: CPT | Performed by: INTERNAL MEDICINE

## 2022-03-22 PROCEDURE — 250N000013 HC RX MED GY IP 250 OP 250 PS 637: Performed by: HOSPITALIST

## 2022-03-22 RX ORDER — ONDANSETRON 4 MG/1
4 TABLET, ORALLY DISINTEGRATING ORAL EVERY 6 HOURS PRN
DISCHARGE
Start: 2022-03-22

## 2022-03-22 RX ORDER — MAGNESIUM HYDROXIDE/ALUMINUM HYDROXICE/SIMETHICONE 120; 1200; 1200 MG/30ML; MG/30ML; MG/30ML
30 SUSPENSION ORAL EVERY 4 HOURS PRN
DISCHARGE
Start: 2022-03-22

## 2022-03-22 RX ORDER — ONDANSETRON 2 MG/ML
4 INJECTION INTRAMUSCULAR; INTRAVENOUS EVERY 6 HOURS PRN
DISCHARGE
Start: 2022-03-22 | End: 2022-03-23

## 2022-03-22 RX ORDER — METOPROLOL TARTRATE 25 MG/1
25 TABLET, FILM COATED ORAL 2 TIMES DAILY
DISCHARGE
Start: 2022-03-22 | End: 2022-05-05

## 2022-03-22 RX ORDER — QUETIAPINE FUMARATE 25 MG/1
12.5 TABLET, FILM COATED ORAL
DISCHARGE
Start: 2022-03-22 | End: 2024-08-08

## 2022-03-22 RX ORDER — DEXAMETHASONE SODIUM PHOSPHATE 10 MG/ML
6 INJECTION, SOLUTION INTRAMUSCULAR; INTRAVENOUS DAILY
DISCHARGE
Start: 2022-03-23 | End: 2022-03-23

## 2022-03-22 RX ORDER — GUAIFENESIN 600 MG/1
1200 TABLET, EXTENDED RELEASE ORAL 2 TIMES DAILY
DISCHARGE
Start: 2022-03-22 | End: 2022-10-17

## 2022-03-22 RX ORDER — BISACODYL 10 MG
10 SUPPOSITORY, RECTAL RECTAL DAILY PRN
DISCHARGE
Start: 2022-03-22

## 2022-03-22 RX ORDER — FAMOTIDINE 20 MG/1
20 TABLET, FILM COATED ORAL
Status: ON HOLD | DISCHARGE
Start: 2022-03-24 | End: 2024-08-24

## 2022-03-22 RX ORDER — AMOXICILLIN 250 MG
2 CAPSULE ORAL 2 TIMES DAILY PRN
DISCHARGE
Start: 2022-03-22 | End: 2022-10-17

## 2022-03-22 RX ORDER — AMOXICILLIN 250 MG
1 CAPSULE ORAL 2 TIMES DAILY PRN
Status: ON HOLD | DISCHARGE
Start: 2022-03-22 | End: 2024-08-24

## 2022-03-22 RX ADMIN — MICONAZOLE NITRATE: 20 POWDER TOPICAL at 11:00

## 2022-03-22 RX ADMIN — LETROZOLE 2.5 MG: 2.5 TABLET, FILM COATED ORAL at 08:39

## 2022-03-22 RX ADMIN — GUAIFENESIN 1200 MG: 600 TABLET, EXTENDED RELEASE ORAL at 08:39

## 2022-03-22 RX ADMIN — DEXAMETHASONE SODIUM PHOSPHATE 6 MG: 10 INJECTION, SOLUTION INTRAMUSCULAR; INTRAVENOUS at 08:39

## 2022-03-22 RX ADMIN — FAMOTIDINE 20 MG: 20 TABLET ORAL at 11:00

## 2022-03-22 RX ADMIN — IPRATROPIUM BROMIDE AND ALBUTEROL 1 PUFF: 20; 100 SPRAY, METERED RESPIRATORY (INHALATION) at 11:02

## 2022-03-22 RX ADMIN — ACETAMINOPHEN 650 MG: 325 TABLET, FILM COATED ORAL at 08:51

## 2022-03-22 RX ADMIN — LITHIUM CARBONATE 150 MG: 150 CAPSULE, GELATIN COATED ORAL at 08:39

## 2022-03-22 RX ADMIN — METOPROLOL TARTRATE 25 MG: 25 TABLET, FILM COATED ORAL at 08:39

## 2022-03-22 RX ADMIN — IPRATROPIUM BROMIDE AND ALBUTEROL 1 PUFF: 20; 100 SPRAY, METERED RESPIRATORY (INHALATION) at 07:47

## 2022-03-22 RX ADMIN — LEVOTHYROXINE SODIUM 100 MCG: 0.1 TABLET ORAL at 08:50

## 2022-03-22 RX ADMIN — ARIPIPRAZOLE 5 MG: 5 TABLET ORAL at 08:39

## 2022-03-22 RX ADMIN — APIXABAN 2.5 MG: 2.5 TABLET, FILM COATED ORAL at 08:39

## 2022-03-22 RX ADMIN — ACETAMINOPHEN 650 MG: 325 TABLET, FILM COATED ORAL at 01:56

## 2022-03-22 ASSESSMENT — ACTIVITIES OF DAILY LIVING (ADL)
ADLS_ACUITY_SCORE: 37
ADLS_ACUITY_SCORE: 39
ADLS_ACUITY_SCORE: 37
ADLS_ACUITY_SCORE: 39
ADLS_ACUITY_SCORE: 39
ADLS_ACUITY_SCORE: 37
ADLS_ACUITY_SCORE: 37
ADLS_ACUITY_SCORE: 39

## 2022-03-22 NOTE — PLAN OF CARE
Patient's After Visit Summary was reviewed with patient and/or discharge to TCU with applicable paperwork  Patient verbalized understanding of After Visit Summary, recommended follow up and was given an opportunity to ask questions.   Discharge medications sent home with patient/family: yes  Discharged with scheduled transport     VSS, report given to TCU, discharged with all personal belongings.

## 2022-03-22 NOTE — DISCHARGE SUMMARY
[facility] [place]    Discharge Summary  Hospitalist    Date of Admission:  3/10/2022  Date of Discharge:  3/22/2022  Discharging Provider:  RAMIREZ Knox MD, FACP     Date of Service (when I saw the patient): 03/22/22    Discharge Diagnoses      Acute hypoxemic respiratory failure due to Covid 19 Pneumonia  Acute fluid overload/exacerbation of mild diastolic dysfunction  Paroxysmal A-fib., with rapid rate  Hypertension  Acute kidney injury  Chronic kidney disease  Generalized weakness  Deconditioning  UTI  Bipolar disorder  Depression  Hypothyroidism  History of breast cancer  Malnutrition  Delirium  Constipation      History of Present Illness      Per 3/10/22 admit H & P, amended: Lennie Mar is a pleasant 89 year-old woman with a history of alcohol dependence in remission, anxiety, asymptomatic varicose veins, bipolar disorder, chronic kidney disease stage 3, breast cancer, prior tobacco use, hyperparathyroidism, memory loss, muscle weakness (generalized), severe depression, subdural hygroma, and tremor of unknown origin - who was admitted for generalized weakness and COVID-19 infection.   Lennie's daughter - in - law shared that her mother-in-law has been more weak than normal.  She has appeared short of breath and has had a weak cough that has been been present for some time.  She has not been eating or drinking well.  Lennie was recently started on Depakote.        Hospital Course   Lennie Mar was admitted on 3/10/2022.  The following problems were addressed during her hospitalization:     Acute hypoxemic respiratory failure due to Covid 19 Pneumonia  - required Oxymizer up to 5 L and then was weaned off of oxygen 3/20.  - has been saturating 94 to 96% on room air  - PTA  had two vaccinations, but had not been boosted   - CXR on admission showed no acute disease  - repeat CXR on 3/13 showed:  Mild left basilar/retrocardiac pulmonary opacities  - earlier was unable to discharge to  TCU because of COVID-19 infection (unless 10 days out)  - dexamethasone was started on 3/17--> plan to treat for total of 10 days.     Acute fluid overload/exacerbation of mild diastolic dysfunction  - was given a dose of 40mg PO lasix on 3/15, and then 60mg IV x1 on 3/16: had good response (at least 700cc out)  - also had Lasix 40mg IV x 1 on 3/17    - ECHO done on 3/13 showed EF of 75 to 80%, with possible early diastolic dysfunction     Paroxysmal A-fib., with rapid rate; rates now controlled.  - was started on metoprolol 25 mg BID  - has been in sinus.  - was started on Eliquis 2.5mg BID (for A-fib and DVT prophylaxis)    Hypertension; pressures elevated intermittently.  - metoprolol added for A-fib rate control.  - needs close follow up while at TCU and at follow up with her PCP    Acute kidney injury in setting of mild chronic kidney disease; Cr stable at time of discharge, but higher than baseline.  - repeat BMP within 7 days.     Generalized weakness  Deconditioning  - attributed to COVID-19 infection and UTI  - was evaluated by and will need ongoing PT at the TCU after discharge      UTI  - UA on 3/10 appeared infected, urine culture grew E. coli  - previous urine cultures grew Klebsiella, and enterococcus.   - was treated with Ceftriaxone (start date 3/10)  - due to overnight delirium prior to discharge, her son requested urinalysis to be checked prior to her discharge; UA on 3/21 was unremarkable.     Bipolar disorder, unspecified  Depression  - sees Dr. Horn at [facility]   - continued home meds: Depakote, Lithium, and Aripiprazole   - needs close follow up with Dr. Horn after discharge     Hypothyroidism  - had thyroid nodule biopsy on 6/1/2021 (per prior note, was benign)   - continued levothyroxine      History of breast cancer; no new Sx during admission.  - prior treatment with radiation therapy   - has upcoming appointment with Oncology on 4/18/2022 (Dr. Malik)  - on letrozole  since 2019; continued      Malnutrition; has had poor oral intake - now improved.   - needs encouragement to complete meals and increase PO intake     Delirium - likely medication related; stable.   - continued redirection and supportive care.    Constipation; improved.  - added several PRN bowel meds        RAMIREZ Knox MD, [facility]ist      Significant Results and Procedures   See below and in EHR    Pending Results   None    Code Status   DNR / DNI       Primary Care Physician   Christi Terry    Physical Exam     Vitals:    03/17/22 0603 03/18/22 0628 03/20/22 0737   Weight: 64.5 kg (142 lb 3.2 oz) 64.2 kg (141 lb 9.6 oz) 65.7 kg (144 lb 12.8 oz)     03/22/22 1048 97.3  F (36.3  C) 69 -- -- 123/83 20 94 % -- None (Room air) -- -- AN   03/22/22 0837 -- 82 -- -- 132/101 Abnormal  18 -- -- --        Constitutional: awake, no apparent distress; lying in bed  HEENT: sclerae clear; MM's moist  Respiratory: good a/e bilaterally, no wheezing or rhonchi  Cardiovascular: Regular rate and rhythm, S1, S2 noted; no m/r/g  GI: abdomen flat, + bowel sounds; soft, non-tender, non-distended  Skin/Integumen: no rashes, no cyanosis, no jaundice  Musculoskeletal: no edema  Neurologic: follows directions well; no focal deficits     Discharge Disposition   Discharged to short-term care facility  Condition at discharge: Stable    Consultations This Hospital Stay   PHYSICAL THERAPY ADULT IP CONSULT  CARE MANAGEMENT / SOCIAL WORK IP CONSULT  OCCUPATIONAL THERAPY ADULT IP CONSULT  PALLIATIVE CARE ADULT IP CONSULT  WOUND OSTOMY CONTINENCE NURSE  IP CONSULT  PHYSICAL THERAPY ADULT IP CONSULT  OCCUPATIONAL THERAPY ADULT IP CONSULT    Time Spent on this Encounter   I, Elton Knox MD, personally saw the patient today and spent greater than 30 minutes discharging this patient.    Discharge Orders      Anti-Embolism Stockings    Bilateral below knee length.On in the morning, off at night     Medication Therapy  Management Referral      General info for SNF    Length of Stay Estimate: Short Term Care: Estimated # of Days <30  Condition at Discharge: Improving  Level of care:skilled   Rehabilitation Potential: Fair  Admission H&P remains valid and up-to-date: Yes  Recent Chemotherapy: N/A  Use Nursing Home Standing Orders: Yes     Mantoux instructions    Give two-step Mantoux (PPD) Per Facility Policy Yes     Follow Up and recommended labs and tests    1. Follow up with TCU physician/provider within 2-3 days.  The following labs/tests are recommended: BMP, D-dimer.  2. Follow up with primary care provider 1 week after discharge from TCU.     Reason for your hospital stay    You were admitted for multiple acute medical issues, and have made good progress.     Intake and output    Every shift     Daily weights    Call Provider for weight gain of more than 2 pounds per day or 5 pounds per week.     Wound care (specify)    Site:   inguinal dermatitis and perineal area  Instructions:  continue current topical treatments; consult WOCN IF no improvement in the next 3-5 days.     Activity - Up with nursing assistance     Activity - Ambulate in hallway    Every shift     Physical Therapy Adult Consult    Evaluate and treat as clinically indicated.    Reason:  Deconditioning     Occupational Therapy Adult Consult    Evaluate and treat as clinically indicated.    Reason:  Deconditioning     Contact Isolation     Fall precautions     Diet    Follow this diet upon discharge: Orders Placed This Encounter      Snacks/Supplements Pediatric: Boost Plus; Between Meals      Mechanical/Dental Soft Diet     Discharge Medications   Discharge Medication List as of 3/22/2022  1:05 PM      START taking these medications    Details   alum & mag hydroxide-simethicone (MAALOX) 200-200-20 MG/5ML SUSP suspension Take 30 mLs by mouth every 4 hours as needed for indigestion, Transitional      apixaban ANTICOAGULANT (ELIQUIS) 2.5 MG tablet Take 1 tablet  (2.5 mg) by mouth 2 times daily, Transitional      bisacodyl (DULCOLAX) 10 MG suppository Place 1 suppository (10 mg) rectally daily as needed for constipation, Transitional      famotidine (PEPCID) 20 MG tablet Take 1 tablet (20 mg) by mouth every 48 hours, Transitional      guaiFENesin (MUCINEX) 600 MG 12 hr tablet Take 2 tablets (1,200 mg) by mouth 2 times daily, Transitional      ipratropium-albuterol (COMBIVENT RESPIMAT)  MCG/ACT inhaler Inhale 1 puff into the lungs 4 times daily as needed for shortness of breath / dyspnea or wheezing, Transitional      magnesium hydroxide (MILK OF MAGNESIA) 400 MG/5ML suspension Take 30 mLs by mouth daily as needed for constipation, Transitional      metoprolol tartrate (LOPRESSOR) 25 MG tablet Take 1 tablet (25 mg) by mouth 2 times daily, Transitional      miconazole (MICATIN) 2 % external powder Apply topically 2 times dailyTransitional      ondansetron (ZOFRAN-ODT) 4 MG ODT tab Take 1 tablet (4 mg) by mouth every 6 hours as needed for nausea or vomiting, Transitional      QUEtiapine (SEROQUEL) 25 MG tablet Take 0.5 tablets (12.5 mg) by mouth nightly as needed (restlessness), Transitional      !! senna-docusate (SENOKOT-S/PERICOLACE) 8.6-50 MG tablet Take 1 tablet by mouth 2 times daily as needed for constipation, Transitional      !! senna-docusate (SENOKOT-S/PERICOLACE) 8.6-50 MG tablet Take 2 tablets by mouth 2 times daily as needed for constipation, Transitional      zinc Oxide (DESITIN) 40 % paste Apply topically as needed for dry skin or irritationTransitional      dexamethasone PF (DECADRON) 10 MG/ML injection Inject 0.6 mLs (6 mg) into the vein daily, Transitional      ondansetron (ZOFRAN) 2 MG/ML SOLN injection Inject 2 mLs (4 mg) into the vein every 6 hours as needed for nausea or vomiting, Transitional       !! - Potential duplicate medications found. Please discuss with provider.      CONTINUE these medications which have NOT CHANGED    Details    acetaminophen (TYLENOL) 325 MG tablet Take 3 tablets (975 mg) by mouth every 8 hours as needed for mild pain, Disp-50 tablet, R-0, E-Prescribe      ARIPiprazole (ABILIFY) 5 MG tablet Take 2 mg by mouth daily , R-3, Historical      divalproex sodium extended-release (DEPAKOTE ER) 500 MG 24 hr tablet Take 500 mg by mouth At Bedtime, Historical      letrozole (FEMARA) 2.5 MG tablet TAKE 1 TABLET BY MOUTH EVERY DAY, Disp-90 tablet, R-3, E-Prescribe      levothyroxine (SYNTHROID/LEVOTHROID) 75 MCG tablet Take 100 mcg by mouth daily , Historical      lithium (ESKALITH) 150 MG capsule Take 150 mg by mouth every morning , Disp-30 capsule, R-1, Historical      melatonin 1 MG TABS tablet Take 1 tablet (1 mg) by mouth nightly as needed for sleep, Transitional      vitamin D3 (CHOLECALCIFEROL) 10 MCG (400 UNIT) capsule Take 2 capsules (800 Units) by mouth daily, Disp-60 capsule, R-3, E-Prescribe      ORDER FOR DME Equipment being ordered: compression stocking knee high [id number] Package, R-0, Fax           Allergies   Allergies   Allergen Reactions     Cafergot Other (See Comments) and Nausea and Vomiting     Severe HA, N/V & diarrhea     Ciprofloxacin      Nausea and vomiting per son      Penicillins Rash and Unknown     Sulfa Drugs Rash and Unknown     Ergot Alkaloids Unknown     Lamictal [Lamotrigine] Other (See Comments)     Patient experienced night moss     Naproxen      Pt unable to remember reaction.     Naproxen Unknown     Tetracycline Unknown     Pt unable to remember allergy     Data   Most Recent 3 CBC's:  Recent Labs   Lab Test 03/19/22  0659 03/18/22  0918 03/17/22  0709   WBC 7.4 5.0 4.8   HGB 13.2 12.3 11.9   * 100 102*    158 122*      Most Recent 3 BMP's:  Recent Labs   Lab Test 03/25/22  0559 03/24/22  0600 03/22/22  0857   * 149* 143   POTASSIUM 4.8 4.9 4.9   CHLORIDE 114* 115* 113*   CO2 24 23 27   BUN 44* 46* 44*   CR 1.35* 1.47* 1.44*   ANIONGAP 8 11 3   CANELO 9.6 9.2 9.4   GLC  80 78 80     Most Recent 2 LFT's:  Recent Labs   Lab Test 03/13/22  0858 03/10/22  1158   AST 34 35   ALT 21 29   ALKPHOS 94 98   BILITOTAL 0.3 0.3     Most Recent INR's and Anticoagulation Dosing History:  Anticoagulation Dose History     Recent Dosing and Labs Latest Ref Rng & Units 2/28/2012    INR 0.86 - 1.14 1.03        Most Recent 3 Troponin's:No lab results found.  Most Recent Cholesterol Panel:No lab results found.  Most Recent 6 Bacteria Isolates From Any Culture (See EPIC Reports for Culture Details):  Recent Labs   Lab Test 04/15/19  0821   CULT >100,000 colonies/mL  Klebsiella pneumoniae  *     Most Recent TSH, T4 and A1c Labs:  Recent Labs   Lab Test 03/10/22  1158   TSH 0.01*   T4 1.20     Results for orders placed or performed during the hospital encounter of 03/10/22   XR Chest Port 1 View    Narrative    CHEST ONE VIEW PORTABLE March 10, 2022 1:31 PM     HISTORY: COVID, weakness.    COMPARISON: 8/8/2014.      Impression    IMPRESSION: No airspace consolidation, pneumothorax, or pleural  effusion.    ARABELLA TEJEDA MD         SYSTEM ID:  [id number]   XR Chest Port 1 View    Narrative    EXAM: XR CHEST PORTABLE 1 VIEW  LOCATION: [facility]  DATE/TIME: 03/13/2022, 9:50 AM    INDICATION: Chest pain, new hypoxia.  COMPARISON: Chest x-ray on 03/10/2022.      Impression    IMPRESSION: Single AP view of the chest was obtained. Cardiomediastinal silhouette is within normal limits. Mild left basilar/retrocardiac pulmonary opacities, indeterminate, could represent atelectasis versus infection. No significant pleural effusion   or pneumothorax.     US Lower Extremity Venous Bilateral Port    Addendum: 3/21/2022    INDICATION: fever, dyspnea, hypoxia, edema, COVID.          Narrative    EXAM: US LOWER EXTREMITY VENOUS DUPLEX BILATERAL PORT  LOCATION: [facility]  DATE/TIME: 3/13/2022 1:16 PM    INDICATION: fever, dyspnea, hypoxia, COVID.  Eval for DVT  COMPARISON:  None.  TECHNIQUE: Venous Duplex ultrasound of bilateral lower extremities with and without compression, augmentation and duplex. Color flow and spectral Doppler with waveform analysis performed.    FINDINGS: Exam includes the common femoral, femoral, popliteal veins as well as segmentally visualized deep calf veins and greater saphenous vein.     RIGHT: No deep vein thrombosis. No superficial thrombophlebitis. No popliteal cyst.    LEFT: No deep vein thrombosis. No superficial thrombophlebitis. No popliteal cyst.      Impression    IMPRESSION:  1.  No deep venous thrombosis in the bilateral lower extremities.   Echocardiogram Limited     Value    LVEF  75-80%    [facility]    [id number]  [id number]  [id number]  [id number]^LUCIE^FOREST^VINNY     [facility]  Echocardiography Laboratory  [address] [place] [address]     Name: MANI ANDERSON  MRN: [id number]  : 1927  Study Date: 2022 11:12 AM  Age: 89 yrs  Gender: Female  Patient Location: [facility]  Reason For Study: Chest Pain  Ordering Physician: FOREST FAULKNER  Referring Physician: Christi Terry  Performed By: Batsheva Ch RDCS     BSA: 1.7 m2  Height: 62 in  Weight: 146 lb  HR: 80  BP: 124/64 mmHg  ______________________________________________________________________________  Procedure  Limited Portable Echo Adult.  ______________________________________________________________________________  Interpretation Summary     There is moderate concentric left ventricular hypertrophy.  Hyperdynamic left ventricular function  The visual ejection fraction is estimated at 75-80%.  No regional wall motion abnormalities noted.  The right ventricular systolic function is normal.  Trace tricuspid and mitral valve regurgitation.  No hemodynamically significant valvular aortic stenosis.  The inferior vena cava is normal.     Compared to prior study, there is no significant  change.  ______________________________________________________________________________  Left Ventricle  The left ventricle is normal in size. There is moderate concentric left  ventricular hypertrophy. Hyperdynamic left ventricular function. The visual  ejection fraction is estimated at 75-80%. Grade I or early diastolic  dysfunction. No regional wall motion abnormalities noted.     Right Ventricle  The right ventricle is grossly normal size. The right ventricular systolic  function is normal.     Atria  Normal left atrial size. Right atrial size is normal. There is no color  Doppler evidence of an atrial shunt.     Mitral Valve  The mitral valve leaflets are mildly thickened. There is trace mitral  regurgitation.     Tricuspid Valve  The tricuspid valve is not well visualized, but is grossly normal. There is  trace tricuspid regurgitation. Right ventricular systolic pressure could not  be approximated due to inadequate tricuspid regurgitation.     Aortic Valve  The aortic valve is not well visualized. No aortic regurgitation is present.  No hemodynamically significant valvular aortic stenosis.     Pulmonic Valve  The pulmonic valve is not well seen, but is grossly normal. There is trace  pulmonic valvular regurgitation.     Vessels  The aortic root is normal size. The ascending aorta is Borderline dilated. The  inferior vena cava is normal.     Pericardium  There is no pericardial effusion.     Rhythm  Sinus rhythm was noted.  ______________________________________________________________________________  MMode/2D Measurements & Calculations  IVSd: 1.4 cm     LVIDd: 3.9 cm  LVIDs: 2.3 cm  LVPWd: 1.2 cm  FS: 42.6 %  LV mass(C)d: 189.9 grams  LV mass(C)dI: 113.6 grams/m2  asc Aorta Diam: 3.8 cm  RWT: 0.63     ______________________________________________________________________________  Report approved by: Dr Sierra Mejias 03/13/2022 12:25 PM

## 2022-03-22 NOTE — PLAN OF CARE
Occupational Therapy Discharge Summary    Reason for therapy discharge:    Discharged to transitional care facility.    Progress towards therapy goal(s). See goals on Care Plan in [facility] electronic health record for goal details.  Goals not met.  Barriers to achieving goals:   discharge from facility.    Therapy recommendation(s):    Continued therapy is recommended.  Rationale/Recommendations:  OT eval and treat at TCU.        **Pt not seen by discharging therapist on this date, note written based on previous treating therapist's notes and recommendations

## 2022-03-22 NOTE — PLAN OF CARE
"[id number]    Inpatient Progress Note:    /62 (BP Location: Right arm)   Pulse 76   Temp 98  F (36.7  C) (Axillary)   Resp 22   Ht 1.575 m (5' 2\")   Wt 65.7 kg (144 lb 12.8 oz)   LMP  (LMP Unknown)   SpO2 95%   BMI 26.48 kg/m       Pt alert to self only. Rambling speech, mumbling at times. Yelling out at times. Lavender patches given for relaxation. Denies pain, PAINAD score 0. Assist of 2 with repositioning in bed and perineum care. Groin and perineum red and excoriated. Barrier cream applied after incontinence cares. 1 loose stool. PIV SL. Weak, congested cough. Mech soft diet. Plan: discharge to [facility] tomorrow at 1300 via  transport.              "

## 2022-03-22 NOTE — PROGRESS NOTES
"Pt alert to self only. VSS but BP slightly elevated. Assist x 2. Incontinent B&B. Rambling speech. T & R q2hr. Groin and perineum red and excoriated. Had 1 loose stool. PIV SL. On mechanical soft diet.     Plan is to discharge today to [facility] TCU today at 1300 via WC transport.    BP (!) 153/68 (BP Location: Right arm, Patient Position: Semi-Burr's, Cuff Size: Adult Regular)   Pulse 73   Temp 97.8  F (36.6  C) (Axillary)   Resp 24   Ht 1.575 m (5' 2\")   Wt 65.7 kg (144 lb 12.8 oz)   LMP  (LMP Unknown)   SpO2 97%   BMI 26.48 kg/m      "

## 2022-03-22 NOTE — PROGRESS NOTES
Care Management Discharge Note    Discharge Date: 03/22/2022     Discharge Disposition: [facility]    Discharge Services: PT/OT     Discharge Transportation:  [facility] WC    Private pay costs discussed: transportation costs    PAS Confirmation Code:  [id number]  Patient/family educated on Medicare website which has current facility and service quality ratings: Yes     Education Provided on the Discharge Plan:  Yes  Persons Notified of Discharge Plans: Patient's daughter-in-law Corwin Lozano [facility] admissions [phone]  Patient/Family in Agreement with the Plan: Yes    Handoff Referral Completed: Yes    Additional Information:  Orders to be faxed. [facility] WC transport at 1300. Daughter in law Marta updated via phone today. Left  for admissions at TCU to provide transport time. Requested orders from MD to be signed asap-facility notified. ESPERANZA will continue to follow.     ONEL Stanford

## 2022-03-22 NOTE — PLAN OF CARE
Physical Therapy Discharge Summary    Reason for therapy discharge:    Discharged to transitional care facility.    Progress towards therapy goal(s). See goals on Care Plan in [facility] electronic health record for goal details.  Goals not met.  Barriers to achieving goals:   discharge from facility.    Therapy recommendation(s):    Continued therapy is recommended.  Rationale/Recommendations:  PT eval and treat at TCU.        **Pt not seen by discharging therapist on this date, note written based on previous treating therapist's notes and recommendations

## 2022-03-23 ENCOUNTER — TRANSITIONAL CARE UNIT VISIT (OUTPATIENT)
Dept: GERIATRICS | Facility: CLINIC | Age: 87
End: 2022-03-23
Payer: MEDICARE

## 2022-03-23 ENCOUNTER — LAB REQUISITION (OUTPATIENT)
Dept: LAB | Facility: CLINIC | Age: 87
End: 2022-03-23
Payer: MEDICARE

## 2022-03-23 VITALS
BODY MASS INDEX: 26.48 KG/M2 | OXYGEN SATURATION: 95 % | HEIGHT: 62 IN | SYSTOLIC BLOOD PRESSURE: 137 MMHG | HEART RATE: 74 BPM | RESPIRATION RATE: 18 BRPM | DIASTOLIC BLOOD PRESSURE: 65 MMHG | TEMPERATURE: 98.2 F

## 2022-03-23 DIAGNOSIS — F31.9 BIPOLAR AFFECTIVE DISORDER, REMISSION STATUS UNSPECIFIED (H): ICD-10-CM

## 2022-03-23 DIAGNOSIS — U07.1 INFECTION DUE TO 2019 NOVEL CORONAVIRUS: Primary | ICD-10-CM

## 2022-03-23 DIAGNOSIS — J96.01 ACUTE RESPIRATORY FAILURE WITH HYPOXIA (H): ICD-10-CM

## 2022-03-23 DIAGNOSIS — Z85.3 PERSONAL HISTORY OF MALIGNANT NEOPLASM OF BREAST: ICD-10-CM

## 2022-03-23 DIAGNOSIS — I48.91 ATRIAL FIBRILLATION WITH RVR (H): ICD-10-CM

## 2022-03-23 DIAGNOSIS — Z51.81 ENCOUNTER FOR THERAPEUTIC DRUG LEVEL MONITORING: ICD-10-CM

## 2022-03-23 DIAGNOSIS — I10 ESSENTIAL (PRIMARY) HYPERTENSION: ICD-10-CM

## 2022-03-23 DIAGNOSIS — I50.30 HEART FAILURE WITH PRESERVED EJECTION FRACTION, NYHA CLASS I (H): ICD-10-CM

## 2022-03-23 PROCEDURE — 99306 1ST NF CARE HIGH MDM 50: CPT | Performed by: FAMILY MEDICINE

## 2022-03-23 NOTE — LETTER
3/23/2022        RE: Lennie Mar  [address]  [facility] [address]        M [facility] GERIATRIC SERVICES    Facility:  [facility] ([facility]) [[id number]]  Code Status: DNR      CHIEF COMPLAINT/REASON FOR VISIT:  Chief Complaint   Patient presents with     Hospital F/U       HPI:   Lennie is a 89 year old female who does have a history of bipolar disorder and some alcohol dependence in the past with some anxiety.  She also is chronic kidney disease stage III and history of smoking was recently admitted to the hospital on 3/10/2022 for acute hypoxic respiratory failure was found to have COVID-19 and was admitted to the hospital.  Admission she had been on Oxymizer 5 L wean down and in this note that I am reviewing was able to take her off the oxygen at 320.  Sats were 94 to 96% on room air.  She has been vaccinated at this time.  There was no acute disease on chest x-ray however.  She does have fluid overload was given a dose of 40 mg of Lasix on 315 and and 60 mg IV x1.  She was on the dry side at that time and her creatinine did improve.  Respiratory status likely due to Covid pneumonia and she was put on dexamethasone.  Not on that and at this time secondary to question of behaviors given her bipolar disorder.  Ejection fraction was done in the hospital was 75 to 80% and she did have rapid A. fib and my discussion with son she did not have that before.  She is on Eliquis for this at this time.    Patient was also treated for UTI in the hospital and she is also on lithium.    Patient seen in her bed comfortably.  She does a speech impediment but she did does answer questions appropriately.  She seems somewhat nervous but did calm down after we spoke a little bit.  And did follow commands very well.    She is in no pain at this time and there is no signs of any distress.    Past Medical History:  Past Medical History:   Diagnosis Date     Alcohol dependence in remission (H)      Anxiety       Asymptomatic varicose veins      Bipolar disorder, unspecified (H)      CKD (chronic kidney disease) stage 3, GFR 30-59 ml/min (H) 2/18/2014     History of smoking      Hyperparathyroidism (H)      Infection due to 2019 novel coronavirus 3/10/2022     Memory loss      Muscle weakness (generalized) 6/23/2008     Severe depression (H)      Subdural hygroma     chronic.  no surgeries     Tremor of unknown origin            Surgical History:  Past Surgical History:   Procedure Laterality Date     APPENDECTOMY OPEN  1947     CATARACT IOL, RT/LT       COLONOSCOPY WITH CO2 INSUFFLATION  2/29/2012    Procedure:COLONOSCOPY WITH CO2 INSUFFLATION; Surgeon:GAYLE REYNA; [facility]     CYSTOCELE REPAIR  1972     CYSTOSCOPY, INSERT STENT URETHRA, COMBINED  1999     CYSTOSCOPY, RETROGRADES, INSERT STENT URETER(S), COMBINED  2/29/2012    Procedure:COMBINED CYSTOSCOPY, RETROGRADES, INSERT STENT URETER(S); Surgeon:ANT ESPINOZA; [facility]     DECOMPRESSION LUMBAR ONE LEVEL  8/9/2013    Procedure: DECOMPRESSION LUMBAR ONE LEVEL;  Posterior Decompression Right Lumbar 5- Sacral 1;  Surgeon: You Zavala MD;  Location: [facility] OR     HC TOOTH EXTRACTION W/FORCEP       HYSTERECTOMY, CATA  1963     IRRIGATION AND DEBRIDEMENT ORAL, COMBINED  1982    For treatment of gingivitis     LAPAROSCOPIC ASSISTED COLECTOMY  2/29/2012    Procedure:LAPAROSCOPIC ASSISTED COLECTOMY; Cysto, Bilateral Stent placement (both stents removed at end of case)- Yanet ACOSTA. Laparoscopic Extended Right Venu Colectomy with CO2 Colonoscopy and polyp removal-Judah; Surgeon:GAYLE REYNA; Location:[facility] OR     LIGATN/STRIP LONG OR SHORT SAPHEN  1955     MASTECTOMY PARTIAL WITH SENTINEL NODE Left 11/19/2018    Procedure: Left Mastectomy, Left [place] Lymph Node Biopsy;  Surgeon: Nahun Betancourt MD;  Location: [facility] OR     STRIP VEIN BILATERAL  1964     SURGICAL HISTORY OF -   1999    ureter surgery for blockage.       Family History:    Family History   Problem Relation Age of Onset     C.A.D. Mother          at age 47 Heart failure, Rhumatic Fever     Cerebrovascular Disease Father          at age 79     Diabetes Father         type 2     Breast Cancer Sister 84        99 year old sister     Circulatory Maternal Grandmother         vericose veins     C.A.D. Paternal Grandfather      Gastrointestinal Disease Paternal Grandfather         ulcer     Cancer Son          age 51--Melanoma     Cancer Brother          age 50's--Melanoma     Arthritis Sister      Circulatory Sister         vericose veins     Circulatory Sister         vericose veins     Eye Disorder Sister         Cateracts     Respiratory Sister         asthma     Respiratory Brother         COPD     Breast Cancer Niece 60        daughter of 98 yo sister       Social History:    Social History     Socioeconomic History     Marital status:      Spouse name: Not on file     Number of children: Not on file     Years of education: College-2y     Highest education level: Not on file   Occupational History     Occupation: [employer]     Employer: RETIRED     Comment: Retired [employer]    Tobacco Use     Smoking status: Former Smoker     Packs/day: 1.50     Years: 30.00     Pack years: 45.00     Types: Cigarettes     Quit date: 1993     Years since quittin.6     Smokeless tobacco: Never Used   Substance and Sexual Activity     Alcohol use: No     Drug use: No     Sexual activity: Not Currently   Other Topics Concern     Parent/sibling w/ CABG, MI or angioplasty before 65F 55M? Yes     Comment: mother dies from a heart attack at age 47   Social History Narrative    Dairy/d 0-1 servings/d.     Caffeine 2 servings/d    Exercise 0 x week-walks regularly.    Sunscreen used - Yes    Seatbelts used - Yes    Working smoke/CO detectors in the home - Yes    Guns stored in the home - No    Self Breast Exams - Yes    Self Testicular Exam - No    Eye Exam up to date - Yes      Dental Exam up to date - Yes  2010    Pap Smear up to date - No    Mammogram up to date - No    PSA up to date - No    Dexa Scan up to date - No    Flex Sig / Colonoscopy up to date - No    Immunizations up to date - Yes  5/2009 Td    Abuse: Current or Past(Physical, Sexual or Emotional)- No    Do you feel safe in your environment - Yes    Updated 3/2010                                 Social Determinants of Health     Financial Resource Strain: Not on file   Food Insecurity: Not on file   Transportation Needs: Not on file   Physical Activity: Not on file   Stress: Not on file   Social Connections: Not on file   Intimate Partner Violence: Not on file   Housing Stability: Not on file       Post Discharge Medication Reconciliation Status: discharge medications reconciled and changed, per note/orders    Current Outpatient Medications   Medication Sig     acetaminophen (TYLENOL) 325 MG tablet Take 3 tablets (975 mg) by mouth every 8 hours as needed for mild pain     alum & mag hydroxide-simethicone (MAALOX) 200-200-20 MG/5ML SUSP suspension Take 30 mLs by mouth every 4 hours as needed for indigestion     apixaban ANTICOAGULANT (ELIQUIS) 2.5 MG tablet Take 1 tablet (2.5 mg) by mouth 2 times daily     ARIPiprazole (ABILIFY) 5 MG tablet Take 2 mg by mouth daily      bisacodyl (DULCOLAX) 10 MG suppository Place 1 suppository (10 mg) rectally daily as needed for constipation     divalproex sodium extended-release (DEPAKOTE ER) 500 MG 24 hr tablet Take 500 mg by mouth At Bedtime     [START ON 3/24/2022] famotidine (PEPCID) 20 MG tablet Take 1 tablet (20 mg) by mouth every 48 hours     guaiFENesin (MUCINEX) 600 MG 12 hr tablet Take 2 tablets (1,200 mg) by mouth 2 times daily     ipratropium-albuterol (COMBIVENT RESPIMAT)  MCG/ACT inhaler Inhale 1 puff into the lungs 4 times daily as needed for shortness of breath / dyspnea or wheezing     letrozole (FEMARA) 2.5 MG tablet TAKE 1 TABLET BY MOUTH EVERY DAY      levothyroxine (SYNTHROID/LEVOTHROID) 75 MCG tablet Take 100 mcg by mouth daily      lithium (ESKALITH) 150 MG capsule Take 150 mg by mouth every morning      magnesium hydroxide (MILK OF MAGNESIA) 400 MG/5ML suspension Take 30 mLs by mouth daily as needed for constipation     melatonin 1 MG TABS tablet Take 1 tablet (1 mg) by mouth nightly as needed for sleep     metoprolol tartrate (LOPRESSOR) 25 MG tablet Take 1 tablet (25 mg) by mouth 2 times daily     miconazole (MICATIN) 2 % external powder Apply topically 2 times daily     ondansetron (ZOFRAN-ODT) 4 MG ODT tab Take 1 tablet (4 mg) by mouth every 6 hours as needed for nausea or vomiting     ORDER FOR DME Equipment being ordered: compression stocking knee high 20-30mmhg     QUEtiapine (SEROQUEL) 25 MG tablet Take 0.5 tablets (12.5 mg) by mouth nightly as needed (restlessness)     senna-docusate (SENOKOT-S/PERICOLACE) 8.6-50 MG tablet Take 1 tablet by mouth 2 times daily as needed for constipation     senna-docusate (SENOKOT-S/PERICOLACE) 8.6-50 MG tablet Take 2 tablets by mouth 2 times daily as needed for constipation     vitamin D3 (CHOLECALCIFEROL) 10 MCG (400 UNIT) capsule Take 2 capsules (800 Units) by mouth daily     zinc Oxide (DESITIN) 40 % paste Apply topically as needed for dry skin or irritation     No current facility-administered medications for this visit.       REVIEW OF SYSTEM: Patient denies any pain fevers chills nausea vomit diarrhea change in vision hearing taste or smell weakness one-sided the chest pain or shortness of breath.  The remainder the review of systems is negative.      PHYSICAL EXAM: Patient is alert.  She is aphasic at this time but she can speak relatively clearly.  I do not know what her baseline is at this time.  Head was normocephalic and atraumatic sclera conjunctiva clear oromucosa was moist nasal discharge.  Neurologic Nasir was nonfocal and affect was pleasant.    Heart sounds were regular with multiple PACs and rate was  well controlled.  Lungs were clear to auscultation there were no crackles rales or wheezes.  Extremities did not show any cyanosis or edema and There was no calf pain and Homans' sign was negative.        LABS: I do not have a discharge summary however TSH is 1.01, T4 was 1.2 she is admitted to the hospital service 140 Peter 146 potassium was 4.2, chloride 118, CO2 was 26, BUN was 52, creatinine 1.27, anion gap was 2, calcium was 9.3, glucose 87.    Vitals; blood pressure 137/65    Pulse 74    Temperature is 98.2    Respirations 18    O2 sats 95%.      ASSESSMENT:    Encounter Diagnoses   Name Primary?     Infection due to 2019 novel coronavirus Yes     Acute respiratory failure with hypoxia (H)      Personal history of malignant neoplasm of breast      Heart failure with preserved ejection fraction, NYHA class I (H)      Atrial fibrillation with RVR (H)      Bipolar affective disorder, remission status unspecified (H)         PLAN: Plan at this time registered dietitian will see if in nutritional supplements she will have nutritional supplements 3 times daily.    Basic metabolic profile be done tomorrow second acute kidney injury to monitor her kidneys.    PT OT will evaluate and treat and also speech therapy evaluate and treat.    After discussion with patient's son his psychiatrist did agree we will stop the dexamethasone at this time it was supposed be total of 10 days treatment she had 6 on the oral dexamethasone.  This is family's decision secondary to her bipolar disorder and she does not do well with steroids.    I will get a discharge summary from the hospital for some clarifications and that is not available yet.    Patient's respiratory rate is 18 and O2 sats 95% on room air so there is no respiratory distress.    I will continue to monitor above medical problems and no other changes to care plan at this time.    1 hour spent on this admission with greater than 50% the time dedicated to coordination care  this included discussion with patient's son over the phone who is a psychiatrist and plan of care with patient.        Electronically signed by: DEVAUGHN JAMIL DO          Sincerely,        DEVAUGHN JAMIL DO

## 2022-03-23 NOTE — PROGRESS NOTES
[facility] GERIATRIC SERVICES    Facility:  [facility] ([facility]) [[id number]]  Code Status: DNR      CHIEF COMPLAINT/REASON FOR VISIT:  Chief Complaint   Patient presents with     Hospital F/U       HPI:   Lennie is a 89 year old female who does have a history of bipolar disorder and some alcohol dependence in the past with some anxiety.  She also is chronic kidney disease stage III and history of smoking was recently admitted to the hospital on 3/10/2022 for acute hypoxic respiratory failure was found to have COVID-19 and was admitted to the hospital.  Admission she had been on Oxymizer 5 L wean down and in this note that I am reviewing was able to take her off the oxygen at 320.  Sats were 94 to 96% on room air.  She has been vaccinated at this time.  There was no acute disease on chest x-ray however.  She does have fluid overload was given a dose of 40 mg of Lasix on 315 and and 60 mg IV x1.  She was on the dry side at that time and her creatinine did improve.  Respiratory status likely due to Covid pneumonia and she was put on dexamethasone.  Not on that and at this time secondary to question of behaviors given her bipolar disorder.  Ejection fraction was done in the hospital was 75 to 80% and she did have rapid A. fib and my discussion with son she did not have that before.  She is on Eliquis for this at this time.    Patient was also treated for UTI in the hospital and she is also on lithium.    Patient seen in her bed comfortably.  She does a speech impediment but she did does answer questions appropriately.  She seems somewhat nervous but did calm down after we spoke a little bit.  And did follow commands very well.    She is in no pain at this time and there is no signs of any distress.    Past Medical History:  Past Medical History:   Diagnosis Date     Alcohol dependence in remission (H)      Anxiety      Asymptomatic varicose veins      Bipolar disorder, unspecified (H)      CKD (chronic kidney disease)  stage 3, GFR 30-59 ml/min (H) 2014     History of smoking      Hyperparathyroidism (H)      Infection due to 2019 novel coronavirus 3/10/2022     Memory loss      Muscle weakness (generalized) 2008     Severe depression (H)      Subdural hygroma     chronic.  no surgeries     Tremor of unknown origin            Surgical History:  Past Surgical History:   Procedure Laterality Date     APPENDECTOMY OPEN       CATARACT IOL, RT/LT       COLONOSCOPY WITH CO2 INSUFFLATION  2012    Procedure:COLONOSCOPY WITH CO2 INSUFFLATION; Surgeon:GAYLE REYNA; Location:[facility]     CYSTOCELE REPAIR       CYSTOSCOPY, INSERT STENT URETHRA, COMBINED       CYSTOSCOPY, RETROGRADES, INSERT STENT URETER(S), COMBINED  2012    Procedure:COMBINED CYSTOSCOPY, RETROGRADES, INSERT STENT URETER(S); Surgeon:ANT ESPINOZA; Location:[facility]     DECOMPRESSION LUMBAR ONE LEVEL  2013    Procedure: DECOMPRESSION LUMBAR ONE LEVEL;  Posterior Decompression Right Lumbar 5- Sacral 1;  Surgeon: You Zavala MD;  Location: [facility] OR     HC TOOTH EXTRACTION W/FORCEP       HYSTERECTOMY, CATA       IRRIGATION AND DEBRIDEMENT ORAL, COMBINED      For treatment of gingivitis     LAPAROSCOPIC ASSISTED COLECTOMY  2012    Procedure:LAPAROSCOPIC ASSISTED COLECTOMY; Cysto, Bilateral Stent placement (both stents removed at end of case)- Yanet ACOSTA. Laparoscopic Extended Right Venu Colectomy with CO2 Colonoscopy and polyp removal-Judah; Surgeon:GAYLE REYNA; Location[facility]     LIGATN/STRIP LONG OR SHORT SAPHEN       MASTECTOMY PARTIAL WITH SENTINEL NODE Left 2018    Procedure: Left Mastectomy, Left [place] Lymph Node Biopsy;  Surgeon: aNhun Betancourt MD;  Location: [facility] OR     STRIP VEIN BILATERAL  1964     SURGICAL HISTORY OF -       ureter surgery for blockage.       Family History:   Family History   Problem Relation Age of Onset     C.A.D. Mother          at age 47 Heart failure,  Rhumatic Fever     Cerebrovascular Disease Father          at age 79     Diabetes Father         type 2     Breast Cancer Sister 84        99 year old sister     Circulatory Maternal Grandmother         vericose veins     C.A.D. Paternal Grandfather      Gastrointestinal Disease Paternal Grandfather         ulcer     Cancer Son          age 51--Melanoma     Cancer Brother          age 50's--Melanoma     Arthritis Sister      Circulatory Sister         vericose veins     Circulatory Sister         vericose veins     Eye Disorder Sister         Cateracts     Respiratory Sister         asthma     Respiratory Brother         COPD     Breast Cancer Niece 60        daughter of 98 yo sister       Social History:    Social History     Socioeconomic History     Marital status:      Spouse name: Not on file     Number of children: Not on file     Years of education: College-2y     Highest education level: Not on file   Occupational History     Occupation: [employer]     Employer: RETIRED     Comment: Retired in1992   Tobacco Use     Smoking status: Former Smoker     Packs/day: 1.50     Years: 30.00     Pack years: 45.00     Types: Cigarettes     Quit date: 1993     Years since quittin.6     Smokeless tobacco: Never Used   Substance and Sexual Activity     Alcohol use: No     Drug use: No     Sexual activity: Not Currently   Other Topics Concern     Parent/sibling w/ CABG, MI or angioplasty before 65F 55M? Yes     Comment: mother dies from a heart attack at age 47   Social History Narrative    Dairy/d 0-1 servings/d.     Caffeine 2 servings/d    Exercise 0 x week-walks regularly.    Sunscreen used - Yes    Seatbelts used - Yes    Working smoke/CO detectors in the home - Yes    Guns stored in the home - No    Self Breast Exams - Yes    Self Testicular Exam - No    Eye Exam up to date - Yes 2009    Dental Exam up to date - Yes  2010    Pap Smear up to date - No    Mammogram up to date - No    PSA  up to date - No    Dexa Scan up to date - No    Flex Sig / Colonoscopy up to date - No    Immunizations up to date - Yes  5/2009 Td    Abuse: Current or Past(Physical, Sexual or Emotional)- No    Do you feel safe in your environment - Yes    Updated 3/2010                                 Social Determinants of Health     Financial Resource Strain: Not on file   Food Insecurity: Not on file   Transportation Needs: Not on file   Physical Activity: Not on file   Stress: Not on file   Social Connections: Not on file   Intimate Partner Violence: Not on file   Housing Stability: Not on file       Post Discharge Medication Reconciliation Status: discharge medications reconciled and changed, per note/orders    Current Outpatient Medications   Medication Sig     acetaminophen (TYLENOL) 325 MG tablet Take 3 tablets (975 mg) by mouth every 8 hours as needed for mild pain     alum & mag hydroxide-simethicone (MAALOX) 200-200-20 MG/5ML SUSP suspension Take 30 mLs by mouth every 4 hours as needed for indigestion     apixaban ANTICOAGULANT (ELIQUIS) 2.5 MG tablet Take 1 tablet (2.5 mg) by mouth 2 times daily     ARIPiprazole (ABILIFY) 5 MG tablet Take 2 mg by mouth daily      bisacodyl (DULCOLAX) 10 MG suppository Place 1 suppository (10 mg) rectally daily as needed for constipation     divalproex sodium extended-release (DEPAKOTE ER) 500 MG 24 hr tablet Take 500 mg by mouth At Bedtime     [START ON 3/24/2022] famotidine (PEPCID) 20 MG tablet Take 1 tablet (20 mg) by mouth every 48 hours     guaiFENesin (MUCINEX) 600 MG 12 hr tablet Take 2 tablets (1,200 mg) by mouth 2 times daily     ipratropium-albuterol (COMBIVENT RESPIMAT)  MCG/ACT inhaler Inhale 1 puff into the lungs 4 times daily as needed for shortness of breath / dyspnea or wheezing     letrozole (FEMARA) 2.5 MG tablet TAKE 1 TABLET BY MOUTH EVERY DAY     levothyroxine (SYNTHROID/LEVOTHROID) 75 MCG tablet Take 100 mcg by mouth daily      lithium (ESKALITH) 150 MG  capsule Take 150 mg by mouth every morning      magnesium hydroxide (MILK OF MAGNESIA) 400 MG/5ML suspension Take 30 mLs by mouth daily as needed for constipation     melatonin 1 MG TABS tablet Take 1 tablet (1 mg) by mouth nightly as needed for sleep     metoprolol tartrate (LOPRESSOR) 25 MG tablet Take 1 tablet (25 mg) by mouth 2 times daily     miconazole (MICATIN) 2 % external powder Apply topically 2 times daily     ondansetron (ZOFRAN-ODT) 4 MG ODT tab Take 1 tablet (4 mg) by mouth every 6 hours as needed for nausea or vomiting     ORDER FOR DME Equipment being ordered: compression stocking knee high 20-30mmhg     QUEtiapine (SEROQUEL) 25 MG tablet Take 0.5 tablets (12.5 mg) by mouth nightly as needed (restlessness)     senna-docusate (SENOKOT-S/PERICOLACE) 8.6-50 MG tablet Take 1 tablet by mouth 2 times daily as needed for constipation     senna-docusate (SENOKOT-S/PERICOLACE) 8.6-50 MG tablet Take 2 tablets by mouth 2 times daily as needed for constipation     vitamin D3 (CHOLECALCIFEROL) 10 MCG (400 UNIT) capsule Take 2 capsules (800 Units) by mouth daily     zinc Oxide (DESITIN) 40 % paste Apply topically as needed for dry skin or irritation     No current facility-administered medications for this visit.       REVIEW OF SYSTEM: Patient denies any pain fevers chills nausea vomit diarrhea change in vision hearing taste or smell weakness one-sided the chest pain or shortness of breath.  The remainder the review of systems is negative.      PHYSICAL EXAM: Patient is alert.  She is aphasic at this time but she can speak relatively clearly.  I do not know what her baseline is at this time.  Head was normocephalic and atraumatic sclera conjunctiva clear oromucosa was moist nasal discharge.  Neurologic Nasir was nonfocal and affect was pleasant.    Heart sounds were regular with multiple PACs and rate was well controlled.  Lungs were clear to auscultation there were no crackles rales or wheezes.  Extremities did  not show any cyanosis or edema and There was no calf pain and Homans' sign was negative.        LABS: I do not have a discharge summary however TSH is 1.01, T4 was 1.2 she is admitted to the hospital service 140 [address] 146 potassium was 4.2, chloride 118, CO2 was 26, BUN was 52, creatinine 1.27, anion gap was 2, calcium was 9.3, glucose 87.    Vitals; blood pressure 137/65    Pulse 74    Temperature is 98.2    Respirations 18    O2 sats 95%.      ASSESSMENT:    Encounter Diagnoses   Name Primary?     Infection due to 2019 novel coronavirus Yes     Acute respiratory failure with hypoxia (H)      Personal history of malignant neoplasm of breast      Heart failure with preserved ejection fraction, NYHA class I (H)      Atrial fibrillation with RVR (H)      Bipolar affective disorder, remission status unspecified (H)         PLAN: Plan at this time registered dietitian will see if in nutritional supplements she will have nutritional supplements 3 times daily.    Basic metabolic profile be done tomorrow second acute kidney injury to monitor her kidneys.    PT OT will evaluate and treat and also speech therapy evaluate and treat.    After discussion with patient's son his psychiatrist did agree we will stop the dexamethasone at this time it was supposed be total of 10 days treatment she had 6 on the oral dexamethasone.  This is family's decision secondary to her bipolar disorder and she does not do well with steroids.    I will get a discharge summary from the hospital for some clarifications and that is not available yet.    Patient's respiratory rate is 18 and O2 sats 95% on room air so there is no respiratory distress.    I will continue to monitor above medical problems and no other changes to care plan at this time.    1 hour spent on this admission with greater than 50% the time dedicated to coordination care this included discussion with patient's son over the phone who is a psychiatrist and plan of care with  patient.        Electronically signed by: DEVAUGHN JAMIL DO

## 2022-03-24 ENCOUNTER — LAB REQUISITION (OUTPATIENT)
Dept: LAB | Facility: CLINIC | Age: 87
End: 2022-03-24
Payer: MEDICARE

## 2022-03-24 DIAGNOSIS — Z79.01 LONG TERM (CURRENT) USE OF ANTICOAGULANTS: ICD-10-CM

## 2022-03-24 DIAGNOSIS — I48.91 UNSPECIFIED ATRIAL FIBRILLATION (H): ICD-10-CM

## 2022-03-24 LAB
ANION GAP SERPL CALCULATED.3IONS-SCNC: 11 MMOL/L (ref 5–18)
BUN SERPL-MCNC: 46 MG/DL (ref 8–28)
CALCIUM SERPL-MCNC: 9.2 MG/DL (ref 8.5–10.5)
CHLORIDE BLD-SCNC: 115 MMOL/L (ref 98–107)
CO2 SERPL-SCNC: 23 MMOL/L (ref 22–31)
CREAT SERPL-MCNC: 1.47 MG/DL (ref 0.6–1.1)
GFR SERPL CREATININE-BSD FRML MDRD: 33 ML/MIN/1.73M2
GLUCOSE BLD-MCNC: 78 MG/DL (ref 70–125)
POTASSIUM BLD-SCNC: 4.9 MMOL/L (ref 3.5–5)
SODIUM SERPL-SCNC: 149 MMOL/L (ref 136–145)
VALPROATE SERPL-MCNC: 44.1 UG/ML

## 2022-03-24 PROCEDURE — 36415 COLL VENOUS BLD VENIPUNCTURE: CPT | Performed by: FAMILY MEDICINE

## 2022-03-24 PROCEDURE — 80164 ASSAY DIPROPYLACETIC ACD TOT: CPT | Performed by: FAMILY MEDICINE

## 2022-03-24 PROCEDURE — 82310 ASSAY OF CALCIUM: CPT | Performed by: FAMILY MEDICINE

## 2022-03-24 PROCEDURE — 80165 DIPROPYLACETIC ACID FREE: CPT | Performed by: FAMILY MEDICINE

## 2022-03-24 PROCEDURE — P9603 ONE-WAY ALLOW PRORATED MILES: HCPCS | Performed by: FAMILY MEDICINE

## 2022-03-25 LAB
ANION GAP SERPL CALCULATED.3IONS-SCNC: 8 MMOL/L (ref 5–18)
BUN SERPL-MCNC: 44 MG/DL (ref 8–28)
CALCIUM SERPL-MCNC: 9.6 MG/DL (ref 8.5–10.5)
CHLORIDE BLD-SCNC: 114 MMOL/L (ref 98–107)
CO2 SERPL-SCNC: 24 MMOL/L (ref 22–31)
CREAT SERPL-MCNC: 1.35 MG/DL (ref 0.6–1.1)
GFR SERPL CREATININE-BSD FRML MDRD: 36 ML/MIN/1.73M2
GLUCOSE BLD-MCNC: 80 MG/DL (ref 70–125)
LITHIUM SERPL-SCNC: 0.5 MMOL/L
POTASSIUM BLD-SCNC: 4.8 MMOL/L (ref 3.5–5)
SODIUM SERPL-SCNC: 146 MMOL/L (ref 136–145)

## 2022-03-25 PROCEDURE — 36415 COLL VENOUS BLD VENIPUNCTURE: CPT | Performed by: FAMILY MEDICINE

## 2022-03-25 PROCEDURE — 80178 ASSAY OF LITHIUM: CPT | Performed by: FAMILY MEDICINE

## 2022-03-25 PROCEDURE — P9603 ONE-WAY ALLOW PRORATED MILES: HCPCS | Performed by: FAMILY MEDICINE

## 2022-03-25 PROCEDURE — 80048 BASIC METABOLIC PNL TOTAL CA: CPT | Performed by: FAMILY MEDICINE

## 2022-03-27 LAB
VALPROATE FREE MFR SERPL: 25 %
VALPROATE FREE SERPL-MCNC: 12 UG/ML
VALPROATE SERPL-MCNC: 48 UG/ML

## 2022-03-28 ENCOUNTER — LAB REQUISITION (OUTPATIENT)
Dept: LAB | Facility: CLINIC | Age: 87
End: 2022-03-28
Payer: MEDICARE

## 2022-03-28 ENCOUNTER — TRANSITIONAL CARE UNIT VISIT (OUTPATIENT)
Dept: GERIATRICS | Facility: CLINIC | Age: 87
End: 2022-03-28
Payer: MEDICARE

## 2022-03-28 VITALS
BODY MASS INDEX: 25.25 KG/M2 | HEART RATE: 83 BPM | SYSTOLIC BLOOD PRESSURE: 103 MMHG | TEMPERATURE: 98.3 F | WEIGHT: 137.2 LBS | HEIGHT: 62 IN | DIASTOLIC BLOOD PRESSURE: 51 MMHG | OXYGEN SATURATION: 94 % | RESPIRATION RATE: 20 BRPM

## 2022-03-28 DIAGNOSIS — I48.91 UNSPECIFIED ATRIAL FIBRILLATION (H): ICD-10-CM

## 2022-03-28 DIAGNOSIS — Z85.3 PERSONAL HISTORY OF MALIGNANT NEOPLASM OF BREAST: ICD-10-CM

## 2022-03-28 DIAGNOSIS — J96.01 ACUTE RESPIRATORY FAILURE WITH HYPOXIA (H): ICD-10-CM

## 2022-03-28 DIAGNOSIS — F31.9 BIPOLAR AFFECTIVE DISORDER, REMISSION STATUS UNSPECIFIED (H): ICD-10-CM

## 2022-03-28 DIAGNOSIS — I50.30 HEART FAILURE WITH PRESERVED EJECTION FRACTION, NYHA CLASS I (H): ICD-10-CM

## 2022-03-28 DIAGNOSIS — U07.1 INFECTION DUE TO 2019 NOVEL CORONAVIRUS: Primary | ICD-10-CM

## 2022-03-28 DIAGNOSIS — I48.91 ATRIAL FIBRILLATION WITH RVR (H): ICD-10-CM

## 2022-03-28 PROCEDURE — 99309 SBSQ NF CARE MODERATE MDM 30: CPT | Performed by: FAMILY MEDICINE

## 2022-03-28 NOTE — PROGRESS NOTES
[facility] GERIATRIC SERVICES    Facility:  [facility] ([facility]) [[id number]]  Code Status: DNR      CHIEF COMPLAINT/REASON FOR VISIT:  Chief Complaint   Patient presents with     RECHECK       HISTORY:      HPI: Lennie is a 89 year old female who resides here at the [facility] TCU undergoing physical occupational therapy.  She does have chronic kidney disease stage III and also she did have hypernatremia which we did push free water on her.  The wound from 149 over the weekend down to 146.  We will continue to monitor.  She was found to have COVID-19 in the hospital and she was admitted.  She had acute hypoxic respiratory failure and she also some fluid overload was on Lasix in the hospital.  Creatinine has been improving at this time.    Patient does have bipolar disorder and does have aphasia.  Broca's type appearance and she is working with speech therapy.  She did have this in the hospital at this time.  And she is currently does have A. fib is currently on Eliquis.  She is also treated for a UTI in the hospital and she did have a recent lithium level of 0.5.    Patient is doing well this morning she still has a speech impediment however this seems to be improving aphasia is improving at this time.  She is extremely delightful at this time and has no concerns.  Staff have no new concerns.    Past Medical History:   Diagnosis Date     Alcohol dependence in remission (H)      Anxiety      Asymptomatic varicose veins      Bipolar disorder, unspecified (H)      CKD (chronic kidney disease) stage 3, GFR 30-59 ml/min (H) 2014     History of smoking      Hyperparathyroidism (H)      Infection due to 2019 novel coronavirus 3/10/2022     Memory loss      Muscle weakness (generalized) 2008     Severe depression (H)      Subdural hygroma     chronic.  no surgeries     Tremor of unknown origin              Family History   Problem Relation Age of Onset     C.A.D. Mother          at age 47 Heart  failure, Rhumatic Fever     Cerebrovascular Disease Father          at age 79     Diabetes Father         type 2     Breast Cancer Sister 84        99 year old sister     Circulatory Maternal Grandmother         vericose veins     C.A.D. Paternal Grandfather      Gastrointestinal Disease Paternal Grandfather         ulcer     Cancer Son          age 51--Melanoma     Cancer Brother          age 50's--Melanoma     Arthritis Sister      Circulatory Sister         vericose veins     Circulatory Sister         vericose veins     Eye Disorder Sister         Cateracts     Respiratory Sister         asthma     Respiratory Brother         COPD     Breast Cancer Niece 60        daughter of 98 yo sister     Social History     Socioeconomic History     Marital status:      Spouse name: Not on file     Number of children: Not on file     Years of education: College-2y     Highest education level: Not on file   Occupational History     Occupation: [employer]     Employer: RETIRED     Comment: Retired in1992   Tobacco Use     Smoking status: Former Smoker     Packs/day: 1.50     Years: 30.00     Pack years: 45.00     Types: Cigarettes     Quit date: 1993     Years since quittin.6     Smokeless tobacco: Never Used   Substance and Sexual Activity     Alcohol use: No     Drug use: No     Sexual activity: Not Currently   Other Topics Concern     Parent/sibling w/ CABG, MI or angioplasty before 65F 55M? Yes     Comment: mother dies from a heart attack at age 47   Social History Narrative    Dairy/d 0-1 servings/d.     Caffeine 2 servings/d    Exercise 0 x week-walks regularly.    Sunscreen used - Yes    Seatbelts used - Yes    Working smoke/CO detectors in the home - Yes    Guns stored in the home - No    Self Breast Exams - Yes    Self Testicular Exam - No    Eye Exam up to date - Yes     Dental Exam up to date - Yes  2010    Pap Smear up to date - No    Mammogram up to date - No    PSA up to date -  No    Dexa Scan up to date - No    Flex Sig / Colonoscopy up to date - No    Immunizations up to date - Yes  5/2009 Td    Abuse: Current or Past(Physical, Sexual or Emotional)- No    Do you feel safe in your environment - Yes    Updated 3/2010                                 Social Determinants of Health     Financial Resource Strain: Not on file   Food Insecurity: Not on file   Transportation Needs: Not on file   Physical Activity: Not on file   Stress: Not on file   Social Connections: Not on file   Intimate Partner Violence: Not on file   Housing Stability: Not on file         REVIEW OF SYSTEM: Patient denies any pain fevers chills nausea vomit diarrhea change in vision hearing taste or smell weakness one-sided the chest pain shortness of breath.  She is moving her bowels well and urinating without difficulty and the remainder review of systems is negative.    PHYSICAL EXAM: Patient is alert pleasant does not appear to be in acute distress head is normocephalic and atraumatic sclera conjunctive is clear oromucosa was moist nose at discharge.  Heart sounds were irregularly irregular with adequate rate control lungs were clear to auscultation.  Abdomen was soft nontender.  Neurologic Nasir was baseline with the exception of some aphasia and affect was very pleasant and delightful.        LABS: Basic metabolic profile from 3/25/2022; sodium is 146 improved from 149    Potassium was 4.8, chloride was 114, CO2 is 24, anion gap was 8, BUN was 44, creatinine 1.35 which is improved from 1.45, GFR is 36 which is improved from 33.    Vitals; blood pressure 103/51    Pulse 83    Temperature is 98.3    Respirations 20    O2 sats 94%.      ASSESSMENT:   Encounter Diagnoses   Name Primary?     Infection due to 2019 novel coronavirus Yes     Heart failure with preserved ejection fraction, NYHA class I (H)      Acute respiratory failure with hypoxia (H)      Atrial fibrillation with RVR (H)      Personal history of malignant  neoplasm of breast      Bipolar affective disorder, remission status unspecified (H)         PLAN: Plan at this time we will send the labs to her psychiatrist and we will check a basic metabolic profile tomorrow second of hyponatremia chronic kidney disease.  Friday the sodium was 146 which is down from 149 so I will check it again tomorrow.    I will not touch her psych meds without the psychiatrist at this time however she seems to be doing okay.    Her speech impediment could be tar dive dyskinesia or she could had a stroke at this time however would be no new changes to care plan at this time and will continue with physical and Occupational Therapy.        Electronically signed by: DEVAUGHN JAMIL DO

## 2022-03-28 NOTE — LETTER
3/28/2022        RE: Lennie Mar  [address]  [place] [address]        [facility] GERIATRIC SERVICES    Facility:  [facility] ([facility]) [[id number]]  Code Status: DNR      CHIEF COMPLAINT/REASON FOR VISIT:  Chief Complaint   Patient presents with     RECHECK       HISTORY:      HPI: Lennie is a 89 year old female [facility].  She does have chronic kidney disease stage III and also she did have hypernatremia which we did push free water on her.  The wound from 149 over the weekend down to 146.  We will continue to monitor.  She was found to have COVID-19 in the hospital and she was admitted.  She had acute hypoxic respiratory failure and she also some fluid overload was on Lasix in the hospital.  Creatinine has been improving at this time.    Patient does have bipolar disorder and does have aphasia.  Broca's type appearance and she is working with speech therapy.  She did have this in the hospital at this time.  And she is currently does have A. fib is currently on Eliquis.  She is also treated for a UTI in the hospital and she did have a recent lithium level of 0.5.    Patient is doing well this morning she still has a speech impediment however this seems to be improving aphasia is improving at this time.  She is extremely delightful at this time and has no concerns.  Staff have no new concerns.    Past Medical History:   Diagnosis Date     Alcohol dependence in remission (H)      Anxiety      Asymptomatic varicose veins      Bipolar disorder, unspecified (H)      CKD (chronic kidney disease) stage 3, GFR 30-59 ml/min (H) 2/18/2014     History of smoking      Hyperparathyroidism (H)      Infection due to 2019 novel coronavirus 3/10/2022     Memory loss      Muscle weakness (generalized) 6/23/2008     Severe depression (H)      Subdural hygroma     chronic.  no surgeries     Tremor of unknown origin               Family History   Problem Relation Age of Onset     C.A.D. Mother          at age 47 Heart failure, Rhumatic Fever     Cerebrovascular Disease Father          at age 79     Diabetes Father         type 2     Breast Cancer Sister 84        99 year old sister     Circulatory Maternal Grandmother         vericose veins     C.A.D. Paternal Grandfather      Gastrointestinal Disease Paternal Grandfather         ulcer     Cancer Son          age 51--Melanoma     Cancer Brother          age 50's--Melanoma     Arthritis Sister      Circulatory Sister         vericose veins     Circulatory Sister         vericose veins     Eye Disorder Sister         Cateracts     Respiratory Sister         asthma     Respiratory Brother         COPD     Breast Cancer Niece 60        daughter of 98 yo sister     Social History     Socioeconomic History     Marital status:      Spouse name: Not on file     Number of children: Not on file     Years of education: College-2y     Highest education level: Not on file   Occupational History     Occupation: [employer]     Employer: RETIRED     Comment: Retired [employer]    Tobacco Use     Smoking status: Former Smoker     Packs/day: 1.50     Years: 30.00     Pack years: 45.00     Types: Cigarettes     Quit date: 1993     Years since quittin.6     Smokeless tobacco: Never Used   Substance and Sexual Activity     Alcohol use: No     Drug use: No     Sexual activity: Not Currently   Other Topics Concern     Parent/sibling w/ CABG, MI or angioplasty before 65F 55M? Yes     Comment: mother dies from a heart attack at age 47   Social History Narrative    Dairy/d 0-1 servings/d.     Caffeine 2 servings/d    Exercise 0 x week-walks regularly.    Sunscreen used - Yes    Seatbelts used - Yes    Working smoke/CO detectors in the home - Yes    Guns stored in the home - No    Self Breast Exams - Yes    Self Testicular Exam - No    Eye Exam up to date - Yes 2009    Dental Exam up to  date - Yes  2010    Pap Smear up to date - No    Mammogram up to date - No    PSA up to date - No    Dexa Scan up to date - No    Flex Sig / Colonoscopy up to date - No    Immunizations up to date - Yes  5/2009 Td    Abuse: Current or Past(Physical, Sexual or Emotional)- No    Do you feel safe in your environment - Yes    Updated 3/2010                                 Social Determinants of Health     Financial Resource Strain: Not on file   Food Insecurity: Not on file   Transportation Needs: Not on file   Physical Activity: Not on file   Stress: Not on file   Social Connections: Not on file   Intimate Partner Violence: Not on file   Housing Stability: Not on file         REVIEW OF SYSTEM: Patient denies any pain fevers chills nausea vomit diarrhea change in vision hearing taste or smell weakness one-sided the chest pain shortness of breath.  She is moving her bowels well and urinating without difficulty and the remainder review of systems is negative.    PHYSICAL EXAM: Patient is alert pleasant does not appear to be in acute distress head is normocephalic and atraumatic sclera conjunctive is clear oromucosa was moist nose at discharge.  Heart sounds were irregularly irregular with adequate rate control lungs were clear to auscultation.  Abdomen was soft nontender.  Neurologic Nasir was baseline with the exception of some aphasia and affect was very pleasant and delightful.        LABS: Basic metabolic profile from 3/25/2022; sodium is 146 improved from 149    Potassium was 4.8, chloride was 114, CO2 is 24, anion gap was 8, BUN was 44, creatinine 1.35 which is improved from 1.45, GFR is 36 which is improved from 33.    Vitals; blood pressure 103/51    Pulse 83    Temperature is 98.3    Respirations 20    O2 sats 94%.      ASSESSMENT:   Encounter Diagnoses   Name Primary?     Infection due to 2019 novel coronavirus Yes     Heart failure with preserved ejection fraction, NYHA class I (H)      Acute respiratory failure  with hypoxia (H)      Atrial fibrillation with RVR (H)      Personal history of malignant neoplasm of breast      Bipolar affective disorder, remission status unspecified (H)         PLAN: Plan at this time we will send the labs to her psychiatrist and we will check a basic metabolic profile tomorrow second of hyponatremia chronic kidney disease.  Friday the sodium was 146 which is down from 149 so I will check it again tomorrow.    I will not touch her psych meds without the psychiatrist at this time however she seems to be doing okay.    Her speech impediment could be tar dive dyskinesia or she could had a stroke at this time however would be no new changes to care plan at this time and will continue with physical and Occupational Therapy.        Electronically signed by: DEVAUGHN JAMIL DO        Sincerely,        DEVAUGHN JAMIL DO

## 2022-03-29 LAB
ANION GAP SERPL CALCULATED.3IONS-SCNC: 10 MMOL/L (ref 5–18)
BUN SERPL-MCNC: 32 MG/DL (ref 8–28)
CALCIUM SERPL-MCNC: 9.6 MG/DL (ref 8.5–10.5)
CHLORIDE BLD-SCNC: 115 MMOL/L (ref 98–107)
CO2 SERPL-SCNC: 20 MMOL/L (ref 22–31)
CREAT SERPL-MCNC: 1.21 MG/DL (ref 0.6–1.1)
GFR SERPL CREATININE-BSD FRML MDRD: 41 ML/MIN/1.73M2
GLUCOSE BLD-MCNC: 88 MG/DL (ref 70–125)
POTASSIUM BLD-SCNC: 4.2 MMOL/L (ref 3.5–5)
SODIUM SERPL-SCNC: 145 MMOL/L (ref 136–145)

## 2022-03-29 PROCEDURE — P9604 ONE-WAY ALLOW PRORATED TRIP: HCPCS | Performed by: FAMILY MEDICINE

## 2022-03-29 PROCEDURE — 80048 BASIC METABOLIC PNL TOTAL CA: CPT | Performed by: FAMILY MEDICINE

## 2022-03-29 PROCEDURE — 36415 COLL VENOUS BLD VENIPUNCTURE: CPT | Performed by: FAMILY MEDICINE

## 2022-03-31 ENCOUNTER — TRANSITIONAL CARE UNIT VISIT (OUTPATIENT)
Dept: GERIATRICS | Facility: CLINIC | Age: 87
End: 2022-03-31
Payer: MEDICARE

## 2022-03-31 VITALS
WEIGHT: 143.3 LBS | HEIGHT: 62 IN | BODY MASS INDEX: 26.37 KG/M2 | HEART RATE: 67 BPM | TEMPERATURE: 97.8 F | RESPIRATION RATE: 20 BRPM | DIASTOLIC BLOOD PRESSURE: 57 MMHG | SYSTOLIC BLOOD PRESSURE: 114 MMHG | OXYGEN SATURATION: 99 %

## 2022-03-31 DIAGNOSIS — F31.9 BIPOLAR AFFECTIVE DISORDER, REMISSION STATUS UNSPECIFIED (H): ICD-10-CM

## 2022-03-31 DIAGNOSIS — J96.01 ACUTE RESPIRATORY FAILURE WITH HYPOXIA (H): ICD-10-CM

## 2022-03-31 DIAGNOSIS — Z85.3 PERSONAL HISTORY OF MALIGNANT NEOPLASM OF BREAST: ICD-10-CM

## 2022-03-31 DIAGNOSIS — I48.91 ATRIAL FIBRILLATION WITH RVR (H): ICD-10-CM

## 2022-03-31 DIAGNOSIS — U07.1 INFECTION DUE TO 2019 NOVEL CORONAVIRUS: Primary | ICD-10-CM

## 2022-03-31 DIAGNOSIS — I50.30 HEART FAILURE WITH PRESERVED EJECTION FRACTION, NYHA CLASS I (H): ICD-10-CM

## 2022-03-31 PROCEDURE — 99309 SBSQ NF CARE MODERATE MDM 30: CPT | Performed by: FAMILY MEDICINE

## 2022-03-31 NOTE — PROGRESS NOTES
[facility] GERIATRIC SERVICES    Facility:  [facility] ([facility]) [[id number]]  Code Status: DNR      CHIEF COMPLAINT/REASON FOR VISIT:  Chief Complaint   Patient presents with     RECHECK       HISTORY:      HPI: Lennie is a 89 year old female who is currently residing in TCU undergoing physical occupational therapy and medical monitoring for multiple medical problems.  She does have chronic kidney disease stage III and and she was recently hospitalized for COVID-19.  She did have fluid overload was on Lasix in the hospital also had hypoxic respiratory failure.  Creatinine has been improving at this time and her sodium was 149 every push free water and is down to normal range at this time.    She does have bipolar disorder she is currently medicated for this and she has Broca's type of aphasia versus tar dive dyskinesias.  However his speech has improved over the last few days.  She does have atrial fibrillation and she is on Eliquis at this time and was also treated for urinary tract infection the hospital.  Lithium and Depakote levels were reassuring here.    Patient is sitting in a chair alert and pleasant she did remember me from last visit at this time and her speech is much better.  She can articulate everything and answer questions appropriately without any issues.  She has no pain issues at this time and she is at her baseline shortness of breath.  Her respiratory rate and O2 sats are fine at this time and she denies any other issues.    Her speech when she came in was very poor and however now it is much better.  Her sodium is back to normal at this time and her creatinine is improving.    Past Medical History:   Diagnosis Date     Alcohol dependence in remission (H)      Anxiety      Asymptomatic varicose veins      Bipolar disorder, unspecified (H)      CKD (chronic kidney disease) stage 3, GFR 30-59 ml/min (H) 2/18/2014     History of smoking      Hyperparathyroidism (H)      Infection due to 2019 novel  coronavirus 3/10/2022     Memory loss      Muscle weakness (generalized) 2008     Severe depression (H)      Subdural hygroma     chronic.  no surgeries     Tremor of unknown origin              Family History   Problem Relation Age of Onset     C.A.D. Mother          at age 47 Heart failure, Rhumatic Fever     Cerebrovascular Disease Father          at age 79     Diabetes Father         type 2     Breast Cancer Sister 84        99 year old sister     Circulatory Maternal Grandmother         vericose veins     C.A.D. Paternal Grandfather      Gastrointestinal Disease Paternal Grandfather         ulcer     Cancer Son          age 51--Melanoma     Cancer Brother          age 50's--Melanoma     Arthritis Sister      Circulatory Sister         vericose veins     Circulatory Sister         vericose veins     Eye Disorder Sister         Cateracts     Respiratory Sister         asthma     Respiratory Brother         COPD     Breast Cancer Niece 60        daughter of 98 yo sister     Social History     Socioeconomic History     Marital status:      Spouse name: Not on file     Number of children: Not on file     Years of education: College-2y     Highest education level: Not on file   Occupational History     Occupation: [employer]     Employer: RETIRED     Comment: Retired in1992   Tobacco Use     Smoking status: Former Smoker     Packs/day: 1.50     Years: 30.00     Pack years: 45.00     Types: Cigarettes     Quit date: 1993     Years since quittin.6     Smokeless tobacco: Never Used   Substance and Sexual Activity     Alcohol use: No     Drug use: No     Sexual activity: Not Currently   Other Topics Concern     Parent/sibling w/ CABG, MI or angioplasty before 65F 55M? Yes     Comment: mother dies from a heart attack at age 47   Social History Narrative    Dairy/d 0-1 servings/d.     Caffeine 2 servings/d    Exercise 0 x week-walks regularly.    Sunscreen used - Yes    Seatbelts  used - Yes    Working smoke/CO detectors in the home - Yes    Guns stored in the home - No    Self Breast Exams - Yes    Self Testicular Exam - No    Eye Exam up to date - Yes 2009    Dental Exam up to date - Yes  2010    Pap Smear up to date - No    Mammogram up to date - No    PSA up to date - No    Dexa Scan up to date - No    Flex Sig / Colonoscopy up to date - No    Immunizations up to date - Yes  5/2009 Td    Abuse: Current or Past(Physical, Sexual or Emotional)- No    Do you feel safe in your environment - Yes    Updated 3/2010                                 Social Determinants of Health     Financial Resource Strain: Not on file   Food Insecurity: Not on file   Transportation Needs: Not on file   Physical Activity: Not on file   Stress: Not on file   Social Connections: Not on file   Intimate Partner Violence: Not on file   Housing Stability: Not on file         REVIEW OF SYSTEM: Patient denies any pain fevers chills nausea vomit diarrhea change in vision hearing taste or smell weakness one-sided chest pain.  She still has some shortness of breath but no changes at this time and nothing out of the ordinary.  She moves her bowels well urinate without difficulty without any urgency frequency or burning with urination remainder review of systems is negative.      PHYSICAL EXAM: Patient is alert very pleasant articulate does not appear to be in acute distress.  Answering questions appropriately.  Head is normocephalic and atraumatic sclera conjunctive is clear mucosas moist nasal discharge.  Heart sounds are irregularly irregular with adequate rate control lungs are clear to auscultation abdomen soft nontender bowel sounds are positive all 4 quadrants extremities show trace edema bilateral.  Neurologic exam is nonfocal she still has a slight speech impediment but is much improved and she is articulating well.  Do not know her baseline.    Affect is very pleasant.        LABS: On 3/29/2022 sodium was 145,  potassium 4.2, CO2 is 20, anion gap was 10, BUN was 32, creatinine 1.21, GFR was 41.  The creatinine had improved from 1.35 and the GFR has improved from 36.  Lithium level was 0.5.    On 3/24/2022 valproic acid was 44.1 and free valproic acid was 12 and total valproic acid was 48.    Vitals; blood pressure 114/57    Pulse 67    Temperature is 97.8    Respirations 28    O2 sats 99%.      ASSESSMENT:   Encounter Diagnoses   Name Primary?     Infection due to 2019 novel coronavirus Yes     Heart failure with preserved ejection fraction, NYHA class I (H)      Personal history of malignant neoplasm of breast      Acute respiratory failure with hypoxia (H)      Bipolar affective disorder, remission status unspecified (H)      Atrial fibrillation with RVR (H)         PLAN: Plan at this time we will continue with physical occupational therapy at this time basic metabolic profile be done Monday secondary to hypernatremia.  Her creatinine is also improving so we continue to monitor this and I would recommend that we continue with cares as ordered.    I will continue to monitor above medical problems and no other changes to care plan at this time.  Her bipolar affective disorder is doing well at this time she is neither manic nor depressive at this point.  Her acute respiratory failure has pretty much resolved at this time but will continue to monitor.  Her heart failure with preserved ejection fraction is stable at this time and she appears to be euvolemic by exam.        Electronically signed by: DEVAUGHN JAMIL DO

## 2022-03-31 NOTE — LETTER
3/31/2022        RE: Lennie Mar  [address]  [facility] [address]        [facility] GERIATRIC SERVICES    Facility:  [facility] ([facility]) [[id number]]  Code Status: DNR      CHIEF COMPLAINT/REASON FOR VISIT:  Chief Complaint   Patient presents with     RECHECK       HISTORY:      HPI: Lennie is a 89 year old female who is currently residing in TCU undergoing physical occupational therapy and medical monitoring for multiple medical problems.  She does have chronic kidney disease stage III and and she was recently hospitalized for COVID-19.  She did have fluid overload was on Lasix in the hospital also had hypoxic respiratory failure.  Creatinine has been improving at this time and her sodium was 149 every push free water and is down to normal range at this time.    She does have bipolar disorder she is currently medicated for this and she has Broca's type of aphasia versus tar dive dyskinesias.  However his speech has improved over the last few days.  She does have atrial fibrillation and she is on Eliquis at this time and was also treated for urinary tract infection the hospital.  Lithium and Depakote levels were reassuring here.    Patient is sitting in a chair alert and pleasant she did remember me from last visit at this time and her speech is much better.  She can articulate everything and answer questions appropriately without any issues.  She has no pain issues at this time and she is at her baseline shortness of breath.  Her respiratory rate and O2 sats are fine at this time and she denies any other issues.    Her speech when she came in was very poor and however now it is much better.  Her sodium is back to normal at this time and her creatinine is improving.    Past Medical History:   Diagnosis Date     Alcohol dependence in remission (H)      Anxiety      Asymptomatic varicose veins      Bipolar disorder, unspecified (H)      CKD (chronic kidney disease) stage 3, GFR 30-59 ml/min  (H) 2014     History of smoking      Hyperparathyroidism (H)      Infection due to 2019 novel coronavirus 3/10/2022     Memory loss      Muscle weakness (generalized) 2008     Severe depression (H)      Subdural hygroma     chronic.  no surgeries     Tremor of unknown origin              Family History   Problem Relation Age of Onset     C.A.D. Mother          at age 47 Heart failure, Rhumatic Fever     Cerebrovascular Disease Father          at age 79     Diabetes Father         type 2     Breast Cancer Sister 84        99 year old sister     Circulatory Maternal Grandmother         vericose veins     C.A.D. Paternal Grandfather      Gastrointestinal Disease Paternal Grandfather         ulcer     Cancer Son          age 51--Melanoma     Cancer Brother          age 50's--Melanoma     Arthritis Sister      Circulatory Sister         vericose veins     Circulatory Sister         vericose veins     Eye Disorder Sister         Cateracts     Respiratory Sister         asthma     Respiratory Brother         COPD     Breast Cancer Niece 60        daughter of 98 yo sister     Social History     Socioeconomic History     Marital status:      Spouse name: Not on file     Number of children: Not on file     Years of education: College-2y     Highest education level: Not on file   Occupational History     Occupation: [employer]     Employer: RETIRED     Comment: Retired in1992   Tobacco Use     Smoking status: Former Smoker     Packs/day: 1.50     Years: 30.00     Pack years: 45.00     Types: Cigarettes     Quit date: 1993     Years since quittin.6     Smokeless tobacco: Never Used   Substance and Sexual Activity     Alcohol use: No     Drug use: No     Sexual activity: Not Currently   Other Topics Concern     Parent/sibling w/ CABG, MI or angioplasty before 65F 55M? Yes     Comment: mother dies from a heart attack at age 47   Social History Narrative    Dairy/d 0-1 servings/d.      Caffeine 2 servings/d    Exercise 0 x week-walks regularly.    Sunscreen used - Yes    Seatbelts used - Yes    Working smoke/CO detectors in the home - Yes    Guns stored in the home - No    Self Breast Exams - Yes    Self Testicular Exam - No    Eye Exam up to date - Yes 2009    Dental Exam up to date - Yes  2010    Pap Smear up to date - No    Mammogram up to date - No    PSA up to date - No    Dexa Scan up to date - No    Flex Sig / Colonoscopy up to date - No    Immunizations up to date - Yes  5/2009 Td    Abuse: Current or Past(Physical, Sexual or Emotional)- No    Do you feel safe in your environment - Yes    Updated 3/2010                                 Social Determinants of Health     Financial Resource Strain: Not on file   Food Insecurity: Not on file   Transportation Needs: Not on file   Physical Activity: Not on file   Stress: Not on file   Social Connections: Not on file   Intimate Partner Violence: Not on file   Housing Stability: Not on file         REVIEW OF SYSTEM: Patient denies any pain fevers chills nausea vomit diarrhea change in vision hearing taste or smell weakness one-sided chest pain.  She still has some shortness of breath but no changes at this time and nothing out of the ordinary.  She moves her bowels well urinate without difficulty without any urgency frequency or burning with urination remainder review of systems is negative.      PHYSICAL EXAM: Patient is alert very pleasant articulate does not appear to be in acute distress.  Answering questions appropriately.  Head is normocephalic and atraumatic sclera conjunctive is clear mucosas moist nasal discharge.  Heart sounds are irregularly irregular with adequate rate control lungs are clear to auscultation abdomen soft nontender bowel sounds are positive all 4 quadrants extremities show trace edema bilateral.  Neurologic exam is nonfocal she still has a slight speech impediment but is much improved and she is articulating well.  Do  not know her baseline.    Affect is very pleasant.        LABS: On 3/29/2022 sodium was 145, potassium 4.2, CO2 is 20, anion gap was 10, BUN was 32, creatinine 1.21, GFR was 41.  The creatinine had improved from 1.35 and the GFR has improved from 36.  Lithium level was 0.5.    On 3/24/2022 valproic acid was 44.1 and free valproic acid was 12 and total valproic acid was 48.    Vitals; blood pressure 114/57    Pulse 67    Temperature is 97.8    Respirations 28    O2 sats 99%.      ASSESSMENT:   Encounter Diagnoses   Name Primary?     Infection due to 2019 novel coronavirus Yes     Heart failure with preserved ejection fraction, NYHA class I (H)      Personal history of malignant neoplasm of breast      Acute respiratory failure with hypoxia (H)      Bipolar affective disorder, remission status unspecified (H)      Atrial fibrillation with RVR (H)         PLAN: Plan at this time we will continue with physical occupational therapy at this time basic metabolic profile be done Monday secondary to hypernatremia.  Her creatinine is also improving so we continue to monitor this and I would recommend that we continue with cares as ordered.    I will continue to monitor above medical problems and no other changes to care plan at this time.  Her bipolar affective disorder is doing well at this time she is neither manic nor depressive at this point.  Her acute respiratory failure has pretty much resolved at this time but will continue to monitor.  Her heart failure with preserved ejection fraction is stable at this time and she appears to be euvolemic by exam.        Electronically signed by: DEVAUGHN JAMIL DO          Sincerely,        DEVAUGHN JAMIL DO

## 2022-04-03 ENCOUNTER — LAB REQUISITION (OUTPATIENT)
Dept: LAB | Facility: CLINIC | Age: 87
End: 2022-04-03
Payer: MEDICARE

## 2022-04-03 DIAGNOSIS — I48.91 UNSPECIFIED ATRIAL FIBRILLATION (H): ICD-10-CM

## 2022-04-04 ENCOUNTER — TRANSITIONAL CARE UNIT VISIT (OUTPATIENT)
Dept: GERIATRICS | Facility: CLINIC | Age: 87
End: 2022-04-04
Payer: MEDICARE

## 2022-04-04 VITALS
DIASTOLIC BLOOD PRESSURE: 68 MMHG | HEART RATE: 88 BPM | WEIGHT: 144.4 LBS | BODY MASS INDEX: 26.57 KG/M2 | TEMPERATURE: 98.2 F | SYSTOLIC BLOOD PRESSURE: 128 MMHG | OXYGEN SATURATION: 93 % | HEIGHT: 62 IN | RESPIRATION RATE: 20 BRPM

## 2022-04-04 DIAGNOSIS — U07.1 INFECTION DUE TO 2019 NOVEL CORONAVIRUS: Primary | ICD-10-CM

## 2022-04-04 DIAGNOSIS — F31.9 BIPOLAR AFFECTIVE DISORDER, REMISSION STATUS UNSPECIFIED (H): ICD-10-CM

## 2022-04-04 DIAGNOSIS — I48.91 ATRIAL FIBRILLATION WITH RVR (H): ICD-10-CM

## 2022-04-04 DIAGNOSIS — I50.30 HEART FAILURE WITH PRESERVED EJECTION FRACTION, NYHA CLASS I (H): ICD-10-CM

## 2022-04-04 LAB
ANION GAP SERPL CALCULATED.3IONS-SCNC: 8 MMOL/L (ref 5–18)
BUN SERPL-MCNC: 31 MG/DL (ref 8–28)
CALCIUM SERPL-MCNC: 9.3 MG/DL (ref 8.5–10.5)
CHLORIDE BLD-SCNC: 113 MMOL/L (ref 98–107)
CO2 SERPL-SCNC: 24 MMOL/L (ref 22–31)
CREAT SERPL-MCNC: 1.07 MG/DL (ref 0.6–1.1)
GFR SERPL CREATININE-BSD FRML MDRD: 48 ML/MIN/1.73M2
GLUCOSE BLD-MCNC: 76 MG/DL (ref 70–125)
POTASSIUM BLD-SCNC: 4.5 MMOL/L (ref 3.5–5)
SODIUM SERPL-SCNC: 145 MMOL/L (ref 136–145)

## 2022-04-04 PROCEDURE — 99309 SBSQ NF CARE MODERATE MDM 30: CPT | Performed by: FAMILY MEDICINE

## 2022-04-04 PROCEDURE — P9604 ONE-WAY ALLOW PRORATED TRIP: HCPCS | Performed by: FAMILY MEDICINE

## 2022-04-04 PROCEDURE — 36415 COLL VENOUS BLD VENIPUNCTURE: CPT | Performed by: FAMILY MEDICINE

## 2022-04-04 PROCEDURE — 80048 BASIC METABOLIC PNL TOTAL CA: CPT | Performed by: FAMILY MEDICINE

## 2022-04-04 NOTE — PROGRESS NOTES
[facility] GERIATRIC SERVICES    Facility:  [facility] (SNF) [[id number]]  Code Status: DNR      CHIEF COMPLAINT/REASON FOR VISIT:  Chief Complaint   Patient presents with     RECHECK       HISTORY:      HPI: Lennie is a 89 year old female [facility]  Her recent hospitalization was for COVID-19 infection she also some fluid overload at this time.  She has bipolar disorder but she is on appropriate medications and when she came in she did have aphasia which has improved.  No signs of any stroke at this time she is anticoagulated with Eliquis secondary to atrial fibrillation.  While she was here Depakote levels and lithium levels were done and they were reassuring.    She is progressed as anticipated physical occupational therapy.  She can walk with a walker without any issues at this time and there is been no new changes since last exam except for her aphasia is improving.    She is in a chair comfortably does not appear to be in acute distress she smiles when I come in the room and we do of discussion.  Speech is appropriate at this time with occasional stumbling however much improved since last week.  She is moving her bowels well urinate without difficulty has no signs of any infection.  No signs of any respiratory distress.    Past Medical History:   Diagnosis Date     Alcohol dependence in remission (H)      Anxiety      Asymptomatic varicose veins      Bipolar disorder, unspecified (H)      CKD (chronic kidney disease) stage 3, GFR 30-59 ml/min (H) 2014     History of smoking      Hyperparathyroidism (H)      Infection due to 2019 novel coronavirus 3/10/2022     Memory loss      Muscle weakness (generalized) 2008     Severe depression (H)      Subdural hygroma     chronic.  no surgeries     Tremor of unknown origin              Family History   Problem Relation Age of Onset     C.A.D. Mother          at age 47 Heart failure, Rhumatic Fever      Cerebrovascular Disease Father          at age 79     Diabetes Father         type 2     Breast Cancer Sister 84        99 year old sister     Circulatory Maternal Grandmother         vericose veins     C.A.D. Paternal Grandfather      Gastrointestinal Disease Paternal Grandfather         ulcer     Cancer Son          age 51--Melanoma     Cancer Brother          age 50's--Melanoma     Arthritis Sister      Circulatory Sister         vericose veins     Circulatory Sister         vericose veins     Eye Disorder Sister         Cateracts     Respiratory Sister         asthma     Respiratory Brother         COPD     Breast Cancer Niece 60        daughter of 98 yo sister     Social History     Socioeconomic History     Marital status:      Spouse name: Not on file     Number of children: Not on file     Years of education: College-2y     Highest education level: Not on file   Occupational History     Occupation: "[employer]"     Employer: RETIRED     Comment: Retired in1992   Tobacco Use     Smoking status: Former Smoker     Packs/day: 1.50     Years: 30.00     Pack years: 45.00     Types: Cigarettes     Quit date: 1993     Years since quittin.7     Smokeless tobacco: Never Used   Substance and Sexual Activity     Alcohol use: No     Drug use: No     Sexual activity: Not Currently   Other Topics Concern     Parent/sibling w/ CABG, MI or angioplasty before 65F 55M? Yes     Comment: mother dies from a heart attack at age 47   Social History Narrative    Dairy/d 0-1 servings/d.     Caffeine 2 servings/d    Exercise 0 x week-walks regularly.    Sunscreen used - Yes    Seatbelts used - Yes    Working smoke/CO detectors in the home - Yes    Guns stored in the home - No    Self Breast Exams - Yes    Self Testicular Exam - No    Eye Exam up to date - Yes     Dental Exam up to date - Yes  2010    Pap Smear up to date - No    Mammogram up to date - No    PSA up to date - No    Dexa Scan up to date -  No    Flex Sig / Colonoscopy up to date - No    Immunizations up to date - Yes  5/2009 Td    Abuse: Current or Past(Physical, Sexual or Emotional)- No    Do you feel safe in your environment - Yes    Updated 3/2010                                 Social Determinants of Health     Financial Resource Strain: Not on file   Food Insecurity: Not on file   Transportation Needs: Not on file   Physical Activity: Not on file   Stress: Not on file   Social Connections: Not on file   Intimate Partner Violence: Not on file   Housing Stability: Not on file         REVIEW OF SYSTEM: Patient denies any pain fevers chills nausea vomit diarrhea change in vision hearing taste or smell weakness one-sided chest pain shortness of breath.  She is moving her bowels well and urinating without difficulty and the remainder the review of systems is negative.      PHYSICAL EXAM: Patient is alert pleasant not appear to be in acute distress head is normocephalic and atraumatic sclera conjunctive is clear mucosas moist nasal discharge.  Heart sounds were irregularly irregular with adequate rate control lungs were clear auscultation abdomen soft nontender bowel sounds positive all 4 quadrants.  There is trace edema bilateral no change since last exam at this time and neuro exam was nonfocal likely baseline at this time with speech appropriate.        LABS: Basic metabolic profile 3/9/2022 is as follows.  Sodium is 145, potassium 4.2, chloride 115, CO2 is 20, anion gap was 10, BUN was 32, creatinine 1.21, calcium was 9.6, glucose 88, GFR was 41.    Sodium on 3/24/2022 was 149.  And creatinine is 1.47 so we do have improvement.    Vitals; blood pressure 128/68    Pulse 88    Temperature is 98.2    Respirations 20    O2 sats 93%.      ASSESSMENT:   Encounter Diagnoses   Name Primary?     Infection due to 2019 novel coronavirus Yes     Heart failure with preserved ejection fraction, NYHA class I (H)      Bipolar affective disorder, remission status  unspecified (H)      Atrial fibrillation with RVR (H)         PLAN: Plan at this time we will continue to monitor above medical problems and no other changes to care plan at this time.  Care plan was reviewed and is appropriate.    She will continue work with speech therapy until I get a notification that she is going to be discharged however from medical standpoint she is appropriate for medical discharge.    There is no respiratory issues with her COVID-19 infection she seems to have fully recovered without any signs of post Covid sequelae.    Weight is up to 144.5 pounds from admission here 135.  This a 10 pound weight gain.  With some edema in the lower extremities we will continue to check weights on a daily basis.    I do not feel she is in acute congestive heart failure at this time so I will recommend possible starting of a diuretic at some point.        Electronically signed by: DEVAUGHN JAMIL DO

## 2022-04-04 NOTE — LETTER
4/4/2022        RE: Lennie Mar  [address]  [facility] [address]        [facility] GERIATRIC SERVICES    Facility:  [facility] ([facility]) [[id number]]  Code Status: DNR      CHIEF COMPLAINT/REASON FOR VISIT:  Chief Complaint   Patient presents with     RECHECK       HISTORY:      HPI: Lennie is a 89 year old female [facility]  Her recent hospitalization was for COVID-19 infection she also some fluid overload at this time.  She has bipolar disorder but she is on appropriate medications and when she came in she did have aphasia which has improved.  No signs of any stroke at this time she is anticoagulated with Eliquis secondary to atrial fibrillation.  While she was here Depakote levels and lithium levels were done and they were reassuring.    She is progressed as anticipated physical occupational therapy.  She can walk with a walker without any issues at this time and there is been no new changes since last exam except for her aphasia is improving.    She is in a chair comfortably does not appear to be in acute distress she smiles when I come in the room and we do of discussion.  Speech is appropriate at this time with occasional stumbling however much improved since last week.  She is moving her bowels well urinate without difficulty has no signs of any infection.  No signs of any respiratory distress.    Past Medical History:   Diagnosis Date     Alcohol dependence in remission (H)      Anxiety      Asymptomatic varicose veins      Bipolar disorder, unspecified (H)      CKD (chronic kidney disease) stage 3, GFR 30-59 ml/min (H) 2/18/2014     History of smoking      Hyperparathyroidism (H)      Infection due to 2019 novel coronavirus 3/10/2022     Memory loss      Muscle weakness (generalized) 6/23/2008     Severe depression (H)      Subdural hygroma     chronic.  no surgeries     Tremor of unknown origin              Family History   Problem Relation Age  of Onset     C.A.D. Mother          at age 47 Heart failure, Rhumatic Fever     Cerebrovascular Disease Father          at age 79     Diabetes Father         type 2     Breast Cancer Sister 84        99 year old sister     Circulatory Maternal Grandmother         vericose veins     C.A.D. Paternal Grandfather      Gastrointestinal Disease Paternal Grandfather         ulcer     Cancer Son          age 51--Melanoma     Cancer Brother          age 50's--Melanoma     Arthritis Sister      Circulatory Sister         vericose veins     Circulatory Sister         vericose veins     Eye Disorder Sister         Cateracts     Respiratory Sister         asthma     Respiratory Brother         COPD     Breast Cancer Niece 60        daughter of 98 yo sister     Social History     Socioeconomic History     Marital status:      Spouse name: Not on file     Number of children: Not on file     Years of education: College-2y     Highest education level: Not on file   Occupational History     Occupation: [employer]     Employer: RETIRED     Comment: Retired [employer]    Tobacco Use     Smoking status: Former Smoker     Packs/day: 1.50     Years: 30.00     Pack years: 45.00     Types: Cigarettes     Quit date: 1993     Years since quittin.7     Smokeless tobacco: Never Used   Substance and Sexual Activity     Alcohol use: No     Drug use: No     Sexual activity: Not Currently   Other Topics Concern     Parent/sibling w/ CABG, MI or angioplasty before 65F 55M? Yes     Comment: mother dies from a heart attack at age 47   Social History Narrative    Dairy/d 0-1 servings/d.     Caffeine 2 servings/d    Exercise 0 x week-walks regularly.    Sunscreen used - Yes    Seatbelts used - Yes    Working smoke/CO detectors in the home - Yes    Guns stored in the home - No    Self Breast Exams - Yes    Self Testicular Exam - No    Eye Exam up to date - Yes     Dental Exam up to date - Yes      Pap Smear up to  date - No    Mammogram up to date - No    PSA up to date - No    Dexa Scan up to date - No    Flex Sig / Colonoscopy up to date - No    Immunizations up to date - Yes  5/2009 Td    Abuse: Current or Past(Physical, Sexual or Emotional)- No    Do you feel safe in your environment - Yes    Updated 3/2010                                 Social Determinants of Health     Financial Resource Strain: Not on file   Food Insecurity: Not on file   Transportation Needs: Not on file   Physical Activity: Not on file   Stress: Not on file   Social Connections: Not on file   Intimate Partner Violence: Not on file   Housing Stability: Not on file         REVIEW OF SYSTEM: Patient denies any pain fevers chills nausea vomit diarrhea change in vision hearing taste or smell weakness one-sided chest pain shortness of breath.  She is moving her bowels well and urinating without difficulty and the remainder the review of systems is negative.      PHYSICAL EXAM: Patient is alert pleasant not appear to be in acute distress head is normocephalic and atraumatic sclera conjunctive is clear mucosas moist nasal discharge.  Heart sounds were irregularly irregular with adequate rate control lungs were clear auscultation abdomen soft nontender bowel sounds positive all 4 quadrants.  There is trace edema bilateral no change since last exam at this time and neuro exam was nonfocal likely baseline at this time with speech appropriate.        LABS: Basic metabolic profile 3/9/2022 is as follows.  Sodium is 145, potassium 4.2, chloride 115, CO2 is 20, anion gap was 10, BUN was 32, creatinine 1.21, calcium was 9.6, glucose 88, GFR was 41.    Sodium on 3/24/2022 was 149.  And creatinine is 1.47 so we do have improvement.    Vitals; blood pressure 128/68    Pulse 88    Temperature is 98.2    Respirations 20    O2 sats 93%.      ASSESSMENT:   Encounter Diagnoses   Name Primary?     Infection due to 2019 novel coronavirus Yes     Heart failure with preserved  ejection fraction, NYHA class I (H)      Bipolar affective disorder, remission status unspecified (H)      Atrial fibrillation with RVR (H)         PLAN: Plan at this time we will continue to monitor above medical problems and no other changes to care plan at this time.  Care plan was reviewed and is appropriate.    She will continue work with speech therapy until I get a notification that she is going to be discharged however from medical standpoint she is appropriate for medical discharge.    There is no respiratory issues with her COVID-19 infection she seems to have fully recovered without any signs of post Covid sequelae.    Weight is up to 144.5 pounds from admission here 135.  This a 10 pound weight gain.  With some edema in the lower extremities we will continue to check weights on a daily basis.    I do not feel she is in acute congestive heart failure at this time so I will recommend possible starting of a diuretic at some point.        Electronically signed by: DEVAUGHN JAMIL DO          Sincerely,        DEVAUGHN JAMIL, DO

## 2022-04-08 ENCOUNTER — TRANSITIONAL CARE UNIT VISIT (OUTPATIENT)
Dept: GERIATRICS | Facility: CLINIC | Age: 87
End: 2022-04-08
Payer: MEDICARE

## 2022-04-08 VITALS
OXYGEN SATURATION: 95 % | RESPIRATION RATE: 20 BRPM | DIASTOLIC BLOOD PRESSURE: 62 MMHG | WEIGHT: 148.1 LBS | HEART RATE: 80 BPM | BODY MASS INDEX: 27.25 KG/M2 | HEIGHT: 62 IN | SYSTOLIC BLOOD PRESSURE: 124 MMHG | TEMPERATURE: 98.2 F

## 2022-04-08 DIAGNOSIS — F31.9 BIPOLAR AFFECTIVE DISORDER, REMISSION STATUS UNSPECIFIED (H): ICD-10-CM

## 2022-04-08 DIAGNOSIS — I50.30 HEART FAILURE WITH PRESERVED EJECTION FRACTION, NYHA CLASS I (H): ICD-10-CM

## 2022-04-08 DIAGNOSIS — J96.01 ACUTE RESPIRATORY FAILURE WITH HYPOXIA (H): ICD-10-CM

## 2022-04-08 DIAGNOSIS — I48.91 ATRIAL FIBRILLATION WITH RVR (H): ICD-10-CM

## 2022-04-08 DIAGNOSIS — U07.1 INFECTION DUE TO 2019 NOVEL CORONAVIRUS: Primary | ICD-10-CM

## 2022-04-08 PROCEDURE — 99309 SBSQ NF CARE MODERATE MDM 30: CPT | Performed by: FAMILY MEDICINE

## 2022-04-08 NOTE — PROGRESS NOTES
[facility] GERIATRIC SERVICES    Facility:  [facility] ([facility]) [[id number]]  Code Status: DNR      CHIEF COMPLAINT/REASON FOR VISIT:  Chief Complaint   Patient presents with     RECHECK       HISTORY:      HPI: Lennie is a 89 year old female who resides here at the [facility] after hospitalization for COVID-19.  She was fluid overload on Lasix at this time he did have acute hypoxic respiratory failure creatinine is improving at this time sodium is 149 but that did return to normal.  She    She did bipolar disorder currently and some Broca's type aphasia but that has seemed to resolve at this time she is speaking much better at this time.  Depakote lithium levels were unremarkable at this time and she has progressed as anticipated with physical and Occupational Therapy.    She has no new concerns today and staff have no new concerns.    Past Medical History:   Diagnosis Date     Alcohol dependence in remission (H)      Anxiety      Asymptomatic varicose veins      Bipolar disorder, unspecified (H)      CKD (chronic kidney disease) stage 3, GFR 30-59 ml/min (H) 2014     History of smoking      Hyperparathyroidism (H)      Infection due to  novel coronavirus 3/10/2022     Memory loss      Muscle weakness (generalized) 2008     Severe depression (H)      Subdural hygroma     chronic.  no surgeries     Tremor of unknown origin              Family History   Problem Relation Age of Onset     C.A.D. Mother          at age 47 Heart failure, Rhumatic Fever     Cerebrovascular Disease Father          at age 79     Diabetes Father         type 2     Breast Cancer Sister 84        99 year old sister     Circulatory Maternal Grandmother         vericose veins     C.A.D. Paternal Grandfather      Gastrointestinal Disease Paternal Grandfather         ulcer     Cancer Son          age 51--Melanoma     Cancer Brother          age 50's--Melanoma     Arthritis Sister      Circulatory Sister          vericose veins     Circulatory Sister         vericose veins     Eye Disorder Sister         Cateracts     Respiratory Sister         asthma     Respiratory Brother         COPD     Breast Cancer Niece 60        daughter of 98 yo sister     Social History     Socioeconomic History     Marital status:      Spouse name: Not on file     Number of children: Not on file     Years of education: College-2y     Highest education level: Not on file   Occupational History     Occupation: [employer]     Employer: RETIRED     Comment: Retired 1992   Tobacco Use     Smoking status: Former Smoker     Packs/day: 1.50     Years: 30.00     Pack years: 45.00     Types: Cigarettes     Quit date: 1993     Years since quittin.7     Smokeless tobacco: Never Used   Substance and Sexual Activity     Alcohol use: No     Drug use: No     Sexual activity: Not Currently   Other Topics Concern     Parent/sibling w/ CABG, MI or angioplasty before 65F 55M? Yes     Comment: mother dies from a heart attack at age 47   Social History Narrative    Dairy/d 0-1 servings/d.     Caffeine 2 servings/d    Exercise 0 x week-walks regularly.    Sunscreen used - Yes    Seatbelts used - Yes    Working smoke/CO detectors in the home - Yes    Guns stored in the home - No    Self Breast Exams - Yes    Self Testicular Exam - No    Eye Exam up to date - Yes     Dental Exam up to date - Yes      Pap Smear up to date - No    Mammogram up to date - No    PSA up to date - No    Dexa Scan up to date - No    Flex Sig / Colonoscopy up to date - No    Immunizations up to date - Yes  2009 Td    Abuse: Current or Past(Physical, Sexual or Emotional)- No    Do you feel safe in your environment - Yes    Updated 3/2010                                 Social Determinants of Health     Financial Resource Strain: Not on file   Food Insecurity: Not on file   Transportation Needs: Not on file   Physical Activity: Not on file   Stress: Not on file    Social Connections: Not on file   Intimate Partner Violence: Not on file   Housing Stability: Not on file         REVIEW OF SYSTEM: Patient denies any pain fever chills nausea vomit diarrhea change in vision hearing taste or smell weakness one-sided chest pain shortness of breath.  The main review of systems is negative.      PHYSICAL EXAM: Patient is alert pleasant does not appear to be in acute distress she does answer questions appropriately has normocephalic and atraumatic sclera conjunctive is clear mucosas moist nasal discharge.  Heart sounds are irregularly really irregular with adequate rate control lungs are clear abdomen soft nontender.  And she has trace edema bilateral unchanged as last exam.  Neurologic Nasir is nonfocal.    Affect is pleasant at this time no signs of any javan or depression.        LABS: Reviewed.    Vitals; blood pressure 124/62    Pulse is 80    Temperature is 98    Respirations 20    O2 sats 95%.      ASSESSMENT:   Encounter Diagnoses   Name Primary?     Infection due to 2019 novel coronavirus Yes     Bipolar affective disorder, remission status unspecified (H)      Heart failure with preserved ejection fraction, NYHA class I (H)      Atrial fibrillation with RVR (H)      Acute respiratory failure with hypoxia (H)         PLAN: Plan at this time we will continue to monitor above medical problems and no other changes to care plan at this time.  I did review her medications and is stable at this time and no new changes.  There is no signs of any infection at this time I will continue to monitor above medical problems.            Electronically signed by: DEVAUGHN JAMIL,

## 2022-04-08 NOTE — LETTER
2022        RE: Lennie Mar   [address]  [facility] [address]        [facility] GERIATRIC SERVICES    Facility:  [facility] ([facility]) [[id number]]  Code Status: DNR      CHIEF COMPLAINT/REASON FOR VISIT:  Chief Complaint   Patient presents with     RECHECK       HISTORY:      HPI: Lennie is a 89 year old female who [facility] for [facility].  She was fluid overload on Lasix at this time he did have acute hypoxic respiratory failure creatinine is improving at this time sodium is 149 but that did return to normal.  She    She did bipolar disorder currently and some Broca's type aphasia but that has seemed to resolve at this time she is speaking much better at this time.  Depakote lithium levels were unremarkable at this time and she has progressed as anticipated with physical and Occupational Therapy.    She has no new concerns today and staff have no new concerns.    Past Medical History:   Diagnosis Date     Alcohol dependence in remission (H)      Anxiety      Asymptomatic varicose veins      Bipolar disorder, unspecified (H)      CKD (chronic kidney disease) stage 3, GFR 30-59 ml/min (H) 2014     History of smoking      Hyperparathyroidism (H)      Infection due to 2019 novel coronavirus 3/10/2022     Memory loss      Muscle weakness (generalized) 2008     Severe depression (H)      Subdural hygroma     chronic.  no surgeries     Tremor of unknown origin              Family History   Problem Relation Age of Onset     C.A.D. Mother          at age 47 Heart failure, Rhumatic Fever     Cerebrovascular Disease Father          at age 79     Diabetes Father         type 2     Breast Cancer Sister 84        99 year old sister     Circulatory Maternal Grandmother         vericose veins     C.A.D. Paternal Grandfather      Gastrointestinal Disease Paternal Grandfather         ulcer     Cancer Son          age 51--Melanoma      Cancer Brother          age 50's--Melanoma     Arthritis Sister      Circulatory Sister         vericose veins     Circulatory Sister         vericose veins     Eye Disorder Sister         Cateracts     Respiratory Sister         asthma     Respiratory Brother         COPD     Breast Cancer Niece 60        daughter of 98 yo sister     Social History     Socioeconomic History     Marital status:      Spouse name: Not on file     Number of children: Not on file     Years of education: College-2y     Highest education level: Not on file   Occupational History     Occupation: [employer]     Employer: RETIRED     Comment: Retired in1992   Tobacco Use     Smoking status: Former Smoker     Packs/day: 1.50     Years: 30.00     Pack years: 45.00     Types: Cigarettes     Quit date: 1993     Years since quittin.7     Smokeless tobacco: Never Used   Substance and Sexual Activity     Alcohol use: No     Drug use: No     Sexual activity: Not Currently   Other Topics Concern     Parent/sibling w/ CABG, MI or angioplasty before 65F 55M? Yes     Comment: mother dies from a heart attack at age 47   Social History Narrative    Dairy/d 0-1 servings/d.     Caffeine 2 servings/d    Exercise 0 x week-walks regularly.    Sunscreen used - Yes    Seatbelts used - Yes    Working smoke/CO detectors in the home - Yes    Guns stored in the home - No    Self Breast Exams - Yes    Self Testicular Exam - No    Eye Exam up to date - Yes     Dental Exam up to date - Yes      Pap Smear up to date - No    Mammogram up to date - No    PSA up to date - No    Dexa Scan up to date - No    Flex Sig / Colonoscopy up to date - No    Immunizations up to date - Yes  2009 Td    Abuse: Current or Past(Physical, Sexual or Emotional)- No    Do you feel safe in your environment - Yes    Updated 3/2010                                 Social Determinants of Health     Financial Resource Strain: Not on file   Food Insecurity: Not  on file   Transportation Needs: Not on file   Physical Activity: Not on file   Stress: Not on file   Social Connections: Not on file   Intimate Partner Violence: Not on file   Housing Stability: Not on file         REVIEW OF SYSTEM: Patient denies any pain fever chills nausea vomit diarrhea change in vision hearing taste or smell weakness one-sided chest pain shortness of breath.  The main review of systems is negative.      PHYSICAL EXAM: Patient is alert pleasant does not appear to be in acute distress she does answer questions appropriately has normocephalic and atraumatic sclera conjunctive is clear mucosas moist nasal discharge.  Heart sounds are irregularly really irregular with adequate rate control lungs are clear abdomen soft nontender.  And she has trace edema bilateral unchanged as last exam.  Neurologic Nasir is nonfocal.    Affect is pleasant at this time no signs of any javan or depression.        LABS: Reviewed.    Vitals; blood pressure 124/62    Pulse is 80    Temperature is 98    Respirations 20    O2 sats 95%.      ASSESSMENT:   Encounter Diagnoses   Name Primary?     Infection due to 2019 novel coronavirus Yes     Bipolar affective disorder, remission status unspecified (H)      Heart failure with preserved ejection fraction, NYHA class I (H)      Atrial fibrillation with RVR (H)      Acute respiratory failure with hypoxia (H)         PLAN: Plan at this time we will continue to monitor above medical problems and no other changes to care plan at this time.  I did review her medications and is stable at this time and no new changes.  There is no signs of any infection at this time I will continue to monitor above medical problems.            Electronically signed by: DEVAUGHN JAMIL DO          Sincerely,        DEVAUGHN JAMIL DO

## 2022-04-11 ENCOUNTER — TRANSITIONAL CARE UNIT VISIT (OUTPATIENT)
Dept: GERIATRICS | Facility: CLINIC | Age: 87
End: 2022-04-11
Payer: MEDICARE

## 2022-04-11 VITALS
DIASTOLIC BLOOD PRESSURE: 47 MMHG | SYSTOLIC BLOOD PRESSURE: 107 MMHG | BODY MASS INDEX: 26.74 KG/M2 | HEART RATE: 60 BPM | WEIGHT: 145.3 LBS | TEMPERATURE: 97.5 F | HEIGHT: 62 IN | RESPIRATION RATE: 20 BRPM | OXYGEN SATURATION: 97 %

## 2022-04-11 DIAGNOSIS — I50.30 HEART FAILURE WITH PRESERVED EJECTION FRACTION, NYHA CLASS I (H): ICD-10-CM

## 2022-04-11 DIAGNOSIS — Z85.3 PERSONAL HISTORY OF MALIGNANT NEOPLASM OF BREAST: ICD-10-CM

## 2022-04-11 DIAGNOSIS — U07.1 INFECTION DUE TO 2019 NOVEL CORONAVIRUS: Primary | ICD-10-CM

## 2022-04-11 DIAGNOSIS — I48.91 ATRIAL FIBRILLATION WITH RVR (H): ICD-10-CM

## 2022-04-11 DIAGNOSIS — F31.9 BIPOLAR AFFECTIVE DISORDER, REMISSION STATUS UNSPECIFIED (H): ICD-10-CM

## 2022-04-11 PROCEDURE — 99309 SBSQ NF CARE MODERATE MDM 30: CPT | Performed by: FAMILY MEDICINE

## 2022-04-11 NOTE — PROGRESS NOTES
[facility] GERIATRIC SERVICES    Facility:  [facility] ([facility]) [[id number]]  Code Status: FULL CODE      CHIEF COMPLAINT/REASON FOR VISIT:  Chief Complaint   Patient presents with     RECHECK       HISTORY:      HPI: Lennie is a 89 year old female who resides at the [facility].  She does have chronic kidney disease stage III at that is been stable.  And she was recently hospitalized for Covid 19.  She is of a history of fluid overload and had hypoxic respiratory failure.  Creatinine improved sodium improved from 149 to normal range.    She does bipolar disorder is treated appropriately and when she came in she did have aphasia but this has improved.    She is progressed as anticipated with physical and Occupational Therapy.    Patient seen in chair comfortably.  She smiles when I walked in the room and she does address me by name.  Her aphasia has improved to the point she is very articulate with her speech at this time.    Past Medical History:   Diagnosis Date     Alcohol dependence in remission (H)      Anxiety      Asymptomatic varicose veins      Bipolar disorder, unspecified (H)      CKD (chronic kidney disease) stage 3, GFR 30-59 ml/min (H) 2014     History of smoking      Hyperparathyroidism (H)      Infection due to 2019 novel coronavirus 3/10/2022     Memory loss      Muscle weakness (generalized) 2008     Severe depression (H)      Subdural hygroma     chronic.  no surgeries     Tremor of unknown origin              Family History   Problem Relation Age of Onset     C.A.D. Mother          at age 47 Heart failure, Rhumatic Fever     Cerebrovascular Disease Father          at age 79     Diabetes Father         type 2     Breast Cancer Sister 84        99 year old sister     Circulatory Maternal Grandmother         vericose veins     C.A.D. Paternal Grandfather      Gastrointestinal Disease Paternal Grandfather         ulcer     Cancer Son          age 51--Melanoma      Cancer Brother          age 50's--Melanoma     Arthritis Sister      Circulatory Sister         vericose veins     Circulatory Sister         vericose veins     Eye Disorder Sister         Cateracts     Respiratory Sister         asthma     Respiratory Brother         COPD     Breast Cancer Niece 60        daughter of 98 yo sister     Social History     Socioeconomic History     Marital status:      Years of education: College-2y   Occupational History     Occupation: [employer]     Employer: RETIRED     Comment: Retired 1992   Tobacco Use     Smoking status: Former Smoker     Packs/day: 1.50     Years: 30.00     Pack years: 45.00     Types: Cigarettes     Quit date: 1993     Years since quittin.7     Smokeless tobacco: Never Used   Substance and Sexual Activity     Alcohol use: No     Drug use: No     Sexual activity: Not Currently   Other Topics Concern     Parent/sibling w/ CABG, MI or angioplasty before 65F 55M? Yes     Comment: mother dies from a heart attack at age 47   Social History Narrative    Dairy/d 0-1 servings/d.     Caffeine 2 servings/d    Exercise 0 x week-walks regularly.    Sunscreen used - Yes    Seatbelts used - Yes    Working smoke/CO detectors in the home - Yes    Guns stored in the home - No    Self Breast Exams - Yes    Self Testicular Exam - No    Eye Exam up to date - Yes     Dental Exam up to date - Yes      Pap Smear up to date - No    Mammogram up to date - No    PSA up to date - No    Dexa Scan up to date - No    Flex Sig / Colonoscopy up to date - No    Immunizations up to date - Yes  2009 Td    Abuse: Current or Past(Physical, Sexual or Emotional)- No    Do you feel safe in your environment - Yes    Updated 3/2010                                     REVIEW OF SYSTEM: Patient denies any pain fevers chills nausea vomit diarrhea change in vision hearing taste or smell weakness one-sided of the chest pain shortness of breath.  Denies any congeners stool  polyphagia polydipsia polyuria depression or anxiety in the range of the review of systems is negative.      PHYSICAL EXAM: Patient is alert pleasant does not appear to be in acute distress head is normocephalic and intramuscular conjunctive is clear mucosas moist nasal discharge.  I could not see any active canker sores in her mouth at this time.  Heart sounds were irregular regular with no changes in lungs were clear to station.  Abdomen soft nontender extremities did show trace edema bilateral.  Neurologic seems nonfocal and affect was pleasant.        LABS: On 4/4/2022 sodium was 145, potassium 4.5, chloride 113, CO2 is 24, anion gap was 8, BUN is 31, creatinine 1.07, calcium 9.3, glucose 76, GFR was 48.    Vitals; blood pressure 107/47 pulse is 60    Temperature is 97.5    Respirations 20    O2 sats 97%.      ASSESSMENT:   Encounter Diagnoses   Name Primary?     Infection due to 2019 novel coronavirus Yes     Bipolar affective disorder, remission status unspecified (H)      Atrial fibrillation with RVR (H)      Personal history of malignant neoplasm of breast      Heart failure with preserved ejection fraction, NYHA class I (H)         PLAN: Plan at this time I will discuss with therapy and  in time for discharge.  She is recovered very well from Covid 19 infection her bipolar disorder is doing well there is no signs of any depression nor is or any signs of javan.    She does have atrial fibrillation but rate is in good control at this time.  She is currently on apixaban 2.5 mg twice daily.  We will continue that.    Heart failure with preserved ejection fraction she is not show any signs of fluid overload or fluid depletion.    I will continue to monitor above medical problems and no other changes to care plan at this time.  Care plan was reviewed and is appropriate.        Electronically signed by: DEVAUGHN JAMIL DO

## 2022-04-11 NOTE — LETTER
2022        RE: Lennie Mar   [address]  [place] [facility] [address]        [facility] GERIATRIC SERVICES    Facility:  [facility] ([facility]) [[id number]]  Code Status: FULL CODE      CHIEF COMPLAINT/REASON FOR VISIT:  Chief Complaint   Patient presents with     RECHECK       HISTORY:      HPI: Lennie is a 89 year old female who resides at the [facility].  She does have chronic kidney disease stage III at that is been stable.  And she was recently hospitalized for Covid 19.  She is of a history of fluid overload and had hypoxic respiratory failure.  Creatinine improved sodium improved from 149 to normal range.    She does bipolar disorder is treated appropriately and when she came in she did have aphasia but this has improved.    She is progressed as anticipated with physical and Occupational Therapy.    Patient seen in chair comfortably.  She smiles when I walked in the room and she does address me by name.  Her aphasia has improved to the point she is very articulate with her speech at this time.    Past Medical History:   Diagnosis Date     Alcohol dependence in remission (H)      Anxiety      Asymptomatic varicose veins      Bipolar disorder, unspecified (H)      CKD (chronic kidney disease) stage 3, GFR 30-59 ml/min (H) 2014     History of smoking      Hyperparathyroidism (H)      Infection due to 2019 novel coronavirus 3/10/2022     Memory loss      Muscle weakness (generalized) 2008     Severe depression (H)      Subdural hygroma     chronic.  no surgeries     Tremor of unknown origin              Family History   Problem Relation Age of Onset     C.A.D. Mother          at age 47 Heart failure, Rhumatic Fever     Cerebrovascular Disease Father          at age 79     Diabetes Father         type 2     Breast Cancer Sister 84        99 year old sister     Circulatory Maternal Grandmother         vericose veins     C.A.D. Paternal Grandfather       Gastrointestinal Disease Paternal Grandfather         ulcer     Cancer Son          age 51--Melanoma     Cancer Brother          age 50's--Melanoma     Arthritis Sister      Circulatory Sister         vericose veins     Circulatory Sister         vericose veins     Eye Disorder Sister         Cateracts     Respiratory Sister         asthma     Respiratory Brother         COPD     Breast Cancer Niece 60        daughter of 98 yo sister     Social History     Socioeconomic History     Marital status:      Years of education: College-2y   Occupational History     Occupation: [employer]     Employer: RETIRED     Comment: Retired in1992   Tobacco Use     Smoking status: Former Smoker     Packs/day: 1.50     Years: 30.00     Pack years: 45.00     Types: Cigarettes     Quit date: 1993     Years since quittin.7     Smokeless tobacco: Never Used   Substance and Sexual Activity     Alcohol use: No     Drug use: No     Sexual activity: Not Currently   Other Topics Concern     Parent/sibling w/ CABG, MI or angioplasty before 65F 55M? Yes     Comment: mother dies from a heart attack at age 47   Social History Narrative    Dairy/d 0-1 servings/d.     Caffeine 2 servings/d    Exercise 0 x week-walks regularly.    Sunscreen used - Yes    Seatbelts used - Yes    Working smoke/CO detectors in the home - Yes    Guns stored in the home - No    Self Breast Exams - Yes    Self Testicular Exam - No    Eye Exam up to date - Yes     Dental Exam up to date - Yes      Pap Smear up to date - No    Mammogram up to date - No    PSA up to date - No    Dexa Scan up to date - No    Flex Sig / Colonoscopy up to date - No    Immunizations up to date - Yes  2009 Td    Abuse: Current or Past(Physical, Sexual or Emotional)- No    Do you feel safe in your environment - Yes    Updated 3/2010                                     REVIEW OF SYSTEM: Patient denies any pain fevers chills nausea vomit diarrhea change in vision  hearing taste or smell weakness one-sided of the chest pain shortness of breath.  Denies any congeners stool polyphagia polydipsia polyuria depression or anxiety in the range of the review of systems is negative.      PHYSICAL EXAM: Patient is alert pleasant does not appear to be in acute distress head is normocephalic and intramuscular conjunctive is clear mucosas moist nasal discharge.  I could not see any active canker sores in her mouth at this time.  Heart sounds were irregular regular with no changes in lungs were clear to station.  Abdomen soft nontender extremities did show trace edema bilateral.  Neurologic seems nonfocal and affect was pleasant.        LABS: On 4/4/2022 sodium was 145, potassium 4.5, chloride 113, CO2 is 24, anion gap was 8, BUN is 31, creatinine 1.07, calcium 9.3, glucose 76, GFR was 48.    Vitals; blood pressure 107/47 pulse is 60    Temperature is 97.5    Respirations 20    O2 sats 97%.      ASSESSMENT:   Encounter Diagnoses   Name Primary?     Infection due to 2019 novel coronavirus Yes     Bipolar affective disorder, remission status unspecified (H)      Atrial fibrillation with RVR (H)      Personal history of malignant neoplasm of breast      Heart failure with preserved ejection fraction, NYHA class I (H)         PLAN: Plan at this time I will discuss with therapy and  in time for discharge.  She is recovered very well from Covid 19 infection her bipolar disorder is doing well there is no signs of any depression nor is or any signs of javan.    She does have atrial fibrillation but rate is in good control at this time.  She is currently on apixaban 2.5 mg twice daily.  We will continue that.    Heart failure with preserved ejection fraction she is not show any signs of fluid overload or fluid depletion.    I will continue to monitor above medical problems and no other changes to care plan at this time.  Care plan was reviewed and is appropriate.        Electronically  signed by: DEVAUGHN JAMIL DO          Sincerely,        DEVAUGHN JAMIL DO

## 2022-04-13 ENCOUNTER — TRANSITIONAL CARE UNIT VISIT (OUTPATIENT)
Dept: GERIATRICS | Facility: CLINIC | Age: 87
End: 2022-04-13
Payer: MEDICARE

## 2022-04-13 ENCOUNTER — LAB REQUISITION (OUTPATIENT)
Dept: LAB | Facility: CLINIC | Age: 87
End: 2022-04-13
Payer: MEDICARE

## 2022-04-13 VITALS
DIASTOLIC BLOOD PRESSURE: 60 MMHG | OXYGEN SATURATION: 95 % | HEIGHT: 62 IN | HEART RATE: 69 BPM | RESPIRATION RATE: 20 BRPM | WEIGHT: 145.8 LBS | SYSTOLIC BLOOD PRESSURE: 130 MMHG | BODY MASS INDEX: 26.83 KG/M2 | TEMPERATURE: 98.1 F

## 2022-04-13 DIAGNOSIS — I48.91 ATRIAL FIBRILLATION WITH RVR (H): ICD-10-CM

## 2022-04-13 DIAGNOSIS — L03.90 CELLULITIS, UNSPECIFIED: ICD-10-CM

## 2022-04-13 DIAGNOSIS — F31.9 BIPOLAR AFFECTIVE DISORDER, REMISSION STATUS UNSPECIFIED (H): ICD-10-CM

## 2022-04-13 DIAGNOSIS — U07.1 INFECTION DUE TO 2019 NOVEL CORONAVIRUS: Primary | ICD-10-CM

## 2022-04-13 DIAGNOSIS — I50.30 HEART FAILURE WITH PRESERVED EJECTION FRACTION, NYHA CLASS I (H): ICD-10-CM

## 2022-04-13 DIAGNOSIS — M79.661 PAIN OF RIGHT LOWER LEG: ICD-10-CM

## 2022-04-13 PROCEDURE — 99309 SBSQ NF CARE MODERATE MDM 30: CPT | Performed by: FAMILY MEDICINE

## 2022-04-13 NOTE — LETTER
2022        RE: Lennie Mar   [address]  [facility] [id number]        M [facility] GERIATRIC SERVICES    Facility:  [facility] ([facility]) [[id number]]  Code Status: DNR      CHIEF COMPLAINT/REASON FOR VISIT:  Chief Complaint   Patient presents with     RECHECK       HISTORY:      HPI: Lennie is a 89 year old female who resides in the TCU at the [facility].  He was recently admitted to the hospital for COVID-19 and she is recovered.  There has been no post Covid sequelae.    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  She is progressed as anticipated with physical occupational therapy she is ambulating with a walker.    She has soreness and swelling of the right leg.  She is tenderness behind the calf and there is some redness area around the could represent cellulitis.    She has been afebrile at this time and has no other concerns.    Past Medical History:   Diagnosis Date     Alcohol dependence in remission (H)      Anxiety      Asymptomatic varicose veins      Bipolar disorder, unspecified (H)      CKD (chronic kidney disease) stage 3, GFR 30-59 ml/min (H) 2014     History of smoking      Hyperparathyroidism (H)      Infection due to 2019 novel coronavirus 3/10/2022     Memory loss      Muscle weakness (generalized) 2008     Severe depression (H)      Subdural hygroma     chronic.  no surgeries     Tremor of unknown origin              Family History   Problem Relation Age of Onset     C.A.D. Mother          at age 47 Heart failure, Rhumatic Fever     Cerebrovascular Disease Father          at age 79     Diabetes Father         type 2     Breast Cancer Sister 84        99 year old sister     Circulatory Maternal Grandmother         vericose veins     C.A.D. Paternal Grandfather       Gastrointestinal Disease Paternal Grandfather         ulcer     Cancer Son          age 51--Melanoma     Cancer Brother          age 50's--Melanoma     Arthritis Sister      Circulatory Sister         vericose veins     Circulatory Sister         vericose veins     Eye Disorder Sister         Cateracts     Respiratory Sister         asthma     Respiratory Brother         COPD     Breast Cancer Niece 60        daughter of 98 yo sister     Social History     Socioeconomic History     Marital status:      Years of education: College-2y   Occupational History     Occupation: [employer]     Employer: RETIRED     Comment: Retired in1992   Tobacco Use     Smoking status: Former Smoker     Packs/day: 1.50     Years: 30.00     Pack years: 45.00     Types: Cigarettes     Quit date: 1993     Years since quittin.7     Smokeless tobacco: Never Used   Substance and Sexual Activity     Alcohol use: No     Drug use: No     Sexual activity: Not Currently   Other Topics Concern     Parent/sibling w/ CABG, MI or angioplasty before 65F 55M? Yes     Comment: mother dies from a heart attack at age 47   Social History Narrative    Dairy/d 0-1 servings/d.     Caffeine 2 servings/d    Exercise 0 x week-walks regularly.    Sunscreen used - Yes    Seatbelts used - Yes    Working smoke/CO detectors in the home - Yes    Guns stored in the home - No    Self Breast Exams - Yes    Self Testicular Exam - No    Eye Exam up to date - Yes     Dental Exam up to date - Yes      Pap Smear up to date - No    Mammogram up to date - No    PSA up to date - No    Dexa Scan up to date - No    Flex Sig / Colonoscopy up to date - No    Immunizations up to date - Yes  2009 Td    Abuse: Current or Past(Physical, Sexual or Emotional)- No    Do you feel safe in your environment - Yes    Updated 3/2010                                     REVIEW OF SYSTEM: Patient complains of right leg pain but denies any fever chills nausea vomit  diarrhea change in vision hearing taste or smell weakness one-sided of the chest pain shortness of breath.  Denies any cough shortness stool polyphagia polydipsia polyuria depression or anxiety remainder the review of systems is negative.      PHYSICAL EXAM: Patient is alert pleasant does not appear to be in acute distress head is normocephalic and atraumatic sclera conjunctiva clear mucosas moist nasal discharge.  Heart sounds were irregular regular with adequate rate control lungs were clear to auscultation abdomen soft nontender.  Left leg was normal right leg showed some redness around the lower shin area that wrapped around the calf and there is palpatory tenderness over the calf.  Homans' sign was negative.  Neurologic exam was nonfocal and affect was pleasant.        LABS: Reviewed.    Vitals; blood pressure 130/60    Pulse 69    Temperature is 98.1    Respirations 20    O2 sats 95%.      ASSESSMENT:   Encounter Diagnoses   Name Primary?     Infection due to 2019 novel coronavirus Yes     Bipolar affective disorder, remission status unspecified (H)      Heart failure with preserved ejection fraction, NYHA class I (H)      Atrial fibrillation with RVR (H)         PLAN: Plan at this time there is no signs of COVID-19 at this time no respiratory issues lungs have clear.    Because of the redness and the warmth over her right lower shin and the pain in her calf we will work her up with the following White count will be stat today and procalcitonin will be stat today.  If not tomorrow morning be okay but venous Doppler ultrasound the right leg to rule out DVT.    I will continue to monitor above medical problems and no other changes to care plan at this time.  Care plan was reviewed and is appropriate.        Electronically signed by: DEVAUGHN JAMIL DO          Sincerely,        DEVAUGHN JAMIL DO

## 2022-04-13 NOTE — PROGRESS NOTES
[facility] GERIATRIC SERVICES    Facility:  [facility] ([facility]) [[id number]]  Code Status: DNR      CHIEF COMPLAINT/REASON FOR VISIT:  Chief Complaint   Patient presents with     RECHECK       HISTORY:      HPI: Lennie is a 89 year old female who resides in the TCU at the [facility].  He was recently admitted to the hospital for COVID-19 and she is recovered.  There has been no post Covid sequelae.    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  She is progressed as anticipated with physical occupational therapy she is ambulating with a walker.    She has soreness and swelling of the right leg.  She is tenderness behind the calf and there is some redness area around the could represent cellulitis.    She has been afebrile at this time and has no other concerns.    Past Medical History:   Diagnosis Date     Alcohol dependence in remission (H)      Anxiety      Asymptomatic varicose veins      Bipolar disorder, unspecified (H)      CKD (chronic kidney disease) stage 3, GFR 30-59 ml/min (H) 2014     History of smoking      Hyperparathyroidism (H)      Infection due to 2019 novel coronavirus 3/10/2022     Memory loss      Muscle weakness (generalized) 2008     Severe depression (H)      Subdural hygroma     chronic.  no surgeries     Tremor of unknown origin              Family History   Problem Relation Age of Onset     C.A.D. Mother          at age 47 Heart failure, Rhumatic Fever     Cerebrovascular Disease Father          at age 79     Diabetes Father         type 2     Breast Cancer Sister 84        99 year old sister     Circulatory Maternal Grandmother         vericose veins     C.A.D. Paternal Grandfather      Gastrointestinal Disease Paternal Grandfather         ulcer     Cancer Son          age 51--Melanoma      Cancer Brother          age 50's--Melanoma     Arthritis Sister      Circulatory Sister         vericose veins     Circulatory Sister         vericose veins     Eye Disorder Sister         Cateracts     Respiratory Sister         asthma     Respiratory Brother         COPD     Breast Cancer Niece 60        daughter of 98 yo sister     Social History     Socioeconomic History     Marital status:      Years of education: College-2y   Occupational History     Occupation: [employer]     Employer: RETIRED     Comment: Retired 1992   Tobacco Use     Smoking status: Former Smoker     Packs/day: 1.50     Years: 30.00     Pack years: 45.00     Types: Cigarettes     Quit date: 1993     Years since quittin.7     Smokeless tobacco: Never Used   Substance and Sexual Activity     Alcohol use: No     Drug use: No     Sexual activity: Not Currently   Other Topics Concern     Parent/sibling w/ CABG, MI or angioplasty before 65F 55M? Yes     Comment: mother dies from a heart attack at age 47   Social History Narrative    Dairy/d 0-1 servings/d.     Caffeine 2 servings/d    Exercise 0 x week-walks regularly.    Sunscreen used - Yes    Seatbelts used - Yes    Working smoke/CO detectors in the home - Yes    Guns stored in the home - No    Self Breast Exams - Yes    Self Testicular Exam - No    Eye Exam up to date - Yes     Dental Exam up to date - Yes      Pap Smear up to date - No    Mammogram up to date - No    PSA up to date - No    Dexa Scan up to date - No    Flex Sig / Colonoscopy up to date - No    Immunizations up to date - Yes  2009 Td    Abuse: Current or Past(Physical, Sexual or Emotional)- No    Do you feel safe in your environment - Yes    Updated 3/2010                                     REVIEW OF SYSTEM: Patient complains of right leg pain but denies any fever chills nausea vomit diarrhea change in vision hearing taste or smell weakness one-sided of the chest pain shortness of breath.   Denies any cough shortness stool polyphagia polydipsia polyuria depression or anxiety remainder the review of systems is negative.      PHYSICAL EXAM: Patient is alert pleasant does not appear to be in acute distress head is normocephalic and atraumatic sclera conjunctiva clear mucosas moist nasal discharge.  Heart sounds were irregular regular with adequate rate control lungs were clear to auscultation abdomen soft nontender.  Left leg was normal right leg showed some redness around the lower shin area that wrapped around the calf and there is palpatory tenderness over the calf.  Homans' sign was negative.  Neurologic exam was nonfocal and affect was pleasant.        LABS: Reviewed.    Vitals; blood pressure 130/60    Pulse 69    Temperature is 98.1    Respirations 20    O2 sats 95%.      ASSESSMENT:   Encounter Diagnoses   Name Primary?     Infection due to 2019 novel coronavirus Yes     Bipolar affective disorder, remission status unspecified (H)      Heart failure with preserved ejection fraction, NYHA class I (H)      Atrial fibrillation with RVR (H)         PLAN: Plan at this time there is no signs of COVID-19 at this time no respiratory issues lungs have clear.    Because of the redness and the warmth over her right lower shin and the pain in her calf we will work her up with the following White count will be stat today and procalcitonin will be stat today.  If not tomorrow morning be okay but venous Doppler ultrasound the right leg to rule out DVT.    I will continue to monitor above medical problems and no other changes to care plan at this time.  Care plan was reviewed and is appropriate.        Electronically signed by: DEVAUGHN JAMIL DO

## 2022-04-14 ENCOUNTER — TRANSITIONAL CARE UNIT VISIT (OUTPATIENT)
Dept: GERIATRICS | Facility: CLINIC | Age: 87
End: 2022-04-14
Payer: MEDICARE

## 2022-04-14 VITALS
WEIGHT: 145.3 LBS | OXYGEN SATURATION: 98 % | HEIGHT: 62 IN | HEART RATE: 70 BPM | BODY MASS INDEX: 26.74 KG/M2 | RESPIRATION RATE: 20 BRPM | SYSTOLIC BLOOD PRESSURE: 134 MMHG | DIASTOLIC BLOOD PRESSURE: 53 MMHG | TEMPERATURE: 97.3 F

## 2022-04-14 DIAGNOSIS — I48.91 ATRIAL FIBRILLATION WITH RVR (H): ICD-10-CM

## 2022-04-14 DIAGNOSIS — J96.01 ACUTE RESPIRATORY FAILURE WITH HYPOXIA (H): ICD-10-CM

## 2022-04-14 DIAGNOSIS — U07.1 INFECTION DUE TO 2019 NOVEL CORONAVIRUS: ICD-10-CM

## 2022-04-14 DIAGNOSIS — M79.661 PAIN OF RIGHT LOWER LEG: Primary | ICD-10-CM

## 2022-04-14 DIAGNOSIS — F31.9 BIPOLAR AFFECTIVE DISORDER, REMISSION STATUS UNSPECIFIED (H): ICD-10-CM

## 2022-04-14 LAB
PROCALCITONIN SERPL-MCNC: 0.06 NG/ML (ref 0–0.49)
WBC # BLD AUTO: 4 10E3/UL (ref 4–11)

## 2022-04-14 PROCEDURE — 99309 SBSQ NF CARE MODERATE MDM 30: CPT | Performed by: FAMILY MEDICINE

## 2022-04-14 PROCEDURE — P9604 ONE-WAY ALLOW PRORATED TRIP: HCPCS | Performed by: FAMILY MEDICINE

## 2022-04-14 PROCEDURE — 84145 PROCALCITONIN (PCT): CPT | Performed by: FAMILY MEDICINE

## 2022-04-14 PROCEDURE — 36415 COLL VENOUS BLD VENIPUNCTURE: CPT | Performed by: FAMILY MEDICINE

## 2022-04-14 PROCEDURE — 85048 AUTOMATED LEUKOCYTE COUNT: CPT | Performed by: FAMILY MEDICINE

## 2022-04-14 NOTE — LETTER
4/14/2022        RE: Lennie Mar  [address]  [facility] [address]        M [facility] GERIATRIC SERVICES    Facility:  [facility] ([facility]) [[id number]]  Code Status: DNR      CHIEF COMPLAINT/REASON FOR VISIT:  Chief Complaint   Patient presents with     RECHECK       HISTORY:      HPI: Lennie is a 89 year old female who does a history of bipolar disorder is currently residing here in the TCU.  I followed up for her yesterday for redness of the right leg.  White count was negative and venous Doppler ultrasound was negative for any DVT.  Given that I did talk to her son and there is a history of cellulitis at this time and this could be brewing.  She has had no fever at this time but the leg is swollen.  It is somewhat warm to touch and the redness is still there.  She is recovering from Covid pneumonia at this time and she has a bipolar disorder with no signs of any depression or javan.    I did talk to her son this morning was very attentive to her care.  He did not indicate to me that he is not sure if she is ready for a assisted living at this time and may look at something more of a long-term care picture.    Leg is warm to touch a little bit and how likely start clindamycin at this time.  It is still swollen at this time and also can start hydrocortisone cream for the lower leg.  She does deny any issues at this time.    Past Medical History:   Diagnosis Date     Alcohol dependence in remission (H)      Anxiety      Asymptomatic varicose veins      Bipolar disorder, unspecified (H)      CKD (chronic kidney disease) stage 3, GFR 30-59 ml/min (H) 2/18/2014     History of smoking      Hyperparathyroidism (H)      Infection due to 2019 novel coronavirus 3/10/2022     Memory loss      Muscle weakness (generalized) 6/23/2008     Severe depression (H)      Subdural hygroma     chronic.  no surgeries     Tremor of unknown origin              Family History   Problem Relation Age of Onset      NEO Mother          at age 47 Heart failure, Rhumatic Fever     Cerebrovascular Disease Father          at age 79     Diabetes Father         type 2     Breast Cancer Sister 84        99 year old sister     Circulatory Maternal Grandmother         vericose veins     CRinaARANJANA Paternal Grandfather      Gastrointestinal Disease Paternal Grandfather         ulcer     Cancer Son          age 51--Melanoma     Cancer Brother          age 50's--Melanoma     Arthritis Sister      Circulatory Sister         vericose veins     Circulatory Sister         vericose veins     Eye Disorder Sister         Cateracts     Respiratory Sister         asthma     Respiratory Brother         COPD     Breast Cancer Niece 60        daughter of 98 yo sister     Social History     Socioeconomic History     Marital status:      Years of education: College-2y   Occupational History     Occupation: [employer]     Employer: RETIRED     Comment: Retired 1992   Tobacco Use     Smoking status: Former Smoker     Packs/day: 1.50     Years: 30.00     Pack years: 45.00     Types: Cigarettes     Quit date: 1993     Years since quittin.7     Smokeless tobacco: Never Used   Substance and Sexual Activity     Alcohol use: No     Drug use: No     Sexual activity: Not Currently   Other Topics Concern     Parent/sibling w/ CABG, MI or angioplasty before 65F 55M? Yes     Comment: mother dies from a heart attack at age 47   Social History Narrative    Dairy/d 0-1 servings/d.     Caffeine 2 servings/d    Exercise 0 x week-walks regularly.    Sunscreen used - Yes    Seatbelts used - Yes    Working smoke/CO detectors in the home - Yes    Guns stored in the home - No    Self Breast Exams - Yes    Self Testicular Exam - No    Eye Exam up to date - Yes     Dental Exam up to date - Yes  2010    Pap Smear up to date - No    Mammogram up to date - No    PSA up to date - No    Dexa Scan up to date - No    Flex Sig / Colonoscopy up to  date - No    Immunizations up to date - Yes  5/2009 Td    Abuse: Current or Past(Physical, Sexual or Emotional)- No    Do you feel safe in your environment - Yes    Updated 3/2010                                     REVIEW OF SYSTEM: Right leg pain is still present at this time with some swelling but denies any fevers chills nausea vomit diarrhea change in vision hearing taste or smell weakness one-sided chest pain shortness of breath.  The remainder review of systems is negative.      PHYSICAL EXAM: Patient is alert pleasant does not appear to be in acute distress head is normocephalic and atraumatic sclera conjunctiva is clear oromucosa was moist nasal discharge.  Heart sounds were regular regular with adequate rate control lungs were clear to station abdomen soft nontender.  Right extremity did show redness and warmth in the lower extremity.  With some flaking of the skin and with a history of eczema.        LABS: White count came back at 4.0, basic metabolic profile was unremarkable with exception of GFR was 48.  Creatinine was 1.07 sodium potassium within normal limits BUN was 31 and chloride was 113.    Vitals; blood pressure 134/53    Pulse of 70    Temperature is 97.3    Respirations 20    O2 sats 98%.    Venous Doppler ultrasound done on 4/13/2022 were negative for any DVT.      ASSESSMENT:   Encounter Diagnoses   Name Primary?     Pain of right lower leg Yes     Acute respiratory failure with hypoxia (H)      Bipolar affective disorder, remission status unspecified (H)      Infection due to 2019 novel coronavirus      Atrial fibrillation with RVR (H)         PLAN: Plan at this time start clindamycin 300 mg 3 times daily x7 days for the cellulitis and continue to monitor daily.    The exam under skin right leg which is has been done before use hydrocortisone 2.5% cream twice daily.    I will continue to monitor above medical problems and no other changes to care plan at this time.  I will discuss with   with discharge planning will look like at this time.            Electronically signed by: DEVAUGHN JAMIL DO          Sincerely,        DEVAUGHN JAMIL DO

## 2022-04-14 NOTE — PROGRESS NOTES
[facility] GERIATRIC SERVICES    Facility:  [facility] ([facility]) [[id number]]  Code Status: DNR      CHIEF COMPLAINT/REASON FOR VISIT:  Chief Complaint   Patient presents with     RECHECK       HISTORY:      HPI: Lennie is a 89 year old female who does a history of bipolar disorder is currently residing here in the TCU.  I followed up for her yesterday for redness of the right leg.  White count was negative and venous Doppler ultrasound was negative for any DVT.  Given that I did talk to her son and there is a history of cellulitis at this time and this could be brewing.  She has had no fever at this time but the leg is swollen.  It is somewhat warm to touch and the redness is still there.  She is recovering from Covid pneumonia at this time and she has a bipolar disorder with no signs of any depression or javan.    I did talk to her son this morning was very attentive to her care.  He did not indicate to me that he is not sure if she is ready for a assisted living at this time and may look at something more of a long-term care picture.    Leg is warm to touch a little bit and how likely start clindamycin at this time.  It is still swollen at this time and also can start hydrocortisone cream for the lower leg.  She does deny any issues at this time.    Past Medical History:   Diagnosis Date     Alcohol dependence in remission (H)      Anxiety      Asymptomatic varicose veins      Bipolar disorder, unspecified (H)      CKD (chronic kidney disease) stage 3, GFR 30-59 ml/min (H) 2014     History of smoking      Hyperparathyroidism (H)      Infection due to 2019 novel coronavirus 3/10/2022     Memory loss      Muscle weakness (generalized) 2008     Severe depression (H)      Subdural hygroma     chronic.  no surgeries     Tremor of unknown origin              Family History   Problem Relation Age of Onset     C.A.D. Mother          at age 47 Heart failure, Rhumatic Fever     Cerebrovascular Disease Father           at age 79     Diabetes Father         type 2     Breast Cancer Sister 84        99 year old sister     Circulatory Maternal Grandmother         vericose veins     C.A.D. Paternal Grandfather      Gastrointestinal Disease Paternal Grandfather         ulcer     Cancer Son          age 51--Melanoma     Cancer Brother          age 50's--Melanoma     Arthritis Sister      Circulatory Sister         vericose veins     Circulatory Sister         vericose veins     Eye Disorder Sister         Cateracts     Respiratory Sister         asthma     Respiratory Brother         COPD     Breast Cancer Niece 60        daughter of 98 yo sister     Social History     Socioeconomic History     Marital status:      Years of education: College-2y   Occupational History     Occupation: [employer]     Employer: RETIRED     Comment: Retired [employer]    Tobacco Use     Smoking status: Former Smoker     Packs/day: 1.50     Years: 30.00     Pack years: 45.00     Types: Cigarettes     Quit date: 1993     Years since quittin.7     Smokeless tobacco: Never Used   Substance and Sexual Activity     Alcohol use: No     Drug use: No     Sexual activity: Not Currently   Other Topics Concern     Parent/sibling w/ CABG, MI or angioplasty before 65F 55M? Yes     Comment: mother dies from a heart attack at age 47   Social History Narrative    Dairy/d 0-1 servings/d.     Caffeine 2 servings/d    Exercise 0 x week-walks regularly.    Sunscreen used - Yes    Seatbelts used - Yes    Working smoke/CO detectors in the home - Yes    Guns stored in the home - No    Self Breast Exams - Yes    Self Testicular Exam - No    Eye Exam up to date - Yes     Dental Exam up to date - Yes      Pap Smear up to date - No    Mammogram up to date - No    PSA up to date - No    Dexa Scan up to date - No    Flex Sig / Colonoscopy up to date - No    Immunizations up to date - Yes  2009 Td    Abuse: Current or Past(Physical, Sexual or  Emotional)- No    Do you feel safe in your environment - Yes    Updated 3/2010                                     REVIEW OF SYSTEM: Right leg pain is still present at this time with some swelling but denies any fevers chills nausea vomit diarrhea change in vision hearing taste or smell weakness one-sided chest pain shortness of breath.  The remainder review of systems is negative.      PHYSICAL EXAM: Patient is alert pleasant does not appear to be in acute distress head is normocephalic and atraumatic sclera conjunctiva is clear oromucosa was moist nasal discharge.  Heart sounds were regular regular with adequate rate control lungs were clear to station abdomen soft nontender.  Right extremity did show redness and warmth in the lower extremity.  With some flaking of the skin and with a history of eczema.        LABS: White count came back at 4.0, basic metabolic profile was unremarkable with exception of GFR was 48.  Creatinine was 1.07 sodium potassium within normal limits BUN was 31 and chloride was 113.    Vitals; blood pressure 134/53    Pulse of 70    Temperature is 97.3    Respirations 20    O2 sats 98%.    Venous Doppler ultrasound done on 4/13/2022 were negative for any DVT.      ASSESSMENT:   Encounter Diagnoses   Name Primary?     Pain of right lower leg Yes     Acute respiratory failure with hypoxia (H)      Bipolar affective disorder, remission status unspecified (H)      Infection due to 2019 novel coronavirus      Atrial fibrillation with RVR (H)         PLAN: Plan at this time start clindamycin 300 mg 3 times daily x7 days for the cellulitis and continue to monitor daily.    The exam under skin right leg which is has been done before use hydrocortisone 2.5% cream twice daily.    I will continue to monitor above medical problems and no other changes to care plan at this time.  I will discuss with  with discharge planning will look like at this time.            Electronically signed by:  DEVAUGHN JAMIL, DO

## 2022-04-16 NOTE — PROGRESS NOTES
ONCOLOGY FOLLOW UP:  Date on this visit: 4/18/2022    Diagnosis:  1.  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  Stage IIa, T2N0M0, ER/CO positive, HER-2 non-amplified invasive ductal carcinoma of the left breast at 11:00    Primary Physician: Ty Quintanilla     History Of Present Illness:  Ms. Mar is a 89 year old female with two cancers of the left breast. She self palpated a lump and underwent mammogram and ultrasound in 5/2018 that showed a 3.5 cm mass in the superficial lateral left breast, at 3:00, 2 cm from the nipple and a second 2.4 cm mass in the upper outer left breast at 1:00, 7 cm from the nipple.  Biopsy of the 3:00  mass near the nipple was an ER/CO positive, HER2 amplified (HER2/CEN17 ratio of 6.4/2.3) grade 3 invasive carcinoma.  The 1:00 mass was an ER/CO positive, HER2 nonamplified, grade 3 invasive carcinoma.  No lymphadenopathy was seen on ultrasound.    She began treatment with Herceptin and letrozole on 6/1/18.  Left breast mastectomy and SLN biopsy on 11/19/18 showed two residual tumors.  The larger tumor at 3:00 was grade 2 and measured 2.7 cm, ER positive, CO negative, HER2-amplified.  The smaller tumor at 1:00 was grade 3 and measured 2.5 cm, ER/CO positive, HER2 non-amplified.  Overall tumor cellularity was 15%.  2/3 left axillary lymph nodes demonstrated metastases measuring 9 mm and 1.2 mm.  HER-2 FISH of the larger axillary lymph node metastasis was amplified.    She completed radiation to the left chest and regional lymph nodes (left axillary and supraclavicular) on 2/18/19.  Her case was discussed at breast conference and recommendation was to administer adjuvant T-DM1 given HER2 positivity in the lymph node metastasis.  She received 1 year of adjuvant T-DM1 from 1/3/19 - 12/23/19.  She has been on letrozole since 12/2019.    Interval History:  Ms. Mar was  seen via video today for routine left breast cancer follow-up.  She was discharged from the hospital on March 22 after being hospitalized for 13 days with COVID infection.  Per her daughter, she was quite ill during the hospitalization and it was not clear whether or not she would recover.  She was able to be discharged to a transitional care center and resides there now.  In transitional care, she has done quite well.  She is ambulating around the care center without difficulty.  She denies current cough, shortness of breath, or chest pain.  She denies palpable lumps overlying either the left chest wall or within the right breast.  She denies chest pain or swelling.  She reports pain and swelling of her right lower extremity.  Per her daughter she has been diagnosed with a cellulitis and is currently on antibiotic.  She has no abdominal complaints including abdominal pain, nausea, or diarrhea.  She denies new bone or joint aches or pains.  She has no headaches or focal neurologic complaints.  Per her daughter, plan will be to move to an assisted living facility.  She felt very isolated living independently and the hope is that mood will remain improved with ongoing interaction with others.  The remainder of a complete 12 point review of systems was reviewed with the patient was negative with exception that mentioned above.    Past Medical/Surgical History:  Past Medical History:   Diagnosis Date     Alcohol dependence in remission (H)      Anxiety      Asymptomatic varicose veins      Bipolar disorder, unspecified (H)      CKD (chronic kidney disease) stage 3, GFR 30-59 ml/min (H) 2/18/2014     History of smoking      Hyperparathyroidism (H)      Infection due to 2019 novel coronavirus 3/10/2022     Memory loss      Muscle weakness (generalized) 6/23/2008     Severe depression (H)      Subdural hygroma     chronic.  no surgeries     Tremor of unknown origin      Past Surgical History:   Procedure Laterality Date      APPENDECTOMY OPEN  1947     CATARACT IOL, RT/LT       COLONOSCOPY WITH CO2 INSUFFLATION  2/29/2012    Procedure:COLONOSCOPY WITH CO2 INSUFFLATION; Surgeon:GAYLE REYNA; [facility]     CYSTOCELE REPAIR  1972     CYSTOSCOPY, INSERT STENT URETHRA, COMBINED  1999     CYSTOSCOPY, RETROGRADES, INSERT STENT URETER(S), COMBINED  2/29/2012    Procedure:COMBINED CYSTOSCOPY, RETROGRADES, INSERT STENT URETER(S); Surgeon:ANT ESPINOZA; [facility]     DECOMPRESSION LUMBAR ONE LEVEL  8/9/2013    Procedure: DECOMPRESSION LUMBAR ONE LEVEL;  Posterior Decompression Right Lumbar 5- Sacral 1;  Surgeon: You Zavala MD;  [facility]     HC TOOTH EXTRACTION W/FORCEP       HYSTERECTOMY, CATA  1963     IRRIGATION AND DEBRIDEMENT ORAL, COMBINED  1982    For treatment of gingivitis     LAPAROSCOPIC ASSISTED COLECTOMY  2/29/2012    Procedure:LAPAROSCOPIC ASSISTED COLECTOMY; Cysto, Bilateral Stent placement (both stents removed at end of case)- Yanet ACOSTA. Laparoscopic Extended Right Venu Colectomy with CO2 Colonoscopy and polyp removal-Judah; Surgeon:GAYLE REYNA; [facility]     LIGATN/STRIP LONG OR SHORT SAPHEN  1955     MASTECTOMY PARTIAL WITH SENTINEL NODE Left 11/19/2018    Procedure: Left Mastectomy, Left [place] Lymph Node Biopsy;  Surgeon: Nahun Betancourt MD;  Location: [facility]     STRIP VEIN BILATERAL  1964     SURGICAL HISTORY OF -   1999    ureter surgery for blockage.     Allergies:  Allergies as of 04/18/2022 - Reviewed 04/14/2022   Allergen Reaction Noted     Cafergot Other (See Comments) and Nausea and Vomiting 01/01/1972     Ciprofloxacin  06/19/2012     Penicillins Rash and Unknown 01/01/1949     Sulfa drugs Rash and Unknown 01/01/1950     Ergot alkaloids Unknown 03/08/2012     Lamictal [lamotrigine] Other (See Comments) 02/15/2014     Naproxen       Naproxen Unknown 03/08/2012     Tetracycline Unknown 03/08/2012     Current Medications:  Current Outpatient Medications    Medication Sig Dispense Refill     acetaminophen (TYLENOL) 325 MG tablet Take 3 tablets (975 mg) by mouth every 8 hours as needed for mild pain 50 tablet 0     alum & mag hydroxide-simethicone (MAALOX) 200-200-20 MG/5ML SUSP suspension Take 30 mLs by mouth every 4 hours as needed for indigestion       apixaban ANTICOAGULANT (ELIQUIS) 2.5 MG tablet Take 1 tablet (2.5 mg) by mouth 2 times daily       ARIPiprazole (ABILIFY) 5 MG tablet Take 2 mg by mouth daily   3     bisacodyl (DULCOLAX) 10 MG suppository Place 1 suppository (10 mg) rectally daily as needed for constipation       divalproex sodium extended-release (DEPAKOTE ER) 500 MG 24 hr tablet Take 500 mg by mouth At Bedtime       famotidine (PEPCID) 20 MG tablet Take 1 tablet (20 mg) by mouth every 48 hours       guaiFENesin (MUCINEX) 600 MG 12 hr tablet Take 2 tablets (1,200 mg) by mouth 2 times daily       ipratropium-albuterol (COMBIVENT RESPIMAT)  MCG/ACT inhaler Inhale 1 puff into the lungs 4 times daily as needed for shortness of breath / dyspnea or wheezing       letrozole (FEMARA) 2.5 MG tablet TAKE 1 TABLET BY MOUTH EVERY DAY 90 tablet 3     levothyroxine (SYNTHROID/LEVOTHROID) 75 MCG tablet Take 100 mcg by mouth daily        lithium (ESKALITH) 150 MG capsule Take 150 mg by mouth every morning  30 capsule 1     magnesium hydroxide (MILK OF MAGNESIA) 400 MG/5ML suspension Take 30 mLs by mouth daily as needed for constipation       melatonin 1 MG TABS tablet Take 1 tablet (1 mg) by mouth nightly as needed for sleep       metoprolol tartrate (LOPRESSOR) 25 MG tablet Take 1 tablet (25 mg) by mouth 2 times daily       miconazole (MICATIN) 2 % external powder Apply topically 2 times daily       ondansetron (ZOFRAN-ODT) 4 MG ODT tab Take 1 tablet (4 mg) by mouth every 6 hours as needed for nausea or vomiting       ORDER FOR DME Equipment being ordered: compression stocking knee high 20-30mmhg 2 Package 0     QUEtiapine (SEROQUEL) 25 MG tablet Take 0.5  tablets (12.5 mg) by mouth nightly as needed (restlessness)       senna-docusate (SENOKOT-S/PERICOLACE) 8.6-50 MG tablet Take 1 tablet by mouth 2 times daily as needed for constipation       senna-docusate (SENOKOT-S/PERICOLACE) 8.6-50 MG tablet Take 2 tablets by mouth 2 times daily as needed for constipation       vitamin D3 (CHOLECALCIFEROL) 10 MCG (400 UNIT) capsule Take 2 capsules (800 Units) by mouth daily 60 capsule 3     zinc Oxide (DESITIN) 40 % paste Apply topically as needed for dry skin or irritation       Physical Exam:  General:  Elderly appearing adult female in NAD.  Appropriately answering questions.  Eyes:  No erythema or discharge  Respiratory:  Breathing comfortably on room air.  No wheezing or distress.  Skin:  No visible concerning skin rashes or lesions  Neuro:  No notable tremor and dyskinetic movements.  Psych:  Mood and affect appear normal.    The rest of a comprehensive physical examination is deferred due to PHE (public health emergency) video visit restrictions.    Laboratory/Imaging Studies:  4/4/2022 Labs:  Chloride is elevated at 113 mmol/L  Bicarbonate is wnl at 24 mmol/L  BUN is elevated at 31 mg/dL  Creatinine is elevated at 1.07 mg/dL  GFR is low at 48 mL/min    Sodium, potassium, and calcium are wnl.    3/13/2022 CXR (performed for CP and hypoxia):  Mild basilar/retrocardiac pulmonary opacities, atelectasis vs infection.  No evidence of malignancy.    ASSESSMENT/PLAN:  89 year old female with a h/o T2N0M0, ER/UT positive, HER2 non-amplified and T2N1M0, ER/UT positive, HER2 amplified invasive mammary carcinomas of the left breast.  She is s/p treatment with 5 months neoadjuvant herceptin and letrozole, left breast mastectomy, SLN biopsy, radiation, and 1 year Kadcyla.  She continues on letrozole.    1.  Left breast cancers:  Ms. Mar is 3 years, 5 months out from excision of left breast cancers.  She continues on treatment with letrozole. She confirms today that she continues to  take it and is tolerating the medication well.    She is asymptomatic of disease recurrence on history taken today.  Due to age and medical comorbidities, we are not performing routine screening mammogram of the right breast.  Will schedule next visit in 6 months.  If her health allows, will plan for this to be an in person visit.    2.  Stage IIIa CKD:  Secondary to chronic lithium.  Last creatinine on 4/4 was at baseline.  Ongoing follow up with Nephrology.    3.  Bipolar disorder/depression:  Reports improvement in symptoms since last visit.  Her daughter thinks increased interaction with others at [facility] has contributed to improvement in mood and plan is to discharge from the [facility] to assisted living.  She follows with Dr. Horn at [facility] in [place].  Current medications including Seroquel, Depakote, lithium and Abilify.     4.  Osteopenia:  At risk for bone loss on letrozole.  DEXA in 02/2021 with a lowest T-score of -2.4 c/w osteopenia/borderline osteoporosis.  Plan to repeat a DEXA in 02/2023.    5.  Follow Up:  Return to clinic in approximately 6 months for in person visit with me.        I spent a total of 12 minutes on the telephone with the patient and her daughter today.  An additional 10 minutes was spent on the day of the visit reviewing her medical record, reviewing and interpreting tests, and in documentation.

## 2022-04-18 ENCOUNTER — VIRTUAL VISIT (OUTPATIENT)
Dept: ONCOLOGY | Facility: CLINIC | Age: 87
End: 2022-04-18
Attending: INTERNAL MEDICINE
Payer: MEDICARE

## 2022-04-18 VITALS
DIASTOLIC BLOOD PRESSURE: 66 MMHG | SYSTOLIC BLOOD PRESSURE: 131 MMHG | WEIGHT: 149 LBS | HEART RATE: 74 BPM | RESPIRATION RATE: 18 BRPM | HEIGHT: 62 IN | TEMPERATURE: 98.2 F | BODY MASS INDEX: 27.42 KG/M2 | OXYGEN SATURATION: 92 %

## 2022-04-18 DIAGNOSIS — C50.212 MALIGNANT NEOPLASM OF UPPER-INNER QUADRANT OF LEFT BREAST IN FEMALE, ESTROGEN RECEPTOR POSITIVE (H): Primary | ICD-10-CM

## 2022-04-18 DIAGNOSIS — F31.75 BIPOLAR DISORDER, IN PARTIAL REMISSION, MOST RECENT EPISODE DEPRESSED (H): ICD-10-CM

## 2022-04-18 DIAGNOSIS — Z17.0 MALIGNANT NEOPLASM OF UPPER-INNER QUADRANT OF LEFT BREAST IN FEMALE, ESTROGEN RECEPTOR POSITIVE (H): Primary | ICD-10-CM

## 2022-04-18 DIAGNOSIS — N18.31 STAGE 3A CHRONIC KIDNEY DISEASE (H): ICD-10-CM

## 2022-04-18 DIAGNOSIS — Z17.0 MALIGNANT NEOPLASM OF UPPER-OUTER QUADRANT OF LEFT BREAST IN FEMALE, ESTROGEN RECEPTOR POSITIVE (H): ICD-10-CM

## 2022-04-18 DIAGNOSIS — C50.412 MALIGNANT NEOPLASM OF UPPER-OUTER QUADRANT OF LEFT BREAST IN FEMALE, ESTROGEN RECEPTOR POSITIVE (H): ICD-10-CM

## 2022-04-18 PROCEDURE — 99442 PR PHYSICIAN TELEPHONE EVALUATION 11-20 MIN: CPT | Mod: 95 | Performed by: INTERNAL MEDICINE

## 2022-04-18 NOTE — NURSING NOTE
Patient has a painful rash on her bottom. Patient will need prescriptions reconciled. Patient will be transitioning to a new care center called [facility] according to Dmitry.

## 2022-04-18 NOTE — LETTER
4/18/2022         RE: Lennie Mar  [address]  [place] [address]        Dear Colleague,    Thank you for referring your patient, Lennie Mar, to the [facility]. Please see a copy of my visit note below.    ONCOLOGY FOLLOW UP:  Date on this visit: 4/18/2022    Diagnosis:  1.  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  Stage IIa, T2N0M0, ER/MD positive, HER-2 non-amplified invasive ductal carcinoma of the left breast at 11:00    Primary Physician: Ty Quintanilla     History Of Present Illness:  Ms. Mar is a 89 year old female with two cancers of the left breast. She self palpated a lump and underwent mammogram and ultrasound in 5/2018 that showed a 3.5 cm mass in the superficial lateral left breast, at 3:00, 2 cm from the nipple and a second 2.4 cm mass in the upper outer left breast at 1:00, 7 cm from the nipple.  Biopsy of the 3:00  mass near the nipple was an ER/MD positive, HER2 amplified (HER2/CEN17 ratio of 6.4/2.3) grade 3 invasive carcinoma.  The 1:00 mass was an ER/MD positive, HER2 nonamplified, grade 3 invasive carcinoma.  No lymphadenopathy was seen on ultrasound.    She began treatment with Herceptin and letrozole on 6/1/18.  Left breast mastectomy and SLN biopsy on 11/19/18 showed two residual tumors.  The larger tumor at 3:00 was grade 2 and measured 2.7 cm, ER positive, MD negative, HER2-amplified.  The smaller tumor at 1:00 was grade 3 and measured 2.5 cm, ER/MD positive, HER2 non-amplified.  Overall tumor cellularity was 15%.  2/3 left axillary lymph nodes demonstrated metastases measuring 9 mm and 1.2 mm.  HER-2 FISH of the larger axillary lymph node metastasis was amplified.    She completed radiation to the left chest and regional lymph nodes (left axillary and supraclavicular) on 2/18/19.  Her case was discussed at  breast conference and recommendation was to administer adjuvant T-DM1 given HER2 positivity in the lymph node metastasis.  She received 1 year of adjuvant T-DM1 from 1/3/19 - 12/23/19.  She has been on letrozole since 12/2019.    Interval History:  Ms. Mar was seen via video today for routine left breast cancer follow-up.  She was discharged from the hospital on March 22 after being hospitalized for 13 days with COVID infection.  Per her daughter, she was quite ill during the hospitalization and it was not clear whether or not she would recover.  She was able to be discharged to a transitional care center and resides there now.  In transitional care, she has done quite well.  She is ambulating around the care center without difficulty.  She denies current cough, shortness of breath, or chest pain.  She denies palpable lumps overlying either the left chest wall or within the right breast.  She denies chest pain or swelling.  She reports pain and swelling of her right lower extremity.  Per her daughter she has been diagnosed with a cellulitis and is currently on antibiotic.  She has no abdominal complaints including abdominal pain, nausea, or diarrhea.  She denies new bone or joint aches or pains.  She has no headaches or focal neurologic complaints.  Per her daughter, plan will be to move to an assisted living facility.  She felt very isolated living independently and the hope is that mood will remain improved with ongoing interaction with others.  The remainder of a complete 12 point review of systems was reviewed with the patient was negative with exception that mentioned above.    Past Medical/Surgical History:  Past Medical History:   Diagnosis Date     Alcohol dependence in remission (H)      Anxiety      Asymptomatic varicose veins      Bipolar disorder, unspecified (H)      CKD (chronic kidney disease) stage 3, GFR 30-59 ml/min (H) 2/18/2014     History of smoking      Hyperparathyroidism (H)      Infection  due to 2019 novel coronavirus 3/10/2022     Memory loss      Muscle weakness (generalized) 6/23/2008     Severe depression (H)      Subdural hygroma     chronic.  no surgeries     Tremor of unknown origin      Past Surgical History:   Procedure Laterality Date     APPENDECTOMY OPEN  1947     CATARACT IOL, RT/LT       COLONOSCOPY WITH CO2 INSUFFLATION  2/29/2012    Procedure:COLONOSCOPY WITH CO2 INSUFFLATION; Surgeon:GAYLE REYNA; [facility]     CYSTOCELE REPAIR  1972     CYSTOSCOPY, INSERT STENT URETHRA, COMBINED  1999     CYSTOSCOPY, RETROGRADES, INSERT STENT URETER(S), COMBINED  2/29/2012    Procedure:COMBINED CYSTOSCOPY, RETROGRADES, INSERT STENT URETER(S); Surgeon:ANT ESPINOZA; [facility]     DECOMPRESSION LUMBAR ONE LEVEL  8/9/2013    Procedure: DECOMPRESSION LUMBAR ONE LEVEL;  Posterior Decompression Right Lumbar 5- Sacral 1;  Surgeon: You Zavala MD;  [facility]     HC TOOTH EXTRACTION W/FORCEP       HYSTERECTOMY, CATA  1963     IRRIGATION AND DEBRIDEMENT ORAL, COMBINED  1982    For treatment of gingivitis     LAPAROSCOPIC ASSISTED COLECTOMY  2/29/2012    Procedure:LAPAROSCOPIC ASSISTED COLECTOMY; Cysto, Bilateral Stent placement (both stents removed at end of case)- Yanet ACOSTA. Laparoscopic Extended Right Venu Colectomy with CO2 Colonoscopy and polyp removal-Judah; Surgeon:GAYLE REYNA; [facility]     LIGATN/STRIP LONG OR SHORT SAPHEN  1955     MASTECTOMY PARTIAL WITH SENTINEL NODE Left 11/19/2018    Procedure: Left Mastectomy, Left [place] Lymph Node Biopsy;  Surgeon: Nahun Betancourt MD;  Location: [facility]     STRIP VEIN BILATERAL  1964     SURGICAL HISTORY OF -   1999    ureter surgery for blockage.     Allergies:  Allergies as of 04/18/2022 - Reviewed 04/14/2022   Allergen Reaction Noted     Cafergot Other (See Comments) and Nausea and Vomiting 01/01/1972     Ciprofloxacin  06/19/2012     Penicillins Rash and Unknown 01/01/1949     Sulfa drugs Rash and  Unknown 01/01/1950     Ergot alkaloids Unknown 03/08/2012     Lamictal [lamotrigine] Other (See Comments) 02/15/2014     Naproxen       Naproxen Unknown 03/08/2012     Tetracycline Unknown 03/08/2012     Current Medications:  Current Outpatient Medications   Medication Sig Dispense Refill     acetaminophen (TYLENOL) 325 MG tablet Take 3 tablets (975 mg) by mouth every 8 hours as needed for mild pain 50 tablet 0     alum & mag hydroxide-simethicone (MAALOX) 200-200-20 MG/5ML SUSP suspension Take 30 mLs by mouth every 4 hours as needed for indigestion       apixaban ANTICOAGULANT (ELIQUIS) 2.5 MG tablet Take 1 tablet (2.5 mg) by mouth 2 times daily       ARIPiprazole (ABILIFY) 5 MG tablet Take 2 mg by mouth daily   3     bisacodyl (DULCOLAX) 10 MG suppository Place 1 suppository (10 mg) rectally daily as needed for constipation       divalproex sodium extended-release (DEPAKOTE ER) 500 MG 24 hr tablet Take 500 mg by mouth At Bedtime       famotidine (PEPCID) 20 MG tablet Take 1 tablet (20 mg) by mouth every 48 hours       guaiFENesin (MUCINEX) 600 MG 12 hr tablet Take 2 tablets (1,200 mg) by mouth 2 times daily       ipratropium-albuterol (COMBIVENT RESPIMAT)  MCG/ACT inhaler Inhale 1 puff into the lungs 4 times daily as needed for shortness of breath / dyspnea or wheezing       letrozole (FEMARA) 2.5 MG tablet TAKE 1 TABLET BY MOUTH EVERY DAY 90 tablet 3     levothyroxine (SYNTHROID/LEVOTHROID) 75 MCG tablet Take 100 mcg by mouth daily        lithium (ESKALITH) 150 MG capsule Take 150 mg by mouth every morning  30 capsule 1     magnesium hydroxide (MILK OF MAGNESIA) 400 MG/5ML suspension Take 30 mLs by mouth daily as needed for constipation       melatonin 1 MG TABS tablet Take 1 tablet (1 mg) by mouth nightly as needed for sleep       metoprolol tartrate (LOPRESSOR) 25 MG tablet Take 1 tablet (25 mg) by mouth 2 times daily       miconazole (MICATIN) 2 % external powder Apply topically 2 times daily        ondansetron (ZOFRAN-ODT) 4 MG ODT tab Take 1 tablet (4 mg) by mouth every 6 hours as needed for nausea or vomiting       ORDER FOR DME Equipment being ordered: compression stocking knee high 20-30mmhg 2 Package 0     QUEtiapine (SEROQUEL) 25 MG tablet Take 0.5 tablets (12.5 mg) by mouth nightly as needed (restlessness)       senna-docusate (SENOKOT-S/PERICOLACE) 8.6-50 MG tablet Take 1 tablet by mouth 2 times daily as needed for constipation       senna-docusate (SENOKOT-S/PERICOLACE) 8.6-50 MG tablet Take 2 tablets by mouth 2 times daily as needed for constipation       vitamin D3 (CHOLECALCIFEROL) 10 MCG (400 UNIT) capsule Take 2 capsules (800 Units) by mouth daily 60 capsule 3     zinc Oxide (DESITIN) 40 % paste Apply topically as needed for dry skin or irritation       Physical Exam:  General:  Elderly appearing adult female in NAD.  Appropriately answering questions.  Eyes:  No erythema or discharge  Respiratory:  Breathing comfortably on room air.  No wheezing or distress.  Skin:  No visible concerning skin rashes or lesions  Neuro:  No notable tremor and dyskinetic movements.  Psych:  Mood and affect appear normal.    The rest of a comprehensive physical examination is deferred due to PHE (public health emergency) video visit restrictions.    Laboratory/Imaging Studies:  4/4/2022 Labs:  Chloride is elevated at 113 mmol/L  Bicarbonate is wnl at 24 mmol/L  BUN is elevated at 31 mg/dL  Creatinine is elevated at 1.07 mg/dL  GFR is low at 48 mL/min    Sodium, potassium, and calcium are wnl.    3/13/2022 CXR (performed for CP and hypoxia):  Mild basilar/retrocardiac pulmonary opacities, atelectasis vs infection.  No evidence of malignancy.    ASSESSMENT/PLAN:  89 year old female with a h/o T2N0M0, ER/NJ positive, HER2 non-amplified and T2N1M0, ER/NJ positive, HER2 amplified invasive mammary carcinomas of the left breast.  She is s/p treatment with 5 months neoadjuvant herceptin and letrozole, left breast  mastectomy, SLN biopsy, radiation, and 1 year Kadcyla.  She continues on letrozole.    1.  Left breast cancers:  Ms. Mar is 3 years, 5 months out from excision of left breast cancers.  She continues on treatment with letrozole. She confirms today that she continues to take it and is tolerating the medication well.    She is asymptomatic of disease recurrence on history taken today.  Due to age and medical comorbidities, we are not performing routine screening mammogram of the right breast.  Will schedule next visit in 6 months.  If her health allows, will plan for this to be an in person visit.    2.  Stage IIIa CKD:  Secondary to chronic lithium.  Last creatinine on 4/4 was at baseline.  Ongoing follow up with Nephrology.    3.  Bipolar disorder/depression:  Reports improvement in symptoms since last visit.  Her daughter thinks increased interaction with others at [facility] has contributed to improvement in mood and plan is to discharge from the [facility] to assisted living.  She follows with Dr. Horn at [facility] in [place].  Current medications including Seroquel, Depakote, lithium and Abilify.     4.  Osteopenia:  At risk for bone loss on letrozole.  DEXA in 02/2021 with a lowest T-score of -2.4 c/w osteopenia/borderline osteoporosis.  Plan to repeat a DEXA in 02/2023.    5.  Follow Up:  Return to clinic in approximately 6 months for in person visit with me.        I spent a total of 12 minutes on the telephone with the patient and her daughter today.  An additional 10 minutes was spent on the day of the visit reviewing her medical record, reviewing and interpreting tests, and in documentation.    Lennie is a 89 year old who is being evaluated via a billable video visit.      How would you like to obtain your AVS? MyChart  If the video visit is dropped, the invitation should be resent by: Text to cell phone: [phone]  Will anyone else be joining your video visit? No Patient family is with her.           Again,  thank you for allowing me to participate in the care of your patient.      Sincerely,    Perlita Malik MD

## 2022-04-18 NOTE — PROGRESS NOTES
Lennie is a 89 year old who is being evaluated via a billable video visit.      How would you like to obtain your AVS? MyChart  If the video visit is dropped, the invitation should be resent by: Text to cell phone: [phone]  Will anyone else be joining your video visit? No Patient family is with her.     Video-Visit Details    Type of service:  Video Visit    Originating Location (pt. Location): [facility]    Distant Location (provider location):  [facility]     Platform used for Video Visit: Erasmo Feng

## 2022-04-18 NOTE — PROGRESS NOTES
[facility] GERIATRIC SERVICES    Facility:  [facility] ([facility]) [[id number]]  Code Status: DNR      CHIEF COMPLAINT/REASON FOR VISIT:  Chief Complaint   Patient presents with     RECHECK       HISTORY:      HPI: Lennie is a 89 year old female who resides here at the [facility] TCU undergoing physical and occupational therapy secondary to hospitalization with COVID-19.  She was admitted and she did improve.  Her acute hypoxic respiratory failure has resolved and she was transferred to the TCU.  Weight is at 149 which is baseline for her and I been treating her for right leg pain.  But no real findings.  She does have bipolar disorder and she has shown no signs of depression or javan here.  She does have A. fib on Eliquis at this time and she also was treated for recent UTI.  And no signs of recurrence.    Ping today claims she is doing well at this time has no new concerns she still has her right leg swelling with a little bit of pink area down there likely venous stasis changes unlikely infection given the white count and procalcitonin.  She is already treated with antibiotics and there is no signs of recurrence.  She still has a swollen leg.  Venous Doppler ultrasound was negative for any clot.  And she is already on Eliquis.    Past Medical History:   Diagnosis Date     Alcohol dependence in remission (H)      Anxiety      Asymptomatic varicose veins      Bipolar disorder, unspecified (H)      CKD (chronic kidney disease) stage 3, GFR 30-59 ml/min (H) 2014     History of smoking      Hyperparathyroidism (H)      Infection due to 2019 novel coronavirus 3/10/2022     Memory loss      Muscle weakness (generalized) 2008     Severe depression (H)      Subdural hygroma     chronic.  no surgeries     Tremor of unknown origin              Family History   Problem Relation Age of Onset     C.A.D. Mother          at age 47 Heart failure, Rhumatic Fever     Cerebrovascular Disease Father           at age 79     Diabetes Father         type 2     Breast Cancer Sister 84        99 year old sister     Circulatory Maternal Grandmother         vericose veins     C.A.D. Paternal Grandfather      Gastrointestinal Disease Paternal Grandfather         ulcer     Cancer Son          age 51--Melanoma     Cancer Brother          age 50's--Melanoma     Arthritis Sister      Circulatory Sister         vericose veins     Circulatory Sister         vericose veins     Eye Disorder Sister         Cateracts     Respiratory Sister         asthma     Respiratory Brother         COPD     Breast Cancer Niece 60        daughter of 98 yo sister     Social History     Socioeconomic History     Marital status:      Years of education: College-2y   Occupational History     Occupation: [employer]     Employer: RETIRED     Comment: Retired ini    Tobacco Use     Smoking status: Former Smoker     Packs/day: 1.50     Years: 30.00     Pack years: 45.00     Types: Cigarettes     Quit date: 1993     Years since quittin.7     Smokeless tobacco: Never Used   Substance and Sexual Activity     Alcohol use: No     Drug use: No     Sexual activity: Not Currently   Other Topics Concern     Parent/sibling w/ CABG, MI or angioplasty before 65F 55M? Yes     Comment: mother dies from a heart attack at age 47   Social History Narrative    Dairy/d 0-1 servings/d.     Caffeine 2 servings/d    Exercise 0 x week-walks regularly.    Sunscreen used - Yes    Seatbelts used - Yes    Working smoke/CO detectors in the home - Yes    Guns stored in the home - No    Self Breast Exams - Yes    Self Testicular Exam - No    Eye Exam up to date - Yes     Dental Exam up to date - Yes      Pap Smear up to date - No    Mammogram up to date - No    PSA up to date - No    Dexa Scan up to date - No    Flex Sig / Colonoscopy up to date - No    Immunizations up to date - Yes  2009 Td    Abuse: Current or Past(Physical, Sexual or Emotional)-  No    Do you feel safe in your environment - Yes    Updated 3/2010                                     REVIEW OF SYSTEM: Patient denies any pain fevers chills nausea vomit diarrhea change in vision hearing taste or smell weakness one-sided chest pain shortness of breath.  Denies incontinence stool polyphagia polydipsia polyuria depression anxiety and remainder review of systems is negative.      PHYSICAL EXAM: She is alert pleasant does not appear to be in acute distress head is normocephalic and atraumatic sclera conjunctive is clear oral mucosa was moist nasal discharge.  Heart sounds are irregular Dwight a regular with adequate rate control lungs are clear auscultation.  Bowel sounds are positive all 4 quadrants.  She does show no edema in the left leg but about 1+ pitting edema in the right leg with some pink area and venous stasis changes.  No signs of any calf tenderness and Homans' sign is negative.  Neurologic Nasir is nonfocal and affect is pleasant.        LABS: White count and procalcitonin were favorable and within normal limits.  Last basic metabolic profile was unremarkable with exception of GFR of 48.    Vitals; blood pressure 109/51    Pulse 71    9 temperature is 97.1    Respiration 16    O2 sats 97%.      ASSESSMENT:   Encounter Diagnoses   Name Primary?     Pain of right lower leg Yes     Acute respiratory failure with hypoxia (H)      Bipolar affective disorder, remission status unspecified (H)      Heart failure with preserved ejection fraction, NYHA class I (H)      Infection due to 2019 novel coronavirus         PLAN: Plan at this time we will to monitor above medical problems and no other changes to care plan at this time.  Care plan was reviewed and is appropriate.        Electronically signed by: DEVAUGHN JAMIL DO

## 2022-04-19 ENCOUNTER — OFFICE VISIT (OUTPATIENT)
Dept: GERIATRICS | Facility: CLINIC | Age: 87
End: 2022-04-19
Payer: MEDICARE

## 2022-04-19 DIAGNOSIS — U07.1 INFECTION DUE TO 2019 NOVEL CORONAVIRUS: ICD-10-CM

## 2022-04-19 DIAGNOSIS — F31.9 BIPOLAR AFFECTIVE DISORDER, REMISSION STATUS UNSPECIFIED (H): ICD-10-CM

## 2022-04-19 DIAGNOSIS — J96.01 ACUTE RESPIRATORY FAILURE WITH HYPOXIA (H): ICD-10-CM

## 2022-04-19 DIAGNOSIS — M79.661 PAIN OF RIGHT LOWER LEG: Primary | ICD-10-CM

## 2022-04-19 DIAGNOSIS — I50.30 HEART FAILURE WITH PRESERVED EJECTION FRACTION, NYHA CLASS I (H): ICD-10-CM

## 2022-04-19 PROCEDURE — 99309 SBSQ NF CARE MODERATE MDM 30: CPT | Performed by: FAMILY MEDICINE

## 2022-04-19 NOTE — LETTER
4/18/2022        RE: Lennie Mar  [address]  [place] [address]        M [facility] GERIATRIC SERVICES    Facility:  [facility] () [[id number]]  Code Status: DNR      CHIEF COMPLAINT/REASON FOR VISIT:  Chief Complaint   Patient presents with     RECHECK       HISTORY:      HPI: Lennie is a 89 year old female who resides here at the [facility] TCU undergoing physical and occupational therapy secondary to hospitalization with COVID-19.  She was admitted and she did improve.  Her acute hypoxic respiratory failure has resolved and she was transferred to the TCU.  Weight is at 149 which is baseline for her and I been treating her for right leg pain.  But no real findings.  She does have bipolar disorder and she has shown no signs of depression or javan here.  She does have A. fib on Eliquis at this time and she also was treated for recent UTI.  And no signs of recurrence.    Ping today claims she is doing well at this time has no new concerns she still has her right leg swelling with a little bit of pink area down there likely venous stasis changes unlikely infection given the white count and procalcitonin.  She is already treated with antibiotics and there is no signs of recurrence.  She still has a swollen leg.  Venous Doppler ultrasound was negative for any clot.  And she is already on Eliquis.    Past Medical History:   Diagnosis Date     Alcohol dependence in remission (H)      Anxiety      Asymptomatic varicose veins      Bipolar disorder, unspecified (H)      CKD (chronic kidney disease) stage 3, GFR 30-59 ml/min (H) 2/18/2014     History of smoking      Hyperparathyroidism (H)      Infection due to 2019 novel coronavirus 3/10/2022     Memory loss      Muscle weakness (generalized) 6/23/2008     Severe depression (H)      Subdural hygroma     chronic.  no surgeries     Tremor of unknown origin              Family History   Problem Relation Age of Onset     C.A.D. Mother           at age 47 Heart failure, Rhumatic Fever     Cerebrovascular Disease Father          at age 79     Diabetes Father         type 2     Breast Cancer Sister 84        99 year old sister     Circulatory Maternal Grandmother         vericose veins     C.A.D. Paternal Grandfather      Gastrointestinal Disease Paternal Grandfather         ulcer     Cancer Son          age 51--Melanoma     Cancer Brother          age 50's--Melanoma     Arthritis Sister      Circulatory Sister         vericose veins     Circulatory Sister         vericose veins     Eye Disorder Sister         Cateracts     Respiratory Sister         asthma     Respiratory Brother         COPD     Breast Cancer Niece 60        daughter of 98 yo sister     Social History     Socioeconomic History     Marital status:      Years of education: College-2y   Occupational History     Occupation: [employer]     Employer: RETIRED     Comment: Retired in1992   Tobacco Use     Smoking status: Former Smoker     Packs/day: 1.50     Years: 30.00     Pack years: 45.00     Types: Cigarettes     Quit date: 1993     Years since quittin.7     Smokeless tobacco: Never Used   Substance and Sexual Activity     Alcohol use: No     Drug use: No     Sexual activity: Not Currently   Other Topics Concern     Parent/sibling w/ CABG, MI or angioplasty before 65F 55M? Yes     Comment: mother dies from a heart attack at age 47   Social History Narrative    Dairy/d 0-1 servings/d.     Caffeine 2 servings/d    Exercise 0 x week-walks regularly.    Sunscreen used - Yes    Seatbelts used - Yes    Working smoke/CO detectors in the home - Yes    Guns stored in the home - No    Self Breast Exams - Yes    Self Testicular Exam - No    Eye Exam up to date - Yes 2009    Dental Exam up to date - Yes  2010    Pap Smear up to date - No    Mammogram up to date - No    PSA up to date - No    Dexa Scan up to date - No    Flex Sig / Colonoscopy up to date - No     Immunizations up to date - Yes  5/2009 Td    Abuse: Current or Past(Physical, Sexual or Emotional)- No    Do you feel safe in your environment - Yes    Updated 3/2010                                     REVIEW OF SYSTEM: Patient denies any pain fevers chills nausea vomit diarrhea change in vision hearing taste or smell weakness one-sided chest pain shortness of breath.  Denies incontinence stool polyphagia polydipsia polyuria depression anxiety and remainder review of systems is negative.      PHYSICAL EXAM: She is alert pleasant does not appear to be in acute distress head is normocephalic and atraumatic sclera conjunctive is clear oral mucosa was moist nasal discharge.  Heart sounds are irregular Dwight a regular with adequate rate control lungs are clear auscultation.  Bowel sounds are positive all 4 quadrants.  She does show no edema in the left leg but about 1+ pitting edema in the right leg with some pink area and venous stasis changes.  No signs of any calf tenderness and Homans' sign is negative.  Neurologic Nasir is nonfocal and affect is pleasant.        LABS: White count and procalcitonin were favorable and within normal limits.  Last basic metabolic profile was unremarkable with exception of GFR of 48.    Vitals; blood pressure 109/51    Pulse 71    9 temperature is 97.1    Respiration 16    O2 sats 97%.      ASSESSMENT:   Encounter Diagnoses   Name Primary?     Pain of right lower leg Yes     Acute respiratory failure with hypoxia (H)      Bipolar affective disorder, remission status unspecified (H)      Heart failure with preserved ejection fraction, NYHA class I (H)      Infection due to 2019 novel coronavirus         PLAN: Plan at this time we will to monitor above medical problems and no other changes to care plan at this time.  Care plan was reviewed and is appropriate.        Electronically signed by: DEVAUGHN JAMIL DO        Sincerely,        DEVAUGHN JAMIL DO

## 2022-04-21 ENCOUNTER — OFFICE VISIT (OUTPATIENT)
Dept: GERIATRICS | Facility: CLINIC | Age: 87
End: 2022-04-21
Payer: MEDICARE

## 2022-04-21 VITALS
RESPIRATION RATE: 20 BRPM | WEIGHT: 150.4 LBS | HEIGHT: 62 IN | OXYGEN SATURATION: 98 % | DIASTOLIC BLOOD PRESSURE: 71 MMHG | SYSTOLIC BLOOD PRESSURE: 113 MMHG | HEART RATE: 74 BPM | TEMPERATURE: 97.2 F | BODY MASS INDEX: 27.68 KG/M2

## 2022-04-21 DIAGNOSIS — F31.9 BIPOLAR AFFECTIVE DISORDER, REMISSION STATUS UNSPECIFIED (H): ICD-10-CM

## 2022-04-21 DIAGNOSIS — J96.01 ACUTE RESPIRATORY FAILURE WITH HYPOXIA (H): ICD-10-CM

## 2022-04-21 DIAGNOSIS — M79.661 PAIN OF RIGHT LOWER LEG: Primary | ICD-10-CM

## 2022-04-21 DIAGNOSIS — U07.1 INFECTION DUE TO 2019 NOVEL CORONAVIRUS: ICD-10-CM

## 2022-04-21 DIAGNOSIS — I50.30 HEART FAILURE WITH PRESERVED EJECTION FRACTION, NYHA CLASS I (H): ICD-10-CM

## 2022-04-21 PROCEDURE — 99309 SBSQ NF CARE MODERATE MDM 30: CPT | Performed by: FAMILY MEDICINE

## 2022-04-21 NOTE — LETTER
2022        RE: Lennie Mar   [address]  [place] [facility] [address]        M [facility] GERIATRIC SERVICES    Facility:  [facility] ([facility]) [[id number]]  Code Status: DNR      CHIEF COMPLAINT/REASON FOR VISIT:  Chief Complaint   Patient presents with     RECHECK       HISTORY:      HPI: Lennie is a 89 year old female who resides here at the [facility] TCU.  She does have chronic kidney disease stage III has been pretty stable at this time she is recently hospitalized with COVID-19.  She did have fluid overload was on Lasix at this time and creatinine has been improving..  She did have a high sodium when she came in that has returned to normal.    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  She does atrial fibrillation on Eliquis at this time.  In the hospital she was treated for urinary tract infection.    She has been very stable here with her bipolar disorder she is on Depakote and lithium and levels were reassuring here in the TCU.    She is showed show no signs of any javan javan or depression at this time and she is likely reaching her baseline.    Patient is doing well at this time she seems baseline.  She denies any issues today.  And staff have no new concerns.    Past Medical History:   Diagnosis Date     Alcohol dependence in remission (H)      Anxiety      Asymptomatic varicose veins      Bipolar disorder, unspecified (H)      CKD (chronic kidney disease) stage 3, GFR 30-59 ml/min (H) 2014     History of smoking      Hyperparathyroidism (H)      Infection due to 2019 novel coronavirus 3/10/2022     Memory loss      Muscle weakness (generalized) 2008     Severe depression (H)      Subdural hygroma     chronic.  no surgeries     Tremor of unknown origin              Family History   Problem Relation Age of Onset     C.A.D. Mother          at age 47 Heart failure, Rhumatic Fever     Cerebrovascular  Disease Father          at age 79     Diabetes Father         type 2     Breast Cancer Sister 84        99 year old sister     Circulatory Maternal Grandmother         vericose veins     C.A.D. Paternal Grandfather      Gastrointestinal Disease Paternal Grandfather         ulcer     Cancer Son          age 51--Melanoma     Cancer Brother          age 50's--Melanoma     Arthritis Sister      Circulatory Sister         vericose veins     Circulatory Sister         vericose veins     Eye Disorder Sister         Cateracts     Respiratory Sister         asthma     Respiratory Brother         COPD     Breast Cancer Niece 60        daughter of 98 yo sister     Social History     Socioeconomic History     Marital status:      Years of education: College-2y   Occupational History     Occupation: [employer]     Employer: RETIRED     Comment: Retired ini    Tobacco Use     Smoking status: Former Smoker     Packs/day: 1.50     Years: 30.00     Pack years: 45.00     Types: Cigarettes     Quit date: 1993     Years since quittin.7     Smokeless tobacco: Never Used   Substance and Sexual Activity     Alcohol use: No     Drug use: No     Sexual activity: Not Currently   Other Topics Concern     Parent/sibling w/ CABG, MI or angioplasty before 65F 55M? Yes     Comment: mother dies from a heart attack at age 47   Social History Narrative    Dairy/d 0-1 servings/d.     Caffeine 2 servings/d    Exercise 0 x week-walks regularly.    Sunscreen used - Yes    Seatbelts used - Yes    Working smoke/CO detectors in the home - Yes    Guns stored in the home - No    Self Breast Exams - Yes    Self Testicular Exam - No    Eye Exam up to date - Yes     Dental Exam up to date - Yes      Pap Smear up to date - No    Mammogram up to date - No    PSA up to date - No    Dexa Scan up to date - No    Flex Sig / Colonoscopy up to date - No    Immunizations up to date - Yes  2009 Td    Abuse: Current or  Past(Physical, Sexual or Emotional)- No    Do you feel safe in your environment - Yes    Updated 3/2010                                     REVIEW OF SYSTEM: Patient denies any pain fevers chills nausea vomit diarrhea change in vision hearing taste or smell weakness one-sided chest pain shortness of breath.  Denies any current shortness still polyphasia polydipsia polyuria depression or anxiety and the main review of systems is negative.      PHYSICAL EXAM: Patient is alert pleasant not appear to be in acute distress head is normocephalic and atraumatic sclera conjunctiva is clear mucosas moist nasal discharge.  Heart sounds are irregularly regular with adequate rate control lungs are clear to station abdomen soft nontender.  Right lower extremity does have some edema on it but no signs of any infection or cellulitis.  Neurologic exam showed tremor but otherwise nonfocal.  High-frequency intention tremor.  Affect was pleasant.        LABS: Reviewed.    Vital; blood pressure 113/71    Pulse of 74    Temperature is 97.2    Respirations 20    O2 sats 98%.      ASSESSMENT:   Encounter Diagnoses   Name Primary?     Pain of right lower leg Yes     Acute respiratory failure with hypoxia (H)      Infection due to 2019 novel coronavirus      Heart failure with preserved ejection fraction, NYHA class I (H)      Bipolar affective disorder, remission status unspecified (H)         PLAN: Plan at this time we will continue to monitor above medical problems and no other changes to care plan at this time.  Care plan was reviewed and is appropriate.        Electronically signed by: DEVAUGHN JAMIL DO          Sincerely,        DEVAUGHN JAMIL DO

## 2022-04-21 NOTE — PROGRESS NOTES
[facility] GERIATRIC SERVICES    Facility:  [facility] ([facility]) [[id number]]  Code Status: DNR      CHIEF COMPLAINT/REASON FOR VISIT:  Chief Complaint   Patient presents with     RECHECK       HISTORY:      HPI: Lennie is a 89 year old female who resides here at the [facility] TCU.  She does have chronic kidney disease stage III has been pretty stable at this time she is recently hospitalized with COVID-19.  She did have fluid overload was on Lasix at this time and creatinine has been improving..  She did have a high sodium when she came in that has returned to normal.    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  She does atrial fibrillation on Eliquis at this time.  In the hospital she was treated for urinary tract infection.    She has been very stable here with her bipolar disorder she is on Depakote and lithium and levels were reassuring here in the TCU.    She is showed show no signs of any javan javan or depression at this time and she is likely reaching her baseline.    Patient is doing well at this time she seems baseline.  She denies any issues today.  And staff have no new concerns.    Past Medical History:   Diagnosis Date     Alcohol dependence in remission (H)      Anxiety      Asymptomatic varicose veins      Bipolar disorder, unspecified (H)      CKD (chronic kidney disease) stage 3, GFR 30-59 ml/min (H) 2014     History of smoking      Hyperparathyroidism (H)      Infection due to 2019 novel coronavirus 3/10/2022     Memory loss      Muscle weakness (generalized) 2008     Severe depression (H)      Subdural hygroma     chronic.  no surgeries     Tremor of unknown origin              Family History   Problem Relation Age of Onset     C.A.D. Mother          at age 47 Heart failure, Rhumatic Fever     Cerebrovascular Disease Father          at age 79     Diabetes Father         type 2     Breast Cancer Sister  84        99 year old sister     Circulatory Maternal Grandmother         vericose veins     C.A.D. Paternal Grandfather      Gastrointestinal Disease Paternal Grandfather         ulcer     Cancer Son          age 51--Melanoma     Cancer Brother          age 50's--Melanoma     Arthritis Sister      Circulatory Sister         vericose veins     Circulatory Sister         vericose veins     Eye Disorder Sister         Cateracts     Respiratory Sister         asthma     Respiratory Brother         COPD     Breast Cancer Niece 60        daughter of 98 yo sister     Social History     Socioeconomic History     Marital status:      Years of education: College-2y   Occupational History     Occupation: [employer]     Employer: RETIRED     Comment: Retired in1992   Tobacco Use     Smoking status: Former Smoker     Packs/day: 1.50     Years: 30.00     Pack years: 45.00     Types: Cigarettes     Quit date: 1993     Years since quittin.7     Smokeless tobacco: Never Used   Substance and Sexual Activity     Alcohol use: No     Drug use: No     Sexual activity: Not Currently   Other Topics Concern     Parent/sibling w/ CABG, MI or angioplasty before 65F 55M? Yes     Comment: mother dies from a heart attack at age 47   Social History Narrative    Dairy/d 0-1 servings/d.     Caffeine 2 servings/d    Exercise 0 x week-walks regularly.    Sunscreen used - Yes    Seatbelts used - Yes    Working smoke/CO detectors in the home - Yes    Guns stored in the home - No    Self Breast Exams - Yes    Self Testicular Exam - No    Eye Exam up to date - Yes     Dental Exam up to date - Yes      Pap Smear up to date - No    Mammogram up to date - No    PSA up to date - No    Dexa Scan up to date - No    Flex Sig / Colonoscopy up to date - No    Immunizations up to date - Yes  2009 Td    Abuse: Current or Past(Physical, Sexual or Emotional)- No    Do you feel safe in your environment - Yes    Updated 3/2010                                      REVIEW OF SYSTEM: Patient denies any pain fevers chills nausea vomit diarrhea change in vision hearing taste or smell weakness one-sided chest pain shortness of breath.  Denies any current shortness still polyphasia polydipsia polyuria depression or anxiety and the main review of systems is negative.      PHYSICAL EXAM: Patient is alert pleasant not appear to be in acute distress head is normocephalic and atraumatic sclera conjunctiva is clear mucosas moist nasal discharge.  Heart sounds are irregularly regular with adequate rate control lungs are clear to station abdomen soft nontender.  Right lower extremity does have some edema on it but no signs of any infection or cellulitis.  Neurologic exam showed tremor but otherwise nonfocal.  High-frequency intention tremor.  Affect was pleasant.        LABS: Reviewed.    Vital; blood pressure 113/71    Pulse of 74    Temperature is 97.2    Respirations 20    O2 sats 98%.      ASSESSMENT:   Encounter Diagnoses   Name Primary?     Pain of right lower leg Yes     Acute respiratory failure with hypoxia (H)      Infection due to 2019 novel coronavirus      Heart failure with preserved ejection fraction, NYHA class I (H)      Bipolar affective disorder, remission status unspecified (H)         PLAN: Plan at this time we will continue to monitor above medical problems and no other changes to care plan at this time.  Care plan was reviewed and is appropriate.        Electronically signed by: DEVAUGHN JAMIL DO

## 2022-04-25 ENCOUNTER — TRANSITIONAL CARE UNIT VISIT (OUTPATIENT)
Dept: GERIATRICS | Facility: CLINIC | Age: 87
End: 2022-04-25
Payer: MEDICARE

## 2022-04-25 VITALS
TEMPERATURE: 97.7 F | HEART RATE: 73 BPM | RESPIRATION RATE: 20 BRPM | HEIGHT: 62 IN | DIASTOLIC BLOOD PRESSURE: 50 MMHG | SYSTOLIC BLOOD PRESSURE: 132 MMHG | WEIGHT: 152 LBS | BODY MASS INDEX: 27.97 KG/M2 | OXYGEN SATURATION: 98 %

## 2022-04-25 DIAGNOSIS — F31.9 BIPOLAR AFFECTIVE DISORDER, REMISSION STATUS UNSPECIFIED (H): ICD-10-CM

## 2022-04-25 DIAGNOSIS — M79.661 PAIN OF RIGHT LOWER LEG: Primary | ICD-10-CM

## 2022-04-25 DIAGNOSIS — J96.01 ACUTE RESPIRATORY FAILURE WITH HYPOXIA (H): ICD-10-CM

## 2022-04-25 DIAGNOSIS — U07.1 INFECTION DUE TO 2019 NOVEL CORONAVIRUS: ICD-10-CM

## 2022-04-25 DIAGNOSIS — I50.30 HEART FAILURE WITH PRESERVED EJECTION FRACTION, NYHA CLASS I (H): ICD-10-CM

## 2022-04-25 PROCEDURE — 99309 SBSQ NF CARE MODERATE MDM 30: CPT | Performed by: FAMILY MEDICINE

## 2022-04-25 NOTE — PROGRESS NOTES
[facility] GERIATRIC SERVICES    Facility:  [facility] ([facility]) [[id number]]  Code Status: DNR      CHIEF COMPLAINT/REASON FOR VISIT:  Chief Complaint   Patient presents with     RECHECK       HISTORY:      HPI: Lennie is a 89 year old [facility] she did have recent COVID-19 and did have some neurologic sequelae including some aphasia.  This did recover however and she also did have some fluid overload on Lasix at this time creatinine creatinine has been improving.  She had a high sodium at 1 point but that did return to normal.  She had been very stable at this time and her bipolar disorder has been stable with normal Depakote and lithium levels while she was here.  Is been no signs of javan or depression.    Patient has met all her therapy goals at this time from medical standpoint and psychological status standpoint she is stable.  She is going to go home tomorrow.    Past Medical History:   Diagnosis Date     Alcohol dependence in remission (H)      Anxiety      Asymptomatic varicose veins      Bipolar disorder, unspecified (H)      CKD (chronic kidney disease) stage 3, GFR 30-59 ml/min (H) 2014     History of smoking      Hyperparathyroidism (H)      Infection due to 2019 novel coronavirus 3/10/2022     Memory loss      Muscle weakness (generalized) 2008     Severe depression (H)      Subdural hygroma     chronic.  no surgeries     Tremor of unknown origin              Family History   Problem Relation Age of Onset     C.A.D. Mother          at age 47 Heart failure, Rhumatic Fever     Cerebrovascular Disease Father          at age 79     Diabetes Father         type 2     Breast Cancer Sister 84        99 year old sister     Circulatory Maternal Grandmother         vericose veins     C.A.D. Paternal Grandfather      Gastrointestinal Disease Paternal Grandfather         ulcer     Cancer Son          age 51--Melanoma     Cancer Brother          age  50's--Melanoma     Arthritis Sister      Circulatory Sister         vericose veins     Circulatory Sister         vericose veins     Eye Disorder Sister         Cateracts     Respiratory Sister         asthma     Respiratory Brother         COPD     Breast Cancer Niece 60        daughter of 98 yo sister     Social History     Socioeconomic History     Marital status:      Years of education: College-2y   Occupational History     Occupation: [employer]     Employer: RETIRED     Comment: Retired in1992   Tobacco Use     Smoking status: Former Smoker     Packs/day: 1.50     Years: 30.00     Pack years: 45.00     Types: Cigarettes     Quit date: 1993     Years since quittin.7     Smokeless tobacco: Never Used   Substance and Sexual Activity     Alcohol use: No     Drug use: No     Sexual activity: Not Currently   Other Topics Concern     Parent/sibling w/ CABG, MI or angioplasty before 65F 55M? Yes     Comment: mother dies from a heart attack at age 47   Social History Narrative    Dairy/d 0-1 servings/d.     Caffeine 2 servings/d    Exercise 0 x week-walks regularly.    Sunscreen used - Yes    Seatbelts used - Yes    Working smoke/CO detectors in the home - Yes    Guns stored in the home - No    Self Breast Exams - Yes    Self Testicular Exam - No    Eye Exam up to date - Yes     Dental Exam up to date - Yes      Pap Smear up to date - No    Mammogram up to date - No    PSA up to date - No    Dexa Scan up to date - No    Flex Sig / Colonoscopy up to date - No    Immunizations up to date - Yes  2009 Td    Abuse: Current or Past(Physical, Sexual or Emotional)- No    Do you feel safe in your environment - Yes    Updated 3/2010                                     REVIEW OF SYSTEM: Patient denies any pain fevers chills nausea vomit diarrhea change in vision hearing taste or smell weakness one-sided the chest pain shortness of breath.  Denies any current shortness stool polyphagia polydipsia  polyuria depression or anxiety remainder the review of systems is negative.      PHYSICAL EXAM: Patient is alert pleasant does not distress head is normocephalic and atraumatic she does have a little skin care skin lesion could be could represent skin cancer or actinic keratosis on her face.  She is going to go ahead that checked out by dermatology.  Heart sounds were irregular regular with adequate rate control lungs are clear to station abdomen soft nontender.  Right extremity does have some edema but no signs of any infection cellulitis neurologic Nasir does show tremor but otherwise nonfocal and affect was pleasant.        LABS: Reviewed.    Vital; blood pressure 132/50    Pulse 73    Temperature is 97.7    Respirations 28    O2 sats are 98%.      ASSESSMENT:   Encounter Diagnoses   Name Primary?     Pain of right lower leg Yes     Heart failure with preserved ejection fraction, NYHA class I (H)      Acute respiratory failure with hypoxia (H)      Bipolar affective disorder, remission status unspecified (H)      Infection due to 2019 novel coronavirus         PLAN: Plan at this time okay to discharge home with current meds and services.  Follow-up with primary care MD in 1 week.  No other changes to care plan at this time care plan was reviewed and is appropriate.        Electronically signed by: DEVAUGHN JAMIL DO

## 2022-04-25 NOTE — LETTER
2022        RE: Lennie Mar   [address]  [facility] [address]        [facility] GERIATRIC SERVICES    Facility:  [facility] ([facility]) [[id number]]  Code Status: DNR      CHIEF COMPLAINT/REASON FOR VISIT:  Chief Complaint   Patient presents with     RECHECK       HISTORY:      HPI: Lennie is a 89 year old female resides here at [facility] she did have recent COVID-19 and did have some neurologic sequelae including some aphasia.  This did recover however and she also did have some fluid overload on Lasix at this time creatinine creatinine has been improving.  She had a high sodium at 1 point but that did return to normal.  She had been very stable at this time and her bipolar disorder has been stable with normal Depakote and lithium levels while she was here.  Is been no signs of javan or depression.    Patient has met all her therapy goals at this time from medical standpoint and psychological status standpoint she is stable.  She is going to go home tomorrow.    Past Medical History:   Diagnosis Date     Alcohol dependence in remission (H)      Anxiety      Asymptomatic varicose veins      Bipolar disorder, unspecified (H)      CKD (chronic kidney disease) stage 3, GFR 30-59 ml/min (H) 2014     History of smoking      Hyperparathyroidism (H)      Infection due to 2019 novel coronavirus 3/10/2022     Memory loss      Muscle weakness (generalized) 2008     Severe depression (H)      Subdural hygroma     chronic.  no surgeries     Tremor of unknown origin              Family History   Problem Relation Age of Onset     C.A.D. Mother          at age 47 Heart failure, Rhumatic Fever     Cerebrovascular Disease Father          at age 79     Diabetes Father         type 2     Breast Cancer Sister 84        99 year old sister     Circulatory Maternal Grandmother         vericose veins     C.A.D. Paternal Grandfather      Gastrointestinal Disease Paternal Grandfather          ulcer     Cancer Son          age 51--Melanoma     Cancer Brother          age 50's--Melanoma     Arthritis Sister      Circulatory Sister         vericose veins     Circulatory Sister         vericose veins     Eye Disorder Sister         Cateracts     Respiratory Sister         asthma     Respiratory Brother         COPD     Breast Cancer Niece 60        daughter of 98 yo sister     Social History     Socioeconomic History     Marital status:      Years of education: College-2y   Occupational History     Occupation: [employer]     Employer: RETIRED     Comment: Retired 1992   Tobacco Use     Smoking status: Former Smoker     Packs/day: 1.50     Years: 30.00     Pack years: 45.00     Types: Cigarettes     Quit date: 1993     Years since quittin.7     Smokeless tobacco: Never Used   Substance and Sexual Activity     Alcohol use: No     Drug use: No     Sexual activity: Not Currently   Other Topics Concern     Parent/sibling w/ CABG, MI or angioplasty before 65F 55M? Yes     Comment: mother dies from a heart attack at age 47   Social History Narrative    Dairy/d 0-1 servings/d.     Caffeine 2 servings/d    Exercise 0 x week-walks regularly.    Sunscreen used - Yes    Seatbelts used - Yes    Working smoke/CO detectors in the home - Yes    Guns stored in the home - No    Self Breast Exams - Yes    Self Testicular Exam - No    Eye Exam up to date - Yes     Dental Exam up to date - Yes      Pap Smear up to date - No    Mammogram up to date - No    PSA up to date - No    Dexa Scan up to date - No    Flex Sig / Colonoscopy up to date - No    Immunizations up to date - Yes  2009 Td    Abuse: Current or Past(Physical, Sexual or Emotional)- No    Do you feel safe in your environment - Yes    Updated 3/2010                                     REVIEW OF SYSTEM: Patient denies any pain fevers chills nausea vomit diarrhea change in vision hearing taste or smell weakness one-sided the  chest pain shortness of breath.  Denies any current shortness stool polyphagia polydipsia polyuria depression or anxiety remainder the review of systems is negative.      PHYSICAL EXAM: Patient is alert pleasant does not distress head is normocephalic and atraumatic she does have a little skin care skin lesion could be could represent skin cancer or actinic keratosis on her face.  She is going to go ahead that checked out by dermatology.  Heart sounds were irregular regular with adequate rate control lungs are clear to station abdomen soft nontender.  Right extremity does have some edema but no signs of any infection cellulitis neurologic Nasir does show tremor but otherwise nonfocal and affect was pleasant.        LABS: Reviewed.    Vital; blood pressure 132/50    Pulse 73    Temperature is 97.7    Respirations 28    O2 sats are 98%.      ASSESSMENT:   Encounter Diagnoses   Name Primary?     Pain of right lower leg Yes     Heart failure with preserved ejection fraction, NYHA class I (H)      Acute respiratory failure with hypoxia (H)      Bipolar affective disorder, remission status unspecified (H)      Infection due to 2019 novel coronavirus         PLAN: Plan at this time okay to discharge home with current meds and services.  Follow-up with primary care MD in 1 week.  No other changes to care plan at this time care plan was reviewed and is appropriate.        Electronically signed by: DEVAUGHN JAMIL DO          Sincerely,        DEVAUGHN JAMIL DO

## 2022-04-27 ENCOUNTER — MEDICAL CORRESPONDENCE (OUTPATIENT)
Dept: HEALTH INFORMATION MANAGEMENT | Facility: CLINIC | Age: 87
End: 2022-04-27
Payer: MEDICARE

## 2022-04-28 ENCOUNTER — TELEPHONE (OUTPATIENT)
Dept: ONCOLOGY | Facility: CLINIC | Age: 87
End: 2022-04-28
Payer: MEDICARE

## 2022-04-28 NOTE — PROGRESS NOTES
CLINICAL NUTRITION SERVICES- ONCOLOGY DISTRESS SCREENING     Identified Concern and Score From Distress Screening: This patient has scored >= 6 on Nutrition question 1 and/or 2 on the Oncology Distress Screening questionaire. Nutritionist Referral recommended. See Onc Distress Screening Flowsheet     Date of Distress Screenin/18     Findings: Called primary phone number listed in chart, pt's son Dmitry answered and reported that Lennie is currently in an assisted living facility and doesn't have her phone connected yet. Unable to speak with patient at this time.      Follow-up Required: NIKOLE Nino RD, LD  [phone]

## 2022-04-29 ENCOUNTER — TELEPHONE (OUTPATIENT)
Dept: FAMILY MEDICINE | Facility: CLINIC | Age: 87
End: 2022-04-29
Payer: MEDICARE

## 2022-04-29 NOTE — TELEPHONE ENCOUNTER
"Left message on voicemail of # in chart (Identified as \"Dr. Mar\" and it seemed to be a man) to call back and speak with a triage nurse.     Patient needs to be scheduled within 30 days to give verbal orders to home care    HARI Poole RN  [facility]      "

## 2022-04-29 NOTE — TELEPHONE ENCOUNTER
Reason for Call: Other call back    Detailed comments: [facility] requesting (per family request) delay in SOC to 5/2/2022 for -  Nursing, PT, OT, speech therapy, and home health aid    Phone Number Patient can be reached at: [phone]  Bridget MATTHEWS  [facility]    Best Time: Any    Can we leave a detailed message on this number? YES    Call taken on 4/29/2022 at 11:16 AM by Annmarie Donaldson

## 2022-04-29 NOTE — TELEPHONE ENCOUNTER
Just moved into new assisted living in [facility].    In house primary care is provided by [facility] and pt son states she will be establishing with them to manage her care.  Agreed that they will do so shortly to ensure home care order is placed.    Will be staying with [facility] for oncology care; updated pt new address and home phone number.    KRYSTAL Holly RN  [facility]

## 2022-04-29 NOTE — TELEPHONE ENCOUNTER
Pt has not been seen since 4/20/21 with [facility].       Ok to use a hold slot of someone's within 30 days to see a provider and give orders below?    AMTIA PooleN RN  [facility]

## 2022-05-02 ENCOUNTER — TELEPHONE (OUTPATIENT)
Dept: FAMILY MEDICINE | Facility: CLINIC | Age: 87
End: 2022-05-02
Payer: MEDICARE

## 2022-05-02 NOTE — TELEPHONE ENCOUNTER
Call received from Bridget with [facility] asking about delay start of care order to today.    Writer gave verbal authorization for delay start of care order to 5/2/22.    Reviewed with Bridget patient needs a visit with a new PCP for ongoing home care orders.    Per review of 4/29/22 telephone encounter patient is establishing care with [facility].    AMITA LangstonN, RN  [facility]

## 2022-05-03 ENCOUNTER — HOSPITAL ENCOUNTER (OUTPATIENT)
Facility: CLINIC | Age: 87
Setting detail: OBSERVATION
Discharge: NURSING, REHAB OR LONG-TERM CARE | End: 2022-05-06
Attending: EMERGENCY MEDICINE | Admitting: EMERGENCY MEDICINE
Payer: MEDICARE

## 2022-05-03 ENCOUNTER — APPOINTMENT (OUTPATIENT)
Dept: CT IMAGING | Facility: CLINIC | Age: 87
End: 2022-05-03
Attending: EMERGENCY MEDICINE
Payer: MEDICARE

## 2022-05-03 ENCOUNTER — APPOINTMENT (OUTPATIENT)
Dept: GENERAL RADIOLOGY | Facility: CLINIC | Age: 87
End: 2022-05-03
Attending: EMERGENCY MEDICINE
Payer: MEDICARE

## 2022-05-03 DIAGNOSIS — S02.2XXB OPEN FRACTURE OF NASAL BONE, INITIAL ENCOUNTER: ICD-10-CM

## 2022-05-03 DIAGNOSIS — R04.0 EPISTAXIS: ICD-10-CM

## 2022-05-03 DIAGNOSIS — S09.90XA INJURY OF HEAD, INITIAL ENCOUNTER: ICD-10-CM

## 2022-05-03 DIAGNOSIS — S01.21XA LACERATION OF NOSE, INITIAL ENCOUNTER: ICD-10-CM

## 2022-05-03 DIAGNOSIS — R55 SYNCOPE AND COLLAPSE: ICD-10-CM

## 2022-05-03 LAB
ABO/RH(D): NORMAL
ANION GAP SERPL CALCULATED.3IONS-SCNC: 4 MMOL/L (ref 3–14)
ANTIBODY SCREEN: NEGATIVE
BASOPHILS # BLD AUTO: 0.1 10E3/UL (ref 0–0.2)
BASOPHILS NFR BLD AUTO: 1 %
BUN SERPL-MCNC: 26 MG/DL (ref 7–30)
CALCIUM SERPL-MCNC: 9.6 MG/DL (ref 8.5–10.1)
CHLORIDE BLD-SCNC: 114 MMOL/L (ref 94–109)
CO2 SERPL-SCNC: 22 MMOL/L (ref 20–32)
CREAT SERPL-MCNC: 1.14 MG/DL (ref 0.52–1.04)
EOSINOPHIL # BLD AUTO: 0.1 10E3/UL (ref 0–0.7)
EOSINOPHIL NFR BLD AUTO: 1 %
ERYTHROCYTE [DISTWIDTH] IN BLOOD BY AUTOMATED COUNT: 14.8 % (ref 10–15)
GFR SERPL CREATININE-BSD FRML MDRD: 44 ML/MIN/1.73M2
GLUCOSE BLD-MCNC: 153 MG/DL (ref 70–99)
HCT VFR BLD AUTO: 39 % (ref 35–47)
HGB BLD-MCNC: 11.7 G/DL (ref 11.7–15.7)
HOLD SPECIMEN: NORMAL
IMM GRANULOCYTES # BLD: 0.1 10E3/UL
IMM GRANULOCYTES NFR BLD: 1 %
LITHIUM SERPL-SCNC: 0.4 MMOL/L
LYMPHOCYTES # BLD AUTO: 1.3 10E3/UL (ref 0.8–5.3)
LYMPHOCYTES NFR BLD AUTO: 13 %
MCH RBC QN AUTO: 32 PG (ref 26.5–33)
MCHC RBC AUTO-ENTMCNC: 30 G/DL (ref 31.5–36.5)
MCV RBC AUTO: 107 FL (ref 78–100)
MONOCYTES # BLD AUTO: 0.9 10E3/UL (ref 0–1.3)
MONOCYTES NFR BLD AUTO: 9 %
NEUTROPHILS # BLD AUTO: 7.7 10E3/UL (ref 1.6–8.3)
NEUTROPHILS NFR BLD AUTO: 75 %
NRBC # BLD AUTO: 0 10E3/UL
NRBC BLD AUTO-RTO: 0 /100
PLATELET # BLD AUTO: 229 10E3/UL (ref 150–450)
POTASSIUM BLD-SCNC: 4.8 MMOL/L (ref 3.4–5.3)
RBC # BLD AUTO: 3.66 10E6/UL (ref 3.8–5.2)
SODIUM SERPL-SCNC: 140 MMOL/L (ref 133–144)
SPECIMEN EXPIRATION DATE: NORMAL
TROPONIN I SERPL HS-MCNC: 15 NG/L
VALPROATE SERPL-MCNC: 59 MG/L
WBC # BLD AUTO: 10.2 10E3/UL (ref 4–11)

## 2022-05-03 PROCEDURE — 36415 COLL VENOUS BLD VENIPUNCTURE: CPT | Performed by: EMERGENCY MEDICINE

## 2022-05-03 PROCEDURE — 86850 RBC ANTIBODY SCREEN: CPT | Performed by: EMERGENCY MEDICINE

## 2022-05-03 PROCEDURE — 12011 RPR F/E/E/N/L/M 2.5 CM/<: CPT

## 2022-05-03 PROCEDURE — 84484 ASSAY OF TROPONIN QUANT: CPT | Performed by: EMERGENCY MEDICINE

## 2022-05-03 PROCEDURE — 93005 ELECTROCARDIOGRAM TRACING: CPT

## 2022-05-03 PROCEDURE — 85014 HEMATOCRIT: CPT | Performed by: EMERGENCY MEDICINE

## 2022-05-03 PROCEDURE — 99220 PR INITIAL OBSERVATION CARE,LEVEL III: CPT | Performed by: INTERNAL MEDICINE

## 2022-05-03 PROCEDURE — 250N000013 HC RX MED GY IP 250 OP 250 PS 637: Performed by: INTERNAL MEDICINE

## 2022-05-03 PROCEDURE — 99285 EMERGENCY DEPT VISIT HI MDM: CPT | Mod: 25

## 2022-05-03 PROCEDURE — G0378 HOSPITAL OBSERVATION PER HR: HCPCS

## 2022-05-03 PROCEDURE — G1004 CDSM NDSC: HCPCS

## 2022-05-03 PROCEDURE — 71045 X-RAY EXAM CHEST 1 VIEW: CPT

## 2022-05-03 PROCEDURE — 72125 CT NECK SPINE W/O DYE: CPT | Mod: ME

## 2022-05-03 PROCEDURE — 82374 ASSAY BLOOD CARBON DIOXIDE: CPT | Performed by: EMERGENCY MEDICINE

## 2022-05-03 PROCEDURE — 86901 BLOOD TYPING SEROLOGIC RH(D): CPT | Performed by: EMERGENCY MEDICINE

## 2022-05-03 PROCEDURE — 80178 ASSAY OF LITHIUM: CPT | Performed by: EMERGENCY MEDICINE

## 2022-05-03 PROCEDURE — 82310 ASSAY OF CALCIUM: CPT | Performed by: EMERGENCY MEDICINE

## 2022-05-03 PROCEDURE — 80164 ASSAY DIPROPYLACETIC ACD TOT: CPT | Performed by: EMERGENCY MEDICINE

## 2022-05-03 RX ORDER — FAMOTIDINE 20 MG/1
20 TABLET, FILM COATED ORAL
Status: DISCONTINUED | OUTPATIENT
Start: 2022-05-04 | End: 2022-05-06 | Stop reason: HOSPADM

## 2022-05-03 RX ORDER — ACETAMINOPHEN 325 MG/1
975 TABLET ORAL EVERY 8 HOURS
Status: DISCONTINUED | OUTPATIENT
Start: 2022-05-03 | End: 2022-05-06 | Stop reason: HOSPADM

## 2022-05-03 RX ORDER — AMOXICILLIN 250 MG
1 CAPSULE ORAL 2 TIMES DAILY PRN
Status: DISCONTINUED | OUTPATIENT
Start: 2022-05-03 | End: 2022-05-06 | Stop reason: HOSPADM

## 2022-05-03 RX ORDER — LIDOCAINE HYDROCHLORIDE AND EPINEPHRINE 10; 10 MG/ML; UG/ML
5 INJECTION, SOLUTION INFILTRATION; PERINEURAL ONCE
Status: DISCONTINUED | OUTPATIENT
Start: 2022-05-03 | End: 2022-05-06 | Stop reason: HOSPADM

## 2022-05-03 RX ORDER — LEVOTHYROXINE SODIUM 100 UG/1
100 TABLET ORAL DAILY
Status: DISCONTINUED | OUTPATIENT
Start: 2022-05-04 | End: 2022-05-06 | Stop reason: HOSPADM

## 2022-05-03 RX ORDER — DIVALPROEX SODIUM 500 MG/1
500 TABLET, EXTENDED RELEASE ORAL AT BEDTIME
Status: DISCONTINUED | OUTPATIENT
Start: 2022-05-03 | End: 2022-05-06 | Stop reason: HOSPADM

## 2022-05-03 RX ORDER — MAGNESIUM HYDROXIDE/ALUMINUM HYDROXICE/SIMETHICONE 120; 1200; 1200 MG/30ML; MG/30ML; MG/30ML
30 SUSPENSION ORAL EVERY 4 HOURS PRN
Status: DISCONTINUED | OUTPATIENT
Start: 2022-05-03 | End: 2022-05-06 | Stop reason: HOSPADM

## 2022-05-03 RX ORDER — ARIPIPRAZOLE 2 MG/1
2 TABLET ORAL DAILY
Status: DISCONTINUED | OUTPATIENT
Start: 2022-05-04 | End: 2022-05-06 | Stop reason: HOSPADM

## 2022-05-03 RX ORDER — METOPROLOL TARTRATE 25 MG/1
25 TABLET, FILM COATED ORAL 2 TIMES DAILY
Status: DISCONTINUED | OUTPATIENT
Start: 2022-05-03 | End: 2022-05-04

## 2022-05-03 RX ORDER — LITHIUM CARBONATE 150 MG/1
150 CAPSULE ORAL EVERY MORNING
Status: DISCONTINUED | OUTPATIENT
Start: 2022-05-04 | End: 2022-05-06 | Stop reason: HOSPADM

## 2022-05-03 RX ORDER — GUAIFENESIN 600 MG/1
1200 TABLET, EXTENDED RELEASE ORAL 2 TIMES DAILY
Status: DISCONTINUED | OUTPATIENT
Start: 2022-05-03 | End: 2022-05-06 | Stop reason: HOSPADM

## 2022-05-03 RX ORDER — BISACODYL 10 MG
10 SUPPOSITORY, RECTAL RECTAL DAILY PRN
Status: DISCONTINUED | OUTPATIENT
Start: 2022-05-03 | End: 2022-05-06 | Stop reason: HOSPADM

## 2022-05-03 RX ORDER — ONDANSETRON 2 MG/ML
4 INJECTION INTRAMUSCULAR; INTRAVENOUS EVERY 6 HOURS PRN
Status: DISCONTINUED | OUTPATIENT
Start: 2022-05-03 | End: 2022-05-06 | Stop reason: HOSPADM

## 2022-05-03 RX ORDER — ONDANSETRON 4 MG/1
4 TABLET, ORALLY DISINTEGRATING ORAL EVERY 6 HOURS PRN
Status: DISCONTINUED | OUTPATIENT
Start: 2022-05-03 | End: 2022-05-06 | Stop reason: HOSPADM

## 2022-05-03 RX ORDER — AMOXICILLIN 250 MG
2 CAPSULE ORAL 2 TIMES DAILY PRN
Status: DISCONTINUED | OUTPATIENT
Start: 2022-05-03 | End: 2022-05-06 | Stop reason: HOSPADM

## 2022-05-03 RX ORDER — ARIPIPRAZOLE 2 MG/1
2 TABLET ORAL DAILY
COMMUNITY
Start: 2022-04-27 | End: 2024-08-08

## 2022-05-03 RX ORDER — OXYMETAZOLINE HYDROCHLORIDE 0.05 G/100ML
2 SPRAY NASAL ONCE
Status: DISCONTINUED | OUTPATIENT
Start: 2022-05-03 | End: 2022-05-06 | Stop reason: HOSPADM

## 2022-05-03 RX ORDER — LEVOTHYROXINE SODIUM 50 UG/1
50 TABLET ORAL DAILY
COMMUNITY
Start: 2022-04-27

## 2022-05-03 RX ADMIN — DIVALPROEX SODIUM 500 MG: 500 TABLET, FILM COATED, EXTENDED RELEASE ORAL at 21:32

## 2022-05-03 RX ADMIN — ACETAMINOPHEN 975 MG: 325 TABLET, FILM COATED ORAL at 21:32

## 2022-05-03 RX ADMIN — GUAIFENESIN 1200 MG: 600 TABLET, EXTENDED RELEASE ORAL at 21:32

## 2022-05-03 RX ADMIN — METOPROLOL TARTRATE 25 MG: 25 TABLET, FILM COATED ORAL at 21:32

## 2022-05-03 RX ADMIN — QUETIAPINE FUMARATE 12.5 MG: 25 TABLET ORAL at 21:32

## 2022-05-03 ASSESSMENT — ENCOUNTER SYMPTOMS
BACK PAIN: 0
HEADACHES: 0
ARTHRALGIAS: 0
NAUSEA: 0
NECK PAIN: 0
ABDOMINAL PAIN: 0
SHORTNESS OF BREATH: 0

## 2022-05-03 NOTE — PHARMACY-ADMISSION MEDICATION HISTORY
Admission medication history interview status for this patient is complete. See [facility] admission navigator for allergy information, prior to admission medications and immunization status.     Medication history interview done, indicate source(s): Patient reported that took AM meds.  Medication history resources (including written lists, pill bottles, clinic record):paper MAR from [facility] [phone]    Changes made to PTA medication list:  Added: none  Changed:    -Seroquel 12.5mg at bedtime prn ---> to at bedtime;   - Tylenol 975mg q8h prn --> to q6h prn;    Removed: none    Medication reconciliation/reorder completed by provider prior to medication history?  N      Prior to Admission medications    Medication Sig Last Dose Taking? Auth Provider   acetaminophen (TYLENOL) 325 MG tablet Take 3 tablets (975 mg) by mouth every 6 hours as needed for mild pain  Yes Mini Lua, BERTHA   alum & mag hydroxide-simethicone (MAALOX) 200-200-20 MG/5ML SUSP suspension Take 30 mLs by mouth every 4 hours as needed for indigestion  Yes Elton Knox MD   apixaban ANTICOAGULANT (ELIQUIS) 2.5 MG tablet Take 1 tablet (2.5 mg) by mouth 2 times daily 5/3/2022 at am Yes Elton Knox MD   ARIPiprazole (ABILIFY) 2 MG tablet Take 2 mg by mouth daily 5/3/2022 at Unknown time Yes Unknown, Entered By History   bisacodyl (DULCOLAX) 10 MG suppository Place 1 suppository (10 mg) rectally daily as needed for constipation  Yes Elton Knox MD   divalproex sodium extended-release (DEPAKOTE ER) 500 MG 24 hr tablet Take 500 mg by mouth At Bedtime 5/2/2022 at Unknown time Yes Unknown, Entered By History   famotidine (PEPCID) 20 MG tablet Take 1 tablet (20 mg) by mouth every 48 hours  Yes Elton Knox MD   guaiFENesin (MUCINEX) 600 MG 12 hr tablet Take 2 tablets (1,200 mg) by mouth 2 times daily 5/3/2022 at am Yes Elton Knox MD   ipratropium-albuterol (COMBIVENT RESPIMAT)  MCG/ACT inhaler  Inhale 1 puff into the lungs 4 times daily as needed for shortness of breath / dyspnea or wheezing  Yes Elton Knox MD   letrozole (FEMARA) 2.5 MG tablet TAKE 1 TABLET BY MOUTH EVERY DAY 5/3/2022 at Unknown time Yes Perlita Malik MD   levothyroxine (SYNTHROID/LEVOTHROID) 100 MCG tablet Take 100 mcg by mouth daily 5/3/2022 at Unknown time Yes Unknown, Entered By History   lithium (ESKALITH) 150 MG capsule Take 150 mg by mouth every morning  5/3/2022 Yes Ty Quintanilla MD   magnesium hydroxide (MILK OF MAGNESIA) 400 MG/5ML suspension Take 30 mLs by mouth daily as needed for constipation  Yes Elton Knox MD   melatonin 1 MG TABS tablet Take 1 tablet (1 mg) by mouth nightly as needed for sleep  Yes Elton Knox MD   metoprolol tartrate (LOPRESSOR) 25 MG tablet Take 1 tablet (25 mg) by mouth 2 times daily 5/3/2022 at am Yes Elton Knox MD   miconazole (MICATIN) 2 % external powder Apply topically 2 times daily  Patient taking differently: Apply topically 2 times daily To affected area(s) of groin  Yes Elton Knox MD   ondansetron (ZOFRAN-ODT) 4 MG ODT tab Take 1 tablet (4 mg) by mouth every 6 hours as needed for nausea or vomiting  Yes Elton Knox MD   QUEtiapine (SEROQUEL) 25 MG tablet Take 0.5 tablets (12.5 mg) by mouth nightly as needed (restlessness)  Patient taking differently: Take 12.5 mg by mouth At Bedtime 5/2/2022 at Unknown time Yes Elton Knox MD   senna-docusate (SENOKOT-S/PERICOLACE) 8.6-50 MG tablet Take 1 tablet by mouth 2 times daily as needed for constipation  Yes Elton Knox MD   senna-docusate (SENOKOT-S/PERICOLACE) 8.6-50 MG tablet Take 2 tablets by mouth 2 times daily as needed for constipation  Yes Elton Knox MD   vitamin D3 (CHOLECALCIFEROL) 10 MCG (400 UNIT) capsule Take 2 capsules (800 Units) by mouth daily 5/3/2022 at Unknown time Yes Perlita Malik MD   zinc Oxide (DESITIN) 40 % paste Apply topically as  needed for dry skin or irritation  Yes Elton Knox MD   ORDER FOR DME Equipment being ordered: compression stocking knee high 20-30mmhg   Ty Quintanilla MD

## 2022-05-03 NOTE — ED NOTES
[facility]  ED Nurse Handoff Report    Lennie Mar is a 89 year old female   ED Chief complaint: Epistaxis  . ED Diagnosis:   Final diagnoses:   Syncope and collapse   Open fracture of nasal bone, initial encounter   Epistaxis   Laceration of nose, initial encounter   Injury of head, initial encounter     Allergies:   Allergies   Allergen Reactions     Cafergot Other (See Comments) and Nausea and Vomiting     Severe HA, N/V & diarrhea     Ciprofloxacin      Nausea and vomiting per son      Penicillins Rash and Unknown     Sulfa Drugs Rash and Unknown     Ergot Alkaloids Unknown     Lamictal [Lamotrigine] Other (See Comments)     Patient experienced night moss     Naproxen      Pt unable to remember reaction.     Naproxen Unknown     Tetracycline Unknown     Pt unable to remember allergy       Code Status: DNR / DNI  Activity level - Baseline/Home:  Independent. Activity Level - Current:   Assist X 1. Lift room needed: No. Bariatric: No   Needed: No   Isolation: Yes. 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     Vital Signs:   Vitals:    05/03/22 1625 05/03/22 1645 05/03/22 1700 05/03/22 1805   BP:  130/70 (!) 148/67 (!) 140/104   Pulse: 78 79 80 85   Resp: 17 14 15 14   Temp:       TempSrc:       SpO2: 94% 96% 93% 93%       Cardiac Rhythm:  ,      Pain level:    Patient confused: No. Patient Falls Risk: Yes.   Elimination Status: not voided yet   Patient Report - Initial Complaint: fall.   Tests Performed:   Abnormal Labs Resulted from Time of ED Arrival to Time of ED Departure   BASIC METABOLIC PANEL - Abnormal       Result Value    Sodium 140      Potassium 4.8      Chloride 114 (*)     Carbon Dioxide (CO2) 22      Anion Gap 4      Urea Nitrogen 26      Creatinine 1.14 (*)     Calcium 9.6      Glucose 153 (*)     GFR Estimate 44 (*)    CBC WITH PLATELETS AND DIFFERENTIAL - Abnormal    WBC Count 10.2      RBC Count 3.66 (*)     Hemoglobin 11.7      Hematocrit 39.0       (*)     MCH 32.0       MCHC 30.0 (*)     RDW 14.8      Platelet Count 229      % Neutrophils 75      % Lymphocytes 13      % Monocytes 9      % Eosinophils 1      % Basophils 1      % Immature Granulocytes 1      NRBCs per 100 WBC 0      Absolute Neutrophils 7.7      Absolute Lymphocytes 1.3      Absolute Monocytes 0.9      Absolute Eosinophils 0.1      Absolute Basophils 0.1      Absolute Immature Granulocytes 0.1      Absolute NRBCs 0.0     . Abnormal Results: see chart.   Treatments provided: see MAR  Family Comments: son updated via phone  OBS brochure/video discussed/provided to patient:  Yes  ED Medications:   Medications   oxymetazoline (AFRIN) 0.05 % spray 2 spray (has no administration in time range)   lidocaine 1% with EPINEPHrine 1:100,000 injection 5 mL (has no administration in time range)     Drips infusing:  No  For the majority of the shift, the patient's behavior Green. Interventions performed were n/a.    Sepsis treatment initiated: No     Patient tested for COVID 19 prior to admission: NO, recovered covid    ED Nurse Name/Phone Number: Isabell Sal RN,   6:17 PM    RECEIVING UNIT ED HANDOFF REVIEW    Above ED Nurse Handoff Report was reviewed: Yes  Reviewed by: Akosua Vu RN on May 3, 2022 at 8:25 PM

## 2022-05-03 NOTE — ED PROVIDER NOTES
History   Chief Complaint:  Epistaxis       HPI   Lenniesa ESTEPHANIE Mar is a 89 year old female anticoagulated on Eliquis with history of A fib and frequent falls who presents with a fall. Per EMS the patient fell today while by herself in her room. Staff said the patient's neighbor heard her screaming, prompting staff to come in and check on her. They found her face down on the floor and with an abrasion to her nose. She was at 83% O2 on room air for EMS, but denies any pain or complaints and did not want to be transported. They spoke to the patient's son who agreed to have her transported. He told EMS she had just moved to assisted living a week ago after staying in a TCU. Son reports she is DNR/DNI.    Presenting in the ED, the patient states she blacked out when she fell. She reports pain in her nose but has no other complaints. She denies any shortness of breath, headache, neck or back pain, chest pain, abdominal pain, or nausea. She denies any pain anywhere else and has had no recent cough, fever, or runny nose. She presents with slight swelling in her right leg which she states is abnormal for her.    Review of Systems   HENT: Positive for nosebleeds.    Respiratory: Negative for shortness of breath.    Cardiovascular: Positive for leg swelling (right leg). Negative for chest pain.   Gastrointestinal: Negative for abdominal pain and nausea.   Musculoskeletal: Negative for arthralgias, back pain and neck pain.   Neurological: Positive for syncope. Negative for headaches.   All other systems reviewed and are negative.    Allergies:  Cafergot  Ciprofloxacin  Penicillins  Sulfa Drugs  Ergot Alkaloids  Lamictal  Naproxen  Naproxen  Tetracycline    Medications:  Depakote  Synthroid/Levothyroid  Abilify  Tylenol  Eliquis  Femara  Albuterol  Lithium  Lopressor  Zofran  Seroquel  Senokot    Past Medical History:     Alcohol dependence in remission  Anxiety  Bipolar disorder  CKD stage  III  Hyperparathyroidism  Covid-19  Depression  Subdural hygroma  Vitamin D deficiency  Frequent falls  Breast cancer  Acute renal failure  Gastritis  Hypertension    A fib    Past Surgical History:    Appendectomy  Cataract surgery  Tooth extraction  Hysterectomy  Colectomy  Mastectomy  Bilateral vein strip  Ureter surgery for blockage   Laminectomy    Family History:    Mother: CAD  Father: Cerebrovascular disease  Sister: Breast cancer, Arthritis, Vericose veins, Cataracts, Asthma  Son: Melanoma  Brother: Melanoma, COPD    Social History:  The patient presents alone. She just moved to a nursing home. Former smoker.    Physical Exam     Patient Vitals for the past 24 hrs:   BP Temp Temp src Pulse Resp SpO2   05/03/22 1805 (!) 140/104 -- -- 85 14 93 %   05/03/22 1700 (!) 148/67 -- -- 80 15 93 %   05/03/22 1645 130/70 -- -- 79 14 96 %   05/03/22 1625 -- -- -- 78 17 94 %   05/03/22 1620 -- -- -- 78 19 95 %   05/03/22 1615 136/55 -- -- 77 17 94 %   05/03/22 1600 121/57 -- -- 75 21 96 %   05/03/22 1505 111/75 -- -- 72 27 95 %   05/03/22 1430 (!) 124/99 -- -- 75 29 95 %   05/03/22 1423 -- -- -- 77 25 --   05/03/22 1402 -- -- -- -- -- 100 %   05/03/22 1336 138/84 97.4  F (36.3  C) Tympanic 107 16 (!) 84 %       Physical Exam  Vitals and nursing note reviewed.   Constitutional:       General: She is not in acute distress.     Appearance: Normal appearance. She is not ill-appearing.      Comments: Elderly and frail   HENT:      Head: Normocephalic. Laceration present.        Right Ear: External ear normal.      Left Ear: External ear normal.      Nose: Nasal deformity, laceration and nasal tenderness present.      Right Nostril: Epistaxis present. No septal hematoma.      Left Nostril: No septal hematoma.      Mouth/Throat:      Mouth: Mucous membranes are moist.   Eyes:      Extraocular Movements: Extraocular movements intact.      Conjunctiva/sclera: Conjunctivae normal.      Pupils: Pupils are equal, round, and reactive  to light.   Cardiovascular:      Rate and Rhythm: Tachycardia present. Rhythm regularly irregular.      Heart sounds: No murmur heard.  Pulmonary:      Effort: Pulmonary effort is normal. No respiratory distress.      Breath sounds: Normal breath sounds. No wheezing, rhonchi or rales.   Chest:      Chest wall: No tenderness.   Abdominal:      General: Abdomen is flat. Bowel sounds are normal. There is no distension.      Palpations: Abdomen is soft.      Tenderness: There is no abdominal tenderness. There is no guarding or rebound.   Musculoskeletal:         General: No deformity or signs of injury.      Cervical back: Normal range of motion and neck supple. No tenderness.   Skin:     General: Skin is warm and dry.      Capillary Refill: Capillary refill takes less than 2 seconds.      Findings: No rash.   Neurological:      Mental Status: She is alert.      Motor: Tremor present.           Emergency Department Course   ECG:   Completed 1347, read 1400  Afib, incomplete RBBB, Left axis deviation  No sig change from prev 3/14/22  Rate 80, NH int *, , QT/QTc 370/426, P R T * -56 9      Imaging:  CT Head w/o Contrast   Preliminary Result   IMPRESSION:   1. No CT findings of acute intracranial process.   2. Acute appearing nasal arch fractures. If clinically warranted,   facial CT can be obtained for further assessment of the maxillofacial   skeleton.   3. Mild to moderate generalized brain parenchymal volume loss.      CT Cervical Spine w/o Contrast   Preliminary Result   IMPRESSION:   1. No acute fracture or posttraumatic malalignment of the cervical   spine.   2. Multilevel degenerative change, as described. No high-grade spinal   canal stenosis.   3. Marked diffuse enlargement and heterogeneous density of the right   thyroid lobe with multiple nodules, partially extending into the upper   mediastinum, incompletely evaluated. Please see the body of the report   for additional details. Follow-up thyroid  ultrasound can be performed,   as warranted.      XR Chest Port 1 View   Final Result   IMPRESSION: No pneumothorax. No definite displaced rib fracture. No   infiltrates.      ELLE BETANCOURT MD            SYSTEM ID:  [id number]        Report per radiology    Laboratory:  Labs Ordered and Resulted from Time of ED Arrival to Time of ED Departure   BASIC METABOLIC PANEL - Abnormal       Result Value    Sodium 140      Potassium 4.8      Chloride 114 (*)     Carbon Dioxide (CO2) 22      Anion Gap 4      Urea Nitrogen 26      Creatinine 1.14 (*)     Calcium 9.6      Glucose 153 (*)     GFR Estimate 44 (*)    CBC WITH PLATELETS AND DIFFERENTIAL - Abnormal    WBC Count 10.2      RBC Count 3.66 (*)     Hemoglobin 11.7      Hematocrit 39.0       (*)     MCH 32.0      MCHC 30.0 (*)     RDW 14.8      Platelet Count 229      % Neutrophils 75      % Lymphocytes 13      % Monocytes 9      % Eosinophils 1      % Basophils 1      % Immature Granulocytes 1      NRBCs per 100 WBC 0      Absolute Neutrophils 7.7      Absolute Lymphocytes 1.3      Absolute Monocytes 0.9      Absolute Eosinophils 0.1      Absolute Basophils 0.1      Absolute Immature Granulocytes 0.1      Absolute NRBCs 0.0     TROPONIN I - Normal    Troponin I High Sensitivity 15     VALPROIC ACID - Normal    Valproic acid 59     LITHIUM LEVEL - Normal    Lithium 0.4     ROUTINE UA WITH MICROSCOPIC REFLEX TO CULTURE   TYPE AND SCREEN, ADULT    ABO/RH(D) B POS      Antibody Screen Negative      SPECIMEN EXPIRATION DATE [id number]     ABO/RH TYPE AND SCREEN        [facility]    -Laceration Repair    Date/Time: 5/3/2022 5:46 PM  Performed by: Quan Smith MD  Authorized by: Quan Smith MD     Risks, benefits and alternatives discussed.      ANESTHESIA (see MAR for exact dosages):     Anesthesia method:  Local infiltration    Local anesthetic:  Lidocaine 1% WITH epi  LACERATION DETAILS     Location:  Face    Face location:  Nose     Length (cm):  1    REPAIR TYPE:     Repair type:  Simple      EXPLORATION:     Hemostasis achieved with:  Epinephrine and direct pressure    Wound exploration: wound explored through full range of motion and entire depth of wound probed and visualized      Wound extent: no foreign body      Contaminated: no      TREATMENT:     Area cleansed with:  Saline    Amount of cleaning:  Standard    Irrigation solution:  Sterile saline    SKIN REPAIR     Repair method:  Sutures    Suture size:  5-0    Suture material:  Fast-absorbing gut    Number of sutures:  2    APPROXIMATION     Approximation:  Close    POST-PROCEDURE DETAILS     Dressing:  Open (no dressing)              Emergency Department Course:  Reviewed:  I reviewed nursing notes, vitals, past medical history and Care Everywhere    Assessments:  1332 I obtained history and examined the patient as noted above.   1539 I rechecked the patient and examined her nose.      Disposition:  The patient was admitted to the hospital under the care of Dr. Rowley.     Impression & Plan     CMS Diagnoses: None    Medical Decision Makin yo F with fall, facial injuries, epistaxis, possible syncope.   She is hypoxic on arrival.  Unclear etiology.  May have aspirated, less likely traumatic pneumothorax.  Doubt PE as she is already anticoagulated.  Will check labs, ECG, CXR for further eval.  Also will CT head and C-spine given the head injury on anticoagulation.  Epistaxis seems to be controlled with nasal clip, will address after imaging.    1750  Pt's CT shows nasal arch fx, but no intracranial bleed or c-spine injury.  Labs are unremarkable.  Her SpO2 improved without intervention.  Her CXR is neg.  Nasal clip was removed and pt continued to have some oozing epistaxis from R nostril.  Afrin was applied and this achieved hemostasis. Nasal laceration repaired as noted.  Will plan to admit for obs given poss syncopal episode, head injury on anticoagulation, etc.        D/w  Dr Rowley who accepts admission to observation.        Diagnosis:    ICD-10-CM    1. Syncope and collapse  R55    2. Open fracture of nasal bone, initial encounter  S02.2XXB    3. Epistaxis  R04.0    4. Laceration of nose, initial encounter  S01.21XA    5. Injury of head, initial encounter  S09.90XA        Discharge Medications:  New Prescriptions    No medications on file       Scribe Disclosure:  Lisa WALTERS, am serving as a scribe at 1:34 PM on 5/3/2022 to document services personally performed by Quan Smith MD based on my observations and the provider's statements to me.     Quan Smith MD  05/03/22 [id number]

## 2022-05-03 NOTE — ED TRIAGE NOTES
From TCU. Unwitnessed fall, found face down on floor. Abrasion to bridge of nose. Nose bleeding, clip placed in route by EMS. Take eliquis. sats 82% on room air for EMS. Son contacted who is POA, confirms that patient is DNR/DNI per EMS. Patient complains of nose pain.

## 2022-05-04 ENCOUNTER — APPOINTMENT (OUTPATIENT)
Dept: PHYSICAL THERAPY | Facility: CLINIC | Age: 87
End: 2022-05-04
Attending: INTERNAL MEDICINE
Payer: MEDICARE

## 2022-05-04 LAB
ATRIAL RATE - MUSE: 71 BPM
DIASTOLIC BLOOD PRESSURE - MUSE: NORMAL MMHG
ERYTHROCYTE [DISTWIDTH] IN BLOOD BY AUTOMATED COUNT: 14.6 % (ref 10–15)
HCT VFR BLD AUTO: 31.9 % (ref 35–47)
HGB BLD-MCNC: 9.8 G/DL (ref 11.7–15.7)
INTERPRETATION ECG - MUSE: NORMAL
MCH RBC QN AUTO: 31.6 PG (ref 26.5–33)
MCHC RBC AUTO-ENTMCNC: 30.7 G/DL (ref 31.5–36.5)
MCV RBC AUTO: 103 FL (ref 78–100)
P AXIS - MUSE: NORMAL DEGREES
PLATELET # BLD AUTO: 198 10E3/UL (ref 150–450)
PR INTERVAL - MUSE: NORMAL MS
QRS DURATION - MUSE: 100 MS
QT - MUSE: 370 MS
QTC - MUSE: 426 MS
R AXIS - MUSE: -56 DEGREES
RBC # BLD AUTO: 3.1 10E6/UL (ref 3.8–5.2)
SYSTOLIC BLOOD PRESSURE - MUSE: NORMAL MMHG
T AXIS - MUSE: 9 DEGREES
VENTRICULAR RATE- MUSE: 80 BPM
WBC # BLD AUTO: 6 10E3/UL (ref 4–11)

## 2022-05-04 PROCEDURE — G0378 HOSPITAL OBSERVATION PER HR: HCPCS

## 2022-05-04 PROCEDURE — 99225 PR SUBSEQUENT OBSERVATION CARE,LEVEL II: CPT | Performed by: PHYSICIAN ASSISTANT

## 2022-05-04 PROCEDURE — 250N000013 HC RX MED GY IP 250 OP 250 PS 637: Performed by: INTERNAL MEDICINE

## 2022-05-04 PROCEDURE — 97530 THERAPEUTIC ACTIVITIES: CPT | Mod: GP

## 2022-05-04 PROCEDURE — 36415 COLL VENOUS BLD VENIPUNCTURE: CPT | Performed by: INTERNAL MEDICINE

## 2022-05-04 PROCEDURE — 85014 HEMATOCRIT: CPT | Performed by: INTERNAL MEDICINE

## 2022-05-04 PROCEDURE — 97161 PT EVAL LOW COMPLEX 20 MIN: CPT | Mod: GP

## 2022-05-04 RX ADMIN — FAMOTIDINE 20 MG: 20 TABLET ORAL at 08:31

## 2022-05-04 RX ADMIN — QUETIAPINE FUMARATE 12.5 MG: 25 TABLET ORAL at 21:01

## 2022-05-04 RX ADMIN — GUAIFENESIN 1200 MG: 600 TABLET, EXTENDED RELEASE ORAL at 20:55

## 2022-05-04 RX ADMIN — LITHIUM CARBONATE 150 MG: 150 CAPSULE, GELATIN COATED ORAL at 08:31

## 2022-05-04 RX ADMIN — METOPROLOL TARTRATE 25 MG: 25 TABLET, FILM COATED ORAL at 08:32

## 2022-05-04 RX ADMIN — ARIPIPRAZOLE 2 MG: 2 TABLET ORAL at 08:31

## 2022-05-04 RX ADMIN — ACETAMINOPHEN 975 MG: 325 TABLET, FILM COATED ORAL at 04:11

## 2022-05-04 RX ADMIN — DIVALPROEX SODIUM 500 MG: 500 TABLET, FILM COATED, EXTENDED RELEASE ORAL at 21:01

## 2022-05-04 RX ADMIN — GUAIFENESIN 1200 MG: 600 TABLET, EXTENDED RELEASE ORAL at 08:32

## 2022-05-04 RX ADMIN — ACETAMINOPHEN 975 MG: 325 TABLET, FILM COATED ORAL at 20:54

## 2022-05-04 RX ADMIN — CHOLECALCIFEROL TAB 10 MCG (400 UNIT) 20 MCG: 10 TAB at 10:34

## 2022-05-04 RX ADMIN — LEVOTHYROXINE SODIUM 100 MCG: 0.1 TABLET ORAL at 08:31

## 2022-05-04 ASSESSMENT — ACTIVITIES OF DAILY LIVING (ADL)
DEPENDENT_IADLS:: CLEANING;COOKING;LAUNDRY;SHOPPING;MEAL PREPARATION;MEDICATION MANAGEMENT;MONEY MANAGEMENT;TRANSPORTATION

## 2022-05-04 NOTE — PLAN OF CARE
PRIMARY DIAGNOSIS: SYNCOPE    OUTPATIENT/OBSERVATION GOALS TO BE MET BEFORE DISCHARGE  1. Orthostatic performed: N/A    2. Completion of appropriate imaging: Yes    3. Tolerating PO medications: Yes    4. Return to near baseline physical activity: No    5. Cleared for discharge by consultants (if involved): Yes    Discharge Planner Nurse   Safe discharge environment identified: Yes  Barriers to discharge: No       Entered by: Shona Mooney RN 05/04/2022      Please review provider order for any additional goals.   Nurse to notify provider when observation goals have been met and patient is ready for discharge.    Patient alert and oriented x 4. Vital signs stable. On room air. Denies pain. Tolerating oral diet and oral medication. Bowels are active. Patient is incontinent- pure wick and brief in place. Patient had 1 bowel movent. Assist X1. Tele monitor in place. Social work, PT and OT following. Plan for placement tomorrow.

## 2022-05-04 NOTE — PLAN OF CARE
PRIMARY DIAGNOSIS: SYNCOPE  OUTPATIENT/OBSERVATION GOALS TO BE MET BEFORE DISCHARGE:  1. Orthostatic performed: N/A    2. Diagnostic testing complete & at baseline neurologic testing: Yes    3. Cleared by consultants (if involved): No    4. Interpretation of cardiac rhythm per telemetry tech: SR     5. Tolerating adequate PO diet and medications: Yes    6. Return to near baseline physical activity or neurologic status: No    Discharge Planner Nurse   Safe discharge environment identified: Yes  Barriers to discharge: Yes       Entered by: Akosua Vu RN 05/03/2022 11:54 PM  Pt AO x4, occasionally confused. VSS on RA. LS has some course crackles. BS active. Regular diet. Up with A1. Denies pain. Tele monitor in place.   BP (!) 141/92 (BP Location: Left arm)   Pulse 82   Temp 97.7  F (36.5  C) (Oral)   Resp 16   Wt 66.2 kg (146 lb)   LMP  (LMP Unknown)   SpO2 99%   BMI 26.70 kg/m    Please review provider order for any additional goals.   Nurse to notify provider when observation goals have been met and patient is ready for discharge.

## 2022-05-04 NOTE — PROGRESS NOTES
05/04/22 1530   Quick Adds   Type of Visit Initial PT Evaluation   Living Environment   People in Home facility resident   Current Living Arrangements assisted living   Living Environment Comments pt recently DC from [facility] to [facility] then to group home. Pt had fall at her group home   Self-Care   Usual Activity Tolerance moderate   Current Activity Tolerance fair   Equipment Currently Used at Home walker, rolling   Activity/Exercise/Self-Care Comment Pt mod I with use of FWW, reports she was not receiving assists with showers or toileting. Per CC note: She stated she has lived at [facility] for 1 week. They provide her with medication administration, cleaning, hygiene care (unclear what) and 3 meals per day. Pt did reported she has manual WC to CC. To this writer pt reported she utilizes FWW   General Information   Onset of Illness/Injury or Date of Surgery 05/03/22   Referring Physician Lucio Rowley MD   Pertinent History of Current Problem (include personal factors and/or comorbidities that impact the POC) Lennie Mar is a 89 year old female who recently recovered from COVID and was disharged from TCU about 1 week ago who also has a history of anxiety, bipolar disorder, atrial afibrillation on anticoagulation admitted on 5/3/2022 after an unwitnessed fall at her [facility]   Cognition   Cognitive Status Comments Pt reporting some fluctuating hx information compared to what was reported to CC. Questionable historian   Integumentary/Edema   Integumentary/Edema Comments Bruising to face   Range of Motion (ROM)   ROM Comment WNL   Strength (Manual Muscle Testing)   Strength Comments Pt demonstrating > 3/5 strength in BLE however overall demonstrating gross functional weakness with OOB mobility   Bed Mobility   Comment, (Bed Mobility) Supine > sitting EOB with min A   Transfers   Comment, (Transfers) STS with FWW and CGA   Gait/Stairs (Locomotion)   Comment, (Gait/Stairs) Pt ambulated with FWW and CGA, unsteady  with turns   Balance   Balance Comments impaired dynamic balance   Sensory Examination   Sensory Perception Comments intact to light touch   Clinical Impression   Criteria for Skilled Therapeutic Intervention Yes, treatment indicated   PT Diagnosis (PT) impaired IND with mobility from baseline   Influenced by the following impairments functional weakness, impaired high level balance, impaired activity tolerance   Functional limitations due to impairments impaired IND with bed mobility, transfers, gait   Clinical Presentation (PT Evaluation Complexity) Stable/Uncomplicated   Clinical Presentation Rationale clinical judgement   Clinical Decision Making (Complexity) low complexity   Planned Therapy Interventions (PT) balance training;bed mobility training;gait training;home exercise program;patient/family education;strengthening;transfer training   Risk & Benefits of therapy have been explained evaluation/treatment results reviewed;care plan/treatment goals reviewed;risks/benefits reviewed;patient   PT Discharge Planning   PT Discharge Recommendation (DC Rec) Transitional Care Facility;home with assist;home with home care physical therapy   PT Rationale for DC Rec Pt currently requires A x 1 with bed mobility, transfers, gait with use of FWW, pt fatigues quickly with mobility. If pt is able to have A x 1 at [facility], can return to [facility] with HHPT. If pt unable to have A x 1 with mobility and does not progress to mod I with FWW for bed mobility, transfers, gait household distances then pt will require TCU stay   Plan of Care Review   Plan of Care Reviewed With patient   Total Evaluation Time   Total Evaluation Time (Minutes) 15   Physical Therapy Goals   PT Frequency 3x/week   PT Predicted Duration/Target Date for Goal Attainment 05/09/22   PT Goals Bed Mobility;Transfers;Gait   PT: Bed Mobility Independent   PT: Transfers Modified independent;Sit to/from stand;Bed to/from chair;Assistive device   PT: Gait Modified  independent;50 feet;Rolling walker

## 2022-05-04 NOTE — CONSULTS
Care Management Initial Consult    General Information  Assessment completed with: Patient, Caregiver, Lennie  Type of CM/SW Visit: Initial Assessment    Primary Care Provider verified and updated as needed: No   Readmission within the last 30 days: no previous admission in last 30 days   Return Category: New Diagnosis  Reason for Consult: care coordination/care conference, discharge planning  Advance Care Planning: Advance Care Planning Reviewed: no concerns identified        Communication Assessment  Patient's communication style: spoken language (English or Bilingual)    Hearing Difficulty or Deaf: no      Cognitive  Cognitive/Neuro/Behavioral: WDL                      Living Environment:   People in home: facility resident     Current living Arrangements: assisted living  Name of Facility: [facility]   Able to return to prior arrangements: no     Family/Social Support:  Care provided by:    Provides care for: no one, unable/limited ability to care for self  Marital Status:   Children, Facility resident(s)/Staff          Description of Support System: Supportive, Involved       Current Resources:   Patient receiving home care services: No     Community Resources:    Equipment currently used at home: walker, rolling, wheelchair, manual (family states does not own one)   Supplies currently used at home: None    Employment/Financial:  Employment Status: retired        Financial Concerns: No concerns identified     Lifestyle & Psychosocial Needs:  Social Determinants of Health     Tobacco Use: Medium Risk     Smoking Tobacco Use: Former Smoker     Smokeless Tobacco Use: Never Used   Alcohol Use: Not on file   Financial Resource Strain: Not on file   Food Insecurity: Not on file   Transportation Needs: Not on file   Physical Activity: Not on file   Stress: Not on file   Social Connections: Not on file   Intimate Partner Violence: Not on file   Depression: Not at risk     PHQ-2 Score: 0   Housing  Stability: Not on file     Functional Status:  Prior to admission patient needed assistance:   Dependent ADLs:: Ambulation-walker, Bathing, Dressing, Grooming, Wheelchair-independent  Dependent IADLs:: Cleaning, Cooking, Laundry, Shopping, Meal Preparation, Medication Management, Money Management, Transportation     Mental Health Status:  Mental Health Status: No Current Concerns       Chemical Dependency Status:  Chemical Dependency Status: No Current Concerns           Values/Beliefs:  Spiritual, Cultural Beliefs, Sabianist Practices, Values that affect care: no             Additional Information:  Patient admitted after a fall at her [facility] ([facility]) resulting in head trauma & epistaxis. Her unplanned readmission risk is N/A as she was admitted under Observation status. CM met with patient at bedside. Introduced self and role. Patient is not answering questions that have been asked. She says no to inquiries instead of providing information. She stated she has lived at [facility] for 1 week. They provide her with medication administration, cleaning, hygiene care (unclear what) and 3 meals per day. She stated she uses a manual wheelchair. In previous notes, she had a 4WW. She has 4 children and their spouses. Most of them live outside of the [place]. She is unsure how she will get back to [facility]. Awaiting return call from [facility] facility.     RN CC will continue to follow for discharge planning.    Beatriz Mancilla RN, BSN, CPHN, CM  Inpatient Care Coordination - [facility]  [phone]

## 2022-05-04 NOTE — PROGRESS NOTES
[facility]  Hospitalist Progress Note  Cristal Neville PA-C 05/04/2022    Reason for Stay (Diagnosis): Fall, head trauma         Assessment and Plan:      Summary of Stay: Lennie Mar is a 89 year old female who recently recovered from COVID and was disharged from TCU about 1 week ago who also has a history of anxiety, bipolar disorder, atrial afibrillation on anticoagulation admitted on 5/3/2022 after an unwitnessed fall at her [facility]    In the ED her temp is 99.7, pressure 145/100, HR 84 and breathing comfortably on room air without hypoxia. Lab work remarkable for creatinine 1.14, normal electrolytes, glucose 153 and normal CBC. Lithium level a little low and valproic level normal. CXR negative. CT head negative, CT cervical spine negative. She had laceration of nasal bridge which was repaired in ED and she was admitted under observation.     #Unwitnessed fall with nose fracture and nasal bridge laceration  -CT head and CT cervical spine negative  -Sutured in ED and prophylactic Augmentin started  -ECG NSR and telemetry monitoring revealed no arrhythmia overnight  -No murmur heard on exam    #Paroxysmal a fib  -After discussion with family anticoagulation has been stopped due to high fall risk  -Continue beta blocker    #Anxiety/Bipolar disorder  -Follows with Dr. Horn at [facility] in [place]  -Continue PTA Depakote and Lithium  -Continue Abilify and Seroquel    #Hypothryoidism  #Thyroid mass  -had biopsy on 6/1/2021 of a thyroid nodule that was benign   -continue Levothyroxine    #Hx of breast cancer  -Continue Letrozole    #Recovered COVID  -Diagnosed 3/21/2022      DVT Prophylaxis: PCDs  Code Status: DNR / DNI  Discharge Dispo: Will need either TCU or return home          Interval History (Subjective):      Denies shortness of breath, cough, chest discomfort, facial pain, nausea, vomiting, diarrhea, abdominal pain and urinary symptoms.                   Physical Exam:      Last  Vital Signs:  /53 (BP Location: Left arm)   Pulse 63   Temp 97.4  F (36.3  C) (Axillary)   Resp 18   Wt 66.2 kg (146 lb)   LMP  (LMP Unknown)   SpO2 93%   BMI 26.70 kg/m      No intake or output data in the 24 hours ending 05/04/22 1427    Constitutional: Awake, alert, cooperative, no apparent distress   Respiratory: Clear to auscultation bilaterally, no crackles or wheezing   Cardiovascular: Regular rate and rhythm, normal S1 and S2, and no murmur noted   Abdomen: Normal bowel sounds, soft, non-distended, non-tender   Skin: Nasal sutures in place, dry blood in right nare   Neuro: Alert and oriented x3, no weakness, numbness, memory loss   Extremities: No edema, normal range of motion   Other(s):        All other systems: Negative          Medications:      All current medications were reviewed with changes reflected in problem list.         Data:      All new lab and imaging data was reviewed.   Labs:  Recent Labs   Lab 05/03/22  1401      POTASSIUM 4.8   CHLORIDE 114*   CO2 22   ANIONGAP 4   *   BUN 26   CR 1.14*   GFRESTIMATED 44*   CANELO 9.6     Recent Labs   Lab 05/04/22  0642   WBC 6.0   HGB 9.8*   HCT 31.9*   *         Imaging:   Recent Results (from the past 24 hour(s))   XR Chest Port 1 View    Narrative    CHEST ONE VIEW  5/3/2022 2:21 PM     HISTORY: Syncope, fall, hypoxia.    COMPARISON: March 13, 2022      Impression    IMPRESSION: No pneumothorax. No definite displaced rib fracture. No  infiltrates.    ELLE BETANCOURT MD         SYSTEM ID:  [id number]   CT Cervical Spine w/o Contrast    Narrative    CT CERVICAL SPINE WITHOUT CONTRAST   5/3/2022 3:03 PM     HISTORY: Neck trauma (Age >= 65y).     TECHNIQUE: Axial images of the cervical spine were obtained without  intravenous contrast. Multiplanar reformations were performed.   Radiation dose for this scan was reduced using automated exposure  control, adjustment of the mA and/or kV according to patient size,  or  iterative reconstruction technique.    COMPARISON: None available. Correlation is made with ultrasound images  of the neck from fine-needle aspiration study 6/1/2021, 4/16/2021, and  also diagnostic ultrasound of the thyroid dated 2/12/2021.    FINDINGS: No acute fracture. Straightening of the normal cervical  lordosis. Minimal anterolisthesis of C3 on C4, which may be  degenerative. Minimal levoconvex curvature of the cervical spine.  Small sclerotic focus in the left lateral aspect of the C6 vertebral  body, which is nonspecific, but may represent a bone island. Small  nonspecific area of hypodensity in the right paramedian aspect of the  C7 vertebral body, potentially representing an intraosseous  hemangioma. There is a similar lesion seen in the T2 vertebral body.  Small multilevel Schmorl's node/degenerative endplate irregularities.    Multilevel degenerative disc disease, including severe disc height  loss at C6-C7 and moderate to severe disc height loss at C3-C4 with  partial fusion across the C3-C4 disc space. Solid fusion across the  right C3-C4 facet joint and probable partial fusion across the left  C3-C4 facet joint, most likely degenerative in nature. Mild scattered  degenerative facet disease elsewhere. Mild degenerative arthropathy at  the median atlantoaxial joint. Small scattered disc bulges/protrusions  are present. No definite high-grade central spinal canal stenosis.  Varying degrees of multilevel mild/moderate neural foraminal stenosis,  greatest on the right at C3-C4 and on the left at C5-C6.    Partial visualization of marked diffuse heterogeneous enlargement of  the right thyroid lobe with multiple internal nodular foci. The right  thyroid lobe measures over 9.7 cm in craniocaudal dimension (series 18  image 13) and exerts mass effect on the larynx and trachea, displacing  them toward the left. The carotid space structures on the right are  also displaced laterally. The enlarged right  thyroid lobe extends  partially into the upper aspect of the mediastinum, incompletely  evaluated. The visualized paraspinal soft tissues otherwise appear  unremarkable.      Impression    IMPRESSION:  1. No acute fracture or posttraumatic malalignment of the cervical  spine.  2. Multilevel degenerative change, as described. No high-grade spinal  canal stenosis.  3. Marked diffuse enlargement and heterogeneous density of the right  thyroid lobe with multiple nodules, partially extending into the upper  mediastinum, incompletely evaluated. Please see the body of the report  for additional details. Follow-up thyroid ultrasound can be performed,  as warranted.    ELLE KUMAR MD         SYSTEM ID:  [id number]   CT Head w/o Contrast    Narrative    CT SCAN OF THE HEAD WITHOUT CONTRAST   5/3/2022 3:06 PM     HISTORY: Head trauma, minor (Age >= 65y).    TECHNIQUE:  Axial images of the head and coronal reformations without  IV contrast material. Radiation dose for this scan was reduced using  automated exposure control, adjustment of the mA and/or kV according  to patient size, or iterative reconstruction technique.    COMPARISON: CT of the head dated 6/10/2016. MRI of the head dated  6/10/2016. CT head 5/4/2012. MRI head 4/24/2012.    FINDINGS: The ventricles are normal in size and configuration. There  is mild to moderate generalized brain parenchymal volume loss.  Interval resolution of previously seen small predominantly hypodense  subdural fluid collection overlying the right frontal convexity  compared to the prior study. No definite new/acute extra-axial fluid  collection seen. No acute intracranial hemorrhage or mass  effect/herniation. No definite CT findings of acute infarct.    There appear to be new, probable acute fractures involving the  bilateral nasal arch with overlying soft tissue swelling and  opacification of the bilateral anterior nasal cavities, possibly with  some hematoma and soft tissue swelling  surrounding the nasal septum  anteriorly (series 8 image 6). There are secretions noted extending  posteriorly in the right nasal cavity into the nasopharynx. Partially  visualized layering fluid in the bilateral maxillary sinuses,  incompletely evaluated.    Bilateral lens implants are noted. The calvarium appears intact.      Impression    IMPRESSION:  1. No CT findings of acute intracranial process.  2. Acute appearing nasal arch fractures. If clinically warranted,  facial CT can be obtained for further assessment of the maxillofacial  skeleton.  3. Mild to moderate generalized brain parenchymal volume loss.    ELLE KUMAR MD         SYSTEM ID:  [id number]

## 2022-05-04 NOTE — PROGRESS NOTES
Care Management Follow Up    Length of Stay (days): 0    Expected Discharge Date:  5/5/2022     Concerns to be Addressed:    Services at her [facility]   Patient plan of care discussed at interdisciplinary rounds: Yes    Anticipated Discharge Disposition:  Return to [facility]     Anticipated Discharge Services:  TBD  Anticipated Discharge DME:  None    Education Provided on the Discharge Plan:  No  Patient/Family in Agreement with the Plan:  TBD    Referrals Placed by CM/SW:  None  Private pay costs discussed: Not applicable    Additional Information:  CM contacted [facility] ([phone]) Marce to inquire about current services patient has. Tried RN Clinical Director Lola. WANG for both with CM contact information. Await return call.     RN CC will continue to follow for discharge planning.    Beatriz Mancilla RN, BSN, CPHN, CM  Inpatient Care Coordination - [facility]  [phone]    Addendum 2pm - Again LM for Lola MATTHEWS Clinical Director inquiring about current services. Await return call.    reji

## 2022-05-04 NOTE — PLAN OF CARE
PRIMARY DIAGNOSIS: SYNCOPE    OUTPATIENT/OBSERVATION GOALS TO BE MET BEFORE DISCHARGE  1. Orthostatic performed: N/A    2. Completion of appropriate imaging: Yes    3. Tolerating PO medications: Yes    4. Return to near baseline physical activity: No    5. Cleared for discharge by consultants (if involved): Yes    Discharge Planner Nurse   Safe discharge environment identified: Yes  Barriers to discharge: No       Entered by: Shona Mooney RN 05/04/2022      Please review provider order for any additional goals.   Nurse to notify provider when observation goals have been met and patient is ready for discharge.    Patient alert and oriented x 4. Vital signs stable. On room air. Denies pain. Tolerating oral diet. Bowels are active. Patient is incontinent- pure wick and brief in place. Assist X1. Tele monitor in place. Social work, PT and OT following.

## 2022-05-04 NOTE — PLAN OF CARE
Pt a/o x4. VSS. Denied pain. Denied nausea. Laceration on nose, asked pt if she wanted me to help clean it up she said no. IV in right hand. Due to void.

## 2022-05-04 NOTE — PLAN OF CARE
PRIMARY DIAGNOSIS: SYNCOPE  OUTPATIENT/OBSERVATION GOALS TO BE MET BEFORE DISCHARGE:  1. Orthostatic performed: N/A    2. Diagnostic testing complete & at baseline neurologic testing: Yes    3. Cleared by consultants (if involved): No    4. Interpretation of cardiac rhythm per telemetry tech: SR 69    5. Tolerating adequate PO diet and medications: Yes    6. Return to near baseline physical activity or neurologic status: No    Discharge Planner Nurse   Safe discharge environment identified: Yes  Barriers to discharge: Yes       Entered by: Akosua Vu RN 05/04/2022 4:26 AM  Pt AO x4, occasionally confused. VSS on RA. LS has some course crackles. BS active. Pt incontinent of bladder and bowel throughout the shift. Regular diet. Up with A1. Denies pain. Tele monitor in place SR 69. Plan: SW, OT, and PT consults.   /75 (BP Location: Left arm)   Pulse 72   Temp 98.3  F (36.8  C) (Axillary)   Resp 18   Wt 66.2 kg (146 lb)   LMP  (LMP Unknown)   SpO2 95%   BMI 26.70 kg/m    Please review provider order for any additional goals.   Nurse to notify provider when observation goals have been met and patient is ready for discharge.

## 2022-05-04 NOTE — PLAN OF CARE
PRIMARY DIAGNOSIS: SYNCOPE    OUTPATIENT/OBSERVATION GOALS TO BE MET BEFORE DISCHARGE  1. Orthostatic performed: N/A    2. Completion of appropriate imaging: Yes    3. Tolerating PO medications: Yes    4. Return to near baseline physical activity: No    5. Cleared for discharge by consultants (if involved): Yes    Discharge Planner Nurse   Safe discharge environment identified: Yes  Barriers to discharge: No       Entered by: Shona Mooney RN 05/04/2022      Please review provider order for any additional goals.   Nurse to notify provider when observation goals have been met and patient is ready for discharge.    Patient alert and oriented x 4. Vital signs stable. On room air. Denies pain. Tolerating oral intake well. Bowels are active. Patient is incontinent- pure wick and brief in place, patient tolerating well. Assist X1. Tele monitor in place. Social work, PT and OT following. Plan for placement tomorrow.

## 2022-05-04 NOTE — H&P
Admitted: 05/03/2022    CHIEF COMPLAINT:  Episode of fall, facial trauma, epistaxis.    HISTORY OF PRESENT ILLNESS:  Lennie Mar is a 89-year-old female with past medical history significant for anxiety, bipolar disorder, atrial fibrillation, on anticoagulation, among others, who presents to the Emergency Room today status post fall.  The patient was at the assisted living facility, and she was in her room alone when she fell down.  The patient stated she pressed her on-call button, but there was no response, and the patient crawled to the door and screamed, prompting the patient's neighbor heard and called staff for the patient.  The patient was found her face on the floor with abrasion at her nasal bridge.  She was hypoxic at 83% on room air by EMS.  The patient denied any headache.  No blurring of vision.  She did not know exactly why she fell down.  It was noted the patient was at transitional care unit and moved to assisted living facility a week ago.  It was reported that she is DNR/DNI.  After presentation here, she was found to have pain at her nose area and had epistaxis.  She denied any nausea or vomiting.  No chest pain. No palpitation.  She denied any urinary or bowel habit change.  The patient also denied any pain from any other site.  In the Emergency Room, she was evaluated.  Imaging studies done showed a CT scan of the head without contrast reported as acute appearing nasal arch fracture.  No CT finding of acute intracranial process.  The patient is being admitted under observation.    PAST MEDICAL HISTORY:  1.  Anxiety.  2.  Bipolar disorder.  3.  Chronic kidney disease, stage III.  4.  Hyperparathyroidism.  5.  History of COVID-19 infection.  6.  Depression.  7.  Paroxysmal atrial fibrillation.  8.  Vitamin D deficiency.  9.  Frequent falls.  10.  Breast cancer.  11.  Gastritis.    PAST SURGICAL HISTORY:   Past Surgical History:   Procedure Laterality Date     APPENDECTOMY OPEN  1947      CATARACT IOL, RT/LT       COLONOSCOPY WITH CO2 INSUFFLATION  2/29/2012    Procedure:COLONOSCOPY WITH CO2 INSUFFLATION; Surgeon:GAYLE REYNA; Location:[facility] OR     CYSTOCELE REPAIR  1972     CYSTOSCOPY, INSERT STENT URETHRA, COMBINED  1999     CYSTOSCOPY, RETROGRADES, INSERT STENT URETER(S), COMBINED  2/29/2012    Procedure:COMBINED CYSTOSCOPY, RETROGRADES, INSERT STENT URETER(S); Surgeon:ANT ESPINOZA; Location:[facility] OR     DECOMPRESSION LUMBAR ONE LEVEL  8/9/2013    Procedure: DECOMPRESSION LUMBAR ONE LEVEL;  Posterior Decompression Right Lumbar 5- Sacral 1;  Surgeon: You Zavala MD;  Location: [facility] OR     HC TOOTH EXTRACTION W/FORCEP       HYSTERECTOMY, CATA  1963     IRRIGATION AND DEBRIDEMENT ORAL, COMBINED  1982    For treatment of gingivitis     LAPAROSCOPIC ASSISTED COLECTOMY  2/29/2012    Procedure:LAPAROSCOPIC ASSISTED COLECTOMY; Cysto, Bilateral Stent placement (both stents removed at end of case)- Yanet ACOSTA. Laparoscopic Extended Right Venu Colectomy with CO2 Colonoscopy and polyp removal-Judah; Surgeon:GAYLE REYNA; Location:[facility] OR     LIGATN/STRIP LONG OR SHORT SAPHEN  1955     MASTECTOMY PARTIAL WITH SENTINEL NODE Left 11/19/2018    Procedure: Left Mastectomy, Left Notrees Lymph Node Biopsy;  Surgeon: Nahun Betancourt MD;  Location: [facility] OR     STRIP VEIN BILATERAL  1964     SURGICAL HISTORY OF -   1999    ureter surgery for blockage.     FAMILY HISTORY:  Reviewed and noncontributory to her current condition.  Her mother with history of coronary artery disease.  Father with CVA.  Sister with breast cancer and asthma.    SOCIAL HISTORY:  The patient presented to the Emergency Room alone, later on her daughter came in and was at bedside.  She is a former smoker.  She does not drink alcohol or use illicit drugs.    PHYSICAL EXAMINATION:  GENERAL:  The patient is awake, alert, oriented, not in distress.  VITAL SIGNS:  Blood pressure 145/100, pulse rate 84, temperature 99.7, oxygen  saturation 95%.  HEENT:  Pink, nonicteric.  Sutured nasal bridge with dried blood on her nose and facial area and also a slightly swollen upper lip.  NECK:  Supple.  No JVD. No thyromegaly.  CHEST:  Good air entry bilaterally.  No wheezing, crackles, or rales.  CARDIOVASCULAR SYSTEM:  S1 and S2 were heard.  No gallop or murmur.  Regular.  ABDOMEN:  Soft, nontender, nondistended. Positive bowel sounds.  EXTREMITIES:  No edema, cyanosis, or clubbing.  The patient moves all her extremities.  PSYCHIATRIC:  Normal mood and affect.  Keeps eye contact.  Responds to question appropriately.    DIAGNOSTIC TESTS OF INTEREST:  EKG showed initially atrial fibrillation at 80 beats per minute.  On monitor, the patient was in sinus rhythm.  CT head showed no acute intracranial abnormality.  There is acute appearing nasal arch fracture.  CT cervical spine:  No acute fracture.    LABORATORIES:  Sodium 140, potassium 4.8, BUN 26, creatinine 1.14, glucose 153.  WBC 10.3, hemoglobin 11.7, platelets 229.  Lithium 0.4.  Valproic acid 59.  Troponin 15.    ASSESSMENT AND PLAN:  Lennie Mar is a 89-year-old female with past medical history including paroxysmal atrial fibrillation, on anticoagulation with Eliquis, bipolar disorder, anxiety, breast cancer, depression, hypertension, among others, who presents to the Emergency Room, status post unwitnessed fall, sustaining trauma to her face with epistaxis and fractured nasal arch, and being admitted to the hospital under observation.    1.  Unwitnessed fall, suspect syncopal episode:  The patient fell down in her apartment. She did not give detail if she lost consciousness, but she stated, she tried to press the call button, but no nobody responded, and she had to scream later on, and crawled to the door, and the neighbor heard and called for her.  She will be monitored on telemetry.  Currently, she is in sinus rhythm.  We will get physical therapy and occupational therapy evaluation.  2.   Open fracture of nasal bone secondary to fall with epistaxis:  The patient fractured her nasal bone, and also there was a laceration, which was sutured.  At this point, no need for antibiotic as this is open fracture.  We will cover her with Augmentin.  The patient is on anticoagulation.  Epistaxis has resolved.  We will completely discontinue anticoagulation.  3.  Head injury secondary to fall in a patient on anticoagulation:  The patient will be monitored closely with neuro check.  If her neuro status changes, may consider repeating CT scan.  4.  Paroxysmal atrial fibrillation, currently in sinus:  The patient will be monitored.  Will continue her beta blocker.  We will stop her Eliquis as the risk outweighs the benefit at this time.  5.  Chronic medical conditions, all stable:  We will continue prior to admission medications, including her bipolar antidepressants.    I discussed with the patient's daughter at bedside, also discussed with her son-in-law, who is a psychiatrist, regarding this patient over the phone.  The patient is do not intubate, do not resuscitate, and also, at this time, family requested to completely discontinue her Eliquis, which is done.  This patient should be discharged off her Eliquis.  The patient is being admitted under observation.  She will be seen by physical therapy and occupational therapy and social service will evaluate the patient for safe discharge plan.    Lucio Rowley MD        D: 2022   T: 2022   MT: marie    Name:     MANI ANDERSON  MRN:      [id number]        Account:     [id number]   :      [id number]           Admitted:    2022       Document: [id number]

## 2022-05-05 PROCEDURE — 250N000013 HC RX MED GY IP 250 OP 250 PS 637: Performed by: INTERNAL MEDICINE

## 2022-05-05 PROCEDURE — G0378 HOSPITAL OBSERVATION PER HR: HCPCS

## 2022-05-05 PROCEDURE — 999N000111 HC STATISTIC OT IP EVAL DEFER

## 2022-05-05 RX ORDER — CEPHALEXIN 500 MG/1
500 CAPSULE ORAL 3 TIMES DAILY
Qty: 21 CAPSULE | Refills: 0 | DISCHARGE
Start: 2022-05-05 | End: 2022-05-06

## 2022-05-05 RX ADMIN — LEVOTHYROXINE SODIUM 100 MCG: 0.1 TABLET ORAL at 09:12

## 2022-05-05 RX ADMIN — GUAIFENESIN 1200 MG: 600 TABLET, EXTENDED RELEASE ORAL at 09:12

## 2022-05-05 RX ADMIN — GUAIFENESIN 1200 MG: 600 TABLET, EXTENDED RELEASE ORAL at 21:08

## 2022-05-05 RX ADMIN — ARIPIPRAZOLE 2 MG: 2 TABLET ORAL at 09:13

## 2022-05-05 RX ADMIN — DIVALPROEX SODIUM 500 MG: 500 TABLET, FILM COATED, EXTENDED RELEASE ORAL at 21:08

## 2022-05-05 RX ADMIN — LITHIUM CARBONATE 150 MG: 150 CAPSULE, GELATIN COATED ORAL at 09:12

## 2022-05-05 RX ADMIN — CHOLECALCIFEROL TAB 10 MCG (400 UNIT) 20 MCG: 10 TAB at 09:14

## 2022-05-05 RX ADMIN — QUETIAPINE FUMARATE 12.5 MG: 25 TABLET ORAL at 21:08

## 2022-05-05 NOTE — PLAN OF CARE
PRIMARY DIAGNOSIS: SYNCOPE  OUTPATIENT/OBSERVATION GOALS TO BE MET BEFORE DISCHARGE:  1. Orthostatic performed: N/A    2. Diagnostic testing complete & at baseline neurologic testing: Yes    3. Cleared by consultants (if involved): No    4. Interpretation of cardiac rhythm per telemetry tech: SR 69    5. Tolerating adequate PO diet and medications: Yes    6. Return to near baseline physical activity or neurologic status: No    Discharge Planner Nurse   Safe discharge environment identified: Yes  Barriers to discharge: Yes       Entered by: Akosua Vu RN 05/05/2022 3:33 AM  Pt AO x4, occasionally confused. VSS on RA. LS has some course crackles. BS active. Tele monitor in place SR 60's. IV SL. Regular diet. Up with A1. Denies pain. Pt incontinent of bladder and bowel. Plan: SW, OT, and PT consults.   /56 (BP Location: Left arm)   Pulse 80   Temp 97.2  F (36.2  C) (Axillary)   Resp 18   Wt 66.2 kg (146 lb)   LMP  (LMP Unknown)   SpO2 94%   BMI 26.70 kg/m     Please review provider order for any additional goals.   Nurse to notify provider when observation goals have been met and patient is ready for discharge.

## 2022-05-05 NOTE — PLAN OF CARE
PRIMARY DIAGNOSIS: SYNCOPE  OUTPATIENT/OBSERVATION GOALS TO BE MET BEFORE DISCHARGE:  1. Orthostatic performed: N/A    2. Diagnostic testing complete & at baseline neurologic testing: Yes    3. Cleared by consultants (if involved): No    4. Interpretation of cardiac rhythm per telemetry tech: SR 69    5. Tolerating adequate PO diet and medications: Yes    6. Return to near baseline physical activity or neurologic status: No    Discharge Planner Nurse   Safe discharge environment identified: Yes  Barriers to discharge: Yes       Entered by: Akosua Vu RN 05/05/2022 2:34 AM  Pt AO x4, occasionally confused. VSS on RA. LS has some course crackles. BS active. Regular diet. Up with A1. Denies pain. Pt incontinent of bladder and bowel. Tele monitor in place. IV SL. Plan: SW, OT, and PT consults.   /54 (BP Location: Left arm)   Pulse 73   Temp 98.7  F (37.1  C) (Axillary)   Resp 16   Wt 66.2 kg (146 lb)   LMP  (LMP Unknown)   SpO2 93%   BMI 26.70 kg/m    Please review provider order for any additional goals.   Nurse to notify provider when observation goals have been met and patient is ready for discharge.

## 2022-05-05 NOTE — PROGRESS NOTES
Care Management Follow Up    Length of Stay (days): 0    Expected Discharge Date: 05/05/2022     Concerns to be Addressed:  TCU choices     Patient plan of care discussed at interdisciplinary rounds: Yes    Anticipated Discharge Disposition: Assisted Living vs TCU     Anticipated Discharge Services: If TCU, OT & PT  Anticipated Discharge DME: None    Patient/family educated on Medicare website which has current facility and service quality ratings: Yes  PAS: [id number]  Education Provided on the Discharge Plan:  Yes  Patient/Family in Agreement with the Plan: yes    Referrals Placed by CM/SW: External Care Coordination  Private pay costs discussed: private room/amenity fees and transportation costs    Additional Information:  ANIBAL received voicemail from Lola at [facility] responding to VM left by ANIBAL. She stated staff assists patient with bathing, medication administration, meal preparation & housekeeping. Per PT, patient needs more assistance and someone to be with her 24/7. ANIBAL left message with son, Naren ([phone]) updating him on recommendations. Contacted other son, Navarro ([phone]) He lives in [place] and recommends staff contact Dmitry or Marta as they live in [place]. Contacted Marta - KATINA ([phone]). Marta stated she should be primary contact as her  Dmitry is a MD and can't always answer the phone. ANIBAL changed the order in Emergency Contacts. Marta would like patient to return to [facility] TCU. CM updated her that a referral would be sent and writer would call TCU to see if they have availability. Spoke with Jaiden. They are able to accept patient to a private room without charge. Jaiden stated 1pm on Friday would work. ANIBAL updated KATINA Lozano via phone. She would like wheelchair transport. Reviewed out of pocket cost for [facility], $81.80 for base rate and $5.26 per mile to the destination. She expressed understanding and is agreeable to this.   Contacted   [facility] ([phone]) spoke with Jessica. They are able to transport patient at 1300 (1pm) on Friday, 5/6/22. Updated chg RN, bedside RN & provider. Need to complete PAS.     RN CC will continue to follow for discharge planning.    Beatriz Mancilla RN, BSN, CPHN, CM  Inpatient Care Coordination - [facility]  [phone]

## 2022-05-05 NOTE — UTILIZATION REVIEW
Concurrent stay review; Secondary Review Determination     [facility]          Under the authority of the Utilization Management Committee, the utilization review process indicated a secondary review on the above patient.  The review outcome is based on review of the medical records, discussions with staff, and applying clinical experience noted on the date of the review.          (x) Observation Status Appropriate - Concurrent stay review    RATIONALE FOR DETERMINATION    89 year old female who recently recovered from COVID and was disharged from TCU about 1 week ago who also has a history of anxiety, bipolar disorder, atrial afibrillation on anticoagulation admitted on 5/3/2022 after an unwitnessed fall at her [facility]   CT cervical spine negative. She had laceration of nasal bridge which was repaired in ED and she was admitted under observation.    working on TCU placement, possible placement tomorrow.    The severity of illness, intensity of service provided, expected LOS and risk for adverse outcome make the care appropriate for observation.      This document was produced using voice recognition software       The information on this document is developed by the utilization review team in order for the business office to ensure compliance.  This only denotes the appropriateness of proper admission status and does not reflect the quality of care rendered.         The definitions of Inpatient Status and Observation Status used in making the determination above are those provided in the CMS Coverage Manual, Chapter 1 and Chapter 6, section 70.4.      Sincerely,     RENZO PENN MD    System Medical Director  Utilization Management  [facility].

## 2022-05-05 NOTE — PLAN OF CARE
OT: Orders received. Chart reviewed and discussed with care team.  OT not indicated as pt with planned discharge to TCU as pt unsafe to return directly to detention. Most appropriate to defer OT eval to TCU setting.  Defer discharge recommendations to PT/care team.  Will complete IP OT orders.

## 2022-05-05 NOTE — PLAN OF CARE
PRIMARY DIAGNOSIS: SYNCOPE  OUTPATIENT/OBSERVATION GOALS TO BE MET BEFORE DISCHARGE:  1. Orthostatic performed:no           2. Diagnostic testing complete & at baseline neurologic testing: Yes    3. Cleared by consultants (if involved): No    4. Interpretation of cardiac rhythm per telemetry tech: SR    5. Tolerating adequate PO diet and medications: Yes    6. Return to near baseline physical activity or neurologic status: Yes    Discharge Planner Nurse   Safe discharge environment identified: Yes  Barriers to discharge: No    Declined getting out of bed this morning, refused breakfast.         Entered by: Traci Neville RN 05/05/2022 11:29 AM     Please review provider order for any additional goals.   Nurse to notify provider when observation goals have been met and patient is ready for discharge.Goal Outcome Evaluation:

## 2022-05-05 NOTE — PLAN OF CARE
PRIMARY DIAGNOSIS: SYNCOPE  OUTPATIENT/OBSERVATION GOALS TO BE MET BEFORE DISCHARGE:  1. Orthostatic performed: no            2. Diagnostic testing complete & at baseline neurologic testing: Yes    3. Cleared by consultants (if involved): No    4. Interpretation of cardiac rhythm per telemetry tech: SR    5. Tolerating adequate PO diet and medications: Yes    6. Return to near baseline physical activity or neurologic status: No    Discharge Planner Nurse   Safe discharge environment identified: Yes  Barriers to discharge: No       Entered by: Traci Neville RN 05/05/2022 9:51 AM     Please review provider order for any additional goals.   Nurse to notify provider when observation goals have been met and patient is ready for discharge.Goal Outcome Evaluation:

## 2022-05-05 NOTE — PLAN OF CARE
PRIMARY DIAGNOSIS: SYNCOPE  OUTPATIENT/OBSERVATION GOALS TO BE MET BEFORE DISCHARGE:  1. Orthostatic performed: N/A    2. Diagnostic testing complete & at baseline neurologic testing: Yes    3. Cleared by consultants (if involved): No    4. Interpretation of cardiac rhythm per telemetry tech: SR 60    5. Tolerating adequate PO diet and medications: Yes    6. Return to near baseline physical activity or neurologic status: No    Discharge Planner Nurse   Safe discharge environment identified: Yes  Barriers to discharge: Yes       Entered by: Akosua Vu RN 05/05/2022 4:13 AM  Pt AO x4, occasionally confused. VSS on RA. LS has some course crackles. BS active. Regular diet. Up with A1. IV SL. Denies pain. Pt incontinent of bladder and bowel. Tele monitor in place SR 60's.  Plan: SW, OT, and PT.   /56 (BP Location: Left arm)   Pulse 80   Temp 97.2  F (36.2  C) (Axillary)   Resp 18   Wt 66.2 kg (146 lb)   LMP  (LMP Unknown)   SpO2 94%   BMI 26.70 kg/m    Please review provider order for any additional goals.   Nurse to notify provider when observation goals have been met and patient is ready for discharge.

## 2022-05-06 ENCOUNTER — TELEPHONE (OUTPATIENT)
Dept: NURSING | Facility: CLINIC | Age: 87
End: 2022-05-06
Payer: MEDICARE

## 2022-05-06 VITALS
HEART RATE: 102 BPM | BODY MASS INDEX: 26.7 KG/M2 | OXYGEN SATURATION: 96 % | TEMPERATURE: 98 F | DIASTOLIC BLOOD PRESSURE: 64 MMHG | RESPIRATION RATE: 18 BRPM | SYSTOLIC BLOOD PRESSURE: 143 MMHG | WEIGHT: 146 LBS

## 2022-05-06 PROCEDURE — G0378 HOSPITAL OBSERVATION PER HR: HCPCS

## 2022-05-06 PROCEDURE — 250N000013 HC RX MED GY IP 250 OP 250 PS 637: Performed by: PHYSICIAN ASSISTANT

## 2022-05-06 PROCEDURE — 250N000013 HC RX MED GY IP 250 OP 250 PS 637: Performed by: INTERNAL MEDICINE

## 2022-05-06 PROCEDURE — 99217 PR OBSERVATION CARE DISCHARGE: CPT | Performed by: PHYSICIAN ASSISTANT

## 2022-05-06 RX ORDER — CEPHALEXIN 500 MG/1
500 CAPSULE ORAL EVERY 12 HOURS SCHEDULED
Status: DISCONTINUED | OUTPATIENT
Start: 2022-05-06 | End: 2022-05-06 | Stop reason: HOSPADM

## 2022-05-06 RX ORDER — CEPHALEXIN 500 MG/1
500 CAPSULE ORAL EVERY 8 HOURS SCHEDULED
Status: DISCONTINUED | OUTPATIENT
Start: 2022-05-06 | End: 2022-05-06

## 2022-05-06 RX ORDER — CEPHALEXIN 500 MG/1
500 CAPSULE ORAL 2 TIMES DAILY
Refills: 0 | DISCHARGE
Start: 2022-05-06 | End: 2022-05-09

## 2022-05-06 RX ADMIN — ARIPIPRAZOLE 2 MG: 2 TABLET ORAL at 08:45

## 2022-05-06 RX ADMIN — FAMOTIDINE 20 MG: 20 TABLET ORAL at 08:41

## 2022-05-06 RX ADMIN — LEVOTHYROXINE SODIUM 100 MCG: 0.1 TABLET ORAL at 08:42

## 2022-05-06 RX ADMIN — CEPHALEXIN 500 MG: 500 CAPSULE ORAL at 08:41

## 2022-05-06 RX ADMIN — CHOLECALCIFEROL TAB 10 MCG (400 UNIT) 20 MCG: 10 TAB at 08:42

## 2022-05-06 RX ADMIN — GUAIFENESIN 1200 MG: 600 TABLET, EXTENDED RELEASE ORAL at 08:42

## 2022-05-06 RX ADMIN — LITHIUM CARBONATE 150 MG: 150 CAPSULE, GELATIN COATED ORAL at 08:45

## 2022-05-06 NOTE — DISCHARGE SUMMARY
Discharge Summary  Hospitalist Service    Lennie Mar MRN# [id number]   YOB: 1927 Age: 89 year old     Date of Admission: 5/3/2022  Date of Discharge: 5/6/2022  Admitting Physician: Lucio Rowley MD  Discharge Physician: Cristal Neville PA-C  Discharging Service: Hospitalist Service     Primary Provider: No Ref-Primary, Physician  Primary Care Physician Phone Number: None         Discharge Diagnoses/Problem Oriented Hospital Course (Providers):      Lennie Mar was admitted on 5/3/2022 by Lucio Rowley MD and I would refer you to their history and physical. She has a hx of anxiety, bipolar disorder and atrial afibrillation on anticoagulation. She had been  discharged from TCU to assisted living approximately 1 week ago. On the day of admission she had a unwitnessed fall with facial trauma resulting in nasal bridge fracture.     In the ED her temp is 99.7, pressure 145/100, HR 84 and breathing comfortably on room air without hypoxia. Lab work remarkable for creatinine 1.14, normal electrolytes, glucose 153 and normal CBC. Lithium level a little low and valproic level normal. CXR negative. CT head negative, CT cervical spine negative. She had laceration of nasal bridge which was repaired in ED and she was admitted under observation.     Telemetry monitoring revealed no arrhythmias. Echocardiogram recently done showed no significant structural heart disease. After discussion with her family anticoagulation was discontinued due to fall risk and family also requested Metoprolol be discontinued as they were concerned this was the cause of her fall. Augmentin was started for infection prophylaxis and PT assessed with recommendation for TCU which Social work help arrange.     Her daughter in law was updated at time of discharge. They were told she is at risk of A fib with RVR off of Metoprolol and stroke off of anticoagulation.             Code Status:      DNR / DNI         Brief Hospital Stay Summary Sent Home With Patient in AVS:          Reason for your hospital stay      You were admitted for unwitnessed fall with facial trauma and had   fracture of your nasal bone. Sutures were placed on the bridge of your   nose that will absorb on their own. We have placed you on a prophylactic   antibiotic to prevent infection. You were monitored on telemetry with no   arrythmias. Your son requested your Metoprolol be stopped so we did. If   you notice palpitaitons, chest pain or shortness of breath you should be   checked for Atrial fibrillation. We stopped your blood thinner to prevent   stroke due to your high risk for falling.    Your Lithium level was a little low at 0.4. We did not make any medication   adjustments but recommend recheck in a couple of weeks.                          Pending Results:        Unresulted Labs Ordered in the Past 30 Days of this Admission     No orders found from 4/3/2022 to 5/4/2022.            Discharge Instructions and Follow-Up:      Follow-up Appointments     Follow Up and recommended labs and tests      Follow up with Nursing home physician.  No follow up labs or test are   needed.               Discharge Disposition:        Discharged to home         Discharge Medications:        Current Discharge Medication List      START taking these medications    Details   cephALEXin (KEFLEX) 500 MG capsule Take 1 capsule (500 mg) by mouth 2 times daily for 7 days  Refills: 0    Associated Diagnoses: Laceration of nose, initial encounter         CONTINUE these medications which have NOT CHANGED    Details   acetaminophen (TYLENOL) 325 MG tablet Take 3 tablets (975 mg) by mouth every 8 hours as needed for mild pain  Qty: 50 tablet, Refills: 0    Associated Diagnoses: Postoperative pain      alum & mag hydroxide-simethicone (MAALOX) 200-200-20 MG/5ML SUSP suspension Take 30 mLs by mouth every 4 hours as needed for indigestion    Associated Diagnoses: Infection due  to 2019 novel coronavirus      apixaban ANTICOAGULANT (ELIQUIS) 2.5 MG tablet Take 1 tablet (2.5 mg) by mouth 2 times daily    Associated Diagnoses: Paroxysmal atrial fibrillation (H)      ARIPiprazole (ABILIFY) 2 MG tablet Take 2 mg by mouth daily      bisacodyl (DULCOLAX) 10 MG suppository Place 1 suppository (10 mg) rectally daily as needed for constipation    Associated Diagnoses: Drug-induced constipation      divalproex sodium extended-release (DEPAKOTE ER) 500 MG 24 hr tablet Take 500 mg by mouth At Bedtime      famotidine (PEPCID) 20 MG tablet Take 1 tablet (20 mg) by mouth every 48 hours    Associated Diagnoses: Infection due to 2019 novel coronavirus      guaiFENesin (MUCINEX) 600 MG 12 hr tablet Take 2 tablets (1,200 mg) by mouth 2 times daily    Associated Diagnoses: Infection due to 2019 novel coronavirus      ipratropium-albuterol (COMBIVENT RESPIMAT)  MCG/ACT inhaler Inhale 1 puff into the lungs 4 times daily as needed for shortness of breath / dyspnea or wheezing    Associated Diagnoses: Infection due to 2019 novel coronavirus      letrozole (FEMARA) 2.5 MG tablet TAKE 1 TABLET BY MOUTH EVERY DAY  Qty: 90 tablet, Refills: 3    Associated Diagnoses: Malignant neoplasm of upper-inner quadrant of left breast in female, estrogen receptor positive (H); Malignant neoplasm of upper-outer quadrant of left breast in female, estrogen receptor positive (H)      levothyroxine (SYNTHROID/LEVOTHROID) 100 MCG tablet Take 100 mcg by mouth daily      lithium (ESKALITH) 150 MG capsule Take 150 mg by mouth every morning   Qty: 30 capsule, Refills: 1    Associated Diagnoses: Severe depressed bipolar I disorder without psychotic features (H)      magnesium hydroxide (MILK OF MAGNESIA) 400 MG/5ML suspension Take 30 mLs by mouth daily as needed for constipation    Associated Diagnoses: Infection due to 2019 novel coronavirus      melatonin 1 MG TABS tablet Take 1 tablet (1 mg) by mouth nightly as needed for sleep     Associated Diagnoses: Infection due to 2019 novel coronavirus      miconazole (MICATIN) 2 % external powder Apply topically 2 times daily    Associated Diagnoses: Infection due to 2019 novel coronavirus      ondansetron (ZOFRAN-ODT) 4 MG ODT tab Take 1 tablet (4 mg) by mouth every 6 hours as needed for nausea or vomiting    Associated Diagnoses: Infection due to 2019 novel coronavirus      QUEtiapine (SEROQUEL) 25 MG tablet Take 0.5 tablets (12.5 mg) by mouth nightly as needed (restlessness)    Associated Diagnoses: Infection due to 2019 novel coronavirus      !! senna-docusate (SENOKOT-S/PERICOLACE) 8.6-50 MG tablet Take 1 tablet by mouth 2 times daily as needed for constipation    Associated Diagnoses: Infection due to 2019 novel coronavirus      !! senna-docusate (SENOKOT-S/PERICOLACE) 8.6-50 MG tablet Take 2 tablets by mouth 2 times daily as needed for constipation    Associated Diagnoses: Infection due to 2019 novel coronavirus      vitamin D3 (CHOLECALCIFEROL) 10 MCG (400 UNIT) capsule Take 2 capsules (800 Units) by mouth daily  Qty: 60 capsule, Refills: 3    Associated Diagnoses: Vitamin D deficiency      zinc Oxide (DESITIN) 40 % paste Apply topically as needed for dry skin or irritation    Associated Diagnoses: Infection due to 2019 novel coronavirus      ORDER FOR DME Equipment being ordered: compression stocking knee high 20-30mmhg  Qty: 2 Package, Refills: 0    Associated Diagnoses: Edema       !! - Potential duplicate medications found. Please discuss with provider.      STOP taking these medications       metoprolol tartrate (LOPRESSOR) 25 MG tablet Comments:   Reason for Stopping:                 Allergies:         Allergies   Allergen Reactions     Cafergot Other (See Comments) and Nausea and Vomiting     Severe HA, N/V & diarrhea     Ciprofloxacin      Nausea and vomiting per son      Penicillins Rash and Unknown     Sulfa Drugs Rash and Unknown     Ergot Alkaloids Unknown     Lamictal [Lamotrigine]  Other (See Comments)     Patient experienced night moss     Naproxen      Pt unable to remember reaction.     Naproxen Unknown     Tetracycline Unknown     Pt unable to remember allergy           Consultations This Hospital Stay:        No consultations were requested during this admission         Condition and Physical on Discharge:        Discharge condition: Stable   Vitals: Blood pressure (!) 143/64, pulse 102, temperature 98  F (36.7  C), temperature source Axillary, resp. rate 18, weight 66.2 kg (146 lb), SpO2 96 %, not currently breastfeeding.     Constitutional: Alert and orientated. Flat affect     Lungs: CTAB   Cardiovascular: RRR with no murmur   Abdomen: Bowel sounds are present with no tenderness   Skin: Nasal abrasion is less swollen. No spreading erythema or discharge   Other:          Discharge Time:      less than 30 minutes.        Image Results From This Hospital Stay (For Non-EPIC Providers):        Results for orders placed or performed during the hospital encounter of 05/03/22   CT Head w/o Contrast    Narrative    CT SCAN OF THE HEAD WITHOUT CONTRAST   5/3/2022 3:06 PM     HISTORY: Head trauma, minor (Age >= 65y).    TECHNIQUE:  Axial images of the head and coronal reformations without  IV contrast material. Radiation dose for this scan was reduced using  automated exposure control, adjustment of the mA and/or kV according  to patient size, or iterative reconstruction technique.    COMPARISON: CT of the head dated 6/10/2016. MRI of the head dated  6/10/2016. CT head 5/4/2012. MRI head 4/24/2012.    FINDINGS: The ventricles are normal in size and configuration. There  is mild to moderate generalized brain parenchymal volume loss.  Interval resolution of previously seen small predominantly hypodense  subdural fluid collection overlying the right frontal convexity  compared to the prior study. No definite new/acute extra-axial fluid  collection seen. No acute intracranial hemorrhage or  mass  effect/herniation. No definite CT findings of acute infarct.    There appear to be new, probable acute fractures involving the  bilateral nasal arch with overlying soft tissue swelling and  opacification of the bilateral anterior nasal cavities, possibly with  some hematoma and soft tissue swelling surrounding the nasal septum  anteriorly (series 8 image 6). There are secretions noted extending  posteriorly in the right nasal cavity into the nasopharynx. Partially  visualized layering fluid in the bilateral maxillary sinuses,  incompletely evaluated.    Bilateral lens implants are noted. The calvarium appears intact.      Impression    IMPRESSION:  1. No CT findings of acute intracranial process.  2. Acute appearing nasal arch fractures. If clinically warranted,  facial CT can be obtained for further assessment of the maxillofacial  skeleton.  3. Mild to moderate generalized brain parenchymal volume loss.    ELLE KUMAR MD         SYSTEM ID:  [id number]   CT Cervical Spine w/o Contrast    Narrative    CT CERVICAL SPINE WITHOUT CONTRAST   5/3/2022 3:03 PM     HISTORY: Neck trauma (Age >= 65y).     TECHNIQUE: Axial images of the cervical spine were obtained without  intravenous contrast. Multiplanar reformations were performed.   Radiation dose for this scan was reduced using automated exposure  control, adjustment of the mA and/or kV according to patient size, or  iterative reconstruction technique.    COMPARISON: None available. Correlation is made with ultrasound images  of the neck from fine-needle aspiration study 6/1/2021, 4/16/2021, and  also diagnostic ultrasound of the thyroid dated 2/12/2021.    FINDINGS: No acute fracture. Straightening of the normal cervical  lordosis. Minimal anterolisthesis of C3 on C4, which may be  degenerative. Minimal levoconvex curvature of the cervical spine.  Small sclerotic focus in the left lateral aspect of the C6 vertebral  body, which is nonspecific, but may represent  a bone island. Small  nonspecific area of hypodensity in the right paramedian aspect of the  C7 vertebral body, potentially representing an intraosseous  hemangioma. There is a similar lesion seen in the T2 vertebral body.  Small multilevel Schmorl's node/degenerative endplate irregularities.    Multilevel degenerative disc disease, including severe disc height  loss at C6-C7 and moderate to severe disc height loss at C3-C4 with  partial fusion across the C3-C4 disc space. Solid fusion across the  right C3-C4 facet joint and probable partial fusion across the left  C3-C4 facet joint, most likely degenerative in nature. Mild scattered  degenerative facet disease elsewhere. Mild degenerative arthropathy at  the median atlantoaxial joint. Small scattered disc bulges/protrusions  are present. No definite high-grade central spinal canal stenosis.  Varying degrees of multilevel mild/moderate neural foraminal stenosis,  greatest on the right at C3-C4 and on the left at C5-C6.    Partial visualization of marked diffuse heterogeneous enlargement of  the right thyroid lobe with multiple internal nodular foci. The right  thyroid lobe measures over 9.7 cm in craniocaudal dimension (series 18  image 13) and exerts mass effect on the larynx and trachea, displacing  them toward the left. The carotid space structures on the right are  also displaced laterally. The enlarged right thyroid lobe extends  partially into the upper aspect of the mediastinum, incompletely  evaluated. The visualized paraspinal soft tissues otherwise appear  unremarkable.      Impression    IMPRESSION:  1. No acute fracture or posttraumatic malalignment of the cervical  spine.  2. Multilevel degenerative change, as described. No high-grade spinal  canal stenosis.  3. Marked diffuse enlargement and heterogeneous density of the right  thyroid lobe with multiple nodules, partially extending into the upper  mediastinum, incompletely evaluated. Please see the  body of the report  for additional details. Follow-up thyroid ultrasound can be performed,  as warranted.    ELLE KUMAR MD         SYSTEM ID:  [id number]   XR Chest Port 1 View    Narrative    CHEST ONE VIEW  5/3/2022 2:21 PM     HISTORY: Syncope, fall, hypoxia.    COMPARISON: March 13, 2022      Impression    IMPRESSION: No pneumothorax. No definite displaced rib fracture. No  infiltrates.    ELLE BETANCOURT MD         SYSTEM ID:  [id number]           Most Recent Lab Results In EPIC (For Non-EPIC Providers):    Most Recent 3 CBC's:  Recent Labs   Lab Test 05/04/22  0642 05/03/22  1401 04/14/22  0633 03/19/22  0659   WBC 6.0 10.2 4.0 7.4   HGB 9.8* 11.7  --  13.2   * 107*  --  101*    229  --  198      Most Recent 3 BMP's:  Recent Labs   Lab Test 05/03/22  1401 04/04/22  0610 03/29/22  0526    145 145   POTASSIUM 4.8 4.5 4.2   CHLORIDE 114* 113* 115*   CO2 22 24 20*   BUN 26 31* 32*   CR 1.14* 1.07 1.21*   ANIONGAP 4 8 10   CANELO 9.6 9.3 9.6   * 76 88     Most Recent 3 Troponin's:No lab results found.  Most Recent 3 INR's:No lab results found.  Most Recent 2 LFT's:  Recent Labs   Lab Test 03/13/22  0858 03/10/22  1158   AST 34 35   ALT 21 29   ALKPHOS 94 98   BILITOTAL 0.3 0.3     Most Recent Cholesterol Panel:No lab results found.  Most Recent 6 Bacteria Isolates From Any Culture (See EPIC Reports for Culture Details):  Recent Labs   Lab Test 04/15/19  0821   CULT >100,000 colonies/mL  Klebsiella pneumoniae  *     Most Recent TSH, T4 and HgbA1c:   Recent Labs   Lab Test 03/10/22  1158   TSH 0.01*   T4 1.20

## 2022-05-06 NOTE — PLAN OF CARE
PRIMARY DIAGNOSIS: SYNCOPE AND COLLAPSE  OUTPATIENT/OBSERVATION GOALS TO BE MET BEFORE DISCHARGE:  ADLs back to baseline: No    Activity and level of assistance: Assist of 2    Pain status: Pain free.    Return to near baseline physical activity: No     Discharge Planner Nurse   Safe discharge environment identified: Yes  Barriers to discharge: No       Entered by: Allegra Castro RN 05/06/2022      Please review provider order for any additional goals.   Nurse to notify provider when observation goals have been met and patient is ready for discharge.

## 2022-05-06 NOTE — PROGRESS NOTES
Physical Therapy Discharge Summary    Reason for therapy discharge:    Discharged to transitional care facility.    Progress towards therapy goal(s). See goals on Care Plan in [facility] electronic health record for goal details.  Goals not met.  Barriers to achieving goals:   discharge from facility.    Therapy recommendation(s):    Continued therapy is recommended.  Rationale/Recommendations:  strengthening and IND with mobility.

## 2022-05-06 NOTE — PLAN OF CARE
PRIMARY DIAGNOSIS: SYNCOPE AND COLLAPSE  OUTPATIENT/OBSERVATION GOALS TO BE MET BEFORE DISCHARGE:  1. ADLs back to baseline: No    2. Activity and level of assistance: Assist of 2    3. Pain status: Pain free.    4. Return to near baseline physical activity: No     Vitals are Temp: 99.3  F (37.4  C) Temp src: Oral BP: 137/66 Pulse: 87   Resp: 18 SpO2: 92 %.  Patient is Alert and Oriented x4 but forgetful. They are 2 assist with Gait Belt and Walker.  Patient is on Contact precuations for VRE.  Pt is a Regular diet.  They are denying pain.  Patient is Saline locked.Pt is on tele-NSR/tachy/91 with PVC's. Pt has bruises on the face.     Discharge Planner Nurse   Safe discharge environment identified: Yes  Barriers to discharge: No       Entered by: Allegra Castro RN 05/06/2022      Please review provider order for any additional goals.   Nurse to notify provider when observation goals have been met and patient is ready for discharge.

## 2022-05-06 NOTE — PLAN OF CARE
PRIMARY DIAGNOSIS: SYNCOPE  OUTPATIENT/OBSERVATION GOALS TO BE MET BEFORE DISCHARGE:  1. Orthostatic performed:N/A           2. Diagnostic testing complete & at baseline neurologic testing: Yes    3. Cleared by consultants (if involved): Yes-TCU    4. Interpretation of cardiac rhythm per telemetry tech: SR    5. Tolerating adequate PO diet and medications: Yes    6. Return to near baseline physical activity or neurologic status: Yes    Discharge Planner Nurse   Safe discharge environment identified: Yes  Barriers to discharge: No    Declined getting out of bed this morning, refused breakfast.         Entered by: Kendal Canas RN 05/05/2022    Please review provider order for any additional goals.   Nurse to notify provider when observation goals have been met and patient is ready for discharge.Goal Outcome Evaluation:    Alert, disoriented to situation-forgetful. Extensive bruising and swelling on face/nose- attempted to wipe some of the dried blood off pt's face as she would allow. Denies pain.Pleasant. Plan for TCU tomorrow at 1300.

## 2022-05-06 NOTE — TELEPHONE ENCOUNTER
Sowmya MATTHEWS calling [facility] TCU    Just discharged from [facility] with cephalexin on medication list.  RN reports that penicillin is on Pt's allergy list and RN wanting to be sure provider is aware of this.    Writer contacted ordering provider, Cristal Neville PA-C who advised that Pt can have cephalexin.  She had a dose in the hospital with no issues.    Sowmya MATTHEWS notified of above advice.    Maribel Guzman RN, Nurse Advisor 5:07 PM 5/6/2022

## 2022-05-06 NOTE — PLAN OF CARE
PRIMARY DIAGNOSIS: SYNCOPE  OUTPATIENT/OBSERVATION GOALS TO BE MET BEFORE DISCHARGE:  1. Orthostatic performed:N/A           2. Diagnostic testing complete & at baseline neurologic testing: Yes    3. Cleared by consultants (if involved): Yes-TCU    4. Interpretation of cardiac rhythm per telemetry tech: SR    5. Tolerating adequate PO diet and medications: Yes    6. Return to near baseline physical activity or neurologic status: Yes    Discharge Planner Nurse   Safe discharge environment identified: Yes  Barriers to discharge: No    Declined getting out of bed this morning, refused breakfast.         Entered by: Kendal Canas RN 05/05/2022    Please review provider order for any additional goals.   Nurse to notify provider when observation goals have been met and patient is ready for discharge.Goal Outcome Evaluation:    Alert, disoriented to situation-forgetful. Extensive bruising and swelling on face/nose Denies pain. Poor appetite. Pleasant. Plan for TCU tomorrow at 1300. W/C transport.

## 2022-05-06 NOTE — PLAN OF CARE
PRIMARY DIAGNOSIS: VERTIGO Facial injury    OUTPATIENT/OBSERVATION GOALS TO BE MET BEFORE DISCHARGE  1. Orthostatic performed: N/A    2. Completion of appropriate imaging: Yes    3. Tolerating PO medications: Yes    4. Return to near baseline physical activity: Yes    5. Cleared for discharge by consultants (if involved): Yes    Discharge Planner Nurse   Safe discharge environment identified: Yes  Barriers to discharge: No       Entered by: Sonali Garcia RN 05/06/2022 11:19 AM     Please review provider order for any additional goals.   Nurse to notify provider when observation goals have been met and patient is ready for discharge.Goal Outcome Evaluation:

## 2022-05-09 ENCOUNTER — TRANSITIONAL CARE UNIT VISIT (OUTPATIENT)
Dept: GERIATRICS | Facility: CLINIC | Age: 87
End: 2022-05-09
Payer: MEDICARE

## 2022-05-09 VITALS
RESPIRATION RATE: 16 BRPM | BODY MASS INDEX: 26 KG/M2 | TEMPERATURE: 97.5 F | WEIGHT: 141.3 LBS | HEART RATE: 80 BPM | SYSTOLIC BLOOD PRESSURE: 126 MMHG | OXYGEN SATURATION: 97 % | HEIGHT: 62 IN | DIASTOLIC BLOOD PRESSURE: 80 MMHG

## 2022-05-09 DIAGNOSIS — U07.1 INFECTION DUE TO 2019 NOVEL CORONAVIRUS: ICD-10-CM

## 2022-05-09 DIAGNOSIS — I50.30 HEART FAILURE WITH PRESERVED EJECTION FRACTION, NYHA CLASS I (H): ICD-10-CM

## 2022-05-09 DIAGNOSIS — F31.9 BIPOLAR AFFECTIVE DISORDER, REMISSION STATUS UNSPECIFIED (H): ICD-10-CM
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
## 2022-05-09 DIAGNOSIS — J96.01 ACUTE RESPIRATORY FAILURE WITH HYPOXIA (H): ICD-10-CM

## 2022-05-09 DIAGNOSIS — I48.91 ATRIAL FIBRILLATION WITH RVR (H): ICD-10-CM

## 2022-05-09 PROCEDURE — 99304 1ST NF CARE SF/LOW MDM 25: CPT | Performed by: FAMILY MEDICINE

## 2022-05-09 RX ORDER — CEPHALEXIN 500 MG/1
500 CAPSULE ORAL 3 TIMES DAILY
COMMUNITY
Start: 2022-05-07 | End: 2022-05-14

## 2022-05-09 NOTE — PROGRESS NOTES
[facility] GERIATRIC SERVICES    Facility:  [facility] ([facility]) [[id number]]  Code Status: DNR/DNI      CHIEF COMPLAINT/REASON FOR VISIT:  Chief Complaint   Patient presents with     Hospital F/U       HPI:   Lennie is a 89 year old female who was basic recovered from COVID-19 infection she was sent to assisted living from here about a week week and 1/2 to 2 weeks ago.  She was anticoagulated and does age have atrial fibrillation.  She was sent to the hospital on 5/3/2022 for a fall at her [facility].  Temperature is 99.7 and head CT cervical spine was negative.  She did have a laceration on her nose sutured.  On telemetry monitoring normal sinus rhythm was noted and no arrhythmia overnight.  She does have paroxysmal atrial fibrillation and she is currently anticoagulated and she also is being followed by Dr. Cheng primary care [facility] for anxiety and bipolar disorder.  She was then decided she was a candidate for therapy and sent here to the [facility] in stable condition.    Patient claims she is doing well at this time she still has a tremor which is high-frequency tremor at this time but denies any other issues at this time.  Her speech is okay memory is okay and no signs of any acute issues.    Past Medical History:  Past Medical History:   Diagnosis Date     Alcohol dependence in remission (H)      Anxiety      Asymptomatic varicose veins      Bipolar disorder, unspecified (H)      CKD (chronic kidney disease) stage 3, GFR 30-59 ml/min (H) 2/18/2014     History of smoking      Hyperparathyroidism (H)      Infection due to 2019 novel coronavirus 3/10/2022     Memory loss      Muscle weakness (generalized) 6/23/2008     Severe depression (H)      Subdural hygroma     chronic.  no surgeries     Tremor of unknown origin            Surgical History:  Past Surgical History:   Procedure Laterality Date     APPENDECTOMY OPEN  1947     CATARACT IOL, RT/LT       COLONOSCOPY WITH CO2 INSUFFLATION  2/29/2012     Procedure:COLONOSCOPY WITH CO2 INSUFFLATION; Surgeon:GAYLE REYNA; Location:[facility] OR     CYSTOCELE REPAIR       CYSTOSCOPY, INSERT STENT URETHRA, COMBINED       CYSTOSCOPY, RETROGRADES, INSERT STENT URETER(S), COMBINED  2012    Procedure:COMBINED CYSTOSCOPY, RETROGRADES, INSERT STENT URETER(S); Surgeon:ANT ESPINOZA; Location:[facility] OR     DECOMPRESSION LUMBAR ONE LEVEL  2013    Procedure: DECOMPRESSION LUMBAR ONE LEVEL;  Posterior Decompression Right Lumbar 5- Sacral 1;  Surgeon: You Zavala MD;  Location: [facility] OR     HC TOOTH EXTRACTION W/FORCEP       HYSTERECTOMY, CATA       IRRIGATION AND DEBRIDEMENT ORAL, COMBINED      For treatment of gingivitis     LAPAROSCOPIC ASSISTED COLECTOMY  2012    Procedure:LAPAROSCOPIC ASSISTED COLECTOMY; Cysto, Bilateral Stent placement (both stents removed at end of case)- Yanet ACOSTA. Laparoscopic Extended Right Venu Colectomy with CO2 Colonoscopy and polyp removal-Judah; Surgeon:GAYLE REYNA; Location:[facility] OR     LIGATN/STRIP LONG OR SHORT SAPHEN       MASTECTOMY PARTIAL WITH SENTINEL NODE Left 2018    Procedure: Left Mastectomy, Left [place] Lymph Node Biopsy;  Surgeon: Nahun Betancourt MD;  Location: [facility] OR     STRIP VEIN BILATERAL       SURGICAL HISTORY OF -       ureter surgery for blockage.       Family History:   Family History   Problem Relation Age of Onset     C.A.D. Mother          at age 47 Heart failure, Rhumatic Fever     Cerebrovascular Disease Father          at age 79     Diabetes Father         type 2     Breast Cancer Sister 84        99 year old sister     Circulatory Maternal Grandmother         vericose veins     C.A.D. Paternal Grandfather      Gastrointestinal Disease Paternal Grandfather         ulcer     Cancer Son          age 51--Melanoma     Cancer Brother          age 50's--Melanoma     Arthritis Sister      Circulatory Sister         vericose veins     Circulatory  Sister         vericose veins     Eye Disorder Sister         Cateracts     Respiratory Sister         asthma     Respiratory Brother         COPD     Breast Cancer Niece 60        daughter of 98 yo sister       Social History:    Social History     Socioeconomic History     Marital status:      Years of education: College-2y   Occupational History     Occupation: [employer]     Employer: RETIRED     Comment: Retired in1992   Tobacco Use     Smoking status: Former Smoker     Packs/day: 1.50     Years: 30.00     Pack years: 45.00     Types: Cigarettes     Quit date: 1993     Years since quittin.8     Smokeless tobacco: Never Used   Substance and Sexual Activity     Alcohol use: No     Drug use: No     Sexual activity: Not Currently   Other Topics Concern     Parent/sibling w/ CABG, MI or angioplasty before 65F 55M? Yes     Comment: mother dies from a heart attack at age 47   Social History Narrative    Dairy/d 0-1 servings/d.     Caffeine 2 servings/d    Exercise 0 x week-walks regularly.    Sunscreen used - Yes    Seatbelts used - Yes    Working smoke/CO detectors in the home - Yes    Guns stored in the home - No    Self Breast Exams - Yes    Self Testicular Exam - No    Eye Exam up to date - Yes     Dental Exam up to date - Yes      Pap Smear up to date - No    Mammogram up to date - No    PSA up to date - No    Dexa Scan up to date - No    Flex Sig / Colonoscopy up to date - No    Immunizations up to date - Yes  2009 Td    Abuse: Current or Past(Physical, Sexual or Emotional)- No    Do you feel safe in your environment - Yes    Updated 3/2010                                   Post Discharge Medication Reconciliation Status: discharge medications reconciled and changed, per note/orders    Current Outpatient Medications   Medication Sig     acetaminophen (TYLENOL) 325 MG tablet Take 3 tablets (975 mg) by mouth every 8 hours as needed for mild pain     alum & mag hydroxide-simethicone  (MAALOX) 200-200-20 MG/5ML SUSP suspension Take 30 mLs by mouth every 4 hours as needed for indigestion     apixaban ANTICOAGULANT (ELIQUIS) 2.5 MG tablet Take 1 tablet (2.5 mg) by mouth 2 times daily     ARIPiprazole (ABILIFY) 2 MG tablet Take 2 mg by mouth daily     bisacodyl (DULCOLAX) 10 MG suppository Place 1 suppository (10 mg) rectally daily as needed for constipation     cephALEXin (KEFLEX) 500 MG capsule Take 500 mg by mouth 3 times daily     divalproex sodium extended-release (DEPAKOTE ER) 500 MG 24 hr tablet Take 500 mg by mouth At Bedtime     famotidine (PEPCID) 20 MG tablet Take 1 tablet (20 mg) by mouth every 48 hours     guaiFENesin (MUCINEX) 600 MG 12 hr tablet Take 2 tablets (1,200 mg) by mouth 2 times daily     ipratropium-albuterol (COMBIVENT RESPIMAT)  MCG/ACT inhaler Inhale 1 puff into the lungs 4 times daily as needed for shortness of breath / dyspnea or wheezing     letrozole (FEMARA) 2.5 MG tablet TAKE 1 TABLET BY MOUTH EVERY DAY     levothyroxine (SYNTHROID/LEVOTHROID) 100 MCG tablet Take 100 mcg by mouth daily     lithium (ESKALITH) 150 MG capsule Take 150 mg by mouth every morning      magnesium hydroxide (MILK OF MAGNESIA) 400 MG/5ML suspension Take 30 mLs by mouth daily as needed for constipation     melatonin 1 MG TABS tablet Take 1 tablet (1 mg) by mouth nightly as needed for sleep     miconazole (MICATIN) 2 % external powder Apply topically 2 times daily (Patient taking differently: Apply topically 2 times daily To affected area(s) of groin)     ondansetron (ZOFRAN-ODT) 4 MG ODT tab Take 1 tablet (4 mg) by mouth every 6 hours as needed for nausea or vomiting     ORDER FOR DME Equipment being ordered: compression stocking knee high 20-30mmhg     QUEtiapine (SEROQUEL) 25 MG tablet Take 0.5 tablets (12.5 mg) by mouth nightly as needed (restlessness) (Patient taking differently: Take 12.5 mg by mouth At Bedtime)     senna-docusate (SENOKOT-S/PERICOLACE) 8.6-50 MG tablet Take 1  tablet by mouth 2 times daily as needed for constipation     senna-docusate (SENOKOT-S/PERICOLACE) 8.6-50 MG tablet Take 2 tablets by mouth 2 times daily as needed for constipation     vitamin D3 (CHOLECALCIFEROL) 10 MCG (400 UNIT) capsule Take 2 capsules (800 Units) by mouth daily     zinc Oxide (DESITIN) 40 % paste Apply topically as needed for dry skin or irritation     No current facility-administered medications for this visit.       REVIEW OF SYSTEM: Patient denies any pain fevers chills nausea vomit diarrhea change in vision hearing taste or smell weakness one-sided of the chest pain or shortness of breath.  The remainder review of systems is negative.      PHYSICAL EXAM: Patient is alert pleasant does not appear to be in acute distress head I did have some areas of ecchymosis from the fall and some slight abrasions and the laceration has been sutured.  Oromucosa is moist nasal discharge.  Heart sounds are regular at this time without any signs of atrial fibrillation lungs are clear to auscultation abdomen was soft nontender.  Extremities show only trace edema bilateral.  Neurologic Nasir is nonfocal with the exception high-frequency tremor which has not changed and affect was pleasant.        LABS: Hospital labs are as follows; serum was 140, potassium 4.8, chloride was 114, CO2 is 22, anion gap was 4, glucose 153, BUN is 26, creatinine 1.14, calcium was 9.6,    White count 6.0, hemoglobin 9.8, please 198,000.    Vital; blood pressure 140/64    Pulse 81    Temperature is 97.7    Respiration 16    O2 sats 95%.      ASSESSMENT:    Encounter Diagnoses   Name Primary?     Fall, subsequent encounter Yes     Heart failure with preserved ejection fraction, NYHA class I (H)      Acute respiratory failure with hypoxia (H)      Bipolar affective disorder, remission status unspecified (H)      Infection due to 2019 novel coronavirus      Atrial fibrillation with RVR (H)         PLAN: Plan at this time we will continue to  monitor above medical problems and no other changes to care plan at this time.  We will continue with physical and occupational therapy at this time with emphasis on balance and risk of falls.    Her bipolar disorder is stable at this time she does not appear manic nor depressed at this time and there is no signs of any acute respiratory failure or infectious process at this time.        Electronically signed by: DEVAUGHN JAMIL DO

## 2022-05-09 NOTE — LETTER
5/9/2022        RE: Lennie Mar  [address] [facility] [address]        M [facility] GERIATRIC SERVICES    Facility:  [facility] ([facility]) [[id number]]  Code Status: DNR/DNI      CHIEF COMPLAINT/REASON FOR VISIT:  Chief Complaint   Patient presents with     Hospital F/U       HPI:   Lennie is a 89 year old female who was basic recovered from COVID-19 infection she was sent to assisted living from here about a week week and 1/2 to 2 weeks ago.  She was anticoagulated and does age have atrial fibrillation.  She was sent to the hospital on 5/3/2022 for a fall at her [facility].  Temperature is 99.7 and head CT cervical spine was negative.  She did have a laceration on her nose sutured.  On telemetry monitoring normal sinus rhythm was noted and no arrhythmia overnight.  She does have paroxysmal atrial fibrillation and she is currently anticoagulated and she also is being followed by Dr. Cheng primary care [facility] for anxiety and bipolar disorder.  She was then decided she was a candidate for therapy and [facility].    Patient claims she is doing well at this time she still has a tremor which is high-frequency tremor at this time but denies any other issues at this time.  Her speech is okay memory is okay and no signs of any acute issues.    Past Medical History:  Past Medical History:   Diagnosis Date     Alcohol dependence in remission (H)      Anxiety      Asymptomatic varicose veins      Bipolar disorder, unspecified (H)      CKD (chronic kidney disease) stage 3, GFR 30-59 ml/min (H) 2/18/2014     History of smoking      Hyperparathyroidism (H)      Infection due to 2019 novel coronavirus 3/10/2022     Memory loss      Muscle weakness (generalized) 6/23/2008     Severe depression (H)      Subdural hygroma     chronic.  no surgeries     Tremor of unknown origin            Surgical History:  Past Surgical History:   Procedure Laterality Date     APPENDECTOMY  OPEN  1947     CATARACT IOL, RT/LT       COLONOSCOPY WITH CO2 INSUFFLATION  2012    Procedure:COLONOSCOPY WITH CO2 INSUFFLATION; Surgeon:GAYLE REYNA; Location:[facility] OR     CYSTOCELE REPAIR       CYSTOSCOPY, INSERT STENT URETHRA, COMBINED       CYSTOSCOPY, RETROGRADES, INSERT STENT URETER(S), COMBINED  2012    Procedure:COMBINED CYSTOSCOPY, RETROGRADES, INSERT STENT URETER(S); Surgeon:ANT ESPINOZA; Location:[facility] OR     DECOMPRESSION LUMBAR ONE LEVEL  2013    Procedure: DECOMPRESSION LUMBAR ONE LEVEL;  Posterior Decompression Right Lumbar 5- Sacral 1;  Surgeon: You Zavala MD;  Location: [facility] OR     HC TOOTH EXTRACTION W/FORCEP       HYSTERECTOMY, CATA       IRRIGATION AND DEBRIDEMENT ORAL, COMBINED      For treatment of gingivitis     LAPAROSCOPIC ASSISTED COLECTOMY  2012    Procedure:LAPAROSCOPIC ASSISTED COLECTOMY; Cysto, Bilateral Stent placement (both stents removed at end of case)- Yanet ACOSTA. Laparoscopic Extended Right Venu Colectomy with CO2 Colonoscopy and polyp removal-Judah; Surgeon:GAYLE REYNA; Location:[facility] OR     LIGATN/STRIP LONG OR SHORT SAPHEN       MASTECTOMY PARTIAL WITH SENTINEL NODE Left 2018    Procedure: Left Mastectomy, Left [place] Lymph Node Biopsy;  Surgeon: Nahun Betancourt MD;  Location: [facility] OR     STRIP VEIN BILATERAL  1964     SURGICAL HISTORY OF -       ureter surgery for blockage.       Family History:   Family History   Problem Relation Age of Onset     C.A.D. Mother          at age 47 Heart failure, Rhumatic Fever     Cerebrovascular Disease Father          at age 79     Diabetes Father         type 2     Breast Cancer Sister 84        99 year old sister     Circulatory Maternal Grandmother         vericose veins     C.A.D. Paternal Grandfather      Gastrointestinal Disease Paternal Grandfather         ulcer     Cancer Son          age 51--Melanoma     Cancer Brother          age  50's--Melanoma     Arthritis Sister      Circulatory Sister         vericose veins     Circulatory Sister         vericose veins     Eye Disorder Sister         Cateracts     Respiratory Sister         asthma     Respiratory Brother         COPD     Breast Cancer Niece 60        daughter of 98 yo sister       Social History:    Social History     Socioeconomic History     Marital status:      Years of education: College-2y   Occupational History     Occupation: [employer]     Employer: RETIRED     Comment: Retired in1992   Tobacco Use     Smoking status: Former Smoker     Packs/day: 1.50     Years: 30.00     Pack years: 45.00     Types: Cigarettes     Quit date: 1993     Years since quittin.8     Smokeless tobacco: Never Used   Substance and Sexual Activity     Alcohol use: No     Drug use: No     Sexual activity: Not Currently   Other Topics Concern     Parent/sibling w/ CABG, MI or angioplasty before 65F 55M? Yes     Comment: mother dies from a heart attack at age 47   Social History Narrative    Dairy/d 0-1 servings/d.     Caffeine 2 servings/d    Exercise 0 x week-walks regularly.    Sunscreen used - Yes    Seatbelts used - Yes    Working smoke/CO detectors in the home - Yes    Guns stored in the home - No    Self Breast Exams - Yes    Self Testicular Exam - No    Eye Exam up to date - Yes     Dental Exam up to date - Yes      Pap Smear up to date - No    Mammogram up to date - No    PSA up to date - No    Dexa Scan up to date - No    Flex Sig / Colonoscopy up to date - No    Immunizations up to date - Yes  2009 Td    Abuse: Current or Past(Physical, Sexual or Emotional)- No    Do you feel safe in your environment - Yes    Updated 3/2010                                   Post Discharge Medication Reconciliation Status: discharge medications reconciled and changed, per note/orders    Current Outpatient Medications   Medication Sig     acetaminophen (TYLENOL) 325 MG tablet Take 3  tablets (975 mg) by mouth every 8 hours as needed for mild pain     alum & mag hydroxide-simethicone (MAALOX) 200-200-20 MG/5ML SUSP suspension Take 30 mLs by mouth every 4 hours as needed for indigestion     apixaban ANTICOAGULANT (ELIQUIS) 2.5 MG tablet Take 1 tablet (2.5 mg) by mouth 2 times daily     ARIPiprazole (ABILIFY) 2 MG tablet Take 2 mg by mouth daily     bisacodyl (DULCOLAX) 10 MG suppository Place 1 suppository (10 mg) rectally daily as needed for constipation     cephALEXin (KEFLEX) 500 MG capsule Take 500 mg by mouth 3 times daily     divalproex sodium extended-release (DEPAKOTE ER) 500 MG 24 hr tablet Take 500 mg by mouth At Bedtime     famotidine (PEPCID) 20 MG tablet Take 1 tablet (20 mg) by mouth every 48 hours     guaiFENesin (MUCINEX) 600 MG 12 hr tablet Take 2 tablets (1,200 mg) by mouth 2 times daily     ipratropium-albuterol (COMBIVENT RESPIMAT)  MCG/ACT inhaler Inhale 1 puff into the lungs 4 times daily as needed for shortness of breath / dyspnea or wheezing     letrozole (FEMARA) 2.5 MG tablet TAKE 1 TABLET BY MOUTH EVERY DAY     levothyroxine (SYNTHROID/LEVOTHROID) 100 MCG tablet Take 100 mcg by mouth daily     lithium (ESKALITH) 150 MG capsule Take 150 mg by mouth every morning      magnesium hydroxide (MILK OF MAGNESIA) 400 MG/5ML suspension Take 30 mLs by mouth daily as needed for constipation     melatonin 1 MG TABS tablet Take 1 tablet (1 mg) by mouth nightly as needed for sleep     miconazole (MICATIN) 2 % external powder Apply topically 2 times daily (Patient taking differently: Apply topically 2 times daily To affected area(s) of groin)     ondansetron (ZOFRAN-ODT) 4 MG ODT tab Take 1 tablet (4 mg) by mouth every 6 hours as needed for nausea or vomiting     ORDER FOR DME Equipment being ordered: compression stocking knee high 20-30mmhg     QUEtiapine (SEROQUEL) 25 MG tablet Take 0.5 tablets (12.5 mg) by mouth nightly as needed (restlessness) (Patient taking differently: Take  12.5 mg by mouth At Bedtime)     senna-docusate (SENOKOT-S/PERICOLACE) 8.6-50 MG tablet Take 1 tablet by mouth 2 times daily as needed for constipation     senna-docusate (SENOKOT-S/PERICOLACE) 8.6-50 MG tablet Take 2 tablets by mouth 2 times daily as needed for constipation     vitamin D3 (CHOLECALCIFEROL) 10 MCG (400 UNIT) capsule Take 2 capsules (800 Units) by mouth daily     zinc Oxide (DESITIN) 40 % paste Apply topically as needed for dry skin or irritation     No current facility-administered medications for this visit.       REVIEW OF SYSTEM: Patient denies any pain fevers chills nausea vomit diarrhea change in vision hearing taste or smell weakness one-sided of the chest pain or shortness of breath.  The remainder review of systems is negative.      PHYSICAL EXAM: Patient is alert pleasant does not appear to be in acute distress head I did have some areas of ecchymosis from the fall and some slight abrasions and the laceration has been sutured.  Oromucosa is moist nasal discharge.  Heart sounds are regular at this time without any signs of atrial fibrillation lungs are clear to auscultation abdomen was soft nontender.  Extremities show only trace edema bilateral.  Neurologic Nsair is nonfocal with the exception high-frequency tremor which has not changed and affect was pleasant.        LABS: Hospital labs are as follows; serum was 140, potassium 4.8, chloride was 114, CO2 is 22, anion gap was 4, glucose 153, BUN is 26, creatinine 1.14, calcium was 9.6,    White count 6.0, hemoglobin 9.8, please 198,000.    Vital; blood pressure 140/64    Pulse 81    Temperature is 97.7    Respiration 16    O2 sats 95%.      ASSESSMENT:    Encounter Diagnoses   Name Primary?     Fall, subsequent encounter Yes     Heart failure with preserved ejection fraction, NYHA class I (H)      Acute respiratory failure with hypoxia (H)      Bipolar affective disorder, remission status unspecified (H)      Infection due to 2019 novel  coronavirus      Atrial fibrillation with RVR (H)         PLAN: Plan at this time we will continue to monitor above medical problems and no other changes to care plan at this time.  We will continue with physical and occupational therapy at this time with emphasis on balance and risk of falls.    Her bipolar disorder is stable at this time she does not appear manic nor depressed at this time and there is no signs of any acute respiratory failure or infectious process at this time.        Electronically signed by: DEVAUGHN JAMIL DO          Sincerely,        DEVAUGHN JAMIL DO

## 2022-05-12 ENCOUNTER — TRANSITIONAL CARE UNIT VISIT (OUTPATIENT)
Dept: GERIATRICS | Facility: CLINIC | Age: 87
End: 2022-05-12
Payer: MEDICARE

## 2022-05-12 VITALS
RESPIRATION RATE: 16 BRPM | HEIGHT: 62 IN | OXYGEN SATURATION: 90 % | HEART RATE: 92 BPM | SYSTOLIC BLOOD PRESSURE: 144 MMHG | TEMPERATURE: 97 F | DIASTOLIC BLOOD PRESSURE: 64 MMHG | WEIGHT: 141.3 LBS | BODY MASS INDEX: 26 KG/M2

## 2022-05-12 DIAGNOSIS — F31.9 BIPOLAR AFFECTIVE DISORDER, REMISSION STATUS UNSPECIFIED (H): ICD-10-CM

## 2022-05-12 DIAGNOSIS — I48.91 ATRIAL FIBRILLATION WITH RVR (H): ICD-10-CM

## 2022-05-12 DIAGNOSIS — U07.1 INFECTION DUE TO 2019 NOVEL CORONAVIRUS: ICD-10-CM

## 2022-05-12 DIAGNOSIS — J96.01 ACUTE RESPIRATORY FAILURE WITH HYPOXIA (H): ICD-10-CM
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
## 2022-05-12 DIAGNOSIS — I50.30 HEART FAILURE WITH PRESERVED EJECTION FRACTION, NYHA CLASS I (H): ICD-10-CM

## 2022-05-12 PROCEDURE — 99309 SBSQ NF CARE MODERATE MDM 30: CPT | Performed by: FAMILY MEDICINE

## 2022-05-12 NOTE — LETTER
2022        RE: Lennie Mar  [address] [facility] [address]        M [facility] GERIATRIC SERVICES    Facility:  [facility] ([facility]) [[id number]]  Code Status: DNR/DNI      CHIEF COMPLAINT/REASON FOR VISIT:  Chief Complaint   Patient presents with     RECHECK       HISTORY:      HPI: Lennie is a 89 year old female who resides here at the TCU at [facility].  She had previously been here recovering from COVID-19 and no COVID-19 sequelae has been identified at this time.  She does have atrial fibrillation and she did recently have a fall which prompted a hospitalization on 5/3/2022 from her [facility].  Fall was likely mechanical at this time.  And patient is on apixaban 2.5 mg twice daily.    Patient also has bipolar disorder is on Seroquel at this time which she is going to need as needed and I will continue that.  She is also on lithium at this time and and Depakote.  She is doing well with this at this time and her bipolar disorder is well controlled without any signs of javan or depression.    Patient claims she is doing well at this time and has no new concerns.  There is been no signs of javan or depression at this time.    Past Medical History:   Diagnosis Date     Alcohol dependence in remission (H)      Anxiety      Asymptomatic varicose veins      Bipolar disorder, unspecified (H)      CKD (chronic kidney disease) stage 3, GFR 30-59 ml/min (H) 2014     History of smoking      Hyperparathyroidism (H)      Infection due to 2019 novel coronavirus 3/10/2022     Memory loss      Muscle weakness (generalized) 2008     Severe depression (H)      Subdural hygroma     chronic.  no surgeries     Tremor of unknown origin              Family History   Problem Relation Age of Onset     C.A.D. Mother          at age 47 Heart failure, Rhumatic Fever     Cerebrovascular Disease Father          at age 79     Diabetes Father         type 2     Breast Cancer Sister 84        99  year old sister     Circulatory Maternal Grandmother         vericose veins     C.A.D. Paternal Grandfather      Gastrointestinal Disease Paternal Grandfather         ulcer     Cancer Son          age 51--Melanoma     Cancer Brother          age 50's--Melanoma     Arthritis Sister      Circulatory Sister         vericose veins     Circulatory Sister         vericose veins     Eye Disorder Sister         Cateracts     Respiratory Sister         asthma     Respiratory Brother         COPD     Breast Cancer Niece 60        daughter of 98 yo sister     Social History     Socioeconomic History     Marital status:      Years of education: College-2y   Occupational History     Occupation: [employer]     Employer: RETIRED     Comment: Retired in1992   Tobacco Use     Smoking status: Former Smoker     Packs/day: 1.50     Years: 30.00     Pack years: 45.00     Types: Cigarettes     Quit date: 1993     Years since quittin.8     Smokeless tobacco: Never Used   Substance and Sexual Activity     Alcohol use: No     Drug use: No     Sexual activity: Not Currently   Other Topics Concern     Parent/sibling w/ CABG, MI or angioplasty before 65F 55M? Yes     Comment: mother dies from a heart attack at age 47   Social History Narrative    Dairy/d 0-1 servings/d.     Caffeine 2 servings/d    Exercise 0 x week-walks regularly.    Sunscreen used - Yes    Seatbelts used - Yes    Working smoke/CO detectors in the home - Yes    Guns stored in the home - No    Self Breast Exams - Yes    Self Testicular Exam - No    Eye Exam up to date - Yes     Dental Exam up to date - Yes      Pap Smear up to date - No    Mammogram up to date - No    PSA up to date - No    Dexa Scan up to date - No    Flex Sig / Colonoscopy up to date - No    Immunizations up to date - Yes  2009 Td    Abuse: Current or Past(Physical, Sexual or Emotional)- No    Do you feel safe in your environment - Yes    Updated 3/2010                                      REVIEW OF SYSTEM: Patient denies any pain fevers chills nausea vomit diarrhea change in vision hearing taste or smell weakness one-sided chest pain shortness of breath.  Denies any urgency frequency or burning with urination moving her bowels well and the remainder review of systems is negative.      PHYSICAL EXAM: Patient is alert pleasant does not appear to be in acute distress head was normocephalic and atraumatic with the exception of bridge of her nose lesion is healing well.  Heart sounds were regular at this time lungs are clear to auscultation abdomen soft nontender.  Extremities show trace edema neurologic Nasir was baseline and affect was pleasant.        LABS: Labs reviewed.    Vitals; blood pressure 144/64    Pulse 92    Temperature is 97    Respiration 16    O2 sats 90%.      ASSESSMENT:   Encounter Diagnoses   Name Primary?     Fall, subsequent encounter Yes     Bipolar affective disorder, remission status unspecified (H)      Atrial fibrillation with RVR (H)      Infection due to 2019 novel coronavirus      Acute respiratory failure with hypoxia (H)      Heart failure with preserved ejection fraction, NYHA class I (H)         PLAN: Plan at this time I will resume quetiapine as needed as ordered as written.  Given the fact that we will not change any her of her psychotropic medications that is up to the psychiatrist at this time.    We will continue to monitor above medical problems and no other changes to care plan at this time.  Care plan was reviewed and is appropriate.        Electronically signed by: DEVAUGHN JAMIL DO            Sincerely,        DEVAUGHN JAMIL DO

## 2022-05-12 NOTE — PROGRESS NOTES
[facility] GERIATRIC SERVICES    Facility:  [facility] ([facility]) [[id number]]  Code Status: DNR/DNI      CHIEF COMPLAINT/REASON FOR VISIT:  Chief Complaint   Patient presents with     RECHECK       HISTORY:      HPI: Lennie is a 89 year old female who resides here at the TCU at [facility].  She had previously been here recovering from COVID-19 and no COVID-19 sequelae has been identified at this time.  She does have atrial fibrillation and she did recently have a fall which prompted a hospitalization on 5/3/2022 from her senior living.  Fall was likely mechanical at this time.  And patient is on apixaban 2.5 mg twice daily.    Patient also has bipolar disorder is on Seroquel at this time which she is going to need as needed and I will continue that.  She is also on lithium at this time and and Depakote.  She is doing well with this at this time and her bipolar disorder is well controlled without any signs of javan or depression.    Patient claims she is doing well at this time and has no new concerns.  There is been no signs of javan or depression at this time.    Past Medical History:   Diagnosis Date     Alcohol dependence in remission (H)      Anxiety      Asymptomatic varicose veins      Bipolar disorder, unspecified (H)      CKD (chronic kidney disease) stage 3, GFR 30-59 ml/min (H) 2014     History of smoking      Hyperparathyroidism (H)      Infection due to 2019 novel coronavirus 3/10/2022     Memory loss      Muscle weakness (generalized) 2008     Severe depression (H)      Subdural hygroma     chronic.  no surgeries     Tremor of unknown origin              Family History   Problem Relation Age of Onset     C.A.D. Mother          at age 47 Heart failure, Rhumatic Fever     Cerebrovascular Disease Father          at age 79     Diabetes Father         type 2     Breast Cancer Sister 84        99 year old sister     Circulatory Maternal Grandmother         vericose veins     C.A.D.  Paternal Grandfather      Gastrointestinal Disease Paternal Grandfather         ulcer     Cancer Son          age 51--Melanoma     Cancer Brother          age 50's--Melanoma     Arthritis Sister      Circulatory Sister         vericose veins     Circulatory Sister         vericose veins     Eye Disorder Sister         Cateracts     Respiratory Sister         asthma     Respiratory Brother         COPD     Breast Cancer Niece 60        daughter of 98 yo sister     Social History     Socioeconomic History     Marital status:      Years of education: College-2y   Occupational History     Occupation: [employer]     Employer: RETIRED     Comment: Retired in1992   Tobacco Use     Smoking status: Former Smoker     Packs/day: 1.50     Years: 30.00     Pack years: 45.00     Types: Cigarettes     Quit date: 1993     Years since quittin.8     Smokeless tobacco: Never Used   Substance and Sexual Activity     Alcohol use: No     Drug use: No     Sexual activity: Not Currently   Other Topics Concern     Parent/sibling w/ CABG, MI or angioplasty before 65F 55M? Yes     Comment: mother dies from a heart attack at age 47   Social History Narrative    Dairy/d 0-1 servings/d.     Caffeine 2 servings/d    Exercise 0 x week-walks regularly.    Sunscreen used - Yes    Seatbelts used - Yes    Working smoke/CO detectors in the home - Yes    Guns stored in the home - No    Self Breast Exams - Yes    Self Testicular Exam - No    Eye Exam up to date - Yes     Dental Exam up to date - Yes      Pap Smear up to date - No    Mammogram up to date - No    PSA up to date - No    Dexa Scan up to date - No    Flex Sig / Colonoscopy up to date - No    Immunizations up to date - Yes  2009 Td    Abuse: Current or Past(Physical, Sexual or Emotional)- No    Do you feel safe in your environment - Yes    Updated 3/2010                                     REVIEW OF SYSTEM: Patient denies any pain fevers chills nausea vomit  diarrhea change in vision hearing taste or smell weakness one-sided chest pain shortness of breath.  Denies any urgency frequency or burning with urination moving her bowels well and the remainder review of systems is negative.      PHYSICAL EXAM: Patient is alert pleasant does not appear to be in acute distress head was normocephalic and atraumatic with the exception of bridge of her nose lesion is healing well.  Heart sounds were regular at this time lungs are clear to auscultation abdomen soft nontender.  Extremities show trace edema neurologic Nasir was baseline and affect was pleasant.        LABS: Labs reviewed.    Vitals; blood pressure 144/64    Pulse 92    Temperature is 97    Respiration 16    O2 sats 90%.      ASSESSMENT:   Encounter Diagnoses   Name Primary?     Fall, subsequent encounter Yes     Bipolar affective disorder, remission status unspecified (H)      Atrial fibrillation with RVR (H)      Infection due to 2019 novel coronavirus      Acute respiratory failure with hypoxia (H)      Heart failure with preserved ejection fraction, NYHA class I (H)         PLAN: Plan at this time I will resume quetiapine as needed as ordered as written.  Given the fact that we will not change any her of her psychotropic medications that is up to the psychiatrist at this time.    We will continue to monitor above medical problems and no other changes to care plan at this time.  Care plan was reviewed and is appropriate.        Electronically signed by: DEVAUGHN JAMIL DO

## 2022-05-16 ENCOUNTER — TRANSITIONAL CARE UNIT VISIT (OUTPATIENT)
Dept: GERIATRICS | Facility: CLINIC | Age: 87
End: 2022-05-16
Payer: MEDICARE

## 2022-05-16 VITALS
SYSTOLIC BLOOD PRESSURE: 136 MMHG | OXYGEN SATURATION: 94 % | BODY MASS INDEX: 26.2 KG/M2 | HEIGHT: 62 IN | HEART RATE: 80 BPM | WEIGHT: 142.4 LBS | TEMPERATURE: 97.6 F | DIASTOLIC BLOOD PRESSURE: 69 MMHG | RESPIRATION RATE: 16 BRPM

## 2022-05-16 DIAGNOSIS — J96.01 ACUTE RESPIRATORY FAILURE WITH HYPOXIA (H): ICD-10-CM

## 2022-05-16 DIAGNOSIS — I50.30 HEART FAILURE WITH PRESERVED EJECTION FRACTION, NYHA CLASS I (H): ICD-10-CM

## 2022-05-16 DIAGNOSIS — I48.91 ATRIAL FIBRILLATION WITH RVR (H): ICD-10-CM

## 2022-05-16 DIAGNOSIS — F31.9 BIPOLAR AFFECTIVE DISORDER, REMISSION STATUS UNSPECIFIED (H): ICD-10-CM
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
## 2022-05-16 DIAGNOSIS — M79.661 PAIN OF RIGHT LOWER LEG: ICD-10-CM

## 2022-05-16 PROCEDURE — 99309 SBSQ NF CARE MODERATE MDM 30: CPT | Performed by: FAMILY MEDICINE

## 2022-05-16 NOTE — PROGRESS NOTES
[facility] GERIATRIC SERVICES    Facility:  [facility] ([facility]) [[id number]]  Code Status: DNR/DNI      CHIEF COMPLAINT/REASON FOR VISIT:  Chief Complaint   Patient presents with     RECHECK       HISTORY:      HPI: Lennie is a 89 year old female who was recently hospitalized on 5/3/2022 she does have bipolar disorder medications are managed by an outside psychiatrist.  She is on anticoagulation and she did have a fall.  Was in the assisted living facility and likely mechanical.  She was found on her face with abrasion on the bridge of the nose was hypoxic 83 on room air and in a week before that she had been discharged from the transitional care.  There is no pain from her fall at this time and the bridge of her nose incision is healing.  She was admitted for observation and then sent here to the TCU for further rehab.    She is progressed as anticipated with physical and Occupational Therapy she has no issues at this time.  Therapy did indicate to me that she is progressing very well on our discussions today.    Past Medical History:   Diagnosis Date     Alcohol dependence in remission (H)      Anxiety      Asymptomatic varicose veins      Bipolar disorder, unspecified (H)      CKD (chronic kidney disease) stage 3, GFR 30-59 ml/min (H) 2014     History of smoking      Hyperparathyroidism (H)      Infection due to 2019 novel coronavirus 3/10/2022     Memory loss      Muscle weakness (generalized) 2008     Severe depression (H)      Subdural hygroma     chronic.  no surgeries     Tremor of unknown origin              Family History   Problem Relation Age of Onset     C.A.D. Mother          at age 47 Heart failure, Rhumatic Fever     Cerebrovascular Disease Father          at age 79     Diabetes Father         type 2     Breast Cancer Sister 84        99 year old sister     Circulatory Maternal Grandmother         vericose veins     C.A.D. Paternal Grandfather      Gastrointestinal Disease  Paternal Grandfather         ulcer     Cancer Son          age 51--Melanoma     Cancer Brother          age 50's--Melanoma     Arthritis Sister      Circulatory Sister         vericose veins     Circulatory Sister         vericose veins     Eye Disorder Sister         Cateracts     Respiratory Sister         asthma     Respiratory Brother         COPD     Breast Cancer Niece 60        daughter of 98 yo sister     Social History     Socioeconomic History     Marital status:      Years of education: College-2y   Occupational History     Occupation: [employer]     Employer: RETIRED     Comment: Retired in1992   Tobacco Use     Smoking status: Former Smoker     Packs/day: 1.50     Years: 30.00     Pack years: 45.00     Types: Cigarettes     Quit date: 1993     Years since quittin.8     Smokeless tobacco: Never Used   Substance and Sexual Activity     Alcohol use: No     Drug use: No     Sexual activity: Not Currently   Other Topics Concern     Parent/sibling w/ CABG, MI or angioplasty before 65F 55M? Yes     Comment: mother dies from a heart attack at age 47   Social History Narrative    Dairy/d 0-1 servings/d.     Caffeine 2 servings/d    Exercise 0 x week-walks regularly.    Sunscreen used - Yes    Seatbelts used - Yes    Working smoke/CO detectors in the home - Yes    Guns stored in the home - No    Self Breast Exams - Yes    Self Testicular Exam - No    Eye Exam up to date - Yes     Dental Exam up to date - Yes      Pap Smear up to date - No    Mammogram up to date - No    PSA up to date - No    Dexa Scan up to date - No    Flex Sig / Colonoscopy up to date - No    Immunizations up to date - Yes  2009 Td    Abuse: Current or Past(Physical, Sexual or Emotional)- No    Do you feel safe in your environment - Yes    Updated 3/2010                                     REVIEW OF SYSTEM: Patient denies any pain fevers chills nausea vomit diarrhea change in vision hearing taste or smell  weakness one-sided the chest pain shortness of breath.  She is moving her bowels well and urinating without difficulties no urgency frequency or burning with urination remainder review of systems is negative.      PHYSICAL EXAM: Patient is alert pleasant does not appear to be in acute distress head is normocephalic and atraumatic with the exception of the incision of the bridge of her nose that is healing very well.  Heart sounds are regular at this time but she does have a history of paroxysmal atrial fibrillation lungs clear abdomen soft nontender.  Rest of the general medical exam is unremarkable.        LABS: Reviewed.    Vital; blood pressure 136/69    Pulse is 80    Temperature is 97.6    Respiration 16    O2 sats 94%.      ASSESSMENT:   Encounter Diagnoses   Name Primary?     Fall, subsequent encounter Yes     Atrial fibrillation with RVR (H)      Bipolar affective disorder, remission status unspecified (H)      Heart failure with preserved ejection fraction, NYHA class I (H)      Acute respiratory failure with hypoxia (H)      Pain of right lower leg         PLAN: Plan at this time we will continue to monitor above medical problems and no other changes to care plan at this time.  Care plan was reviewed and is appropriate.    Given the face-to-face encounter today no changes in her as needed medications will be made this will be done by her psychiatrist if needed.    She is not manic nor she depressive at this time and she is well managed on the current pain regime and recommend no new changes.        Electronically signed by: DEVAUGHN JAMIL DO

## 2022-05-18 VITALS
WEIGHT: 142.4 LBS | RESPIRATION RATE: 16 BRPM | OXYGEN SATURATION: 96 % | TEMPERATURE: 97.5 F | SYSTOLIC BLOOD PRESSURE: 136 MMHG | HEIGHT: 62 IN | BODY MASS INDEX: 26.2 KG/M2 | HEART RATE: 82 BPM | DIASTOLIC BLOOD PRESSURE: 65 MMHG

## 2022-05-18 NOTE — PROGRESS NOTES
[facility] GERIATRIC SERVICES    Facility:  [facility] ([facility]) [[id number]]  Code Status: DNR/DNI      CHIEF COMPLAINT/REASON FOR VISIT:  Chief Complaint   Patient presents with     RECHECK       HISTORY:      HPI: Lennie is a 89 year old female [facility] she fell in her assisted living facility mechanical fall had abrasion on the bridge of her nose is healing well.  She was hypoxic at that time and she is pretty much been stable here on her respiratory rate and O2 sats and pulse.  She did have high blood pressures when but most of her blood pressures have been normotensive at this time there is no pain issues at this time and she is progressing as anticipated with physical occupational therapy.    Patient does have bipolar disorder and is appropriately medicated at this time.  She has not been manic nor depressed during this time and she is ambulating okay at this time she still is at risk of falls.        Patient has no new concerns today.    Past Medical History:   Diagnosis Date     Alcohol dependence in remission (H)      Anxiety      Asymptomatic varicose veins      Bipolar disorder, unspecified (H)      CKD (chronic kidney disease) stage 3, GFR 30-59 ml/min (H) 2014     History of smoking      Hyperparathyroidism (H)      Infection due to 2019 novel coronavirus 3/10/2022     Memory loss      Muscle weakness (generalized) 2008     Severe depression (H)      Subdural hygroma     chronic.  no surgeries     Tremor of unknown origin              Family History   Problem Relation Age of Onset     C.A.D. Mother          at age 47 Heart failure, Rhumatic Fever     Cerebrovascular Disease Father          at age 79     Diabetes Father         type 2     Breast Cancer Sister 84        99 year old sister     Circulatory Maternal Grandmother         vericose veins     C.A.D. Paternal Grandfather      Gastrointestinal  Disease Paternal Grandfather         ulcer     Cancer Son          age 51--Melanoma     Cancer Brother          age 50's--Melanoma     Arthritis Sister      Circulatory Sister         vericose veins     Circulatory Sister         vericose veins     Eye Disorder Sister         Cateracts     Respiratory Sister         asthma     Respiratory Brother         COPD     Breast Cancer Niece 60        daughter of 98 yo sister     Social History     Socioeconomic History     Marital status:      Years of education: College-2y   Occupational History     Occupation: [employer]     Employer: RETIRED     Comment: Retired in1992   Tobacco Use     Smoking status: Former Smoker     Packs/day: 1.50     Years: 30.00     Pack years: 45.00     Types: Cigarettes     Quit date: 1993     Years since quittin.8     Smokeless tobacco: Never Used   Substance and Sexual Activity     Alcohol use: No     Drug use: No     Sexual activity: Not Currently   Other Topics Concern     Parent/sibling w/ CABG, MI or angioplasty before 65F 55M? Yes     Comment: mother dies from a heart attack at age 47   Social History Narrative    Dairy/d 0-1 servings/d.     Caffeine 2 servings/d    Exercise 0 x week-walks regularly.    Sunscreen used - Yes    Seatbelts used - Yes    Working smoke/CO detectors in the home - Yes    Guns stored in the home - No    Self Breast Exams - Yes    Self Testicular Exam - No    Eye Exam up to date - Yes     Dental Exam up to date - Yes      Pap Smear up to date - No    Mammogram up to date - No    PSA up to date - No    Dexa Scan up to date - No    Flex Sig / Colonoscopy up to date - No    Immunizations up to date - Yes  2009 Td    Abuse: Current or Past(Physical, Sexual or Emotional)- No    Do you feel safe in your environment - Yes    Updated 3/2010                                     REVIEW OF SYSTEM: Patient denies any pain fevers chills nausea vomit diarrhea change in vision hearing taste or  smell weakness one-sided chest pain shortness of breath.  Denies any current shortness stool polyphasia polydipsia polyuria depression or anxiety and the main review of systems is negative.      PHYSICAL EXAM: Patient is alert pleasant not appear to be in acute distress head is no cephalic and atraumatic with exception of a healing lesion on her bridge of her nose.  It is healed over at this time.  Sclera conjunctive is clear oral mucosa was moist nasal discharge.  Heart sounds are regular lungs are clear abdomen soft nontender.  Extremities not show any cyanosis or edema.  Affect was pleasant.  No signs of any depression or javan.        LABS: Vitals; blood pressure 165/76    Pulse 82    Temperature is 98.2    Respirations 20    O2 sats 96%.    Blood pressures have been running 124/70 up to 165/76 but that is an outlier she seems to be pretty much normotensive most of the time.      ASSESSMENT:   Encounter Diagnoses   Name Primary?     Bipolar affective disorder, remission status unspecified (H) Yes     Heart failure with preserved ejection fraction, NYHA class I (H)      Atrial fibrillation with RVR (H)      Infection due to 2019 novel coronavirus      Acute respiratory failure with hypoxia (H)      Pain of right lower leg         PLAN: Plan at this time we will continue to monitor above medical problems and no other changes to care plan at this time.  Care plan was reviewed and is appropriate.        Electronically signed by: DEVAUGHN JAMIL DO

## 2022-05-19 ENCOUNTER — TRANSITIONAL CARE UNIT VISIT (OUTPATIENT)
Dept: GERIATRICS | Facility: CLINIC | Age: 87
End: 2022-05-19
Payer: MEDICARE

## 2022-05-19 DIAGNOSIS — F31.9 BIPOLAR AFFECTIVE DISORDER, REMISSION STATUS UNSPECIFIED (H): Primary | ICD-10-CM

## 2022-05-19 DIAGNOSIS — U07.1 INFECTION DUE TO 2019 NOVEL CORONAVIRUS: ICD-10-CM

## 2022-05-19 DIAGNOSIS — J96.01 ACUTE RESPIRATORY FAILURE WITH HYPOXIA (H): ICD-10-CM

## 2022-05-19 DIAGNOSIS — I48.91 ATRIAL FIBRILLATION WITH RVR (H): ICD-10-CM

## 2022-05-19 DIAGNOSIS — I50.30 HEART FAILURE WITH PRESERVED EJECTION FRACTION, NYHA CLASS I (H): ICD-10-CM

## 2022-05-19 DIAGNOSIS — M79.661 PAIN OF RIGHT LOWER LEG: ICD-10-CM

## 2022-05-19 PROCEDURE — 99309 SBSQ NF CARE MODERATE MDM 30: CPT | Performed by: FAMILY MEDICINE

## 2022-05-19 NOTE — LETTER
2022        RE: Lennie Mar  [id number] [facility] [address]        [facility] GERIATRIC SERVICES    Facility:  [facility] ([facility]) [[id number]]  Code Status: DNR/DNI      CHIEF COMPLAINT/REASON FOR VISIT:  Chief Complaint   Patient presents with     RECHECK       HISTORY:      HPI: Lennie is a 89 year [facility] she fell in her assisted living facility mechanical fall had abrasion on the bridge of her nose is healing well.  She was hypoxic at that time and she is pretty much been stable here on her respiratory rate and O2 sats and pulse.  She did have high blood pressures when but most of her blood pressures have been normotensive at this time there is no pain issues at this time and she is progressing as anticipated with physical occupational therapy.    Patient does have bipolar disorder and is appropriately medicated at this time.  She has not been manic nor depressed during this time and she is ambulating okay at this time she still is at risk of falls.        Patient has no new concerns today.    Past Medical History:   Diagnosis Date     Alcohol dependence in remission (H)      Anxiety      Asymptomatic varicose veins      Bipolar disorder, unspecified (H)      CKD (chronic kidney disease) stage 3, GFR 30-59 ml/min (H) 2014     History of smoking      Hyperparathyroidism (H)      Infection due to 2019 novel coronavirus 3/10/2022     Memory loss      Muscle weakness (generalized) 2008     Severe depression (H)      Subdural hygroma     chronic.  no surgeries     Tremor of unknown origin              Family History   Problem Relation Age of Onset     C.A.D. Mother          at age 47 Heart failure, Rhumatic Fever     Cerebrovascular Disease Father          at age 79     Diabetes Father         type 2     Breast Cancer Sister 84        99 year old sister     Circulatory Maternal  Grandmother         vericose veins     C.A.D. Paternal Grandfather      Gastrointestinal Disease Paternal Grandfather         ulcer     Cancer Son          age 51--Melanoma     Cancer Brother          age 50's--Melanoma     Arthritis Sister      Circulatory Sister         vericose veins     Circulatory Sister         vericose veins     Eye Disorder Sister         Cateracts     Respiratory Sister         asthma     Respiratory Brother         COPD     Breast Cancer Niece 60        daughter of 98 yo sister     Social History     Socioeconomic History     Marital status:      Years of education: College-2y   Occupational History     Occupation: [employer]     Employer: RETIRED     Comment: Retired in1992   Tobacco Use     Smoking status: Former Smoker     Packs/day: 1.50     Years: 30.00     Pack years: 45.00     Types: Cigarettes     Quit date: 1993     Years since quittin.8     Smokeless tobacco: Never Used   Substance and Sexual Activity     Alcohol use: No     Drug use: No     Sexual activity: Not Currently   Other Topics Concern     Parent/sibling w/ CABG, MI or angioplasty before 65F 55M? Yes     Comment: mother dies from a heart attack at age 47   Social History Narrative    Dairy/d 0-1 servings/d.     Caffeine 2 servings/d    Exercise 0 x week-walks regularly.    Sunscreen used - Yes    Seatbelts used - Yes    Working smoke/CO detectors in the home - Yes    Guns stored in the home - No    Self Breast Exams - Yes    Self Testicular Exam - No    Eye Exam up to date - Yes     Dental Exam up to date - Yes      Pap Smear up to date - No    Mammogram up to date - No    PSA up to date - No    Dexa Scan up to date - No    Flex Sig / Colonoscopy up to date - No    Immunizations up to date - Yes  2009 Td    Abuse: Current or Past(Physical, Sexual or Emotional)- No    Do you feel safe in your environment - Yes    Updated 3/2010                                     REVIEW OF SYSTEM:  Patient denies any pain fevers chills nausea vomit diarrhea change in vision hearing taste or smell weakness one-sided chest pain shortness of breath.  Denies any current shortness stool polyphasia polydipsia polyuria depression or anxiety and the main review of systems is negative.      PHYSICAL EXAM: Patient is alert pleasant not appear to be in acute distress head is no cephalic and atraumatic with exception of a healing lesion on her bridge of her nose.  It is healed over at this time.  Sclera conjunctive is clear oral mucosa was moist nasal discharge.  Heart sounds are regular lungs are clear abdomen soft nontender.  Extremities not show any cyanosis or edema.  Affect was pleasant.  No signs of any depression or javan.        LABS: Vitals; blood pressure 165/76    Pulse 82    Temperature is 98.2    Respirations 20    O2 sats 96%.    Blood pressures have been running 124/70 up to 165/76 but that is an outlier she seems to be pretty much normotensive most of the time.      ASSESSMENT:   Encounter Diagnoses   Name Primary?     Bipolar affective disorder, remission status unspecified (H) Yes     Heart failure with preserved ejection fraction, NYHA class I (H)      Atrial fibrillation with RVR (H)      Infection due to 2019 novel coronavirus      Acute respiratory failure with hypoxia (H)      Pain of right lower leg         PLAN: Plan at this time we will continue to monitor above medical problems and no other changes to care plan at this time.  Care plan was reviewed and is appropriate.        Electronically signed by: DEVAUGHN JAMIL DO          Sincerely,        DEVAUGHN JAMIL DO

## 2022-05-23 ENCOUNTER — DISCHARGE SUMMARY NURSING HOME (OUTPATIENT)
Dept: GERIATRICS | Facility: CLINIC | Age: 87
End: 2022-05-23
Payer: MEDICARE

## 2022-05-23 VITALS
DIASTOLIC BLOOD PRESSURE: 71 MMHG | RESPIRATION RATE: 18 BRPM | TEMPERATURE: 97.6 F | OXYGEN SATURATION: 96 % | BODY MASS INDEX: 26.43 KG/M2 | WEIGHT: 143.6 LBS | HEIGHT: 62 IN | HEART RATE: 81 BPM | SYSTOLIC BLOOD PRESSURE: 139 MMHG

## 2022-05-23 DIAGNOSIS — F06.30 MOOD DISORDER DUE TO A GENERAL MEDICAL CONDITION: Primary | ICD-10-CM

## 2022-05-23 DIAGNOSIS — S09.90XA INJURY OF HEAD, INITIAL ENCOUNTER: ICD-10-CM

## 2022-05-23 DIAGNOSIS — S01.21XA LACERATION OF NOSE, INITIAL ENCOUNTER: ICD-10-CM

## 2022-05-23 PROCEDURE — 99316 NF DSCHRG MGMT 30 MIN+: CPT | Performed by: NURSE PRACTITIONER

## 2022-05-23 NOTE — PROGRESS NOTES
[facility] GERIATRICS DISCHARGE SUMMARY  PATIENT'S NAME: Lennie Mar  YOB: 1927  MEDICAL RECORD NUMBER:  [id number]  Place of Service where encounter took place:  [facility] ([facility]) [[id number]]    PRIMARY CARE PROVIDER AND CLINIC RESPONSIBLE AFTER TRANSFER:   Physician No Ref-Primary, No address on file    Non-FMG Provider     Transferring providers: Simon Benavidez CNP, MD Jossue  Recent Hospitalization/ED:  [facility] Hospital stay 5/3/22 to 5/6/22.  Date of SNF Admission: May 06, 2022  Date of SNF (anticipated) Discharge: May 24, 2022  Discharged to: previous assisted living  Cognitive Scores: n/a  Physical Function: Wheelchair dependent  DME: No DME needed    CODE STATUS/ADVANCE DIRECTIVES DISCUSSION:  No CPR- Do NOT Intubate   ALLERGIES: Cafergot, Ciprofloxacin, Penicillins, Sulfa drugs, Ergot alkaloids, Lamictal [lamotrigine], Naproxen, Naproxen, and Tetracycline    NURSING FACILITY COURSE   Medication Changes/Rationale:     No changes per review of Dr Hahn notes.     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 She was initially hypoxic however that resolved during tcu course. She is 89 years old and family requested that metoprolol and anticoagulation be discontinued In the hospital due to age and high risk of falls. Per hospital notes, risk benefit discussion was had and these were discontinued.   Blood pressures in tcu generally <140/90.   She does have a hx of bipolar dx however mood is stable.      Discharge Medications:    Current Outpatient Medications   Medication Sig Dispense Refill     acetaminophen (TYLENOL) 325 MG tablet Take 3 tablets  (975 mg) by mouth every 8 hours as needed for mild pain 50 tablet 0     alum & mag hydroxide-simethicone (MAALOX) 200-200-20 MG/5ML SUSP suspension Take 30 mLs by mouth every 4 hours as needed for indigestion       apixaban ANTICOAGULANT (ELIQUIS) 2.5 MG tablet Take 1 tablet (2.5 mg) by mouth 2 times daily       ARIPiprazole (ABILIFY) 2 MG tablet Take 2 mg by mouth daily       bisacodyl (DULCOLAX) 10 MG suppository Place 1 suppository (10 mg) rectally daily as needed for constipation       divalproex sodium extended-release (DEPAKOTE ER) 500 MG 24 hr tablet Take 500 mg by mouth At Bedtime       famotidine (PEPCID) 20 MG tablet Take 1 tablet (20 mg) by mouth every 48 hours       guaiFENesin (MUCINEX) 600 MG 12 hr tablet Take 2 tablets (1,200 mg) by mouth 2 times daily       ipratropium-albuterol (COMBIVENT RESPIMAT)  MCG/ACT inhaler Inhale 1 puff into the lungs 4 times daily as needed for shortness of breath / dyspnea or wheezing       letrozole (FEMARA) 2.5 MG tablet TAKE 1 TABLET BY MOUTH EVERY DAY 90 tablet 3     levothyroxine (SYNTHROID/LEVOTHROID) 100 MCG tablet Take 100 mcg by mouth daily       lithium (ESKALITH) 150 MG capsule Take 150 mg by mouth every morning  30 capsule 1     magnesium hydroxide (MILK OF MAGNESIA) 400 MG/5ML suspension Take 30 mLs by mouth daily as needed for constipation       melatonin 1 MG TABS tablet Take 1 tablet (1 mg) by mouth nightly as needed for sleep       miconazole (MICATIN) 2 % external powder Apply topically 2 times daily (Patient taking differently: Apply topically 2 times daily To affected area(s) of groin)       ondansetron (ZOFRAN-ODT) 4 MG ODT tab Take 1 tablet (4 mg) by mouth every 6 hours as needed for nausea or vomiting       ORDER FOR DME Equipment being ordered: compression stocking knee high 20-30mmhg 2 Package 0     QUEtiapine (SEROQUEL) 25 MG tablet Take 0.5 tablets (12.5 mg) by mouth nightly as needed (restlessness) (Patient taking differently: Take 12.5  "mg by mouth At Bedtime)       senna-docusate (SENOKOT-S/PERICOLACE) 8.6-50 MG tablet Take 1 tablet by mouth 2 times daily as needed for constipation       senna-docusate (SENOKOT-S/PERICOLACE) 8.6-50 MG tablet Take 2 tablets by mouth 2 times daily as needed for constipation       vitamin D3 (CHOLECALCIFEROL) 10 MCG (400 UNIT) capsule Take 2 capsules (800 Units) by mouth daily 60 capsule 3     zinc Oxide (DESITIN) 40 % paste Apply topically as needed for dry skin or irritation          Controlled medications:   not applicable/none     Past Medical History:   Past Medical History:   Diagnosis Date     Alcohol dependence in remission (H)      Anxiety      Asymptomatic varicose veins      Bipolar disorder, unspecified (H)      CKD (chronic kidney disease) stage 3, GFR 30-59 ml/min (H) 2/18/2014     History of smoking      Hyperparathyroidism (H)      Infection due to 2019 novel coronavirus 3/10/2022     Memory loss      Muscle weakness (generalized) 6/23/2008     Severe depression (H)      Subdural hygroma     chronic.  no surgeries     Tremor of unknown origin      Physical Exam:   Vitals: /71   Pulse 81   Temp 97.6  F (36.4  C)   Resp 18   Ht 1.575 m (5' 2\")   Wt 65.1 kg (143 lb 9.6 oz)   LMP  (LMP Unknown)   SpO2 96%   BMI 26.26 kg/m    BMI: Body mass index is 26.26 kg/m .  Physical Exam   General appearance: alert, appears stated age and cooperative.   Head: Normocephalic, without obvious abnormality, atraumatic, Eyes: sclera anicteric.  Lungs: respirations without effort, LSCTA; no wheezing or rales.   Cardiovascular: S1, S2. Regular rate and rhythm.   ABDOMEN: Globular and soft, non tender.    Extremities: extremities normal, atraumatic, no cyanosis or edema  Skin: Skin color, texture, turgor normal. No rashes or lesions  Neurologic:oriented. No focal deficits.   Psych: interacts well with caregivers, exhibits logical thought processes and connections, pleasant    SNF labs:   Most Recent 3 " CBC's:Recent Labs   Lab Test 05/04/22  0642 05/03/22  1401 04/14/22  0633 03/19/22  0659   WBC 6.0 10.2 4.0 7.4   HGB 9.8* 11.7  --  13.2   * 107*  --  101*    229  --  198     Most Recent 3 BMP's:Recent Labs   Lab Test 05/03/22  1401 04/04/22  0610 03/29/22  0526    145 145   POTASSIUM 4.8 4.5 4.2   CHLORIDE 114* 113* 115*   CO2 22 24 20*   BUN 26 31* 32*   CR 1.14* 1.07 1.21*   ANIONGAP 4 8 10   CANELO 9.6 9.3 9.6   * 76 88       DISCHARGE PLAN:    Follow up labs: per pcp    Medical Follow Up:      Follow up with primary care provider in 1 weeks    [facility] scheduled appointments: none.    Discharge Services: Home Care:  Occupational Therapy, Physical Therapy, Registered Nurse and Home Health Aide    Discharge Instructions Verbalized to Patient at Discharge:     24-hour supervision is recommended for safety.     TOTAL DISCHARGE TIME:   Greater than 30 minutes  Electronically signed by:  Simon Benavidez CNP     Documentation of Face to Face and Certification for Home Health Services    I certify that patient: Lennie is under my care and that I, or a nurse practitioner or physician's assistant working with me, had a face-to-face encounter that meets the physician face-to-face encounter requirements with this patient on: 5/23/22.    This encounter with the patient was in whole, or in part, for the following medical condition, which is the primary reason for home health care: Weakness, a fib w/ rvr, fall with nasal laceration    I certify that, based on my findings, the following services are medically necessary home health services: Nursing, Occupational Therapy and Physical Therapy.    My clinical findings support the need for the above services because: RN required for medication management, PT and OT required for continued functional improvment in home setting .     Further, I certify that my clinical findings support that this patient is homebound (i.e. absences from home  require considerable and taxing effort and are for medical reasons or Mandaen services or infrequently or of short duration when for other reasons) because: Requires assistance of another person or specialized equipment to access medical services because patient: Range of motion limitations prevents ability to exit home safely. and Requires supervision of another for safe transfer...    Based on the above findings. I certify that this patient is confined to the home and needs intermittent skilled nursing care, physical therapy and/or speech therapy.  The patient is under my care, and I have initiated the establishment of the plan of care.  This patient will be followed by a physician who will periodically review the plan of care.                   0

## 2022-05-23 NOTE — LETTER
5/23/2022        RE: Lennie Mar  [address] [facility] [address]        [facility] GERIATRICS DISCHARGE SUMMARY  PATIENT'S NAME: Lennie Mar  YOB: 1927  MEDICAL RECORD NUMBER:  [id number]  Place of Service where encounter took place:  [facility] (SNF) [[id number]]    PRIMARY CARE PROVIDER AND CLINIC RESPONSIBLE AFTER TRANSFER:   Physician No Ref-Primary, No address on file    Non-FMG Provider     Transferring providers: Simon Benavidez CNP, MD Jossue  Recent Hospitalization/ED:  [facility] Hospital stay 5/3/22 to 5/6/22.  Date of SNF Admission: May 06, 2022  Date of SNF (anticipated) Discharge: May 24, 2022  Discharged to: previous assisted living  Cognitive Scores: n/a  Physical Function: Wheelchair dependent  DME: No DME needed    CODE STATUS/ADVANCE DIRECTIVES DISCUSSION:  No CPR- Do NOT Intubate   ALLERGIES: Cafergot, Ciprofloxacin, Penicillins, Sulfa drugs, Ergot alkaloids, Lamictal [lamotrigine], Naproxen, Naproxen, and Tetracycline    NURSING FACILITY COURSE   Medication Changes/Rationale:     No changes per review of Dr Hahn notes.     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 She was initially hypoxic however that resolved during tcu course. She is 89 years old and family requested that metoprolol and anticoagulation be discontinued In the hospital due to age and high risk of falls. Per hospital notes, risk benefit discussion was had and these were discontinued.   Blood pressures in tcu generally <140/90.   She does have a hx of bipolar dx however mood is stable.      Discharge Medications:    Current Outpatient Medications    Medication Sig Dispense Refill     acetaminophen (TYLENOL) 325 MG tablet Take 3 tablets (975 mg) by mouth every 8 hours as needed for mild pain 50 tablet 0     alum & mag hydroxide-simethicone (MAALOX) 200-200-20 MG/5ML SUSP suspension Take 30 mLs by mouth every 4 hours as needed for indigestion       apixaban ANTICOAGULANT (ELIQUIS) 2.5 MG tablet Take 1 tablet (2.5 mg) by mouth 2 times daily       ARIPiprazole (ABILIFY) 2 MG tablet Take 2 mg by mouth daily       bisacodyl (DULCOLAX) 10 MG suppository Place 1 suppository (10 mg) rectally daily as needed for constipation       divalproex sodium extended-release (DEPAKOTE ER) 500 MG 24 hr tablet Take 500 mg by mouth At Bedtime       famotidine (PEPCID) 20 MG tablet Take 1 tablet (20 mg) by mouth every 48 hours       guaiFENesin (MUCINEX) 600 MG 12 hr tablet Take 2 tablets (1,200 mg) by mouth 2 times daily       ipratropium-albuterol (COMBIVENT RESPIMAT)  MCG/ACT inhaler Inhale 1 puff into the lungs 4 times daily as needed for shortness of breath / dyspnea or wheezing       letrozole (FEMARA) 2.5 MG tablet TAKE 1 TABLET BY MOUTH EVERY DAY 90 tablet 3     levothyroxine (SYNTHROID/LEVOTHROID) 100 MCG tablet Take 100 mcg by mouth daily       lithium (ESKALITH) 150 MG capsule Take 150 mg by mouth every morning  30 capsule 1     magnesium hydroxide (MILK OF MAGNESIA) 400 MG/5ML suspension Take 30 mLs by mouth daily as needed for constipation       melatonin 1 MG TABS tablet Take 1 tablet (1 mg) by mouth nightly as needed for sleep       miconazole (MICATIN) 2 % external powder Apply topically 2 times daily (Patient taking differently: Apply topically 2 times daily To affected area(s) of groin)       ondansetron (ZOFRAN-ODT) 4 MG ODT tab Take 1 tablet (4 mg) by mouth every 6 hours as needed for nausea or vomiting       ORDER FOR DME Equipment being ordered: compression stocking knee high 20-30mmhg 2 Package 0     QUEtiapine (SEROQUEL) 25 MG tablet Take 0.5 tablets  "(12.5 mg) by mouth nightly as needed (restlessness) (Patient taking differently: Take 12.5 mg by mouth At Bedtime)       senna-docusate (SENOKOT-S/PERICOLACE) 8.6-50 MG tablet Take 1 tablet by mouth 2 times daily as needed for constipation       senna-docusate (SENOKOT-S/PERICOLACE) 8.6-50 MG tablet Take 2 tablets by mouth 2 times daily as needed for constipation       vitamin D3 (CHOLECALCIFEROL) 10 MCG (400 UNIT) capsule Take 2 capsules (800 Units) by mouth daily 60 capsule 3     zinc Oxide (DESITIN) 40 % paste Apply topically as needed for dry skin or irritation          Controlled medications:   not applicable/none     Past Medical History:   Past Medical History:   Diagnosis Date     Alcohol dependence in remission (H)      Anxiety      Asymptomatic varicose veins      Bipolar disorder, unspecified (H)      CKD (chronic kidney disease) stage 3, GFR 30-59 ml/min (H) 2/18/2014     History of smoking      Hyperparathyroidism (H)      Infection due to 2019 novel coronavirus 3/10/2022     Memory loss      Muscle weakness (generalized) 6/23/2008     Severe depression (H)      Subdural hygroma     chronic.  no surgeries     Tremor of unknown origin      Physical Exam:   Vitals: /71   Pulse 81   Temp 97.6  F (36.4  C)   Resp 18   Ht 1.575 m (5' 2\")   Wt 65.1 kg (143 lb 9.6 oz)   LMP  (LMP Unknown)   SpO2 96%   BMI 26.26 kg/m    BMI: Body mass index is 26.26 kg/m .  Physical Exam   General appearance: alert, appears stated age and cooperative.   Head: Normocephalic, without obvious abnormality, atraumatic, Eyes: sclera anicteric.  Lungs: respirations without effort, LSCTA; no wheezing or rales.   Cardiovascular: S1, S2. Regular rate and rhythm.   ABDOMEN: Globular and soft, non tender.    Extremities: extremities normal, atraumatic, no cyanosis or edema  Skin: Skin color, texture, turgor normal. No rashes or lesions  Neurologic:oriented. No focal deficits.   Psych: interacts well with caregivers, exhibits " logical thought processes and connections, pleasant    SNF labs:   Most Recent 3 CBC's:Recent Labs   Lab Test 05/04/22  0642 05/03/22  1401 04/14/22  0633 03/19/22  0659   WBC 6.0 10.2 4.0 7.4   HGB 9.8* 11.7  --  13.2   * 107*  --  101*    229  --  198     Most Recent 3 BMP's:Recent Labs   Lab Test 05/03/22  1401 04/04/22  0610 03/29/22  0526    145 145   POTASSIUM 4.8 4.5 4.2   CHLORIDE 114* 113* 115*   CO2 22 24 20*   BUN 26 31* 32*   CR 1.14* 1.07 1.21*   ANIONGAP 4 8 10   CANELO 9.6 9.3 9.6   * 76 88       DISCHARGE PLAN:    Follow up labs: per pcp    Medical Follow Up:      Follow up with primary care provider in 1 weeks    [facility] scheduled appointments: none.    Discharge Services: Home Care:  Occupational Therapy, Physical Therapy, Registered Nurse and Home Health Aide    Discharge Instructions Verbalized to Patient at Discharge:     24-hour supervision is recommended for safety.     TOTAL DISCHARGE TIME:   Greater than 30 minutes  Electronically signed by:  Simon Benavidez CNP     Documentation of Face to Face and Certification for Home Health Services    I certify that patient: Lennie is under my care and that I, or a nurse practitioner or physician's assistant working with me, had a face-to-face encounter that meets the physician face-to-face encounter requirements with this patient on: 5/23/22.    This encounter with the patient was in whole, or in part, for the following medical condition, which is the primary reason for home health care: Weakness, a fib w/ rvr, fall with nasal laceration    I certify that, based on my findings, the following services are medically necessary home health services: Nursing, Occupational Therapy and Physical Therapy.    My clinical findings support the need for the above services because: RN required for medication management, PT and OT required for continued functional improvment in home setting .     Further, I certify that my  clinical findings support that this patient is homebound (i.e. absences from home require considerable and taxing effort and are for medical reasons or Rastafari services or infrequently or of short duration when for other reasons) because: Requires assistance of another person or specialized equipment to access medical services because patient: Range of motion limitations prevents ability to exit home safely. and Requires supervision of another for safe transfer...    Based on the above findings. I certify that this patient is confined to the home and needs intermittent skilled nursing care, physical therapy and/or speech therapy.  The patient is under my care, and I have initiated the establishment of the plan of care.  This patient will be followed by a physician who will periodically review the plan of care.                        Sincerely,        Simon Benavidez, CNP

## 2022-06-04 ENCOUNTER — TELEPHONE (OUTPATIENT)
Dept: FAMILY MEDICINE | Facility: CLINIC | Age: 87
End: 2022-06-04
Payer: MEDICARE

## 2022-06-04 NOTE — TELEPHONE ENCOUNTER
Reason for Call:  Other call back    Detailed comments: FYI patient is declineing skill homecare. Patient stated they do not need.     Phone Number Patient can be reached at: Home number on file [phone] (home)    Best Time: Anytime.    Can we leave a detailed message on this number? Not Applicable    Call taken on 6/4/2022 at 10:24 AM by Franny Charles

## 2022-06-06 NOTE — TELEPHONE ENCOUNTER
"CTC on file with sons Naren Christopher and Navarro.    4/20/21 was last time pt was seen in this clinic and that was with Dr Terry. She has not established new PCP    This RN called pt, \"I don''t know\" She said I can call son Naren    Son  Dr Naren Mar was called    Attempted to reach, unable. Left message  to call back.  Samantha Gold RN        "

## 2022-06-07 ENCOUNTER — LAB REQUISITION (OUTPATIENT)
Dept: LAB | Facility: CLINIC | Age: 87
End: 2022-06-07
Payer: MEDICARE

## 2022-06-07 DIAGNOSIS — D69.6 THROMBOCYTOPENIA, UNSPECIFIED (H): ICD-10-CM

## 2022-06-07 DIAGNOSIS — E03.9 HYPOTHYROIDISM, UNSPECIFIED: ICD-10-CM

## 2022-06-07 DIAGNOSIS — N18.30 CHRONIC KIDNEY DISEASE, STAGE 3 UNSPECIFIED (H): ICD-10-CM

## 2022-06-08 LAB
ALBUMIN SERPL-MCNC: 2.6 G/DL (ref 3.5–5)
ALP SERPL-CCNC: 84 U/L (ref 45–120)
ALT SERPL W P-5'-P-CCNC: 9 U/L (ref 0–45)
ANION GAP SERPL CALCULATED.3IONS-SCNC: 5 MMOL/L (ref 5–18)
AST SERPL W P-5'-P-CCNC: 13 U/L (ref 0–40)
BASOPHILS # BLD AUTO: 0.1 10E3/UL (ref 0–0.2)
BASOPHILS NFR BLD AUTO: 1 %
BILIRUB SERPL-MCNC: 0.4 MG/DL (ref 0–1)
BUN SERPL-MCNC: 26 MG/DL (ref 8–28)
CALCIUM SERPL-MCNC: 9.2 MG/DL (ref 8.5–10.5)
CHLORIDE BLD-SCNC: 112 MMOL/L (ref 98–107)
CO2 SERPL-SCNC: 26 MMOL/L (ref 22–31)
CREAT SERPL-MCNC: 1.2 MG/DL (ref 0.6–1.1)
EOSINOPHIL # BLD AUTO: 0.1 10E3/UL (ref 0–0.7)
EOSINOPHIL NFR BLD AUTO: 3 %
ERYTHROCYTE [DISTWIDTH] IN BLOOD BY AUTOMATED COUNT: 14.4 % (ref 10–15)
GFR SERPL CREATININE-BSD FRML MDRD: 42 ML/MIN/1.73M2
GLUCOSE BLD-MCNC: 77 MG/DL (ref 70–125)
HCT VFR BLD AUTO: 33.5 % (ref 35–47)
HGB BLD-MCNC: 10.4 G/DL (ref 11.7–15.7)
IMM GRANULOCYTES # BLD: 0 10E3/UL
IMM GRANULOCYTES NFR BLD: 0 %
LYMPHOCYTES # BLD AUTO: 2.2 10E3/UL (ref 0.8–5.3)
LYMPHOCYTES NFR BLD AUTO: 44 %
MCH RBC QN AUTO: 31.8 PG (ref 26.5–33)
MCHC RBC AUTO-ENTMCNC: 31 G/DL (ref 31.5–36.5)
MCV RBC AUTO: 102 FL (ref 78–100)
MONOCYTES # BLD AUTO: 0.5 10E3/UL (ref 0–1.3)
MONOCYTES NFR BLD AUTO: 11 %
NEUTROPHILS # BLD AUTO: 2 10E3/UL (ref 1.6–8.3)
NEUTROPHILS NFR BLD AUTO: 41 %
NRBC # BLD AUTO: 0 10E3/UL
NRBC BLD AUTO-RTO: 0 /100
PLATELET # BLD AUTO: 202 10E3/UL (ref 150–450)
POTASSIUM BLD-SCNC: 4.6 MMOL/L (ref 3.5–5)
PROT SERPL-MCNC: 5.7 G/DL (ref 6–8)
RBC # BLD AUTO: 3.27 10E6/UL (ref 3.8–5.2)
SODIUM SERPL-SCNC: 143 MMOL/L (ref 136–145)
T4 FREE SERPL-MCNC: 1.09 NG/DL (ref 0.7–1.8)
TSH SERPL DL<=0.005 MIU/L-ACNC: <0.01 UIU/ML (ref 0.3–5)
WBC # BLD AUTO: 4.8 10E3/UL (ref 4–11)

## 2022-06-08 PROCEDURE — 84443 ASSAY THYROID STIM HORMONE: CPT | Mod: ORL | Performed by: NURSE PRACTITIONER

## 2022-06-08 PROCEDURE — 80053 COMPREHEN METABOLIC PANEL: CPT | Mod: ORL | Performed by: NURSE PRACTITIONER

## 2022-06-08 PROCEDURE — 84439 ASSAY OF FREE THYROXINE: CPT | Mod: ORL | Performed by: NURSE PRACTITIONER

## 2022-06-08 PROCEDURE — P9603 ONE-WAY ALLOW PRORATED MILES: HCPCS | Mod: ORL | Performed by: NURSE PRACTITIONER

## 2022-06-08 PROCEDURE — 85025 COMPLETE CBC W/AUTO DIFF WBC: CPT | Mod: ORL | Performed by: NURSE PRACTITIONER

## 2022-06-08 PROCEDURE — 36415 COLL VENOUS BLD VENIPUNCTURE: CPT | Mod: ORL | Performed by: NURSE PRACTITIONER

## 2022-06-19 ENCOUNTER — LAB REQUISITION (OUTPATIENT)
Dept: LAB | Facility: CLINIC | Age: 87
End: 2022-06-19
Payer: MEDICARE

## 2022-06-19 DIAGNOSIS — D53.9 NUTRITIONAL ANEMIA, UNSPECIFIED: ICD-10-CM

## 2022-06-22 LAB
FERRITIN SERPL-MCNC: 55 NG/ML (ref 10–130)
FOLATE SERPL-MCNC: 9.4 NG/ML
IRON SATN MFR SERPL: 25 % (ref 20–50)
IRON SERPL-MCNC: 76 UG/DL (ref 42–175)
RETICS # AUTO: 0.07 10E6/UL (ref 0.01–0.11)
RETICS/RBC NFR AUTO: 2.1 % (ref 0.8–2.7)
TIBC SERPL-MCNC: 306 UG/DL (ref 313–563)
TRANSFERRIN SERPL-MCNC: 245 MG/DL (ref 212–360)
VIT B12 SERPL-MCNC: 290 PG/ML (ref 213–816)

## 2022-06-22 PROCEDURE — 84466 ASSAY OF TRANSFERRIN: CPT | Mod: ORL | Performed by: NURSE PRACTITIONER

## 2022-06-22 PROCEDURE — 36415 COLL VENOUS BLD VENIPUNCTURE: CPT | Mod: ORL | Performed by: NURSE PRACTITIONER

## 2022-06-22 PROCEDURE — P9604 ONE-WAY ALLOW PRORATED TRIP: HCPCS | Mod: ORL | Performed by: NURSE PRACTITIONER

## 2022-06-22 PROCEDURE — 82607 VITAMIN B-12: CPT | Mod: ORL | Performed by: NURSE PRACTITIONER

## 2022-06-22 PROCEDURE — 85045 AUTOMATED RETICULOCYTE COUNT: CPT | Mod: ORL | Performed by: NURSE PRACTITIONER

## 2022-06-22 PROCEDURE — 82728 ASSAY OF FERRITIN: CPT | Mod: ORL | Performed by: NURSE PRACTITIONER

## 2022-06-22 PROCEDURE — 82746 ASSAY OF FOLIC ACID SERUM: CPT | Mod: ORL | Performed by: NURSE PRACTITIONER

## 2022-08-01 ENCOUNTER — LAB REQUISITION (OUTPATIENT)
Dept: LAB | Facility: CLINIC | Age: 87
End: 2022-08-01
Payer: MEDICARE

## 2022-08-01 DIAGNOSIS — F31.60 BIPOLAR DISORDER, CURRENT EPISODE MIXED, UNSPECIFIED (H): ICD-10-CM

## 2022-08-03 LAB
ANION GAP SERPL CALCULATED.3IONS-SCNC: 11 MMOL/L (ref 7–15)
BUN SERPL-MCNC: 19.6 MG/DL (ref 8–23)
CALCIUM SERPL-MCNC: 9.5 MG/DL (ref 8.2–9.6)
CHLORIDE SERPL-SCNC: 111 MMOL/L (ref 98–107)
CREAT SERPL-MCNC: 1 MG/DL (ref 0.51–0.95)
DEPRECATED HCO3 PLAS-SCNC: 22 MMOL/L (ref 22–29)
GFR SERPL CREATININE-BSD FRML MDRD: 52 ML/MIN/1.73M2
GLUCOSE SERPL-MCNC: 89 MG/DL (ref 70–99)
LITHIUM SERPL-SCNC: 0.5 MMOL/L (ref 0.6–1.2)
POTASSIUM SERPL-SCNC: 4.9 MMOL/L (ref 3.4–5.3)
SODIUM SERPL-SCNC: 144 MMOL/L (ref 136–145)
TSH SERPL DL<=0.005 MIU/L-ACNC: 0.01 UIU/ML (ref 0.3–4.2)

## 2022-08-03 PROCEDURE — 36415 COLL VENOUS BLD VENIPUNCTURE: CPT | Mod: ORL | Performed by: DENTIST

## 2022-08-03 PROCEDURE — 80178 ASSAY OF LITHIUM: CPT | Mod: ORL | Performed by: DENTIST

## 2022-08-03 PROCEDURE — 80048 BASIC METABOLIC PNL TOTAL CA: CPT | Mod: ORL | Performed by: DENTIST

## 2022-08-03 PROCEDURE — 84443 ASSAY THYROID STIM HORMONE: CPT | Mod: ORL | Performed by: DENTIST

## 2022-08-03 PROCEDURE — P9603 ONE-WAY ALLOW PRORATED MILES: HCPCS | Mod: ORL | Performed by: DENTIST

## 2022-10-09 ENCOUNTER — HEALTH MAINTENANCE LETTER (OUTPATIENT)
Age: 87
End: 2022-10-09

## 2022-10-16 NOTE — PROGRESS NOTES
ONCOLOGY FOLLOW UP:  Date on this visit: 10/17/2022    Diagnosis:  1.  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  Stage IIa, T2N0M0, ER/PA positive, HER-2 non-amplified invasive ductal carcinoma of the left breast at 11:00    Primary Physician: Ty Quintanilla     History Of Present Illness:  Ms. Mar is a 89 year old female with two cancers of the left breast. She self palpated a lump and underwent mammogram and ultrasound in 5/2018 that showed a 3.5 cm mass in the superficial lateral left breast, at 3:00, 2 cm from the nipple and a second 2.4 cm mass in the upper outer left breast at 1:00, 7 cm from the nipple.  Biopsy of the 3:00  mass near the nipple was an ER/PA positive, HER2 amplified (HER2/CEN17 ratio of 6.4/2.3) grade 3 invasive carcinoma.  The 1:00 mass was an ER/PA positive, HER2 nonamplified, grade 3 invasive carcinoma.  No lymphadenopathy was seen on ultrasound.    She began treatment with Herceptin and letrozole on 6/1/18.  Left breast mastectomy and SLN biopsy on 11/19/18 showed two residual tumors.  The larger tumor at 3:00 was grade 2 and measured 2.7 cm, ER positive, PA negative, HER2-amplified.  The smaller tumor at 1:00 was grade 3 and measured 2.5 cm, ER/PA positive, HER2 non-amplified.  Overall tumor cellularity was 15%.  2/3 left axillary lymph nodes demonstrated metastases measuring 9 mm and 1.2 mm.  HER-2 FISH of the larger axillary lymph node metastasis was amplified.    She completed radiation to the left chest and regional lymph nodes (left axillary and supraclavicular) on 2/18/19.  Her case was discussed at breast conference and recommendation was to administer adjuvant T-DM1 given HER2 positivity in the lymph node metastasis.  She received 1 year of adjuvant T-DM1 from 1/3/19 - 12/23/19.  She has been on letrozole since 12/2019.    Interval History:  Ms. Mar comes  into clinic for routine breast cancer follow up.  She reports exacerbation of depression in recent months.  She presents with her daughter-in-law, Marta, who states this has been a normal cycling of depression due to bipolar disorder.  Lennie now resides at assisted living.  She eats dinner with others, otherwise has been keeping to herself.  She had a fall in May, fracturing her nose.  She states he blood thinner was stopped at that time.  She denies further falls since.  She denies concerning lumps, pain, swelling, or skin changes of either the right breast or the left chest wall.  She has had no cough, shortness of breath, or chest pain.  She has no current abdominal complaints.  The remainder of a complete 12 point review of systems was reviewed with the patient and was negative with the exception of that mentioned above.    Past Medical/Surgical History:  Past Medical History:   Diagnosis Date     Alcohol dependence in remission (H)      Anxiety      Asymptomatic varicose veins      Bipolar disorder, unspecified (H)      CKD (chronic kidney disease) stage 3, GFR 30-59 ml/min (H) 2/18/2014     History of smoking      Hyperparathyroidism (H)      Infection due to 2019 novel coronavirus 3/10/2022     Memory loss      Muscle weakness (generalized) 6/23/2008     Severe depression (H)      Subdural hygroma     chronic.  no surgeries     Tremor of unknown origin      Past Surgical History:   Procedure Laterality Date     APPENDECTOMY OPEN  1947     CATARACT IOL, RT/LT       COLONOSCOPY WITH CO2 INSUFFLATION  2/29/2012    Procedure:COLONOSCOPY WITH CO2 INSUFFLATION; Surgeon:GAYLE REYNA; [facility]     CYSTOCELE REPAIR  1972     CYSTOSCOPY, INSERT STENT URETHRA, COMBINED  1999     CYSTOSCOPY, RETROGRADES, INSERT STENT URETER(S), COMBINED  2/29/2012    Procedure:COMBINED CYSTOSCOPY, RETROGRADES, INSERT STENT URETER(S); Surgeon:ANT ESPINOZA; [facility]     DECOMPRESSION LUMBAR ONE LEVEL  8/9/2013     Procedure: DECOMPRESSION LUMBAR ONE LEVEL;  Posterior Decompression Right Lumbar 5- Sacral 1;  Surgeon: You Zavala MD;  [facility]     HC TOOTH EXTRACTION W/FORCEP       HYSTERECTOMY, CATA  1963     IRRIGATION AND DEBRIDEMENT ORAL, COMBINED  1982    For treatment of gingivitis     LAPAROSCOPIC ASSISTED COLECTOMY  2/29/2012    Procedure:LAPAROSCOPIC ASSISTED COLECTOMY; Cysto, Bilateral Stent placement (both stents removed at end of case)- Yanet ACOSTA. Laparoscopic Extended Right Venu Colectomy with CO2 Colonoscopy and polyp removal-Judah; Surgeon:GAYLE REYNA; [facility]     LIGATN/STRIP LONG OR SHORT SAPHEN  1955     MASTECTOMY PARTIAL WITH SENTINEL NODE Left 11/19/2018    Procedure: Left Mastectomy, Left [place] Lymph Node Biopsy;  Surgeon: Nahun Betancourt MD;  Location: [facility]     STRIP VEIN BILATERAL  1964     SURGICAL HISTORY OF -   1999    ureter surgery for blockage.     Allergies:  Allergies as of 10/17/2022 - Reviewed 06/01/2022   Allergen Reaction Noted     Cafergot Other (See Comments) and Nausea and Vomiting 01/01/1972     Ciprofloxacin  06/19/2012     Penicillins Rash and Unknown 01/01/1949     Sulfa drugs Rash and Unknown 01/01/1950     Ergot alkaloids Unknown 03/08/2012     Lamictal [lamotrigine] Other (See Comments) 02/15/2014     Naproxen       Naproxen Unknown 03/08/2012     Tetracycline Unknown 03/08/2012     Current Medications:  Current Outpatient Medications   Medication Sig Dispense Refill     acetaminophen (TYLENOL) 325 MG tablet Take 3 tablets (975 mg) by mouth every 8 hours as needed for mild pain 50 tablet 0     alum & mag hydroxide-simethicone (MAALOX) 200-200-20 MG/5ML SUSP suspension Take 30 mLs by mouth every 4 hours as needed for indigestion       apixaban ANTICOAGULANT (ELIQUIS) 2.5 MG tablet Take 1 tablet (2.5 mg) by mouth 2 times daily       ARIPiprazole (ABILIFY) 2 MG tablet Take 2 mg by mouth daily       bisacodyl (DULCOLAX) 10 MG suppository Place  1 suppository (10 mg) rectally daily as needed for constipation       divalproex sodium extended-release (DEPAKOTE ER) 500 MG 24 hr tablet Take 500 mg by mouth At Bedtime       famotidine (PEPCID) 20 MG tablet Take 1 tablet (20 mg) by mouth every 48 hours       guaiFENesin (MUCINEX) 600 MG 12 hr tablet Take 2 tablets (1,200 mg) by mouth 2 times daily       ipratropium-albuterol (COMBIVENT RESPIMAT)  MCG/ACT inhaler Inhale 1 puff into the lungs 4 times daily as needed for shortness of breath / dyspnea or wheezing       letrozole (FEMARA) 2.5 MG tablet TAKE 1 TABLET BY MOUTH EVERY DAY 90 tablet 3     levothyroxine (SYNTHROID/LEVOTHROID) 100 MCG tablet Take 100 mcg by mouth daily       lithium (ESKALITH) 150 MG capsule Take 150 mg by mouth every morning  30 capsule 1     magnesium hydroxide (MILK OF MAGNESIA) 400 MG/5ML suspension Take 30 mLs by mouth daily as needed for constipation       melatonin 1 MG TABS tablet Take 1 tablet (1 mg) by mouth nightly as needed for sleep       miconazole (MICATIN) 2 % external powder Apply topically 2 times daily (Patient taking differently: Apply topically 2 times daily To affected area(s) of groin)       ondansetron (ZOFRAN-ODT) 4 MG ODT tab Take 1 tablet (4 mg) by mouth every 6 hours as needed for nausea or vomiting       ORDER FOR DME Equipment being ordered: compression stocking knee high 20-30mmhg 2 Package 0     QUEtiapine (SEROQUEL) 25 MG tablet Take 0.5 tablets (12.5 mg) by mouth nightly as needed (restlessness) (Patient taking differently: Take 12.5 mg by mouth At Bedtime)       senna-docusate (SENOKOT-S/PERICOLACE) 8.6-50 MG tablet Take 1 tablet by mouth 2 times daily as needed for constipation       senna-docusate (SENOKOT-S/PERICOLACE) 8.6-50 MG tablet Take 2 tablets by mouth 2 times daily as needed for constipation       vitamin D3 (CHOLECALCIFEROL) 10 MCG (400 UNIT) capsule Take 2 capsules (800 Units) by mouth daily 60 capsule 3     zinc Oxide (DESITIN) 40 %  paste Apply topically as needed for dry skin or irritation       Social History:  Patient now resides in assisted living.  She is wheelchair dependent.    Physical Exam:  /73   Pulse 67   Resp 16   Wt 70.3 kg (155 lb)   LMP  (LMP Unknown)   SpO2 97%   BMI 28.35 kg/m    General:  Well appearing, well-nourished adult female in NAD.  Answers questions appropriately  HEENT:  Normocephalic.  Sclera anicteric.  MMM.  No lesions of the oropharynx.  Thyroid:  Very large thyroid goiter, right > left  Lymph:  No palpable cervical, supraclavicular, or axillary LAD.  Chest:  CTA bilaterally.  No wheezes or crackles.  CV:  RRR.  Nl S1 and S2.   Breast:  Right breast is of normal fibroglandular density.  There are no discretely palpable masses of the right breast.  Right nipple is everted and without discharge.  Left chest wall is without palpable mass.  Abd:  Soft/NT/ND.    Ext:  Non pitting edema of the bilateral lower extremities.    Neuro:  Cranial nerves grossly intact.  Psych:  Mood and affect appear normal.  Skin:  Dry and thin.    Laboratory/Imaging Studies:  8/3/2022 Labs:  Chloride is elevated at 111 mmol/L  Creatinine is elevated at 1.00 mg/dL  GFR is low at 52 mL/min    TSH is low at 0.01 uIU/mL    ASSESSMENT/PLAN:  89 year old female with a h/o T2N0M0, ER/CT positive, HER2 non-amplified and T2N1M0, ER/CT positive, HER2 amplified invasive mammary carcinomas of the left breast.  She is s/p treatment with 5 months neoadjuvant herceptin and letrozole, left breast mastectomy, SLN biopsy, radiation, and 1 year Kadcyla.  She continues on letrozole.    1.  Left breast cancers:  Ms. Mar is 3 years, 11 months out from excision of left breast cancers.  She continues on treatment with letrozole. She confirms today that she continues to take it and is tolerating the medication well.    She is asymptomatic of disease recurrence on history taken today.  Clinical exam today is without concerning findings.  Due to age  and medical comorbidities, we are not performing routine screening mammogram of the right breast.  Coming into clinic is difficult for her.  I asked if her breast cancer returned, would she be willing to take additional medication for it.  She stated she would not likely be amenable to surgery, but would be amenable to medication type treatments.  I would therefore like to continue to see her in clinic every 6 months for surveillance visits.      2.  Stage IIIa CKD:  Secondary to chronic lithium and some improvement with altered dosing.  Creatinine on 8/3 was wnl/GFR was 52 mL/min.  Ongoing follow up with Nephrology.    3.  Bipolar disorder/depression:  She follows with Dr. Horn at [facility] in [place].  Medications include Seroquel, Depakote, lithium and Abilify.     4.  Osteopenia:  At risk for bone loss on letrozole.  DEXA in 02/2021 with a lowest T-score of -2.4 c/w osteopenia/borderline osteoporosis.  I recommended repeating a DEXA at the time of her return visit.  We discussed the rationale for the DEXA.  Despite this, she declines to have it done.  We reviewed fall precautions in the setting of near osteoporosis.    5.  Follow Up:  Return to clinic in approximately 6 months for in person visit with me.        30 minutes spent on the date of the encounter doing chart review, review of test results, interpretation of tests, patient visit, documentation and discussion with family.

## 2022-10-17 ENCOUNTER — ONCOLOGY VISIT (OUTPATIENT)
Dept: ONCOLOGY | Facility: CLINIC | Age: 87
End: 2022-10-17
Payer: MEDICARE

## 2022-10-17 VITALS
BODY MASS INDEX: 28.35 KG/M2 | HEART RATE: 67 BPM | RESPIRATION RATE: 16 BRPM | SYSTOLIC BLOOD PRESSURE: 128 MMHG | DIASTOLIC BLOOD PRESSURE: 73 MMHG | OXYGEN SATURATION: 97 % | WEIGHT: 155 LBS

## 2022-10-17 DIAGNOSIS — C50.212 MALIGNANT NEOPLASM OF UPPER-INNER QUADRANT OF LEFT BREAST IN FEMALE, ESTROGEN RECEPTOR POSITIVE (H): Primary | ICD-10-CM

## 2022-10-17 DIAGNOSIS — Z17.0 MALIGNANT NEOPLASM OF UPPER-OUTER QUADRANT OF LEFT BREAST IN FEMALE, ESTROGEN RECEPTOR POSITIVE (H): ICD-10-CM

## 2022-10-17 DIAGNOSIS — Z79.811 LONG TERM (CURRENT) USE OF AROMATASE INHIBITORS: ICD-10-CM

## 2022-10-17 DIAGNOSIS — M89.9 DISORDER OF BONE AND CARTILAGE: ICD-10-CM

## 2022-10-17 DIAGNOSIS — E04.1 THYROID NODULE: ICD-10-CM

## 2022-10-17 DIAGNOSIS — Z17.0 MALIGNANT NEOPLASM OF UPPER-INNER QUADRANT OF LEFT BREAST IN FEMALE, ESTROGEN RECEPTOR POSITIVE (H): Primary | ICD-10-CM

## 2022-10-17 DIAGNOSIS — C50.412 MALIGNANT NEOPLASM OF UPPER-OUTER QUADRANT OF LEFT BREAST IN FEMALE, ESTROGEN RECEPTOR POSITIVE (H): ICD-10-CM

## 2022-10-17 DIAGNOSIS — M94.9 DISORDER OF BONE AND CARTILAGE: ICD-10-CM

## 2022-10-17 PROCEDURE — 99214 OFFICE O/P EST MOD 30 MIN: CPT | Performed by: INTERNAL MEDICINE

## 2022-10-17 PROCEDURE — G0463 HOSPITAL OUTPT CLINIC VISIT: HCPCS

## 2022-10-17 ASSESSMENT — PAIN SCALES - GENERAL: PAINLEVEL: NO PAIN (0)

## 2022-10-17 NOTE — LETTER
10/17/2022         RE: Lennie Mar  [id number] [id number]        Dear Colleague,    Thank you for referring your patient, Lennie Mar, to the [facility]. Please see a copy of my visit note below.    ONCOLOGY FOLLOW UP:  Date on this visit: 10/17/2022    Diagnosis:  1.  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  Stage IIa, T2N0M0, ER/ID positive, HER-2 non-amplified invasive ductal carcinoma of the left breast at 11:00    Primary Physician: Ty Quintanilla     History Of Present Illness:  Ms. Mar is a 89 year old female with two cancers of the left breast. She self palpated a lump and underwent mammogram and ultrasound in 5/2018 that showed a 3.5 cm mass in the superficial lateral left breast, at 3:00, 2 cm from the nipple and a second 2.4 cm mass in the upper outer left breast at 1:00, 7 cm from the nipple.  Biopsy of the 3:00  mass near the nipple was an ER/ID positive, HER2 amplified (HER2/CEN17 ratio of 6.4/2.3) grade 3 invasive carcinoma.  The 1:00 mass was an ER/ID positive, HER2 nonamplified, grade 3 invasive carcinoma.  No lymphadenopathy was seen on ultrasound.    She began treatment with Herceptin and letrozole on 6/1/18.  Left breast mastectomy and SLN biopsy on 11/19/18 showed two residual tumors.  The larger tumor at 3:00 was grade 2 and measured 2.7 cm, ER positive, ID negative, HER2-amplified.  The smaller tumor at 1:00 was grade 3 and measured 2.5 cm, ER/ID positive, HER2 non-amplified.  Overall tumor cellularity was 15%.  2/3 left axillary lymph nodes demonstrated metastases measuring 9 mm and 1.2 mm.  HER-2 FISH of the larger axillary lymph node metastasis was amplified.    She completed radiation to the left chest and regional lymph nodes (left axillary and supraclavicular) on 2/18/19.  Her case was discussed at  breast conference and recommendation was to administer adjuvant T-DM1 given HER2 positivity in the lymph node metastasis.  She received 1 year of adjuvant T-DM1 from 1/3/19 - 12/23/19.  She has been on letrozole since 12/2019.    Interval History:  Ms. Mar comes into clinic for routine breast cancer follow up.  She reports exacerbation of depression in recent months.  She presents with her daughter-in-law, Marta, who states this has been a normal cycling of depression due to bipolar disorder.  Lennie now resides at assisted living.  She eats dinner with others, otherwise has been keeping to herself.  She had a fall in May, fracturing her nose.  She states he blood thinner was stopped at that time.  She denies further falls since.  She denies concerning lumps, pain, swelling, or skin changes of either the right breast or the left chest wall.  She has had no cough, shortness of breath, or chest pain.  She has no current abdominal complaints.  The remainder of a complete 12 point review of systems was reviewed with the patient and was negative with the exception of that mentioned above.    Past Medical/Surgical History:  Past Medical History:   Diagnosis Date     Alcohol dependence in remission (H)      Anxiety      Asymptomatic varicose veins      Bipolar disorder, unspecified (H)      CKD (chronic kidney disease) stage 3, GFR 30-59 ml/min (H) 2/18/2014     History of smoking      Hyperparathyroidism (H)      Infection due to 2019 novel coronavirus 3/10/2022     Memory loss      Muscle weakness (generalized) 6/23/2008     Severe depression (H)      Subdural hygroma     chronic.  no surgeries     Tremor of unknown origin      Past Surgical History:   Procedure Laterality Date     APPENDECTOMY OPEN  1947     CATARACT IOL, RT/LT       COLONOSCOPY WITH CO2 INSUFFLATION  2/29/2012    Procedure:COLONOSCOPY WITH CO2 INSUFFLATION; Surgeon:GAYLE REYNA; [facility]     CYSTOCELE REPAIR  1972     CYSTOSCOPY, INSERT  STENT URETHRA, COMBINED  1999     CYSTOSCOPY, RETROGRADES, INSERT STENT URETER(S), COMBINED  2/29/2012    Procedure:COMBINED CYSTOSCOPY, RETROGRADES, INSERT STENT URETER(S); Surgeon:ANT ESPINOZA; [facility]     DECOMPRESSION LUMBAR ONE LEVEL  8/9/2013    Procedure: DECOMPRESSION LUMBAR ONE LEVEL;  Posterior Decompression Right Lumbar 5- Sacral 1;  Surgeon: You Zavala MD;  [facility]: [facility]     HC TOOTH EXTRACTION W/FORCEP       HYSTERECTOMY, CATA  1963     IRRIGATION AND DEBRIDEMENT ORAL, COMBINED  1982    For treatment of gingivitis     LAPAROSCOPIC ASSISTED COLECTOMY  2/29/2012    Procedure:LAPAROSCOPIC ASSISTED COLECTOMY; Cysto, Bilateral Stent placement (both stents removed at end of case)- Yanet ACOSTA. Laparoscopic Extended Right Venu Colectomy with CO2 Colonoscopy and polyp removal-Judah; Surgeon:GAYLE REYNA; [facility]     LIGATN/STRIP LONG OR SHORT SAPHEN  1955     MASTECTOMY PARTIAL WITH SENTINEL NODE Left 11/19/2018    Procedure: Left Mastectomy, Left [place] Lymph Node Biopsy;  Surgeon: Nahun Betancourt MD;  Location: [facility]     STRIP VEIN BILATERAL  1964     SURGICAL HISTORY OF -   1999    ureter surgery for blockage.     Allergies:  Allergies as of 10/17/2022 - Reviewed 06/01/2022   Allergen Reaction Noted     Cafergot Other (See Comments) and Nausea and Vomiting 01/01/1972     Ciprofloxacin  06/19/2012     Penicillins Rash and Unknown 01/01/1949     Sulfa drugs Rash and Unknown 01/01/1950     Ergot alkaloids Unknown 03/08/2012     Lamictal [lamotrigine] Other (See Comments) 02/15/2014     Naproxen       Naproxen Unknown 03/08/2012     Tetracycline Unknown 03/08/2012     Current Medications:  Current Outpatient Medications   Medication Sig Dispense Refill     acetaminophen (TYLENOL) 325 MG tablet Take 3 tablets (975 mg) by mouth every 8 hours as needed for mild pain 50 tablet 0     alum & mag hydroxide-simethicone (MAALOX) 200-200-20 MG/5ML SUSP suspension Take 30 mLs  by mouth every 4 hours as needed for indigestion       apixaban ANTICOAGULANT (ELIQUIS) 2.5 MG tablet Take 1 tablet (2.5 mg) by mouth 2 times daily       ARIPiprazole (ABILIFY) 2 MG tablet Take 2 mg by mouth daily       bisacodyl (DULCOLAX) 10 MG suppository Place 1 suppository (10 mg) rectally daily as needed for constipation       divalproex sodium extended-release (DEPAKOTE ER) 500 MG 24 hr tablet Take 500 mg by mouth At Bedtime       famotidine (PEPCID) 20 MG tablet Take 1 tablet (20 mg) by mouth every 48 hours       guaiFENesin (MUCINEX) 600 MG 12 hr tablet Take 2 tablets (1,200 mg) by mouth 2 times daily       ipratropium-albuterol (COMBIVENT RESPIMAT)  MCG/ACT inhaler Inhale 1 puff into the lungs 4 times daily as needed for shortness of breath / dyspnea or wheezing       letrozole (FEMARA) 2.5 MG tablet TAKE 1 TABLET BY MOUTH EVERY DAY 90 tablet 3     levothyroxine (SYNTHROID/LEVOTHROID) 100 MCG tablet Take 100 mcg by mouth daily       lithium (ESKALITH) 150 MG capsule Take 150 mg by mouth every morning  30 capsule 1     magnesium hydroxide (MILK OF MAGNESIA) 400 MG/5ML suspension Take 30 mLs by mouth daily as needed for constipation       melatonin 1 MG TABS tablet Take 1 tablet (1 mg) by mouth nightly as needed for sleep       miconazole (MICATIN) 2 % external powder Apply topically 2 times daily (Patient taking differently: Apply topically 2 times daily To affected area(s) of groin)       ondansetron (ZOFRAN-ODT) 4 MG ODT tab Take 1 tablet (4 mg) by mouth every 6 hours as needed for nausea or vomiting       ORDER FOR DME Equipment being ordered: compression stocking knee high 20-30mmhg 2 Package 0     QUEtiapine (SEROQUEL) 25 MG tablet Take 0.5 tablets (12.5 mg) by mouth nightly as needed (restlessness) (Patient taking differently: Take 12.5 mg by mouth At Bedtime)       senna-docusate (SENOKOT-S/PERICOLACE) 8.6-50 MG tablet Take 1 tablet by mouth 2 times daily as needed for constipation        senna-docusate (SENOKOT-S/PERICOLACE) 8.6-50 MG tablet Take 2 tablets by mouth 2 times daily as needed for constipation       vitamin D3 (CHOLECALCIFEROL) 10 MCG (400 UNIT) capsule Take 2 capsules (800 Units) by mouth daily 60 capsule 3     zinc Oxide (DESITIN) 40 % paste Apply topically as needed for dry skin or irritation       Social History:  Patient now resides in assisted living.  She is wheelchair dependent.    Physical Exam:  /73   Pulse 67   Resp 16   Wt 70.3 kg (155 lb)   LMP  (LMP Unknown)   SpO2 97%   BMI 28.35 kg/m    General:  Well appearing, well-nourished adult female in NAD.  Answers questions appropriately  HEENT:  Normocephalic.  Sclera anicteric.  MMM.  No lesions of the oropharynx.  Thyroid:  Very large thyroid goiter, right > left  Lymph:  No palpable cervical, supraclavicular, or axillary LAD.  Chest:  CTA bilaterally.  No wheezes or crackles.  CV:  RRR.  Nl S1 and S2.   Breast:  Right breast is of normal fibroglandular density.  There are no discretely palpable masses of the right breast.  Right nipple is everted and without discharge.  Left chest wall is without palpable mass.  Abd:  Soft/NT/ND.    Ext:  Non pitting edema of the bilateral lower extremities.    Neuro:  Cranial nerves grossly intact.  Psych:  Mood and affect appear normal.  Skin:  Dry and thin.    Laboratory/Imaging Studies:  8/3/2022 Labs:  Chloride is elevated at 111 mmol/L  Creatinine is elevated at 1.00 mg/dL  GFR is low at 52 mL/min    TSH is low at 0.01 uIU/mL    ASSESSMENT/PLAN:  89 year old female with a h/o T2N0M0, ER/OK positive, HER2 non-amplified and T2N1M0, ER/OK positive, HER2 amplified invasive mammary carcinomas of the left breast.  She is s/p treatment with 5 months neoadjuvant herceptin and letrozole, left breast mastectomy, SLN biopsy, radiation, and 1 year Kadcyla.  She continues on letrozole.    1.  Left breast cancers:  Ms. Mar is 3 years, 11 months out from excision of left breast cancers.   She continues on treatment with letrozole. She confirms today that she continues to take it and is tolerating the medication well.    She is asymptomatic of disease recurrence on history taken today.  Clinical exam today is without concerning findings.  Due to age and medical comorbidities, we are not performing routine screening mammogram of the right breast.  Coming into clinic is difficult for her.  I asked if her breast cancer returned, would she be willing to take additional medication for it.  She stated she would not likely be amenable to surgery, but would be amenable to medication type treatments.  I would therefore like to continue to see her in clinic every 6 months for surveillance visits.      2.  Stage IIIa CKD:  Secondary to chronic lithium and some improvement with altered dosing.  Creatinine on 8/3 was wnl/GFR was 52 mL/min.  Ongoing follow up with Nephrology.    3.  Bipolar disorder/depression:  She follows with Dr. Horn at [facility] in [place].  Medications include Seroquel, Depakote, lithium and Abilify.     4.  Osteopenia:  At risk for bone loss on letrozole.  DEXA in 02/2021 with a lowest T-score of -2.4 c/w osteopenia/borderline osteoporosis.  I recommended repeating a DEXA at the time of her return visit.  We discussed the rationale for the DEXA.  Despite this, she declines to have it done.  We reviewed fall precautions in the setting of near osteoporosis.    5.  Follow Up:  Return to clinic in approximately 6 months for in person visit with me.        30 minutes spent on the date of the encounter doing chart review, review of test results, interpretation of tests, patient visit, documentation and discussion with family.         Again, thank you for allowing me to participate in the care of your patient.      Sincerely,    Perlita Malik MD

## 2022-10-17 NOTE — NURSING NOTE
"Oncology Rooming Note    October 17, 2022 12:47 PM   Lennie Mar is a 89 year old female who presents for:    Chief Complaint   Patient presents with     Oncology Clinic Visit     Breast cancer     Initial Vitals: /73   Pulse 67   Resp 16   Wt 70.3 kg (155 lb)   LMP  (LMP Unknown)   SpO2 97%   BMI 28.35 kg/m   Estimated body mass index is 28.35 kg/m  as calculated from the following:    Height as of 5/23/22: 1.575 m (5' 2\").    Weight as of this encounter: 70.3 kg (155 lb). Body surface area is 1.75 meters squared.  No Pain (0) Comment: Data Unavailable   No LMP recorded (lmp unknown). Patient is postmenopausal.  Allergies reviewed: Yes  Medications reviewed: Yes    Medications: Medication refills not needed today.  Pharmacy name entered into ZangZing:    [facility] - [place], [place] - [address] [facility] [place], [place] - [address] [facility] [place] [facility] [place], [place] [address]  [id number] - [place], [place] - [address] [facility] [address] [facility] - [place], [place] - [address]    Clinical concerns: Pharm[facility] [address] [id number] [address]   [id number]      Anna Watt CMA              "

## 2022-10-25 ENCOUNTER — LAB REQUISITION (OUTPATIENT)
Dept: LAB | Facility: CLINIC | Age: 87
End: 2022-10-25
Payer: MEDICARE

## 2022-10-25 DIAGNOSIS — E03.9 HYPOTHYROIDISM, UNSPECIFIED: ICD-10-CM

## 2022-10-26 LAB — TSH SERPL DL<=0.005 MIU/L-ACNC: 0.82 UIU/ML (ref 0.3–4.2)

## 2022-10-26 PROCEDURE — 36415 COLL VENOUS BLD VENIPUNCTURE: CPT | Mod: ORL | Performed by: NURSE PRACTITIONER

## 2022-10-26 PROCEDURE — 84443 ASSAY THYROID STIM HORMONE: CPT | Mod: ORL | Performed by: NURSE PRACTITIONER

## 2022-10-26 PROCEDURE — P9603 ONE-WAY ALLOW PRORATED MILES: HCPCS | Mod: ORL | Performed by: NURSE PRACTITIONER

## 2022-12-26 ENCOUNTER — LAB REQUISITION (OUTPATIENT)
Dept: LAB | Facility: CLINIC | Age: 87
End: 2022-12-26
Payer: MEDICARE

## 2022-12-26 DIAGNOSIS — N18.30 CHRONIC KIDNEY DISEASE, STAGE 3 UNSPECIFIED (H): ICD-10-CM

## 2022-12-26 DIAGNOSIS — E03.9 HYPOTHYROIDISM, UNSPECIFIED: ICD-10-CM

## 2022-12-28 LAB
ANION GAP SERPL CALCULATED.3IONS-SCNC: 11 MMOL/L (ref 7–15)
BUN SERPL-MCNC: 17.9 MG/DL (ref 8–23)
CALCIUM SERPL-MCNC: 9.9 MG/DL (ref 8.2–9.6)
CHLORIDE SERPL-SCNC: 113 MMOL/L (ref 98–107)
CREAT SERPL-MCNC: 0.99 MG/DL (ref 0.51–0.95)
DEPRECATED HCO3 PLAS-SCNC: 21 MMOL/L (ref 22–29)
ERYTHROCYTE [DISTWIDTH] IN BLOOD BY AUTOMATED COUNT: 14.7 % (ref 10–15)
GFR SERPL CREATININE-BSD FRML MDRD: 52 ML/MIN/1.73M2
GLUCOSE SERPL-MCNC: 80 MG/DL (ref 70–99)
HCT VFR BLD AUTO: 38.4 % (ref 35–47)
HGB BLD-MCNC: 12 G/DL (ref 11.7–15.7)
MCH RBC QN AUTO: 31.3 PG (ref 26.5–33)
MCHC RBC AUTO-ENTMCNC: 31.3 G/DL (ref 31.5–36.5)
MCV RBC AUTO: 100 FL (ref 78–100)
PLATELET # BLD AUTO: 234 10E3/UL (ref 150–450)
POTASSIUM SERPL-SCNC: 4.8 MMOL/L (ref 3.4–5.3)
RBC # BLD AUTO: 3.84 10E6/UL (ref 3.8–5.2)
SODIUM SERPL-SCNC: 145 MMOL/L (ref 136–145)
TSH SERPL DL<=0.005 MIU/L-ACNC: 0.75 UIU/ML (ref 0.3–4.2)
WBC # BLD AUTO: 5.2 10E3/UL (ref 4–11)

## 2022-12-28 PROCEDURE — 80048 BASIC METABOLIC PNL TOTAL CA: CPT | Mod: ORL | Performed by: FAMILY MEDICINE

## 2022-12-28 PROCEDURE — P9604 ONE-WAY ALLOW PRORATED TRIP: HCPCS | Mod: ORL | Performed by: FAMILY MEDICINE

## 2022-12-28 PROCEDURE — 84443 ASSAY THYROID STIM HORMONE: CPT | Mod: ORL | Performed by: FAMILY MEDICINE

## 2022-12-28 PROCEDURE — 85027 COMPLETE CBC AUTOMATED: CPT | Mod: ORL | Performed by: FAMILY MEDICINE

## 2022-12-28 PROCEDURE — 36415 COLL VENOUS BLD VENIPUNCTURE: CPT | Mod: ORL | Performed by: FAMILY MEDICINE

## 2023-02-07 ENCOUNTER — LAB REQUISITION (OUTPATIENT)
Dept: LAB | Facility: CLINIC | Age: 88
End: 2023-02-07
Payer: MEDICARE

## 2023-02-07 DIAGNOSIS — Z79.810 LONG TERM (CURRENT) USE OF SELECTIVE ESTROGEN RECEPTOR MODULATORS (SERMS): ICD-10-CM

## 2023-02-08 LAB
ALBUMIN SERPL BCG-MCNC: 3.6 G/DL (ref 3.5–5.2)
ALBUMIN SERPL BCG-MCNC: 3.6 G/DL (ref 3.5–5.2)
ALP SERPL-CCNC: 101 U/L (ref 35–104)
ALP SERPL-CCNC: 101 U/L (ref 35–104)
ALT SERPL W P-5'-P-CCNC: 7 U/L (ref 10–35)
ALT SERPL W P-5'-P-CCNC: 7 U/L (ref 10–35)
ANION GAP SERPL CALCULATED.3IONS-SCNC: 12 MMOL/L (ref 7–15)
AST SERPL W P-5'-P-CCNC: 22 U/L (ref 10–35)
AST SERPL W P-5'-P-CCNC: 22 U/L (ref 10–35)
BILIRUB DIRECT SERPL-MCNC: <0.2 MG/DL (ref 0–0.3)
BILIRUB SERPL-MCNC: 0.3 MG/DL
BILIRUB SERPL-MCNC: 0.3 MG/DL
BUN SERPL-MCNC: 24.7 MG/DL (ref 8–23)
CALCIUM SERPL-MCNC: 9.7 MG/DL (ref 8.2–9.6)
CHLORIDE SERPL-SCNC: 110 MMOL/L (ref 98–107)
CREAT SERPL-MCNC: 1.21 MG/DL (ref 0.51–0.95)
DEPRECATED HCO3 PLAS-SCNC: 22 MMOL/L (ref 22–29)
ERYTHROCYTE [DISTWIDTH] IN BLOOD BY AUTOMATED COUNT: 15.2 % (ref 10–15)
GFR SERPL CREATININE-BSD FRML MDRD: 41 ML/MIN/1.73M2
GLUCOSE SERPL-MCNC: 111 MG/DL (ref 70–99)
HCT VFR BLD AUTO: 39.8 % (ref 35–47)
HGB BLD-MCNC: 12.3 G/DL (ref 11.7–15.7)
LITHIUM SERPL-SCNC: 0.4 MMOL/L (ref 0.6–1.2)
MCH RBC QN AUTO: 31.5 PG (ref 26.5–33)
MCHC RBC AUTO-ENTMCNC: 30.9 G/DL (ref 31.5–36.5)
MCV RBC AUTO: 102 FL (ref 78–100)
PLATELET # BLD AUTO: 271 10E3/UL (ref 150–450)
POTASSIUM SERPL-SCNC: 5.1 MMOL/L (ref 3.4–5.3)
PROT SERPL-MCNC: 6.7 G/DL (ref 6.4–8.3)
PROT SERPL-MCNC: 6.7 G/DL (ref 6.4–8.3)
RBC # BLD AUTO: 3.91 10E6/UL (ref 3.8–5.2)
SODIUM SERPL-SCNC: 144 MMOL/L (ref 136–145)
VALPROATE SERPL-MCNC: 44.1 UG/ML
WBC # BLD AUTO: 5.7 10E3/UL (ref 4–11)

## 2023-02-08 PROCEDURE — P9604 ONE-WAY ALLOW PRORATED TRIP: HCPCS | Mod: ORL | Performed by: FAMILY MEDICINE

## 2023-02-08 PROCEDURE — 80164 ASSAY DIPROPYLACETIC ACD TOT: CPT | Mod: ORL | Performed by: FAMILY MEDICINE

## 2023-02-08 PROCEDURE — 80178 ASSAY OF LITHIUM: CPT | Mod: ORL | Performed by: FAMILY MEDICINE

## 2023-02-08 PROCEDURE — 36415 COLL VENOUS BLD VENIPUNCTURE: CPT | Mod: ORL | Performed by: FAMILY MEDICINE

## 2023-02-08 PROCEDURE — 82248 BILIRUBIN DIRECT: CPT | Mod: ORL | Performed by: FAMILY MEDICINE

## 2023-02-08 PROCEDURE — 85027 COMPLETE CBC AUTOMATED: CPT | Mod: ORL | Performed by: FAMILY MEDICINE

## 2023-02-18 ENCOUNTER — HEALTH MAINTENANCE LETTER (OUTPATIENT)
Age: 88
End: 2023-02-18

## 2023-03-21 NOTE — PROGRESS NOTES
Care Management Discharge Note    Discharge Date: 05/06/2022       Discharge Disposition: [facility]    Discharge Services: None    Discharge DME: None    Discharge Transportation:  [facility] Wheelchair 1300    PAS Confirmation Code:  [id number]    Patient/Family in Agreement with the Plan: yes      Additional Information:  CM following for discontinue to Transitional Care Unit. Patient has been accepted at [facility] today. Orders are in and have been faxed to Transitional Care Unit. Transportation has already been arranged through [facility] wheelchair transportation for 1300 today.    Call placed to [facility] to confirm bed today. They were updated on transport time and orders were faxed. Unit phone number given for nurse to nurse report. PAS has been completed.     No further needs.          Erma Arreola RN BSN CM  Inpatient Care Coordination  [facility]  [phone]                 Topical Ketoconazole Counseling: Patient counseled that this medication may cause skin irritation or allergic reactions.  In the event of skin irritation, the patient was advised to reduce the amount of the drug applied or use it less frequently.   The patient verbalized understanding of the proper use and possible adverse effects of ketoconazole.  All of the patient's questions and concerns were addressed.

## 2023-06-06 NOTE — PROGRESS NOTES
Virtual Visit Details  Type of service:  Telephone Visit   Phone call duration: 16 minutes     ONCOLOGY FOLLOW UP:  Date on this visit: 6/7/2023    Diagnosis:  1.  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  Stage IIa, T2N0M0, ER/GA positive, HER-2 non-amplified invasive ductal carcinoma of the left breast at 11:00    Primary Physician: Ty Quintanilla     History Of Present Illness:  Ms. Mar is a 89 year old female with two cancers of the left breast. She self palpated a lump and underwent mammogram and ultrasound in 5/2018 that showed a 3.5 cm mass in the superficial lateral left breast, at 3:00, 2 cm from the nipple and a second 2.4 cm mass in the upper outer left breast at 1:00, 7 cm from the nipple.  Biopsy of the 3:00  mass near the nipple was an ER/GA positive, HER2 amplified (HER2/CEN17 ratio of 6.4/2.3) grade 3 invasive carcinoma.  The 1:00 mass was an ER/GA positive, HER2 nonamplified, grade 3 invasive carcinoma.  No lymphadenopathy was seen on ultrasound.    She began treatment with Herceptin and letrozole on 6/1/18.  Left breast mastectomy and SLN biopsy on 11/19/18 showed two residual tumors.  The larger tumor at 3:00 was grade 2 and measured 2.7 cm, ER positive, GA negative, HER2-amplified.  The smaller tumor at 1:00 was grade 3 and measured 2.5 cm, ER/GA positive, HER2 non-amplified.  Overall tumor cellularity was 15%.  2/3 left axillary lymph nodes demonstrated metastases measuring 9 mm and 1.2 mm.  HER-2 FISH of the larger axillary lymph node metastasis was amplified.    She completed radiation to the left chest and regional lymph nodes (left axillary and supraclavicular) on 2/18/19.  Her case was discussed at breast conference and recommendation was to administer adjuvant T-DM1 given HER2 positivity in the lymph node metastasis.  She received 1 year of adjuvant T-DM1 from  1/3/18 - 12/23/19.  She has been on letrozole since 12/2019.    Interval History:  Due to transportation and mobility issues, today's visit was conducted as a telephone visit.  Patient's daughter-in-law, Marta, was at her side during the visit.  Lennie reports in the last few months, she has had a couple of falls.  None of which did she injure herself.  She states now if she feels unbalanced, she will just sit down.  She also reports that her depression is in a worse state currently.  She confirms that she continues to take letrozole.  She denies concerning lumps of the right breast or the left chest wall.  She has no new bone or joint aches or pains.  She has no cough, shortness of breath, or chest pain.  She denies abdominal complaints.  She has headaches or focal neurologic complaints.  Neither Lennie nor Marta had any questions for me today.    Past Medical/Surgical History:  Past Medical History:   Diagnosis Date     Alcohol dependence in remission (H)      Anxiety      Asymptomatic varicose veins      Bipolar disorder, unspecified (H)      CKD (chronic kidney disease) stage 3, GFR 30-59 ml/min (H) 2/18/2014     History of smoking      Hyperparathyroidism (H)      Infection due to 2019 novel coronavirus 3/10/2022     Memory loss      Muscle weakness (generalized) 6/23/2008     Severe depression (H)      Subdural hygroma     chronic.  no surgeries     Tremor of unknown origin      Past Surgical History:   Procedure Laterality Date     APPENDECTOMY OPEN  1947     CATARACT IOL, RT/LT       COLONOSCOPY WITH CO2 INSUFFLATION  2/29/2012    Procedure:COLONOSCOPY WITH CO2 INSUFFLATION; Surgeon:GAYLE REYNA; [facility]     CYSTOCELE REPAIR  1972     CYSTOSCOPY, INSERT STENT URETHRA, COMBINED  1999     CYSTOSCOPY, RETROGRADES, INSERT STENT URETER(S), COMBINED  2/29/2012    Procedure:COMBINED CYSTOSCOPY, RETROGRADES, INSERT STENT URETER(S); Surgeon:ANT ESPINOZA; [facility]     DECOMPRESSION LUMBAR ONE  LEVEL  8/9/2013    Procedure: DECOMPRESSION LUMBAR ONE LEVEL;  Posterior Decompression Right Lumbar 5- Sacral 1;  Surgeon: You Zavala MD;  Location: [facility]     HC TOOTH EXTRACTION W/FORCEP       HYSTERECTOMY, CATA  1963     IRRIGATION AND DEBRIDEMENT ORAL, COMBINED  1982    For treatment of gingivitis     LAPAROSCOPIC ASSISTED COLECTOMY  2/29/2012    Procedure:LAPAROSCOPIC ASSISTED COLECTOMY; Cysto, Bilateral Stent placement (both stents removed at end of case)- Yanet ACOSTA. Laparoscopic Extended Right Venu Colectomy with CO2 Colonoscopy and polyp removal-Judah; Surgeon:GAYLE REYNA; [facility]     LIGATN/STRIP LONG OR SHORT SAPHEN  1955     MASTECTOMY PARTIAL WITH SENTINEL NODE Left 11/19/2018    Procedure: Left Mastectomy, Left [place] Lymph Node Biopsy;  Surgeon: Nahun Betancourt MD;  Location: [facility] OR     STRIP VEIN BILATERAL  1964     SURGICAL HISTORY OF -   1999    ureter surgery for blockage.     Allergies:  Allergies as of 06/07/2023 - Reviewed 06/01/2022   Allergen Reaction Noted     Ciprofloxacin  06/19/2012     Ergotamine-caffeine Other (See Comments) and Nausea and Vomiting 01/01/1972     Penicillins Rash and Unknown 01/01/1949     Sulfa antibiotics Rash and Unknown 01/01/1950     Lamictal [lamotrigine] Other (See Comments) 02/15/2014     Naproxen       Naproxen Unknown 03/08/2012     Nicergoline Unknown 03/08/2012     Tetracycline Unknown 03/08/2012     Current Medications:  Current Outpatient Medications   Medication Sig Dispense Refill     acetaminophen (TYLENOL) 325 MG tablet Take 3 tablets (975 mg) by mouth every 8 hours as needed for mild pain 50 tablet 0     alum & mag hydroxide-simethicone (MAALOX) 200-200-20 MG/5ML SUSP suspension Take 30 mLs by mouth every 4 hours as needed for indigestion       apixaban ANTICOAGULANT (ELIQUIS) 2.5 MG tablet Take 1 tablet (2.5 mg) by mouth 2 times daily       ARIPiprazole (ABILIFY) 2 MG tablet Take 2 mg by mouth daily       bisacodyl  (DULCOLAX) 10 MG suppository Place 1 suppository (10 mg) rectally daily as needed for constipation       divalproex sodium extended-release (DEPAKOTE ER) 500 MG 24 hr tablet Take 500 mg by mouth At Bedtime       famotidine (PEPCID) 20 MG tablet Take 1 tablet (20 mg) by mouth every 48 hours       ipratropium-albuterol (COMBIVENT RESPIMAT)  MCG/ACT inhaler Inhale 1 puff into the lungs 4 times daily as needed for shortness of breath / dyspnea or wheezing       letrozole (FEMARA) 2.5 MG tablet TAKE 1 TABLET BY MOUTH EVERY DAY 90 tablet 3     levothyroxine (SYNTHROID/LEVOTHROID) 75 MCG tablet Take 100 mcg by mouth daily       lithium (ESKALITH) 150 MG capsule Take 150 mg by mouth every morning  30 capsule 1     magnesium hydroxide (MILK OF MAGNESIA) 400 MG/5ML suspension Take 30 mLs by mouth daily as needed for constipation       melatonin 1 MG TABS tablet Take 1 tablet (1 mg) by mouth nightly as needed for sleep       ondansetron (ZOFRAN-ODT) 4 MG ODT tab Take 1 tablet (4 mg) by mouth every 6 hours as needed for nausea or vomiting       ORDER FOR DME Equipment being ordered: compression stocking knee high 20-30mmhg 2 Package 0     QUEtiapine (SEROQUEL) 25 MG tablet Take 0.5 tablets (12.5 mg) by mouth nightly as needed (restlessness) (Patient taking differently: Take 12.5 mg by mouth At Bedtime)       senna-docusate (SENOKOT-S/PERICOLACE) 8.6-50 MG tablet Take 1 tablet by mouth 2 times daily as needed for constipation       vitamin D3 (CHOLECALCIFEROL) 10 MCG (400 UNIT) capsule Take 2 capsules (800 Units) by mouth daily 60 capsule 3     zinc Oxide (DESITIN) 40 % paste Apply topically as needed for dry skin or irritation       Social History:  Patient now resides in assisted living.  She is wheelchair dependent.    Physical Exam:  A comprehensive physical examination is deferred due to telephone visit restrictions    Laboratory/Imaging Studies:  No laboratory studies pertinent to today's  visit.    ASSESSMENT/PLAN:  89 year old female with a h/o T2N0M0, ER/DC positive, HER2 non-amplified and T2N1M0, ER/DC positive, HER2 amplified invasive mammary carcinomas of the left breast.  She is s/p treatment with 5 months neoadjuvant herceptin and letrozole, left breast mastectomy, SLN biopsy, radiation, and 1 year Kadcyla.  She continues on letrozole.    1.  Left breast cancers:  Ms. Mar is 4 years, 7 months out from excision of left breast cancers.  She continues on treatment with letrozole.  She is tolerating the medication well.    She is asymptomatic of disease recurrence on history taken today.  Due to age and medical comorbidities, we are not performing routine screening mammogram of the right breast.  We have previously discussed whether she would want to take additional cancer treatments if her breast cancer returned.  She had stated she wouldn't want surgery, but would be amenable to other cancer treatments and therefore we have continued to monitor her with surveillance.    2.  Stage IIIa CKD:  Secondary to chronic lithium and some improvement with altered dosing. Ongoing follow up with Nephrology.    3.  Bipolar disorder/depression:  Worse symptoms since last visit.  She will follow up with Dr. Horn at [facility] in [place].  Medications include Seroquel, Depakote, lithium and Abilify.     4.  Osteopenia:  We discussed today my concern for risk of bone loss on letrozole.  DEXA in 02/2021 with a lowest T-score of -2.4 c/w osteopenia/borderline osteoporosis.  She has declined follow up DEXA.  We discussed my concerns for the risk of fracture on letrozole in the setting of recent falls.  They will pursue fall precautions.    5.  Follow Up:  Return to clinic in approximately 6 months for in person visit with me.

## 2023-06-07 ENCOUNTER — VIRTUAL VISIT (OUTPATIENT)
Dept: ONCOLOGY | Facility: CLINIC | Age: 88
End: 2023-06-07
Attending: INTERNAL MEDICINE
Payer: MEDICARE

## 2023-06-07 DIAGNOSIS — Z17.0 MALIGNANT NEOPLASM OF UPPER-OUTER QUADRANT OF LEFT BREAST IN FEMALE, ESTROGEN RECEPTOR POSITIVE (H): ICD-10-CM

## 2023-06-07 DIAGNOSIS — C50.212 MALIGNANT NEOPLASM OF UPPER-INNER QUADRANT OF LEFT BREAST IN FEMALE, ESTROGEN RECEPTOR POSITIVE (H): Primary | ICD-10-CM

## 2023-06-07 DIAGNOSIS — C50.412 MALIGNANT NEOPLASM OF UPPER-OUTER QUADRANT OF LEFT BREAST IN FEMALE, ESTROGEN RECEPTOR POSITIVE (H): ICD-10-CM

## 2023-06-07 DIAGNOSIS — Z17.0 MALIGNANT NEOPLASM OF UPPER-INNER QUADRANT OF LEFT BREAST IN FEMALE, ESTROGEN RECEPTOR POSITIVE (H): Primary | ICD-10-CM

## 2023-06-07 PROCEDURE — 99442 PR PHYSICIAN TELEPHONE EVALUATION 11-20 MIN: CPT | Performed by: INTERNAL MEDICINE

## 2023-06-07 NOTE — NURSING NOTE
Is the patient currently in the state of [place]? YES    Visit mode:TELEPHONE    If the visit is dropped, the patient can be reconnected by: VIDEO VISIT: Text to cell phone: [phone]    Will anyone else be joining the visit? Yes,daughter in law Marta is there with her.    How would you like to obtain your AVS? MyChart    Are changes needed to the allergy or medication list? NO    Reason for visit: RECHECK (Phone visit )  Ariana Quintanilla

## 2023-06-07 NOTE — LETTER
6/7/2023         RE: Lennie Mar  [id number] [address] [address]        Dear Colleague,    Thank you for referring your patient, Lennie aMr, to the [facility]. Please see a copy of my visit note below.    Virtual Visit Details  Type of service:  Telephone Visit   Phone call duration: 16 minutes     ONCOLOGY FOLLOW UP:  Date on this visit: 6/7/2023    Diagnosis:  1.  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  Stage IIa, T2N0M0, ER/OK positive, HER-2 non-amplified invasive ductal carcinoma of the left breast at 11:00    Primary Physician: Ty Quintanilla     History Of Present Illness:  Ms. Mar is a 89 year old female with two cancers of the left breast. She self palpated a lump and underwent mammogram and ultrasound in 5/2018 that showed a 3.5 cm mass in the superficial lateral left breast, at 3:00, 2 cm from the nipple and a second 2.4 cm mass in the upper outer left breast at 1:00, 7 cm from the nipple.  Biopsy of the 3:00  mass near the nipple was an ER/OK positive, HER2 amplified (HER2/CEN17 ratio of 6.4/2.3) grade 3 invasive carcinoma.  The 1:00 mass was an ER/OK positive, HER2 nonamplified, grade 3 invasive carcinoma.  No lymphadenopathy was seen on ultrasound.    She began treatment with Herceptin and letrozole on 6/1/18.  Left breast mastectomy and SLN biopsy on 11/19/18 showed two residual tumors.  The larger tumor at 3:00 was grade 2 and measured 2.7 cm, ER positive, OK negative, HER2-amplified.  The smaller tumor at 1:00 was grade 3 and measured 2.5 cm, ER/OK positive, HER2 non-amplified.  Overall tumor cellularity was 15%.  2/3 left axillary lymph nodes demonstrated metastases measuring 9 mm and 1.2 mm.  HER-2 FISH of the larger axillary lymph node metastasis was amplified.    She completed radiation to the left chest and regional  lymph nodes (left axillary and supraclavicular) on 2/18/19.  Her case was discussed at breast conference and recommendation was to administer adjuvant T-DM1 given HER2 positivity in the lymph node metastasis.  She received 1 year of adjuvant T-DM1 from 1/3/18 - 12/23/19.  She has been on letrozole since 12/2019.    Interval History:  Due to transportation and mobility issues, today's visit was conducted as a telephone visit.  Patient's daughter-in-law, Marta, was at her side during the visit.  Lennie reports in the last few months, she has had a couple of falls.  None of which did she injure herself.  She states now if she feels unbalanced, she will just sit down.  She also reports that her depression is in a worse state currently.  She confirms that she continues to take letrozole.  She denies concerning lumps of the right breast or the left chest wall.  She has no new bone or joint aches or pains.  She has no cough, shortness of breath, or chest pain.  She denies abdominal complaints.  She has headaches or focal neurologic complaints.  Neither Lennie nor Marta had any questions for me today.    Past Medical/Surgical History:  Past Medical History:   Diagnosis Date    Alcohol dependence in remission (H)     Anxiety     Asymptomatic varicose veins     Bipolar disorder, unspecified (H)     CKD (chronic kidney disease) stage 3, GFR 30-59 ml/min (H) 2/18/2014    History of smoking     Hyperparathyroidism (H)     Infection due to 2019 novel coronavirus 3/10/2022    Memory loss     Muscle weakness (generalized) 6/23/2008    Severe depression (H)     Subdural hygroma     chronic.  no surgeries    Tremor of unknown origin      Past Surgical History:   Procedure Laterality Date    APPENDECTOMY OPEN  1947    CATARACT IOL, RT/LT      COLONOSCOPY WITH CO2 INSUFFLATION  2/29/2012    Procedure:COLONOSCOPY WITH CO2 INSUFFLATION; Surgeon:GAYLE REYNA; Location[facility]    CYSTOCELE REPAIR  1972    CYSTOSCOPY, INSERT STENT  URETHRA, COMBINED  1999    CYSTOSCOPY, RETROGRADES, INSERT STENT URETER(S), COMBINED  2/29/2012    Procedure:COMBINED CYSTOSCOPY, RETROGRADES, INSERT STENT URETER(S); Surgeon:ANT ESPINOZA; [facility]    DECOMPRESSION LUMBAR ONE LEVEL  8/9/2013    Procedure: DECOMPRESSION LUMBAR ONE LEVEL;  Posterior Decompression Right Lumbar 5- Sacral 1;  Surgeon: You Zavala MD;  [facility]    HC TOOTH EXTRACTION W/FORCEP      HYSTERECTOMY, CATA  1963    IRRIGATION AND DEBRIDEMENT ORAL, COMBINED  1982    For treatment of gingivitis    LAPAROSCOPIC ASSISTED COLECTOMY  2/29/2012    Procedure:LAPAROSCOPIC ASSISTED COLECTOMY; Cysto, Bilateral Stent placement (both stents removed at end of case)- Yanet ACOSTA. Laparoscopic Extended Right Venu Colectomy with CO2 Colonoscopy and polyp removal-Judah; Surgeon:GAYLE REYNA; [facility]    LIGATN/STRIP LONG OR SHORT SAPHEN  1955    MASTECTOMY PARTIAL WITH SENTINEL NODE Left 11/19/2018    Procedure: Left Mastectomy, Left [place] Lymph Node Biopsy;  Surgeon: Nahun Betancourt MD;  Location: [facility]    STRIP VEIN BILATERAL  1964    SURGICAL HISTORY OF -   1999    ureter surgery for blockage.     Allergies:  Allergies as of 06/07/2023 - Reviewed 06/01/2022   Allergen Reaction Noted    Ciprofloxacin  06/19/2012    Ergotamine-caffeine Other (See Comments) and Nausea and Vomiting 01/01/1972    Penicillins Rash and Unknown 01/01/1949    Sulfa antibiotics Rash and Unknown 01/01/1950    Lamictal [lamotrigine] Other (See Comments) 02/15/2014    Naproxen      Naproxen Unknown 03/08/2012    Nicergoline Unknown 03/08/2012    Tetracycline Unknown 03/08/2012     Current Medications:  Current Outpatient Medications   Medication Sig Dispense Refill    acetaminophen (TYLENOL) 325 MG tablet Take 3 tablets (975 mg) by mouth every 8 hours as needed for mild pain 50 tablet 0    alum & mag hydroxide-simethicone (MAALOX) 200-200-20 MG/5ML SUSP suspension Take 30 mLs by mouth  every 4 hours as needed for indigestion      apixaban ANTICOAGULANT (ELIQUIS) 2.5 MG tablet Take 1 tablet (2.5 mg) by mouth 2 times daily      ARIPiprazole (ABILIFY) 2 MG tablet Take 2 mg by mouth daily      bisacodyl (DULCOLAX) 10 MG suppository Place 1 suppository (10 mg) rectally daily as needed for constipation      divalproex sodium extended-release (DEPAKOTE ER) 500 MG 24 hr tablet Take 500 mg by mouth At Bedtime      famotidine (PEPCID) 20 MG tablet Take 1 tablet (20 mg) by mouth every 48 hours      ipratropium-albuterol (COMBIVENT RESPIMAT)  MCG/ACT inhaler Inhale 1 puff into the lungs 4 times daily as needed for shortness of breath / dyspnea or wheezing      letrozole (FEMARA) 2.5 MG tablet TAKE 1 TABLET BY MOUTH EVERY DAY 90 tablet 3    levothyroxine (SYNTHROID/LEVOTHROID) 75 MCG tablet Take 100 mcg by mouth daily      lithium (ESKALITH) 150 MG capsule Take 150 mg by mouth every morning  30 capsule 1    magnesium hydroxide (MILK OF MAGNESIA) 400 MG/5ML suspension Take 30 mLs by mouth daily as needed for constipation      melatonin 1 MG TABS tablet Take 1 tablet (1 mg) by mouth nightly as needed for sleep      ondansetron (ZOFRAN-ODT) 4 MG ODT tab Take 1 tablet (4 mg) by mouth every 6 hours as needed for nausea or vomiting      ORDER FOR DME Equipment being ordered: compression stocking knee high 20-30mmhg 2 Package 0    QUEtiapine (SEROQUEL) 25 MG tablet Take 0.5 tablets (12.5 mg) by mouth nightly as needed (restlessness) (Patient taking differently: Take 12.5 mg by mouth At Bedtime)      senna-docusate (SENOKOT-S/PERICOLACE) 8.6-50 MG tablet Take 1 tablet by mouth 2 times daily as needed for constipation      vitamin D3 (CHOLECALCIFEROL) 10 MCG (400 UNIT) capsule Take 2 capsules (800 Units) by mouth daily 60 capsule 3    zinc Oxide (DESITIN) 40 % paste Apply topically as needed for dry skin or irritation       Social History:  Patient now resides in assisted living.  She is wheelchair  dependent.    Physical Exam:  A comprehensive physical examination is deferred due to telephone visit restrictions    Laboratory/Imaging Studies:  No laboratory studies pertinent to today's visit.    ASSESSMENT/PLAN:  89 year old female with a h/o T2N0M0, ER/IN positive, HER2 non-amplified and T2N1M0, ER/IN positive, HER2 amplified invasive mammary carcinomas of the left breast.  She is s/p treatment with 5 months neoadjuvant herceptin and letrozole, left breast mastectomy, SLN biopsy, radiation, and 1 year Kadcyla.  She continues on letrozole.    1.  Left breast cancers:  Ms. Mar is 4 years, 7 months out from excision of left breast cancers.  She continues on treatment with letrozole.  She is tolerating the medication well.    She is asymptomatic of disease recurrence on history taken today.  Due to age and medical comorbidities, we are not performing routine screening mammogram of the right breast.  We have previously discussed whether she would want to take additional cancer treatments if her breast cancer returned.  She had stated she wouldn't want surgery, but would be amenable to other cancer treatments and therefore we have continued to monitor her with surveillance.    2.  Stage IIIa CKD:  Secondary to chronic lithium and some improvement with altered dosing. Ongoing follow up with Nephrology.    3.  Bipolar disorder/depression:  Worse symptoms since last visit.  She will follow up with Dr. Horn at [facility] in [place].  Medications include Seroquel, Depakote, lithium and Abilify.     4.  Osteopenia:  We discussed today my concern for risk of bone loss on letrozole.  DEXA in 02/2021 with a lowest T-score of -2.4 c/w osteopenia/borderline osteoporosis.  She has declined follow up DEXA.  We discussed my concerns for the risk of fracture on letrozole in the setting of recent falls.  They will pursue fall precautions.    5.  Follow Up:  Return to clinic in approximately 6 months for in person visit with  me.            Perlita Malik MD

## 2023-06-13 ENCOUNTER — LAB REQUISITION (OUTPATIENT)
Dept: LAB | Facility: CLINIC | Age: 88
End: 2023-06-13
Payer: MEDICARE

## 2023-06-13 DIAGNOSIS — Z79.899 OTHER LONG TERM (CURRENT) DRUG THERAPY: ICD-10-CM

## 2023-06-14 LAB
ANION GAP SERPL CALCULATED.3IONS-SCNC: 10 MMOL/L (ref 7–15)
BUN SERPL-MCNC: 19.1 MG/DL (ref 8–23)
CALCIUM SERPL-MCNC: 9.3 MG/DL (ref 8.2–9.6)
CHLORIDE SERPL-SCNC: 113 MMOL/L (ref 98–107)
CREAT SERPL-MCNC: 1.31 MG/DL (ref 0.51–0.95)
DEPRECATED HCO3 PLAS-SCNC: 22 MMOL/L (ref 22–29)
GFR SERPL CREATININE-BSD FRML MDRD: 37 ML/MIN/1.73M2
GLUCOSE SERPL-MCNC: 83 MG/DL (ref 70–99)
LITHIUM SERPL-SCNC: 0.3 MMOL/L (ref 0.6–1.2)
POTASSIUM SERPL-SCNC: 5 MMOL/L (ref 3.4–5.3)
SODIUM SERPL-SCNC: 145 MMOL/L (ref 136–145)
TSH SERPL DL<=0.005 MIU/L-ACNC: 0.46 UIU/ML (ref 0.3–4.2)

## 2023-06-14 PROCEDURE — 80048 BASIC METABOLIC PNL TOTAL CA: CPT | Mod: ORL | Performed by: NURSE PRACTITIONER

## 2023-06-14 PROCEDURE — 36415 COLL VENOUS BLD VENIPUNCTURE: CPT | Mod: ORL | Performed by: NURSE PRACTITIONER

## 2023-06-14 PROCEDURE — P9604 ONE-WAY ALLOW PRORATED TRIP: HCPCS | Mod: ORL | Performed by: NURSE PRACTITIONER

## 2023-06-14 PROCEDURE — 80178 ASSAY OF LITHIUM: CPT | Mod: ORL | Performed by: NURSE PRACTITIONER

## 2023-06-14 PROCEDURE — 84443 ASSAY THYROID STIM HORMONE: CPT | Mod: ORL | Performed by: NURSE PRACTITIONER

## 2023-09-12 ENCOUNTER — LAB REQUISITION (OUTPATIENT)
Dept: LAB | Facility: CLINIC | Age: 88
End: 2023-09-12
Payer: MEDICARE

## 2023-09-12 DIAGNOSIS — Z79.899 OTHER LONG TERM (CURRENT) DRUG THERAPY: ICD-10-CM

## 2023-09-13 LAB
ANION GAP SERPL CALCULATED.3IONS-SCNC: 10 MMOL/L (ref 7–15)
BUN SERPL-MCNC: 24.9 MG/DL (ref 8–23)
CALCIUM SERPL-MCNC: 9.6 MG/DL (ref 8.2–9.6)
CHLORIDE SERPL-SCNC: 109 MMOL/L (ref 98–107)
CREAT SERPL-MCNC: 1.38 MG/DL (ref 0.51–0.95)
DEPRECATED HCO3 PLAS-SCNC: 24 MMOL/L (ref 22–29)
EGFRCR SERPLBLD CKD-EPI 2021: 35 ML/MIN/1.73M2
GLUCOSE SERPL-MCNC: 82 MG/DL (ref 70–99)
LITHIUM SERPL-SCNC: 0.47 MMOL/L (ref 0.6–1.2)
POTASSIUM SERPL-SCNC: 4.5 MMOL/L (ref 3.4–5.3)
SODIUM SERPL-SCNC: 143 MMOL/L (ref 136–145)

## 2023-09-13 PROCEDURE — 80048 BASIC METABOLIC PNL TOTAL CA: CPT | Mod: ORL | Performed by: NURSE PRACTITIONER

## 2023-09-13 PROCEDURE — 36415 COLL VENOUS BLD VENIPUNCTURE: CPT | Mod: ORL | Performed by: NURSE PRACTITIONER

## 2023-09-13 PROCEDURE — P9604 ONE-WAY ALLOW PRORATED TRIP: HCPCS | Mod: ORL | Performed by: NURSE PRACTITIONER

## 2023-09-13 PROCEDURE — 80178 ASSAY OF LITHIUM: CPT | Mod: ORL | Performed by: NURSE PRACTITIONER

## 2023-09-26 ENCOUNTER — LAB REQUISITION (OUTPATIENT)
Dept: LAB | Facility: CLINIC | Age: 88
End: 2023-09-26
Payer: MEDICARE

## 2023-09-26 DIAGNOSIS — Z79.899 OTHER LONG TERM (CURRENT) DRUG THERAPY: ICD-10-CM

## 2023-09-27 LAB
ANION GAP SERPL CALCULATED.3IONS-SCNC: 8 MMOL/L (ref 7–15)
BUN SERPL-MCNC: 19.2 MG/DL (ref 8–23)
CALCIUM SERPL-MCNC: 9.8 MG/DL (ref 8.2–9.6)
CHLORIDE SERPL-SCNC: 114 MMOL/L (ref 98–107)
CREAT SERPL-MCNC: 1.65 MG/DL (ref 0.51–0.95)
DEPRECATED HCO3 PLAS-SCNC: 23 MMOL/L (ref 22–29)
EGFRCR SERPLBLD CKD-EPI 2021: 28 ML/MIN/1.73M2
GLUCOSE SERPL-MCNC: 77 MG/DL (ref 70–99)
LITHIUM SERPL-SCNC: 0.5 MMOL/L (ref 0.6–1.2)
POTASSIUM SERPL-SCNC: 4.9 MMOL/L (ref 3.4–5.3)
SODIUM SERPL-SCNC: 145 MMOL/L (ref 135–145)

## 2023-09-27 PROCEDURE — P9603 ONE-WAY ALLOW PRORATED MILES: HCPCS | Mod: ORL | Performed by: NURSE PRACTITIONER

## 2023-09-27 PROCEDURE — 80178 ASSAY OF LITHIUM: CPT | Mod: ORL | Performed by: NURSE PRACTITIONER

## 2023-09-27 PROCEDURE — 36415 COLL VENOUS BLD VENIPUNCTURE: CPT | Mod: ORL | Performed by: NURSE PRACTITIONER

## 2023-09-27 PROCEDURE — 80048 BASIC METABOLIC PNL TOTAL CA: CPT | Mod: ORL | Performed by: NURSE PRACTITIONER

## 2023-10-31 ENCOUNTER — LAB REQUISITION (OUTPATIENT)
Dept: LAB | Facility: CLINIC | Age: 88
End: 2023-10-31
Payer: MEDICARE

## 2023-10-31 DIAGNOSIS — N18.4 CHRONIC KIDNEY DISEASE, STAGE 4 (SEVERE) (H): ICD-10-CM

## 2023-11-01 LAB
ANION GAP SERPL CALCULATED.3IONS-SCNC: 8 MMOL/L (ref 7–15)
BUN SERPL-MCNC: 23 MG/DL (ref 8–23)
CALCIUM SERPL-MCNC: 9.7 MG/DL (ref 8.2–9.6)
CHLORIDE SERPL-SCNC: 113 MMOL/L (ref 98–107)
CREAT SERPL-MCNC: 1.16 MG/DL (ref 0.51–0.95)
DEPRECATED HCO3 PLAS-SCNC: 24 MMOL/L (ref 22–29)
EGFRCR SERPLBLD CKD-EPI 2021: 43 ML/MIN/1.73M2
GLUCOSE SERPL-MCNC: 71 MG/DL (ref 70–99)
POTASSIUM SERPL-SCNC: 5.3 MMOL/L (ref 3.4–5.3)
SODIUM SERPL-SCNC: 145 MMOL/L (ref 135–145)

## 2023-11-01 PROCEDURE — 36415 COLL VENOUS BLD VENIPUNCTURE: CPT | Mod: ORL | Performed by: NURSE PRACTITIONER

## 2023-11-01 PROCEDURE — 80048 BASIC METABOLIC PNL TOTAL CA: CPT | Mod: ORL | Performed by: NURSE PRACTITIONER

## 2023-11-01 PROCEDURE — P9604 ONE-WAY ALLOW PRORATED TRIP: HCPCS | Mod: ORL | Performed by: NURSE PRACTITIONER

## 2024-01-08 ENCOUNTER — LAB REQUISITION (OUTPATIENT)
Dept: LAB | Facility: CLINIC | Age: 89
End: 2024-01-08
Payer: MEDICARE

## 2024-01-08 DIAGNOSIS — N18.30 CHRONIC KIDNEY DISEASE, STAGE 3 UNSPECIFIED (H): ICD-10-CM

## 2024-01-08 DIAGNOSIS — F31.62 BIPOLAR DISORDER, CURRENT EPISODE MIXED, MODERATE (H): ICD-10-CM

## 2024-01-10 LAB
ANION GAP SERPL CALCULATED.3IONS-SCNC: 10 MMOL/L (ref 7–15)
BUN SERPL-MCNC: 18.8 MG/DL (ref 8–23)
CALCIUM SERPL-MCNC: 10.2 MG/DL (ref 8.2–9.6)
CHLORIDE SERPL-SCNC: 111 MMOL/L (ref 98–107)
CREAT SERPL-MCNC: 1.41 MG/DL (ref 0.51–0.95)
DEPRECATED HCO3 PLAS-SCNC: 23 MMOL/L (ref 22–29)
EGFRCR SERPLBLD CKD-EPI 2021: 34 ML/MIN/1.73M2
ERYTHROCYTE [DISTWIDTH] IN BLOOD BY AUTOMATED COUNT: 15.3 % (ref 10–15)
GLUCOSE SERPL-MCNC: 70 MG/DL (ref 70–99)
HCT VFR BLD AUTO: 39.1 % (ref 35–47)
HGB BLD-MCNC: 12.3 G/DL (ref 11.7–15.7)
MCH RBC QN AUTO: 33.5 PG (ref 26.5–33)
MCHC RBC AUTO-ENTMCNC: 31.5 G/DL (ref 31.5–36.5)
MCV RBC AUTO: 107 FL (ref 78–100)
PLATELET # BLD AUTO: 165 10E3/UL (ref 150–450)
POTASSIUM SERPL-SCNC: 4.6 MMOL/L (ref 3.4–5.3)
RBC # BLD AUTO: 3.67 10E6/UL (ref 3.8–5.2)
SODIUM SERPL-SCNC: 144 MMOL/L (ref 135–145)
VALPROATE SERPL-MCNC: 55.8 UG/ML
VIT D+METAB SERPL-MCNC: 25 NG/ML (ref 20–50)
WBC # BLD AUTO: 4.4 10E3/UL (ref 4–11)

## 2024-01-10 PROCEDURE — 36415 COLL VENOUS BLD VENIPUNCTURE: CPT | Mod: ORL | Performed by: NURSE PRACTITIONER

## 2024-01-10 PROCEDURE — 82306 VITAMIN D 25 HYDROXY: CPT | Mod: ORL | Performed by: NURSE PRACTITIONER

## 2024-01-10 PROCEDURE — 80048 BASIC METABOLIC PNL TOTAL CA: CPT | Mod: ORL | Performed by: NURSE PRACTITIONER

## 2024-01-10 PROCEDURE — 85027 COMPLETE CBC AUTOMATED: CPT | Mod: ORL | Performed by: NURSE PRACTITIONER

## 2024-01-10 PROCEDURE — 80164 ASSAY DIPROPYLACETIC ACD TOT: CPT | Mod: ORL | Performed by: NURSE PRACTITIONER

## 2024-01-10 PROCEDURE — P9604 ONE-WAY ALLOW PRORATED TRIP: HCPCS | Mod: ORL | Performed by: NURSE PRACTITIONER

## 2024-01-10 NOTE — TELEPHONE ENCOUNTER
Writer spoke with Jodi blas [facility] care nurse. Patient will be discharged next Wednesday. HC nurse will check with the billing department to determine how to resolve the episode since Dr. Quintanilla is not available  To fill out forms and the other providers are familiar with this patient.   Appt with Dr. Quintanilla on 1/24 should fulfill the 90 day F2F.   Dr. Quintanilla no longer a provider at this clinic.   We can look to Dr. Quintanilla's notes as to why HC was ordered.   HC nurse should be able to fill out the paperwork as it is the HC nurse who determines which disciplines should be added and if patient is homebound.     Thanks! Cristal Whaley RN     Yes

## 2024-02-13 ENCOUNTER — LAB REQUISITION (OUTPATIENT)
Dept: LAB | Facility: CLINIC | Age: 89
End: 2024-02-13
Payer: MEDICARE

## 2024-02-13 DIAGNOSIS — Z79.899 OTHER LONG TERM (CURRENT) DRUG THERAPY: ICD-10-CM

## 2024-02-14 LAB — LITHIUM SERPL-SCNC: 0.38 MMOL/L (ref 0.6–1.2)

## 2024-02-14 PROCEDURE — 36415 COLL VENOUS BLD VENIPUNCTURE: CPT | Mod: ORL | Performed by: NURSE PRACTITIONER

## 2024-02-14 PROCEDURE — P9604 ONE-WAY ALLOW PRORATED TRIP: HCPCS | Mod: ORL | Performed by: NURSE PRACTITIONER

## 2024-02-14 PROCEDURE — 80178 ASSAY OF LITHIUM: CPT | Mod: ORL | Performed by: NURSE PRACTITIONER

## 2024-03-16 ENCOUNTER — HEALTH MAINTENANCE LETTER (OUTPATIENT)
Age: 89
End: 2024-03-16

## 2024-04-03 NOTE — LETTER
2019       RE: Lennie Mar   [address]  [facility] [address]     Dear Colleague,    Thank you for referring your patient, Lennie Mar, to the RADIATION ONCOLOGY CLINIC. Please see a copy of my visit note below.    [facility] PHYSICIANS  SPECIALIZING IN BREAKTHROUGHS  Radiation Oncology    On Treatment Visit Note      Lennie Mar      Date: 2019   MRN: [id number]   : 1927  Diagnosis: Breast cancer      Reason for Visit:  On Radiation Treatment Visit     Treatment Summary to Date   L breast + SCV Fossa Current Dose: 2120/6040 cGy Fractions:       Chemotherapy  Chemo concurrent with radx?: No    ED Visit/Hosiptal Admission: None    Treatment Breaks: None      Subjective:   Mrs. Mar continue to tolerate radiation therapy well with no complaints.  She has been using moisturizer.  She has no questions.      Nursing ROS:   Nutrition Alteration  Diet Type: Patient's Preference  Nutrition Note: appetite good  Skin  Skin Reaction: 0 - No changes  Skin Note: reviewed skin care        Gastrointestinal  GI Note: WNL     Psychosocial  Pyschosocial Note: sleeping well  Pain Assessment  0-10 Pain Scale: 0    Objective:   /76   Pulse 68   Wt 71.7 kg (158 lb)   BMI 29.87 kg/m     Gen: Appears well, in no acute distress  Skin: Mild diffuse erythema over treatment field    Labs:  CBC RESULTS:   Recent Labs   Lab Test 19  1031   WBC 10.5   RBC 3.96   HGB 11.9   HCT 38.4   MCV 97   MCH 30.1   MCHC 31.0*   RDW 14.0        ELECTROLYTES:  Recent Labs   Lab Test 19  1031      POTASSIUM 4.3   CHLORIDE 110*   CANELO 9.5   CO2 23   BUN 24   CR 1.60*   GLC 97       Assessment:    Tolerating radiation therapy well.  All questions and concerns addressed.    Toxicities:  Dermatitis: Grade 1: Faint erythema or dry desquamation    Plan:   1. Continue current therapy.        The History [employer] chart and setup information reviewed  Ports checked    Medication  Review  Med list reviewed with patient?: Yes    Educational Topic Discussed  Education Instructions: reviewed        Lupe Andrews MD/PhD  [phone] clinic  Pager [phone]    Please do not send letter to referring physician.      Again, thank you for allowing me to participate in the care of your patient.      Sincerely,    Lupe Andrews MD       36.6

## 2024-07-01 ENCOUNTER — LAB REQUISITION (OUTPATIENT)
Dept: LAB | Facility: CLINIC | Age: 89
End: 2024-07-01
Payer: MEDICARE

## 2024-07-01 DIAGNOSIS — E03.9 HYPOTHYROIDISM, UNSPECIFIED: ICD-10-CM

## 2024-07-01 DIAGNOSIS — N18.30 CHRONIC KIDNEY DISEASE, STAGE 3 UNSPECIFIED (H): ICD-10-CM

## 2024-07-03 LAB
ANION GAP SERPL CALCULATED.3IONS-SCNC: 8 MMOL/L (ref 7–15)
BASOPHILS # BLD AUTO: 0.1 10E3/UL (ref 0–0.2)
BASOPHILS NFR BLD AUTO: 2 %
BUN SERPL-MCNC: 24.9 MG/DL (ref 8–23)
CALCIUM SERPL-MCNC: 9.7 MG/DL (ref 8.2–9.6)
CHLORIDE SERPL-SCNC: 111 MMOL/L (ref 98–107)
CREAT SERPL-MCNC: 1.33 MG/DL (ref 0.51–0.95)
DEPRECATED HCO3 PLAS-SCNC: 25 MMOL/L (ref 22–29)
EGFRCR SERPLBLD CKD-EPI 2021: 36 ML/MIN/1.73M2
EOSINOPHIL # BLD AUTO: 0.2 10E3/UL (ref 0–0.7)
EOSINOPHIL NFR BLD AUTO: 3 %
ERYTHROCYTE [DISTWIDTH] IN BLOOD BY AUTOMATED COUNT: 16.2 % (ref 10–15)
GLUCOSE SERPL-MCNC: 90 MG/DL (ref 70–99)
HCT VFR BLD AUTO: 35.1 % (ref 35–47)
HGB BLD-MCNC: 11.1 G/DL (ref 11.7–15.7)
IMM GRANULOCYTES # BLD: 0 10E3/UL
IMM GRANULOCYTES NFR BLD: 0 %
LYMPHOCYTES # BLD AUTO: 1.8 10E3/UL (ref 0.8–5.3)
LYMPHOCYTES NFR BLD AUTO: 35 %
MCH RBC QN AUTO: 34.4 PG (ref 26.5–33)
MCHC RBC AUTO-ENTMCNC: 31.6 G/DL (ref 31.5–36.5)
MCV RBC AUTO: 109 FL (ref 78–100)
MONOCYTES # BLD AUTO: 0.5 10E3/UL (ref 0–1.3)
MONOCYTES NFR BLD AUTO: 9 %
NEUTROPHILS # BLD AUTO: 2.7 10E3/UL (ref 1.6–8.3)
NEUTROPHILS NFR BLD AUTO: 51 %
NRBC # BLD AUTO: 0 10E3/UL
NRBC BLD AUTO-RTO: 0 /100
PLATELET # BLD AUTO: 270 10E3/UL (ref 150–450)
POTASSIUM SERPL-SCNC: 5.5 MMOL/L (ref 3.4–5.3)
RBC # BLD AUTO: 3.23 10E6/UL (ref 3.8–5.2)
SODIUM SERPL-SCNC: 144 MMOL/L (ref 135–145)
TSH SERPL DL<=0.005 MIU/L-ACNC: 1.99 UIU/ML (ref 0.3–4.2)
WBC # BLD AUTO: 5.2 10E3/UL (ref 4–11)

## 2024-07-03 PROCEDURE — 84443 ASSAY THYROID STIM HORMONE: CPT | Mod: ORL | Performed by: NURSE PRACTITIONER

## 2024-07-03 PROCEDURE — 85025 COMPLETE CBC W/AUTO DIFF WBC: CPT | Mod: ORL | Performed by: NURSE PRACTITIONER

## 2024-07-03 PROCEDURE — 36415 COLL VENOUS BLD VENIPUNCTURE: CPT | Mod: ORL | Performed by: NURSE PRACTITIONER

## 2024-07-03 PROCEDURE — 80048 BASIC METABOLIC PNL TOTAL CA: CPT | Mod: ORL | Performed by: NURSE PRACTITIONER

## 2024-07-03 PROCEDURE — P9604 ONE-WAY ALLOW PRORATED TRIP: HCPCS | Mod: ORL | Performed by: NURSE PRACTITIONER

## 2024-07-09 ENCOUNTER — LAB REQUISITION (OUTPATIENT)
Dept: LAB | Facility: CLINIC | Age: 89
End: 2024-07-09
Payer: MEDICARE

## 2024-07-09 DIAGNOSIS — E87.6 HYPOKALEMIA: ICD-10-CM

## 2024-07-10 LAB — POTASSIUM SERPL-SCNC: 5.4 MMOL/L (ref 3.4–5.3)

## 2024-07-10 PROCEDURE — 84132 ASSAY OF SERUM POTASSIUM: CPT | Mod: ORL | Performed by: NURSE PRACTITIONER

## 2024-07-10 PROCEDURE — P9604 ONE-WAY ALLOW PRORATED TRIP: HCPCS | Mod: ORL | Performed by: NURSE PRACTITIONER

## 2024-07-10 PROCEDURE — 36415 COLL VENOUS BLD VENIPUNCTURE: CPT | Mod: ORL | Performed by: NURSE PRACTITIONER

## 2024-07-30 ENCOUNTER — LAB REQUISITION (OUTPATIENT)
Dept: LAB | Facility: CLINIC | Age: 89
End: 2024-07-30
Payer: MEDICARE

## 2024-07-30 DIAGNOSIS — R41.82 ALTERED MENTAL STATUS, UNSPECIFIED: ICD-10-CM

## 2024-07-30 LAB
ALBUMIN UR-MCNC: NEGATIVE MG/DL
APPEARANCE UR: ABNORMAL
BILIRUB UR QL STRIP: NEGATIVE
COLOR UR AUTO: ABNORMAL
GLUCOSE UR STRIP-MCNC: NEGATIVE MG/DL
HGB UR QL STRIP: NEGATIVE
KETONES UR STRIP-MCNC: NEGATIVE MG/DL
LEUKOCYTE ESTERASE UR QL STRIP: ABNORMAL
NITRATE UR QL: NEGATIVE
PH UR STRIP: 6 [PH] (ref 5–7)
RBC URINE: 0 /HPF
SP GR UR STRIP: 1.01 (ref 1–1.03)
TRANSITIONAL EPI: <1 /HPF
UROBILINOGEN UR STRIP-MCNC: NORMAL MG/DL
WBC CLUMPS #/AREA URNS HPF: PRESENT /HPF
WBC URINE: 58 /HPF

## 2024-07-30 PROCEDURE — 81001 URINALYSIS AUTO W/SCOPE: CPT | Mod: ORL | Performed by: NURSE PRACTITIONER

## 2024-07-30 PROCEDURE — 87086 URINE CULTURE/COLONY COUNT: CPT | Mod: ORL | Performed by: NURSE PRACTITIONER

## 2024-07-30 PROCEDURE — 87186 SC STD MICRODIL/AGAR DIL: CPT | Mod: ORL | Performed by: NURSE PRACTITIONER

## 2024-08-01 LAB — BACTERIA UR CULT: ABNORMAL

## 2024-08-08 ENCOUNTER — APPOINTMENT (OUTPATIENT)
Dept: GENERAL RADIOLOGY | Facility: CLINIC | Age: 89
DRG: 193 | End: 2024-08-08
Attending: EMERGENCY MEDICINE
Payer: MEDICARE

## 2024-08-08 ENCOUNTER — HOSPITAL ENCOUNTER (INPATIENT)
Facility: CLINIC | Age: 89
LOS: 4 days | Discharge: NURSING, REHAB OR LONG-TERM CARE | DRG: 193 | End: 2024-08-12
Attending: EMERGENCY MEDICINE | Admitting: INTERNAL MEDICINE
Payer: MEDICARE

## 2024-08-08 DIAGNOSIS — J18.9 COMMUNITY ACQUIRED PNEUMONIA OF LEFT LOWER LOBE OF LUNG: Primary | ICD-10-CM

## 2024-08-08 DIAGNOSIS — R09.02 HYPOXIA: ICD-10-CM

## 2024-08-08 LAB
ALBUMIN SERPL BCG-MCNC: 3.2 G/DL (ref 3.5–5.2)
ALBUMIN UR-MCNC: NEGATIVE MG/DL
ALP SERPL-CCNC: 102 U/L (ref 40–150)
ALT SERPL W P-5'-P-CCNC: 7 U/L (ref 0–50)
ANION GAP SERPL CALCULATED.3IONS-SCNC: 12 MMOL/L (ref 7–15)
APPEARANCE UR: CLEAR
AST SERPL W P-5'-P-CCNC: 10 U/L (ref 0–45)
BILIRUB SERPL-MCNC: 0.4 MG/DL
BILIRUB UR QL STRIP: NEGATIVE
BUN SERPL-MCNC: 25.8 MG/DL (ref 8–23)
CALCIUM SERPL-MCNC: 10.1 MG/DL (ref 8.8–10.4)
CHLORIDE SERPL-SCNC: 109 MMOL/L (ref 98–107)
COLOR UR AUTO: ABNORMAL
CREAT SERPL-MCNC: 1.55 MG/DL (ref 0.51–0.95)
EGFRCR SERPLBLD CKD-EPI 2021: 30 ML/MIN/1.73M2
FLUAV RNA SPEC QL NAA+PROBE: NEGATIVE
FLUBV RNA RESP QL NAA+PROBE: NEGATIVE
GLUCOSE SERPL-MCNC: 121 MG/DL (ref 70–99)
GLUCOSE UR STRIP-MCNC: NEGATIVE MG/DL
HCO3 BLDV-SCNC: 23 MMOL/L (ref 21–28)
HCO3 SERPL-SCNC: 20 MMOL/L (ref 22–29)
HGB UR QL STRIP: NEGATIVE
HOLD SPECIMEN: NORMAL
KETONES UR STRIP-MCNC: NEGATIVE MG/DL
LACTATE BLD-SCNC: 1.4 MMOL/L
LEUKOCYTE ESTERASE UR QL STRIP: NEGATIVE
NITRATE UR QL: NEGATIVE
NT-PROBNP SERPL-MCNC: 1022 PG/ML (ref 0–1800)
PCO2 BLDV: 41 MM HG (ref 40–50)
PH BLDV: 7.36 [PH] (ref 7.32–7.43)
PH UR STRIP: 6.5 [PH] (ref 5–7)
PO2 BLDV: 26 MM HG (ref 25–47)
POTASSIUM SERPL-SCNC: 4.7 MMOL/L (ref 3.4–5.3)
PROCALCITONIN SERPL IA-MCNC: 0.36 NG/ML
PROT SERPL-MCNC: 6.2 G/DL (ref 6.4–8.3)
RBC URINE: <1 /HPF
RSV RNA SPEC NAA+PROBE: NEGATIVE
SAO2 % BLDV: 46 % (ref 70–75)
SARS-COV-2 RNA RESP QL NAA+PROBE: NEGATIVE
SODIUM SERPL-SCNC: 141 MMOL/L (ref 135–145)
SP GR UR STRIP: 1.02 (ref 1–1.03)
TROPONIN T SERPL HS-MCNC: 42 NG/L
TROPONIN T SERPL HS-MCNC: 44 NG/L
UROBILINOGEN UR STRIP-MCNC: NORMAL MG/DL
WBC URINE: 7 /HPF

## 2024-08-08 PROCEDURE — 82803 BLOOD GASES ANY COMBINATION: CPT

## 2024-08-08 PROCEDURE — 99285 EMERGENCY DEPT VISIT HI MDM: CPT | Mod: 25

## 2024-08-08 PROCEDURE — 84145 PROCALCITONIN (PCT): CPT | Performed by: EMERGENCY MEDICINE

## 2024-08-08 PROCEDURE — 96374 THER/PROPH/DIAG INJ IV PUSH: CPT | Mod: 59

## 2024-08-08 PROCEDURE — 87040 BLOOD CULTURE FOR BACTERIA: CPT | Performed by: EMERGENCY MEDICINE

## 2024-08-08 PROCEDURE — 84484 ASSAY OF TROPONIN QUANT: CPT | Performed by: EMERGENCY MEDICINE

## 2024-08-08 PROCEDURE — 71046 X-RAY EXAM CHEST 2 VIEWS: CPT

## 2024-08-08 PROCEDURE — 36415 COLL VENOUS BLD VENIPUNCTURE: CPT | Performed by: EMERGENCY MEDICINE

## 2024-08-08 PROCEDURE — 80053 COMPREHEN METABOLIC PANEL: CPT | Performed by: EMERGENCY MEDICINE

## 2024-08-08 PROCEDURE — 87637 SARSCOV2&INF A&B&RSV AMP PRB: CPT | Performed by: EMERGENCY MEDICINE

## 2024-08-08 PROCEDURE — 99222 1ST HOSP IP/OBS MODERATE 55: CPT | Mod: AI | Performed by: INTERNAL MEDICINE

## 2024-08-08 PROCEDURE — 83880 ASSAY OF NATRIURETIC PEPTIDE: CPT | Performed by: EMERGENCY MEDICINE

## 2024-08-08 PROCEDURE — 250N000011 HC RX IP 250 OP 636: Performed by: EMERGENCY MEDICINE

## 2024-08-08 PROCEDURE — 85027 COMPLETE CBC AUTOMATED: CPT | Performed by: EMERGENCY MEDICINE

## 2024-08-08 PROCEDURE — 81001 URINALYSIS AUTO W/SCOPE: CPT | Performed by: EMERGENCY MEDICINE

## 2024-08-08 PROCEDURE — 120N000001 HC R&B MED SURG/OB

## 2024-08-08 PROCEDURE — 93005 ELECTROCARDIOGRAM TRACING: CPT

## 2024-08-08 PROCEDURE — 85007 BL SMEAR W/DIFF WBC COUNT: CPT | Performed by: EMERGENCY MEDICINE

## 2024-08-08 PROCEDURE — 258N000003 HC RX IP 258 OP 636: Performed by: EMERGENCY MEDICINE

## 2024-08-08 RX ORDER — AZITHROMYCIN 250 MG/1
250 TABLET, FILM COATED ORAL EVERY EVENING
Status: DISCONTINUED | OUTPATIENT
Start: 2024-08-09 | End: 2024-08-12 | Stop reason: HOSPADM

## 2024-08-08 RX ORDER — LIDOCAINE 4 G/G
1 PATCH TOPICAL DAILY PRN
Status: ON HOLD | COMMUNITY
End: 2024-09-16

## 2024-08-08 RX ORDER — ONDANSETRON 4 MG/1
4 TABLET, ORALLY DISINTEGRATING ORAL EVERY 6 HOURS PRN
Status: DISCONTINUED | OUTPATIENT
Start: 2024-08-08 | End: 2024-08-12 | Stop reason: HOSPADM

## 2024-08-08 RX ORDER — CHOLECALCIFEROL (VITAMIN D3) 50 MCG
1 TABLET ORAL DAILY
Status: ON HOLD | COMMUNITY
End: 2024-09-16

## 2024-08-08 RX ORDER — PROCHLORPERAZINE 25 MG
12.5 SUPPOSITORY, RECTAL RECTAL EVERY 12 HOURS PRN
Status: DISCONTINUED | OUTPATIENT
Start: 2024-08-08 | End: 2024-08-12 | Stop reason: HOSPADM

## 2024-08-08 RX ORDER — ACETAMINOPHEN 650 MG/1
650 SUPPOSITORY RECTAL EVERY 4 HOURS PRN
Status: DISCONTINUED | OUTPATIENT
Start: 2024-08-08 | End: 2024-08-12 | Stop reason: HOSPADM

## 2024-08-08 RX ORDER — PROCHLORPERAZINE MALEATE 5 MG
5 TABLET ORAL EVERY 6 HOURS PRN
Status: DISCONTINUED | OUTPATIENT
Start: 2024-08-08 | End: 2024-08-12 | Stop reason: HOSPADM

## 2024-08-08 RX ORDER — AMOXICILLIN 250 MG
1 CAPSULE ORAL 2 TIMES DAILY PRN
Status: DISCONTINUED | OUTPATIENT
Start: 2024-08-08 | End: 2024-08-12 | Stop reason: HOSPADM

## 2024-08-08 RX ORDER — LORAZEPAM 0.5 MG/1
0.25 TABLET ORAL
Status: ON HOLD | COMMUNITY
End: 2024-08-24

## 2024-08-08 RX ORDER — CALCIUM CARBONATE 500 MG/1
1000 TABLET, CHEWABLE ORAL 4 TIMES DAILY PRN
Status: DISCONTINUED | OUTPATIENT
Start: 2024-08-08 | End: 2024-08-12 | Stop reason: HOSPADM

## 2024-08-08 RX ORDER — AMOXICILLIN 250 MG
2 CAPSULE ORAL 2 TIMES DAILY PRN
Status: DISCONTINUED | OUTPATIENT
Start: 2024-08-08 | End: 2024-08-12 | Stop reason: HOSPADM

## 2024-08-08 RX ORDER — LOPERAMIDE HCL 2 MG
2 CAPSULE ORAL 4 TIMES DAILY PRN
COMMUNITY

## 2024-08-08 RX ORDER — ACETAMINOPHEN 325 MG/1
650 TABLET ORAL EVERY 4 HOURS PRN
Status: DISCONTINUED | OUTPATIENT
Start: 2024-08-08 | End: 2024-08-12 | Stop reason: HOSPADM

## 2024-08-08 RX ORDER — CIPROFLOXACIN 250 MG/1
250 TABLET, FILM COATED ORAL DAILY
Status: ON HOLD | COMMUNITY
End: 2024-08-11

## 2024-08-08 RX ORDER — ALBUTEROL SULFATE 90 UG/1
2 AEROSOL, METERED RESPIRATORY (INHALATION) EVERY 6 HOURS PRN
COMMUNITY

## 2024-08-08 RX ORDER — PYRIDOXINE HCL (VITAMIN B6) 100 MG
500 TABLET ORAL 2 TIMES DAILY
Status: ON HOLD | COMMUNITY
End: 2024-09-16

## 2024-08-08 RX ORDER — GUAIFENESIN/DEXTROMETHORPHAN 100-10MG/5
10 SYRUP ORAL EVERY 4 HOURS PRN
Status: DISCONTINUED | OUTPATIENT
Start: 2024-08-08 | End: 2024-08-12 | Stop reason: HOSPADM

## 2024-08-08 RX ORDER — ONDANSETRON 2 MG/ML
4 INJECTION INTRAMUSCULAR; INTRAVENOUS EVERY 6 HOURS PRN
Status: DISCONTINUED | OUTPATIENT
Start: 2024-08-08 | End: 2024-08-12 | Stop reason: HOSPADM

## 2024-08-08 RX ORDER — CEFTRIAXONE 2 G/1
2 INJECTION, POWDER, FOR SOLUTION INTRAMUSCULAR; INTRAVENOUS ONCE
Status: COMPLETED | OUTPATIENT
Start: 2024-08-08 | End: 2024-08-08

## 2024-08-08 RX ORDER — MAGNESIUM HYDROXIDE/ALUMINUM HYDROXICE/SIMETHICONE 120; 1200; 1200 MG/30ML; MG/30ML; MG/30ML
30 SUSPENSION ORAL EVERY 4 HOURS PRN
Status: ON HOLD | COMMUNITY
End: 2024-08-24

## 2024-08-08 RX ORDER — HYDROCORTISONE 10 MG/G
CREAM TOPICAL 2 TIMES DAILY
Status: ON HOLD | COMMUNITY
End: 2024-08-24

## 2024-08-08 RX ORDER — AZITHROMYCIN 500 MG/5ML
500 INJECTION, POWDER, LYOPHILIZED, FOR SOLUTION INTRAVENOUS ONCE
Status: COMPLETED | OUTPATIENT
Start: 2024-08-08 | End: 2024-08-08

## 2024-08-08 RX ORDER — CEFTRIAXONE 2 G/1
2 INJECTION, POWDER, FOR SOLUTION INTRAMUSCULAR; INTRAVENOUS EVERY 24 HOURS
Status: DISCONTINUED | OUTPATIENT
Start: 2024-08-09 | End: 2024-08-10

## 2024-08-08 RX ORDER — POLYETHYLENE GLYCOL 3350 17 G/17G
17 POWDER, FOR SOLUTION ORAL 2 TIMES DAILY PRN
Status: DISCONTINUED | OUTPATIENT
Start: 2024-08-08 | End: 2024-08-12 | Stop reason: HOSPADM

## 2024-08-08 RX ORDER — ACETAMINOPHEN 500 MG
1000 TABLET ORAL 3 TIMES DAILY
Status: ON HOLD | COMMUNITY
End: 2024-08-30

## 2024-08-08 RX ORDER — LIDOCAINE 40 MG/G
CREAM TOPICAL
Status: DISCONTINUED | OUTPATIENT
Start: 2024-08-08 | End: 2024-08-12 | Stop reason: HOSPADM

## 2024-08-08 RX ADMIN — AZITHROMYCIN MONOHYDRATE 500 MG: 500 INJECTION, POWDER, LYOPHILIZED, FOR SOLUTION INTRAVENOUS at 21:56

## 2024-08-08 RX ADMIN — CEFTRIAXONE 2 G: 2 INJECTION, POWDER, FOR SOLUTION INTRAMUSCULAR; INTRAVENOUS at 21:36

## 2024-08-08 ASSESSMENT — ACTIVITIES OF DAILY LIVING (ADL)
ADLS_ACUITY_SCORE: 43

## 2024-08-09 LAB
ANION GAP SERPL CALCULATED.3IONS-SCNC: 12 MMOL/L (ref 7–15)
ATRIAL RATE - MUSE: 86 BPM
BASOPHILS # BLD MANUAL: 0 10E3/UL (ref 0–0.2)
BASOPHILS NFR BLD MANUAL: 0 %
BUN SERPL-MCNC: 23.3 MG/DL (ref 8–23)
CALCIUM SERPL-MCNC: 9.8 MG/DL (ref 8.8–10.4)
CHLORIDE SERPL-SCNC: 110 MMOL/L (ref 98–107)
CREAT SERPL-MCNC: 1.51 MG/DL (ref 0.51–0.95)
DIASTOLIC BLOOD PRESSURE - MUSE: NORMAL MMHG
EGFRCR SERPLBLD CKD-EPI 2021: 31 ML/MIN/1.73M2
EOSINOPHIL # BLD MANUAL: 0.1 10E3/UL (ref 0–0.7)
EOSINOPHIL NFR BLD MANUAL: 1 %
ERYTHROCYTE [DISTWIDTH] IN BLOOD BY AUTOMATED COUNT: 15.1 % (ref 10–15)
ERYTHROCYTE [DISTWIDTH] IN BLOOD BY AUTOMATED COUNT: 15.2 % (ref 10–15)
GLUCOSE SERPL-MCNC: 108 MG/DL (ref 70–99)
HCO3 SERPL-SCNC: 20 MMOL/L (ref 22–29)
HCT VFR BLD AUTO: 32.3 % (ref 35–47)
HCT VFR BLD AUTO: 35.7 % (ref 35–47)
HGB BLD-MCNC: 10 G/DL (ref 11.7–15.7)
HGB BLD-MCNC: 11 G/DL (ref 11.7–15.7)
INTERPRETATION ECG - MUSE: NORMAL
LITHIUM SERPL-SCNC: 0.61 MMOL/L (ref 0.6–1.2)
LYMPHOCYTES # BLD MANUAL: 1.7 10E3/UL (ref 0.8–5.3)
LYMPHOCYTES NFR BLD MANUAL: 15 %
MCH RBC QN AUTO: 32.9 PG (ref 26.5–33)
MCH RBC QN AUTO: 32.9 PG (ref 26.5–33)
MCHC RBC AUTO-ENTMCNC: 30.8 G/DL (ref 31.5–36.5)
MCHC RBC AUTO-ENTMCNC: 31 G/DL (ref 31.5–36.5)
MCV RBC AUTO: 106 FL (ref 78–100)
MCV RBC AUTO: 107 FL (ref 78–100)
MONOCYTES # BLD MANUAL: 1.8 10E3/UL (ref 0–1.3)
MONOCYTES NFR BLD MANUAL: 16 %
MYELOCYTES # BLD MANUAL: 0.1 10E3/UL
MYELOCYTES NFR BLD MANUAL: 1 %
NEUTROPHILS # BLD MANUAL: 7.4 10E3/UL (ref 1.6–8.3)
NEUTROPHILS NFR BLD MANUAL: 67 %
NRBC # BLD AUTO: 0 10E3/UL
NRBC BLD AUTO-RTO: 0 /100
P AXIS - MUSE: NORMAL DEGREES
PATH REV: ABNORMAL
PLAT MORPH BLD: ABNORMAL
PLATELET # BLD AUTO: 245 10E3/UL (ref 150–450)
PLATELET # BLD AUTO: 283 10E3/UL (ref 150–450)
POTASSIUM SERPL-SCNC: 5.1 MMOL/L (ref 3.4–5.3)
PR INTERVAL - MUSE: 216 MS
QRS DURATION - MUSE: 106 MS
QT - MUSE: 366 MS
QTC - MUSE: 437 MS
R AXIS - MUSE: -30 DEGREES
RBC # BLD AUTO: 3.04 10E6/UL (ref 3.8–5.2)
RBC # BLD AUTO: 3.34 10E6/UL (ref 3.8–5.2)
RBC MORPH BLD: ABNORMAL
SODIUM SERPL-SCNC: 142 MMOL/L (ref 135–145)
SYSTOLIC BLOOD PRESSURE - MUSE: NORMAL MMHG
T AXIS - MUSE: -33 DEGREES
VENTRICULAR RATE- MUSE: 86 BPM
WBC # BLD AUTO: 10.2 10E3/UL (ref 4–11)
WBC # BLD AUTO: 11 10E3/UL (ref 4–11)

## 2024-08-09 PROCEDURE — 36415 COLL VENOUS BLD VENIPUNCTURE: CPT | Performed by: INTERNAL MEDICINE

## 2024-08-09 PROCEDURE — 85027 COMPLETE CBC AUTOMATED: CPT | Performed by: INTERNAL MEDICINE

## 2024-08-09 PROCEDURE — 92610 EVALUATE SWALLOWING FUNCTION: CPT | Mod: GN

## 2024-08-09 PROCEDURE — 250N000013 HC RX MED GY IP 250 OP 250 PS 637: Performed by: INTERNAL MEDICINE

## 2024-08-09 PROCEDURE — 80178 ASSAY OF LITHIUM: CPT | Performed by: INTERNAL MEDICINE

## 2024-08-09 PROCEDURE — 250N000011 HC RX IP 250 OP 636: Performed by: INTERNAL MEDICINE

## 2024-08-09 PROCEDURE — 99232 SBSQ HOSP IP/OBS MODERATE 35: CPT | Performed by: INTERNAL MEDICINE

## 2024-08-09 PROCEDURE — 258N000003 HC RX IP 258 OP 636: Performed by: INTERNAL MEDICINE

## 2024-08-09 PROCEDURE — 120N000001 HC R&B MED SURG/OB

## 2024-08-09 PROCEDURE — 80048 BASIC METABOLIC PNL TOTAL CA: CPT | Performed by: INTERNAL MEDICINE

## 2024-08-09 RX ORDER — LITHIUM CARBONATE 150 MG/1
150 CAPSULE ORAL EVERY MORNING
Status: DISCONTINUED | OUTPATIENT
Start: 2024-08-09 | End: 2024-08-12 | Stop reason: HOSPADM

## 2024-08-09 RX ORDER — LETROZOLE 2.5 MG/1
2.5 TABLET, FILM COATED ORAL DAILY
Status: DISCONTINUED | OUTPATIENT
Start: 2024-08-09 | End: 2024-08-12 | Stop reason: HOSPADM

## 2024-08-09 RX ORDER — LEVOTHYROXINE SODIUM 50 UG/1
50 TABLET ORAL DAILY
Status: DISCONTINUED | OUTPATIENT
Start: 2024-08-09 | End: 2024-08-12 | Stop reason: HOSPADM

## 2024-08-09 RX ORDER — DIVALPROEX SODIUM 500 MG/1
500 TABLET, EXTENDED RELEASE ORAL AT BEDTIME
Status: DISCONTINUED | OUTPATIENT
Start: 2024-08-09 | End: 2024-08-12 | Stop reason: HOSPADM

## 2024-08-09 RX ADMIN — LEVOTHYROXINE SODIUM 50 MCG: 0.05 TABLET ORAL at 09:45

## 2024-08-09 RX ADMIN — LITHIUM CARBONATE 150 MG: 150 CAPSULE, GELATIN COATED ORAL at 09:45

## 2024-08-09 RX ADMIN — AZITHROMYCIN DIHYDRATE 250 MG: 250 TABLET ORAL at 21:28

## 2024-08-09 RX ADMIN — LETROZOLE 2.5 MG: 2.5 TABLET ORAL at 09:45

## 2024-08-09 RX ADMIN — DIVALPROEX SODIUM 500 MG: 500 TABLET, FILM COATED, EXTENDED RELEASE ORAL at 21:28

## 2024-08-09 RX ADMIN — CEFTRIAXONE 2 G: 2 INJECTION, POWDER, FOR SOLUTION INTRAMUSCULAR; INTRAVENOUS at 21:29

## 2024-08-09 RX ADMIN — SODIUM CHLORIDE 500 ML: 9 INJECTION, SOLUTION INTRAVENOUS at 00:06

## 2024-08-09 ASSESSMENT — ACTIVITIES OF DAILY LIVING (ADL)
ADLS_ACUITY_SCORE: 51
ADLS_ACUITY_SCORE: 49
ADLS_ACUITY_SCORE: 43
ADLS_ACUITY_SCORE: 49
ADLS_ACUITY_SCORE: 51
ADLS_ACUITY_SCORE: 49
ADLS_ACUITY_SCORE: 51
ADLS_ACUITY_SCORE: 43
ADLS_ACUITY_SCORE: 51
ADLS_ACUITY_SCORE: 43
ADLS_ACUITY_SCORE: 43
DEPENDENT_IADLS:: CLEANING;COOKING;LAUNDRY;SHOPPING;MEAL PREPARATION;MEDICATION MANAGEMENT;TRANSPORTATION
ADLS_ACUITY_SCORE: 43
ADLS_ACUITY_SCORE: 51
ADLS_ACUITY_SCORE: 52
ADLS_ACUITY_SCORE: 51
ADLS_ACUITY_SCORE: 43
ADLS_ACUITY_SCORE: 51

## 2024-08-09 NOTE — ED NOTES
[facility]  ED Nurse Handoff Report    ED Chief complaint: Fever  . ED Diagnosis:   Final diagnoses:   Community acquired pneumonia of left lower lobe of lung   Hypoxia       Allergies:   Allergies   Allergen Reactions    Ciprofloxacin      Nausea and vomiting per son     Ergotamine-Caffeine Other (See Comments) and Nausea and Vomiting     Severe HA, N/V & diarrhea    Penicillins Rash and Unknown    Sulfa Antibiotics Rash and Unknown    Lamictal [Lamotrigine] Other (See Comments)     Patient experienced night moss    Naproxen      Pt unable to remember reaction.    Naproxen Unknown    Nicergoline Unknown    Tetracycline Unknown     Pt unable to remember allergy       Code Status: DNR - need paperwork from NH.    Activity level - Baseline/Home:  assist of 1.  Activity Level - Current:   assist of 1 and assist of 2.   Lift room needed: No.   Bariatric: No   Needed: No   Isolation: No.   Infection: Not Applicable.     Respiratory status: Room air    Vital Signs (within 30 minutes):   Vitals:    08/08/24 2100 08/08/24 2115 08/08/24 2130 08/08/24 2145   BP: 108/42 112/53 114/49 121/61   Pulse: 85 84 83 89   Resp:       Temp:       TempSrc:       SpO2: 94% 93% 92% 92%       Cardiac Rhythm:  ,      Pain level:    Patient confused: Yes.   Patient Falls Risk: bed/chair alarm on, nonskid shoes/slippers when out of bed, arm band in place, patient and family education, assistive device/personal items within reach, and activity supervised.   Elimination Status:  due to void - purewick in place.      Patient Report - Initial Complaint: fever, weakness at NH.   Focused Assessment:      GLEN HUMMEL Zaid is a 89 year old female on Eliquis with history of breast cancer and CKD who presents at the emergency department for evaluation of fever. The patient denies being able to recall why they are present at the emergency department, where they came from, or what month it is. EMS report that the  patient was brought in from her care facility, with a fever of 99F and hypotensive state in the 80s systolic. They note the patient was also hypoxic on room air, as they then placed her on 2L of oxygen. The patient endorses cough but denies chest pain, shortness of breath, nausea, vomiting, abdominal pain, urinary symptoms, or diarrhea. Lennie denies recent chemotherapy treatment. Patient's history is limited.      Abnormal Results:   Labs Ordered and Resulted from Time of ED Arrival to Time of ED Departure   COMPREHENSIVE METABOLIC PANEL - Abnormal       Result Value    Sodium 141      Potassium 4.7      Carbon Dioxide (CO2) 20 (*)     Anion Gap 12      Urea Nitrogen 25.8 (*)     Creatinine 1.55 (*)     GFR Estimate 30 (*)     Calcium 10.1      Chloride 109 (*)     Glucose 121 (*)     Alkaline Phosphatase 102      AST 10      ALT 7      Protein Total 6.2 (*)     Albumin 3.2 (*)     Bilirubin Total 0.4     TROPONIN T, HIGH SENSITIVITY - Abnormal    Troponin T, High Sensitivity 44 (*)    CBC WITH PLATELETS AND DIFFERENTIAL - Abnormal    WBC Count 11.0      RBC Count 3.04 (*)     Hemoglobin 10.0 (*)     Hematocrit 32.3 (*)      (*)     MCH 32.9      MCHC 31.0 (*)     RDW 15.2 (*)     Platelet Count 283      NRBCs per 100 WBC 0      Absolute NRBCs 0.0     MANUAL DIFFERENTIAL - Abnormal    % Neutrophils 67      % Lymphocytes 15      % Monocytes 16      % Eosinophils 1      % Basophils 0      % Myelocytes 1      Absolute Neutrophils 7.4      Absolute Lymphocytes 1.7      Absolute Monocytes 1.8 (*)     Absolute Eosinophils 0.1      Absolute Basophils 0.0      Absolute Myelocytes 0.1 (*)     RBC Morphology Confirmed RBC Indices      Platelet Assessment        Value: Automated Count Confirmed. Platelet morphology is normal.   ISTAT GASES LACTATE VENOUS POCT - Abnormal    Lactic Acid POCT 1.4      Bicarbonate Venous POCT 23      O2 Sat, Venous POCT 46 (*)     pCO2 Venous POCT 41      pH Venous POCT 7.36      pO2  Venous POCT 26     NT PROBNP INPATIENT - Normal    N terminal Pro BNP Inpatient 1,022     INFLUENZA A/B, RSV, & SARS-COV2 PCR - Normal    Influenza A PCR Negative      Influenza B PCR Negative      RSV PCR Negative      SARS CoV2 PCR Negative     PROCALCITONIN - Normal    Procalcitonin 0.36     ROUTINE UA WITH MICROSCOPIC REFLEX TO CULTURE   TROPONIN T, HIGH SENSITIVITY   BLOOD CULTURE        XR Chest 2 Views   Final Result   IMPRESSION:       Hypoinflated lungs, limiting evaluation. Left basilar opacity with blunting of the costophrenic angle, which may be atelectasis, infiltrate, and/or trace effusion.      No focal airspace disease in the right lung. No right pleural effusion. No pneumothorax.      Stable mild cardiomegaly.          Treatments provided: iv, labs, abx, straight cath for urine.   Family Comments: son to be updated.  OBS brochure/video discussed/provided to patient:  No  ED Medications:   Medications   cefTRIAXone (ROCEPHIN) 2 g vial to attach to  ml bag for ADULTS or NS 50 ml bag for PEDS (2 g Intravenous $New Bag 8/8/24 2627)   azithromycin (ZITHROMAX) 500 mg in  mL intermittent infusion (has no administration in time range)       Drips infusing:  Yes - abx  For the majority of the shift this patient was Green.   Interventions performed were none.    Sepsis treatment initiated: No    Cares/treatment/interventions/medications to be completed following ED care: per provider orders.     ED Nurse Name: Shona Murguia RN  9:58 PM    RECEIVING UNIT ED HANDOFF REVIEW    Above ED Nurse Handoff Report was reviewed: Yes  Reviewed by: Mary Whittaker RN on August 8, 2024 at 11:31 PM   I Heather called the ED to inform them the note was read: No, unable to reach rosalva

## 2024-08-09 NOTE — H&P
[facility]    History and Physical - Hospitalist Service       Date of Admission:  8/8/2024    Assessment & Plan      Lennie Mar is a 89 year old female admitted on 8/8/2024 with left lower lung pneumonia and hypoxia. She has history of atrial fibrillation, previous anticoagulation with Eliquis (possibly discontinued?), essential tremor, COVID-19 in 3/22, CKD stage III, hyperparathyroidism, breast cancer, hypothyroidism, depression, anxiety, bipolar disorder, and subdural hygroma.  She lives in a nursing facility.  She was sent to the emergency department by ambulance for evaluation of fever, hypotension with systolic blood pressure in the 80s, and hypoxia at her facility.  Patient provides limited history due to some degree of memory impairment.  She is not really sure while she is here.  She does report some cough but is not sure if she has had fever, hypotension, or hypoxia.    Emergency department evaluation showed oxygen saturations of 90% on room air.  Laboratory evaluation showed BUN 25.8, creatinine 1.55, white blood cells 11, hemoglobin 10, troponin 44, negative testing for COVID/influenza/RSV, and a normal lactic acid.  Chest x-ray was obtained and suggested left lower lung infiltrate.  Patient was given Rocephin and Zithromax intravenously.  I was asked to admit her for further cares of left lower lobe pneumonia with hypotension and hypoxia.      Problem list:    Left lower lobe pneumonia  Hypoxia, improved  Hypotension, improved  -Admit as inpatient  -Continue Rocephin and Zithromax  -Supplemental oxygen as needed  -AM CBC    Acute kidney injury  Chronic kidney disease stage III  -Normal saline 500 mL over 4 hours  -A.m. BMP    Paroxysmal atrial fibrillation  Essential tremor  Hypothyroidism  Depression  Anxiety  Bipolar disorder  -Assess prior to admission medications once reconciled            Diet: Combination Diet Regular Diet Adult    DVT Prophylaxis: Pneumatic  Compression Devices  Kelley Catheter: Not present  Lines: None     Cardiac Monitoring: None  Code Status: No CPR- Do NOT Intubate      Clinically Significant Risk Factors Present on Admission           # Hypercalcemia: corrected calcium is >10.1, will monitor as appropriate    # Hypoalbuminemia: Lowest albumin = 3.2 g/dL at 8/8/2024  7:55 PM, will monitor as appropriate  # Drug Induced Coagulation Defect: home medication list includes an anticoagulant medication    # Hypertension: Noted on problem list    # Anemia: based on hgb <11                        Disposition Plan     Medically Ready for Discharge: Anticipated in 2-4 Days           Yasmany Jiménez MD  Hospitalist Service  [facility]  Securely message with [employer] (more info)  Text page via [facility] Paging/Directory     ______________________________________________________________________    Chief Complaint   Hypotension, hypoxia, and cough    History is obtained from the patient, ED provider, and the medical record    History of Present Illness   Lennie Mar is a 89 year old female admitted on 8/8/2024 with left lower lung pneumonia and hypoxia. She has history of atrial fibrillation, previous anticoagulation with Eliquis (possibly discontinued?), essential tremor, COVID-19 in 3/22, CKD stage III, hyperparathyroidism, breast cancer, hypothyroidism, depression, anxiety, bipolar disorder, and subdural hygroma.  She lives in a nursing facility.  She was sent to the emergency department by ambulance for evaluation of fever, hypotension with systolic blood pressure in the 80s, and hypoxia at her facility.  Patient provides limited history due to some degree of memory impairment.  She is not really sure while she is here.  She does report some cough but is not sure if she has had fever, hypotension, or hypoxia.    Emergency department evaluation showed oxygen saturations of 90% on room air.  Laboratory evaluation showed BUN 25.8,  creatinine 1.55, white blood cells 11, hemoglobin 10, troponin 44, negative testing for COVID/influenza/RSV, and a normal lactic acid.  Chest x-ray was obtained and suggested left lower lung infiltrate.  Patient was given Rocephin and Zithromax intravenously.  I was asked to admit her for further cares of left lower lobe pneumonia with hypotension and hypoxia.      Past Medical History    Past Medical History:   Diagnosis Date    Alcohol dependence in remission (H)     Anxiety     Asymptomatic varicose veins     Bipolar disorder, unspecified (H)     CKD (chronic kidney disease) stage 3, GFR 30-59 ml/min (H) 2/18/2014    History of smoking     Hyperparathyroidism (H)     Infection due to 2019 novel coronavirus 3/10/2022    Memory loss     Muscle weakness (generalized) 6/23/2008    Severe depression (H)     Subdural hygroma     chronic.  no surgeries    Tremor of unknown origin        Past Surgical History   Past Surgical History:   Procedure Laterality Date    APPENDECTOMY OPEN  1947    CATARACT IOL, RT/LT      COLONOSCOPY WITH CO2 INSUFFLATION  2/29/2012    Procedure:COLONOSCOPY WITH CO2 INSUFFLATION; Surgeon:GAYLE REYNA; Location:[facility] OR    CYSTOCELE REPAIR  1972    CYSTOSCOPY, INSERT STENT URETHRA, COMBINED  1999    CYSTOSCOPY, RETROGRADES, INSERT STENT URETER(S), COMBINED  2/29/2012    Procedure:COMBINED CYSTOSCOPY, RETROGRADES, INSERT STENT URETER(S); Surgeon:ANT ESPINOZA; Location:[facility] OR    DECOMPRESSION LUMBAR ONE LEVEL  8/9/2013    Procedure: DECOMPRESSION LUMBAR ONE LEVEL;  Posterior Decompression Right Lumbar 5- Sacral 1;  Surgeon: You Zavala MD;  Location: [facility] OR    HC TOOTH EXTRACTION W/FORCEP      HYSTERECTOMY, CATA  1963    IRRIGATION AND DEBRIDEMENT ORAL, COMBINED  1982    For treatment of gingivitis    LAPAROSCOPIC ASSISTED COLECTOMY  2/29/2012    Procedure:LAPAROSCOPIC ASSISTED COLECTOMY; Cysto, Bilateral Stent placement (both stents removed at end of case)- Yanet ACOSTA.  Laparoscopic Extended Right Venu Colectomy with CO2 Colonoscopy and polyp removal-Judah; Surgeon:GAYLE REYNA; Location:[facility] OR    LIGATN/STRIP LONG OR SHORT SAPHEN  1955    MASTECTOMY PARTIAL WITH SENTINEL NODE Left 11/19/2018    Procedure: Left Mastectomy, Left Monument Lymph Node Biopsy;  Surgeon: Nahun Betancourt MD;  Location: [facility] OR    STRIP VEIN BILATERAL  1964    SURGICAL HISTORY OF -   1999    ureter surgery for blockage.       Prior to Admission Medications   Prior to Admission Medications   Prescriptions Last Dose Informant Patient Reported? Taking?   ARIPiprazole (ABILIFY) 2 MG tablet   Yes No   Sig: Take 2 mg by mouth daily   ORDER FOR DME   No No   Sig: Equipment being ordered: compression stocking knee high 20-30mmhg   QUEtiapine (SEROQUEL) 25 MG tablet   No No   Sig: Take 0.5 tablets (12.5 mg) by mouth nightly as needed (restlessness)   Patient taking differently: Take 12.5 mg by mouth At Bedtime   acetaminophen (TYLENOL) 325 MG tablet   No No   Sig: Take 3 tablets (975 mg) by mouth every 8 hours as needed for mild pain   alum & mag hydroxide-simethicone (MAALOX) 200-200-20 MG/5ML SUSP suspension   No No   Sig: Take 30 mLs by mouth every 4 hours as needed for indigestion   apixaban ANTICOAGULANT (ELIQUIS) 2.5 MG tablet   No No   Sig: Take 1 tablet (2.5 mg) by mouth 2 times daily   Patient not taking: Reported on 6/7/2023   bisacodyl (DULCOLAX) 10 MG suppository   No No   Sig: Place 1 suppository (10 mg) rectally daily as needed for constipation   divalproex sodium extended-release (DEPAKOTE ER) 500 MG 24 hr tablet   Yes No   Sig: Take 500 mg by mouth At Bedtime   famotidine (PEPCID) 20 MG tablet   No No   Sig: Take 1 tablet (20 mg) by mouth every 48 hours   ipratropium-albuterol (COMBIVENT RESPIMAT)  MCG/ACT inhaler   No No   Sig: Inhale 1 puff into the lungs 4 times daily as needed for shortness of breath / dyspnea or wheezing   Patient not taking: Reported on 6/7/2023   letrozole (FEMARA)  2.5 MG tablet   No No   Sig: TAKE 1 TABLET BY MOUTH EVERY DAY   levothyroxine (SYNTHROID/LEVOTHROID) 75 MCG tablet   Yes No   Sig: Take 100 mcg by mouth daily   lithium (ESKALITH) 150 MG capsule   Yes No   Sig: Take 150 mg by mouth every morning    magnesium hydroxide (MILK OF MAGNESIA) 400 MG/5ML suspension   No No   Sig: Take 30 mLs by mouth daily as needed for constipation   melatonin 1 MG TABS tablet   No No   Sig: Take 1 tablet (1 mg) by mouth nightly as needed for sleep   ondansetron (ZOFRAN-ODT) 4 MG ODT tab   No No   Sig: Take 1 tablet (4 mg) by mouth every 6 hours as needed for nausea or vomiting   senna-docusate (SENOKOT-S/PERICOLACE) 8.6-50 MG tablet   No No   Sig: Take 1 tablet by mouth 2 times daily as needed for constipation   vitamin D3 (CHOLECALCIFEROL) 10 MCG (400 UNIT) capsule   No No   Sig: Take 2 capsules (800 Units) by mouth daily   zinc Oxide (DESITIN) 40 % paste   No No   Sig: Apply topically as needed for dry skin or irritation      Facility-Administered Medications: None        Review of Systems    The 10 point Review of Systems is negative other than noted in the HPI or here.     Social History   I have reviewed this patient's social history and updated it with pertinent information if needed.  Social History     Tobacco Use    Smoking status: Former     Current packs/day: 0.00     Average packs/day: 1.5 packs/day for 30.0 years (45.0 ttl pk-yrs)     Types: Cigarettes     Start date: 1963     Quit date: 1993     Years since quittin.0    Smokeless tobacco: Never   Substance Use Topics    Alcohol use: No    Drug use: No         Family History   I have reviewed this patient's family history and updated it with pertinent information if needed.  Family History   Problem Relation Age of Onset    C.A.D. Mother          at age 47 Heart failure, Rhumatic Fever    Cerebrovascular Disease Father          at age 79    Diabetes Father         type 2    Breast Cancer Sister 84         99 year old sister    Circulatory Maternal Grandmother         vericose veins    C.A.D. Paternal Grandfather     Gastrointestinal Disease Paternal Grandfather         ulcer    Cancer Son          age 51--Melanoma    Cancer Brother          age 50's--Melanoma    Arthritis Sister     Circulatory Sister         vericose veins    Circulatory Sister         vericose veins    Eye Disorder Sister         Cateracts    Respiratory Sister         asthma    Respiratory Brother         COPD    Breast Cancer Niece 60        daughter of 98 yo sister         Allergies   Allergies   Allergen Reactions    Ciprofloxacin      Nausea and vomiting per son     Ergotamine-Caffeine Other (See Comments) and Nausea and Vomiting     Severe HA, N/V & diarrhea    Penicillins Rash and Unknown    Sulfa Antibiotics Rash and Unknown    Lamictal [Lamotrigine] Other (See Comments)     Patient experienced night moss    Naproxen      Pt unable to remember reaction.    Naproxen Unknown    Nicergoline Unknown    Tetracycline Unknown     Pt unable to remember allergy        Physical Exam   Vital Signs: Temp: 98.2  F (36.8  C) Temp src: Oral BP: 121/61 Pulse: 89   Resp: 16 SpO2: 92 % O2 Device: None (Room air) Oxygen Delivery: 2 LPM  Weight: 0 lbs 0 oz    GENERAL: Pleasant and cooperative. No acute distress.  EYES: Pupils equal and round. No scleral erythema or icterus.  ENT: External ears are normal without deformity. Posterior oropharynx is without erythem, swelling, or exudate.  NECK: Supple. No masses or swelling. No tenderness. Thyroid is normal without mass or tenderness.  CHEST: Clear to auscultation. Normal breath sounds. No retractions.   CV: Regular rate and rhythm. No JVD. Pulses normal.  ABDOMEN: Bowel sounds present. No tenderness. No masses or hernia.  EXTREMETIES: No clubbing, cyanosis, or ischemia.  SKIN: Warm and dry to touch. No wounds or rashes.  NEUROLOGIC: Strength and sensation are normal. Deep tendon reflexes are normal.  Cranial nerves are normal.      Medical Decision Making       65 MINUTES SPENT BY ME on the date of service doing chart review, history, exam, documentation & further activities per the note.      Data     I have personally reviewed the following data over the past 24 hrs:    11.0  \   10.0 (L)   / 283     141 109 (H) 25.8 (H) /  121 (H)   4.7 20 (L) 1.55 (H) \     ALT: 7 AST: 10 AP: 102 TBILI: 0.4   ALB: 3.2 (L) TOT PROTEIN: 6.2 (L) LIPASE: N/A     Trop: 42 (H) BNP: 1,022     Procal: 0.36 CRP: N/A Lactic Acid: 1.4         Imaging results reviewed over the past 24 hrs:   Recent Results (from the past 24 hour(s))   XR Chest 2 Views    Narrative    EXAM: XR CHEST 2 VIEWS  LOCATION: [facility]  DATE: 8/8/2024    INDICATION: Hypoxia.  COMPARISON: 5/3/2022      Impression    IMPRESSION:     Hypoinflated lungs, limiting evaluation. Left basilar opacity with blunting of the costophrenic angle, which may be atelectasis, infiltrate, and/or trace effusion.    No focal airspace disease in the right lung. No right pleural effusion. No pneumothorax.    Stable mild cardiomegaly.     Recent Labs   Lab 08/08/24 1955   WBC 11.0   HGB 10.0*   *         POTASSIUM 4.7   CHLORIDE 109*   CO2 20*   BUN 25.8*   CR 1.55*   ANIONGAP 12   CANELO 10.1   *   ALBUMIN 3.2*   PROTTOTAL 6.2*   BILITOTAL 0.4   ALKPHOS 102   ALT 7   AST 10

## 2024-08-09 NOTE — ED PROVIDER NOTES
Emergency Department Note      History of Present Illness     Chief Complaint   Fever      HPI   Lennie Mar is a 89 year old female on Eliquis with history of breast cancer and CKD who presents at the emergency department for evaluation of fever, transient hypotension, hypoxia, and increasing difficulty with ambulation. The patient denies being able to recall why they are present at the emergency department, where they came from, or what month it is. EMS report that the patient was brought in from her care facility, with a subjective fever of 99F and hypotensive state in the 80s systolic. They note the patient was also hypoxic on room air, as they then placed her on 2L of oxygen. The patient endorses cough but denies chest pain, shortness of breath, nausea, vomiting, abdominal pain, urinary symptoms, or diarrhea. Lennie denies recent chemotherapy treatment for breast cancer.  Patient is limited historian.     Independent Historian   EMS as detailed above.    Review of External Notes   Reviewed discharge summary from 5/6/22.  Reviewed note from 6/7/23.    Past Medical History     Medical History and Problem List  Alcohol dependence in remission   Anxiety   Asymptomatic varicose veins   Bipolar disorder  CKD stage 3  Hyperparathyroidism    Subdural hygroma   Tremor  Depression  ARF  Breast cancer  Memory difficulty     Medications   Eliquis  Abilify  Dulcolax  Pepcid  Depakote  Combivent Respimat  Femara  Synthroid    Surgical History   Appendectomy  Cataract  Colonoscopy  Cystocele repair  Cystoscopy, stent insert  Decompression lumbar, one level  Tooth extraction  Hysterectomy  Colectomy  Mastectomy with sentinel node  Ureter surgery for blockage  Strip vein bilateral    Physical Exam     Patient Vitals for the past 24 hrs:   BP Temp Temp src Pulse Resp SpO2   08/08/24 2100 108/42 -- -- 85 -- 94 %   08/08/24 2053 113/49 -- -- 87 -- 93 %   08/08/24 2020 106/72 -- -- 91 -- 90 %   08/08/24 2000 102/57 -- -- 90  -- 93 %   24 126/70 98.2  F (36.8  C) Oral 90 16 96 %     Physical Exam    Constitutional:       Appearance: Normal appearance.   HENT:      Head: Normocephalic and atraumatic.   Eyes:      Extraocular Movements: Extraocular movements intact.      Conjunctiva/sclera: Conjunctivae normal.   Cardiovascular:      Rate and Rhythm: Normal rate and regular rhythm.   Pulmonary:      Effort: Pulmonary effort is normal. No respiratory distress.      Breath sounds: Mild subtle coarse breath sounds to auscultation bilaterally.  Abdominal:      General: Abdomen is flat. There is no distension.      Palpations: Abdomen is soft.      Tenderness: There is no abdominal tenderness.   Musculoskeletal:      Cervical back: Normal range of motion. No rigidity.       Right lower le+ pitting edema.      Left lower le+ pitting edema.   Skin:     General: Skin is warm and dry.   Neurological:      General: No focal deficit present.      Mental Status: Alert and oriented to person, but not place, and time.   Psychiatric:         Mood and Affect: Mood normal.         Behavior: Behavior normal.    Diagnostics     Lab Results   Labs Ordered and Resulted from Time of ED Arrival to Time of ED Departure   COMPREHENSIVE METABOLIC PANEL - Abnormal       Result Value    Sodium 141      Potassium 4.7      Carbon Dioxide (CO2) 20 (*)     Anion Gap 12      Urea Nitrogen 25.8 (*)     Creatinine 1.55 (*)     GFR Estimate 30 (*)     Calcium 10.1      Chloride 109 (*)     Glucose 121 (*)     Alkaline Phosphatase 102      AST 10      ALT 7      Protein Total 6.2 (*)     Albumin 3.2 (*)     Bilirubin Total 0.4     TROPONIN T, HIGH SENSITIVITY - Abnormal    Troponin T, High Sensitivity 44 (*)    CBC WITH PLATELETS AND DIFFERENTIAL - Abnormal    WBC Count 11.0      RBC Count 3.04 (*)     Hemoglobin 10.0 (*)     Hematocrit 32.3 (*)      (*)     MCH 32.9      MCHC 31.0 (*)     RDW 15.2 (*)     Platelet Count 283      NRBCs per 100 WBC 0       Absolute NRBCs 0.0     MANUAL DIFFERENTIAL - Abnormal    % Neutrophils 67      % Lymphocytes 15      % Monocytes 16      % Eosinophils 1      % Basophils 0      % Myelocytes 1      Absolute Neutrophils 7.4      Absolute Lymphocytes 1.7      Absolute Monocytes 1.8 (*)     Absolute Eosinophils 0.1      Absolute Basophils 0.0      Absolute Myelocytes 0.1 (*)     RBC Morphology Confirmed RBC Indices      Platelet Assessment        Value: Automated Count Confirmed. Platelet morphology is normal.   ISTAT GASES LACTATE VENOUS POCT - Abnormal    Lactic Acid POCT 1.4      Bicarbonate Venous POCT 23      O2 Sat, Venous POCT 46 (*)     pCO2 Venous POCT 41      pH Venous POCT 7.36      pO2 Venous POCT 26     NT PROBNP INPATIENT - Normal    N terminal Pro BNP Inpatient 1,022     INFLUENZA A/B, RSV, & SARS-COV2 PCR - Normal    Influenza A PCR Negative      Influenza B PCR Negative      RSV PCR Negative      SARS CoV2 PCR Negative     PROCALCITONIN - Normal    Procalcitonin 0.36     ROUTINE UA WITH MICROSCOPIC REFLEX TO CULTURE   TROPONIN T, HIGH SENSITIVITY   BLOOD CULTURE       Imaging   XR Chest 2 Views   Final Result   IMPRESSION:       Hypoinflated lungs, limiting evaluation. Left basilar opacity with blunting of the costophrenic angle, which may be atelectasis, infiltrate, and/or trace effusion.      No focal airspace disease in the right lung. No right pleural effusion. No pneumothorax.      Stable mild cardiomegaly.          EKG   ECG taken at 2044, ECG read at 2054  Sinus rhythm with 1st degree A-V block  Left axis deviation  Moderate voltage criteria for LVH, may be normal variant (R in aVL , Gallo product)  Anteroseptal infarct   Abnormal ECG  Rate 86 bpm. TN interval 216 ms. QRS duration 106 ms. QT/QTc 366/437 ms. P-R-T axes * -30 -33.    See ED course for independent interpretation.    Independent Interpretation   See ED course    ED Course      Medications Administered   Medications   cefTRIAXone (ROCEPHIN) 2 g  vial to attach to  ml bag for ADULTS or NS 50 ml bag for PEDS (2 g Intravenous $New Bag 8/8/24 2136)   azithromycin (ZITHROMAX) 500 mg in  mL intermittent infusion (has no administration in time range)       Discussion of Management   Admitting Hospitalist, Dr. Jiménez    ED Course   ED Course as of 08/09/24 0318   Thu Aug 08, 2024   2001 I obtained history and examined the patient as noted above     2025 WBC: 11.0   2040 On my independent interpretation of chest x-ray, there is some increased interstitial markings and increased haziness of left lower lung.  No mediastinal widening, no pneumothorax.   2053 Creatinine(!): 1.55  Mild ASHLEY   2054 EKG 12-lead, tracing only  Sinus rhythm with first-degree AV block.  Rate of 86.  .  .  QTc 437.  No acute ST elevation or depression as compared with 5/3/2022 EKG.   2059 Hemoglobin(!): 10.0  Near baseline   2126 pH Venous POCT: 7.36  Re-assuring   2139 I spoke with Dr. Jiménez, hospitalist, who accepts the patient for admission.   2224 Troponin T, High Sensitivity(!): 42  2 set flat.       Additional Documentation  None    Medical Decision Making / Diagnosis     CMS Diagnoses: None    MIPS       None    MDM   89-year-old female as described above presents to the emergency department for reported history by EMS of fever, transient hypotension, transient hypoxia, and increasing difficulty with ambulation.  Patient hemodynamically stable at time of evaluation.  Afebrile.  Patient is only alert and oriented to self.  Unable to provide much history.  Will evaluate for underlying sepsis.  Chest x-ray for evaluation for pneumonia given subtle bilateral crackles on lung auscultation.  BNP for evaluation for CHF given lower extremity edema.  Viral swabbing.  Sepsis workup.  This for evaluation for UTI.  Discussed care plan with patient.  Answered all questions.  Additional workup and orders as listed in chart.     Please refer to ED course above as part of  continuation of MDM for details on the patient's treatment course and any changes or updates in care plan beyond my initial evaluation and MDM creation.    Disposition   The patient was admitted to the hospital.     Diagnosis     ICD-10-CM    1. Community acquired pneumonia of left lower lobe of lung  J18.9       2. Hypoxia  R09.02            Discharge Medications   New Prescriptions    No medications on file         Scribe Disclosure:  I, Edis Rosario, am serving as a scribe at 8:18 PM on 8/8/2024 to document services personally performed by Arnaud Hogue DO based on my observations and the provider's statements to me.        Arnaud Hogue DO  08/09/24 0318

## 2024-08-09 NOTE — CONSULTS
"CLINICAL NUTRITION SERVICES  -  ASSESSMENT NOTE    Recommendations Ordered by Registered Dietitian (RD):   Diet per MD - continue to monitor PO intake, plan to add oral nutritional supplements if declines further    Malnutrition:   % Weight Loss:  unable to assess - no weight hx   % Intake: unable to assess - no nutrition hx   Subcutaneous Fat Loss:  none observed - visual only as pt eating breakfast and disoriented   Muscle Loss:  none observed - visual only as pt eating breakfast and disoriented   Fluid Retention: trace    Malnutrition Diagnosis: Unable to determine due to lack of information       REASON FOR ASSESSMENT  Lennie Mar is a 89 year old female seen by Registered Dietitian for Admission Nutrition Risk Screen for positive    Past medical history: atrial fibrillation, previous anticoagulation with Eliquis (possibly discontinued?), essential tremor, COVID-19 in 3/22, CKD stage III, hyperparathyroidism, breast cancer, hypothyroidism, depression, anxiety, bipolar disorder, and subdural hygroma.     Admitted for:  left lower lung pneumonia and hypoxia     NUTRITION HISTORY  - Information obtained from chart - pt disoriented to time and place  - Food allergies: NKFA    CURRENT NUTRITION ORDERS  Diet Order:     Regular Diet     Current Intake/Tolerance:  No information recorded in the flowsheets to comment on     Obtained from Chart/Interdisciplinary Team:  - Reviewed stooling patterns  - Please refer to flowsheets for more information on skin     ANTHROPOMETRICS  Height: 5' 4\"  Weight: 72.1 kg ( 158 lbs 15.23 oz)   Body mass index is 27.28 kg/m .  Weight Status:  Overweight BMI 25-29.9  Weight History: No recent weight hx to comment on   Wt Readings from Last 10 Encounters:   08/09/24 72.1 kg (158 lb 15.2 oz)   10/17/22 70.3 kg (155 lb)   05/23/22 65.1 kg (143 lb 9.6 oz)   05/18/22 64.6 kg (142 lb 6.4 oz)   05/16/22 64.6 kg (142 lb 6.4 oz)   05/12/22 64.1 kg (141 lb 4.8 oz)   05/09/22 64.1 kg (141 lb 4.8 " oz)   05/03/22 66.2 kg (146 lb)   04/25/22 68.9 kg (152 lb)   04/21/22 68.2 kg (150 lb 6.4 oz)     LABS  Labs reviewed    Labs:  Electrolytes  Potassium (mmol/L)   Date Value   08/08/2024 4.7   07/10/2024 5.4 (H)   07/03/2024 5.5 (H)   06/08/2022 4.6   05/03/2022 4.8   04/04/2022 4.5   02/12/2021 4.4   01/08/2020 3.5   12/23/2019 4.1     Phosphorus (mg/dL)   Date Value   05/06/2019 3.4   08/08/2014 3.5   04/22/2012 3.5   03/17/2012 4.4   03/02/2012 2.9    Blood Glucose  Glucose (mg/dL)   Date Value   08/08/2024 121 (H)   07/03/2024 90   01/10/2024 70   11/01/2023 71   09/27/2023 77   06/08/2022 77   05/03/2022 153 (H)   04/04/2022 76   03/29/2022 88   03/25/2022 80   02/12/2021 119 (H)   01/08/2020 106 (H)   12/23/2019 94   12/02/2019 93   11/12/2019 141 (H)    Inflammatory Markers  CRP Inflammation (mg/L)   Date Value   03/19/2022 57.2 (H)   03/18/2022 114.0 (H)   03/16/2022 116.0 (H)   08/11/2013 89.4 (H)     WBC (10e9/L)   Date Value   02/12/2021 7.8   12/23/2019 5.9   12/02/2019 6.5     WBC Count (10e3/uL)   Date Value   08/08/2024 11.0   07/03/2024 5.2   01/10/2024 4.4     Albumin (g/dL)   Date Value   08/08/2024 3.2 (L)   02/08/2023 3.6   02/08/2023 3.6   06/08/2022 2.6 (L)   03/13/2022 2.3 (L)   03/10/2022 2.7 (L)   02/12/2021 3.2 (L)   01/08/2020 3.2 (L)   12/23/2019 3.0 (L)      Magnesium (mg/dL)   Date Value   03/22/2022 2.3   03/21/2022 2.5 (H)   03/20/2022 2.4 (H)   04/22/2012 2.0   03/18/2012 2.2   03/17/2012 2.6 (H)     Sodium (mmol/L)   Date Value   08/08/2024 141   07/03/2024 144   01/10/2024 144   02/12/2021 145 (H)   01/08/2020 142   12/23/2019 141    Renal  Urea Nitrogen (mg/dL)   Date Value   08/08/2024 25.8 (H)   07/03/2024 24.9 (H)   01/10/2024 18.8   06/08/2022 26   05/03/2022 26   04/04/2022 31 (H)   02/12/2021 21   01/08/2020 20   12/23/2019 26     Creatinine (mg/dL)   Date Value   08/08/2024 1.55 (H)   07/03/2024 1.33 (H)   01/10/2024 1.41 (H)   02/12/2021 1.07 (H)   01/08/2020 1.15 (H)    12/23/2019 1.02     Additional  Triglycerides (mg/dL)   Date Value   03/31/2010 166 (H)     Ketones Urine (mg/dL)   Date Value   08/08/2024 Negative   05/06/2019 Negative        MEDICATIONS  Medications reviewed    Current Facility-Administered Medications   Medication Dose Route Frequency Provider Last Rate Last Admin    azithromycin (ZITHROMAX) tablet 250 mg  250 mg Oral QPM Yasmany Jiménez MD        cefTRIAXone (ROCEPHIN) 2 g vial to attach to  ml bag for ADULTS or NS 50 ml bag for PEDS  2 g Intravenous Q24H Yasmany Jiménez MD        divalproex sodium extended-release (DEPAKOTE ER) 24 hr tablet 500 mg  500 mg Oral At Bedtime Naren Mi DO        letrozole (FEMARA) tablet 2.5 mg  2.5 mg Oral Daily Naren Mi DO        levothyroxine (SYNTHROID/LEVOTHROID) tablet 50 mcg  50 mcg Oral Daily Naren Mi DO        lithium (ESKALITH) capsule 150 mg  150 mg Oral QAM Naren Mi DO        sodium chloride (PF) 0.9% PF flush 3 mL  3 mL Intracatheter Q8H Yasmany Jiménez MD   3 mL at 08/09/24 0007      Current Facility-Administered Medications   Medication Dose Route Frequency Provider Last Rate Last Admin      Current Facility-Administered Medications   Medication Dose Route Frequency Provider Last Rate Last Admin    acetaminophen (TYLENOL) tablet 650 mg  650 mg Oral Q4H PRN Yasmany Jiménez MD        Or    acetaminophen (TYLENOL) Suppository 650 mg  650 mg Rectal Q4H PRN Yasmany Jiménez MD        calcium carbonate (TUMS) chewable tablet 1,000 mg  1,000 mg Oral 4x Daily PRN Yasmany Jiménez MD        guaiFENesin-dextromethorphan (ROBITUSSIN DM) 100-10 MG/5ML syrup 10 mL  10 mL Oral Q4H PRN Yasmany Jiménez MD        lidocaine (LMX4) cream   Topical Q1H PRN Yasmany Jiménez MD        lidocaine 1 % 0.1-1 mL  0.1-1 mL Other Q1H PRN Yasmany Jiménez MD        melatonin tablet 1 mg  1 mg Oral At Bedtime PRN Yasmany Jiménez MD        ondansetron (ZOFRAN ODT)  ODT tab 4 mg  4 mg Oral Q6H PRN Yasmany Jiménez MD        Or    ondansetron (ZOFRAN) injection 4 mg  4 mg Intravenous Q6H PRN Yasmany Jiménez MD        polyethylene glycol (MIRALAX) Packet 17 g  17 g Oral BID PRN Yasmany Jiménez MD        prochlorperazine (COMPAZINE) injection 5 mg  5 mg Intravenous Q6H PRN Yasmany Jiménez MD        Or    prochlorperazine (COMPAZINE) tablet 5 mg  5 mg Oral Q6H PRN Yasmany Jiménez MD        Or    prochlorperazine (COMPAZINE) suppository 12.5 mg  12.5 mg Rectal Q12H PRYasmany Whaley MD        senna-docusate (SENOKOT-S/PERICOLACE) 8.6-50 MG per tablet 1 tablet  1 tablet Oral BID Yasmany Bowen MD        Or    senna-docusate (SENOKOT-S/PERICOLACE) 8.6-50 MG per tablet 2 tablet  2 tablet Oral BID Yasmany Bowen MD        sodium chloride (PF) 0.9% PF flush 3 mL  3 mL Intracatheter q1 min prYasmany Whaley MD            ASSESSED NUTRITION NEEDS PER APPROVED PRACTICE GUIDELINES:    Dosing Weight 72.1 kg   Estimated Energy Needs: 5618-7627 kcals (25-30 Kcal/Kg)  Justification: maintenance  Estimated Protein Needs: 72-87 grams protein (1-1.2 g pro/Kg)  Justification: maintenance  Estimated Fluid Needs: per MD     NUTRITION DIAGNOSIS:  Predicted inadequate nutrient intake related to potential for PO intake to decline pending clinical course     NUTRITION INTERVENTIONS  Recommendations / Nutrition Prescription  Diet per MD - continue to monitor PO intake, plan to add oral nutritional supplements if declines further     Implementation  Nutrition education: Not appropriate at this time due to patient condition    Nutrition Goals  Patient to consume 75% of meals or oral nutritional supplements ordered TID    MONITORING AND EVALUATION:  Progress towards goals will be monitored and evaluated per protocol and Practice Guidelines    Divya Saucedo RD, LD  Clinical Dietitian     3rd floor/ICU: [phone]  All other floors:  [phone]  Weekend/holiday: [phone]  Office: [phone]

## 2024-08-09 NOTE — PROGRESS NOTES
08/09/24 1500   Appointment Info   Signing Clinician's Name / Credentials (SLP) MAREK Menezes SLP   General Information   Onset of Illness/Injury or Date of Surgery 08/08/24   Referring Physician Yasmany Jiménez MD   Pertinent History of Current Problem Lennie Mar is a 89 year old female with a history of chronic kidney disease stage III, paroxysmal atrial fibrillation, essential tremor, hypothyroidism, depression section anxiety, macrocytic anemia, bipolar disorder admitted on 8/8/2024 with altered mental status.  In the emergency department, the patient was found to have a temperature of 98.2  F, blood pressure 121/61, heart rate 89, respiratory rate 16, SpO2 92% on 2 L nasal cannula.  Initial lab work showed chloride 109, bicarb 20, BUN/creatinine 24.9/1.55, high-sensitivity troponin 44 and 42 upon repeat, lactic acid 1.4, WBC 11.0, hemoglobin 10.0.  COVID-19, influenza, RSV were negative.  Urinalysis was negative for signs of infection.  Chest x-ray showed left basilar opacity with blunting of the costophrenic angle suggesting atelectasis versus infiltrate.  The patient was started on ceftriaxone and azithromycin for suspected community-acquired pneumonia.  The patient's oxygen was weaned. Clinical swallow eval completed per MD orders.   Type of Evaluation   Type of Evaluation Swallow Evaluation   Oral Motor   Oral Musculature unable to assess due to poor participation/comprehension   Structural Abnormalities none present   Dentition (Oral Motor)   Dentition (Oral Motor) significant number of missing teeth   Facial Symmetry (Oral Motor)   Facial Symmetry (Oral Motor) unable/difficult to assess   Lip Function (Oral Motor)   Lip Range of Motion (Oral Motor) unable/difficult to assess   Tongue Function (Oral Motor)   Tongue ROM (Oral Motor) unable/difficult to assess   Jaw Function (Oral Motor)   Jaw Function (Oral Motor) unable/difficult to assess   Cough/Swallow/Gag Reflex (Oral Motor)   Soft  Palate/Velum (Oral Motor) unable/difficult to assess   Volitional Swallow (Oral Motor) unable/difficult to assess   Vocal Quality/Secretion Management (Oral Motor)   Vocal Quality (Oral Motor) dysphonic   General Swallowing Observations   Past History of Dysphagia Per EMR chart review, pt was recommended a regular diet and thin liquids following VFSS   Respiratory Support room air   Current Diet/Method of Nutritional Intake (General Swallowing Observations, NIS) thin liquids (level 0);pureed (dysphagia pureed) (level 4)   Swallowing Evaluation Clinical swallow evaluation   Clinical Swallow Evaluation   Feeding Assistance dependent   Clinical Swallow Evaluation Textures Trialed thin liquids   Clinical Swallow Eval: Thin Liquid Texture Trial   Mode of Presentation, Thin Liquids straw;cup   Volume of Liquid or Food Presented 4 oz   Pharyngeal Phase of Swallow impaired   Diagnostic Statement Wet vocal quality with initial straw sip of thin liquid   Esophageal Phase of Swallow   Patient reports or presents with symptoms of esophageal dysphagia No   Swallowing Recommendations   Diet Consistency Recommendations pureed (level 4);thin liquids (level 0)   Supervision Level for Intake close supervision needed   Mode of Delivery Recommendations bolus size, small;slow rate of intake   Swallowing Maneuver Recommendations alternate food and liquid intake   Monitoring/Assistance Required (Eating/Swallowing) stop eating activities when fatigue is present;monitor for cough or change in vocal quality with intake   Recommended Feeding/Eating Techniques (Swallow Eval) maintain upright sitting position for eating;minimize distractions during oral intake   Medication Administration Recommendations, Swallowing (SLP) Meds OK whole in puree   Instrumental Assessment Recommendations instrumental evaluation not recommended at this time   General Therapy Interventions   Planned Therapy Interventions Dysphagia Treatment   Dysphagia treatment  Modified diet education;Instruction of safe swallow strategies   Clinical Impression   Criteria for Skilled Therapeutic Interventions Met (SLP Eval) Yes, treatment indicated   Risks & Benefits of therapy have been explained evaluation/treatment results reviewed;care plan/treatment goals reviewed;risks/benefits reviewed;current/potential barriers reviewed;participants voiced agreement with care plan;participants included;patient   Clinical Impression Comments Clinical swallow eval completed per MD orders. Pt presents with acute on chronic orophrayngeal dysphagia in the setting of pneumonia and hypoxia. Per family report, pt consumes a pureed diet at baseline. Pt highly resistant to cares. Pt declined participation in oral mech exam and refused to trial textures this date despite max encouragement from SLP. Pt agreeable to trial 2 sips of thin liquid which resulted in wet vocal quality x1; however, pt began speaking before swallow could be completed, possibly attributing to overt s/sx of aspiration. Recommend pt continue the pureed diet (4) with thin liquids (0) with close supervision. Pt should be upright and altert for all PO, take small bites/sips, alternate between consistencies, and slow pacing. Anticipate pt will require close ST follow-up at discharge given chronic dysphagia and memory impairment.   SLP Total Evaluation Time   Eval: oral/pharyngeal swallow function, clinical swallow Minutes ([id number]) 9   SLP Goals   Therapy Frequency (SLP Eval) 5 times/week   SLP Predicted Duration/Target Date for Goal Attainment 09/09/24   SLP Discharge Planning   SLP Plan Additional trials of pureed textures and thin liquids with modification of diet as appropriate. Pt at baseline swallow function   SLP Discharge Recommendation skilled nursing facility   SLP Rationale for DC Rec Pt at baseline swallow function   SLP Brief overview of current status  Recommend pt continue the pureed diet (4) with thin liquids (0) with close  supervision. Pt should be upright and altert for all PO, take small bites/sips, alternate between consistencies, and slow pacing.   Total Session Time   Total Session Time (sum of timed and untimed services) 9

## 2024-08-09 NOTE — PLAN OF CARE
"End of shift note: Pt alert and disoriented to time and place. Pt up 2 assist w/ Lift. Purewick in place. BC no grwoth after 12 hrs. K+ within range, recheck tomorrow AM. LS diminished. Speech consulted.     Goal Outcome Evaluation:      Plan of Care Reviewed With: patient, family    Overall Patient Progress: improvingOverall Patient Progress: improving    Outcome Evaluation: Denies pain. Sating well on RA. VSS, afebrile.      Problem: Adult Inpatient Plan of Care  Goal: Plan of Care Review  Description: The Plan of Care Review/Shift note should be completed every shift.  The Outcome Evaluation is a brief statement about your assessment that the patient is improving, declining, or no change.  This information will be displayed automatically on your shift  note.  Outcome: Progressing  Flowsheets (Taken 8/9/2024 1822)  Outcome Evaluation: Denies pain. Sating well on RA. VSS, afebrile.  Plan of Care Reviewed With:   patient   family  Overall Patient Progress: improving  Goal: Patient-Specific Goal (Individualized)  Description: You can add care plan individualizations to a care plan. Examples of Individualization might be:  \"Parent requests to be called daily at 9am for status\", \"I have a hard time hearing out of my right ear\", or \"Do not touch me to wake me up as it startles  me\".  Outcome: Progressing  Goal: Absence of Hospital-Acquired Illness or Injury  Outcome: Progressing  Intervention: Identify and Manage Fall Risk  Recent Flowsheet Documentation  Taken 8/9/2024 1802 by Dalila Gleason, RN  Safety Promotion/Fall Prevention: safety round/check completed  Taken 8/9/2024 1734 by Dalila Gleason, RN  Safety Promotion/Fall Prevention: safety round/check completed  Taken 8/9/2024 1630 by Dalila Gleason, RN  Safety Promotion/Fall Prevention: safety round/check completed  Taken 8/9/2024 1515 by Dalila Gleason, RN  Safety Promotion/Fall Prevention: safety round/check completed  Taken 8/9/2024 1410 by Victorino, " CASSIE Conner  Safety Promotion/Fall Prevention: safety round/check completed  Taken 8/9/2024 1319 by Dalila Gleason RN  Safety Promotion/Fall Prevention: safety round/check completed  Taken 8/9/2024 1205 by Dalila Gleason RN  Safety Promotion/Fall Prevention: safety round/check completed  Taken 8/9/2024 1100 by Dalila Gleason RN  Safety Promotion/Fall Prevention: safety round/check completed  Taken 8/9/2024 0943 by Dalila Gleason RN  Safety Promotion/Fall Prevention: safety round/check completed  Taken 8/9/2024 0719 by Dalila Gleason RN  Safety Promotion/Fall Prevention: safety round/check completed  Intervention: Prevent Skin Injury  Recent Flowsheet Documentation  Taken 8/9/2024 0943 by Dalila Gleason RN  Body Position:   weight shifting   log-rolled  Intervention: Prevent and Manage VTE (Venous Thromboembolism) Risk  Recent Flowsheet Documentation  Taken 8/9/2024 0943 by Dalila Gleason RN  VTE Prevention/Management: SCDs off (sequential compression devices)  Intervention: Prevent Infection  Recent Flowsheet Documentation  Taken 8/9/2024 0943 by Dalila Gleason RN  Infection Prevention:   rest/sleep promoted   single patient room provided   hand hygiene promoted  Goal: Optimal Comfort and Wellbeing  Outcome: Progressing  Goal: Readiness for Transition of Care  Outcome: Progressing     Problem: Fall Injury Risk  Goal: Absence of Fall and Fall-Related Injury  Outcome: Progressing  Intervention: Identify and Manage Contributors  Recent Flowsheet Documentation  Taken 8/9/2024 0943 by Dalila Gleason RN  Medication Review/Management: medications reviewed  Intervention: Promote Injury-Free Environment  Recent Flowsheet Documentation  Taken 8/9/2024 1802 by Dalila Gleason RN  Safety Promotion/Fall Prevention: safety round/check completed  Taken 8/9/2024 1734 by Dalila Gleason RN  Safety Promotion/Fall Prevention: safety round/check completed  Taken 8/9/2024 1630 by Dalila Gleason  RN  Safety Promotion/Fall Prevention: safety round/check completed  Taken 8/9/2024 1515 by Dalila Gleason RN  Safety Promotion/Fall Prevention: safety round/check completed  Taken 8/9/2024 1410 by Dalila Gleason RN  Safety Promotion/Fall Prevention: safety round/check completed  Taken 8/9/2024 1319 by Dalila Gleason RN  Safety Promotion/Fall Prevention: safety round/check completed  Taken 8/9/2024 1205 by Dalila Gleason RN  Safety Promotion/Fall Prevention: safety round/check completed  Taken 8/9/2024 1100 by Dalila Gleason RN  Safety Promotion/Fall Prevention: safety round/check completed  Taken 8/9/2024 0943 by Dalila Gleason RN  Safety Promotion/Fall Prevention: safety round/check completed  Taken 8/9/2024 0719 by Dalila Gleason RN  Safety Promotion/Fall Prevention: safety round/check completed     Problem: Infection  Goal: Absence of Infection Signs and Symptoms  Outcome: Progressing  Intervention: Prevent or Manage Infection  Recent Flowsheet Documentation  Taken 8/9/2024 0943 by Dalila Gleason RN  Isolation Precautions: contact precautions maintained     Problem: Gas Exchange Impaired  Goal: Optimal Gas Exchange  Outcome: Progressing

## 2024-08-09 NOTE — PLAN OF CARE
Goal Outcome Evaluation:    Alert to self. Denies pain, sob. Updated son.       Plan of Care Reviewed With: patient    Overall Patient Progress: no changeOverall Patient Progress: no change    Outcome Evaluation: Denies pain and sob. RA, FLORES.     Problem: Adult Inpatient Plan of Care  Goal: Plan of Care Review  Description: The Plan of Care Review/Shift note should be completed every shift.  The Outcome Evaluation is a brief statement about your assessment that the patient is improving, declining, or no change.  This information will be displayed automatically on your shift  note.  Outcome: Progressing  Flowsheets (Taken 8/9/2024 0308)  Outcome Evaluation: Denies pain and sob. RA, but uses accessory muscule to breath.  Plan of Care Reviewed With: patient  Overall Patient Progress: no change  Goal: Absence of Hospital-Acquired Illness or Injury  Intervention: Identify and Manage Fall Risk  Recent Flowsheet Documentation  Taken 8/8/2024 2351 by Mary Whittaker, RN  Safety Promotion/Fall Prevention:   treat underlying cause   supervised activity   toileting scheduled   safety round/check completed   room organization consistent  Intervention: Prevent Skin Injury  Recent Flowsheet Documentation  Taken 8/8/2024 2351 by Mary Whittaker, RN  Body Position: position changed independently  Goal: Readiness for Transition of Care  Intervention: Mutually Develop Transition Plan  Recent Flowsheet Documentation  Taken 8/8/2024 2300 by Mary Whittaker, RN  Equipment Currently Used at Home: walker, rolling     Problem: Fall Injury Risk  Goal: Absence of Fall and Fall-Related Injury  Intervention: Promote Injury-Free Environment  Recent Flowsheet Documentation  Taken 8/8/2024 2351 by Mary Whittaker, CASSIE  Safety Promotion/Fall Prevention:   treat underlying cause   supervised activity   toileting scheduled   safety round/check completed   room organization consistent

## 2024-08-09 NOTE — PHARMACY-ADMISSION MEDICATION HISTORY
Pharmacist Admission Medication History    Admission medication history is complete. The information provided in this note is only as accurate as the sources available at the time of the update.    Information Source(s): Facility ([facility]/) medication list/MAR via N/A    Pertinent Information: medlist from [facility]    Changes made to PTA medication list:  Added: cipro, cranberry, hydrocortisone cream, lidocaine patch, imodium, ativan, refresh opth, ventolin MDI  Deleted: eliquis, abilify, seroquel  Changed: synthroid, vitamin d3, APAP    Allergies reviewed with patient and updates made in EHR: yes    Medication History Completed By: Maged Orellana RPH 8/8/2024 10:57 PM    PTA Med List   Medication Sig Last Dose    acetaminophen (TYLENOL) 325 MG tablet Take 650 mg by mouth 3 times daily     albuterol (PROAIR HFA/PROVENTIL HFA/VENTOLIN HFA) 108 (90 Base) MCG/ACT inhaler Inhale 2 puffs into the lungs every 6 hours as needed for shortness of breath, wheezing or cough Unknown    alum & mag hydroxide-simethicone (MAALOX) 200-200-20 MG/5ML SUSP suspension Take 30 mLs by mouth every 4 hours as needed for indigestion     alum & mag hydroxide-simethicone (MAALOX) 200-200-20 MG/5ML SUSP suspension Take 30 mLs by mouth every 4 hours as needed for indigestion Unknown    bisacodyl (DULCOLAX) 10 MG suppository Place 1 suppository (10 mg) rectally daily as needed for constipation Unknown    ciprofloxacin (CIPRO) 250 MG tablet Take 250 mg by mouth daily For 7 days, end date 8/8/24 Unknown    Cranberry 500 MG CAPS Take 500 mg by mouth 2 times daily Unknown    divalproex sodium extended-release (DEPAKOTE ER) 500 MG 24 hr tablet Take 500 mg by mouth At Bedtime Unknown    famotidine (PEPCID) 20 MG tablet Take 1 tablet (20 mg) by mouth every 48 hours Unknown    hydrocortisone 1 % CREA cream Place rectally 2 times daily Unknown    ipratropium-albuterol (COMBIVENT RESPIMAT)  MCG/ACT inhaler Inhale 1 puff into the lungs  4 times daily as needed for shortness of breath / dyspnea or wheezing Unknown    letrozole (FEMARA) 2.5 MG tablet TAKE 1 TABLET BY MOUTH EVERY DAY Unknown    levothyroxine (SYNTHROID/LEVOTHROID) 75 MCG tablet Take 50 mcg by mouth daily Unknown    Lidocaine (LIDOCARE) 4 % Patch Place 1 patch onto the skin daily as needed for moderate pain To prevent lidocaine toxicity, patient should be patch free for 12 hrs daily.     lithium (ESKALITH) 150 MG capsule Take 150 mg by mouth every morning  Unknown    loperamide (IMODIUM) 2 MG capsule Take 2 mg by mouth 4 times daily as needed for diarrhea     LORazepam (ATIVAN) 0.25 mg TABS half-tab Take 0.25 mg by mouth nightly as needed for anxiety     magnesium hydroxide (MILK OF MAGNESIA) 400 MG/5ML suspension Take 30 mLs by mouth daily as needed for constipation Unknown    melatonin 1 MG TABS tablet Take 1 tablet (1 mg) by mouth nightly as needed for sleep Unknown    ondansetron (ZOFRAN-ODT) 4 MG ODT tab Take 1 tablet (4 mg) by mouth every 6 hours as needed for nausea or vomiting Unknown    polyvinyl alcohol-povidone PF (REFRESH) 1.4-0.6 % ophthalmic solution Place 2 drops into both eyes 2 times daily     senna-docusate (SENOKOT-S/PERICOLACE) 8.6-50 MG tablet Take 1 tablet by mouth 2 times daily as needed for constipation Unknown    vitamin D3 (CHOLECALCIFEROL) 50 mcg (2000 units) tablet Take 1 tablet by mouth daily Unknown    zinc Oxide (DESITIN) 40 % paste Apply topically as needed for dry skin or irritation Unknown

## 2024-08-09 NOTE — ED TRIAGE NOTES
Veronica from facility. Per EMS report: This morning had a fever of 99, this afternoon was hypotensive in the 80s systolic, and hypoxic on RA. Had trouble ambulating which is new for pt. En route , VSS on 2L NC. 20G IV in L AC. History of bipolar, HTN, CKD, AF.

## 2024-08-09 NOTE — CONSULTS
Care Management Initial Consult    General Information  Assessment completed with: Family, Marta (daughter-in-law) and [facility] Nurse, Zelda.   Type of CM/SW Visit: Initial Assessment    Primary Care Provider verified and updated as needed: Yes   Readmission within the last 30 days:        Reason for Consult: discharge planning  Advance Care Planning: Advance Care Planning Reviewed: present on chart          Communication Assessment  Patient's communication style: spoken language (English or Bilingual)    Hearing Difficulty or Deaf: no (son answered questions)   Wear Glasses or Blind: yes    Cognitive  Cognitive/Neuro/Behavioral: .WDL except  Level of Consciousness: alert, confused  Arousal Level: opens eyes spontaneously  Orientation: disoriented to, time, place  Mood/Behavior: calm, cooperative          Living Environment:   People in home: facility resident     Current living Arrangements: assisted living  Name of Facility:  ([facility])   Able to return to prior arrangements: yes       Family/Social Support:  Care provided by:  ([facility], [facility] staff)  Provides care for: no one, unable/limited ability to care for self  Marital Status:              Description of Support System: Supportive, Involved         Current Resources:   Patient receiving home care services: No     Community Resources:  Assisted Living  Equipment currently used at home: walker, standard  Supplies currently used at home:      Employment/Financial:  Financial Concerns: none        Does the patient's insurance plan have a 3 day qualifying hospital stay waiver?  No    Lifestyle & Psychosocial Needs:  Social Determinants of Health     Food Insecurity: Not on file   Depression: Not at risk (1/7/2021)    PHQ-2     PHQ-2 Score: 0   Housing Stability: Not on file   Tobacco Use: Medium Risk (6/7/2023)    Patient History     Smoking Tobacco Use: Former     Smokeless Tobacco Use: Never     Passive  Exposure: Not on file   Financial Resource Strain: Not on file   Alcohol Use: Not on file   Transportation Needs: Not on file   Physical Activity: Not on file   Interpersonal Safety: Not on file   Stress: Not on file   Social Connections: Not on file   Health Literacy: Not on file       Functional Status:  Prior to admission patient needed assistance:   Dependent ADLs:: Ambulation-walker, Bathing, Dressing, Grooming, Transfers, Toileting  Dependent IADLs:: Cleaning, Cooking, Laundry, Shopping, Meal Preparation, Medication Management, Transportation     Additional Information:  Patient admitted with fevers, altered mental status; diagnosed with LLL community acquired pneumonia. Per chart review, she resides at [facility]. Spoke with patient's daughter-in-law, Marta. She confirms patient lives at [facility] and give approval for CM to talk with [facility] staff.     Called and spoke with CASSIE Ndiaye at [facility]. Patient is a resident in the assisted living. At baseline, she answers questions with difficulty speaking. She ambulates with a walker and SBA with transfers. Facility provides all cares including medications, meals, bathing and personal care.     [facility] contact (name/number): building #[phone], Nursing ph#[phone]. Clinical Director, Bryanna Young, ph#[phone], coordinates patients returning for hospital.   Fax #: [phone]  Barriers to pt returning:  prefers M-F returns, only on-call nurse on the weekend  Baseline mobility: SBA x1 transfer and walker  Time of preferred return: prefers discharge orders by 10 am or as soon as available. Facility prefer patient returns before 2 pm M-F.  New meds/prescriptions/Pharmacy: [facility]  Transportation: family vs [facility]. Family considering providing transport.     Other: For weekend returns, call Clinical Director, Bryanna at ph#[phone]    RN CC/SW will continue to follow for discharge planning and return to  facility.    Héctor Jones RN Case Manager  Inpatient Care Coordination   [facility]   [phone]      Héctor Jones RN

## 2024-08-09 NOTE — PROGRESS NOTES
[facility]    Hospitalist Progress Note  Name: Lennie Mar    MRN: [id number]  Provider:  Naren Mi DO  Date of Service: 08/09/2024    Summary of Stay: Lennie Mar is a 89 year old female with a history of chronic kidney disease stage III, paroxysmal atrial fibrillation, essential tremor, hypothyroidism, depression section anxiety, macrocytic anemia, bipolar disorder admitted on 8/8/2024 with altered mental status.  In the emergency department, the patient was found to have a temperature of 98.2  F, blood pressure 121/61, heart rate 89, respiratory rate 16, SpO2 92% on 2 L nasal cannula.  Initial lab work showed chloride 109, bicarb 20, BUN/creatinine 24.9/1.55, high-sensitivity troponin 44 and 42 upon repeat, lactic acid 1.4, WBC 11.0, hemoglobin 10.0.  COVID-19, influenza, RSV were negative.  Urinalysis was negative for signs of infection.  Chest x-ray showed left basilar opacity with blunting of the costophrenic angle suggesting atelectasis versus infiltrate.  The patient was started on ceftriaxone and azithromycin for suspected community-acquired pneumonia.  The patient's oxygen was weaned.  Speech therapy was consulted to see the patient.    TODAY'S PLAN:  Continue ceftriaxone and azithromycin.  Encephalopathy improving as well as patient is able to tell me that it is August and that she is in the hospital.  Updated son Dmitry via phone who is a primary care doctor.  Blood culture pending.  Will check lithium level as well given her encephalopathy.  He reports the patient is on a mechanical soft diet or puréed diet at baseline.  Will ask for speech therapy assistance and changed to mechanical soft diet for now.  Anticipate discharge back to [facility] tomorrow if continued improvement in her mentation.  Appreciate social work assistance with arrangements.    Problem List:   Acute Hypoxic Respiratory Failure  LLL Community Acquired Pneumonia  Mild Metabolic  Encephalopathy  - Continue Ceftriaxone and azithromycin  - Appreciate SLP recommendations  - Weaned off oxygen  - Check lithium level    Acute Kidney Injury  Chronic Kidney Disease Stage 3  - Baseline Cr = 1.1-1.4  - IVF in the ED  - Daily BMP    Elevated Troponin  - Likely secondary to acute infection and mild hypoxia  - No further work up at this time as pt is asymptomatic    Recent Pseudomonas Aeruginosa UTI  - Completed course of ciprofloxacin on 8/8    Chronic Medical Problems:  Paroxysmal Atrial Fibrillation  Essential Tremor  Hypothyroidism  Depression/Anxiety  Macrocytic Anemia  Bipolar Disorder    I spent 48 minutes in reviewing this patient's labs, imaging, medications, medical history.  In addition time was spent interviewing the patient, communicating with family, and medical decision making.      DVT Prophylaxis: Pneumatic Compression Devices  Code Status: No CPR- Do NOT Intubate  Diet: Mechanical/Dental Soft Diet    Kelley Catheter: Not present    Disposition: Medically Ready for Discharge: Anticipated Tomorrow  Goals to discharge include: infection improving, mentation improving  Family updated today: No     Interval History   Pt seen and examined.  Pt knows she is in the hospital and it is August.  Denies sob or cough.    -Data reviewed today: I personally reviewed all new labs and imaging results over the last 24 hours.     Physical Exam   Temp: 97.3  F (36.3  C) Temp src: Oral BP: 117/57 Pulse: 92   Resp: 20 SpO2: 93 % O2 Device: None (Room air) Oxygen Delivery: 2 LPM  Vitals:    08/08/24 2351 08/09/24 0609   Weight: 72.5 kg (159 lb 13.3 oz) 72.1 kg (158 lb 15.2 oz)     Vital Signs with Ranges  Temp:  [97.3  F (36.3  C)-98.2  F (36.8  C)] 97.3  F (36.3  C)  Pulse:  [82-94] 92  Resp:  [16-22] 20  BP: (102-126)/(42-86) 117/57  SpO2:  [90 %-96 %] 93 %  I/O last 3 completed shifts:  In: 480 [IV Piggyback:480]  Out: 600 [Urine:600]    GENERAL: No apparent distress. Awake, alert, and fully  "oriented.  HEENT: Normocephalic, atraumatic. Extraocular movements intact.  CARDIOVASCULAR: Regular rate and rhythm without murmurs or rubs. No S3.  PULMONARY: Clear bilaterally.  GASTROINTESTINAL: Soft, non-tender, non-distended. Bowel sounds normoactive.   EXTREMITIES: No cyanosis or clubbing. No edema.  NEUROLOGICAL: CN 2-12 grossly intact, no focal neurological deficits.  DERMATOLOGICAL: No rash, ulcer, bruising, nor jaundice.    Medications   Current Facility-Administered Medications   Medication Dose Route Frequency Provider Last Rate Last Admin     Current Facility-Administered Medications   Medication Dose Route Frequency Provider Last Rate Last Admin    azithromycin (ZITHROMAX) tablet 250 mg  250 mg Oral QPM Yasmany Jiménez MD        cefTRIAXone (ROCEPHIN) 2 g vial to attach to  ml bag for ADULTS or NS 50 ml bag for PEDS  2 g Intravenous Q24H Yasmany Jiménez MD        divalproex sodium extended-release (DEPAKOTE ER) 24 hr tablet 500 mg  500 mg Oral At Bedtime Naren Mi DO        letrozole (FEMARA) tablet 2.5 mg  2.5 mg Oral Daily Naren Mi DO   2.5 mg at 08/09/24 0945    levothyroxine (SYNTHROID/LEVOTHROID) tablet 50 mcg  50 mcg Oral Daily Naren Mi DO   50 mcg at 08/09/24 0945    lithium (ESKALITH) capsule 150 mg  150 mg Oral QAM Naren Mi DO   150 mg at 08/09/24 0945    sodium chloride (PF) 0.9% PF flush 3 mL  3 mL Intracatheter Q8H Yasmany Jiménez MD   3 mL at 08/09/24 0947     Data     Laboratory:  Recent Labs   Lab 08/08/24 1955   WBC 11.0   HGB 10.0*   HCT 32.3*   *        Recent Labs   Lab 08/08/24 1955      POTASSIUM 4.7   CHLORIDE 109*   CO2 20*   ANIONGAP 12   *   BUN 25.8*   CR 1.55*   GFRESTIMATED 30*   CANELO 10.1     No results for input(s): \"CULT\" in the last 168 hours.  Troponin I ES   Date Value Ref Range Status   03/17/2012 <0.012 0.000 - 0.034 ug/L Final       Imaging:  Recent Results (from the past " 24 hour(s))   XR Chest 2 Views    Narrative    EXAM: XR CHEST 2 VIEWS  LOCATION: [facility]  DATE: 8/8/2024    INDICATION: Hypoxia.  COMPARISON: 5/3/2022      Impression    IMPRESSION:     Hypoinflated lungs, limiting evaluation. Left basilar opacity with blunting of the costophrenic angle, which may be atelectasis, infiltrate, and/or trace effusion.    No focal airspace disease in the right lung. No right pleural effusion. No pneumothorax.    Stable mild cardiomegaly.         Naren Mi DO  [facility] Hospitalist  [address] [place] [address]  Securely message with [employer] (more info)  Text page via [facility] Paging/Directory   08/09/2024

## 2024-08-10 ENCOUNTER — LAB REQUISITION (OUTPATIENT)
Dept: LAB | Facility: CLINIC | Age: 89
End: 2024-08-10
Payer: MEDICARE

## 2024-08-10 DIAGNOSIS — R53.83 OTHER FATIGUE: ICD-10-CM

## 2024-08-10 LAB
ANION GAP SERPL CALCULATED.3IONS-SCNC: 12 MMOL/L (ref 7–15)
BUN SERPL-MCNC: 26.1 MG/DL (ref 8–23)
CALCIUM SERPL-MCNC: 10 MG/DL (ref 8.8–10.4)
CHLORIDE SERPL-SCNC: 114 MMOL/L (ref 98–107)
CREAT SERPL-MCNC: 1.55 MG/DL (ref 0.51–0.95)
EGFRCR SERPLBLD CKD-EPI 2021: 30 ML/MIN/1.73M2
GLUCOSE SERPL-MCNC: 95 MG/DL (ref 70–99)
HCO3 SERPL-SCNC: 19 MMOL/L (ref 22–29)
POTASSIUM SERPL-SCNC: 5.3 MMOL/L (ref 3.4–5.3)
SODIUM SERPL-SCNC: 145 MMOL/L (ref 135–145)

## 2024-08-10 PROCEDURE — 120N000001 HC R&B MED SURG/OB

## 2024-08-10 PROCEDURE — 250N000013 HC RX MED GY IP 250 OP 250 PS 637: Performed by: INTERNAL MEDICINE

## 2024-08-10 PROCEDURE — 36415 COLL VENOUS BLD VENIPUNCTURE: CPT | Performed by: INTERNAL MEDICINE

## 2024-08-10 PROCEDURE — 99232 SBSQ HOSP IP/OBS MODERATE 35: CPT | Performed by: INTERNAL MEDICINE

## 2024-08-10 PROCEDURE — 80048 BASIC METABOLIC PNL TOTAL CA: CPT | Performed by: INTERNAL MEDICINE

## 2024-08-10 RX ORDER — CEFDINIR 300 MG/1
300 CAPSULE ORAL DAILY
Status: DISCONTINUED | OUTPATIENT
Start: 2024-08-10 | End: 2024-08-12 | Stop reason: HOSPADM

## 2024-08-10 RX ORDER — CEFDINIR 300 MG/1
300 CAPSULE ORAL EVERY 12 HOURS SCHEDULED
Status: DISCONTINUED | OUTPATIENT
Start: 2024-08-10 | End: 2024-08-10 | Stop reason: DRUGHIGH

## 2024-08-10 RX ADMIN — DIVALPROEX SODIUM 500 MG: 500 TABLET, FILM COATED, EXTENDED RELEASE ORAL at 21:54

## 2024-08-10 RX ADMIN — CEFDINIR 300 MG: 300 CAPSULE ORAL at 09:45

## 2024-08-10 RX ADMIN — LEVOTHYROXINE SODIUM 50 MCG: 0.05 TABLET ORAL at 09:45

## 2024-08-10 RX ADMIN — AZITHROMYCIN DIHYDRATE 250 MG: 250 TABLET ORAL at 21:54

## 2024-08-10 RX ADMIN — LITHIUM CARBONATE 150 MG: 150 CAPSULE, GELATIN COATED ORAL at 09:45

## 2024-08-10 RX ADMIN — SENNOSIDES AND DOCUSATE SODIUM 2 TABLET: 50; 8.6 TABLET ORAL at 21:53

## 2024-08-10 RX ADMIN — LETROZOLE 2.5 MG: 2.5 TABLET ORAL at 09:45

## 2024-08-10 ASSESSMENT — ACTIVITIES OF DAILY LIVING (ADL)
ADLS_ACUITY_SCORE: 52
ADLS_ACUITY_SCORE: 51
ADLS_ACUITY_SCORE: 51
ADLS_ACUITY_SCORE: 52
ADLS_ACUITY_SCORE: 51
ADLS_ACUITY_SCORE: 55
ADLS_ACUITY_SCORE: 55
ADLS_ACUITY_SCORE: 52
ADLS_ACUITY_SCORE: 62
ADLS_ACUITY_SCORE: 51
ADLS_ACUITY_SCORE: 52
ADLS_ACUITY_SCORE: 52
ADLS_ACUITY_SCORE: 51
ADLS_ACUITY_SCORE: 52

## 2024-08-10 NOTE — PROGRESS NOTES
[facility]    Hospitalist Progress Note  Name: Lennie Mar    MRN: [id number]  Provider:  Naren Mi DO  Date of Service: 08/10/2024    Summary of Stay: Lennie Mar is a 89 year old female with a history of chronic kidney disease stage III, paroxysmal atrial fibrillation, essential tremor, hypothyroidism, depression section anxiety, macrocytic anemia, bipolar disorder admitted on 8/8/2024 with altered mental status.  In the emergency department, the patient was found to have a temperature of 98.2  F, blood pressure 121/61, heart rate 89, respiratory rate 16, SpO2 92% on 2 L nasal cannula.  Initial lab work showed chloride 109, bicarb 20, BUN/creatinine 24.9/1.55, high-sensitivity troponin 44 and 42 upon repeat, lactic acid 1.4, WBC 11.0, hemoglobin 10.0.  COVID-19, influenza, RSV were negative.  Urinalysis was negative for signs of infection.  Chest x-ray showed left basilar opacity with blunting of the costophrenic angle suggesting atelectasis versus infiltrate.  The patient was started on ceftriaxone and azithromycin for suspected community-acquired pneumonia.  The patient's oxygen was weaned.  Speech therapy was consulted to see the patient who recommended a pureed diet (level 4) with thin liquids.     TODAY'S PLAN:  Pt doing well.  Had episode of delirium last night but seems to be more aware this morning.  She knows she is in the hospital and that she is here for pneumonia.  She knows it is August.  She reports feeling hungry and is asking for assistance ordering breakfast.  Transition to cefdinir and continue azithromycin with plans for 5 days total of antibiotics.  Updated son Naren who is a physician.  He reports issues in the past with weekend discharges to the point that the patient ended up hospitalized because of staffing issues and is hesitant to have the patient discharged on a weekend.  Given the patient's advanced age this seems reasonable.  The patient does have a  history of delirium and sundowning with hospitalizations and at one point was aphasic, but sounds like this was most likely due to supratherapeutic lithium level.  Tentative plan for discharge back to [facility] on Monday.  All questions were answered.    Problem List:   Acute Hypoxic Respiratory Failure  LLL Community Acquired Pneumonia  Mild Metabolic Encephalopathy  - Continue Ceftriaxone and azithromycin  - Appreciate SLP recommendations - pureed diet (level 4) with thin liquids  - Weaned off oxygen  - Lithium level = 0.61 on 8/9     Acute Kidney Injury  Chronic Kidney Disease Stage 3  - Baseline Cr = 1.1-1.4  - IVF in the ED  - Daily BMP     Elevated Troponin  - Likely secondary to acute infection and mild hypoxia  - No further work up at this time as pt is asymptomatic     Recent Pseudomonas Aeruginosa UTI  - Completed course of ciprofloxacin on 8/8     Chronic Medical Problems:  Paroxysmal Atrial Fibrillation  Essential Tremor  Hypothyroidism  Depression/Anxiety  Macrocytic Anemia  Bipolar Disorder     I spent 46 minutes in reviewing this patient's labs, imaging, medications, medical history.  In addition time was spent interviewing the patient, communicating with family, and medical decision making.      DVT Prophylaxis: Pneumatic Compression Devices  Code Status: No CPR- Do NOT Intubate  Diet: Combination Diet (Pt should be upright & alert for PO, take small bites/sips, alternate between consistencies, and slow pacing. Hold PO with decline in mentation or s/sx of aspiration); Pureed Diet (level 4); Thin Liquids (level 0)    Kelley Catheter: Not present    Disposition: Medically Ready for Discharge: Anticipated in 2-4 Days  Goals to discharge include: [facility] able to accept pt back  Family updated today: Yes      Interval History   Pt seen and examined.  Pt reports feeling well.  Denies any sob.    -Data reviewed today: I personally reviewed all new labs and imaging results over the last 24  hours.     Physical Exam   Temp: 98.1  F (36.7  C) Temp src: Oral BP: (!) 142/60 Pulse: 79   Resp: 20 SpO2: 93 % O2 Device: None (Room air)    Vitals:    08/08/24 2351 08/09/24 0609 08/10/24 0635   Weight: 72.5 kg (159 lb 13.3 oz) 72.1 kg (158 lb 15.2 oz) 72.5 kg (159 lb 13.3 oz)     Vital Signs with Ranges  Temp:  [97.3  F (36.3  C)-99  F (37.2  C)] 98.1  F (36.7  C)  Pulse:  [] 79  Resp:  [18-20] 20  BP: (117-142)/(50-81) 142/60  SpO2:  [93 %-94 %] 93 %  I/O last 3 completed shifts:  In: 1090 [P.O.:1090]  Out: 3300 [Urine:3300]    GENERAL: No apparent distress. Awake, alert, Aox2-3  HEENT: Normocephalic, atraumatic. Extraocular movements intact.  CARDIOVASCULAR: Regular rate and rhythm without murmurs or rubs. No S3.  PULMONARY: Clear bilaterally.  GASTROINTESTINAL: Soft, non-tender, non-distended. Bowel sounds normoactive.   EXTREMITIES: No cyanosis or clubbing. No edema.  NEUROLOGICAL: CN 2-12 grossly intact, no focal neurological deficits.  DERMATOLOGICAL: No rash, ulcer, bruising, nor jaundice.    Medications   Current Facility-Administered Medications   Medication Dose Route Frequency Provider Last Rate Last Admin     Current Facility-Administered Medications   Medication Dose Route Frequency Provider Last Rate Last Admin    azithromycin (ZITHROMAX) tablet 250 mg  250 mg Oral QPM Yasmany Jiménez MD   250 mg at 08/09/24 2128    cefdinir (OMNICEF) capsule 300 mg  300 mg Oral Q12H [facility] (08/20) Naren Mi DO        divalproex sodium extended-release (DEPAKOTE ER) 24 hr tablet 500 mg  500 mg Oral At Bedtime Naren Mi DO   500 mg at 08/09/24 2128    letrozole (FEMARA) tablet 2.5 mg  2.5 mg Oral Daily Naren Mi DO   2.5 mg at 08/09/24 0945    levothyroxine (SYNTHROID/LEVOTHROID) tablet 50 mcg  50 mcg Oral Daily Naren Mi DO   50 mcg at 08/09/24 0945    lithium (ESKALITH) capsule 150 mg  150 mg Oral Naren Jimenez DO   150 mg at 08/09/24 0945    sodium  "chloride (PF) 0.9% PF flush 3 mL  3 mL Intracatheter Q8H Yasmany Jiménez MD   3 mL at 08/09/24 2139     Data     Laboratory:  Recent Labs   Lab 08/09/24 1200 08/08/24 1955   WBC 10.2 11.0   HGB 11.0* 10.0*   HCT 35.7 32.3*   * 106*    283     Recent Labs   Lab 08/09/24 1200 08/08/24 1955    141   POTASSIUM 5.1 4.7   CHLORIDE 110* 109*   CO2 20* 20*   ANIONGAP 12 12   * 121*   BUN 23.3* 25.8*   CR 1.51* 1.55*   GFRESTIMATED 31* 30*   CANELO 9.8 10.1     No results for input(s): \"CULT\" in the last 168 hours.  Troponin I ES   Date Value Ref Range Status   03/17/2012 <0.012 0.000 - 0.034 ug/L Final       Imaging:  No results found for this or any previous visit (from the past 24 hour(s)).      Naren Mi DO  [facility] Hospitalist  [address] [place] [address]  Securely message with [employer] (more info)  Text page via [employer] Paging/Directory   08/10/2024   "

## 2024-08-10 NOTE — PLAN OF CARE
"Alert to self and situation. Unable to recall hospital or date. Speech is hard to understand at times. VSS. Denies pain. A2 with lift. Incontinent of B&B.     Goal Outcome Evaluation:      Plan of Care Reviewed With: patient    Overall Patient Progress: improvingOverall Patient Progress: improving    Outcome Evaluation: Remains off O2, int. confusion      Problem: Adult Inpatient Plan of Care  Goal: Plan of Care Review  Description: The Plan of Care Review/Shift note should be completed every shift.  The Outcome Evaluation is a brief statement about your assessment that the patient is improving, declining, or no change.  This information will be displayed automatically on your shift  note.  Outcome: Progressing  Flowsheets (Taken 8/10/2024 0644)  Outcome Evaluation: Remains off O2, int. confusion  Plan of Care Reviewed With: patient  Overall Patient Progress: improving  Goal: Patient-Specific Goal (Individualized)  Description: You can add care plan individualizations to a care plan. Examples of Individualization might be:  \"Parent requests to be called daily at 9am for status\", \"I have a hard time hearing out of my right ear\", or \"Do not touch me to wake me up as it startles  me\".  Outcome: Progressing  Goal: Absence of Hospital-Acquired Illness or Injury  Outcome: Progressing  Intervention: Identify and Manage Fall Risk  Recent Flowsheet Documentation  Taken 8/9/2024 2128 by Cher Gregory RN  Safety Promotion/Fall Prevention: safety round/check completed  Intervention: Prevent and Manage VTE (Venous Thromboembolism) Risk  Recent Flowsheet Documentation  Taken 8/9/2024 2128 by Cher Gregory, RN  VTE Prevention/Management: SCDs off (sequential compression devices)  Goal: Optimal Comfort and Wellbeing  Outcome: Progressing  Goal: Readiness for Transition of Care  Outcome: Progressing     Problem: Fall Injury Risk  Goal: Absence of Fall and Fall-Related Injury  Outcome: Progressing  Intervention: Identify and " Manage Contributors  Recent Flowsheet Documentation  Taken 8/9/2024 2128 by Cher Gregory, RN  Medication Review/Management: medications reviewed  Intervention: Promote Injury-Free Environment  Recent Flowsheet Documentation  Taken 8/9/2024 2128 by Cher Gregory RN  Safety Promotion/Fall Prevention: safety round/check completed     Problem: Infection  Goal: Absence of Infection Signs and Symptoms  Outcome: Progressing     Problem: Gas Exchange Impaired  Goal: Optimal Gas Exchange  Outcome: Progressing

## 2024-08-10 NOTE — PLAN OF CARE
"End of shift note: Pt alert and disoriented to time and place. Pt up 2 assist w/ Lift. Purewick in place. BC no grwoth after 1 day. K+ within range, recheck tomorrow AM. LS diminished. On oral Cefdinir. Plan to discharge back to facility on Monday.    Goal Outcome Evaluation:      Plan of Care Reviewed With: patient    Overall Patient Progress: improvingOverall Patient Progress: improving    Outcome Evaluation: Denies pain. Sating well on RA. VSS, afebrile.      Problem: Adult Inpatient Plan of Care  Goal: Plan of Care Review  Description: The Plan of Care Review/Shift note should be completed every shift.  The Outcome Evaluation is a brief statement about your assessment that the patient is improving, declining, or no change.  This information will be displayed automatically on your shift  note.  Outcome: Progressing  Flowsheets (Taken 8/10/2024 1805)  Outcome Evaluation: Denies pain. Sating well on RA. VSS, afebrile.  Plan of Care Reviewed With: patient  Overall Patient Progress: improving  Goal: Patient-Specific Goal (Individualized)  Description: You can add care plan individualizations to a care plan. Examples of Individualization might be:  \"Parent requests to be called daily at 9am for status\", \"I have a hard time hearing out of my right ear\", or \"Do not touch me to wake me up as it startles  me\".  Outcome: Progressing  Goal: Absence of Hospital-Acquired Illness or Injury  Outcome: Progressing  Intervention: Identify and Manage Fall Risk  Recent Flowsheet Documentation  Taken 8/10/2024 1657 by Dalila Gleason, RN  Safety Promotion/Fall Prevention: safety round/check completed  Taken 8/10/2024 1550 by Dalila Gleason, RN  Safety Promotion/Fall Prevention: safety round/check completed  Taken 8/10/2024 1443 by Dalila Gleason, RN  Safety Promotion/Fall Prevention: safety round/check completed  Taken 8/10/2024 1331 by Dalila Gleason, RN  Safety Promotion/Fall Prevention: safety round/check completed  Taken " 8/10/2024 1238 by Dalila Gleason RN  Safety Promotion/Fall Prevention: safety round/check completed  Taken 8/10/2024 1154 by Dalila Gleason RN  Safety Promotion/Fall Prevention: safety round/check completed  Taken 8/10/2024 1128 by Dalila Gleason RN  Safety Promotion/Fall Prevention: safety round/check completed  Taken 8/10/2024 1051 by Dalila Gleason RN  Safety Promotion/Fall Prevention: safety round/check completed  Taken 8/10/2024 0948 by Dalila Gleason RN  Safety Promotion/Fall Prevention: safety round/check completed  Taken 8/10/2024 0830 by Dalila Gleason RN  Safety Promotion/Fall Prevention: safety round/check completed  Taken 8/10/2024 0724 by Dalila Gleason RN  Safety Promotion/Fall Prevention: safety round/check completed  Intervention: Prevent Skin Injury  Recent Flowsheet Documentation  Taken 8/10/2024 1443 by Dalila Gleason RN  Body Position:   weight shifting   log-rolled  Taken 8/10/2024 1331 by Dalila Gleason RN  Body Position: weight shifting  Taken 8/10/2024 1128 by Dalila Gleason RN  Body Position: refuses positioning  Taken 8/10/2024 0948 by Dalila Gleason RN  Body Position: weight shifting  Intervention: Prevent and Manage VTE (Venous Thromboembolism) Risk  Recent Flowsheet Documentation  Taken 8/10/2024 0948 by Dalila Gleason RN  VTE Prevention/Management: SCDs off (sequential compression devices)  Intervention: Prevent Infection  Recent Flowsheet Documentation  Taken 8/10/2024 0948 by Dalila Gleason RN  Infection Prevention:   rest/sleep promoted   single patient room provided   hand hygiene promoted  Goal: Optimal Comfort and Wellbeing  Outcome: Progressing  Goal: Readiness for Transition of Care  Outcome: Progressing     Problem: Fall Injury Risk  Goal: Absence of Fall and Fall-Related Injury  Outcome: Progressing  Intervention: Identify and Manage Contributors  Recent Flowsheet Documentation  Taken 8/10/2024 0948 by Dalila Gleason RN  Medication  Review/Management: medications reviewed  Intervention: Promote Injury-Free Environment  Recent Flowsheet Documentation  Taken 8/10/2024 1657 by Dalila Gleason RN  Safety Promotion/Fall Prevention: safety round/check completed  Taken 8/10/2024 1550 by Dalila Gleason RN  Safety Promotion/Fall Prevention: safety round/check completed  Taken 8/10/2024 1443 by Dalila Gleason RN  Safety Promotion/Fall Prevention: safety round/check completed  Taken 8/10/2024 1331 by Dalila Gleason RN  Safety Promotion/Fall Prevention: safety round/check completed  Taken 8/10/2024 1238 by Dalila Gleason RN  Safety Promotion/Fall Prevention: safety round/check completed  Taken 8/10/2024 1154 by Dalila Gleason RN  Safety Promotion/Fall Prevention: safety round/check completed  Taken 8/10/2024 1128 by Dalila Gleason RN  Safety Promotion/Fall Prevention: safety round/check completed  Taken 8/10/2024 1051 by Dalila Gleason RN  Safety Promotion/Fall Prevention: safety round/check completed  Taken 8/10/2024 0948 by Dalila Gleason RN  Safety Promotion/Fall Prevention: safety round/check completed  Taken 8/10/2024 0830 by Dalila Gleason RN  Safety Promotion/Fall Prevention: safety round/check completed  Taken 8/10/2024 0724 by Dalila Gleason RN  Safety Promotion/Fall Prevention: safety round/check completed     Problem: Infection  Goal: Absence of Infection Signs and Symptoms  Outcome: Progressing  Intervention: Prevent or Manage Infection  Recent Flowsheet Documentation  Taken 8/10/2024 0948 by Dalila Gleason RN  Isolation Precautions: contact precautions maintained     Problem: Gas Exchange Impaired  Goal: Optimal Gas Exchange  Outcome: Progressing  Intervention: Optimize Oxygenation and Ventilation  Recent Flowsheet Documentation  Taken 8/10/2024 0948 by Dalila Gleason RN  Head of Bed (HOB) Positioning: HOB at 30 degrees

## 2024-08-11 ENCOUNTER — APPOINTMENT (OUTPATIENT)
Dept: PHYSICAL THERAPY | Facility: CLINIC | Age: 89
DRG: 193 | End: 2024-08-11
Attending: INTERNAL MEDICINE
Payer: MEDICARE

## 2024-08-11 ENCOUNTER — APPOINTMENT (OUTPATIENT)
Dept: SPEECH THERAPY | Facility: CLINIC | Age: 89
DRG: 193 | End: 2024-08-11
Payer: MEDICARE

## 2024-08-11 LAB
ANION GAP SERPL CALCULATED.3IONS-SCNC: 14 MMOL/L (ref 7–15)
BUN SERPL-MCNC: 25.1 MG/DL (ref 8–23)
CALCIUM SERPL-MCNC: 9.9 MG/DL (ref 8.8–10.4)
CHLORIDE SERPL-SCNC: 108 MMOL/L (ref 98–107)
CREAT SERPL-MCNC: 1.39 MG/DL (ref 0.51–0.95)
EGFRCR SERPLBLD CKD-EPI 2021: 35 ML/MIN/1.73M2
ERYTHROCYTE [DISTWIDTH] IN BLOOD BY AUTOMATED COUNT: 14.6 % (ref 10–15)
GLUCOSE SERPL-MCNC: 83 MG/DL (ref 70–99)
HCO3 SERPL-SCNC: 19 MMOL/L (ref 22–29)
HCT VFR BLD AUTO: 35.7 % (ref 35–47)
HGB BLD-MCNC: 11 G/DL (ref 11.7–15.7)
MCH RBC QN AUTO: 32.7 PG (ref 26.5–33)
MCHC RBC AUTO-ENTMCNC: 30.8 G/DL (ref 31.5–36.5)
MCV RBC AUTO: 106 FL (ref 78–100)
PLATELET # BLD AUTO: 314 10E3/UL (ref 150–450)
POTASSIUM SERPL-SCNC: 5.3 MMOL/L (ref 3.4–5.3)
RBC # BLD AUTO: 3.36 10E6/UL (ref 3.8–5.2)
SODIUM SERPL-SCNC: 141 MMOL/L (ref 135–145)
VIT B12 SERPL-MCNC: 381 PG/ML (ref 232–1245)
WBC # BLD AUTO: 6.9 10E3/UL (ref 4–11)

## 2024-08-11 PROCEDURE — 97161 PT EVAL LOW COMPLEX 20 MIN: CPT | Mod: GP

## 2024-08-11 PROCEDURE — 250N000013 HC RX MED GY IP 250 OP 250 PS 637: Performed by: INTERNAL MEDICINE

## 2024-08-11 PROCEDURE — 92526 ORAL FUNCTION THERAPY: CPT | Mod: GN | Performed by: SPEECH-LANGUAGE PATHOLOGIST

## 2024-08-11 PROCEDURE — 120N000001 HC R&B MED SURG/OB

## 2024-08-11 PROCEDURE — 36415 COLL VENOUS BLD VENIPUNCTURE: CPT | Performed by: INTERNAL MEDICINE

## 2024-08-11 PROCEDURE — 85027 COMPLETE CBC AUTOMATED: CPT | Performed by: INTERNAL MEDICINE

## 2024-08-11 PROCEDURE — 82607 VITAMIN B-12: CPT | Performed by: INTERNAL MEDICINE

## 2024-08-11 PROCEDURE — 99232 SBSQ HOSP IP/OBS MODERATE 35: CPT | Performed by: INTERNAL MEDICINE

## 2024-08-11 PROCEDURE — 97530 THERAPEUTIC ACTIVITIES: CPT | Mod: GP

## 2024-08-11 PROCEDURE — 80048 BASIC METABOLIC PNL TOTAL CA: CPT | Performed by: INTERNAL MEDICINE

## 2024-08-11 RX ORDER — AZITHROMYCIN 250 MG/1
250 TABLET, FILM COATED ORAL EVERY EVENING
Qty: 2 TABLET | Refills: 0 | Status: SHIPPED | OUTPATIENT
Start: 2024-08-11 | End: 2024-08-13

## 2024-08-11 RX ORDER — CEFDINIR 300 MG/1
300 CAPSULE ORAL DAILY
Qty: 2 CAPSULE | Refills: 0 | Status: SHIPPED | OUTPATIENT
Start: 2024-08-12 | End: 2024-08-14

## 2024-08-11 RX ADMIN — LITHIUM CARBONATE 150 MG: 150 CAPSULE, GELATIN COATED ORAL at 08:38

## 2024-08-11 RX ADMIN — DIVALPROEX SODIUM 500 MG: 500 TABLET, FILM COATED, EXTENDED RELEASE ORAL at 22:33

## 2024-08-11 RX ADMIN — AZITHROMYCIN DIHYDRATE 250 MG: 250 TABLET ORAL at 20:55

## 2024-08-11 RX ADMIN — LETROZOLE 2.5 MG: 2.5 TABLET ORAL at 08:38

## 2024-08-11 RX ADMIN — LEVOTHYROXINE SODIUM 50 MCG: 0.05 TABLET ORAL at 08:38

## 2024-08-11 RX ADMIN — CEFDINIR 300 MG: 300 CAPSULE ORAL at 08:38

## 2024-08-11 ASSESSMENT — ACTIVITIES OF DAILY LIVING (ADL)
ADLS_ACUITY_SCORE: 60
ADLS_ACUITY_SCORE: 59
ADLS_ACUITY_SCORE: 62
ADLS_ACUITY_SCORE: 59
ADLS_ACUITY_SCORE: 54
ADLS_ACUITY_SCORE: 59
ADLS_ACUITY_SCORE: 54
ADLS_ACUITY_SCORE: 62
ADLS_ACUITY_SCORE: 58
ADLS_ACUITY_SCORE: 59
ADLS_ACUITY_SCORE: 59
ADLS_ACUITY_SCORE: 54
ADLS_ACUITY_SCORE: 58
ADLS_ACUITY_SCORE: 59
ADLS_ACUITY_SCORE: 60
ADLS_ACUITY_SCORE: 59
ADLS_ACUITY_SCORE: 59

## 2024-08-11 NOTE — PLAN OF CARE
"Disorientated to place and time. VSS. RA. Denies pain. Crushed pills in pudding. Incontinent of B&B. A2 with lift.     Goal Outcome Evaluation:      Plan of Care Reviewed With: patient    Overall Patient Progress: improvingOverall Patient Progress: improving    Outcome Evaluation: Had large BM, no pain      Problem: Adult Inpatient Plan of Care  Goal: Plan of Care Review  Description: The Plan of Care Review/Shift note should be completed every shift.  The Outcome Evaluation is a brief statement about your assessment that the patient is improving, declining, or no change.  This information will be displayed automatically on your shift  note.  Outcome: Progressing  Flowsheets (Taken 8/10/2024 2232)  Outcome Evaluation: Had large BM, no pain  Plan of Care Reviewed With: patient  Overall Patient Progress: improving  Goal: Patient-Specific Goal (Individualized)  Description: You can add care plan individualizations to a care plan. Examples of Individualization might be:  \"Parent requests to be called daily at 9am for status\", \"I have a hard time hearing out of my right ear\", or \"Do not touch me to wake me up as it startles  me\".  Outcome: Progressing  Goal: Absence of Hospital-Acquired Illness or Injury  Outcome: Progressing  Intervention: Identify and Manage Fall Risk  Recent Flowsheet Documentation  Taken 8/10/2024 2222 by Cher Gregory RN  Safety Promotion/Fall Prevention: safety round/check completed  Intervention: Prevent and Manage VTE (Venous Thromboembolism) Risk  Recent Flowsheet Documentation  Taken 8/10/2024 2222 by Cher Gregory RN  VTE Prevention/Management: SCDs off (sequential compression devices)  Intervention: Prevent Infection  Recent Flowsheet Documentation  Taken 8/10/2024 2222 by Cher Gregory RN  Infection Prevention:   rest/sleep promoted   single patient room provided   hand hygiene promoted  Goal: Optimal Comfort and Wellbeing  Outcome: Progressing  Goal: Readiness for Transition of " Care  Outcome: Progressing     Problem: Fall Injury Risk  Goal: Absence of Fall and Fall-Related Injury  Outcome: Progressing  Intervention: Identify and Manage Contributors  Recent Flowsheet Documentation  Taken 8/10/2024 2222 by Cher Gregory RN  Medication Review/Management: medications reviewed  Intervention: Promote Injury-Free Environment  Recent Flowsheet Documentation  Taken 8/10/2024 2222 by Cher Gregory RN  Safety Promotion/Fall Prevention: safety round/check completed     Problem: Infection  Goal: Absence of Infection Signs and Symptoms  Outcome: Progressing  Intervention: Prevent or Manage Infection  Recent Flowsheet Documentation  Taken 8/10/2024 2222 by Cher Gregory RN  Isolation Precautions: contact precautions maintained     Problem: Gas Exchange Impaired  Goal: Optimal Gas Exchange  Outcome: Progressing

## 2024-08-11 NOTE — PLAN OF CARE
"Goal Outcome Evaluation:      Plan of Care Reviewed With: patient    Overall Patient Progress: improvingOverall Patient Progress: improving    Outcome Evaluation: .Stable    Vitals VSS except BP elevated  Neuro Disoriented to place and time   Respiratory 93% RA  Cardiac/Tele tachycardic at times  GI/ Purewick in place   Skin trace edema BLEs   LDAs PIV SL  Labs K+ protocol   Diet Pureed, thin liquids   Activity A2   Plan Continue with plan of care       Problem: Adult Inpatient Plan of Care  Goal: Plan of Care Review  Description: The Plan of Care Review/Shift note should be completed every shift.  The Outcome Evaluation is a brief statement about your assessment that the patient is improving, declining, or no change.  This information will be displayed automatically on your shift  note.  Outcome: Progressing  Flowsheets (Taken 8/11/2024 0249)  Outcome Evaluation: .  Plan of Care Reviewed With: patient  Overall Patient Progress: improving  Goal: Patient-Specific Goal (Individualized)  Description: You can add care plan individualizations to a care plan. Examples of Individualization might be:  \"Parent requests to be called daily at 9am for status\", \"I have a hard time hearing out of my right ear\", or \"Do not touch me to wake me up as it startles  me\".  Outcome: Progressing  Goal: Absence of Hospital-Acquired Illness or Injury  Outcome: Progressing  Intervention: Identify and Manage Fall Risk  Recent Flowsheet Documentation  Taken 8/11/2024 0057 by Ulices Zamora RN  Safety Promotion/Fall Prevention:   activity supervised   clutter free environment maintained   safety round/check completed  Intervention: Prevent Skin Injury  Recent Flowsheet Documentation  Taken 8/11/2024 0057 by Ulices Zamora RN  Body Position: weight shifting  Intervention: Prevent and Manage VTE (Venous Thromboembolism) Risk  Recent Flowsheet Documentation  Taken 8/11/2024 0057 by Ulices Zamora, RN  VTE Prevention/Management: SCDs " off (sequential compression devices)  Intervention: Prevent Infection  Recent Flowsheet Documentation  Taken 8/11/2024 0057 by Ulices Zamora RN  Infection Prevention: rest/sleep promoted  Goal: Optimal Comfort and Wellbeing  Outcome: Progressing  Goal: Readiness for Transition of Care  Outcome: Progressing

## 2024-08-11 NOTE — PLAN OF CARE
"End of shift note: Pt alert and disoriented to time. Pt up 1 assist GB and walker. BC no growth after 2 day. K+ within range, recheck tomorrow AM. LS diminished. On oral Cefdinir. Plan to discharge back to facility on Monday, wheelchair transport set up between [id number]. Per nursing facility, the pt needs to be back by 1400.    Goal Outcome Evaluation:      Plan of Care Reviewed With: patient    Overall Patient Progress: improvingOverall Patient Progress: improving    Outcome Evaluation: Denies pain. Sating well on RA. VSS, afebrile.        Problem: Adult Inpatient Plan of Care  Goal: Plan of Care Review  Description: The Plan of Care Review/Shift note should be completed every shift.  The Outcome Evaluation is a brief statement about your assessment that the patient is improving, declining, or no change.  This information will be displayed automatically on your shift  note.  Outcome: Progressing  Flowsheets (Taken 8/11/2024 1849)  Outcome Evaluation: Denies pain. Sating well on RA. VSS, afebrile.  Plan of Care Reviewed With: patient  Overall Patient Progress: improving  Goal: Patient-Specific Goal (Individualized)  Description: You can add care plan individualizations to a care plan. Examples of Individualization might be:  \"Parent requests to be called daily at 9am for status\", \"I have a hard time hearing out of my right ear\", or \"Do not touch me to wake me up as it startles  me\".  Outcome: Progressing  Goal: Absence of Hospital-Acquired Illness or Injury  Outcome: Progressing  Intervention: Identify and Manage Fall Risk  Recent Flowsheet Documentation  Taken 8/11/2024 1844 by Dalila Gleason, RN  Safety Promotion/Fall Prevention: safety round/check completed  Taken 8/11/2024 1717 by Dalila Gleason, RN  Safety Promotion/Fall Prevention: safety round/check completed  Taken 8/11/2024 1550 by Dalila Gleason, RN  Safety Promotion/Fall Prevention: safety round/check completed  Taken 8/11/2024 1510 by Victorino, " CASSIE Conner  Safety Promotion/Fall Prevention: safety round/check completed  Taken 8/11/2024 1420 by Dalila Gleason RN  Safety Promotion/Fall Prevention: safety round/check completed  Taken 8/11/2024 1250 by Dalila Gleason RN  Safety Promotion/Fall Prevention: safety round/check completed  Taken 8/11/2024 1125 by Dalila Gleason RN  Safety Promotion/Fall Prevention: safety round/check completed  Taken 8/11/2024 1037 by Dalila Gleason RN  Safety Promotion/Fall Prevention: safety round/check completed  Taken 8/11/2024 0852 by Dalila Gleason RN  Safety Promotion/Fall Prevention: safety round/check completed  Taken 8/11/2024 0740 by Dalila Gelason RN  Safety Promotion/Fall Prevention: safety round/check completed  Taken 8/11/2024 0722 by Dalila Gleason RN  Safety Promotion/Fall Prevention: safety round/check completed  Intervention: Prevent Skin Injury  Recent Flowsheet Documentation  Taken 8/11/2024 1420 by Dalila Gleason RN  Body Position:   turned   left  Intervention: Prevent and Manage VTE (Venous Thromboembolism) Risk  Recent Flowsheet Documentation  Taken 8/11/2024 0740 by Dalila Gleason RN  VTE Prevention/Management: SCDs off (sequential compression devices)  Intervention: Prevent Infection  Recent Flowsheet Documentation  Taken 8/11/2024 0740 by Dalila Gleason RN  Infection Prevention: rest/sleep promoted  Goal: Optimal Comfort and Wellbeing  Outcome: Progressing  Goal: Readiness for Transition of Care  Outcome: Progressing     Problem: Fall Injury Risk  Goal: Absence of Fall and Fall-Related Injury  Outcome: Progressing  Intervention: Identify and Manage Contributors  Recent Flowsheet Documentation  Taken 8/11/2024 0740 by Dalila Gleason RN  Medication Review/Management: medications reviewed  Intervention: Promote Injury-Free Environment  Recent Flowsheet Documentation  Taken 8/11/2024 1844 by Dalila Gleason RN  Safety Promotion/Fall Prevention: safety round/check  completed  Taken 8/11/2024 1717 by Dalila Gleason RN  Safety Promotion/Fall Prevention: safety round/check completed  Taken 8/11/2024 1550 by Dalila Gleason RN  Safety Promotion/Fall Prevention: safety round/check completed  Taken 8/11/2024 1510 by Dalila Gleason RN  Safety Promotion/Fall Prevention: safety round/check completed  Taken 8/11/2024 1420 by Dalila Gleason RN  Safety Promotion/Fall Prevention: safety round/check completed  Taken 8/11/2024 1250 by Dalila Gleason RN  Safety Promotion/Fall Prevention: safety round/check completed  Taken 8/11/2024 1125 by Dalila Gleason RN  Safety Promotion/Fall Prevention: safety round/check completed  Taken 8/11/2024 1037 by Dalila Gleason RN  Safety Promotion/Fall Prevention: safety round/check completed  Taken 8/11/2024 0852 by Dalila Gleason RN  Safety Promotion/Fall Prevention: safety round/check completed  Taken 8/11/2024 0740 by Dalila Gleason RN  Safety Promotion/Fall Prevention: safety round/check completed  Taken 8/11/2024 0722 by Dalila Gleason RN  Safety Promotion/Fall Prevention: safety round/check completed     Problem: Infection  Goal: Absence of Infection Signs and Symptoms  Outcome: Progressing  Intervention: Prevent or Manage Infection  Recent Flowsheet Documentation  Taken 8/11/2024 0740 by Dalila Gleason RN  Isolation Precautions: contact precautions maintained     Problem: Gas Exchange Impaired  Goal: Optimal Gas Exchange  Outcome: Progressing

## 2024-08-11 NOTE — PROGRESS NOTES
08/11/24 1055   Appointment Info   Signing Clinician's Name / Credentials (PT) Mikayla Singh DPT   Living Environment   People in Home facility resident   Current Living Arrangements assisted living   Home Accessibility no concerns   Transportation Anticipated health plan transportation;agency   Living Environment Comments Pt is a resident at [facility].   Self-Care   Usual Activity Tolerance moderate   Current Activity Tolerance fair   Equipment Currently Used at Home walker, standard   Fall history within last six months yes   Number of times patient has fallen within last six months   (unable to provide value to therapist)   Activity/Exercise/Self-Care Comment Pt is a poor historian. Per  note she receives Ax1 with transfers and mobility with FWW. Receives assist with all ADLs (medications, meals, bathing and personal care)   General Information   Onset of Illness/Injury or Date of Surgery 08/08/24   Referring Physician Naren Mi,    Patient/Family Therapy Goals Statement (PT) return home   Pertinent History of Current Problem (include personal factors and/or comorbidities that impact the POC) Lennie Mar is a 89 year old female with a history of chronic kidney disease stage III, paroxysmal atrial fibrillation, essential tremor, hypothyroidism, depression section anxiety, macrocytic anemia, bipolar disorder admitted on 8/8/2024 with altered mental status.   Existing Precautions/Restrictions fall   Cognition   Affect/Mental Status (Cognition) flat/blunted affect   Orientation Status (Cognition) disoriented to;time;place   Follows Commands (Cognition) follows one-step commands   Pain Assessment   Patient Currently in Pain No   Integumentary/Edema   Integumentary/Edema Comments normal aging changes   Posture    Posture Forward head position;Protracted shoulders;Kyphosis   Range of Motion (ROM)   Range of Motion ROM is WFL   Strength (Manual Muscle Testing)    Strength (Manual Muscle Testing) Deficits observed during functional mobility   Bed Mobility   Comment, (Bed Mobility) Javier supine>sit   Transfers   Comment, (Transfers) Javier sit<>stand with FWW   Gait/Stairs (Locomotion)   Distance in Feet (Gait) 10' for eval   Comment, (Gait/Stairs) CGA with FWW ambulation   Balance   Balance Comments good sitting balance, fair standing balance with FWW for UE support   Sensory Examination   Sensory Perception patient reports no sensory changes   Clinical Impression   Criteria for Skilled Therapeutic Intervention Yes, treatment indicated   PT Diagnosis (PT) impaired mobility   Influenced by the following impairments decreased activity tolerance, weakness, impaired balance   Functional limitations due to impairments impaired bed mobility, transfers, ambulation   Clinical Presentation (PT Evaluation Complexity) stable   Clinical Presentation Rationale clinical judgement, chart review   Clinical Decision Making (Complexity) low complexity   Planned Therapy Interventions (PT) balance training;bed mobility training;gait training;home exercise program;patient/family education;neuromuscular re-education;strengthening;transfer training;home program guidelines;risk factor education;progressive activity/exercise   Risk & Benefits of therapy have been explained evaluation/treatment results reviewed;care plan/treatment goals reviewed;risks/benefits reviewed;current/potential barriers reviewed;participants voiced agreement with care plan;participants included;patient   PT Total Evaluation Time   PT Eval, Low Complexity Minutes (20607) 6   Physical Therapy Goals   PT Frequency Daily   PT Predicted Duration/Target Date for Goal Attainment 08/13/24   PT Goals Bed Mobility;Transfers;Gait   PT: Bed Mobility Supervision/stand-by assist;Supine to/from sit   PT: Transfers Supervision/stand-by assist;Sit to/from stand;Assistive device   PT: Gait Supervision/stand-by assist;Rolling walker;50 feet    Interventions   Interventions Quick Adds Therapeutic Activity   Therapeutic Activity   Therapeutic Activities: dynamic activities to improve functional performance Minutes (39880) 10   Symptoms Noted During/After Treatment Fatigue   Treatment Detail/Skilled Intervention Pt supine upon arrival, agreeable with encouragement. After eval, pt completed sit<>stand with FWW and Javier, cueing for safe hand placement with transfers. Completed another 10' ambulation after eval with CGA and FWW, safety cueing for walker management, walker proximity. Pt declining any further mobility. Returned to supine, Javier to assist lifting LEs into bed. Left with alarm on and needs in reach.   PT Discharge Planning   PT Plan progress ambulation distance, transfers, and bed mobility   PT Discharge Recommendation (DC Rec) home with assist   PT Rationale for DC Rec Pt near baseline level of mobility, currently requiring Ax1 with FWW for mobility. Anticipate with IP PT that pt will progress to return to [facility] with Ax1 for mobility and cares.   PT Brief overview of current status Ax1 FWW   Total Session Time   Timed Code Treatment Minutes 10   Total Session Time (sum of timed and untimed services) 16

## 2024-08-11 NOTE — PROGRESS NOTES
[facility]    Hospitalist Progress Note  Name: Lennie Mar    MRN: [id number]  Provider:  Naren Mi DO  Date of Service: 08/11/2024    Summary of Stay: Lennie Mar is a 89 year old female with a history of chronic kidney disease stage III, paroxysmal atrial fibrillation, essential tremor, hypothyroidism, depression section anxiety, macrocytic anemia, bipolar disorder admitted on 8/8/2024 with altered mental status.  In the emergency department, the patient was found to have a temperature of 98.2  F, blood pressure 121/61, heart rate 89, respiratory rate 16, SpO2 92% on 2 L nasal cannula.  Initial lab work showed chloride 109, bicarb 20, BUN/creatinine 24.9/1.55, high-sensitivity troponin 44 and 42 upon repeat, lactic acid 1.4, WBC 11.0, hemoglobin 10.0.  COVID-19, influenza, RSV were negative.  Urinalysis was negative for signs of infection.  Chest x-ray showed left basilar opacity with blunting of the costophrenic angle suggesting atelectasis versus infiltrate.  The patient was started on ceftriaxone and azithromycin for suspected community-acquired pneumonia.  The patient's oxygen was weaned.  Speech therapy was consulted to see the patient who recommended a pureed diet (level 4) with thin liquids.     TODAY'S PLAN:  Pt doing well.  Transitioned to oral cefdinir and azithromycin.  Plan for 5 day course.  Plan is for discharge back to [facility] tomorrow.  Left voicemail for ashley Sneed.  Appreciate Care Coordinator assistance with discharge arrangements.      Problem List:   Acute Hypoxic Respiratory Failure  LLL Community Acquired Pneumonia  Mild Metabolic Encephalopathy  - Continue Ceftriaxone and azithromycin  - Appreciate SLP recommendations - pureed diet (level 4) with thin liquids  - Weaned off oxygen  - Lithium level = 0.61 on 8/9     Acute Kidney Injury  Chronic Kidney Disease Stage 3  - Baseline Cr = 1.1-1.4  - IVF in the ED  - Daily BMP     Elevated Troponin  -  Likely secondary to acute infection and mild hypoxia  - No further work up at this time as pt is asymptomatic     Recent Pseudomonas Aeruginosa UTI  - Completed course of ciprofloxacin on 8/8     Chronic Medical Problems:  Paroxysmal Atrial Fibrillation  Essential Tremor  Hypothyroidism  Depression/Anxiety  Macrocytic Anemia  Bipolar Disorder     I spent 46 minutes in reviewing this patient's labs, imaging, medications, medical history.  In addition time was spent interviewing the patient, communicating with family, and medical decision making.      DVT Prophylaxis: Pneumatic Compression Devices  Code Status: No CPR- Do NOT Intubate  Diet: Combination Diet (Pt should be upright & alert for PO, take small bites/sips, alternate between consistencies, and slow pacing. Hold PO with decline in mentation or s/sx of aspiration); Pureed Diet (level 4); Thin Liquids (level 0)    Kelley Catheter: Not present    Disposition: Medically Ready for Discharge: Anticipated Tomorrow  Goals to discharge include: facility able to accept pt back  Family updated today:  Left voicemail for son Naren     Interval History   Pt seen and examined.  Pt knows she is in the hospital for pneumonia, but thought she was at [place].    -Data reviewed today: I personally reviewed all new labs and imaging results over the last 24 hours.     Physical Exam   Temp: 98  F (36.7  C) Temp src: Oral BP: (!) 147/66 Pulse: 86   Resp: 20 SpO2: 93 % O2 Device: None (Room air)    Vitals:    08/09/24 0609 08/10/24 0635 08/11/24 0500   Weight: 72.1 kg (158 lb 15.2 oz) 72.5 kg (159 lb 13.3 oz) 70.9 kg (156 lb 4.9 oz)     Vital Signs with Ranges  Temp:  [98  F (36.7  C)-98.8  F (37.1  C)] 98  F (36.7  C)  Pulse:  [82-86] 86  Resp:  [18-20] 20  BP: (147-166)/(66-87) 147/66  SpO2:  [93 %-94 %] 93 %  I/O last 3 completed shifts:  In: 460 [P.O.:460]  Out: 1150 [Urine:1150]    GENERAL: No apparent distress. Awake, alert, and fully oriented.  HEENT: Normocephalic,  atraumatic. Extraocular movements intact.  CARDIOVASCULAR: Regular rate and rhythm without murmurs or rubs. No S3.  PULMONARY: Clear bilaterally.  GASTROINTESTINAL: Soft, non-tender, non-distended. Bowel sounds normoactive.   EXTREMITIES: No cyanosis or clubbing. No edema.  NEUROLOGICAL: CN 2-12 grossly intact, no focal neurological deficits.  DERMATOLOGICAL: No rash, ulcer, bruising, nor jaundice.    Medications   Current Facility-Administered Medications   Medication Dose Route Frequency Provider Last Rate Last Admin     Current Facility-Administered Medications   Medication Dose Route Frequency Provider Last Rate Last Admin    azithromycin (ZITHROMAX) tablet 250 mg  250 mg Oral QPM Yasmany Jiménez MD   250 mg at 08/10/24 2154    cefdinir (OMNICEF) capsule 300 mg  300 mg Oral Daily Yasmany Jiménez MD   300 mg at 08/11/24 0838    divalproex sodium extended-release (DEPAKOTE ER) 24 hr tablet 500 mg  500 mg Oral At Bedtime Naren Mi, DO   500 mg at 08/10/24 2154    letrozole (FEMARA) tablet 2.5 mg  2.5 mg Oral Daily Naren Mi DO   2.5 mg at 08/11/24 0838    levothyroxine (SYNTHROID/LEVOTHROID) tablet 50 mcg  50 mcg Oral Daily Naren Mi, DO   50 mcg at 08/11/24 0838    lithium (ESKALITH) capsule 150 mg  150 mg Oral QAM Naren Mi, DO   150 mg at 08/11/24 0838    sodium chloride (PF) 0.9% PF flush 3 mL  3 mL Intracatheter Q8H Yasmany Jiménez MD   3 mL at 08/11/24 0826     Data     Laboratory:  Recent Labs   Lab 08/11/24  0728 08/09/24  1200 08/08/24  1955   WBC 6.9 10.2 11.0   HGB 11.0* 11.0* 10.0*   HCT 35.7 35.7 32.3*   * 107* 106*    245 283     Recent Labs   Lab 08/11/24  0728 08/10/24  0906 08/09/24  1200    145 142   POTASSIUM 5.3 5.3 5.1   CHLORIDE 108* 114* 110*   CO2 19* 19* 20*   ANIONGAP 14 12 12   GLC 83 95 108*   BUN 25.1* 26.1* 23.3*   CR 1.39* 1.55* 1.51*   GFRESTIMATED 35* 30* 31*   CANELO 9.9 10.0 9.8     No results for input(s):  "\"CULT\" in the last 168 hours.  Troponin I ES   Date Value Ref Range Status   03/17/2012 <0.012 0.000 - 0.034 ug/L Final       Imaging:  No results found for this or any previous visit (from the past 24 hour(s)).      Naren Mi DO  [facility] Hospitalist  [address] [place] [address]  Securely message with [employer] (more info)  Text page via [employer] Paging/Directory   08/11/2024   "

## 2024-08-11 NOTE — PROGRESS NOTES
Care Management Follow Up    Length of Stay (days): 3    Expected Discharge Date: 08/12/2024     Concerns to be Addressed: care coordination/care conferences, discharge planning     Patient plan of care discussed at interdisciplinary rounds: Yes  transportation costs    Additional Information:  Anticipating pt will be discharging tomorrow. Per nursing, pt safe for wheelchair transportation.   Spoke with son, Naren, reviewed out of pocket cost for [facility] transport, $89.98 base and $5.79 per mile to the destination. Son expressed understanding and is agreeable to this.    [facility] wheelchair transportation arranged for 8/12/2024 from 1200 to 1245.  [facility] would like pt to return by 1400.  Will continue to follow for discharge planning.    Rosaline Raza RN   Inpatient Care Coordination  [facility]   Phone: [phone]

## 2024-08-12 VITALS
DIASTOLIC BLOOD PRESSURE: 70 MMHG | OXYGEN SATURATION: 94 % | RESPIRATION RATE: 16 BRPM | HEIGHT: 64 IN | HEART RATE: 79 BPM | SYSTOLIC BLOOD PRESSURE: 153 MMHG | TEMPERATURE: 98.2 F | BODY MASS INDEX: 27.63 KG/M2 | WEIGHT: 161.82 LBS

## 2024-08-12 LAB
ANION GAP SERPL CALCULATED.3IONS-SCNC: 11 MMOL/L (ref 7–15)
BUN SERPL-MCNC: 26.1 MG/DL (ref 8–23)
CALCIUM SERPL-MCNC: 10.1 MG/DL (ref 8.8–10.4)
CHLORIDE SERPL-SCNC: 112 MMOL/L (ref 98–107)
CREAT SERPL-MCNC: 1.31 MG/DL (ref 0.51–0.95)
EGFRCR SERPLBLD CKD-EPI 2021: 37 ML/MIN/1.73M2
ERYTHROCYTE [DISTWIDTH] IN BLOOD BY AUTOMATED COUNT: 14.3 % (ref 10–15)
GLUCOSE SERPL-MCNC: 90 MG/DL (ref 70–99)
HCO3 SERPL-SCNC: 20 MMOL/L (ref 22–29)
HCT VFR BLD AUTO: 35.8 % (ref 35–47)
HGB BLD-MCNC: 11.1 G/DL (ref 11.7–15.7)
MCH RBC QN AUTO: 32.9 PG (ref 26.5–33)
MCHC RBC AUTO-ENTMCNC: 31 G/DL (ref 31.5–36.5)
MCV RBC AUTO: 106 FL (ref 78–100)
PLATELET # BLD AUTO: 330 10E3/UL (ref 150–450)
POTASSIUM SERPL-SCNC: 5.1 MMOL/L (ref 3.4–5.3)
RBC # BLD AUTO: 3.37 10E6/UL (ref 3.8–5.2)
SODIUM SERPL-SCNC: 143 MMOL/L (ref 135–145)
WBC # BLD AUTO: 6.2 10E3/UL (ref 4–11)

## 2024-08-12 PROCEDURE — 85027 COMPLETE CBC AUTOMATED: CPT | Performed by: INTERNAL MEDICINE

## 2024-08-12 PROCEDURE — 250N000013 HC RX MED GY IP 250 OP 250 PS 637: Performed by: INTERNAL MEDICINE

## 2024-08-12 PROCEDURE — 99239 HOSP IP/OBS DSCHRG MGMT >30: CPT | Performed by: INTERNAL MEDICINE

## 2024-08-12 PROCEDURE — 36415 COLL VENOUS BLD VENIPUNCTURE: CPT | Performed by: INTERNAL MEDICINE

## 2024-08-12 PROCEDURE — 80048 BASIC METABOLIC PNL TOTAL CA: CPT | Performed by: INTERNAL MEDICINE

## 2024-08-12 RX ADMIN — LETROZOLE 2.5 MG: 2.5 TABLET ORAL at 09:17

## 2024-08-12 RX ADMIN — LEVOTHYROXINE SODIUM 50 MCG: 0.05 TABLET ORAL at 09:17

## 2024-08-12 RX ADMIN — LITHIUM CARBONATE 150 MG: 150 CAPSULE, GELATIN COATED ORAL at 09:17

## 2024-08-12 RX ADMIN — CEFDINIR 300 MG: 300 CAPSULE ORAL at 09:17

## 2024-08-12 ASSESSMENT — ACTIVITIES OF DAILY LIVING (ADL)
ADLS_ACUITY_SCORE: 60
ADLS_ACUITY_SCORE: 59
ADLS_ACUITY_SCORE: 59
ADLS_ACUITY_SCORE: 60
ADLS_ACUITY_SCORE: 59
ADLS_ACUITY_SCORE: 60
ADLS_ACUITY_SCORE: 59
ADLS_ACUITY_SCORE: 60
ADLS_ACUITY_SCORE: 59
ADLS_ACUITY_SCORE: 59
ADLS_ACUITY_SCORE: 60
ADLS_ACUITY_SCORE: 55
ADLS_ACUITY_SCORE: 60

## 2024-08-12 NOTE — PROGRESS NOTES
[facility]    Hospitalist Progress Note  Name: Lennie Mar    MRN: [id number]  Provider:  Naren Mi DO  Date of Service: 08/12/2024    Summary of Stay: Lennie Mar is a 89 year old female with a history of chronic kidney disease stage III, paroxysmal atrial fibrillation, essential tremor, hypothyroidism, depression section anxiety, macrocytic anemia, bipolar disorder admitted on 8/8/2024 with altered mental status.  In the emergency department, the patient was found to have a temperature of 98.2  F, blood pressure 121/61, heart rate 89, respiratory rate 16, SpO2 92% on 2 L nasal cannula.  Initial lab work showed chloride 109, bicarb 20, BUN/creatinine 24.9/1.55, high-sensitivity troponin 44 and 42 upon repeat, lactic acid 1.4, WBC 11.0, hemoglobin 10.0.  COVID-19, influenza, RSV were negative.  Urinalysis was negative for signs of infection.  Chest x-ray showed left basilar opacity with blunting of the costophrenic angle suggesting atelectasis versus infiltrate.  The patient was started on ceftriaxone and azithromycin for suspected community-acquired pneumonia.  The patient's oxygen was weaned.  Speech therapy was consulted to see the patient who recommended a pureed diet (level 4) with thin liquids.     TODAY'S PLAN:  Continue cefdinir and azithromycin.  Pt doing well.  Discharge back to [facility] today.  Appreciate  assistance with discharge arrangements.  Updated son Dmitry via phone.  All questions answered.    Problem List:   Acute Hypoxic Respiratory Failure  LLL Community Acquired Pneumonia  Mild Metabolic Encephalopathy  - Continue Ceftriaxone and azithromycin.  Transitioned to cefdinir and azithromycin  - Appreciate SLP recommendations - pureed diet (level 4) with thin liquids  - Weaned off oxygen  - Lithium level = 0.61 on 8/9     Acute Kidney Injury  Chronic Kidney Disease Stage 3  - Baseline Cr = 1.1-1.4  - IVF in the ED  - Daily BMP     Elevated Troponin  -  Likely secondary to acute infection and mild hypoxia  - No further work up at this time as pt is asymptomatic     Recent Pseudomonas Aeruginosa UTI  - Completed course of ciprofloxacin on 8/8     Chronic Medical Problems:  Paroxysmal Atrial Fibrillation  Essential Tremor  Hypothyroidism  Depression/Anxiety  Macrocytic Anemia  Bipolar Disorder     I spent 44 minutes in reviewing this patient's labs, imaging, medications, medical history.  In addition time was spent interviewing the patient, communicating with family, and medical decision making.      DVT Prophylaxis: Pneumatic Compression Devices  Code Status: No CPR- Do NOT Intubate  Diet: Combination Diet (Pt should be upright & alert for PO, take small bites/sips, alternate between consistencies, and slow pacing. Hold PO with decline in mentation or s/sx of aspiration); Pureed Diet (level 4); Thin Liquids (level 0)  Diet    Kelley Catheter: Not present    Disposition: Medically Ready for Discharge: Ready Now  Goals to discharge include: facility able to accept pt back  Family updated today: Yes      Interval History   Pt seen and examined.  Pt denies any sob.  States she was coughing a bit.    -Data reviewed today: I personally reviewed all new labs and imaging results over the last 24 hours.     Physical Exam   Temp: 98.2  F (36.8  C) Temp src: Axillary BP: (!) 153/70 Pulse: 79   Resp: 16 SpO2: 94 % O2 Device: None (Room air)    Vitals:    08/10/24 0635 08/11/24 0500 08/12/24 0618   Weight: 72.5 kg (159 lb 13.3 oz) 70.9 kg (156 lb 4.9 oz) 73.4 kg (161 lb 13.1 oz)     Vital Signs with Ranges  Temp:  [98.1  F (36.7  C)-98.3  F (36.8  C)] 98.2  F (36.8  C)  Pulse:  [74-79] 79  Resp:  [16-18] 16  BP: (140-160)/(64-70) 153/70  SpO2:  [94 %-96 %] 94 %  I/O last 3 completed shifts:  In: 420 [P.O.:420]  Out: 800 [Urine:800]    GENERAL: No apparent distress. Awake, alert, and fully oriented.  HEENT: Normocephalic, atraumatic. Extraocular movements intact.  CARDIOVASCULAR:  "Regular rate and rhythm without murmurs or rubs. No S3.  PULMONARY: Clear bilaterally.  GASTROINTESTINAL: Soft, non-tender, non-distended. Bowel sounds normoactive.   EXTREMITIES: No cyanosis or clubbing. No edema.  NEUROLOGICAL: CN 2-12 grossly intact, no focal neurological deficits.  DERMATOLOGICAL: No rash, ulcer, bruising, nor jaundice.    Medications   Current Facility-Administered Medications   Medication Dose Route Frequency Provider Last Rate Last Admin     Current Facility-Administered Medications   Medication Dose Route Frequency Provider Last Rate Last Admin    azithromycin (ZITHROMAX) tablet 250 mg  250 mg Oral QPM Yasmany Jiménez MD   250 mg at 08/11/24 2055    cefdinir (OMNICEF) capsule 300 mg  300 mg Oral Daily Yasmany Jiménez MD   300 mg at 08/11/24 0838    divalproex sodium extended-release (DEPAKOTE ER) 24 hr tablet 500 mg  500 mg Oral At Bedtime Naren Mi, DO   500 mg at 08/11/24 2233    letrozole (FEMARA) tablet 2.5 mg  2.5 mg Oral Daily Naren Mi DO   2.5 mg at 08/11/24 0838    levothyroxine (SYNTHROID/LEVOTHROID) tablet 50 mcg  50 mcg Oral Daily Naren Mi DO   50 mcg at 08/11/24 0838    lithium (ESKALITH) capsule 150 mg  150 mg Oral QAM Naren Mi, DO   150 mg at 08/11/24 0838    sodium chloride (PF) 0.9% PF flush 3 mL  3 mL Intracatheter Q8H Yasmany Jiménez MD   3 mL at 08/12/24 0041     Data     Laboratory:  Recent Labs   Lab 08/12/24  0629 08/11/24  0728 08/09/24  1200   WBC 6.2 6.9 10.2   HGB 11.1* 11.0* 11.0*   HCT 35.8 35.7 35.7   * 106* 107*    314 245     Recent Labs   Lab 08/12/24  0629 08/11/24  0728 08/10/24  0906    141 145   POTASSIUM 5.1 5.3 5.3   CHLORIDE 112* 108* 114*   CO2 20* 19* 19*   ANIONGAP 11 14 12   GLC 90 83 95   BUN 26.1* 25.1* 26.1*   CR 1.31* 1.39* 1.55*   GFRESTIMATED 37* 35* 30*   CANELO 10.1 9.9 10.0     No results for input(s): \"CULT\" in the last 168 hours.  Troponin I ES   Date Value Ref " Range Status   03/17/2012 <0.012 0.000 - 0.034 ug/L Final       Imaging:  No results found for this or any previous visit (from the past 24 hour(s)).      Naren Mi DO  [facility] Hospitalist  [address] [place] [place] [address]  Securely message with [employer] (more info)  Text page via [employer] Paging/Directory   08/12/2024

## 2024-08-12 NOTE — PLAN OF CARE
Physical Therapy Discharge Summary    Reason for therapy discharge:    Discharged to home.    Progress towards therapy goal(s). See goals on Care Plan in [facility] electronic health record for goal details.  Goals not met.  Barriers to achieving goals:   discharge from facility.    Therapy recommendation(s):    Per chart review and treating therapist's note: Pt near baseline level of mobility, currently requiring Ax1 with FWW for mobility. Anticipate with IP PT that pt will progress to return to [facility] with Ax1 for mobility and cares.    Goal Outcome Evaluation:

## 2024-08-12 NOTE — PROGRESS NOTES
DC instructions given to pt, verbalized understanding.  All belongings with pt, IV DC'd and documented.     Pt left the unit via [facility] transport wheelchair back to care facility.

## 2024-08-12 NOTE — PLAN OF CARE
"RA. No pain. A1 GB + walker. Discharge scheduled today from  via wheelchair back to [facility].     Goal Outcome Evaluation:      Plan of Care Reviewed With: patient    Overall Patient Progress: improvingOverall Patient Progress: improving    Outcome Evaluation: A&Ox4, intermittently disorientated to time. Asked for breakfast multiple times throughout the night.      Problem: Adult Inpatient Plan of Care  Goal: Plan of Care Review  Description: The Plan of Care Review/Shift note should be completed every shift.  The Outcome Evaluation is a brief statement about your assessment that the patient is improving, declining, or no change.  This information will be displayed automatically on your shift  note.  8/12/2024 0441 by Cher Gregory RN  Outcome: Progressing  Flowsheets (Taken 8/12/2024 0441)  Outcome Evaluation: A&Ox4, intermittently disorientated to time. Asked for breakfast multiple times throughout the night.  8/12/2024 0441 by Cher Gregory RN  Outcome: Progressing  Flowsheets (Taken 8/12/2024 0441)  Outcome Evaluation: A&Ox4, intermittently disorientated to time. Asked for breakfast multiple times throughout the night.  Plan of Care Reviewed With: patient  Overall Patient Progress: improving  Goal: Patient-Specific Goal (Individualized)  Description: You can add care plan individualizations to a care plan. Examples of Individualization might be:  \"Parent requests to be called daily at 9am for status\", \"I have a hard time hearing out of my right ear\", or \"Do not touch me to wake me up as it startles  me\".  8/12/2024 0441 by Cher Gregory RN  Outcome: Progressing  8/12/2024 0441 by Cher Gregory RN  Outcome: Progressing  Goal: Absence of Hospital-Acquired Illness or Injury  8/12/2024 0441 by Cher Gregory RN  Outcome: Progressing  8/12/2024 0441 by Cher Gregory RN  Outcome: Progressing  Intervention: Identify and Manage Fall Risk  Recent Flowsheet Documentation  Taken 8/11/2024 2107 by " Ritenour, Cloee, RN  Safety Promotion/Fall Prevention: safety round/check completed  Taken 8/11/2024 2100 by Cher Gregory RN  Safety Promotion/Fall Prevention: safety round/check completed  Intervention: Prevent Skin Injury  Recent Flowsheet Documentation  Taken 8/11/2024 2107 by Cher Gregory RN  Body Position:   turned   right   legs elevated   weight shifting  Intervention: Prevent and Manage VTE (Venous Thromboembolism) Risk  Recent Flowsheet Documentation  Taken 8/11/2024 2100 by Cher Gregory RN  VTE Prevention/Management: other (see comments)  Intervention: Prevent Infection  Recent Flowsheet Documentation  Taken 8/11/2024 2100 by Cher Gregory RN  Infection Prevention: rest/sleep promoted  Goal: Optimal Comfort and Wellbeing  8/12/2024 0441 by Cher Gregory RN  Outcome: Progressing  8/12/2024 0441 by Cher Gregory RN  Outcome: Progressing  Goal: Readiness for Transition of Care  8/12/2024 0441 by Cher Gregory RN  Outcome: Progressing  8/12/2024 0441 by Cher Gregory RN  Outcome: Progressing     Problem: Fall Injury Risk  Goal: Absence of Fall and Fall-Related Injury  8/12/2024 0441 by Cher Gregroy RN  Outcome: Progressing  8/12/2024 0441 by Cher Gregory RN  Outcome: Progressing  Intervention: Identify and Manage Contributors  Recent Flowsheet Documentation  Taken 8/11/2024 2107 by Cher Gregory RN  Medication Review/Management: medications reviewed  Taken 8/11/2024 2100 by Cher Gregory RN  Medication Review/Management: medications reviewed  Intervention: Promote Injury-Free Environment  Recent Flowsheet Documentation  Taken 8/11/2024 2107 by Cher Gregory RN  Safety Promotion/Fall Prevention: safety round/check completed  Taken 8/11/2024 2100 by Cher Gregory RN  Safety Promotion/Fall Prevention: safety round/check completed     Problem: Infection  Goal: Absence of Infection Signs and Symptoms  8/12/2024 0441 by Cher Gregory RN  Outcome:  Progressing  8/12/2024 0441 by Cher Gregory, RN  Outcome: Progressing  Intervention: Prevent or Manage Infection  Recent Flowsheet Documentation  Taken 8/11/2024 2100 by Cher Gregory RN  Isolation Precautions: contact precautions maintained

## 2024-08-12 NOTE — PROGRESS NOTES
Care Management Discharge Note    Discharge Date: 08/12/2024       Discharge Disposition:  [facility] [place]     Discharge Services:  none    Discharge DME:  none    Discharge Transportation: health plan transportation, agency    Private pay costs discussed: Not applicable    Does the patient's insurance plan have a 3 day qualifying hospital stay waiver?  No      Education Provided on the Discharge Plan:  yes  Persons Notified of Discharge Plans: son, left message for facility Clinical Director, Bryanna, at ph#[phone] and nursing ph# [phone]   Patient/Family in Agreement with the Plan:  yes    Handoff Referral Completed: No    Additional Information:  Patient discharging to [facility]. Discharge orders faxed to facility, fax #: [phone] . Called facility's Clinical Director, Bryanna, at ph#[phone] and left message updating facility on patient return.     [facility] wheelchair transportation arranged for 8/12/2024 from 1200 to 1245.     Héctor Jones RN Case Manager  Inpatient Care Coordination   [facility]   [phone]    Héctor Jones RN    Addendum 1130: received call back from Bryanna at [facility]. Facility has received discharge instructions and has no further questions.

## 2024-08-12 NOTE — DISCHARGE SUMMARY
Hospitalist Discharge Summary  [facility]    Lennie Mar MRN# [id number]   YOB: 1927 Age: 89 year old     Date of Admission:  8/8/2024  Date of Discharge:  8/12/2024  1:44 PM  Admitting Physician:  Yasmany Jiménez MD  Discharge Physician:  Naren Mi DO  Discharging Service:  Hospitalist     Primary Provider: No Ref-Primary, Physician          Discharge Diagnosis:     Acute Hypoxic Respiratory Failure  LLL Community Acquired Pneumonia  Mild Metabolic Encephalopathy  - Continue Ceftriaxone and azithromycin.  Transitioned to cefdinir and azithromycin  - Appreciate SLP recommendations - pureed diet (level 4) with thin liquids  - Weaned off oxygen  - Lithium level = 0.61 on 8/9     Acute Kidney Injury  Chronic Kidney Disease Stage 3  - Baseline Cr = 1.1-1.4  - IVF in the ED  - Daily BMP     Elevated Troponin  - Likely secondary to acute infection and mild hypoxia  - No further work up at this time as pt is asymptomatic     Recent Pseudomonas Aeruginosa UTI  - Completed course of ciprofloxacin on 8/8     Chronic Medical Problems:  Paroxysmal Atrial Fibrillation  Essential Tremor  Hypothyroidism  Depression/Anxiety  Macrocytic Anemia  Bipolar Disorder              Discharge Disposition:     Discharged to [facility] Course:     Lennie Mar is a 89 year old female with a history of chronic kidney disease stage III, paroxysmal atrial fibrillation, essential tremor, hypothyroidism, depression section anxiety, macrocytic anemia, bipolar disorder admitted on 8/8/2024 with altered mental status.  In the emergency department, the patient was found to have a temperature of 98.2  F, blood pressure 121/61, heart rate 89, respiratory rate 16, SpO2 92% on 2 L nasal cannula.  Initial lab work showed chloride 109, bicarb 20, BUN/creatinine 24.9/1.55, high-sensitivity troponin 44 and 42 upon repeat, lactic acid 1.4, WBC 11.0, hemoglobin 10.0.  COVID-19,  influenza, RSV were negative.  Urinalysis was negative for signs of infection.  Chest x-ray showed left basilar opacity with blunting of the costophrenic angle suggesting atelectasis versus infiltrate.  The patient was started on ceftriaxone and azithromycin for suspected community-acquired pneumonia.  The patient's oxygen was weaned.  Speech therapy was consulted to see the patient who recommended a pureed diet (level 4) with thin liquids.  The patient's symptoms improved and she was titrated off of oxygen.  The patient was transition to cefdinir and azithromycin.  On 8/12/2024, the patient was discharged back to [facility].    The patient was seen, examined, and counseled on this day. The hospitalization and plan of care was reviewed with the patient extensively. All questions were addressed and the patient agreed to follow-up as noted above.           Allergies:     Allergies   Allergen Reactions    Ciprofloxacin      Nausea and vomiting per son     Ergotamine-Caffeine Other (See Comments) and Nausea and Vomiting     Severe HA, N/V & diarrhea    Penicillins Rash and Unknown    Sulfa Antibiotics Rash and Unknown    Lamictal [Lamotrigine] Other (See Comments)     Patient experienced night moss    Naproxen      Pt unable to remember reaction.    Naproxen Unknown    Nicergoline Unknown    Tetracycline Unknown     Pt unable to remember allergy              Discharge Medications:     Discharge Medication List as of 8/12/2024 12:20 PM        START taking these medications    Details   azithromycin (ZITHROMAX) 250 MG tablet Take 1 tablet (250 mg) by mouth every evening for 2 days, Disp-2 tablet, R-0, E-Prescribe      cefdinir (OMNICEF) 300 MG capsule Take 1 capsule (300 mg) by mouth daily for 2 days, Disp-2 capsule, R-0, E-Prescribe           CONTINUE these medications which have NOT CHANGED    Details   acetaminophen (TYLENOL) 325 MG tablet Take 650 mg by mouth 3 times daily, Historical      albuterol (PROAIR  HFA/PROVENTIL HFA/VENTOLIN HFA) 108 (90 Base) MCG/ACT inhaler Inhale 2 puffs into the lungs every 6 hours as needed for shortness of breath, wheezing or cough, Historical      !! alum & mag hydroxide-simethicone (MAALOX) 200-200-20 MG/5ML SUSP suspension Take 30 mLs by mouth every 4 hours as needed for indigestion, Historical      !! alum & mag hydroxide-simethicone (MAALOX) 200-200-20 MG/5ML SUSP suspension Take 30 mLs by mouth every 4 hours as needed for indigestion, Transitional      bisacodyl (DULCOLAX) 10 MG suppository Place 1 suppository (10 mg) rectally daily as needed for constipation, Transitional      Cranberry 500 MG CAPS Take 500 mg by mouth 2 times daily, Historical      divalproex sodium extended-release (DEPAKOTE ER) 500 MG 24 hr tablet Take 500 mg by mouth At Bedtime, Historical      famotidine (PEPCID) 20 MG tablet Take 1 tablet (20 mg) by mouth every 48 hours, Transitional      hydrocortisone 1 % CREA cream Place rectally 2 times dailyHistorical      ipratropium-albuterol (COMBIVENT RESPIMAT)  MCG/ACT inhaler Inhale 1 puff into the lungs 4 times daily as needed for shortness of breath / dyspnea or wheezing, Transitional      letrozole (FEMARA) 2.5 MG tablet TAKE 1 TABLET BY MOUTH EVERY DAY, Disp-90 tablet, R-3, E-Prescribe      levothyroxine (SYNTHROID/LEVOTHROID) 75 MCG tablet Take 50 mcg by mouth daily, Historical      Lidocaine (LIDOCARE) 4 % Patch Place 1 patch onto the skin daily as needed for moderate pain To prevent lidocaine toxicity, patient should be patch free for 12 hrs daily.Historical      lithium (ESKALITH) 150 MG capsule Take 150 mg by mouth every morning , Disp-30 capsule, R-1, Historical      loperamide (IMODIUM) 2 MG capsule Take 2 mg by mouth 4 times daily as needed for diarrhea, Historical      LORazepam (ATIVAN) 0.25 mg TABS half-tab Take 0.25 mg by mouth nightly as needed for anxiety, Historical      magnesium hydroxide (MILK OF MAGNESIA) 400 MG/5ML suspension Take 30  "mLs by mouth daily as needed for constipation, Transitional      melatonin 1 MG TABS tablet Take 1 tablet (1 mg) by mouth nightly as needed for sleep, Transitional      ondansetron (ZOFRAN-ODT) 4 MG ODT tab Take 1 tablet (4 mg) by mouth every 6 hours as needed for nausea or vomiting, Transitional      polyvinyl alcohol-povidone PF (REFRESH) 1.4-0.6 % ophthalmic solution Place 2 drops into both eyes 2 times daily, Historical      senna-docusate (SENOKOT-S/PERICOLACE) 8.6-50 MG tablet Take 1 tablet by mouth 2 times daily as needed for constipation, Transitional      vitamin D3 (CHOLECALCIFEROL) 50 mcg (2000 units) tablet Take 1 tablet by mouth daily, Historical      zinc Oxide (DESITIN) 40 % paste Apply topically as needed for dry skin or irritationTransitional      ORDER FOR DME Equipment being ordered: compression stocking knee high [id number] Package, R-0, Fax       !! - Potential duplicate medications found. Please discuss with provider.        STOP taking these medications       ciprofloxacin (CIPRO) 250 MG tablet Comments:   Reason for Stopping:                      Condition on Discharge:     Discharge condition: Fair   Discharge vitals: Blood pressure (!) 153/70, pulse 79, temperature 98.2  F (36.8  C), temperature source Axillary, resp. rate 16, height 1.626 m (5' 4\"), weight 73.4 kg (161 lb 13.1 oz), SpO2 94%, not currently breastfeeding.   Code status on discharge: DNR / DNI      BASIC PHYSICAL EXAMINATION:  GENERAL: No apparent distress.  CARDIOVASCULAR: Regular rate and rhythm without murmurs.  PULMONARY: Clear to auscultation bilaterally.   GASTROINTESTINAL: Abdomen soft, non-tender.  EXTREMITIES: No edema, pulses intact.  NEUROLOGIC: No focal deficits.            History of Illness:   See detailed admission note for full details.               Procedures excluding imaging which is summarized below:     Please see details in the electronic medical record.           Consultations:     CARE MANAGEMENT " / SOCIAL WORK IP CONSULT  SPEECH LANGUAGE PATH ADULT IP CONSULT  PHYSICAL THERAPY ADULT IP CONSULT          Significant Results:     Results for orders placed or performed during the hospital encounter of 08/08/24   XR Chest 2 Views    Narrative    EXAM: XR CHEST 2 VIEWS  LOCATION: [facility]  DATE: 8/8/2024    INDICATION: Hypoxia.  COMPARISON: 5/3/2022      Impression    IMPRESSION:     Hypoinflated lungs, limiting evaluation. Left basilar opacity with blunting of the costophrenic angle, which may be atelectasis, infiltrate, and/or trace effusion.    No focal airspace disease in the right lung. No right pleural effusion. No pneumothorax.    Stable mild cardiomegaly.       Transthoracic Echocardiogram Results:  No results found for this or any previous visit (from the past 4320 hour(s)).             Pending Results:     Unresulted Labs Ordered in the Past 30 Days of this Admission       Date and Time Order Name Status Description    8/8/2024  8:07 PM Blood Culture Arm, Right Preliminary                         Discharge Instructions and Follow-Up:     Discharge instructions and follow-up:   Discharge Procedure Orders   Reason for your hospital stay   Order Comments: Community Acquired Pneumonia     Follow-up and recommended labs and tests    Order Comments: Follow up with primary care provider within 7 days for hospital follow- up.  No follow up labs or test are needed.      You would benefit from a repeat chest x-ray in 4-6 weeks with your primary care physician to ensure complete resolution of your pneumonia.     Activity   Order Comments: Your activity upon discharge: activity as tolerated     Order Specific Question Answer Comments   Is discharge order? Yes      Diet   Order Comments: Follow this diet upon discharge: Orders Placed This Encounter      Combination Diet (Pt should be upright & alert for PO, take small bites/sips, alternate between consistencies, and slow pacing. Hold PO with  decline in mentation or s/sx of aspiration); Pureed Diet (level 4); Thin Liquids (level 0)     Order Specific Question Answer Comments   Is discharge order? Yes           Total time spent in face to face contact with the patient and coordinating discharge was:  34 Minutes    Naren Mi DO  [facility] Hospitalist  [address] [place] [address]  08/12/2024

## 2024-08-12 NOTE — PLAN OF CARE
"Cared for [id number]    Pt A/O x 3 (disoriented to time, forgetful), VSS; Pt denies pain, headache, dizziness, N/V & SOB. Pt up Asst: 1 w/gait belt & walker. Lung sounds clear, RA. Potassium protocols within parameters - recheck in AM. Blanchable redness on sacrum; bruises on forearm/upper arms. Speech, PT & Social Work following. Plan to discharge today. Will continue with plan of care.    ++++++++++++++++++++++++++++++++    Goal Outcome Evaluation:                 Outcome Evaluation: Denies pain, plan to discharge today    Problem: Adult Inpatient Plan of Care  Goal: Plan of Care Review  Description: The Plan of Care Review/Shift note should be completed every shift.  The Outcome Evaluation is a brief statement about your assessment that the patient is improving, declining, or no change.  This information will be displayed automatically on your shift  note.  Outcome: Adequate for Care Transition  Flowsheets (Taken 8/12/2024 6378)  Outcome Evaluation: Denies pain, plan to discharge today  Goal: Patient-Specific Goal (Individualized)  Description: You can add care plan individualizations to a care plan. Examples of Individualization might be:  \"Parent requests to be called daily at 9am for status\", \"I have a hard time hearing out of my right ear\", or \"Do not touch me to wake me up as it startles  me\".  Outcome: Adequate for Care Transition  Goal: Absence of Hospital-Acquired Illness or Injury  Outcome: Adequate for Care Transition  Intervention: Identify and Manage Fall Risk  Recent Flowsheet Documentation  Taken 8/12/2024 0829 by Rayna Ybarra RN  Safety Promotion/Fall Prevention:   activity supervised   assistive device/personal items within reach  Intervention: Prevent and Manage VTE (Venous Thromboembolism) Risk  Recent Flowsheet Documentation  Taken 8/12/2024 0829 by Rayna Ybarra RN  VTE Prevention/Management: patient refused intervention  Intervention: Prevent Infection  Recent Flowsheet " Documentation  Taken 8/12/2024 0829 by Rayna Ybarra RN  Infection Prevention:   hand hygiene promoted   personal protective equipment utilized   single patient room provided  Goal: Optimal Comfort and Wellbeing  Outcome: Adequate for Care Transition  Goal: Readiness for Transition of Care  Outcome: Adequate for Care Transition     Problem: Fall Injury Risk  Goal: Absence of Fall and Fall-Related Injury  Outcome: Adequate for Care Transition  Intervention: Identify and Manage Contributors  Recent Flowsheet Documentation  Taken 8/12/2024 0829 by Rayna Ybarra RN  Medication Review/Management: medications reviewed  Intervention: Promote Injury-Free Environment  Recent Flowsheet Documentation  Taken 8/12/2024 0829 by Rayna Ybarra RN  Safety Promotion/Fall Prevention:   activity supervised   assistive device/personal items within reach     Problem: Infection  Goal: Absence of Infection Signs and Symptoms  Outcome: Adequate for Care Transition  Intervention: Prevent or Manage Infection  Recent Flowsheet Documentation  Taken 8/12/2024 0829 by Rayna Ybarra RN  Isolation Precautions: contact precautions maintained

## 2024-08-12 NOTE — PLAN OF CARE
Speech Language Therapy Discharge Summary    Reason for therapy discharge:    Discharged to long term care facility.    Progress towards therapy goal(s). See goals on Care Plan in Epic electronic health record for goal details.  Goals partially met.  Barriers to achieving goals:   discharge from facility.    Therapy recommendation(s):    No further therapy is recommended.    Recommend pt continue the pureed diet (4) with thin liquids (0) with close supervision, which is her baseline diet. Pt should be upright and altert for all PO, take small bites/sips, alternate between consistencies, and slow pacing.

## 2024-08-13 ENCOUNTER — PATIENT OUTREACH (OUTPATIENT)
Dept: CARE COORDINATION | Facility: CLINIC | Age: 89
End: 2024-08-13
Payer: MEDICARE

## 2024-08-13 LAB — BACTERIA BLD CULT: NO GROWTH

## 2024-08-13 NOTE — PROGRESS NOTES
[facility] Care [facility] [place]    Background: Transitional Care Management program identified per system criteria and reviewed by [facility] team for possible outreach.    Assessment: Upon chart review, [facility] Team member will not proceed with patient outreach related to this episode of Transitional Care Management program due to reason below:    Patient has discharged to a Memory Care, Long-term Care, Assisted Living or Group Home where patient is receiving on-site support with their daily cares, including support with hospital follow up plan.    Plan: Transitional Care Management episode addressed appropriately per reason noted above.      JOSE Celestin  Connected Care Resource [place], [facility]    *Connected Care Resource Team does NOT follow patient ongoing. Referrals are identified based on internal discharge reports and the outreach is to ensure patient has an understanding of their discharge instructions.

## 2024-08-17 ENCOUNTER — LAB REQUISITION (OUTPATIENT)
Dept: LAB | Facility: CLINIC | Age: 89
End: 2024-08-17
Payer: MEDICARE

## 2024-08-17 DIAGNOSIS — J18.9 PNEUMONIA, UNSPECIFIED ORGANISM: ICD-10-CM

## 2024-08-24 ENCOUNTER — APPOINTMENT (OUTPATIENT)
Dept: CT IMAGING | Facility: CLINIC | Age: 89
DRG: 291 | End: 2024-08-24
Attending: STUDENT IN AN ORGANIZED HEALTH CARE EDUCATION/TRAINING PROGRAM
Payer: MEDICARE

## 2024-08-24 ENCOUNTER — HOSPITAL ENCOUNTER (INPATIENT)
Facility: CLINIC | Age: 89
LOS: 5 days | Discharge: NURSING, REHAB OR LONG-TERM CARE | DRG: 291 | End: 2024-08-30
Attending: STUDENT IN AN ORGANIZED HEALTH CARE EDUCATION/TRAINING PROGRAM | Admitting: INTERNAL MEDICINE
Payer: MEDICARE

## 2024-08-24 DIAGNOSIS — J90 BILATERAL PLEURAL EFFUSION: ICD-10-CM

## 2024-08-24 DIAGNOSIS — F41.9 ANXIETY: ICD-10-CM

## 2024-08-24 DIAGNOSIS — R06.02 SHORTNESS OF BREATH: ICD-10-CM
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
## 2024-08-24 DIAGNOSIS — Z79.899 ON DEEP VEIN THROMBOSIS (DVT) PROPHYLAXIS: ICD-10-CM

## 2024-08-24 DIAGNOSIS — J18.9 COMMUNITY ACQUIRED PNEUMONIA OF LEFT LOWER LOBE OF LUNG: Primary | ICD-10-CM

## 2024-08-24 DIAGNOSIS — M54.6 ACUTE RIGHT-SIDED THORACIC BACK PAIN: ICD-10-CM

## 2024-08-24 LAB
ALBUMIN UR-MCNC: NEGATIVE MG/DL
APPEARANCE UR: ABNORMAL
BACTERIA #/AREA URNS HPF: ABNORMAL /HPF
BILIRUB UR QL STRIP: NEGATIVE
COLOR UR AUTO: ABNORMAL
FLUAV RNA SPEC QL NAA+PROBE: NEGATIVE
FLUBV RNA RESP QL NAA+PROBE: NEGATIVE
GLUCOSE UR STRIP-MCNC: NEGATIVE MG/DL
HGB UR QL STRIP: ABNORMAL
HYALINE CASTS: 4 /LPF
KETONES UR STRIP-MCNC: NEGATIVE MG/DL
LEUKOCYTE ESTERASE UR QL STRIP: ABNORMAL
MUCOUS THREADS #/AREA URNS LPF: PRESENT /LPF
NITRATE UR QL: NEGATIVE
PH UR STRIP: 5.5 [PH] (ref 5–7)
RBC URINE: 1 /HPF
RSV RNA SPEC NAA+PROBE: NEGATIVE
SARS-COV-2 RNA RESP QL NAA+PROBE: NEGATIVE
SP GR UR STRIP: 1.01 (ref 1–1.03)
SQUAMOUS EPITHELIAL: 1 /HPF
UROBILINOGEN UR STRIP-MCNC: NORMAL MG/DL
WBC CLUMPS #/AREA URNS HPF: PRESENT /HPF
WBC URINE: >182 /HPF

## 2024-08-24 PROCEDURE — G0378 HOSPITAL OBSERVATION PER HR: HCPCS

## 2024-08-24 PROCEDURE — 72125 CT NECK SPINE W/O DYE: CPT | Mod: MA

## 2024-08-24 PROCEDURE — 81001 URINALYSIS AUTO W/SCOPE: CPT | Performed by: STUDENT IN AN ORGANIZED HEALTH CARE EDUCATION/TRAINING PROGRAM

## 2024-08-24 PROCEDURE — 99222 1ST HOSP IP/OBS MODERATE 55: CPT | Mod: AI | Performed by: HOSPITALIST

## 2024-08-24 PROCEDURE — 99285 EMERGENCY DEPT VISIT HI MDM: CPT | Mod: 25

## 2024-08-24 PROCEDURE — 87086 URINE CULTURE/COLONY COUNT: CPT | Performed by: STUDENT IN AN ORGANIZED HEALTH CARE EDUCATION/TRAINING PROGRAM

## 2024-08-24 PROCEDURE — 87637 SARSCOV2&INF A&B&RSV AMP PRB: CPT | Performed by: STUDENT IN AN ORGANIZED HEALTH CARE EDUCATION/TRAINING PROGRAM

## 2024-08-24 PROCEDURE — 70450 CT HEAD/BRAIN W/O DYE: CPT | Mod: MA

## 2024-08-24 PROCEDURE — 71250 CT THORAX DX C-: CPT | Mod: MA

## 2024-08-24 PROCEDURE — 87186 SC STD MICRODIL/AGAR DIL: CPT | Performed by: STUDENT IN AN ORGANIZED HEALTH CARE EDUCATION/TRAINING PROGRAM

## 2024-08-24 PROCEDURE — 250N000013 HC RX MED GY IP 250 OP 250 PS 637: Performed by: HOSPITALIST

## 2024-08-24 PROCEDURE — 250N000013 HC RX MED GY IP 250 OP 250 PS 637: Performed by: STUDENT IN AN ORGANIZED HEALTH CARE EDUCATION/TRAINING PROGRAM

## 2024-08-24 RX ORDER — ONDANSETRON 4 MG/1
4 TABLET, ORALLY DISINTEGRATING ORAL EVERY 6 HOURS PRN
Status: DISCONTINUED | OUTPATIENT
Start: 2024-08-24 | End: 2024-08-30 | Stop reason: HOSPADM

## 2024-08-24 RX ORDER — LETROZOLE 2.5 MG/1
2.5 TABLET, FILM COATED ORAL DAILY
Status: DISCONTINUED | OUTPATIENT
Start: 2024-08-24 | End: 2024-08-30 | Stop reason: HOSPADM

## 2024-08-24 RX ORDER — LORAZEPAM 0.5 MG/1
0.25 TABLET ORAL
COMMUNITY
End: 2024-08-30

## 2024-08-24 RX ORDER — LEVOTHYROXINE SODIUM 50 UG/1
50 TABLET ORAL DAILY
Status: DISCONTINUED | OUTPATIENT
Start: 2024-08-24 | End: 2024-08-30 | Stop reason: HOSPADM

## 2024-08-24 RX ORDER — AMOXICILLIN 250 MG
1 CAPSULE ORAL 2 TIMES DAILY PRN
Status: DISCONTINUED | OUTPATIENT
Start: 2024-08-24 | End: 2024-08-30 | Stop reason: HOSPADM

## 2024-08-24 RX ORDER — VITAMIN B COMPLEX
50 TABLET ORAL DAILY
Status: DISCONTINUED | OUTPATIENT
Start: 2024-08-24 | End: 2024-08-30 | Stop reason: HOSPADM

## 2024-08-24 RX ORDER — LEVOFLOXACIN 750 MG/1
750 TABLET, FILM COATED ORAL EVERY OTHER DAY
Status: ON HOLD | COMMUNITY
Start: 2024-08-24 | End: 2024-08-30

## 2024-08-24 RX ORDER — QUETIAPINE FUMARATE 100 MG/1
100 TABLET, FILM COATED ORAL AT BEDTIME
COMMUNITY

## 2024-08-24 RX ORDER — LOPERAMIDE HCL 2 MG
2 CAPSULE ORAL 4 TIMES DAILY PRN
Status: DISCONTINUED | OUTPATIENT
Start: 2024-08-24 | End: 2024-08-30 | Stop reason: HOSPADM

## 2024-08-24 RX ORDER — ONDANSETRON 2 MG/ML
4 INJECTION INTRAMUSCULAR; INTRAVENOUS EVERY 6 HOURS PRN
Status: DISCONTINUED | OUTPATIENT
Start: 2024-08-24 | End: 2024-08-30 | Stop reason: HOSPADM

## 2024-08-24 RX ORDER — AMOXICILLIN 250 MG
1-2 CAPSULE ORAL 2 TIMES DAILY PRN
Status: ON HOLD | COMMUNITY
End: 2024-09-16

## 2024-08-24 RX ORDER — LITHIUM CARBONATE 150 MG/1
150 CAPSULE ORAL EVERY MORNING
Status: DISCONTINUED | OUTPATIENT
Start: 2024-08-25 | End: 2024-08-30 | Stop reason: HOSPADM

## 2024-08-24 RX ORDER — ACETAMINOPHEN 325 MG/1
650 TABLET ORAL EVERY 4 HOURS PRN
Status: CANCELLED | OUTPATIENT
Start: 2024-08-24

## 2024-08-24 RX ORDER — ACETAMINOPHEN 500 MG
1000 TABLET ORAL ONCE
Status: COMPLETED | OUTPATIENT
Start: 2024-08-24 | End: 2024-08-24

## 2024-08-24 RX ORDER — AMOXICILLIN 250 MG
2 CAPSULE ORAL 2 TIMES DAILY PRN
Status: DISCONTINUED | OUTPATIENT
Start: 2024-08-24 | End: 2024-08-30 | Stop reason: HOSPADM

## 2024-08-24 RX ORDER — ACETAMINOPHEN 650 MG/1
650 SUPPOSITORY RECTAL EVERY 4 HOURS PRN
Status: CANCELLED | OUTPATIENT
Start: 2024-08-24

## 2024-08-24 RX ORDER — QUETIAPINE FUMARATE 25 MG/1
25 TABLET, FILM COATED ORAL AT BEDTIME
Status: DISCONTINUED | OUTPATIENT
Start: 2024-08-24 | End: 2024-08-24

## 2024-08-24 RX ORDER — DIVALPROEX SODIUM 500 MG/1
500 TABLET, EXTENDED RELEASE ORAL AT BEDTIME
Status: DISCONTINUED | OUTPATIENT
Start: 2024-08-24 | End: 2024-08-30 | Stop reason: HOSPADM

## 2024-08-24 RX ORDER — FUROSEMIDE 40 MG
40 TABLET ORAL DAILY
Status: DISCONTINUED | OUTPATIENT
Start: 2024-08-24 | End: 2024-08-30

## 2024-08-24 RX ORDER — FAMOTIDINE 20 MG/1
20 TABLET, FILM COATED ORAL EVERY OTHER DAY
COMMUNITY

## 2024-08-24 RX ORDER — ACETAMINOPHEN 325 MG/1
650 TABLET ORAL 3 TIMES DAILY
Status: DISCONTINUED | OUTPATIENT
Start: 2024-08-24 | End: 2024-08-30 | Stop reason: HOSPADM

## 2024-08-24 RX ADMIN — ACETAMINOPHEN 1000 MG: 500 TABLET, FILM COATED ORAL at 16:08

## 2024-08-24 RX ADMIN — FUROSEMIDE 40 MG: 40 TABLET ORAL at 20:51

## 2024-08-24 ASSESSMENT — ACTIVITIES OF DAILY LIVING (ADL)
ADLS_ACUITY_SCORE: 42
ADLS_ACUITY_SCORE: 43
ADLS_ACUITY_SCORE: 42
ADLS_ACUITY_SCORE: 43
ADLS_ACUITY_SCORE: 43
ADLS_ACUITY_SCORE: 42
ADLS_ACUITY_SCORE: 43
ADLS_ACUITY_SCORE: 43

## 2024-08-24 ASSESSMENT — COLUMBIA-SUICIDE SEVERITY RATING SCALE - C-SSRS
3. HAVE YOU BEEN THINKING ABOUT HOW YOU MIGHT KILL YOURSELF?: NO
1. IN THE PAST MONTH, HAVE YOU WISHED YOU WERE DEAD OR WISHED YOU COULD GO TO SLEEP AND NOT WAKE UP?: NO
5. HAVE YOU STARTED TO WORK OUT OR WORKED OUT THE DETAILS OF HOW TO KILL YOURSELF? DO YOU INTEND TO CARRY OUT THIS PLAN?: NO
6. HAVE YOU EVER DONE ANYTHING, STARTED TO DO ANYTHING, OR PREPARED TO DO ANYTHING TO END YOUR LIFE?: NO
4. HAVE YOU HAD THESE THOUGHTS AND HAD SOME INTENTION OF ACTING ON THEM?: YES
2. HAVE YOU ACTUALLY HAD ANY THOUGHTS OF KILLING YOURSELF IN THE PAST MONTH?: YES

## 2024-08-24 NOTE — ED PROVIDER NOTES
Emergency Department Note      History of Present Illness     Chief Complaint   Flank Pain and Shortness of Breath      HPI   Lennie Mar is a 89 year old female with history of hypertension, CKD stage 3, paroxysmal atrial fibrillation, essential tremor, hypothyroidism, and macrocytic anemia who presents for SOB and flank pain due to a fall this morning. Patient was backing up with walker and tripped. She hit her head but denies LOC. She did not land on anything. Reports SOB. Reports pain with sitting. No headache or neck pain. No vomiting. Hips, legs, and wrists feel normal. Patient is uncertain whether she is currently on anticoagulants.    Independent Historian   None    Review of External Notes   I reviewed the discharge note from 8/12/2024 for pneumonia.    Past Medical History     Medical History and Problem List   CKD stage 3  Paroxysmal atrial fibrillation  Essential tremor  Hypothyroidism  Macrocytic anemia   Memory loss  Subdural hygroma   Hypertension   Bipolar 1 disorder    Medications   Zantac  Ativan  Eskalith   Abilify  Femara  Synthroid/Levothroid   Lithonate     Surgical History   Appendectomy  Colonoscopy  Hysterectomy  Decompression lumbar one level  Colectomy   Mastectomy     Physical Exam     Patient Vitals for the past 24 hrs:   BP Temp Pulse Resp SpO2   08/24/24 1615 113/79 -- 91 -- --   08/24/24 1445 96/53 -- 53 -- 92 %   08/24/24 1430 112/55 -- 88 -- 92 %   08/24/24 1400 108/75 -- 83 -- 90 %   08/24/24 1344 -- -- -- -- 92 %   08/24/24 1343 (!) 146/137 97.6  F (36.4  C) 58 12 --     Physical Exam  GENERAL: Seems frail and SOB. Speaking clearly.   HEAD: Atraumatic. No romo sign, raccoon eyes or CSF leak  Eyes: Anicteric. PERRL  NOSE: No active bleeding  MOUTH: Moist mucosa  THROAT: Patent airway. Pharynx clear.   Neck: No rigidity. No midline spinal tenderness.  Nontender mass on right side of neck, chronic.  Back: No midline spinal tenderness, crepitus or gross deformity. Tenderness  over posterior ribs on right side.  CV: RRR, no murmurs, rubs or gallops  PULM: CTAB with good aeration; no retractions, rales, rhonchi, or wheezing  ABD: Soft, nontender, nondistended, no guarding, no peritoneal signs, no bruising  DERM: No rash. Skin warm and dry  EXTREMITY: Moving all extremities without difficulty  Pelvis: Stable  VASCULAR: Symmetric pulses bilaterally      Diagnostics     Lab Results   Labs Ordered and Resulted from Time of ED Arrival to Time of ED Departure   INFLUENZA A/B, RSV, & SARS-COV2 PCR - Normal       Result Value    Influenza A PCR Negative      Influenza B PCR Negative      RSV PCR Negative      SARS CoV2 PCR Negative     ROUTINE UA WITH MICROSCOPIC REFLEX TO CULTURE       Imaging   CT Cervical Spine w/o Contrast   Final Result   IMPRESSION:   1.  No fracture or posttraumatic subluxation.   2.  No high-grade spinal canal or neural foraminal stenosis.      3.  Right thyroid mass.      CT Head w/o Contrast   Final Result   IMPRESSION:   1.  No CT evidence for acute intracranial process.   2.  Brain atrophy and presumed chronic microvascular ischemic changes as above.      Chest CT w/o contrast   Final Result   IMPRESSION:    1.  No definite displaced rib fractures demonstrated.   2.  Small to moderate bilateral effusions which are loculated on the left with associated probable compressive atelectasis versus less likely infiltrate.   3.  Large incompletely characterized neck mass, possibly related to a thyroid goiter on the right.                 Independent Interpretation   CT Head: No intracranial hemorrhage.    ED Course      Medications Administered   Medications   acetaminophen (TYLENOL) tablet 1,000 mg (1,000 mg Oral $Given 8/24/24 1605)       Discussion of Management   Admitting HospitalistBahman    ED Course   ED Course as of 08/24/24 1742   Sat Aug 24, 2024   1405 I obtained history and examined the patient as noted above.     1741 I spoke with Dr. Ruggiero, of the hospitalist  team, regarding the patient. They accepted the patient for admission.         Additional Documentation  None    Medical Decision Making / Diagnosis     CMS Diagnoses: None    MIPS       None    MDM   Lennie Mar is a 89 year old female     Patient presenting after mechanical fall.      Chronic conditions complicating - elderly    DDx considered, but not limited to fracture, intracranial bleed, syncope, however evaluation not consistent with these or other ominous etiologies.    CT head and C-spine negative for acute process.  Patient does have a significant goiter but that is chronic.    CT torso negative for fracture and hemothorax.  Patient does have bilateral loculated.  Pleural effusions.    Patient declines all lab work.  I discussed multiple times with patient but she does not want any IV pokes.    Try to ambulate patient but she was not able to.  Baseline she can walk with walker.  Therefore discussed with hospitalist and patient to be admitted.         Disposition   The patient was admitted to the hospital.     Diagnosis     ICD-10-CM    1. Fall, initial encounter  W19.XXXA       2. Bilateral pleural effusion  J90       3. Shortness of breath  R06.02       4. Acute right-sided thoracic back pain  M54.6            Discharge Medications   New Prescriptions    No medications on file     Jihan 22, unremarkable    Scribe Disclosure:  Agustina WALTERS, am serving as a scribe at 2:16 PM on 8/24/2024 to document services personally performed by Moreno Zhou MD based on my observations and the provider's statements to me.        Moreno Zhou MD  08/24/24 9428

## 2024-08-24 NOTE — ED TRIAGE NOTES
Pt reports SOB and R flank pain X 2 days. Upon EMS arrival, SB was in 90's. She denies any burning/pain with urination.

## 2024-08-24 NOTE — PLAN OF CARE
"ROOM # 227    Living Situation (if not independent, order SW consult): [facility]  Facility name:  : [facility]    Activity level at baseline: independent with walker  Activity level on admit: 1-2 assist, GB/W    Who will be transporting you at discharge: TBD    Patient registered to observation; given Patient Bill of Rights; given the opportunity to ask questions about observation status and their plan of care.  Patient has been oriented to the observation room, bathroom and call light is in place.    Discussed discharge goals and expectations with patient/family.     OBSERVATION patient IN TIME: 1850 pm    /63 (BP Location: Right arm)   Pulse 88   Temp 97.5  F (36.4  C) (Oral)   Resp 14   Ht 1.575 m (5' 2\")   Wt 69 kg (152 lb 1.9 oz)   LMP  (LMP Unknown)   SpO2 90%   BMI 27.82 kg/m                              "

## 2024-08-24 NOTE — PHARMACY-ADMISSION MEDICATION HISTORY
Pharmacy Intern Admission Medication History    Admission medication history is complete. The information provided in this note is only as accurate as the sources available at the time of the update.    Information Source(s): Facility ([facility] medication list/MAR via N/A    Pertinent Information: Pt came to the ED with a med list with no last doses. Pt comes from [facility] [phone].    Changes made to PTA medication list:  Added: levofloxacin and quetiapine   Deleted: hydrocortisone  Changed: None    Allergies reviewed with patient and updates made in EHR: yes    Medication History Completed By: Vel George 8/24/2024 6:49 PM    PTA Med List   Medication Sig Last Dose    acetaminophen (TYLENOL) 500 MG tablet Take 1,000 mg by mouth 3 times daily. 8/24/2024 at 1400    albuterol (PROAIR HFA/PROVENTIL HFA/VENTOLIN HFA) 108 (90 Base) MCG/ACT inhaler Inhale 2 puffs into the lungs every 6 hours as needed for shortness of breath, wheezing or cough Unknown at PRN    alum & mag hydroxide-simethicone (MAALOX) 200-200-20 MG/5ML SUSP suspension Take 30 mLs by mouth every 4 hours as needed for indigestion Unknown at PRN    bisacodyl (DULCOLAX) 10 MG suppository Place 1 suppository (10 mg) rectally daily as needed for constipation Unknown at PRN    Cranberry 500 MG CAPS Take 500 mg by mouth 2 times daily 8/24/2024 at am    divalproex sodium extended-release (DEPAKOTE ER) 500 MG 24 hr tablet Take 500 mg by mouth At Bedtime 8/23/2024 at pm    famotidine (PEPCID) 20 MG tablet Take 20 mg by mouth every other day. 8/23/2024 at AM    ipratropium-albuterol (COMBIVENT RESPIMAT)  MCG/ACT inhaler Inhale 1 puff into the lungs 4 times daily as needed for shortness of breath / dyspnea or wheezing Unknown at prn    letrozole (FEMARA) 2.5 MG tablet TAKE 1 TABLET BY MOUTH EVERY DAY 8/24/2024 at am    levofloxacin (LEVAQUIN) 750 MG tablet Take 750 mg by mouth every other day. 8/24/2024 at am    levothyroxine  (SYNTHROID/LEVOTHROID) 50 MCG tablet Take 50 mcg by mouth daily. 8/24/2024 at am    Lidocaine (LIDOCARE) 4 % Patch Place 1 patch onto the skin daily as needed (back pain). To prevent lidocaine toxicity, patient should be patch free for 12 hrs daily. Unknown at PRN    lithium (ESKALITH) 150 MG capsule Take 150 mg by mouth every morning  8/24/2024 at am    loperamide (IMODIUM) 2 MG capsule Take 2 mg by mouth 4 times daily as needed for diarrhea Unknown at PRN    LORazepam (ATIVAN) 0.5 MG tablet Take 0.25 mg by mouth nightly as needed for anxiety. Unknown at PRN    magnesium hydroxide (MILK OF MAGNESIA) 400 MG/5ML suspension Take 30 mLs by mouth daily as needed for constipation Unknown at PRN    melatonin 1 MG TABS tablet Take 1 tablet (1 mg) by mouth nightly as needed for sleep Unknown at PRN    ondansetron (ZOFRAN-ODT) 4 MG ODT tab Take 1 tablet (4 mg) by mouth every 6 hours as needed for nausea or vomiting Unknown at PRN    polyvinyl alcohol-povidone PF (REFRESH) 1.4-0.6 % ophthalmic solution Place 2 drops into both eyes 2 times daily 8/24/2024 at am    QUEtiapine (SEROQUEL) 100 MG tablet Take 100 mg by mouth at bedtime. 8/23/2024 at pm    senna-docusate (SENOKOT-S/PERICOLACE) 8.6-50 MG tablet Take 1-2 tablets by mouth 2 times daily as needed for constipation. Unknown at PRN    vitamin D3 (CHOLECALCIFEROL) 50 mcg (2000 units) tablet Take 1 tablet by mouth daily 8/24/2024 at AM    zinc Oxide (DESITIN) 40 % paste Apply topically as needed for dry skin or irritation Unknown at PRN

## 2024-08-24 NOTE — ED NOTES
[facility]  ED Nurse Handoff Report    ED Chief complaint: Flank Pain and Shortness of Breath  . ED Diagnosis:   Final diagnoses:   Fall, initial encounter   Bilateral pleural effusion   Shortness of breath   Acute right-sided thoracic back pain       Allergies:   Allergies   Allergen Reactions    Ciprofloxacin      Nausea and vomiting per son     Ergotamine-Caffeine Other (See Comments) and Nausea and Vomiting     Severe HA, N/V & diarrhea    Penicillins Rash and Unknown    Sulfa Antibiotics Rash and Unknown    Lamictal [Lamotrigine] Other (See Comments)     Patient experienced night moss    Naproxen      Pt unable to remember reaction.    Naproxen Unknown    Nicergoline Unknown    Tetracycline Unknown     Pt unable to remember allergy       Code Status: Full Code    Activity level - Baseline/Home:  standby.  Activity Level - Current:   assist of 2.   Lift room needed: No.   Bariatric: No   Needed: No   Isolation: No.   Infection: Not Applicable.     Respiratory status: Room air    Vital Signs (within 30 minutes):   Vitals:    08/24/24 1400 08/24/24 1430 08/24/24 1445 08/24/24 1615   BP: 108/75 112/55 96/53 113/79   Pulse: 83 88 53 91   Resp:       Temp:       SpO2: 90% 92% 92%        Cardiac Rhythm:  ,      Pain level:    Patient confused: No.   Patient Falls Risk: patient and family education.   Elimination Status:  due to void      Patient Report - Initial Complaint: flank pain.   Focused Assessment: R flank pain     Abnormal Results:   Labs Ordered and Resulted from Time of ED Arrival to Time of ED Departure   INFLUENZA A/B, RSV, & SARS-COV2 PCR - Normal       Result Value    Influenza A PCR Negative      Influenza B PCR Negative      RSV PCR Negative      SARS CoV2 PCR Negative     ROUTINE UA WITH MICROSCOPIC REFLEX TO CULTURE        CT Cervical Spine w/o Contrast   Final Result   IMPRESSION:   1.  No fracture or posttraumatic subluxation.   2.  No high-grade spinal canal or  neural foraminal stenosis.      3.  Right thyroid mass.      CT Head w/o Contrast   Final Result   IMPRESSION:   1.  No CT evidence for acute intracranial process.   2.  Brain atrophy and presumed chronic microvascular ischemic changes as above.      Chest CT w/o contrast   Final Result   IMPRESSION:    1.  No definite displaced rib fractures demonstrated.   2.  Small to moderate bilateral effusions which are loculated on the left with associated probable compressive atelectasis versus less likely infiltrate.   3.  Large incompletely characterized neck mass, possibly related to a thyroid goiter on the right.                   Treatments provided: tylenol  Family Comments: n/a  OBS brochure/video discussed/provided to patient:  yes  ED Medications:   Medications   acetaminophen (TYLENOL) tablet 1,000 mg (1,000 mg Oral $Given 8/24/24 5770)       Drips infusing:  No  For the majority of the shift this patient was Green.   Interventions performed were n/a.    Sepsis treatment initiated: No    Cares/treatment/interventions/medications to be completed following ED care: pain management    ED Nurse Name: Lola Rodríguez RN  5:51 PM     RECEIVING UNIT ED HANDOFF REVIEW    Above ED Nurse Handoff Report was reviewed: Yes  Reviewed by: Agustina Becerril RN on August 24, 2024 at 6:32 PM

## 2024-08-24 NOTE — ED NOTES
Bed: ED20  Expected date:   Expected time:   Means of arrival:   Comments:  A594. 89. F. Fall. No thinners. No LOC.

## 2024-08-24 NOTE — H&P
[facility]    History and Physical  Hospitalist       Date of Admission:  8/24/2024    Assessment and Plan:      Lennie Mar is a  89 year old female with history of hypertension, CKD stage 3, paroxysmal atrial fibrillation, essential tremor, hypothyroidism, and macrocytic anemia who presents for SOB and flank pain due to a fall this morning.     Fall  -Likely mechanical, imaging no evidence of rib fracture, or spinal fracutre, CT head without acute pathology, CT cervical spine no fracture or posttraumatic subluxation  -PT  - no telemetry per patients request    SOB  Possible Acute Diastolic heart failure given Hx of Diastolic heart failure  Bilateral pleural effusions moderate to small with L loculation  -Patient currently denies shortness of breath, states she feels fine now but is clearly short of breath on exam  -patient with imaging showing Bilateral effusion, exam findings of volume overload. Concern for possible volume overload, will order lasix 40 mg once oral. Patient does not want any IV medications  - Recently treated for pneumonia 8/12, with loculations, concern for empyema? Clinically no evidence of infection, afebrile, no leukocytosis, monitor clinically if not improving with diuresis, can consider thoracentesis for clinical diagnosis evaluation pending goals with patient, currently patient does not want telemetry or lab draws. Mainly wants to go home     Large incompletely characterized neck mass   Hx of Right thyroid nodule   -Monitor clinically     Chronic Kidney Disease Stage 3   -No labs today, patient did not want labs drawn., recently labs from previous admission appears at baseline     Goals of care:  Discussed with son about goals of care as patient recently does not want a lot of interventions currently.  He states that he she does have a history of bipolar and depression can be noncooperative sometimes.  He feels patient should be on lithium and Seroquel.  I do  not see Seroquel in the med list but will order 25 mg Seroquel for the evening, and ECG does not show QTc prolongation on 8/24.  If patient continues to not want significant medical intervention goals of care will need to be readdressed with family.    Chronic Medical Problems:  Paroxysmal Atrial Fibrillation  Essential Tremor  Hypothyroidism  Depression/Anxiety  Macrocytic Anemia  Bipolar Disorder   ---------     # Code status: DNR/DNI  # DVT:SCDs  # IVF: none                        Medically Ready for Discharge: Anticipated Tomorrow      Tash Ruggiero MD  Text Page (7am - 6pm, M-F)          Primary Care Physician   Physician No Ref-Primary    Chief Complaint       History is obtained from the patient    History of Present Illness   Lennie Mar is a 89 year old female with history of hypertension, CKD stage 3, paroxysmal atrial fibrillation, essential tremor, hypothyroidism, and macrocytic anemia who presents for SOB and flank pain due to a fall this morning.  Patient initially did not want to be examined or speak to provider.  She stated she does not want to be hospitalized and wants to go home.  Discussed with patient that we need a safe discharge plan and she needs to be able to walk around and is failed a mobility at bedside ED. patient understands and is more agreeable to talk with provider.  She states she was short of breath but feels fine now.  She states she has had lower extremity edema which has been worsening over the past few days.  She states she was trying to sit down in her chair and missed her chair and fell back and hit her head.  Otherwise no other complaints    Past Medical History    I have reviewed this patient's medical history and updated it with pertinent information if needed.   Past Medical History:   Diagnosis Date    Alcohol dependence in remission (H)     Anxiety     Asymptomatic varicose veins     Bipolar disorder, unspecified (H)     CKD (chronic kidney disease) stage 3, GFR  30-59 ml/min (H) 2/18/2014    History of smoking     Hyperparathyroidism (H)     Infection due to 2019 novel coronavirus 3/10/2022    Memory loss     Muscle weakness (generalized) 6/23/2008    Severe depression (H)     Subdural hygroma     chronic.  no surgeries    Tremor of unknown origin        Past Surgical History   I have reviewed this patient's surgical history and updated it with pertinent information if needed.  Past Surgical History:   Procedure Laterality Date    APPENDECTOMY OPEN  1947    CATARACT IOL, RT/LT      COLONOSCOPY WITH CO2 INSUFFLATION  2/29/2012    Procedure:COLONOSCOPY WITH CO2 INSUFFLATION; Surgeon:GAYLE REYNA; Location:[facility] OR    CYSTOCELE REPAIR  1972    CYSTOSCOPY, INSERT STENT URETHRA, COMBINED  1999    CYSTOSCOPY, RETROGRADES, INSERT STENT URETER(S), COMBINED  2/29/2012    Procedure:COMBINED CYSTOSCOPY, RETROGRADES, INSERT STENT URETER(S); Surgeon:ANT ESPINOZA; Location:[facility] OR    DECOMPRESSION LUMBAR ONE LEVEL  8/9/2013    Procedure: DECOMPRESSION LUMBAR ONE LEVEL;  Posterior Decompression Right Lumbar 5- Sacral 1;  Surgeon: You Zavala MD;  Location: [facility] OR    HC TOOTH EXTRACTION W/FORCEP      HYSTERECTOMY, CATA  1963    IRRIGATION AND DEBRIDEMENT ORAL, COMBINED  1982    For treatment of gingivitis    LAPAROSCOPIC ASSISTED COLECTOMY  2/29/2012    Procedure:LAPAROSCOPIC ASSISTED COLECTOMY; Cysto, Bilateral Stent placement (both stents removed at end of case)- Yanet ACOSTA. Laparoscopic Extended Right Venu Colectomy with CO2 Colonoscopy and polyp removal-[place]ah; Surgeon:GAYLE REYNA; Location:[facility] OR    LIGATN/STRIP LONG OR SHORT SAPHEN  1955    MASTECTOMY PARTIAL WITH SENTINEL NODE Left 11/19/2018    Procedure: Left Mastectomy, Left [place] Lymph Node Biopsy;  Surgeon: Nahun Betancourt MD;  Location: [facility] OR    STRIP VEIN BILATERAL  1964    SURGICAL HISTORY OF -   1999    ureter surgery for blockage.       Prior to Admission Medications   Prior to Admission  Medications   Prescriptions Last Dose Informant Patient Reported? Taking?   Cranberry 500 MG CAPS   Yes No   Sig: Take 500 mg by mouth 2 times daily   LORazepam (ATIVAN) 0.25 mg TABS half-tab   Yes No   Sig: Take 0.25 mg by mouth nightly as needed for anxiety   Lidocaine (LIDOCARE) 4 % Patch   Yes No   Sig: Place 1 patch onto the skin daily as needed for moderate pain To prevent lidocaine toxicity, patient should be patch free for 12 hrs daily.   ORDER FOR DME   No No   Sig: Equipment being ordered: compression stocking knee high 20-30mmhg   acetaminophen (TYLENOL) 325 MG tablet   Yes No   Sig: Take 650 mg by mouth 3 times daily   albuterol (PROAIR HFA/PROVENTIL HFA/VENTOLIN HFA) 108 (90 Base) MCG/ACT inhaler   Yes No   Sig: Inhale 2 puffs into the lungs every 6 hours as needed for shortness of breath, wheezing or cough   alum & mag hydroxide-simethicone (MAALOX) 200-200-20 MG/5ML SUSP suspension   No No   Sig: Take 30 mLs by mouth every 4 hours as needed for indigestion   alum & mag hydroxide-simethicone (MAALOX) 200-200-20 MG/5ML SUSP suspension   Yes No   Sig: Take 30 mLs by mouth every 4 hours as needed for indigestion   bisacodyl (DULCOLAX) 10 MG suppository   No No   Sig: Place 1 suppository (10 mg) rectally daily as needed for constipation   divalproex sodium extended-release (DEPAKOTE ER) 500 MG 24 hr tablet   Yes No   Sig: Take 500 mg by mouth At Bedtime   famotidine (PEPCID) 20 MG tablet   No No   Sig: Take 1 tablet (20 mg) by mouth every 48 hours   hydrocortisone 1 % CREA cream   Yes No   Sig: Place rectally 2 times daily   ipratropium-albuterol (COMBIVENT RESPIMAT)  MCG/ACT inhaler   No No   Sig: Inhale 1 puff into the lungs 4 times daily as needed for shortness of breath / dyspnea or wheezing   letrozole (FEMARA) 2.5 MG tablet   No No   Sig: TAKE 1 TABLET BY MOUTH EVERY DAY   levothyroxine (SYNTHROID/LEVOTHROID) 75 MCG tablet   Yes No   Sig: Take 50 mcg by mouth daily   lithium (ESKALITH) 150 MG  capsule   Yes No   Sig: Take 150 mg by mouth every morning    loperamide (IMODIUM) 2 MG capsule   Yes No   Sig: Take 2 mg by mouth 4 times daily as needed for diarrhea   magnesium hydroxide (MILK OF MAGNESIA) 400 MG/5ML suspension   No No   Sig: Take 30 mLs by mouth daily as needed for constipation   melatonin 1 MG TABS tablet   No No   Sig: Take 1 tablet (1 mg) by mouth nightly as needed for sleep   ondansetron (ZOFRAN-ODT) 4 MG ODT tab   No No   Sig: Take 1 tablet (4 mg) by mouth every 6 hours as needed for nausea or vomiting   polyvinyl alcohol-povidone PF (REFRESH) 1.4-0.6 % ophthalmic solution   Yes No   Sig: Place 2 drops into both eyes 2 times daily   senna-docusate (SENOKOT-S/PERICOLACE) 8.6-50 MG tablet   No No   Sig: Take 1 tablet by mouth 2 times daily as needed for constipation   vitamin D3 (CHOLECALCIFEROL) 50 mcg (2000 units) tablet   Yes No   Sig: Take 1 tablet by mouth daily   zinc Oxide (DESITIN) 40 % paste   No No   Sig: Apply topically as needed for dry skin or irritation      Facility-Administered Medications: None     Allergies   Allergies   Allergen Reactions    Ciprofloxacin      Nausea and vomiting per son     Ergotamine-Caffeine Other (See Comments) and Nausea and Vomiting     Severe HA, N/V & diarrhea    Penicillins Rash and Unknown    Sulfa Antibiotics Rash and Unknown    Lamictal [Lamotrigine] Other (See Comments)     Patient experienced night moss    Naproxen      Pt unable to remember reaction.    Naproxen Unknown    Nicergoline Unknown    Tetracycline Unknown     Pt unable to remember allergy       Social History   I have reviewed this patient's social history and updated it with pertinent information if needed. Lennie A Zaid  reports that she quit smoking about 31 years ago. Her smoking use included cigarettes. She started smoking about 61 years ago. She has a 45 pack-year smoking history. She has never used smokeless tobacco. She reports that she does not drink alcohol and does  not use drugs.    Family History   Family history reviewed with patient and is noncontributory.    ROS  12 point discussed with patient negative as per HPI    Physical Exam   Temp: 97.6  F (36.4  C)   BP: 113/79 Pulse: 91   Resp: 12 SpO2: 92 % O2 Device: None (Room air)    Vital Signs with Ranges  Temp:  [97.6  F (36.4  C)] 97.6  F (36.4  C)  Pulse:  [53-91] 91  Resp:  [12] 12  BP: ()/() 113/79  SpO2:  [90 %-92 %] 92 %  0 lbs 0 oz    General: Pt in NAD, normal appearance  HEENT: PERRLA, EOMI, normocephalic / atraumatic no cervical LAD, no bruit, no pallor, WNL oropharynx, neck supple  Cardiac: +S1, S2, RRR, no MRG, no edema  Lungs: Clear to Auscultation Bilateral, normal breathing without accessory muscle usage, no wheezing, rhonchi or crackles  GI: soft NT/ND +bowel sounds all quadrants, no hepatosplenomegaly  Psych: normal mood and affect, A&Ox3  Neurological: A&O x3, CN II-XII grossly intact, sensation intact normal gait  Skin: warm, dry, normal turgor, no rash    Data   Data reviewed today:  I personally reviewed no images or EKG's today.  No lab results found in last 7 days.    Recent Results (from the past 24 hour(s))   Chest CT w/o contrast    Narrative    EXAM: CT CHEST WITHOUT CONTRAST  LOCATION: [facility]  DATE: 08/24/2024    INDICATION: Tender right posterior ribs and right lateral ribs.  COMPARISON: None.  TECHNIQUE: CT chest without IV contrast. Multiplanar reformats were obtained. Dose reduction techniques were used.  CONTRAST: None.    FINDINGS:   LUNGS AND PLEURA: No pneumothorax. Small to moderate bilateral effusions, loculated on the left with associated probable compressive atelectasis versus less likely infiltrate.    MEDIASTINUM/AXILLAE: Incompletely characterized neck mass, possibly a large goiter on the right. No aneurysm.    CORONARY ARTERY CALCIFICATION: None.    UPPER ABDOMEN: No acute findings.    MUSCULOSKELETAL: No definite displaced rib fracture. No  frankly destructive bony lesions.      Impression    IMPRESSION:   1.  No definite displaced rib fractures demonstrated.  2.  Small to moderate bilateral effusions which are loculated on the left with associated probable compressive atelectasis versus less likely infiltrate.  3.  Large incompletely characterized neck mass, possibly related to a thyroid goiter on the right.         CT Head w/o Contrast    Narrative    EXAM: CT HEAD W/O CONTRAST  LOCATION: [facility]  DATE: 8/24/2024    INDICATION: fell, hit head. Injury. Pain.    COMPARISON: May 3, 2022 head CT  TECHNIQUE: Routine CT Head without IV contrast. Multiplanar reformats. Dose reduction techniques were used.    FINDINGS:  INTRACRANIAL CONTENTS: No intracranial hemorrhage, extraaxial collection, or mass effect.  No CT evidence of acute infarct. Mild to moderate presumed chronic small vessel ischemic changes. Moderate generalized volume loss. No hydrocephalus.     VISUALIZED ORBITS/SINUSES/MASTOIDS: Prior bilateral cataract surgery. Visualized portions of the orbits are otherwise unremarkable. No paranasal sinus mucosal disease. No middle ear or mastoid effusion.    BONES/SOFT TISSUES: No acute abnormality.      Impression    IMPRESSION:  1.  No CT evidence for acute intracranial process.  2.  Brain atrophy and presumed chronic microvascular ischemic changes as above.   CT Cervical Spine w/o Contrast    Narrative    EXAM: CT CERVICAL SPINE W/O CONTRAST  LOCATION: [facility]  DATE: 8/24/2024    INDICATION: fell, hit head. Injury. Pain.  COMPARISON: Cervical spine CT May 3, 2022.  TECHNIQUE: Routine CT Cervical Spine without IV contrast. Multiplanar reformats. Dose reduction techniques were used.    FINDINGS:  VERTEBRA: Normal vertebral body heights. Reversal the normal cervical lordosis centered at C4. No fracture or traumatic malalignment.    CANAL/FORAMINA:     C3-4: Ankylosis across the disc and facet joints.  Patent central canal and foramen.    C4-5: Uncinate spur with mild left foraminal stenosis. Ventral osteophyte.    C5-6: Uncinate spur with severe left foraminal stenosis. Right foramen patent.    C6-7: Uncinate spur with moderate left and mild right foraminal stenosis..    PARASPINAL: Large right thyroid mass redemonstrated. Left pleural effusion. Consider thyroid ultrasound and biopsy.      Impression    IMPRESSION:  1.  No fracture or posttraumatic subluxation.  2.  No high-grade spinal canal or neural foraminal stenosis.    3.  Right thyroid mass.

## 2024-08-25 ENCOUNTER — APPOINTMENT (OUTPATIENT)
Dept: ULTRASOUND IMAGING | Facility: CLINIC | Age: 89
DRG: 291 | End: 2024-08-25
Attending: INTERNAL MEDICINE
Payer: MEDICARE

## 2024-08-25 PROBLEM — R06.02 SHORTNESS OF BREATH: Status: ACTIVE | Noted: 2024-08-25

## 2024-08-25 PROBLEM — M54.6 ACUTE RIGHT-SIDED THORACIC BACK PAIN: Status: ACTIVE | Noted: 2024-08-25

## 2024-08-25 LAB
ANION GAP SERPL CALCULATED.3IONS-SCNC: 14 MMOL/L (ref 7–15)
BUN SERPL-MCNC: 38.7 MG/DL (ref 8–23)
CALCIUM SERPL-MCNC: 10.2 MG/DL (ref 8.8–10.4)
CHLORIDE SERPL-SCNC: 106 MMOL/L (ref 98–107)
CREAT SERPL-MCNC: 2.2 MG/DL (ref 0.51–0.95)
EGFRCR SERPLBLD CKD-EPI 2021: 20 ML/MIN/1.73M2
ERYTHROCYTE [DISTWIDTH] IN BLOOD BY AUTOMATED COUNT: 15 % (ref 10–15)
GLUCOSE SERPL-MCNC: 90 MG/DL (ref 70–99)
HCO3 SERPL-SCNC: 20 MMOL/L (ref 22–29)
HCT VFR BLD AUTO: 35.5 % (ref 35–47)
HGB BLD-MCNC: 10.8 G/DL (ref 11.7–15.7)
MCH RBC QN AUTO: 32.3 PG (ref 26.5–33)
MCHC RBC AUTO-ENTMCNC: 30.4 G/DL (ref 31.5–36.5)
MCV RBC AUTO: 106 FL (ref 78–100)
PLATELET # BLD AUTO: 450 10E3/UL (ref 150–450)
POTASSIUM SERPL-SCNC: 5 MMOL/L (ref 3.4–5.3)
PROT SERPL-MCNC: 6.6 G/DL (ref 6.4–8.3)
RBC # BLD AUTO: 3.34 10E6/UL (ref 3.8–5.2)
SODIUM SERPL-SCNC: 140 MMOL/L (ref 135–145)
WBC # BLD AUTO: 11.6 10E3/UL (ref 4–11)

## 2024-08-25 PROCEDURE — 36415 COLL VENOUS BLD VENIPUNCTURE: CPT | Performed by: INTERNAL MEDICINE

## 2024-08-25 PROCEDURE — 84155 ASSAY OF PROTEIN SERUM: CPT | Performed by: INTERNAL MEDICINE

## 2024-08-25 PROCEDURE — 80048 BASIC METABOLIC PNL TOTAL CA: CPT | Performed by: HOSPITALIST

## 2024-08-25 PROCEDURE — 83880 ASSAY OF NATRIURETIC PEPTIDE: CPT | Performed by: INTERNAL MEDICINE

## 2024-08-25 PROCEDURE — 76604 US EXAM CHEST: CPT

## 2024-08-25 PROCEDURE — 120N000001 HC R&B MED SURG/OB

## 2024-08-25 PROCEDURE — 36415 COLL VENOUS BLD VENIPUNCTURE: CPT | Performed by: HOSPITALIST

## 2024-08-25 PROCEDURE — 250N000013 HC RX MED GY IP 250 OP 250 PS 637: Performed by: HOSPITALIST

## 2024-08-25 PROCEDURE — 85027 COMPLETE CBC AUTOMATED: CPT | Performed by: HOSPITALIST

## 2024-08-25 PROCEDURE — 99232 SBSQ HOSP IP/OBS MODERATE 35: CPT | Performed by: INTERNAL MEDICINE

## 2024-08-25 PROCEDURE — G0378 HOSPITAL OBSERVATION PER HR: HCPCS

## 2024-08-25 RX ADMIN — LETROZOLE 2.5 MG: 2.5 TABLET ORAL at 07:59

## 2024-08-25 RX ADMIN — FUROSEMIDE 40 MG: 40 TABLET ORAL at 08:00

## 2024-08-25 RX ADMIN — LEVOTHYROXINE SODIUM 50 MCG: 0.05 TABLET ORAL at 07:59

## 2024-08-25 RX ADMIN — LITHIUM CARBONATE 150 MG: 150 CAPSULE, GELATIN COATED ORAL at 08:00

## 2024-08-25 RX ADMIN — Medication 50 MCG: at 07:59

## 2024-08-25 RX ADMIN — ACETAMINOPHEN 650 MG: 325 TABLET, FILM COATED ORAL at 07:58

## 2024-08-25 ASSESSMENT — ACTIVITIES OF DAILY LIVING (ADL)
ADLS_ACUITY_SCORE: 55
ADLS_ACUITY_SCORE: 59
ADLS_ACUITY_SCORE: 55
ADLS_ACUITY_SCORE: 59
ADLS_ACUITY_SCORE: 55
ADLS_ACUITY_SCORE: 59
ADLS_ACUITY_SCORE: 59
ADLS_ACUITY_SCORE: 55
ADLS_ACUITY_SCORE: 59
ADLS_ACUITY_SCORE: 55
ADLS_ACUITY_SCORE: 55
ADLS_ACUITY_SCORE: 57

## 2024-08-25 NOTE — PROGRESS NOTES
PRIMARY DIAGNOSIS: Fall/Bilateral Plural Effusions  n  OUTPATIENT/OBSERVATION GOALS TO BE MET BEFORE DISCHARGE  1. Orthostatic performed: No    2. Tolerating PO medications: Yes    3. Return to near baseline physical activity: No    4. Cleared for discharge by consultants (if involved): No    Discharge Planner Nurse   Safe discharge environment identified: Yes  Barriers to discharge: Yes       Entered by: Sonali Garcia RN 08/25/2024 9:12 AM     Please review provider order for any additional goals.   Nurse to notify provider when observation goals have been met and patient is ready for discharge.

## 2024-08-25 NOTE — PLAN OF CARE
"PRIMARY DIAGNOSIS: Bilateral pleural effusion, Shortness of breath, Fall   OUTPATIENT/OBSERVATION GOALS TO BE MET BEFORE DISCHARGE:  ADLs back to baseline: Yes    Activity and level of assistance: A X 1    Pain status: Denies pain     Return to near baseline physical activity: Yes     Discharge Planner Nurse   Safe discharge environment identified: Yes  Barriers to discharge: Yes  A & O X 4 with intermittent confusion. Lung sounds diminished. Infrequent nonproductive cough noted. Denies pain, dizziness, nausea, vomit, or diarrhea. Reported shortness of breath on exertion. No IV access. A X 1-2 walker and gait belt. Notable tremor to bilateral upper extremity. On contact precaution for VRE.  Urine sample collected and sen to the lab-results pending. On scheduled Lasix-See MAR.   Will continue to provide supportive cares.   /64 (BP Location: Right arm)   Pulse 89   Temp 97.7  F (36.5  C) (Oral)   Resp 14   Ht 1.575 m (5' 2\")   Wt 69 kg (152 lb 1.9 oz)   LMP  (LMP Unknown)   SpO2 94%   BMI 27.82 kg/m           Entered by: Linda Torres RN 08/25/2024 2:58 AM     Problem: Adult Inpatient Plan of Care  Goal: Plan of Care Review  Description: The Plan of Care Review/Shift note should be completed every shift.  The Outcome Evaluation is a brief statement about your assessment that the patient is improving, declining, or no change.  This information will be displayed automatically on your shift  note.  Outcome: Progressing  Flowsheets (Taken 8/25/2024 0248)  Plan of Care Reviewed With: patient  Goal: Patient-Specific Goal (Individualized)  Description: You can add care plan individualizations to a care plan. Examples of Individualization might be:  \"Parent requests to be called daily at 9am for status\", \"I have a hard time hearing out of my right ear\", or \"Do not touch me to wake me up as it startles  me\".  Outcome: Progressing  Goal: Absence of Hospital-Acquired Illness or Injury  Outcome: " Progressing  Intervention: Identify and Manage Fall Risk  Recent Flowsheet Documentation  Taken 8/24/2024 2017 by Linda Torres RN  Safety Promotion/Fall Prevention:   supervised activity   safety round/check completed   room organization consistent   room near nurse's station   nonskid shoes/slippers when out of bed   lighting adjusted   clutter free environment maintained  Intervention: Prevent Skin Injury  Recent Flowsheet Documentation  Taken 8/24/2024 2017 by Linda Torres RN  Body Position: position changed independently  Skin Protection: adhesive use limited  Device Skin Pressure Protection:   absorbent pad utilized/changed   tubing/devices free from skin contact  Intervention: Prevent and Manage VTE (Venous Thromboembolism) Risk  Recent Flowsheet Documentation  Taken 8/24/2024 2017 by Linda Torres RN  VTE Prevention/Management: SCDs off (sequential compression devices)  Intervention: Prevent Infection  Recent Flowsheet Documentation  Taken 8/24/2024 2017 by Linda Torres RN  Infection Prevention:   rest/sleep promoted   single patient room provided  Goal: Optimal Comfort and Wellbeing  Outcome: Progressing  Goal: Readiness for Transition of Care  Outcome: Progressing     Problem: Suicide Risk  Goal: Absence of Self-Harm  Outcome: Progressing  Intervention: Assess Risk to Self and Maintain Safety  Recent Flowsheet Documentation  Taken 8/24/2024 2017 by Linda Torres RN  Enhanced Safety Measures: pain management     Please review provider order for any additional goals.   Nurse to notify provider when observation goals have been met and patient is ready for discharge.      Plan of Care Reviewed With: patient

## 2024-08-25 NOTE — PROGRESS NOTES
PRIMARY DIAGNOSIS: Fall/Bilateral Plural Effusions  n  OUTPATIENT/OBSERVATION GOALS TO BE MET BEFORE DISCHARGE  1. Orthostatic performed: No    2. Tolerating PO medications: Yes    3. Return to near baseline physical activity: No    4. Cleared for discharge by consultants (if involved): No    Discharge Planner Nurse   Safe discharge environment identified: Yes  Barriers to discharge: Yes       Entered by: Sonali Garcia RN 08/25/2024 12:00 PM     Please review provider order for any additional goals.   Nurse to notify provider when observation goals have been met and patient is ready for discharge.

## 2024-08-25 NOTE — PROGRESS NOTES
PRIMARY DIAGNOSIS: Fall/Bilateral Plural Effusions  n  OUTPATIENT/OBSERVATION GOALS TO BE MET BEFORE DISCHARGE  1. Orthostatic performed: No    2. Tolerating PO medications: Yes    3. Return to near baseline physical activity: No    4. Cleared for discharge by consultants (if involved): No    Discharge Planner Nurse   Safe discharge environment identified: Yes  Barriers to discharge: Yes       Entered by: Sonali Garcia RN 08/25/2024 4:13 PM     Please review provider order for any additional goals.   Nurse to notify provider when observation goals have been met and patient is ready for discharge.    Plan for a thoracentesis.Pt is aware, grand daughter at bedside.

## 2024-08-25 NOTE — PLAN OF CARE
"PRIMARY DIAGNOSIS: Bilateral pleural effusion, Shortness of breath, Fall   OUTPATIENT/OBSERVATION GOALS TO BE MET BEFORE DISCHARGE:  ADLs back to baseline: Yes    Activity and level of assistance: A X 1    Pain status: Denies pain     Return to near baseline physical activity: Yes     Discharge Planner Nurse   Safe discharge environment identified: Yes  Barriers to discharge: Yes  A & O X 4 with intermittent confusion. Lung sounds diminished. Infrequent nonproductive cough noted. Denies pain, dizziness, nausea, vomit, or diarrhea. Reported shortness of breath on exertion. No IV access. A X 1-2 walker and gait belt. Notable tremor to bilateral upper extremity. Urine sample collected and sen to the lab-results pending. On scheduled Lasix-See MAR.   Will continue to provide supportive cares.   /64 (BP Location: Right arm)   Pulse 89   Temp 97.7  F (36.5  C) (Oral)   Resp 14   Ht 1.575 m (5' 2\")   Wt 69 kg (152 lb 1.9 oz)   LMP  (LMP Unknown)   SpO2 94%   BMI 27.82 kg/m           Entered by: Linda Torres RN 08/25/2024 2:49 AM     Problem: Adult Inpatient Plan of Care  Goal: Plan of Care Review  Description: The Plan of Care Review/Shift note should be completed every shift.  The Outcome Evaluation is a brief statement about your assessment that the patient is improving, declining, or no change.  This information will be displayed automatically on your shift  note.  Outcome: Progressing  Flowsheets (Taken 8/25/2024 0248)  Plan of Care Reviewed With: patient  Goal: Patient-Specific Goal (Individualized)  Description: You can add care plan individualizations to a care plan. Examples of Individualization might be:  \"Parent requests to be called daily at 9am for status\", \"I have a hard time hearing out of my right ear\", or \"Do not touch me to wake me up as it startles  me\".  Outcome: Progressing  Goal: Absence of Hospital-Acquired Illness or Injury  Outcome: Progressing  Intervention: Identify and Manage " Fall Risk  Recent Flowsheet Documentation  Taken 8/24/2024 2017 by Linda Torres RN  Safety Promotion/Fall Prevention:   supervised activity   safety round/check completed   room organization consistent   room near nurse's station   nonskid shoes/slippers when out of bed   lighting adjusted   clutter free environment maintained  Intervention: Prevent Skin Injury  Recent Flowsheet Documentation  Taken 8/24/2024 2017 by Linda Torres RN  Body Position: position changed independently  Skin Protection: adhesive use limited  Device Skin Pressure Protection:   absorbent pad utilized/changed   tubing/devices free from skin contact  Intervention: Prevent and Manage VTE (Venous Thromboembolism) Risk  Recent Flowsheet Documentation  Taken 8/24/2024 2017 by Linda Torres RN  VTE Prevention/Management: SCDs off (sequential compression devices)  Intervention: Prevent Infection  Recent Flowsheet Documentation  Taken 8/24/2024 2017 by Linda Torres RN  Infection Prevention:   rest/sleep promoted   single patient room provided  Goal: Optimal Comfort and Wellbeing  Outcome: Progressing  Goal: Readiness for Transition of Care  Outcome: Progressing     Problem: Suicide Risk  Goal: Absence of Self-Harm  Outcome: Progressing  Intervention: Assess Risk to Self and Maintain Safety  Recent Flowsheet Documentation  Taken 8/24/2024 2017 by Linda Torres RN  Enhanced Safety Measures: pain management     Please review provider order for any additional goals.   Nurse to notify provider when observation goals have been met and patient is ready for discharge.      Plan of Care Reviewed With: patient

## 2024-08-25 NOTE — PROGRESS NOTES
Hospitalist Medicine Progress Note   [facility]       Lennie Mar is a 89 year old lady with past medical history of hypertension, grade 1 diastolic CHF with LV showing hyperdynamic LV function and EF of 75 to 80% in the echo done March 2022, stage III CKD, paroxysmal atrial fibrillation, essential hypertension, essential tremors, hypothyroidism, macrocytic anemia.  She came into [facility] 8/24/2024 with symptoms of shortness of breath and right flank pain of 2-day duration following mechanical fall.  CT scan of the head, neck cervical spine did not show any acute pathology chest CT did not show any displaced rib fractures but rather small to moderate bilateral effusions which were loculated in the left with associated possible compressive atelectasis versus infiltrate and a large incompletely characterized neck mass probably related to thyroid goiter on the right side,        Date of Admission:  8/24/2024  Assessment & Plan     Fall    Right-sided flank pain    Acute exacerbation of chronic diastolic congestive heart failure    Left-sided loculated pleural effusion    Bilateral pleural effusions    Large incompletely characterized neck mass on the right?  Thyroid origin    ASHLEY on management of loculated pleural effusion stage III CKD    Bipolar disorder  Depression  Anxiety  Paroxysmal atrial fibrillation  Essential tremors  Hypothyroidism  Macrocytic anemia              Plan:   Consult pulmonology regarding loculated pleural effusion management  Will consult interventional radiology regarding left-sided loculated pleural effusion  Hold Lasix with worsening of acute kidney injury and increase in creatinine  LDH, CBC, BMP in a.m.    Diet: Regular Diet Adult    DVT Prophylaxis: Pneumatic Compression Devices  Kelley Catheter: Not present  Code Status: No CPR- Do NOT Intubate         Medically Ready for Discharge: Anticipated in 2-4 Days    Clinically Significant Risk Factors  "Present on Admission                 # Acute Kidney Injury, unspecified: based on a >150% or 0.3 mg/dL increase in last creatinine compared to past 90 day average, will monitor renal function  # Hypertension: Noted on problem list    # Anemia: based on hgb <11       # Overweight: Estimated body mass index is 27.82 kg/m  as calculated from the following:    Height as of this encounter: 1.575 m (5' 2\").    Weight as of this encounter: 69 kg (152 lb 1.9 oz).       # Financial/Environmental Concerns:                       The patient's care was discussed with the Patient and RN.    William Faulkner MD  Hospitalist Service  [facility]    ______________________________________________________________________    Interval History     Symptoms   Still short of breath  Denies any chest pain    Review of Systems:   Still feeling weak    Data reviewed today: I reviewed all medications, new labs and imaging results over the last 24 hours.     Physical Exam   Vital Signs: Temp: 98  F (36.7  C) Temp src: Oral BP: 103/55 Pulse: 78   Resp: 16 SpO2: 93 % O2 Device: None (Room air)    Weight: 152 lbs 1.88 oz      GENERAL: Patient is not in acute distress  HEENT: EOM+,Conjunctiva is clear   NECK:  no Jugular Venous distention  HEART: S1 S2 regular Rate and Rhythm, there is no murmur,   LUNGS: Respirations are not laboured, Lungs have decreased breath sounds in the Lung bases to auscultation without Crepitations or Wheezing   ABDOMEN: Soft, there is no tenderness , Bowel Sounds are  Positive   LOWER LIMBS:  Pedal Edema  Bilaterally   CNS:  Alert,  Oriented x 3, Moving all the Four Limbs     Data   Recent Labs   Lab 08/25/24  0706   WBC 11.6*   HGB 10.8*   *         POTASSIUM 5.0   CHLORIDE 106   CO2 20*   BUN 38.7*   CR 2.20*   ANIONGAP 14   CANELO 10.2   GLC 90         Recent Results (from the past 24 hour(s))   Chest CT w/o contrast    Narrative    EXAM: CT CHEST WITHOUT CONTRAST  LOCATION: [facility]  DATE: 08/24/2024    INDICATION: Tender right posterior ribs and right lateral ribs.  COMPARISON: None.  TECHNIQUE: CT chest without IV contrast. Multiplanar reformats were obtained. Dose reduction techniques were used.  CONTRAST: None.    FINDINGS:   LUNGS AND PLEURA: No pneumothorax. Small to moderate bilateral effusions, loculated on the left with associated probable compressive atelectasis versus less likely infiltrate.    MEDIASTINUM/AXILLAE: Incompletely characterized neck mass, possibly a large goiter on the right. No aneurysm.    CORONARY ARTERY CALCIFICATION: None.    UPPER ABDOMEN: No acute findings.    MUSCULOSKELETAL: No definite displaced rib fracture. No frankly destructive bony lesions.      Impression    IMPRESSION:   1.  No definite displaced rib fractures demonstrated.  2.  Small to moderate bilateral effusions which are loculated on the left with associated probable compressive atelectasis versus less likely infiltrate.  3.  Large incompletely characterized neck mass, possibly related to a thyroid goiter on the right.         CT Head w/o Contrast    Narrative    EXAM: CT HEAD W/O CONTRAST  LOCATION: [facility]  DATE: 8/24/2024    INDICATION: fell, hit head. Injury. Pain.    COMPARISON: May 3, 2022 head CT  TECHNIQUE: Routine CT Head without IV contrast. Multiplanar reformats. Dose reduction techniques were used.    FINDINGS:  INTRACRANIAL CONTENTS: No intracranial hemorrhage, extraaxial collection, or mass effect.  No CT evidence of acute infarct. Mild to moderate presumed chronic small vessel ischemic changes. Moderate generalized volume loss. No hydrocephalus.     VISUALIZED ORBITS/SINUSES/MASTOIDS: Prior bilateral cataract surgery. Visualized portions of the orbits are otherwise unremarkable. No paranasal sinus mucosal disease. No middle ear or mastoid effusion.    BONES/SOFT TISSUES: No acute abnormality.      Impression    IMPRESSION:  1.   No CT evidence for acute intracranial process.  2.  Brain atrophy and presumed chronic microvascular ischemic changes as above.   CT Cervical Spine w/o Contrast    Narrative    EXAM: CT CERVICAL SPINE W/O CONTRAST  LOCATION: [facility]  DATE: 8/24/2024    INDICATION: fell, hit head. Injury. Pain.  COMPARISON: Cervical spine CT May 3, 2022.  TECHNIQUE: Routine CT Cervical Spine without IV contrast. Multiplanar reformats. Dose reduction techniques were used.    FINDINGS:  VERTEBRA: Normal vertebral body heights. Reversal the normal cervical lordosis centered at C4. No fracture or traumatic malalignment.    CANAL/FORAMINA:     C3-4: Ankylosis across the disc and facet joints. Patent central canal and foramen.    C4-5: Uncinate spur with mild left foraminal stenosis. Ventral osteophyte.    C5-6: Uncinate spur with severe left foraminal stenosis. Right foramen patent.    C6-7: Uncinate spur with moderate left and mild right foraminal stenosis..    PARASPINAL: Large right thyroid mass redemonstrated. Left pleural effusion. Consider thyroid ultrasound and biopsy.      Impression    IMPRESSION:  1.  No fracture or posttraumatic subluxation.  2.  No high-grade spinal canal or neural foraminal stenosis.    3.  Right thyroid mass.

## 2024-08-26 ENCOUNTER — APPOINTMENT (OUTPATIENT)
Dept: ULTRASOUND IMAGING | Facility: CLINIC | Age: 89
DRG: 291 | End: 2024-08-26
Attending: INTERNAL MEDICINE
Payer: MEDICARE

## 2024-08-26 LAB
% LINING CELLS, BODY FLUID: 4 %
ANION GAP SERPL CALCULATED.3IONS-SCNC: 13 MMOL/L (ref 7–15)
ANION GAP SERPL CALCULATED.3IONS-SCNC: 15 MMOL/L (ref 7–15)
APPEARANCE FLD: ABNORMAL
ATRIAL RATE - MUSE: 136 BPM
BUN SERPL-MCNC: 37.7 MG/DL (ref 8–23)
BUN SERPL-MCNC: 38 MG/DL (ref 8–23)
CALCIUM SERPL-MCNC: 10.3 MG/DL (ref 8.8–10.4)
CALCIUM SERPL-MCNC: 9.7 MG/DL (ref 8.8–10.4)
CELL COUNT BODY FLUID SOURCE: ABNORMAL
CHLORIDE SERPL-SCNC: 107 MMOL/L (ref 98–107)
CHLORIDE SERPL-SCNC: 108 MMOL/L (ref 98–107)
COLOR FLD: ABNORMAL
CREAT SERPL-MCNC: 2.16 MG/DL (ref 0.51–0.95)
CREAT SERPL-MCNC: 2.18 MG/DL (ref 0.51–0.95)
DIASTOLIC BLOOD PRESSURE - MUSE: NORMAL MMHG
EGFRCR SERPLBLD CKD-EPI 2021: 20 ML/MIN/1.73M2
EGFRCR SERPLBLD CKD-EPI 2021: 20 ML/MIN/1.73M2
EOSINOPHIL NFR FLD MANUAL: 1 %
ERYTHROCYTE [DISTWIDTH] IN BLOOD BY AUTOMATED COUNT: 14.9 % (ref 10–15)
GLUCOSE BODY FLUID SOURCE: NORMAL
GLUCOSE FLD-MCNC: 90 MG/DL
GLUCOSE SERPL-MCNC: 128 MG/DL (ref 70–99)
GLUCOSE SERPL-MCNC: 90 MG/DL (ref 70–99)
HCO3 SERPL-SCNC: 19 MMOL/L (ref 22–29)
HCO3 SERPL-SCNC: 20 MMOL/L (ref 22–29)
HCT VFR BLD AUTO: 35 % (ref 35–47)
HGB BLD-MCNC: 10.8 G/DL (ref 11.7–15.7)
INTERPRETATION ECG - MUSE: NORMAL
LD BODY BODY FLUID SOURCE: NORMAL
LDH FLD L TO P-CCNC: 159 U/L
LDH SERPL L TO P-CCNC: 170 U/L (ref 0–250)
LDH SERPL L TO P-CCNC: 277 U/L (ref 0–250)
LYMPHOCYTES NFR FLD MANUAL: 54 %
MAGNESIUM SERPL-MCNC: 2 MG/DL (ref 1.7–2.3)
MCH RBC QN AUTO: 32.6 PG (ref 26.5–33)
MCHC RBC AUTO-ENTMCNC: 30.9 G/DL (ref 31.5–36.5)
MCV RBC AUTO: 106 FL (ref 78–100)
MONOS+MACROS NFR FLD MANUAL: 10 %
NEUTS BAND NFR FLD MANUAL: 31 %
NT-PROBNP SERPL-MCNC: 2895 PG/ML (ref 0–1800)
P AXIS - MUSE: NORMAL DEGREES
PLATELET # BLD AUTO: 449 10E3/UL (ref 150–450)
POTASSIUM SERPL-SCNC: 4.6 MMOL/L (ref 3.4–5.3)
POTASSIUM SERPL-SCNC: 4.9 MMOL/L (ref 3.4–5.3)
PR INTERVAL - MUSE: NORMAL MS
PROT FLD-MCNC: 3.8 G/DL
PROT SERPL-MCNC: 6.4 G/DL (ref 6.4–8.3)
PROTEIN BODY FLUID SOURCE: NORMAL
QRS DURATION - MUSE: 114 MS
QT - MUSE: 308 MS
QTC - MUSE: 465 MS
R AXIS - MUSE: -46 DEGREES
RBC # BLD AUTO: 3.31 10E6/UL (ref 3.8–5.2)
SODIUM SERPL-SCNC: 140 MMOL/L (ref 135–145)
SODIUM SERPL-SCNC: 142 MMOL/L (ref 135–145)
SYSTOLIC BLOOD PRESSURE - MUSE: NORMAL MMHG
T AXIS - MUSE: 100 DEGREES
VENTRICULAR RATE- MUSE: 137 BPM
WBC # BLD AUTO: 10.5 10E3/UL (ref 4–11)
WBC # FLD AUTO: 954 /UL

## 2024-08-26 PROCEDURE — 87205 SMEAR GRAM STAIN: CPT | Performed by: INTERNAL MEDICINE

## 2024-08-26 PROCEDURE — 0W993ZZ DRAINAGE OF RIGHT PLEURAL CAVITY, PERCUTANEOUS APPROACH: ICD-10-PCS | Performed by: RADIOLOGY

## 2024-08-26 PROCEDURE — 250N000009 HC RX 250: Performed by: RADIOLOGY

## 2024-08-26 PROCEDURE — 84155 ASSAY OF PROTEIN SERUM: CPT | Performed by: INTERNAL MEDICINE

## 2024-08-26 PROCEDURE — 32555 ASPIRATE PLEURA W/ IMAGING: CPT | Mod: 50

## 2024-08-26 PROCEDURE — 80048 BASIC METABOLIC PNL TOTAL CA: CPT | Performed by: INTERNAL MEDICINE

## 2024-08-26 PROCEDURE — 83615 LACTATE (LD) (LDH) ENZYME: CPT | Performed by: INTERNAL MEDICINE

## 2024-08-26 PROCEDURE — 82945 GLUCOSE OTHER FLUID: CPT | Performed by: INTERNAL MEDICINE

## 2024-08-26 PROCEDURE — 85014 HEMATOCRIT: CPT | Performed by: INTERNAL MEDICINE

## 2024-08-26 PROCEDURE — 36415 COLL VENOUS BLD VENIPUNCTURE: CPT | Performed by: INTERNAL MEDICINE

## 2024-08-26 PROCEDURE — 250N000011 HC RX IP 250 OP 636: Performed by: INTERNAL MEDICINE

## 2024-08-26 PROCEDURE — 250N000009 HC RX 250: Performed by: HOSPITALIST

## 2024-08-26 PROCEDURE — 99232 SBSQ HOSP IP/OBS MODERATE 35: CPT | Performed by: INTERNAL MEDICINE

## 2024-08-26 PROCEDURE — 89050 BODY FLUID CELL COUNT: CPT | Performed by: INTERNAL MEDICINE

## 2024-08-26 PROCEDURE — 120N000001 HC R&B MED SURG/OB

## 2024-08-26 PROCEDURE — 0W9B3ZZ DRAINAGE OF LEFT PLEURAL CAVITY, PERCUTANEOUS APPROACH: ICD-10-PCS | Performed by: RADIOLOGY

## 2024-08-26 PROCEDURE — 83735 ASSAY OF MAGNESIUM: CPT | Performed by: INTERNAL MEDICINE

## 2024-08-26 PROCEDURE — 93010 ELECTROCARDIOGRAM REPORT: CPT | Performed by: INTERNAL MEDICINE

## 2024-08-26 PROCEDURE — 250N000013 HC RX MED GY IP 250 OP 250 PS 637: Performed by: INTERNAL MEDICINE

## 2024-08-26 PROCEDURE — 84157 ASSAY OF PROTEIN OTHER: CPT | Performed by: INTERNAL MEDICINE

## 2024-08-26 PROCEDURE — 99222 1ST HOSP IP/OBS MODERATE 55: CPT | Performed by: INTERNAL MEDICINE

## 2024-08-26 PROCEDURE — 250N000013 HC RX MED GY IP 250 OP 250 PS 637: Performed by: HOSPITALIST

## 2024-08-26 RX ORDER — QUETIAPINE FUMARATE 100 MG/1
100 TABLET, FILM COATED ORAL AT BEDTIME
Status: DISCONTINUED | OUTPATIENT
Start: 2024-08-26 | End: 2024-08-30 | Stop reason: HOSPADM

## 2024-08-26 RX ORDER — LIDOCAINE HYDROCHLORIDE 10 MG/ML
20 INJECTION, SOLUTION EPIDURAL; INFILTRATION; INTRACAUDAL; PERINEURAL ONCE
Status: COMPLETED | OUTPATIENT
Start: 2024-08-26 | End: 2024-08-26

## 2024-08-26 RX ORDER — HYDROXYZINE HYDROCHLORIDE 25 MG/1
25 TABLET, FILM COATED ORAL 3 TIMES DAILY PRN
Status: DISCONTINUED | OUTPATIENT
Start: 2024-08-26 | End: 2024-08-30 | Stop reason: HOSPADM

## 2024-08-26 RX ORDER — DILTIAZEM HYDROCHLORIDE 5 MG/ML
15 INJECTION INTRAVENOUS ONCE
Status: COMPLETED | OUTPATIENT
Start: 2024-08-26 | End: 2024-08-26

## 2024-08-26 RX ORDER — ACETAMINOPHEN 325 MG/1
650 TABLET ORAL ONCE
Status: COMPLETED | OUTPATIENT
Start: 2024-08-26 | End: 2024-08-26

## 2024-08-26 RX ADMIN — DIVALPROEX SODIUM 500 MG: 500 TABLET, FILM COATED, EXTENDED RELEASE ORAL at 21:19

## 2024-08-26 RX ADMIN — QUETIAPINE FUMARATE 100 MG: 100 TABLET ORAL at 21:19

## 2024-08-26 RX ADMIN — ACETAMINOPHEN 650 MG: 325 TABLET, FILM COATED ORAL at 05:35

## 2024-08-26 RX ADMIN — LIDOCAINE HYDROCHLORIDE 20 ML: 10 INJECTION, SOLUTION EPIDURAL; INFILTRATION; INTRACAUDAL; PERINEURAL at 16:25

## 2024-08-26 RX ADMIN — Medication 50 MCG: at 09:37

## 2024-08-26 RX ADMIN — ACETAMINOPHEN 650 MG: 325 TABLET, FILM COATED ORAL at 21:19

## 2024-08-26 RX ADMIN — LEVOTHYROXINE SODIUM 50 MCG: 0.05 TABLET ORAL at 09:34

## 2024-08-26 RX ADMIN — LITHIUM CARBONATE 150 MG: 150 CAPSULE, GELATIN COATED ORAL at 09:35

## 2024-08-26 RX ADMIN — LETROZOLE 2.5 MG: 2.5 TABLET ORAL at 09:36

## 2024-08-26 RX ADMIN — DILTIAZEM HYDROCHLORIDE 15 MG: 5 INJECTION, SOLUTION INTRAVENOUS at 01:25

## 2024-08-26 RX ADMIN — ACETAMINOPHEN 650 MG: 325 TABLET, FILM COATED ORAL at 14:40

## 2024-08-26 RX ADMIN — IPRATROPIUM BROMIDE AND ALBUTEROL 1 PUFF: 20; 100 SPRAY, METERED RESPIRATORY (INHALATION) at 05:08

## 2024-08-26 ASSESSMENT — ACTIVITIES OF DAILY LIVING (ADL)
ADLS_ACUITY_SCORE: 59
ADLS_ACUITY_SCORE: 55
ADLS_ACUITY_SCORE: 59
ADLS_ACUITY_SCORE: 55
ADLS_ACUITY_SCORE: 55
ADLS_ACUITY_SCORE: 59
ADLS_ACUITY_SCORE: 55
ADLS_ACUITY_SCORE: 59
ADLS_ACUITY_SCORE: 55
ADLS_ACUITY_SCORE: 55

## 2024-08-26 NOTE — PROGRESS NOTES
Hospitalist Medicine Progress Note   [facility]       Lennie Mar is a 89 year old lady with past medical history of hypertension, grade 1 diastolic CHF with LV showing hyperdynamic LV function and EF of 75 to 80% in the echo done March 2022, stage III CKD, paroxysmal atrial fibrillation, essential hypertension, essential tremors, hypothyroidism, macrocytic anemia.  She came into [facility] 8/24/2024 with symptoms of shortness of breath and right flank pain of 2-day duration following mechanical fall.  CT scan of the head, neck cervical spine did not show any acute pathology chest CT did not show any displaced rib fractures but rather small to moderate bilateral effusions which were loculated in the left with associated possible compressive atelectasis versus infiltrate and a large incompletely characterized neck mass probably related to thyroid goiter on the right side.  Patient had earlier refused thoracentesis but later agreed to it and on 8/26/2024 750 mL was removed from the left side and 500 mL from the right side.  She had refused placement of chest tube for loculated pleural effusion.  Patient also had a SVT in the early hours of 8/26/2024 which got better with 15 mg of IV diltiazem push following which she was in NSR magnesium was normal at 2 potassium was 4.6       Date of Admission:  8/24/2024  Assessment & Plan     Fall  Right-sided flank pain  On pain medications    Acute exacerbation of chronic diastolic congestive heart failure  Left-sided loculated pleural effusion  Bilateral pleural effusions  Patient did have drainage of 750 mL of left-sided pleural effusion and 500 mL of right-sided pleural effusion on 8/26/2024  She says that her shortness of breath is somewhat improved after drainage of pleural effusion by interventional radiology  A chest x-ray will be done to evaluate for fluid overload in the morning of 8/27/2024 there is no hepatojugular reflux at  "the present time    ASHLEY on management of loculated pleural effusion stage III CKD  Monitor kidney function after drainage of pleural effusions in a.m. 8/27/2024 by checking BMP    Bipolar disorder  Depression  Anxiety  Paroxysmal atrial fibrillation  Essential tremors  Hypothyroidism  Macrocytic anemia  Large incompletely characterized neck mass on the right?  Thyroid origin            Plan:   Started Seroquel for sleep as well as Atarax hydroxyzine  Appreciate pulmonary consult and discussion with the patient's psychiatrist M.D. son  Will restart Lasix  CBC, BMP in a.m.    Diet: Regular Diet Adult    DVT Prophylaxis: Pneumatic Compression Devices  Kelley Catheter: Not present  Code Status: No CPR- Do NOT Intubate         Medically Ready for Discharge: Anticipated in 2-4 Days    Clinically Significant Risk Factors                 # Acute Kidney Injury, unspecified: based on a >150% or 0.3 mg/dL increase in last creatinine compared to past 90 day average, will monitor renal function  # Hypertension: Noted on problem list           # Overweight: Estimated body mass index is 27.82 kg/m  as calculated from the following:    Height as of this encounter: 1.575 m (5' 2\").    Weight as of this encounter: 69 kg (152 lb 1.9 oz)., PRESENT ON ADMISSION       # Financial/Environmental Concerns:                       The patient's care was discussed with the Patient and patient's MD son who is a psychiatrist    William Faulkner MD  Hospitalist Service  [facility]    ______________________________________________________________________    Interval History     Symptoms   Still short of breath is better after drainage of pleural effusions as mentioned above    Review of Systems:   Patient complaining of chest pain at the site of the pleural fluid drainage though she looks comfortable    Data reviewed today: I reviewed all medications, new labs and imaging results over the last 24 hours.     Physical Exam   Vital " Signs: Temp: 97.9  F (36.6  C) Temp src: Oral BP: 129/84 Pulse: 79   Resp: 18 SpO2: 92 % O2 Device: Nasal cannula Oxygen Delivery: 1/2 LPM  Weight: 152 lbs 1.88 oz      GENERAL: Patient is not in acute distress  HEENT: EOM+,Conjunctiva is clear   NECK:  no Jugular Venous distention  HEART: S1 S2 regular Rate and Rhythm, there is no murmur,   LUNGS: Respirations are not laboured, Lungs have decreased breath sounds in the Lung bases to auscultation better than before nevertheless with Crepitations in the left lung base and without Wheezing   ABDOMEN: Soft, there is no tenderness , Bowel Sounds are  Positive   LOWER LIMBS:  Pedal Edema  Bilaterally   CNS:  Alert,  Oriented x 3, Moving all the Four Limbs     Data   Recent Labs   Lab 08/26/24  0917 08/26/24  0123 08/25/24  1604 08/25/24  0706   WBC 10.5  --   --  11.6*   HGB 10.8*  --   --  10.8*   *  --   --  106*     --   --  450    142  --  140   POTASSIUM 4.9 4.6  --  5.0   CHLORIDE 108* 107  --  106   CO2 19* 20*  --  20*   BUN 37.7* 38.0*  --  38.7*   CR 2.16* 2.18*  --  2.20*   ANIONGAP 13 15  --  14   CANELO 9.7 10.3  --  10.2   GLC 90 128*  --  90   PROTTOTAL 6.4  --  6.6  --          Recent Results (from the past 24 hour(s))   US Thoracentesis Bilateral    Narrative    THORACENTESIS 8/26/2024 4:38 PM    HISTORY: Patient with history of bilateral pleural effusion.    PROCEDURE/FINDINGS:  After obtaining informed consent, the patient was  positioned appropriately. The back was prepped and draped in the usual  sterile manner. Limited ultrasound was performed demonstrating simple  appearing bilateral pleural effusions.    Under ultrasound guidance, a 5 Danish Yueh needle was used to access  the left pleural space. A permanent ultrasound image was saved to  document needle position. Thoracentesis was performed. Approximately  725 mL November fluid was aspirated out.     Under ultrasound guidance, a 5 Danish Yueh needle was used to access  the right  pleural space. A permanent ultrasound image was saved to  document needle position. Thoracentesis was performed. Approximately  500 mL krysta fluid was aspirated out.     The patient tolerated the procedure well. There were no immediate  postprocedure complications. The patient's vital signs were monitored  by radiology nursing staff under my supervision and remained stable  throughout the study.      Impression    IMPRESSION:  Successful bilateral thoracentesis performed. A total of  750 mL fluid was removed from the left, and 500 mL fluid was removed  from the right.    SANDY BOLAND MD         SYSTEM ID:  [id number]

## 2024-08-26 NOTE — PLAN OF CARE
"Paged provider for IV pain meds, and home meds atarax and ativan per req. Pt.    \"Just had thoracentesis. PT C/O pain in l.abd, only scheduled Tylenol .. can we have a PRN for pain?  pt also having increased anxiety.. requesting home meds Atarax and Ativan. Son in room is Doctor req. IV pain med.\"    "

## 2024-08-26 NOTE — PROVIDER NOTIFICATION
FYI patient's latest VS /84  RR 24 T 97.6 O2 sats 94 on 1.5LPM/NC, endorses increasing shortness of breath and dyspnea on exertion. Known to have history of Paroxysmal Afib and in for pleural effusion but refused thoracentesis. Please advise. Thanks.    Lilly Mckinnon RN

## 2024-08-26 NOTE — PROVIDER NOTIFICATION
Attempted to get patient up in chair with Ax2 and walker/gait belt. Her HR on monitor when as high as 202. Called telemetry and they report HR between 120-160.     Oxygen desaturated to low 80's while on her 1.5 LPM. Rebounded to 90's at 3 LPM and tapered back to 1.5 LPM upon resting back in bed with improvement in HR.     Lung fields sounded wheezy and possible with crackles. Appears SOB and dyspneic upon the prior activity. Breathing depth appears shallow - although this was baseline prior to attempted transfer. Tachypnea present upon return to bed.     Vocera sent to provider.

## 2024-08-26 NOTE — PROVIDER NOTIFICATION
Notified EVELINE Morrow that patient refused thoracentesis done earlier this evening. Complaining of shortness of breath, Sats 90% on RA, 1.5 LPM/NC given latest Sats 94-95%. Advised to continue with oxygen and let AM team if need to re order thoracentesis.    Lilly Mckinnon RN

## 2024-08-26 NOTE — PLAN OF CARE
"19:00-07:30    See separate notes for events:    Alert and oriented but forgetful. Dyspnea on exertion noted and reported increased shortness of breathing. On continuous oxygen at 1.5LPM/NC. [employer] was notified overnight for increasing SOB and high HR. EKG done and started on telemetry monitoring. STAT dose of IV Diltiazem given per order. Patient did not sleep at all overnight. Anxious and keep on calling the nurses/ staff. Active listening done and constant re assurance given to patient. Plan thoracentesis.     Goal Outcome Evaluation:      Plan of Care Reviewed With: patient    Overall Patient Progress: no changeOverall Patient Progress: no change    Outcome Evaluation: short of breath      Problem: Adult Inpatient Plan of Care  Goal: Plan of Care Review  Description: The Plan of Care Review/Shift note should be completed every shift.  The Outcome Evaluation is a brief statement about your assessment that the patient is improving, declining, or no change.  This information will be displayed automatically on your shift  note.  Outcome: Not Progressing  Flowsheets (Taken 8/26/2024 0542)  Outcome Evaluation: short of breath  Plan of Care Reviewed With: patient  Overall Patient Progress: no change  Goal: Patient-Specific Goal (Individualized)  Description: You can add care plan individualizations to a care plan. Examples of Individualization might be:  \"Parent requests to be called daily at 9am for status\", \"I have a hard time hearing out of my right ear\", or \"Do not touch me to wake me up as it startles  me\".  Outcome: Not Progressing  Goal: Absence of Hospital-Acquired Illness or Injury  Outcome: Not Progressing  Intervention: Identify and Manage Fall Risk  Recent Flowsheet Documentation  Taken 8/26/2024 0138 by Lilly Mckinnon RN  Safety Promotion/Fall Prevention: activity supervised  Intervention: Prevent Skin Injury  Recent Flowsheet Documentation  Taken 8/26/2024 0346 by Lilly Mckinnon, RN  Body " Position: supine, head elevated  Taken 8/26/2024 0138 by Lilly Mckinnon RN  Skin Protection: adhesive use limited  Device Skin Pressure Protection: absorbent pad utilized/changed  Intervention: Prevent and Manage VTE (Venous Thromboembolism) Risk  Recent Flowsheet Documentation  Taken 8/26/2024 0138 by Lilly Mckinnon RN  VTE Prevention/Management: SCDs off (sequential compression devices)  Intervention: Prevent Infection  Recent Flowsheet Documentation  Taken 8/26/2024 0138 by Lilly Mckinnon RN  Infection Prevention:   rest/sleep promoted   hand hygiene promoted  Goal: Optimal Comfort and Wellbeing  Outcome: Not Progressing  Intervention: Monitor Pain and Promote Comfort  Recent Flowsheet Documentation  Taken 8/26/2024 0535 by Lilly Mckinnon RN  Pain Management Interventions: medication (see MAR)  Taken 8/26/2024 0116 by Lilly Mckinnon RN  Pain Management Interventions:   repositioned   rest  Goal: Readiness for Transition of Care  Outcome: Not Progressing     Problem: Suicide Risk  Goal: Absence of Self-Harm  Outcome: Not Progressing  Intervention: Assess Risk to Self and Maintain Safety  Recent Flowsheet Documentation  Taken 8/26/2024 0138 by Lilly Mckinnon RN  Enhanced Safety Measures: pain management     Problem: Gas Exchange Impaired  Goal: Optimal Gas Exchange  Outcome: Not Progressing  Intervention: Optimize Oxygenation and Ventilation  Recent Flowsheet Documentation  Taken 8/26/2024 0346 by Lilly Mckinnon RN  Head of Bed (HOB) Positioning: HOB at 45 degrees     Problem: Cardiac Impairment  Goal: Optimal Activity Tolerance  Outcome: Not Progressing

## 2024-08-26 NOTE — PROGRESS NOTES
[facility]      OUTPATIENT SPEECH LANGUAGE PATHOLOGY EVALUATION  PLAN OF TREATMENT FOR OUTPATIENT REHABILITATION  (COMPLETE FOR INITIAL CLAIMS ONLY)  Patient's Last Name, First Name, M.I.  YOB: 1927  Tyrel Marsa  ESTEPHANIE                        Provider's Name  [facility] Medical Record No.  [id number]                               Onset Date:     Start of Care Date:   8/9/24   Type:     ___PT   ___OT   _X_SLP Medical Diagnosis:   Pneumonia         SLP Diagnosis:     Visits from SOC:  1   ________________________________________________________________  Plan of Treatment/Functional Goals    Planned Interventions:                    Goals: See Speech Language Pathology Goals on Care Plan in [facility] electronic health record.    Therapy Frequency:    Predicted Duration of Therapy Intervention:    ________________________________________________________________________________    I CERTIFY THE NEED FOR THESE SERVICES FURNISHED UNDER        THIS PLAN OF TREATMENT AND WHILE UNDER MY CARE     (Physician attestation of this document indicates review and certification of the therapy plan).                                 Initial Assessment        See Speech Language Pathology documentation in Epic electronic health record, evaluation dated

## 2024-08-26 NOTE — PROGRESS NOTES
Pt was in Radiology today for bilateral  thoracentesis. Procedure performed by Dr Hall . There were no complications during procedure and vitals remained stable throughout. Pt tolerated procedure well, 725 mL krysta fluid drained from left side and sent to lab for testing, 500 cc's of krysta colored fluid was removed from right pleural space. Pt then was given written and verbal instructions. Pt left department in stable and satisfactory condition with technologist.

## 2024-08-26 NOTE — PLAN OF CARE
"Inpatient: [id number]    Dx: SOB      Orientation: Disoriented to time (year)  Pain: 5/10 in right shoulder/flank  Activity: Ax2 with walker and gait belt  LDA: On telemetry - has right PIV SL  Diet: Regular diet  Neuro: Baseline tremor present - generalized  Cardio: Tachycardic upon exertion  Resp: SOB - breath sounds with wheezing and crackles - all worse when lying flat. Remains on 0.5 LPM of oxygen  MS: Impaired - generally weak  GI: Incontinent   : Incontinent   Derm: Pale with blanchable redness to bottom  Plan: Awaiting thoracentesis       Attempted to get patient out of bed this AM - became tachycardic and desaturated - see provider notification note for full detail.    Since remains resting in bed - repositioned left/right.     Awaiting thoracentesis.     /57 (BP Location: Right arm)   Pulse 83   Temp 97.8  F (36.6  C) (Oral)   Resp 16   Ht 1.575 m (5' 2\")   Wt 69 kg (152 lb 1.9 oz)   LMP  (LMP Unknown)   SpO2 94%   BMI 27.82 kg/m       Problem: Adult Inpatient Plan of Care  Goal: Plan of Care Review  Description: The Plan of Care Review/Shift note should be completed every shift.  The Outcome Evaluation is a brief statement about your assessment that the patient is improving, declining, or no change.  This information will be displayed automatically on your shift  note.  Outcome: Progressing  Flowsheets (Taken 8/26/2024 1019)  Outcome Evaluation: SOB today  Plan of Care Reviewed With: patient  Overall Patient Progress: no change  Goal: Patient-Specific Goal (Individualized)  Description: You can add care plan individualizations to a care plan. Examples of Individualization might be:  \"Parent requests to be called daily at 9am for status\", \"I have a hard time hearing out of my right ear\", or \"Do not touch me to wake me up as it startles  me\".  Outcome: Progressing  Goal: Absence of Hospital-Acquired Illness or Injury  Outcome: Progressing  Intervention: Identify and Manage Fall Risk  Recent " Flowsheet Documentation  Taken 8/26/2024 0944 by Karthki Rodriguez RN  Safety Promotion/Fall Prevention:   activity supervised   assistive device/personal items within reach   clutter free environment maintained   lighting adjusted   mobility aid in reach   nonskid shoes/slippers when out of bed   patient and family education   room organization consistent   safety round/check completed   supervised activity  Intervention: Prevent Skin Injury  Recent Flowsheet Documentation  Taken 8/26/2024 0944 by Karthik Rodriguez RN  Body Position:   turned   right  Intervention: Prevent and Manage VTE (Venous Thromboembolism) Risk  Recent Flowsheet Documentation  Taken 8/26/2024 0944 by Karthik Rodriguez RN  VTE Prevention/Management: SCDs off (sequential compression devices)  Goal: Optimal Comfort and Wellbeing  Outcome: Progressing  Intervention: Monitor Pain and Promote Comfort  Recent Flowsheet Documentation  Taken 8/26/2024 0932 by Karthik Rodriguez RN  Pain Management Interventions: rest  Goal: Readiness for Transition of Care  Outcome: Progressing     Problem: Suicide Risk  Goal: Absence of Self-Harm  Outcome: Progressing  Intervention: Assess Risk to Self and Maintain Safety  Recent Flowsheet Documentation  Taken 8/26/2024 0944 by Karthik Rodriguez RN  Enhanced Safety Measures:   pain management   patient/family teach back on injury risk   review medications for side effects with activity     Problem: Gas Exchange Impaired  Goal: Optimal Gas Exchange  Outcome: Progressing     Problem: Cardiac Impairment  Goal: Optimal Activity Tolerance  Outcome: Progressing     Problem: Skin Injury Risk Increased  Goal: Skin Health and Integrity  Outcome: Progressing  Intervention: Optimize Skin Protection  Recent Flowsheet Documentation  Taken 8/26/2024 0944 by Karthik Rodriguez RN  Activity Management: activity adjusted per tolerance   Goal Outcome Evaluation:      Plan of Care Reviewed With: patient    Overall Patient Progress: no  changeOverall Patient Progress: no change    Outcome Evaluation: SOB today

## 2024-08-26 NOTE — PROGRESS NOTES
Cross cover was notified the patient is having increasing shortness of breath with dyspnea on exertion.  O2 sats 94% on 1.5 L O2.  Reviewed chart.  She has bilateral pleural effusions, but refused thoracentesis today which I think is what will make her breathing feel better.  She was receiving oral Lasix last 2 days, but creatinine was well above her baseline of 2.2 so this was discontinued.  She does diastolic dysfunction on most recent TTE.  She does have paroxysmal A-fib and heart rate is currently 130s, not on telemetry.  -Obtain EKG to make sure not in A-fib with RVR  -Recommend she reconsider thoracentesis during the day  -Reluctant to add further diuretics given current creatinine, BMP in the morning    Addendum:  EKG shows heart rate in the 130s certainly could be in SVT.  QRS mildly widened with possible LAFB.  I cannot tell if this is A-fib, other SVT, or sinus tachycardia and she was not on telemetry.  -Start telemetry then give 15 mg IV diltiazem push to see if we can determine rhythm    Addendum:  After IV diltiazem push she appeared to be in NSR with heart rates in the 90s.  There is a significant amount of artifact on her telemetry so at times that is difficult to verify her rhythm.  Unclear if she did have SVT broken by diltiazem or if she just had sinus tachycardia due to her shortness of breath and anxiety with this.

## 2024-08-27 ENCOUNTER — APPOINTMENT (OUTPATIENT)
Dept: PHYSICAL THERAPY | Facility: CLINIC | Age: 89
DRG: 291 | End: 2024-08-27
Attending: INTERNAL MEDICINE
Payer: MEDICARE

## 2024-08-27 ENCOUNTER — APPOINTMENT (OUTPATIENT)
Dept: CARDIOLOGY | Facility: CLINIC | Age: 89
DRG: 291 | End: 2024-08-27
Attending: INTERNAL MEDICINE
Payer: MEDICARE

## 2024-08-27 DIAGNOSIS — J90 PLEURAL EFFUSION: Primary | ICD-10-CM

## 2024-08-27 LAB
ANION GAP SERPL CALCULATED.3IONS-SCNC: 14 MMOL/L (ref 7–15)
BUN SERPL-MCNC: 38.3 MG/DL (ref 8–23)
CALCIUM SERPL-MCNC: 9.7 MG/DL (ref 8.8–10.4)
CHLORIDE SERPL-SCNC: 107 MMOL/L (ref 98–107)
CREAT SERPL-MCNC: 1.93 MG/DL (ref 0.51–0.95)
EGFRCR SERPLBLD CKD-EPI 2021: 23 ML/MIN/1.73M2
GLUCOSE SERPL-MCNC: 87 MG/DL (ref 70–99)
HCO3 SERPL-SCNC: 20 MMOL/L (ref 22–29)
LVEF ECHO: NORMAL
MAGNESIUM SERPL-MCNC: 2.2 MG/DL (ref 1.7–2.3)
POTASSIUM SERPL-SCNC: 5 MMOL/L (ref 3.4–5.3)
SODIUM SERPL-SCNC: 141 MMOL/L (ref 135–145)

## 2024-08-27 PROCEDURE — 97161 PT EVAL LOW COMPLEX 20 MIN: CPT | Mod: GP | Performed by: PHYSICAL THERAPIST

## 2024-08-27 PROCEDURE — 80048 BASIC METABOLIC PNL TOTAL CA: CPT | Performed by: INTERNAL MEDICINE

## 2024-08-27 PROCEDURE — 250N000013 HC RX MED GY IP 250 OP 250 PS 637: Performed by: INTERNAL MEDICINE

## 2024-08-27 PROCEDURE — 255N000002 HC RX 255 OP 636: Performed by: INTERNAL MEDICINE

## 2024-08-27 PROCEDURE — C8929 TTE W OR WO FOL WCON,DOPPLER: HCPCS

## 2024-08-27 PROCEDURE — 120N000001 HC R&B MED SURG/OB

## 2024-08-27 PROCEDURE — 93306 TTE W/DOPPLER COMPLETE: CPT | Mod: 26 | Performed by: INTERNAL MEDICINE

## 2024-08-27 PROCEDURE — 999N000208 ECHOCARDIOGRAM COMPLETE

## 2024-08-27 PROCEDURE — 36415 COLL VENOUS BLD VENIPUNCTURE: CPT | Performed by: INTERNAL MEDICINE

## 2024-08-27 PROCEDURE — 250N000011 HC RX IP 250 OP 636: Performed by: HOSPITALIST

## 2024-08-27 PROCEDURE — 99232 SBSQ HOSP IP/OBS MODERATE 35: CPT | Performed by: HOSPITALIST

## 2024-08-27 PROCEDURE — 83735 ASSAY OF MAGNESIUM: CPT | Performed by: INTERNAL MEDICINE

## 2024-08-27 PROCEDURE — 250N000013 HC RX MED GY IP 250 OP 250 PS 637: Performed by: HOSPITALIST

## 2024-08-27 PROCEDURE — 97530 THERAPEUTIC ACTIVITIES: CPT | Mod: GP | Performed by: PHYSICAL THERAPIST

## 2024-08-27 RX ORDER — DOXYCYCLINE 100 MG/1
100 CAPSULE ORAL EVERY 12 HOURS SCHEDULED
Status: DISCONTINUED | OUTPATIENT
Start: 2024-08-27 | End: 2024-08-30 | Stop reason: HOSPADM

## 2024-08-27 RX ORDER — DILTIAZEM HYDROCHLORIDE 5 MG/ML
10 INJECTION INTRAVENOUS ONCE
Status: COMPLETED | OUTPATIENT
Start: 2024-08-27 | End: 2024-08-27

## 2024-08-27 RX ORDER — DILTIAZEM HYDROCHLORIDE 30 MG/1
30 TABLET, FILM COATED ORAL ONCE
Status: COMPLETED | OUTPATIENT
Start: 2024-08-27 | End: 2024-08-27

## 2024-08-27 RX ORDER — HEPARIN SODIUM 5000 [USP'U]/.5ML
5000 INJECTION, SOLUTION INTRAVENOUS; SUBCUTANEOUS EVERY 8 HOURS
Status: DISCONTINUED | OUTPATIENT
Start: 2024-08-27 | End: 2024-08-30 | Stop reason: HOSPADM

## 2024-08-27 RX ORDER — FAMOTIDINE 20 MG/1
20 TABLET, FILM COATED ORAL EVERY OTHER DAY
Status: DISCONTINUED | OUTPATIENT
Start: 2024-08-27 | End: 2024-08-30 | Stop reason: HOSPADM

## 2024-08-27 RX ADMIN — FAMOTIDINE 20 MG: 20 TABLET, FILM COATED ORAL at 14:48

## 2024-08-27 RX ADMIN — QUETIAPINE FUMARATE 100 MG: 100 TABLET ORAL at 22:15

## 2024-08-27 RX ADMIN — HYDROXYZINE HYDROCHLORIDE 25 MG: 25 TABLET ORAL at 17:31

## 2024-08-27 RX ADMIN — ACETAMINOPHEN 650 MG: 325 TABLET, FILM COATED ORAL at 20:45

## 2024-08-27 RX ADMIN — LEVOTHYROXINE SODIUM 50 MCG: 0.05 TABLET ORAL at 09:44

## 2024-08-27 RX ADMIN — HUMAN ALBUMIN MICROSPHERES AND PERFLUTREN 2 ML: 10; .22 INJECTION, SOLUTION INTRAVENOUS at 08:14

## 2024-08-27 RX ADMIN — LETROZOLE 2.5 MG: 2.5 TABLET ORAL at 09:43

## 2024-08-27 RX ADMIN — ACETAMINOPHEN 650 MG: 325 TABLET, FILM COATED ORAL at 14:30

## 2024-08-27 RX ADMIN — DILTIAZEM HYDROCHLORIDE 30 MG: 30 TABLET, FILM COATED ORAL at 06:28

## 2024-08-27 RX ADMIN — FUROSEMIDE 40 MG: 40 TABLET ORAL at 09:45

## 2024-08-27 RX ADMIN — ACETAMINOPHEN 650 MG: 325 TABLET, FILM COATED ORAL at 09:41

## 2024-08-27 RX ADMIN — DIVALPROEX SODIUM 500 MG: 500 TABLET, FILM COATED, EXTENDED RELEASE ORAL at 22:15

## 2024-08-27 RX ADMIN — HYDROXYZINE HYDROCHLORIDE 25 MG: 25 TABLET ORAL at 11:29

## 2024-08-27 RX ADMIN — LITHIUM CARBONATE 150 MG: 150 CAPSULE, GELATIN COATED ORAL at 09:42

## 2024-08-27 RX ADMIN — Medication 50 MCG: at 09:45

## 2024-08-27 RX ADMIN — DOXYCYCLINE HYCLATE 100 MG: 100 CAPSULE ORAL at 20:45

## 2024-08-27 RX ADMIN — HEPARIN SODIUM 5000 UNITS: 5000 INJECTION, SOLUTION INTRAVENOUS; SUBCUTANEOUS at 16:35

## 2024-08-27 ASSESSMENT — ACTIVITIES OF DAILY LIVING (ADL)
ADLS_ACUITY_SCORE: 59
ADLS_ACUITY_SCORE: 61
ADLS_ACUITY_SCORE: 59
ADLS_ACUITY_SCORE: 61
ADLS_ACUITY_SCORE: 61
ADLS_ACUITY_SCORE: 59
ADLS_ACUITY_SCORE: 61
ADLS_ACUITY_SCORE: 59
ADLS_ACUITY_SCORE: 61
ADLS_ACUITY_SCORE: 61
ADLS_ACUITY_SCORE: 59
ADLS_ACUITY_SCORE: 59
DEPENDENT_IADLS:: CLEANING;COOKING;LAUNDRY;SHOPPING;MEAL PREPARATION;MEDICATION MANAGEMENT;TRANSPORTATION
ADLS_ACUITY_SCORE: 59
ADLS_ACUITY_SCORE: 61

## 2024-08-27 NOTE — CONSULTS
PULMONARY CONSULT  Date of service: 2024    Patient: Lennie Mar      : 1927      MRN: [id number]       Impressions/Recommendations:     #B/l pleiural effusion:   - Discussed in detail the risks and benefits of chest tube especially for left sided loculated pleural effusion with son.     -Decided to do b/l thoracentesis.  -Will need 4 weeks of oral antibiotics.   - Will set her upt obe seen in pulmonary clinic in 4/5 weeks with Ct chest. I will set it up.       Brandon Rutherford MD  Pulmonary & Critical Care            History of Present Illness:   Lennie Mar is a 89 year old lady with past medical history of hypertension, grade 1 diastolic CHF with LV showing hyperdynamic LV function and EF of 75 to 80% in the echo done 2022, stage III CKD, paroxysmal atrial fibrillation, essential hypertension, essential tremors, hypothyroidism, macrocytic anemia.  She came into [facility] 2024 with symptoms of shortness of breath and right flank pain of 2-day duration following mechanical fall.  CT scan of the head, neck cervical spine did not show any acute pathology chest CT did not show any displaced rib fractures but rather small to moderate bilateral effusions which were loculated in the left with associated possible compressive atelectasis           Review of Symptoms:   10-point ROS reviewed, & found negative w/ exceptions noted in the HPI.          Past Medical History:     Past Medical History:   Diagnosis Date    Alcohol dependence in remission (H)     Anxiety     Asymptomatic varicose veins     Bipolar disorder, unspecified (H)     CKD (chronic kidney disease) stage 3, GFR 30-59 ml/min (H) 2014    History of smoking     Hyperparathyroidism (H)     Infection due to 2019 novel coronavirus 3/10/2022    Memory loss     Muscle weakness (generalized) 2008    Severe depression (H)     Subdural hygroma     chronic.  no surgeries    Tremor of unknown origin        Past  Surgical History:   Procedure Laterality Date    APPENDECTOMY OPEN  1947    CATARACT IOL, RT/LT      COLONOSCOPY WITH CO2 INSUFFLATION  2/29/2012    Procedure:COLONOSCOPY WITH CO2 INSUFFLATION; Surgeon:GAYLE REYNA; Location:[facility] OR    CYSTOCELE REPAIR  1972    CYSTOSCOPY, INSERT STENT URETHRA, COMBINED  1999    CYSTOSCOPY, RETROGRADES, INSERT STENT URETER(S), COMBINED  2/29/2012    Procedure:COMBINED CYSTOSCOPY, RETROGRADES, INSERT STENT URETER(S); Surgeon:ANT ESPINOZA; Location:[facility] OR    DECOMPRESSION LUMBAR ONE LEVEL  8/9/2013    Procedure: DECOMPRESSION LUMBAR ONE LEVEL;  Posterior Decompression Right Lumbar 5- Sacral 1;  Surgeon: You Zavala MD;  Location: [facility] OR    HC TOOTH EXTRACTION W/FORCEP      HYSTERECTOMY, CATA  1963    IRRIGATION AND DEBRIDEMENT ORAL, COMBINED  1982    For treatment of gingivitis    LAPAROSCOPIC ASSISTED COLECTOMY  2/29/2012    Procedure:LAPAROSCOPIC ASSISTED COLECTOMY; Cysto, Bilateral Stent placement (both stents removed at end of case)- Yanet ACOSTA. Laparoscopic Extended Right Venu Colectomy with CO2 Colonoscopy and polyp removal-Judah; Surgeon:GAYLE REYNA; Location:[facility] OR    LIGATN/STRIP LONG OR SHORT SAPHEN  1955    MASTECTOMY PARTIAL WITH SENTINEL NODE Left 11/19/2018    Procedure: Left Mastectomy, Left Spring Lymph Node Biopsy;  Surgeon: Nahun Betancourt MD;  Location: [facility] OR    STRIP VEIN BILATERAL  1964    SURGICAL HISTORY OF -   1999    ureter surgery for blockage.            Allergies:     Allergies   Allergen Reactions    Ciprofloxacin      Nausea and vomiting per son     Ergotamine-Caffeine Other (See Comments) and Nausea and Vomiting     Severe HA, N/V & diarrhea    Penicillins Rash and Unknown    Sulfa Antibiotics Rash and Unknown    Lamictal [Lamotrigine] Other (See Comments)     Patient experienced night moss    Naproxen      Pt unable to remember reaction.    Naproxen Unknown    Nicergoline Unknown    Tetracycline Unknown     Pt unable to  remember allergy             Outpatient Medications:     Current Facility-Administered Medications   Medication Dose Route Frequency Provider Last Rate Last Admin    acetaminophen (TYLENOL) tablet 650 mg  650 mg Oral TID Tash Ruggiero MD   650 mg at 24    divalproex sodium extended-release (DEPAKOTE ER) 24 hr tablet 500 mg  500 mg Oral At Bedtime Tash Ruggiero MD   500 mg at 24    furosemide (LASIX) tablet 40 mg  40 mg Oral Daily William Faulkner MD   40 mg at 24 0800    hydrOXYzine HCl (ATARAX) tablet 25 mg  25 mg Oral TID PRN William Faulkner MD        ipratropium-albuterol (COMBIVENT RESPIMAT) inhaler 1 puff  1 puff Inhalation 4x Daily PRN Tash Ruggiero MD   1 puff at 24 0508    letrozole (FEMARA) tablet 2.5 mg  2.5 mg Oral Daily Tash Ruggiero MD   2.5 mg at 24 0936    levothyroxine (SYNTHROID/LEVOTHROID) tablet 50 mcg  50 mcg Oral Daily Tash Ruggiero MD   50 mcg at 24 0934    lithium (ESKALITH) capsule 150 mg  150 mg Oral QAM Tash Ruggiero MD   150 mg at 24 0935    loperamide (IMODIUM) capsule 2 mg  2 mg Oral 4x Daily PRN Tash Ruggiero MD        ondansetron (ZOFRAN ODT) ODT tab 4 mg  4 mg Oral Q6H PRN Tash Ruggiero MD        Or    ondansetron (ZOFRAN) injection 4 mg  4 mg Intravenous Q6H PRN Tash Ruggiero MD        QUEtiapine (SEROquel) tablet 100 mg  100 mg Oral At Bedtime William Faulkner MD   100 mg at 24    senna-docusate (SENOKOT-S/PERICOLACE) 8.6-50 MG per tablet 1 tablet  1 tablet Oral BID PRN Tash Ruggiero MD        Or    senna-docusate (SENOKOT-S/PERICOLACE) 8.6-50 MG per tablet 2 tablet  2 tablet Oral BID PRN Tash Ruggiero MD        Vitamin D3 (CHOLECALCIFEROL) tablet 50 mcg  50 mcg Oral Daily Tash Ruggiero MD   50 mcg at 24 0937             Family History:     Family History   Problem Relation Age of Onset    C.A.D. Mother          at age 47 Heart failure, Rhumatic  "Fever    Cerebrovascular Disease Father          at age 79    Diabetes Father         type 2    Breast Cancer Sister 84        99 year old sister    Circulatory Maternal Grandmother         vericose veins    C.A.D. Paternal Grandfather     Gastrointestinal Disease Paternal Grandfather         ulcer    Cancer Son          age 51--Melanoma    Cancer Brother          age 50's--Melanoma    Arthritis Sister     Circulatory Sister         vericose veins    Circulatory Sister         vericose veins    Eye Disorder Sister         Cateracts    Respiratory Sister         asthma    Respiratory Brother         COPD    Breast Cancer Niece 60        daughter of 98 yo sister               Social History:     Social History     Tobacco Use    Smoking status: Former     Current packs/day: 0.00     Average packs/day: 1.5 packs/day for 30.0 years (45.0 ttl pk-yrs)     Types: Cigarettes     Start date: 1963     Quit date: 1993     Years since quittin.1    Smokeless tobacco: Never   Substance Use Topics    Alcohol use: No    Drug use: No             Physical Exam:   /46 (BP Location: Right arm)   Pulse 67   Temp 98  F (36.7  C) (Axillary)   Resp 16   Ht 1.575 m (5' 2\")   Wt 69 kg (152 lb 1.9 oz)   LMP  (LMP Unknown)   SpO2 99%   BMI 27.82 kg/m      General: NAD  HEENT: Anicteric sclera, EOMI, MMM, OP unobstructed, w/o erythema/discharge; no cervical LAD  CV: RRR, no m/r/g  Lungs: Poor air entry  Abd: Soft, NT, ND  Ext: WWP,  No LE edema  Skin: No rashes, cyanosis, or jaundice  Neuro: AAOx3, no focal deficits          Data:           "

## 2024-08-27 NOTE — PROGRESS NOTES
"   08/27/24 1056   Appointment Info   Signing Clinician's Name / Credentials (PT) Paige Ornelas DPT   Rehab Comments (PT) watch HR   Living Environment   People in Home facility resident   Current Living Arrangements assisted living   Home Accessibility no concerns   Transportation Anticipated health plan transportation   Living Environment Comments Pt resides in [facility]. Receives Ax1 with all transfers and ambulation at baseline. Assist for bathing,meals, housekeeping, medication management. Pt notes that she doesn't move around much at home and likes to sit in her chair   Self-Care   Usual Activity Tolerance fair   Current Activity Tolerance fair   Regular Exercise No   Equipment Currently Used at Home walker, rolling   Fall history within last six months yes   Activity/Exercise/Self-Care Comment Pt unable to recall number of falls, but notes \"only a couple\"   General Information   Onset of Illness/Injury or Date of Surgery 08/24/24   Referring Physician William Faulkner MD   Patient/Family Therapy Goals Statement (PT) Return home and \"be left alone\"   Pertinent History of Current Problem (include personal factors and/or comorbidities that impact the POC) Per chart: \"Lennie Mar is a 89 year old lady with past medical history of hypertension, grade 1 diastolic CHF with LV showing hyperdynamic LV function and EF of 75 to 80% in the echo done March 2022, stage III CKD, paroxysmal atrial fibrillation, essential hypertension, essential tremors, hypothyroidism, macrocytic anemia.  She came into [facility] 8/24/2024 with symptoms of shortness of breath and right flank pain of 2-day duration following mechanical fall. \"   Existing Precautions/Restrictions fall   Weight-Bearing Status - LLE weight-bearing as tolerated   Weight-Bearing Status - RLE weight-bearing as tolerated   Cognition   Affect/Mental Status (Cognition) flat/blunted affect   Orientation Status (Cognition) disoriented to;time   Follows " Commands (Cognition) follows one-step commands   Behavioral Issues withdrawn;uncooperative   Safety Deficit (Cognition) moderate deficit;awareness of need for assistance;insight into deficits/self-awareness   Pain Assessment   Patient Currently in Pain Yes, see Vital Sign flowsheet   Integumentary/Edema   Integumentary/Edema Comments normal aging changes; see nursing notes for specifics   Posture    Posture Forward head position;Protracted shoulders   Range of Motion (ROM)   Range of Motion ROM is WFL   Strength (Manual Muscle Testing)   Strength (Manual Muscle Testing) Deficits observed during functional mobility   Bed Mobility   Comment, (Bed Mobility) sit<>supine with modA   Transfers   Comment, (Transfers) sit<>stand with modA   Gait/Stairs (Locomotion)   Comment, (Gait/Stairs) Pt refusing to ambulate this session   Balance   Balance Comments intact sitting, fair standing with FWW support   Sensory Examination   Sensory Perception patient reports no sensory changes   Coordination   Coordination no deficits were identified   Muscle Tone   Muscle Tone no deficits were identified   Clinical Impression   Criteria for Skilled Therapeutic Intervention Yes, treatment indicated   PT Diagnosis (PT) Impaired functional mobiltiy   Influenced by the following impairments Pain, weakness, decreased activity tolerance   Functional limitations due to impairments difficulty with bed mobility, transfers, ambulation   Clinical Presentation (PT Evaluation Complexity) stable   Clinical Presentation Rationale medically progressing   Clinical Decision Making (Complexity) low complexity   Planned Therapy Interventions (PT) balance training;bed mobility training;gait training;home exercise program;patient/family education;neuromuscular re-education;strengthening;transfer training;home program guidelines;risk factor education;progressive activity/exercise   Risk & Benefits of therapy have been explained evaluation/treatment results  reviewed;care plan/treatment goals reviewed;risks/benefits reviewed;current/potential barriers reviewed;participants voiced agreement with care plan;participants included;patient   PT Total Evaluation Time   PT Eval, Low Complexity Minutes (27507) 10   Physical Therapy Goals   PT Frequency Daily   PT Predicted Duration/Target Date for Goal Attainment 08/30/24   PT: Bed Mobility Minimal assist;Supine to/from sit   PT: Transfers Minimal assist;Sit to/from stand   PT: Gait Minimal assist;Rolling walker;10 feet   PT Discharge Planning   PT Discharge Recommendation (DC Rec) home with assist;home with home care physical therapy;Leaving home requires significant taxing effort   PT Rationale for DC Rec Rec patient return back to [facility] with Ax1 for bed mobility and transfers. Pt does not appear to be highly interested in mobility. Would benefit from continued PT via HHPT to maximize safety with mobility in the home setting.   PT Brief overview of current status Ax1 to stand at EOB; refused all additional attempts at mobility   Total Session Time   Total Session Time (sum of timed and untimed services) 10

## 2024-08-27 NOTE — PLAN OF CARE
"Care from [id number]      Inpatient Progress Note:  For complete assessment see flow sheet documentation.       /51 (BP Location: Right arm)   Pulse (!) 126   Temp 98.2  F (36.8  C) (Axillary)   Resp 18   Ht 1.575 m (5' 2\")   Wt 69 kg (152 lb 1.9 oz)   LMP  (LMP Unknown)   SpO2 95%   BMI 27.82 kg/m         Neuro: Intact, Disoriented to time with intermittent confusion.   Vital Signs: Pt went into A- Fib RVR overnight. Crosscover paged, give oral diltiazem and echo ordered.   Pain/Comfort: Denies pain   Diet/po intake: Adequate.   Output: Has been incontinent of urine.   Activity/Ambulation: Ax2 w/ ambulation and cares.   Pertinent assessments: Monitoring on telemetry.   Infusions: Saline Locked   Major Shift Events: Pt converted to A- Fib RVR, crosscover provider paged.   Plan (Upcoming Events): Echo this morning. Cont antibiotics. PT consult.     Will continue with POC.          Will continue to monitor and provide cares.    Goal Outcome Evaluation:      Plan of Care Reviewed With: patient    Overall Patient Progress: no changeOverall Patient Progress: no change    Outcome Evaluation: SOB, started seroquel, AFIB RVR overnight.      "

## 2024-08-27 NOTE — PLAN OF CARE
"Inpatient: [id number]    Dx: Bilateral Pleural Effusion      Orientation: Disoriented to time  Pain: PAINAD of 4 earlier. States left sided back pain.   Activity: Ax2 w/ walker and gait belt - Not OOB this shift.   LDA: Right PIV SL  Diet: Total-feed with order assist   HEENT: Teeth missing   Neuro: Lethargic. Disoriented to time.   Cardio: Tachycardic upon exertion   Resp: Dyspnea upon exertion - Tachypnea and dyspnea upon exertion with labile oxygenation. Lung fields clear to diminished   MS: Reports back pain  GI: Fecal incontinence - Last BM charted on 8/23/24 - Poor appetite   : Incontinence  Derm: Overall pale - Redness to bottom  Plan: Encourage PO intake and implement repositioning.       Given PRN hydroxyzine as patient seemed more anxious compared to yesterday.     Now reports her pain in left lower back. Denies right-sided pain.     Implementing repositioning Left/Right while not OOB.     /53 (BP Location: Right arm)   Pulse 69   Temp 97.9  F (36.6  C) (Axillary)   Resp 18   Ht 1.575 m (5' 2\")   Wt 69 kg (152 lb 1.9 oz)   LMP  (LMP Unknown)   SpO2 94%   BMI 27.82 kg/m       Problem: Adult Inpatient Plan of Care  Goal: Plan of Care Review  Description: The Plan of Care Review/Shift note should be completed every shift.  The Outcome Evaluation is a brief statement about your assessment that the patient is improving, declining, or no change.  This information will be displayed automatically on your shift  note.  Outcome: Progressing  Flowsheets (Taken 8/27/2024 1017)  Outcome Evaluation: Repositioning - PT to assess later this shift  Plan of Care Reviewed With: patient  Overall Patient Progress: no change  Goal: Patient-Specific Goal (Individualized)  Description: You can add care plan individualizations to a care plan. Examples of Individualization might be:  \"Parent requests to be called daily at 9am for status\", \"I have a hard time hearing out of my right ear\", or \"Do not touch me to wake " "me up as it startles  me\".  Outcome: Progressing  Goal: Absence of Hospital-Acquired Illness or Injury  Outcome: Progressing  Intervention: Identify and Manage Fall Risk  Recent Flowsheet Documentation  Taken 8/27/2024 0947 by Karthik Rodriguez RN  Safety Promotion/Fall Prevention:   activity supervised   assistive device/personal items within reach   clutter free environment maintained   lighting adjusted   mobility aid in reach   nonskid shoes/slippers when out of bed   patient and family education   room organization consistent   supervised activity   safety round/check completed  Intervention: Prevent Skin Injury  Recent Flowsheet Documentation  Taken 8/27/2024 0947 by Karthik Rodriguez RN  Body Position:   turned   right   heels elevated  Device Skin Pressure Protection:   absorbent pad utilized/changed   tubing/devices free from skin contact  Intervention: Prevent and Manage VTE (Venous Thromboembolism) Risk  Recent Flowsheet Documentation  Taken 8/27/2024 0947 by Karthik Rodriguez RN  VTE Prevention/Management: SCDs off (sequential compression devices)  Goal: Optimal Comfort and Wellbeing  Outcome: Progressing  Goal: Readiness for Transition of Care  Outcome: Progressing     Problem: Suicide Risk  Goal: Absence of Self-Harm  Outcome: Progressing  Intervention: Assess Risk to Self and Maintain Safety  Recent Flowsheet Documentation  Taken 8/27/2024 0947 by Karthik Rodriguez RN  Enhanced Safety Measures:   pain management   patient/family teach back on injury risk   review medications for side effects with activity     Problem: Gas Exchange Impaired  Goal: Optimal Gas Exchange  Outcome: Progressing     Problem: Cardiac Impairment  Goal: Optimal Activity Tolerance  Outcome: Progressing     Problem: Skin Injury Risk Increased  Goal: Skin Health and Integrity  Outcome: Progressing  Intervention: Plan: Nurse Driven Intervention: Moisture Management  Recent Flowsheet Documentation  Taken 8/27/2024 0947 by Karthik Rodriguez" VICTORIA, RN  Moisture Interventions: Body-worn absorptive product when OOB (brief, etc.)  Intervention: Plan: Nurse Driven Intervention: Friction and Shear  Recent Flowsheet Documentation  Taken 8/27/2024 0947 by Karthik Rodriguez, RN  Friction/Shear Interventions: HOB 30 degrees or less  Intervention: Optimize Skin Protection  Recent Flowsheet Documentation  Taken 8/27/2024 0947 by Karthik Rodriguez, RN  Activity Management: activity adjusted per tolerance   Goal Outcome Evaluation:      Plan of Care Reviewed With: patient    Overall Patient Progress: no changeOverall Patient Progress: no change    Outcome Evaluation: Repositioning - PT to assess later this shift

## 2024-08-27 NOTE — CONSULTS
Care Management Initial Consult    General Information  Assessment completed with: Patient, VM-chart review, Pt - Lennie  Type of CM/SW Visit: Initial Assessment    Primary Care Provider verified and updated as needed: No   Readmission within the last 30 days: current reason for admission unrelated to previous admission   Return Category: New Diagnosis  Reason for Consult: discharge planning  Advance Care Planning: Advance Care Planning Reviewed: no concerns identified       Communication Assessment  Patient's communication style: spoken language (English or Bilingual)    Hearing Difficulty or Deaf: no     Cognitive  Cognitive/Neuro/Behavioral: .WDL except, orientation  Level of Consciousness: lethargic, intermittent confusion  Arousal Level: opens eyes spontaneously  Orientation: disoriented to, time  Mood/Behavior: anxious, cooperative  Best Language: 1 - Mild to moderate (Some difficulties in communicating)  Speech: clear    Living Environment:   People in home: alone, facility resident     Current living Arrangements: assisted living  Name of Facility: [facility]   Able to return to prior arrangements: yes    Family/Social Support:  Care provided by: self, other (see comments) ([facility] staff)  Provides care for: no one  Marital Status:   Support system: Children, Facility resident(s)/Staff          Description of Support System: Supportive, Involved    Support Assessment: Adequate family and caregiver support    Current Resources:   Patient receiving home care services: No     Community Resources: None  Equipment currently used at home: walker, rolling  Supplies currently used at home: Oxygen Tubing/Supplies    Employment/Financial:  Employment Status:          Financial Concerns: none   Referral to Financial Worker: No    Lifestyle & Psychosocial Needs:  Social Determinants of Health     Food Insecurity: Low Risk  (8/24/2024)    Food Insecurity     Within the past 12 months, did you worry  that your food would run out before you got money to buy more?: No     Within the past 12 months, did the food you bought just not last and you didn t have money to get more?: No   Depression: Not at risk (1/7/2021)    PHQ-2     PHQ-2 Score: 0   Housing Stability: High Risk (8/24/2024)    Housing Stability     Do you have housing? : No     Are you worried about losing your housing?: No   Tobacco Use: Medium Risk (6/7/2023)    Patient History     Smoking Tobacco Use: Former     Smokeless Tobacco Use: Never     Passive Exposure: Not on file   Financial Resource Strain: Low Risk  (8/24/2024)    Financial Resource Strain     Within the past 12 months, have you or your family members you live with been unable to get utilities (heat, electricity) when it was really needed?: No   Alcohol Use: Not on file   Transportation Needs: Low Risk  (8/24/2024)    Transportation Needs     Within the past 12 months, has lack of transportation kept you from medical appointments, getting your medicines, non-medical meetings or appointments, work, or from getting things that you need?: No   Physical Activity: Not on file   Interpersonal Safety: Low Risk  (8/27/2024)    Interpersonal Safety     Do you feel physically and emotionally safe where you currently live?: Yes     Within the past 12 months, have you been hit, slapped, kicked or otherwise physically hurt by someone?: No     Within the past 12 months, have you been humiliated or emotionally abused in other ways by your partner or ex-partner?: No   Stress: Not on file   Social Connections: Not on file   Health Literacy: Not on file     Functional Status:  Prior to admission patient needed assistance:   Dependent ADLs:: Ambulation-walker, Bathing, Dressing, Grooming, Transfers, Toileting  Dependent IADLs:: Cleaning, Cooking, Laundry, Shopping, Meal Preparation, Medication Management, Transportation     Mental Health Status:  Mental Health Status: No Current Concerns       Chemical  "Dependency Status:  Chemical Dependency Status: No Current Concerns           Values/Beliefs:  Spiritual, Cultural Beliefs, Scientology Practices, Values that affect care: n/a          Additional Information:  Patient is 89 year old female admitted on 8/24/2024 with a chief complaint of \"fall\" (per H&P). Chart reviewed. Updated by PT that the discharge recommendation is return to [facility] with HC PT/OT. Pt was Ax1 with PT.     Met with pt this date and introduced self and social work role. Pt was lying in bed and answered questions appropriately. Pt resides at [facility]. Pt uses a walker to ambulate at baseline. Per pt, her son Naren is her main contact. Pt gave writer permission to call her facility to coordinate her discharge when medically ready. SW inquired about transportation at time of discharge and pt stated \"I don't know\". Pt does not have any concerns discharging back to [facility] once cleared.     SW left  for [facility] clinical director Bryanna p:[phone] requesting return call.     Per chart review and last admission on 8/9/24:     [facility] contact (name/number): building p:[phone], Nursing p:[phone]. Clinical Director, Bryanna Young, ph#[phone], coordinates patients returning for hospital.   Fax #: [phone]  Barriers to pt returning:  prefers M-F returns, only on-call nurse on the weekend  Baseline mobility: SBA x1 transfer and walker  Time of preferred return: prefers discharge orders by 10 am or as soon as available. Facility prefer patient returns before 2 pm M-F.  New meds/prescriptions/Pharmacy: [facility]  Transportation: family vs  Health. Pt did discharge with [facility] last admission.     Social work will continue to follow and assist with discharge planning as needed. Will await return call from [facility] and discuss/coordinate HC.     MARTI Capone, [facility]  Care Coordination  [phone]    MARTI Crowley      "

## 2024-08-27 NOTE — PLAN OF CARE
"Temp: 97.9  F (36.6  C) Temp src: Oral BP: 139/85 Pulse: 102   Resp: 16 SpO2: 95 % O2 Device: Nasal cannula Oxygen Delivery: 1/2 LPM     Disoriented to time. Ax2- stayed in bed this evening due to sob\cardiac issues this morning. Complained of pain while pts son at bedside when Dr. Doshi came to see pt and updated family- pt no longer complaining of pain so held 1x dose of tylenol. Later complained of left side pain while turning in bed, scheduled tylenol given. Incontinent of urine/stool- refusing purewick. Per pt her sob has improved post thoracentesis. Plan- monitor on tele, samples from thoracentesis taken, pulmonology consult, PT consult.     Problem: Adult Inpatient Plan of Care  Goal: Plan of Care Review  Description: The Plan of Care Review/Shift note should be completed every shift.  The Outcome Evaluation is a brief statement about your assessment that the patient is improving, declining, or no change.  This information will be displayed automatically on your shift  note.  Outcome: Progressing  Goal: Patient-Specific Goal (Individualized)  Description: You can add care plan individualizations to a care plan. Examples of Individualization might be:  \"Parent requests to be called daily at 9am for status\", \"I have a hard time hearing out of my right ear\", or \"Do not touch me to wake me up as it startles  me\".  Outcome: Progressing  Goal: Absence of Hospital-Acquired Illness or Injury  Outcome: Progressing  Intervention: Identify and Manage Fall Risk  Recent Flowsheet Documentation  Taken 8/26/2024 1541 by Marina Nunez, RN  Safety Promotion/Fall Prevention:   safety round/check completed   nonskid shoes/slippers when out of bed  Intervention: Prevent Skin Injury  Recent Flowsheet Documentation  Taken 8/26/2024 1541 by Marina Nunez, RN  Body Position: weight shifting  Goal: Optimal Comfort and Wellbeing  Outcome: Progressing  Goal: Readiness for Transition of Care  Outcome: Progressing     Problem: Suicide " Risk  Goal: Absence of Self-Harm  Outcome: Progressing     Problem: Gas Exchange Impaired  Goal: Optimal Gas Exchange  Outcome: Progressing     Problem: Cardiac Impairment  Goal: Optimal Activity Tolerance  Outcome: Progressing     Problem: Skin Injury Risk Increased  Goal: Skin Health and Integrity  Outcome: Progressing  Intervention: Plan: Nurse Driven Intervention: Moisture Management  Recent Flowsheet Documentation  Taken 8/26/2024 1541 by Marina Nunez RN  Moisture Interventions: (pt refusing purewick) Incontinence pad  Bathing/Skin Care: incontinence care  Intervention: Optimize Skin Protection  Recent Flowsheet Documentation  Taken 8/26/2024 1541 by Marina Nunez, RN  Activity Management: activity adjusted per tolerance

## 2024-08-27 NOTE — PROGRESS NOTES
Patient went into A-fib with RVR on telemetry.  Rates ranging from 110 up to 140.  Currently asymptomatic.  Blood pressure 131/60.  Not on AV pricila blockade or anticoagulation at baseline, but does have history of paroxysmal A-fib per hospitalist note.  Former 45-pack-year history.  Receiving some Lasix for diastolic CHF and pleural effusions.  -Give 10 mg IV diltiazem x 1  -BMP and magnesium level this morning    Addendum:  BP rechecked before plan to give IV diltiazem and it was low at 86/62 with repeat 104/51.  Reviewed telemetry again and heart rate is improved to the 110s-120.  -Will give oral diltiazem 30 mgx1 and not IV diltiazem, reassess later and if effective this can be continued  -ordered TTE

## 2024-08-27 NOTE — PROGRESS NOTES
[facility]    Medicine Progress Note - Hospitalist Service    Date of Admission:  8/24/2024    Assessment & Plan     Lennie Mar is a 89 year old with past medical history of hypertension, grade 1 diastolic CHF,   stage III CKD, paroxysmal atrial fibrillation, essential hypertension, essential tremors, hypothyroidism, macrocytic anemia. Admitted 8/24/2024 with symptoms of shortness of breath and right flank pain of 2-day duration following mechanical fall.    CT scan of the head, neck cervical spine did not show any acute pathology chest CT did not show any displaced rib fractures but rather small to moderate bilateral effusions which were loculated in the left with associated possible compressive atelectasis versus infiltrate and a large incompletely characterized neck mass probably related to thyroid goiter on the right side.   S/p thoracentesis, refused chest tube  Seen by Pulm  Afib with RVR, now sinus    Fall  Right-sided flank pain    - on pain medications    - no injury    - seen by PT: assist of 1, likely can discharge to her facility     Atrial fibrillation with RVR    - now stable in sinus    Acute exacerbation of chronic diastolic congestive heart failure  Left-sided loculated pleural effusion  Bilateral pleural effusions    - patient did have drainage of 750 mL of left-sided pleural effusion and 500 mL of right-sided pleural effusion on 8/26/2024    - needing 1L of O2 as she is normal on RA at rest but will desat with exertion    - seen by Pulm: rec 4 weeks of antibiotics, but none ordered    - contacted Pulm: Augmentin (but she has allergy), so Doxy (has allergy, but unclear what)    - tried calling son to clarify allergy, but NA, start doxy     Acute on chronic renal failure    - improved     Bipolar disorder  Depression  Anxiety  Paroxysmal atrial fibrillation  Essential tremors  Hypothyroidism  Macrocytic anemia    - cont home meds    Has thyroid mass    - follow-up as  "outpt     Called and updated son        Diet: Regular Diet Adult    DVT Prophylaxis: Heparin SQ  Kelley Catheter: Not present  Lines: None     Cardiac Monitoring: ACTIVE order. Indication: Tachyarrhythmias, acute (48 hours)  Code Status: No CPR- Do NOT Intubate      Clinically Significant Risk Factors                  # Hypertension: Noted on problem list           # Overweight: Estimated body mass index is 27.82 kg/m  as calculated from the following:    Height as of this encounter: 1.575 m (5' 2\").    Weight as of this encounter: 69 kg (152 lb 1.9 oz)., PRESENT ON ADMISSION     # Financial/Environmental Concerns: none               Disposition Plan     Medically Ready for Discharge: Anticipated Tomorrow    Devendra Cody MD  Hospitalist Service  [facility]  Securely message with [email or web address] (more info)  Text page via [facility] Paging/Directory   ______________________________________________________________________    Interval History   I assumed care of the patient today.  She denies any chest pain or shortness of breath.  No abdominal pain, nausea, vomiting, diarrhea.  She is not eating or drinking much.  She denies any symptoms from keeping her eating.  She just states she is not hungry.  I did speak with her son.  He states that during times she is not feeling well she does not eat or drink.    Physical Exam   Vital Signs: Temp: 97.8  F (36.6  C) Temp src: Axillary BP: 110/49 Pulse: 76   Resp: 18 SpO2: 91 % O2 Device: Nasal cannula Oxygen Delivery: 1/2 LPM  Weight: 152 lbs 1.88 oz    Constitutional: awake, alert, cooperative, no apparent distress, and appears stated age  Eyes: Lids and lashes normal, pupils equal, round and reactive to light, extra ocular muscles intact, sclera clear, conjunctiva normal  ENT: Normocephalic, without obvious abnormality, atraumatic, sinuses nontender on palpation, external ears without lesions, oral pharynx with moist mucous membranes, tonsils without erythema or " exudates, gums normal and good dentition.  Respiratory: poor air movement  Cardiovascular: Normal apical impulse, regular rate and rhythm, normal S1 and S2, no S3 or S4, and no murmur noted  GI: No scars, normal bowel sounds, soft, non-distended, non-tender, no masses palpated, no hepatosplenomegally    Medical Decision Making       30 MINUTES SPENT BY ME on the date of service doing chart review, history, exam, documentation & further activities per the note.          Data     I have personally reviewed the following data over the past 24 hrs:    N/A  \   N/A   / N/A     141 107 38.3 (H) /  87   5.0 20 (L) 1.93 (H) \       Imaging results reviewed over the past 24 hrs:   Recent Results (from the past 24 hour(s))   US Thoracentesis Bilateral    Narrative    THORACENTESIS 8/26/2024 4:38 PM    HISTORY: Patient with history of bilateral pleural effusion.    PROCEDURE/FINDINGS:  After obtaining informed consent, the patient was  positioned appropriately. The back was prepped and draped in the usual  sterile manner. Limited ultrasound was performed demonstrating simple  appearing bilateral pleural effusions.    Under ultrasound guidance, a 5 Swazi Yueh needle was used to access  the left pleural space. A permanent ultrasound image was saved to  document needle position. Thoracentesis was performed. Approximately  725 mL November fluid was aspirated out.     Under ultrasound guidance, a 5 Swazi Yueh needle was used to access  the right pleural space. A permanent ultrasound image was saved to  document needle position. Thoracentesis was performed. Approximately  500 mL krysta fluid was aspirated out.     The patient tolerated the procedure well. There were no immediate  postprocedure complications. The patient's vital signs were monitored  by radiology nursing staff under my supervision and remained stable  throughout the study.      Impression    IMPRESSION:  Successful bilateral thoracentesis performed. A total of  750  mL fluid was removed from the left, and 500 mL fluid was removed  from the right.    SANDY BOLAND MD         SYSTEM ID:  [id number]   Echocardiogram Complete   Result Value    LVEF  55-60%    [facility]    [id number]  [id number]  [id number]^BERNABE^KIMBERLEY^[place]     [facility]  Echocardiography Laboratory  [address] [place] [address]     Name: MANI ANDERSON  MRN: [id number]  : 1927  Study Date: 2024 07:49 AM  Age: 89 yrs  Gender: Female  Patient Location: [facility]  Reason For Study: Atrial Fibrillation  Ordering Physician: KIMBERLEY KIDD  Performed By: Shasha Mancilla RDCS     BSA: 1.7 m2  Height: 62 in  Weight: 152 lb  HR: 87  BP: 104/51 mmHg  ______________________________________________________________________________  Procedure  Complete Portable Echo Adult. Optison (NDC #[id number]) given intravenously.  ______________________________________________________________________________  Interpretation Summary     The visual ejection fraction is 55-60%.  Left ventricular systolic function is normal.  Small pericardial effusion  Moderate left pleural effusion  There are no echocardiographic indications of cardiac tamponade.  There was no pericardial effusion or pleural effusion on the previous echo in  2022. The study was technically difficult.  ______________________________________________________________________________  Left Ventricle  The left ventricle is normal in size. There is normal left ventricular wall  thickness. Diastolic Doppler findings (E/E' ratio and/or other parameters)  suggest left ventricular filling pressures are normal. The visual ejection  fraction is 55-60%. Left ventricular systolic function is normal. Septal  motion is consistent with conduction abnormality.     Right Ventricle  The right ventricle is normal in size and function.     Atria  Normal left atrial size. Right atrial size is normal. There is no color  Doppler evidence of  an atrial shunt.     Mitral Valve  There is mild mitral annular calcification. There is trace mitral  regurgitation.     Tricuspid Valve  There is trace tricuspid regurgitation. Right ventricular systolic pressure  could not be approximated due to inadequate tricuspid regurgitation.     Aortic Valve  The aortic valve is trileaflet. No aortic regurgitation is present. No  hemodynamically significant valvular aortic stenosis.     Pulmonic Valve  There is no pulmonic valvular regurgitation. Normal pulmonic valve velocity.     Vessels  The aortic root is normal size. Normal size ascending aorta. IVC diameter <2.1  cm collapsing >50% with sniff suggests a normal RA pressure of 3 mmHg.     Pericardium  Small pericardial effusion. There are no echocardiographic indications of  cardiac tamponade. Moderate left pleural effusion.     Rhythm  The rhythm was sinus with wide QRS.  ______________________________________________________________________________  MMode/2D Measurements & Calculations     IVSd: 0.98 cm  LVIDd: 3.3 cm  LVIDs: 2.1 cm  LVPWd: 0.91 cm  IVC diam: 1.4 cm  FS: 35.3 %  LV mass(C)d: 86.0 grams  LV mass(C)dI: 50.6 grams/m2  Ao root diam: 3.4 cm  asc Aorta Diam: 3.9 cm  Ao root diam index Ht(cm/m): 2.2  Ao root diam index BSA (cm/m2): 2.0  Asc Ao diam index BSA (cm/m2): 2.3  Asc Ao diam index Ht(cm/m): 2.5  RWT: 0.56  TAPSE: 1.2 cm     Doppler Measurements & Calculations  MV E max kamlesh: 57.8 cm/sec  MV A max kamlesh: 59.4 cm/sec  MV E/A: 0.97  MV max P.5 mmHg  MV mean P.93 mmHg  MV V2 VTI: 13.7 cm     MV dec time: 0.26 sec  PA acc time: 0.10 sec  E/E' av.1  Lateral E/e': 9.8  Medial E/e': 8.5  RV S Kamlesh: 10.3 cm/sec     ______________________________________________________________________________  Report approved by: Micheline Shelton 2024 10:21 AM

## 2024-08-28 ENCOUNTER — APPOINTMENT (OUTPATIENT)
Dept: PHYSICAL THERAPY | Facility: CLINIC | Age: 89
DRG: 291 | End: 2024-08-28
Payer: MEDICARE

## 2024-08-28 LAB
ANION GAP SERPL CALCULATED.3IONS-SCNC: 10 MMOL/L (ref 7–15)
BACTERIA UR CULT: ABNORMAL
BACTERIA UR CULT: ABNORMAL
BASOPHILS # BLD MANUAL: 0 10E3/UL (ref 0–0.2)
BASOPHILS NFR BLD MANUAL: 0 %
BUN SERPL-MCNC: 45.6 MG/DL (ref 8–23)
CALCIUM SERPL-MCNC: 9.7 MG/DL (ref 8.8–10.4)
CHLORIDE SERPL-SCNC: 108 MMOL/L (ref 98–107)
CREAT SERPL-MCNC: 2.14 MG/DL (ref 0.51–0.95)
EGFRCR SERPLBLD CKD-EPI 2021: 21 ML/MIN/1.73M2
EOSINOPHIL # BLD MANUAL: 0.2 10E3/UL (ref 0–0.7)
EOSINOPHIL NFR BLD MANUAL: 2 %
ERYTHROCYTE [DISTWIDTH] IN BLOOD BY AUTOMATED COUNT: 14.9 % (ref 10–15)
GLUCOSE SERPL-MCNC: 106 MG/DL (ref 70–99)
HCO3 SERPL-SCNC: 22 MMOL/L (ref 22–29)
HCT VFR BLD AUTO: 34.1 % (ref 35–47)
HGB BLD-MCNC: 10.2 G/DL (ref 11.7–15.7)
LYMPHOCYTES # BLD MANUAL: 2.4 10E3/UL (ref 0.8–5.3)
LYMPHOCYTES NFR BLD MANUAL: 22 %
MCH RBC QN AUTO: 32 PG (ref 26.5–33)
MCHC RBC AUTO-ENTMCNC: 29.9 G/DL (ref 31.5–36.5)
MCV RBC AUTO: 107 FL (ref 78–100)
MONOCYTES # BLD MANUAL: 1.1 10E3/UL (ref 0–1.3)
MONOCYTES NFR BLD MANUAL: 10 %
NEUTROPHILS # BLD MANUAL: 7.1 10E3/UL (ref 1.6–8.3)
NEUTROPHILS NFR BLD MANUAL: 66 %
NRBC # BLD AUTO: 0 10E3/UL
NRBC BLD AUTO-RTO: 0 /100
PLAT MORPH BLD: NORMAL
PLATELET # BLD AUTO: 393 10E3/UL (ref 150–450)
POTASSIUM SERPL-SCNC: 4.9 MMOL/L (ref 3.4–5.3)
RBC # BLD AUTO: 3.19 10E6/UL (ref 3.8–5.2)
RBC MORPH BLD: NORMAL
SODIUM SERPL-SCNC: 140 MMOL/L (ref 135–145)
WBC # BLD AUTO: 10.7 10E3/UL (ref 4–11)

## 2024-08-28 PROCEDURE — 250N000013 HC RX MED GY IP 250 OP 250 PS 637: Performed by: INTERNAL MEDICINE

## 2024-08-28 PROCEDURE — 36415 COLL VENOUS BLD VENIPUNCTURE: CPT | Performed by: HOSPITALIST

## 2024-08-28 PROCEDURE — 250N000013 HC RX MED GY IP 250 OP 250 PS 637: Performed by: HOSPITALIST

## 2024-08-28 PROCEDURE — 80048 BASIC METABOLIC PNL TOTAL CA: CPT | Performed by: HOSPITALIST

## 2024-08-28 PROCEDURE — 99232 SBSQ HOSP IP/OBS MODERATE 35: CPT | Performed by: HOSPITALIST

## 2024-08-28 PROCEDURE — 97530 THERAPEUTIC ACTIVITIES: CPT | Mod: GP | Performed by: PHYSICAL THERAPIST

## 2024-08-28 PROCEDURE — 85027 COMPLETE CBC AUTOMATED: CPT | Performed by: HOSPITALIST

## 2024-08-28 PROCEDURE — 258N000003 HC RX IP 258 OP 636: Performed by: HOSPITALIST

## 2024-08-28 PROCEDURE — 120N000001 HC R&B MED SURG/OB

## 2024-08-28 PROCEDURE — 250N000011 HC RX IP 250 OP 636: Performed by: HOSPITALIST

## 2024-08-28 PROCEDURE — 85007 BL SMEAR W/DIFF WBC COUNT: CPT | Performed by: HOSPITALIST

## 2024-08-28 RX ORDER — SODIUM CHLORIDE 9 MG/ML
INJECTION, SOLUTION INTRAVENOUS CONTINUOUS
Status: ACTIVE | OUTPATIENT
Start: 2024-08-28 | End: 2024-08-28

## 2024-08-28 RX ADMIN — DIVALPROEX SODIUM 500 MG: 500 TABLET, FILM COATED, EXTENDED RELEASE ORAL at 21:24

## 2024-08-28 RX ADMIN — QUETIAPINE FUMARATE 100 MG: 100 TABLET ORAL at 21:24

## 2024-08-28 RX ADMIN — HEPARIN SODIUM 5000 UNITS: 5000 INJECTION, SOLUTION INTRAVENOUS; SUBCUTANEOUS at 07:52

## 2024-08-28 RX ADMIN — LETROZOLE 2.5 MG: 2.5 TABLET ORAL at 08:00

## 2024-08-28 RX ADMIN — HYDROXYZINE HYDROCHLORIDE 25 MG: 25 TABLET ORAL at 06:06

## 2024-08-28 RX ADMIN — SODIUM CHLORIDE: 9 INJECTION, SOLUTION INTRAVENOUS at 10:57

## 2024-08-28 RX ADMIN — ACETAMINOPHEN 650 MG: 325 TABLET, FILM COATED ORAL at 21:24

## 2024-08-28 RX ADMIN — LITHIUM CARBONATE 150 MG: 150 CAPSULE, GELATIN COATED ORAL at 07:51

## 2024-08-28 RX ADMIN — HEPARIN SODIUM 5000 UNITS: 5000 INJECTION, SOLUTION INTRAVENOUS; SUBCUTANEOUS at 17:12

## 2024-08-28 RX ADMIN — FUROSEMIDE 40 MG: 40 TABLET ORAL at 07:53

## 2024-08-28 RX ADMIN — DOXYCYCLINE HYCLATE 100 MG: 100 CAPSULE ORAL at 07:51

## 2024-08-28 RX ADMIN — Medication 50 MCG: at 07:51

## 2024-08-28 RX ADMIN — DOXYCYCLINE HYCLATE 100 MG: 100 CAPSULE ORAL at 21:24

## 2024-08-28 RX ADMIN — LEVOTHYROXINE SODIUM 50 MCG: 0.05 TABLET ORAL at 07:51

## 2024-08-28 RX ADMIN — HEPARIN SODIUM 5000 UNITS: 5000 INJECTION, SOLUTION INTRAVENOUS; SUBCUTANEOUS at 01:16

## 2024-08-28 RX ADMIN — ACETAMINOPHEN 650 MG: 325 TABLET, FILM COATED ORAL at 14:01

## 2024-08-28 RX ADMIN — ACETAMINOPHEN 650 MG: 325 TABLET, FILM COATED ORAL at 07:51

## 2024-08-28 RX ADMIN — HYDROXYZINE HYDROCHLORIDE 25 MG: 25 TABLET ORAL at 17:13

## 2024-08-28 ASSESSMENT — ACTIVITIES OF DAILY LIVING (ADL)
ADLS_ACUITY_SCORE: 61

## 2024-08-28 NOTE — PROGRESS NOTES
[facility]    Medicine Progress Note - Hospitalist Service    Date of Admission:  8/24/2024    Assessment & Plan     Lennie Mar is a 89 year old with past medical history of hypertension, grade 1 diastolic CHF,   stage III CKD, paroxysmal atrial fibrillation, essential hypertension, essential tremors, hypothyroidism, macrocytic anemia. Admitted 8/24/2024 with symptoms of shortness of breath and right flank pain of 2-day duration following mechanical fall.    CT scan of the head, neck cervical spine did not show any acute pathology chest CT did not show any displaced rib fractures but rather small to moderate bilateral effusions which were loculated in the left with associated possible compressive atelectasis versus infiltrate and a large incompletely characterized neck mass probably related to thyroid goiter on the right side.   S/p thoracentesis, refused chest tube  Seen by Pulm  Afib with RVR, now sinus  Still needing O2, bump in Cr    Fall  Right-sided flank pain    - on pain medications    - no injury    - seen by PT: assist of 1, likely can discharge to her facility    - PT to re-assess today     Atrial fibrillation with RVR    - now stable in sinus    Acute exacerbation of chronic diastolic congestive heart failure  Left-sided loculated pleural effusion  Bilateral pleural effusions    - patient did have drainage of 750 mL of left-sided pleural effusion and 500 mL of right-sided pleural effusion on 8/26/2024    - needing 1L of O2 as she is normal on RA at rest but will desat with exertion    - seen by Pulm: rec 4 weeks of antibiotics, but none ordered    - contacted Pulm: Augmentin (but she has allergy), so Doxy (has allergy, but unclear what)    - on Doxy    - will need O2 to go home with     Acute on chronic renal failure    - improved    - no increased today    - need to encourage PO intake    - IVF x 10 hours today     Bipolar disorder  Depression  Anxiety  Paroxysmal atrial  "fibrillation  Essential tremors  Hypothyroidism  Macrocytic anemia    - cont home meds    Has thyroid mass    - follow-up as outpt     Called and updated son        Diet: Regular Diet Adult    DVT Prophylaxis: Heparin SQ  Kelley Catheter: Not present  Lines: None     Cardiac Monitoring: ACTIVE order. Indication: Tachyarrhythmias, acute (48 hours)  Code Status: No CPR- Do NOT Intubate      Clinically Significant Risk Factors                 # Acute Kidney Injury, unspecified: based on a >150% or 0.3 mg/dL increase in last creatinine compared to past 90 day average, will monitor renal function  # Hypertension: Noted on problem list           # Overweight: Estimated body mass index is 27.82 kg/m  as calculated from the following:    Height as of this encounter: 1.575 m (5' 2\").    Weight as of this encounter: 69 kg (152 lb 1.9 oz)., PRESENT ON ADMISSION       # Financial/Environmental Concerns: none      Disposition Plan     Medically Ready for Discharge: Anticipated Tomorrow    Devendra Cody MD  Hospitalist Service  [facility]  Securely message with [employer] (more info)  Text page via "[employer] Paging/Directory   ______________________________________________________________________    Interval History   Patient is in bed.  She states she has a lot of upper shoulder pain across her back.  She does not feel ready to go home.  She states she does not want to be by herself yet.  No other new symptoms or complaints.  I spoke with her son.  Again, he states that when she is not feeling well she will stop eating and drinking much.  States she needs to be encouraged.    Physical Exam   Vital Signs: Temp: 97.7  F (36.5  C) Temp src: Axillary BP: 116/73 Pulse: 97   Resp: 14 SpO2: 93 % O2 Device: None (Room air) Oxygen Delivery: 1/2 LPM  Weight: 152 lbs 1.88 oz    Constitutional: awake, alert, cooperative, no apparent distress, and appears stated age  Eyes: Lids and lashes normal, pupils equal, round and " reactive to light, extra ocular muscles intact, sclera clear, conjunctiva normal  ENT: Normocephalic, without obvious abnormality, atraumatic, sinuses nontender on palpation, external ears without lesions, oral pharynx with moist mucous membranes, tonsils without erythema or exudates, gums normal and good dentition.  Respiratory: poor air movement  Cardiovascular: Normal apical impulse, regular rate and rhythm, normal S1 and S2, no S3 or S4, and no murmur noted  GI: No scars, normal bowel sounds, soft, non-distended, non-tender, no masses palpated, no hepatosplenomegally    Medical Decision Making       30 MINUTES SPENT BY ME on the date of service doing chart review, history, exam, documentation & further activities per the note.          Data     I have personally reviewed the following data over the past 24 hrs:    10.7  \   10.2 (L)   / 393     140 108 (H) 45.6 (H) /  106 (H)   4.9 22 2.14 (H) \       Imaging results reviewed over the past 24 hrs:   No results found for this or any previous visit (from the past 24 hour(s)).

## 2024-08-28 NOTE — PLAN OF CARE
"Goal Outcome Evaluation:  Care from [id number]    Inpatient Progress Note:  For complete assessment see flow sheet documentation.    /81 (BP Location: Right arm)   Pulse 87   Temp 97.7  F (36.5  C) (Axillary)   Resp 12   Ht 1.575 m (5' 2\")   Wt 69 kg (152 lb 1.9 oz)   LMP  (LMP Unknown)   SpO2 94%   BMI 27.82 kg/m   Alert & oriented with some confusion. Denies pain. VSS. Assist x2 with cares. On 0.5 L 02 MC with sats up in the mid 90s.Thoracentesis entry sites CDI. PT rec. Return to [facility] with HCPT/OT. Ongoing plan of care.    Gali Whittington RN                       "

## 2024-08-28 NOTE — PLAN OF CARE
"Goal Outcome Evaluation:      Plan of Care Reviewed With: patient    Overall Patient Progress: improvingOverall Patient Progress: improving    Outcome Evaluation: PT a/o to self and situation with intermittnet confusion. PT In pain and displaying high anxiety.. provided PRNS and scheudled meds. PT was able to feed self dinner. IV saline locked and flushed x2 on shift. PT changed w/incontinence cares provided.      Problem: Adult Inpatient Plan of Care  Goal: Plan of Care Review  Description: The Plan of Care Review/Shift note should be completed every shift.  The Outcome Evaluation is a brief statement about your assessment that the patient is improving, declining, or no change.  This information will be displayed automatically on your shift  note.  Outcome: Progressing  Flowsheets (Taken 8/27/2024 2114)  Outcome Evaluation: PT a/o to self and situation with intermittnet confusion. PT In pain and displaying high anxiety.. provided PRNS and scheudled meds. PT was able to feed self dinner. IV saline locked and flushed x2 on shift.  Plan of Care Reviewed With: patient  Overall Patient Progress: improving  Goal: Patient-Specific Goal (Individualized)  Description: You can add care plan individualizations to a care plan. Examples of Individualization might be:  \"Parent requests to be called daily at 9am for status\", \"I have a hard time hearing out of my right ear\", or \"Do not touch me to wake me up as it startles  me\".  Outcome: Progressing  Goal: Absence of Hospital-Acquired Illness or Injury  Outcome: Progressing  Intervention: Identify and Manage Fall Risk  Recent Flowsheet Documentation  Taken 8/27/2024 1700 by Gavi Spicer RN  Safety Promotion/Fall Prevention:   safety round/check completed   room organization consistent   room near nurse's station  Intervention: Prevent Skin Injury  Recent Flowsheet Documentation  Taken 8/27/2024 1700 by Gavi Spicer RN  Skin Protection: adhesive use limited  Device Skin " Pressure Protection: absorbent pad utilized/changed  Intervention: Prevent Infection  Recent Flowsheet Documentation  Taken 8/27/2024 1700 by Gavi Spicer RN  Infection Prevention:   cohorting utilized   equipment surfaces disinfected   single patient room provided   rest/sleep promoted  Goal: Optimal Comfort and Wellbeing  Outcome: Progressing  Goal: Readiness for Transition of Care  Outcome: Progressing     Problem: Suicide Risk  Goal: Absence of Self-Harm  Outcome: Progressing  Intervention: Assess Risk to Self and Maintain Safety  Recent Flowsheet Documentation  Taken 8/27/2024 1700 by Gavi Spicer RN  Enhanced Safety Measures: pain management     Problem: Gas Exchange Impaired  Goal: Optimal Gas Exchange  Outcome: Progressing     Problem: Cardiac Impairment  Goal: Optimal Activity Tolerance  Outcome: Progressing     Problem: Skin Injury Risk Increased  Goal: Skin Health and Integrity  Outcome: Progressing  Intervention: Plan: Nurse Driven Intervention: Moisture Management  Recent Flowsheet Documentation  Taken 8/27/2024 1700 by aGvi Spicer RN  Moisture Interventions:   Incontinence pad   Body-worn absorptive product when OOB (brief, etc.)  Intervention: Plan: Nurse Driven Intervention: Friction and Shear  Recent Flowsheet Documentation  Taken 8/27/2024 1700 by Gavi Spicer RN  Friction/Shear Interventions: HOB 30 degrees or less  Intervention: Optimize Skin Protection  Recent Flowsheet Documentation  Taken 8/27/2024 1700 by Gavi Spicer RN  Pressure Reduction Techniques: frequent weight shift encouraged  Pressure Reduction Devices: heel offloading device utilized  Skin Protection: adhesive use limited

## 2024-08-28 NOTE — PROGRESS NOTES
Care Management Discharge Note    Discharge Date: 08/29/2024     Discharge Disposition: [facility]     Discharge Services: HC PT/OT     Discharge Transportation: [facility] WC transport arranged for [id number]    Private pay costs discussed: transportation costs    Does the patient's insurance plan have a 3 day qualifying hospital stay waiver?  No    Patient/family educated on Medicare website which has current facility and service quality ratings: Yes    Education Provided on the Discharge Plan: Yes   Persons Notified of Discharge Plans: Patient, son Naren, long-term RN  Patient/Family in Agreement with the Plan: Yes     Handoff Referral Completed: No, handoff not indicated or clinically appropriate    Additional Information:  Per care team rounds, patient medically ready to return to long-term today. PT has recommended home care PT/OT. Initial HC referral sent via HC hub, awaiting accepting agency. ESPERANZA left  for long-term RN, [phone], to update on patient's return today. Orders to be faxed to (f) [phone]. New medications to be sent to [facility]. ESPERANZA spoke with patient's son Naren to update. He requested  WC transport be arranged. MH WC transport arranged for [id number] today.     1055: Patient no longer discharging today. MH WC transport cancelled. Updated facility via voicemail. Will continue to follow.    ONEL Stanford, [facility]   Inpatient Care Coordination  [facility]   [phone]

## 2024-08-28 NOTE — PROGRESS NOTES
InPt Notes:    7 - 1500    Pt a/o x 3 she is forgetful and gets agitated at times. Pt de sated with activity and gets dyspneic with exertion.Pt is on 2 l nc and sats in the 90's.Pt ivf running at 75/hr.Pt up to bathroom with 1-2 assist walker gait belt. Plan for possible discharge tomorrow back to al.

## 2024-08-28 NOTE — PROVIDER NOTIFICATION
Patient has been assessed for Home Oxygen needs. Oxygen readings:    *Pulse oximetry (SpO2) = 94% on 0.5LPM NC while awake.    Dropped to 84% on 0.5LPM NC while standing. Increased to 2LPM NC with improving sats of 88%.     *SpO2 improved to 94% on 2liters/minute at rest.

## 2024-08-29 ENCOUNTER — APPOINTMENT (OUTPATIENT)
Dept: PHYSICAL THERAPY | Facility: CLINIC | Age: 89
DRG: 291 | End: 2024-08-29
Payer: MEDICARE

## 2024-08-29 LAB
ANION GAP SERPL CALCULATED.3IONS-SCNC: 9 MMOL/L (ref 7–15)
BASOPHILS # BLD AUTO: 0.1 10E3/UL (ref 0–0.2)
BASOPHILS NFR BLD AUTO: 1 %
BUN SERPL-MCNC: 42.3 MG/DL (ref 8–23)
CALCIUM SERPL-MCNC: 9.7 MG/DL (ref 8.8–10.4)
CHLORIDE SERPL-SCNC: 111 MMOL/L (ref 98–107)
CREAT SERPL-MCNC: 1.88 MG/DL (ref 0.51–0.95)
EGFRCR SERPLBLD CKD-EPI 2021: 24 ML/MIN/1.73M2
EOSINOPHIL # BLD AUTO: 0.2 10E3/UL (ref 0–0.7)
EOSINOPHIL NFR BLD AUTO: 2 %
ERYTHROCYTE [DISTWIDTH] IN BLOOD BY AUTOMATED COUNT: 14.8 % (ref 10–15)
GLUCOSE SERPL-MCNC: 93 MG/DL (ref 70–99)
HCO3 SERPL-SCNC: 21 MMOL/L (ref 22–29)
HCT VFR BLD AUTO: 33 % (ref 35–47)
HGB BLD-MCNC: 10 G/DL (ref 11.7–15.7)
IMM GRANULOCYTES # BLD: 0.5 10E3/UL
IMM GRANULOCYTES NFR BLD: 5 %
LYMPHOCYTES # BLD AUTO: 1.6 10E3/UL (ref 0.8–5.3)
LYMPHOCYTES NFR BLD AUTO: 15 %
MCH RBC QN AUTO: 32.2 PG (ref 26.5–33)
MCHC RBC AUTO-ENTMCNC: 30.3 G/DL (ref 31.5–36.5)
MCV RBC AUTO: 106 FL (ref 78–100)
MONOCYTES # BLD AUTO: 1.3 10E3/UL (ref 0–1.3)
MONOCYTES NFR BLD AUTO: 12 %
NEUTROPHILS # BLD AUTO: 6.9 10E3/UL (ref 1.6–8.3)
NEUTROPHILS NFR BLD AUTO: 65 %
NRBC # BLD AUTO: 0 10E3/UL
NRBC BLD AUTO-RTO: 0 /100
PLATELET # BLD AUTO: 359 10E3/UL (ref 150–450)
POTASSIUM SERPL-SCNC: 4.4 MMOL/L (ref 3.4–5.3)
RBC # BLD AUTO: 3.11 10E6/UL (ref 3.8–5.2)
SODIUM SERPL-SCNC: 141 MMOL/L (ref 135–145)
WBC # BLD AUTO: 10.7 10E3/UL (ref 4–11)

## 2024-08-29 PROCEDURE — 80048 BASIC METABOLIC PNL TOTAL CA: CPT | Performed by: HOSPITALIST

## 2024-08-29 PROCEDURE — 85041 AUTOMATED RBC COUNT: CPT | Performed by: HOSPITALIST

## 2024-08-29 PROCEDURE — 250N000011 HC RX IP 250 OP 636: Performed by: HOSPITALIST

## 2024-08-29 PROCEDURE — 97530 THERAPEUTIC ACTIVITIES: CPT | Mod: GP | Performed by: PHYSICAL THERAPIST

## 2024-08-29 PROCEDURE — 250N000013 HC RX MED GY IP 250 OP 250 PS 637: Performed by: HOSPITALIST

## 2024-08-29 PROCEDURE — 250N000013 HC RX MED GY IP 250 OP 250 PS 637: Performed by: INTERNAL MEDICINE

## 2024-08-29 PROCEDURE — 36415 COLL VENOUS BLD VENIPUNCTURE: CPT | Performed by: HOSPITALIST

## 2024-08-29 PROCEDURE — 97116 GAIT TRAINING THERAPY: CPT | Mod: GP | Performed by: PHYSICAL THERAPIST

## 2024-08-29 PROCEDURE — 99232 SBSQ HOSP IP/OBS MODERATE 35: CPT | Performed by: HOSPITALIST

## 2024-08-29 PROCEDURE — 120N000001 HC R&B MED SURG/OB

## 2024-08-29 RX ORDER — NALOXONE HYDROCHLORIDE 0.4 MG/ML
0.2 INJECTION, SOLUTION INTRAMUSCULAR; INTRAVENOUS; SUBCUTANEOUS
Status: DISCONTINUED | OUTPATIENT
Start: 2024-08-29 | End: 2024-08-30 | Stop reason: HOSPADM

## 2024-08-29 RX ORDER — NALOXONE HYDROCHLORIDE 0.4 MG/ML
0.4 INJECTION, SOLUTION INTRAMUSCULAR; INTRAVENOUS; SUBCUTANEOUS
Status: DISCONTINUED | OUTPATIENT
Start: 2024-08-29 | End: 2024-08-30 | Stop reason: HOSPADM

## 2024-08-29 RX ADMIN — QUETIAPINE FUMARATE 100 MG: 100 TABLET ORAL at 21:30

## 2024-08-29 RX ADMIN — FUROSEMIDE 40 MG: 40 TABLET ORAL at 08:01

## 2024-08-29 RX ADMIN — DOXYCYCLINE HYCLATE 100 MG: 100 CAPSULE ORAL at 20:19

## 2024-08-29 RX ADMIN — ACETAMINOPHEN 650 MG: 325 TABLET, FILM COATED ORAL at 20:19

## 2024-08-29 RX ADMIN — DOXYCYCLINE HYCLATE 100 MG: 100 CAPSULE ORAL at 08:02

## 2024-08-29 RX ADMIN — FAMOTIDINE 20 MG: 20 TABLET, FILM COATED ORAL at 08:11

## 2024-08-29 RX ADMIN — Medication 50 MCG: at 08:01

## 2024-08-29 RX ADMIN — HEPARIN SODIUM 5000 UNITS: 5000 INJECTION, SOLUTION INTRAVENOUS; SUBCUTANEOUS at 01:13

## 2024-08-29 RX ADMIN — HEPARIN SODIUM 5000 UNITS: 5000 INJECTION, SOLUTION INTRAVENOUS; SUBCUTANEOUS at 21:30

## 2024-08-29 RX ADMIN — ACETAMINOPHEN 650 MG: 325 TABLET, FILM COATED ORAL at 08:01

## 2024-08-29 RX ADMIN — LITHIUM CARBONATE 150 MG: 150 CAPSULE, GELATIN COATED ORAL at 08:02

## 2024-08-29 RX ADMIN — LETROZOLE 2.5 MG: 2.5 TABLET ORAL at 08:02

## 2024-08-29 RX ADMIN — HEPARIN SODIUM 5000 UNITS: 5000 INJECTION, SOLUTION INTRAVENOUS; SUBCUTANEOUS at 08:01

## 2024-08-29 RX ADMIN — LEVOTHYROXINE SODIUM 50 MCG: 0.05 TABLET ORAL at 08:01

## 2024-08-29 RX ADMIN — HYDROXYZINE HYDROCHLORIDE 25 MG: 25 TABLET ORAL at 14:41

## 2024-08-29 RX ADMIN — ACETAMINOPHEN 650 MG: 325 TABLET, FILM COATED ORAL at 13:53

## 2024-08-29 RX ADMIN — DIVALPROEX SODIUM 500 MG: 500 TABLET, FILM COATED, EXTENDED RELEASE ORAL at 21:30

## 2024-08-29 RX ADMIN — HEPARIN SODIUM 5000 UNITS: 5000 INJECTION, SOLUTION INTRAVENOUS; SUBCUTANEOUS at 13:54

## 2024-08-29 ASSESSMENT — ACTIVITIES OF DAILY LIVING (ADL)
ADLS_ACUITY_SCORE: 59
ADLS_ACUITY_SCORE: 52
ADLS_ACUITY_SCORE: 61
DRESSING/BATHING: BATHING DIFFICULTY, ASSISTANCE 1 PERSON
ADLS_ACUITY_SCORE: 59
ADLS_ACUITY_SCORE: 59
ADLS_ACUITY_SCORE: 61
ADLS_ACUITY_SCORE: 59
ADLS_ACUITY_SCORE: 52
ADLS_ACUITY_SCORE: 52
ADLS_ACUITY_SCORE: 61
WALKING_OR_CLIMBING_STAIRS_DIFFICULTY: NO
ADLS_ACUITY_SCORE: 59
DIFFICULTY_EATING/SWALLOWING: NO
CONCENTRATING,_REMEMBERING_OR_MAKING_DECISIONS_DIFFICULTY: YES
ADLS_ACUITY_SCORE: 59
ADLS_ACUITY_SCORE: 59
WEAR_GLASSES_OR_BLIND: YES
TOILETING_ISSUES: YES
VISION_MANAGEMENT: GLASSES
ADLS_ACUITY_SCORE: 61
DRESSING/BATHING_DIFFICULTY: YES
ADLS_ACUITY_SCORE: 52
FALL_HISTORY_WITHIN_LAST_SIX_MONTHS: YES
TOILETING_ASSISTANCE: TOILETING DIFFICULTY, ASSISTANCE 1 PERSON
DOING_ERRANDS_INDEPENDENTLY_DIFFICULTY: NO
ADLS_ACUITY_SCORE: 59
EQUIPMENT_CURRENTLY_USED_AT_HOME: WALKER, ROLLING
CHANGE_IN_FUNCTIONAL_STATUS_SINCE_ONSET_OF_CURRENT_ILLNESS/INJURY: NO
ADLS_ACUITY_SCORE: 52
ADLS_ACUITY_SCORE: 59
ADLS_ACUITY_SCORE: 61
ADLS_ACUITY_SCORE: 59
ADLS_ACUITY_SCORE: 61

## 2024-08-29 NOTE — PLAN OF CARE
Planned discharge for patient tomorrow to go to TCU. Tolerating a regular diet but she needs to be fed otherwise she will not attempt to eat. On RA now, needed to move her pulse ox to her toe, fingers were cold and it was not reading her oxygen properly. On contact precautions for ESBL        Plan of Care Reviewed With: patient    Overall Patient Progress: no changeOverall Patient Progress: no change    Outcome Evaluation: Pt AO with intermittent confusion. Discharging to TCU tomorrow

## 2024-08-29 NOTE — PROGRESS NOTES
Care Management Discharge Note    Discharge Date: 08/30/2024     Discharge Disposition:  [facility]    Discharge Services: PT/OT     Discharge Transportation: [facility] transport w/ O2 arranged for tomorrow, 8/30    Private pay costs discussed: transportation costs    Does the patient's insurance plan have a 3 day qualifying hospital stay waiver?  No    PAS Confirmation Code:  [id number]  Patient/family educated on Medicare website which has current facility and service quality ratings:  Yes    Education Provided on the Discharge Plan: Yes   Persons Notified of Discharge Plans: Patient, daughter-in-law, son, TCU admissions  Patient/Family in Agreement with the Plan: Yes     Handoff Referral Completed: No, handoff not indicated or clinically appropriate    Additional Information:  [facility]CU has clinically accepted patient, bed available tomorrow. [id number]. [facility] transport w/ O2 arranged for tomorrow, 8/30, between [phone]. Facility and daughter-in-law Marta updated on discharge plans for tomorrow. Updated RN at [facility] ([place] [phone]). SW will continue to follow.     ONEL Stanford, [facility]   Inpatient Care Coordination  [facility]   [phone]

## 2024-08-29 NOTE — PROGRESS NOTES
[facility]    Medicine Progress Note - Hospitalist Service    Date of Admission:  8/24/2024    Assessment & Plan     Lennie Mar is a 89 year old with past medical history of hypertension, grade 1 diastolic CHF,   stage III CKD, paroxysmal atrial fibrillation, essential hypertension, essential tremors, hypothyroidism, macrocytic anemia. Admitted 8/24/2024 with symptoms of shortness of breath and right flank pain of 2-day duration following mechanical fall.    CT scan of the head, neck cervical spine did not show any acute pathology chest CT did not show any displaced rib fractures but rather small to moderate bilateral effusions which were loculated in the left with associated possible compressive atelectasis versus infiltrate and a large incompletely characterized neck mass probably related to thyroid goiter on the right side.   S/p thoracentesis, refused chest tube  Seen by Pulm  Afib with RVR, now sinus  Still needing O2, bump in Cr    Fall  Right-sided flank pain    - on pain medications    - no injury    - seen by PT: assist of 1, likely can discharge to her facility    - re-assessment: TCU, will have bed for tomorrow     Atrial fibrillation with RVR    - now stable in sinus    Acute exacerbation of chronic diastolic congestive heart failure  Left-sided loculated pleural effusion  Bilateral pleural effusions    - patient did have drainage of 750 mL of left-sided pleural effusion and 500 mL of right-sided pleural effusion on 8/26/2024    - needing 1L of O2 as she is normal on RA at rest but will desat with exertion    - seen by Pulm: rec 4 weeks of antibiotics, but none ordered    - contacted Pulm: Augmentin (but she has allergy), so Doxy (has allergy, but unclear what)    - on Doxy    - cont to titrate O2 down     Acute on chronic renal failure    - improved    - no increased today    - need to encourage PO intake    - IVF x 10 hours today     Bipolar  "disorder  Depression  Anxiety  Paroxysmal atrial fibrillation  Essential tremors  Hypothyroidism  Macrocytic anemia    - cont home meds    Has thyroid mass    - follow-up as outpt     Called and updated son        Diet: Regular Diet Adult    DVT Prophylaxis: Heparin SQ  Kelley Catheter: Not present  Lines: None     Cardiac Monitoring: ACTIVE order. Indication: Tachyarrhythmias, acute (48 hours)  Code Status: No CPR- Do NOT Intubate      Clinically Significant Risk Factors                  # Hypertension: Noted on problem list           # Overweight: Estimated body mass index is 27.82 kg/m  as calculated from the following:    Height as of this encounter: 1.575 m (5' 2\").    Weight as of this encounter: 69 kg (152 lb 1.9 oz)., PRESENT ON ADMISSION       # Financial/Environmental Concerns: none      Disposition Plan     Medically Ready for Discharge: Anticipated Tomorrow    Devendra Cody MD  Hospitalist Service  [facility]  Securely message with [employer] (more info)  Text page via [employer] Paging/Directory   ______________________________________________________________________    Interval History   Patient is in the chair. States she feels better today. Ate breakfast and some lunch. No new complaints.    Physical Exam   Vital Signs: Temp: 98.2  F (36.8  C) Temp src: Oral BP: 109/56 Pulse: 87   Resp: 16 SpO2: 98 % O2 Device: Nasal cannula Oxygen Delivery: 1 LPM  Weight: 152 lbs 1.88 oz    Constitutional: awake, alert, cooperative, no apparent distress, and appears stated age  Eyes: Lids and lashes normal, pupils equal, round and reactive to light, extra ocular muscles intact, sclera clear, conjunctiva normal  ENT: Normocephalic, without obvious abnormality, atraumatic, sinuses nontender on palpation, external ears without lesions, oral pharynx with moist mucous membranes, tonsils without erythema or exudates, gums normal and good dentition.  Respiratory: poor air movement, some upper airway " congestion  Cardiovascular: Normal apical impulse, regular rate and rhythm, normal S1 and S2, no S3 or S4, and no murmur noted  GI: No scars, normal bowel sounds, soft, non-distended, non-tender, no masses palpated, no hepatosplenomegally    Medical Decision Making       30 MINUTES SPENT BY ME on the date of service doing chart review, history, exam, documentation & further activities per the note.          Data     I have personally reviewed the following data over the past 24 hrs:    10.7  \   10.0 (L)   / 359     141 111 (H) 42.3 (H) /  93   4.4 21 (L) 1.88 (H) \       Imaging results reviewed over the past 24 hrs:   No results found for this or any previous visit (from the past 24 hour(s)).

## 2024-08-29 NOTE — PLAN OF CARE
"Patient is alert with intermittent confusion.   IV line saline locked. Patient is on regular diet. Pain level is 5, she verbalized back pain, tylenol and atarax administered for pain. She was ambulated to the chair with assist of 2 and gait belt. TCU secured, plan to discharge tomorrow. Patient was weaned off oxygen per provider, now on room air.    Problem: Adult Inpatient Plan of Care  Goal: Plan of Care Review  Description: The Plan of Care Review/Shift note should be completed every shift.  The Outcome Evaluation is a brief statement about your assessment that the patient is improving, declining, or no change.  This information will be displayed automatically on your shift  note.  Outcome: Progressing  Flowsheets (Taken 8/29/2024 1411)  Plan of Care Reviewed With: patient  Overall Patient Progress: improving  Goal: Patient-Specific Goal (Individualized)  Description: You can add care plan individualizations to a care plan. Examples of Individualization might be:  \"Parent requests to be called daily at 9am for status\", \"I have a hard time hearing out of my right ear\", or \"Do not touch me to wake me up as it startles  me\".  Outcome: Progressing  Goal: Absence of Hospital-Acquired Illness or Injury  Outcome: Progressing  Intervention: Identify and Manage Fall Risk  Recent Flowsheet Documentation  Taken 8/29/2024 0918 by Lauren Trevizo RN  Safety Promotion/Fall Prevention:   activity supervised   clutter free environment maintained   nonskid shoes/slippers when out of bed   safety round/check completed   room organization consistent  Intervention: Prevent and Manage VTE (Venous Thromboembolism) Risk  Recent Flowsheet Documentation  Taken 8/29/2024 0918 by Lauren Trevizo RN  VTE Prevention/Management: SCDs off (sequential compression devices)  Goal: Optimal Comfort and Wellbeing  Outcome: Progressing  Goal: Readiness for Transition of Care  Outcome: Progressing     Problem: Suicide Risk  Goal: Absence of " Self-Harm  Outcome: Progressing  Intervention: Assess Risk to Self and Maintain Safety  Recent Flowsheet Documentation  Taken 8/29/2024 0918 by Lauren Trevizo RN  Enhanced Safety Measures: assistive devices when indicated     Problem: Gas Exchange Impaired  Goal: Optimal Gas Exchange  Outcome: Progressing  Intervention: Optimize Oxygenation and Ventilation  Recent Flowsheet Documentation  Taken 8/29/2024 0918 by Lauren Trevizo RN  Head of Bed (HOB) Positioning: HOB at 15 degrees     Problem: Cardiac Impairment  Goal: Optimal Activity Tolerance  Outcome: Progressing     Problem: Skin Injury Risk Increased  Goal: Skin Health and Integrity  Outcome: Progressing  Intervention: Plan: Nurse Driven Intervention: Moisture Management  Recent Flowsheet Documentation  Taken 8/29/2024 0918 by Lauren Trevizo RN  Moisture Interventions: Incontinence pad  Intervention: Plan: Nurse Driven Intervention: Friction and Shear  Recent Flowsheet Documentation  Taken 8/29/2024 0918 by Lauren Trevizo RN  Friction/Shear Interventions: HOB 30 degrees or less  Intervention: Optimize Skin Protection  Recent Flowsheet Documentation  Taken 8/29/2024 0918 by Lauren Tervizo RN  Activity Management: activity adjusted per tolerance  Head of Bed (HOB) Positioning: HOB at 15 degrees         Goal Outcome Evaluation:      Plan of Care Reviewed With: patient    Overall Patient Progress: improvingOverall Patient Progress: improving

## 2024-08-29 NOTE — PLAN OF CARE
"Inpatient: [id number]    Dx: AFib and Bilateral Pleural Effusions      Orientation: Disoriented to Time  Pain: Reports Discomfort in Back  Activity: Ax2 w/ Walker and Gait Belt   LDA: Right PIV SL  Diet: Regular Diet   Neuro: Intermittently confused and forgetful. Baseline tremors present  Cardio: WDL - No events this shift  Resp: Dyspneic upon exertion. Crackles in lung fields. On 1 LPM  MS: Generally weak and back pain present  GI: Last BM unknown - 8/23/24 is Charted - Patient denies constipation  : Incontinence  Derm: Overall pale with scattered bruising. She has blanchable redness on bottom.   Plan: SW following for discharge      Remains on telemetry - report this afternoon was SR in 90's. Denies chest pain.     Patient reports her lungs feel \"spongy\" - Auscultated crackles in lung fields - Had 75 mL/hr IVF's running and since stopped per X-cover. Now SL.     Able to get patient in chair during shift. She ate 50% of supper and had approximately 320 mL fluid intake - all require encouragement and assistance.     Able to maintain 1 LPM of oxygen all shift.     /50 (BP Location: Right arm)   Pulse 86   Temp 98  F (36.7  C) (Oral)   Resp 17   Ht 1.575 m (5' 2\")   Wt 69 kg (152 lb 1.9 oz)   LMP  (LMP Unknown)   SpO2 92%   BMI 27.82 kg/m       Problem: Adult Inpatient Plan of Care  Goal: Plan of Care Review  Description: The Plan of Care Review/Shift note should be completed every shift.  The Outcome Evaluation is a brief statement about your assessment that the patient is improving, declining, or no change.  This information will be displayed automatically on your shift  note.  Outcome: Progressing  Flowsheets (Taken 8/28/2024 2138)  Plan of Care Reviewed With: patient  Overall Patient Progress: no change  Goal: Patient-Specific Goal (Individualized)  Description: You can add care plan individualizations to a care plan. Examples of Individualization might be:  \"Parent requests to be called daily at " "9am for status\", \"I have a hard time hearing out of my right ear\", or \"Do not touch me to wake me up as it startles  me\".  Outcome: Progressing  Goal: Absence of Hospital-Acquired Illness or Injury  Outcome: Progressing  Intervention: Identify and Manage Fall Risk  Recent Flowsheet Documentation  Taken 8/28/2024 2132 by Karthik Rodriguez RN  Safety Promotion/Fall Prevention:   activity supervised   assistive device/personal items within reach   clutter free environment maintained   lighting adjusted   mobility aid in reach   nonskid shoes/slippers when out of bed   patient and family education   room organization consistent   safety round/check completed   supervised activity  Intervention: Prevent Skin Injury  Recent Flowsheet Documentation  Taken 8/28/2024 0757 by Karthik Rodriguez RN  Body Position:   turned   left   heels elevated  Intervention: Prevent and Manage VTE (Venous Thromboembolism) Risk  Recent Flowsheet Documentation  Taken 8/28/2024 2132 by Karthik Rodriguez RN  VTE Prevention/Management: SCDs off (sequential compression devices)  Goal: Optimal Comfort and Wellbeing  Outcome: Progressing  Goal: Readiness for Transition of Care  Outcome: Progressing     Problem: Suicide Risk  Goal: Absence of Self-Harm  Outcome: Progressing  Intervention: Assess Risk to Self and Maintain Safety  Recent Flowsheet Documentation  Taken 8/28/2024 2132 by Karthik Rodriguez RN  Enhanced Safety Measures:   pain management   patient/family teach back on injury risk   review medications for side effects with activity     Problem: Gas Exchange Impaired  Goal: Optimal Gas Exchange  Outcome: Progressing     Problem: Cardiac Impairment  Goal: Optimal Activity Tolerance  Outcome: Progressing     Problem: Skin Injury Risk Increased  Goal: Skin Health and Integrity  Outcome: Progressing  Intervention: Plan: Nurse Driven Intervention: Moisture Management  Recent Flowsheet Documentation  Taken 8/28/2024 2132 by Karthik Rodriguez, " RN  Moisture Interventions: Incontinence pad  Taken 8/28/2024 1721 by Karthik Rodriguez RN  Bathing/Skin Care: incontinence care  Taken 8/28/2024 0757 by Karthik Rodriguez RN  Bathing/Skin Care: incontinence care  Intervention: Plan: Nurse Driven Intervention: Friction and Shear  Recent Flowsheet Documentation  Taken 8/28/2024 2132 by Karthik Rodriguez RN  Friction/Shear Interventions: HOB 30 degrees or less  Intervention: Optimize Skin Protection  Recent Flowsheet Documentation  Taken 8/28/2024 1721 by Karthik Rodriguez, RN  Activity Management: up in chair  Taken 8/28/2024 0757 by Karthik Rodriguez, RN  Activity Management: back to bed   Goal Outcome Evaluation:      Plan of Care Reviewed With: patient    Overall Patient Progress: no changeOverall Patient Progress: no change

## 2024-08-29 NOTE — PLAN OF CARE
"Goal Outcome Evaluation:  Care from [id number]    Inpatient Progress Note:  For complete assessment see flow sheet documentation.    /56 (BP Location: Left arm)   Pulse 82   Temp 97.6  F (36.4  C) (Oral)   Resp 20   Ht 1.575 m (5' 2\")   Wt 69 kg (152 lb 1.9 oz)   LMP  (LMP Unknown)   SpO2 92%   BMI 27.82 kg/m   Alert & oriented with some confusion. Denies pain. VSS. On Tele -SR-75 Assist x 2 with cares. On 1L 02 NC with sats up in the mid 90s. Blanchable redness to bottom..PT rec. [facility]. Ongoing plan of care.    Gali Whittington RN                       "

## 2024-08-29 NOTE — PROGRESS NOTES
Care Management Follow Up    Expected Discharge Date: 08/30/2024     Concerns to be Addressed: discharge planning     Patient plan of care discussed at interdisciplinary rounds: Yes    Anticipated Discharge Disposition: TCU       Anticipated Discharge Services: PT/OT    Patient/family educated on Medicare website which has current facility and service quality ratings: Yes  Education Provided on the Discharge Plan: Yes   Patient/Family in Agreement with the Plan: Yes     Referrals Placed by CM/SW: Skilled Nursing Facility   Private pay costs discussed: transportation costs    Discussed  Partnership in Safe Discharge Planning  document with patient/family: No     Handoff Completed: No, handoff not indicated or clinically appropriate    Additional Information:  PT continues to require Ax1-2, recs for TCU as [facility] unable to accommodate and pt does not feel strong enough to return to [facility]. Met with patient who is agreeable to TCU placement. Deferred to son to discuss facility preferences. SW spoke with son Dmitry via phone, he is agreeable to TCU but asked that SW discuss with his wife Marta as he was busy at work. SW spoke with patient's daughter-in-law Marta [phone]. Reviewed TCU recs and Medicare.gov. She is requesting that a referral be sent to [facility] as patient has been there in the past. Pt/family also agreeable to referrals being sent to [facility] and [facility]. Referrals sent. Pt will need [facility] transport w/ O2 arranged at discharge. Cost discussed. SW will continue to follow.     Next Steps: Secure TCU bed, PAS, arrange transport    ONEL Stanford, [facility]   Inpatient Care Coordination  [facility]   [phone]

## 2024-08-30 VITALS
HEART RATE: 86 BPM | RESPIRATION RATE: 18 BRPM | WEIGHT: 152.12 LBS | SYSTOLIC BLOOD PRESSURE: 101 MMHG | BODY MASS INDEX: 27.99 KG/M2 | HEIGHT: 62 IN | TEMPERATURE: 97.7 F | OXYGEN SATURATION: 91 % | DIASTOLIC BLOOD PRESSURE: 53 MMHG

## 2024-08-30 LAB
ANION GAP SERPL CALCULATED.3IONS-SCNC: 10 MMOL/L (ref 7–15)
BUN SERPL-MCNC: 39.2 MG/DL (ref 8–23)
CALCIUM SERPL-MCNC: 9.8 MG/DL (ref 8.8–10.4)
CHLORIDE SERPL-SCNC: 112 MMOL/L (ref 98–107)
CREAT SERPL-MCNC: 1.72 MG/DL (ref 0.51–0.95)
EGFRCR SERPLBLD CKD-EPI 2021: 27 ML/MIN/1.73M2
GLUCOSE SERPL-MCNC: 97 MG/DL (ref 70–99)
HCO3 SERPL-SCNC: 20 MMOL/L (ref 22–29)
HOLD SPECIMEN: NORMAL
POTASSIUM SERPL-SCNC: 4.5 MMOL/L (ref 3.4–5.3)
SODIUM SERPL-SCNC: 142 MMOL/L (ref 135–145)

## 2024-08-30 PROCEDURE — 36415 COLL VENOUS BLD VENIPUNCTURE: CPT | Performed by: HOSPITALIST

## 2024-08-30 PROCEDURE — 80048 BASIC METABOLIC PNL TOTAL CA: CPT | Performed by: HOSPITALIST

## 2024-08-30 PROCEDURE — 250N000013 HC RX MED GY IP 250 OP 250 PS 637: Performed by: HOSPITALIST

## 2024-08-30 PROCEDURE — 99239 HOSP IP/OBS DSCHRG MGMT >30: CPT | Performed by: HOSPITALIST

## 2024-08-30 PROCEDURE — 250N000011 HC RX IP 250 OP 636: Performed by: HOSPITALIST

## 2024-08-30 RX ORDER — DOXYCYCLINE 100 MG/1
100 CAPSULE ORAL EVERY 12 HOURS
Status: ON HOLD | DISCHARGE
Start: 2024-08-30 | End: 2024-09-16

## 2024-08-30 RX ORDER — TRAMADOL HYDROCHLORIDE 25 MG/1
25 TABLET, COATED ORAL 2 TIMES DAILY PRN
Qty: 10 TABLET | Refills: 0 | Status: SHIPPED | OUTPATIENT
Start: 2024-08-30

## 2024-08-30 RX ORDER — ACETAMINOPHEN 325 MG/1
650 TABLET ORAL 3 TIMES DAILY
Status: ON HOLD | DISCHARGE
Start: 2024-08-30 | End: 2024-09-16

## 2024-08-30 RX ORDER — HEPARIN SODIUM 5000 [USP'U]/.5ML
5000 INJECTION, SOLUTION INTRAVENOUS; SUBCUTANEOUS EVERY 8 HOURS
DISCHARGE
Start: 2024-08-30 | End: 2024-09-11

## 2024-08-30 RX ORDER — LORAZEPAM 0.5 MG/1
0.25 TABLET ORAL
Qty: 10 TABLET | Refills: 0 | Status: ON HOLD | OUTPATIENT
Start: 2024-08-30 | End: 2024-09-16

## 2024-08-30 RX ADMIN — DOXYCYCLINE HYCLATE 100 MG: 100 CAPSULE ORAL at 09:13

## 2024-08-30 RX ADMIN — HEPARIN SODIUM 5000 UNITS: 5000 INJECTION, SOLUTION INTRAVENOUS; SUBCUTANEOUS at 05:13

## 2024-08-30 RX ADMIN — LEVOTHYROXINE SODIUM 50 MCG: 0.05 TABLET ORAL at 09:13

## 2024-08-30 RX ADMIN — LITHIUM CARBONATE 150 MG: 150 CAPSULE, GELATIN COATED ORAL at 09:13

## 2024-08-30 RX ADMIN — LETROZOLE 2.5 MG: 2.5 TABLET ORAL at 09:14

## 2024-08-30 ASSESSMENT — ACTIVITIES OF DAILY LIVING (ADL)
ADLS_ACUITY_SCORE: 51

## 2024-08-30 NOTE — PROVIDER NOTIFICATION
Patient noted to be desat in upper 80 % on RA. Provider updated.  Obtained Oxygen orders -wean as tolerated.

## 2024-08-30 NOTE — PROGRESS NOTES
Pt discharging to TCU, packet and facesheet printed and given to  transport. PIV removed. Belongings with patient. Report given to TCU RN. Pt o2 use from 2L to 0.5L this stay, sent pt on 1L O2. Pt up assist of 1 walker gait belt, pivoted to wheelchair.

## 2024-08-30 NOTE — DISCHARGE SUMMARY
"[facility]  Hospitalist Discharge Summary      Date of Admission:  8/24/2024  Date of Discharge:  8/30/2024  Discharging Provider: Devendra Cody MD  Discharge Service: Hospitalist Service    Discharge Diagnoses   Fall  Right-sided flank pain  Acute exacerbation of chronic diastolic congestive heart failure  Left-sided loculated pleural effusion  Bilateral pleural effusions  Atrial fibrillation with RVR    Clinically Significant Risk Factors     # Overweight: Estimated body mass index is 27.82 kg/m  as calculated from the following:    Height as of this encounter: 1.575 m (5' 2\").    Weight as of this encounter: 69 kg (152 lb 1.9 oz).       Follow-ups Needed After Discharge   Follow-up Appointments     Follow Up and recommended labs and tests      Follow up with half-way physician.  The following labs/tests are   recommended: chem 7 in 3-4 days, lithium leves next week  Follow up with primary care provider in 2-3 weeks.  The following   labs/tests are recommended: basic labs  Follow-up with Pulmonary in 3-4 weeks. Referral placed.            Unresulted Labs Ordered in the Past 30 Days of this Admission       Date and Time Order Name Status Description    8/26/2024  1:41 PM Pleural fluid Aerobic Bacterial Culture Routine With Gram Stain Preliminary         These results will be followed up by Pulmonary/Hospitalist group    Discharge Disposition   Discharged to short-term care facility  Condition at discharge: Stable    Hospital Course   Lennie Mar is a 89 year old female with history of hypertension, CKD stage 3, paroxysmal atrial fibrillation, essential tremor, hypothyroidism, and macrocytic anemia who presents for SOB and flank pain due to a fall this morning.  Patient initially did not want to be examined or speak to provider.  She stated she does not want to be hospitalized and wants to go home.  Discussed with patient that we need a safe discharge plan and she needs to be able " to walk around and is failed a mobility at bedside ED. patient understands and is more agreeable to talk with provider.  She states she was short of breath but feels fine now.  She states she has had lower extremity edema which has been worsening over the past few days.  She states she was trying to sit down in her chair and missed her chair and fell back and hit her head.  Otherwise no other complaints     Fall  Right-sided flank pain    - on pain medications    - no injury    - seen by PT: assist of 1, likely can discharge to her facility    - re-assessment: TCU, will have bed for tomorrow     Atrial fibrillation with RVR    - now stable in sinus     Acute exacerbation of chronic diastolic congestive heart failure  Left-sided loculated pleural effusion  Bilateral pleural effusions    - patient did have drainage of 750 mL of left-sided pleural effusion and 500 mL of right-sided pleural effusion on 8/26/2024    - needing 1L of O2 as she is normal on RA at rest but will desat with exertion    - seen by Pulm: rec 4 weeks of antibiotics, but none ordered    - contacted Pulm: Augmentin (but she has allergy), so Doxy (has allergy, but unclear what)    - on Doxy    - cont to titrate O2 down     Acute on chronic renal failure    - improved    - no increased today    - need to encourage PO intake    - IVF x 10 hours today     Bipolar disorder  Depression  Anxiety  Paroxysmal atrial fibrillation  Essential tremors  Hypothyroidism  Macrocytic anemia    - cont home meds     Has thyroid mass    - follow-up as outpt     Patient has a bed at TCU for today.  She is stable with no acute events overnight.  She has remained in sinus rhythm.  She denies any new complaints.  Labs and vitals are stable.  She will have outpatient follow-up with pulmonary.  I am not going to send her out on the diuretic.  Her oral intake is poor at times.  She has had issues with acute renal failure.  She can be assessed by pulmonary as an  outpatient.    Consultations This Hospital Stay   PHYSICAL THERAPY ADULT IP CONSULT  PULMONARY IP CONSULT  CARE MANAGEMENT / SOCIAL WORK IP CONSULT  CARE MANAGEMENT / SOCIAL WORK IP CONSULT  PALLIATIVE CARE ADULT IP CONSULT  CARE MANAGEMENT / SOCIAL WORK IP CONSULT  PHYSICAL THERAPY ADULT IP CONSULT  OCCUPATIONAL THERAPY ADULT IP CONSULT    Code Status   No CPR- Do NOT Intubate    Time Spent on this Encounter   I, Devendra Cody MD, personally saw the patient today and spent greater than 30 minutes discharging this patient.       Devendra Cody MD  [facility] OBSERVATION DEPT  [address] [place] [address]  Phone: [phone]  ______________________________________________________________________    Physical Exam   Vital Signs: Temp: 97.4  F (36.3  C) Temp src: Axillary BP: 120/65 Pulse: 87   Resp: 18 SpO2: 95 % O2 Device: None (Room air) Oxygen Delivery: 2 LPM  Weight: 152 lbs 1.88 oz  Constitutional: awake, alert, cooperative, no apparent distress, and appears stated age  Eyes: Lids and lashes normal, pupils equal, round and reactive to light, extra ocular muscles intact, sclera clear, conjunctiva normal  ENT: Normocephalic, without obvious abnormality, atraumatic, sinuses nontender on palpation, external ears without lesions, oral pharynx with moist mucous membranes, tonsils without erythema or exudates, gums normal and good dentition.  Respiratory: poor exam. Upper airway congestion. Decreased BS in bases       Primary Care Physician   Physician No Ref-Primary    Discharge Orders      Adult Pulmonary Medicine  Referral      General info for SNF    Length of Stay Estimate: Short Term Care: Estimated # of Days 31-90  Condition at Discharge: Stable  Level of care:skilled   Rehabilitation Potential: Fair  Admission H&P remains valid and up-to-date: Yes  Recent Chemotherapy: N/A  Use Nursing Home Standing Orders: Yes     Mantoux instructions    Give two-step Mantoux (PPD)  Per Facility Policy Yes     Follow Up and recommended labs and tests    Follow up with assisted physician.  The following labs/tests are recommended: chem 7 in 3-4 days.  Follow up with primary care provider in 2-3 weeks.  The following labs/tests are recommended: basic labs  Follow-up with Pulmonary in 3-4 weeks. Referral placed.     Reason for your hospital stay    Fall  Right-sided flank pain  Acute exacerbation of chronic diastolic congestive heart failure  Left-sided loculated pleural effusion  Bilateral pleural effusions  Atrial fibrillation with RVR  Acute on chronic renal failure     Activity - Up with nursing assistance     No CPR- Do NOT Intubate     Physical Therapy Adult Consult    Evaluate and treat as clinically indicated.    Reason:  weakness     Occupational Therapy Adult Consult    Evaluate and treat as clinically indicated.    Reason:  weakness     Oxygen (SNF/TCU) Discharge     Fall precautions     Diet    Follow this diet upon discharge: Current Diet:Orders Placed This Encounter      Regular Diet Adult       Significant Results and Procedures   Most Recent 3 CBC's:  Recent Labs   Lab Test 08/29/24  0752 08/28/24  0640 08/26/24  0917   WBC 10.7 10.7 10.5   HGB 10.0* 10.2* 10.8*   * 107* 106*    393 449     Most Recent 3 BMP's:  Recent Labs   Lab Test 08/29/24  0656 08/28/24  0640 08/27/24  0920    140 141   POTASSIUM 4.4 4.9 5.0   CHLORIDE 111* 108* 107   CO2 21* 22 20*   BUN 42.3* 45.6* 38.3*   CR 1.88* 2.14* 1.93*   ANIONGAP 9 10 14   CANELO 9.7 9.7 9.7   GLC 93 106* 87     Most Recent 2 LFT's:  Recent Labs   Lab Test 08/08/24 1955 02/08/23  0855   AST 10 22  22   ALT 7 7*  7*   ALKPHOS 102 101  101   BILITOTAL 0.4 0.3  0.3   ,   Results for orders placed or performed during the hospital encounter of 08/24/24   CT Head w/o Contrast    Narrative    EXAM: CT HEAD W/O CONTRAST  LOCATION: [facility]  DATE: 8/24/2024    INDICATION: fell, hit head.  Injury. Pain.    COMPARISON: May 3, 2022 head CT  TECHNIQUE: Routine CT Head without IV contrast. Multiplanar reformats. Dose reduction techniques were used.    FINDINGS:  INTRACRANIAL CONTENTS: No intracranial hemorrhage, extraaxial collection, or mass effect.  No CT evidence of acute infarct. Mild to moderate presumed chronic small vessel ischemic changes. Moderate generalized volume loss. No hydrocephalus.     VISUALIZED ORBITS/SINUSES/MASTOIDS: Prior bilateral cataract surgery. Visualized portions of the orbits are otherwise unremarkable. No paranasal sinus mucosal disease. No middle ear or mastoid effusion.    BONES/SOFT TISSUES: No acute abnormality.      Impression    IMPRESSION:  1.  No CT evidence for acute intracranial process.  2.  Brain atrophy and presumed chronic microvascular ischemic changes as above.   CT Cervical Spine w/o Contrast    Narrative    EXAM: CT CERVICAL SPINE W/O CONTRAST  LOCATION: [facility]  DATE: 8/24/2024    INDICATION: fell, hit head. Injury. Pain.  COMPARISON: Cervical spine CT May 3, 2022.  TECHNIQUE: Routine CT Cervical Spine without IV contrast. Multiplanar reformats. Dose reduction techniques were used.    FINDINGS:  VERTEBRA: Normal vertebral body heights. Reversal the normal cervical lordosis centered at C4. No fracture or traumatic malalignment.    CANAL/FORAMINA:     C3-4: Ankylosis across the disc and facet joints. Patent central canal and foramen.    C4-5: Uncinate spur with mild left foraminal stenosis. Ventral osteophyte.    C5-6: Uncinate spur with severe left foraminal stenosis. Right foramen patent.    C6-7: Uncinate spur with moderate left and mild right foraminal stenosis..    PARASPINAL: Large right thyroid mass redemonstrated. Left pleural effusion. Consider thyroid ultrasound and biopsy.      Impression    IMPRESSION:  1.  No fracture or posttraumatic subluxation.  2.  No high-grade spinal canal or neural foraminal stenosis.    3.  Right  thyroid mass.   Chest CT w/o contrast    Narrative    EXAM: CT CHEST WITHOUT CONTRAST  LOCATION: [facility]  DATE: 08/24/2024    INDICATION: Tender right posterior ribs and right lateral ribs.  COMPARISON: None.  TECHNIQUE: CT chest without IV contrast. Multiplanar reformats were obtained. Dose reduction techniques were used.  CONTRAST: None.    FINDINGS:   LUNGS AND PLEURA: No pneumothorax. Small to moderate bilateral effusions, loculated on the left with associated probable compressive atelectasis versus less likely infiltrate.    MEDIASTINUM/AXILLAE: Incompletely characterized neck mass, possibly a large goiter on the right. No aneurysm.    CORONARY ARTERY CALCIFICATION: None.    UPPER ABDOMEN: No acute findings.    MUSCULOSKELETAL: No definite displaced rib fracture. No frankly destructive bony lesions.      Impression    IMPRESSION:   1.  No definite displaced rib fractures demonstrated.  2.  Small to moderate bilateral effusions which are loculated on the left with associated probable compressive atelectasis versus less likely infiltrate.  3.  Large incompletely characterized neck mass, possibly related to a thyroid goiter on the right.         US Chest Pleural Effusion Imaging    Narrative    US CHEST/PLEURAL EFFUSION IMAGING  8/25/2024 4:42 PM     HISTORY:  The patient has bilateral pleural effusions. Thoracentesis  was requested.    COMPARISON: Chest CT dated 8/24/2024    FINDINGS: Ultrasound demonstrates bilateral pleural effusions. The  patient refused thoracentesis.    ALEJO MILLER MD         SYSTEM ID:  [id number]   US Thoracentesis Bilateral    Narrative    THORACENTESIS 8/26/2024 4:38 PM    HISTORY: Patient with history of bilateral pleural effusion.    PROCEDURE/FINDINGS:  After obtaining informed consent, the patient was  positioned appropriately. The back was prepped and draped in the usual  sterile manner. Limited ultrasound was performed demonstrating simple  appearing  bilateral pleural effusions.    Under ultrasound guidance, a 5 [place]er Yueh needle was used to access  the left pleural space. A permanent ultrasound image was saved to  document needle position. Thoracentesis was performed. Approximately  725 mL November fluid was aspirated out.     Under ultrasound guidance, a 5 [place]er Yueh needle was used to access  the right pleural space. A permanent ultrasound image was saved to  document needle position. Thoracentesis was performed. Approximately  500 mL krysta fluid was aspirated out.     The patient tolerated the procedure well. There were no immediate  postprocedure complications. The patient's vital signs were monitored  by radiology nursing staff under my supervision and remained stable  throughout the study.      Impression    IMPRESSION:  Successful bilateral thoracentesis performed. A total of  750 mL fluid was removed from the left, and 500 mL fluid was removed  from the right.    SANDY BOLAND MD         SYSTEM ID:  [id number]   Echocardiogram Complete     Value    LVEF  55-60%    Narrative    [id number]  [id number]  [id number]  [id number]^BERNABE^KIMBERLEY^AURORA     [facility]  Echocardiography Laboratory  [address] [place] [address]     Name: MANI ANDERSON  MRN: [id number]  : 1927  Study Date: 2024 07:49 AM  Age: 89 yrs  Gender: Female  Patient Location: [facility]  Reason For Study: Atrial Fibrillation  Ordering Physician: KIMBERLEY KIDD  Performed By: Shasha Mancilla RDCS     BSA: 1.7 m2  Height: 62 in  Weight: 152 lb  HR: 87  BP: 104/51 mmHg  ______________________________________________________________________________  Procedure  Complete Portable Echo Adult. Optison (NDC #6569-2229) given intravenously.  ______________________________________________________________________________  Interpretation Summary     The visual ejection fraction is 55-60%.  Left ventricular systolic function is normal.  Small pericardial  effusion  Moderate left pleural effusion  There are no echocardiographic indications of cardiac tamponade.  There was no pericardial effusion or pleural effusion on the previous echo in  March 2022. The study was technically difficult.  ______________________________________________________________________________  Left Ventricle  The left ventricle is normal in size. There is normal left ventricular wall  thickness. Diastolic Doppler findings (E/E' ratio and/or other parameters)  suggest left ventricular filling pressures are normal. The visual ejection  fraction is 55-60%. Left ventricular systolic function is normal. Septal  motion is consistent with conduction abnormality.     Right Ventricle  The right ventricle is normal in size and function.     Atria  Normal left atrial size. Right atrial size is normal. There is no color  Doppler evidence of an atrial shunt.     Mitral Valve  There is mild mitral annular calcification. There is trace mitral  regurgitation.     Tricuspid Valve  There is trace tricuspid regurgitation. Right ventricular systolic pressure  could not be approximated due to inadequate tricuspid regurgitation.     Aortic Valve  The aortic valve is trileaflet. No aortic regurgitation is present. No  hemodynamically significant valvular aortic stenosis.     Pulmonic Valve  There is no pulmonic valvular regurgitation. Normal pulmonic valve velocity.     Vessels  The aortic root is normal size. Normal size ascending aorta. IVC diameter <2.1  cm collapsing >50% with sniff suggests a normal RA pressure of 3 mmHg.     Pericardium  Small pericardial effusion. There are no echocardiographic indications of  cardiac tamponade. Moderate left pleural effusion.     Rhythm  The rhythm was sinus with wide QRS.  ______________________________________________________________________________  MMode/2D Measurements & Calculations     IVSd: 0.98 cm  LVIDd: 3.3 cm  LVIDs: 2.1 cm  LVPWd: 0.91 cm  IVC diam: 1.4 cm  FS:  35.3 %  LV mass(C)d: 86.0 grams  LV mass(C)dI: 50.6 grams/m2  Ao root diam: 3.4 cm  asc Aorta Diam: 3.9 cm  Ao root diam index Ht(cm/m): 2.2  Ao root diam index BSA (cm/m2): 2.0  Asc Ao diam index BSA (cm/m2): 2.3  Asc Ao diam index Ht(cm/m): 2.5  RWT: 0.56  TAPSE: 1.2 cm     Doppler Measurements & Calculations  MV E max kamlesh: 57.8 cm/sec  MV A max kamlesh: 59.4 cm/sec  MV E/A: 0.97  MV max P.5 mmHg  MV mean P.93 mmHg  MV V2 VTI: 13.7 cm     MV dec time: 0.26 sec  PA acc time: 0.10 sec  E/E' av.1  Lateral E/e': 9.8  Medial E/e': 8.5  RV S Kamlesh: 10.3 cm/sec     ______________________________________________________________________________  Report approved by: Micheline Shelton 2024 10:21 AM             Discharge Medications   Current Discharge Medication List        START taking these medications    Details   doxycycline hyclate (VIBRAMYCIN) 100 MG capsule Take 1 capsule (100 mg) by mouth every 12 hours for 26 days.    Associated Diagnoses: Bilateral pleural effusion; Community acquired pneumonia of left lower lobe of lung      Heparin Sodium, Porcine, (HEPARIN ANTICOAGULANT) 5000 UNIT/0.5ML injection Inject 0.5 mLs (5,000 Units) subcutaneously every 8 hours. DVT prophylaxis    Associated Diagnoses: On deep vein thrombosis (DVT) prophylaxis      traMADol 25 MG TABS tablet Take 1 tablet (25 mg) by mouth 2 times daily as needed for severe pain.  Qty: 10 tablet, Refills: 0    Associated Diagnoses: Acute right-sided thoracic back pain           CONTINUE these medications which have CHANGED    Details   acetaminophen (TYLENOL) 325 MG tablet Take 2 tablets (650 mg) by mouth 3 times daily.    Associated Diagnoses: Acute right-sided thoracic back pain           CONTINUE these medications which have NOT CHANGED    Details   albuterol (PROAIR HFA/PROVENTIL HFA/VENTOLIN HFA) 108 (90 Base) MCG/ACT inhaler Inhale 2 puffs into the lungs every 6 hours as needed for shortness of breath, wheezing or cough      alum  & mag hydroxide-simethicone (MAALOX) 200-200-20 MG/5ML SUSP suspension Take 30 mLs by mouth every 4 hours as needed for indigestion    Associated Diagnoses: Infection due to 2019 novel coronavirus      bisacodyl (DULCOLAX) 10 MG suppository Place 1 suppository (10 mg) rectally daily as needed for constipation    Associated Diagnoses: Drug-induced constipation      Cranberry 500 MG CAPS Take 500 mg by mouth 2 times daily      divalproex sodium extended-release (DEPAKOTE ER) 500 MG 24 hr tablet Take 500 mg by mouth At Bedtime      famotidine (PEPCID) 20 MG tablet Take 20 mg by mouth every other day.      ipratropium-albuterol (COMBIVENT RESPIMAT)  MCG/ACT inhaler Inhale 1 puff into the lungs 4 times daily as needed for shortness of breath / dyspnea or wheezing    Associated Diagnoses: Infection due to 2019 novel coronavirus      letrozole (FEMARA) 2.5 MG tablet TAKE 1 TABLET BY MOUTH EVERY DAY  Qty: 90 tablet, Refills: 3    Associated Diagnoses: Malignant neoplasm of upper-inner quadrant of left breast in female, estrogen receptor positive (H); Malignant neoplasm of upper-outer quadrant of left breast in female, estrogen receptor positive (H)      levothyroxine (SYNTHROID/LEVOTHROID) 50 MCG tablet Take 50 mcg by mouth daily.      Lidocaine (LIDOCARE) 4 % Patch Place 1 patch onto the skin daily as needed (back pain). To prevent lidocaine toxicity, patient should be patch free for 12 hrs daily.      lithium (ESKALITH) 150 MG capsule Take 150 mg by mouth every morning   Qty: 30 capsule, Refills: 1    Associated Diagnoses: Severe depressed bipolar I disorder without psychotic features (H)      loperamide (IMODIUM) 2 MG capsule Take 2 mg by mouth 4 times daily as needed for diarrhea      LORazepam (ATIVAN) 0.5 MG tablet Take 0.25 mg by mouth nightly as needed for anxiety.      magnesium hydroxide (MILK OF MAGNESIA) 400 MG/5ML suspension Take 30 mLs by mouth daily as needed for constipation    Associated Diagnoses:  Infection due to 2019 novel coronavirus      melatonin 1 MG TABS tablet Take 1 tablet (1 mg) by mouth nightly as needed for sleep    Associated Diagnoses: Infection due to 2019 novel coronavirus      ondansetron (ZOFRAN-ODT) 4 MG ODT tab Take 1 tablet (4 mg) by mouth every 6 hours as needed for nausea or vomiting    Associated Diagnoses: Infection due to 2019 novel coronavirus      polyvinyl alcohol-povidone PF (REFRESH) 1.4-0.6 % ophthalmic solution Place 2 drops into both eyes 2 times daily      QUEtiapine (SEROQUEL) 100 MG tablet Take 100 mg by mouth at bedtime.      senna-docusate (SENOKOT-S/PERICOLACE) 8.6-50 MG tablet Take 1-2 tablets by mouth 2 times daily as needed for constipation.      vitamin D3 (CHOLECALCIFEROL) 50 mcg (2000 units) tablet Take 1 tablet by mouth daily      zinc Oxide (DESITIN) 40 % paste Apply topically as needed for dry skin or irritation    Associated Diagnoses: Infection due to 2019 novel coronavirus           STOP taking these medications       levofloxacin (LEVAQUIN) 750 MG tablet Comments:   Reason for Stopping:             Allergies   Allergies   Allergen Reactions    Ciprofloxacin      Nausea and vomiting per son     Ergotamine-Caffeine Other (See Comments) and Nausea and Vomiting     Severe HA, N/V & diarrhea    Penicillins Rash and Unknown    Sulfa Antibiotics Rash and Unknown    Lamictal [Lamotrigine] Other (See Comments)     Patient experienced night moss    Naproxen      Pt unable to remember reaction.    Naproxen Unknown    Nicergoline Unknown    Tetracycline Unknown     Pt unable to remember allergy

## 2024-08-30 NOTE — PLAN OF CARE
"[id number]    Inpatient Progress Note:  A & O X 4 with intermittent confusion. Lung sounds diminished. No wheezes, crackles, or rales ausculted. Denies pain, shortness of breath,dizziness, nausea, vomit, or diarrhea. Bowel sounds present all quads. Occasionally incontinent of bladder. A X 2 walker and gait belt. Notable tremor to bilateral upper extremity. PIV saline lock. On contact precaution for VRE. Admitting DX: Right-sided flank pain, Bilateral pleural effusions status post Thoracentesis on 8/26, Acute exacerbation of chronic diastolic congestive heart failure. On oral Doxycycline. Patient on scheduled Depakote, and Seroquel at . Pain is managed with Tylenol. Possible discharge to [facility] today via [facility] W/C transport. Afebrile.  following for discharge planning.  BP 98/71 (BP Location: Right arm)   Pulse 90   Temp 98.5  F (36.9  C) (Oral)   Resp 18   Ht 1.575 m (5' 2\")   Wt 69 kg (152 lb 1.9 oz)   LMP  (LMP Unknown)   SpO2 90%   BMI 27.82 kg/m       Problem: Adult Inpatient Plan of Care  Goal: Plan of Care Review  Description: The Plan of Care Review/Shift note should be completed every shift.  The Outcome Evaluation is a brief statement about your assessment that the patient is improving, declining, or no change.  This information will be displayed automatically on your shift  note.  Outcome: Progressing  Goal: Patient-Specific Goal (Individualized)  Description: You can add care plan individualizations to a care plan. Examples of Individualization might be:  \"Parent requests to be called daily at 9am for status\", \"I have a hard time hearing out of my right ear\", or \"Do not touch me to wake me up as it startles  me\".  Outcome: Progressing  Goal: Absence of Hospital-Acquired Illness or Injury  Outcome: Progressing  Intervention: Identify and Manage Fall Risk  Recent Flowsheet Documentation  Taken 8/29/2024 2024 by Linda Torres RN  Safety Promotion/Fall Prevention:   activity " supervised   clutter free environment maintained   nonskid shoes/slippers when out of bed   safety round/check completed   room organization consistent  Intervention: Prevent Skin Injury  Recent Flowsheet Documentation  Taken 8/29/2024 2024 by Linda Torres RN  Body Position:   left   right   turned  Device Skin Pressure Protection: absorbent pad utilized/changed  Intervention: Prevent and Manage VTE (Venous Thromboembolism) Risk  Recent Flowsheet Documentation  Taken 8/29/2024 2024 by Linda Torres RN  VTE Prevention/Management: SCDs off (sequential compression devices)  Intervention: Prevent Infection  Recent Flowsheet Documentation  Taken 8/29/2024 2024 by Linda Torres RN  Infection Prevention:   hand hygiene promoted   rest/sleep promoted   single patient room provided  Goal: Optimal Comfort and Wellbeing  Outcome: Progressing  Goal: Readiness for Transition of Care  Outcome: Progressing     Problem: Suicide Risk  Goal: Absence of Self-Harm  Outcome: Progressing  Intervention: Assess Risk to Self and Maintain Safety  Recent Flowsheet Documentation  Taken 8/29/2024 2024 by Linda Torres RN  Enhanced Safety Measures: assistive devices when indicated     Problem: Gas Exchange Impaired  Goal: Optimal Gas Exchange  Outcome: Progressing  Intervention: Optimize Oxygenation and Ventilation  Recent Flowsheet Documentation  Taken 8/29/2024 2024 by Linda Torres RN  Head of Bed (HOB) Positioning: HOB at 20-30 degrees     Problem: Cardiac Impairment  Goal: Optimal Activity Tolerance  Outcome: Progressing     Problem: Skin Injury Risk Increased  Goal: Skin Health and Integrity  Outcome: Progressing  Intervention: Plan: Nurse Driven Intervention: Moisture Management  Recent Flowsheet Documentation  Taken 8/29/2024 2024 by Linda Torres RN  Bathing/Skin Care: incontinence care  Intervention: Plan: Nurse Driven Intervention: Friction and Shear  Recent Flowsheet Documentation  Taken  8/29/2024 2024 by Linda Torres, RN  Friction/Shear Interventions: HOB 30 degrees or less  Intervention: Optimize Skin Protection  Recent Flowsheet Documentation  Taken 8/29/2024 2024 by Linda Torres, RN  Activity Management: activity adjusted per tolerance  Head of Bed (HOB) Positioning: HOB at 20-30 degrees     Will continue to  provide supportive cares.     Linda Torres, CASSIE

## 2024-08-30 NOTE — PLAN OF CARE
Physical Therapy Discharge Summary     Reason for therapy discharge:    Discharged to transitional care facility.     Progress towards therapy goal(s). See goals on Care Plan in [facility] electronic health record for goal details.  Goals not met.  Barriers to achieving goals:   discharge from facility.     Therapy recommendation(s):    Continued therapy is recommended.  Rationale/Recommendations:  Patient would benefit from PT eval at TCU in order to increase strength, activity tolerance, balance and independence with mobility.    **Pt not seen by discharging therapist on this date, note written based on previous treating therapist's notes and recommendations

## 2024-08-31 ENCOUNTER — PATIENT OUTREACH (OUTPATIENT)
Dept: CARE COORDINATION | Facility: CLINIC | Age: 89
End: 2024-08-31
Payer: MEDICARE

## 2024-08-31 LAB
BACTERIA PLR CULT: NO GROWTH
GRAM STAIN RESULT: NORMAL
GRAM STAIN RESULT: NORMAL

## 2024-08-31 NOTE — PROGRESS NOTES
[facility]    Background: Transitional Care Management program identified per system criteria and reviewed by [facility] team for possible outreach.    Assessment: Upon chart review, [facility] Team member will not proceed with patient outreach related to this episode of Transitional Care Management program due to reason below:    Patient has discharged to a Memory Care, Long-term Care, Assisted Living or Group Home where patient is receiving on-site support with their daily cares, including support with hospital follow up plan.    Plan: Transitional Care Management episode addressed appropriately per reason noted above.          RACHEL Denton  [phone]  [facility]

## 2024-09-03 ENCOUNTER — TELEPHONE (OUTPATIENT)
Dept: PULMONOLOGY | Facility: CLINIC | Age: 89
End: 2024-09-03
Payer: MEDICARE

## 2024-09-03 NOTE — TELEPHONE ENCOUNTER
Spoke to Dmitry, stated to call May for scheduling    Appointment type: ZAIDAULM  Provider: [facility]  Return date: 4/5-6 week follow-up  Specialty phone number: [phone]  Additional appointment(s) needed: CT CHEST  Additonal Notes: No consent to communicate on file for Marta

## 2024-09-04 ENCOUNTER — DOCUMENTATION ONLY (OUTPATIENT)
Dept: OTHER | Facility: CLINIC | Age: 89
End: 2024-09-04
Payer: MEDICARE

## 2024-09-05 ENCOUNTER — LAB REQUISITION (OUTPATIENT)
Dept: LAB | Facility: CLINIC | Age: 89
End: 2024-09-05
Payer: MEDICARE

## 2024-09-05 DIAGNOSIS — R11.0 NAUSEA: ICD-10-CM

## 2024-09-06 LAB
ANION GAP SERPL CALCULATED.3IONS-SCNC: 9 MMOL/L (ref 7–15)
BASOPHILS # BLD AUTO: 0.1 10E3/UL (ref 0–0.2)
BASOPHILS NFR BLD AUTO: 1 %
BUN SERPL-MCNC: 58.8 MG/DL (ref 8–23)
CALCIUM SERPL-MCNC: 10.6 MG/DL (ref 8.8–10.4)
CHLORIDE SERPL-SCNC: 105 MMOL/L (ref 98–107)
CREAT SERPL-MCNC: 1.65 MG/DL (ref 0.51–0.95)
EGFRCR SERPLBLD CKD-EPI 2021: 28 ML/MIN/1.73M2
EOSINOPHIL # BLD AUTO: 0.2 10E3/UL (ref 0–0.7)
EOSINOPHIL NFR BLD AUTO: 2 %
ERYTHROCYTE [DISTWIDTH] IN BLOOD BY AUTOMATED COUNT: 15 % (ref 10–15)
GLUCOSE SERPL-MCNC: 80 MG/DL (ref 70–99)
HCO3 SERPL-SCNC: 25 MMOL/L (ref 22–29)
HCT VFR BLD AUTO: 35.5 % (ref 35–47)
HGB BLD-MCNC: 10.7 G/DL (ref 11.7–15.7)
IMM GRANULOCYTES # BLD: 0.3 10E3/UL
IMM GRANULOCYTES NFR BLD: 2 %
LYMPHOCYTES # BLD AUTO: 2.6 10E3/UL (ref 0.8–5.3)
LYMPHOCYTES NFR BLD AUTO: 21 %
MCH RBC QN AUTO: 31.9 PG (ref 26.5–33)
MCHC RBC AUTO-ENTMCNC: 30.1 G/DL (ref 31.5–36.5)
MCV RBC AUTO: 106 FL (ref 78–100)
MONOCYTES # BLD AUTO: 1.5 10E3/UL (ref 0–1.3)
MONOCYTES NFR BLD AUTO: 12 %
NEUTROPHILS # BLD AUTO: 7.8 10E3/UL (ref 1.6–8.3)
NEUTROPHILS NFR BLD AUTO: 62 %
NRBC # BLD AUTO: 0 10E3/UL
NRBC BLD AUTO-RTO: 0 /100
PLATELET # BLD AUTO: 373 10E3/UL (ref 150–450)
POTASSIUM SERPL-SCNC: 4.9 MMOL/L (ref 3.4–5.3)
RBC # BLD AUTO: 3.35 10E6/UL (ref 3.8–5.2)
SODIUM SERPL-SCNC: 139 MMOL/L (ref 135–145)
WBC # BLD AUTO: 12.5 10E3/UL (ref 4–11)

## 2024-09-06 PROCEDURE — 36415 COLL VENOUS BLD VENIPUNCTURE: CPT | Performed by: FAMILY MEDICINE

## 2024-09-06 PROCEDURE — P9604 ONE-WAY ALLOW PRORATED TRIP: HCPCS | Performed by: FAMILY MEDICINE

## 2024-09-06 PROCEDURE — 85025 COMPLETE CBC W/AUTO DIFF WBC: CPT | Performed by: FAMILY MEDICINE

## 2024-09-06 PROCEDURE — 80048 BASIC METABOLIC PNL TOTAL CA: CPT | Performed by: FAMILY MEDICINE

## 2024-09-06 NOTE — TELEPHONE ENCOUNTER
Spoke to patient's son about scheduling for hospital follow-up     Appointment type: RPULM   Provider: [facility]  Return date: 4,5,6 week follow-up needed  Specialty phone number: [phone]  Additional appointment(s) needed: CT CHEST  Additonal Notes: patient's son stated to call wife for scheduling, gave list of names that are on consent to communicate, does not include patient's wife.

## 2024-09-11 ENCOUNTER — HOSPITAL ENCOUNTER (INPATIENT)
Facility: CLINIC | Age: 89
LOS: 5 days | Discharge: HOSPICE | DRG: 092 | End: 2024-09-17
Attending: EMERGENCY MEDICINE | Admitting: INTERNAL MEDICINE
Payer: MEDICARE

## 2024-09-11 ENCOUNTER — APPOINTMENT (OUTPATIENT)
Dept: CT IMAGING | Facility: CLINIC | Age: 89
DRG: 092 | End: 2024-09-11
Attending: EMERGENCY MEDICINE
Payer: MEDICARE

## 2024-09-11 ENCOUNTER — APPOINTMENT (OUTPATIENT)
Dept: GENERAL RADIOLOGY | Facility: CLINIC | Age: 89
DRG: 092 | End: 2024-09-11
Attending: EMERGENCY MEDICINE
Payer: MEDICARE

## 2024-09-11 DIAGNOSIS — M54.6 ACUTE RIGHT-SIDED THORACIC BACK PAIN: ICD-10-CM

## 2024-09-11 DIAGNOSIS — R06.02 SHORTNESS OF BREATH: ICD-10-CM

## 2024-09-11 DIAGNOSIS — R11.2 NAUSEA AND VOMITING, UNSPECIFIED VOMITING TYPE: ICD-10-CM

## 2024-09-11 DIAGNOSIS — Z51.5 HOSPICE CARE PATIENT: ICD-10-CM

## 2024-09-11 DIAGNOSIS — L89.152 PRESSURE INJURY OF COCCYGEAL REGION, STAGE 2 (H): Primary | ICD-10-CM

## 2024-09-11 DIAGNOSIS — D72.829 LEUKOCYTOSIS, UNSPECIFIED TYPE: ICD-10-CM

## 2024-09-11 DIAGNOSIS — R41.0 DELIRIUM: ICD-10-CM

## 2024-09-11 LAB
ALBUMIN SERPL BCG-MCNC: 2.8 G/DL (ref 3.5–5.2)
ALP SERPL-CCNC: 442 U/L (ref 40–150)
ALT SERPL W P-5'-P-CCNC: 44 U/L (ref 0–50)
ANION GAP SERPL CALCULATED.3IONS-SCNC: 9 MMOL/L (ref 7–15)
AST SERPL W P-5'-P-CCNC: 44 U/L (ref 0–45)
BASOPHILS # BLD AUTO: 0.1 10E3/UL (ref 0–0.2)
BASOPHILS NFR BLD AUTO: 1 %
BILIRUB SERPL-MCNC: 0.7 MG/DL
BUN SERPL-MCNC: 61.8 MG/DL (ref 8–23)
CALCIUM SERPL-MCNC: 11.3 MG/DL (ref 8.8–10.4)
CHLORIDE SERPL-SCNC: 102 MMOL/L (ref 98–107)
CREAT SERPL-MCNC: 1.81 MG/DL (ref 0.51–0.95)
EGFRCR SERPLBLD CKD-EPI 2021: 25 ML/MIN/1.73M2
EOSINOPHIL # BLD AUTO: 0.1 10E3/UL (ref 0–0.7)
EOSINOPHIL NFR BLD AUTO: 0 %
ERYTHROCYTE [DISTWIDTH] IN BLOOD BY AUTOMATED COUNT: 14.8 % (ref 10–15)
FLUAV RNA SPEC QL NAA+PROBE: NEGATIVE
FLUBV RNA RESP QL NAA+PROBE: NEGATIVE
GLUCOSE SERPL-MCNC: 78 MG/DL (ref 70–99)
HCO3 BLDV-SCNC: 29 MMOL/L (ref 21–28)
HCO3 SERPL-SCNC: 25 MMOL/L (ref 22–29)
HCT VFR BLD AUTO: 41.6 % (ref 35–47)
HGB BLD-MCNC: 13.1 G/DL (ref 11.7–15.7)
IMM GRANULOCYTES # BLD: 0.2 10E3/UL
IMM GRANULOCYTES NFR BLD: 2 %
LACTATE BLD-SCNC: 1.5 MMOL/L
LIPASE SERPL-CCNC: 133 U/L (ref 13–60)
LITHIUM SERPL-SCNC: 0.94 MMOL/L (ref 0.6–1.2)
LYMPHOCYTES # BLD AUTO: 2.1 10E3/UL (ref 0.8–5.3)
LYMPHOCYTES NFR BLD AUTO: 16 %
MCH RBC QN AUTO: 31.5 PG (ref 26.5–33)
MCHC RBC AUTO-ENTMCNC: 31.5 G/DL (ref 31.5–36.5)
MCV RBC AUTO: 100 FL (ref 78–100)
MONOCYTES # BLD AUTO: 1.3 10E3/UL (ref 0–1.3)
MONOCYTES NFR BLD AUTO: 9 %
NEUTROPHILS # BLD AUTO: 9.6 10E3/UL (ref 1.6–8.3)
NEUTROPHILS NFR BLD AUTO: 72 %
NRBC # BLD AUTO: 0.1 10E3/UL
NRBC BLD AUTO-RTO: 1 /100
NT-PROBNP SERPL-MCNC: 2467 PG/ML (ref 0–1800)
PCO2 BLDV: 58 MM HG (ref 40–50)
PH BLDV: 7.31 [PH] (ref 7.32–7.43)
PLATELET # BLD AUTO: 385 10E3/UL (ref 150–450)
PO2 BLDV: 24 MM HG (ref 25–47)
POTASSIUM SERPL-SCNC: 5.2 MMOL/L (ref 3.4–5.3)
PROT SERPL-MCNC: 6.2 G/DL (ref 6.4–8.3)
RBC # BLD AUTO: 4.16 10E6/UL (ref 3.8–5.2)
RSV RNA SPEC NAA+PROBE: NEGATIVE
SAO2 % BLDV: 36 % (ref 70–75)
SARS-COV-2 RNA RESP QL NAA+PROBE: NEGATIVE
SODIUM SERPL-SCNC: 136 MMOL/L (ref 135–145)
TROPONIN T SERPL HS-MCNC: 24 NG/L
VALPROATE SERPL-MCNC: 50.4 UG/ML
WBC # BLD AUTO: 13.3 10E3/UL (ref 4–11)

## 2024-09-11 PROCEDURE — 71045 X-RAY EXAM CHEST 1 VIEW: CPT

## 2024-09-11 PROCEDURE — 83690 ASSAY OF LIPASE: CPT | Performed by: EMERGENCY MEDICINE

## 2024-09-11 PROCEDURE — 83880 ASSAY OF NATRIURETIC PEPTIDE: CPT | Performed by: EMERGENCY MEDICINE

## 2024-09-11 PROCEDURE — 85025 COMPLETE CBC W/AUTO DIFF WBC: CPT | Performed by: EMERGENCY MEDICINE

## 2024-09-11 PROCEDURE — 84484 ASSAY OF TROPONIN QUANT: CPT | Performed by: EMERGENCY MEDICINE

## 2024-09-11 PROCEDURE — 36415 COLL VENOUS BLD VENIPUNCTURE: CPT | Performed by: EMERGENCY MEDICINE

## 2024-09-11 PROCEDURE — 81001 URINALYSIS AUTO W/SCOPE: CPT | Performed by: EMERGENCY MEDICINE

## 2024-09-11 PROCEDURE — 82803 BLOOD GASES ANY COMBINATION: CPT

## 2024-09-11 PROCEDURE — 99285 EMERGENCY DEPT VISIT HI MDM: CPT | Mod: 25

## 2024-09-11 PROCEDURE — 93005 ELECTROCARDIOGRAM TRACING: CPT

## 2024-09-11 PROCEDURE — 80164 ASSAY DIPROPYLACETIC ACD TOT: CPT | Performed by: EMERGENCY MEDICINE

## 2024-09-11 PROCEDURE — 87186 SC STD MICRODIL/AGAR DIL: CPT | Performed by: EMERGENCY MEDICINE

## 2024-09-11 PROCEDURE — 87637 SARSCOV2&INF A&B&RSV AMP PRB: CPT | Performed by: EMERGENCY MEDICINE

## 2024-09-11 PROCEDURE — 74176 CT ABD & PELVIS W/O CONTRAST: CPT | Mod: MG

## 2024-09-11 PROCEDURE — 99222 1ST HOSP IP/OBS MODERATE 55: CPT | Mod: AI | Performed by: INTERNAL MEDICINE

## 2024-09-11 PROCEDURE — 80053 COMPREHEN METABOLIC PANEL: CPT | Performed by: EMERGENCY MEDICINE

## 2024-09-11 PROCEDURE — 70450 CT HEAD/BRAIN W/O DYE: CPT | Mod: MA

## 2024-09-11 PROCEDURE — 258N000003 HC RX IP 258 OP 636: Performed by: EMERGENCY MEDICINE

## 2024-09-11 PROCEDURE — 80178 ASSAY OF LITHIUM: CPT | Performed by: EMERGENCY MEDICINE

## 2024-09-11 RX ORDER — ONDANSETRON 2 MG/ML
4 INJECTION INTRAMUSCULAR; INTRAVENOUS EVERY 30 MIN PRN
Status: DISCONTINUED | OUTPATIENT
Start: 2024-09-11 | End: 2024-09-12

## 2024-09-11 RX ORDER — ONDANSETRON 4 MG/1
4 TABLET, ORALLY DISINTEGRATING ORAL
COMMUNITY

## 2024-09-11 RX ADMIN — SODIUM CHLORIDE 500 ML: 9 INJECTION, SOLUTION INTRAVENOUS at 23:15

## 2024-09-11 ASSESSMENT — ACTIVITIES OF DAILY LIVING (ADL)
ADLS_ACUITY_SCORE: 43

## 2024-09-11 ASSESSMENT — COLUMBIA-SUICIDE SEVERITY RATING SCALE - C-SSRS
1. IN THE PAST MONTH, HAVE YOU WISHED YOU WERE DEAD OR WISHED YOU COULD GO TO SLEEP AND NOT WAKE UP?: NO
6. HAVE YOU EVER DONE ANYTHING, STARTED TO DO ANYTHING, OR PREPARED TO DO ANYTHING TO END YOUR LIFE?: NO
2. HAVE YOU ACTUALLY HAD ANY THOUGHTS OF KILLING YOURSELF IN THE PAST MONTH?: NO

## 2024-09-12 PROBLEM — R41.0 DELIRIUM: Status: ACTIVE | Noted: 2024-09-12

## 2024-09-12 PROBLEM — D72.829 LEUKOCYTOSIS, UNSPECIFIED TYPE: Status: ACTIVE | Noted: 2024-09-12

## 2024-09-12 PROBLEM — R11.2 NAUSEA AND VOMITING, UNSPECIFIED VOMITING TYPE: Status: ACTIVE | Noted: 2024-09-12

## 2024-09-12 LAB
ALBUMIN SERPL BCG-MCNC: 2.8 G/DL (ref 3.5–5.2)
ALBUMIN UR-MCNC: NEGATIVE MG/DL
ALP SERPL-CCNC: 416 U/L (ref 40–150)
ALT SERPL W P-5'-P-CCNC: 43 U/L (ref 0–50)
ANION GAP SERPL CALCULATED.3IONS-SCNC: 9 MMOL/L (ref 7–15)
APPEARANCE UR: CLEAR
AST SERPL W P-5'-P-CCNC: 42 U/L (ref 0–45)
ATRIAL RATE - MUSE: 100 BPM
BILIRUB SERPL-MCNC: 0.5 MG/DL
BILIRUB UR QL STRIP: NEGATIVE
BUN SERPL-MCNC: 60.9 MG/DL (ref 8–23)
CA-I BLD-MCNC: 5.9 MG/DL (ref 4.4–5.2)
CALCIUM SERPL-MCNC: 11.3 MG/DL (ref 8.8–10.4)
CHLORIDE SERPL-SCNC: 104 MMOL/L (ref 98–107)
COLOR UR AUTO: YELLOW
CREAT SERPL-MCNC: 1.75 MG/DL (ref 0.51–0.95)
CYSTATIN C (ROCHE): 3.1 MG/L (ref 0.6–1)
DIASTOLIC BLOOD PRESSURE - MUSE: NORMAL MMHG
EGFRCR SERPLBLD CKD-EPI 2021: 26 ML/MIN/1.73M2
ERYTHROCYTE [DISTWIDTH] IN BLOOD BY AUTOMATED COUNT: 14.9 % (ref 10–15)
GFR/BSA.PRED SERPLBLD CYS-BASED-ARV: 14 ML/MIN/1.73M2
GLUCOSE SERPL-MCNC: 72 MG/DL (ref 70–99)
GLUCOSE UR STRIP-MCNC: NEGATIVE MG/DL
HCO3 SERPL-SCNC: 24 MMOL/L (ref 22–29)
HCT VFR BLD AUTO: 41.4 % (ref 35–47)
HGB BLD-MCNC: 13.2 G/DL (ref 11.7–15.7)
HGB UR QL STRIP: ABNORMAL
INTERPRETATION ECG - MUSE: NORMAL
KETONES UR STRIP-MCNC: NEGATIVE MG/DL
LEUKOCYTE ESTERASE UR QL STRIP: ABNORMAL
MCH RBC QN AUTO: 31.7 PG (ref 26.5–33)
MCHC RBC AUTO-ENTMCNC: 31.9 G/DL (ref 31.5–36.5)
MCV RBC AUTO: 100 FL (ref 78–100)
MUCOUS THREADS #/AREA URNS LPF: PRESENT /LPF
NITRATE UR QL: NEGATIVE
P AXIS - MUSE: 13 DEGREES
PH UR STRIP: 5.5 [PH] (ref 5–7)
PLATELET # BLD AUTO: 313 10E3/UL (ref 150–450)
POTASSIUM SERPL-SCNC: 5.6 MMOL/L (ref 3.4–5.3)
POTASSIUM SERPL-SCNC: 5.7 MMOL/L (ref 3.4–5.3)
PR INTERVAL - MUSE: 198 MS
PROT SERPL-MCNC: 5.9 G/DL (ref 6.4–8.3)
PTH-INTACT SERPL-MCNC: 59 PG/ML (ref 15–65)
QRS DURATION - MUSE: 116 MS
QT - MUSE: 344 MS
QTC - MUSE: 443 MS
R AXIS - MUSE: -44 DEGREES
RBC # BLD AUTO: 4.16 10E6/UL (ref 3.8–5.2)
RBC URINE: 3 /HPF
SODIUM SERPL-SCNC: 137 MMOL/L (ref 135–145)
SP GR UR STRIP: 1.01 (ref 1–1.03)
SQUAMOUS EPITHELIAL: 1 /HPF
SYSTOLIC BLOOD PRESSURE - MUSE: NORMAL MMHG
T AXIS - MUSE: 112 DEGREES
UROBILINOGEN UR STRIP-MCNC: NORMAL MG/DL
VENTRICULAR RATE- MUSE: 100 BPM
WBC # BLD AUTO: 11.3 10E3/UL (ref 4–11)
WBC CLUMPS #/AREA URNS HPF: PRESENT /HPF
WBC URINE: 74 /HPF

## 2024-09-12 PROCEDURE — 82330 ASSAY OF CALCIUM: CPT | Performed by: INTERNAL MEDICINE

## 2024-09-12 PROCEDURE — 250N000011 HC RX IP 250 OP 636: Performed by: INTERNAL MEDICINE

## 2024-09-12 PROCEDURE — 82397 CHEMILUMINESCENT ASSAY: CPT | Performed by: INTERNAL MEDICINE

## 2024-09-12 PROCEDURE — 250N000013 HC RX MED GY IP 250 OP 250 PS 637: Performed by: INTERNAL MEDICINE

## 2024-09-12 PROCEDURE — 82610 CYSTATIN C: CPT | Performed by: INTERNAL MEDICINE

## 2024-09-12 PROCEDURE — 84132 ASSAY OF SERUM POTASSIUM: CPT | Performed by: INTERNAL MEDICINE

## 2024-09-12 PROCEDURE — 83970 ASSAY OF PARATHORMONE: CPT | Performed by: INTERNAL MEDICINE

## 2024-09-12 PROCEDURE — 120N000001 HC R&B MED SURG/OB

## 2024-09-12 PROCEDURE — 82040 ASSAY OF SERUM ALBUMIN: CPT | Performed by: INTERNAL MEDICINE

## 2024-09-12 PROCEDURE — 36415 COLL VENOUS BLD VENIPUNCTURE: CPT | Performed by: INTERNAL MEDICINE

## 2024-09-12 PROCEDURE — 99232 SBSQ HOSP IP/OBS MODERATE 35: CPT | Performed by: INTERNAL MEDICINE

## 2024-09-12 PROCEDURE — 85027 COMPLETE CBC AUTOMATED: CPT | Performed by: INTERNAL MEDICINE

## 2024-09-12 PROCEDURE — 99223 1ST HOSP IP/OBS HIGH 75: CPT | Performed by: NURSE PRACTITIONER

## 2024-09-12 PROCEDURE — 258N000003 HC RX IP 258 OP 636: Performed by: INTERNAL MEDICINE

## 2024-09-12 PROCEDURE — G0463 HOSPITAL OUTPT CLINIC VISIT: HCPCS

## 2024-09-12 RX ORDER — LIDOCAINE 40 MG/G
CREAM TOPICAL
Status: DISCONTINUED | OUTPATIENT
Start: 2024-09-12 | End: 2024-09-17 | Stop reason: HOSPADM

## 2024-09-12 RX ORDER — CEFTRIAXONE 1 G/1
1 INJECTION, POWDER, FOR SOLUTION INTRAMUSCULAR; INTRAVENOUS EVERY 24 HOURS
Status: DISCONTINUED | OUTPATIENT
Start: 2024-09-12 | End: 2024-09-14

## 2024-09-12 RX ORDER — DIVALPROEX SODIUM 500 MG/1
500 TABLET, EXTENDED RELEASE ORAL AT BEDTIME
Status: DISCONTINUED | OUTPATIENT
Start: 2024-09-12 | End: 2024-09-16

## 2024-09-12 RX ORDER — LEVOTHYROXINE SODIUM 50 UG/1
50 TABLET ORAL
Status: DISCONTINUED | OUTPATIENT
Start: 2024-09-12 | End: 2024-09-17 | Stop reason: HOSPADM

## 2024-09-12 RX ORDER — CALCIUM CARBONATE 500 MG/1
1000 TABLET, CHEWABLE ORAL 4 TIMES DAILY PRN
Status: DISCONTINUED | OUTPATIENT
Start: 2024-09-12 | End: 2024-09-17 | Stop reason: HOSPADM

## 2024-09-12 RX ORDER — BISACODYL 10 MG
10 SUPPOSITORY, RECTAL RECTAL DAILY PRN
Status: DISCONTINUED | OUTPATIENT
Start: 2024-09-12 | End: 2024-09-17 | Stop reason: HOSPADM

## 2024-09-12 RX ORDER — FAMOTIDINE 20 MG/1
20 TABLET, FILM COATED ORAL EVERY OTHER DAY
Status: DISCONTINUED | OUTPATIENT
Start: 2024-09-12 | End: 2024-09-17 | Stop reason: HOSPADM

## 2024-09-12 RX ORDER — DOXYCYCLINE 100 MG/1
100 CAPSULE ORAL EVERY 12 HOURS
Status: DISCONTINUED | OUTPATIENT
Start: 2024-09-12 | End: 2024-09-12

## 2024-09-12 RX ORDER — AMOXICILLIN 250 MG
1 CAPSULE ORAL 2 TIMES DAILY PRN
Status: DISCONTINUED | OUTPATIENT
Start: 2024-09-12 | End: 2024-09-17 | Stop reason: HOSPADM

## 2024-09-12 RX ORDER — LITHIUM CARBONATE 150 MG/1
150 CAPSULE ORAL EVERY MORNING
Status: DISCONTINUED | OUTPATIENT
Start: 2024-09-12 | End: 2024-09-17 | Stop reason: HOSPADM

## 2024-09-12 RX ORDER — QUETIAPINE FUMARATE 50 MG/1
100 TABLET, FILM COATED ORAL AT BEDTIME
Status: DISCONTINUED | OUTPATIENT
Start: 2024-09-12 | End: 2024-09-17 | Stop reason: HOSPADM

## 2024-09-12 RX ORDER — AMOXICILLIN 250 MG
1-2 CAPSULE ORAL 2 TIMES DAILY PRN
Status: DISCONTINUED | OUTPATIENT
Start: 2024-09-12 | End: 2024-09-17 | Stop reason: HOSPADM

## 2024-09-12 RX ORDER — ONDANSETRON 4 MG/1
4 TABLET, ORALLY DISINTEGRATING ORAL EVERY 6 HOURS PRN
Status: DISCONTINUED | OUTPATIENT
Start: 2024-09-12 | End: 2024-09-17 | Stop reason: HOSPADM

## 2024-09-12 RX ORDER — LETROZOLE 2.5 MG/1
2.5 TABLET, FILM COATED ORAL DAILY
Status: DISCONTINUED | OUTPATIENT
Start: 2024-09-12 | End: 2024-09-16

## 2024-09-12 RX ORDER — ONDANSETRON 2 MG/ML
4 INJECTION INTRAMUSCULAR; INTRAVENOUS EVERY 6 HOURS PRN
Status: DISCONTINUED | OUTPATIENT
Start: 2024-09-12 | End: 2024-09-17 | Stop reason: HOSPADM

## 2024-09-12 RX ORDER — SODIUM CHLORIDE 9 MG/ML
INJECTION, SOLUTION INTRAVENOUS CONTINUOUS
Status: DISCONTINUED | OUTPATIENT
Start: 2024-09-12 | End: 2024-09-15

## 2024-09-12 RX ORDER — CARBOXYMETHYLCELLULOSE SODIUM 5 MG/ML
2 SOLUTION/ DROPS OPHTHALMIC 2 TIMES DAILY
Status: DISCONTINUED | OUTPATIENT
Start: 2024-09-12 | End: 2024-09-17 | Stop reason: HOSPADM

## 2024-09-12 RX ORDER — ALBUTEROL SULFATE 90 UG/1
2 AEROSOL, METERED RESPIRATORY (INHALATION) EVERY 6 HOURS PRN
Status: DISCONTINUED | OUTPATIENT
Start: 2024-09-12 | End: 2024-09-17 | Stop reason: HOSPADM

## 2024-09-12 RX ORDER — ACETAMINOPHEN 325 MG/1
975 TABLET ORAL EVERY 8 HOURS PRN
Status: DISCONTINUED | OUTPATIENT
Start: 2024-09-12 | End: 2024-09-17 | Stop reason: HOSPADM

## 2024-09-12 RX ORDER — AMOXICILLIN 250 MG
2 CAPSULE ORAL 2 TIMES DAILY PRN
Status: DISCONTINUED | OUTPATIENT
Start: 2024-09-12 | End: 2024-09-17 | Stop reason: HOSPADM

## 2024-09-12 RX ADMIN — CEFTRIAXONE 1 G: 1 INJECTION, POWDER, FOR SOLUTION INTRAMUSCULAR; INTRAVENOUS at 01:11

## 2024-09-12 RX ADMIN — SODIUM ZIRCONIUM CYCLOSILICATE 5 G: 5 POWDER, FOR SUSPENSION ORAL at 20:47

## 2024-09-12 RX ADMIN — QUETIAPINE 100 MG: 100 TABLET ORAL at 22:55

## 2024-09-12 RX ADMIN — SODIUM CHLORIDE: 9 INJECTION, SOLUTION INTRAVENOUS at 11:48

## 2024-09-12 RX ADMIN — CARBOXYMETHYLCELLULOSE SODIUM 2 DROP: 5 SOLUTION/ DROPS OPHTHALMIC at 01:16

## 2024-09-12 RX ADMIN — CARBOXYMETHYLCELLULOSE SODIUM 2 DROP: 5 SOLUTION/ DROPS OPHTHALMIC at 10:19

## 2024-09-12 RX ADMIN — CARBOXYMETHYLCELLULOSE SODIUM 2 DROP: 5 SOLUTION/ DROPS OPHTHALMIC at 20:47

## 2024-09-12 RX ADMIN — CEFTRIAXONE 1 G: 1 INJECTION, POWDER, FOR SOLUTION INTRAMUSCULAR; INTRAVENOUS at 23:56

## 2024-09-12 RX ADMIN — SODIUM CHLORIDE: 9 INJECTION, SOLUTION INTRAVENOUS at 23:59

## 2024-09-12 ASSESSMENT — ACTIVITIES OF DAILY LIVING (ADL)
ADLS_ACUITY_SCORE: 49
ADLS_ACUITY_SCORE: 49
ADLS_ACUITY_SCORE: 43
ADLS_ACUITY_SCORE: 43
NUMBER_OF_TIMES_PATIENT_HAS_FALLEN_WITHIN_LAST_SIX_MONTHS: 6
ADLS_ACUITY_SCORE: 43
WEAR_GLASSES_OR_BLIND: NO
ADLS_ACUITY_SCORE: 49
ADLS_ACUITY_SCORE: 49
WALKING_OR_CLIMBING_STAIRS: AMBULATION DIFFICULTY, ASSISTANCE 1 PERSON
ADLS_ACUITY_SCORE: 43
ADLS_ACUITY_SCORE: 49
TOILETING_ISSUES: YES
ADLS_ACUITY_SCORE: 49
HEARING_DIFFICULTY_OR_DEAF: NO
ADLS_ACUITY_SCORE: 49
ADLS_ACUITY_SCORE: 43
ADLS_ACUITY_SCORE: 51
EQUIPMENT_CURRENTLY_USED_AT_HOME: WHEELCHAIR, MANUAL
ADLS_ACUITY_SCORE: 43
EATING/SWALLOWING: SWALLOWING LIQUIDS;SWALLOWING SOLID FOOD
TOILETING: 2-->COMPLETELY DEPENDENT
ADLS_ACUITY_SCORE: 49
DRESSING/BATHING: BATHING DIFFICULTY, ASSISTANCE 1 PERSON
TOILETING: 2-->COMPLETELY DEPENDENT (NOT DEVELOPMENTALLY APPROPRIATE)
ADLS_ACUITY_SCORE: 49
CHANGE_IN_FUNCTIONAL_STATUS_SINCE_ONSET_OF_CURRENT_ILLNESS/INJURY: YES
DIFFICULTY_EATING/SWALLOWING: YES
DOING_ERRANDS_INDEPENDENTLY_DIFFICULTY: NO
ADLS_ACUITY_SCORE: 51
ADLS_ACUITY_SCORE: 51
ADLS_ACUITY_SCORE: 49
ADLS_ACUITY_SCORE: 49
ADLS_ACUITY_SCORE: 51
TOILETING_ASSISTANCE: TOILETING DIFFICULTY, ASSISTANCE 1 PERSON
FALL_HISTORY_WITHIN_LAST_SIX_MONTHS: YES
ADLS_ACUITY_SCORE: 43
ADLS_ACUITY_SCORE: 49
WALKING_OR_CLIMBING_STAIRS_DIFFICULTY: YES
DRESSING/BATHING_DIFFICULTY: YES
CONCENTRATING,_REMEMBERING_OR_MAKING_DECISIONS_DIFFICULTY: YES
DIFFICULTY_COMMUNICATING: NO

## 2024-09-12 NOTE — PLAN OF CARE
"Goal Outcome Evaluation:      Plan of Care Reviewed With: patient    Overall Patient Progress: no changeOverall Patient Progress: no change    Outcome Evaluation: A/O x4, Pt not OOB during shift. Pt refused all assessments. Is on 3 L NC. Swallow study needs to be completed.    Isolation Precautions:   Patient is on Contact precuations for VRE.    Neuro: Alert to and Self  Activity: have not been out of bed yet  Telemetry Monitoring: No  Pain: not showing any nonverbal signs of being in pain.  Labs / Tests:   GI: Pt refused assessment  : Purewick in place.  O2: 3 : MC  LDA's: Peripheral  Fluids: is Saline locked.  Diet: NPO  Consults: Palliative or Hospice  Discharge Disposition: TBD    Plan of Care:    O2 therapy  Abx: rocephin     Problem: Adult Inpatient Plan of Care  Goal: Plan of Care Review  Description: The Plan of Care Review/Shift note should be completed every shift.  The Outcome Evaluation is a brief statement about your assessment that the patient is improving, declining, or no change.  This information will be displayed automatically on your shift  note.  Outcome: Not Progressing  Flowsheets (Taken 9/12/2024 0711)  Outcome Evaluation: A/O x4, Pt not OOB during shift. Pt refused all assessments. Is on 3 L NC. Swallow study needs to be completed.  Plan of Care Reviewed With: patient  Overall Patient Progress: no change  Goal: Patient-Specific Goal (Individualized)  Description: You can add care plan individualizations to a care plan. Examples of Individualization might be:  \"Parent requests to be called daily at 9am for status\", \"I have a hard time hearing out of my right ear\", or \"Do not touch me to wake me up as it startles  me\".  Outcome: Not Progressing  Goal: Absence of Hospital-Acquired Illness or Injury  Outcome: Not Progressing  Intervention: Identify and Manage Fall Risk  Recent Flowsheet Documentation  Taken 9/12/2024 0700 by Rosaline Eagle RN  Safety Promotion/Fall Prevention:   safety " round/check completed   room near nurse's station   room door open   nonskid shoes/slippers when out of bed   clutter free environment maintained   activity supervised   assistive device/personal items within reach  Intervention: Prevent Infection  Recent Flowsheet Documentation  Taken 9/12/2024 0700 by Rosaline Eagle RN  Infection Prevention:   hand hygiene promoted   rest/sleep promoted   single patient room provided  Goal: Optimal Comfort and Wellbeing  Outcome: Not Progressing  Goal: Readiness for Transition of Care  Outcome: Not Progressing     Problem: Fall Injury Risk  Goal: Absence of Fall and Fall-Related Injury  Outcome: Not Progressing  Intervention: Identify and Manage Contributors  Recent Flowsheet Documentation  Taken 9/12/2024 0700 by Rosaline Eagle RN  Medication Review/Management: medications reviewed  Intervention: Promote Injury-Free Environment  Recent Flowsheet Documentation  Taken 9/12/2024 0700 by Rosaline Eagle, RN  Safety Promotion/Fall Prevention:   safety round/check completed   room near nurse's station   room door open   nonskid shoes/slippers when out of bed   clutter free environment maintained   activity supervised   assistive device/personal items within reach     Problem: Infection  Goal: Absence of Infection Signs and Symptoms  Outcome: Not Progressing     Problem: Comorbidity Management  Goal: Blood Pressure in Desired Range  Outcome: Not Progressing  Intervention: Maintain Blood Pressure Management  Recent Flowsheet Documentation  Taken 9/12/2024 0700 by Rosaline Eagle RN  Medication Review/Management: medications reviewed

## 2024-09-12 NOTE — CONSULTS
[facility] Nurse Inpatient Assessment     Consulted for: Coccyx    Patient History (according to provider note(s):      Lennie Mar is a 89 year old woman who returns to the hospital from transitional care apparently at the request of her family because she appeared to be more confused, less interactive and with concerns of vomiting earlier     Recent medical history includes hospitalization at [facility] from 8/8 through 8/12/2024 for apparent left lower lobe pneumonia and acute hypoxic respiratory failure contributing to some encephalopathy.  From that hospitalization, she was discharged back home to [facility] but return to the emergency department on 8/24/2024 due to shortness of breath and right flank pain for a couple of days that have been complicated by a fall.  She remained here from 8/24-8/30/2024 during which time the patient had been found to have bilateral pleural effusions thought to be due to an exacerbation of chronic diastolic heart failure.  She underwent thoracenteses and because she indicated she wanted to go home, the medical team made arrangements for discharge.  Unfortunately, the patient was too weak to go home to independence and she was discharged to transitional care.    Assessment:      Areas visualized during today's visit: Sacrum/coccyx and Thigh    Pressure Injury Location: Coccyx  Last photo: 9/12/24    Wound type: Pressure Injury     Pressure Injury Stage: 2, present on admission   Wound history/plan of care:   Patient with poor mobility recently.  Patient unable to provide any history of wounds.  Wound base: 100 % Dermis and skin flaps of ruptured blisters     Palpation of the wound bed: normal      Drainage: small     Description of drainage: serous     Measurements (length x width x depth, in cm) 1.5x1cm, 0.7x2.5cm and 1x0.3cm      Tunneling N/A     Undermining N/A  Periwound skin: Erythema- blanchable      Color: pink       "Temperature: normal   Odor: none  Pain: mild  Pain intervention prior to dressing change: slow and gentle cares   Treatment goal: Heal   STATUS: initial assessment  Supplies ordered: gathered    My PI Risk Assessment     Sensory Perception: 3 - Slightly Limited     Moisture: 3 - Occasionally moist      Activity: 1 - Bedfast      Mobility: 2 - Very limited     Nutrition: 2 - Probably inadequate      Friction/Shear: 2 - Potential problem      TOTAL: 13     Pressure Injury Location: Left inner thigh  Last photo: 9/12/24    Wound type: Pressure Injury     Pressure Injury Stage: 1, hospital acquired      This is a Medical Device Related Pressure Injury (MDRPI) due to  brief   Wound history/plan of care:   Bedside RN found area nonblanchable erythema where brief was when patient came up from ED.  Wound base: 100 % Non-blanchable and Erythema     Palpation of the wound bed: normal      Drainage: none     Description of drainage: none     Measurements (length x width x depth, in cm) 8  x 0.6 cm      Tunneling N/A     Undermining N/A  Periwound skin: Intact      Color: normal and consistent with surrounding tissue      Temperature: normal   Odor: none  Pain: denies   Pain intervention prior to dressing change: N/A  Treatment goal: Heal   STATUS: initial assessment  Supplies ordered: at bedside      Treatment Plan:     Coccyx wound(s): Every 3 days  Cleanse with wound cleanser and dry  Apply Mepilex sacral     Left inner thigh wound(s): Leave open to air  No briefs on patient    Pressure Injury Prevention (PIP) Plan:  If patient is declining pressure injury prevention interventions: Explore reason why and address patient's concerns, Educate on pressure injury risk and prevention intervention(s), and If patient is still declining, document \"informed refusal\"   Mattress: Follow bed algorithm, add Low Air Loss (Air+) mattress pump if skin is very moist or constantly moist.   HOB: Maintain at or below 30 degrees, unless " contraindicated  Repositioning in bed: Left/right positioning; avoid supine  Heels: Pillows under calves  Protective Dressing: Sacral Mepilex for prevention (#[id number]),  especially for the agitated patient   Chair positioning: Chair cushion (#[id number])    If patient has a buttock pressure injury, or high risk for PI use chair cushion or SPS.  Moisture Management: Avoid brief in bed  Under Devices: Inspect skin under all medical devices during skin inspection , Ensure tubes are stabilized without tension, and Ensure patient is not lying on medical devices or equipment when repositioned  Ask provider to discontinue device when no longer needed.       Orders: Written    RECOMMEND PRIMARY TEAM ORDER: None, at this time  Education provided: importance of repositioning, plan of care, and Off-loading pressure  Discussed plan of care with: Patient, Family, and Nurse  WO nurse follow-up plan: weekly  Notify WOC if wound(s) deteriorate.  Nursing to notify the Provider(s) and re-consult the WOC Nurse if new skin concern.    DATA:     Current support surface: Standard  Standard gel mattress (Isoflex)  Containment of urine/stool: Suction based external urinary catheter   BMI: There is no height or weight on file to calculate BMI.   Active diet order: Orders Placed This Encounter      Combination Diet Regular Diet Adult     Output: I/O last 3 completed shifts:  In: 200 [P.O.:200]  Out: -      Labs:   Recent Labs   Lab 09/12/24  0646   ALBUMIN 2.8*   HGB 13.2   WBC 11.3*     Pressure injury risk assessment:   Sensory Perception: 3-->slightly limited  Moisture: 3-->occasionally moist  Activity: 2-->chairfast  Mobility: 2-->very limited  Nutrition: 1-->very poor  Friction and Shear: 2-->potential problem  Raul Score: 13    Ray Hannah RN CWOCN  Contact [facility] Nurse (Anayeli)  Dept. Office Number: [phone]

## 2024-09-12 NOTE — PLAN OF CARE
[facility]    ED Boarding Nurse Handoff Addendum Report:    Date/time: 9/12/2024, 10:27 AM    Activity Level: assist of 2    Fall Risk: Yes:  activity supervised    Active Infusions: NA    Current Meds Due: See MAR    Current care needs: Monitor N&V    Oxygen requirements (liters/min and/or FiO2): 3 L    Respiratory status: Nasal cannula    Vital signs (within last 30 minutes):    Vitals:    09/12/24 0245 09/12/24 0540 09/12/24 0810 09/12/24 1023   BP: (!) 88/46 106/50 104/88 (!) 118/92   BP Location:  Right arm     Pulse: 73  80 80   Resp:  16     Temp:  98  F (36.7  C)     TempSrc:  Oral     SpO2: 95%  98% 100%       Focused assessment within last 30 minutes:    AO to self, VSS, assist of 2 not OOB during shift, pure wick in place, held morning meds per MD waiting for Palliative care consult and swallow eval, no episodes of nausea or vomiting, TELE    ED Boarding Nurse name: Yesi Arreaga RN

## 2024-09-12 NOTE — ED NOTES
[facility]  ED Nurse Handoff Report    ED Chief complaint: Vomiting  . ED Diagnosis:   Final diagnoses:   None       Allergies:   Allergies   Allergen Reactions    Ciprofloxacin      Nausea and vomiting per son     Ergotamine-Caffeine Other (See Comments) and Nausea and Vomiting     Severe HA, N/V & diarrhea    Penicillins Rash and Unknown    Sulfa Antibiotics Rash and Unknown    Lamictal [Lamotrigine] Other (See Comments)     Patient experienced night moss    Naproxen      Pt unable to remember reaction.    Naproxen Unknown    Nicergoline Unknown    Tetracycline Unknown     Pt unable to remember allergy       Code Status: DNR    Activity level - Baseline/Home:  assist of 2.  Activity Level - Current:   assist of 2.   Lift room needed: No.   Bariatric: No   Needed: No   Isolation: yes  Infection: Not Applicable  VRE.     Respiratory status: Nasal cannula    Vital Signs (within 30 minutes):   Vitals:    09/11/24 2115 09/11/24 2129 09/11/24 2200 09/11/24 2227   BP:  93/69  116/55   Pulse: 94 84  89   Resp: 12 14 15    Temp:       TempSrc:       SpO2: 100% 98% 99%        Cardiac Rhythm:  ,      Pain level:    Patient confused: No.   Patient Falls Risk: arm band in place, patient and family education, and activity supervised.   Elimination Status: Has voided     Patient Report - Initial Complaint: nausea.   Focused Assessment: GI     Abnormal Results:   Labs Ordered and Resulted from Time of ED Arrival to Time of ED Departure   COMPREHENSIVE METABOLIC PANEL - Abnormal       Result Value    Sodium 136      Potassium 5.2      Carbon Dioxide (CO2) 25      Anion Gap 9      Urea Nitrogen 61.8 (*)     Creatinine 1.81 (*)     GFR Estimate 25 (*)     Calcium 11.3 (*)     Chloride 102      Glucose 78      Alkaline Phosphatase 442 (*)     AST 44      ALT 44      Protein Total 6.2 (*)     Albumin 2.8 (*)     Bilirubin Total 0.7     LIPASE - Abnormal    Lipase 133 (*)    TROPONIN T, HIGH  SENSITIVITY - Abnormal    Troponin T, High Sensitivity 24 (*)    NT PROBNP INPATIENT - Abnormal    N terminal Pro BNP Inpatient 2,467 (*)    CBC WITH PLATELETS AND DIFFERENTIAL - Abnormal    WBC Count 13.3 (*)     RBC Count 4.16      Hemoglobin 13.1      Hematocrit 41.6            MCH 31.5      MCHC 31.5      RDW 14.8      Platelet Count 385      % Neutrophils 72      % Lymphocytes 16      % Monocytes 9      % Eosinophils 0      % Basophils 1      % Immature Granulocytes 2      NRBCs per 100 WBC 1 (*)     Absolute Neutrophils 9.6 (*)     Absolute Lymphocytes 2.1      Absolute Monocytes 1.3      Absolute Eosinophils 0.1      Absolute Basophils 0.1      Absolute Immature Granulocytes 0.2      Absolute NRBCs 0.1     ISTAT GASES LACTATE VENOUS POCT - Abnormal    Lactic Acid POCT 1.5      Bicarbonate Venous POCT 29 (*)     O2 Sat, Venous POCT 36 (*)     pCO2 Venous POCT 58 (*)     pH Venous POCT 7.31 (*)     pO2 Venous POCT 24 (*)    LITHIUM LEVEL - Normal    Lithium 0.94     VALPROIC ACID - Normal    Valproic acid 50.4     INFLUENZA A/B, RSV, & SARS-COV2 PCR - Normal    Influenza A PCR Negative      Influenza B PCR Negative      RSV PCR Negative      SARS CoV2 PCR Negative     ROUTINE UA WITH MICROSCOPIC REFLEX TO CULTURE        CT Head w/o Contrast    (Results Pending)   CT Abdomen Pelvis w/o Contrast    (Results Pending)   XR Chest 1 View    (Results Pending)       Treatments provided: nausea meds  Family Comments: none at side  OBS brochure/video discussed/provided to patient:  No  ED Medications:   Medications   ondansetron (ZOFRAN) injection 4 mg (has no administration in time range)       Drips infusing:  No  For the majority of the shift this patient was Green.   Interventions performed were na  .    Sepsis treatment initiated: na      Cares/treatment/interventions/medications to be completed following ED care: see orders    ED Nurse Name: Joann HUMMEL Jamar, RN  10:30 PM     RECEIVING UNIT ED HANDOFF  REVIEW    Above ED Nurse Handoff Report was reviewed: Yes  Reviewed by: Rina Lewis RN on September 12, 2024 at 10:20 AM   I Bethera called the ED to inform them the note was read: Yes

## 2024-09-12 NOTE — PLAN OF CARE
Admitted/transferred from:   2 RN full   skin assessment completed by Rina Lewis RN and Cyn Baker RN  Skin assessment finding: Blanchable redness to R big toe, R heel, and L inner thigh. Stage 2 pressure injury on coccyx (3 open areas)   Interventions/actions: MD informed for WOC consult. Meplix placed to coccyx

## 2024-09-12 NOTE — ED TRIAGE NOTES
Pt arrives via EMS from [facility] in [facility] with c/o vomiting x 1 day. Pt has been in TCU for CHF exacerbation. Family requests she be evaluated.      Triage Assessment (Adult)       Row Name 09/11/24 1948          Triage Assessment    Airway WDL WDL        Respiratory WDL    Respiratory WDL WDL        Skin Circulation/Temperature WDL    Skin Circulation/Temperature WDL WDL        Cardiac WDL    Cardiac WDL X;rhythm  tachycardia        Peripheral/Neurovascular WDL    Peripheral Neurovascular WDL WDL        Cognitive/Neuro/Behavioral WDL    Cognitive/Neuro/Behavioral WDL X     Level of Consciousness confused     Orientation disoriented to;time;situation        Redford Coma Scale    Best Eye Response 4-->(E4) spontaneous     Best Motor Response 6-->(M6) obeys commands     Best Verbal Response 4-->(V4) confused     Lui Coma Scale Score 14

## 2024-09-12 NOTE — H&P
[facility] History and Physical    Lennie Mar MRN# [id number]   Age: 89 year old YOB: 1927     Date of Admission:  9/11/2024       Lennie Mar is a 89 year old woman who returns to the hospital from transitional care apparently at the request of her family because she appeared to be more confused, less interactive and with some vomiting today.    Recent medical history includes hospitalization at [facility] from 8/8 through 8/12/2024 for apparent left lower lobe pneumonia and acute hypoxic respiratory failure contributing to some encephalopathy.  From that hospitalization, she was discharged back home to [facility] but return to the emergency department on 8/24/2024 due to shortness of breath and right flank pain for a couple of days that have been complicated by a fall.  She remained here from 8/24-8/30/2024 during which time the patient had been found to have bilateral pleural effusions thought to be due to an exacerbation of chronic diastolic heart failure.  She underwent thoracenteses and because she indicated she wanted to go home, the medical team made arrangements for discharge.  Unfortunately, the patient was too weak to go home to [facility] and she was discharged to transitional care.    More remote PMH includes breast cancer for which she has been on Letrozole since 2019. (I believe she is still on it.)  Her chart indicates CKD stage 3b, but given her age and low muscle mass, the creatinine likely overestimates her GFR. She is treated for Bipolar disorder with Lithium and Valproic acid.  She has a history of atrial tachycardias but is currently in a sinus rhythm with a left anterior fascicular block.    Upon presentation to emergency department this evening, VS: Documentation of the vital signs in the first 15 minutes was inexplicably erratic.  As of about 20 minutes in, the patient was noted to be hypoxic, was placed on supplemental oxygen  and heart rate stabilized out at about 95, /75, RR 12 and no fever.  Oxygen saturation 95% breathing room air.  Examination: Ms. Mar appears oriented to the situation though she did not know that it was the middle of the night, but she was able to tell me her name and where she was.  We talked though she appeared fatigued and generally weak.  If she has a large neck mass compatible with goiter which she says is longstanding.  Respirations do not appear labored at all.  She is well-perfused, pulses are palpable in both radials and she has some degree of abdominal tenderness.  Labs: Creatinine 1.8, calcium 11.3 with an albumin 2.8, protein 6.2 with alkaline phosphatase 442.  LFTs and electrolytes are otherwise normal.  N-terminal proBNP 2467.  Troponin T is 24 but because there was low suspicion for MI, there was no delta troponin obtained.  VBG: pH 7.31 with pCO2 58.  WBC 13.3 with a left shift, Hgb 13.1, .  COVID, influenza A/B and RSV PCR negative.  Urinalysis obtained by straight cath shows moderate leukocyte esterase and white cell clumps.  Due to concern for the patient's apparent increased confusion, lithium as well as valproic acid levels were obtained and both of those values are well in the low therapeutic range.  Imaging: CT abdomen and pelvis without contrast shows no acute abnormality and confirms the presence of bilateral pleural effusions..  CT head without contrast also shows no acute change.  Interventions in the emergency department: Ms. Mar was given a 500 cc NS bolus for relatively soft blood pressures.  When the UA returned, she was also given a gram of ceftriaxone.    Diagnoses:  1.  Acute confusion (encephalopathy probably due to urinary tract infection).  Her family told the emergency room provider that to Ms. Mar is typically very astute and alert.  Unfortunately, I believe the repeated hospitalizations represent significant change in the patient's previously robust  function.  I called the pt's son who is first on her contact list, but did not get an answer at about midnight.  I think goals of care need to be more clearly discussed.  2.  UTI suspected.  3.  Hypercalcemia with elevated Alk Phos suggests possible ita metastatic disease. In any event, the hypercalcemia likely is contributing to the confusion and ASHLEY. Given the hx of breast cancer, recurrent breast cancer seems the most likely.  The protein is not elevated, making multiple myeloma less likely.  I note that the pleural fluid from August thoracentesis was not sent for cytology.  4.  ASHLEY superimposed on chronic renal disease.  Suspect the creatinine value overestimates GFR.  5.  Chronic hypoxic and hypercapnic resp failure related to Emphysematous changes, poss radiation injury, chronic v recurrent pleural effusions thought due to diastolic CHF.  6.  Generalized weakness.   7.  Neck mass of unclear significance.  Probably large goiter.  8.  Kidney nodules of unclear significance.    PLAN:  1.  Admit to inpatient.   2.  Pt was given a fluid bolus in the ED, but she is in a difficult situation: she has pleural effusions thought due to HFpEF, but she also needs fluid for ASHLEY and hypercalcemia management. This is why it is so essential to discuss goals of care: her care has become extremely complex and restorative measures would almost certainly be an excessive burden to her (demanding a longer hospitalization and lots of tests). She may benefit from diuresis.    3.  Consider cardiology and nephrology consultations (this would be the least invasive and possibly most efficient approach to this set of complex problems.)  4.  If her calcium and alk phos do not improve with fluids or correction of the kidney injury, then a search for evidence of malignancy may be appropriate. Check iPTH and PTH-related peptide.  5.  Empirically will cover with Ceftriaxone for presumed UTI.   6.  Palliative Care consult.           Chief  Complaint:     Increased confusion.     History is obtained from the patient, electronic health record, and emergency department physician     As noted above, Ms. Mar has not been in and out of the hospital since the beginning of August.  Since that time when she first was admitted with pneumonia, her course has been complicated by increasing weakness, increasing pleural effusions, acute kidney injury, etc.  She has gone from being independent to now residing in a transitional care.  Family had sent her in at this time apparently because of an increase in confusion.    The more remote history is difficult to obtain at this point as Ms. Mar is not able to give me much information and I did not get an answer from her son whom I called near midnight on 9/11.  I note that she is on maintenance treatment for breast cancer but has not been seen by oncology for a year.    At this time, the patient is able to tell me that she is not ready to die and yet also does not want to be in the hospital.  She complains of pain when I examine her abdomen but otherwise is soft-spoken and appears generally of fairly comfortable.        Past Medical History:     Past Medical History:   Diagnosis Date    Alcohol dependence in remission (H)     Anxiety     Asymptomatic varicose veins     Bipolar disorder, unspecified (H)     CKD (chronic kidney disease) stage 3, GFR 30-59 ml/min (H) 2/18/2014    History of smoking     Hyperparathyroidism (H24)     Infection due to 2019 novel coronavirus 3/10/2022    Memory loss     Muscle weakness (generalized) 6/23/2008    Nausea and vomiting, unspecified vomiting type 9/12/2024    Severe depression (H)     Subdural hygroma     chronic.  no surgeries    Tremor of unknown origin              Past Surgical History:      Past Surgical History:   Procedure Laterality Date    APPENDECTOMY OPEN  1947    CATARACT IOL, RT/LT      COLONOSCOPY WITH CO2 INSUFFLATION  2/29/2012    Procedure:COLONOSCOPY WITH CO2  INSUFFLATION; Surgeon:GAYLE REYNA; Location:[facility] OR    CYSTOCELE REPAIR      CYSTOSCOPY, INSERT STENT URETHRA, COMBINED      CYSTOSCOPY, RETROGRADES, INSERT STENT URETER(S), COMBINED  2012    Procedure:COMBINED CYSTOSCOPY, RETROGRADES, INSERT STENT URETER(S); Surgeon:ANT ESPINOZA; Location:[facility] OR    DECOMPRESSION LUMBAR ONE LEVEL  2013    Procedure: DECOMPRESSION LUMBAR ONE LEVEL;  Posterior Decompression Right Lumbar 5- Sacral 1;  Surgeon: You Zavala MD;  Location: [facility] OR    HC TOOTH EXTRACTION W/FORCEP      HYSTERECTOMY, CATA      IRRIGATION AND DEBRIDEMENT ORAL, COMBINED      For treatment of gingivitis    LAPAROSCOPIC ASSISTED COLECTOMY  2012    Procedure:LAPAROSCOPIC ASSISTED COLECTOMY; Cysto, Bilateral Stent placement (both stents removed at end of case)- Yanet ACOSTA. Laparoscopic Extended Right Venu Colectomy with CO2 Colonoscopy and polyp removal-Judah; Surgeon:GAYLE REYNA; Location:[facility] OR    LIGATN/STRIP LONG OR SHORT SAPHEN      MASTECTOMY PARTIAL WITH SENTINEL NODE Left 2018    Procedure: Left Mastectomy, Left [place] Lymph Node Biopsy;  Surgeon: Nahun Betancourt MD;  Location: [facility] OR    STRIP VEIN BILATERAL      SURGICAL HISTORY OF -       ureter surgery for blockage.             Social History:     Social History     Tobacco Use    Smoking status: Former     Current packs/day: 0.00     Average packs/day: 1.5 packs/day for 30.0 years (45.0 ttl pk-yrs)     Types: Cigarettes     Start date: 1963     Quit date: 1993     Years since quittin.1    Smokeless tobacco: Never   Substance Use Topics    Alcohol use: No             Family History:     Family History   Problem Relation Age of Onset    C.A.D. Mother          at age 47 Heart failure, Rhumatic Fever    Cerebrovascular Disease Father          at age 79    Diabetes Father         type 2    Breast Cancer Sister 84        99 year old sister    Circulatory  Maternal Grandmother         vericose veins    C.A.D. Paternal Grandfather     Gastrointestinal Disease Paternal Grandfather         ulcer    Cancer Son          age 51--Melanoma    Cancer Brother          age 50's--Melanoma    Arthritis Sister     Circulatory Sister         vericose veins    Circulatory Sister         vericose veins    Eye Disorder Sister         Cateracts    Respiratory Sister         asthma    Respiratory Brother         COPD    Breast Cancer Niece 60        daughter of 98 yo sister     Family history reviewed          Immunizations:     Immunization History   Administered Date(s) Administered    Influenza (High Dose) Trivalent,PF (Fluzone) 10/15/2013, 2015, 2017, 10/23/2018, 2019    Influenza (IIV3) PF 2007, 2011    Influenza Vaccine >6 months,quad, PF 10/15/2013    Pneumo Conj 13-V (2010&after) 2015    Pneumococcal 23 valent 2010    TD,PF 7+ (Tenivac) 2009    Zoster vaccine, live 2010             Allergies:     Allergies   Allergen Reactions    Ciprofloxacin      Nausea and vomiting per son     Ergotamine-Caffeine Other (See Comments) and Nausea and Vomiting     Severe HA, N/V & diarrhea    Penicillins Rash and Unknown    Sulfa Antibiotics Rash and Unknown    Lamictal [Lamotrigine] Other (See Comments)     Patient experienced night moss    Naproxen      Pt unable to remember reaction.    Naproxen Unknown    Nicergoline Unknown    Tetracycline Unknown     Pt unable to remember allergy             Medications:   (Not yet reconciled by pharmacy)  Combivent inhaler 4 times daily as needed  Albuterol inhaler 2 puffs every 6 hours as needed  Depakote  mg nightly  Doxycycline 100 mg p.o. twice daily for 26 days.  (Not entirely clear when that was started)  Famotidine 20 mg every other day  Letrozole 25 mg daily  Levothyroxine 50 mcg daily  Lithium 150 mg every morning  Lorazepam 0.25 mg nightly as needed  Quetiapine 100 mg  "nightly  Tramadol 25 mg twice daily as needed severe pain  Multiple supplements.         Review of Systems:     The Review of Systems is negative other than noted in the HPI.  This is quite limited due to the patient's level of awareness.          Physical Exam:   Vitals were reviewed  Temp: 97.4  F (36.3  C) Temp src: Oral BP: 94/51 Pulse: 77   Resp: 15 SpO2: 100 % O2 Device: Nasal cannula Oxygen Delivery: 2 LPM  Constitutional: Frail elderly woman, easily awakened, cooperative, no apparent distress.  Speaks in a very soft voice.  Eyes: Lids and lashes normal, pupils equal, extra ocular muscles intact, sclera clear, conjunctiva normal.  ENT: Normocephalic, without obvious abnormality, atraumatic.  No facial muscular asymmetry.  Neck: Supple, a large anterior neck masses appreciated, larger on the right than on the left.  This is nontender and nonballotable.  Hematologic / Lymphatic: No cervical lymphadenopathy and no supraclavicular lymphadenopathy.  Lungs: No increased work of breathing, quiet breath sounds.  No apparent wheezing or rales appreciated.  Cardiovascular: Regular rate and rhythm, normal S1 and S2, no S3 or S4, and no murmur noted.  Abdomen: Diffusely tender and relatively firm in the right upper quadrant.  Soft in the left upper quadrant.  Musculoskeletal: No redness, warmth, or swelling of the joints. Tone is normal.  Neurologic: Awakens easily, remains alert, oriented to name. Cranial nerves II-XII are grossly intact.  In order to test her motor function, I asked her to squeeze my hands but she did not give much of a try before she said that \"that was enough\".  Neuropsychiatric: Appears appropriate to the situation.  Skin: No rashes, erythema, pallor, petechia or purpura.          Data:     Results for orders placed or performed during the hospital encounter of 09/11/24 (from the past 24 hour(s))   EKG 12-lead, tracing only   Result Value Ref Range    Systolic Blood Pressure  mmHg    Diastolic Blood " Pressure  mmHg    Ventricular Rate 100 BPM    Atrial Rate 100 BPM    AR Interval 198 ms    QRS Duration 116 ms     ms    QTc 443 ms    P Axis 13 degrees    R AXIS -44 degrees    T Axis 112 degrees    Interpretation ECG       Sinus rhythm with Fusion complexes  Left axis deviation  Left ventricular hypertrophy with QRS widening and repolarization abnormality ( R in aVL , [place] product )  Abnormal ECG  When compared with ECG of 26-Aug-2024 00:45,  Fusion complexes are now Present     CBC with platelets differential    Narrative    The following orders were created for panel order CBC with platelets differential.  Procedure                               Abnormality         Status                     ---------                               -----------         ------                     CBC with platelets and d...[[id number]]  Abnormal            Final result                 Please view results for these tests on the individual orders.   Comprehensive metabolic panel   Result Value Ref Range    Sodium 136 135 - 145 mmol/L    Potassium 5.2 3.4 - 5.3 mmol/L    Carbon Dioxide (CO2) 25 22 - 29 mmol/L    Anion Gap 9 7 - 15 mmol/L    Urea Nitrogen 61.8 (H) 8.0 - 23.0 mg/dL    Creatinine 1.81 (H) 0.51 - 0.95 mg/dL    GFR Estimate 25 (L) >60 mL/min/1.73m2    Calcium 11.3 (H) 8.8 - 10.4 mg/dL    Chloride 102 98 - 107 mmol/L    Glucose 78 70 - 99 mg/dL    Alkaline Phosphatase 442 (H) 40 - 150 U/L    AST 44 0 - 45 U/L    ALT 44 0 - 50 U/L    Protein Total 6.2 (L) 6.4 - 8.3 g/dL    Albumin 2.8 (L) 3.5 - 5.2 g/dL    Bilirubin Total 0.7 <=1.2 mg/dL   Lipase   Result Value Ref Range    Lipase 133 (H) 13 - 60 U/L   Troponin T, High Sensitivity   Result Value Ref Range    Troponin T, High Sensitivity 24 (H) <=14 ng/L   Nt probnp inpatient (BNP)   Result Value Ref Range    N terminal Pro BNP Inpatient 2,467 (H) 0 - 1,800 pg/mL   Lithium level   Result Value Ref Range    Lithium 0.94 0.60 - 1.20 mmol/L   Valproic acid   Result Value  Ref Range    Valproic acid 50.4   ug/mL   CBC with platelets and differential   Result Value Ref Range    WBC Count 13.3 (H) 4.0 - 11.0 10e3/uL    RBC Count 4.16 3.80 - 5.20 10e6/uL    Hemoglobin 13.1 11.7 - 15.7 g/dL    Hematocrit 41.6 35.0 - 47.0 %     78 - 100 fL    MCH 31.5 26.5 - 33.0 pg    MCHC 31.5 31.5 - 36.5 g/dL    RDW 14.8 10.0 - 15.0 %    Platelet Count 385 150 - 450 10e3/uL    % Neutrophils 72 %    % Lymphocytes 16 %    % Monocytes 9 %    % Eosinophils 0 %    % Basophils 1 %    % Immature Granulocytes 2 %    NRBCs per 100 WBC 1 (H) <1 /100    Absolute Neutrophils 9.6 (H) 1.6 - 8.3 10e3/uL    Absolute Lymphocytes 2.1 0.8 - 5.3 10e3/uL    Absolute Monocytes 1.3 0.0 - 1.3 10e3/uL    Absolute Eosinophils 0.1 0.0 - 0.7 10e3/uL    Absolute Basophils 0.1 0.0 - 0.2 10e3/uL    Absolute Immature Granulocytes 0.2 <=0.4 10e3/uL    Absolute NRBCs 0.1 10e3/uL   [place] Draw    Narrative    The following orders were created for panel order [place] Draw.  Procedure                               Abnormality         Status                     ---------                               -----------         ------                     Extra Blue Top Tube[[id number]]                                                         Extra Red Top Tube[[id number]]                                                          Extra Green Top (Lithium...[[id number]]                                                 Extra Purple Top Tube[[id number]]                                                       Extra Heparinized Syringe[[id number]]                                                     Please view results for these tests on the individual orders.   Symptomatic Influenza A/B, RSV, & SARS-CoV2 PCR (COVID-19) Nasopharyngeal    Specimen: Nasopharyngeal; Swab   Result Value Ref Range    Influenza A PCR Negative Negative    Influenza B PCR Negative Negative    RSV PCR Negative Negative    SARS CoV2 PCR Negative Negative    Narrative    Testing was  performed using the Xpert Xpress CoV2/Flu/RSV Assay on the [employer] GeneXpert Instrument. This test should be ordered for the detection of SARS-CoV2, influenza, and RSV viruses in individuals with signs and symptoms of respiratory tract infection. This test is for in vitro diagnostic use under the [place] FDA for laboratories certified under CLIA to perform high or moderate complexity testing. This test has been [place] FDA cleared. A negative result does not rule out the presence of PCR inhibitors in the specimen or target RNA in concentration below the limit of detection for the assay. If only one viral target is positive but coinfection with multiple targets is suspected, the sample should be re-tested with another FDA cleared, approved, or authorized test, if coninfection would change clinical management. This test was validated by the [facility]. These laboratories are certified under the Clinical Laboratory Improvement Amendments of 1988 (CLIA-88) as qualified to perfom high complexity laboratory testing.   iStat Gases (lactate) venous, POCT   Result Value Ref Range    Lactic Acid POCT 1.5 <=2.0 mmol/L    Bicarbonate Venous POCT 29 (H) 21 - 28 mmol/L    O2 Sat, Venous POCT 36 (L) 70 - 75 %    pCO2 Venous POCT 58 (H) 40 - 50 mm Hg    pH Venous POCT 7.31 (L) 7.32 - 7.43    pO2 Venous POCT 24 (L) 25 - 47 mm Hg   CT Head w/o Contrast    Narrative    EXAM: CT HEAD W/O CONTRAST  LOCATION: [facility]  DATE: 9/11/2024    INDICATION: ams  COMPARISON: August 24, 2024  TECHNIQUE: Routine CT Head without IV contrast. Multiplanar reformats. Dose reduction techniques were used.    FINDINGS:  INTRACRANIAL CONTENTS: No intracranial hemorrhage, extraaxial collection, or mass effect.  No CT evidence of acute infarct. Mild to moderate presumed chronic small vessel ischemic changes. Moderate generalized volume loss. No hydrocephalus.     VISUALIZED ORBITS/SINUSES/MASTOIDS: Prior bilateral cataract  surgery. Visualized portions of the orbits are otherwise unremarkable. No paranasal sinus mucosal disease. No middle ear or mastoid effusion.    BONES/SOFT TISSUES: No acute abnormality.      Impression    IMPRESSION:  1.  No CT evidence for acute intracranial process.  2.  Brain atrophy and presumed chronic microvascular ischemic changes as above.   CT Abdomen Pelvis w/o Contrast    Narrative    EXAM: CT ABDOMEN AND PELVIS WITHOUT CONTRAST  LOCATION: [facility]  DATE/TIME: 9/11/2024 10:19 PM CDT    INDICATION: Abdominal pain and vomiting.  COMPARISON: 8/24/2024 - CT chest.    TECHNIQUE: CT scan of the abdomen and pelvis was performed without IV contrast. Multiplanar reformats were obtained. Dose reduction techniques were used.  CONTRAST: None.    FINDINGS:    LOWER CHEST: Moderate-sized bilateral pleural effusions with adjacent atelectasis. Mild emphysematous changes in the lung bases.    HEPATOBILIARY: The gallbladder is mildly distended, but otherwise unremarkable in appearance.    SPLEEN: Unremarkable.    PANCREAS: Unremarkable.    ADRENAL GLANDS: Unremarkable.    KIDNEYS/BLADDER: A few small renal lesions are present bilaterally, measuring up to 1.5 cm in diameter. A few are mildly high in attenuation. These were present previously and most likely represent hemorrhagic or proteinaceous cysts.    BOWEL: Prior right hemicolectomy. The stomach, small and large bowel are normal in caliber. Several colonic diverticula are present without evidence of diverticulitis. No visualized bowel wall thickening, pneumatosis or free intraperitoneal gas.    LYMPH NODES: Unremarkable.    PELVIC ORGANS: No acute findings.    MUSCULOSKELETAL: No acute findings.    OTHER: Atherosclerotic calcification in the abdominal aorta. Very small left inguinal hernia containing fat.      Impression    IMPRESSION:   1. No acute abnormality identified in the abdomen or pelvis. No evidence of bowel obstruction.  2.  Moderate-sized bilateral pleural effusions.    XR Chest 1 View    Narrative    EXAM: XR CHEST 1 VIEW  LOCATION: [facility]  DATE: 9/11/2024    INDICATION: sob  COMPARISON: 8/8/2024.      Impression    IMPRESSION: Moderate bilateral pleural effusions increased compared to previous chest x-ray compressive atelectasis at lung bases. Heart size is obscured secondary to the basilar density. 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   UA with Microscopic reflex to Culture    Specimen: Urine, Catheter   Result Value Ref Range    Color Urine Yellow Colorless, Straw, Light Yellow, Yellow    Appearance Urine Clear Clear    Glucose Urine Negative Negative mg/dL    Bilirubin Urine Negative Negative    Ketones Urine Negative Negative mg/dL    Specific Gravity Urine 1.015 1.003 - 1.035    Blood Urine Small (A) Negative    pH Urine 5.5 5.0 - 7.0    Protein Albumin Urine Negative Negative mg/dL    Urobilinogen Urine Normal Normal, 2.0 mg/dL    Nitrite Urine Negative Negative    Leukocyte Esterase Urine Moderate (A) Negative    WBC Clumps Urine Present (A) None Seen /HPF    Mucus Urine Present (A) None Seen /LPF    RBC Urine 3 (H) <=2 /HPF    WBC Urine 74 (H) <=5 /HPF    Squamous Epithelials Urine 1 <=1 /HPF    Narrative    Urine Culture ordered based on laboratory criteria      All cardiac studies reviewed by me.   All imaging studies reviewed by me.      Attestation:  I have reviewed today's vital signs, notes, medications, labs and imaging.     Tunde Adams MD

## 2024-09-12 NOTE — ED NOTES
Straight cath performed to obtain urine, sample sent to lab. Pt was cleaned and changed, no skin breakdown noted in sacrum area. Pt is connected to [employer], connected back to monitor.

## 2024-09-12 NOTE — CONSULTS
Palliative Care Consultation Note  [facility]      Patient: Lennie Mar  Date of Admission:  9/11/2024    Requesting Clinician / Team: hospitalist  Reason for consult: Goals of care  Decisional support  Patient and family support       Recommendations & Counseling     GOALS OF CARE:   Restorative with limits - ok with IV fluids and antibiotics,   No thoracentesis as this was uncomfortable for her previously  Back to her place of living with hospice on discharge     ADVANCE CARE PLANNING:  No health care directive on file. Per system policy, Surrogate Decision-makers for Patients With Diminished Decision-making Capacity offers guidance on possible decision-makers. Naren Mar (Marta)  has been identified as a surrogate decision maker.  They know her best and provide most of the cares.   There is a POLST form on file, this was reviewed and current.  Code status: No CPR- Do NOT Intubate    MEDICAL MANAGEMENT:   #Nausea  -Pharmacologic management   Ondansetron (Zofran)  PRN  Diet as tolerated  -Nonpharmacologic management  Small, frequent intake  Assess and avoid aggravating factors  Accupressure (C-band)  Aromatherapy, alcohol swabs known to be effective    #General Weakness  Appreciate the staff for all ADLs  Back to [facility] on hospice on discharge    PSYCHOSOCIAL/SPIRITUAL SUPPORT:  Family   [place]: [employer] - family will contact their own  for anointing, if unable to have someone come or have questions of concerns they will reach out to nursing for  support    Palliative Care will continue to follow. Thank you for the consult and allowing us to aid in the care of Lennie Mar.    These recommendations have been discussed with medical team.    Jodi Funk NP  [facility], Palliative Care  Securely message with the Vocera Web Console (learn more here) or  Text page via [employer] Paging/Directory         Assessment      Lennie Mar is a 89 year old female  with a past medical history of hypertension, CKD stage 3, paroxysmal atrial fibrillation, essential tremor, hypothyroidism, and macrocytic anemia who presented on 9/11 with confusion, less interactive and vomiting.      Today, the patient was seen for:  Goals of care    History of Present Illness   Met with Marta over the phone.   I introduced our role as an extra layer of support and how we help patients and families dealing with serious, potentially life-limiting illnesses. I explained the composition of the palliative care team.  Palliative care helps patients and families navigate their care while focusing on the whole person; providing emotional, social and spiritual support  Palliative care often assists with symptom management, information sharing about what to expect from the illness, available treatment options and what effect those options may have on the disease course, and provide effective communication and caring support.    Prognosis, Goals, & Planning:   Functional Status just prior to this current hospitalization:  Decline over the last couple of month, 3 hospitalizations  since the beginning of August    Prognosis, Goals, and/or Advance Care Planning:  Discussed what continuing restorative/life-prolonging care entails, including continued (re)admissions to the hospital, continuing with preventative and primary care, seeing disease/organ specific specialty consultations for medical treatments in hopes to prolong life for as long as possible.    Education provided regarding hospice philosophy, prognostic,and eligibility criteria. Discussed what services are provided and those that are not,  Discussed common misconceptions. We explored the various disposition options where they can receive hospice care (home, residential hospice homes, LTC with hospice) including subsequent financial and familial implications. Discussed typical anticipated timing of discharge.    Code Status was addressed today:    yes        Patient has decision-making capacity today for complex decisions: Questionable          Coping, Meaning, & Spirituality:   Mood, coping, and/or meaning in the context of serious illness were addressed today: Yes    Social:   Living situation:resides in assisted living [facility]    Medications:  Reviewed this patient's medication profile and medications from this hospitalization. Notable medications: none   [place] Board of Pharmacy Data Base Reviewed: Yes:   reviewed - controlled substances reflected in medication list.    ROS:  Comprehensive ROS is reviewed and is negative except as here & per HPI:     Physical Exam   Vital Signs with Ranges  Temp:  [97.4  F (36.3  C)-98  F (36.7  C)] 98  F (36.7  C)  Pulse:  [] 80  Resp:  [10-37] 16  BP: ()/() 104/88  SpO2:  [86 %-100 %] 98 %  Wt Readings from Last 10 Encounters:   08/24/24 69 kg (152 lb 1.9 oz)   08/12/24 73.4 kg (161 lb 13.1 oz)   10/17/22 70.3 kg (155 lb)   05/23/22 65.1 kg (143 lb 9.6 oz)   05/18/22 64.6 kg (142 lb 6.4 oz)   05/16/22 64.6 kg (142 lb 6.4 oz)   05/12/22 64.1 kg (141 lb 4.8 oz)   05/09/22 64.1 kg (141 lb 4.8 oz)   05/03/22 66.2 kg (146 lb)   04/25/22 68.9 kg (152 lb)     0 lbs 0 oz    PHYSICAL EXAM:  Constitutional: alert and no distress   Cardiovascular: negative  Respiratory: positive findings: congested cough  Psychiatric: confused and calm and pleasant  Abdomen: Abdomen soft, non-tender. BS normal. No masses, organomegaly    Data reviewed:  Results for orders placed or performed during the hospital encounter of 09/11/24 (from the past 24 hour(s))   [place] Draw *Canceled*    Narrative    The following orders were created for panel order [place] Draw.  Procedure                               Abnormality         Status                     ---------                               -----------         ------                       Please view results for these tests on the individual orders.   EKG 12-lead, tracing  only   Result Value Ref Range    Systolic Blood Pressure  mmHg    Diastolic Blood Pressure  mmHg    Ventricular Rate 100 BPM    Atrial Rate 100 BPM    CA Interval 198 ms    QRS Duration 116 ms     ms    QTc 443 ms    P Axis 13 degrees    R AXIS -44 degrees    T Axis 112 degrees    Interpretation ECG       Sinus rhythm with Fusion complexes  Left axis deviation  Left ventricular hypertrophy with QRS widening and repolarization abnormality ( R in aVL , Gallo product )  Abnormal ECG  When compared with ECG of 26-Aug-2024 00:45,  Fusion complexes are now Present  Unconfirmed report - interpretation of this ECG is computer generated - see medical record for final interpretation  Confirmed by - EMERGENCY ROOM, PHYSICIAN ([id number]),  RUBIN ADEN ([id number]) on 9/12/2024 6:44:43 AM     CBC with platelets differential    Narrative    The following orders were created for panel order CBC with platelets differential.  Procedure                               Abnormality         Status                     ---------                               -----------         ------                     CBC with platelets and d...[[id number]]  Abnormal            Final result                 Please view results for these tests on the individual orders.   Comprehensive metabolic panel   Result Value Ref Range    Sodium 136 135 - 145 mmol/L    Potassium 5.2 3.4 - 5.3 mmol/L    Carbon Dioxide (CO2) 25 22 - 29 mmol/L    Anion Gap 9 7 - 15 mmol/L    Urea Nitrogen 61.8 (H) 8.0 - 23.0 mg/dL    Creatinine 1.81 (H) 0.51 - 0.95 mg/dL    GFR Estimate 25 (L) >60 mL/min/1.73m2    Calcium 11.3 (H) 8.8 - 10.4 mg/dL    Chloride 102 98 - 107 mmol/L    Glucose 78 70 - 99 mg/dL    Alkaline Phosphatase 442 (H) 40 - 150 U/L    AST 44 0 - 45 U/L    ALT 44 0 - 50 U/L    Protein Total 6.2 (L) 6.4 - 8.3 g/dL    Albumin 2.8 (L) 3.5 - 5.2 g/dL    Bilirubin Total 0.7 <=1.2 mg/dL   Lipase   Result Value Ref Range    Lipase 133 (H) 13 - 60 U/L   Troponin T, High  Sensitivity   Result Value Ref Range    Troponin T, High Sensitivity 24 (H) <=14 ng/L   Nt probnp inpatient (BNP)   Result Value Ref Range    N terminal Pro BNP Inpatient 2,467 (H) 0 - 1,800 pg/mL   Lithium level   Result Value Ref Range    Lithium 0.94 0.60 - 1.20 mmol/L   Valproic acid   Result Value Ref Range    Valproic acid 50.4   ug/mL   CBC with platelets and differential   Result Value Ref Range    WBC Count 13.3 (H) 4.0 - 11.0 10e3/uL    RBC Count 4.16 3.80 - 5.20 10e6/uL    Hemoglobin 13.1 11.7 - 15.7 g/dL    Hematocrit 41.6 35.0 - 47.0 %     78 - 100 fL    MCH 31.5 26.5 - 33.0 pg    MCHC 31.5 31.5 - 36.5 g/dL    RDW 14.8 10.0 - 15.0 %    Platelet Count 385 150 - 450 10e3/uL    % Neutrophils 72 %    % Lymphocytes 16 %    % Monocytes 9 %    % Eosinophils 0 %    % Basophils 1 %    % Immature Granulocytes 2 %    NRBCs per 100 WBC 1 (H) <1 /100    Absolute Neutrophils 9.6 (H) 1.6 - 8.3 10e3/uL    Absolute Lymphocytes 2.1 0.8 - 5.3 10e3/uL    Absolute Monocytes 1.3 0.0 - 1.3 10e3/uL    Absolute Eosinophils 0.1 0.0 - 0.7 10e3/uL    Absolute Basophils 0.1 0.0 - 0.2 10e3/uL    Absolute Immature Granulocytes 0.2 <=0.4 10e3/uL    Absolute NRBCs 0.1 10e3/uL   Alna Draw *Canceled*    Narrative    The following orders were created for panel order Alna Draw.  Procedure                               Abnormality         Status                     ---------                               -----------         ------                     Extra Blue Top Tube[[id number]]                                                         Extra Red Top Tube[[id number]]                                                          Extra Green Top (Lithium...[[id number]]                                                 Extra Purple Top Tube[[id number]]                                                       Extra Heparinized Syringe[[id number]]                                                     Please view results for these tests on the  individual orders.   Symptomatic Influenza A/B, RSV, & SARS-CoV2 PCR (COVID-19) Nasopharyngeal    Specimen: Nasopharyngeal; Swab   Result Value Ref Range    Influenza A PCR Negative Negative    Influenza B PCR Negative Negative    RSV PCR Negative Negative    SARS CoV2 PCR Negative Negative    Narrative    Testing was performed using the Xpert Xpress CoV2/Flu/RSV Assay on the [employer] GeneXpert Instrument. This test should be ordered for the detection of SARS-CoV2, influenza, and RSV viruses in individuals with signs and symptoms of respiratory tract infection. This test is for in vitro diagnostic use under the [place] FDA for laboratories certified under CLIA to perform high or moderate complexity testing. This test has been [place] FDA cleared. A negative result does not rule out the presence of PCR inhibitors in the specimen or target RNA in concentration below the limit of detection for the assay. If only one viral target is positive but coinfection with multiple targets is suspected, the sample should be re-tested with another FDA cleared, approved, or authorized test, if coninfection would change clinical management. This test was validated by the [facility]. These laboratories are certified under the Clinical Laboratory Improvement Amendments of 1988 (CLIA-88) as qualified to perfom high complexity laboratory testing.   iStat Gases (lactate) venous, POCT   Result Value Ref Range    Lactic Acid POCT 1.5 <=2.0 mmol/L    Bicarbonate Venous POCT 29 (H) 21 - 28 mmol/L    O2 Sat, Venous POCT 36 (L) 70 - 75 %    pCO2 Venous POCT 58 (H) 40 - 50 mm Hg    pH Venous POCT 7.31 (L) 7.32 - 7.43    pO2 Venous POCT 24 (L) 25 - 47 mm Hg   CT Head w/o Contrast    Narrative    EXAM: CT HEAD W/O CONTRAST  LOCATION: [facility]  DATE: 9/11/2024    INDICATION: ams  COMPARISON: August 24, 2024  TECHNIQUE: Routine CT Head without IV contrast. Multiplanar reformats. Dose reduction techniques were  used.    FINDINGS:  INTRACRANIAL CONTENTS: No intracranial hemorrhage, extraaxial collection, or mass effect.  No CT evidence of acute infarct. Mild to moderate presumed chronic small vessel ischemic changes. Moderate generalized volume loss. No hydrocephalus.     VISUALIZED ORBITS/SINUSES/MASTOIDS: Prior bilateral cataract surgery. Visualized portions of the orbits are otherwise unremarkable. No paranasal sinus mucosal disease. No middle ear or mastoid effusion.    BONES/SOFT TISSUES: No acute abnormality.      Impression    IMPRESSION:  1.  No CT evidence for acute intracranial process.  2.  Brain atrophy and presumed chronic microvascular ischemic changes as above.   CT Abdomen Pelvis w/o Contrast    Narrative    EXAM: CT ABDOMEN AND PELVIS WITHOUT CONTRAST  LOCATION: [facility]  DATE/TIME: 9/11/2024 10:19 PM CDT    INDICATION: Abdominal pain and vomiting.  COMPARISON: 8/24/2024 - CT chest.    TECHNIQUE: CT scan of the abdomen and pelvis was performed without IV contrast. Multiplanar reformats were obtained. Dose reduction techniques were used.  CONTRAST: None.    FINDINGS:    LOWER CHEST: Moderate-sized bilateral pleural effusions with adjacent atelectasis. Mild emphysematous changes in the lung bases.    HEPATOBILIARY: The gallbladder is mildly distended, but otherwise unremarkable in appearance.    SPLEEN: Unremarkable.    PANCREAS: Unremarkable.    ADRENAL GLANDS: Unremarkable.    KIDNEYS/BLADDER: A few small renal lesions are present bilaterally, measuring up to 1.5 cm in diameter. A few are mildly high in attenuation. These were present previously and most likely represent hemorrhagic or proteinaceous cysts.    BOWEL: Prior right hemicolectomy. The stomach, small and large bowel are normal in caliber. Several colonic diverticula are present without evidence of diverticulitis. No visualized bowel wall thickening, pneumatosis or free intraperitoneal gas.    LYMPH NODES:  Unremarkable.    PELVIC ORGANS: No acute findings.    MUSCULOSKELETAL: No acute findings.    OTHER: Atherosclerotic calcification in the abdominal aorta. Very small left inguinal hernia containing fat.      Impression    IMPRESSION:   1. No acute abnormality identified in the abdomen or pelvis. No evidence of bowel obstruction.  2. Moderate-sized bilateral pleural effusions.    XR Chest 1 View    Narrative    EXAM: XR CHEST 1 VIEW  LOCATION: [facility]  DATE: 9/11/2024    INDICATION: sob  COMPARISON: 8/8/2024.      Impression    IMPRESSION: Moderate bilateral pleural effusions increased compared to previous chest x-ray compressive atelectasis at lung bases. Heart size is obscured secondary to the basilar density. 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   UA with Microscopic reflex to Culture    Specimen: Urine, Catheter   Result Value Ref Range    Color Urine Yellow Colorless, Straw, Light Yellow, Yellow    Appearance Urine Clear Clear    Glucose Urine Negative Negative mg/dL    Bilirubin Urine Negative Negative    Ketones Urine Negative Negative mg/dL    Specific Gravity Urine 1.015 1.003 - 1.035    Blood Urine Small (A) Negative    pH Urine 5.5 5.0 - 7.0    Protein Albumin Urine Negative Negative mg/dL    Urobilinogen Urine Normal Normal, 2.0 mg/dL    Nitrite Urine Negative Negative    Leukocyte Esterase Urine Moderate (A) Negative    WBC Clumps Urine Present (A) None Seen /HPF    Mucus Urine Present (A) None Seen /LPF    RBC Urine 3 (H) <=2 /HPF    WBC Urine 74 (H) <=5 /HPF    Squamous Epithelials Urine 1 <=1 /HPF    Narrative    Urine Culture ordered based on laboratory criteria   CBC with platelets   Result Value Ref Range    WBC Count 11.3 (H) 4.0 - 11.0 10e3/uL    RBC Count 4.16 3.80 - 5.20 10e6/uL    Hemoglobin 13.2 11.7 - 15.7 g/dL    Hematocrit 41.4 35.0 - 47.0 %     78 - 100 fL    MCH 31.7 26.5 - 33.0 pg    MCHC 31.9 31.5 - 36.5  g/dL    RDW 14.9 10.0 - 15.0 %    Platelet Count 313 150 - 450 10e3/uL   Ionized Calcium   Result Value Ref Range    Calcium Ionized Whole Blood 5.9 (H) 4.4 - 5.2 mg/dL   Cystatin C with GFR   Result Value Ref Range    Cystatin C 3.1 (H) 0.6 - 1.0 mg/L    GFR Calculated with Cystatin C 14 (L) >=60 mL/min/1.73m2    Narrative    eGFRcys in adults is calculated using the 2012 CKD-EPI cystatin c equation which includes age and gender (Eugene et al., [place], DOI: 10.1056/VRBHrn1668985)   Comprehensive metabolic panel   Result Value Ref Range    Sodium 137 135 - 145 mmol/L    Potassium 5.6 (H) 3.4 - 5.3 mmol/L    Carbon Dioxide (CO2) 24 22 - 29 mmol/L    Anion Gap 9 7 - 15 mmol/L    Urea Nitrogen 60.9 (H) 8.0 - 23.0 mg/dL    Creatinine 1.75 (H) 0.51 - 0.95 mg/dL    GFR Estimate 26 (L) >60 mL/min/1.73m2    Calcium 11.3 (H) 8.8 - 10.4 mg/dL    Chloride 104 98 - 107 mmol/L    Glucose 72 70 - 99 mg/dL    Alkaline Phosphatase 416 (H) 40 - 150 U/L    AST 42 0 - 45 U/L    ALT 43 0 - 50 U/L    Protein Total 5.9 (L) 6.4 - 8.3 g/dL    Albumin 2.8 (L) 3.5 - 5.2 g/dL    Bilirubin Total 0.5 <=1.2 mg/dL   Parathyroid Hormone Intact   Result Value Ref Range    Parathyroid Hormone Intact 59 15 - 65 pg/mL    Narrative    This result was obtained with the Roche Elecsys PTH STAT assay.   This reference range differs from PTH assays used in other [facility] laboratories.       Medical Decision Making     75 MINUTES SPENT BY ME on the date of service doing chart review, history, exam, documentation & further activities per the note.

## 2024-09-12 NOTE — PHARMACY-ADMISSION MEDICATION HISTORY
Pharmacist Admission Medication History    Admission medication history is complete. The information provided in this note is only as accurate as the sources available at the time of the update.    Information Source(s): Facility ([facility]/) medication list/MAR and    via  [facility]    Pertinent Information: all info updated per MAR    Changes made to PTA medication list:  Added: zofran schedule  Deleted: heparin SQ  Changed: None    Allergies reviewed with patient and updates made in EHR: yes    Medication History Completed By: Maged Orellana RPH 9/11/2024 9:42 PM    PTA Med List   Medication Sig Last Dose    acetaminophen (TYLENOL) 325 MG tablet Take 2 tablets (650 mg) by mouth 3 times daily. 9/11/2024 at 1400    albuterol (PROAIR HFA/PROVENTIL HFA/VENTOLIN HFA) 108 (90 Base) MCG/ACT inhaler Inhale 2 puffs into the lungs every 6 hours as needed for shortness of breath, wheezing or cough Unknown    alum & mag hydroxide-simethicone (MAALOX) 200-200-20 MG/5ML SUSP suspension Take 30 mLs by mouth every 4 hours as needed for indigestion Unknown    bisacodyl (DULCOLAX) 10 MG suppository Place 1 suppository (10 mg) rectally daily as needed for constipation Unknown    Cranberry 500 MG CAPS Take 500 mg by mouth 2 times daily 9/11/2024 at 0800    divalproex sodium extended-release (DEPAKOTE ER) 500 MG 24 hr tablet Take 500 mg by mouth At Bedtime 9/10/2024 at 2100    doxycycline hyclate (VIBRAMYCIN) 100 MG capsule Take 1 capsule (100 mg) by mouth every 12 hours for 26 days. 9/11/2024 at am    famotidine (PEPCID) 20 MG tablet Take 20 mg by mouth every other day. 9/10/2024 at 0800    ipratropium-albuterol (COMBIVENT RESPIMAT)  MCG/ACT inhaler Inhale 1 puff into the lungs 4 times daily as needed for shortness of breath / dyspnea or wheezing Unknown    letrozole (FEMARA) 2.5 MG tablet TAKE 1 TABLET BY MOUTH EVERY DAY 9/11/2024 at 0800    levothyroxine (SYNTHROID/LEVOTHROID) 50 MCG tablet Take 50 mcg by  mouth daily. 9/11/2024 at 0600    Lidocaine (LIDOCARE) 4 % Patch Place 1 patch onto the skin daily as needed (back pain). To prevent lidocaine toxicity, patient should be patch free for 12 hrs daily. Unknown    lithium (ESKALITH) 150 MG capsule Take 150 mg by mouth every morning  9/11/2024 at 0800    loperamide (IMODIUM) 2 MG capsule Take 2 mg by mouth 4 times daily as needed for diarrhea Unknown    LORazepam (ATIVAN) 0.5 MG tablet Take 0.5 tablets (0.25 mg) by mouth nightly as needed for anxiety. Unknown    magnesium hydroxide (MILK OF MAGNESIA) 400 MG/5ML suspension Take 30 mLs by mouth daily as needed for constipation Unknown    melatonin 1 MG TABS tablet Take 1 tablet (1 mg) by mouth nightly as needed for sleep Past Month    ondansetron (ZOFRAN ODT) 4 MG ODT tab Take 4 mg by mouth 2 times daily (before meals). 30 min before breakfast and supper 9/11/2024 at 1700    ondansetron (ZOFRAN-ODT) 4 MG ODT tab Take 1 tablet (4 mg) by mouth every 6 hours as needed for nausea or vomiting Past Week    polyvinyl alcohol-povidone PF (REFRESH) 1.4-0.6 % ophthalmic solution Place 2 drops into both eyes 2 times daily 9/11/2024 at 0800    QUEtiapine (SEROQUEL) 100 MG tablet Take 100 mg by mouth at bedtime. 9/10/2024 at 2100    senna-docusate (SENOKOT-S/PERICOLACE) 8.6-50 MG tablet Take 1-2 tablets by mouth 2 times daily as needed for constipation. Unknown    traMADol 25 MG TABS tablet Take 1 tablet (25 mg) by mouth 2 times daily as needed for severe pain. Past Week    vitamin D3 (CHOLECALCIFEROL) 50 mcg (2000 units) tablet Take 1 tablet by mouth daily 9/11/2024 at 0800    zinc Oxide (DESITIN) 40 % paste Apply topically as needed for dry skin or irritation Unknown

## 2024-09-12 NOTE — PROGRESS NOTES
Morning medications held due to patient pocketing medications and not being able to swallow water, waiting for swallow eval to be completed.

## 2024-09-12 NOTE — PROGRESS NOTES
[facility]    Medicine Progress Note - Hospitalist Service    Date of Admission:  9/11/2024    Assessment & Plan        Lennie Mar is a 89 year old woman who returns to the hospital from transitional care apparently at the request of her family because she appeared to be more confused, less interactive and with concerns of vomiting earlier     Recent medical history includes hospitalization at [facility] from 8/8 through 8/12/2024 for apparent left lower lobe pneumonia and acute hypoxic respiratory failure contributing to some encephalopathy.  From that hospitalization, she was discharged back home to [facility] but return to the emergency department on 8/24/2024 due to shortness of breath and right flank pain for a couple of days that have been complicated by a fall.  She remained here from 8/24-8/30/2024 during which time the patient had been found to have bilateral pleural effusions thought to be due to an exacerbation of chronic diastolic heart failure.  She underwent thoracenteses and because she indicated she wanted to go home, the medical team made arrangements for discharge.  Unfortunately, the patient was too weak to go home to [facility] and she was discharged to transitional care.     More remote PMH includes breast cancer for which she has been on Letrozole since 2019. (I believe she is still on it.)  Her chart indicates CKD stage 3b, but given her age and low muscle mass, the creatinine likely overestimates her GFR. She is treated for Bipolar disorder with Lithium and Valproic acid.  She has a history of atrial tachycardias but is currently in a sinus rhythm with a left anterior fascicular block.      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  Remained to be high risk for falling  -She will benefit for close monitoring care under inpatient service  -Will request cardiac telemonitoring given underlying hyperkalemia  -Repeat serum potassium this afternoon  -I will initiate her with gentle IV fluid support in the setting of her hypercalcemia, ASHLEY, accompanying azotemia and suspicion of hypovolemic state with decreasing oral intake prior to this hospitalization with episodes of vomiting spells.  -Will hold off any further diuresis for now.  Continue to monitor for any hypervolemic symptomatology in the setting of already known pleural effusion  -Agree with initial plans by admitting service regarding further discussion of goals of care with palliative care service in the setting of her recurrent hospitalization, advanced age and numerous extensive complicated medical history  -Will defer consultation request to cardiology and nephrology for now while awaiting for goals of care discussion  -Also agree with suspicion of underlying malignancy in the setting of her advanced age, previous history of breast carcinoma now with hypercalcemia with normal PTH intact and accompanying alkaline phosphatase elevation  -Oxygen support and titrate as able  -Resume home regimen of breathing inhalers  -Continued on antibiotics as started during admission process due to underlying suspected urinary tract infection.  Will follow-up cultures and adjust antibiotics accordingly      Addendum:  Spoke with patient's family over the phone and discussed her underlying diagnosis, plan of care and her recent trajectory of clinical deterioration in the last several weeks requiring numerous hospitalizations.  Patient also noted that likely she is not doing well and interested in  "further discussion with goals of care.  Currently she is a DNR/DNI.  No further escalation of care.  Pending palliative care service.  Likely patient and family are leaning towards comfort care approach and hospice evaluation upon discharge.  -Patient's case was also discussed with nursing service     Diet: Combination Diet Regular Diet Adult    DVT Prophylaxis: Pneumatic Compression Devices  Kelley Catheter: Not present  Lines: None     Cardiac Monitoring: None  Code Status: No CPR- Do NOT Intubate      Clinically Significant Risk Factors Present on Admission        # Hyperkalemia: Highest K = 5.6 mmol/L in last 2 days, will monitor as appropriate    # Hypercalcemia: Highest Ca = 11.3 mg/dL in last 2 days, will monitor as appropriate    # Hypoalbuminemia: Lowest albumin = 2.8 g/dL at 9/12/2024  6:46 AM, will monitor as appropriate     # Hypertension: Noted on problem list         # Overweight: Estimated body mass index is 27.82 kg/m  as calculated from the following:    Height as of 8/24/24: 1.575 m (5' 2\").    Weight as of 8/24/24: 69 kg (152 lb 1.9 oz).       # Financial/Environmental Concerns:                 Disposition Plan     Medically Ready for Discharge: Anticipated in 2-4 Days             Juan Izaguirre MD, MD  Hospitalist Service  [facility]  Securely message with [employer] (more info)  Text page via [employer] Paging/Directory   ______________________________________________________________________    Interval History   I assumed medicine service care today.  Seen and examined.  Chart reviewed.  Discussed with nursing service.  I met this pleasant lady earlier today while boarding the emergency room waiting for admission bed.  She is sleeping comfortably but easily aroused with minimal verbal stimuli.  No reports of any nausea or vomiting in the emergency room.  Still demonstrating stable hemodynamics.  On oxygen support with no escalation overnight.  No events of mental status " changes such as being combative or agitated.  Remained to be afebrile.  No reported bleeding tendencies.  No diarrhea.  Endorsing no chest pain or shortness of breath or abdominal pain.    Physical Exam   Vital Signs: Temp: 98  F (36.7  C) Temp src: Oral BP: (!) 118/92 Pulse: 80   Resp: 16 SpO2: 100 % O2 Device: Nasal cannula Oxygen Delivery: 3 LPM  Weight: 0 lbs 0 oz    HEENT; Atraumatic, normocephalic, pinkish conjuctiva, pupils bilateral reactive   Abuse notable large neck mass, nontender  Skin: warm and moist, no rashes  Weak looking  Lungs: equal chest expansion, clear to auscultation, no wheezes, no stridor, no crackles,   Heart: normal rate, normal rhythm, no rubs or gallops.   Abdomen: normal bowel sounds, no tenderness, no peritoneal signs, no guarding  Extremities: no deformities,   Neuro; follow commands, alert and oriented to her name, following some simple verbal instructions, moving all extremities spontaneously, spontaneous speech, coherent, moves all extremities spontaneously  Psych; not combative, not agitated      Medical Decision Making             Data

## 2024-09-12 NOTE — ED NOTES
Attempting to provide night med to patient, although pt reported she normally able to swallow pills as a whole, but she was unable to do so. Pt had hard time swallowing pills, pocketing, but is able to take small sip of water. Offered apple sauce and crushed pill in juice, pt declined, unwilling to try. Dr. Adams were informed, will hold med until swallow eval can be completed.     Also noted big swollen lump to right neck, no-redness, pt reported non-tender to the touch, non-crepitus. MD is aware of the abnormality. Pt was repositioned

## 2024-09-12 NOTE — ED PROVIDER NOTES
Emergency Department Note      History of Present Illness     Chief Complaint   Vomiting      HPI   Lennie Mar is a 89 year old female with history of bipolar disorder, CKD, depression, anxiety, CKD who presents with nausea and vomiting for the past couple of days.  Patient was discharged to a TCU from the hospital on 8/30/2024 after being admitted for acute CHF exacerbation with bilateral pleural effusions and A-fib with RVR.  She had her pleural effusions drained while in the hospital and was discharged on doxycycline for possible pneumonia.  Patient's son Naren noted that the patient has been increasingly confused over the past couple of days.  She has been oriented only to the state that she is in and she was not able to recognize his wife.  Patient apparently can normally discuss politics and has much less confusion at baseline.  She also has not been able to eat or drink much over the past couple of days and has been vomiting.    Independent Historian   Son as detailed above. (By phone)    Review of External Notes   I reviewed the discharge summary from 8/30/2024.  Patient was admitted for A-fib, CHF, bilateral pleural effusions.    Past Medical History     Medical History and Problem List   Past Medical History:   Diagnosis Date    Alcohol dependence in remission (H)     Anxiety     Asymptomatic varicose veins     Bipolar disorder, unspecified (H)     CKD (chronic kidney disease) stage 3, GFR 30-59 ml/min (H) 2/18/2014    History of smoking     Hyperparathyroidism (H)     Infection due to 2019 novel coronavirus 3/10/2022    Memory loss     Muscle weakness (generalized) 6/23/2008    Severe depression (H)     Subdural hygroma     Tremor of unknown origin        Medications   acetaminophen (TYLENOL) 325 MG tablet  albuterol (PROAIR HFA/PROVENTIL HFA/VENTOLIN HFA) 108 (90 Base) MCG/ACT inhaler  alum & mag hydroxide-simethicone (MAALOX) 200-200-20 MG/5ML SUSP suspension  bisacodyl (DULCOLAX) 10 MG  suppository  Cranberry 500 MG CAPS  divalproex sodium extended-release (DEPAKOTE ER) 500 MG 24 hr tablet  doxycycline hyclate (VIBRAMYCIN) 100 MG capsule  famotidine (PEPCID) 20 MG tablet  Heparin Sodium, Porcine, (HEPARIN ANTICOAGULANT) 5000 UNIT/0.5ML injection  ipratropium-albuterol (COMBIVENT RESPIMAT)  MCG/ACT inhaler  letrozole (FEMARA) 2.5 MG tablet  levothyroxine (SYNTHROID/LEVOTHROID) 50 MCG tablet  Lidocaine (LIDOCARE) 4 % Patch  lithium (ESKALITH) 150 MG capsule  loperamide (IMODIUM) 2 MG capsule  LORazepam (ATIVAN) 0.5 MG tablet  magnesium hydroxide (MILK OF MAGNESIA) 400 MG/5ML suspension  melatonin 1 MG TABS tablet  ondansetron (ZOFRAN-ODT) 4 MG ODT tab  polyvinyl alcohol-povidone PF (REFRESH) 1.4-0.6 % ophthalmic solution  QUEtiapine (SEROQUEL) 100 MG tablet  senna-docusate (SENOKOT-S/PERICOLACE) 8.6-50 MG tablet  traMADol 25 MG TABS tablet  vitamin D3 (CHOLECALCIFEROL) 50 mcg (2000 units) tablet  zinc Oxide (DESITIN) 40 % paste        Surgical History   Past Surgical History:   Procedure Laterality Date    APPENDECTOMY OPEN  1947    CATARACT IOL, RT/LT      COLONOSCOPY WITH CO2 INSUFFLATION  2/29/2012    Procedure:COLONOSCOPY WITH CO2 INSUFFLATION; Surgeon:GAYLE REYNA; Location:[facility] OR    CYSTOCELE REPAIR  1972    CYSTOSCOPY, INSERT STENT URETHRA, COMBINED  1999    CYSTOSCOPY, RETROGRADES, INSERT STENT URETER(S), COMBINED  2/29/2012    Procedure:COMBINED CYSTOSCOPY, RETROGRADES, INSERT STENT URETER(S); Surgeon:ANT ESPINOZA; Location:[facility] OR    DECOMPRESSION LUMBAR ONE LEVEL  8/9/2013    Procedure: DECOMPRESSION LUMBAR ONE LEVEL;  Posterior Decompression Right Lumbar 5- Sacral 1;  Surgeon: You Zavala MD;  Location: [facility] OR    HC TOOTH EXTRACTION W/FORCEP      HYSTERECTOMY, CATA  1963    IRRIGATION AND DEBRIDEMENT ORAL, COMBINED  1982    For treatment of gingivitis    LAPAROSCOPIC ASSISTED COLECTOMY  2/29/2012    Procedure:LAPAROSCOPIC ASSISTED COLECTOMY; Cysto, Bilateral  Stent placement (both stents removed at end of case)- Yanet ACOSTA. Laparoscopic Extended Right Venu Colectomy with CO2 Colonoscopy and polyp removal-Judah; Surgeon:GAYLE REYNA; Location:[facility] OR    LIGATN/STRIP LONG OR SHORT SAPHEN  1955    MASTECTOMY PARTIAL WITH SENTINEL NODE Left 11/19/2018    Procedure: Left Mastectomy, Left [place] Lymph Node Biopsy;  Surgeon: Nahun Betancourt MD;  Location: [facility] OR    STRIP VEIN BILATERAL  1964    SURGICAL HISTORY OF -   1999    ureter surgery for blockage.       Physical Exam     Patient Vitals for the past 24 hrs:   BP Temp Temp src Pulse Resp SpO2   09/11/24 2227 116/55 -- -- 89 -- --   09/11/24 2200 -- -- -- -- 15 99 %   09/11/24 2129 93/69 -- -- 84 14 98 %   09/11/24 2115 -- -- -- 94 12 100 %   09/11/24 2114 110/62 -- -- 94 -- --   09/11/24 2105 -- -- -- 94 12 100 %   09/11/24 2100 104/62 -- -- 95 10 100 %   09/11/24 2052 110/75 -- -- 96 12 99 %   09/11/24 2030 -- -- -- 99 14 97 %   09/11/24 2015 (!) 177/135 -- -- 99 14 90 %   09/11/24 2000 119/75 -- -- 99 13 98 %   09/11/24 1954 -- -- -- 102 14 (!) 86 %   09/11/24 1953 -- -- -- 102 14 (!) 86 %   09/11/24 1948 105/84 -- -- (!) 129 26 95 %   09/11/24 1945 105/84 97.4  F (36.3  C) Oral (!) 126 27 --   09/11/24 1944 -- -- -- 108 22 93 %   09/11/24 1940 -- -- -- -- (!) 37 99 %   09/1935 (!) 135/99 -- -- -- -- 91 %   09/11/24 1931 (!) 130/118 -- -- -- -- --     Physical Exam  Vitals and nursing note reviewed.   Constitutional:       General: She is not in acute distress.     Appearance: She is ill-appearing. She is not toxic-appearing.   HENT:      Head: Normocephalic and atraumatic.      Right Ear: External ear normal.      Left Ear: External ear normal.      Nose: Nose normal.      Mouth/Throat:      Mouth: Mucous membranes are moist.   Eyes:      Extraocular Movements: Extraocular movements intact.      Conjunctiva/sclera: Conjunctivae normal.   Cardiovascular:      Rate and Rhythm: Normal rate and regular  rhythm.      Heart sounds: No murmur heard.  Pulmonary:      Effort: Pulmonary effort is normal. No respiratory distress.      Breath sounds: Normal breath sounds. No wheezing, rhonchi or rales.   Abdominal:      General: Abdomen is flat. Bowel sounds are normal. There is no distension.      Palpations: Abdomen is soft.      Tenderness: There is generalized abdominal tenderness (mild). There is no guarding or rebound.   Musculoskeletal:         General: No deformity or signs of injury.      Cervical back: Normal range of motion and neck supple.   Skin:     General: Skin is warm and dry.      Findings: No rash.   Neurological:      Mental Status: She is alert.      Motor: Tremor present.   Psychiatric:         Behavior: Behavior normal.           Diagnostics     Lab Results   Labs Ordered and Resulted from Time of ED Arrival to Time of ED Departure   COMPREHENSIVE METABOLIC PANEL - Abnormal       Result Value    Sodium 136      Potassium 5.2      Carbon Dioxide (CO2) 25      Anion Gap 9      Urea Nitrogen 61.8 (*)     Creatinine 1.81 (*)     GFR Estimate 25 (*)     Calcium 11.3 (*)     Chloride 102      Glucose 78      Alkaline Phosphatase 442 (*)     AST 44      ALT 44      Protein Total 6.2 (*)     Albumin 2.8 (*)     Bilirubin Total 0.7     LIPASE - Abnormal    Lipase 133 (*)    TROPONIN T, HIGH SENSITIVITY - Abnormal    Troponin T, High Sensitivity 24 (*)    NT PROBNP INPATIENT - Abnormal    N terminal Pro BNP Inpatient 2,467 (*)    CBC WITH PLATELETS AND DIFFERENTIAL - Abnormal    WBC Count 13.3 (*)     RBC Count 4.16      Hemoglobin 13.1      Hematocrit 41.6            MCH 31.5      MCHC 31.5      RDW 14.8      Platelet Count 385      % Neutrophils 72      % Lymphocytes 16      % Monocytes 9      % Eosinophils 0      % Basophils 1      % Immature Granulocytes 2      NRBCs per 100 WBC 1 (*)     Absolute Neutrophils 9.6 (*)     Absolute Lymphocytes 2.1      Absolute Monocytes 1.3      Absolute Eosinophils  0.1      Absolute Basophils 0.1      Absolute Immature Granulocytes 0.2      Absolute NRBCs 0.1     ISTAT GASES LACTATE VENOUS POCT - Abnormal    Lactic Acid POCT 1.5      Bicarbonate Venous POCT 29 (*)     O2 Sat, Venous POCT 36 (*)     pCO2 Venous POCT 58 (*)     pH Venous POCT 7.31 (*)     pO2 Venous POCT 24 (*)    LITHIUM LEVEL - Normal    Lithium 0.94     VALPROIC ACID - Normal    Valproic acid 50.4     INFLUENZA A/B, RSV, & SARS-COV2 PCR - Normal    Influenza A PCR Negative      Influenza B PCR Negative      RSV PCR Negative      SARS CoV2 PCR Negative     ROUTINE UA WITH MICROSCOPIC REFLEX TO CULTURE       Imaging   XR Chest 1 View   Final Result   IMPRESSION: Moderate bilateral pleural effusions increased compared to previous chest x-ray compressive atelectasis at lung bases. Heart size is obscured secondary to the basilar density. Leftward deviation of trachea secondary to presumed right-sided    goiter is unchanged.      CT Abdomen Pelvis w/o Contrast   Preliminary Result   IMPRESSION:    1. No acute abnormality identified in the abdomen or pelvis. No evidence of bowel obstruction.   2. Moderate-sized bilateral pleural effusions.       CT Head w/o Contrast   Final Result   IMPRESSION:   1.  No CT evidence for acute intracranial process.   2.  Brain atrophy and presumed chronic microvascular ischemic changes as above.          EKG   ECG results from 09/11/24   EKG 12-lead, tracing only     Value    Systolic Blood Pressure     Diastolic Blood Pressure     Ventricular Rate 100    Atrial Rate 100    MT Interval 198    QRS Duration 116        QTc 443    P Axis 13    R AXIS -44    T Axis 112    Interpretation ECG      Sinus rhythm with Fusion complexes  Left axis deviation  Left ventricular hypertrophy with QRS widening and repolarization abnormality ( R in aVL , Gallo product )  Abnormal ECG  When compared with ECG of 26-Aug-2024 00:45,  Fusion complexes are now Present         Independent  Interpretation   CXR: No pneumothorax. Bilateral pleural effusions.   CT Head: No intracranial hemorrhage.    ED Course      Medications Administered   Medications   ondansetron (ZOFRAN) injection 4 mg (has no administration in time range)   sodium chloride 0.9% BOLUS 500 mL (has no administration in time range)       Procedures   Procedures     Discussion of Management   Admitting Hospitalist, Dr Adams    ED Course   ED Course as of 09/11/24 2256   Wed Sep 11, 2024   2023 I discussed with the patient's son Naren.   2255 I discussed with Dr. Adams who accepts admission.       Additional Documentation  None    Medical Decision Making / Diagnosis     CMS Diagnoses: None    MIPS       None    MDM   Lennie Mar is a 89 year old female who presents with nausea vomiting for the past couple of days.  She was recently in the hospital for CHF exacerbation and had bilateral pleural effusions.  She was discharged on oxygen and antibiotics.  History is somewhat limited.  Differential diagnosis is broad and includes infection, bowel obstruction, intra-abdominal pathology such as pancreatitis, cholecystitis, appendicitis, constipation, gastroenteritis, etc. Will check labs and chest x-ray and give IV fluids and antiemetics.    Disposition   The patient was admitted to the hospital.     Diagnosis     ICD-10-CM    1. Delirium  R41.0       2. Nausea and vomiting, unspecified vomiting type  R11.2       3. Leukocytosis, unspecified type  D72.829            Discharge Medications   New Prescriptions    No medications on file         MD Sarah Madrid Shaun M, MD  09/11/24 2256

## 2024-09-13 LAB
ANION GAP SERPL CALCULATED.3IONS-SCNC: 9 MMOL/L (ref 7–15)
BASOPHILS # BLD AUTO: 0.1 10E3/UL (ref 0–0.2)
BASOPHILS NFR BLD AUTO: 2 %
BUN SERPL-MCNC: 60.6 MG/DL (ref 8–23)
CALCIUM SERPL-MCNC: 10.8 MG/DL (ref 8.8–10.4)
CHLORIDE SERPL-SCNC: 109 MMOL/L (ref 98–107)
CREAT SERPL-MCNC: 1.67 MG/DL (ref 0.51–0.95)
EGFRCR SERPLBLD CKD-EPI 2021: 28 ML/MIN/1.73M2
EOSINOPHIL # BLD AUTO: 0.2 10E3/UL (ref 0–0.7)
EOSINOPHIL NFR BLD AUTO: 3 %
ERYTHROCYTE [DISTWIDTH] IN BLOOD BY AUTOMATED COUNT: 15 % (ref 10–15)
GLUCOSE SERPL-MCNC: 79 MG/DL (ref 70–99)
HCO3 SERPL-SCNC: 21 MMOL/L (ref 22–29)
HCT VFR BLD AUTO: 35.4 % (ref 35–47)
HGB BLD-MCNC: 10.9 G/DL (ref 11.7–15.7)
IMM GRANULOCYTES # BLD: 0.1 10E3/UL
IMM GRANULOCYTES NFR BLD: 1 %
LYMPHOCYTES # BLD AUTO: 1.9 10E3/UL (ref 0.8–5.3)
LYMPHOCYTES NFR BLD AUTO: 25 %
MCH RBC QN AUTO: 31.7 PG (ref 26.5–33)
MCHC RBC AUTO-ENTMCNC: 30.8 G/DL (ref 31.5–36.5)
MCV RBC AUTO: 103 FL (ref 78–100)
MONOCYTES # BLD AUTO: 1.1 10E3/UL (ref 0–1.3)
MONOCYTES NFR BLD AUTO: 14 %
NEUTROPHILS # BLD AUTO: 4.4 10E3/UL (ref 1.6–8.3)
NEUTROPHILS NFR BLD AUTO: 56 %
NRBC # BLD AUTO: 0 10E3/UL
NRBC BLD AUTO-RTO: 0 /100
PLATELET # BLD AUTO: 249 10E3/UL (ref 150–450)
POTASSIUM SERPL-SCNC: 4.8 MMOL/L (ref 3.4–5.3)
RBC # BLD AUTO: 3.44 10E6/UL (ref 3.8–5.2)
SODIUM SERPL-SCNC: 139 MMOL/L (ref 135–145)
WBC # BLD AUTO: 7.8 10E3/UL (ref 4–11)

## 2024-09-13 PROCEDURE — 258N000003 HC RX IP 258 OP 636: Performed by: INTERNAL MEDICINE

## 2024-09-13 PROCEDURE — 250N000013 HC RX MED GY IP 250 OP 250 PS 637: Performed by: INTERNAL MEDICINE

## 2024-09-13 PROCEDURE — 80048 BASIC METABOLIC PNL TOTAL CA: CPT | Performed by: INTERNAL MEDICINE

## 2024-09-13 PROCEDURE — 85025 COMPLETE CBC W/AUTO DIFF WBC: CPT | Performed by: INTERNAL MEDICINE

## 2024-09-13 PROCEDURE — 120N000001 HC R&B MED SURG/OB

## 2024-09-13 PROCEDURE — 36415 COLL VENOUS BLD VENIPUNCTURE: CPT | Performed by: INTERNAL MEDICINE

## 2024-09-13 PROCEDURE — 99232 SBSQ HOSP IP/OBS MODERATE 35: CPT | Performed by: INTERNAL MEDICINE

## 2024-09-13 PROCEDURE — 93010 ELECTROCARDIOGRAM REPORT: CPT | Performed by: INTERNAL MEDICINE

## 2024-09-13 PROCEDURE — 250N000011 HC RX IP 250 OP 636: Performed by: INTERNAL MEDICINE

## 2024-09-13 PROCEDURE — 999N000226 HC STATISTIC SLP IP EVAL DEFER

## 2024-09-13 RX ADMIN — METOPROLOL TARTRATE 12.5 MG: 25 TABLET, FILM COATED ORAL at 08:43

## 2024-09-13 RX ADMIN — CEFTRIAXONE 1 G: 1 INJECTION, POWDER, FOR SOLUTION INTRAMUSCULAR; INTRAVENOUS at 23:47

## 2024-09-13 RX ADMIN — QUETIAPINE 100 MG: 100 TABLET ORAL at 21:00

## 2024-09-13 RX ADMIN — LEVOTHYROXINE SODIUM 50 MCG: 0.05 TABLET ORAL at 08:46

## 2024-09-13 RX ADMIN — METOPROLOL TARTRATE 12.5 MG: 25 TABLET, FILM COATED ORAL at 21:00

## 2024-09-13 RX ADMIN — SODIUM CHLORIDE: 9 INJECTION, SOLUTION INTRAVENOUS at 23:56

## 2024-09-13 RX ADMIN — CARBOXYMETHYLCELLULOSE SODIUM 2 DROP: 5 SOLUTION/ DROPS OPHTHALMIC at 08:46

## 2024-09-13 RX ADMIN — CARBOXYMETHYLCELLULOSE SODIUM 2 DROP: 5 SOLUTION/ DROPS OPHTHALMIC at 20:56

## 2024-09-13 RX ADMIN — DIVALPROEX SODIUM 500 MG: 500 TABLET, FILM COATED, EXTENDED RELEASE ORAL at 21:01

## 2024-09-13 ASSESSMENT — ACTIVITIES OF DAILY LIVING (ADL)
ADLS_ACUITY_SCORE: 49
ADLS_ACUITY_SCORE: 57
ADLS_ACUITY_SCORE: 63
ADLS_ACUITY_SCORE: 57
ADLS_ACUITY_SCORE: 57
ADLS_ACUITY_SCORE: 49
ADLS_ACUITY_SCORE: 49
ADLS_ACUITY_SCORE: 57
ADLS_ACUITY_SCORE: 57
ADLS_ACUITY_SCORE: 49
ADLS_ACUITY_SCORE: 57
ADLS_ACUITY_SCORE: 49
ADLS_ACUITY_SCORE: 49
ADLS_ACUITY_SCORE: 63
ADLS_ACUITY_SCORE: 57
ADLS_ACUITY_SCORE: 49
ADLS_ACUITY_SCORE: 63
ADLS_ACUITY_SCORE: 57

## 2024-09-13 NOTE — PROGRESS NOTES
SPIRITUAL HEALTH SERVICES Progress Note  [facility] 5th Floor    Brief initial visit with pt per IDT referral, regarding possible support connecting with . Pt declined SH support at this time.    No plans to follow; SH remains glad to support pt or family.     Patrick Barnett M.Div., ACPE   Staff  & Clinical Pastoral Educator

## 2024-09-13 NOTE — PROGRESS NOTES
Care Management Follow Up    Length of Stay (days): 1    Expected Discharge Date: 09/16/2024     Concerns to be Addressed:       Patient plan of care discussed at interdisciplinary rounds: Yes    Anticipated Discharge Disposition:  Back to [facility] with hospice        Anticipated Discharge Services:    Anticipated Discharge DME:      Patient/family educated on Medicare website which has current facility and service quality ratings:    Education Provided on the Discharge Plan:    Patient/Family in Agreement with the Plan:      Referrals Placed by CM/SW:    Private pay costs discussed:     Discussed  Partnership in Safe Discharge Planning  document with patient/family:     Handoff Completed:     Additional Information:    ADDENDUM: ESPERANZA spoke with patient's son, Naren. Naren shared that the family is open to hospice but he would like to discuss with Lennie hernandez as she has been resistant to hospice in the past. He shared that if she agrees, they would like to use [facility] hospice as she lives at an [facility] facility. Naren expressed some concern that the hospice agency may require Lennie to stop taking her Bipolar medication, which he says would be harmful to her. He would like to make sure this would not happen before confirming hospice services.     ESPERANZA left VM for patient's family members, son Naren ([phone] ) and daughter-in-law Marta ([phone], [phone]) to offer support with discharge planning. ESPERANZA also left VM at [facility], (Nursing p:[phone]. Clinical Director, Bryanna Young, [phone]) to inquire about preferred hospice agencies and their capacity to take the patient back.     Next Steps:   Work with family and NATHAN to make a plan for the patient to return with hospice.     ONEL Dickerson, [facility]   Care Coordinator-Casual  [email or web address]

## 2024-09-13 NOTE — PROGRESS NOTES
Cross cover notified the patient flipped into atrial fibrillation with rates in the 100-110s.  Previous rates are in the 70s.  Reviewed previous EKGs and looks like she had A-fib in 2022.  Not on AV pricila blockade or anticoagulation.  Based on progress note from today and palliative care note the plan is to treat her with fluids and antibiotics and then discharge her back to previous living environment with hospice.  Not escalating cares further, however with her significantly increased rate I will add oral metoprolol tartrate 12.5 mg twice daily starting now and get an EKG just to confirm rhythm.

## 2024-09-13 NOTE — PROGRESS NOTES
SLP: Clinical swallow evaluation orders received; however, progress note revealed pt is to be discharged back to previous living environment with hospice. SLP will defer consult. Please re-consult with changes in pt's goals of care. Discussed with MD.

## 2024-09-13 NOTE — PLAN OF CARE
"End of Shift Summary  For vital signs and complete assessments, please see documentation flowsheets.     Pertinent assessments: Alert- disoriented to time, place, situation. VSS on 3L NC. LS diminished. BS active. No BM this shift. Purewick in place with good output. Repositioned. Tele  A fib RVR.     Major Shift Events: Converted to A fib RVR, cross cover paged and EKG completed. Ordered metoprolol but pt unable to swallow medication- MD notified.    Treatment Plan: symptom management, IVF, IV abx, PIP, WOC/palliative following    Bedside Nurse: Vasquez Chow RN         Goal Outcome Evaluation:      Plan of Care Reviewed With: patient    Overall Patient Progress: no changeOverall Patient Progress: no change    Outcome Evaluation: VSS. Tele converted to A fib RVR. Slept most of shift.      Problem: Adult Inpatient Plan of Care  Goal: Plan of Care Review  Description: The Plan of Care Review/Shift note should be completed every shift.  The Outcome Evaluation is a brief statement about your assessment that the patient is improving, declining, or no change.  This information will be displayed automatically on your shift  note.  Outcome: Progressing  Flowsheets (Taken 9/13/2024 0541)  Outcome Evaluation: VSS. Tele converted to A fib RVR. Slept most of shift.  Plan of Care Reviewed With: patient  Overall Patient Progress: no change  Goal: Patient-Specific Goal (Individualized)  Description: You can add care plan individualizations to a care plan. Examples of Individualization might be:  \"Parent requests to be called daily at 9am for status\", \"I have a hard time hearing out of my right ear\", or \"Do not touch me to wake me up as it startles  me\".  Outcome: Progressing  Goal: Absence of Hospital-Acquired Illness or Injury  Outcome: Progressing  Intervention: Identify and Manage Fall Risk  Recent Flowsheet Documentation  Taken 9/12/2024 2030 by Vasquez Chow, RN  Safety Promotion/Fall Prevention:   assistive " device/personal items within reach   clutter free environment maintained   patient and family education   safety round/check completed   room organization consistent  Intervention: Prevent Skin Injury  Recent Flowsheet Documentation  Taken 9/13/2024 0330 by Vasquez Chow RN  Body Position:   turned   left  Taken 9/13/2024 0116 by Vasquez Chow RN  Body Position:   turned   right  Taken 9/12/2024 2300 by Vasquez Chow RN  Body Position:   turned   left  Taken 9/12/2024 2030 by Vasquez Chow RN  Body Position:   turned   right  Skin Protection:   adhesive use limited   incontinence pads utilized  Device Skin Pressure Protection:   absorbent pad utilized/changed   adhesive use limited   positioning supports utilized  Intervention: Prevent and Manage VTE (Venous Thromboembolism) Risk  Recent Flowsheet Documentation  Taken 9/12/2024 2030 by Vasquez Chow RN  VTE Prevention/Management: SCDs off (sequential compression devices)  Intervention: Prevent Infection  Recent Flowsheet Documentation  Taken 9/12/2024 2030 by Vasquez Chow RN  Infection Prevention:   rest/sleep promoted   hand hygiene promoted  Goal: Optimal Comfort and Wellbeing  Outcome: Progressing  Goal: Readiness for Transition of Care  Outcome: Progressing     Problem: Fall Injury Risk  Goal: Absence of Fall and Fall-Related Injury  Outcome: Progressing  Intervention: Identify and Manage Contributors  Recent Flowsheet Documentation  Taken 9/12/2024 2030 by Vasquez Chow RN  Medication Review/Management: medications reviewed  Intervention: Promote Injury-Free Environment  Recent Flowsheet Documentation  Taken 9/12/2024 2030 by Vasquez Chow RN  Safety Promotion/Fall Prevention:   assistive device/personal items within reach   clutter free environment maintained   patient and family education   safety round/check completed   room organization consistent     Problem: Infection  Goal: Absence of Infection Signs and Symptoms  Outcome:  Progressing  Intervention: Prevent or Manage Infection  Recent Flowsheet Documentation  Taken 9/12/2024 2030 by Vasquez Chow RN  Isolation Precautions: contact precautions maintained     Problem: Comorbidity Management  Goal: Blood Pressure in Desired Range  Outcome: Progressing  Intervention: Maintain Blood Pressure Management  Recent Flowsheet Documentation  Taken 9/12/2024 2030 by Vasquez Chow, RN  Medication Review/Management: medications reviewed

## 2024-09-13 NOTE — PROGRESS NOTES
BRIEF NUTRITION NOTE     Chart reviewed for positive MST score of unsure weight loss and poor intake d/t poor appetite.  Per progress note and palliative note, pt is to be discharged back to previous living environment with hospice.  Discussed with MD. RD will defer MST assessment.   RD input note into [facility] that okay to order supplements and made pt RN aware that pt, family, or RN are able to order supplements PRN if pt tolerates it. MD approves of this course.      RD will sign off. If GOC or needs change, please formally consult.      Rosemarie Salazar RD, LD  Clinical Dietitian  3rd Floor/ICU: [phone]  All Other Floors: [phone]  Weekends/Holiday: [phone]  Office: [phone]

## 2024-09-13 NOTE — PROGRESS NOTES
[facility]    Medicine Progress Note - Hospitalist Service    Date of Admission:  9/11/2024    Assessment & Plan        Lennie Mar is a 89 year old woman who returns to the hospital from transitional care apparently at the request of her family because she appeared to be more confused, less interactive and with concerns of vomiting earlier     Recent medical history includes hospitalization at [facility] from 8/8 through 8/12/2024 for apparent left lower lobe pneumonia and acute hypoxic respiratory failure contributing to some encephalopathy.  From that hospitalization, she was discharged back home to [facility] but return to the emergency department on 8/24/2024 due to shortness of breath and right flank pain for a couple of days that have been complicated by a fall.  She remained here from 8/24-8/30/2024 during which time the patient had been found to have bilateral pleural effusions thought to be due to an exacerbation of chronic diastolic heart failure.  She underwent thoracenteses and because she indicated she wanted to go home, the medical team made arrangements for discharge.  Unfortunately, the patient was [facility] and [facility].     More remote PMH includes breast cancer for which she has been on Letrozole since 2019. (I believe she is still on it.)  Her chart indicates CKD stage 3b, but given her age and low muscle mass, the creatinine likely overestimates her GFR. She is treated for Bipolar disorder with Lithium and Valproic acid.  She has a history of atrial tachycardias but is currently in a sinus rhythm with a left anterior fascicular block.      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  Remained to be high risk for falling  -Stop cardiac telemonitoring  -Hyperkalemia improved.  Suspected atrial fibrillation might also be related to his numerous electrolyte derangements however cannot totally rule out chronicity  -Patient is not a good candidate for long-term anticoagulation given advanced age, fall risk and now with goals of care discussion leading no further escalation of cares.  -Nursing noted concerns for possible dysphagia.  We discussed about current goals of care and likely will not much benefit for further SLP input as plans for likely hospice approach upon discharge  -Highly appreciate input from palliative care service  -On IV fluids with improvement of her azotemia and hyperkalemia.  Hypercalcemia on a downward trend  -Unfortunately Ping remained at risk for hypervolemia symptoms in the setting of ongoing IV fluid support, ASHLEY and previously known pleural effusions  -Still holding off further diuresis  -Still having high suspicion for underlying brewing malignancy with prior breast carcinoma and with abnormal laboratory markers such as hypercalcemia, elevated alkaline phosphatase with normal PTH intact  -I am electing not to further escalate investigation of malignancy workup her current goals of care and intention for hospice care upon discharge  -Continued on antibiotics as started during admission process due to underlying suspected urinary tract infection.  Will follow-up cultures and adjust antibiotics accordingly      Spoke with patient's family over the phone and discussed her underlying diagnosis, plan of care and her recent trajectory of clinical deterioration in the last several weeks requiring numerous  "hospitalizations.  Patient also noted that likely she is not doing well and interested in further discussion with goals of care.  Currently she is a DNR/DNI.  No further escalation of care.  Pending palliative care service.  Likely patient and family are leaning towards comfort care approach and hospice evaluation upon discharge.  -Patient's case was also discussed with nursing service     Diet: Combination Diet Regular Diet Adult    DVT Prophylaxis: Pneumatic Compression Devices  Kelley Catheter: Not present  Lines: None     Cardiac Monitoring: None  Code Status: No CPR- Do NOT Intubate      Clinically Significant Risk Factors        # Hyperkalemia: Highest K = 5.7 mmol/L in last 2 days, will monitor as appropriate    # Hypercalcemia: Highest Ca = 11.3 mg/dL in last 2 days, will monitor as appropriate    # Hypoalbuminemia: Lowest albumin = 2.8 g/dL at 9/12/2024  6:46 AM, will monitor as appropriate     # Hypertension: Noted on problem list           # Overweight: Estimated body mass index is 27.82 kg/m  as calculated from the following:    Height as of 8/24/24: 1.575 m (5' 2\").    Weight as of 8/24/24: 69 kg (152 lb 1.9 oz)., PRESENT ON ADMISSION       # Financial/Environmental Concerns:                 Disposition Plan     Medically Ready for Discharge: Anticipated in 2-4 Days             Juan Izaguirre MD, MD  Hospitalist Service  [facility]  Securely message with [employer] (more info)  Text page via [facility] Paging/Directory   ______________________________________________________________________    Interval History   Continuing medicine service care today.  Seen and examined.  Chart reviewed.  Discussed with nursing service.  Nursing service reported notable issues of possible dysphagia  Patient remained to be a poor historian.  She has spontaneous eye opening but does not follow verbal instructions.  She had some grunting earlier.  Afebrile.  On oxygen support no further escalation " overnight       Physical Exam   Vital Signs: Temp: 97.4  F (36.3  C) Temp src: Axillary BP: 107/77 Pulse: 80   Resp: 20 SpO2: 90 % O2 Device: None (Room air) Oxygen Delivery: 3 LPM  Weight: 0 lbs 0 oz    HEENT; Atraumatic, normocephalic, pinkish conjuctiva, pupils bilateral reactive   Abuse notable large neck mass, nontender  Skin: warm and moist, no rashes  Weak looking  Lungs: equal chest expansion,  fair air entry, bilateral fine crackles  Heart: normal rate, normal rhythm, no rubs or gallops.   Abdomen: normal bowel sounds, no tenderness, no peritoneal signs, no guarding  Extremities: no deformities,   Neuro; follow commands, alert and oriented to her name, following some simple verbal instructions, moving all extremities spontaneously, spontaneous speech, coherent, moves all extremities spontaneously  Psych; not combative, not agitated      Medical Decision Making             Data

## 2024-09-13 NOTE — PLAN OF CARE
"Goal Outcome Evaluation:           Overall Patient Progress: no changeOverall Patient Progress: no change    Outcome Evaluation: poor appetite. VSS. several pressure wounds, woc consulted.        End of Shift Summary  For vital signs and complete assessments, please see documentation flowsheets.     Pertinent assessments: Disoriented to time. C/o bottom pain, repo & barrier cream given. 3 small open areas on coccyx & bilateral ear pressure injuries. LS diminished, remains on 3L NC, dyspnea with activity. IVF infusing. Appetite is very poor. Tele: BBB & 1st degree AV block, relayed to MD. Per son patient is using A2 and LIFT at TCU. Potassium results relayed to MD.     Major Shift Events: admission     Treatment Plan: rocephin, IVF, pain management, repo, WOC, palliative     Bedside Nurse: Rina Lewis RN         Problem: Adult Inpatient Plan of Care  Goal: Plan of Care Review  Description: The Plan of Care Review/Shift note should be completed every shift.  The Outcome Evaluation is a brief statement about your assessment that the patient is improving, declining, or no change.  This information will be displayed automatically on your shift  note.  Outcome: Not Progressing  Flowsheets (Taken 9/12/2024 1950)  Outcome Evaluation: poor appetite. VSS. several pressure wounds, woc consulted.  Overall Patient Progress: no change  Goal: Patient-Specific Goal (Individualized)  Description: You can add care plan individualizations to a care plan. Examples of Individualization might be:  \"Parent requests to be called daily at 9am for status\", \"I have a hard time hearing out of my right ear\", or \"Do not touch me to wake me up as it startles  me\".  Outcome: Not Progressing  Goal: Absence of Hospital-Acquired Illness or Injury  Outcome: Not Progressing  Intervention: Identify and Manage Fall Risk  Recent Flowsheet Documentation  Taken 9/12/2024 1100 by Rina Lewis RN  Safety Promotion/Fall Prevention: activity " supervised  Intervention: Prevent Skin Injury  Recent Flowsheet Documentation  Taken 9/12/2024 1749 by Rina Lewis RN  Body Position:   left   turned  Taken 9/12/2024 1200 by Rina Lewis RN  Body Position:   right   turned  Taken 9/12/2024 1100 by Rina Lewis RN  Skin Protection: adhesive use limited  Device Skin Pressure Protection:   absorbent pad utilized/changed   adhesive use limited  Intervention: Prevent and Manage VTE (Venous Thromboembolism) Risk  Recent Flowsheet Documentation  Taken 9/12/2024 1100 by Rina Lewis RN  VTE Prevention/Management: SCDs off (sequential compression devices)  Intervention: Prevent Infection  Recent Flowsheet Documentation  Taken 9/12/2024 1100 by Rina eLwis RN  Infection Prevention: cohorting utilized  Goal: Optimal Comfort and Wellbeing  Outcome: Not Progressing  Intervention: Monitor Pain and Promote Comfort  Recent Flowsheet Documentation  Taken 9/12/2024 1056 by Rina Lewis RN  Pain Management Interventions: repositioned  Goal: Readiness for Transition of Care  Outcome: Not Progressing  Intervention: Mutually Develop Transition Plan  Recent Flowsheet Documentation  Taken 9/12/2024 1635 by Rina Lewis RN  Equipment Currently Used at Home: wheelchair, manual

## 2024-09-14 LAB
ANION GAP SERPL CALCULATED.3IONS-SCNC: 10 MMOL/L (ref 7–15)
BUN SERPL-MCNC: 55.1 MG/DL (ref 8–23)
CALCIUM SERPL-MCNC: 10.2 MG/DL (ref 8.8–10.4)
CHLORIDE SERPL-SCNC: 108 MMOL/L (ref 98–107)
CREAT SERPL-MCNC: 1.49 MG/DL (ref 0.51–0.95)
EGFRCR SERPLBLD CKD-EPI 2021: 32 ML/MIN/1.73M2
GLUCOSE SERPL-MCNC: 60 MG/DL (ref 70–99)
HCO3 SERPL-SCNC: 21 MMOL/L (ref 22–29)
POTASSIUM SERPL-SCNC: 4.3 MMOL/L (ref 3.4–5.3)
SODIUM SERPL-SCNC: 139 MMOL/L (ref 135–145)

## 2024-09-14 PROCEDURE — 250N000011 HC RX IP 250 OP 636: Performed by: INTERNAL MEDICINE

## 2024-09-14 PROCEDURE — 36415 COLL VENOUS BLD VENIPUNCTURE: CPT | Performed by: INTERNAL MEDICINE

## 2024-09-14 PROCEDURE — 80048 BASIC METABOLIC PNL TOTAL CA: CPT | Performed by: INTERNAL MEDICINE

## 2024-09-14 PROCEDURE — 250N000013 HC RX MED GY IP 250 OP 250 PS 637: Performed by: INTERNAL MEDICINE

## 2024-09-14 PROCEDURE — 99232 SBSQ HOSP IP/OBS MODERATE 35: CPT | Performed by: INTERNAL MEDICINE

## 2024-09-14 PROCEDURE — 120N000001 HC R&B MED SURG/OB

## 2024-09-14 PROCEDURE — 258N000003 HC RX IP 258 OP 636: Performed by: INTERNAL MEDICINE

## 2024-09-14 RX ORDER — NALOXONE HYDROCHLORIDE 0.4 MG/ML
0.2 INJECTION, SOLUTION INTRAMUSCULAR; INTRAVENOUS; SUBCUTANEOUS
Status: DISCONTINUED | OUTPATIENT
Start: 2024-09-14 | End: 2024-09-17 | Stop reason: HOSPADM

## 2024-09-14 RX ORDER — NALOXONE HYDROCHLORIDE 0.4 MG/ML
0.4 INJECTION, SOLUTION INTRAMUSCULAR; INTRAVENOUS; SUBCUTANEOUS
Status: DISCONTINUED | OUTPATIENT
Start: 2024-09-14 | End: 2024-09-17 | Stop reason: HOSPADM

## 2024-09-14 RX ADMIN — LEVOTHYROXINE SODIUM 50 MCG: 0.05 TABLET ORAL at 06:46

## 2024-09-14 RX ADMIN — METOPROLOL TARTRATE 12.5 MG: 25 TABLET, FILM COATED ORAL at 10:59

## 2024-09-14 RX ADMIN — METOPROLOL TARTRATE 12.5 MG: 25 TABLET, FILM COATED ORAL at 22:07

## 2024-09-14 RX ADMIN — LITHIUM CARBONATE 150 MG: 150 CAPSULE, GELATIN COATED ORAL at 11:00

## 2024-09-14 RX ADMIN — DIVALPROEX SODIUM 500 MG: 500 TABLET, FILM COATED, EXTENDED RELEASE ORAL at 22:08

## 2024-09-14 RX ADMIN — ONDANSETRON 4 MG: 2 INJECTION INTRAMUSCULAR; INTRAVENOUS at 11:57

## 2024-09-14 RX ADMIN — FAMOTIDINE 20 MG: 20 TABLET, FILM COATED ORAL at 10:59

## 2024-09-14 RX ADMIN — QUETIAPINE 100 MG: 100 TABLET ORAL at 22:08

## 2024-09-14 RX ADMIN — LETROZOLE 2.5 MG: 2.5 TABLET ORAL at 11:00

## 2024-09-14 RX ADMIN — SODIUM CHLORIDE: 9 INJECTION, SOLUTION INTRAVENOUS at 15:25

## 2024-09-14 ASSESSMENT — ACTIVITIES OF DAILY LIVING (ADL)
ADLS_ACUITY_SCORE: 63
ADLS_ACUITY_SCORE: 61
ADLS_ACUITY_SCORE: 63
ADLS_ACUITY_SCORE: 61
ADLS_ACUITY_SCORE: 63
ADLS_ACUITY_SCORE: 63
ADLS_ACUITY_SCORE: 65
ADLS_ACUITY_SCORE: 63
ADLS_ACUITY_SCORE: 65
ADLS_ACUITY_SCORE: 63
ADLS_ACUITY_SCORE: 61

## 2024-09-14 NOTE — CONSULTS
Care Management Follow Up    Length of Stay (days): 2    Expected Discharge Date: 09/16/2024     Concerns to be Addressed:       Patient plan of care discussed at interdisciplinary rounds: Yes    Anticipated Discharge Disposition:                Anticipated Discharge Services:    Anticipated Discharge DME:      Patient/family educated on Medicare website which has current facility and service quality ratings:    Education Provided on the Discharge Plan:    Patient/Family in Agreement with the Plan:      Referrals Placed by CM/SW:    Private pay costs discussed: Not applicable    Discussed  Partnership in Safe Discharge Planning  document with patient/family: No     Handoff Completed: No, handoff not indicated or clinically appropriate    Additional Information:  Patient has been seen and CM is following. Consult was cleared      Next Steps: Monitor for ability to return to [facility] Hosp[facility] and then arrange IF family decides that option.    Bridget Navarro RN, BSN, CM  Inpatient Care Coordination  [facility]  [phone]      Kiki Navarro RN

## 2024-09-14 NOTE — PROGRESS NOTES
[facility]    Medicine Progress Note - Hospitalist Service    Date of Admission:  9/11/2024    Assessment & Plan        Lennie Mar is a 89 year old woman who returns to the hospital from transitional care apparently at the request of her family because she appeared to be more confused, less interactive and with concerns of vomiting earlier     Recent medical history includes hospitalization at [facility] from 8/8 through 8/12/2024 for apparent left lower lobe pneumonia and acute hypoxic respiratory failure contributing to some encephalopathy.  From that hospitalization, she was discharged back home to [facility] but return to the emergency department on 8/24/2024 due to shortness of breath and right flank pain for a couple of days that have been complicated by a fall.  She remained here from 8/24-8/30/2024 during which time the patient had been found to have bilateral pleural effusions thought to be due to an exacerbation of chronic diastolic heart failure.  She underwent thoracenteses and because she indicated she wanted to go home, the medical team made arrangements for discharge.  Unfortunately, the patient was [facility] and [facility] was [facility] care.     More remote PMH includes breast cancer for which she has been on Letrozole since 2019. (I believe she is still on it.)  Her chart indicates CKD stage 3b, but given her age and low muscle mass, the creatinine likely overestimates her GFR. She is treated for Bipolar disorder with Lithium and Valproic acid.  She has a history of atrial tachycardias but is currently in a sinus rhythm with a left anterior fascicular block.    Addendum:  Family updated regarding ongoing clinical condition and plan of care  Has a pending metabolic panel.  Family requesting with modification of diet to mechanical soft diet  -Will see if she will tolerate this better  -Decrease IV fluids to 50 mL/h  if she continues to tolerate better oral intake  -We also discussed about urine cultures showing likely a VRE but unsure if this is a true pathogen or a colonization.  She is not the best historian and hard to ask for any ongoing symptomatology.  Her most recent UA showing not totally convincing for a severe infectious process.  Her isolated suspected VRE is also at 10-50,000 CFU  -Will stop ceftriaxone.  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  Remained to be high risk for falling  -Stop cardiac telemonitoring  -Hyperkalemia improved and resolved.  Suspected atrial fibrillation might also be related to his numerous electrolyte derangements however cannot totally rule out chronicity  -Patient is not a good candidate for long-term anticoagulation given advanced age, fall risk and now with goals of care discussion leading no further escalation of cares.  -Nursing noted concerns for possible dysphagia.  We discussed about current goals of care and likely will not much benefit for further SLP input as plans for likely hospice approach upon discharge  -Highly appreciate input from palliative care service  -On IV fluids with improvement of her azotemia and hyperkalemia.  Hypercalcemia on a downward  trend  -Repeat BMP pending this morning  -Urine cultures growing possible VRE, unsure if this is a colonization versus a true infection  -If a true pathogen may need ID service input depending on further goals of care      -Unfortunately Ping remained at risk for hypervolemia symptoms in the setting of ongoing IV fluid support, ASHLEY and previously known pleural effusions  -Still holding off further diuresis  -Still having high suspicion for underlying brewing malignancy with prior breast carcinoma and with abnormal laboratory markers such as hypercalcemia, elevated alkaline phosphatase with normal PTH intact  -I am electing not to further escalate investigation of malignancy workup her current goals of care and intention for hospice care upon discharge  -Continued on antibiotics as started during admission process due to underlying suspected urinary tract infection.  Will follow-up cultures and adjust antibiotics accordingly      Spoke with patient's family over the phone and discussed her underlying diagnosis, plan of care and her recent trajectory of clinical deterioration in the last several weeks requiring numerous hospitalizations.  Patient also noted that likely she is not doing well and interested in further discussion with goals of care.  Currently she is a DNR/DNI.  No further escalation of care.  Patient Factive.  Likely patient and family are leaning towards comfort care approach and hospice evaluation upon discharge.      Diet: Combination Diet Regular Diet Adult    DVT Prophylaxis: Pneumatic Compression Devices  Kelley Catheter: Not present  Lines: None     Cardiac Monitoring: None  Code Status: No CPR- Do NOT Intubate      Clinically Significant Risk Factors        # Hyperkalemia: Highest K = 5.7 mmol/L in last 2 days, will monitor as appropriate    # Hypercalcemia: Highest Ca = 10.8 mg/dL in last 2 days, will monitor as appropriate    # Hypoalbuminemia: Lowest albumin = 2.8 g/dL at 9/12/2024  6:46 AM,  "will monitor as appropriate     # Hypertension: Noted on problem list           # Overweight: Estimated body mass index is 27.82 kg/m  as calculated from the following:    Height as of 8/24/24: 1.575 m (5' 2\").    Weight as of 8/24/24: 69 kg (152 lb 1.9 oz)., PRESENT ON ADMISSION       # Financial/Environmental Concerns:                 Disposition Plan     Medically Ready for Discharge: Anticipated in 2-4 Days             Juan Izaguirre MD, MD  Hospitalist Service  [facility]  Securely message with [employer] (more info)  Text page via [facility] Paging/Directory   ______________________________________________________________________    Interval History   Continuing medicine service care today.  Seen and examined.  Chart reviewed.  Discussed with nursing service.  Nursing service reported notable issues of possible dysphagia  Patient remained to be a poor historian.  She has spontaneous eye opening but does not follow verbal instructions.  She had some grunting earlier.  Afebrile.  On oxygen support no further escalation overnight       Physical Exam   Vital Signs: Temp: 97.4  F (36.3  C) Temp src: Axillary BP: 123/52 Pulse: 59   Resp: 18 SpO2: 97 % O2 Device: Nasal cannula Oxygen Delivery: 3 LPM  Weight: 0 lbs 0 oz    HEENT; Atraumatic, normocephalic, pinkish conjuctiva, pupils bilateral reactive   Abuse notable large neck mass, nontender  Skin: warm and moist, no rashes  Weak looking  Lungs: equal chest expansion,  fair air entry, bilateral fine crackles  Heart: normal rate, normal rhythm, no rubs or gallops.   Abdomen: normal bowel sounds, no tenderness, no peritoneal signs, no guarding  Extremities: no deformities,   Neuro; much responsive, appears to be lethargic.  Limited exam.  Moving all extremities   Psych; not combative, not agitated      Medical Decision Making             Data   24-hour data reviewed  "

## 2024-09-14 NOTE — PLAN OF CARE
"End of Shift Summary  For vital signs and complete assessments, please see documentation flowsheets.     Pertinent assessments: Alert- disoriented to time, place, situation. VSS on 3L NC. LS diminished. BS active.BM this shift.  Denies pain.  Turned when pt allowed nsg to turn.  Did not eat but few bites of meals.  No noted swallow diff.  No redness found on ears.  No redness found on  face.  Tele discontinued.  Major Shift Events:  Treatment Plan: symptom management, IVF, IV abx, PIP, WOC/palliative following    Bedside Nurse: Sade Ludwig RN    Problem: Adult Inpatient Plan of Care  Goal: Plan of Care Review  Description: The Plan of Care Review/Shift note should be completed every shift.  The Outcome Evaluation is a brief statement about your assessment that the patient is improving, declining, or no change.  This information will be displayed automatically on your shift  note.  Outcome: Progressing  Flowsheets (Taken 9/13/2024 2013)  Plan of Care Reviewed With: patient  Overall Patient Progress: no change  Goal: Patient-Specific Goal (Individualized)  Description: You can add care plan individualizations to a care plan. Examples of Individualization might be:  \"Parent requests to be called daily at 9am for status\", \"I have a hard time hearing out of my right ear\", or \"Do not touch me to wake me up as it startles  me\".  Outcome: Progressing  Goal: Absence of Hospital-Acquired Illness or Injury  Outcome: Progressing  Intervention: Identify and Manage Fall Risk  Recent Flowsheet Documentation  Taken 9/13/2024 0800 by Sade Ludwig RN  Safety Promotion/Fall Prevention:   assistive device/personal items within reach   clutter free environment maintained   patient and family education   safety round/check completed   room organization consistent  Intervention: Prevent Skin Injury  Recent Flowsheet Documentation  Taken 9/13/2024 0800 by Sade Ludwig, RN  Body Position:   turned   right  Skin Protection:   " adhesive use limited   incontinence pads utilized  Device Skin Pressure Protection:   absorbent pad utilized/changed   adhesive use limited   positioning supports utilized  Intervention: Prevent and Manage VTE (Venous Thromboembolism) Risk  Recent Flowsheet Documentation  Taken 9/13/2024 0800 by Sade Ludwig RN  VTE Prevention/Management: SCDs off (sequential compression devices)  Intervention: Prevent Infection  Recent Flowsheet Documentation  Taken 9/13/2024 0800 by Sade Ludwig RN  Infection Prevention:   rest/sleep promoted   hand hygiene promoted  Goal: Optimal Comfort and Wellbeing  Outcome: Progressing  Goal: Readiness for Transition of Care  Outcome: Progressing     Problem: Fall Injury Risk  Goal: Absence of Fall and Fall-Related Injury  Outcome: Progressing  Intervention: Identify and Manage Contributors  Recent Flowsheet Documentation  Taken 9/13/2024 0800 by Sade Ludwig RN  Medication Review/Management: medications reviewed  Intervention: Promote Injury-Free Environment  Recent Flowsheet Documentation  Taken 9/13/2024 0800 by Sade Ludwig RN  Safety Promotion/Fall Prevention:   assistive device/personal items within reach   clutter free environment maintained   patient and family education   safety round/check completed   room organization consistent     Problem: Infection  Goal: Absence of Infection Signs and Symptoms  Outcome: Progressing  Intervention: Prevent or Manage Infection  Recent Flowsheet Documentation  Taken 9/13/2024 0800 by Sade Ludwig RN  Isolation Precautions: contact precautions maintained     Problem: Comorbidity Management  Goal: Blood Pressure in Desired Range  Outcome: Progressing  Intervention: Maintain Blood Pressure Management  Recent Flowsheet Documentation  Taken 9/13/2024 0800 by Sade Ludwig RN  Medication Review/Management: medications reviewed

## 2024-09-14 NOTE — PLAN OF CARE
"/61  Pulse 58   Temp 97.5  F  Resp 18  SpO2 98%     Patient is alert and oriented to self only, assist of 2 with dl steady, no complaints of pain, family is deciding about hospice care, incontinent of bowel and bladder, pure wick in, slept well at night.     Goal Outcome Evaluation:      Plan of Care Reviewed With: patient    Overall Patient Progress: no changeOverall Patient Progress: no change    Outcome Evaluation: Alert and oriented to self, no pain, assist of 2 with dl steady.      Problem: Adult Inpatient Plan of Care  Goal: Plan of Care Review  Description: The Plan of Care Review/Shift note should be completed every shift.  The Outcome Evaluation is a brief statement about your assessment that the patient is improving, declining, or no change.  This information will be displayed automatically on your shift  note.  9/14/2024 0531 by Elvia Syed RN  Outcome: Not Progressing  Flowsheets (Taken 9/14/2024 0531)  Outcome Evaluation: Alert and oriented to self, no pain, assist of 2 with dl steady.  Plan of Care Reviewed With: patient  Overall Patient Progress: no change  9/14/2024 0531 by Elvia Syed RN  Outcome: Progressing  Flowsheets (Taken 9/14/2024 0531)  Outcome Evaluation: Alert and oriented to self, no pain, assist of 2 with dl steady.  Plan of Care Reviewed With: patient  Overall Patient Progress: no change  Goal: Patient-Specific Goal (Individualized)  Description: You can add care plan individualizations to a care plan. Examples of Individualization might be:  \"Parent requests to be called daily at 9am for status\", \"I have a hard time hearing out of my right ear\", or \"Do not touch me to wake me up as it startles  me\".  9/14/2024 0531 by Elvia Syed, RN  Outcome: Not Progressing  9/14/2024 0531 by Elvia Syed RN  Outcome: Progressing  Goal: Absence of Hospital-Acquired Illness or Injury  9/14/2024 0531 by Elvia Syed, RN  Outcome: Not Progressing  9/14/2024 0531 by Kunal, " CASSIE Gan  Outcome: Progressing  Intervention: Identify and Manage Fall Risk  Recent Flowsheet Documentation  Taken 9/13/2024 2349 by Elvia Syed RN  Safety Promotion/Fall Prevention:   activity supervised   clutter free environment maintained   lighting adjusted   patient and family education   room near nurse's station   room organization consistent   safety round/check completed   supervised activity  Intervention: Prevent Skin Injury  Recent Flowsheet Documentation  Taken 9/13/2024 2349 by Elvia Syed RN  Body Position:   turned   right  Intervention: Prevent and Manage VTE (Venous Thromboembolism) Risk  Recent Flowsheet Documentation  Taken 9/13/2024 2349 by Elvia Syed RN  VTE Prevention/Management: SCDs off (sequential compression devices)  Intervention: Prevent Infection  Recent Flowsheet Documentation  Taken 9/13/2024 2349 by Elvia Syed RN  Infection Prevention:   cohorting utilized   hand hygiene promoted   rest/sleep promoted   single patient room provided  Goal: Optimal Comfort and Wellbeing  9/14/2024 0531 by Elvia Syed RN  Outcome: Not Progressing  9/14/2024 0531 by Elvia Syed RN  Outcome: Progressing  Goal: Readiness for Transition of Care  9/14/2024 0531 by Elvia Syed RN  Outcome: Not Progressing  9/14/2024 0531 by Elvia Syed RN  Outcome: Progressing

## 2024-09-15 LAB
ANION GAP SERPL CALCULATED.3IONS-SCNC: 9 MMOL/L (ref 7–15)
ATRIAL RATE - MUSE: 111 BPM
BACTERIA UR CULT: ABNORMAL
BASOPHILS # BLD AUTO: 0.1 10E3/UL (ref 0–0.2)
BASOPHILS NFR BLD AUTO: 1 %
BUN SERPL-MCNC: 50.5 MG/DL (ref 8–23)
CALCIUM SERPL-MCNC: 10.3 MG/DL (ref 8.8–10.4)
CHLORIDE SERPL-SCNC: 113 MMOL/L (ref 98–107)
CREAT SERPL-MCNC: 1.37 MG/DL (ref 0.51–0.95)
DIASTOLIC BLOOD PRESSURE - MUSE: NORMAL MMHG
EGFRCR SERPLBLD CKD-EPI 2021: 35 ML/MIN/1.73M2
EOSINOPHIL # BLD AUTO: 0.2 10E3/UL (ref 0–0.7)
EOSINOPHIL NFR BLD AUTO: 2 %
ERYTHROCYTE [DISTWIDTH] IN BLOOD BY AUTOMATED COUNT: 15.1 % (ref 10–15)
GLUCOSE SERPL-MCNC: 55 MG/DL (ref 70–99)
HCO3 SERPL-SCNC: 21 MMOL/L (ref 22–29)
HCT VFR BLD AUTO: 36.4 % (ref 35–47)
HGB BLD-MCNC: 11.2 G/DL (ref 11.7–15.7)
IMM GRANULOCYTES # BLD: 0.1 10E3/UL
IMM GRANULOCYTES NFR BLD: 1 %
INTERPRETATION ECG - MUSE: NORMAL
LYMPHOCYTES # BLD AUTO: 1.7 10E3/UL (ref 0.8–5.3)
LYMPHOCYTES NFR BLD AUTO: 23 %
MCH RBC QN AUTO: 31.3 PG (ref 26.5–33)
MCHC RBC AUTO-ENTMCNC: 30.8 G/DL (ref 31.5–36.5)
MCV RBC AUTO: 102 FL (ref 78–100)
MONOCYTES # BLD AUTO: 0.9 10E3/UL (ref 0–1.3)
MONOCYTES NFR BLD AUTO: 12 %
NEUTROPHILS # BLD AUTO: 4.5 10E3/UL (ref 1.6–8.3)
NEUTROPHILS NFR BLD AUTO: 60 %
NRBC # BLD AUTO: 0 10E3/UL
NRBC BLD AUTO-RTO: 0 /100
P AXIS - MUSE: NORMAL DEGREES
PLATELET # BLD AUTO: 169 10E3/UL (ref 150–450)
POTASSIUM SERPL-SCNC: 4.8 MMOL/L (ref 3.4–5.3)
PR INTERVAL - MUSE: NORMAL MS
QRS DURATION - MUSE: 124 MS
QT - MUSE: 370 MS
QTC - MUSE: 507 MS
R AXIS - MUSE: -51 DEGREES
RBC # BLD AUTO: 3.58 10E6/UL (ref 3.8–5.2)
SODIUM SERPL-SCNC: 143 MMOL/L (ref 135–145)
SYSTOLIC BLOOD PRESSURE - MUSE: NORMAL MMHG
T AXIS - MUSE: 123 DEGREES
VENTRICULAR RATE- MUSE: 113 BPM
WBC # BLD AUTO: 7.4 10E3/UL (ref 4–11)

## 2024-09-15 PROCEDURE — 120N000001 HC R&B MED SURG/OB

## 2024-09-15 PROCEDURE — 80048 BASIC METABOLIC PNL TOTAL CA: CPT | Performed by: INTERNAL MEDICINE

## 2024-09-15 PROCEDURE — 99232 SBSQ HOSP IP/OBS MODERATE 35: CPT | Performed by: STUDENT IN AN ORGANIZED HEALTH CARE EDUCATION/TRAINING PROGRAM

## 2024-09-15 PROCEDURE — 250N000013 HC RX MED GY IP 250 OP 250 PS 637: Performed by: INTERNAL MEDICINE

## 2024-09-15 PROCEDURE — 250N000011 HC RX IP 250 OP 636: Performed by: STUDENT IN AN ORGANIZED HEALTH CARE EDUCATION/TRAINING PROGRAM

## 2024-09-15 PROCEDURE — 250N000011 HC RX IP 250 OP 636: Performed by: INTERNAL MEDICINE

## 2024-09-15 PROCEDURE — 85025 COMPLETE CBC W/AUTO DIFF WBC: CPT | Performed by: INTERNAL MEDICINE

## 2024-09-15 PROCEDURE — 36415 COLL VENOUS BLD VENIPUNCTURE: CPT | Performed by: INTERNAL MEDICINE

## 2024-09-15 RX ORDER — DEXTROSE, SODIUM CHLORIDE, SODIUM LACTATE, POTASSIUM CHLORIDE, AND CALCIUM CHLORIDE 5; .6; .31; .03; .02 G/100ML; G/100ML; G/100ML; G/100ML; G/100ML
INJECTION, SOLUTION INTRAVENOUS CONTINUOUS
Status: DISCONTINUED | OUTPATIENT
Start: 2024-09-15 | End: 2024-09-17 | Stop reason: HOSPADM

## 2024-09-15 RX ADMIN — ACETAMINOPHEN 975 MG: 325 TABLET, FILM COATED ORAL at 12:36

## 2024-09-15 RX ADMIN — SODIUM CHLORIDE, SODIUM LACTATE, POTASSIUM CHLORIDE, CALCIUM CHLORIDE AND DEXTROSE MONOHYDRATE: 5; 600; 310; 30; 20 INJECTION, SOLUTION INTRAVENOUS at 14:59

## 2024-09-15 RX ADMIN — CARBOXYMETHYLCELLULOSE SODIUM 2 DROP: 5 SOLUTION/ DROPS OPHTHALMIC at 20:23

## 2024-09-15 RX ADMIN — QUETIAPINE 100 MG: 100 TABLET ORAL at 22:36

## 2024-09-15 RX ADMIN — LETROZOLE 2.5 MG: 2.5 TABLET ORAL at 09:51

## 2024-09-15 RX ADMIN — LEVOTHYROXINE SODIUM 50 MCG: 0.05 TABLET ORAL at 06:36

## 2024-09-15 RX ADMIN — CARBOXYMETHYLCELLULOSE SODIUM 2 DROP: 5 SOLUTION/ DROPS OPHTHALMIC at 09:51

## 2024-09-15 RX ADMIN — LITHIUM CARBONATE 150 MG: 150 CAPSULE, GELATIN COATED ORAL at 09:51

## 2024-09-15 RX ADMIN — ONDANSETRON 4 MG: 2 INJECTION INTRAMUSCULAR; INTRAVENOUS at 12:37

## 2024-09-15 RX ADMIN — METOPROLOL TARTRATE 12.5 MG: 25 TABLET, FILM COATED ORAL at 20:23

## 2024-09-15 RX ADMIN — METOPROLOL TARTRATE 12.5 MG: 25 TABLET, FILM COATED ORAL at 09:50

## 2024-09-15 ASSESSMENT — ACTIVITIES OF DAILY LIVING (ADL)
ADLS_ACUITY_SCORE: 63
ADLS_ACUITY_SCORE: 65
ADLS_ACUITY_SCORE: 57
ADLS_ACUITY_SCORE: 63
ADLS_ACUITY_SCORE: 57
ADLS_ACUITY_SCORE: 57
ADLS_ACUITY_SCORE: 63
ADLS_ACUITY_SCORE: 57
ADLS_ACUITY_SCORE: 57
ADLS_ACUITY_SCORE: 63
ADLS_ACUITY_SCORE: 65
ADLS_ACUITY_SCORE: 57
ADLS_ACUITY_SCORE: 57
ADLS_ACUITY_SCORE: 63
ADLS_ACUITY_SCORE: 57
ADLS_ACUITY_SCORE: 63
ADLS_ACUITY_SCORE: 65
ADLS_ACUITY_SCORE: 63

## 2024-09-15 NOTE — PROGRESS NOTES
Care Management Follow Up    Length of Stay (days): 3    Expected Discharge Date: 09/16/2024     Concerns to be Addressed:       Patient plan of care discussed at interdisciplinary rounds: Yes    Anticipated Discharge Disposition:                Anticipated Discharge Services:    Anticipated Discharge DME:      Patient/family educated on Medicare website which has current facility and service quality ratings:    Education Provided on the Discharge Plan:    Patient/Family in Agreement with the Plan:      Referrals Placed by CM/SW:    Private pay costs discussed: Not applicable    Discussed  Partnership in Safe Discharge Planning  document with patient/family: No     Handoff Completed: No, handoff not indicated or clinically appropriate    Additional Information:    SW left  for pt son Naren ([phone] )  to follow up on discharge planning. Hospice was discussed Friday in which Naren was planning to speak with Lennie prior to making the decision. If family wanted hospice, their preference is [facility]. Awaiting returned call.    Next Steps: Work with family on discharge back to [facility], (Nursing p:[phone]. Clinical Director, Bryanna Young, [phone] potentially with [facility] hospice.    Addendum    Spoke with pt son Naren over the phone who confirms that they are planning to send pt back to her nursing home with [facility] however they do still hope to meet with the palliative team again before moving forward with these plans. He is agreeable to a referral for [facility] hosp[facility] being sent. ESPERANZA sent referral in [facility]. Naren prefer planning go through his wife Marta (P:[phone]).     Mary Cody/ONEL De La Cruz, [facility]  Inpatient Care Coordination  Emergency Room /Float  [phone]    Mary Cody, [facility]

## 2024-09-15 NOTE — PLAN OF CARE
"  Problem: Adult Inpatient Plan of Care  Goal: Plan of Care Review  Description: The Plan of Care Review/Shift note should be completed every shift.  The Outcome Evaluation is a brief statement about your assessment that the patient is improving, declining, or no change.  This information will be displayed automatically on your shift  note.  Outcome: Progressing  Flowsheets (Taken 9/14/2024 2463)  Outcome Evaluation: Patient remains confused, but cooperative.  Sacral dressing C/D/I, denies pain  Plan of Care Reviewed With: patient  Goal: Patient-Specific Goal (Individualized)  Description: You can add care plan individualizations to a care plan. Examples of Individualization might be:  \"Parent requests to be called daily at 9am for status\", \"I have a hard time hearing out of my right ear\", or \"Do not touch me to wake me up as it startles  me\".  Outcome: Progressing  Goal: Absence of Hospital-Acquired Illness or Injury  Outcome: Progressing  Goal: Optimal Comfort and Wellbeing  Outcome: Progressing  Goal: Readiness for Transition of Care  Outcome: Progressing   Goal Outcome Evaluation:      Plan of Care Reviewed With: patient          Outcome Evaluation: Patient remains confused, but cooperative.  Sacral dressing C/D/I, denies pain      "

## 2024-09-15 NOTE — PLAN OF CARE
"BP 91/74  Pulse 59   Temp 97.6  F  Resp 18  SpO2 97%     Patient is alert but confused, no complaints of pain, assist of 2, pure wick in for the night, turned and repositioned throughout the night, family is considering comfort cares and hospice evaluation once she's back in her facility she came from.       Goal Outcome Evaluation:      Plan of Care Reviewed With: patient    Overall Patient Progress: no changeOverall Patient Progress: no change    Outcome Evaluation: Patient is alert but confused, no pain, assist of 2.      Problem: Adult Inpatient Plan of Care  Goal: Plan of Care Review  Description: The Plan of Care Review/Shift note should be completed every shift.  The Outcome Evaluation is a brief statement about your assessment that the patient is improving, declining, or no change.  This information will be displayed automatically on your shift  note.  Outcome: Not Progressing  Flowsheets (Taken 9/15/2024 0108)  Outcome Evaluation: Patient is alert but confused, no pain, assist of 2.  Plan of Care Reviewed With: patient  Overall Patient Progress: no change  Goal: Patient-Specific Goal (Individualized)  Description: You can add care plan individualizations to a care plan. Examples of Individualization might be:  \"Parent requests to be called daily at 9am for status\", \"I have a hard time hearing out of my right ear\", or \"Do not touch me to wake me up as it startles  me\".  Outcome: Not Progressing  Goal: Absence of Hospital-Acquired Illness or Injury  Outcome: Not Progressing  Intervention: Identify and Manage Fall Risk  Recent Flowsheet Documentation  Taken 9/14/2024 9020 by Elvia Syed RN  Safety Promotion/Fall Prevention:   activity supervised   clutter free environment maintained   lighting adjusted   nonskid shoes/slippers when out of bed   patient and family education   room near nurse's station   room organization consistent   safety round/check completed   supervised activity  Intervention: " Prevent Skin Injury  Recent Flowsheet Documentation  Taken 9/14/2024 2344 by Elvia Syed, RN  Body Position: weight shifting  Intervention: Prevent and Manage VTE (Venous Thromboembolism) Risk  Recent Flowsheet Documentation  Taken 9/14/2024 2344 by Elvia Syed, RN  VTE Prevention/Management: SCDs off (sequential compression devices)  Intervention: Prevent Infection  Recent Flowsheet Documentation  Taken 9/14/2024 2344 by Elvia Syed RN  Infection Prevention:   cohorting utilized   hand hygiene promoted   rest/sleep promoted   single patient room provided  Goal: Optimal Comfort and Wellbeing  Outcome: Not Progressing  Goal: Readiness for Transition of Care  Outcome: Not Progressing

## 2024-09-15 NOTE — PROGRESS NOTES
[facility]    Medicine Progress Note - Hospitalist Service    Date of Admission:  9/11/2024    Assessment & Plan     Lennie Mar is a 89 year old woman who returns to the hospital from transitional care apparently at the request of her family because she appeared to be more confused, less interactive and with concerns of vomiting earlier     Recent medical history includes hospitalization at [facility] from 8/8 through 8/12/2024 for apparent left lower lobe pneumonia and acute hypoxic respiratory failure contributing to some encephalopathy.  From that hospitalization, she was discharged back home to [facility] but return to the emergency department on 8/24/2024 due to shortness of breath and right flank pain for a couple of days that have been complicated by a fall.  She remained here from 8/24-8/30/2024 during which time the patient had been found to have bilateral pleural effusions thought to be due to an exacerbation of chronic diastolic heart failure.  She underwent thoracenteses and because she indicated she wanted to go home, the medical team made arrangements for discharge.  Unfortunately, the patient was too weak to [facility] and she was discharged to transitional care.     More remote PMH includes breast cancer for which she has been on Letrozole since 2019. (I believe she is still on it.)  Her chart indicates CKD stage 3b, but given her age and low muscle mass, the creatinine likely overestimates her GFR. She is treated for Bipolar disorder with Lithium and Valproic acid.  She has a history of atrial tachycardias but is currently in a sinus rhythm with a left anterior fascicular block.     Interval:  Family still hoping to pursue with discharge plan disposition going back to her prior place with hospice evaluation; likely discharge 9/16.     Encephalopathy likely multifactorial from infectious and metabolic and etiology  Physical  deconditioning  Spoke with patient's family and discussed her underlying diagnosis, plan of care and her recent trajectory of clinical deterioration in the last several weeks requiring numerous hospitalizations.  Patient also noted that likely she is not doing well and interested in further discussion with goals of care.  Currently she is a DNR/DNI.  No further escalation of care.  Likely patient and family are leaning towards comfort care approach and hospice evaluation upon discharge.    Hypercalcemia, mild hyperkalemia-improving  ASHLEY on CKD  Received IV fluids with improvement of her azotemia and hyperkalemia.  Hypercalcemia on a downward trend with repeat BMP improving. Continue at low rate until maintaining PO intake and continue to hold off on further diuresis, with continued monitoring for hypervolemia.  Still having high suspicion for underlying brewing malignancy with prior breast carcinoma and with abnormal laboratory markers such as hypercalcemia, elevated alkaline phosphatase with normal PTH intact. Electing not to further escalate investigation of malignancy workup her current goals of care and intention for hospice care upon discharge    Abnormal UA, likely colonization  She is not the best historian and hard to ask for any ongoing symptomatology.  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 Will stop ceftriaxone.  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 Suspected atrial fibrillation might also be related to his numerous electrolyte derangements however cannot totally rule out chronicity  -Patient is not a good candidate for  "long-term anticoagulation given advanced age, fall risk and now with goals of care discussion leading no further escalation of cares.  -Nursing noted concerns for possible dysphagia.  We discussed about current goals of care and likely will not much benefit for further SLP input as plans for likely hospice approach upon discharge       Diet: Soft & Bite Sized Diet (level 6) Mildly Thick (level 2)  Snacks/Supplements Adult: Other; patient/family/nursing may order any supplement consistent with diet orders; Between Meals  Snacks/Supplements Adult: Ensure Enlive; Between Meals    DVT Prophylaxis: Pneumatic Compression Devices  Kelley Catheter: Not present  Lines: None     Cardiac Monitoring: None  Code Status: No CPR- Do NOT Intubate      Clinically Significant Risk Factors              # Hypoalbuminemia: Lowest albumin = 2.8 g/dL at 9/12/2024  6:46 AM, will monitor as appropriate     # Hypertension: Noted on problem list           # Overweight: Estimated body mass index is 27.82 kg/m  as calculated from the following:    Height as of 8/24/24: 1.575 m (5' 2\").    Weight as of 8/24/24: 69 kg (152 lb 1.9 oz)., PRESENT ON ADMISSION     # Financial/Environmental Concerns:                 Disposition Plan     Expected Discharge Date: 09/16/2024                  Yamileth Choi DO  Hospitalist Service  [facility]  Securely message with [employer] (more info)  Text page via [employer] Paging/Directory   ______________________________________________________________________      Physical Exam   Vital Signs: Temp: 97.7  F (36.5  C) Temp src: Axillary BP: 124/45 Pulse: 64   Resp: 18 SpO2: 97 % O2 Device: Nasal cannula Oxygen Delivery: 3 LPM  Weight: 0 lbs 0 oz    General: NAD  Cardiac: warm and well perfused  Respiratory: Normal work of breathing.  Decreased breath sounds at bases.   GI:  Abdomen soft  Musculoskeletal: Moving all extremities appropriately.  Skin: No rashes or abrasions on exposed skin.  Neurologic: " opens eyes to voice      Medical Decision Making       45 MINUTES SPENT BY ME on the date of service doing chart review, history, exam, documentation & further activities per the note.      Data     I have personally reviewed the following data over the past 24 hrs:    7.4  \   11.2 (L)   / 169     143 113 (H) 50.5 (H) /  55 (L)   4.8 21 (L) 1.37 (H) \

## 2024-09-15 NOTE — PLAN OF CARE
"Pertinent assessments: Alert- disoriented to time, place, situation. VSS on 3L NC. LS diminished. BS active.BM this shift.  Denies pain.  Turned Q2 hours  Did not eat but few bites of meals.  Family at bedside most of day.  Purewick in place mult skin checks/monitoring.     Major Shift Events:  Treatment Plan: symptom management, IVF, IV abx, PIP, WOC/palliative following    Bedside Nurse: Sade Ludwig RN    Problem: Adult Inpatient Plan of Care  Goal: Plan of Care Review  Description: The Plan of Care Review/Shift note should be completed every shift.  The Outcome Evaluation is a brief statement about your assessment that the patient is improving, declining, or no change.  This information will be displayed automatically on your shift  note.  Outcome: Progressing  Flowsheets (Taken 9/14/2024 2008)  Outcome Evaluation: confused refused oob  Plan of Care Reviewed With:   patient   family  Overall Patient Progress: no change  Goal: Patient-Specific Goal (Individualized)  Description: You can add care plan individualizations to a care plan. Examples of Individualization might be:  \"Parent requests to be called daily at 9am for status\", \"I have a hard time hearing out of my right ear\", or \"Do not touch me to wake me up as it startles  me\".  Outcome: Progressing  Goal: Absence of Hospital-Acquired Illness or Injury  Outcome: Progressing  Intervention: Identify and Manage Fall Risk  Recent Flowsheet Documentation  Taken 9/14/2024 0830 by Sade Ludwig RN  Safety Promotion/Fall Prevention:   assistive device/personal items within reach   clutter free environment maintained   patient and family education   safety round/check completed   room organization consistent  Intervention: Prevent Skin Injury  Recent Flowsheet Documentation  Taken 9/14/2024 0830 by Sade Ludwig RN  Body Position:   turned   left  Skin Protection:   adhesive use limited   incontinence pads utilized  Device Skin Pressure Protection:   absorbent " pad utilized/changed   adhesive use limited   positioning supports utilized  Intervention: Prevent and Manage VTE (Venous Thromboembolism) Risk  Recent Flowsheet Documentation  Taken 9/14/2024 0830 by Sade Ludwig RN  VTE Prevention/Management: SCDs off (sequential compression devices)  Intervention: Prevent Infection  Recent Flowsheet Documentation  Taken 9/14/2024 0830 by Sade Ludwig RN  Infection Prevention:   rest/sleep promoted   hand hygiene promoted  Goal: Optimal Comfort and Wellbeing  Outcome: Progressing  Goal: Readiness for Transition of Care  Outcome: Progressing     Problem: Fall Injury Risk  Goal: Absence of Fall and Fall-Related Injury  Outcome: Progressing  Intervention: Identify and Manage Contributors  Recent Flowsheet Documentation  Taken 9/14/2024 0830 by Sade Ludwig RN  Medication Review/Management: medications reviewed  Intervention: Promote Injury-Free Environment  Recent Flowsheet Documentation  Taken 9/14/2024 0830 by Sade Ludwig RN  Safety Promotion/Fall Prevention:   assistive device/personal items within reach   clutter free environment maintained   patient and family education   safety round/check completed   room organization consistent     Problem: Infection  Goal: Absence of Infection Signs and Symptoms  Outcome: Progressing  Intervention: Prevent or Manage Infection  Recent Flowsheet Documentation  Taken 9/14/2024 0830 by Sade Ludwig RN  Isolation Precautions: contact precautions maintained     Problem: Comorbidity Management  Goal: Blood Pressure in Desired Range  Outcome: Progressing  Intervention: Maintain Blood Pressure Management  Recent Flowsheet Documentation  Taken 9/14/2024 0830 by Sade Ludwig RN  Medication Review/Management: medications reviewed

## 2024-09-16 PROCEDURE — 250N000013 HC RX MED GY IP 250 OP 250 PS 637: Performed by: INTERNAL MEDICINE

## 2024-09-16 PROCEDURE — 99232 SBSQ HOSP IP/OBS MODERATE 35: CPT | Performed by: NURSE PRACTITIONER

## 2024-09-16 PROCEDURE — 250N000011 HC RX IP 250 OP 636: Performed by: STUDENT IN AN ORGANIZED HEALTH CARE EDUCATION/TRAINING PROGRAM

## 2024-09-16 PROCEDURE — 999N000111 HC STATISTIC OT IP EVAL DEFER

## 2024-09-16 PROCEDURE — 120N000001 HC R&B MED SURG/OB

## 2024-09-16 PROCEDURE — 99232 SBSQ HOSP IP/OBS MODERATE 35: CPT | Performed by: STUDENT IN AN ORGANIZED HEALTH CARE EDUCATION/TRAINING PROGRAM

## 2024-09-16 RX ORDER — OLANZAPINE 5 MG/1
5 TABLET, ORALLY DISINTEGRATING ORAL EVERY 4 HOURS PRN
Qty: 15 TABLET | Refills: 0 | Status: SHIPPED | OUTPATIENT
Start: 2024-09-16

## 2024-09-16 RX ORDER — LORAZEPAM 0.5 MG/1
0.5 TABLET ORAL EVERY 4 HOURS PRN
Qty: 15 TABLET | Refills: 0 | Status: SHIPPED | OUTPATIENT
Start: 2024-09-16

## 2024-09-16 RX ADMIN — QUETIAPINE 50 MG: 100 TABLET ORAL at 21:19

## 2024-09-16 RX ADMIN — LEVOTHYROXINE SODIUM 50 MCG: 0.05 TABLET ORAL at 06:59

## 2024-09-16 RX ADMIN — SODIUM CHLORIDE, SODIUM LACTATE, POTASSIUM CHLORIDE, CALCIUM CHLORIDE AND DEXTROSE MONOHYDRATE: 5; 600; 310; 30; 20 INJECTION, SOLUTION INTRAVENOUS at 08:57

## 2024-09-16 RX ADMIN — CARBOXYMETHYLCELLULOSE SODIUM 2 DROP: 5 SOLUTION/ DROPS OPHTHALMIC at 10:39

## 2024-09-16 ASSESSMENT — ACTIVITIES OF DAILY LIVING (ADL)
ADLS_ACUITY_SCORE: 64
ADLS_ACUITY_SCORE: 65
ADLS_ACUITY_SCORE: 61
ADLS_ACUITY_SCORE: 65
ADLS_ACUITY_SCORE: 65
ADLS_ACUITY_SCORE: 61
ADLS_ACUITY_SCORE: 65
ADLS_ACUITY_SCORE: 61
ADLS_ACUITY_SCORE: 64
ADLS_ACUITY_SCORE: 65
ADLS_ACUITY_SCORE: 64
ADLS_ACUITY_SCORE: 65
ADLS_ACUITY_SCORE: 61
ADLS_ACUITY_SCORE: 61

## 2024-09-16 NOTE — PLAN OF CARE
"End of Shift Summary   For vital signs and complete assessments, please see documentation flowsheets.     Pertinent assessments: Alert to self only. VSS on 2L NC. LS diminished. BS active.BM this shift.  Generalized pain relieved with Ty.  Turned Q2 hours  Did not eat but few bites of meals.  Family at bedside most of day.  Purewick in place mult skin checks/monitoring.  Feet elevated.   Major Shift Events:  Treatment Plan: symptom management, IVF, IV abx,  WOC/palliative following discharge with hospice.    Bedside Nurse: Sade Ludwig RN    Problem: Adult Inpatient Plan of Care  Goal: Plan of Care Review  Description: The Plan of Care Review/Shift note should be completed every shift.  The Outcome Evaluation is a brief statement about your assessment that the patient is improving, declining, or no change.  This information will be displayed automatically on your shift  note.  Outcome: Not Progressing  Flowsheets (Taken 9/15/2024 2041)  Plan of Care Reviewed With: patient  Overall Patient Progress: no change  Goal: Patient-Specific Goal (Individualized)  Description: You can add care plan individualizations to a care plan. Examples of Individualization might be:  \"Parent requests to be called daily at 9am for status\", \"I have a hard time hearing out of my right ear\", or \"Do not touch me to wake me up as it startles  me\".  Outcome: Not Progressing  Goal: Absence of Hospital-Acquired Illness or Injury  Outcome: Not Progressing  Intervention: Identify and Manage Fall Risk  Recent Flowsheet Documentation  Taken 9/15/2024 0800 by Sade Ludwig RN  Safety Promotion/Fall Prevention:   assistive device/personal items within reach   clutter free environment maintained   patient and family education   safety round/check completed   room organization consistent  Intervention: Prevent Skin Injury  Recent Flowsheet Documentation  Taken 9/15/2024 0800 by Sade Ludwig, RN  Body Position: supine, legs elevated  Skin " Protection:   adhesive use limited   incontinence pads utilized  Device Skin Pressure Protection:   absorbent pad utilized/changed   adhesive use limited   positioning supports utilized  Intervention: Prevent and Manage VTE (Venous Thromboembolism) Risk  Recent Flowsheet Documentation  Taken 9/15/2024 0800 by Sade Ludwig RN  VTE Prevention/Management: SCDs off (sequential compression devices)  Intervention: Prevent Infection  Recent Flowsheet Documentation  Taken 9/15/2024 0800 by Sade Ludwig RN  Infection Prevention:   rest/sleep promoted   hand hygiene promoted  Goal: Optimal Comfort and Wellbeing  Outcome: Not Progressing  Goal: Readiness for Transition of Care  Outcome: Not Progressing     Problem: Fall Injury Risk  Goal: Absence of Fall and Fall-Related Injury  Outcome: Not Progressing  Intervention: Identify and Manage Contributors  Recent Flowsheet Documentation  Taken 9/15/2024 0800 by Sade Ludwig RN  Medication Review/Management: medications reviewed  Intervention: Promote Injury-Free Environment  Recent Flowsheet Documentation  Taken 9/15/2024 0800 by Sade Ludwig RN  Safety Promotion/Fall Prevention:   assistive device/personal items within reach   clutter free environment maintained   patient and family education   safety round/check completed   room organization consistent     Problem: Infection  Goal: Absence of Infection Signs and Symptoms  Outcome: Not Progressing  Intervention: Prevent or Manage Infection  Recent Flowsheet Documentation  Taken 9/15/2024 0800 by Sade Ludwig RN  Isolation Precautions: contact precautions maintained     Problem: Comorbidity Management  Goal: Blood Pressure in Desired Range  Outcome: Not Progressing  Intervention: Maintain Blood Pressure Management  Recent Flowsheet Documentation  Taken 9/15/2024 0800 by Sade Ludwig RN  Medication Review/Management: medications reviewed

## 2024-09-16 NOTE — PROGRESS NOTES
PALLIATIVE CARE PROGRESS NOTE  [facility]     Patient Name: Lennie Mar  Date of Admission: 9/11/2024   Today the patient was seen for:   Goals of care  Emotional and decisional support     Recommendations & Counseling     GOALS OF CARE:   Restorative with limits - ok with IV fluids and antibiotics,   No thoracentesis as this was uncomfortable for her previously  Back to her place of living with hospice on discharge      ADVANCE CARE PLANNING:  No health care directive on file. Per system policy, Surrogate Decision-makers for Patients With Diminished Decision-making Capacity offers guidance on possible decision-makers. Naren Mar (Marta)  has been identified as a surrogate decision maker.  They know her best and provide most of the cares.   There is a POLST form on file, this was reviewed and current.  Code status: No CPR- Do NOT Intubate     MEDICAL MANAGEMENT:   #Nausea, hearburn  -Pharmacologic management   Ondansetron (Zofran)  PRN  Diet as tolerated  Family would like to keep pepcid for now     #General Weakness  Appreciate the staff for all ADLs  Back to [facility] on hospice on discharge     PSYCHOSOCIAL/SPIRITUAL SUPPORT:  Family   [place] community: Muslim - family will contact their own  for anointing, if unable to have someone come or have questions of concerns they will reach out to nursing for  support    Palliative Care will continue to follow. Thank you for the consult and allowing us to aid in the care of Lennie Mar.    These recommendations have been discussed with medical team.    Jodi Funk NP  Securely message with [employer] (more info)  Text page via [facility] Paging/Directory      Assessments          Lennie Mar is a 89 year old female with a past medical history of hypertension, CKD stage 3, paroxysmal atrial fibrillation, essential tremor, hypothyroidism, and macrocytic anemia who presented on 9/11 with confusion, less interactive and  vomiting.                  Interval History:     Per patient or family/caregivers today:  Phone call with KATINA Lozano and son Dmitry. Discussed and reviewed condition and medications. Discussed and reviewed her discharge medications as well. Discussed her swallowing difficulties today and inability to take her pills. Discussed and reviewed that she is still able to eat and drink as desires. Questions asked and answered as able.             Review of Systems:     Besides above, an additional system ROS was reviewed and is unremarkable               Physical Exam:   Temp: 97  F (36.1  C) Temp src: Axillary Temp  Min: 97  F (36.1  C)  Max: 97.9  F (36.6  C) BP: 135/72 Pulse: 84   Resp: 18 SpO2: 96 % O2 Device: Nasal cannula Oxygen Delivery: 3 LPM  Vital Signs with Ranges  Temp:  [97  F (36.1  C)-97.9  F (36.6  C)] 97  F (36.1  C)  Pulse:  [] 84  Resp:  [16-18] 18  BP: (119-135)/(58-78) 135/72  SpO2:  [93 %-97 %] 96 %  0 lbs 0 oz    EXAM:  Constitutional: no distress and lethargic   Cardiovascular: negative  Respiratory: positive findings: congested cough  Psychiatric: clam               Current Problem List:   Active Problems:    Delirium    Leukocytosis, unspecified type    Nausea and vomiting, unspecified vomiting type      Allergies   Allergen Reactions    Ciprofloxacin      Nausea and vomiting per son     Ergotamine-Caffeine Other (See Comments) and Nausea and Vomiting     Severe HA, N/V & diarrhea    Penicillins Rash and Unknown    Sulfa Antibiotics Rash and Unknown    Lamictal [Lamotrigine] Other (See Comments)     Patient experienced night moss    Naproxen      Pt unable to remember reaction.    Naproxen Unknown    Nicergoline Unknown    Tetracycline Unknown     Pt unable to remember allergy            Data Reviewed:       No results found for this or any previous visit (from the past 24 hour(s)).       Medical Decision Making       45 MINUTES SPENT BY ME on the date of service doing chart review, history,  exam, documentation & further activities per the note.

## 2024-09-16 NOTE — CONSULTS
SPIRITUAL HEALTH SERVICES - Consult Note  [facility] Med/Surg 5    Referral Source: [facility] consult; staff requested  support for pt and her family.    Introduced [facility] to pt Lennie.  It was difficult for me to understand Lennie's speech.  She tried to tell me that she fell.  Lennie affirmed that she is Anabaptist and welcomed prayer.  She could not tell me if she is affiliated with a [employer]ism or who gave her the augustin that was next to her bed.  Lennie consented to my request to call her daughter-in-law Matra to learn more about her and her family.      Marta reported that Lennie will discharge to her [facility] tomorrow with Hospice Care.  She shared that Lennie is a member of [employer] in [place].  A family member is arranging for her to be anointed.  Marta shared that the family is coping well by supporting one another and by leaning into their Anabaptist margarito.  She expressed gratitude that the family has been able to love and care for Lennie.    Plan: Informed pt's daughter-in-law how she and her family can request further  support.  This author and other chaplains remain available per pt/family request.     Eris Salvador M.Div., [facility]  Staff     [facility] available 24/7 for emergent requests/referrals, either by paging the on-call  or by entering an ASAP/STAT consult in [facility], which will also page the on-call .

## 2024-09-16 NOTE — PLAN OF CARE
"A&O to self, drowsy when woken up to repo. VSS, on 3LO2 NC. LS clear but diminished. Denies pain, requests water, took a few sips of thickened water, but does not like it. Pt incontinent of bowel & bladder, purewik in place. Sacral/coccyx area w/ mepilex CDI, no BM this shift. Pt repositioned q2hrs w/ pillows.pt is total care & a feeder.     Plan: IVF,  WOC/palliative following, SW for discharge planning. Family wanting hospice at discharge.     Goal Outcome Evaluation:      Plan of Care Reviewed With: patient    Overall Patient Progress: no changeOverall Patient Progress: no change    Outcome Evaluation: confused, denies pain, total care      Problem: Adult Inpatient Plan of Care  Goal: Plan of Care Review  Description: The Plan of Care Review/Shift note should be completed every shift.  The Outcome Evaluation is a brief statement about your assessment that the patient is improving, declining, or no change.  This information will be displayed automatically on your shift  note.  Outcome: Not Progressing  Flowsheets (Taken 9/16/2024 0341)  Outcome Evaluation: confused, denies pain, total care  Plan of Care Reviewed With: patient  Overall Patient Progress: no change  Goal: Patient-Specific Goal (Individualized)  Description: You can add care plan individualizations to a care plan. Examples of Individualization might be:  \"Parent requests to be called daily at 9am for status\", \"I have a hard time hearing out of my right ear\", or \"Do not touch me to wake me up as it startles  me\".  Outcome: Not Progressing  Goal: Absence of Hospital-Acquired Illness or Injury  Outcome: Not Progressing  Intervention: Identify and Manage Fall Risk  Recent Flowsheet Documentation  Taken 9/16/2024 0200 by Erma Huerta, RN  Safety Promotion/Fall Prevention:   activity supervised   increased rounding and observation   nonskid shoes/slippers when out of bed   patient and family education   room organization consistent   clutter free " environment maintained  Intervention: Prevent Skin Injury  Recent Flowsheet Documentation  Taken 9/16/2024 0200 by Erma Huerta RN  Body Position:   turned   left   heels elevated   legs elevated  Skin Protection:   adhesive use limited   incontinence pads utilized  Device Skin Pressure Protection:   absorbent pad utilized/changed   adhesive use limited   positioning supports utilized  Taken 9/16/2024 0005 by Erma Huerta RN  Body Position:   turned   right   heels elevated   legs elevated  Intervention: Prevent and Manage VTE (Venous Thromboembolism) Risk  Recent Flowsheet Documentation  Taken 9/16/2024 0200 by Erma Huerta RN  VTE Prevention/Management: SCDs off (sequential compression devices)  Goal: Optimal Comfort and Wellbeing  Outcome: Not Progressing  Goal: Readiness for Transition of Care  Outcome: Not Progressing     Problem: Fall Injury Risk  Goal: Absence of Fall and Fall-Related Injury  Outcome: Not Progressing  Intervention: Identify and Manage Contributors  Recent Flowsheet Documentation  Taken 9/16/2024 0200 by Erma Huerta RN  Medication Review/Management: medications reviewed  Intervention: Promote Injury-Free Environment  Recent Flowsheet Documentation  Taken 9/16/2024 0200 by Erma Huerta RN  Safety Promotion/Fall Prevention:   activity supervised   increased rounding and observation   nonskid shoes/slippers when out of bed   patient and family education   room organization consistent   clutter free environment maintained     Problem: Infection  Goal: Absence of Infection Signs and Symptoms  Outcome: Not Progressing  Intervention: Prevent or Manage Infection  Recent Flowsheet Documentation  Taken 9/16/2024 0200 by Erma Huerta RN  Isolation Precautions: contact precautions maintained     Problem: Comorbidity Management  Goal: Blood Pressure in Desired Range  Outcome: Not Progressing  Intervention: Maintain Blood Pressure Management  Recent Flowsheet Documentation  Taken  9/16/2024 0200 by Erma Huerta RN  Medication Review/Management: medications reviewed     Problem: Skin Injury Risk Increased  Goal: Skin Health and Integrity  Outcome: Not Progressing  Intervention: Plan: Nurse Driven Intervention: Moisture Management  Recent Flowsheet Documentation  Taken 9/16/2024 0200 by Erma Huerta, RN  Moisture Interventions:   Incontinence pad   Urinary collection device  Bathing/Skin Care: back care  Intervention: Plan: Nurse Driven Intervention: Friction and Shear  Recent Flowsheet Documentation  Taken 9/16/2024 0200 by Erma Huerta RN  Friction/Shear Interventions:   HOB 30 degrees or less   Silicone foam sacral dressing  Intervention: Optimize Skin Protection  Recent Flowsheet Documentation  Taken 9/16/2024 0200 by Erma Huerta, RN  Skin Protection:   adhesive use limited   incontinence pads utilized  Activity Management: activity adjusted per tolerance  Head of Bed (HOB) Positioning: HOB at 20-30 degrees  Taken 9/16/2024 0005 by Erma Huerta, RN  Activity Management: activity adjusted per tolerance  Head of Bed (HOB) Positioning: HOB at 20-30 degrees

## 2024-09-16 NOTE — PROGRESS NOTES
[facility]    Medicine Progress Note - Hospitalist Service    Date of Admission:  9/11/2024    Assessment & Plan     Lennie Mar is a 89 year old woman who returns to the hospital from transitional care apparently at the request of her family because she appeared to be more confused, less interactive and with concerns of vomiting earlier     Recent medical history includes hospitalization at [facility] from 8/8 through 8/12/2024 for apparent left lower lobe pneumonia and acute hypoxic respiratory failure contributing to some encephalopathy.  From that hospitalization, she was discharged back home to [facility] but return to the emergency department on 8/24/2024 due to shortness of breath and right flank pain for a couple of days that have been complicated by a fall.  She remained here from 8/24-8/30/2024 during which time the patient had been found to have bilateral pleural effusions thought to be due to an exacerbation of chronic diastolic heart failure.  She underwent thoracenteses and because she indicated she wanted to go home, the medical team made arrangements for discharge.  Unfortunately, the patient was too weak to go home to [facility] and she was discharged to transitional care.     More remote PMH includes breast cancer for which she has been on Letrozole since 2019. (I believe she is still on it.)  Her chart indicates CKD stage 3b, but given her age and low muscle mass, the creatinine likely overestimates her GFR. She is treated for Bipolar disorder with Lithium and Valproic acid.  She has a history of atrial tachycardias but is currently in a sinus rhythm with a left anterior fascicular block.     Interval:  Family still hoping to pursue with discharge plan disposition of [facility] with hospice.    Encephalopathy likely multifactorial from infectious and metabolic and etiology  Physical deconditioning  Spoke with patient's family and discussed her underlying  diagnosis, plan of care and her recent trajectory of clinical deterioration in the last several weeks requiring numerous hospitalizations.  Patient also noted that likely she is not doing well and interested in further discussion with goals of care.  Currently she is a DNR/DNI.  No further escalation of care.  Likely patient and family are leaning towards comfort care approach and hospice evaluation upon discharge.    Hypercalcemia, mild hyperkalemia-improving  ASHLEY on CKD  Received IV fluids with improvement of her azotemia and hyperkalemia.  Hypercalcemia on a downward trend with repeat BMP improving. Continue at low rate until maintaining PO intake and continue to hold off on further diuresis, with continued monitoring for hypervolemia.  Still having high suspicion for underlying brewing malignancy with prior breast carcinoma and with abnormal laboratory markers such as hypercalcemia, elevated alkaline phosphatase with normal PTH intact. Electing not to further escalate investigation of malignancy workup her current goals of care and intention for hospice care upon discharge    Abnormal UA, likely colonization  She is not the best historian and hard to ask for any ongoing symptomatology.  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 Will stop ceftriaxone.  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 Suspected atrial fibrillation might also be related to his numerous electrolyte derangements however cannot totally rule out chronicity  -Patient is not a good candidate for long-term anticoagulation given advanced age, fall risk and now with goals of  "care discussion leading no further escalation of cares.  -Nursing noted concerns for possible dysphagia.  We discussed about current goals of care and likely will not much benefit for further SLP input as plans for likely hospice approach upon discharge       Diet: Soft & Bite Sized Diet (level 6) Mildly Thick (level 2)  Snacks/Supplements Adult: Other; patient/family/nursing may order any supplement consistent with diet orders; Between Meals  Snacks/Supplements Adult: Ensure Enlive; Between Meals    DVT Prophylaxis: Pneumatic Compression Devices  Kelley Catheter: Not present  Lines: None     Cardiac Monitoring: None  Code Status: No CPR- Do NOT Intubate      Clinically Significant Risk Factors              # Hypoalbuminemia: Lowest albumin = 2.8 g/dL at 9/12/2024  6:46 AM, will monitor as appropriate     # Hypertension: Noted on problem list           # Overweight: Estimated body mass index is 27.82 kg/m  as calculated from the following:    Height as of 8/24/24: 1.575 m (5' 2\").    Weight as of 8/24/24: 69 kg (152 lb 1.9 oz).        # Financial/Environmental Concerns:                 Disposition Plan     Expected Discharge Date: 09/16/2024                  Yamileth Choi DO  Hospitalist Service  [facility]  Securely message with [employer] (more info)  Text page via [employer] Paging/Directory   ______________________________________________________________________      Physical Exam   Vital Signs: Temp: 97  F (36.1  C) Temp src: Axillary BP: 135/72 Pulse: 84   Resp: 18 SpO2: 96 % O2 Device: Nasal cannula Oxygen Delivery: 3 LPM  Weight: 0 lbs 0 oz    General: NAD  Cardiac: warm and well perfused  Respiratory: Normal work of breathing.  Decreased breath sounds at bases.   GI:  Abdomen soft  Musculoskeletal: Moving all extremities appropriately.  Skin: No rashes or abrasions on exposed skin.  Neurologic: opens eyes to voice      Medical Decision Making       45 MINUTES SPENT BY ME on the date of " service doing chart review, history, exam, documentation & further activities per the note.      Data

## 2024-09-16 NOTE — PROGRESS NOTES
Occupational Therapy/ Physical Therapy. Orders received. Chart reviewed and discussed with care team.? Occupational Therapy/Physical Therapy not indicated due to pt with plan for return to [facility] on hospice and [facility] able to accommodate, DME taken care of and plan for discharge tomorrow 9/17. No skilled therapy needs.? Defer discharge recommendations to care team.? Will complete orders.

## 2024-09-16 NOTE — PROGRESS NOTES
Care Management Follow Up    Length of Stay (days): 4    Expected Discharge Date: 09/17/2024     Concerns to be Addressed: discharge planning    Patient plan of care discussed at interdisciplinary rounds: Yes    Anticipated Discharge Disposition:  shelter      Anticipated Discharge Services:  Hospice  Anticipated Discharge DME: hospital bed, O2, over the bed table - to be delivered by hospice     Patient/family educated on Medicare website which has current facility and service quality ratings:  yes  Education Provided on the Discharge Plan:  yes  Patient/Family in Agreement with the Plan:  yes    Referrals Placed by CM/SW:  hospice  Private pay costs discussed: transportation costs    Discussed  Partnership in Safe Discharge Planning  document with patient/family: No     Handoff Completed: No, handoff not indicated or clinically appropriate    Additional Information:  Porter spoke with Priscilla, P: [phone] F: [phone], who said that they are able to admit the pt to services tomorrow at [facility] at 1100.  Porter told her that they are working with the family and the [facility] to determine equipment needs.  Porter told them that the family wants to meet with our Palliative team one more time today prior to discharge tomorrow.  Porter left a vm for Bryanna, Clinical Director at [facility] P: [phone] F: [phone], requesting a call back to discuss the pt's discharge needs.  Porter will follow-up with the pt's family once they have talked with Palliative.    Next Steps:   Porter will follow-up with the pt's family and the pt's shelter to finalize a discharge plan for tomorrow.    ONEL Moran, [facility]  Inpatient Care Coordination  [facility]  [phone]    ADDENDUM 1537:  Porter spoke with Bryanna, Clinical Director at [facility] P: [phone] F: [phone], who confirmed that the pt is able to return there with hospice.  Porter told her that [facility] can admit the pt to services tomorrow  morning.  Hospice equipment will be delivered this evening.    Porter spoke with the pt's dtr-in-law Marta to provide her with the update.  Transportation was discussed and the pt will need [facility] stretcher transportation.  She is aware and agreeable to the cost.  Porter told her that the hospice equipment will be delivered this evening.  The maintenance department at the [facility] will remove the pt's current bed, so the room will be ready for equipment.  Porter left a vm for Priscilla, requesting that the hospice equipment be delivered this evening.    Porter arranged for transport tomorrow between [id number].    ADDENDUM 1545:  Porter confirmed all of the pt's discharge plans with Bryanna at [facility], Priscilla with [facility], and the pt's dtr-in-law Marta.  Hospice equipment will be delivered this evening (hospital bed, over the bed, and O2).

## 2024-09-16 NOTE — PLAN OF CARE
"End of Shift Summary   For vital signs and complete assessments, please see documentation flowsheets.     Pertinent assessments: Alert to self only. VSS on 3L NC. LS diminished. Pt refused all meds, refuses food or drink. Very lethargic this shift, extremities cool to the touch. Purewick in place mult skin checks/monitoring.  Feet elevated. Family at bedside.     Treatment Plan: symptom management, IVF, WOC/palliative following. Discharge with hospice tomorrow.    Bedside Nurse: Alanis Gilliam RN    Goal Outcome Evaluation:           Overall Patient Progress: no changeOverall Patient Progress: no change    Outcome Evaluation: Lethargic, refuses meds. Family at bedside.      Problem: Adult Inpatient Plan of Care  Goal: Plan of Care Review  Description: The Plan of Care Review/Shift note should be completed every shift.  The Outcome Evaluation is a brief statement about your assessment that the patient is improving, declining, or no change.  This information will be displayed automatically on your shift  note.  Outcome: Progressing  Flowsheets (Taken 9/16/2024 1827)  Outcome Evaluation: Lethargic, refuses meds. Family at bedside.  Overall Patient Progress: no change  Goal: Patient-Specific Goal (Individualized)  Description: You can add care plan individualizations to a care plan. Examples of Individualization might be:  \"Parent requests to be called daily at 9am for status\", \"I have a hard time hearing out of my right ear\", or \"Do not touch me to wake me up as it startles  me\".  Outcome: Progressing  Goal: Absence of Hospital-Acquired Illness or Injury  Outcome: Progressing  Intervention: Identify and Manage Fall Risk  Recent Flowsheet Documentation  Taken 9/16/2024 0900 by Alanis Gilliam RN  Safety Promotion/Fall Prevention: activity supervised  Intervention: Prevent Skin Injury  Recent Flowsheet Documentation  Taken 9/16/2024 0900 by Alanis Gilliam RN  Body Position: supine, head elevated  Skin " Protection: adhesive use limited  Device Skin Pressure Protection: absorbent pad utilized/changed  Intervention: Prevent Infection  Recent Flowsheet Documentation  Taken 9/16/2024 0900 by Alanis Gilliam RN  Infection Prevention: rest/sleep promoted  Goal: Optimal Comfort and Wellbeing  Outcome: Progressing  Goal: Readiness for Transition of Care  Outcome: Progressing     Problem: Fall Injury Risk  Goal: Absence of Fall and Fall-Related Injury  Outcome: Progressing  Intervention: Identify and Manage Contributors  Recent Flowsheet Documentation  Taken 9/16/2024 0900 by Alanis Gilliam RN  Medication Review/Management: medications reviewed  Intervention: Promote Injury-Free Environment  Recent Flowsheet Documentation  Taken 9/16/2024 0900 by Alanis Gilliam RN  Safety Promotion/Fall Prevention: activity supervised     Problem: Infection  Goal: Absence of Infection Signs and Symptoms  Outcome: Progressing  Intervention: Prevent or Manage Infection  Recent Flowsheet Documentation  Taken 9/16/2024 0900 by Alanis Gilliam RN  Isolation Precautions: contact precautions maintained     Problem: Comorbidity Management  Goal: Blood Pressure in Desired Range  Outcome: Progressing  Intervention: Maintain Blood Pressure Management  Recent Flowsheet Documentation  Taken 9/16/2024 0900 by Alanis Gilliam RN  Medication Review/Management: medications reviewed     Problem: Skin Injury Risk Increased  Goal: Skin Health and Integrity  Outcome: Progressing  Intervention: Plan: Nurse Driven Intervention: Moisture Management  Recent Flowsheet Documentation  Taken 9/16/2024 0900 by Alanis Gilliam RN  Moisture Interventions: Incontinence pad  Intervention: Plan: Nurse Driven Intervention: Friction and Shear  Recent Flowsheet Documentation  Taken 9/16/2024 0900 by Alanis Gilliam RN  Friction/Shear Interventions: HOB 30 degrees or less  Intervention: Optimize Skin Protection  Recent Flowsheet Documentation  Taken  9/16/2024 0900 by Alanis Gilliam RN  Pressure Reduction Devices: heel offloading device utilized  Skin Protection: adhesive use limited  Activity Management: activity adjusted per tolerance  Head of Bed (HOB) Positioning: HOB at 20-30 degrees      alan

## 2024-09-17 VITALS
DIASTOLIC BLOOD PRESSURE: 74 MMHG | TEMPERATURE: 98.9 F | RESPIRATION RATE: 18 BRPM | HEART RATE: 93 BPM | OXYGEN SATURATION: 90 % | SYSTOLIC BLOOD PRESSURE: 130 MMHG

## 2024-09-17 LAB — PTH RELATED PROT SERPL-SCNC: 1 PMOL/L

## 2024-09-17 PROCEDURE — 99239 HOSP IP/OBS DSCHRG MGMT >30: CPT | Performed by: STUDENT IN AN ORGANIZED HEALTH CARE EDUCATION/TRAINING PROGRAM

## 2024-09-17 PROCEDURE — 250N000013 HC RX MED GY IP 250 OP 250 PS 637: Performed by: INTERNAL MEDICINE

## 2024-09-17 RX ADMIN — TRAMADOL HYDROCHLORIDE 25 MG: 50 TABLET ORAL at 09:20

## 2024-09-17 ASSESSMENT — ACTIVITIES OF DAILY LIVING (ADL)
ADLS_ACUITY_SCORE: 65

## 2024-09-17 NOTE — PLAN OF CARE
"A/self only, slept soundly overnight. Small position changes overnight. Purewick for urine. IVF @50. VSS. Dry mouth- moisturizer applied.     Shift events:   Uneventful night, slept well. No significant change in condition.     Treatment Plan:   symptom management, IVF, WOC/palliative following. Discharge with hospice today with stretcher transport scheduled between 3623-2978 per  notes                                Problem: Adult Inpatient Plan of Care  Goal: Plan of Care Review  Description: The Plan of Care Review/Shift note should be completed every shift.  The Outcome Evaluation is a brief statement about your assessment that the patient is improving, declining, or no change.  This information will be displayed automatically on your shift  note.  Outcome: Not Progressing  Flowsheets (Taken 9/17/2024 0623)  Plan of Care Reviewed With: patient  Overall Patient Progress: no change  Goal: Patient-Specific Goal (Individualized)  Description: You can add care plan individualizations to a care plan. Examples of Individualization might be:  \"Parent requests to be called daily at 9am for status\", \"I have a hard time hearing out of my right ear\", or \"Do not touch me to wake me up as it startles  me\".  Outcome: Not Progressing  Goal: Absence of Hospital-Acquired Illness or Injury  Outcome: Not Progressing  Intervention: Identify and Manage Fall Risk  Recent Flowsheet Documentation  Taken 9/17/2024 0015 by Manisha Franklin, RN  Safety Promotion/Fall Prevention: safety round/check completed  Intervention: Prevent Skin Injury  Recent Flowsheet Documentation  Taken 9/17/2024 0015 by Manisha Franklin, RN  Body Position: position changed independently  Intervention: Prevent Infection  Recent Flowsheet Documentation  Taken 9/17/2024 0015 by Manisha Franklin, RN  Infection Prevention: rest/sleep promoted  Goal: Optimal Comfort and Wellbeing  Outcome: Not Progressing  Goal: Readiness for Transition of Care  Outcome: Not " Progressing   Goal Outcome Evaluation:      Plan of Care Reviewed With: patient    Overall Patient Progress: no changeOverall Patient Progress: no change

## 2024-09-17 NOTE — DISCHARGE SUMMARY
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    Weight as of 8/24/24: 69 kg (152 lb 1.9 oz).       Follow-ups Needed After Discharge   Follow-up Appointments     Follow-up and recommended labs and tests       Follow up with hospice team at discharge            Discharge Disposition   Discharged to assisted living  Condition at discharge: Stable    Hospital Course     Lennie Mar is a 89 year old woman who returns to the hospital from transitional care apparently at the request of her family because she appeared to be more confused, less interactive and with concerns of vomiting earlier in the day.      She was recently hospitalized at [facility] from 8/8-8/12/2024 for apparent left lower lobe pneumonia and acute hypoxic respiratory failure contributing to some encephalopathy.  From that hospitalization, she was discharged back home to [facility] but return to the emergency department on 8/24/2024 due to shortness of breath and right flank pain for a couple of days that have been complicated by a fall.  She remained here from 8/24-8/30/2024 during " which time the patient had been found to have bilateral pleural effusions thought to be due to an exacerbation of chronic diastolic heart failure.  She underwent thoracenteses and because she indicated she wanted to go home, the medical team made arrangements for discharge.  Unfortunately, the patient was too weak to go home independently and she was discharged to transitional care.     During hospital course, numerous discussions with patient's family and patient and discussed her underlying diagnosis, plan of care and her recent trajectory of clinical deterioration in the last several weeks requiring numerous hospitalizations. She noted that likely she is not doing well and wanted no further escalation of care and elected discharge to [facility] with hospice.       Consultations This Hospital Stay   PALLIATIVE CARE ADULT IP CONSULT  PHYSICAL THERAPY ADULT IP CONSULT  OCCUPATIONAL THERAPY ADULT IP CONSULT  WOUND OSTOMY CONTINENCE NURSE  IP CONSULT  CARE MANAGEMENT / SOCIAL WORK IP CONSULT  SPEECH LANGUAGE PATH ADULT IP CONSULT  SPIRITUAL HEALTH SERVICES IP CONSULT    Code Status   No CPR- Do NOT Intubate    Time Spent on this Encounter   IYamileth DO, personally saw the patient today and spent greater than 30 minutes discharging this patient.       Yamileth Choi DO  [place] [address] [facility]  [address] [place] [address]  Phone: [phone]  Fax: [phone]  ______________________________________________________________________    Physical Exam   Vital Signs: Temp: 98  F (36.7  C) Temp src: Axillary BP: 100/56 Pulse: 84   Resp: 18 SpO2: 96 % O2 Device: Nasal cannula Oxygen Delivery: 3 LPM  Weight: 0 lbs 0 oz         Primary Care Physician   Physician No Ref-Primary    Discharge Orders      Medication Therapy Management Referral      Reason for your hospital stay    You were admitted to the hospital for weakness and heart failure exacerbation.     Follow-up and recommended  labs and tests     Follow up with hospice team at discharge     Activity    Your activity upon discharge: activity as tolerated     Oxygen Adult/Peds    Oxygen Documentation  I certify that this patient, Lennie Mar has been under my care (or a nurse practitioner or physican's assistant working with me). This is the face-to-face encounter for oxygen medical necessity.      At the time of this encounter, I have reviewed the qualifying testing and have determined that supplemental oxygen is reasonable and necessary and is expected to improve the patient's condition in a home setting.         Patient has continued oxygen desaturation due to Chronic Heart Failure I50  Chronic Respiratory Failure with Hypoxia J96.11.    If portability is ordered, is the patient mobile within the home? no    Was this visit performed as a telehealth visit: No     Diet    Follow this diet upon discharge: Current Diet:Orders Placed This Encounter      Snacks/Supplements Adult: Other; patient/family/nursing may order any supplement consistent with diet orders; Between Meals      Snacks/Supplements Adult: Ensure Enlive; Between Meals      Soft & Bite Sized Diet (level 6) Mildly Thick (level 2)       Significant Results and Procedures   Results for orders placed or performed during the hospital encounter of 09/11/24   CT Head w/o Contrast    Narrative    EXAM: CT HEAD W/O CONTRAST  LOCATION: [facility]  DATE: 9/11/2024    INDICATION: ams  COMPARISON: August 24, 2024  TECHNIQUE: Routine CT Head without IV contrast. Multiplanar reformats. Dose reduction techniques were used.    FINDINGS:  INTRACRANIAL CONTENTS: No intracranial hemorrhage, extraaxial collection, or mass effect.  No CT evidence of acute infarct. Mild to moderate presumed chronic small vessel ischemic changes. Moderate generalized volume loss. No hydrocephalus.     VISUALIZED ORBITS/SINUSES/MASTOIDS: Prior bilateral cataract surgery. Visualized portions  of the orbits are otherwise unremarkable. No paranasal sinus mucosal disease. No middle ear or mastoid effusion.    BONES/SOFT TISSUES: No acute abnormality.      Impression    IMPRESSION:  1.  No CT evidence for acute intracranial process.  2.  Brain atrophy and presumed chronic microvascular ischemic changes as above.   CT Abdomen Pelvis w/o Contrast    Narrative    EXAM: CT ABDOMEN AND PELVIS WITHOUT CONTRAST  LOCATION: [facility]  DATE/TIME: 9/11/2024 10:19 PM CDT    INDICATION: Abdominal pain and vomiting.  COMPARISON: 8/24/2024 - CT chest.    TECHNIQUE: CT scan of the abdomen and pelvis was performed without IV contrast. Multiplanar reformats were obtained. Dose reduction techniques were used.  CONTRAST: None.    FINDINGS:    LOWER CHEST: Moderate-sized bilateral pleural effusions with adjacent atelectasis. Mild emphysematous changes in the lung bases.    HEPATOBILIARY: The gallbladder is mildly distended, but otherwise unremarkable in appearance.    SPLEEN: Unremarkable.    PANCREAS: Unremarkable.    ADRENAL GLANDS: Unremarkable.    KIDNEYS/BLADDER: A few small renal lesions are present bilaterally, measuring up to 1.5 cm in diameter. A few are mildly high in attenuation. These were present previously and most likely represent hemorrhagic or proteinaceous cysts.    BOWEL: Prior right hemicolectomy. The stomach, small and large bowel are normal in caliber. Several colonic diverticula are present without evidence of diverticulitis. No visualized bowel wall thickening, pneumatosis or free intraperitoneal gas.    LYMPH NODES: Unremarkable.    PELVIC ORGANS: No acute findings.    MUSCULOSKELETAL: No acute findings.    OTHER: Atherosclerotic calcification in the abdominal aorta. Very small left inguinal hernia containing fat.      Impression    IMPRESSION:   1. No acute abnormality identified in the abdomen or pelvis. No evidence of bowel obstruction.  2. Moderate-sized bilateral pleural  effusions.    XR Chest 1 View    Narrative    EXAM: XR CHEST 1 VIEW  LOCATION: [facility]  DATE: 9/11/2024    INDICATION: sob  COMPARISON: 8/8/2024.      Impression    IMPRESSION: Moderate bilateral pleural effusions increased compared to previous chest x-ray compressive atelectasis at lung bases. Heart size is obscured secondary to the basilar density. 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       Discharge Medications   Current Discharge Medication List        START taking these medications    Details   OLANZapine zydis (ZYPREXA) 5 MG ODT Take 1 tablet (5 mg) by mouth every 4 hours as needed for agitation (hallucinations, restlessness).  Qty: 15 tablet, Refills: 0    Comments: Please bubble pack  Associated Diagnoses: Hospice care patient           CONTINUE these medications which have CHANGED    Details   LORazepam (ATIVAN) 0.5 MG tablet Take 1 tablet (0.5 mg) by mouth every 4 hours as needed for anxiety.  Qty: 15 tablet, Refills: 0    Comments: Please bubble pack  Associated Diagnoses: Hospice care patient           CONTINUE these medications which have NOT CHANGED    Details   albuterol (PROAIR HFA/PROVENTIL HFA/VENTOLIN HFA) 108 (90 Base) MCG/ACT inhaler Inhale 2 puffs into the lungs every 6 hours as needed for shortness of breath, wheezing or cough      alum & mag hydroxide-simethicone (MAALOX) 200-200-20 MG/5ML SUSP suspension Take 30 mLs by mouth every 4 hours as needed for indigestion    Associated Diagnoses: Infection due to 2019 novel coronavirus      bisacodyl (DULCOLAX) 10 MG suppository Place 1 suppository (10 mg) rectally daily as needed for constipation    Associated Diagnoses: Drug-induced constipation      famotidine (PEPCID) 20 MG tablet Take 20 mg by mouth every other day.      ipratropium-albuterol (COMBIVENT RESPIMAT)  MCG/ACT inhaler Inhale 1 puff into the lungs 4 times daily as needed for shortness of breath /  dyspnea or wheezing    Associated Diagnoses: Infection due to 2019 novel coronavirus      levothyroxine (SYNTHROID/LEVOTHROID) 50 MCG tablet Take 50 mcg by mouth daily.      lithium (ESKALITH) 150 MG capsule Take 150 mg by mouth every morning   Qty: 30 capsule, Refills: 1    Associated Diagnoses: Severe depressed bipolar I disorder without psychotic features (H)      loperamide (IMODIUM) 2 MG capsule Take 2 mg by mouth 4 times daily as needed for diarrhea      !! ondansetron (ZOFRAN ODT) 4 MG ODT tab Take 4 mg by mouth 2 times daily (before meals). 30 min before breakfast and supper      !! ondansetron (ZOFRAN-ODT) 4 MG ODT tab Take 1 tablet (4 mg) by mouth every 6 hours as needed for nausea or vomiting    Associated Diagnoses: Infection due to 2019 novel coronavirus      polyvinyl alcohol-povidone PF (REFRESH) 1.4-0.6 % ophthalmic solution Place 2 drops into both eyes 2 times daily      QUEtiapine (SEROQUEL) 100 MG tablet Take 100 mg by mouth at bedtime.      traMADol 25 MG TABS tablet Take 1 tablet (25 mg) by mouth 2 times daily as needed for severe pain.  Qty: 10 tablet, Refills: 0    Associated Diagnoses: Acute right-sided thoracic back pain      zinc Oxide (DESITIN) 40 % paste Apply topically as needed for dry skin or irritation    Associated Diagnoses: Infection due to 2019 novel coronavirus       !! - Potential duplicate medications found. Please discuss with provider.        STOP taking these medications       acetaminophen (TYLENOL) 325 MG tablet Comments:   Reason for Stopping:         Cranberry 500 MG CAPS Comments:   Reason for Stopping:         divalproex sodium extended-release (DEPAKOTE ER) 500 MG 24 hr tablet Comments:   Reason for Stopping:         doxycycline hyclate (VIBRAMYCIN) 100 MG capsule Comments:   Reason for Stopping:         letrozole (FEMARA) 2.5 MG tablet Comments:   Reason for Stopping:         Lidocaine (LIDOCARE) 4 % Patch Comments:   Reason for Stopping:         magnesium hydroxide  (MILK OF MAGNESIA) 400 MG/5ML suspension Comments:   Reason for Stopping:         melatonin 1 MG TABS tablet Comments:   Reason for Stopping:         senna-docusate (SENOKOT-S/PERICOLACE) 8.6-50 MG tablet Comments:   Reason for Stopping:         vitamin D3 (CHOLECALCIFEROL) 50 mcg (2000 units) tablet Comments:   Reason for Stopping:             Allergies   Allergies   Allergen Reactions    Ciprofloxacin      Nausea and vomiting per son     Ergotamine-Caffeine Other (See Comments) and Nausea and Vomiting     Severe HA, N/V & diarrhea    Penicillins Rash and Unknown    Sulfa Antibiotics Rash and Unknown    Lamictal [Lamotrigine] Other (See Comments)     Patient experienced night moss    Naproxen      Pt unable to remember reaction.    Naproxen Unknown    Nicergoline Unknown    Tetracycline Unknown     Pt unable to remember allergy

## 2024-09-17 NOTE — PROGRESS NOTES
Care Management Discharge Note    Discharge Date: 09/17/2024       Discharge Disposition:  shelter    Discharge Services:  Hospice    Discharge DME:  Hospital bed, O2, over the bed table (to be delivered by hospice    Discharge Transportation:  [facility] Stretcher    Private pay costs discussed: transportation costs  Reviewed potential out of pocket cost for [facility] stretcher transport. Current base rate is $1228.70 and $28.67 per mile to the destination. Pt/family expressed understanding and are agreeable to this.      Patient requires stretcher transportation due to the pt being bed bound, on hospice, and confused where she can't control her O2.    Stretcher transportation has been arranged for today between 2021-2517. Patient and bedside nurse notified of transportation time.    Ambulance PCS form completed and placed in patient chart. Ambulance PCS form should be given to transportation team.    Does the patient's insurance plan have a 3 day qualifying hospital stay waiver?  No    PAS Confirmation Code:  N/A  Patient/family educated on Medicare website which has current facility and service quality ratings:  N/A    Education Provided on the Discharge Plan:  yes  Persons Notified of Discharge Plans: Pt's dtr-in-law Marta, [facility], and [facility]  Patient/Family in Agreement with the Plan:  yes    Handoff Referral Completed: No, handoff not indicated or clinically appropriate    Additional Information:  The pt will discharge back to [facility] today with [facility].  Porter faxed the pt's discharge orders to Bryanna at [facility], P: [phone] F: [phone].  Porter faxed the pt's discharge orders to Priscilla with [facility], P: [phone] F: [phone].    Sw will continue to be available as needed until discharge.    ONEL Moran, [facility]  Inpatient Care Coordination  [facility]  [phone]

## 2025-03-22 ENCOUNTER — HEALTH MAINTENANCE LETTER (OUTPATIENT)
Age: OVER 89
End: 2025-03-22

## 2025-07-03 NOTE — TELEPHONE ENCOUNTER
"Naren is calling back.     He was frustrated and stated \"my mother is taken care of at an assisted living, she does not need primary care any longer and does not need home care\" he would like us to please not call patient or anyone as this patient is no longer needing any primary care through [facility].    HARI Poole RN  [facility]    " What Is The Reason For Today's Visit?: History of Melanoma Year Excised?: 2022

## (undated) DEVICE — ESU GROUND PAD ADULT W/CORD E7507

## (undated) DEVICE — SU SILK 3-0 SH 30" K832H

## (undated) DEVICE — DRAPE SHEET MED 44X70" 9355

## (undated) DEVICE — PREP CHLORAPREP 26ML TINTED ORANGE  260815

## (undated) DEVICE — SU DERMABOND ADVANCED .7ML DNX12

## (undated) DEVICE — DRAPE IOBAN INCISE 23X17" 6650EZ

## (undated) DEVICE — LINEN TOWEL PACK X30 5481

## (undated) DEVICE — SU VICRYL 3-0 SH 27" J316H

## (undated) DEVICE — LINEN TOWEL PACK X6 WHITE 5487

## (undated) DEVICE — SUCTION MANIFOLD DORNOCH ULTRA CART UL-CL500

## (undated) DEVICE — GLOVE PROTEXIS POWDER FREE SMT 8.0  2D72PT80X

## (undated) DEVICE — Device

## (undated) DEVICE — BNDG ELASTIC 6"X5YDS STERILE 6611-6S

## (undated) DEVICE — DRSG KERLIX 4 1/2"X4YDS ROLL 6715

## (undated) DEVICE — DRSG GAUZE 4X4" TRAY 6939

## (undated) DEVICE — CLIP HORIZON MED BLUE 002200

## (undated) DEVICE — CLIP HORIZON SM RED WIDE SLOT 001201

## (undated) DEVICE — SU PDS II 4-0 FS-2 27" Z422H

## (undated) DEVICE — ESU ELEC BLADE 2.75" COATED/INSULATED E1455

## (undated) DEVICE — DRSG GAUZE 2X2" 8042

## (undated) DEVICE — DRAIN JACKSON PRATT 15FR ROUND SU130-1323

## (undated) DEVICE — DRAIN JACKSON PRATT RESERVOIR 100ML SU130-1305

## (undated) DEVICE — STPL SKIN 35W ROTATING HEAD PRW35

## (undated) DEVICE — SU ETHILON 3-0 PS-1 18" 1663H

## (undated) RX ORDER — ISOSULFAN BLUE 50 MG/5ML
INJECTION, SOLUTION SUBCUTANEOUS
Status: DISPENSED
Start: 2018-11-19

## (undated) RX ORDER — PROPOFOL 10 MG/ML
INJECTION, EMULSION INTRAVENOUS
Status: DISPENSED
Start: 2018-11-19

## (undated) RX ORDER — HYDROMORPHONE HYDROCHLORIDE 1 MG/ML
INJECTION, SOLUTION INTRAMUSCULAR; INTRAVENOUS; SUBCUTANEOUS
Status: DISPENSED
Start: 2018-11-19

## (undated) RX ORDER — LIDOCAINE HYDROCHLORIDE 20 MG/ML
INJECTION, SOLUTION EPIDURAL; INFILTRATION; INTRACAUDAL; PERINEURAL
Status: DISPENSED
Start: 2018-11-19

## (undated) RX ORDER — GLYCOPYRROLATE 0.2 MG/ML
INJECTION, SOLUTION INTRAMUSCULAR; INTRAVENOUS
Status: DISPENSED
Start: 2018-11-19

## (undated) RX ORDER — ONDANSETRON 2 MG/ML
INJECTION INTRAMUSCULAR; INTRAVENOUS
Status: DISPENSED
Start: 2018-11-19

## (undated) RX ORDER — FENTANYL CITRATE 50 UG/ML
INJECTION, SOLUTION INTRAMUSCULAR; INTRAVENOUS
Status: DISPENSED
Start: 2018-11-19

## (undated) RX ORDER — EPHEDRINE SULFATE 50 MG/ML
INJECTION, SOLUTION INTRAMUSCULAR; INTRAVENOUS; SUBCUTANEOUS
Status: DISPENSED
Start: 2018-11-19

## (undated) RX ORDER — PHENYLEPHRINE HCL IN 0.9% NACL 1 MG/10 ML
SYRINGE (ML) INTRAVENOUS
Status: DISPENSED
Start: 2018-11-19

## (undated) RX ORDER — LIDOCAINE HYDROCHLORIDE 10 MG/ML
INJECTION, SOLUTION EPIDURAL; INFILTRATION; INTRACAUDAL; PERINEURAL
Status: DISPENSED
Start: 2019-05-09

## (undated) RX ORDER — SODIUM CHLORIDE, SODIUM LACTATE, POTASSIUM CHLORIDE, CALCIUM CHLORIDE 600; 310; 30; 20 MG/100ML; MG/100ML; MG/100ML; MG/100ML
INJECTION, SOLUTION INTRAVENOUS
Status: DISPENSED
Start: 2018-11-19

## (undated) RX ORDER — CLINDAMYCIN PHOSPHATE 900 MG/50ML
INJECTION, SOLUTION INTRAVENOUS
Status: DISPENSED
Start: 2018-11-19

## (undated) RX ORDER — LIDOCAINE HYDROCHLORIDE 10 MG/ML
INJECTION, SOLUTION EPIDURAL; INFILTRATION; INTRACAUDAL; PERINEURAL
Status: DISPENSED
Start: 2021-04-16